# Patient Record
Sex: FEMALE | Race: WHITE | NOT HISPANIC OR LATINO | Employment: OTHER | URBAN - METROPOLITAN AREA
[De-identification: names, ages, dates, MRNs, and addresses within clinical notes are randomized per-mention and may not be internally consistent; named-entity substitution may affect disease eponyms.]

---

## 2017-01-04 ENCOUNTER — ANTICOAGULATION THERAPY VISIT (OUTPATIENT)
Dept: ANTICOAGULATION | Facility: CLINIC | Age: 67
End: 2017-01-04
Payer: COMMERCIAL

## 2017-01-04 DIAGNOSIS — I48.20 CHRONIC ATRIAL FIBRILLATION (H): ICD-10-CM

## 2017-01-04 DIAGNOSIS — Z79.01 LONG-TERM (CURRENT) USE OF ANTICOAGULANTS: Primary | ICD-10-CM

## 2017-01-04 LAB — INR POINT OF CARE: 2.9 (ref 0.86–1.14)

## 2017-01-04 PROCEDURE — 99207 ZZC NO CHARGE NURSE ONLY: CPT

## 2017-01-04 PROCEDURE — 36416 COLLJ CAPILLARY BLOOD SPEC: CPT

## 2017-01-04 PROCEDURE — 85610 PROTHROMBIN TIME: CPT | Mod: QW

## 2017-01-04 NOTE — PROGRESS NOTES
ANTICOAGULATION FOLLOW-UP CLINIC VISIT    Patient Name:  Latanya Padron  Date:  1/4/2017  Contact Type:  Face to Face    SUBJECTIVE:     Patient Findings     Positives No Problem Findings           OBJECTIVE    INR PROTIME   Date Value Ref Range Status   01/04/2017 2.9* 0.86 - 1.14 Final       ASSESSMENT / PLAN  INR assessment THER    Recheck INR In: 6 WEEKS    INR Location Clinic      Anticoagulation Summary as of 1/4/2017     INR goal 2.0-3.0   Selected INR 2.9 (1/4/2017)   Maintenance plan 5 mg (5 mg x 1) on Mon; 2.5 mg (5 mg x 0.5) all other days   Full instructions 5 mg on Mon; 2.5 mg all other days   Weekly total 20 mg   No change documented Francine Bowen RN   Plan last modified Francine Bowen RN (3/4/2016)   Next INR check 2/15/2017   Target end date     Indications   Long-term (current) use of anticoagulants [Z79.01] [Z79.01]  Atrial fibrillation (H) [I48.91]         Anticoagulation Episode Summary     INR check location     Preferred lab     Send INR reminders to [facility] POOL    Comments 5 mg tabs, likes card       Anticoagulation Care Providers     Provider Role Specialty Phone number    Glen Blue MD [facility] [phone]            See the Encounter Report to view Anticoagulation Flowsheet and Dosing Calendar (Go to Encounters tab in chart review, and find the Anticoagulation Therapy Visit)    Delete key to remove if not charging [id number]    Francine Bowen, LOVE

## 2017-02-14 ENCOUNTER — TELEPHONE (OUTPATIENT)
Dept: INTERNAL MEDICINE | Facility: CLINIC | Age: 67
End: 2017-02-14

## 2017-02-17 ENCOUNTER — TELEPHONE (OUTPATIENT)
Dept: INTERNAL MEDICINE | Facility: CLINIC | Age: 67
End: 2017-02-17

## 2017-02-17 DIAGNOSIS — Z53.9 ERRONEOUS ENCOUNTER--DISREGARD: Primary | ICD-10-CM

## 2017-02-17 RX ORDER — DILTIAZEM HYDROCHLORIDE 120 MG/1
CAPSULE, EXTENDED RELEASE ORAL
Qty: 90 CAPSULE | Refills: 3 | OUTPATIENT
Start: 2017-02-17

## 2017-02-17 NOTE — TELEPHONE ENCOUNTER
Pt is wondering if she can discontinue taking Lipitor. Stated her labs were good in Dec. Please call.

## 2017-02-17 NOTE — TELEPHONE ENCOUNTER
Per Dr. Green the good labs mean the medication is working and she should stay on her Lipitor. Patient notified. Caprice MCKEON

## 2017-02-21 ENCOUNTER — TELEPHONE (OUTPATIENT)
Dept: ANTICOAGULATION | Facility: CLINIC | Age: 67
End: 2017-02-21

## 2017-02-21 ENCOUNTER — ANTICOAGULATION THERAPY VISIT (OUTPATIENT)
Dept: ANTICOAGULATION | Facility: CLINIC | Age: 67
End: 2017-02-21
Payer: COMMERCIAL

## 2017-02-21 DIAGNOSIS — I48.20 CHRONIC ATRIAL FIBRILLATION (H): Primary | ICD-10-CM

## 2017-02-21 DIAGNOSIS — I48.20 CHRONIC ATRIAL FIBRILLATION (H): ICD-10-CM

## 2017-02-21 DIAGNOSIS — Z79.01 LONG-TERM (CURRENT) USE OF ANTICOAGULANTS: ICD-10-CM

## 2017-02-21 LAB — INR POINT OF CARE: 1.7 (ref 0.86–1.14)

## 2017-02-21 PROCEDURE — 36416 COLLJ CAPILLARY BLOOD SPEC: CPT

## 2017-02-21 PROCEDURE — 99207 ZZC NO CHARGE NURSE ONLY: CPT

## 2017-02-21 PROCEDURE — 85610 PROTHROMBIN TIME: CPT | Mod: QW

## 2017-02-21 NOTE — PROGRESS NOTES
ANTICOAGULATION FOLLOW-UP CLINIC VISIT    Patient Name:  Latanya Padron  Date:  2/21/2017  Contact Type:  Face to Face    SUBJECTIVE:     Patient Findings     Positives Change in diet/appetite (Increased greens in diet), Bruising (Bruising on bilat knees from fall, healing), No Problem Findings           OBJECTIVE    INR Protime   Date Value Ref Range Status   02/21/2017 1.7 (A) 0.86 - 1.14 Final       ASSESSMENT / PLAN  INR assessment SUB    Recheck INR In: 2 WEEKS    INR Location Clinic      Anticoagulation Summary as of 2/21/2017     INR goal 2.0-3.0   Today's INR 1.7!   Maintenance plan 5 mg (5 mg x 1) on Mon, Thu; 2.5 mg (5 mg x 0.5) all other days   Full instructions 2/21: 5 mg; Otherwise 5 mg on Mon, Thu; 2.5 mg all other days   Weekly total 22.5 mg   Plan last modified Nino Paz RN (2/21/2017)   Next INR check 3/7/2017   Target end date     Indications   Long-term (current) use of anticoagulants [Z79.01] [Z79.01]  Atrial fibrillation (H) [I48.91]         Anticoagulation Episode Summary     INR check location     Preferred lab     Send INR reminders to [facility] POOL    Comments 5 mg tabs, likes card       Anticoagulation Care Providers     Provider Role Specialty Phone number    Glen Blue MD [facility] [phone]            See the Encounter Report to view Anticoagulation Flowsheet and Dosing Calendar (Go to Encounters tab in chart review, and find the Anticoagulation Therapy Visit)    Dosage adjustment made based on physician directed care plan.    Nino Paz, RN

## 2017-02-21 NOTE — MR AVS SNAPSHOT
Latanya Padron   2/21/2017 10:45 AM   Anticoagulation Therapy Visit    Description:  66 year old female   Provider:  [facility] ANTI COAG   Department:  [facility]           INR as of 2/21/2017     Today's INR 1.7!      Anticoagulation Summary as of 2/21/2017     INR goal 2.0-3.0   Today's INR 1.7!   Full instructions 2/21: 5 mg; Otherwise 5 mg on Mon, Thu; 2.5 mg all other days   Next INR check 3/7/2017    Indications   Long-term (current) use of anticoagulants [Z79.01] [Z79.01]  Atrial fibrillation (H) [I48.91]         Your next Anticoagulation Clinic appointment(s)     Mar 07, 2017  2:15 PM CST   Anticoagulation Visit with PH ANTI COAG   [facility]    [address] [place] [address]   [phone]              Contact Numbers     Clinic Number:         February 2017 Details    Sun Mon Tue Wed Thu Fri Sat        1               2               3               4                 5               6               7               8               9               10               11                 12               13               14               15               16               17               18                 19               20               21      5 mg   See details      22      2.5 mg         23      5 mg         24      2.5 mg         25      2.5 mg           26      2.5 mg         27      5 mg         28      2.5 mg              Date Details   02/21 This INR check               How to take your warfarin dose     To take:  2.5 mg Take 0.5 of a 5 mg tablet.    To take:  5 mg Take 1 of the 5 mg tablets.           March 2017 Details    Sun Mon Tue Wed Thu Fri Sat        1      2.5 mg         2      5 mg         3      2.5 mg         4      2.5 mg           5      2.5 mg         6      5 mg         7            8               9               10               11                 12               13               14               15               16                17               18                 19               20               21               22               23               24               25                 26               27               28               29               30               31                 Date Details   No additional details    Date of next INR:  3/7/2017         How to take your warfarin dose     To take:  2.5 mg Take 0.5 of a 5 mg tablet.    To take:  5 mg Take 1 of the 5 mg tablets.

## 2017-03-07 ENCOUNTER — ANTICOAGULATION THERAPY VISIT (OUTPATIENT)
Dept: ANTICOAGULATION | Facility: CLINIC | Age: 67
End: 2017-03-07
Payer: COMMERCIAL

## 2017-03-07 DIAGNOSIS — I10 HYPERTENSION, GOAL BELOW 140/90: ICD-10-CM

## 2017-03-07 DIAGNOSIS — Z79.01 LONG-TERM (CURRENT) USE OF ANTICOAGULANTS: ICD-10-CM

## 2017-03-07 DIAGNOSIS — J43.9 PULMONARY EMPHYSEMA, UNSPECIFIED EMPHYSEMA TYPE (H): ICD-10-CM

## 2017-03-07 DIAGNOSIS — I48.20 CHRONIC ATRIAL FIBRILLATION (H): ICD-10-CM

## 2017-03-07 LAB — INR POINT OF CARE: 2.2 (ref 0.86–1.14)

## 2017-03-07 PROCEDURE — 99207 ZZC NO CHARGE NURSE ONLY: CPT

## 2017-03-07 PROCEDURE — 85610 PROTHROMBIN TIME: CPT | Mod: QW

## 2017-03-07 PROCEDURE — 36416 COLLJ CAPILLARY BLOOD SPEC: CPT

## 2017-03-07 NOTE — TELEPHONE ENCOUNTER
mometasone-formoterol (DULERA) 200-5 MCG/ACT oral inhaler       Last Written Prescription Date: 12/28/15  Last Fill Quantity: 13, # refills: 11    Last [place] [facility] or  Health prescribing provider:  12/19/16   Future Office Visit:       Date of Last Asthma Action Plan Letter:   There are no preventive care reminders to display for this patient.   Asthma Control Test: No flowsheet data found.    Date of Last Spirometry Test:   No results found for this or any previous visit.    lisinopril (PRINIVIL,ZESTRIL) 2.5 MG tablet      Last Written Prescription Date: 11/21/16  Last Fill Quantity: 90, # refills: 0  Last Office Visit [place] [facility] or  Health prescribing provider: 12/19/16       Potassium   Date Value Ref Range Status   12/05/2016 4.3 3.4 - 5.3 mmol/L Final     Creatinine   Date Value Ref Range Status   12/05/2016 0.90 0.52 - 1.04 mg/dL Final     BP Readings from Last 3 Encounters:   12/19/16 108/68   12/05/16 112/80   10/18/16 126/82     hydrochlorothiazide (HYDRODIURIL) 25 MG tablet      Last Written Prescription Date: 11/21/16  Last Fill Quantity: 90, # refills: 0  Last [place] Visit [place] [facility] or  Health prescribing provider: 12/19/16       Potassium   Date Value Ref Range Status   12/05/2016 4.3 3.4 - 5.3 mmol/L Final     Creatinine   Date Value Ref Range Status   12/05/2016 0.90 0.52 - 1.04 mg/dL Final     BP Readings from Last 3 Encounters:   12/19/16 108/68   12/05/16 112/80   10/18/16 126/82

## 2017-03-07 NOTE — PROGRESS NOTES
ANTICOAGULATION FOLLOW-UP CLINIC VISIT    Patient Name:  Latanya Padron  Date:  3/7/2017  Contact Type:  Face to Face    SUBJECTIVE:     Patient Findings     Positives No Problem Findings           OBJECTIVE    INR Protime   Date Value Ref Range Status   03/07/2017 2.2 (A) 0.86 - 1.14 Final       ASSESSMENT / PLAN  INR assessment THER    Recheck INR In: 6 WEEKS    INR Location Clinic      Anticoagulation Summary as of 3/7/2017     INR goal 2.0-3.0   Today's INR 2.2   Maintenance plan 5 mg (5 mg x 1) on Mon, Thu; 2.5 mg (5 mg x 0.5) all other days   Full instructions 5 mg on Mon, Thu; 2.5 mg all other days   Weekly total 22.5 mg   No change documented Francine Bowen RN   Plan last modified Nino Paz RN (2/21/2017)   Next INR check 4/18/2017   Target end date     Indications   Long-term (current) use of anticoagulants [Z79.01] [Z79.01]  Atrial fibrillation (H) [I48.91]         Anticoagulation Episode Summary     INR check location     Preferred lab     Send INR reminders to [facility] POOL    Comments 5 mg tabs, likes card       Anticoagulation Care Providers     Provider Role Specialty Phone number    Glen Blue MD [facility] [phone]            See the Encounter Report to view Anticoagulation Flowsheet and Dosing Calendar (Go to Encounters tab in chart review, and find the Anticoagulation Therapy Visit)    Dosage adjustment made based on physician directed care plan.      Francine Bowen, RN

## 2017-03-07 NOTE — MR AVS SNAPSHOT
Latanya Padron   3/7/2017 2:15 PM   Anticoagulation Therapy Visit    Description:  66 year old female   Provider:  [facility]   Department:  [facility]           INR as of 3/7/2017     Today's INR 2.2      Anticoagulation Summary as of 3/7/2017     INR goal 2.0-3.0   Today's INR 2.2   Full instructions 5 mg on Mon, Thu; 2.5 mg all other days   Next INR check 4/18/2017    Indications   Long-term (current) use of anticoagulants [Z79.01] [Z79.01]  Atrial fibrillation (H) [I48.91]         Your next Anticoagulation Clinic appointment(s)     Apr 18, 2017 11:00 AM CDT   Anticoagulation Visit with DALIA ANTI COAALISTAIR   [facility]    [address] [place] [id number] [phone]              Contact Numbers     Clinic Number:         March 2017 Details    Sun Mon Tue Wed Thu Fri Sat        1               2               3               4                 5               6               7      2.5 mg   See details      8      2.5 mg         9      5 mg         10      2.5 mg         11      2.5 mg           12      2.5 mg         13      5 mg         14      2.5 mg         15      2.5 mg         16      5 mg         17      2.5 mg         18      2.5 mg           19      2.5 mg         20      5 mg         21      2.5 mg         22      2.5 mg         23      5 mg         24      2.5 mg         25      2.5 mg           26      2.5 mg         27      5 mg         28      2.5 mg         29      2.5 mg         30      5 mg         31      2.5 mg           Date Details   03/07 This INR check               How to take your warfarin dose     To take:  2.5 mg Take 0.5 of a 5 mg tablet.    To take:  5 mg Take 1 of the 5 mg tablets.           April 2017 Details    Sun Mon Tue Wed Thu Fri Sat           1      2.5 mg           2      2.5 mg         3      5 mg         4      2.5 mg         5      2.5 mg         6      5 mg         7      2.5 mg         8      2.5 mg            9      2.5 mg         10      5 mg         11      2.5 mg         12      2.5 mg         13      5 mg         14      2.5 mg         15      2.5 mg           16      2.5 mg         17      5 mg         18            19               20               21               22                 23               24               25               26               27               28               29                 30                      Date Details   No additional details    Date of next INR:  4/18/2017         How to take your warfarin dose     To take:  2.5 mg Take 0.5 of a 5 mg tablet.    To take:  5 mg Take 1 of the 5 mg tablets.

## 2017-03-08 RX ORDER — LISINOPRIL 2.5 MG/1
TABLET ORAL
Qty: 90 TABLET | Refills: 2 | Status: SHIPPED | OUTPATIENT
Start: 2017-03-08 | End: 2017-06-21

## 2017-03-08 RX ORDER — HYDROCHLOROTHIAZIDE 25 MG/1
TABLET ORAL
Qty: 90 TABLET | Refills: 2 | Status: SHIPPED | OUTPATIENT
Start: 2017-03-08 | End: 2017-06-21

## 2017-03-08 RX ORDER — MOMETASONE FUROATE AND FORMOTEROL FUMARATE DIHYDRATE 200; 5 UG/1; UG/1
AEROSOL RESPIRATORY (INHALATION)
Qty: 13 G | Refills: 8 | Status: SHIPPED | OUTPATIENT
Start: 2017-03-08 | End: 2017-06-21

## 2017-03-08 NOTE — TELEPHONE ENCOUNTER
Prescription approved per [facility] Refill Protocol.............LOVE Barnhart

## 2017-03-21 DIAGNOSIS — M81.0 OSTEOPOROSIS: ICD-10-CM

## 2017-03-21 NOTE — TELEPHONE ENCOUNTER
Vitamin d      Last Written Prescription Date: 12/5/16  Last Fill Quantity: 100,  # refills: 0   Last Office Visit with [facility] or [facility] prescribing provider: 12/19/16                                             Gail Becerra MA 3/21/2017

## 2017-03-22 RX ORDER — OMEGA-3S/DHA/EPA/FISH OIL/D3 300MG-1000
CAPSULE ORAL
Qty: 100 TABLET | Refills: 10 | Status: SHIPPED | OUTPATIENT
Start: 2017-03-22 | End: 2019-09-11 | Stop reason: DRUGHIGH

## 2017-03-22 NOTE — TELEPHONE ENCOUNTER
Prescription approved per [facility] [place].  Kathy Ramon RN

## 2017-04-05 ENCOUNTER — TELEPHONE (OUTPATIENT)
Dept: FAMILY MEDICINE | Facility: CLINIC | Age: 67
End: 2017-04-05

## 2017-04-05 NOTE — TELEPHONE ENCOUNTER
Please advise   You have Same day and acute and a dr only that afternoon.    Gail Becerra MA 4/5/2017

## 2017-04-05 NOTE — TELEPHONE ENCOUNTER
Patient.'s apt was rescheduled per her request and confirmed with patient VORB Dr. Glen Blue/Brenda Carlin CMA (AAMA)

## 2017-04-17 ENCOUNTER — ANTICOAGULATION THERAPY VISIT (OUTPATIENT)
Dept: ANTICOAGULATION | Facility: CLINIC | Age: 67
End: 2017-04-17
Payer: COMMERCIAL

## 2017-04-17 ENCOUNTER — OFFICE VISIT (OUTPATIENT)
Dept: FAMILY MEDICINE | Facility: CLINIC | Age: 67
End: 2017-04-17
Payer: COMMERCIAL

## 2017-04-17 VITALS
SYSTOLIC BLOOD PRESSURE: 124 MMHG | HEART RATE: 90 BPM | WEIGHT: 209 LBS | TEMPERATURE: 96.8 F | HEIGHT: 62 IN | DIASTOLIC BLOOD PRESSURE: 64 MMHG | OXYGEN SATURATION: 97 % | RESPIRATION RATE: 14 BRPM | BODY MASS INDEX: 38.46 KG/M2

## 2017-04-17 DIAGNOSIS — J43.9 PULMONARY EMPHYSEMA, UNSPECIFIED EMPHYSEMA TYPE (H): ICD-10-CM

## 2017-04-17 DIAGNOSIS — M17.0 PRIMARY OSTEOARTHRITIS OF BOTH KNEES: Primary | ICD-10-CM

## 2017-04-17 DIAGNOSIS — Z79.01 LONG-TERM (CURRENT) USE OF ANTICOAGULANTS: ICD-10-CM

## 2017-04-17 DIAGNOSIS — I48.20 CHRONIC ATRIAL FIBRILLATION (H): ICD-10-CM

## 2017-04-17 DIAGNOSIS — E66.01 MORBID OBESITY, UNSPECIFIED OBESITY TYPE (H): ICD-10-CM

## 2017-04-17 DIAGNOSIS — F32.5 MAJOR DEPRESSION IN COMPLETE REMISSION (H): ICD-10-CM

## 2017-04-17 LAB — INR POINT OF CARE: 2.5 (ref 0.86–1.14)

## 2017-04-17 PROCEDURE — 36416 COLLJ CAPILLARY BLOOD SPEC: CPT

## 2017-04-17 PROCEDURE — 99214 OFFICE O/P EST MOD 30 MIN: CPT | Performed by: FAMILY MEDICINE

## 2017-04-17 PROCEDURE — 85610 PROTHROMBIN TIME: CPT | Mod: QW

## 2017-04-17 PROCEDURE — 99207 ZZC NO CHARGE NURSE ONLY: CPT

## 2017-04-17 ASSESSMENT — PAIN SCALES - GENERAL: PAINLEVEL: NO PAIN (0)

## 2017-04-17 NOTE — NURSING NOTE
"Chief Complaint   Patient presents with     Leg Pain     c/o knee and leg pain     COPD     Recheck       Initial /64 (BP Location: Left arm, Patient Position: Chair, Cuff Size: Adult Regular)  Pulse 90  Temp 96.8  F (36  C) (Temporal)  Resp 14  Ht 5' 1.5\" (1.562 m)  Wt 209 lb (94.8 kg)  SpO2 97%  BMI 38.85 kg/m2 Estimated body mass index is 38.85 kg/(m^2) as calculated from the following:    Height as of this encounter: 5' 1.5\" (1.562 m).    Weight as of this encounter: 209 lb (94.8 kg).  Medication Reconciliation: complete   Gail Becerra MA 4/17/2017        "

## 2017-04-17 NOTE — MR AVS SNAPSHOT
After Visit Summary   4/17/2017    Latanya Padron    MRN: [id number]           Patient Information     Date Of Birth          1950        Visit Information        Provider Department      4/17/2017 2:45 PM Glen Blue MD [facility]        Today's Diagnoses     Long-term (current) use of anticoagulants [Z79.01]    -  1    Chronic atrial fibrillation (H)        Pulmonary emphysema, unspecified emphysema type (H)        Disorder of bone and cartilage          Care Instructions    Knees let me know if you would like to try injections for the knees  All else the same..            Follow-ups after your visit        Additional Services     INR CLINIC REFERRAL       Your provider has referred you to INR Services.    Please be aware that coverage of these services is subject to the terms and limitations of your health insurance plan.  Call member services at your health plan with any benefit or coverage questions.    Indication for Anticoagulation: Atrial Fibrillation  If nonstandard INR is desired, indicate goal range and explanation:   Expected Duration of Therapy: Lifetime                  Your next 10 appointments already scheduled     May 31, 2017  2:00 PM CDT   Anticoagulation Visit with PH ANTI COAG   [facility] ([address] [id number] [phone]            Jun 21, 2017  2:15 PM CDT   SHORT with Glen Blue MD   [facility] ([address] [address] [phone]              Who to contact     If you have questions or need follow up information about today's clinic visit or your schedule please contact [facility] directly at [phone].  Normal or non-critical lab and imaging results will be communicated to you by MyChart, letter or phone within 4 business days after the clinic has received the results. If you  "do not hear from us within 7 days, please contact the clinic through [employer] or phone. If you have a critical or abnormal lab result, we will notify you by phone as soon as possible.  Submit refill requests through [employer] or call your pharmacy and they will forward the refill request to us. Please allow 3 business days for your refill to be completed.          Additional Information About Your Visit        [employer] Information     [employer] gives you secure access to your electronic health record. If you see a primary care provider, you can also send messages to your care team and make appointments. If you have questions, please call your primary care clinic.  If you do not have a primary care provider, please call [phone] and they will assist you.        Care EveryWhere ID     This is your Care EveryWhere ID. This could be used by other organizations to access your [facility] medical records  [id number]        Your Vitals Were     Pulse Temperature Respirations Height Pulse Oximetry BMI (Body Mass Index)    90 96.8  F (36  C) (Temporal) 14 5' 1.5\" (1.562 m) 97% 38.85 kg/m2       Blood Pressure from Last 3 Encounters:   04/17/17 124/64   12/19/16 108/68   12/05/16 112/80    Weight from Last 3 Encounters:   04/17/17 209 lb (94.8 kg)   12/19/16 210 lb (95.3 kg)   12/05/16 210 lb (95.3 kg)              We Performed the Following     COPD ACTION PLAN     INR CLINIC REFERRAL        Primary Care Provider Office Phone # Fax #    Glen Blue [phone]       [facility] [address] [place] [address]        Thank you!     Thank you for choosing [facility]  for your care. Our goal is always to provide you with excellent care. Hearing back from our patients is one way we can continue to improve our services. Please take a few minutes to complete the written survey that you may receive in the mail after your visit with us. Thank you!             Your Updated " Medication List - Protect others around you: Learn how to safely use, store and throw away your medicines at www.disposemymeds.org.          This list is accurate as of: 4/17/17  3:25 PM.  Always use your most recent med list.                   Brand Name Dispense Instructions for use    * albuterol (2.5 MG/3ML) 0.083% neb solution     75 mL    Take  by nebulization. 1 NEB EVERY 4-6 HOURS AS NEEDED       * albuterol 108 (90 BASE) MCG/ACT Inhaler    PROAIR HFA/PROVENTIL HFA/VENTOLIN HFA    2 Inhaler    1-2 puffs by mouth every 2-4 hours as needed       alendronate 70 MG tablet    FOSAMAX    4 tablet    Take 1 tablet (70 mg) by mouth every 7 days Take 60 minutes before am meal with 8 oz. water. Remain upright for 30 minutes.       amoxicillin 250 MG capsule    AMOXIL    21 capsule    Take 1 capsule (250 mg) by mouth 3 times daily       ASPIRIN NOT PRESCRIBED    INTENTIONAL    0 each    1 each continuous prn for other Reported on 4/17/2017       atorvastatin 10 MG tablet    LIPITOR    30 tablet    TAKE ONE TABLET BY MOUTH EVERY DAY       cholecalciferol 400 UNITS tablet    Vitamin D    100 tablet    TAKE ONE TABLET BY MOUTH EVERY DAY       diltiazem 120 MG 24 hr capsule    CARTIA XT    90 capsule    TAKE ONE CAPSULE BY MOUTH EVERY DAY (DUE FOR FOLLOW-UP APPOINTMENT)       DULERA 200-5 MCG/ACT oral inhaler   Generic drug:  mometasone-formoterol     13 g    INHALE 2 PUFFS BY MOUTH TWO TIMES A DAY       fish oil-omega-3 fatty acids 1000 MG capsule     180 capsule    Take  by mouth. Take 1 capsule by mouth daily       hydrochlorothiazide 25 MG tablet    HYDRODIURIL    90 tablet    TAKE ONE TABLET BY MOUTH EVERY DAY       lisinopril 2.5 MG tablet    PRINIVIL/Zestril    90 tablet    TAKE ONE TABLET BY MOUTH EVERY DAY       methylPREDNISolone 4 MG tablet    MEDROL DOSEPAK    21 tablet    Follow package instructions       MULTIPLE vitamin  S/WOMENS Tabs     30 tablet    Daily       order for DME      Equipment being ordered:  Oxygen @ 2 liters with exertion       umeclidinium 62.5 MCG/INH oral inhaler    INCRUSE ELLIPTA    30 each    Inhale 1 capsule (62.5 mcg) into the lungs daily       venlafaxine 37.5 MG 24 hr capsule    EFFEXOR-XR    90 capsule    TAKE ONE CAPSULE BY MOUTH EVERY DAY WITH FOOD       warfarin 5 MG tablet    JANTOVEN    60 tablet    Take 5 mg Mon and 2.5 mg other 6 days or as directed by coumadin clinic       * Notice:  This list has 2 medication(s) that are the same as other medications prescribed for you. Read the directions carefully, and ask your doctor or other care provider to review them with you.

## 2017-04-17 NOTE — LETTER
My COPD Action Plan   Name: Latanya Padron    YOB: 1950   Date: 4/17/2017    My doctor: Glen Blue MD   My clinic: [address] [address] [phone]  My Controller Medicine: { :453174}   Dose: ***     My Rescue Medicine: { :280811}   Dose: ***     My Flare Up Medicine: { :220372}   Dose: ***  My COPD Severity: { :441373}     Use of Oxygen: { :083861}        GREEN ZONE       Doing well today      Usual level of activity and exercise    Usual amount of cough and mucus    No shortness of breath    Usual level of health (thinking clearly, sleeping well, feel like eating) Actions:      Take daily medicines    Use oxygen as prescribed    Follow regular exercise and diet plan    Avoid cigarette smoke and other irritants that harm the lungs           YELLOW ZONE          Having a bad day or flare up      Short of breath more than usual    A lot more sputum (mucus) than usual    Sputum looks yellow, green, tan, brown or bloody    More coughing or wheezing    Fever or chills    Less energy; trouble completing activities    Trouble thinking or focusing    Using quick relief inhaler or nebulizer more often    Poor sleep; symptoms wake me up    Do not feel like eating Actions:      Get plenty of rest    Take daily medicines    Use quick relief inhaler every *** hours    If you use oxygen, call you doctor to see if you should adjust your oxygen    Do breathing exercises or other things to help you relax    Let a loved one, friend or neighbor know you are feeling worse    Call your care team if you have 2 or more symptoms.  Start taking steroids or antibiotics if directed by your care team           RED ZONE       Need medical care now      Severe shortness of breath (feel you can't breathe)    Fever, chills    Not enough breath to do any activity    Trouble coughing up mucus, walking or talking    Blood in mucus    Frequent coughing   Rescue medicines  are not working    Not able to sleep because of breathing    Feel confused or drowsy    Chest pain    Actions:      Call your health care team.  If you cannot reach your care team, call 911 or go to the emergency room.        Electronically signed by: Brenda Carlin, April 17, 2017  Annual Reminders:  Meet with Care Team, Flu Shot every Fall and Pneumonia Shot at least once  Pharmacy:    [id number] - [place], [place] - [address] [facility] [place], [place] - [address] [place] [address]

## 2017-04-17 NOTE — PROGRESS NOTES
ANTICOAGULATION FOLLOW-UP CLINIC VISIT    Patient Name:  Latanya Padron  Date:  4/17/2017  Contact Type:  Face to Face    SUBJECTIVE:     Patient Findings     Positives No Problem Findings           OBJECTIVE    INR Protime   Date Value Ref Range Status   04/17/2017 2.5 (A) 0.86 - 1.14 Final       ASSESSMENT / PLAN  INR assessment THER    Recheck INR In: 6 WEEKS    INR Location Clinic      Anticoagulation Summary as of 4/17/2017     INR goal 2.0-3.0   Today's INR 2.5   Maintenance plan 5 mg (5 mg x 1) on Mon, Thu; 2.5 mg (5 mg x 0.5) all other days   Full instructions 5 mg on Mon, Thu; 2.5 mg all other days   Weekly total 22.5 mg   No change documented Nino Paz RN   Plan last modified Nino Paz RN (2/21/2017)   Next INR check 5/31/2017   Target end date     Indications   Long-term (current) use of anticoagulants [Z79.01] [Z79.01]  Atrial fibrillation (H) [I48.91]         Anticoagulation Episode Summary     INR check location     Preferred lab     Send INR reminders to [facility] POOL    Comments 5 mg tabs, likes card       Anticoagulation Care Providers     Provider Role Specialty Phone number    Glen Blue MD [facility] Practice [phone]            See the Encounter Report to view Anticoagulation Flowsheet and Dosing Calendar (Go to Encounters tab in chart review, and find the Anticoagulation Therapy Visit)    Dosage adjustment made based on physician directed care plan.    Nino Paz, RN

## 2017-04-17 NOTE — PROGRESS NOTES
"  SUBJECTIVE:                                                    Latanya Padron is a 67 year old female who presents to clinic today for the following health issues:      COPD Follow-Up    Symptoms are currently: stable sleeps with oxygen all night    Current fatigue or dyspnea with ambulation: stable     Shortness of breath: depends on situation    Increased or change in Cough/Sputum: No    Fever(s): No    Baseline ambulation without stopping to rest 1 to 1.5 blocks. Able to walk up 1 flights of stairs without stopping to rest.    Any ER/UC or hospital admissions since your last visit? No     History   Smoking Status     Former Smoker     Packs/day: 1.00     Years: 30.00     Quit date: 10/25/2005   Smokeless Tobacco     Never Used     Lab Results   Component Value Date    FEV1 0.50 04/05/2010    AVF7XDS 0.33 04/05/2010        C/o leg and knee pain:    Amount of exercise or physical activity: 2-3 days/week for an average of 30-45 minutes    Problems taking medications regularly: No    Medication side effects: none    Diet: weight watchers          Problem list and histories reviewed & adjusted, as indicated.  Additional history: none    Relieved mother is now in assisted living, less stress in care giving and with siblings who now see how much their mother has changed with dementia present.     Reviewed and updated as needed this visit by clinical staff       Reviewed and updated as needed this visit by Provider         ROS:  Constitutional, HEENT, cardiovascular, pulmonary, gi and gu systems are negative, except as otherwise noted.    OBJECTIVE:                                                    /64 (BP Location: Left arm, Patient Position: Chair, Cuff Size: Adult Regular)  Pulse 90  Temp 96.8  F (36  C) (Temporal)  Resp 14  Ht 5' 1.5\" (1.562 m)  Wt 209 lb (94.8 kg)  SpO2 97%  BMI 38.85 kg/m2  Body mass index is 38.85 kg/(m^2).  GENERAL: healthy, alert and no distress  NECK: no adenopathy, no " "asymmetry, masses, or scars and thyroid normal to palpation  RESP: lungs clear to auscultation - no rales, rhonchi or wheezes  CV: regular rate and rhythm, normal S1 S2, no S3 or S4, no murmur, click or rub, no peripheral edema and peripheral pulses strong  MS: painful knees with walking/steps.  Tender to palpate medial joint areas both knees. No swelling knees. Hips seem ok. Feet ok. No or minimal leg swelling  NEURO: Normal strength and tone and mentation intact    Diagnostic Test Results:  none      ASSESSMENT/PLAN:                                                    COPD: Moderate = FeV1 < 79% -50%, controlled  Associated with the following complications:  Supplemental Oxygen:  At night so sleeps better and able to be more active in daytime    Plan:  No changes in the patient's current treatment plan      COPD education: www.lungmn.org        BMI:   Estimated body mass index is 38.85 kg/(m^2) as calculated from the following:    Height as of this encounter: 5' 1.5\" (1.562 m).    Weight as of this encounter: 209 lb (94.8 kg).   Weight management plan: Discussed healthy diet and exercise guidelines and patient will follow up in 6 months in clinic to re-evaluate.    (M17.0) Primary osteoarthritis of both knees  (primary encounter diagnosis)  Comment: does not want to consider replacement. Will treat symptoms and knows injections may help  Plan: sports med consult for injection if desired. Discussed some of risks and benefits    (Z79.01) Long-term (current) use of anticoagulants [Z79.01]  Comment:   Plan: continue    (I48.2) Chronic atrial fibrillation (H)  Comment: controlled and doing well  Plan: INR CLINIC REFERRAL        As above    (J43.9) Pulmonary emphysema, unspecified emphysema type (H)  Comment: best I have seen her for a long time  Plan: COPD ACTION PLAN        Will continue night time oxygen use    (F32.5) Major depression in complete remission (H)  Comment: helps having mother's situation in control and " no longer work stres  Plan: stay with antidepressant.    (E66.01) Morbid obesity, unspecified obesity type (H)  Comment: knows this affects knees and will continue to try fewer calories and activity as able  Plan: above      MEDICATIONS:  Continue current medications without change  Work on weight loss  Patient Instructions   Knees let me know if you would like to try injections for the knees  All else the same..        Time spent with greater than 50% spent in discussion, making the plan, or filling out paperwork with patient for illness/treatments was 25 minutes or more. First time seeing her for a while and needed to update with records and discussion  Glen Blue MD  [facility]

## 2017-04-17 NOTE — MR AVS SNAPSHOT
Latanya Padron   4/17/2017 2:15 PM   Anticoagulation Therapy Visit    Description:  67 year old female   Provider:  DALIA ANTI COAG   Department:  [facility]           INR as of 4/17/2017     Today's INR 2.5      Anticoagulation Summary as of 4/17/2017     INR goal 2.0-3.0   Today's INR 2.5   Full instructions 5 mg on Mon, Thu; 2.5 mg all other days   Next INR check 5/31/2017    Indications   Long-term (current) use of anticoagulants [Z79.01] [Z79.01]  Atrial fibrillation (H) [I48.91]         Your next Anticoagulation Clinic appointment(s)     May 31, 2017  2:00 PM CDT   Anticoagulation Visit with DALIA ANTI CHERYL   [facility]    [address] [place] [id number] [phone]              Contact Numbers     Clinic Number:         April 2017 Details    Sun Mon Tue Wed Thu Fri Sat           1                 2               3               4               5               6               7               8                 9               10               11               12               13               14               15                 16               17      5 mg   See details      18      2.5 mg         19      2.5 mg         20      5 mg         21      2.5 mg         22      2.5 mg           23      2.5 mg         24      5 mg         25      2.5 mg         26      2.5 mg         27      5 mg         28      2.5 mg         29      2.5 mg           30      2.5 mg                Date Details   04/17 This INR check               How to take your warfarin dose     To take:  2.5 mg Take 0.5 of a 5 mg tablet.    To take:  5 mg Take 1 of the 5 mg tablets.           May 2017 Details    Sun Mon Tue Wed Thu Fri Sat      1      5 mg         2      2.5 mg         3      2.5 mg         4      5 mg         5      2.5 mg         6      2.5 mg           7      2.5 mg         8      5 mg         9      2.5 mg         10      2.5 mg         11      5 mg         12       2.5 mg         13      2.5 mg           14      2.5 mg         15      5 mg         16      2.5 mg         17      2.5 mg         18      5 mg         19      2.5 mg         20      2.5 mg           21      2.5 mg         22      5 mg         23      2.5 mg         24      2.5 mg         25      5 mg         26      2.5 mg         27      2.5 mg           28      2.5 mg         29      5 mg         30      2.5 mg         31                Date Details   No additional details    Date of next INR:  5/31/2017         How to take your warfarin dose     To take:  2.5 mg Take 0.5 of a 5 mg tablet.    To take:  5 mg Take 1 of the 5 mg tablets.

## 2017-04-18 DIAGNOSIS — Z79.01 LONG-TERM (CURRENT) USE OF ANTICOAGULANTS: ICD-10-CM

## 2017-04-18 DIAGNOSIS — I48.20 CHRONIC ATRIAL FIBRILLATION (H): ICD-10-CM

## 2017-04-18 PROBLEM — M17.0 PRIMARY OSTEOARTHRITIS OF BOTH KNEES: Status: ACTIVE | Noted: 2017-04-18

## 2017-04-18 NOTE — TELEPHONE ENCOUNTER
Feliciano     Last Written Prescription Date: 10/28/2016  Last Fill Qty: 60, # refills: 2   (lost her last supply she picked up)  Last Office Visit with [facility], [facility] or [facility] prescribing provider: 04/17/2017  Next 5 appointments (look out 90 days)     Jun 21, 2017  2:15 PM CDT   SHORT with Glen Blue MD   [facility]    [address] [place] [id number] [phone]                   Date and Result of Last PT/INR:   Lab Results   Component Value Date    INR 2.5 04/17/2017    INR 2.2 03/07/2017        Thanks  Delia Garcia [facility]   [phone]

## 2017-04-19 RX ORDER — WARFARIN SODIUM 5 MG/1
TABLET ORAL
Qty: 60 TABLET | Refills: 1 | Status: SHIPPED | OUTPATIENT
Start: 2017-04-19 | End: 2017-06-21

## 2017-05-31 ENCOUNTER — ANTICOAGULATION THERAPY VISIT (OUTPATIENT)
Dept: ANTICOAGULATION | Facility: CLINIC | Age: 67
End: 2017-05-31
Payer: COMMERCIAL

## 2017-05-31 DIAGNOSIS — I48.20 CHRONIC ATRIAL FIBRILLATION (H): ICD-10-CM

## 2017-05-31 DIAGNOSIS — Z79.01 LONG-TERM (CURRENT) USE OF ANTICOAGULANTS: ICD-10-CM

## 2017-05-31 LAB — INR POINT OF CARE: 2.2 (ref 0.86–1.14)

## 2017-05-31 PROCEDURE — 99207 ZZC NO CHARGE NURSE ONLY: CPT

## 2017-05-31 PROCEDURE — 85610 PROTHROMBIN TIME: CPT | Mod: QW

## 2017-05-31 PROCEDURE — 36416 COLLJ CAPILLARY BLOOD SPEC: CPT

## 2017-05-31 NOTE — MR AVS SNAPSHOT
Latanya Padron   5/31/2017 2:00 PM   Anticoagulation Therapy Visit    Description:  67 year old female   Provider:  DALIA ANTI CHERYL   Department:  [facility]           INR as of 5/31/2017     Today's INR 2.2      Anticoagulation Summary as of 5/31/2017     INR goal 2.0-3.0   Today's INR 2.2   Full instructions 5 mg on Mon, Thu; 2.5 mg all other days   Next INR check 7/12/2017    Indications   Long-term (current) use of anticoagulants [Z79.01] [Z79.01]  Atrial fibrillation (H) [I48.91]         Your next Anticoagulation Clinic appointment(s)     Jul 12, 2017  8:30 AM CDT   Anticoagulation Visit with PH ANTI COAG   [facility]    [address] [place] [id number] [phone]              Contact Numbers     Clinic Number:         May 2017 Details    Sun Mon Tue Wed Thu Fri Sat      1               2               3               4               5               6                 7               8               9               10               11               12               13                 14               15               16               17               18               19               20                 21               22               23               24               25               26               27                 28               29               30               31      2.5 mg   See details          Date Details   05/31 This INR check               How to take your warfarin dose     To take:  2.5 mg Take 0.5 of a 5 mg tablet.           June 2017 Details    Sun Mon Tue Wed Thu Fri Sat         1      5 mg         2      2.5 mg         3      2.5 mg           4      2.5 mg         5      5 mg         6      2.5 mg         7      2.5 mg         8      5 mg         9      2.5 mg         10      2.5 mg           11      2.5 mg         12      5 mg         13      2.5 mg         14      2.5 mg         15      5 mg         16      2.5 mg          17      2.5 mg           18      2.5 mg         19      5 mg         20      2.5 mg         21      2.5 mg         22      5 mg         23      2.5 mg         24      2.5 mg           25      2.5 mg         26      5 mg         27      2.5 mg         28      2.5 mg         29      5 mg         30      2.5 mg           Date Details   No additional details            How to take your warfarin dose     To take:  2.5 mg Take 0.5 of a 5 mg tablet.    To take:  5 mg Take 1 of the 5 mg tablets.           July 2017 Details    Sun Mon Tue Wed Thu Fri Sat           1      2.5 mg           2      2.5 mg         3      5 mg         4      2.5 mg         5      2.5 mg         6      5 mg         7      2.5 mg         8      2.5 mg           9      2.5 mg         10      5 mg         11      2.5 mg         12            13               14               15                 16               17               18               19               20               21               22                 23               24               25               26               27               28               29                 30               31                     Date Details   No additional details    Date of next INR:  7/12/2017         How to take your warfarin dose     To take:  2.5 mg Take 0.5 of a 5 mg tablet.    To take:  5 mg Take 1 of the 5 mg tablets.

## 2017-05-31 NOTE — PROGRESS NOTES
ANTICOAGULATION FOLLOW-UP CLINIC VISIT    Patient Name:  Latanya Padron  Date:  5/31/2017  Contact Type:  Face to Face    SUBJECTIVE:     Patient Findings     Positives No Problem Findings           OBJECTIVE    INR Protime   Date Value Ref Range Status   05/31/2017 2.2 (A) 0.86 - 1.14 Final       ASSESSMENT / PLAN  INR assessment THER    Recheck INR In: 6 WEEKS    INR Location Clinic      Anticoagulation Summary as of 5/31/2017     INR goal 2.0-3.0   Today's INR 2.2   Maintenance plan 5 mg (5 mg x 1) on Mon, Thu; 2.5 mg (5 mg x 0.5) all other days   Full instructions 5 mg on Mon, Thu; 2.5 mg all other days   Weekly total 22.5 mg   No change documented Francine Bowen RN   Plan last modified Nino Paz RN (2/21/2017)   Next INR check 7/12/2017   Target end date     Indications   Long-term (current) use of anticoagulants [Z79.01] [Z79.01]  Atrial fibrillation (H) [I48.91]         Anticoagulation Episode Summary     INR check location     Preferred lab     Send INR reminders to [facility] POOL    Comments 5 mg tabs, likes card       Anticoagulation Care Providers     Provider Role Specialty Phone number    Glen Blue MD [facility] [phone]            See the Encounter Report to view Anticoagulation Flowsheet and Dosing Calendar (Go to Encounters tab in chart review, and find the Anticoagulation Therapy Visit)    Dosage adjustment made based on physician directed care plan.      Francine Bowen, RN

## 2017-06-03 DIAGNOSIS — J43.9 PULMONARY EMPHYSEMA, UNSPECIFIED EMPHYSEMA TYPE (H): Primary | ICD-10-CM

## 2017-06-05 DIAGNOSIS — F32.5 MAJOR DEPRESSION IN COMPLETE REMISSION (H): ICD-10-CM

## 2017-06-05 RX ORDER — ALBUTEROL SULFATE 90 UG/1
AEROSOL, METERED RESPIRATORY (INHALATION)
Qty: 2 INHALER | Refills: 11 | Status: SHIPPED | OUTPATIENT
Start: 2017-06-05 | End: 2019-05-13

## 2017-06-06 NOTE — TELEPHONE ENCOUNTER
venlafaxine (EFFEXOR-XR) 37.5 MG 24 hr capsule    Last Written Prescription Date: 11/21/16  Last Fill Quantity: 90, # refills: 1  Last Office Visit with [facility], P or [facility] prescribing provider: 4/17/17   Next 5 appointments (look out 90 days)     Jun 21, 2017  2:15 PM CDT   SHORT with Glen Blue MD   [facility]    [address] [place] [id number] [phone]                   BP Readings from Last 3 Encounters:   04/17/17 124/64   12/19/16 108/68   12/05/16 112/80     Pulse: (for Fetzima)  Creatinine   Date Value Ref Range Status   12/05/2016 0.90 0.52 - 1.04 mg/dL Final   ]    Last PHQ-9 score on record=   PHQ-9 SCORE 12/19/2016   Total Score -   Total Score 0

## 2017-06-07 RX ORDER — VENLAFAXINE HYDROCHLORIDE 37.5 MG/1
CAPSULE, EXTENDED RELEASE ORAL
Qty: 90 CAPSULE | Refills: 0 | Status: SHIPPED | OUTPATIENT
Start: 2017-06-07 | End: 2017-06-21

## 2017-06-07 NOTE — TELEPHONE ENCOUNTER
Prescription approved per [facility] Refill Protocol.  Suze Damian [facility]  [phone]

## 2017-06-21 ENCOUNTER — OFFICE VISIT (OUTPATIENT)
Dept: FAMILY MEDICINE | Facility: CLINIC | Age: 67
End: 2017-06-21
Payer: COMMERCIAL

## 2017-06-21 VITALS
RESPIRATION RATE: 16 BRPM | TEMPERATURE: 97.3 F | HEART RATE: 84 BPM | OXYGEN SATURATION: 100 % | DIASTOLIC BLOOD PRESSURE: 70 MMHG | WEIGHT: 218 LBS | BODY MASS INDEX: 40.52 KG/M2 | SYSTOLIC BLOOD PRESSURE: 132 MMHG

## 2017-06-21 DIAGNOSIS — I10 HYPERTENSION, GOAL BELOW 140/90: ICD-10-CM

## 2017-06-21 DIAGNOSIS — E78.5 HYPERLIPIDEMIA LDL GOAL <130: ICD-10-CM

## 2017-06-21 DIAGNOSIS — J43.9 PULMONARY EMPHYSEMA, UNSPECIFIED EMPHYSEMA TYPE (H): ICD-10-CM

## 2017-06-21 DIAGNOSIS — N18.30 CKD (CHRONIC KIDNEY DISEASE) STAGE 3, GFR 30-59 ML/MIN (H): ICD-10-CM

## 2017-06-21 DIAGNOSIS — I48.20 CHRONIC ATRIAL FIBRILLATION (H): Primary | ICD-10-CM

## 2017-06-21 DIAGNOSIS — Z79.01 LONG-TERM (CURRENT) USE OF ANTICOAGULANTS: ICD-10-CM

## 2017-06-21 DIAGNOSIS — F32.5 MAJOR DEPRESSION IN COMPLETE REMISSION (H): ICD-10-CM

## 2017-06-21 DIAGNOSIS — E66.01 MORBID OBESITY, UNSPECIFIED OBESITY TYPE (H): ICD-10-CM

## 2017-06-21 LAB
ANION GAP SERPL CALCULATED.3IONS-SCNC: 3 MMOL/L (ref 3–14)
BUN SERPL-MCNC: 17 MG/DL (ref 7–30)
CALCIUM SERPL-MCNC: 8.7 MG/DL (ref 8.5–10.1)
CHLORIDE SERPL-SCNC: 105 MMOL/L (ref 94–109)
CO2 SERPL-SCNC: 33 MMOL/L (ref 20–32)
CREAT SERPL-MCNC: 0.95 MG/DL (ref 0.52–1.04)
ERYTHROCYTE [DISTWIDTH] IN BLOOD BY AUTOMATED COUNT: 12.7 % (ref 10–15)
GFR SERPL CREATININE-BSD FRML MDRD: 59 ML/MIN/1.7M2
GLUCOSE SERPL-MCNC: 119 MG/DL (ref 70–99)
HCT VFR BLD AUTO: 41.4 % (ref 35–47)
HGB BLD-MCNC: 13.1 G/DL (ref 11.7–15.7)
MCH RBC QN AUTO: 28.6 PG (ref 26.5–33)
MCHC RBC AUTO-ENTMCNC: 31.6 G/DL (ref 31.5–36.5)
MCV RBC AUTO: 90 FL (ref 78–100)
PLATELET # BLD AUTO: 290 10E9/L (ref 150–450)
POTASSIUM SERPL-SCNC: 4 MMOL/L (ref 3.4–5.3)
RBC # BLD AUTO: 4.58 10E12/L (ref 3.8–5.2)
SODIUM SERPL-SCNC: 141 MMOL/L (ref 133–144)
WBC # BLD AUTO: 9 10E9/L (ref 4–11)

## 2017-06-21 PROCEDURE — 99214 OFFICE O/P EST MOD 30 MIN: CPT | Performed by: FAMILY MEDICINE

## 2017-06-21 PROCEDURE — 85027 COMPLETE CBC AUTOMATED: CPT | Performed by: FAMILY MEDICINE

## 2017-06-21 PROCEDURE — 80048 BASIC METABOLIC PNL TOTAL CA: CPT | Performed by: FAMILY MEDICINE

## 2017-06-21 PROCEDURE — 36415 COLL VENOUS BLD VENIPUNCTURE: CPT | Performed by: FAMILY MEDICINE

## 2017-06-21 RX ORDER — VENLAFAXINE HYDROCHLORIDE 37.5 MG/1
CAPSULE, EXTENDED RELEASE ORAL
Qty: 90 CAPSULE | Refills: 3 | Status: SHIPPED | OUTPATIENT
Start: 2017-06-21 | End: 2018-07-01

## 2017-06-21 RX ORDER — ALBUTEROL SULFATE 0.83 MG/ML
SOLUTION RESPIRATORY (INHALATION)
Qty: 75 ML | Refills: 5 | Status: SHIPPED | OUTPATIENT
Start: 2017-06-21 | End: 2020-02-12

## 2017-06-21 RX ORDER — DILTIAZEM HYDROCHLORIDE 120 MG/1
CAPSULE, COATED, EXTENDED RELEASE ORAL
Qty: 90 CAPSULE | Refills: 3 | Status: SHIPPED | OUTPATIENT
Start: 2017-06-21 | End: 2018-08-12

## 2017-06-21 RX ORDER — LISINOPRIL 2.5 MG/1
2.5 TABLET ORAL DAILY
Qty: 90 TABLET | Refills: 3 | Status: SHIPPED | OUTPATIENT
Start: 2017-06-21 | End: 2018-07-01

## 2017-06-21 RX ORDER — WARFARIN SODIUM 5 MG/1
TABLET ORAL
Qty: 60 TABLET | Refills: 5 | Status: SHIPPED | OUTPATIENT
Start: 2017-06-21 | End: 2017-07-26

## 2017-06-21 RX ORDER — HYDROCHLOROTHIAZIDE 25 MG/1
25 TABLET ORAL DAILY
Qty: 90 TABLET | Refills: 3 | Status: SHIPPED | OUTPATIENT
Start: 2017-06-21 | End: 2018-07-01

## 2017-06-21 RX ORDER — ATORVASTATIN CALCIUM 10 MG/1
10 TABLET, FILM COATED ORAL DAILY
Qty: 90 TABLET | Refills: 3 | Status: SHIPPED | OUTPATIENT
Start: 2017-06-21 | End: 2018-07-01

## 2017-06-21 ASSESSMENT — PAIN SCALES - GENERAL: PAINLEVEL: NO PAIN (0)

## 2017-06-21 NOTE — NURSING NOTE
"Chief Complaint   Patient presents with     Atrial Fib     Recheck     Depression     Recheck     Lipids     Recheck     Hypertension     Recheck     COPD     Recheck       Initial /62 (BP Location: Left arm, Patient Position: Chair, Cuff Size: Adult Large)  Pulse 84  Temp 97.3  F (36.3  C) (Temporal)  Resp 16  Wt 218 lb (98.9 kg)  SpO2 100%  BMI 40.52 kg/m2 Estimated body mass index is 40.52 kg/(m^2) as calculated from the following:    Height as of 4/17/17: 5' 1.5\" (1.562 m).    Weight as of this encounter: 218 lb (98.9 kg).  Medication Reconciliation: complete  "

## 2017-06-21 NOTE — MR AVS SNAPSHOT
After Visit Summary   6/21/2017    Latanya Padron    MRN: [id number]           Patient Information     Date Of Birth          1950        Visit Information        Provider Department      6/21/2017 2:15 PM Glen Blue MD [facility]        Today's Diagnoses     Chronic atrial fibrillation (H)    -  1    Cough        Other emphysema (H)        Hyperlipidemia LDL goal <130        Hypertension, goal below 140/90        Paroxysmal atrial fibrillation (H)        Pulmonary emphysema, unspecified emphysema type (H)        Major depression in complete remission (H)        Long-term (current) use of anticoagulants [Z79.01]          Care Instructions    The current medical regimen is effective;  continue present plan and medications.            Follow-ups after your visit        Additional Services     INR CLINIC REFERRAL       Your provider has referred you to INR Services.    Please be aware that coverage of these services is subject to the terms and limitations of your health insurance plan.  Call member services at your health plan with any benefit or coverage questions.    Indication for Anticoagulation: Atrial Fibrillation  If nonstandard INR is desired, indicate goal range and explanation:   Expected Duration of Therapy: Lifetime                  Your next 10 appointments already scheduled     Jul 12, 2017  8:30 AM CDT   Anticoagulation Visit with [facility]TI COAG   [facility]    [address] [place] [id number] [phone]              Who to contact     If you have questions or need follow up information about today's clinic visit or your schedule please contact [facility] directly at [phone].  Normal or non-critical lab and imaging results will be communicated to you by MyChart, letter or phone within 4 business days after the clinic has received the results. If you do not hear from us within 7  days, please contact the clinic through [employer] or phone. If you have a critical or abnormal lab result, we will notify you by phone as soon as possible.  Submit refill requests through [employer] or call your pharmacy and they will forward the refill request to us. Please allow 3 business days for your refill to be completed.          Additional Information About Your Visit        [employer] Information     [employer] gives you secure access to your electronic health record. If you see a primary care provider, you can also send messages to your care team and make appointments. If you have questions, please call your primary care clinic.  If you do not have a primary care provider, please call [phone] and they will assist you.        Care EveryWhere ID     This is your Care EveryWhere ID. This could be used by other organizations to access your [facility] medical records  [id number]        Your Vitals Were     Pulse Temperature Respirations Pulse Oximetry BMI (Body Mass Index)       84 97.3  F (36.3  C) (Temporal) 16 100% 40.52 kg/m2        Blood Pressure from Last 3 Encounters:   06/21/17 132/70   04/17/17 124/64   12/19/16 108/68    Weight from Last 3 Encounters:   06/21/17 218 lb (98.9 kg)   04/17/17 209 lb (94.8 kg)   12/19/16 210 lb (95.3 kg)              We Performed the Following     Basic metabolic panel     CBC with platelets     INR CLINIC REFERRAL          Today's Medication Changes          These changes are accurate as of: 6/21/17  3:27 PM.  If you have any questions, ask your nurse or doctor.               These medicines have changed or have updated prescriptions.        Dose/Directions    atorvastatin 10 MG tablet   Commonly known as:  LIPITOR   This may have changed:  See the new instructions.   Used for:  Hyperlipidemia LDL goal <130   Changed by:  Glen Blue MD        Dose:  10 mg   Take 1 tablet (10 mg) by mouth daily   Quantity:  90 tablet   Refills:  3       hydrochlorothiazide 25 MG  tablet   Commonly known as:  HYDRODIURIL   This may have changed:  See the new instructions.   Used for:  Hypertension, goal below 140/90   Changed by:  Glen Blue MD        Dose:  25 mg   Take 1 tablet (25 mg) by mouth daily   Quantity:  90 tablet   Refills:  3       lisinopril 2.5 MG tablet   Commonly known as:  PRINIVIL/Zestril   This may have changed:  See the new instructions.   Used for:  Hypertension, goal below 140/90   Changed by:  Glen Blue MD        Dose:  2.5 mg   Take 1 tablet (2.5 mg) by mouth daily   Quantity:  90 tablet   Refills:  3       mometasone-formoterol 200-5 MCG/ACT oral inhaler   Commonly known as:  DULERA   This may have changed:  See the new instructions.   Used for:  Pulmonary emphysema, unspecified emphysema type (H)   Changed by:  Glen Blue MD        INHALE 2 PUFFS BY MOUTH TWO TIMES A DAY   Quantity:  13 g   Refills:  11       umeclidinium 62.5 MCG/INH oral inhaler   Commonly known as:  INCRUSE ELLIPTA   This may have changed:  how much to take   Used for:  Pulmonary emphysema, unspecified emphysema type (H)   Changed by:  Glen Blue MD        Dose:  1 puff   Inhale 1 puff into the lungs daily   Quantity:  1 Inhaler   Refills:  11       venlafaxine 37.5 MG 24 hr capsule   Commonly known as:  EFFEXOR-XR   This may have changed:  See the new instructions.   Used for:  Major depression in complete remission (H)   Changed by:  Glen Blue MD        TAKE ONE CAPSULE BY MOUTH EVERY DAY WITH FOOD   Quantity:  90 capsule   Refills:  3            Where to get your medicines      These medications were sent to [facility] [place] [address] [facility]   [address] [facility] , [facility] [address]     Phone:  [phone]     albuterol (2.5 MG/3ML) 0.083% neb solution    atorvastatin 10 MG tablet    diltiazem 120 MG 24 hr capsule    hydrochlorothiazide 25 MG tablet    lisinopril 2.5 MG tablet    mometasone-formoterol 200-5 MCG/ACT oral  inhaler    umeclidinium 62.5 MCG/INH oral inhaler    venlafaxine 37.5 MG 24 hr capsule    warfarin 5 MG tablet                Primary Care Provider Office Phone # Fax #    Glen Darci Blue [phone]       [facility] [address] [place] [address]        Equal Access to Services     [facility] : Hadii aad ku hadasho Soomaali, waaxda luqadaha, qaybta kaalmada adeegyada, waxay idiin hayaan adeeg kharash la'aan . So [facility] [phone].    ATENCIÓN: Si habla español, tiene a de los santos disposición servicios gratuitos de asistencia lingüística. Llame al [phone].    We comply with applicable federal civil rights laws and [place] laws. We do not discriminate on the basis of race, color, national origin, age, disability sex, sexual orientation or gender identity.            Thank you!     Thank you for choosing [facility]  for your care. Our goal is always to provide you with excellent care. Hearing back from our patients is one way we can continue to improve our services. Please take a few minutes to complete the written survey that you may receive in the mail after your visit with us. Thank you!             Your Updated Medication List - Protect others around you: Learn how to safely use, store and throw away your medicines at www.disposemymeds.org.          This list is accurate as of: 6/21/17  3:27 PM.  Always use your most recent med list.                   Brand Name Dispense Instructions for use Diagnosis    * albuterol 108 (90 BASE) MCG/ACT Inhaler    PROAIR HFA/PROVENTIL HFA/VENTOLIN HFA    2 Inhaler    1-2 puffs by mouth every 2-4 hours as needed    Pulmonary emphysema, unspecified emphysema type (H)       * albuterol (2.5 MG/3ML) 0.083% neb solution     75 mL    Take  by nebulization. 1 NEB EVERY 4-6 HOURS AS NEEDED    Cough, Other emphysema (H)       ASPIRIN NOT PRESCRIBED    INTENTIONAL    0 each    1 each continuous prn for other Reported on  4/17/2017    Atrial fibrillation (H)       atorvastatin 10 MG tablet    LIPITOR    90 tablet    Take 1 tablet (10 mg) by mouth daily    Hyperlipidemia LDL goal <130       cholecalciferol 400 UNITS tablet    Vitamin D    100 tablet    TAKE ONE TABLET BY MOUTH EVERY DAY    Osteoporosis       diltiazem 120 MG 24 hr capsule    CARTIA XT    90 capsule    TAKE ONE CAPSULE BY MOUTH EVERY DAY (DUE FOR FOLLOW-UP APPOINTMENT)    Hypertension, goal below 140/90, Paroxysmal atrial fibrillation (H)       fish oil-omega-3 fatty acids 1000 MG capsule     180 capsule    Take  by mouth. Take 1 capsule by mouth daily        hydrochlorothiazide 25 MG tablet    HYDRODIURIL    90 tablet    Take 1 tablet (25 mg) by mouth daily    Hypertension, goal below 140/90       lisinopril 2.5 MG tablet    PRINIVIL/Zestril    90 tablet    Take 1 tablet (2.5 mg) by mouth daily    Hypertension, goal below 140/90       mometasone-formoterol 200-5 MCG/ACT oral inhaler    DULERA    13 g    INHALE 2 PUFFS BY MOUTH TWO TIMES A DAY    Pulmonary emphysema, unspecified emphysema type (H)       MULTIPLE vitamin  S/WOMENS Tabs     30 tablet    Daily        order for DME      Equipment being ordered: Oxygen @ 2 liters with exertion    COPD (chronic obstructive pulmonary disease) (H)       umeclidinium 62.5 MCG/INH oral inhaler    INCRUSE ELLIPTA    1 Inhaler    Inhale 1 puff into the lungs daily    Pulmonary emphysema, unspecified emphysema type (H)       venlafaxine 37.5 MG 24 hr capsule    EFFEXOR-XR    90 capsule    TAKE ONE CAPSULE BY MOUTH EVERY DAY WITH FOOD    Major depression in complete remission (H)       warfarin 5 MG tablet    JANTOVEN    60 tablet    Take 5 mg Mon, Thursday and 2.5 mg other 5 days or as directed by coumadin clinic    Long-term (current) use of anticoagulants, Chronic atrial fibrillation (H)       * Notice:  This list has 2 medication(s) that are the same as other medications prescribed for you. Read the directions carefully, and ask  your doctor or other care provider to review them with you.

## 2017-06-22 ASSESSMENT — PATIENT HEALTH QUESTIONNAIRE - PHQ9: SUM OF ALL RESPONSES TO PHQ QUESTIONS 1-9: 1

## 2017-07-12 ENCOUNTER — ANTICOAGULATION THERAPY VISIT (OUTPATIENT)
Dept: ANTICOAGULATION | Facility: CLINIC | Age: 67
End: 2017-07-12
Payer: COMMERCIAL

## 2017-07-12 DIAGNOSIS — Z79.01 LONG-TERM (CURRENT) USE OF ANTICOAGULANTS: ICD-10-CM

## 2017-07-12 DIAGNOSIS — I48.20 CHRONIC ATRIAL FIBRILLATION (H): ICD-10-CM

## 2017-07-12 LAB — INR POINT OF CARE: 3.5 (ref 0.86–1.14)

## 2017-07-12 PROCEDURE — 99207 ZZC NO CHARGE NURSE ONLY: CPT

## 2017-07-12 PROCEDURE — 36416 COLLJ CAPILLARY BLOOD SPEC: CPT

## 2017-07-12 PROCEDURE — 85610 PROTHROMBIN TIME: CPT | Mod: QW

## 2017-07-12 NOTE — PROGRESS NOTES
ANTICOAGULATION FOLLOW-UP CLINIC VISIT    Patient Name:  Latanya Padron  Date:  7/12/2017  Contact Type:  Face to Face    SUBJECTIVE:     Patient Findings     Positives Change in medications (will be starting oxybutnin, no affect per Micromedex ), Change in diet/appetite (less Shelley K lately, but will increase these )           OBJECTIVE    INR Protime   Date Value Ref Range Status   07/12/2017 3.5 (A) 0.86 - 1.14 Final       ASSESSMENT / PLAN  INR assessment SUPRA    Recheck INR In: 2 WEEKS    INR Location Clinic      Anticoagulation Summary as of 7/12/2017     INR goal 2.0-3.0   Today's INR 3.5!   Maintenance plan 5 mg (5 mg x 1) on Mon, Thu; 2.5 mg (5 mg x 0.5) all other days   Full instructions 7/13: 2.5 mg; Otherwise 5 mg on Mon, Thu; 2.5 mg all other days   Weekly total 22.5 mg   Plan last modified Nino Paz RN (2/21/2017)   Next INR check 7/26/2017   Target end date     Indications   Long-term (current) use of anticoagulants [Z79.01] [Z79.01]  Atrial fibrillation (H) [I48.91]         Anticoagulation Episode Summary     INR check location     Preferred lab     Send INR reminders to [facility] POOL    Comments 5 mg tabs, likes card       Anticoagulation Care Providers     Provider Role Specialty Phone number    Glen Blue MD [facility] [phone]            See the Encounter Report to view Anticoagulation Flowsheet and Dosing Calendar (Go to Encounters tab in chart review, and find the Anticoagulation Therapy Visit)    Dosage adjustment made based on physician directed care plan.    2.5 mg tomorrow, then resume     Francine Andrew, RN

## 2017-07-12 NOTE — MR AVS SNAPSHOT
Latanya Padron   7/12/2017 8:30 AM   Anticoagulation Therapy Visit    Description:  67 year old female   Provider:  PH ANTI COAG   Department:  [facility]           INR as of 7/12/2017     Today's INR 3.5!      Anticoagulation Summary as of 7/12/2017     INR goal 2.0-3.0   Today's INR 3.5!   Full instructions 7/13: 2.5 mg; Otherwise 5 mg on Mon, Thu; 2.5 mg all other days   Next INR check 7/26/2017    Indications   Long-term (current) use of anticoagulants [Z79.01] [Z79.01]  Atrial fibrillation (H) [I48.91]         Your next Anticoagulation Clinic appointment(s)     Jul 26, 2017  8:15 AM CDT   [facility]agulation [facility] with PH ANTI COAG   [facility]    [address] [place] [address]   [phone]              Contact Numbers     Clinic Number:         July 2017 Details    Sun Mon Tue Wed Thu Fri Sat           1                 2               3               4               5               6               7               8                 9               10               11               12      2.5 mg   See details      13      2.5 mg         14      2.5 mg         15      2.5 mg           16      2.5 mg         17      5 mg         18      2.5 mg         19      2.5 mg         20      5 mg         21      2.5 mg         22      2.5 mg           23      2.5 mg         24      5 mg         25      2.5 mg         26            27               28               29                 30               31                     Date Details   07/12 This INR check       Date of next INR:  7/26/2017         How to take your warfarin dose     To take:  2.5 mg Take 0.5 of a 5 mg tablet.    To take:  5 mg Take 1 of the 5 mg tablets.

## 2017-07-26 ENCOUNTER — ANTICOAGULATION THERAPY VISIT (OUTPATIENT)
Dept: ANTICOAGULATION | Facility: CLINIC | Age: 67
End: 2017-07-26
Payer: COMMERCIAL

## 2017-07-26 DIAGNOSIS — I48.20 CHRONIC ATRIAL FIBRILLATION (H): ICD-10-CM

## 2017-07-26 DIAGNOSIS — Z79.01 LONG-TERM (CURRENT) USE OF ANTICOAGULANTS: ICD-10-CM

## 2017-07-26 LAB — INR POINT OF CARE: 3.4 (ref 0.86–1.14)

## 2017-07-26 PROCEDURE — 99207 ZZC NO CHARGE NURSE ONLY: CPT

## 2017-07-26 PROCEDURE — 36416 COLLJ CAPILLARY BLOOD SPEC: CPT

## 2017-07-26 PROCEDURE — 85610 PROTHROMBIN TIME: CPT | Mod: QW

## 2017-07-26 RX ORDER — WARFARIN SODIUM 5 MG/1
TABLET ORAL
Qty: 60 TABLET | Refills: 5 | COMMUNITY
Start: 2017-07-26 | End: 2018-08-12

## 2017-07-26 NOTE — MR AVS SNAPSHOT
Latanya Padron   7/26/2017 8:15 AM   Anticoagulation Therapy Visit    Description:  67 year old female   Provider:  [facility]   Department:  [facility]           INR as of 7/26/2017     Today's INR 3.4!      Anticoagulation Summary as of 7/26/2017     INR goal 2.0-3.0   Today's INR 3.4!   Full instructions 5 mg on Mon; 2.5 mg all other days   Next INR check 8/16/2017    Indications   Long-term (current) use of anticoagulants [Z79.01] [Z79.01]  Atrial fibrillation (H) [I48.91]         Your next Anticoagulation Clinic appointment(s)     Aug 16, 2017  8:45 AM CDT   Anticoagulation Visit with [facility] ANTI CHERY[facility]   [facility]    [address] [place] [address] [phone]              Contact Numbers     Clinic Number:         July 2017 Details    Sun Mon Tue Wed Thu Fri Sat           1                 2               3               4               5               6               7               8                 9               10               11               12               13               14               15                 16               17               18               19               20               21               22                 23               24               25               26      2.5 mg   See details      27      2.5 mg         28      2.5 mg         29      2.5 mg           30      2.5 mg         31      5 mg               Date Details   07/26 This INR check               How to take your warfarin dose     To take:  2.5 mg Take 0.5 of a 5 mg tablet.    To take:  5 mg Take 1 of the 5 mg tablets.           August 2017 Details    Sun Mon Tue Wed Thu Fri Sat       1      2.5 mg         2      2.5 mg         3      2.5 mg         4      2.5 mg         5      2.5 mg           6      2.5 mg         7      5 mg         8      2.5 mg         9      2.5 mg         10      2.5 mg         11      2.5 mg         12      2.5 mg           13       2.5 mg         14      5 mg         15      2.5 mg         16            17               18               19                 20               21               22               23               24               25               26                 27               28               29               30               31                  Date Details   No additional details    Date of next INR:  8/16/2017         How to take your warfarin dose     To take:  2.5 mg Take 0.5 of a 5 mg tablet.    To take:  5 mg Take 1 of the 5 mg tablets.

## 2017-07-26 NOTE — PROGRESS NOTES
ANTICOAGULATION FOLLOW-UP CLINIC VISIT    Patient Name:  Latanya Padron  Date:  7/26/2017  Contact Type:  Face to Face    SUBJECTIVE:     Patient Findings     Positives Unexplained INR or factor level change           OBJECTIVE    INR Protime   Date Value Ref Range Status   07/26/2017 3.4 (A) 0.86 - 1.14 Final       ASSESSMENT / PLAN  INR assessment THER    Recheck INR In: 3 WEEKS    INR Location Clinic      Anticoagulation Summary as of 7/26/2017     INR goal 2.0-3.0   Today's INR 3.4!   Maintenance plan 5 mg (5 mg x 1) on Mon; 2.5 mg (5 mg x 0.5) all other days   Full instructions 5 mg on Mon; 2.5 mg all other days   Weekly total 20 mg   Plan last modified rFancine Andrew RN (7/26/2017)   Next INR check 8/16/2017   Target end date     Indications   Long-term (current) use of anticoagulants [Z79.01] [Z79.01]  Atrial fibrillation (H) [I48.91]         Anticoagulation Episode Summary     INR check location     Preferred lab     Send INR reminders to [facility] POOL    Comments 5 mg tabs, likes card       Anticoagulation Care Providers     Provider Role Specialty Phone number    Glen Blue MD [facility] [phone]            See the Encounter Report to view Anticoagulation Flowsheet and Dosing Calendar (Go to Encounters tab in chart review, and find the Anticoagulation Therapy Visit)    Dosage adjustment made based on physician directed care plan.      Francine Andrew, RN

## 2017-07-31 ENCOUNTER — OFFICE VISIT (OUTPATIENT)
Dept: FAMILY MEDICINE | Facility: CLINIC | Age: 67
End: 2017-07-31
Payer: COMMERCIAL

## 2017-07-31 ENCOUNTER — TELEPHONE (OUTPATIENT)
Dept: FAMILY MEDICINE | Facility: CLINIC | Age: 67
End: 2017-07-31

## 2017-07-31 VITALS
OXYGEN SATURATION: 100 % | TEMPERATURE: 97.2 F | SYSTOLIC BLOOD PRESSURE: 126 MMHG | WEIGHT: 219 LBS | HEART RATE: 72 BPM | RESPIRATION RATE: 16 BRPM | BODY MASS INDEX: 40.71 KG/M2 | DIASTOLIC BLOOD PRESSURE: 64 MMHG

## 2017-07-31 DIAGNOSIS — I48.20 CHRONIC ATRIAL FIBRILLATION (H): ICD-10-CM

## 2017-07-31 DIAGNOSIS — R30.0 DYSURIA: Primary | ICD-10-CM

## 2017-07-31 LAB
ALBUMIN UR-MCNC: NEGATIVE MG/DL
APPEARANCE UR: CLEAR
BILIRUB UR QL STRIP: NEGATIVE
COLOR UR AUTO: NORMAL
GLUCOSE UR STRIP-MCNC: NEGATIVE MG/DL
HGB UR QL STRIP: NEGATIVE
KETONES UR STRIP-MCNC: NEGATIVE MG/DL
LEUKOCYTE ESTERASE UR QL STRIP: NEGATIVE
NITRATE UR QL: NEGATIVE
PH UR STRIP: 6 PH (ref 5–7)
SP GR UR STRIP: 1 (ref 1–1.03)
URN SPEC COLLECT METH UR: NORMAL
UROBILINOGEN UR STRIP-MCNC: 0 MG/DL (ref 0–2)

## 2017-07-31 PROCEDURE — 81003 URINALYSIS AUTO W/O SCOPE: CPT | Performed by: FAMILY MEDICINE

## 2017-07-31 PROCEDURE — 99213 OFFICE O/P EST LOW 20 MIN: CPT | Performed by: FAMILY MEDICINE

## 2017-07-31 RX ORDER — OXYBUTYNIN CHLORIDE 5 MG/1
5 TABLET ORAL DAILY
Qty: 360 TABLET | Refills: 0 | COMMUNITY
Start: 2017-07-31 | End: 2018-06-20 | Stop reason: DRUGHIGH

## 2017-07-31 ASSESSMENT — PAIN SCALES - GENERAL: PAINLEVEL: NO PAIN (0)

## 2017-07-31 NOTE — MR AVS SNAPSHOT
After Visit Summary   7/31/2017    Latanya Padron    MRN: [id number]           Patient Information     Date Of Birth          1950        Visit Information        Provider Department      7/31/2017 4:15 PM Glen Blue MD [facility]        Today's Diagnoses     Chronic atrial fibrillation (H)    -  1    Dysuria          Care Instructions    Keep working with your urologist  All else is the same.           Follow-ups after your visit        Additional Services     INR CLINIC REFERRAL       Your provider has referred you to INR Services.    Please be aware that coverage of these services is subject to the terms and limitations of your health insurance plan.  Call member services at your health plan with any benefit or coverage questions.    Indication for Anticoagulation: Atrial Fibrillation  If nonstandard INR is desired, indicate goal range and explanation:   Expected Duration of Therapy: Lifetime                  Your next 10 appointments already scheduled     Aug 16, 2017  8:45 AM CDT   Anticoagulation Visit with [facility]   [facility]    [address] [place] [address] [id number] [phone]              Who to contact     If you have questions or need follow up information about today's clinic visit or your schedule please contact [facility] directly at [phone].  Normal or non-critical lab and imaging results will be communicated to you by MyChart, letter or phone within 4 business days after the clinic has received the results. If you do not hear from us within 7 days, please contact the clinic through MyChart or phone. If you have a critical or abnormal lab result, we will notify you by phone as soon as possible.  Submit refill requests through [email or web address] or call your pharmacy and they will forward the refill request to us. Please allow 3 business days for your refill to be completed.           Additional Information About Your Visit        MyChart Information     [facility] gives you secure access to your electronic health record. If you see a primary care provider, you can also send messages to your care team and make appointments. If you have questions, please call your primary care clinic.  If you do not have a primary care provider, please call [phone] and they will assist you.        Care EveryWhere ID     This is your Care EveryWhere ID. This could be used by other organizations to access your Mobile medical records  [id number]        Your Vitals Were     Pulse Temperature Respirations Pulse Oximetry BMI (Body Mass Index)       72 97.2  F (36.2  C) (Temporal) 16 100% 40.71 kg/m2        Blood Pressure from Last 3 Encounters:   07/31/17 126/64   06/21/17 132/70   04/17/17 124/64    Weight from Last 3 Encounters:   07/31/17 219 lb (99.3 kg)   06/21/17 218 lb (98.9 kg)   04/17/17 209 lb (94.8 kg)              We Performed the Following     *UA reflex to Microscopic and Culture ([place]; [facility]; [facility]; [facility]; [facility]; [facility]; [place]; [place])     INR CLINIC REFERRAL        Primary Care Provider Office Phone # Fax #    Glen Darci Blue [phone]       [facility] [address] [place] [address]        Equal Access to Services     YAYO SANFORD AH: Hadii lucia ku hadasho Soomaali, waaxda luqadaha, qaybta kaalmada adeegyada, edin velasquez. So [facility] [phone].    ATENCIÓN: Si habla español, tiene a de los santos disposición servicios gratuitos de asistencia lingüística. Gayatri damico [phone].    We comply with applicable federal civil rights laws and [place] laws. We do not discriminate on the basis of race, color, national origin, age, disability sex, sexual orientation or gender identity.            Thank you!     Thank you for choosing [facility]  for your care. Our goal is always  to provide you with excellent care. Hearing back from our patients is one way we can continue to improve our services. Please take a few minutes to complete the written survey that you may receive in the mail after your visit with us. Thank you!             Your Updated Medication List - Protect others around you: Learn how to safely use, store and throw away your medicines at www.disposemymeds.org.          This list is accurate as of: 7/31/17  4:48 PM.  Always use your most recent med list.                   Brand Name Dispense Instructions for use Diagnosis    * albuterol 108 (90 BASE) MCG/ACT Inhaler    PROAIR HFA/PROVENTIL HFA/VENTOLIN HFA    2 Inhaler    1-2 puffs by mouth every 2-4 hours as needed    Pulmonary emphysema, unspecified emphysema type (H)       * albuterol (2.5 MG/3ML) 0.083% neb solution     75 mL    Take  by nebulization. 1 NEB EVERY 4-6 HOURS AS NEEDED        ASPIRIN NOT PRESCRIBED    INTENTIONAL    0 each    1 each continuous prn for other Reported on 4/17/2017    Atrial fibrillation (H)       atorvastatin 10 MG tablet    LIPITOR    90 tablet    Take 1 tablet (10 mg) by mouth daily    Hyperlipidemia LDL goal <130       cholecalciferol 400 UNITS tablet    Vitamin D    100 tablet    TAKE ONE TABLET BY MOUTH EVERY DAY    Osteoporosis       diltiazem 120 MG 24 hr capsule    CARTIA XT    90 capsule    TAKE ONE CAPSULE BY MOUTH EVERY DAY (DUE FOR FOLLOW-UP APPOINTMENT)    Hypertension, goal below 140/90, Chronic atrial fibrillation (H)       hydrochlorothiazide 25 MG tablet    HYDRODIURIL    90 tablet    Take 1 tablet (25 mg) by mouth daily    Hypertension, goal below 140/90       JANTOVEN 5 MG tablet   Generic drug:  warfarin     60 tablet    Take 5 mg Mon and 2.5 mg all other days of the week, or as directed by coumadin clinic    Long-term (current) use of anticoagulants, Chronic atrial fibrillation (H)       lisinopril 2.5 MG tablet    PRINIVIL/Zestril    90 tablet    Take 1 tablet (2.5 mg) by  mouth daily    Hypertension, goal below 140/90       mometasone-formoterol 200-5 MCG/ACT oral inhaler    DULERA    13 g    INHALE 2 PUFFS BY MOUTH TWO TIMES A DAY    Pulmonary emphysema, unspecified emphysema type (H)       order for DME      Equipment being ordered: Oxygen @ 2 liters with exertion    COPD (chronic obstructive pulmonary disease) (H)       oxybutynin 5 MG tablet    DITROPAN    360 tablet    Take 1 tablet (5 mg) by mouth daily        umeclidinium 62.5 MCG/INH oral inhaler    INCRUSE ELLIPTA    1 Inhaler    Inhale 1 puff into the lungs daily    Pulmonary emphysema, unspecified emphysema type (H)       venlafaxine 37.5 MG 24 hr capsule    EFFEXOR-XR    90 capsule    TAKE ONE CAPSULE BY MOUTH EVERY DAY WITH FOOD    Major depression in complete remission (H)       * Notice:  This list has 2 medication(s) that are the same as other medications prescribed for you. Read the directions carefully, and ask your doctor or other care provider to review them with you.

## 2017-07-31 NOTE — TELEPHONE ENCOUNTER
Reason for Call: Request for an order or referral:    Order or referral being requested: Urine Lab Order    Date needed: as soon as possible    Has the patient been seen by the PCP for this problem? YES    Additional comments: Patient called and is requesting a lab order for a urine test. She states she thinks she might be getting a bladder or yeast infection. She is not really sure but something just doesn't feel right and she would like to get it tested. She is wondering if she would be able to do this today because she going out of town. Please advise.     Phone number Patient can be reached at:  Cell number on file:    Telephone Information:   Mobile [phone]       Best Time:  Any     Can we leave a detailed message on this number?  YES    Call taken on 7/31/2017 at 10:58 AM by Renny Orona

## 2017-07-31 NOTE — NURSING NOTE
"Chief Complaint   Patient presents with     Bladder Problems     c/o problems voiding with growth sensation     Optimization Acute Care       Initial /64 (BP Location: Right arm, Patient Position: Chair, Cuff Size: Adult Large)  Pulse 72  Temp 97.2  F (36.2  C) (Temporal)  Resp 16  Wt 219 lb (99.3 kg)  SpO2 100%  BMI 40.71 kg/m2 Estimated body mass index is 40.71 kg/(m^2) as calculated from the following:    Height as of 4/17/17: 5' 1.5\" (1.562 m).    Weight as of this encounter: 219 lb (99.3 kg).  Medication Reconciliation: complete  "

## 2017-07-31 NOTE — TELEPHONE ENCOUNTER
Patient c/o feeling she needs to void but has scant urination and also feels like may have growth sensation.  Confirmed appointment with patient for this pm and to have urinalysis done before seen VORB Dr. Glen Blue/Brenda Carlin CMA (AAMA)

## 2017-08-01 NOTE — PROGRESS NOTES
Latanya Padron is a 67 year old female who presents to clinic today for Bladder Problems (c/o problems voiding with growth sensation) and Optimization Acute Care   She complains of urgency and frequency    Patient did see one urologist and was not satisfied. She saw another treatment plan has been arranged for her urgency and frequency of urination. She was afraid she may be has a bladder infection. She wanted to be seen today. We have obtained a urine sample. She denies fever or chills. She has been following the plan set forth from current urologist. She is not sure that the medication is helped greatly at this time     She is well known to me.    Past medical, surgical, social histories were reviewed and updated.  Social History   Substance Use Topics     Smoking status: Former Smoker     Packs/day: 1.00     Years: 30.00     Quit date: 10/25/2005     Smokeless tobacco: Never Used     Alcohol use No     Current Outpatient Prescriptions   Medication Sig     oxybutynin (DITROPAN) 5 MG tablet Take 1 tablet (5 mg) by mouth daily     warfarin (JANTOVEN) 5 MG tablet Take 5 mg Mon and 2.5 mg all other days of the week, or as directed by coumadin clinic     atorvastatin (LIPITOR) 10 MG tablet Take 1 tablet (10 mg) by mouth daily     diltiazem (CARTIA XT) 120 MG 24 hr capsule TAKE ONE CAPSULE BY MOUTH EVERY DAY (DUE FOR FOLLOW-UP APPOINTMENT)     mometasone-formoterol (DULERA) 200-5 MCG/ACT oral inhaler INHALE 2 PUFFS BY MOUTH TWO TIMES A DAY     hydrochlorothiazide (HYDRODIURIL) 25 MG tablet Take 1 tablet (25 mg) by mouth daily     lisinopril (PRINIVIL/ZESTRIL) 2.5 MG tablet Take 1 tablet (2.5 mg) by mouth daily     umeclidinium (INCRUSE ELLIPTA) 62.5 MCG/INH oral inhaler Inhale 1 puff into the lungs daily     venlafaxine (EFFEXOR-XR) 37.5 MG 24 hr capsule TAKE ONE CAPSULE BY MOUTH EVERY DAY WITH FOOD     albuterol (PROAIR HFA/PROVENTIL HFA/VENTOLIN HFA) 108 (90 BASE) MCG/ACT Inhaler 1-2 puffs by mouth every 2-4 hours as  needed     cholecalciferol (VITAMIN D) 400 UNITS tablet TAKE ONE TABLET BY MOUTH EVERY DAY     ORDER FOR DME Equipment being ordered: Oxygen @ 2 liters with exertion     predniSONE (DELTASONE) 20 MG tablet TAKE ONE TABLET BY MOUTH TWO TIMES A DAY     albuterol (2.5 MG/3ML) 0.083% neb solution Take  by nebulization. 1 NEB EVERY 4-6 HOURS AS NEEDED     ASPIRIN NOT PRESCRIBED, INTENTIONAL, 1 each continuous prn for other Reported on 4/17/2017     No current facility-administered medications for this visit.        Allergies   Allergen Reactions     Meperidine Hcl Nausea and Vomiting     demerol     Seasonal Allergies      spring       Patient Active Problem List   Diagnosis     Sprain and strain of unspecified site of knee and leg     Disorder of bone and cartilage     Female stress incontinence     Family history of malignant neoplasm of breast     Hirsutism     HYPERLIPIDEMIA LDL GOAL <130     Advanced directives, counseling/discussion     Tennis elbow     Hypertension, goal below 140/90     Atrial fibrillation (H)     Major depression in complete remission (H)     Morbid obesity, unspecified obesity type (H)     Pulmonary emphysema, unspecified emphysema type (H)     Long-term (current) use of anticoagulants [Z79.01]     Primary osteoarthritis of both knees     CKD (chronic kidney disease) stage 3, GFR 30-59 ml/min       REVIEW OF SYSTEMS:  Constitutional, HEENT, cardiovascular, pulmonary, gi and gu systems are negative, except as otherwise noted.      EXAM:/64 (BP Location: Right arm, Patient Position: Chair, Cuff Size: Adult Large)  Pulse 72  Temp 97.2  F (36.2  C) (Temporal)  Resp 16  Wt 219 lb (99.3 kg)  SpO2 100%  BMI 40.71 kg/m2  GENERAL APPEARANCE: healthy, alert and no distress  RESP: lungs clear to auscultation - no rales, rhonchi or wheezes  CV: regular rate and rhythm, normal S1 S2, no S3 or S4 and no murmur, click or rub   ABDOMEN: soft, nontender, no HSM or masses and bowel sounds  normal    ASSESSMENT:    ICD-10-CM    1. Dysuria R30.0 *UA reflex to Microscopic and Culture ([place]; [facility]; [facility]; [facility]; [facility]; [facility]; [place]; [place])     CANCELED: *UA reflex to Microscopic and Culture ([place] and [facility] (except [place] and [place])   2. Chronic atrial fibrillation (H) I48.2 INR CLINIC REFERRAL       PLAN:  After discussion patient is comfortable with current medication and urologic consult. With negative urinalysis, infection is not part of her urgency frequency and dysuria. She will continue as noted. I've told her that urology has certain medications at work better. They need to start with oxybutynin and then maybe get a prior authorization for other medication.  And chronic atrial fibrillation she is set up to see Coumadin clinic      Weight management plan: Discussed healthy diet and exercise guidelines and patient will follow up in 6 months in clinic to re-evaluate.  Dicussed general issues around the treatment, evaluation and prevetion  [facility]  Time spent with greater than 50% spent in discussion, making the plan, or filling out paperwork with patient for illness/treatments was 15 minutes or more.    Reviewed medications and side effects in detail.  Return to clinic 3 months  AVS printed. Electronically signed by Glen Baldwin sign of infection.  Noted at visit with her.

## 2017-08-10 DIAGNOSIS — M77.50 TENDONITIS OF ANKLE: ICD-10-CM

## 2017-08-11 RX ORDER — PREDNISONE 20 MG/1
TABLET ORAL
Qty: 10 TABLET | Refills: 0 | Status: SHIPPED | OUTPATIENT
Start: 2017-08-11 | End: 2017-11-28

## 2017-08-11 NOTE — TELEPHONE ENCOUNTER
prednisone      Last Written Prescription Date:  9/28/16  Last Fill Quantity: 10,   # refills: 0  Last Office Visit with [facility] provider: 7/31/17  Future Office visit:       Routing refill request to provider for review/approval because:  Drug not on the [facility] or [facility] refill protocol or controlled substance  Drug not active on patient's medication list

## 2017-08-14 ENCOUNTER — ANTICOAGULATION THERAPY VISIT (OUTPATIENT)
Dept: ANTICOAGULATION | Facility: CLINIC | Age: 67
End: 2017-08-14
Payer: COMMERCIAL

## 2017-08-14 ENCOUNTER — TELEPHONE (OUTPATIENT)
Dept: FAMILY MEDICINE | Facility: CLINIC | Age: 67
End: 2017-08-14

## 2017-08-14 ENCOUNTER — OFFICE VISIT (OUTPATIENT)
Dept: FAMILY MEDICINE | Facility: CLINIC | Age: 67
End: 2017-08-14
Payer: COMMERCIAL

## 2017-08-14 VITALS
BODY MASS INDEX: 41.45 KG/M2 | RESPIRATION RATE: 16 BRPM | DIASTOLIC BLOOD PRESSURE: 50 MMHG | WEIGHT: 223 LBS | HEART RATE: 72 BPM | TEMPERATURE: 96.1 F | SYSTOLIC BLOOD PRESSURE: 114 MMHG | OXYGEN SATURATION: 96 %

## 2017-08-14 DIAGNOSIS — J43.9 PULMONARY EMPHYSEMA, UNSPECIFIED EMPHYSEMA TYPE (H): ICD-10-CM

## 2017-08-14 DIAGNOSIS — I48.20 CHRONIC ATRIAL FIBRILLATION (H): ICD-10-CM

## 2017-08-14 DIAGNOSIS — Z79.01 LONG-TERM (CURRENT) USE OF ANTICOAGULANTS: ICD-10-CM

## 2017-08-14 DIAGNOSIS — J20.9 ACUTE BRONCHITIS WITH COEXISTING CONDITION REQUIRING PROPHYLACTIC TREATMENT: Primary | ICD-10-CM

## 2017-08-14 LAB — INR POINT OF CARE: 3.1 (ref 0.86–1.14)

## 2017-08-14 PROCEDURE — 36416 COLLJ CAPILLARY BLOOD SPEC: CPT

## 2017-08-14 PROCEDURE — 99213 OFFICE O/P EST LOW 20 MIN: CPT | Performed by: FAMILY MEDICINE

## 2017-08-14 PROCEDURE — 85610 PROTHROMBIN TIME: CPT | Mod: QW

## 2017-08-14 PROCEDURE — 99207 ZZC NO CHARGE NURSE ONLY: CPT

## 2017-08-14 RX ORDER — AZITHROMYCIN 250 MG/1
TABLET, FILM COATED ORAL
Qty: 6 TABLET | Refills: 0 | Status: SHIPPED | OUTPATIENT
Start: 2017-08-14 | End: 2017-12-11

## 2017-08-14 ASSESSMENT — PAIN SCALES - GENERAL: PAINLEVEL: NO PAIN (0)

## 2017-08-14 NOTE — TELEPHONE ENCOUNTER
Confirmed appointment with patient to seen by Dr. Blue today//Brenda Carlin/[facility])

## 2017-08-14 NOTE — PROGRESS NOTES
ANTICOAGULATION FOLLOW-UP CLINIC VISIT    Patient Name:  Latanya Padron  Date:  8/14/2017  Contact Type:  Face to Face    SUBJECTIVE:     Patient Findings     Positives Antibiotic use or infection (Pt has URI, was on prednisone 8/9-8/14 and started z mickie 8/14-8/18), No Problem Findings    Comments Advised pt that we could slightly adjust her dose, or she could just increase greens.  Pt states that she will plan to increase greens.           OBJECTIVE    INR Protime   Date Value Ref Range Status   08/14/2017 3.1 (A) 0.86 - 1.14 Final       ASSESSMENT / PLAN  INR assessment THER    Recheck INR In: 2 WEEKS    INR Location Clinic      Anticoagulation Summary as of 8/14/2017     INR goal 2.0-3.0   Today's INR 3.1!   Maintenance plan 5 mg (5 mg x 1) on Mon; 2.5 mg (5 mg x 0.5) all other days   Full instructions 5 mg on Mon; 2.5 mg all other days   Weekly total 20 mg   No change documented Nino Paz RN   Plan last modified Francine Andrew RN (7/26/2017)   Next INR check 8/30/2017   Target end date     Indications   Long-term (current) use of anticoagulants [Z79.01] [Z79.01]  Atrial fibrillation (H) [I48.91]         Anticoagulation Episode Summary     INR check location     Preferred lab     Send INR reminders to [facility] POOL    Comments 5 mg tabs, likes card       Anticoagulation Care Providers     Provider Role Specialty Phone number    Glen Blue MD [facility] [phone]            See the Encounter Report to view Anticoagulation Flowsheet and Dosing Calendar (Go to Encounters tab in chart review, and find the Anticoagulation Therapy Visit)    Dosage adjustment made based on physician directed care plan.    Nino Paz, RN

## 2017-08-14 NOTE — TELEPHONE ENCOUNTER
Reason for Call:  Medication or medication refill:    Do you use a [facility]?  Name of the pharmacy and phone number for the current request:  [facility]- [phone]    Name of the medication requested: Patient is requesting medication for a chest cold.    Other request:  5 days, patient states she's been taking prednisone for 5 days    Can we leave a detailed message on this number? YES    Phone number patient can be reached at: Home number on file [phone] (home) or Cell number on file:    Telephone Information:   Mobile [phone]       Best Time:     Call taken on 8/14/2017 at 7:28 AM by Hope Herrera

## 2017-08-14 NOTE — MR AVS SNAPSHOT
Latanya Padron   8/14/2017 2:15 PM   Anticoagulation Therapy Visit    Description:  67 year old female   Provider:  [facility]   Department:  [facility]           INR as of 8/14/2017     Today's INR 3.1!      Anticoagulation Summary as of 8/14/2017     INR goal 2.0-3.0   Today's INR 3.1!   Full instructions 5 mg on Mon; 2.5 mg all other days   Next INR check 8/30/2017    Indications   Long-term (current) use of anticoagulants [Z79.01] [Z79.01]  Atrial fibrillation (H) [I48.91]         Your next Anticoagulation Clinic appointment(s)     Aug 30, 2017  9:45 AM CDT   An[facility] with [facility]   [facility]    [address] [place] [address] [id number] [phone]              Contact Numbers     Clinic Number:         August 2017 Details    Sun Mon Tue Wed Thu Fri Sat       1               2               3               4               5                 6               7               8               9               10               11               12                 13               14      5 mg   See details      15      2.5 mg         16      2.5 mg         17      2.5 mg         18      2.5 mg         19      2.5 mg           20      2.5 mg         21      5 mg         22      2.5 mg         23      2.5 mg         24      2.5 mg         25      2.5 mg         26      2.5 mg           27      2.5 mg         28      5 mg         29      2.5 mg         30            31                  Date Details   08/14 This INR check       Date of next INR:  8/30/2017         How to take your warfarin dose     To take:  2.5 mg Take 0.5 of a 5 mg tablet.    To take:  5 mg Take 1 of the 5 mg tablets.

## 2017-08-14 NOTE — PROGRESS NOTES
SUBJECTIVE:  Latanya Padron is a 67 year old female who presents to the clinic today with a persistent, slightly productive, spasmodic cough for 6 days.  Associated symptoms include congestion, facial pain, malaise, myalgias, shortness of breath and wheezing.  The patient's symptoms are not changing over the course of time.  The patient's symptoms are exacerbated by cold air and exercise.  Patient has been using OTC cough suppressants and prednisone trial to improve symptoms.    Past Medical History:   Diagnosis Date     Mixed hyperlipidemia       Social History   Substance Use Topics     Smoking status: Former Smoker     Packs/day: 1.00     Years: 30.00     Quit date: 10/25/2005     Smokeless tobacco: Never Used     Alcohol use No       OBJECTIVE:  Exam:  Blood pressure 114/50, pulse 72, temperature 96.1  F (35.6  C), temperature source Temporal, resp. rate 16, weight 223 lb (101.2 kg), SpO2 96 %, not currently breastfeeding.  General: healthy, alert and no distress, appears hydarated, vital signs stable  and lethargic  Head: NORMAL - atraumatic, nontender.  Ears: normal canals, TMs bilaterally, normal TM mobility  Eyes: NORMAL - no injection no discharge, no periorbital swelling.  Nose: NORMAL - no drainage, turbinates normal in size.  Neck: supple, non-tender, free range of motion, no adenopathy  Throat: NORMAL - no erythema, no adenopathy, no exudates.  Resp:  ABNORMAL - bilateral wheezing and frequent coughing which sounds productive  Cardiac: NORMAL - regular rate and rhythm without murmur.    ASSESSMENT:    Acute Bronchitis  With underlying emphysema/COPD unresponsive to try the prednisone alone plus inhalers    PLAN:    Symptomatic measures encouraged, humidified air, plenty of fluids.  Z mickie  Side effects discussed with the patient.  Reportable signs and symptoms discussed.  Follow-up in 2 weeks if not improving and a chest x-ray will be considered.

## 2017-08-14 NOTE — MR AVS SNAPSHOT
After Visit Summary   8/14/2017    Latanya Padorn    MRN: [id number]           Patient Information     Date Of Birth          1950        Visit Information        Provider Department      8/14/2017 2:45 PM Glen Blue MD [facility]        Today's Diagnoses     Acute bronchitis with coexisting condition requiring prophylactic treatment    -  1      Care Instructions    Take antibiotics as ordered          Follow-ups after your visit        Your next 10 appointments already scheduled     Aug 14, 2017  2:45 PM CDT   Office Visit with Glen Blue MD   [facility] ([address] [id number] [phone]           Bring a current list of meds and any records pertaining to this visit. For Physicals, please bring immunization records and any forms needing to be filled out. Please arrive 10 minutes early to complete paperwork.            Aug 16, 2017  8:45 AM CDT   Anticoagulation Visit with PH ANTI COAG   [facility] ([address] [phone]            Dec 11, 2017  8:00 AM CST   Office Visit with Glen Blue MD   [facility] ([address] [phone]           Bring a current list of meds and any records pertaining to this visit. For Physicals, please bring immunization records and any forms needing to be filled out. Please arrive 10 minutes early to complete paperwork.              Who to contact     If you have questions or need follow up information about today's clinic visit or your schedule please contact [facility] directly at [phone].  Normal or non-critical lab and imaging results will be communicated to you by MyChart, letter or phone within 4 business days after the clinic has received the results. If you do  not hear from us within 7 days, please contact the clinic through [employer] or phone. If you have a critical or abnormal lab result, we will notify you by phone as soon as possible.  Submit refill requests through [employer] or call your pharmacy and they will forward the refill request to us. Please allow 3 business days for your refill to be completed.          Additional Information About Your Visit        [employer] Information     [employer] gives you secure access to your electronic health record. If you see a primary care provider, you can also send messages to your care team and make appointments. If you have questions, please call your primary care clinic.  If you do not have a primary care provider, please call [phone] and they will assist you.        Care EveryWhere ID     This is your Care EveryWhere ID. This could be used by other organizations to access your [place] medical records  [id number]        Your Vitals Were     Pulse Temperature Respirations Pulse Oximetry BMI (Body Mass Index)       72 96.1  F (35.6  C) (Temporal) 16 96% 41.45 kg/m2        Blood Pressure from Last 3 Encounters:   08/14/17 114/50   07/31/17 126/64   06/21/17 132/70    Weight from Last 3 Encounters:   08/14/17 223 lb (101.2 kg)   07/31/17 219 lb (99.3 kg)   06/21/17 218 lb (98.9 kg)              Today, you had the following     No orders found for display         Today's Medication Changes          These changes are accurate as of: 8/14/17  2:10 PM.  If you have any questions, ask your nurse or doctor.               Start taking these medicines.        Dose/Directions    azithromycin 250 MG tablet   Commonly known as:  ZITHROMAX   Used for:  Acute bronchitis with coexisting condition requiring prophylactic treatment   Started by:  Glen Blue MD        Two tablets first day, then one tablet daily for four days.   Quantity:  6 tablet   Refills:  0            Where to get your medicines      These medications were sent to  [facility], [place] - [address]  [address] [facility] [address]     Phone:  [phone]     azithromycin 250 MG tablet                Primary Care Provider Office Phone # Fax #    Glen Blue [phone]       [address] [facility]   [facility] [place] [address]        Equal Access to Services     [facility]: Hadii aad ku hadasho Soomaali, waaxda luqadaha, qaybta kaalmada adeegyada, waxay idiin hayaan adeeg kharash la'aan ah. So [facility] [phone].    ATENCIÓN: Si habla español, tiene a de los santos disposición servicios gratuitos de asistencia lingüística. Gayatri al [phone].    We comply with applicable federal civil rights laws and [place] laws. We do not discriminate on the basis of race, color, national origin, age, disability sex, sexual orientation or gender identity.            Thank you!     Thank you for choosing [facility]  for your care. Our goal is always to provide you with excellent care. Hearing back from our patients is one way we can continue to improve our services. Please take a few minutes to complete the written survey that you may receive in the mail after your visit with us. Thank you!             Your Updated Medication List - Protect others around you: Learn how to safely use, store and throw away your medicines at www.disposemymeds.org.          This list is accurate as of: 8/14/17  2:10 PM.  Always use your most recent med list.                   Brand Name Dispense Instructions for use Diagnosis    * albuterol 108 (90 BASE) MCG/ACT Inhaler    PROAIR HFA/PROVENTIL HFA/VENTOLIN HFA    2 Inhaler    1-2 puffs by mouth every 2-4 hours as needed    Pulmonary emphysema, unspecified emphysema type (H)       * albuterol (2.5 MG/3ML) 0.083% neb solution     75 mL    Take  by nebulization. 1 NEB EVERY 4-6 HOURS AS NEEDED        ASPIRIN NOT PRESCRIBED    INTENTIONAL    0 each    1 each continuous prn for other Reported on 4/17/2017     Atrial fibrillation (H)       atorvastatin 10 MG tablet    LIPITOR    90 tablet    Take 1 tablet (10 mg) by mouth daily    Hyperlipidemia LDL goal <130       azithromycin 250 MG tablet    ZITHROMAX    6 tablet    Two tablets first day, then one tablet daily for four days.    Acute bronchitis with coexisting condition requiring prophylactic treatment       cholecalciferol 400 UNITS tablet    Vitamin D    100 tablet    TAKE ONE TABLET BY MOUTH EVERY DAY    Osteoporosis       diltiazem 120 MG 24 hr capsule    CARTIA XT    90 capsule    TAKE ONE CAPSULE BY MOUTH EVERY DAY (DUE FOR FOLLOW-UP APPOINTMENT)    Hypertension, goal below 140/90, Chronic atrial fibrillation (H)       hydrochlorothiazide 25 MG tablet    HYDRODIURIL    90 tablet    Take 1 tablet (25 mg) by mouth daily    Hypertension, goal below 140/90       JANTOVEN 5 MG tablet   Generic drug:  warfarin     60 tablet    Take 5 mg Mon and 2.5 mg all other days of the week, or as directed by coumadin clinic    Long-term (current) use of anticoagulants, Chronic atrial fibrillation (H)       lisinopril 2.5 MG tablet    PRINIVIL/Zestril    90 tablet    Take 1 tablet (2.5 mg) by mouth daily    Hypertension, goal below 140/90       mometasone-formoterol 200-5 MCG/ACT oral inhaler    DULERA    13 g    INHALE 2 PUFFS BY MOUTH TWO TIMES A DAY    Pulmonary emphysema, unspecified emphysema type (H)       order for DME      Equipment being ordered: Oxygen @ 2 liters with exertion    COPD (chronic obstructive pulmonary disease) (H)       oxybutynin 5 MG tablet    DITROPAN    360 tablet    Take 1 tablet (5 mg) by mouth daily        predniSONE 20 MG tablet    DELTASONE    10 tablet    TAKE ONE TABLET BY MOUTH TWO TIMES A DAY    Tendonitis of ankle       umeclidinium 62.5 MCG/INH oral inhaler    INCRUSE ELLIPTA    1 Inhaler    Inhale 1 puff into the lungs daily    Pulmonary emphysema, unspecified emphysema type (H)       venlafaxine 37.5 MG 24 hr capsule    EFFEXOR-XR    90  capsule    TAKE ONE CAPSULE BY MOUTH EVERY DAY WITH FOOD    Major depression in complete remission (H)       * Notice:  This list has 2 medication(s) that are the same as other medications prescribed for you. Read the directions carefully, and ask your doctor or other care provider to review them with you.

## 2017-08-14 NOTE — NURSING NOTE
"Chief Complaint   Patient presents with     Cough       Initial /50 (BP Location: Left arm, Patient Position: Chair, Cuff Size: Adult Large)  Pulse 72  Temp 96.1  F (35.6  C) (Temporal)  Resp 16  Wt 223 lb (101.2 kg)  SpO2 96%  BMI 41.45 kg/m2 Estimated body mass index is 41.45 kg/(m^2) as calculated from the following:    Height as of 4/17/17: 5' 1.5\" (1.562 m).    Weight as of this encounter: 223 lb (101.2 kg).  Medication Reconciliation: complete  "

## 2017-08-18 ENCOUNTER — TELEPHONE (OUTPATIENT)
Dept: FAMILY MEDICINE | Facility: CLINIC | Age: 67
End: 2017-08-18

## 2017-08-18 DIAGNOSIS — J42 CHRONIC BRONCHITIS, UNSPECIFIED CHRONIC BRONCHITIS TYPE (H): ICD-10-CM

## 2017-08-18 DIAGNOSIS — J20.9 ACUTE BRONCHITIS WITH COEXISTING CONDITION REQUIRING PROPHYLACTIC TREATMENT: Primary | ICD-10-CM

## 2017-08-18 RX ORDER — PREDNISONE 10 MG/1
TABLET ORAL
Qty: 15 TABLET | Refills: 0 | Status: SHIPPED | OUTPATIENT
Start: 2017-08-18 | End: 2017-12-11

## 2017-08-18 RX ORDER — AMOXICILLIN AND CLAVULANATE POTASSIUM 500; 125 MG/1; MG/1
1 TABLET, FILM COATED ORAL 2 TIMES DAILY
Qty: 20 TABLET | Refills: 0 | Status: SHIPPED | OUTPATIENT
Start: 2017-08-18 | End: 2017-12-11

## 2017-08-18 NOTE — TELEPHONE ENCOUNTER
Given persistence with infection, I will switch to a more potent antibiotic which will cover other organisms. Augmentin 875 twice a day will be the choice. I will also order prednisone for a longer period of time. Glen Blue M.D.

## 2017-08-18 NOTE — TELEPHONE ENCOUNTER
Patient informed of plan below and that due to insurance Augmentin 500 mg was ordered instead of 875 mg VORB Dr. Glen Blue/Brenda Carlin CMA (AAMA)

## 2017-08-18 NOTE — TELEPHONE ENCOUNTER
Reason for Call:  Medication or medication refill:    Do you use a [facility]?  Name of the pharmacy and phone number for the current request:  [facility] [phone]    Name of the medication requested:     Other request: patient is still coughing up green stuff and over all not feeling great. States that she does feel a little better but not great. Wondering if Dr. Blue wants to do another zpack? Please advise    Can we leave a detailed message on this number? YES    Phone number patient can be reached at: Cell number on file:    Telephone Information:   Mobile [phone]       Best Time: any    Call taken on 8/18/2017 at 8:51 AM by Lisa Jimenez

## 2017-08-30 ENCOUNTER — ANTICOAGULATION THERAPY VISIT (OUTPATIENT)
Dept: ANTICOAGULATION | Facility: CLINIC | Age: 67
End: 2017-08-30
Payer: COMMERCIAL

## 2017-08-30 DIAGNOSIS — I48.20 CHRONIC ATRIAL FIBRILLATION (H): ICD-10-CM

## 2017-08-30 DIAGNOSIS — Z79.01 LONG-TERM (CURRENT) USE OF ANTICOAGULANTS: ICD-10-CM

## 2017-08-30 LAB — INR POINT OF CARE: 3.6 (ref 0.86–1.14)

## 2017-08-30 PROCEDURE — 36416 COLLJ CAPILLARY BLOOD SPEC: CPT

## 2017-08-30 PROCEDURE — 99207 ZZC NO CHARGE NURSE ONLY: CPT

## 2017-08-30 PROCEDURE — 85610 PROTHROMBIN TIME: CPT | Mod: QW

## 2017-08-30 NOTE — MR AVS SNAPSHOT
Latanya Padron   8/30/2017 9:45 AM   Anticoagulation Therapy Visit    Description:  67 year old female   Provider:  [facility]   Department:  [facility]           INR as of 8/30/2017     Today's INR 3.6!      Anticoagulation Summary as of 8/30/2017     INR goal 2.0-3.0   Today's INR 3.6!   Full instructions 8/30: Hold; 9/4: 2.5 mg; Otherwise 5 mg on Mon; 2.5 mg all other days   Next INR check 9/20/2017    Indications   Long-term (current) use of anticoagulants [Z79.01] [Z79.01]  Atrial fibrillation (H) [I48.91]         Your next Anticoagulation Clinic appointment(s)     Sep 20, 2017  8:30 AM CDT   [facility]    [address] [place] [address]   [phone]              Contact Numbers     Clinic Number:         August 2017 Details    Sun Mon Tue Wed Thu Fri Sat       1               2               3               4               5                 6               7               8               9               10               11               12                 13               14               15               16               17               18               19                 20               21               22               23               24               25               26                 27               28               29               30      Hold   See details      31      2.5 mg            Date Details   08/30 This INR check               How to take your warfarin dose     To take:  2.5 mg Take 0.5 of a 5 mg tablet.    Hold Do not take your warfarin dose. See the Details table to the right for additional instructions.                September 2017 Details    Sun Mon Tue Wed Thu Fri Sat          1      2.5 mg         2      2.5 mg           3      2.5 mg         4      2.5 mg         5      2.5 mg         6      2.5 mg         7      2.5 mg         8      2.5 mg         9      2.5 mg            10      2.5 mg         11      5 mg         12      2.5 mg         13      2.5 mg         14      2.5 mg         15      2.5 mg         16      2.5 mg           17      2.5 mg         18      5 mg         19      2.5 mg         20            21               22               23                 24               25               26               27               28               29               30                Date Details   No additional details    Date of next INR:  9/20/2017         How to take your warfarin dose     To take:  2.5 mg Take 0.5 of a 5 mg tablet.    To take:  5 mg Take 1 of the 5 mg tablets.

## 2017-08-30 NOTE — PROGRESS NOTES
ANTICOAGULATION FOLLOW-UP CLINIC VISIT    Patient Name:  Latanya Padron  Date:  8/30/2017  Contact Type:  Face to Face    SUBJECTIVE:     Patient Findings     Positives Change in medications           OBJECTIVE    INR Protime   Date Value Ref Range Status   08/30/2017 3.6 (A) 0.86 - 1.14 Final       ASSESSMENT / PLAN  INR assessment SUPRA    Recheck INR In: 4 WEEKS    INR Location Clinic      Anticoagulation Summary as of 8/30/2017     INR goal 2.0-3.0   Today's INR 3.6!   Maintenance plan 5 mg (5 mg x 1) on Mon; 2.5 mg (5 mg x 0.5) all other days   Full instructions 8/30: Hold; 9/4: 2.5 mg; Otherwise 5 mg on Mon; 2.5 mg all other days   Weekly total 20 mg   Plan last modified Francine Andrew, RN (7/26/2017)   Next INR check 9/20/2017   Target end date     Indications   Long-term (current) use of anticoagulants [Z79.01] [Z79.01]  Atrial fibrillation (H) [I48.91]         Anticoagulation Episode Summary     INR check location     Preferred lab     Send INR reminders to [facility] POOL    Comments 5 mg tabs, likes card       Anticoagulation Care Providers     Provider Role Specialty Phone number    Glen Blue MD [facility] [phone]            See the Encounter Report to view Anticoagulation Flowsheet and Dosing Calendar (Go to Encounters tab in chart review, and find the Anticoagulation Therapy Visit)    Dosage adjustment made based on physician directed care plan.    Hold 8/30, take 2.5 mg on 9/4, and keep all other dosing the same      Francine Andrew, LOVE

## 2017-09-20 ENCOUNTER — ANTICOAGULATION THERAPY VISIT (OUTPATIENT)
Dept: ANTICOAGULATION | Facility: CLINIC | Age: 67
End: 2017-09-20
Payer: COMMERCIAL

## 2017-09-20 DIAGNOSIS — Z79.01 LONG-TERM (CURRENT) USE OF ANTICOAGULANTS: ICD-10-CM

## 2017-09-20 DIAGNOSIS — I48.20 CHRONIC ATRIAL FIBRILLATION (H): ICD-10-CM

## 2017-09-20 LAB — INR POINT OF CARE: 2.3 (ref 0.86–1.14)

## 2017-09-20 PROCEDURE — 99207 ZZC NO CHARGE NURSE ONLY: CPT

## 2017-09-20 PROCEDURE — 36416 COLLJ CAPILLARY BLOOD SPEC: CPT

## 2017-09-20 PROCEDURE — 85610 PROTHROMBIN TIME: CPT | Mod: QW

## 2017-09-20 NOTE — MR AVS SNAPSHOT
Latanya Padron   9/20/2017 8:30 AM   Anticoagulation Therapy Visit    Description:  67 year old female   Provider:  [facility]   Department:  [facility]           INR as of 9/20/2017     Today's INR 2.3      Anticoagulation Summary as of 9/20/2017     INR goal 2.0-3.0   Today's INR 2.3   Full instructions 5 mg on Mon; 2.5 mg all other days   Next INR check 11/1/2017    Indications   Long-term (current) use of anticoagulants [Z79.01] [Z79.01]  Atrial fibrillation (H) [I48.91]         Your next Anticoagulation Clinic appointment(s)     Nov 01, 2017  8:30 AM CDT   Anticoagulation Visit with [facility]AL[facility] ANTI COAALISTAIR   [facility]    [address] [place] [id number] [phone]              Contact Numbers     Clinic Number:         September 2017 Details    Sun Mon Tue Wed Thu Fri Sat          1               2                 3               4               5               6               7               8               9                 10               11               12               13               14               15               16                 17               18               19               20      2.5 mg   See details      21      2.5 mg         22      2.5 mg         23      2.5 mg           24      2.5 mg         25      5 mg         26      2.5 mg         27      2.5 mg         28      2.5 mg         29      2.5 mg         30      2.5 mg          Date Details   09/20 This INR check               How to take your warfarin dose     To take:  2.5 mg Take 0.5 of a 5 mg tablet.    To take:  5 mg Take 1 of the 5 mg tablets.           October 2017 Details    Sun Mon Tue Wed Thu Fri Sat     1      2.5 mg         2      5 mg         3      2.5 mg         4      2.5 mg         5      2.5 mg         6      2.5 mg         7      2.5 mg           8      2.5 mg         9      5 mg         10      2.5 mg         11      2.5 mg         12      2.5 mg          13      2.5 mg         14      2.5 mg           15      2.5 mg         16      5 mg         17      2.5 mg         18      2.5 mg         19      2.5 mg         20      2.5 mg         21      2.5 mg           22      2.5 mg         23      5 mg         24      2.5 mg         25      2.5 mg         26      2.5 mg         27      2.5 mg         28      2.5 mg           29      2.5 mg         30      5 mg         31      2.5 mg              Date Details   No additional details            How to take your warfarin dose     To take:  2.5 mg Take 0.5 of a 5 mg tablet.    To take:  5 mg Take 1 of the 5 mg tablets.           November 2017 Details    Sun Mon Tue Wed Thu Fri Sat        1            2               3               4                 5               6               7               8               9               10               11                 12               13               14               15               16               17               18                 19               20               21               22               23               24               25                 26               27               28               29               30                  Date Details   No additional details    Date of next INR:  11/1/2017         How to take your warfarin dose     To take:  2.5 mg Take 0.5 of a 5 mg tablet.

## 2017-09-20 NOTE — PROGRESS NOTES
ANTICOAGULATION FOLLOW-UP CLINIC VISIT    Patient Name:  Latanya Padron  Date:  9/20/2017  Contact Type:  Face to Face    SUBJECTIVE:     Patient Findings     Positives No Problem Findings           OBJECTIVE    INR Protime   Date Value Ref Range Status   09/20/2017 2.3 (A) 0.86 - 1.14 Final       ASSESSMENT / PLAN  INR assessment THER    Recheck INR In: 6 WEEKS    INR Location Clinic      Anticoagulation Summary as of 9/20/2017     INR goal 2.0-3.0   Today's INR 2.3   Maintenance plan 5 mg (5 mg x 1) on Mon; 2.5 mg (5 mg x 0.5) all other days   Full instructions 5 mg on Mon; 2.5 mg all other days   Weekly total 20 mg   No change documented Francine Andrew RN   Plan last modified Francine Andrew RN (7/26/2017)   Next INR check 11/1/2017   Target end date     Indications   Long-term (current) use of anticoagulants [Z79.01] [Z79.01]  Atrial fibrillation (H) [I48.91]         Anticoagulation Episode Summary     INR check location     Preferred lab     Send INR reminders to [facility] POOL    Comments 5 mg tabs, likes card       Anticoagulation Care Providers     Provider Role Specialty Phone number    Glen Blue MD [facility] [phone]            See the Encounter Report to view Anticoagulation Flowsheet and Dosing Calendar (Go to Encounters tab in chart review, and find the Anticoagulation Therapy Visit)    Dosage adjustment made based on physician directed care plan.      Francine Andrew RN

## 2017-10-02 NOTE — PROGRESS NOTES
ORTHOPEDIC CONSULT      Chief Complaint: Latanya Padron is a 67 year old female     She is being seen for   Chief Complaints and History of Present Illnesses   Patient presents with     Consult     rt knee burning sensation         History of Present Illness:   Latanya Padron is a 67 year old female who is seen in consultation at the request of Dr. Blue.  History of Present illness:  Latanya presents for evaluation of:  1.) rt knee  Onset: late and August  Symptoms brought on by nothing.   Character:  sharp and burning.    Progression of symptoms:  more frequent and last longer.    Previous similar pain: no .   Pain Level:  3/10.   Previous treatments:  tylenol.  Currently on Blood thinners? Yes - coumadin for afib  Diagnosis of Diabetes? No  Painful with knee straight, better bending and moving        Patient's past medical, surgical, social and family histories reviewed.     Past Medical History:   Diagnosis Date     Mixed hyperlipidemia        Past Surgical History:   Procedure Laterality Date     C APPENDECTOMY,W OTHR PROC       C  DELIVERY ONLY           C LIGATE FALLOPIAN TUBE       C NONSPECIFIC PROCEDURE      Exploratory laparotomy with resection of infarcted segment of omentum and minor lysis of adhesions     C NONSPECIFIC PROCEDURE      Removal of hemorrhagic corpus luteum cyst     C VAGINAL HYSTERECTOMY       COLONOSCOPY  06/21/10      BIOPSY OF BREAST, OPEN INCISIONAL        CORRECT BUNION,METATARSAL OSTEOTOMY        LAPAROSCOPY, SURGICAL; CHOLECYSTECTOMY  08/04/10     HC SLING OPERATION FOR STRESS INCONTINENCE  2007     HERNIORRHAPHY INCISIONAL (LOCATION)  2011    Procedure:HERNIORRHAPHY INCISIONAL (LOCATION); Surgeon:AYALA HOOVER; Location[facility]     LAPAROSCOPIC HERNIORRHAPHY INCISIONAL  2011    Procedure:LAPAROSCOPIC HERNIORRHAPHY INCISIONAL; Laparoscopic mesh repair of incarcerated incisional hernia , extensive lysis of  adhesions, excision of hernia sac and closure of fascia.; Surgeon:AYALA HOOVER; Location[facility]     LAPAROSCOPIC LYSIS ADHESIONS  9/23/2011    Procedure:LAPAROSCOPIC LYSIS ADHESIONS; Surgeon:AYALA HOOVER; Location[facility]       Medications:    Current Outpatient Prescriptions on File Prior to Visit:  amoxicillin-clavulanate (AUGMENTIN) 500-125 MG per tablet Take 1 tablet by mouth 2 times daily   predniSONE (DELTASONE) 10 MG tablet 20 mg daily for 3 days and then 10 mg daily until gone.   azithromycin (ZITHROMAX) 250 MG tablet Two tablets first day, then one tablet daily for four days.   predniSONE (DELTASONE) 20 MG tablet TAKE ONE TABLET BY MOUTH TWO TIMES A DAY   oxybutynin (DITROPAN) 5 MG tablet Take 1 tablet (5 mg) by mouth daily   warfarin (JANTOVEN) 5 MG tablet Take 5 mg Mon and 2.5 mg all other days of the week, or as directed by coumadin clinic   albuterol (2.5 MG/3ML) 0.083% neb solution Take  by nebulization. 1 NEB EVERY 4-6 HOURS AS NEEDED   atorvastatin (LIPITOR) 10 MG tablet Take 1 tablet (10 mg) by mouth daily   diltiazem (CARTIA XT) 120 MG 24 hr capsule TAKE ONE CAPSULE BY MOUTH EVERY DAY (DUE FOR FOLLOW-UP APPOINTMENT)   mometasone-formoterol (DULERA) 200-5 MCG/ACT oral inhaler INHALE 2 PUFFS BY MOUTH TWO TIMES A DAY   hydrochlorothiazide (HYDRODIURIL) 25 MG tablet Take 1 tablet (25 mg) by mouth daily   lisinopril (PRINIVIL/ZESTRIL) 2.5 MG tablet Take 1 tablet (2.5 mg) by mouth daily   umeclidinium (INCRUSE ELLIPTA) 62.5 MCG/INH oral inhaler Inhale 1 puff into the lungs daily   venlafaxine (EFFEXOR-XR) 37.5 MG 24 hr capsule TAKE ONE CAPSULE BY MOUTH EVERY DAY WITH FOOD   albuterol (PROAIR HFA/PROVENTIL HFA/VENTOLIN HFA) 108 (90 BASE) MCG/ACT Inhaler 1-2 puffs by mouth every 2-4 hours as needed   cholecalciferol (VITAMIN D) 400 UNITS tablet TAKE ONE TABLET BY MOUTH EVERY DAY   ASPIRIN NOT PRESCRIBED, INTENTIONAL, 1 each continuous prn for other Reported on 4/17/2017   ORDER FOR DME Equipment  "being ordered: Oxygen @ 2 liters with exertion     No current facility-administered medications on file prior to visit.     Allergies   Allergen Reactions     Meperidine Hcl Nausea and Vomiting     demerol     Seasonal Allergies      spring       Social History     Occupational History     [employer]     Social History Main Topics     Smoking status: Former Smoker     Packs/day: 1.00     Years: 30.00     Quit date: 10/25/2005     Smokeless tobacco: Never Used     Alcohol use No     Drug use: No     Sexual activity: No      Comment: hysterectomy       Family History   Problem Relation Age of Onset     CANCER Mother      breast     Breast Cancer Mother      Obesity Mother      Genitourinary Problems Mother      CANCER Sister      breast     Respiratory Sister      Lipids Sister      Breast Cancer Sister      Arthritis Sister      Obesity Sister      CANCER Maternal Grandfather      CANCER Maternal Uncle      colon     CANCER Paternal Grandfather      lymph nodes     HEART DISEASE Father      by pass (5)     CEREBROVASCULAR DISEASE Father      hemorragic     Lipids Father      Alzheimer Disease Maternal Grandmother      Genitourinary Problems Maternal Grandmother      Lipids Sister      HEART DISEASE Brother      Hx: stent placement     Lipids Brother      X 2     Lipids Mother        REVIEW OF SYSTEMS  10 point review systems performed otherwise negative as noted as per history of present illness.    Physical Exam:  Vitals: Resp 16  Ht 1.562 m (5' 1.5\")  Wt 103.4 kg (228 lb)  BMI 42.38 kg/m2  BMI= Body mass index is 42.38 kg/(m^2).    Constitutional: healthy, alert and no acute distress   Psychiatric: mentation appears normal and affect normal/bright  NEURO: no focal deficits  RESP: Normal with easy respirations and no use of accessory muscles to breathe, no audible wheezing or retractions  CV: regular pulse  SKIN: No erythema, rashes, excoriation, or breakdown. No " evidence of infection.   JOINT/EXTREMITIES:right Knee Exam: Inspection: AP/lateral alignment normal, No effusion, No quad atrophy  Tender: peripatellar region  Active Range of Motion: all normal  Strength: normal  Special tests: normal Valgus stress test, normal Varus, negative Lachman's test      GAIT: antalgic  Lymph: no palpable lymph nodes    Diagnostic Modalities:  bilateral knee X-ray: patellofemoral compartment:  with moderate joint space narrowing  Independent visualization of the images was performed.      Impression: right Knee Patellofemoral osteoarthritis and syndrome      Plan:  All of the above pertinent physical exam and imaging modalities findings was reviewed with Latanya.                                          CONSERVATIVE CARE:  I recommend conservative care for the patient to include Tylenol, focused self directed physical therapy, steroid injections, activity modifications. Today I provided or dispensed info and hep.                                        INJECTION PROCEDURE:  The patient was counseled about an  injection, including discussion of risks (including infection), contents of the injection, rationale for performing the injection, and expected benefits of the injection. The skin was prepped with alcohol and betadine and then utilizing sterile technique an injection of the right knee joint from the superior lateral approach in the supine position was performed. The injection consisted 1ml of Kenalog (40mg per 1ml) with 8ml 1% lidocaine plain. The patient tolerated the injection well, and there were no complications. The injection site was covered with a Band-Aid. The injection was performed by TOMI Mathew                                                FUTURE PLAN:  On their return if they still have symptoms we will consider physical therapy, injection of steroids, injection of visco-supplementation .      Return to clinic 6, week(s), or sooner as needed for changes.  Re-x-ray on  return: Ana Hinson M.D.

## 2017-10-04 ENCOUNTER — OFFICE VISIT (OUTPATIENT)
Dept: ORTHOPEDICS | Facility: CLINIC | Age: 67
End: 2017-10-04
Payer: COMMERCIAL

## 2017-10-04 ENCOUNTER — RADIANT APPOINTMENT (OUTPATIENT)
Dept: GENERAL RADIOLOGY | Facility: CLINIC | Age: 67
End: 2017-10-04
Attending: ORTHOPAEDIC SURGERY
Payer: COMMERCIAL

## 2017-10-04 VITALS — HEIGHT: 62 IN | BODY MASS INDEX: 41.96 KG/M2 | RESPIRATION RATE: 16 BRPM | WEIGHT: 228 LBS

## 2017-10-04 DIAGNOSIS — M22.2X1 PATELLOFEMORAL PAIN SYNDROME OF RIGHT KNEE: Primary | ICD-10-CM

## 2017-10-04 DIAGNOSIS — M17.11 PRIMARY OSTEOARTHRITIS OF RIGHT KNEE: ICD-10-CM

## 2017-10-04 DIAGNOSIS — M25.561 RIGHT KNEE PAIN: ICD-10-CM

## 2017-10-04 PROCEDURE — 20610 DRAIN/INJ JOINT/BURSA W/O US: CPT | Mod: RT | Performed by: ORTHOPAEDIC SURGERY

## 2017-10-04 PROCEDURE — 99203 OFFICE O/P NEW LOW 30 MIN: CPT | Mod: 25 | Performed by: ORTHOPAEDIC SURGERY

## 2017-10-04 PROCEDURE — 73562 X-RAY EXAM OF KNEE 3: CPT | Mod: TC

## 2017-10-04 ASSESSMENT — PAIN SCALES - GENERAL: PAINLEVEL: MILD PAIN (3)

## 2017-10-04 NOTE — NURSING NOTE
"Chief Complaint   Patient presents with     Consult     rt knee burning sensation       Initial Resp 16  Ht 1.562 m (5' 1.5\")  Wt 103.4 kg (228 lb)  BMI 42.38 kg/m2 Estimated body mass index is 42.38 kg/(m^2) as calculated from the following:    Height as of this encounter: 1.562 m (5' 1.5\").    Weight as of this encounter: 103.4 kg (228 lb).  Medication Reconciliation: complete    BP completed using cuff size: NA (Not Taken)    Azeb Gottlieb MA      "

## 2017-10-04 NOTE — LETTER
10/4/2017         RE: Latanya Padron  [id number] [facility] [id number]        Dear Colleague,    Thank you for referring your patient, Latanya Padron, to the [facility]. Please see a copy of my visit note below.    ORTHOPEDIC CONSULT      Chief Complaint: Latanya Padron is a 67 year old female     She is being seen for   Chief Complaints and History of Present Illnesses   Patient presents with     Consult     rt knee burning sensation         History of Present Illness:   Latanya Padron is a 67 year old female who is seen in consultation at the request of Dr. Blue.  History of Present illness:  Latanya presents for evaluation of:  1.) rt knee  Onset: late and August  Symptoms brought on by nothing.   Character:  sharp and burning.    Progression of symptoms:  more frequent and last longer.    Previous similar pain: no .   Pain Level:  310.   Previous treatments:  tylenol.  Currently on Blood thinners? Yes - coumadin for afib  Diagnosis of Diabetes? No  Painful with knee straight, better bending and moving        Patient's past medical, surgical, social and family histories reviewed.     Past Medical History:   Diagnosis Date     Mixed hyperlipidemia        Past Surgical History:   Procedure Laterality Date     C APPENDECTOMY,W OTHR PROC       C  DELIVERY ONLY           C LIGATE FALLOPIAN TUBE  's     C NONSPECIFIC PROCEDURE      Exploratory laparotomy with resection of infarcted segment of omentum and minor lysis of adhesions     C NONSPECIFIC PROCEDURE      Removal of hemorrhagic corpus luteum cyst     C VAGINAL HYSTERECTOMY       COLONOSCOPY  06/21/10      BIOPSY OF BREAST, OPEN INCISIONAL        CORRECT BUNION,METATARSAL OSTEOTOMY        LAPAROSCOPY, SURGICAL; CHOLECYSTECTOMY  08/04/10     HC SLING OPERATION FOR STRESS INCONTINENCE  2007     HERNIORRHAPHY INCISIONAL (LOCATION)  2011    Procedure:HERNIORRHAPHY  INCISIONAL (LOCATION); Surgeon:AYALA HOOVER; Location[facility]     LAPAROSCOPIC HERNIORRHAPHY INCISIONAL  9/23/2011    Procedure:LAPAROSCOPIC HERNIORRHAPHY INCISIONAL; Laparoscopic mesh repair of incarcerated incisional hernia , extensive lysis of adhesions, excision of hernia sac and closure of fascia.; Surgeon:AYALA HOOVER; Location[facility]     LAPAROSCOPIC LYSIS ADHESIONS  9/23/2011    Procedure:LAPAROSCOPIC LYSIS ADHESIONS; Surgeon:AYALA HOOVER; Location[facility]       Medications:    Current Outpatient Prescriptions on File Prior to Visit:  amoxicillin-clavulanate (AUGMENTIN) 500-125 MG per tablet Take 1 tablet by mouth 2 times daily   predniSONE (DELTASONE) 10 MG tablet 20 mg daily for 3 days and then 10 mg daily until gone.   azithromycin (ZITHROMAX) 250 MG tablet Two tablets first day, then one tablet daily for four days.   predniSONE (DELTASONE) 20 MG tablet TAKE ONE TABLET BY MOUTH TWO TIMES A DAY   oxybutynin (DITROPAN) 5 MG tablet Take 1 tablet (5 mg) by mouth daily   warfarin (JANTOVEN) 5 MG tablet Take 5 mg Mon and 2.5 mg all other days of the week, or as directed by coumadin clinic   albuterol (2.5 MG/3ML) 0.083% neb solution Take  by nebulization. 1 NEB EVERY 4-6 HOURS AS NEEDED   atorvastatin (LIPITOR) 10 MG tablet Take 1 tablet (10 mg) by mouth daily   diltiazem (CARTIA XT) 120 MG 24 hr capsule TAKE ONE CAPSULE BY MOUTH EVERY DAY (DUE FOR FOLLOW-UP APPOINTMENT)   mometasone-formoterol (DULERA) 200-5 MCG/ACT oral inhaler INHALE 2 PUFFS BY MOUTH TWO TIMES A DAY   hydrochlorothiazide (HYDRODIURIL) 25 MG tablet Take 1 tablet (25 mg) by mouth daily   lisinopril (PRINIVIL/ZESTRIL) 2.5 MG tablet Take 1 tablet (2.5 mg) by mouth daily   umeclidinium (INCRUSE ELLIPTA) 62.5 MCG/INH oral inhaler Inhale 1 puff into the lungs daily   venlafaxine (EFFEXOR-XR) 37.5 MG 24 hr capsule TAKE ONE CAPSULE BY MOUTH EVERY DAY WITH FOOD   albuterol (PROAIR HFA/PROVENTIL HFA/VENTOLIN HFA) 108 (90 BASE) MCG/ACT  "Inhaler 1-2 puffs by mouth every 2-4 hours as needed   cholecalciferol (VITAMIN D) 400 UNITS tablet TAKE ONE TABLET BY MOUTH EVERY DAY   ASPIRIN NOT PRESCRIBED, INTENTIONAL, 1 each continuous prn for other Reported on 4/17/2017   ORDER FOR DME Equipment being ordered: Oxygen @ 2 liters with exertion     No current facility-administered medications on file prior to visit.     Allergies   Allergen Reactions     Meperidine Hcl Nausea and Vomiting     demerol     Seasonal Allergies      spring       Social History     Occupational History     [employer]     Social History Main Topics     Smoking status: Former Smoker     Packs/day: 1.00     Years: 30.00     Quit date: 10/25/2005     Smokeless tobacco: Never Used     Alcohol use No     Drug use: No     Sexual activity: No      Comment: hysterectomy       Family History   Problem Relation Age of Onset     CANCER Mother      breast     Breast Cancer Mother      Obesity Mother      Genitourinary Problems Mother      CANCER Sister      breast     Respiratory Sister      Lipids Sister      Breast Cancer Sister      Arthritis Sister      Obesity Sister      CANCER Maternal Grandfather      CANCER Maternal Uncle      colon     CANCER Paternal Grandfather      lymph nodes     HEART DISEASE Father      by pass (5)     CEREBROVASCULAR DISEASE Father      hemorragic     Lipids Father      Alzheimer Disease Maternal Grandmother      Genitourinary Problems Maternal Grandmother      Lipids Sister      HEART DISEASE Brother      Hx: stent placement     Lipids Brother      X 2     Lipids Mother        REVIEW OF SYSTEMS  10 point review systems performed otherwise negative as noted as per history of present illness.    Physical Exam:  Vitals: Resp 16  Ht 1.562 m (5' 1.5\")  Wt 103.4 kg (228 lb)  BMI 42.38 kg/m2  BMI= Body mass index is 42.38 kg/(m^2).    Constitutional: healthy, alert and no acute distress   Psychiatric: mentation " appears normal and affect normal/bright  NEURO: no focal deficits  RESP: Normal with easy respirations and no use of accessory muscles to breathe, no audible wheezing or retractions  CV: regular pulse  SKIN: No erythema, rashes, excoriation, or breakdown. No evidence of infection.   JOINT/EXTREMITIES:right Knee Exam: Inspection: AP/lateral alignment normal, No effusion, No quad atrophy  Tender: peripatellar region  Active Range of Motion: all normal  Strength: normal  Special tests: normal Valgus stress test, normal Varus, negative Lachman's test      GAIT: antalgic  Lymph: no palpable lymph nodes    Diagnostic Modalities:  bilateral knee X-ray: patellofemoral compartment:  with moderate joint space narrowing  Independent visualization of the images was performed.      Impression: right Knee Patellofemoral osteoarthritis and syndrome      Plan:  All of the above pertinent physical exam and imaging modalities findings was reviewed with Latanya.                                          CONSERVATIVE CARE:  I recommend conservative care for the patient to include Tylenol, focused self directed physical therapy, steroid injections, activity modifications. Today I provided or dispensed info and hep.                                        INJECTION PROCEDURE:  The patient was counseled about an  injection, including discussion of risks (including infection), contents of the injection, rationale for performing the injection, and expected benefits of the injection. The skin was prepped with alcohol and betadine and then utilizing sterile technique an injection of the right knee joint from the superior lateral approach in the supine position was performed. The injection consisted 1ml of Kenalog (40mg per 1ml) with 8ml 1% lidocaine plain. The patient tolerated the injection well, and there were no complications. The injection site was covered with a Band-Aid. The injection was performed by TOMI Mathew                                                 FUTURE PLAN:  On their return if they still have symptoms we will consider physical therapy, injection of steroids, injection of visco-supplementation .      Return to clinic 6, week(s), or sooner as needed for changes.  Re-x-ray on return: No    Nadja Hinson M.D.    Again, thank you for allowing me to participate in the care of your patient.        Sincerely,        Nadja Hinson MD

## 2017-10-04 NOTE — PATIENT INSTRUCTIONS
Encounter Diagnoses   Name Primary?     Patellofemoral pain syndrome of right knee Yes     Primary osteoarthritis of right knee      Rest, ice and elevate above heart level as needed for pain control  1. This is like the head ache of the knee.  Some arthritis under the knee cap also.  2. We decided on a cortisone injection today.  This will help calm down the inflammation in the knee.  3. We also discussed doing straight leg raising and strengthening your quad to holding your kneecap up higher so it does not get bothered. We have exercises below.  4. We have information below  5. We discussed Mobic however that medication is not good for you since you take Coumadin.  6. You can followup with Nadja Hinson MD in 6 weeks, if you are having pain at that time we can do a cortisone injection.  You can always cancel the appointment if you are doing really well and follow-up as needed.   Cortisone Instructions:     1. You received an injection of cortisone into your right knee today.  2. The joint(s) may be more painful for the first 1-2 days.  3. We ask you to continue to rest the joint(s) for a few more days before resuming regular activities.  4. Pain Medications you can take (as long as your primary care provider allows these meds and you do not have kidney or liver conditions):  Tylenol  Take 1000 mg by mouth every 6 hours as needed; maximum dose 4000 mg a day  Ibuprofen  600 mg every 6 hours as needed; maximum 2400 mg a day  (OK to take tylenol and ibuprofen at the same time)  5. Rest, ice and elevate as needed for pain control  6. Watch for these signs of infection: redness, swelling, drainage, warmth to touch, increased pain, or fever. Call the clinic or make an appointment to be seen if you think you have an infection.  7. If you are diabetic, make sure you keep a close eye on your blood sugars, they can get elevated with cortisone injections.   8. Sometimes it can take 1-2 weeks for it to reach its full  effect.    Cortisone Injections  Cortisone is a type of steroid. It can greatly reduce swelling, redness, and irritation (inflammation) and pain. Being injected with cortisone is simple and doesn t take long. Your doctor may ask you questions about your health. Certain health conditions, such as diabetes, can be affected by cortisone.     Your pain may be relieved by a cortisone injection.   Why have a cortisone injection?  Injecting cortisone can relieve pain for anything from a sports injury to arthritis. Your doctor may suggest an injection if rest, splints, or oral medicine doesn t relieve your pain. Injecting cortisone is simpler than having surgery. And cortisone may provide the lasting pain relief that can help you get out and enjoy life again.  Getting the injection  Your doctor will start by cleaning and occasionally numbing your skin at the injection site. Next you ll be injected with local anesthetics (for short-term pain relief) and cortisone. The injection may last a few moments. A small bandage will be put over the injection site. You ll then be ready to go home.  After your injection  After being injected, make sure you don t injure the treated region. But stay active. Enjoy a walk or some other mild activity. Just be careful not to strain the region that gave you trouble.  The next day  Some people feel more pain after being injected. This is normal, and it will go away soon. Applying ice for 20 minutes at a time to your injury may reduce the increased pain. Rest for the first day or two. You don t need to stay in bed. But avoid tasks that may strain the injured region.  If you have diabetes  Cortisone injections can cause blood sugar to be increased for several days after the injection. If you have diabetes, you should follow your blood  sugar closely during this time. Follow your regular plan for what to do when your blood sugar is elevated.     6769-4689 [employer]. [address] [place] [address]  [place] [address]. All rights reserved. This information is not intended as a substitute for professional medical care. Always follow your healthcare professional's instructions.                         Patellofemoral Pain Syndrome (Runner's Knee)             What is patellofemoral pain syndrome?   Patellofemoral pain syndrome is pain behind the kneecap. It may also be called patellofemoral disorder, patellar malalignment, runner's knee, and chondromalacia.   How does it occur?   Patellofemoral pain syndrome can occur from overuse of the knee in sports and activities such as running, walking, jumping, or bicycling.   The kneecap (patella) is attached to the large group of muscles in the thigh called the quadriceps. It is also attached to the shin bone by the patellar tendon. The kneecap fits into grooves in the end of the thigh bone (femur) called the femoral condyle. With repeated bending and straightening of the knee, you can irritate the inside surface of the kneecap and cause pain.   Patellofemoral pain syndrome also may result from the way your hips, legs, knees, or feet are aligned. For example, if you have wide hips or underdeveloped thigh muscles, or if you are knock-kneed You may also have this problem if your foot flattens too much when you walk or run (a condition called over-pronation).   What are the symptoms?   The main symptom is pain behind the kneecap. You may have pain when you walk, run, or sit for a long time. The pain is usually worse when you walk downhill or down stairs. Your knee may swell at times. You may feel or hear snapping, popping, or grinding in the knee.   How is it diagnosed?   Your healthcare provider will review your symptoms and examine your knee. You will have knee X-rays. You may have an MRI to check for damage to the surface of the patella or femur or another injury.   How is it treated?   Treatment includes the following:   Put an ice pack, gel pack, or package of  frozen vegetables, wrapped in a cloth on the area every 3 to 4 hours, for up to 20 minutes at a time.   Raise the knee on a pillow when you sit or lie down.   Take an anti-inflammatory medicine such as ibuprofen, or other medicine as directed by your provider. Nonsteroidal anti-inflammatory medicines (NSAIDs) may cause stomach bleeding and other problems. These risks increase with age. Read the label and take as directed. Unless recommended by your healthcare provider, do not take for more than 10 days.   Follow your provider's instructions for doing exercises to help you recover. Your healthcare provider will show you exercises to help decrease the pain behind your kneecap.   If you over-pronate, your healthcare provider may recommend shoe inserts, called orthotics. You can buy orthotics at a pharmacy or athletic shoe store or they can be custom-made.   Use an infrapatellar strap, a strap placed below the kneecap over the patellar tendon.   Wear a neoprene knee sleeve, which will give support to your knee and patella.   While you recover from your injury, you will need to change your sport or activity to one that does not make your condition worse. For example, you may need to bicycle or swim instead of run.   In cases of severe patellofemoral pain syndrome, surgery may be recommended.   How long will the effects last?   Patellofemoral pain often lasts a long time and can come back after symptoms were better for a while. Treatment requires proper rehabilitation exercises that are done regularly.   When can I return to my normal activities?   Everyone recovers from an injury at a different rate. Return to your activities depends on how soon your knee recovers, not by how many days or weeks it has been since your injury has occurred. In general, the longer you have symptoms before you start treatment, the longer it will take to get better. The goal is to return you to your normal activities as soon as is safely  possible. If you return too soon you may worsen your injury.   You may safely return to your normal activities when, starting from the top of the list and progressing to the end, each of the following is true:   Your injured knee can be fully straightened and bent without pain.   Your knee and leg have regained normal strength compared to the uninjured knee and leg.   You are able to walk, bend, and squat without pain.   How can I prevent runner's knee?   Runner's knee can best be prevented by strengthening your thigh muscles, particularly the inside part of this muscle group. It is also important to wear shoes that fit well and that have good arch supports.     Published by [employer].  This content is reviewed periodically and is subject to change as new health information becomes available. The information is intended to inform and educate and is not a replacement for medical evaluation, advice, diagnosis or treatment by a healthcare professional.   Written by Handy Fernandes MD, for [employer]   ? 2010 [facility] and/or its affiliates. All Rights Reserved.   Copyright   [employer] 2011  Adult Health Advisor    Patellofemoral Pain Syndrome (Runner's Knee) Rehabilitation Exercises              You can do the hamstring stretch right away. When the pain in your knee has decreased, you can do the quadriceps stretch and start strengthening the thigh muscles using the rest of the exercises.   Standing hamstring stretch: Put the heel of the leg on your injured side on a stool about 15 inches high. Keep your leg straight. Lean forward, bending at the hips, until you feel a mild stretch in the back of your thigh. Make sure you don't roll your shoulders or bend at the waist when doing this or you will stretch your lower back instead of your leg. Hold the stretch for 15 to 30 seconds. Repeat 3 times.   Quadriceps stretch: Stand an arm's length away from the wall with your injured leg farthest from the  wall. Facing straight ahead, brace yourself by keeping one hand against the wall. With your other hand, grasp the ankle of your injured leg and pull your heel toward your buttocks. Don't arch or twist your back. Keep your knees together. Hold this stretch for 15 to 30 seconds.   Side-lying leg lift: Lie on your uninjured side. Tighten the front thigh muscles on your injured leg and lift that leg 8 to 10 inches away from the other leg. Keep the leg straight and lower it slowly. Do 3 sets of 10.   Quad sets: Sit on the floor with your injured leg straight and your other leg bent. Press the back of the knee of your injured leg against the floor by tightening the muscles on the top of your thigh. Hold this position 10 seconds. Relax. Do 3 sets of 10.   Straight leg raise: Lie on your back with your legs straight out in front of you. Bend the knee on your uninjured side and place the foot flat on the floor. Tighten the thigh muscle on your injured side and lift your leg about 8 inches off the floor. Keep your leg straight and your thigh muscle tight. Slowly lower your leg back down to the floor. Do 3 sets of 10.   Step-up: Stand with the foot of your injured leg on a support 3 to 5 inches high (like a small step or block of wood). Keep your other foot flat on the floor. Shift your weight onto the injured leg on the support. Straighten your injured leg as the other leg comes off the floor. Return to the starting position by bending your injured leg and slowly lowering your uninjured leg back to the floor. Do 3 sets of 10.   Wall squat with a ball: Stand with your back, shoulders, and head against a wall. Look straight ahead. Keep your shoulders relaxed and your feet 3 feet from the wall and shoulder's width apart. Place a soccer or basketball-sized ball behind your back. Keeping your back against the wall, slowly squat down to a 45-degree angle. Your thighs will not yet be parallel to the floor. Hold this position for 10  seconds and then slowly slide back up the wall. Repeat 10 times. Build up to 3 sets of 10.   Knee stabilization: Wrap a piece of elastic tubing around the ankle of the uninjured leg. Tie a knot in the other end of the tubing and close it in a door.   0. Stand facing the door on the leg without tubing and bend your knee slightly, keeping your thigh muscles tight. While maintaining this position, move the leg with the tubing straight back behind you. Do 3 sets of 10.   0. Turn 90 degrees so the leg without tubing is closest to the door. Move the leg with tubing away from your body. Do 3 sets of 10.   0. Turn 90 degrees again so your back is to the door. Move the leg with tubing straight out in front of you. Do 3 sets of 10.   0. Turn your body 90 degrees again so the leg with tubing is closest to the door. Move the leg with tubing across your body. Do 3 sets of 10.   Hold onto a chair if you need help balancing. This exercise can be made even more challenging by standing on a pillow while you move the leg with tubing.   Resisted terminal knee extension: Make a loop from a piece of elastic tubing by tying a knot in both ends. Close both knots in a door. Step into the loop so the tubing is around the back of your injured leg. Lift the other foot off the ground. Hold onto a chair for balance, if needed. Bend the knee on the leg with tubing about 45 degrees. Slowly straighten your leg, keeping your thigh muscle tight as you do this. Do this 10 times. Do 3 sets. An easier way to do this is to stand on both legs for better support while you do the exercise.   Standing calf stretch: Stand facing a wall with your hands on the wall at about eye level. Keep your injured leg back with your heel on the floor. Keep the other leg forward with the knee bent. Turn your back foot slightly inward (as if you were pigeon-toed). Slowly lean into the wall until you feel a stretch in the back of your calf. Hold the stretch for 15 to 30  seconds. Return to the starting position. Repeat 3 times. Do this exercise several times each day.   Clam exercise: Lie on your uninjured side with your hips and knees bent and feet together. Slowly raise your top leg toward the ceiling while keeping your heels touching each other. Hold for 2 seconds and lower slowly. Do 3 sets of 10 repetitions.   Iliotibial band stretch: Side-bending: Cross one leg in front of the other leg and lean in the opposite direction from the front leg. Reach the arm on the side of the back leg over your head while you do this. Hold this position for 15 to 30 seconds. Return to the starting position. Repeat 3 times and then switch legs and repeat the exercise.   Published by [employer].  This content is reviewed periodically and is subject to change as new health information becomes available. The information is intended to inform and educate and is not a replacement for medical evaluation, advice, diagnosis or treatment by a healthcare professional.   Written by Amy Garcia, MS, PT, and Torie Atkinson PT, [facility], \[facility]\"", for [facility].   ? 2010 [facility] and/or its affiliates. All Rights Reserved.   Copyright   [employer] Reference Systems 2011  Adult Health Advisor                                    [employer] and [employer] may offer reliable information regarding your diagnosis and treatment plan.    THANK YOU for coming in today. If you receive a survey via [employer] or mail please let us know if there was anything you especially appreciated today or if there is any way we can improve our clinic. We appreciate your input.    GENERAL INFORMATION:  Our hours are:  Monday :     Clinic 7:30 AM-430 PM ([facility])  Tuesday:      Operating Room All Day ([facility])  Wednesday: Clinic 7:30 AM - 11:15 AM ([facility])             Clinic 1:00 PM - 4:00PM ([facility])  Thursday:     Administrative Day  Friday:           Clinic 7:30 AM - 11:15 AM ([facility] [place])            Clinic 1:00 PM - 4:00 PM ([facility])      Bone and Joint Service Line for any issues or concerns: [phone]      We are not in the office Thursdays. Therefore non- urgent calls and medical messages received on Thursday will be addressed when we are back in the office on Wednesday. Urgent matters will be reviewed and addressed by one of our partners in the office as needed.    If lab work was done today as part of your evaluation you will generally be contacted via [employer], mail, or phone with the results within 1-5 days. If there is an alarming result we will contact you by phone. Lab results come back at varying times, I generally wait until all labs are resulted before making comments on results. Please note labs are automatically released to [employer] (if you have signed up for it) once available-at times you may see these prior to my having a chance to review them as well.    If you need refills please contact your pharmacist. They will send a refill request to me to review. Please allow 3 business days for us to process all refill requests. All narcotic refills should be handled in the clinic at the time of your visit.

## 2017-10-04 NOTE — MR AVS SNAPSHOT
After Visit Summary   10/4/2017    Latanya Padron    MRN: [id number]           Patient Information     Date Of Birth          1950        Visit Information        Provider Department      10/4/2017 3:50 PM Nadja Hinson MD [facility]        Today's Diagnoses     Patellofemoral pain syndrome of right knee    -  1    Primary osteoarthritis of right knee          Care Instructions    Encounter Diagnoses   Name Primary?     Patellofemoral pain syndrome of right knee Yes     Primary osteoarthritis of right knee      Rest, ice and elevate above heart level as needed for pain control  1. This is like the head ache of the knee.  Some arthritis under the knee cap also.  2. We decided on a cortisone injection today.  This will help calm down the inflammation in the knee.  3. We also discussed doing straight leg raising and strengthening your quad to holding your kneecap up higher so it does not get bothered. We have exercises below.  4. We have information below  5. We discussed Mobic however that medication is not good for you since you take Coumadin.  6. You can followup with Nadja Hinson MD in 6 weeks, if you are having pain at that time we can do a cortisone injection.  You can always cancel the appointment if you are doing really well and follow-up as needed.   Cortisone Instructions:     1. You received an injection of cortisone into your right knee today.  2. The joint(s) may be more painful for the first 1-2 days.  3. We ask you to continue to rest the joint(s) for a few more days before resuming regular activities.  4. Pain Medications you can take (as long as your primary care provider allows these meds and you do not have kidney or liver conditions):  Tylenol  Take 1000 mg by mouth every 6 hours as needed; maximum dose 4000 mg a day  Ibuprofen  600 mg every 6 hours as needed; maximum 2400 mg a day  (OK to take tylenol and ibuprofen at the same time)  5. Rest, ice  and elevate as needed for pain control  6. Watch for these signs of infection: redness, swelling, drainage, warmth to touch, increased pain, or fever. Call the clinic or make an appointment to be seen if you think you have an infection.  7. If you are diabetic, make sure you keep a close eye on your blood sugars, they can get elevated with cortisone injections.   8. Sometimes it can take 1-2 weeks for it to reach its full effect.    Cortisone Injections  Cortisone is a type of steroid. It can greatly reduce swelling, redness, and irritation (inflammation) and pain. Being injected with cortisone is simple and doesn t take long. Your doctor may ask you questions about your health. Certain health conditions, such as diabetes, can be affected by cortisone.     Your pain may be relieved by a cortisone injection.   Why have a cortisone injection?  Injecting cortisone can relieve pain for anything from a sports injury to arthritis. Your doctor may suggest an injection if rest, splints, or oral medicine doesn t relieve your pain. Injecting cortisone is simpler than having surgery. And cortisone may provide the lasting pain relief that can help you get out and enjoy life again.  Getting the injection  Your doctor will start by cleaning and occasionally numbing your skin at the injection site. Next you ll be injected with local anesthetics (for short-term pain relief) and cortisone. The injection may last a few moments. A small bandage will be put over the injection site. You ll then be ready to go home.  After your injection  After being injected, make sure you don t injure the treated region. But stay active. Enjoy a walk or some other mild activity. Just be careful not to strain the region that gave you trouble.  The next day  Some people feel more pain after being injected. This is normal, and it will go away soon. Applying ice for 20 minutes at a time to your injury may reduce the increased pain. Rest for the first day  or two. You don t need to stay in bed. But avoid tasks that may strain the injured region.  If you have diabetes  Cortisone injections can cause blood sugar to be increased for several days after the injection. If you have diabetes, you should follow your blood  sugar closely during this time. Follow your regular plan for what to do when your blood sugar is elevated.     7162-8125 [employer]. [address] [place] [address]. All rights reserved. This information is not intended as a substitute for professional medical care. Always follow your healthcare professional's instructions.                         Patellofemoral Pain Syndrome (Runner's Knee)             What is patellofemoral pain syndrome?   Patellofemoral pain syndrome is pain behind the kneecap. It may also be called patellofemoral disorder, patellar malalignment, runner's knee, and chondromalacia.   How does it occur?   Patellofemoral pain syndrome can occur from overuse of the knee in sports and activities such as running, walking, jumping, or bicycling.   The kneecap (patella) is attached to the large group of muscles in the thigh called the quadriceps. It is also attached to the shin bone by the patellar tendon. The kneecap fits into grooves in the end of the thigh bone (femur) called the femoral condyle. With repeated bending and straightening of the knee, you can irritate the inside surface of the kneecap and cause pain.   Patellofemoral pain syndrome also may result from the way your hips, legs, knees, or feet are aligned. For example, if you have wide hips or underdeveloped thigh muscles, or if you are knock-kneed You may also have this problem if your foot flattens too much when you walk or run (a condition called over-pronation).   What are the symptoms?   The main symptom is pain behind the kneecap. You may have pain when you walk, run, or sit for a long time. The pain is usually worse when you walk downhill or down  stairs. Your knee may swell at times. You may feel or hear snapping, popping, or grinding in the knee.   How is it diagnosed?   Your healthcare provider will review your symptoms and examine your knee. You will have knee X-rays. You may have an MRI to check for damage to the surface of the patella or femur or another injury.   How is it treated?   Treatment includes the following:   Put an ice pack, gel pack, or package of frozen vegetables, wrapped in a cloth on the area every 3 to 4 hours, for up to 20 minutes at a time.   Raise the knee on a pillow when you sit or lie down.   Take an anti-inflammatory medicine such as ibuprofen, or other medicine as directed by your provider. Nonsteroidal anti-inflammatory medicines (NSAIDs) may cause stomach bleeding and other problems. These risks increase with age. Read the label and take as directed. Unless recommended by your healthcare provider, do not take for more than 10 days.   Follow your provider's instructions for doing exercises to help you recover. Your healthcare provider will show you exercises to help decrease the pain behind your kneecap.   If you over-pronate, your healthcare provider may recommend shoe inserts, called orthotics. You can buy orthotics at a pharmacy or athletic shoe store or they can be custom-made.   Use an infrapatellar strap, a strap placed below the kneecap over the patellar tendon.   Wear a neoprene knee sleeve, which will give support to your knee and patella.   While you recover from your injury, you will need to change your sport or activity to one that does not make your condition worse. For example, you may need to bicycle or swim instead of run.   In cases of severe patellofemoral pain syndrome, surgery may be recommended.   How long will the effects last?   Patellofemoral pain often lasts a long time and can come back after symptoms were better for a while. Treatment requires proper rehabilitation exercises that are done regularly.    When can I return to my normal activities?   Everyone recovers from an injury at a different rate. Return to your activities depends on how soon your knee recovers, not by how many days or weeks it has been since your injury has occurred. In general, the longer you have symptoms before you start treatment, the longer it will take to get better. The goal is to return you to your normal activities as soon as is safely possible. If you return too soon you may worsen your injury.   You may safely return to your normal activities when, starting from the top of the list and progressing to the end, each of the following is true:   Your injured knee can be fully straightened and bent without pain.   Your knee and leg have regained normal strength compared to the uninjured knee and leg.   You are able to walk, bend, and squat without pain.   How can I prevent runner's knee?   Runner's knee can best be prevented by strengthening your thigh muscles, particularly the inside part of this muscle group. It is also important to wear shoes that fit well and that have good arch supports.     Published by [employer].  This content is reviewed periodically and is subject to change as new health information becomes available. The information is intended to inform and educate and is not a replacement for medical evaluation, advice, diagnosis or treatment by a healthcare professional.   Written by Handy Fernandes MD, for [employer]   ? 2010 [employer] and/or its affiliates. All Rights Reserved.   Copyright   Clinical Reference Systems 2011  Adult Health Advisor    Patellofemoral Pain Syndrome (Runner's Knee) Rehabilitation Exercises              You can do the hamstring stretch right away. When the pain in your knee has decreased, you can do the quadriceps stretch and start strengthening the thigh muscles using the rest of the exercises.   Standing hamstring stretch: Put the heel of the leg on your injured side on a stool about 15  inches high. Keep your leg straight. Lean forward, bending at the hips, until you feel a mild stretch in the back of your thigh. Make sure you don't roll your shoulders or bend at the waist when doing this or you will stretch your lower back instead of your leg. Hold the stretch for 15 to 30 seconds. Repeat 3 times.   Quadriceps stretch: Stand an arm's length away from the wall with your injured leg farthest from the wall. Facing straight ahead, brace yourself by keeping one hand against the wall. With your other hand, grasp the ankle of your injured leg and pull your heel toward your buttocks. Don't arch or twist your back. Keep your knees together. Hold this stretch for 15 to 30 seconds.   Side-lying leg lift: Lie on your uninjured side. Tighten the front thigh muscles on your injured leg and lift that leg 8 to 10 inches away from the other leg. Keep the leg straight and lower it slowly. Do 3 sets of 10.   Quad sets: Sit on the floor with your injured leg straight and your other leg bent. Press the back of the knee of your injured leg against the floor by tightening the muscles on the top of your thigh. Hold this position 10 seconds. Relax. Do 3 sets of 10.   Straight leg raise: Lie on your back with your legs straight out in front of you. Bend the knee on your uninjured side and place the foot flat on the floor. Tighten the thigh muscle on your injured side and lift your leg about 8 inches off the floor. Keep your leg straight and your thigh muscle tight. Slowly lower your leg back down to the floor. Do 3 sets of 10.   Step-up: Stand with the foot of your injured leg on a support 3 to 5 inches high (like a small step or block of wood). Keep your other foot flat on the floor. Shift your weight onto the injured leg on the support. Straighten your injured leg as the other leg comes off the floor. Return to the starting position by bending your injured leg and slowly lowering your uninjured leg back to the floor. Do  3 sets of 10.   Wall squat with a ball: Stand with your back, shoulders, and head against a wall. Look straight ahead. Keep your shoulders relaxed and your feet 3 feet from the wall and shoulder's width apart. Place a soccer or basketball-sized ball behind your back. Keeping your back against the wall, slowly squat down to a 45-degree angle. Your thighs will not yet be parallel to the floor. Hold this position for 10 seconds and then slowly slide back up the wall. Repeat 10 times. Build up to 3 sets of 10.   Knee stabilization: Wrap a piece of elastic tubing around the ankle of the uninjured leg. Tie a knot in the other end of the tubing and close it in a door.   0. Stand facing the door on the leg without tubing and bend your knee slightly, keeping your thigh muscles tight. While maintaining this position, move the leg with the tubing straight back behind you. Do 3 sets of 10.   0. Turn 90 degrees so the leg without tubing is closest to the door. Move the leg with tubing away from your body. Do 3 sets of 10.   0. Turn 90 degrees again so your back is to the door. Move the leg with tubing straight out in front of you. Do 3 sets of 10.   0. Turn your body 90 degrees again so the leg with tubing is closest to the door. Move the leg with tubing across your body. Do 3 sets of 10.   Hold onto a chair if you need help balancing. This exercise can be made even more challenging by standing on a pillow while you move the leg with tubing.   Resisted terminal knee extension: Make a loop from a piece of elastic tubing by tying a knot in both ends. Close both knots in a door. Step into the loop so the tubing is around the back of your injured leg. Lift the other foot off the ground. Hold onto a chair for balance, if needed. Bend the knee on the leg with tubing about 45 degrees. Slowly straighten your leg, keeping your thigh muscle tight as you do this. Do this 10 times. Do 3 sets. An easier way to do this is to stand on both  legs for better support while you do the exercise.   Standing calf stretch: Stand facing a wall with your hands on the wall at about eye level. Keep your injured leg back with your heel on the floor. Keep the other leg forward with the knee bent. Turn your back foot slightly inward (as if you were pigeon-toed). Slowly lean into the wall until you feel a stretch in the back of your calf. Hold the stretch for 15 to 30 seconds. Return to the starting position. Repeat 3 times. Do this exercise several times each day.   Clam exercise: Lie on your uninjured side with your hips and knees bent and feet together. Slowly raise your top leg toward the ceiling while keeping your heels touching each other. Hold for 2 seconds and lower slowly. Do 3 sets of 10 repetitions.   Iliotibial band stretch: Side-bending: Cross one leg in front of the other leg and lean in the opposite direction from the front leg. Reach the arm on the side of the back leg over your head while you do this. Hold this position for 15 to 30 seconds. Return to the starting position. Repeat 3 times and then switch legs and repeat the exercise.   Published by [facility].  This content is reviewed periodically and is subject to change as new health information becomes available. The information is intended to inform and educate and is not a replacement for medical evaluation, advice, diagnosis or treatment by a healthcare professional.   Written by Amy Garcai MS, PT, and Torie Atkinson, PT, [facility], [facility], for [facility].   ? 2010 [facility] and/or its affiliates. All Rights Reserved.   Copyright   Clinical Reference Systems 2011  Adult Health Advisor                                    Web[employer] and [employer] may offer reliable information regarding your diagnosis and treatment plan.    THANK YOU for coming in today. If you receive a survey via [employer] or mail please let us know if there was anything you especially appreciated today or if there is any way we can  improve our clinic. We appreciate your input.    GENERAL INFORMATION:  Our hours are:  Monday :     Clinic 7:30 AM-430 PM ([facility])  Tuesday:      Operating Room All Day ([facility])  Wednesday: Clinic 7:30 AM - 11:15 AM ([facility])             Clinic 1:00 PM - 4:00PM ([facility])  Thursday:     Administrative Day  Friday:          Clinic 7:30 AM - 11:15 AM ([facility])            Clinic 1:00 PM - 4:00 PM ([facility])      Bone and Joint Service Line for any issues or concerns: [phone]      We are not in the office Thursdays. Therefore non- urgent calls and medical messages received on Thursday will be addressed when we are back in the office on Wednesday. Urgent matters will be reviewed and addressed by one of our partners in the office as needed.    If lab work was done today as part of your evaluation you will generally be contacted via [employer], mail, or phone with the results within 1-5 days. If there is an alarming result we will contact you by phone. Lab results come back at varying times, I generally wait until all labs are resulted before making comments on results. Please note labs are automatically released to [employer] (if you have signed up for it) once available-at times you may see these prior to my having a chance to review them as well.    If you need refills please contact your pharmacist. They will send a refill request to me to review. Please allow 3 business days for us to process all refill requests. All narcotic refills should be handled in the clinic at the time of your visit.            Follow-ups after your visit        Your next 10 appointments already scheduled     Nov 01, 2017  8:30 AM CDT   [facility] with [facility]   [facility]    [address] [place] [id number] [phone]     "        Dec 11, 2017  8:00 AM CST   Office Visit with Glen Blue MD   [facility] ([facility])    [address] [address] [phone]           Bring a current list of meds and any records pertaining to this visit. For Physicals, please bring immunization records and any forms needing to be filled out. Please arrive 10 minutes early to complete paperwork.              Who to contact     If you have questions or need follow up information about today's clinic visit or your schedule please contact [facility] directly at [phone].  Normal or non-critical lab and imaging results will be communicated to you by DearLocalhart, letter or phone within 4 business days after the clinic has received the results. If you do not hear from us within 7 days, please contact the clinic through [employer] or phone. If you have a critical or abnormal lab result, we will notify you by phone as soon as possible.  Submit refill requests through [employer] or call your pharmacy and they will forward the refill request to us. Please allow 3 business days for your refill to be completed.          Additional Information About Your Visit        MyChart Information     [employer] gives you secure access to your electronic health record. If you see a primary care provider, you can also send messages to your care team and make appointments. If you have questions, please call your primary care clinic.  If you do not have a primary care provider, please call [phone] and they will assist you.        Care EveryWhere ID     This is your Care EveryWhere ID. This could be used by other organizations to access your [place] medical records  [id number]        Your Vitals Were     Respirations Height BMI (Body Mass Index)             16 1.562 m (5' 1.5\") 42.38 kg/m2          Blood Pressure from Last 3 Encounters:   08/14/17 114/50   07/31/17 126/64   06/21/17 132/70    Weight from " Last 3 Encounters:   10/04/17 103.4 kg (228 lb)   08/14/17 101.2 kg (223 lb)   07/31/17 99.3 kg (219 lb)              We Performed the Following     Kenalog 40 MG  []     Large Joint/Bursa injection and/or drainage (Shoulder, Knee)        Primary Care Provider Office Phone # Fax #    Glen Darci Blue [phone] [address] [place] [address]        Equal Access to Services     YAYO SANFORD : Hadii aad ku hadasho Soomaali, waaxda luqadaha, qaybta kaalmada adeegyada, waxay idiin hayaan adeeg kharash jules . So [facility] [phone].    ATENCIÓN: Si habla español, tiene a de los santos disposición servicios gratuitos de asistencia lingüística. [facility] [phone].    We comply with applicable federal civil rights laws and [place] laws. We do not discriminate on the basis of race, color, national origin, age, disability, sex, sexual orientation, or gender identity.            Thank you!     Thank you for choosing [facility]  for your care. Our goal is always to provide you with excellent care. Hearing back from our patients is one way we can continue to improve our services. Please take a few minutes to complete the written survey that you may receive in the mail after your visit with us. Thank you!             Your Updated Medication List - Protect others around you: Learn how to safely use, store and throw away your medicines at www.disposemymeds.org.          This list is accurate as of: 10/4/17  4:42 PM.  Always use your most recent med list.                   Brand Name Dispense Instructions for use Diagnosis    * albuterol 108 (90 BASE) MCG/ACT Inhaler    PROAIR HFA/PROVENTIL HFA/VENTOLIN HFA    2 Inhaler    1-2 puffs by mouth every 2-4 hours as needed    Pulmonary emphysema, unspecified emphysema type (H)       * albuterol (2.5 MG/3ML) 0.083% neb solution     75 mL    Take  by nebulization. 1 NEB EVERY 4-6 HOURS AS NEEDED        amoxicillin-clavulanate 500-125 MG  per tablet    AUGMENTIN    20 tablet    Take 1 tablet by mouth 2 times daily    Acute bronchitis with coexisting condition requiring prophylactic treatment, Chronic bronchitis, unspecified chronic bronchitis type (H)       ASPIRIN NOT PRESCRIBED    INTENTIONAL    0 each    1 each continuous prn for other Reported on 4/17/2017    Atrial fibrillation (H)       atorvastatin 10 MG tablet    LIPITOR    90 tablet    Take 1 tablet (10 mg) by mouth daily    Hyperlipidemia LDL goal <130       azithromycin 250 MG tablet    ZITHROMAX    6 tablet    Two tablets first day, then one tablet daily for four days.    Acute bronchitis with coexisting condition requiring prophylactic treatment       cholecalciferol 400 UNITS tablet    Vitamin D    100 tablet    TAKE ONE TABLET BY MOUTH EVERY DAY    Osteoporosis       diltiazem 120 MG 24 hr capsule    CARTIA XT    90 capsule    TAKE ONE CAPSULE BY MOUTH EVERY DAY (DUE FOR FOLLOW-UP APPOINTMENT)    Hypertension, goal below 140/90, Chronic atrial fibrillation (H)       hydrochlorothiazide 25 MG tablet    HYDRODIURIL    90 tablet    Take 1 tablet (25 mg) by mouth daily    Hypertension, goal below 140/90       JANTOVEN 5 MG tablet   Generic drug:  warfarin     60 tablet    Take 5 mg Mon and 2.5 mg all other days of the week, or as directed by coumadin clinic    Long-term (current) use of anticoagulants, Chronic atrial fibrillation (H)       lisinopril 2.5 MG tablet    PRINIVIL/Zestril    90 tablet    Take 1 tablet (2.5 mg) by mouth daily    Hypertension, goal below 140/90       mometasone-formoterol 200-5 MCG/ACT oral inhaler    DULERA    13 g    INHALE 2 PUFFS BY MOUTH TWO TIMES A DAY    Pulmonary emphysema, unspecified emphysema type (H)       order for DME      Equipment being ordered: Oxygen @ 2 liters with exertion    COPD (chronic obstructive pulmonary disease) (H)       oxybutynin 5 MG tablet    DITROPAN    360 tablet    Take 1 tablet (5 mg) by mouth daily        * predniSONE 20 MG  tablet    DELTASONE    10 tablet    TAKE ONE TABLET BY MOUTH TWO TIMES A DAY    Tendonitis of ankle       * predniSONE 10 MG tablet    DELTASONE    15 tablet    20 mg daily for 3 days and then 10 mg daily until gone.    Acute bronchitis with coexisting condition requiring prophylactic treatment, Chronic bronchitis, unspecified chronic bronchitis type (H)       umeclidinium 62.5 MCG/INH oral inhaler    INCRUSE ELLIPTA    1 Inhaler    Inhale 1 puff into the lungs daily    Pulmonary emphysema, unspecified emphysema type (H)       venlafaxine 37.5 MG 24 hr capsule    EFFEXOR-XR    90 capsule    TAKE ONE CAPSULE BY MOUTH EVERY DAY WITH FOOD    Major depression in complete remission (H)       * Notice:  This list has 4 medication(s) that are the same as other medications prescribed for you. Read the directions carefully, and ask your doctor or other care provider to review them with you.

## 2017-10-06 ENCOUNTER — TELEPHONE (OUTPATIENT)
Dept: FAMILY MEDICINE | Facility: CLINIC | Age: 67
End: 2017-10-06

## 2017-10-06 NOTE — TELEPHONE ENCOUNTER
Reason for Call:  Other call back    Detailed comments: Patient had a steroid injection and is wondering if she needs to change the dosage of her coumadin. Please call and advise.     Phone Number Patient can be reached at: Cell number on file:    Telephone Information:   Mobile [phone]     Best Time: any    Can we leave a detailed message on this number? YES    Call taken on 10/6/2017 at 10:20 AM by Tracy Ac

## 2017-11-01 ENCOUNTER — ANTICOAGULATION THERAPY VISIT (OUTPATIENT)
Dept: ANTICOAGULATION | Facility: CLINIC | Age: 67
End: 2017-11-01
Payer: COMMERCIAL

## 2017-11-01 DIAGNOSIS — Z79.01 LONG-TERM (CURRENT) USE OF ANTICOAGULANTS: ICD-10-CM

## 2017-11-01 DIAGNOSIS — I48.20 CHRONIC ATRIAL FIBRILLATION (H): ICD-10-CM

## 2017-11-01 LAB — INR POINT OF CARE: 1.9 (ref 0.86–1.14)

## 2017-11-01 PROCEDURE — 85610 PROTHROMBIN TIME: CPT | Mod: QW

## 2017-11-01 PROCEDURE — 99207 ZZC NO CHARGE NURSE ONLY: CPT

## 2017-11-01 PROCEDURE — 36416 COLLJ CAPILLARY BLOOD SPEC: CPT

## 2017-11-01 NOTE — MR AVS SNAPSHOT
Latanya Padron   11/1/2017 8:30 AM   Anticoagulation Therapy Visit    Description:  67 year old female   Provider:  [facility]   Department:  [facility]           INR as of 11/1/2017     Today's INR 1.9!      Anticoagulation Summary as of 11/1/2017     INR goal 2.0-3.0   Today's INR 1.9!   Full instructions 11/1: 5 mg; Otherwise 5 mg on Mon; 2.5 mg all other days   Next INR check 12/11/2017    Indications   Long-term (current) use of anticoagulants [Z79.01] [Z79.01]  Atrial fibrillation (H) [I48.91]         Your next Anticoagulation Clinic appointment(s)     Dec 11, 2017  8:45 AM CST   Anticoagulation Visit with [facility]   [facility]    [address] [place] [id number]   [phone]              Contact Numbers     Clinic Number:         November 2017 Details    Sun Mon Tue Wed Thu Fri Sat        1      5 mg   See details      2      2.5 mg         3      2.5 mg         4      2.5 mg           5      2.5 mg         6      5 mg         7      2.5 mg         8      2.5 mg         9      2.5 mg         10      2.5 mg         11      2.5 mg           12      2.5 mg         13      5 mg         14      2.5 mg         15      2.5 mg         16      2.5 mg         17      2.5 mg         18      2.5 mg           19      2.5 mg         20      5 mg         21      2.5 mg         22      2.5 mg         23      2.5 mg         24      2.5 mg         25      2.5 mg           26      2.5 mg         27      5 mg         28      2.5 mg         29      2.5 mg         30      2.5 mg            Date Details   11/01 This INR check               How to take your warfarin dose     To take:  2.5 mg Take 0.5 of a 5 mg tablet.    To take:  5 mg Take 1 of the 5 mg tablets.           December 2017 Details    Sun Mon Tue Wed Thu Fri Sat          1      2.5 mg         2      2.5 mg           3      2.5 mg         4      5 mg         5      2.5 mg         6      2.5 mg          7      2.5 mg         8      2.5 mg         9      2.5 mg           10      2.5 mg         11            12               13               14               15               16                 17               18               19               20               21               22               23                 24               25               26               27               28               29               30                 31                      Date Details   No additional details    Date of next INR:  12/11/2017         How to take your warfarin dose     To take:  2.5 mg Take 0.5 of a 5 mg tablet.    To take:  5 mg Take 1 of the 5 mg tablets.

## 2017-11-01 NOTE — PROGRESS NOTES
ANTICOAGULATION FOLLOW-UP CLINIC VISIT    Patient Name:  Latanya Padron  Date:  11/1/2017  Contact Type:  Face to Face    SUBJECTIVE:     Patient Findings     Positives No Problem Findings           OBJECTIVE    INR Protime   Date Value Ref Range Status   11/01/2017 1.9 (A) 0.86 - 1.14 Final       ASSESSMENT / PLAN  INR assessment THER    Recheck INR In: 6 WEEKS    INR Location Clinic      Anticoagulation Summary as of 11/1/2017     INR goal 2.0-3.0   Today's INR 1.9!   Maintenance plan 5 mg (5 mg x 1) on Mon; 2.5 mg (5 mg x 0.5) all other days   Full instructions 11/1: 5 mg; Otherwise 5 mg on Mon; 2.5 mg all other days   Weekly total 20 mg   Plan last modified Francine Andrew RN (7/26/2017)   Next INR check 12/11/2017   Target end date     Indications   Long-term (current) use of anticoagulants [Z79.01] [Z79.01]  Atrial fibrillation (H) [I48.91]         Anticoagulation Episode Summary     INR check location     Preferred lab     Send INR reminders to [facility] POOL    Comments 5 mg tabs, likes card       Anticoagulation Care Providers     Provider Role Specialty Phone number    Glen Blue MD [facility] [phone]            See the Encounter Report to view Anticoagulation Flowsheet and Dosing Calendar (Go to Encounters tab in chart review, and find the Anticoagulation Therapy Visit)    Dosage adjustment made based on physician directed care plan.      Francine Andrew, RN

## 2017-11-02 ENCOUNTER — ALLIED HEALTH/NURSE VISIT (OUTPATIENT)
Dept: FAMILY MEDICINE | Facility: CLINIC | Age: 67
End: 2017-11-02
Payer: COMMERCIAL

## 2017-11-02 DIAGNOSIS — Z23 NEED FOR PROPHYLACTIC VACCINATION AND INOCULATION AGAINST INFLUENZA: Primary | ICD-10-CM

## 2017-11-02 PROCEDURE — G0008 ADMIN INFLUENZA VIRUS VAC: HCPCS

## 2017-11-02 PROCEDURE — 90662 IIV NO PRSV INCREASED AG IM: CPT

## 2017-11-02 PROCEDURE — 99207 ZZC NO CHARGE NURSE ONLY: CPT

## 2017-11-02 NOTE — PROGRESS NOTES

## 2017-11-02 NOTE — MR AVS SNAPSHOT
After Visit Summary   11/2/2017    Latanya Padron    MRN: [id number]           Patient Information     Date Of Birth          1950        Visit Information        Provider Department      11/2/2017 2:00 PM NL FLOAT TEAM D [facility][note 3]        Today's Diagnoses     Need for prophylactic vaccination and inoculation against influenza    -  1       Follow-ups after your visit        Your next 10 appointments already scheduled     Nov 02, 2017  2:00 PM CDT   Nurse Only with NL KENNY TEAM D [address] [id number] [phone]            Nov 15, 2017  4:00 PM CST   Return Visit with Nadja Hinson MD   [address] [id number] [phone]            Dec 11, 2017  8:00 AM CST   Office Visit with Glen Blue MD   [address] [id number] [phone]           Bring a current list of meds and any records pertaining to this visit. For Physicals, please bring immunization records and any forms needing to be filled out. Please arrive 10 minutes early to complete paperwork.            Dec 11, 2017  8:45 AM CST   Anticoagulation Visit with [facility] ANTI COAG   [address] [id number] [phone]              Who to contact     If you have questions or need follow up information about today's clinic visit or your schedule please contact [facility] directly at [phone].  Normal or non-critical lab and imaging results will be communicated to you by MyChart, letter or phone within 4 business days after the clinic has received the results. If you do not hear from us within 7 days, please contact the clinic through MyChart or phone. If you have  a critical or abnormal lab result, we will notify you by phone as soon as possible.  Submit refill requests through [facility] or call your pharmacy and they will forward the refill request to us. Please allow 3 business days for your refill to be completed.          Additional Information About Your Visit        [facility] Information     [facility] gives you secure access to your electronic health record. If you see a primary care provider, you can also send messages to your care team and make appointments. If you have questions, please call your primary care clinic.  If you do not have a primary care provider, please call [phone] and they will assist you.        Care EveryWhere ID     This is your Care EveryWhere ID. This could be used by other organizations to access your [facility] medical records  [id number]         Blood Pressure from Last 3 Encounters:   08/14/17 114/50   07/31/17 126/64   06/21/17 132/70    Weight from Last 3 Encounters:   10/04/17 228 lb (103.4 kg)   08/14/17 223 lb (101.2 kg)   07/31/17 219 lb (99.3 kg)              We Performed the Following     ADMIN INFLUENZA (For MEDICARE Patients ONLY) []     FLU VACCINE, INCREASED ANTIGEN, PRESV FREE, AGE 65+ [02539]        Primary Care Provider Office Phone # Fax #    Glen Darci Blue [phone] [address] [place] [address]        Equal Access to Services     [facility] : Hadii lucia hou hadasho Sorellali, waaxda luqadaha, qaybta kaalmada jerry, edin vicente . So [facility] [phone].    ATENCIÓN: Si habla español, tiene a de los santos disposición servicios gratuitos de asistencia lingüística. Gayatri al [phone].    We comply with applicable federal civil rights laws and [place] laws. We do not discriminate on the basis of race, color, national origin, age, disability, sex, sexual orientation, or gender identity.            Thank you!     Thank you for choosing [facility]   for your care. Our goal is always to provide you with excellent care. Hearing back from our patients is one way we can continue to improve our services. Please take a few minutes to complete the written survey that you may receive in the mail after your visit with us. Thank you!             Your Updated Medication List - Protect others around you: Learn how to safely use, store and throw away your medicines at www.disposemymeds.org.          This list is accurate as of: 11/2/17  1:57 PM.  Always use your most recent med list.                   Brand Name Dispense Instructions for use Diagnosis    * albuterol 108 (90 BASE) MCG/ACT Inhaler    PROAIR HFA/PROVENTIL HFA/VENTOLIN HFA    2 Inhaler    1-2 puffs by mouth every 2-4 hours as needed    Pulmonary emphysema, unspecified emphysema type (H)       * albuterol (2.5 MG/3ML) 0.083% neb solution     75 mL    Take  by nebulization. 1 NEB EVERY 4-6 HOURS AS NEEDED        amoxicillin-clavulanate 500-125 MG per tablet    AUGMENTIN    20 tablet    Take 1 tablet by mouth 2 times daily    Acute bronchitis with coexisting condition requiring prophylactic treatment, Chronic bronchitis, unspecified chronic bronchitis type (H)       ASPIRIN NOT PRESCRIBED    INTENTIONAL    0 each    1 each continuous prn for other Reported on 4/17/2017    Atrial fibrillation (H)       atorvastatin 10 MG tablet    LIPITOR    90 tablet    Take 1 tablet (10 mg) by mouth daily    Hyperlipidemia LDL goal <130       azithromycin 250 MG tablet    ZITHROMAX    6 tablet    Two tablets first day, then one tablet daily for four days.    Acute bronchitis with coexisting condition requiring prophylactic treatment       cholecalciferol 400 UNITS tablet    Vitamin D    100 tablet    TAKE ONE TABLET BY MOUTH EVERY DAY    Osteoporosis       diltiazem 120 MG 24 hr capsule    CARTIA XT    90 capsule    TAKE ONE CAPSULE BY MOUTH EVERY DAY (DUE FOR FOLLOW-UP APPOINTMENT)    Hypertension, goal below 140/90, Chronic  atrial fibrillation (H)       hydrochlorothiazide 25 MG tablet    HYDRODIURIL    90 tablet    Take 1 tablet (25 mg) by mouth daily    Hypertension, goal below 140/90       JANTOVEN 5 MG tablet   Generic drug:  warfarin     60 tablet    Take 5 mg Mon and 2.5 mg all other days of the week, or as directed by coumadin clinic    Long-term (current) use of anticoagulants, Chronic atrial fibrillation (H)       lisinopril 2.5 MG tablet    PRINIVIL/Zestril    90 tablet    Take 1 tablet (2.5 mg) by mouth daily    Hypertension, goal below 140/90       mometasone-formoterol 200-5 MCG/ACT oral inhaler    DULERA    13 g    INHALE 2 PUFFS BY MOUTH TWO TIMES A DAY    Pulmonary emphysema, unspecified emphysema type (H)       order for DME      Equipment being ordered: Oxygen @ 2 liters with exertion    COPD (chronic obstructive pulmonary disease) (H)       oxybutynin 5 MG tablet    DITROPAN    360 tablet    Take 1 tablet (5 mg) by mouth daily        * predniSONE 20 MG tablet    DELTASONE    10 tablet    TAKE ONE TABLET BY MOUTH TWO TIMES A DAY    Tendonitis of ankle       * predniSONE 10 MG tablet    DELTASONE    15 tablet    20 mg daily for 3 days and then 10 mg daily until gone.    Acute bronchitis with coexisting condition requiring prophylactic treatment, Chronic bronchitis, unspecified chronic bronchitis type (H)       umeclidinium 62.5 MCG/INH oral inhaler    INCRUSE ELLIPTA    1 Inhaler    Inhale 1 puff into the lungs daily    Pulmonary emphysema, unspecified emphysema type (H)       venlafaxine 37.5 MG 24 hr capsule    EFFEXOR-XR    90 capsule    TAKE ONE CAPSULE BY MOUTH EVERY DAY WITH FOOD    Major depression in complete remission (H)       * Notice:  This list has 4 medication(s) that are the same as other medications prescribed for you. Read the directions carefully, and ask your doctor or other care provider to review them with you.

## 2017-11-02 NOTE — NURSING NOTE
Chief Complaint   Patient presents with     Imm/Inj     flu shot     Prior to injection verified patient identity using patient's name and date of birth.  Gail Becerra MA 11/2/2017

## 2017-11-17 ENCOUNTER — TELEPHONE (OUTPATIENT)
Dept: FAMILY MEDICINE | Facility: CLINIC | Age: 67
End: 2017-11-17

## 2017-11-17 ENCOUNTER — ANTICOAGULATION THERAPY VISIT (OUTPATIENT)
Dept: ANTICOAGULATION | Facility: CLINIC | Age: 67
End: 2017-11-17
Payer: COMMERCIAL

## 2017-11-17 DIAGNOSIS — I48.20 CHRONIC ATRIAL FIBRILLATION (H): ICD-10-CM

## 2017-11-17 DIAGNOSIS — Z79.01 LONG-TERM (CURRENT) USE OF ANTICOAGULANTS: ICD-10-CM

## 2017-11-17 LAB — INR POINT OF CARE: 2.2 (ref 0.86–1.14)

## 2017-11-17 PROCEDURE — 85610 PROTHROMBIN TIME: CPT | Mod: QW

## 2017-11-17 PROCEDURE — 99207 ZZC NO CHARGE NURSE ONLY: CPT

## 2017-11-17 PROCEDURE — 36416 COLLJ CAPILLARY BLOOD SPEC: CPT

## 2017-11-17 NOTE — MR AVS SNAPSHOT
Latanya Padron   11/17/2017 4:00 PM   Anticoagulation Therapy Visit    Description:  67 year old female   Provider:  [facility] AN[facility]   Department:  Ph Anticoag           INR as of 11/17/2017     Today's INR 2.2      Anticoagulation Summary as of 11/17/2017     INR goal 2.0-3.0   Today's INR 2.2   Full instructions 5 mg on Mon; 2.5 mg all other days   Next INR check 12/12/2017    Indications   Long-term (current) use of anticoagulants [Z79.01] [Z79.01]  Atrial fibrillation (H) [I48.91]         Your next Anticoagulation Clinic appointment(s)     Nov 17, 2017  4:00 PM CST   [facility]    [address] [place] [id number]   [phone]            Dec 11, 2017  8:45 AM CST   Anticoagulation Visit with PH ANTI COAG   [facility] ([address] [phone]   [phone]              Contact Numbers     Clinic Number:         November 2017 Details    Sun Mon Tue Wed Thu Fri Sat        1               2               3               4                 5               6               7               8               9               10               11                 12               13               14               15               16               17      2.5 mg   See details      18      2.5 mg           19      2.5 mg         20      5 mg         21      2.5 mg         22      2.5 mg         23      2.5 mg         24      2.5 mg         25      2.5 mg           26      2.5 mg         27      5 mg         28      2.5 mg         29      2.5 mg         30      2.5 mg            Date Details   11/17 This INR check               How to take your warfarin dose     To take:  2.5 mg Take 0.5 of a 5 mg tablet.    To take:  5 mg Take 1 of the 5 mg tablets.           December 2017 Details    Sun Mon Tue Wed Thu Fri Sat          1      2.5 mg         2      2.5 mg            3      2.5 mg         4      5 mg         5      2.5 mg         6      2.5 mg         7      2.5 mg         8      2.5 mg         9      2.5 mg           10      2.5 mg         11      5 mg         12            13               14               15               16                 17               18               19               20               21               22               23                 24               25               26               27               28               29               30                 31                      Date Details   No additional details    Date of next INR:  12/12/2017         How to take your warfarin dose     To take:  2.5 mg Take 0.5 of a 5 mg tablet.    To take:  5 mg Take 1 of the 5 mg tablets.

## 2017-11-17 NOTE — PROGRESS NOTES
ANTICOAGULATION FOLLOW-UP CLINIC VISIT    Patient Name:  Latanya Padron  Date:  11/17/2017  Contact Type:  Face to Face    SUBJECTIVE:     Patient Findings     Positives Bruising (medium sized reddish spot on right arm after itching it this AM)           OBJECTIVE    INR Protime   Date Value Ref Range Status   11/17/2017 2.2 (A) 0.86 - 1.14 Final       ASSESSMENT / PLAN  INR assessment THER    Recheck INR In: 3 WEEKS    INR Location Clinic      Anticoagulation Summary as of 11/17/2017     INR goal 2.0-3.0   Today's INR 2.2   Maintenance plan 5 mg (5 mg x 1) on Mon; 2.5 mg (5 mg x 0.5) all other days   Full instructions 5 mg on Mon; 2.5 mg all other days   Weekly total 20 mg   No change documented Francine Andrew RN   Plan last modified Francine Andrew RN (7/26/2017)   Next INR check 12/12/2017   Target end date     Indications   Long-term (current) use of anticoagulants [Z79.01] [Z79.01]  Atrial fibrillation (H) [I48.91]         Anticoagulation Episode Summary     INR check location     Preferred lab     Send INR reminders to [facility] POOL    Comments 5 mg tabs, likes card       Anticoagulation Care Providers     Provider Role Specialty Phone number    Glen Blue MD [facility] [phone]            See the Encounter Report to view Anticoagulation Flowsheet and Dosing Calendar (Go to Encounters tab in chart review, and find the Anticoagulation Therapy Visit)    Dosage adjustment made based on physician directed care plan.      Francine Andrew RN

## 2017-11-28 DIAGNOSIS — M77.50 TENDONITIS OF ANKLE: ICD-10-CM

## 2017-11-29 RX ORDER — PREDNISONE 20 MG/1
TABLET ORAL
Qty: 10 TABLET | Refills: 3 | Status: SHIPPED | OUTPATIENT
Start: 2017-11-29 | End: 2018-11-26

## 2017-11-29 NOTE — TELEPHONE ENCOUNTER
Patient calling, states she needs this Rx asap for her cold. Patient states she thought this Rx was kept on file for her to get anytime. Or as needed

## 2017-12-11 ENCOUNTER — OFFICE VISIT (OUTPATIENT)
Dept: FAMILY MEDICINE | Facility: CLINIC | Age: 67
End: 2017-12-11
Payer: COMMERCIAL

## 2017-12-11 ENCOUNTER — ANTICOAGULATION THERAPY VISIT (OUTPATIENT)
Dept: ANTICOAGULATION | Facility: CLINIC | Age: 67
End: 2017-12-11
Payer: COMMERCIAL

## 2017-12-11 VITALS
DIASTOLIC BLOOD PRESSURE: 72 MMHG | WEIGHT: 230 LBS | TEMPERATURE: 96.8 F | HEART RATE: 80 BPM | SYSTOLIC BLOOD PRESSURE: 138 MMHG | OXYGEN SATURATION: 95 % | BODY MASS INDEX: 42.75 KG/M2 | RESPIRATION RATE: 16 BRPM

## 2017-12-11 DIAGNOSIS — F32.5 MAJOR DEPRESSION IN COMPLETE REMISSION (H): Primary | ICD-10-CM

## 2017-12-11 DIAGNOSIS — I10 HYPERTENSION, GOAL BELOW 140/90: ICD-10-CM

## 2017-12-11 DIAGNOSIS — M70.41 PREPATELLAR BURSITIS OF RIGHT KNEE: ICD-10-CM

## 2017-12-11 DIAGNOSIS — I48.0 PAROXYSMAL ATRIAL FIBRILLATION (H): ICD-10-CM

## 2017-12-11 DIAGNOSIS — J43.9 PULMONARY EMPHYSEMA, UNSPECIFIED EMPHYSEMA TYPE (H): ICD-10-CM

## 2017-12-11 DIAGNOSIS — Z79.01 LONG-TERM (CURRENT) USE OF ANTICOAGULANTS: ICD-10-CM

## 2017-12-11 DIAGNOSIS — N18.30 CKD (CHRONIC KIDNEY DISEASE) STAGE 3, GFR 30-59 ML/MIN (H): ICD-10-CM

## 2017-12-11 LAB — INR POINT OF CARE: 3.1 (ref 0.86–1.14)

## 2017-12-11 PROCEDURE — 99207 ZZC NO CHARGE NURSE ONLY: CPT

## 2017-12-11 PROCEDURE — 85610 PROTHROMBIN TIME: CPT | Mod: QW

## 2017-12-11 PROCEDURE — 99214 OFFICE O/P EST MOD 30 MIN: CPT | Performed by: FAMILY MEDICINE

## 2017-12-11 PROCEDURE — 36416 COLLJ CAPILLARY BLOOD SPEC: CPT

## 2017-12-11 RX ORDER — MULTIVITAMIN WITH IRON
1 TABLET ORAL DAILY
Qty: 30 TABLET | Status: ON HOLD | COMMUNITY
Start: 2017-12-11 | End: 2023-01-01

## 2017-12-11 RX ORDER — OMEGA-3 FATTY ACIDS/FISH OIL 300-1000MG
1 CAPSULE ORAL DAILY
Qty: 90 CAPSULE | COMMUNITY
Start: 2017-12-11 | End: 2019-04-29

## 2017-12-11 ASSESSMENT — PAIN SCALES - GENERAL: PAINLEVEL: NO PAIN (0)

## 2017-12-11 ASSESSMENT — PATIENT HEALTH QUESTIONNAIRE - PHQ9: SUM OF ALL RESPONSES TO PHQ QUESTIONS 1-9: 0

## 2017-12-11 NOTE — PROGRESS NOTES
SUBJECTIVE:   Latanya Padron is a 67 year old female who presents to clinic today for the following health issues:        Hypertension Follow-up      Outpatient blood pressures are not being checked.    Low Salt Diet: no added salt    Depression Followup    Status since last visit: Improved     See PHQ-9 for current symptoms.  Other associated symptoms: None    Complicating factors:   Significant life event:  Yes-  Spouse retiring   Current substance abuse:  None  Anxiety or Panic symptoms:  No    PHQ-9 Score and MyChart F/U Questions 7/13/2016 12/19/2016 6/21/2017   Total Score 0 0 1   Q9: Suicide Ideation Not at all Not at all Not at all       PHQ-9  English  PHQ-9   Any Language  Suicide Assessment Five-step Evaluation and Treatment (SAFE-T)  COPD Follow-Up    Symptoms are currently: improved    Current fatigue or dyspnea with ambulation: stable     Shortness of breath: stable    Increased or change in Cough/Sputum: No    Fever(s): No    Baseline ambulation without stopping to rest:  1 blocks. Able to walk up 2 flights of stairs without stopping to rest.    Any ER/UC or hospital admissions since your last visit? No     History   Smoking Status     Former Smoker     Packs/day: 1.00     Years: 30.00     Quit date: 10/25/2005   Smokeless Tobacco     Never Used     Lab Results   Component Value Date    FEV1 0.50 04/05/2010    CBU3RSL 0.33 04/05/2010     Recheck A fib        Amount of exercise or physical activity: None    Problems taking medications regularly: No    Medication side effects: none    Diet: low salt and low fat/cholesterol        Patient did have one episode of respiratory infection around Thanksgiving.  She picked up her prednisone and she has been doing fine since then.  She has a couple tablets left.  She plans to have prednisone on hand.    Problem list and histories reviewed & adjusted, as indicated.  Additional history: as documented    BP Readings from Last 3 Encounters:   12/11/17 138/72    08/14/17 114/50   07/31/17 126/64    Wt Readings from Last 3 Encounters:   12/11/17 230 lb (104.3 kg)   10/04/17 228 lb (103.4 kg)   08/14/17 223 lb (101.2 kg)                  Labs reviewed in EPIC      Reviewed and updated as needed this visit by clinical staff     Reviewed and updated as needed this visit by Provider         ROS:  Constitutional, HEENT, cardiovascular, pulmonary, gi and gu systems are negative, except as otherwise noted.  All history as above in addition patient did have issues with her right knee and saw orthopedics.  They did a cortisone injection with great relief of symptoms.      OBJECTIVE:   /72 (BP Location: Left arm, Patient Position: Sitting, Cuff Size: Adult Large)  Pulse 80  Temp 96.8  F (36  C) (Temporal)  Resp 16  Wt 230 lb (104.3 kg)  SpO2 95%  BMI 42.75 kg/m2  Body mass index is 42.75 kg/(m^2).  GENERAL: healthy, alert and no distress  EYES: Eyes grossly normal to inspection, PERRL and conjunctivae and sclerae normal  NECK: no adenopathy, no asymmetry, masses, or scars and thyroid normal to palpation  RESP: lungs clear to auscultation - no rales, rhonchi or wheezes  CV: regular rate and rhythm, normal S1 S2, no S3 or S4, no murmur, click or rub, no peripheral edema and peripheral pulses strong  ABDOMEN: soft, nontender, no hepatosplenomegaly, no masses and bowel sounds normal  MS: no gross musculoskeletal defects noted, no edema  SKIN: no suspicious lesions or rashes  PSYCH: mentation appears normal, affect normal/bright    Diagnostic Test Results:  none     ASSESSMENT/PLAN:     Hypertension; controlled   Associated with the following complications:    CKD   Plan:  No changes in the patient's current treatment plan    Depression; recurrent episode-- Full remission   Associated with the following complications:    CKD   Plan:  No changes in the patient's current treatment plan    COPD: Moderate = FeV1 < 79% -50%, controlled  Associated with the following  "complications:  None    Plan:  No changes in the patient's current treatment plan      COPD education: www.lungmn.org              Tobacco Cessation:   reports that she quit smoking about 12 years ago. She has a 30.00 pack-year smoking history. She has never used smokeless tobacco.      BMI:   Estimated body mass index is 42.75 kg/(m^2) as calculated from the following:    Height as of 10/4/17: 5' 1.5\" (1.562 m).    Weight as of this encounter: 230 lb (104.3 kg).   Weight management plan: Discussed healthy diet and exercise guidelines and patient will follow up in 6 months in clinic to re-evaluate.          1. Major depression in complete remission (H)  Depression as noted above.  Because she remains on medication, we are not removing this diagnosis  - DEPRESSION ACTION PLAN (DAP)    2. Hypertension, goal below 140/90  Well-controlled although still not at 120/80    3. Paroxysmal atrial fibrillation (H)  No symptoms and no findings on exam of this as recurrence today    4. CKD (chronic kidney disease) stage 3, GFR 30-59 ml/min  Due for blood work later.  No sign of edema or other problems    5. Pulmonary emphysema, unspecified emphysema type (H)  Well-controlled emphysema.    6. Prepatellar bursitis of right knee  Greatly improved knee with cortisone injection after orthopedics      MEDICATIONS:  Continue current medications without change  Work on weight loss  Regular exercise, patient already is going to the fitness center but she plans on being more diligent and eating better at the new year.  Patient Instructions   Keep doing what you are doing.       Glentona Blue MD  [facility]  "

## 2017-12-11 NOTE — MR AVS SNAPSHOT
Latanya Padron   12/11/2017 8:45 AM   Anticoagulation Therapy Visit    Description:  67 year old female   Provider:  [facility]   Department:  [facility]           INR as of 12/11/2017     Today's INR 3.1!      Anticoagulation Summary as of 12/11/2017     INR goal 2.0-3.0   Today's INR 3.1!   Full instructions 5 mg on Mon; 2.5 mg all other days   Next INR check 1/2/2018    Indications   Long-term (current) use of anticoagulants [Z79.01] [Z79.01]  Atrial fibrillation (H) [I48.91]         Your next Anticoagulation Clinic appointment(s)     Jan 02, 2018  3:15 PM CST   Anticoagulation Visit with [facility]   [facility]    [address] [place] [id number] [phone]              Contact Numbers     Clinic Number:         December 2017 Details    Sun Mon Tue Wed Thu Fri Sat          1               2                 3               4               5               6               7               8               9                 10               11      5 mg   See details      12      2.5 mg         13      2.5 mg         14      2.5 mg         15      2.5 mg         16      2.5 mg           17      2.5 mg         18      5 mg         19      2.5 mg         20      2.5 mg         21      2.5 mg         22      2.5 mg         23      2.5 mg           24      2.5 mg         25      5 mg         26      2.5 mg         27      2.5 mg         28      2.5 mg         29      2.5 mg         30      2.5 mg           31      2.5 mg                Date Details   12/11 This INR check               How to take your warfarin dose     To take:  2.5 mg Take 0.5 of a 5 mg tablet.    To take:  5 mg Take 1 of the 5 mg tablets.           January 2018 Details    Sun Mon Tue Wed Thu Fri Sat      1      5 mg         2            3               4               5               6                 7               8               9               10               11                12               13                 14               15               16               17               18               19               20                 21               22               23               24               25               26               27                 28               29               30               31                   Date Details   No additional details    Date of next INR:  1/2/2018         How to take your warfarin dose     To take:  2.5 mg Take 0.5 of a 5 mg tablet.    To take:  5 mg Take 1 of the 5 mg tablets.

## 2017-12-11 NOTE — NURSING NOTE
"Chief Complaint   Patient presents with     Atrial Fib     Recheck     COPD     Recheck     Hypertension     Recheck     Depression     Recheck       Initial /72 (BP Location: Left arm, Patient Position: Sitting, Cuff Size: Adult Large)  Pulse 80  Temp 96.8  F (36  C) (Temporal)  Resp 16  Wt 230 lb (104.3 kg)  SpO2 95%  BMI 42.75 kg/m2 Estimated body mass index is 42.75 kg/(m^2) as calculated from the following:    Height as of 10/4/17: 5' 1.5\" (1.562 m).    Weight as of this encounter: 230 lb (104.3 kg).  Medication Reconciliation: complete  "

## 2017-12-11 NOTE — LETTER
My Depression Action Plan  Name: Latanya Padron   Date of Birth 1950  Date: 12/11/2017    My doctor: Glen Blue   My clinic: [address] [id number] [phone]          GREEN    ZONE   Good Control    What it looks like:     Things are going generally well. You have normal up s and down s. You may even feel depressed from time to time, but bad moods usually last less than a day.   What you need to do:  1. Continue to care for yourself (see self care plan)  2. Check your depression survival kit and update it as needed  3. Follow your physician s recommendations including any medication.  4. Do not stop taking medication unless you consult with your physician first.           YELLOW         ZONE Getting Worse    What it looks like:     Depression is starting to interfere with your life.     It may be hard to get out of bed; you may be starting to isolate yourself from others.    Symptoms of depression are starting to last most all day and this has happened for several days.     You may have suicidal thoughts but they are not constant.   What you need to do:     1. Call your care team, your response to treatment will improve if you keep your care team informed of your progress. Yellow periods are signs an adjustment may need to be made.     2. Continue your self-care, even if you have to fake it!    3. Talk to someone in your support network    4. Open up your depression survival kit           RED    ZONE Medical Alert - Get Help    What it looks like:     Depression is seriously interfering with your life.     You may experience these or other symptoms: You can t get out of bed most days, can t work or engage in other necessary activities, you have trouble taking care of basic hygiene, or basic responsibilities, thoughts of suicide or death that will not go away, self-injurious behavior.     What you need to do:  1. Call your care team and  request a same-day appointment. If they are not available (weekends or after hours) call your local crisis line, emergency room or 911.      Electronically signed by: Brenda Carlin, December 11, 2017    Depression Self Care Plan / Survival Kit    Self-Care for Depression  Here s the deal. Your body and mind are really not as separate as most people think.  What you do and think affects how you feel and how you feel influences what you do and think. This means if you do things that people who feel good do, it will help you feel better.  Sometimes this is all it takes.  There is also a place for medication and therapy depending on how severe your depression is, so be sure to consult with your medical provider and/ or Behavioral Health Consultant if your symptoms are worsening or not improving.     In order to better manage my stress, I will:    Exercise  Get some form of exercise, every day. This will help reduce pain and release endorphins, the  feel good  chemicals in your brain. This is almost as good as taking antidepressants!  This is not the same as joining a gym and then never going! (they count on that by the way ) It can be as simple as just going for a walk or doing some gardening, anything that will get you moving.      Hygiene   Maintain good hygiene (Get out of bed in the morning, Make your bed, Brush your teeth, Take a shower, and Get dressed like you were going to work, even if you are unemployed).  If your clothes don't fit try to get ones that do.    Diet  I will strive to eat foods that are good for me, drink plenty of water, and avoid excessive sugar, caffeine, alcohol, and other mood-altering substances.  Some foods that are helpful in depression are: complex carbohydrates, B vitamins, flaxseed, fish or fish oil, fresh fruits and vegetables.    Psychotherapy  I agree to participate in Individual Therapy (if recommended).    Medication  If prescribed medications, I agree to take them.  Missing doses  can result in serious side effects.  I understand that drinking alcohol, or other illicit drug use, may cause potential side effects.  I will not stop my medication abruptly without first discussing it with my provider.    Staying Connected With Others  I will stay in touch with my friends, family members, and my primary care provider/team.    Use your imagination  Be creative.  We all have a creative side; it doesn t matter if it s oil painting, sand castles, or mud pies! This will also kick up the endorphins.    Witness Beauty  (AKA stop and smell the roses) Take a look outside, even in mid-winter. Notice colors, textures. Watch the squirrels and birds.     Service to others  Be of service to others.  There is always someone else in need.  By helping others we can  get out of ourselves  and remember the really important things.  This also provides opportunities for practicing all the other parts of the program.    Humor  Laugh and be silly!  Adjust your TV habits for less news and crime-drama and more comedy.    Control your stress  Try breathing deep, massage therapy, biofeedback, and meditation. Find time to relax each day.     My support system    Clinic Contact:  Phone number:    Contact 1:  Phone number:    Contact 2:  Phone number:    Jewish/:  Phone number:    Therapist:  Phone number:    Local crisis center:    Phone number:    Other community support:  Phone number:

## 2017-12-11 NOTE — MR AVS SNAPSHOT
After Visit Summary   12/11/2017    Latanya Padron    MRN: [id number]           Patient Information     Date Of Birth          1950        Visit Information        Provider Department      12/11/2017 8:00 AM Glen Blue MD [facility]        Today's Diagnoses     Major depression in complete remission (H)    -  1    Hypertension, goal below 140/90        Paroxysmal atrial fibrillation (H)        CKD (chronic kidney disease) stage 3, GFR 30-59 ml/min        Pulmonary emphysema, unspecified emphysema type (H)          Care Instructions    Keep doing what you are doing.           Follow-ups after your visit        Follow-up notes from your care team     Return in about 6 months (around 6/11/2018).      Your next 10 appointments already scheduled     Dec 11, 2017  8:45 AM CST   Anticoagulation Visit with [facility]   [facility] ([facility]    [address] [place] [id number] [phone]              Who to contact     If you have questions or need follow up information about today's clinic visit or your schedule please contact [facility] directly at [phone].  Normal or non-critical lab and imaging results will be communicated to you by [employer], letter or phone within 4 business days after the clinic has received the results. If you do not hear from us within 7 days, please contact the clinic through [email or web address] or phone. If you have a critical or abnormal lab result, we will notify you by phone as soon as possible.  Submit refill requests through [employer] or call your pharmacy and they will forward the refill request to us. Please allow 3 business days for your refill to be completed.          Additional Information About Your Visit        [employer] Information     [employer] gives you secure access to your electronic health record. If you see a primary care provider, you can also send messages to your care team and  make appointments. If you have questions, please call your primary care clinic.  If you do not have a primary care provider, please call [phone] and they will assist you.        Care EveryWhere ID     This is your Care EveryWhere ID. This could be used by other organizations to access your [facility] medical records  [id number]        Your Vitals Were     Pulse Temperature Respirations Pulse Oximetry BMI (Body Mass Index)       80 96.8  F (36  C) (Temporal) 16 95% 42.75 kg/m2        Blood Pressure from Last 3 Encounters:   12/11/17 138/72   08/14/17 114/50   07/31/17 126/64    Weight from Last 3 Encounters:   12/11/17 230 lb (104.3 kg)   10/04/17 228 lb (103.4 kg)   08/14/17 223 lb (101.2 kg)              We Performed the Following     DEPRESSION ACTION PLAN (DAP)        Primary Care Provider Office Phone # Fax #    Glen Darci Blue [phone]       [address] [place] [address]        Equal Access to Services     [facility]: Hadii aad ku hadasho Soomaali, waaxda luqadaha, qaybta kaalmada adeegyada, waxay shanthi vicente . So [facility] [phone].    ATENCIÓN: Si habla español, tiene a de los santos disposición servicios gratuitos de asistencia lingüística. Llame al [phone].    We comply with applicable federal civil rights laws and [place] laws. We do not discriminate on the basis of race, color, national origin, age, disability, sex, sexual orientation, or gender identity.            Thank you!     Thank you for choosing [facility]  for your care. Our goal is always to provide you with excellent care. Hearing back from our patients is one way we can continue to improve our services. Please take a few minutes to complete the written survey that you may receive in the mail after your visit with us. Thank you!             Your Updated Medication List - Protect others around you: Learn how to safely use, store and throw away your medicines at  www.disposemymeds.org.          This list is accurate as of: 12/11/17  8:34 AM.  Always use your most recent med list.                   Brand Name Dispense Instructions for use Diagnosis    * albuterol 108 (90 BASE) MCG/ACT Inhaler    PROAIR HFA/PROVENTIL HFA/VENTOLIN HFA    2 Inhaler    1-2 puffs by mouth every 2-4 hours as needed    Pulmonary emphysema, unspecified emphysema type (H)       * albuterol (2.5 MG/3ML) 0.083% neb solution     75 mL    Take  by nebulization. 1 NEB EVERY 4-6 HOURS AS NEEDED        ASPIRIN NOT PRESCRIBED    INTENTIONAL    0 each    1 each continuous prn for other Reported on 4/17/2017    Atrial fibrillation (H)       atorvastatin 10 MG tablet    LIPITOR    90 tablet    Take 1 tablet (10 mg) by mouth daily    Hyperlipidemia LDL goal <130       B-12 1000 MCG Tbcr     100 tablet    Take 1,000 mcg by mouth daily        calcium 500 + D 500-400 MG-UNIT Tabs   Generic drug:  Calcium Carb-Cholecalciferol     180 tablet    Take 1 tablet by mouth 2 times daily        cholecalciferol 400 UNITS tablet    Vitamin D    100 tablet    TAKE ONE TABLET BY MOUTH EVERY DAY    Osteoporosis       diltiazem 120 MG 24 hr capsule    CARTIA XT    90 capsule    TAKE ONE CAPSULE BY MOUTH EVERY DAY (DUE FOR FOLLOW-UP APPOINTMENT)    Hypertension, goal below 140/90, Chronic atrial fibrillation (H)       hydrochlorothiazide 25 MG tablet    HYDRODIURIL    90 tablet    Take 1 tablet (25 mg) by mouth daily    Hypertension, goal below 140/90       JANTOVEN 5 MG tablet   Generic drug:  warfarin     60 tablet    Take 5 mg Mon and 2.5 mg all other days of the week, or as directed by coumadin clinic    Long-term (current) use of anticoagulants, Chronic atrial fibrillation (H)       lisinopril 2.5 MG tablet    PRINIVIL/Zestril    90 tablet    Take 1 tablet (2.5 mg) by mouth daily    Hypertension, goal below 140/90       magnesium 250 MG tablet     30 tablet    Take 1 tablet by mouth daily        mometasone-formoterol 200-5  MCG/ACT oral inhaler    DULERA    13 g    INHALE 2 PUFFS BY MOUTH TWO TIMES A DAY    Pulmonary emphysema, unspecified emphysema type (H)       omega 3 1000 MG Caps     90 capsule    Take 1 g by mouth daily        order for DME      Equipment being ordered: Oxygen @ 2 liters with exertion    COPD (chronic obstructive pulmonary disease) (H)       oxybutynin 5 MG tablet    DITROPAN    360 tablet    Take 1 tablet (5 mg) by mouth daily        predniSONE 20 MG tablet    DELTASONE    10 tablet    TAKE ONE TABLET BY MOUTH TWO TIMES A DAY    Tendonitis of ankle       umeclidinium 62.5 MCG/INH oral inhaler    INCRUSE ELLIPTA    1 Inhaler    Inhale 1 puff into the lungs daily    Pulmonary emphysema, unspecified emphysema type (H)       venlafaxine 37.5 MG 24 hr capsule    EFFEXOR-XR    90 capsule    TAKE ONE CAPSULE BY MOUTH EVERY DAY WITH FOOD    Major depression in complete remission (H)       * Notice:  This list has 2 medication(s) that are the same as other medications prescribed for you. Read the directions carefully, and ask your doctor or other care provider to review them with you.

## 2017-12-11 NOTE — PROGRESS NOTES
ANTICOAGULATION FOLLOW-UP CLINIC VISIT    Patient Name:  Latanya Padron  Date:  12/11/2017  Contact Type:  Face to Face    SUBJECTIVE:     Patient Findings     Positives Change in medications (has been on prednisone about 2 weeks, has 1 day left), Change in diet/appetite (has been eating a salad almost daily)           OBJECTIVE    INR Protime   Date Value Ref Range Status   12/11/2017 3.1 (A) 0.86 - 1.14 Final       ASSESSMENT / PLAN  INR assessment THER    Recheck INR In: 3 WEEKS    INR Location Clinic      Anticoagulation Summary as of 12/11/2017     INR goal 2.0-3.0   Today's INR 3.1!   Maintenance plan 5 mg (5 mg x 1) on Mon; 2.5 mg (5 mg x 0.5) all other days   Full instructions 5 mg on Mon; 2.5 mg all other days   Weekly total 20 mg   No change documented Francine Andrew RN   Plan last modified Francine Andrew RN (7/26/2017)   Next INR check 1/2/2018   Target end date     Indications   Long-term (current) use of anticoagulants [Z79.01] [Z79.01]  Atrial fibrillation (H) [I48.91]         Anticoagulation Episode Summary     INR check location     Preferred lab     Send INR reminders to [facility] POOL    Comments 5 mg tabs, likes card       Anticoagulation Care Providers     Provider Role Specialty Phone number    Glen Blue MD [facility] [phone]            See the Encounter Report to view Anticoagulation Flowsheet and Dosing Calendar (Go to Encounters tab in chart review, and find the Anticoagulation Therapy Visit)    Dosage adjustment made based on physician directed care plan.        Francine Andrew RN

## 2017-12-15 ENCOUNTER — TELEPHONE (OUTPATIENT)
Dept: FAMILY MEDICINE | Facility: CLINIC | Age: 67
End: 2017-12-15

## 2017-12-15 NOTE — TELEPHONE ENCOUNTER
Reason for call:  Patient reporting a symptom    Symptom or request: Patient states she has a severe stiff neck & is freezing, asking if she should be doing something?  Please advise    Duration (how long have symptoms been present): 2 days    Have you been treated for this before? Yes    Additional comments:     Phone Number patient can be reached at:  Cell number on file:    Telephone Information:   Mobile [phone]       Best Time:      Can we leave a detailed message on this number:  YES    Call taken on 12/15/2017 at 9:14 AM by Hope Herrera

## 2017-12-17 ENCOUNTER — NURSE TRIAGE (OUTPATIENT)
Dept: NURSING | Facility: CLINIC | Age: 67
End: 2017-12-17

## 2017-12-17 NOTE — TELEPHONE ENCOUNTER
"  Reason for Disposition    [1] MODERATE neck pain (e.g., interferes with normal activities AND [2] present > 3 days    Additional Information    Negative: Shock suspected (e.g., cold/pale/clammy skin, too weak to stand, low BP, rapid pulse)    Negative: Difficult to awaken or acting confused  (e.g., disoriented, slurred speech)    Negative: [1] Similar pain previously AND [2] it was from \"heart attack\"    Negative: [1] Similar pain previously AND [2] it was from \"angina\" AND [3] not relieved by nitroglycerin    Negative: Sounds like a life-threatening emergency to the triager    Negative: Difficulty breathing or unusual sweating (e.g., sweating without exertion)    Negative: [1] Stiff neck (can't put chin to chest) AND [2] headache    Negative: [1] Stiff neck (can't put chin to chest) AND [2] fever    Negative: Weakness of an arm or hand    Negative: Problems with bowel or bladder control    Negative: Head is twisting to one side (or ask \"is it turning against your will?\")    Negative: Patient sounds very sick or weak to the triager    Negative: [1] SEVERE neck pain (e.g., excruciating, unable to do any normal activities) AND [2] not improved after 2 hours of pain medicine    Negative: [1] Fever > 100.5 F (38.1 C) AND [2] IVDA (intravenous drug abuse)    Negative: [1] Fever > 100.5 F (38.1 C) AND [2] diabetes mellitus or weak immune system (e.g., HIV positive, cancer chemo, splenectomy, organ transplant, chronic steroids)    Negative: Numbness in an arm or hand (i.e., loss of sensation)    Negative: Tenderness or swelling of front of neck over windpipe    Negative: Rash in same area as pain (may be described as \"small blisters\")    Negative: High-risk adult (e.g., history of cancer, HIV, or IV drug abuse)    Protocols used: NECK PAIN OR STIFFNESS-ADULT-AH    "

## 2017-12-18 ENCOUNTER — TELEPHONE (OUTPATIENT)
Dept: FAMILY MEDICINE | Facility: CLINIC | Age: 67
End: 2017-12-18

## 2017-12-18 ENCOUNTER — OFFICE VISIT (OUTPATIENT)
Dept: FAMILY MEDICINE | Facility: OTHER | Age: 67
End: 2017-12-18
Payer: COMMERCIAL

## 2017-12-18 VITALS
HEART RATE: 80 BPM | DIASTOLIC BLOOD PRESSURE: 60 MMHG | WEIGHT: 228.2 LBS | RESPIRATION RATE: 20 BRPM | OXYGEN SATURATION: 94 % | BODY MASS INDEX: 42.42 KG/M2 | SYSTOLIC BLOOD PRESSURE: 128 MMHG | TEMPERATURE: 99.4 F

## 2017-12-18 DIAGNOSIS — Z79.01 LONG-TERM (CURRENT) USE OF ANTICOAGULANTS: ICD-10-CM

## 2017-12-18 DIAGNOSIS — R51.9 NONINTRACTABLE HEADACHE, UNSPECIFIED CHRONICITY PATTERN, UNSPECIFIED HEADACHE TYPE: ICD-10-CM

## 2017-12-18 DIAGNOSIS — M62.838 NECK MUSCLE SPASM: Primary | ICD-10-CM

## 2017-12-18 DIAGNOSIS — I48.0 PAROXYSMAL ATRIAL FIBRILLATION (H): Primary | ICD-10-CM

## 2017-12-18 DIAGNOSIS — J01.90 ACUTE SINUSITIS WITH SYMPTOMS > 10 DAYS: ICD-10-CM

## 2017-12-18 PROCEDURE — 99214 OFFICE O/P EST MOD 30 MIN: CPT | Performed by: PHYSICIAN ASSISTANT

## 2017-12-18 RX ORDER — AMOXICILLIN 500 MG/1
500 CAPSULE ORAL 3 TIMES DAILY
Qty: 30 CAPSULE | Refills: 0 | Status: SHIPPED | OUTPATIENT
Start: 2017-12-18 | End: 2018-03-14

## 2017-12-18 RX ORDER — CYCLOBENZAPRINE HCL 5 MG
2.5-5 TABLET ORAL 2 TIMES DAILY PRN
Qty: 30 TABLET | Refills: 0 | Status: SHIPPED | OUTPATIENT
Start: 2017-12-18 | End: 2018-11-26

## 2017-12-18 ASSESSMENT — PAIN SCALES - GENERAL: PAINLEVEL: MODERATE PAIN (5)

## 2017-12-18 NOTE — TELEPHONE ENCOUNTER
Advised pt to come in later this week to check INR. Scheduled Thurs. For lab. Will call her with result and recommendation.     Zoey Pickard RN- [facility] RN

## 2017-12-18 NOTE — PATIENT INSTRUCTIONS
Neck Spasm     A spasm of the neck muscles can happen after a sudden awkward neck movement. Sleeping with your neck in a crooked position can also cause spasm. Some people respond to emotional stress by tensing the muscles of their neck, shoulders, and upper back. If neck spasm lasts long enough, it can cause headache.  The treatment described below will usually help the pain to go away in 5 to 7 days. Pain that continues may need further evaluation or other types of treatment such as physical therapy.  Home care    Rest and relax the muscles. Use a comfortable pillow that supports the head and keeps the spine in a neutral position. The position of the head should not be tilted forward or backward. A rolled up towel may help for a custom fit.    Some people find relief with heat. Heat can be applied with either a warm shower or bath or a moist towel heated in the microwave and massage. Others prefer cold packs. You can make an ice pack by filling a plastic bag that seals at the top with ice cubes or crushed ice and then wrapping it with a thin towel. Try both and use the method that feels best for 15 to 20 minutes, several times a day.    Whether using ice or heat, be careful that you do not injure your skin. Never put ice directly on the skin. Always wrap the ice in a towel or other type of cloth. This is very important, especially in people with poor skin sensations.    Try to reduce your stress level. Emotional stress can lead to neck muscle tension and get in the way of or delay the healing process.    You may use over-the-counter pain medicine to control pain, unless another medicine was prescribed.If you have chronic liver or kidney disease or ever had a stomach ulcer or GI bleeding, talk with your healthcare provider before using these medicines.  Follow-up care  Follow up with your healthcare provider if your symptoms do not show signs of improvement after one week. Physical therapy or further tests may be  needed.  If X-rays, CT scans, or MRI scans were taken, you will be told of any new findings that may affect your care.  Call 911  Call 911 if you have:    Sudden weakness or numbness in one or both arms    Neck swelling, difficulty or painful swallowing    Difficulty breathing    Chest pain  When to seek medical advice  Call your healthcare provider right away if any of these occur:    Pain becomes worse or spreads into one or both arms    Increasing headache with nausea or vomiting    Fever of 100.4 F (38 C) or above lasting for 24 to 48 hours  Date Last Reviewed: 11/21/2015 2000-2017 [employer]. [address] [place] [id number]. All rights reserved. This information is not intended as a substitute for professional medical care. Always follow your healthcare professional's instructions.

## 2017-12-18 NOTE — MR AVS SNAPSHOT
After Visit Summary   12/18/2017    Latanya Padron    MRN: [id number]           Patient Information     Date Of Birth          1950        Visit Information        Provider Department      12/18/2017 11:20 AM Silvina Galvan PA-C [facility]        Today's Diagnoses     Neck muscle spasm    -  1    Acute sinusitis with symptoms > 10 days        Nonintractable headache, unspecified chronicity pattern, unspecified headache type          Care Instructions      Neck Spasm     A spasm of the neck muscles can happen after a sudden awkward neck movement. Sleeping with your neck in a crooked position can also cause spasm. Some people respond to emotional stress by tensing the muscles of their neck, shoulders, and upper back. If neck spasm lasts long enough, it can cause headache.  The treatment described below will usually help the pain to go away in 5 to 7 days. Pain that continues may need further evaluation or other types of treatment such as physical therapy.  Home care    Rest and relax the muscles. Use a comfortable pillow that supports the head and keeps the spine in a neutral position. The position of the head should not be tilted forward or backward. A rolled up towel may help for a custom fit.    Some people find relief with heat. Heat can be applied with either a warm shower or bath or a moist towel heated in the microwave and massage. Others prefer cold packs. You can make an ice pack by filling a plastic bag that seals at the top with ice cubes or crushed ice and then wrapping it with a thin towel. Try both and use the method that feels best for 15 to 20 minutes, several times a day.    Whether using ice or heat, be careful that you do not injure your skin. Never put ice directly on the skin. Always wrap the ice in a towel or other type of cloth. This is very important, especially in people with poor skin sensations.    Try to reduce your stress level. Emotional stress can  lead to neck muscle tension and get in the way of or delay the healing process.    You may use over-the-counter pain medicine to control pain, unless another medicine was prescribed.If you have chronic liver or kidney disease or ever had a stomach ulcer or GI bleeding, talk with your healthcare provider before using these medicines.  Follow-up care  Follow up with your healthcare provider if your symptoms do not show signs of improvement after one week. Physical therapy or further tests may be needed.  If X-rays, CT scans, or MRI scans were taken, you will be told of any new findings that may affect your care.  Call 911  Call 911 if you have:    Sudden weakness or numbness in one or both arms    Neck swelling, difficulty or painful swallowing    Difficulty breathing    Chest pain  When to seek medical advice  Call your healthcare provider right away if any of these occur:    Pain becomes worse or spreads into one or both arms    Increasing headache with nausea or vomiting    Fever of 100.4 F (38 C) or above lasting for 24 to 48 hours  Date Last Reviewed: 11/21/2015 2000-2017 [employer]. [address] [place] [address]. All rights reserved. This information is not intended as a substitute for professional medical care. Always follow your healthcare professional's instructions.                Follow-ups after your visit        Follow-up notes from your care team     Return if symptoms worsen or fail to improve.      Your next 10 appointments already scheduled     Jan 02, 2018  3:15 PM CST   [facility] with PH ANTI COAG   [facility]    [address] [place] [phone] [phone]              Who to contact     If you have questions or need follow up information about today's clinic visit or your schedule please contact [facility] directly at [phone].  Normal or non-critical lab and imaging results will  be communicated to you by [employer], letter or phone within 4 business days after the clinic has received the results. If you do not hear from us within 7 days, please contact the clinic through [employer] or phone. If you have a critical or abnormal lab result, we will notify you by phone as soon as possible.  Submit refill requests through [employer] or call your pharmacy and they will forward the refill request to us. Please allow 3 business days for your refill to be completed.          Additional Information About Your Visit        [employer] Information     [employer] gives you secure access to your electronic health record. If you see a primary care provider, you can also send messages to your care team and make appointments. If you have questions, please call your primary care clinic.  If you do not have a primary care provider, please call [phone] and they will assist you.        Care EveryWhere ID     This is your Care EveryWhere ID. This could be used by other organizations to access your [place] medical records  [id number]        Your Vitals Were     Pulse Temperature Respirations Pulse Oximetry BMI (Body Mass Index)       80 99.4  F (37.4  C) (Oral) 20 94% 42.42 kg/m2        Blood Pressure from Last 3 Encounters:   12/18/17 128/60   12/11/17 138/72   08/14/17 114/50    Weight from Last 3 Encounters:   12/18/17 228 lb 3.2 oz (103.5 kg)   12/11/17 230 lb (104.3 kg)   10/04/17 228 lb (103.4 kg)              Today, you had the following     No orders found for display         Today's Medication Changes          These changes are accurate as of: 12/18/17 11:42 AM.  If you have any questions, ask your nurse or doctor.               Start taking these medicines.        Dose/Directions    amoxicillin 500 MG capsule   Commonly known as:  AMOXIL   Used for:  Acute sinusitis with symptoms > 10 days   Started by:  Silvina Galvan PA-C        Dose:  500 mg   Take 1 capsule (500 mg) by mouth 3 times daily   Quantity:   30 capsule   Refills:  0       cyclobenzaprine 5 MG tablet   Commonly known as:  FLEXERIL   Used for:  Neck muscle spasm   Started by:  Silvina Galvan PA-C        Dose:  2.5-5 mg   Take 0.5-1 tablets (2.5-5 mg) by mouth 2 times daily as needed for muscle spasms   Quantity:  30 tablet   Refills:  0            Where to get your medicines      These medications were sent to [facility], [place] - [address] [facility]   [address] [place] [address]     Phone:  [phone]     amoxicillin 500 MG capsule    cyclobenzaprine 5 MG tablet                Primary Care Provider Office Phone # Fax #    Glen Darci Blue [phone]       [address] [address]        Equal Access to Services     [facility]: Hadii lucia hou hadasho Sogrecia, waaxda luqadaha, qaybta kaalmada adeegyada, edin vicente . So [facility] [phone].    ATENCIÓN: Si habla español, tiene a de los santos disposición servicios gratuitos de asistencia lingüística. [facility] [phone].    We comply with applicable federal civil rights laws and [place] laws. We do not discriminate on the basis of race, color, national origin, age, disability, sex, sexual orientation, or gender identity.            Thank you!     Thank you for choosing [facility]  for your care. Our goal is always to provide you with excellent care. Hearing back from our patients is one way we can continue to improve our services. Please take a few minutes to complete the written survey that you may receive in the mail after your visit with us. Thank you!             Your Updated Medication List - Protect others around you: Learn how to safely use, store and throw away your medicines at www.disposemymeds.org.          This list is accurate as of: 12/18/17 11:42 AM.  Always use your most recent med list.                   Brand Name Dispense Instructions for use Diagnosis    * albuterol 108 (90 BASE)  MCG/ACT Inhaler    PROAIR HFA/PROVENTIL HFA/VENTOLIN HFA    2 Inhaler    1-2 puffs by mouth every 2-4 hours as needed    Pulmonary emphysema, unspecified emphysema type (H)       * albuterol (2.5 MG/3ML) 0.083% neb solution     75 mL    Take  by nebulization. 1 NEB EVERY 4-6 HOURS AS NEEDED        amoxicillin 500 MG capsule    AMOXIL    30 capsule    Take 1 capsule (500 mg) by mouth 3 times daily    Acute sinusitis with symptoms > 10 days       ASPIRIN NOT PRESCRIBED    INTENTIONAL    0 each    1 each continuous prn for other Reported on 4/17/2017    Atrial fibrillation (H)       atorvastatin 10 MG tablet    LIPITOR    90 tablet    Take 1 tablet (10 mg) by mouth daily    Hyperlipidemia LDL goal <130       B-12 1000 MCG Tbcr     100 tablet    Take 1,000 mcg by mouth daily        calcium 500 + D 500-400 MG-UNIT Tabs   Generic drug:  Calcium Carb-Cholecalciferol     180 tablet    Take 1 tablet by mouth 2 times daily        cholecalciferol 400 UNITS tablet    Vitamin D    100 tablet    TAKE ONE TABLET BY MOUTH EVERY DAY    Osteoporosis       cyclobenzaprine 5 MG tablet    FLEXERIL    30 tablet    Take 0.5-1 tablets (2.5-5 mg) by mouth 2 times daily as needed for muscle spasms    Neck muscle spasm       diltiazem 120 MG 24 hr capsule    CARTIA XT    90 capsule    TAKE ONE CAPSULE BY MOUTH EVERY DAY (DUE FOR FOLLOW-UP APPOINTMENT)    Hypertension, goal below 140/90, Chronic atrial fibrillation (H)       hydrochlorothiazide 25 MG tablet    HYDRODIURIL    90 tablet    Take 1 tablet (25 mg) by mouth daily    Hypertension, goal below 140/90       JANTOVEN 5 MG tablet   Generic drug:  warfarin     60 tablet    Take 5 mg Mon and 2.5 mg all other days of the week, or as directed by coumadin clinic    Long-term (current) use of anticoagulants, Chronic atrial fibrillation (H)       lisinopril 2.5 MG tablet    PRINIVIL/Zestril    90 tablet    Take 1 tablet (2.5 mg) by mouth daily    Hypertension, goal below 140/90       magnesium  250 MG tablet     30 tablet    Take 1 tablet by mouth daily        mometasone-formoterol 200-5 MCG/ACT oral inhaler    DULERA    13 g    INHALE 2 PUFFS BY MOUTH TWO TIMES A DAY    Pulmonary emphysema, unspecified emphysema type (H)       omega 3 1000 MG Caps     90 capsule    Take 1 g by mouth daily        order for DME      Equipment being ordered: Oxygen @ 2 liters with exertion    COPD (chronic obstructive pulmonary disease) (H)       oxybutynin 5 MG tablet    DITROPAN    360 tablet    Take 1 tablet (5 mg) by mouth daily        predniSONE 20 MG tablet    DELTASONE    10 tablet    TAKE ONE TABLET BY MOUTH TWO TIMES A DAY    Tendonitis of ankle       umeclidinium 62.5 MCG/INH oral inhaler    INCRUSE ELLIPTA    1 Inhaler    Inhale 1 puff into the lungs daily    Pulmonary emphysema, unspecified emphysema type (H)       venlafaxine 37.5 MG 24 hr capsule    EFFEXOR-XR    90 capsule    TAKE ONE CAPSULE BY MOUTH EVERY DAY WITH FOOD    Major depression in complete remission (H)       * Notice:  This list has 2 medication(s) that are the same as other medications prescribed for you. Read the directions carefully, and ask your doctor or other care provider to review them with you.

## 2017-12-18 NOTE — TELEPHONE ENCOUNTER
Reason for Call:  Other  Latanya wanted Coumadin Clinic to know that she was put on Amoxicillin today due to sinus infection.    Detailed comments: Will this affect her coumadin    Phone Number Patient can be reached at: Home number on file [phone] (home)    Best Time: any    Can we leave a detailed message on this number? YES    Call taken on 12/18/2017 at 3:32 PM by Francine Robledo

## 2017-12-18 NOTE — PROGRESS NOTES
SUBJECTIVE:   Latanya Padron is a 67 year old female who presents to clinic today for the following health issues:      Acute Illness   Acute illness concerns: nausea, headache and neck stiffness  Onset: 4-5 days    Fever: no    Chills/Sweats: YES- chills    Headache (location?): YES    Sinus Pressure:YES    Conjunctivitis:  no    Ear Pain: YES: right    Rhinorrhea: no    Congestion: no    Sore Throat: no     Cough: YES-non-productive    Wheeze: YES    Decreased Appetite: YES    Nausea: YES    Vomiting: no    Diarrhea:  YES- 1 time episode    Dysuria/Freq.: no    Fatigue/Achiness: no     Sick/Strep Exposure: no     Therapies Tried and outcome: Tylenol, Advil; slight relief    Patient is here with concern about sinus pressure and pain as well as a cough. She has had some congestion for the last couple weeks but cough and other cold symptoms have worsened the last several days. She has also been having headaches in the frontal area.   In addition to sinus symptoms she has had pain and stiffness in the neck, she reports this mostly in the right left of the neck with worsening with turning her head to the right. She has had chills but no fevers.   -------------------------------------    Problem list and histories reviewed & adjusted, as indicated.  Additional history: as documented    BP Readings from Last 3 Encounters:   12/18/17 128/60   12/11/17 138/72   08/14/17 114/50    Wt Readings from Last 3 Encounters:   12/18/17 228 lb 3.2 oz (103.5 kg)   12/11/17 230 lb (104.3 kg)   10/04/17 228 lb (103.4 kg)         Reviewed and updated as needed this visit by clinical staffTobacco  Allergies  Med Hx  Surg Hx  Fam Hx  Soc Hx      Reviewed and updated as needed this visit by Provider       ROS:  Constitutional, HEENT, cardiovascular, pulmonary, gi and gu systems are negative, except as otherwise noted.      OBJECTIVE:   /60 (BP Location: Left arm, Patient Position: Chair, Cuff Size: Adult Large)  Pulse 80   Temp 99.4  F (37.4  C) (Oral)  Resp 20  Wt 228 lb 3.2 oz (103.5 kg)  SpO2 94%  BMI 42.42 kg/m2  Body mass index is 42.42 kg/(m^2).  GENERAL: alert and no distress  EYES: Eyes grossly normal to inspection  HENT: normal cephalic/atraumatic, both ears: clear effusion, rhinorrhea yellow, oropharynx clear, oral mucous membranes moist and sinuses: maxillary tenderness on left  NECK: no adenopathy  RESP: lungs clear to auscultation - no rales, rhonchi or wheezes  CV: regular rates and rhythm  MS: neck muscle tension and spasm on the left, no bony tenderness, increased pain with turning head, no meningeal signs  SKIN: no suspicious lesions or rashes    Diagnostic Test Results:  none     ASSESSMENT/PLAN:       ICD-10-CM    1. Neck muscle spasm M62.838 cyclobenzaprine (FLEXERIL) 5 MG tablet   2. Acute sinusitis with symptoms > 10 days J01.90 amoxicillin (AMOXIL) 500 MG capsule   3. Nonintractable headache, unspecified chronicity pattern, unspecified headache type R51        I will treat for neck muscle spasm and sinus infection and have her follow  Up if worsening or not improving with treatment.     See Patient Instructions    Silvina Galvan PA-C  [facility]

## 2017-12-18 NOTE — NURSING NOTE
"Chief Complaint   Patient presents with     Sick     5 days     Neck Pain     4 days       Initial /60 (BP Location: Left arm, Patient Position: Chair, Cuff Size: Adult Large)  Pulse 80  Temp 99.4  F (37.4  C) (Oral)  Resp 20  Wt 228 lb 3.2 oz (103.5 kg)  SpO2 94%  BMI 42.42 kg/m2 Estimated body mass index is 42.42 kg/(m^2) as calculated from the following:    Height as of 10/4/17: 5' 1.5\" (1.562 m).    Weight as of this encounter: 228 lb 3.2 oz (103.5 kg).  Medication Reconciliation: complete     Betty FONTANEZ LPN      "

## 2017-12-21 ENCOUNTER — TELEPHONE (OUTPATIENT)
Dept: FAMILY MEDICINE | Facility: CLINIC | Age: 67
End: 2017-12-21

## 2017-12-21 ENCOUNTER — ANTICOAGULATION THERAPY VISIT (OUTPATIENT)
Dept: ANTICOAGULATION | Facility: CLINIC | Age: 67
End: 2017-12-21

## 2017-12-21 ENCOUNTER — TELEPHONE (OUTPATIENT)
Dept: ANTICOAGULATION | Facility: OTHER | Age: 67
End: 2017-12-21

## 2017-12-21 DIAGNOSIS — I48.0 PAROXYSMAL ATRIAL FIBRILLATION (H): ICD-10-CM

## 2017-12-21 DIAGNOSIS — Z79.01 LONG-TERM (CURRENT) USE OF ANTICOAGULANTS: ICD-10-CM

## 2017-12-21 DIAGNOSIS — I48.91 ATRIAL FIBRILLATION, UNSPECIFIED TYPE (H): ICD-10-CM

## 2017-12-21 LAB — INR BLD: 3 (ref 0.86–1.14)

## 2017-12-21 PROCEDURE — 36416 COLLJ CAPILLARY BLOOD SPEC: CPT | Performed by: FAMILY MEDICINE

## 2017-12-21 PROCEDURE — 85610 PROTHROMBIN TIME: CPT | Mod: QW | Performed by: FAMILY MEDICINE

## 2017-12-21 PROCEDURE — 99207 ZZC NO CHARGE NURSE ONLY: CPT | Performed by: FAMILY MEDICINE

## 2017-12-21 NOTE — TELEPHONE ENCOUNTER
Reason for Call:  Medication question    Do you use a [facility]?  Name of the pharmacy and phone number for the current request:  [facility] - [phone]    Name of the medication requested: Amoxicillin & Prednisone    Other request: Patient calling and states that she is currently taking Amoxicillin for a sinus infection. Patient is wondering if she should start taking her prednisone as well because of her COPD. Please advise patient on what she should do.     Can we leave a detailed message on this number? YES    Phone number patient can be reached at: Cell number on file:    Telephone Information:   Mobile [phone]       Best Time: any    Call taken on 12/21/2017 at 10:24 AM by Eulalia Oates

## 2017-12-21 NOTE — TELEPHONE ENCOUNTER
I have attempted to contact this patient by phone with the following results: no answer. VM left asking her to call the [facility] back to discuss. Manolo Damian RN, BSN

## 2017-12-21 NOTE — PROGRESS NOTES
ANTICOAGULATION FOLLOW-UP CLINIC VISIT    Patient Name:  Latanya Padron  Date:  12/21/2017  Contact Type:  Telephone    SUBJECTIVE:     Patient Findings     Positives Antibiotic use or infection (Currently on Amoxicillin for a sinus infection)    Comments Pt and I discussed leaving her dose the same but just making sure she adds some additional greens in her diet while on the amoxicillin. She also may be starting Prednisone. If that is the case she will call us back and let us know. Manolo Damian RN, BSN             OBJECTIVE    INR Point of Care   Date Value Ref Range Status   12/21/2017 3.0 (H) 0.86 - 1.14 Final     Comment:     This test is intended for monitoring Coumadin therapy.  Results are not   accurate in patients with prolonged INR due to factor deficiency.         ASSESSMENT / PLAN  INR assessment THER    Recheck INR In: 2 WEEKS    INR Location Outside lab      Anticoagulation Summary as of 12/21/2017     INR goal 2.0-3.0   Today's INR 3.0   Maintenance plan 5 mg (5 mg x 1) on Mon; 2.5 mg (5 mg x 0.5) all other days   Full instructions 5 mg on Mon; 2.5 mg all other days   Weekly total 20 mg   No change documented Manolo Damian RN   Plan last modified Francine Andrew RN (7/26/2017)   Next INR check 1/2/2018   Target end date     Indications   Long-term (current) use of anticoagulants [Z79.01] [Z79.01]  Atrial fibrillation (H) [I48.91]         Anticoagulation Episode Summary     INR check location     Preferred lab     Send INR reminders to [facility] POOL    Comments 5 mg tabs, likes card       Anticoagulation Care Providers     Provider Role Specialty Phone number    Glen Blue MD [facility] [phone]            See the Encounter Report to view Anticoagulation Flowsheet and Dosing Calendar (Go to Encounters tab in chart review, and find the Anticoagulation Therapy Visit)    Dosage adjustment made based on physician directed care plan.    Manolo Damian RN

## 2017-12-21 NOTE — MR AVS SNAPSHOT
Latanya Padron   12/21/2017   Anticoagulation Therapy Visit    Description:  67 year old female   Provider:  Glen Blue MD   Department:  Ph Anticoag           INR as of 12/21/2017     Today's INR 3.0      Anticoagulation Summary as of 12/21/2017     INR goal 2.0-3.0   Today's INR 3.0   Full instructions 5 mg on Mon; 2.5 mg all other days   Next INR check 1/2/2018    Indications   Long-term (current) use of anticoagulants [Z79.01] [Z79.01]  Atrial fibrillation (H) [I48.91]         Your next Anticoagulation Clinic appointment(s)     Jan 02, 2018  3:15 PM CST   Anticoagulation Visit with PH ANTI COAG   [facility]    [address] [place] [id number] [phone]              Contact Numbers     Clinic Number:         December 2017 Details    Sun Mon Tue Wed Thu Fri Sat          1               2                 3               4               5               6               7               8               9                 10               11               12               13               14               15               16                 17               18               19               20               21      2.5 mg   See details      22      2.5 mg         23      2.5 mg           24      2.5 mg         25      5 mg         26      2.5 mg         27      2.5 mg         28      2.5 mg         29      2.5 mg         30      2.5 mg           31      2.5 mg                Date Details   12/21 This INR check               How to take your warfarin dose     To take:  2.5 mg Take 0.5 of a 5 mg tablet.    To take:  5 mg Take 1 of the 5 mg tablets.           January 2018 Details    Sun Mon Tue Wed Thu Fri Sat      1      5 mg         2            3               4               5               6                 7               8               9               10               11               12               13                 14               15                16               17               18               19               20                 21               22               23               24               25               26               27                 28               29               30               31                   Date Details   No additional details    Date of next INR:  1/2/2018         How to take your warfarin dose     To take:  2.5 mg Take 0.5 of a 5 mg tablet.    To take:  5 mg Take 1 of the 5 mg tablets.

## 2018-01-02 ENCOUNTER — ANTICOAGULATION THERAPY VISIT (OUTPATIENT)
Dept: ANTICOAGULATION | Facility: CLINIC | Age: 68
End: 2018-01-02
Payer: COMMERCIAL

## 2018-01-02 DIAGNOSIS — I48.91 ATRIAL FIBRILLATION, UNSPECIFIED TYPE (H): ICD-10-CM

## 2018-01-02 DIAGNOSIS — Z79.01 LONG-TERM (CURRENT) USE OF ANTICOAGULANTS: ICD-10-CM

## 2018-01-02 LAB — INR POINT OF CARE: 3 (ref 0.86–1.14)

## 2018-01-02 PROCEDURE — 85610 PROTHROMBIN TIME: CPT | Mod: QW

## 2018-01-02 PROCEDURE — 36416 COLLJ CAPILLARY BLOOD SPEC: CPT

## 2018-01-02 PROCEDURE — 99207 ZZC NO CHARGE NURSE ONLY: CPT

## 2018-01-02 NOTE — MR AVS SNAPSHOT
Latanya Padron   1/2/2018 3:15 PM   Anticoagulation Therapy Visit    Description:  67 year old female   Provider:  [facility]   Department:  [facility]           INR as of 1/2/2018     Today's INR 3.0      Anticoagulation Summary as of 1/2/2018     INR goal 2.0-3.0   Today's INR 3.0   Full instructions 5 mg on Mon; 2.5 mg all other days   Next INR check 2/13/2018    Indications   Long-term (current) use of anticoagulants [Z79.01] [Z79.01]  Atrial fibrillation (H) [I48.91]         Your next Anticoagulation Clinic appointment(s)     Feb 13, 2018 11:30 AM CST   Anticoagulation Visit with [facility] ANTI COAG   [facility]    [address] [place] [id number] [phone]              Contact Numbers     Clinic Number:         January 2018 Details    Sun Mon Tue Wed Thu Fri Sat      1               2      2.5 mg   See details      3      2.5 mg         4      2.5 mg         5      2.5 mg         6      2.5 mg           7      2.5 mg         8      5 mg         9      2.5 mg         10      2.5 mg         11      2.5 mg         12      2.5 mg         13      2.5 mg           14      2.5 mg         15      5 mg         16      2.5 mg         17      2.5 mg         18      2.5 mg         19      2.5 mg         20      2.5 mg           21      2.5 mg         22      5 mg         23      2.5 mg         24      2.5 mg         25      2.5 mg         26      2.5 mg         27      2.5 mg           28      2.5 mg         29      5 mg         30      2.5 mg         31      2.5 mg             Date Details   01/02 This INR check               How to take your warfarin dose     To take:  2.5 mg Take 0.5 of a 5 mg tablet.    To take:  5 mg Take 1 of the 5 mg tablets.           February 2018 Details    Sun Mon Tue Wed Thu Fri Sat         1      2.5 mg         2      2.5 mg         3      2.5 mg           4      2.5 mg         5      5 mg         6      2.5 mg         7      2.5  mg         8      2.5 mg         9      2.5 mg         10      2.5 mg           11      2.5 mg         12      5 mg         13            14               15               16               17                 18               19               20               21               22               23               24                 25               26               27               28                   Date Details   No additional details    Date of next INR:  2/13/2018         How to take your warfarin dose     To take:  2.5 mg Take 0.5 of a 5 mg tablet.    To take:  5 mg Take 1 of the 5 mg tablets.

## 2018-01-02 NOTE — PROGRESS NOTES
ANTICOAGULATION FOLLOW-UP CLINIC VISIT    Patient Name:  Latanya Padron  Date:  1/2/2018  Contact Type:  Face to Face    SUBJECTIVE:     Patient Findings     Positives No Problem Findings           OBJECTIVE    INR Protime   Date Value Ref Range Status   01/02/2018 3.0 (A) 0.86 - 1.14 Final       ASSESSMENT / PLAN  INR assessment THER    Recheck INR In: 6 WEEKS    INR Location Clinic      Anticoagulation Summary as of 1/2/2018     INR goal 2.0-3.0   Today's INR 3.0   Maintenance plan 5 mg (5 mg x 1) on Mon; 2.5 mg (5 mg x 0.5) all other days   Full instructions 5 mg on Mon; 2.5 mg all other days   Weekly total 20 mg   No change documented Francine Andrew RN   Plan last modified Francine Andrew RN (7/26/2017)   Next INR check 2/13/2018   Target end date     Indications   Long-term (current) use of anticoagulants [Z79.01] [Z79.01]  Atrial fibrillation (H) [I48.91]         Anticoagulation Episode Summary     INR check location     Preferred lab     Send INR reminders to [facility] POOL    Comments 5 mg tabs, likes card       Anticoagulation Care Providers     Provider Role Specialty Phone number    Glen Blue MD [facility] [phone]            See the Encounter Report to view Anticoagulation Flowsheet and Dosing Calendar (Go to Encounters tab in chart review, and find the Anticoagulation Therapy Visit)    Dosage adjustment made based on physician directed care plan.    Francine Andrew RN

## 2018-02-13 ENCOUNTER — ANTICOAGULATION THERAPY VISIT (OUTPATIENT)
Dept: ANTICOAGULATION | Facility: CLINIC | Age: 68
End: 2018-02-13
Payer: COMMERCIAL

## 2018-02-13 DIAGNOSIS — Z79.01 LONG-TERM (CURRENT) USE OF ANTICOAGULANTS: ICD-10-CM

## 2018-02-13 DIAGNOSIS — I48.91 ATRIAL FIBRILLATION, UNSPECIFIED TYPE (H): ICD-10-CM

## 2018-02-13 LAB — INR POINT OF CARE: 2.1 (ref 0.86–1.14)

## 2018-02-13 PROCEDURE — 85610 PROTHROMBIN TIME: CPT | Mod: QW

## 2018-02-13 PROCEDURE — 99207 ZZC NO CHARGE NURSE ONLY: CPT

## 2018-02-13 PROCEDURE — 36416 COLLJ CAPILLARY BLOOD SPEC: CPT

## 2018-02-13 NOTE — PROGRESS NOTES
ANTICOAGULATION FOLLOW-UP CLINIC VISIT    Patient Name:  Latanya Padron  Date:  2/13/2018  Contact Type:  Face to Face    SUBJECTIVE:     Patient Findings     Positives No Problem Findings           OBJECTIVE    INR Protime   Date Value Ref Range Status   02/13/2018 2.1 (A) 0.86 - 1.14 Final       ASSESSMENT / PLAN  INR assessment THER    Recheck INR In: 6 WEEKS    INR Location Clinic      Anticoagulation Summary as of 2/13/2018     INR goal 2.0-3.0   Today's INR 2.1   Maintenance plan 5 mg (5 mg x 1) on Mon; 2.5 mg (5 mg x 0.5) all other days   Full instructions 5 mg on Mon; 2.5 mg all other days   Weekly total 20 mg   No change documented Francine Andrew RN   Plan last modified Francine Andrew RN (7/26/2017)   Next INR check 3/27/2018   Target end date     Indications   Long-term (current) use of anticoagulants [Z79.01] [Z79.01]  Atrial fibrillation (H) [I48.91]         Anticoagulation Episode Summary     INR check location     Preferred lab     Send INR reminders to [facility] POOL    Comments 5 mg tabs, likes card, AM      Anticoagulation Care Providers     Provider Role Specialty Phone number    Glen Blue MD [facility] [phone]            See the Encounter Report to view Anticoagulation Flowsheet and Dosing Calendar (Go to Encounters tab in chart review, and find the Anticoagulation Therapy Visit)    Dosage adjustment made based on physician directed care plan.      Francine Andrew RN

## 2018-02-13 NOTE — MR AVS SNAPSHOT
Latanya Padron   2/13/2018 11:30 AM   Anticoagulation Therapy Visit    Description:  67 year old female   Provider:  DALIA ANTI COAG   Department:  [facility]           INR as of 2/13/2018     Today's INR 2.1      Anticoagulation Summary as of 2/13/2018     INR goal 2.0-3.0   Today's INR 2.1   Full instructions 5 mg on Mon; 2.5 mg all other days   Next INR check 3/27/2018    Indications   Long-term (current) use of anticoagulants [Z79.01] [Z79.01]  Atrial fibrillation (H) [I48.91]         Your next Anticoagulation Clinic appointment(s)     Mar 27, 2018  8:15 AM CDT   Anticoagulation Visit with DALIA ANTI COAALISTAIR   [facility]    [address] [place] [id number] [phone]              Contact Numbers     Clinic Number:         February 2018 Details    Sun Mon Tue Wed Thu Fri Sat         1               2               3                 4               5               6               7               8               9               10                 11               12               13      2.5 mg   See details      14      2.5 mg         15      2.5 mg         16      2.5 mg         17      2.5 mg           18      2.5 mg         19      5 mg         20      2.5 mg         21      2.5 mg         22      2.5 mg         23      2.5 mg         24      2.5 mg           25      2.5 mg         26      5 mg         27      2.5 mg         28      2.5 mg             Date Details   02/13 This INR check               How to take your warfarin dose     To take:  2.5 mg Take 0.5 of a 5 mg tablet.    To take:  5 mg Take 1 of the 5 mg tablets.           March 2018 Details    Sun Mon Tue Wed Thu Fri Sat         1      2.5 mg         2      2.5 mg         3      2.5 mg           4      2.5 mg         5      5 mg         6      2.5 mg         7      2.5 mg         8      2.5 mg         9      2.5 mg         10      2.5 mg           11      2.5 mg         12      5 mg          13      2.5 mg         14      2.5 mg         15      2.5 mg         16      2.5 mg         17      2.5 mg           18      2.5 mg         19      5 mg         20      2.5 mg         21      2.5 mg         22      2.5 mg         23      2.5 mg         24      2.5 mg           25      2.5 mg         26      5 mg         27            28               29               30               31                Date Details   No additional details    Date of next INR:  3/27/2018         How to take your warfarin dose     To take:  2.5 mg Take 0.5 of a 5 mg tablet.    To take:  5 mg Take 1 of the 5 mg tablets.

## 2018-03-14 ENCOUNTER — MYC MEDICAL ADVICE (OUTPATIENT)
Dept: FAMILY MEDICINE | Facility: CLINIC | Age: 68
End: 2018-03-14

## 2018-03-27 ENCOUNTER — ANTICOAGULATION THERAPY VISIT (OUTPATIENT)
Dept: ANTICOAGULATION | Facility: CLINIC | Age: 68
End: 2018-03-27
Payer: COMMERCIAL

## 2018-03-27 DIAGNOSIS — Z79.01 LONG-TERM (CURRENT) USE OF ANTICOAGULANTS: ICD-10-CM

## 2018-03-27 DIAGNOSIS — I48.91 ATRIAL FIBRILLATION, UNSPECIFIED TYPE (H): ICD-10-CM

## 2018-03-27 LAB — INR POINT OF CARE: 2.7 (ref 0.86–1.14)

## 2018-03-27 PROCEDURE — 36416 COLLJ CAPILLARY BLOOD SPEC: CPT

## 2018-03-27 PROCEDURE — 99207 ZZC NO CHARGE NURSE ONLY: CPT

## 2018-03-27 PROCEDURE — 85610 PROTHROMBIN TIME: CPT | Mod: QW

## 2018-03-27 NOTE — PROGRESS NOTES
ANTICOAGULATION FOLLOW-UP CLINIC VISIT    Patient Name:  Latanya Padron  Date:  3/27/2018  Contact Type:  Face to Face    SUBJECTIVE:     Patient Findings     Positives No Problem Findings           OBJECTIVE    INR Protime   Date Value Ref Range Status   03/27/2018 2.7 (A) 0.86 - 1.14 Final       ASSESSMENT / PLAN  INR assessment THER    Recheck INR In: 6 WEEKS    INR Location Clinic      Anticoagulation Summary as of 3/27/2018     INR goal 2.0-3.0   Today's INR 2.7   Maintenance plan 5 mg (5 mg x 1) on Mon; 2.5 mg (5 mg x 0.5) all other days   Full instructions 5 mg on Mon; 2.5 mg all other days   Weekly total 20 mg   No change documented Francine Andrew RN   Plan last modified Francine Andrew RN (7/26/2017)   Next INR check 5/8/2018   Target end date     Indications   Long-term (current) use of anticoagulants [Z79.01] [Z79.01]  Atrial fibrillation (H) [I48.91]         Anticoagulation Episode Summary     INR check location     Preferred lab     Send INR reminders to [facility] POOL    Comments 5 mg tabs, likes card, AM dose      Anticoagulation Care Providers     Provider Role Specialty Phone number    Glen Blue MD [facility] [phone]            See the Encounter Report to view Anticoagulation Flowsheet and Dosing Calendar (Go to Encounters tab in chart review, and find the Anticoagulation Therapy Visit)    Dosage adjustment made based on physician directed care plan.      Francine Andrew RN

## 2018-03-27 NOTE — MR AVS SNAPSHOT
Latanya Padron   3/27/2018 8:15 AM   Anticoagulation Therapy Visit    Description:  68 year old female   Provider:  DALIA ANTI COAG   Department:  [facility]           INR as of 3/27/2018     Today's INR 2.7      Anticoagulation Summary as of 3/27/2018     INR goal 2.0-3.0   Today's INR 2.7   Full instructions 5 mg on Mon; 2.5 mg all other days   Next INR check 5/8/2018    Indications   Long-term (current) use of anticoagulants [Z79.01] [Z79.01]  Atrial fibrillation (H) [I48.91]         Your next Anticoagulation Clinic appointment(s)     May 08, 2018  8:15 AM CDT   Anticoagulation Visit with DALIA ANTI CHERYL   [facility]    [address] [place] [id number] [phone]              Contact Numbers     Clinic Number:         March 2018 Details    Sun Mon Tue Wed Thu Fri Sat         1               2               3                 4               5               6               7               8               9               10                 11               12               13               14               15               16               17                 18               19               20               21               22               23               24                 25               26               27      2.5 mg   See details      28      2.5 mg         29      2.5 mg         30      2.5 mg         31      2.5 mg          Date Details   03/27 This INR check               How to take your warfarin dose     To take:  2.5 mg Take 0.5 of a 5 mg tablet.           April 2018 Details    Sun Mon Tue Wed Thu Fri Sat     1      2.5 mg         2      5 mg         3      2.5 mg         4      2.5 mg         5      2.5 mg         6      2.5 mg         7      2.5 mg           8      2.5 mg         9      5 mg         10      2.5 mg         11      2.5 mg         12      2.5 mg         13      2.5 mg         14      2.5 mg           15      2.5 mg          16      5 mg         17      2.5 mg         18      2.5 mg         19      2.5 mg         20      2.5 mg         21      2.5 mg           22      2.5 mg         23      5 mg         24      2.5 mg         25      2.5 mg         26      2.5 mg         27      2.5 mg         28      2.5 mg           29      2.5 mg         30      5 mg               Date Details   No additional details            How to take your warfarin dose     To take:  2.5 mg Take 0.5 of a 5 mg tablet.    To take:  5 mg Take 1 of the 5 mg tablets.           May 2018 Details    Sun Mon Tue Wed Thu Fri Sat       1      2.5 mg         2      2.5 mg         3      2.5 mg         4      2.5 mg         5      2.5 mg           6      2.5 mg         7      5 mg         8            9               10               11               12                 13               14               15               16               17               18               19                 20               21               22               23               24               25               26                 27               28               29               30               31                  Date Details   No additional details    Date of next INR:  5/8/2018         How to take your warfarin dose     To take:  2.5 mg Take 0.5 of a 5 mg tablet.    To take:  5 mg Take 1 of the 5 mg tablets.

## 2018-04-11 ENCOUNTER — OFFICE VISIT (OUTPATIENT)
Dept: ORTHOPEDICS | Facility: CLINIC | Age: 68
End: 2018-04-11
Payer: COMMERCIAL

## 2018-04-11 VITALS
BODY MASS INDEX: 43.05 KG/M2 | HEIGHT: 61 IN | TEMPERATURE: 98 F | SYSTOLIC BLOOD PRESSURE: 122 MMHG | DIASTOLIC BLOOD PRESSURE: 60 MMHG | WEIGHT: 228 LBS

## 2018-04-11 DIAGNOSIS — M22.2X1 PATELLOFEMORAL PAIN SYNDROME OF RIGHT KNEE: Primary | ICD-10-CM

## 2018-04-11 DIAGNOSIS — M17.11 PRIMARY OSTEOARTHRITIS OF RIGHT KNEE: ICD-10-CM

## 2018-04-11 PROCEDURE — 20610 DRAIN/INJ JOINT/BURSA W/O US: CPT | Mod: RT | Performed by: ORTHOPAEDIC SURGERY

## 2018-04-11 RX ORDER — TRIAMCINOLONE ACETONIDE 40 MG/ML
40 INJECTION, SUSPENSION INTRA-ARTICULAR; INTRAMUSCULAR ONCE
Qty: 1 ML | Refills: 0 | OUTPATIENT
Start: 2018-04-11 | End: 2018-04-11

## 2018-04-11 ASSESSMENT — PAIN SCALES - GENERAL: PAINLEVEL: SEVERE PAIN (7)

## 2018-04-11 NOTE — PROGRESS NOTES
"Office Visit-Follow up      Chief Complaint: Latanya Padron is a 68 year old female who is being seen for   Chief Complaint   Patient presents with     RECHECK     f/u rt knee pain lov 10/4/48 she would like injection       History of Present Illness:   Last injection lasted almost 6 mos  Slipped and fell on the ice a couple of times which may have flared up the knee  Would like another injection today      Past medical history reviewed and there is no significant change    Patient does not use Tobacco products.    REVIEW OF SYSTEMS  General: negative for, night sweats, dizziness, fatigue  Resp: No shortness of breath and no cough  CV: negative for chest pain, syncope or near-syncope  GI: negative for nausea, vomiting and diarrhea  : negative for dysuria and hematuria  Musculoskeletal: as above  Neurologic: negative for syncope   Hematologic: negative for bleeding disorder      Physical Exam:  Vitals: /60 (BP Location: Right arm, Patient Position: Sitting, Cuff Size: Adult Large)  Temp 98  F (36.7  C) (Temporal)  Ht 1.549 m (5' 1\")  Wt 103.4 kg (228 lb)  BMI 43.08 kg/m2  BMI= Body mass index is 43.08 kg/(m^2).  Constitutional: healthy, alert and no acute distress   Psychiatric: mentation appears normal and affect normal/bright  NEURO: no focal deficits  JOINT/EXTREMITIES:right knee - no effusion, peripatellar TTP/pain      Diagnostic Modalities:  None today.      Impression: right Patellofemoral OA    Plan:  All of the above pertinent physical exam and imaging modalities findings was reviewed with Latanya.                                          INJECTION PROCEDURE:  The patient was counseled about an  injection, including discussion of risks (including infection), contents of the injection, rationale for performing the injection, and expected benefits of the injection. The skin was prepped with alcohol and betadine and then utilizing sterile technique an injection of the right knee joint from the " superior lateral approach in the supine position was performed. The injection consisted 1ml of Kenalog (40mg per 1ml) with 8ml 1% lidocaine plain. The patient tolerated the injection well, and there were no complications. The injection site was covered with a Band-Aid. The injection was performed by Nadja Hinson M.D.    BP Readings from Last 1 Encounters:   04/11/18 122/60       BP noted to be well controlled today in office.     Return to clinic 6, week(s), PRN, or sooner as needed for changes.  Re-x-ray on return: No    Nadja Hinson M.D.

## 2018-04-11 NOTE — MR AVS SNAPSHOT
After Visit Summary   4/11/2018    Latanya Padron    MRN: [id number]           Patient Information     Date Of Birth          1950        Visit Information        Provider Department      4/11/2018 2:00 PM Nadja Hinson MD [facility]        Today's Diagnoses     Patellofemoral pain syndrome of right knee    -  1    Primary osteoarthritis of right knee           Follow-ups after your visit        Your next 10 appointments already scheduled     May 08, 2018  8:15 AM CDT   Anticoagulation Visit with PH ANTI COAG   [facility]    [address] [place] [id number] [phone]              Who to contact     If you have questions or need follow up information about today's clinic visit or your schedule please contact [facility] directly at [phone].  Normal or non-critical lab and imaging results will be communicated to you by Bellybaloohart, letter or phone within 4 business days after the clinic has received the results. If you do not hear from us within 7 days, please contact the clinic through Bellybaloohart or phone. If you have a critical or abnormal lab result, we will notify you by phone as soon as possible.  Submit refill requests through Virtualmin or call your pharmacy and they will forward the refill request to us. Please allow 3 business days for your refill to be completed.          Additional Information About Your Visit        MyChart Information     Virtualmin gives you secure access to your electronic health record. If you see a primary care provider, you can also send messages to your care team and make appointments. If you have questions, please call your primary care clinic.  If you do not have a primary care provider, please call [phone] and they will assist you.        Care EveryWhere ID     This is your Care EveryWhere ID. This could be used by other organizations to access your [place]  "medical records  [id number]        Your Vitals Were     Temperature Height BMI (Body Mass Index)             98  F (36.7  C) (Temporal) 1.549 m (5' 1\") 43.08 kg/m2          Blood Pressure from Last 3 Encounters:   04/11/18 122/60   12/18/17 128/60   12/11/17 138/72    Weight from Last 3 Encounters:   04/11/18 103.4 kg (228 lb)   12/18/17 103.5 kg (228 lb 3.2 oz)   12/11/17 104.3 kg (230 lb)              We Performed the Following     Kenalog 40 MG  []     Large Joint/Bursa injection and/or drainage (Shoulder, Knee)          Today's Medication Changes          These changes are accurate as of 4/11/18  2:24 PM.  If you have any questions, ask your nurse or doctor.               Start taking these medicines.        Dose/Directions    triamcinolone acetonide 40 MG/ML injection   Commonly known as:  KENALOG   Used for:  Patellofemoral pain syndrome of right knee, Primary osteoarthritis of right knee   Started by:  Nadja Hinson MD        Dose:  40 mg   1 mL (40 mg) by INTRA-ARTICULAR route once for 1 dose   Quantity:  1 mL   Refills:  0            Where to get your medicines      Some of these will need a paper prescription and others can be bought over the counter.  Ask your nurse if you have questions.     You don't need a prescription for these medications     triamcinolone acetonide 40 MG/ML injection                Primary Care Provider Office Phone # Fax #    Glen Darci Blue [phone]        [facility] [address] [place] [address]        Equal Access to Services     [facility]: Hadii lucia hou hadasho Soomaali, waaxda luqadaha, qaybta kaalmada adeegyahalie, waxay idiin hayaan adeeg kharash la'aan . So [facility] [phone].    ATENCIÓN: Si habla español, tiene a de los santos disposición servicios gratuitos de asistencia lingüística. Llame al [phone].    We comply with applicable federal civil rights laws and [place] laws. We do not discriminate on the basis of race, color, national " origin, age, disability, sex, sexual orientation, or gender identity.            Thank you!     Thank you for choosing [facility]  for your care. Our goal is always to provide you with excellent care. Hearing back from our patients is one way we can continue to improve our services. Please take a few minutes to complete the written survey that you may receive in the mail after your visit with us. Thank you!             Your Updated Medication List - Protect others around you: Learn how to safely use, store and throw away your medicines at www.disposemymeds.org.          This list is accurate as of 4/11/18  2:24 PM.  Always use your most recent med list.                   Brand Name Dispense Instructions for use Diagnosis    * albuterol 108 (90 Base) MCG/ACT Inhaler    PROAIR HFA/PROVENTIL HFA/VENTOLIN HFA    2 Inhaler    1-2 puffs by mouth every 2-4 hours as needed    Pulmonary emphysema, unspecified emphysema type (H)       * albuterol (2.5 MG/3ML) 0.083% neb solution     75 mL    Take  by nebulization. 1 NEB EVERY 4-6 HOURS AS NEEDED        ASPIRIN NOT PRESCRIBED    INTENTIONAL    0 each    1 each continuous prn for other Reported on 4/17/2017    Atrial fibrillation (H)       atorvastatin 10 MG tablet    LIPITOR    90 tablet    Take 1 tablet (10 mg) by mouth daily    Hyperlipidemia LDL goal <130       B-12 1000 MCG Tbcr     100 tablet    Take 1,000 mcg by mouth daily        calcium 500 + D 500-400 MG-UNIT Tabs   Generic drug:  Calcium Carb-Cholecalciferol     180 tablet    Take 1 tablet by mouth 2 times daily        cholecalciferol 400 units tablet    Vitamin D    100 tablet    TAKE ONE TABLET BY MOUTH EVERY DAY    Osteoporosis       cyclobenzaprine 5 MG tablet    FLEXERIL    30 tablet    Take 0.5-1 tablets (2.5-5 mg) by mouth 2 times daily as needed for muscle spasms    Neck muscle spasm       diltiazem 120 MG 24 hr capsule    CARTIA XT    90 capsule    TAKE ONE CAPSULE BY MOUTH EVERY DAY (DUE FOR  FOLLOW-UP APPOINTMENT)    Hypertension, goal below 140/90, Chronic atrial fibrillation (H)       hydrochlorothiazide 25 MG tablet    HYDRODIURIL    90 tablet    Take 1 tablet (25 mg) by mouth daily    Hypertension, goal below 140/90       JANTOVEN 5 MG tablet   Generic drug:  warfarin     60 tablet    Take 5 mg Mon and 2.5 mg all other days of the week, or as directed by coumadin clinic    Long-term (current) use of anticoagulants, Chronic atrial fibrillation (H)       lisinopril 2.5 MG tablet    PRINIVIL/Zestril    90 tablet    Take 1 tablet (2.5 mg) by mouth daily    Hypertension, goal below 140/90       magnesium 250 MG tablet     30 tablet    Take 1 tablet by mouth daily        mometasone-formoterol 200-5 MCG/ACT oral inhaler    DULERA    13 g    INHALE 2 PUFFS BY MOUTH TWO TIMES A DAY    Pulmonary emphysema, unspecified emphysema type (H)       omega 3 1000 MG Caps     90 capsule    Take 1 g by mouth daily        order for DME      Equipment being ordered: Oxygen @ 2 liters with exertion    COPD (chronic obstructive pulmonary disease) (H)       oxybutynin 5 MG tablet    DITROPAN    360 tablet    Take 1 tablet (5 mg) by mouth daily        predniSONE 20 MG tablet    DELTASONE    10 tablet    TAKE ONE TABLET BY MOUTH TWO TIMES A DAY    Tendonitis of ankle       triamcinolone acetonide 40 MG/ML injection    KENALOG    1 mL    1 mL (40 mg) by INTRA-ARTICULAR route once for 1 dose    Patellofemoral pain syndrome of right knee, Primary osteoarthritis of right knee       umeclidinium 62.5 MCG/INH oral inhaler    INCRUSE ELLIPTA    1 Inhaler    Inhale 1 puff into the lungs daily    Pulmonary emphysema, unspecified emphysema type (H)       venlafaxine 37.5 MG 24 hr capsule    EFFEXOR-XR    90 capsule    TAKE ONE CAPSULE BY MOUTH EVERY DAY WITH FOOD    Major depression in complete remission (H)       * Notice:  This list has 2 medication(s) that are the same as other medications prescribed for you. Read the directions  carefully, and ask your doctor or other care provider to review them with you.

## 2018-04-11 NOTE — NURSING NOTE
"Chief Complaint   Patient presents with     RECHECK     f/u rt knee pain lov 10/4/48 she would like injection       Initial /60 (BP Location: Right arm, Patient Position: Sitting, Cuff Size: Adult Large)  Temp 98  F (36.7  C) (Temporal)  Ht 1.549 m (5' 1\")  Wt 103.4 kg (228 lb)  BMI 43.08 kg/m2 Estimated body mass index is 43.08 kg/(m^2) as calculated from the following:    Height as of this encounter: 1.549 m (5' 1\").    Weight as of this encounter: 103.4 kg (228 lb).  Medication Reconciliation: complete    "

## 2018-04-11 NOTE — LETTER
"    4/11/2018         RE: Latanya Padron  [id number] [facility] [id number]        Dear Colleague,    Thank you for referring your patient, Latanya Padron, to the [facility]. Please see a copy of my visit note below.    Office Visit-Follow up      Chief Complaint: Latanya Padron is a 68 year old female who is being seen for   Chief Complaint   Patient presents with     RECHECK     f/u rt knee pain lov 10/4/48 she would like injection       History of Present Illness:   Last injection lasted almost 6 mos  Slipped and fell on the ice a couple of times which may have flared up the knee  Would like another injection today      Past medical history reviewed and there is no significant change    Patient does not use Tobacco products.    REVIEW OF SYSTEMS  General: negative for, night sweats, dizziness, fatigue  Resp: No shortness of breath and no cough  CV: negative for chest pain, syncope or near-syncope  GI: negative for nausea, vomiting and diarrhea  : negative for dysuria and hematuria  Musculoskeletal: as above  Neurologic: negative for syncope   Hematologic: negative for bleeding disorder      Physical Exam:  Vitals: /60 (BP Location: Right arm, Patient Position: Sitting, Cuff Size: Adult Large)  Temp 98  F (36.7  C) (Temporal)  Ht 1.549 m (5' 1\")  Wt 103.4 kg (228 lb)  BMI 43.08 kg/m2  BMI= Body mass index is 43.08 kg/(m^2).  Constitutional: healthy, alert and no acute distress   Psychiatric: mentation appears normal and affect normal/bright  NEURO: no focal deficits  JOINT/EXTREMITIES:right knee - no effusion, peripatellar TTP/pain      Diagnostic Modalities:  None today.      Impression: right Patellofemoral OA    Plan:  All of the above pertinent physical exam and imaging modalities findings was reviewed with Latanya.                                          INJECTION PROCEDURE:  The patient was counseled about an  injection, including discussion of risks (including " infection), contents of the injection, rationale for performing the injection, and expected benefits of the injection. The skin was prepped with alcohol and betadine and then utilizing sterile technique an injection of the right knee joint from the superior lateral approach in the supine position was performed. The injection consisted 1ml of Kenalog (40mg per 1ml) with 8ml 1% lidocaine plain. The patient tolerated the injection well, and there were no complications. The injection site was covered with a Band-Aid. The injection was performed by Nadja Hinson M.D.    BP Readings from Last 1 Encounters:   04/11/18 122/60       BP noted to be well controlled today in office.     Return to clinic 6, week(s), PRN, or sooner as needed for changes.  Re-x-ray on return: No    Nadja Hinson M.D.          Again, thank you for allowing me to participate in the care of your patient.        Sincerely,        Nadja Hinson MD

## 2018-04-23 ENCOUNTER — TELEPHONE (OUTPATIENT)
Dept: FAMILY MEDICINE | Facility: CLINIC | Age: 68
End: 2018-04-23

## 2018-04-23 DIAGNOSIS — J20.9 ACUTE BRONCHITIS WITH COEXISTING CONDITION REQUIRING PROPHYLACTIC TREATMENT: ICD-10-CM

## 2018-04-23 DIAGNOSIS — J43.9 PULMONARY EMPHYSEMA, UNSPECIFIED EMPHYSEMA TYPE (H): Primary | ICD-10-CM

## 2018-04-23 RX ORDER — PREDNISONE 20 MG/1
20 TABLET ORAL DAILY
Qty: 5 TABLET | Refills: 0 | Status: SHIPPED | OUTPATIENT
Start: 2018-04-23 | End: 2018-10-17

## 2018-04-23 RX ORDER — AZITHROMYCIN 250 MG/1
TABLET, FILM COATED ORAL
Qty: 6 TABLET | Refills: 0 | Status: SHIPPED | OUTPATIENT
Start: 2018-04-23 | End: 2018-06-20

## 2018-04-23 NOTE — TELEPHONE ENCOUNTER
Reason for Call:  Medication or medication refill:    Do you use a [facility]?  Name of the pharmacy and phone number for the current request:  [facility] - [phone]    Name of the medication requested: prednisone or another antibiotic     Other request: Patient called and states that she started having a sore throat on Saturday 4/21 and thought it could have just been because of allergies but now her sore throat still hasn't gone away and she is starting to cough up a lot of green and yellow mucus. She is wondering if she should be taking prednisone like she usually does when she gets a cough because of her COPD or if she should be taking an antibiotic. Please advise.     Can we leave a detailed message on this number? YES    Phone number patient can be reached at: Cell number on file:    Telephone Information:   Mobile [phone]       Best Time: any     Call taken on 4/23/2018 at 7:59 AM by Renny Orona

## 2018-04-24 ENCOUNTER — TELEPHONE (OUTPATIENT)
Dept: FAMILY MEDICINE | Facility: CLINIC | Age: 68
End: 2018-04-24

## 2018-04-24 NOTE — TELEPHONE ENCOUNTER
Reason for Call:  Other call back    Detailed comments: pt is put on zpak and prednisone. Wondering if her coumadin should change? Please call her back and advise.     Phone Number Patient can be reached at: Cell number on file:    Telephone Information:   Mobile [phone]       Best Time: anytime    Can we leave a detailed message on this number? YES    Call taken on 4/24/2018 at 8:22 AM by Lynne Peng

## 2018-04-27 ENCOUNTER — ANTICOAGULATION THERAPY VISIT (OUTPATIENT)
Dept: ANTICOAGULATION | Facility: CLINIC | Age: 68
End: 2018-04-27
Payer: COMMERCIAL

## 2018-04-27 DIAGNOSIS — Z79.01 LONG-TERM (CURRENT) USE OF ANTICOAGULANTS: ICD-10-CM

## 2018-04-27 DIAGNOSIS — I48.91 ATRIAL FIBRILLATION, UNSPECIFIED TYPE (H): ICD-10-CM

## 2018-04-27 LAB — INR POINT OF CARE: 2.7 (ref 0.86–1.14)

## 2018-04-27 PROCEDURE — 36416 COLLJ CAPILLARY BLOOD SPEC: CPT

## 2018-04-27 PROCEDURE — 99207 ZZC NO CHARGE NURSE ONLY: CPT

## 2018-04-27 PROCEDURE — 85610 PROTHROMBIN TIME: CPT | Mod: QW

## 2018-04-27 NOTE — PROGRESS NOTES
ANTICOAGULATION FOLLOW-UP CLINIC VISIT    Patient Name:  Latanya Padron  Date:  4/27/2018  Contact Type:  Face to Face    SUBJECTIVE:     Patient Findings     Positives Change in medications (zpack and prednisone 4/23-4/27), Change in diet/appetite (pt states she has been eating more greens ), OTC meds (one Aleve last night- she is aware that Tylenol would be better)           OBJECTIVE    INR Protime   Date Value Ref Range Status   04/27/2018 2.7 (A) 0.86 - 1.14 Final       ASSESSMENT / PLAN  INR assessment THER    Recheck INR In: 10 DAYS    INR Location Clinic      Anticoagulation Summary as of 4/27/2018     INR goal 2.0-3.0   Today's INR 2.7   Maintenance plan 5 mg (5 mg x 1) on Mon; 2.5 mg (5 mg x 0.5) all other days   Full instructions 5 mg on Mon; 2.5 mg all other days   Weekly total 20 mg   No change documented Francine Andrew RN   Plan last modified Francine Andrew RN (7/26/2017)   Next INR check 5/8/2018   Target end date     Indications   Long-term (current) use of anticoagulants [Z79.01] [Z79.01]  Atrial fibrillation (H) [I48.91]         Anticoagulation Episode Summary     INR check location     Preferred lab     Send INR reminders to [facility] POOL    Comments 5 mg tabs, likes card, AM dose      Anticoagulation Care Providers     Provider Role Specialty Phone number    Glen Blue MD [facility] [phone]            See the Encounter Report to view Anticoagulation Flowsheet and Dosing Calendar (Go to Encounters tab in chart review, and find the Anticoagulation Therapy Visit)    Dosage adjustment made based on physician directed care plan.      Francine Andrew, LOVE

## 2018-04-27 NOTE — MR AVS SNAPSHOT
Latanya Padron   4/27/2018 8:30 AM   Anticoagulation Therapy Visit    Description:  68 year old female   Provider:  D[facility] ANTI COAG   Department:  [facility]           INR as of 4/27/2018     Today's INR 2.7      Anticoagulation Summary as of 4/27/2018     INR goal 2.0-3.0   Today's INR 2.7   Full instructions 5 mg on Mon; 2.5 mg all other days   Next INR check 5/8/2018    Indications   Long-term (current) use of anticoagulants [Z79.01] [Z79.01]  Atrial fibrillation (H) [I48.91]         Your next Anticoagulation Clinic appointment(s)     May 08, 2018  8:15 AM CDT   Anticoagulation Visit with [facility] ANTI CHERYL   [facility]    [address] [place] [id number] [phone]              Contact Numbers     Clinic Number:         April 2018 Details    Sun Mon Tue Wed Thu Fri Sat     1               2               3               4               5               6               7                 8               9               10               11               12               13               14                 15               16               17               18               19               20               21                 22               23               24               25               26               27      2.5 mg   See details      28      2.5 mg           29      2.5 mg         30      5 mg               Date Details   04/27 This INR check               How to take your warfarin dose     To take:  2.5 mg Take 0.5 of a 5 mg tablet.    To take:  5 mg Take 1 of the 5 mg tablets.           May 2018 Details    Sun Mon Tue Wed Thu Fri Sat       1      2.5 mg         2      2.5 mg         3      2.5 mg         4      2.5 mg         5      2.5 mg           6      2.5 mg         7      5 mg         8            9               10               11               12                 13               14               15               16               17                18               19                 20               21               22               23               24               25               26                 27               28               29               30               31                  Date Details   No additional details    Date of next INR:  5/8/2018         How to take your warfarin dose     To take:  2.5 mg Take 0.5 of a 5 mg tablet.    To take:  5 mg Take 1 of the 5 mg tablets.

## 2018-05-08 ENCOUNTER — ANTICOAGULATION THERAPY VISIT (OUTPATIENT)
Dept: ANTICOAGULATION | Facility: CLINIC | Age: 68
End: 2018-05-08
Payer: COMMERCIAL

## 2018-05-08 DIAGNOSIS — I48.91 ATRIAL FIBRILLATION, UNSPECIFIED TYPE (H): ICD-10-CM

## 2018-05-08 DIAGNOSIS — Z79.01 LONG-TERM (CURRENT) USE OF ANTICOAGULANTS: ICD-10-CM

## 2018-05-08 LAB — INR POINT OF CARE: 2.2 (ref 0.86–1.14)

## 2018-05-08 PROCEDURE — 36416 COLLJ CAPILLARY BLOOD SPEC: CPT

## 2018-05-08 PROCEDURE — 85610 PROTHROMBIN TIME: CPT | Mod: QW

## 2018-05-08 PROCEDURE — 99207 ZZC NO CHARGE NURSE ONLY: CPT

## 2018-05-08 NOTE — PROGRESS NOTES
ANTICOAGULATION FOLLOW-UP CLINIC VISIT    Patient Name:  Latanya Padron  Date:  5/8/2018  Contact Type:  Face to Face    SUBJECTIVE:     Patient Findings     Positives No Problem Findings           OBJECTIVE    INR Protime   Date Value Ref Range Status   05/08/2018 2.2 (A) 0.86 - 1.14 Final       ASSESSMENT / PLAN  INR assessment THER    Recheck INR In: 6 WEEKS    INR Location Clinic      Anticoagulation Summary as of 5/8/2018     INR goal 2.0-3.0   Today's INR 2.2   Maintenance plan 5 mg (5 mg x 1) on Mon; 2.5 mg (5 mg x 0.5) all other days   Full instructions 5 mg on Mon; 2.5 mg all other days   Weekly total 20 mg   No change documented Francine Andrew RN   Plan last modified Francine Andrew RN (7/26/2017)   Next INR check 6/19/2018   Target end date     Indications   Long-term (current) use of anticoagulants [Z79.01] [Z79.01]  Atrial fibrillation (H) [I48.91]         Anticoagulation Episode Summary     INR check location     Preferred lab     Send INR reminders to [facility] POOL    Comments 5 mg tabs, likes card, AM dose      Anticoagulation Care Providers     Provider Role Specialty Phone number    Glen Blue MD [facility] [phone]            See the Encounter Report to view Anticoagulation Flowsheet and Dosing Calendar (Go to Encounters tab in chart review, and find the Anticoagulation Therapy Visit)    Dosage adjustment made based on physician directed care plan.      Francine Andrew RN

## 2018-05-08 NOTE — MR AVS SNAPSHOT
Latanya Padron   5/8/2018 8:15 AM   Anticoagulation Therapy Visit    Description:  68 year old female   Provider:  [facility]   Department:  [facility]           INR as of 5/8/2018     Today's INR 2.2      Anticoagulation Summary as of 5/8/2018     INR goal 2.0-3.0   Today's INR 2.2   Full instructions 5 mg on Mon; 2.5 mg all other days   Next INR check 6/19/2018    Indications   Long-term (current) use of anticoagulants [Z79.01] [Z79.01]  Atrial fibrillation (H) [I48.91]         Your next Anticoagulation Clinic appointment(s)     Jun 19, 2018  8:15 AM CDT   Anticoagulation Visit with DALIA ANTI COAG   [facility]    [address] [place] [id number] [phone]              Contact Numbers     Clinic Number:         May 2018 Details    Sun Mon Tue Wed Thu Fri Sat       1               2               3               4               5                 6               7               8      2.5 mg   See details      9      2.5 mg         10      2.5 mg         11      2.5 mg         12      2.5 mg           13      2.5 mg         14      5 mg         15      2.5 mg         16      2.5 mg         17      2.5 mg         18      2.5 mg         19      2.5 mg           20      2.5 mg         21      5 mg         22      2.5 mg         23      2.5 mg         24      2.5 mg         25      2.5 mg         26      2.5 mg           27      2.5 mg         28      5 mg         29      2.5 mg         30      2.5 mg         31      2.5 mg            Date Details   05/08 This INR check               How to take your warfarin dose     To take:  2.5 mg Take 0.5 of a 5 mg tablet.    To take:  5 mg Take 1 of the 5 mg tablets.           June 2018 Details    Sun Mon Tue Wed Thu Fri Sat          1      2.5 mg         2      2.5 mg           3      2.5 mg         4      5 mg         5      2.5 mg         6      2.5 mg         7      2.5 mg         8      2.5 mg         9       2.5 mg           10      2.5 mg         11      5 mg         12      2.5 mg         13      2.5 mg         14      2.5 mg         15      2.5 mg         16      2.5 mg           17      2.5 mg         18      5 mg         19            20               21               22               23                 24               25               26               27               28               29               30                Date Details   No additional details    Date of next INR:  6/19/2018         How to take your warfarin dose     To take:  2.5 mg Take 0.5 of a 5 mg tablet.    To take:  5 mg Take 1 of the 5 mg tablets.

## 2018-06-15 ENCOUNTER — TELEPHONE (OUTPATIENT)
Dept: FAMILY MEDICINE | Facility: CLINIC | Age: 68
End: 2018-06-15

## 2018-06-15 NOTE — TELEPHONE ENCOUNTER
Latanya Padron is a 68 year old female who calls with left foot and ankle swelling.  Patient reports swelling over the past about 5 days.  Reports swelling is a little better than yesterday.  Reports pitting edema, she is unable to tell how high up the leg the swelling goes.  She reports some pain to the area, but is still able to ambulate.  She denies any shortness of breath, chest pain, warmth, redness, or open areas.  She has been elevating leg, and this is improving.        NURSING ASSESSMENT:  Description:  Left foot and ankle swelling.   Onset/duration:  Past 5 days.   Associated symptoms:  Pain.   Improves/worsens symptoms:  Elevation is helpful.   Pain scale (0-10)   4/10  Last exam/Treatment:  12/11/2017  Allergies:   Allergies   Allergen Reactions     Meperidine Hcl Nausea and Vomiting     demerol     Seasonal Allergies      spring     NURSING PLAN: Huddle with provider, plan includes monitor and appointment.     RECOMMENDED DISPOSITION:  Appointment made for Wednesday 06/20/2018, patient will use ACE wrap or compression stocking at home, elevate.    Will comply with recommendation: Yes  If further questions/concerns or if symptoms do not improve, worsen or new symptoms develop, call your PCP or [facility] as soon as possible.    Guideline used:  Swelling.   Telephone Triage Protocols for Nurses, Fifth Edition, Dena Garrett RN

## 2018-06-15 NOTE — TELEPHONE ENCOUNTER
Reason for Call:  Same Day Appointment, Requested Provider:  Glen Blue M.D.    PCP: Glen Blue    Reason for visit: Left Leg swollen and achy    Duration of symptoms: 1 week    Have you been treated for this in the past? No    Additional comments: Would like to see Dr Blue on Wed 06/20 - Or Ivette is wondering is she should see Dr Caicedo for this    Can we leave a detailed message on this number? YES    Phone number patient can be reached at: Cell number on file:    Telephone Information:   Mobile [phone]       Best Time: any    Call taken on 6/15/2018 at 8:35 AM by Francine Robledo

## 2018-06-15 NOTE — TELEPHONE ENCOUNTER
Per Dr. Blue, will route to triage first. If it is okay to wait until Wednesday, pt can see either Dr. Blue or Dr. Caicedo. Okay to use Dr. Ryan romo. Cely Lobato CMA ([facility])

## 2018-06-19 ENCOUNTER — ANTICOAGULATION THERAPY VISIT (OUTPATIENT)
Dept: ANTICOAGULATION | Facility: CLINIC | Age: 68
End: 2018-06-19
Payer: COMMERCIAL

## 2018-06-19 DIAGNOSIS — I48.91 ATRIAL FIBRILLATION, UNSPECIFIED TYPE (H): ICD-10-CM

## 2018-06-19 DIAGNOSIS — Z79.01 LONG-TERM (CURRENT) USE OF ANTICOAGULANTS: ICD-10-CM

## 2018-06-19 LAB — INR POINT OF CARE: 2.7 (ref 0.86–1.14)

## 2018-06-19 PROCEDURE — 85610 PROTHROMBIN TIME: CPT | Mod: QW

## 2018-06-19 PROCEDURE — 36416 COLLJ CAPILLARY BLOOD SPEC: CPT

## 2018-06-19 PROCEDURE — 99207 ZZC NO CHARGE NURSE ONLY: CPT

## 2018-06-19 NOTE — MR AVS SNAPSHOT
Latanya Padron   6/19/2018 8:15 AM   Anticoagulation Therapy Visit    Description:  68 year old female   Provider:  [facility] ANTI COAG   Department:  [facility]           INR as of 6/19/2018     Today's INR 2.7      Anticoagulation Summary as of 6/19/2018     INR goal 2.0-3.0   Today's INR 2.7   Full warfarin instructions 5 mg on Mon; 2.5 mg all other days   Next INR check 8/1/2018    Indications   Long-term (current) use of anticoagulants [Z79.01] [Z79.01]  Atrial fibrillation (H) [I48.91]         Your next Anticoagulation Clinic appointment(s)     Aug 01, 2018  8:00 AM CDT   [facility]    [address] [place] [id number] [phone]              Contact Numbers     Clinic Number:         June 2018 Details    Sun Mon Tue Wed Thu Fri Sat          1               2                 3               4               5               6               7               8               9                 10               11               12               13               14               15               16                 17               18               19      2.5 mg   See details      20      2.5 mg         21      2.5 mg         22      2.5 mg         23      2.5 mg           24      2.5 mg         25      5 mg         26      2.5 mg         27      2.5 mg         28      2.5 mg         29      2.5 mg         30      2.5 mg          Date Details   06/19 This INR check               How to take your warfarin dose     To take:  2.5 mg Take 0.5 of a 5 mg tablet.    To take:  5 mg Take 1 of the 5 mg tablets.           July 2018 Details    Sun Mon Tue Wed Thu Fri Sat     1      2.5 mg         2      5 mg         3      2.5 mg         4      2.5 mg         5      2.5 mg         6      2.5 mg         7      2.5 mg           8      2.5 mg         9      5 mg         10      2.5 mg         11      2.5 mg         12      2.5 mg          13      2.5 mg         14      2.5 mg           15      2.5 mg         16      5 mg         17      2.5 mg         18      2.5 mg         19      2.5 mg         20      2.5 mg         21      2.5 mg           22      2.5 mg         23      5 mg         24      2.5 mg         25      2.5 mg         26      2.5 mg         27      2.5 mg         28      2.5 mg           29      2.5 mg         30      5 mg         31      2.5 mg              Date Details   No additional details            How to take your warfarin dose     To take:  2.5 mg Take 0.5 of a 5 mg tablet.    To take:  5 mg Take 1 of the 5 mg tablets.           August 2018 Details    Sun Mon Tue Wed Thu Fri Sat        1            2               3               4                 5               6               7               8               9               10               11                 12               13               14               15               16               17               18                 19               20               21               22               23               24               25                 26               27               28               29               30               31                 Date Details   No additional details    Date of next INR:  8/1/2018         How to take your warfarin dose     To take:  2.5 mg Take 0.5 of a 5 mg tablet.

## 2018-06-19 NOTE — PROGRESS NOTES
ANTICOAGULATION FOLLOW-UP CLINIC VISIT    Patient Name:  Latanya Padron  Date:  6/19/2018  Contact Type:  Face to Face    SUBJECTIVE:     Patient Findings     Positives No Problem Findings           OBJECTIVE    INR Protime   Date Value Ref Range Status   06/19/2018 2.7 (A) 0.86 - 1.14 Final       ASSESSMENT / PLAN  INR assessment THER    Recheck INR In: 6 WEEKS    INR Location Clinic      Anticoagulation Summary as of 6/19/2018     INR goal 2.0-3.0   Today's INR 2.7   Warfarin maintenance plan 5 mg (5 mg x 1) on Mon; 2.5 mg (5 mg x 0.5) all other days   Full warfarin instructions 5 mg on Mon; 2.5 mg all other days   Weekly warfarin total 20 mg   No change documented Francine Andrew RN   Plan last modified Francine Andrew RN (7/26/2017)   Next INR check 8/1/2018   Target end date     Indications   Long-term (current) use of anticoagulants [Z79.01] [Z79.01]  Atrial fibrillation (H) [I48.91]         Anticoagulation Episode Summary     INR check location     Preferred lab     Send INR reminders to [facility] POOL    Comments 5 mg tabs, likes card, AM dose      Anticoagulation Care Providers     Provider Role Specialty Phone number    Glen Blue MD [facility] [phone]            See the Encounter Report to view Anticoagulation Flowsheet and Dosing Calendar (Go to Encounters tab in chart review, and find the Anticoagulation Therapy Visit)    Dosage adjustment made based on physician directed care plan.      Francine Andrew RN

## 2018-06-20 ENCOUNTER — HOSPITAL ENCOUNTER (OUTPATIENT)
Dept: CT IMAGING | Facility: CLINIC | Age: 68
Discharge: HOME | End: 2018-06-20
Attending: FAMILY MEDICINE | Admitting: FAMILY MEDICINE
Payer: MEDICARE

## 2018-06-20 ENCOUNTER — OFFICE VISIT (OUTPATIENT)
Dept: FAMILY MEDICINE | Facility: CLINIC | Age: 68
End: 2018-06-20
Payer: COMMERCIAL

## 2018-06-20 VITALS
TEMPERATURE: 96.9 F | RESPIRATION RATE: 16 BRPM | OXYGEN SATURATION: 97 % | HEART RATE: 94 BPM | WEIGHT: 230.5 LBS | BODY MASS INDEX: 43.55 KG/M2 | SYSTOLIC BLOOD PRESSURE: 118 MMHG | DIASTOLIC BLOOD PRESSURE: 72 MMHG

## 2018-06-20 DIAGNOSIS — M25.551 HIP PAIN, RIGHT: ICD-10-CM

## 2018-06-20 DIAGNOSIS — E78.5 HYPERLIPIDEMIA LDL GOAL <130: ICD-10-CM

## 2018-06-20 DIAGNOSIS — R22.1 NECK MASS: ICD-10-CM

## 2018-06-20 DIAGNOSIS — M25.50 MULTIPLE JOINT PAIN: ICD-10-CM

## 2018-06-20 DIAGNOSIS — N18.30 CKD (CHRONIC KIDNEY DISEASE) STAGE 3, GFR 30-59 ML/MIN (H): ICD-10-CM

## 2018-06-20 DIAGNOSIS — I10 HYPERTENSION, GOAL BELOW 140/90: ICD-10-CM

## 2018-06-20 DIAGNOSIS — I48.91 ATRIAL FIBRILLATION, UNSPECIFIED TYPE (H): ICD-10-CM

## 2018-06-20 DIAGNOSIS — F32.5 MAJOR DEPRESSION IN COMPLETE REMISSION (H): ICD-10-CM

## 2018-06-20 DIAGNOSIS — E66.01 MORBID OBESITY, UNSPECIFIED OBESITY TYPE (H): ICD-10-CM

## 2018-06-20 DIAGNOSIS — Z87.891 PERSONAL HISTORY OF TOBACCO USE: Primary | ICD-10-CM

## 2018-06-20 DIAGNOSIS — J43.9 PULMONARY EMPHYSEMA, UNSPECIFIED EMPHYSEMA TYPE (H): ICD-10-CM

## 2018-06-20 DIAGNOSIS — Z87.891 PERSONAL HISTORY OF TOBACCO USE: ICD-10-CM

## 2018-06-20 LAB
ALBUMIN SERPL-MCNC: 3.7 G/DL (ref 3.4–5)
ALP SERPL-CCNC: 64 U/L (ref 40–150)
ALT SERPL W P-5'-P-CCNC: 23 U/L (ref 0–50)
ANION GAP SERPL CALCULATED.3IONS-SCNC: 7 MMOL/L (ref 3–14)
AST SERPL W P-5'-P-CCNC: 17 U/L (ref 0–45)
BILIRUB SERPL-MCNC: 0.5 MG/DL (ref 0.2–1.3)
BUN SERPL-MCNC: 14 MG/DL (ref 7–30)
CALCIUM SERPL-MCNC: 8.4 MG/DL (ref 8.5–10.1)
CHLORIDE SERPL-SCNC: 104 MMOL/L (ref 94–109)
CHOLEST SERPL-MCNC: 149 MG/DL
CO2 SERPL-SCNC: 30 MMOL/L (ref 20–32)
CREAT SERPL-MCNC: 0.86 MG/DL (ref 0.52–1.04)
CREAT UR-MCNC: 107 MG/DL
ERYTHROCYTE [DISTWIDTH] IN BLOOD BY AUTOMATED COUNT: 12.9 % (ref 10–15)
ERYTHROCYTE [SEDIMENTATION RATE] IN BLOOD BY WESTERGREN METHOD: 14 MM/H (ref 0–30)
GFR SERPL CREATININE-BSD FRML MDRD: 66 ML/MIN/1.7M2
GLUCOSE SERPL-MCNC: 93 MG/DL (ref 70–99)
HCT VFR BLD AUTO: 40.1 % (ref 35–47)
HDLC SERPL-MCNC: 55 MG/DL
HGB BLD-MCNC: 12.8 G/DL (ref 11.7–15.7)
LDLC SERPL CALC-MCNC: 78 MG/DL
MCH RBC QN AUTO: 28.6 PG (ref 26.5–33)
MCHC RBC AUTO-ENTMCNC: 31.9 G/DL (ref 31.5–36.5)
MCV RBC AUTO: 90 FL (ref 78–100)
MICROALBUMIN UR-MCNC: 7 MG/L
MICROALBUMIN/CREAT UR: 6.42 MG/G CR (ref 0–25)
NONHDLC SERPL-MCNC: 94 MG/DL
PLATELET # BLD AUTO: 275 10E9/L (ref 150–450)
POTASSIUM SERPL-SCNC: 4 MMOL/L (ref 3.4–5.3)
PROT SERPL-MCNC: 7.2 G/DL (ref 6.8–8.8)
RBC # BLD AUTO: 4.48 10E12/L (ref 3.8–5.2)
SODIUM SERPL-SCNC: 141 MMOL/L (ref 133–144)
TRIGL SERPL-MCNC: 80 MG/DL
TSH SERPL DL<=0.005 MIU/L-ACNC: 1.9 MU/L (ref 0.4–4)
WBC # BLD AUTO: 6 10E9/L (ref 4–11)

## 2018-06-20 PROCEDURE — 80061 LIPID PANEL: CPT | Performed by: FAMILY MEDICINE

## 2018-06-20 PROCEDURE — 85652 RBC SED RATE AUTOMATED: CPT | Performed by: FAMILY MEDICINE

## 2018-06-20 PROCEDURE — 85027 COMPLETE CBC AUTOMATED: CPT | Performed by: FAMILY MEDICINE

## 2018-06-20 PROCEDURE — 84443 ASSAY THYROID STIM HORMONE: CPT | Performed by: FAMILY MEDICINE

## 2018-06-20 PROCEDURE — 36415 COLL VENOUS BLD VENIPUNCTURE: CPT | Performed by: FAMILY MEDICINE

## 2018-06-20 PROCEDURE — G0296 VISIT TO DETERM LDCT ELIG: HCPCS | Performed by: FAMILY MEDICINE

## 2018-06-20 PROCEDURE — 99214 OFFICE O/P EST MOD 30 MIN: CPT | Performed by: FAMILY MEDICINE

## 2018-06-20 PROCEDURE — 80053 COMPREHEN METABOLIC PANEL: CPT | Performed by: FAMILY MEDICINE

## 2018-06-20 PROCEDURE — G0297 LDCT FOR LUNG CA SCREEN: HCPCS

## 2018-06-20 PROCEDURE — 82043 UR ALBUMIN QUANTITATIVE: CPT | Performed by: FAMILY MEDICINE

## 2018-06-20 RX ORDER — TROSPIUM CHLORIDE 20 MG/1
20 TABLET, FILM COATED ORAL
COMMUNITY
Start: 2018-04-10 | End: 2023-01-01 | Stop reason: ALTCHOICE

## 2018-06-20 ASSESSMENT — ANXIETY QUESTIONNAIRES
3. WORRYING TOO MUCH ABOUT DIFFERENT THINGS: NOT AT ALL
IF YOU CHECKED OFF ANY PROBLEMS ON THIS QUESTIONNAIRE, HOW DIFFICULT HAVE THESE PROBLEMS MADE IT FOR YOU TO DO YOUR WORK, TAKE CARE OF THINGS AT HOME, OR GET ALONG WITH OTHER PEOPLE: NOT DIFFICULT AT ALL
6. BECOMING EASILY ANNOYED OR IRRITABLE: NOT AT ALL
1. FEELING NERVOUS, ANXIOUS, OR ON EDGE: NOT AT ALL
2. NOT BEING ABLE TO STOP OR CONTROL WORRYING: NOT AT ALL
7. FEELING AFRAID AS IF SOMETHING AWFUL MIGHT HAPPEN: NOT AT ALL
GAD7 TOTAL SCORE: 0
5. BEING SO RESTLESS THAT IT IS HARD TO SIT STILL: NOT AT ALL

## 2018-06-20 ASSESSMENT — PATIENT HEALTH QUESTIONNAIRE - PHQ9: 5. POOR APPETITE OR OVEREATING: NOT AT ALL

## 2018-06-20 ASSESSMENT — PAIN SCALES - GENERAL: PAINLEVEL: NO PAIN (0)

## 2018-06-20 NOTE — MR AVS SNAPSHOT
After Visit Summary   6/20/2018    Latanya Padron    MRN: [id number]           Patient Information     Date Of Birth          1950        Visit Information        Provider Department      6/20/2018 10:30 AM Glen Blue MD [facility]        Today's Diagnoses     Personal history of tobacco use    -  1    Morbid obesity, unspecified obesity type (H)        Major depression in complete remission (H)        Atrial fibrillation, unspecified type (H)        Pulmonary emphysema, unspecified emphysema type (H)        Hyperlipidemia LDL goal <130        CKD (chronic kidney disease) stage 3, GFR 30-59 ml/min        Hypertension, goal below 140/90        Neck mass        Multiple joint pain        Hip pain, right          Care Instructions      Lung Cancer Screening   Frequently Asked Questions  If you are at high-risk for lung cancer, getting screened with low-dose computed tomography (LDCT) every year can help save your life. This handout offers answers to some of the most common questions about lung cancer screening. If you have other questions, please call [phone] ([phone]).     What is it?  Lung cancer screening uses special X-ray technology to create an image of your lung tissue. The exam is quick and easy and takes less than 10 seconds. We don t give you any medicine or use any needles. You can eat before and after the exam. You don t need to change your clothes as long as the clothing on your chest doesn t contain metal. But, you do need to be able to hold your breath for at least 6 seconds during the exam.    What is the goal of lung cancer screening?  The goal of lung cancer screening is to save lives. Many times, lung cancer is not found until a person starts having physical symptoms. Lung cancer screening can help detect lung cancer in the earliest stages when it may be easier to treat.    Who should be screened for lung cancer?  We suggest lung cancer  screening for anyone who is at high-risk for lung cancer. You are in the high-risk group if you:      are between the ages of 55 and 79, and    have smoked at least 1 pack of cigarettes a day for 30 or more years, and    still smoke or have quit within the past 15 years.    However, if you have a new cough or shortness of breath, you should talk to your doctor before being screened.    Some national lung health advocacy groups also recommend screening for people ages 50 to 79 who have smoked an average of 1 pack of cigarettes a day for 20 years. They must also have at least 1 other risk factor for lung cancer, not including exposure to secondhand smoke. Other risk factors are having had cancer in the past, emphysema, pulmonary fibrosis, COPD, a family history of lung cancer, or exposure to certain materials such as arsenic, asbestos, beryllium, cadmium, chromium, diesel fumes, nickel, radon or silica. Your care team can help you know if you have one of these risk factors.     Why does it matter if I have symptoms?  Certain symptoms can be a sign that you have a condition in your lungs that should be checked and treated by your doctor. These symptoms include fever, chest pain, a new or changing cough, shortness of breath that you have never felt before, coughing up blood or unexplained weight loss. Having any of these symptoms can greatly affect the results of lung cancer screening.       Should all smokers get an LDCT lung cancer screening exam?  It depends. Lung cancer screening is for a very specific group of men and women who have a history of heavy smoking over a long period of time (see  Who should be screened for lung cancer  above).  I am in the high-risk group, but have been diagnosed with cancer in the past. Is LDCT lung cancer screening right for me?  In some cases, you should not have LDCT lung screening, such as when your doctor is already following your cancer with CT scan studies. Your doctor will help  you decide if LDCT lung screening is right for you.  Do I need to have a screening exam every year?  Yes. If you are in the high-risk group described earlier, you should get an LDCT lung cancer screening exam every year until you are 79, or are no longer willing or able to undergo screening and possible procedures to diagnose and treat lung cancer.  How effective is LDCT at preventing death from lung cancer?  Studies have shown that LDCT lung cancer screening can lower the risk of death from lung cancer by 20 percent in people who are at high-risk.  What are the risks?  There are some risks and limitations of LDCT lung cancer screening. We want to make sure you understand the risks and benefits, so please let us know if you have any questions. Your doctor may want to talk with you more about these risks.    Radiation exposure: As with any exam that uses radiation, there is a very small increased risk of cancer. The amount of radiation in LDCT is small--about the same amount a person would get from a mammogram. Your doctor orders the exam when he or she feels the potential benefits outweigh the risks.    False negatives: No test is perfect, including LDCT. It is possible that you may have a medical condition, including lung cancer, that is not found during your exam. This is called a false negative result.    False positives and more testing: LDCT very often finds something in the lung that could be cancer, but in fact is not. This is called a false positive result. False positive tests often cause anxiety. To make sure these findings are not cancer, you may need to have more tests. These tests will be done only if you give us permission. Sometimes patients need a treatment that can have side effects, such as a biopsy. For more information on false positives, see  What can I expect from the results?     Findings not related to lung cancer: Your LDCT exam also takes pictures of areas of your body next to your lungs.  In a very small number of cases, the CT scan will show an abnormal finding in one of these areas, such as your kidneys, adrenal glands, liver or thyroid. This finding may not be serious, but you may need more tests. Your doctor can help you decide what other tests you may need, if any.  What can I expect from the results?  About 1 out of 4 LDCT exams will find something that may need more tests. Most of the time, these findings are lung nodules. Lung nodules are very small collections of tissue in the lung. These nodules are very common, and the vast majority--more than 97 percent--are not cancer (benign). Most are normal lymph nodes or small areas of scarring from past infections.  But, if a small lung nodule is found to be cancer, the cancer can be cured more than 90 percent of the time. To know if the nodule is cancer, we may need to get more images before your next yearly screening exam. If the nodule has suspicious features (for example, it is large, has an odd shape or grows over time), we will refer you to a specialist for further testing.  Will my doctor also get the results?  Yes. Your doctor will get a copy of your results.  Is it okay to keep smoking now that there s a cancer screening exam?  No. Tobacco is one of the strongest cancer-causing agents. It causes not only lung cancer, but other cancers and cardiovascular (heart) diseases as well. The damage caused by smoking builds over time. This means that the longer you smoke, the higher your risk of disease. While it is never too late to quit, the sooner you quit, the better.  Where can I find help to quit smoking?  The best way to prevent lung cancer is to stop smoking. If you have already quit smoking, congratulations and keep it up! For help on quitting smoking, please call [employer] at 1-643-762-HJTK ([phone]) or the American [employer] Society at 1-223.663.7711 to find local resources near you.  One-on-one health coaching:  If you d prefer to work  individually with a health care provider on tobacco cessation, we offer:      Medication Therapy Management:  Our specially trained pharmacists work closely with you and your doctor to help you quit smoking.  Call [phone] or [phone] (toll free).     Can Do: Health coaching offered by [facility].  [email or web address]    Once I see labs, I will offer a plan  The neck mass I will check with ENT          Follow-ups after your visit        Your next 10 appointments already scheduled     Aug 01, 2018  8:00 AM CDT   Anticoagulation Visit with PH ANTI COAG   [facility]    [address] [id number] [phone]              Future tests that were ordered for you today     Open Future Orders        Priority Expected Expires Ordered    CT Chest Lung Cancer Scrn Low Dose wo Routine  6/20/2019 6/20/2018            Who to contact     If you have questions or need follow up information about today's clinic visit or your schedule please contact [facility] directly at [phone].  Normal or non-critical lab and imaging results will be communicated to you by elenihart, letter or phone within 4 business days after the clinic has received the results. If you do not hear from us within 7 days, please contact the clinic through [employer] or phone. If you have a critical or abnormal lab result, we will notify you by phone as soon as possible.  Submit refill requests through [employer] or call your pharmacy and they will forward the refill request to us. Please allow 3 business days for your refill to be completed.          Additional Information About Your Visit        Piktochart Information     [employer] gives you secure access to your electronic health record. If you see a primary care provider, you can also send messages to your care team and make appointments. If you have questions, please call your primary care clinic.  If you do  not have a primary care provider, please call [phone] and they will assist you.        Care EveryWhere ID     This is your Care EveryWhere ID. This could be used by other organizations to access your [facility] medical records  [id number]        Your Vitals Were     Pulse Temperature Respirations Pulse Oximetry BMI (Body Mass Index)       94 96.9  F (36.1  C) (Temporal) 16 97% 43.55 kg/m2        Blood Pressure from Last 3 Encounters:   06/20/18 118/72   04/11/18 122/60   12/18/17 128/60    Weight from Last 3 Encounters:   06/20/18 230 lb 8 oz (104.6 kg)   04/11/18 228 lb (103.4 kg)   12/18/17 228 lb 3.2 oz (103.5 kg)              We Performed the Following     Albumin Random Urine Quantitative with Creat Ratio     CBC with platelets     Comprehensive metabolic panel     ESR: Erythrocyte sedimentation rate     Lipid panel reflex to direct LDL Fasting     Okay for Smoking Cessation Study (PLUTO) to Contact Patient     Prof fee: Shared Decisionmaking for Lung Cancer Screening     TSH with free T4 reflex          Today's Medication Changes          These changes are accurate as of 6/20/18  3:54 PM.  If you have any questions, ask your nurse or doctor.               Stop taking these medicines if you haven't already. Please contact your care team if you have questions.     oxybutynin 5 MG tablet   Commonly known as:  DITROPAN   Stopped by:  Glen Blue MD                    Primary Care Provider Office Phone # Fax #    Glen Blue [phone]       [address] [place] [address]        Equal Access to Services     [facility] : Hadii lucia hou hadasho Sogrecia, waaxda luqadaha, qaybta kaalmada abhiegtruman, edin idirosanne velasquez. So [facility] [phone].    ATENCIÓN: Si habla español, tiene a de los santos disposición servicios gratuitos de asistencia lingüística. Llame al [phone].    We comply with applicable federal civil rights laws and [place] laws. We do not  discriminate on the basis of race, color, national origin, age, disability, sex, sexual orientation, or gender identity.            Thank you!     Thank you for choosing [facility]  for your care. Our goal is always to provide you with excellent care. Hearing back from our patients is one way we can continue to improve our services. Please take a few minutes to complete the written survey that you may receive in the mail after your visit with us. Thank you!             Your Updated Medication List - Protect others around you: Learn how to safely use, store and throw away your medicines at www.disposemymeds.org.          This list is accurate as of 6/20/18  3:54 PM.  Always use your most recent med list.                   Brand Name Dispense Instructions for use Diagnosis    * albuterol 108 (90 Base) MCG/ACT Inhaler    PROAIR HFA/PROVENTIL HFA/VENTOLIN HFA    2 Inhaler    1-2 puffs by mouth every 2-4 hours as needed    Pulmonary emphysema, unspecified emphysema type (H)       * albuterol (2.5 MG/3ML) 0.083% neb solution     75 mL    Take  by nebulization. 1 NEB EVERY 4-6 HOURS AS NEEDED        ASPIRIN NOT PRESCRIBED    INTENTIONAL    0 each    1 each continuous prn for other Reported on 4/17/2017    Atrial fibrillation (H)       atorvastatin 10 MG tablet    LIPITOR    90 tablet    Take 1 tablet (10 mg) by mouth daily    Hyperlipidemia LDL goal <130       B-12 1000 MCG Tbcr     100 tablet    Take 1,000 mcg by mouth daily        calcium 500 + D 500-400 MG-UNIT Tabs   Generic drug:  Calcium Carb-Cholecalciferol     180 tablet    Take 1 tablet by mouth 2 times daily        cholecalciferol 400 units tablet    Vitamin D    100 tablet    TAKE ONE TABLET BY MOUTH EVERY DAY    Osteoporosis       cyclobenzaprine 5 MG tablet    FLEXERIL    30 tablet    Take 0.5-1 tablets (2.5-5 mg) by mouth 2 times daily as needed for muscle spasms    Neck muscle spasm       diltiazem 120 MG 24 hr capsule    CARTIA XT    90  capsule    TAKE ONE CAPSULE BY MOUTH EVERY DAY (DUE FOR FOLLOW-UP APPOINTMENT)    Hypertension, goal below 140/90, Chronic atrial fibrillation (H)       hydrochlorothiazide 25 MG tablet    HYDRODIURIL    90 tablet    Take 1 tablet (25 mg) by mouth daily    Hypertension, goal below 140/90       JANTOVEN 5 MG tablet   Generic drug:  warfarin     60 tablet    Take 5 mg Mon and 2.5 mg all other days of the week, or as directed by coumadin clinic    Long-term (current) use of anticoagulants, Chronic atrial fibrillation (H)       lisinopril 2.5 MG tablet    PRINIVIL/Zestril    90 tablet    Take 1 tablet (2.5 mg) by mouth daily    Hypertension, goal below 140/90       magnesium 250 MG tablet     30 tablet    Take 1 tablet by mouth daily        mometasone-formoterol 200-5 MCG/ACT oral inhaler    DULERA    13 g    INHALE 2 PUFFS BY MOUTH TWO TIMES A DAY    Pulmonary emphysema, unspecified emphysema type (H)       omega 3 1000 MG Caps     90 capsule    Take 1 g by mouth daily        order for DME      Equipment being ordered: Oxygen @ 2 liters with exertion    COPD (chronic obstructive pulmonary disease) (H)       * predniSONE 20 MG tablet    DELTASONE    10 tablet    TAKE ONE TABLET BY MOUTH TWO TIMES A DAY    Tendonitis of ankle       * predniSONE 20 MG tablet    DELTASONE    5 tablet    Take 1 tablet (20 mg) by mouth daily    Pulmonary emphysema, unspecified emphysema type (H), Acute bronchitis with coexisting condition requiring prophylactic treatment       trospium 20 MG tablet    SANCTURA     Take 20 mg by mouth        umeclidinium 62.5 MCG/INH oral inhaler    INCRUSE ELLIPTA    1 Inhaler    Inhale 1 puff into the lungs daily    Pulmonary emphysema, unspecified emphysema type (H)       venlafaxine 37.5 MG 24 hr capsule    EFFEXOR-XR    90 capsule    TAKE ONE CAPSULE BY MOUTH EVERY DAY WITH FOOD    Major depression in complete remission (H)       * Notice:  This list has 4 medication(s) that are the same as other  medications prescribed for you. Read the directions carefully, and ask your doctor or other care provider to review them with you.

## 2018-06-20 NOTE — PROGRESS NOTES
SUBJECTIVE:   Latanya Padron is a 68 year old female who presents to clinic today for the following health issues:    Joint Pain    Onset: x 1.5 weeks    Description:   Location: right hip  Character: Dull ache    Intensity: moderate, severe    Progression of Symptoms: same    Accompanying Signs & Symptoms:  Other symptoms: none    History:   Previous similar pain: no       Precipitating factors:   Trauma or overuse: no     Alleviating factors:  Improved by: tylenol    Therapies Tried and outcome: tylenol, benadryl    Left leg swelling x 1 week; has subsided since. Has kept legs elevated.        Also some mild leg swelling.  Hip pain does limit activity some.    Problem list and histories reviewed & adjusted, as indicated.  Additional history: as documented    BP Readings from Last 3 Encounters:   06/20/18 118/72   04/11/18 122/60   12/18/17 128/60    Wt Readings from Last 3 Encounters:   06/20/18 230 lb 8 oz (104.6 kg)   04/11/18 228 lb (103.4 kg)   12/18/17 228 lb 3.2 oz (103.5 kg)                  Labs reviewed in EPIC    Reviewed and updated as needed this visit by clinical staff  Tobacco  Allergies  Meds  Med Hx  Surg Hx  Fam Hx  Soc Hx      Reviewed and updated as needed this visit by Provider         ROS:  Constitutional, HEENT, cardiovascular, pulmonary, gi and gu systems are negative, except as otherwise noted.  Patient has noted a swelling at the base of her neck on the right side.  There is no specific hard mass felt there but there is a bit of a lump she thinks.  Seems to be getting larger.  She was a former smoker.    OBJECTIVE:     /72  Pulse 94  Temp 96.9  F (36.1  C) (Temporal)  Resp 16  Wt 230 lb 8 oz (104.6 kg)  SpO2 97%  BMI 43.55 kg/m2  Body mass index is 43.55 kg/(m^2).  GENERAL: healthy, alert and no distress  EYES: Eyes grossly normal to inspection, PERRL and conjunctivae and sclerae normal  HENT: ear canals and TM's normal, nose and mouth without ulcers or lesions  NECK:  no asymmetry, masses, or scars, mass this is a slightly ill-defined but generally discrete mass about 2 cm just to the right in proximal to the sternal notch. It is not firm.  There are no other associated soft tissue findings., thyroid normal to palpation and no carotid bruits  RESP: lungs clear to auscultation - no rales, rhonchi or wheezes  CV: regular rate and rhythm, normal S1 S2, no S3 or S4, no murmur, click or rub, no peripheral edema and peripheral pulses strong  ABDOMEN: soft, nontender, no hepatosplenomegaly, no masses and bowel sounds normal  MSPatient does not have edema but there is the brawny edema look to the legs.  Some varicose veins.  Tender to palpate the anterior shins.  She is tender to palpate a little over the hip surface.  Range of motion of the hip is adequate.  She is a little bit favoring the hip with walking and activity  SKIN: no suspicious lesions or rashes  NEURO: Very grossly negative  PSYCH: mentation appears normal, affect normal/bright  LYMPH: No hepato-splenomegaly  And no other nodes.  The mass in the neck does not seem to be a node and there are otherwise no cervical nodes.  This 1 is more in the supraclavicular area.    Diagnostic Test Results:  Results for orders placed or performed in visit on 06/20/18 (from the past 24 hour(s))   Lipid panel reflex to direct LDL Fasting   Result Value Ref Range    Cholesterol 149 <200 mg/dL    Triglycerides 80 <150 mg/dL    HDL Cholesterol 55 >49 mg/dL    LDL Cholesterol Calculated 78 <100 mg/dL    Non HDL Cholesterol 94 <130 mg/dL   Comprehensive metabolic panel   Result Value Ref Range    Sodium 141 133 - 144 mmol/L    Potassium 4.0 3.4 - 5.3 mmol/L    Chloride 104 94 - 109 mmol/L    Carbon Dioxide 30 20 - 32 mmol/L    Anion Gap 7 3 - 14 mmol/L    Glucose 93 70 - 99 mg/dL    Urea Nitrogen 14 7 - 30 mg/dL    Creatinine 0.86 0.52 - 1.04 mg/dL    GFR Estimate 66 >60 mL/min/1.7m2    GFR Estimate If Black 80 >60 mL/min/1.7m2    Calcium 8.4 (L)  "8.5 - 10.1 mg/dL    Bilirubin Total 0.5 0.2 - 1.3 mg/dL    Albumin 3.7 3.4 - 5.0 g/dL    Protein Total 7.2 6.8 - 8.8 g/dL    Alkaline Phosphatase 64 40 - 150 U/L    ALT 23 0 - 50 U/L    AST 17 0 - 45 U/L   CBC with platelets   Result Value Ref Range    WBC 6.0 4.0 - 11.0 10e9/L    RBC Count 4.48 3.8 - 5.2 10e12/L    Hemoglobin 12.8 11.7 - 15.7 g/dL    Hematocrit 40.1 35.0 - 47.0 %    MCV 90 78 - 100 fl    MCH 28.6 26.5 - 33.0 pg    MCHC 31.9 31.5 - 36.5 g/dL    RDW 12.9 10.0 - 15.0 %    Platelet Count 275 150 - 450 10e9/L   TSH with free T4 reflex   Result Value Ref Range    TSH 1.90 0.40 - 4.00 mU/L   ESR: Erythrocyte sedimentation rate   Result Value Ref Range    Sed Rate 14 0 - 30 mm/h   Albumin Random Urine Quantitative with Creat Ratio   Result Value Ref Range    Creatinine Urine 107 mg/dL    Albumin Urine mg/L 7 mg/L    Albumin Urine mg/g Cr 6.42 0 - 25 mg/g Cr       ASSESSMENT/PLAN:           Tobacco Cessation:   reports that she quit smoking about 12 years ago. She has a 30.00 pack-year smoking history. She has never used smokeless tobacco.      BMI:   Estimated body mass index is 43.55 kg/(m^2) as calculated from the following:    Height as of 4/11/18: 5' 1\" (1.549 m).    Weight as of this encounter: 230 lb 8 oz (104.6 kg).   Weight management plan: Discussed healthy diet and exercise guidelines and patient will follow up in 1 month in clinic to re-evaluate.      1. Morbid obesity, unspecified obesity type (H)  Patient is encouraged to be active.    2. Major depression in complete remission (H)  Patient's depression remains in remission.  No change in treatment plan    3. Atrial fibrillation, unspecified type (H)  Well-controlled atrial fibrillation at this time    4. Pulmonary emphysema, unspecified emphysema type (H)  Very well controlled for her.  She uses oxygen intermittently when things are extremely uncomfortable with breathing.    5. Personal history of tobacco use  With a personal history of " tobacco use and this mass like effect right neck region, it is appropriate that we screen for lung CA.  Blood work is negative  - Prof fee: Shared Decisionmaking for Lung Cancer Screening  - CT Chest Lung Cancer Scrn Low Dose wo; Future  - Okay for Smoking Cessation Study (PLUTO) to Contact Patient    6. Hyperlipidemia LDL goal <130  Blood work is negative so we will continue any current treatment  - Lipid panel reflex to direct LDL Fasting    7. CKD (chronic kidney disease) stage 3, GFR 30-59 ml/min  Remains stable with no change in treatment  - Albumin Random Urine Quantitative with Creat Ratio  - Comprehensive metabolic panel    8. Hypertension, goal below 140/90  Remains well controlled.  - Comprehensive metabolic panel  - CBC with platelets    9. Neck mass  This seems to be very much separate from the thyroid but checking TSH just in case.  TSH is normal.  Lung CT screening as above  - TSH with free T4 reflex    10. Multiple joint pain  Joint pain unlikely to be inflammatory.  - ESR: Erythrocyte sedimentation rate    11. Hip pain, right  At some point orthopedic consult or imaging may be necessary.      MEDICATIONS:  Continue current medications without change  Work on weight loss  Regular exercise  Patient Instructions     Lung Cancer Screening   Frequently Asked Questions  If you are at high-risk for lung cancer, getting screened with low-dose computed tomography (LDCT) every year can help save your life. This handout offers answers to some of the most common questions about lung cancer screening. If you have other questions, please call [phone] ([phone]).     What is it?  Lung cancer screening uses special X-ray technology to create an image of your lung tissue. The exam is quick and easy and takes less than 10 seconds. We don t give you any medicine or use any needles. You can eat before and after the exam. You don t need to change your clothes as long as the clothing on your chest doesn t  contain metal. But, you do need to be able to hold your breath for at least 6 seconds during the exam.    What is the goal of lung cancer screening?  The goal of lung cancer screening is to save lives. Many times, lung cancer is not found until a person starts having physical symptoms. Lung cancer screening can help detect lung cancer in the earliest stages when it may be easier to treat.    Who should be screened for lung cancer?  We suggest lung cancer screening for anyone who is at high-risk for lung cancer. You are in the high-risk group if you:      are between the ages of 55 and 79, and    have smoked at least 1 pack of cigarettes a day for 30 or more years, and    still smoke or have quit within the past 15 years.    However, if you have a new cough or shortness of breath, you should talk to your doctor before being screened.    Some national lung health advocacy groups also recommend screening for people ages 50 to 79 who have smoked an average of 1 pack of cigarettes a day for 20 years. They must also have at least 1 other risk factor for lung cancer, not including exposure to secondhand smoke. Other risk factors are having had cancer in the past, emphysema, pulmonary fibrosis, COPD, a family history of lung cancer, or exposure to certain materials such as arsenic, asbestos, beryllium, cadmium, chromium, diesel fumes, nickel, radon or silica. Your care team can help you know if you have one of these risk factors.     Why does it matter if I have symptoms?  Certain symptoms can be a sign that you have a condition in your lungs that should be checked and treated by your doctor. These symptoms include fever, chest pain, a new or changing cough, shortness of breath that you have never felt before, coughing up blood or unexplained weight loss. Having any of these symptoms can greatly affect the results of lung cancer screening.       Should all smokers get an LDCT lung cancer screening exam?  It depends. Lung  cancer screening is for a very specific group of men and women who have a history of heavy smoking over a long period of time (see  Who should be screened for lung cancer  above).  I am in the high-risk group, but have been diagnosed with cancer in the past. Is LDCT lung cancer screening right for me?  In some cases, you should not have LDCT lung screening, such as when your doctor is already following your cancer with CT scan studies. Your doctor will help you decide if LDCT lung screening is right for you.  Do I need to have a screening exam every year?  Yes. If you are in the high-risk group described earlier, you should get an LDCT lung cancer screening exam every year until you are 79, or are no longer willing or able to undergo screening and possible procedures to diagnose and treat lung cancer.  How effective is LDCT at preventing death from lung cancer?  Studies have shown that LDCT lung cancer screening can lower the risk of death from lung cancer by 20 percent in people who are at high-risk.  What are the risks?  There are some risks and limitations of LDCT lung cancer screening. We want to make sure you understand the risks and benefits, so please let us know if you have any questions. Your doctor may want to talk with you more about these risks.    Radiation exposure: As with any exam that uses radiation, there is a very small increased risk of cancer. The amount of radiation in LDCT is small--about the same amount a person would get from a mammogram. Your doctor orders the exam when he or she feels the potential benefits outweigh the risks.    False negatives: No test is perfect, including LDCT. It is possible that you may have a medical condition, including lung cancer, that is not found during your exam. This is called a false negative result.    False positives and more testing: LDCT very often finds something in the lung that could be cancer, but in fact is not. This is called a false positive  result. False positive tests often cause anxiety. To make sure these findings are not cancer, you may need to have more tests. These tests will be done only if you give us permission. Sometimes patients need a treatment that can have side effects, such as a biopsy. For more information on false positives, see  What can I expect from the results?     Findings not related to lung cancer: Your LDCT exam also takes pictures of areas of your body next to your lungs. In a very small number of cases, the CT scan will show an abnormal finding in one of these areas, such as your kidneys, adrenal glands, liver or thyroid. This finding may not be serious, but you may need more tests. Your doctor can help you decide what other tests you may need, if any.  What can I expect from the results?  About 1 out of 4 LDCT exams will find something that may need more tests. Most of the time, these findings are lung nodules. Lung nodules are very small collections of tissue in the lung. These nodules are very common, and the vast majority--more than 97 percent--are not cancer (benign). Most are normal lymph nodes or small areas of scarring from past infections.  But, if a small lung nodule is found to be cancer, the cancer can be cured more than 90 percent of the time. To know if the nodule is cancer, we may need to get more images before your next yearly screening exam. If the nodule has suspicious features (for example, it is large, has an odd shape or grows over time), we will refer you to a specialist for further testing.  Will my doctor also get the results?  Yes. Your doctor will get a copy of your results.  Is it okay to keep smoking now that there s a cancer screening exam?  No. Tobacco is one of the strongest cancer-causing agents. It causes not only lung cancer, but other cancers and cardiovascular (heart) diseases as well. The damage caused by smoking builds over time. This means that the longer you smoke, the higher your risk  of disease. While it is never too late to quit, the sooner you quit, the better.  Where can I find help to quit smoking?  The best way to prevent lung cancer is to stop smoking. If you have already quit smoking, congratulations and keep it up! For help on quitting smoking, please call [employer] at 1-899-919-JIYR (1810) or the American [employer] Society at 1-442.521.2246 to find local resources near you.  One-on-one health coaching:  If you d prefer to work individually with a health care provider on tobacco cessation, we offer:      Medication Therapy Management:  Our specially trained pharmacists work closely with you and your doctor to help you quit smoking.  Call [phone] or [phone] (toll free).     Can Do: Health coaching offered by [facility].  [email or web address]    Once I see labs, I will offer a plan  The neck mass I will check with ENT      Glen Darci Blue MD  [facility]    Lung Cancer Screening Shared Decision Making Visit     Latanya Padron is eligible for lung cancer screening on the basis of the information provided in my signed lung cancer screening order.     I have discussed with patient the risks and benefits of screening for lung cancer with low-dose CT.     The risks include:   radiation exposure: one low dose chest CT has as much ionizing radiation as  about 15 chest x-rays or 6 months of background radiation living in [place]     false positives: 96% of positive findings/nodules are NOT cancer, but some  might still require additional diagnostic evaluation, including biopsy   over-diagnosis: some slow growing cancers that might never have been clinically  significant will be detected and treated unnecessarily     The benefit of early detection of lung cancer is contingent upon adherence to annual screening or more frequent follow up if indicated.     Furthermore, reaping the benefits of screening requires Latanya Padron to be willing and  physically able to undergo diagnostic procedures, if indicated. Although no specific guide is available for determining severity of comorbidities, it is reasonable to withhold screening in patients who have greater mortality risk from other diseases.     We did discuss that the only way to prevent lung cancer is to not smoke. Smoking cessation assistance was not offered.  Because patient has already quit  I did not offer risk estimation using a calculator such as this one:    ShouldIScreen    I did offer screening because of history and that mass increasing in size in the right neck area.

## 2018-06-20 NOTE — PATIENT INSTRUCTIONS

## 2018-06-21 ASSESSMENT — PATIENT HEALTH QUESTIONNAIRE - PHQ9: SUM OF ALL RESPONSES TO PHQ QUESTIONS 1-9: 1

## 2018-06-21 ASSESSMENT — ANXIETY QUESTIONNAIRES: GAD7 TOTAL SCORE: 0

## 2018-06-25 ENCOUNTER — TELEPHONE (OUTPATIENT)
Dept: FAMILY MEDICINE | Facility: CLINIC | Age: 68
End: 2018-06-25

## 2018-06-25 NOTE — TELEPHONE ENCOUNTER
Follow up is repeat CT in one year but the nodules seen are old and not really of concern on their own.   Regarding the calcified heart arteries, this is known information too and controlling blood pressure and watching for diabetes mellitus, etc.  We have seen aortic calcification since at least 2013 so it too is old. Her Lipitor treats this.  Any major chest pain issue should be brought to our attention.    Therefore, this exam rules out cancer at this time and the other findings are known and treated.  The nodules remain of very minimal risk and the ischemic vascular disease is being managed as best as we can already.  Glen Blue MD

## 2018-06-25 NOTE — TELEPHONE ENCOUNTER
Call out to pt, who reports she received a letter from the imaging Department. Letter in Epic on 6/21/18.     Pt is wondering if she needs to do anything further at this time. Routed to Dr. Blue to review and advise.

## 2018-06-25 NOTE — TELEPHONE ENCOUNTER
Reason for Call:  Request for results:    Name of test or procedure: CT     Date of test of procedure: 6/20    Location of the test or procedure: [facility] to leave the result message on voice mail or with a family member? YES    Phone number Patient can be reached at:  Home number on file [phone] (home)    Additional comments: Pt received a letter to call you to discuss these results.     Call taken on 6/25/2018 at 3:36 PM by Nadja Harrison

## 2018-07-01 DIAGNOSIS — I10 HYPERTENSION, GOAL BELOW 140/90: ICD-10-CM

## 2018-07-01 DIAGNOSIS — F32.5 MAJOR DEPRESSION IN COMPLETE REMISSION (H): ICD-10-CM

## 2018-07-01 DIAGNOSIS — E78.5 HYPERLIPIDEMIA LDL GOAL <130: ICD-10-CM

## 2018-07-02 NOTE — TELEPHONE ENCOUNTER
"Requested Prescriptions   Pending Prescriptions Disp Refills     atorvastatin (LIPITOR) 10 MG tablet [Pharmacy Med Name: ATORVASTATIN CALCIUM 10MG TABS] 90 tablet 3     Sig: TAKE ONE TABLET BY MOUTH EVERY DAY    Statins Protocol Passed    7/1/2018  1:54 PM       Passed - LDL on file in past 12 months    Recent Labs   Lab Test  06/20/18   1121   LDL  78            Passed - No abnormal creatine kinase in past 12 months    No lab results found.            Passed - Recent (12 mo) or future (30 days) visit within the authorizing provider's specialty    Patient had office visit in the last 12 months or has a visit in the next 30 days with authorizing provider or within the authorizing provider's specialty.  See \"Patient Info\" tab in inbasket, or \"Choose Columns\" in Meds & Orders section of the refill encounter.           Passed - Patient is age 18 or older       Passed - No active pregnancy on record       Passed - No positive pregnancy test in past 12 months        hydrochlorothiazide (HYDRODIURIL) 25 MG tablet [Pharmacy Med Name: HYDROCHLOROTHIAZIDE 25MG TABS] 90 tablet 3     Sig: TAKE ONE TABLET BY MOUTH EVERY DAY    Diuretics (Including Combos) Protocol Passed    7/1/2018  1:54 PM       Passed - Blood pressure under 140/90 in past 12 months    BP Readings from Last 3 Encounters:   06/20/18 118/72   04/11/18 122/60   12/18/17 128/60                Passed - Recent (12 mo) or future (30 days) visit within the authorizing provider's specialty    Patient had office visit in the last 12 months or has a visit in the next 30 days with authorizing provider or within the authorizing provider's specialty.  See \"Patient Info\" tab in inbasket, or \"Choose Columns\" in Meds & Orders section of the refill encounter.           Passed - Patient is age 18 or older       Passed - No active pregancy on record       Passed - Normal serum creatinine on file in past 12 months    Recent Labs   Lab Test  06/20/18   1121   CR  0.86             " "Passed - Normal serum potassium on file in past 12 months    Recent Labs   Lab Test  06/20/18   1121   POTASSIUM  4.0                   Passed - Normal serum sodium on file in past 12 months    Recent Labs   Lab Test  06/20/18   1121   NA  141             Passed - No positive pregnancy test in past 12 months        venlafaxine (EFFEXOR-XR) 37.5 MG 24 hr capsule [Pharmacy Med Name: VENLAFAXINE HCL ER 37.5MG CP24] 90 capsule 3     Sig: TAKE ONE CAPSULE BY MOUTH EVERY DAY WITH FOOD    Serotonin-Norepinephrine Reuptake Inhibitors  Passed    7/1/2018  1:54 PM       Passed - Blood pressure under 140/90 in past 12 months    BP Readings from Last 3 Encounters:   06/20/18 118/72   04/11/18 122/60   12/18/17 128/60                Passed - PHQ-9 score of less than 5 in past 6 months    Please review last PHQ-9 score.          Passed - Patient is age 18 or older       Passed - No active pregnancy on record       Passed - Normal serum creatinine on file in past 12 months    Recent Labs   Lab Test  06/20/18   1121   CR  0.86            Passed - No positive pregnancy test in past 12 months       Passed - Recent (6 mo) or future (30 days) visit within the authorizing provider's specialty    Patient had office visit in the last 6 months or has a visit in the next 30 days with authorizing provider or within the authorizing provider's specialty.  See \"Patient Info\" tab in inbasket, or \"Choose Columns\" in Meds & Orders section of the refill encounter.            lisinopril (PRINIVIL/ZESTRIL) 2.5 MG tablet [Pharmacy Med Name: LISINOPRIL 2.5MG TABS] 90 tablet 3     Sig: TAKE ONE TABLET BY MOUTH EVERY DAY    ACE Inhibitors (Including Combos) Protocol Passed    7/1/2018  1:54 PM       Passed - Blood pressure under 140/90 in past 12 months    BP Readings from Last 3 Encounters:   06/20/18 118/72   04/11/18 122/60   12/18/17 128/60                Passed - Recent (12 mo) or future (30 days) visit within the authorizing provider's specialty    " "Patient had office visit in the last 12 months or has a visit in the next 30 days with authorizing provider or within the authorizing provider's specialty.  See \"Patient Info\" tab in inbasket, or \"Choose Columns\" in Meds & Orders section of the refill encounter.           Passed - Patient is age 18 or older       Passed - No active pregnancy on record       Passed - Normal serum creatinine on file in past 12 months    Recent Labs   Lab Test  06/20/18   1121   CR  0.86            Passed - Normal serum potassium on file in past 12 months    Recent Labs   Lab Test  06/20/18   1121   POTASSIUM  4.0            Passed - No positive pregnancy test in past 12 months                  atorvastatin  Last Written Prescription Date:  6/21/17  Last Fill Quantity: 90,  # refills: 3   Last office visit: 6/20/2018 with prescribing provider:     Future Office Visit:          hydrochlorothiazide  Last Written Prescription Date:  6/21/17  Last Fill Quantity: 90,  # refills: 3   Last office visit: 6/20/2018 with prescribing provider:     Future Office Visit:          venlafaxine  Last Written Prescription Date:  6/21/17  Last Fill Quantity: 90,  # refills: 3   Last office visit: 6/20/2018 with prescribing provider:     Future Office Visit:            lisinopril  Last Written Prescription Date:  6/21/17  Last Fill Quantity: 90,  # refills: 3   Last office visit: 6/20/2018 with prescribing provider:     Future Office Visit:      "

## 2018-07-03 RX ORDER — LISINOPRIL 2.5 MG/1
TABLET ORAL
Qty: 90 TABLET | Refills: 3 | Status: SHIPPED | OUTPATIENT
Start: 2018-07-03 | End: 2019-07-23

## 2018-07-03 RX ORDER — HYDROCHLOROTHIAZIDE 25 MG/1
TABLET ORAL
Qty: 90 TABLET | Refills: 3 | Status: SHIPPED | OUTPATIENT
Start: 2018-07-03 | End: 2019-07-23

## 2018-07-03 RX ORDER — ATORVASTATIN CALCIUM 10 MG/1
TABLET, FILM COATED ORAL
Qty: 90 TABLET | Refills: 3 | Status: SHIPPED | OUTPATIENT
Start: 2018-07-03 | End: 2019-06-30

## 2018-07-03 RX ORDER — VENLAFAXINE HYDROCHLORIDE 37.5 MG/1
CAPSULE, EXTENDED RELEASE ORAL
Qty: 90 CAPSULE | Refills: 1 | Status: SHIPPED | OUTPATIENT
Start: 2018-07-03 | End: 2019-01-11

## 2018-07-03 NOTE — TELEPHONE ENCOUNTER
Lipitor, HCTZ, Effexor- SR, and Lisinopril Prescription approved per [facility]..................L[facility]

## 2018-07-11 NOTE — PROGRESS NOTES
"Office Visit-Follow up      Chief Complaint: Latanya Padron is a 68 year old female who is being seen for   Chief Complaint   Patient presents with     Consult     rt hip         History of Present Illness: here for a new problem  Latanya Padron is a 68 year old female who is seen in consultation at the request of .  History of Present illness:  Latanya presents for evaluation of:  1.) rt hip  Onset: 3 weeks  Symptoms brought on by: nothing.   Character:  sharp, dull ache, stabbing, burning and throbbing.    Progression of symptoms:  worse.    Previous similar pain: no .   Pain Level:  6/10.   Previous treatments:  acetaminophen and elevate feet.  Currently on Blood thinners? Yes  Diagnosis of Diabetes? No  Sore, worse when walking and sleeping, lateral hip pain    Past medical history reviewed and there is no significant change    Patient does not use Tobacco products.    REVIEW OF SYSTEMS  General: negative for, night sweats, dizziness, fatigue  Resp: No shortness of breath and no cough  CV: negative for chest pain, syncope or near-syncope  GI: negative for nausea, vomiting and diarrhea  : negative for dysuria and hematuria  Musculoskeletal: as above  Neurologic: negative for syncope   Hematologic: negative for bleeding disorder      Physical Exam:  Vitals: /82  Ht 1.549 m (5' 1\")  Wt 104.3 kg (230 lb)  BMI 43.46 kg/m2  BMI= Body mass index is 43.46 kg/(m^2).  Constitutional: healthy, alert and no acute distress   Psychiatric: mentation appears normal and affect normal/bright  NEURO: no focal deficits  RESP: Normal with easy respirations and no use of accessory muscles to breathe, no audible wheezing or retractions  CV: No peripheral edema  SKIN: No erythema, rashes, excoriation, or breakdown. No evidence of infection.   JOINT/EXTREMITIES:right Hip Exam: Palpation: Tender:   right greater trochanter, and IT band  Range of Motion:  Full ROM, both hips  Strength:  full strength    GAIT: " antalgic      Diagnostic Modalities:  right hip X-ray: No fracture, dislocation and or lesion. Normal alignment.  Joint space maintained no significant arthritis. No appreciable soft tissue abnormality.   Independent visualization of the images was performed.      Impression: right Greater trochanteric bursitis    Plan:  All of the above pertinent physical exam and imaging modalities findings was reviewed with Latanya.                                          CONSERVATIVE CARE:  I recommend conservative care for the patient to include focused self directed physical therapy, steroid injections. Today I provided or dispensed info and hep.                                        INJECTION PROCEDURE:  The patient was counseled about an  injection, including discussion of risks (including infection), contents of the injection, rationale for performing the injection, and expected benefits of the injection. The skin was prepped with alcohol and betadine and then utilizing sterile technique an injection of the right hip trochanteric bursa was performed. The injection consisted 1ml of Kenalog (40mg per 1ml) with 3ml 1% lidocaine plain. The patient tolerated the injection well, and there were no complications. The injection site was covered with a Band-Aid. The injection was performed by Nadja Hinson M.D.                                                FUTURE PLAN:  On their return if they still have symptoms we will consider physical therapy, injection of steroids.    BP Readings from Last 1 Encounters:   07/13/18 126/82       BP noted to be well controlled today in office.     Return to clinic 6, week(s), or sooner as needed for changes.  Re-x-ray on return: No    Nadja Hinson M.D.

## 2018-07-13 ENCOUNTER — OFFICE VISIT (OUTPATIENT)
Dept: ORTHOPEDICS | Facility: OTHER | Age: 68
End: 2018-07-13
Payer: COMMERCIAL

## 2018-07-13 ENCOUNTER — RADIANT APPOINTMENT (OUTPATIENT)
Dept: GENERAL RADIOLOGY | Facility: OTHER | Age: 68
End: 2018-07-13
Attending: ORTHOPAEDIC SURGERY
Payer: COMMERCIAL

## 2018-07-13 VITALS
HEIGHT: 61 IN | WEIGHT: 230 LBS | SYSTOLIC BLOOD PRESSURE: 126 MMHG | DIASTOLIC BLOOD PRESSURE: 82 MMHG | BODY MASS INDEX: 43.43 KG/M2

## 2018-07-13 DIAGNOSIS — M70.61 TROCHANTERIC BURSITIS OF RIGHT HIP: ICD-10-CM

## 2018-07-13 DIAGNOSIS — M25.551 HIP PAIN, RIGHT: ICD-10-CM

## 2018-07-13 DIAGNOSIS — M25.551 HIP PAIN, RIGHT: Primary | ICD-10-CM

## 2018-07-13 PROCEDURE — 73502 X-RAY EXAM HIP UNI 2-3 VIEWS: CPT

## 2018-07-13 PROCEDURE — 99213 OFFICE O/P EST LOW 20 MIN: CPT | Mod: 25 | Performed by: ORTHOPAEDIC SURGERY

## 2018-07-13 PROCEDURE — 20610 DRAIN/INJ JOINT/BURSA W/O US: CPT | Mod: RT | Performed by: ORTHOPAEDIC SURGERY

## 2018-07-13 ASSESSMENT — PAIN SCALES - GENERAL: PAINLEVEL: SEVERE PAIN (6)

## 2018-07-13 NOTE — MR AVS SNAPSHOT
After Visit Summary   7/13/2018    Latanya Padron    MRN: [id number]           Patient Information     Date Of Birth          1950        Visit Information        Provider Department      7/13/2018 4:00 PM Nadja Hinson MD [facility]        Today's Diagnoses     Hip pain, right    -  1      Care Instructions      Understanding Trochanteric Bursitis    A bursa is a thin, slippery, sac-like film. It contains a small amount of fluid. This structure is found between bones and soft tissues in and around joints. A bursa cushions and protects a joint. It keeps parts of a joint from rubbing against each other. If a bursa becomes inflamed and irritated, it is known as bursitis.  The trochanteric bursa is found on the hip joint. It lies on top of the bump at the top of the thighbone called the greater trochanter. Inflammation of this bursa is called trochanteric bursitis.     How to say it  [id number]   Causes of trochanteric bursitis  Causes may include:    Overuse of the hip during running or other sports, dance, or work    Falling on or irritation to the side of the hip  This condition may occur along with other problems, such as osteoarthritis of the hip or knee, or low back problems. In rare cases, it may occur after hip surgery.  Symptoms of trochanteric bursitis    Pain or aching on the side of the hip. The pain may travel down the leg.    Swelling, tenderness, or warmth on the side of the hip at the bony bump at the top of the thigh  Treatment for trochanteric bursitis  These may include:    Resting the hip. This allows the bursa to heal.    Prescription or over-the-counter pain medicines. These help reduce inflammation, swelling, and pain.    Cold packs and heat packs. These help reduce pain and swelling.    Stretching and strengthening exercises. These improve flexibility and strength around the hip.    Physical therapy. This includes exercises or other  treatments.    Injections of medicine into the bursa. This may help reduce inflammation and relieve symptoms.  Possible complications  If you don t give your hip time to heal, the problem may not go away, may return, or may get worse. Rest and treat your hip as directed.     When to call your healthcare provider  Call your healthcare provider right away if you have any of these:    Fever of 100.4 F (38 C) or higher, or as directed    Redness, swelling, or warmth that gets worse    Symptoms that don t get better with prescribed medicines, or get worse    New symptoms   Date Last Reviewed: 3/29/2016    2472-6275 [employer]. [address] [place] [address]. All rights reserved. This information is not intended as a substitute for professional medical care. Always follow your healthcare professional's instructions.        Hip Abduction (Strength)    1. Lie on your right side on the floor with your legs straight.  2. Raise your left leg about 6 to 8 inches. Keep your legs and hips straight. Don t roll back onto your hip. Hold for 5 seconds, then lower your leg.  3. Repeat 10 times, or as instructed.  4. Switch legs and repeat.     Challenge yourself  Put an elastic band or tubing around both ankles. Hold the band to the floor with your bottom ankle. Raise and lower your top leg slowly and steadily.   Date Last Reviewed: 3/10/2016    1853-5866 [employer]. [address] [place] [address]. All rights reserved. This information is not intended as a substitute for professional medical care. Always follow your healthcare professional's instructions.        Hip Adductor Stretch (Flexibility)    1. Sit on the floor. Put the soles of your feet together so your knees are pointed outward.  2. Pull your heels in toward your groin, as close as is comfortable.  3. Put your hands on your knees, and gently push them closer to the floor.  4. Hold for 30 to 60 seconds.  5. Relax and repeat  2 times, or as instructed.  6. Repeat this exercise 3 times a day, or as instructed.  Date Last Reviewed: 3/10/2016    2914-4701 [employer]. [address] [place] [address]. All rights reserved. This information is not intended as a substitute for professional medical care. Always follow your healthcare professional's instructions.        Iliotibial Band Stretch (Flexibility)    1. Stand next to a chair. Hold onto the chair with your right hand for support. Cross your right leg behind your left leg.  2. Lean your right hip toward the right. Feel the stretch at the outside of your hip.  3. Hold for 30 to 60 seconds. Then relax.  4. Repeat 2 times, or as instructed.  5. Switch sides and repeat.  6. Do this 3 times a day, or as instructed.     Tip: Don t bend forward or twist at the waist.   Date Last Reviewed: 3/29/2016    9368-9738 [employer]. [address] [place] [address]. All rights reserved. This information is not intended as a substitute for professional medical care. Always follow your healthcare professional's instructions.        Side Lying Hip Abduction (Strength)    1. Lie down on the floor on your side. Rest your head on your arm. Bend your legs at the knees.  2. Keep your feet together and lift your top leg up so that your knees are . Keep your hips steady.     3. Slowly lower your leg back down.  4. Repeat 10 times, or as instructed.  5. Switch sides if instructed.     Challenge yourself  Put an elastic band or tubing around your thighs. Raise and lower your top leg slowly and steadily.      Date Last Reviewed: 3/29/2016    6779-2699 [employer]. [address] [place] [address]. All rights reserved. This information is not intended as a substitute for professional medical care. Always follow your healthcare professional's instructions.                Follow-ups after your visit        Your next 10 appointments already  "scheduled     Aug 01, 2018  8:00 AM CDT   Anticoagulation Visit with PH ANTI COAG   [facility]    [address] [facility] [address] [phone]              Who to contact     If you have questions or need follow up information about today's clinic visit or your schedule please contact [facility] directly at [phone].  Normal or non-critical lab and imaging results will be communicated to you by [employer], letter or phone within 4 business days after the clinic has received the results. If you do not hear from us within 7 days, please contact the clinic through [employer] or phone. If you have a critical or abnormal lab result, we will notify you by phone as soon as possible.  Submit refill requests through [employer] or call your pharmacy and they will forward the refill request to us. Please allow 3 business days for your refill to be completed.          Additional Information About Your Visit        [employer] Information     [employer] gives you secure access to your electronic health record. If you see a primary care provider, you can also send messages to your care team and make appointments. If you have questions, please call your primary care clinic.  If you do not have a primary care provider, please call [phone] and they will assist you.        Care EveryWhere ID     This is your Care EveryWhere ID. This could be used by other organizations to access your [place] medical records  [id number]        Your Vitals Were     Height BMI (Body Mass Index)                1.549 m (5' 1\") 43.46 kg/m2           Blood Pressure from Last 3 Encounters:   07/13/18 126/82   06/20/18 118/72   04/11/18 122/60    Weight from Last 3 Encounters:   07/13/18 104.3 kg (230 lb)   06/20/18 104.6 kg (230 lb 8 oz)   04/11/18 103.4 kg (228 lb)               Primary Care Provider Office Phone # Fax #    [facility] [phone]       [address] [facility] [address] [place] [address]        Equal Access to Services     YAYO SANFORD : Hadii lucia ku hadramonaalvaro Sorellali, wajakobda luqmarlineha, qaalbertota kaemigdiohalie mcgowansamanthahalie, edin cobosdawitaltagracia velasquez. So [facility] [phone].    ATENCIÓN: Si habla español, tiene a de los santos disposición servicios gratuitos de asistencia lingüística. [facility] [phone].    We comply with applicable federal civil rights laws and [place] laws. We do not discriminate on the basis of race, color, national origin, age, disability, sex, sexual orientation, or gender identity.            Thank you!     Thank you for choosing [facility] [place]  for your care. Our goal is always to provide you with excellent care. Hearing back from our patients is one way we can continue to improve our services. Please take a few minutes to complete the written survey that you may receive in the mail after your visit with us. Thank you!             Your Updated Medication List - Protect others around you: Learn how to safely use, store and throw away your medicines at www.disposemymeds.org.          This list is accurate as of 7/13/18  4:25 PM.  Always use your most recent med list.                   Brand Name Dispense Instructions for use Diagnosis    * albuterol 108 (90 Base) MCG/ACT Inhaler    PROAIR HFA/PROVENTIL HFA/VENTOLIN HFA    2 Inhaler    1-2 puffs by mouth every 2-4 hours as needed    Pulmonary emphysema, unspecified emphysema type (H)       * albuterol (2.5 MG/3ML) 0.083% neb solution     75 mL    Take  by nebulization. 1 NEB EVERY 4-6 HOURS AS NEEDED        ASPIRIN NOT PRESCRIBED    INTENTIONAL    0 each    1 each continuous prn for other Reported on 4/17/2017    Atrial fibrillation (H)       atorvastatin 10 MG tablet    LIPITOR    90 tablet    TAKE ONE TABLET BY MOUTH EVERY DAY    Hyperlipidemia LDL goal <130       B-12 1000 MCG Tbcr     100 tablet    Take 1,000 mcg by mouth daily        calcium 500 + D 500-400 MG-UNIT Tabs   Generic  drug:  Calcium Carb-Cholecalciferol     180 tablet    Take 1 tablet by mouth 2 times daily        cholecalciferol 400 units tablet    Vitamin D    100 tablet    TAKE ONE TABLET BY MOUTH EVERY DAY    Osteoporosis       cyclobenzaprine 5 MG tablet    FLEXERIL    30 tablet    Take 0.5-1 tablets (2.5-5 mg) by mouth 2 times daily as needed for muscle spasms    Neck muscle spasm       diltiazem 120 MG 24 hr capsule    CARTIA XT    90 capsule    TAKE ONE CAPSULE BY MOUTH EVERY DAY (DUE FOR FOLLOW-UP APPOINTMENT)    Hypertension, goal below 140/90, Chronic atrial fibrillation (H)       hydrochlorothiazide 25 MG tablet    HYDRODIURIL    90 tablet    TAKE ONE TABLET BY MOUTH EVERY DAY    Hypertension, goal below 140/90       JANTOVEN 5 MG tablet   Generic drug:  warfarin     60 tablet    Take 5 mg Mon and 2.5 mg all other days of the week, or as directed by coumadin clinic    Long-term (current) use of anticoagulants, Chronic atrial fibrillation (H)       lisinopril 2.5 MG tablet    PRINIVIL/Zestril    90 tablet    TAKE ONE TABLET BY MOUTH EVERY DAY    Hypertension, goal below 140/90       magnesium 250 MG tablet     30 tablet    Take 1 tablet by mouth daily        mometasone-formoterol 200-5 MCG/ACT oral inhaler    DULERA    13 g    INHALE 2 PUFFS BY MOUTH TWO TIMES A DAY    Pulmonary emphysema, unspecified emphysema type (H)       omega 3 1000 MG Caps     90 capsule    Take 1 g by mouth daily        order for DME      Equipment being ordered: Oxygen @ 2 liters with exertion    COPD (chronic obstructive pulmonary disease) (H)       * predniSONE 20 MG tablet    DELTASONE    10 tablet    TAKE ONE TABLET BY MOUTH TWO TIMES A DAY    Tendonitis of ankle       * predniSONE 20 MG tablet    DELTASONE    5 tablet    Take 1 tablet (20 mg) by mouth daily    Pulmonary emphysema, unspecified emphysema type (H), Acute bronchitis with coexisting condition requiring prophylactic treatment       trospium 20 MG tablet    SANCTURA     Take 20  mg by mouth        umeclidinium 62.5 MCG/INH oral inhaler    INCRUSE ELLIPTA    1 Inhaler    Inhale 1 puff into the lungs daily    Pulmonary emphysema, unspecified emphysema type (H)       venlafaxine 37.5 MG 24 hr capsule    EFFEXOR-XR    90 capsule    TAKE ONE CAPSULE BY MOUTH EVERY DAY WITH FOOD    Major depression in complete remission (H)       * Notice:  This list has 4 medication(s) that are the same as other medications prescribed for you. Read the directions carefully, and ask your doctor or other care provider to review them with you.

## 2018-07-13 NOTE — PATIENT INSTRUCTIONS
Understanding Trochanteric Bursitis    A bursa is a thin, slippery, sac-like film. It contains a small amount of fluid. This structure is found between bones and soft tissues in and around joints. A bursa cushions and protects a joint. It keeps parts of a joint from rubbing against each other. If a bursa becomes inflamed and irritated, it is known as bursitis.  The trochanteric bursa is found on the hip joint. It lies on top of the bump at the top of the thighbone called the greater trochanter. Inflammation of this bursa is called trochanteric bursitis.     How to say it  [id number]   Causes of trochanteric bursitis  Causes may include:    Overuse of the hip during running or other sports, dance, or work    Falling on or irritation to the side of the hip  This condition may occur along with other problems, such as osteoarthritis of the hip or knee, or low back problems. In rare cases, it may occur after hip surgery.  Symptoms of trochanteric bursitis    Pain or aching on the side of the hip. The pain may travel down the leg.    Swelling, tenderness, or warmth on the side of the hip at the bony bump at the top of the thigh  Treatment for trochanteric bursitis  These may include:    Resting the hip. This allows the bursa to heal.    Prescription or over-the-counter pain medicines. These help reduce inflammation, swelling, and pain.    Cold packs and heat packs. These help reduce pain and swelling.    Stretching and strengthening exercises. These improve flexibility and strength around the hip.    Physical therapy. This includes exercises or other treatments.    Injections of medicine into the bursa. This may help reduce inflammation and relieve symptoms.  Possible complications  If you don t give your hip time to heal, the problem may not go away, may return, or may get worse. Rest and treat your hip as directed.     When to call your healthcare provider  Call your healthcare provider right away if you have  any of these:    Fever of 100.4 F (38 C) or higher, or as directed    Redness, swelling, or warmth that gets worse    Symptoms that don t get better with prescribed medicines, or get worse    New symptoms   Date Last Reviewed: 3/29/2016    7364-1808 [employer]. [address] [place] [address]. All rights reserved. This information is not intended as a substitute for professional medical care. Always follow your healthcare professional's instructions.        Hip Abduction (Strength)    1. Lie on your right side on the floor with your legs straight.  2. Raise your left leg about 6 to 8 inches. Keep your legs and hips straight. Don t roll back onto your hip. Hold for 5 seconds, then lower your leg.  3. Repeat 10 times, or as instructed.  4. Switch legs and repeat.     Challenge yourself  Put an elastic band or tubing around both ankles. Hold the band to the floor with your bottom ankle. Raise and lower your top leg slowly and steadily.   Date Last Reviewed: 3/10/2016    5609-0015 [employer]. [address] [place] [address]. All rights reserved. This information is not intended as a substitute for professional medical care. Always follow your healthcare professional's instructions.        Hip Adductor Stretch (Flexibility)    1. Sit on the floor. Put the soles of your feet together so your knees are pointed outward.  2. Pull your heels in toward your groin, as close as is comfortable.  3. Put your hands on your knees, and gently push them closer to the floor.  4. Hold for 30 to 60 seconds.  5. Relax and repeat 2 times, or as instructed.  6. Repeat this exercise 3 times a day, or as instructed.  Date Last Reviewed: 3/10/2016    9703-3390 [employer]. [address] [place] [address]. All rights reserved. This information is not intended as a substitute for professional medical care. Always follow your healthcare professional's  instructions.        Iliotibial Band Stretch (Flexibility)    1. Stand next to a chair. Hold onto the chair with your right hand for support. Cross your right leg behind your left leg.  2. Lean your right hip toward the right. Feel the stretch at the outside of your hip.  3. Hold for 30 to 60 seconds. Then relax.  4. Repeat 2 times, or as instructed.  5. Switch sides and repeat.  6. Do this 3 times a day, or as instructed.     Tip: Don t bend forward or twist at the waist.   Date Last Reviewed: 3/29/2016    4201-1959 [employer]. [address] [place] [address]. All rights reserved. This information is not intended as a substitute for professional medical care. Always follow your healthcare professional's instructions.        Side Lying Hip Abduction (Strength)    1. Lie down on the floor on your side. Rest your head on your arm. Bend your legs at the knees.  2. Keep your feet together and lift your top leg up so that your knees are . Keep your hips steady.     3. Slowly lower your leg back down.  4. Repeat 10 times, or as instructed.  5. Switch sides if instructed.     Challenge yourself  Put an elastic band or tubing around your thighs. Raise and lower your top leg slowly and steadily.      Date Last Reviewed: 3/29/2016    6384-5677 [employer]. [address] [place] [address]. All rights reserved. This information is not intended as a substitute for professional medical care. Always follow your healthcare professional's instructions.

## 2018-07-13 NOTE — LETTER
"    7/13/2018         RE: Latanya Padron  [id number] [facility] [id number]        Dear Colleague,    Thank you for referring your patient, Latanya Padron, to the [facility]. Please see a copy of my visit note below.    Office Visit-Follow up      Chief Complaint: Latanya Padron is a 68 year old female who is being seen for   Chief Complaint   Patient presents with     Consult     rt hip         History of Present Illness: here for a new problem  Latanya Padron is a 68 year old female who is seen in consultation at the request of .  History of Present illness:  Latanya presents for evaluation of:  1.) rt hip  Onset: 3 weeks  Symptoms brought on by: nothing.   Character:  sharp, dull ache, stabbing, burning and throbbing.    Progression of symptoms:  worse.    Previous similar pain: no .   Pain Level:  6/10.   Previous treatments:  acetaminophen and elevate feet.  Currently on Blood thinners? Yes  Diagnosis of Diabetes? No  Sore, worse when walking and sleeping, lateral hip pain    Past medical history reviewed and there is no significant change    Patient does not use Tobacco products.    REVIEW OF SYSTEMS  General: negative for, night sweats, dizziness, fatigue  Resp: No shortness of breath and no cough  CV: negative for chest pain, syncope or near-syncope  GI: negative for nausea, vomiting and diarrhea  : negative for dysuria and hematuria  Musculoskeletal: as above  Neurologic: negative for syncope   Hematologic: negative for bleeding disorder      Physical Exam:  Vitals: /82  Ht 1.549 m (5' 1\")  Wt 104.3 kg (230 lb)  BMI 43.46 kg/m2  BMI= Body mass index is 43.46 kg/(m^2).  Constitutional: healthy, alert and no acute distress   Psychiatric: mentation appears normal and affect normal/bright  NEURO: no focal deficits  RESP: Normal with easy respirations and no use of accessory muscles to breathe, no audible wheezing or retractions  CV: No peripheral edema  SKIN: No erythema, " rashes, excoriation, or breakdown. No evidence of infection.   JOINT/EXTREMITIES:right Hip Exam: Palpation: Tender:   right greater trochanter, and IT band  Range of Motion:  Full ROM, both hips  Strength:  full strength    GAIT: antalgic      Diagnostic Modalities:  right hip X-ray: No fracture, dislocation and or lesion. Normal alignment.  Joint space maintained no significant arthritis. No appreciable soft tissue abnormality.   Independent visualization of the images was performed.      Impression: right Greater trochanteric bursitis    Plan:  All of the above pertinent physical exam and imaging modalities findings was reviewed with Latanya.                                          CONSERVATIVE CARE:  I recommend conservative care for the patient to include focused self directed physical therapy, steroid injections. Today I provided or dispensed info and hep.                                        INJECTION PROCEDURE:  The patient was counseled about an  injection, including discussion of risks (including infection), contents of the injection, rationale for performing the injection, and expected benefits of the injection. The skin was prepped with alcohol and betadine and then utilizing sterile technique an injection of the right hip trochanteric bursa was performed. The injection consisted 1ml of Kenalog (40mg per 1ml) with 3ml 1% lidocaine plain. The patient tolerated the injection well, and there were no complications. The injection site was covered with a Band-Aid. The injection was performed by Nadja Hinson M.D.                                                FUTURE PLAN:  On their return if they still have symptoms we will consider physical therapy, injection of steroids.    BP Readings from Last 1 Encounters:   07/13/18 126/82       BP noted to be well controlled today in office.     Return to clinic 6, week(s), or sooner as needed for changes.  Re-x-ray on return: No    Nadja Hinson  M.D.          Again, thank you for allowing me to participate in the care of your patient.        Sincerely,        Nadja Hinson MD

## 2018-08-01 ENCOUNTER — ANTICOAGULATION THERAPY VISIT (OUTPATIENT)
Dept: ANTICOAGULATION | Facility: CLINIC | Age: 68
End: 2018-08-01
Payer: COMMERCIAL

## 2018-08-01 DIAGNOSIS — Z79.01 LONG-TERM (CURRENT) USE OF ANTICOAGULANTS: ICD-10-CM

## 2018-08-01 DIAGNOSIS — I48.91 ATRIAL FIBRILLATION, UNSPECIFIED TYPE (H): ICD-10-CM

## 2018-08-01 LAB — INR POINT OF CARE: 2.7 (ref 0.86–1.14)

## 2018-08-01 PROCEDURE — 85610 PROTHROMBIN TIME: CPT | Mod: QW

## 2018-08-01 PROCEDURE — 99207 ZZC NO CHARGE NURSE ONLY: CPT

## 2018-08-01 PROCEDURE — 36416 COLLJ CAPILLARY BLOOD SPEC: CPT

## 2018-08-01 NOTE — PROGRESS NOTES
ANTICOAGULATION FOLLOW-UP CLINIC VISIT    Patient Name:  Latanya Padron  Date:  8/1/2018  Contact Type:  Face to Face    SUBJECTIVE:     Patient Findings     Positives No Problem Findings           OBJECTIVE    INR Protime   Date Value Ref Range Status   08/01/2018 2.7 (A) 0.86 - 1.14 Final       ASSESSMENT / PLAN  INR assessment THER    Recheck INR In: 6 WEEKS    INR Location Clinic      Anticoagulation Summary as of 8/1/2018     INR goal 2.0-3.0   Today's INR 2.7   Warfarin maintenance plan 5 mg (5 mg x 1) on Mon; 2.5 mg (5 mg x 0.5) all other days   Full warfarin instructions 5 mg on Mon; 2.5 mg all other days   Weekly warfarin total 20 mg   No change documented Manolo Damian RN   Plan last modified Francine Andrew RN (7/26/2017)   Next INR check 9/12/2018   Target end date     Indications   Long-term (current) use of anticoagulants [Z79.01] [Z79.01]  Atrial fibrillation (H) [I48.91]         Anticoagulation Episode Summary     INR check location     Preferred lab     Send INR reminders to [facility] POOL    Comments 5 mg tabs, likes card, AM dose      Anticoagulation Care Providers     Provider Role Specialty Phone number    Glen Blue MD [facility] [phone]            See the Encounter Report to view Anticoagulation Flowsheet and Dosing Calendar (Go to Encounters tab in chart review, and find the Anticoagulation Therapy Visit)    Dosage adjustment made based on physician directed care plan.    Manolo Damian RN

## 2018-08-01 NOTE — MR AVS SNAPSHOT
Latanya Padron   8/1/2018 8:00 AM   Anticoagulation Therapy Visit    Description:  68 year old female   Provider:  [facility]   Department:  [facility]           INR as of 8/1/2018     Today's INR 2.7      Anticoagulation Summary as of 8/1/2018     INR goal 2.0-3.0   Today's INR 2.7   Full warfarin instructions 5 mg on Mon; 2.5 mg all other days   Next INR check 9/12/2018    Indications   Long-term (current) use of anticoagulants [Z79.01] [Z79.01]  Atrial fibrillation (H) [I48.91]         Your next Anticoagulation Clinic appointment(s)     Sep 12, 2018  8:15 AM CDT   Anticoagulation Visit with [facility]TI [facility]AG   [facility]    [address] [place] [id number] [phone]              Contact Numbers     Clinic Number:         August 2018 Details    Sun Mon Tue Wed Thu Fri Sat        1      2.5 mg   See details      2      2.5 mg         3      2.5 mg         4      2.5 mg           5      2.5 mg         6      5 mg         7      2.5 mg         8      2.5 mg         9      2.5 mg         10      2.5 mg         11      2.5 mg           12      2.5 mg         13      5 mg         14      2.5 mg         15      2.5 mg         16      2.5 mg         17      2.5 mg         18      2.5 mg           19      2.5 mg         20      5 mg         21      2.5 mg         22      2.5 mg         23      2.5 mg         24      2.5 mg         25      2.5 mg           26      2.5 mg         27      5 mg         28      2.5 mg         29      2.5 mg         30      2.5 mg         31      2.5 mg           Date Details   08/01 This INR check               How to take your warfarin dose     To take:  2.5 mg Take 0.5 of a 5 mg tablet.    To take:  5 mg Take 1 of the 5 mg tablets.           September 2018 Details    Sun Mon Tue Wed Thu Fri Sat           1      2.5 mg           2      2.5 mg         3      5 mg         4      2.5 mg         5      2.5 mg         6      2.5 mg          7      2.5 mg         8      2.5 mg           9      2.5 mg         10      5 mg         11      2.5 mg         12            13               14               15                 16               17               18               19               20               21               22                 23               24               25               26               27               28               29                 30                      Date Details   No additional details    Date of next INR:  9/12/2018         How to take your warfarin dose     To take:  2.5 mg Take 0.5 of a 5 mg tablet.    To take:  5 mg Take 1 of the 5 mg tablets.

## 2018-08-12 DIAGNOSIS — I10 HYPERTENSION, GOAL BELOW 140/90: ICD-10-CM

## 2018-08-12 DIAGNOSIS — I48.20 CHRONIC ATRIAL FIBRILLATION (H): ICD-10-CM

## 2018-08-12 DIAGNOSIS — Z79.01 LONG-TERM (CURRENT) USE OF ANTICOAGULANTS: ICD-10-CM

## 2018-08-13 NOTE — TELEPHONE ENCOUNTER
"Requested Prescriptions   Pending Prescriptions Disp Refills     warfarin (JANTOVEN) 5 MG tablet 60 tablet 5    Last Written Prescription Date:  7/26/17  Last Fill Quantity: 60,  # refills: 5   Last office visit: 6/20/2018 with prescribing provider:  6/20/18   Future Office Visit:     Sig: Take 5 mg Mon and 2.5 mg all other days of the week, or as directed by coumadin clinic    Vitamin K Antagonists Failed    8/12/2018 11:38 AM       Failed - INR is within goal in the past 6 weeks    Confirm INR is within goal in the past 6 weeks.     Recent Labs   Lab Test 08/01/18   INR  2.7*                      Passed - Recent (12 mo) or future (30 days) visit within the authorizing provider's specialty    Patient had office visit in the last 12 months or has a visit in the next 30 days with authorizing provider or within the authorizing provider's specialty.  See \"Patient Info\" tab in inbasket, or \"Choose Columns\" in Meds & Orders section of the refill encounter.           Passed - Patient is 18 years of age or older       Passed - Patient is not pregnant       Passed - No positive pregnancy on file in past 12 months          "

## 2018-08-13 NOTE — TELEPHONE ENCOUNTER
"Last Written Prescription Date:  06/21/2017  Last Fill Quantity: 90,  # refills: 3   Last office visit: 6/20/2018 with prescribing provider:  Dr. Blue   Future Office Visit:      Requested Prescriptions   Pending Prescriptions Disp Refills     CARTIA  MG 24 hr capsule [Pharmacy Med Name: CARTIA XT (DILTIAZEM) 120MG CP24] 90 capsule 3     Sig: TAKE ONE CAPSULE BY MOUTH EVERY DAY    Calcium Channel Blockers Protocol  Passed    8/12/2018 10:59 AM       Passed - Blood pressure under 140/90 in past 12 months    BP Readings from Last 3 Encounters:   07/13/18 126/82   06/20/18 118/72   04/11/18 122/60                Passed - Normal ALT in past 12 months    Recent Labs   Lab Test  06/20/18   1121   ALT  23            Passed - Recent (12 mo) or future (30 days) visit within the authorizing provider's specialty    Patient had office visit in the last 12 months or has a visit in the next 30 days with authorizing provider or within the authorizing provider's specialty.  See \"Patient Info\" tab in inbasket, or \"Choose Columns\" in Meds & Orders section of the refill encounter.           Passed - Patient is age 18 or older       Passed - No active pregnancy on record       Passed - Normal serum creatinine on file in past 12 months    Recent Labs   Lab Test  06/20/18   1121   CR  0.86            Passed - No positive pregnancy test in past 12 months        Bobbi Serrano MA 8/13/2018  1:07 PM        "

## 2018-08-14 ENCOUNTER — MYC MEDICAL ADVICE (OUTPATIENT)
Dept: FAMILY MEDICINE | Facility: CLINIC | Age: 68
End: 2018-08-14

## 2018-08-14 RX ORDER — DILTIAZEM HYDROCHLORIDE 120 MG/1
CAPSULE, EXTENDED RELEASE ORAL
Qty: 90 CAPSULE | Refills: 3 | Status: SHIPPED | OUTPATIENT
Start: 2018-08-14 | End: 2019-08-06

## 2018-08-14 RX ORDER — WARFARIN SODIUM 5 MG/1
TABLET ORAL
Qty: 60 TABLET | Refills: 3 | Status: SHIPPED | OUTPATIENT
Start: 2018-08-14 | End: 2019-08-06

## 2018-08-22 ENCOUNTER — TELEPHONE (OUTPATIENT)
Dept: FAMILY MEDICINE | Facility: CLINIC | Age: 68
End: 2018-08-22

## 2018-08-22 DIAGNOSIS — Z12.31 VISIT FOR SCREENING MAMMOGRAM: Primary | ICD-10-CM

## 2018-08-22 NOTE — TELEPHONE ENCOUNTER
Panel Management Review      Patient has the following on her problem list:     Hypertension   Last three blood pressure readings:  BP Readings from Last 3 Encounters:   07/13/18 126/82   06/20/18 118/72   04/11/18 122/60     Blood pressure: Passed    HTN Guidelines:  Age 18-59 BP range:  Less than 140/90  Age 60-85 with Diabetes:  Less than 140/90  Age 60-85 without Diabetes:  less than 150/90      Composite cancer screening  Chart review shows that this patient is due/due soon for the following Mammogram  Summary:    Patient is due/failing the following:   MAMMOGRAM    Action needed:   Patient needs to schedule a mammogram    Type of outreach:    Sent [employer] message.    Questions for provider review:    None                                                                                                                                    Cely Lobato CMA (AAMA)       Chart routed to Care Team

## 2018-09-05 ENCOUNTER — TELEPHONE (OUTPATIENT)
Dept: FAMILY MEDICINE | Facility: CLINIC | Age: 68
End: 2018-09-05

## 2018-09-05 NOTE — TELEPHONE ENCOUNTER
9/5/2018    Attempt 2    Contacted patient in regards to scheduling VIP mammogram  Message on voicemail     Patient is also due for -     Comments: Clinic made prior attempt(s)        Outreach   Marisol Piña

## 2018-09-07 ENCOUNTER — TELEPHONE (OUTPATIENT)
Dept: FAMILY MEDICINE | Facility: CLINIC | Age: 68
End: 2018-09-07

## 2018-09-07 NOTE — TELEPHONE ENCOUNTER
Reason for call:  Patient reporting a symptom    Symptom or request: Patient states she noticed a bump under the skin on upper arm, it's a little raised white on the top & red underneath, patient is wondering if she need to be seen, please advise.  Patient was advised no appts today, I offered a message to triage to see if it's urgent to be seen today, patient requested the message be sent to Dr Blue    Duration (how long have symptoms been present): notice this morning    Have you been treated for this before? No    Additional comments:     Phone Number patient can be reached at:  Cell number on file:    Telephone Information:   Mobile [phone]       Best Time:      Can we leave a detailed message on this number:  YES    Call taken on 9/7/2018 at 7:57 AM by Hope Herrera

## 2018-09-07 NOTE — TELEPHONE ENCOUNTER
"I have contacted pt. Per Dr. Blue, she can use a hot pack on it over the weekend and see if it changes. If it gets worse, she should call back next week to see if she can get in. Pt said she was able to get some \"white stuff\" out of it. She said it is smaller than a dime. She will see what happens over the weekend and will call back next week if needed. Cely Lobato CMA ([facility])    "

## 2018-09-12 ENCOUNTER — TELEPHONE (OUTPATIENT)
Dept: ANTICOAGULATION | Facility: CLINIC | Age: 68
End: 2018-09-12

## 2018-09-12 ENCOUNTER — ANTICOAGULATION THERAPY VISIT (OUTPATIENT)
Dept: ANTICOAGULATION | Facility: CLINIC | Age: 68
End: 2018-09-12
Payer: COMMERCIAL

## 2018-09-12 DIAGNOSIS — I48.91 ATRIAL FIBRILLATION, UNSPECIFIED TYPE (H): ICD-10-CM

## 2018-09-12 DIAGNOSIS — Z79.01 LONG-TERM (CURRENT) USE OF ANTICOAGULANTS: Primary | ICD-10-CM

## 2018-09-12 DIAGNOSIS — Z79.01 LONG-TERM (CURRENT) USE OF ANTICOAGULANTS: ICD-10-CM

## 2018-09-12 LAB — INR POINT OF CARE: 3.9 (ref 0.86–1.14)

## 2018-09-12 PROCEDURE — 99207 ZZC NO CHARGE NURSE ONLY: CPT

## 2018-09-12 PROCEDURE — 85610 PROTHROMBIN TIME: CPT | Mod: QW

## 2018-09-12 PROCEDURE — 36416 COLLJ CAPILLARY BLOOD SPEC: CPT

## 2018-09-12 NOTE — MR AVS SNAPSHOT
Latanya Padron   9/12/2018 8:15 AM   Anticoagulation Therapy Visit    Description:  68 year old female   Provider:  [facility]   Department:  [facility]           INR as of 9/12/2018     Today's INR 3.9!      Anticoagulation Summary as of 9/12/2018     INR goal 2.0-3.0   Today's INR 3.9!   Full warfarin instructions 9/12: Hold; Otherwise 5 mg on Mon; 2.5 mg all other days   Next INR check 9/26/2018    Indications   Long-term (current) use of anticoagulants [Z79.01] [Z79.01]  Atrial fibrillation (H) [I48.91]         Your next Anticoagulation Clinic appointment(s)     Sep 26, 2018  8:45 AM CDT   Anticoagulation Visit with PH ANTI COAG   [facility] ([facility])    [address] [place] [id number]   [phone]              Contact Numbers     Clinic Number:         September 2018 Details    Sun Mon Tue Wed Thu Fri Sat           1                 2               3               4               5               6               7               8                 9               10               11               12      Hold   See details      13      2.5 mg         14      2.5 mg         15      2.5 mg           16      2.5 mg         17      5 mg         18      2.5 mg         19      2.5 mg         20      2.5 mg         21      2.5 mg         22      2.5 mg           23      2.5 mg         24      5 mg         25      2.5 mg         26            27               28               29                 30                      Date Details   09/12 This INR check       Date of next INR:  9/26/2018         How to take your warfarin dose     To take:  2.5 mg Take 0.5 of a 5 mg tablet.    To take:  5 mg Take 1 of the 5 mg tablets.    Hold Do not take your warfarin dose. See the Details table to the right for additional instructions.

## 2018-09-12 NOTE — TELEPHONE ENCOUNTER
Please review and renew this patients INR referral, orders pending. Thank you!      Manolo Damian RN, BSN

## 2018-09-12 NOTE — PROGRESS NOTES
ANTICOAGULATION FOLLOW-UP CLINIC VISIT    Patient Name:  Latanya Padron  Date:  9/12/2018  Contact Type:  Face to Face    SUBJECTIVE:     Patient Findings     Positives Change in diet/appetite (Possibly hasn't been eating the same amount of greens as she normally would. She will increase this. ), Unexplained INR or factor level change           OBJECTIVE    INR Protime   Date Value Ref Range Status   09/12/2018 3.9 (A) 0.86 - 1.14 Final       ASSESSMENT / PLAN  INR assessment SUPRA    Recheck INR In: 2 WEEKS    INR Location Clinic      Anticoagulation Summary as of 9/12/2018     INR goal 2.0-3.0   Today's INR 3.9!   Warfarin maintenance plan 5 mg (5 mg x 1) on Mon; 2.5 mg (5 mg x 0.5) all other days   Full warfarin instructions 9/12: Hold; Otherwise 5 mg on Mon; 2.5 mg all other days   Weekly warfarin total 20 mg   Plan last modified Francine Andrew RN (7/26/2017)   Next INR check 9/26/2018   Target end date     Indications   Long-term (current) use of anticoagulants [Z79.01] [Z79.01]  Atrial fibrillation (H) [I48.91]         Anticoagulation Episode Summary     INR check location     Preferred lab     Send INR reminders to [facility] POOL    Comments 5 mg tabs, likes card, PM dose      Anticoagulation Care Providers     Provider Role Specialty Phone number    Glen Blue MD [facility] [phone]            See the Encounter Report to view Anticoagulation Flowsheet and Dosing Calendar (Go to Encounters tab in chart review, and find the Anticoagulation Therapy Visit)    Dosage adjustment made based on physician directed care plan.    Manolo Damian RN

## 2018-09-26 ENCOUNTER — ANTICOAGULATION THERAPY VISIT (OUTPATIENT)
Dept: ANTICOAGULATION | Facility: CLINIC | Age: 68
End: 2018-09-26
Payer: COMMERCIAL

## 2018-09-26 ENCOUNTER — HOSPITAL ENCOUNTER (OUTPATIENT)
Dept: MAMMOGRAPHY | Facility: CLINIC | Age: 68
Discharge: HOME | End: 2018-09-26
Attending: FAMILY MEDICINE | Admitting: FAMILY MEDICINE
Payer: MEDICARE

## 2018-09-26 DIAGNOSIS — Z79.01 LONG-TERM (CURRENT) USE OF ANTICOAGULANTS: ICD-10-CM

## 2018-09-26 DIAGNOSIS — I48.91 ATRIAL FIBRILLATION, UNSPECIFIED TYPE (H): ICD-10-CM

## 2018-09-26 DIAGNOSIS — Z12.31 VISIT FOR SCREENING MAMMOGRAM: ICD-10-CM

## 2018-09-26 LAB — INR POINT OF CARE: 2.8 (ref 0.86–1.14)

## 2018-09-26 PROCEDURE — 77063 BREAST TOMOSYNTHESIS BI: CPT

## 2018-09-26 PROCEDURE — 36416 COLLJ CAPILLARY BLOOD SPEC: CPT

## 2018-09-26 PROCEDURE — 85610 PROTHROMBIN TIME: CPT | Mod: QW

## 2018-09-26 PROCEDURE — 99207 ZZC NO CHARGE NURSE ONLY: CPT

## 2018-09-26 NOTE — PROGRESS NOTES
ANTICOAGULATION FOLLOW-UP CLINIC VISIT    Patient Name:  Latanya Padron  Date:  9/26/2018  Contact Type:  Face to Face    SUBJECTIVE:     Patient Findings     Positives No Problem Findings           OBJECTIVE    INR Protime   Date Value Ref Range Status   09/26/2018 2.8 (A) 0.86 - 1.14 Final       ASSESSMENT / PLAN  INR assessment THER    Recheck INR In: 4 WEEKS    INR Location Clinic      Anticoagulation Summary as of 9/26/2018     INR goal 2.0-3.0   Today's INR 2.8   Warfarin maintenance plan 5 mg (5 mg x 1) on Mon; 2.5 mg (5 mg x 0.5) all other days   Full warfarin instructions 5 mg on Mon; 2.5 mg all other days   Weekly warfarin total 20 mg   No change documented Manolo Damian RN   Plan last modified Francine Andrew RN (7/26/2017)   Next INR check 10/24/2018   Target end date     Indications   Long-term (current) use of anticoagulants [Z79.01] [Z79.01]  Atrial fibrillation (H) [I48.91]         Anticoagulation Episode Summary     INR check location     Preferred lab     Send INR reminders to [facility] POOL    Comments 5 mg tabs, likes card, PM dose      Anticoagulation Care Providers     Provider Role Specialty Phone number    Glen Blue MD [facility] [phone]            See the Encounter Report to view Anticoagulation Flowsheet and Dosing Calendar (Go to Encounters tab in chart review, and find the Anticoagulation Therapy Visit)    Dosage adjustment made based on physician directed care plan.    Manolo Damian RN

## 2018-09-26 NOTE — MR AVS SNAPSHOT
Latanya Padron   9/26/2018 8:45 AM   Anticoagulation Therapy Visit    Description:  68 year old female   Provider:  [facility]   Department:  [facility]           INR as of 9/26/2018     Today's INR 2.8      Anticoagulation Summary as of 9/26/2018     INR goal 2.0-3.0   Today's INR 2.8   Full warfarin instructions 5 mg on Mon; 2.5 mg all other days   Next INR check 10/24/2018    Indications   Long-term (current) use of anticoagulants [Z79.01] [Z79.01]  Atrial fibrillation (H) [I48.91]         Your next Anticoagulation Clinic appointment(s)     Oct 24, 2018  8:15 AM CDT   Anticoagulation Visit with DALIA ANTI CHERYL   [facility]    [address] [place] [id number] [phone]              Contact Numbers     Clinic Number:         September 2018 Details    Sun Mon Tue Wed Thu Fri Sat           1                 2               3               4               5               6               7               8                 9               10               11               12               13               14               15                 16               17               18               19               20               21               22                 23               24               25               26      2.5 mg   See details      27      2.5 mg         28      2.5 mg         29      2.5 mg           30      2.5 mg                Date Details   09/26 This INR check               How to take your warfarin dose     To take:  2.5 mg Take 0.5 of a 5 mg tablet.           October 2018 Details    Sun Mon Tue Wed Thu Fri Sat      1      5 mg         2      2.5 mg         3      2.5 mg         4      2.5 mg         5      2.5 mg         6      2.5 mg           7      2.5 mg         8      5 mg         9      2.5 mg         10      2.5 mg         11      2.5 mg         12      2.5 mg         13      2.5 mg           14      2.5 mg         15      5 mg          16      2.5 mg         17      2.5 mg         18      2.5 mg         19      2.5 mg         20      2.5 mg           21      2.5 mg         22      5 mg         23      2.5 mg         24            25               26               27                 28               29               30               31                   Date Details   No additional details    Date of next INR:  10/24/2018         How to take your warfarin dose     To take:  2.5 mg Take 0.5 of a 5 mg tablet.    To take:  5 mg Take 1 of the 5 mg tablets.

## 2018-10-09 ENCOUNTER — HOSPITAL ENCOUNTER (OUTPATIENT)
Dept: ULTRASOUND IMAGING | Facility: CLINIC | Age: 68
Discharge: HOME | End: 2018-10-09
Attending: FAMILY MEDICINE | Admitting: FAMILY MEDICINE
Payer: MEDICARE

## 2018-10-09 DIAGNOSIS — R92.8 ABNORMAL MAMMOGRAM: ICD-10-CM

## 2018-10-09 PROCEDURE — 76642 ULTRASOUND BREAST LIMITED: CPT | Mod: LT

## 2018-10-17 ENCOUNTER — TELEPHONE (OUTPATIENT)
Dept: FAMILY MEDICINE | Facility: CLINIC | Age: 68
End: 2018-10-17

## 2018-10-17 DIAGNOSIS — J43.9 PULMONARY EMPHYSEMA, UNSPECIFIED EMPHYSEMA TYPE (H): ICD-10-CM

## 2018-10-17 DIAGNOSIS — J20.9 ACUTE BRONCHITIS WITH COEXISTING CONDITION REQUIRING PROPHYLACTIC TREATMENT: ICD-10-CM

## 2018-10-17 DIAGNOSIS — J20.9 ACUTE BRONCHITIS, UNSPECIFIED ORGANISM: Primary | ICD-10-CM

## 2018-10-17 RX ORDER — PREDNISONE 20 MG/1
20 TABLET ORAL DAILY
Qty: 5 TABLET | Refills: 0 | Status: SHIPPED | OUTPATIENT
Start: 2018-10-17 | End: 2019-04-10

## 2018-10-17 NOTE — TELEPHONE ENCOUNTER
I do not want her to wait until Friday.  I have ordered antibiotics and prednisone.  Keep us informed. Have her tell Nam garcia from me.  She has been busy I am sure with his care and followup.  Glen Blue MD

## 2018-10-17 NOTE — TELEPHONE ENCOUNTER
Reason for call:  Patient reporting a symptom    Symptom or request: sinus sx/ cold sx/ cough     Duration (how long have symptoms been present): off & on 3 weeks     Have you been treated for this before? Yes    Additional comments: Pt is wondering if she needs more Prednisone due to her COPD? Cannot come in today, but could on Friday if you want to see her.     Phone Number patient can be reached at:  [phone]    Best Time:  Any     Can we leave a detailed message on this number:  YES    Call taken on 10/17/2018 at 8:06 AM by Nadja Harrison

## 2018-10-18 ENCOUNTER — TELEPHONE (OUTPATIENT)
Dept: FAMILY MEDICINE | Facility: CLINIC | Age: 68
End: 2018-10-18

## 2018-10-18 DIAGNOSIS — J43.9 PULMONARY EMPHYSEMA, UNSPECIFIED EMPHYSEMA TYPE (H): ICD-10-CM

## 2018-10-18 DIAGNOSIS — J20.9 ACUTE BRONCHITIS, UNSPECIFIED ORGANISM: Primary | ICD-10-CM

## 2018-10-18 RX ORDER — CEPHALEXIN 500 MG/1
500 CAPSULE ORAL 2 TIMES DAILY
Qty: 20 CAPSULE | Refills: 0 | Status: SHIPPED | OUTPATIENT
Start: 2018-10-18 | End: 2018-11-26

## 2018-10-18 NOTE — TELEPHONE ENCOUNTER
Reason for Call:  Medication or medication refill:    Do you use a [facility]?  Name of the pharmacy and phone number for the current request:  [facility] - [phone]    Name of the medication requested: Different antibiotic for bronchitis    Other request: Patient calling and states she was up all night throwing up from taking Augmentin that was prescribed. Patient is not going to take it. Please prescribe something else and per patients request note in her chart not to prescribe Augmentin again.     Can we leave a detailed message on this number? YES    Phone number patient can be reached at: Cell number on file:    Telephone Information:   Mobile [phone]       Best Time: any    Call taken on 10/18/2018 at 7:38 AM by Eulalia Oates

## 2018-10-24 ENCOUNTER — ANTICOAGULATION THERAPY VISIT (OUTPATIENT)
Dept: ANTICOAGULATION | Facility: CLINIC | Age: 68
End: 2018-10-24
Payer: COMMERCIAL

## 2018-10-24 DIAGNOSIS — I48.91 ATRIAL FIBRILLATION, UNSPECIFIED TYPE (H): ICD-10-CM

## 2018-10-24 LAB — INR POINT OF CARE: 3 (ref 0.86–1.14)

## 2018-10-24 PROCEDURE — 99207 ZZC NO CHARGE NURSE ONLY: CPT

## 2018-10-24 PROCEDURE — 85610 PROTHROMBIN TIME: CPT | Mod: QW

## 2018-10-24 PROCEDURE — 36416 COLLJ CAPILLARY BLOOD SPEC: CPT

## 2018-10-24 NOTE — PROGRESS NOTES
ANTICOAGULATION FOLLOW-UP CLINIC VISIT    Patient Name:  Latanya Padron  Date:  10/24/2018  Contact Type:  Face to Face    SUBJECTIVE:     Patient Findings     Positives Change in medications (prednisone done today. Keflex done in a week. ), Change in diet/appetite (pt got sick and had a decreased appetite/less greens last week. She will be going back to her normal amount of greens now. )           OBJECTIVE    INR Protime   Date Value Ref Range Status   10/24/2018 3.0 (A) 0.86 - 1.14 Final       ASSESSMENT / PLAN  INR assessment THER    Recheck INR In: 4 WEEKS    INR Location Clinic      Anticoagulation Summary as of 10/24/2018     INR goal 2.0-3.0   Today's INR 3.0   Warfarin maintenance plan 5 mg (5 mg x 1) on Mon; 2.5 mg (5 mg x 0.5) all other days   Full warfarin instructions 10/29: 2.5 mg; Otherwise 5 mg on Mon; 2.5 mg all other days   Weekly warfarin total 20 mg   Plan last modified Francine Andrew RN (7/26/2017)   Next INR check 11/21/2018   Target end date     Indications   Long-term (current) use of anticoagulants [Z79.01] [Z79.01]  Atrial fibrillation (H) [I48.91]         Anticoagulation Episode Summary     INR check location     Preferred lab     Send INR reminders to [facility] POOL    Comments 5 mg tabs, likes card, PM dose      Anticoagulation Care Providers     Provider Role Specialty Phone number    Glen Blue MD [facility] [phone]            See the Encounter Report to view Anticoagulation Flowsheet and Dosing Calendar (Go to Encounters tab in chart review, and find the Anticoagulation Therapy Visit)    Dosage adjustment made based on physician directed care plan.      Francine Andrew, LOVE

## 2018-10-24 NOTE — MR AVS SNAPSHOT
Latanya Padron   10/24/2018 8:15 AM   Anticoagulation Therapy Visit    Description:  68 year old female   Provider:  DALIA ANTI CHERYL   Department:  [facility]           INR as of 10/24/2018     Today's INR 3.0      Anticoagulation Summary as of 10/24/2018     INR goal 2.0-3.0   Today's INR 3.0   Full warfarin instructions 10/29: 2.5 mg; Otherwise 5 mg on Mon; 2.5 mg all other days   Next INR check 11/21/2018    Indications   Long-term (current) use of anticoagulants [Z79.01] [Z79.01]  Atrial fibrillation (H) [I48.91]         Your next Anticoagulation Clinic appointment(s)     Nov 21, 2018  8:15 AM CST   Anticoagulation Visit with PH ANTI COAG   [facility]    [address] [place] [address]   [phone]              Contact Numbers     Clinic Number:         October 2018 Details    Sun Mon Tue Wed Thu Fri Sat      1               2               3               4               5               6                 7               8               9               10               11               12               13                 14               15               16               17               18               19               20                 21               22               23               24      2.5 mg   See details      25      2.5 mg         26      2.5 mg         27      2.5 mg           28      2.5 mg         29      2.5 mg         30      2.5 mg         31      2.5 mg             Date Details   10/24 This INR check               How to take your warfarin dose     To take:  2.5 mg Take 0.5 of a 5 mg tablet.           November 2018 Details    Sun Mon Tue Wed Thu Fri Sat         1      2.5 mg         2      2.5 mg         3      2.5 mg           4      2.5 mg         5      5 mg         6      2.5 mg         7      2.5 mg         8      2.5 mg         9      2.5 mg         10      2.5 mg           11      2.5 mg         12      5 mg         13       2.5 mg         14      2.5 mg         15      2.5 mg         16      2.5 mg         17      2.5 mg           18      2.5 mg         19      5 mg         20      2.5 mg         21            22               23               24                 25               26               27               28               29               30                 Date Details   No additional details    Date of next INR:  11/21/2018         How to take your warfarin dose     To take:  2.5 mg Take 0.5 of a 5 mg tablet.    To take:  5 mg Take 1 of the 5 mg tablets.

## 2018-11-21 ENCOUNTER — ANTICOAGULATION THERAPY VISIT (OUTPATIENT)
Dept: ANTICOAGULATION | Facility: CLINIC | Age: 68
End: 2018-11-21
Payer: COMMERCIAL

## 2018-11-21 DIAGNOSIS — I48.91 ATRIAL FIBRILLATION, UNSPECIFIED TYPE (H): ICD-10-CM

## 2018-11-21 LAB — INR POINT OF CARE: 3 (ref 0.86–1.14)

## 2018-11-21 PROCEDURE — 85610 PROTHROMBIN TIME: CPT | Mod: QW

## 2018-11-21 PROCEDURE — 36416 COLLJ CAPILLARY BLOOD SPEC: CPT

## 2018-11-21 PROCEDURE — 99207 ZZC NO CHARGE NURSE ONLY: CPT

## 2018-11-21 NOTE — MR AVS SNAPSHOT
Latanya Padron   11/21/2018 8:15 AM   Anticoagulation Therapy Visit    Description:  68 year old female   Provider:  DALIA ANTI COAG   Department:  [facility]           INR as of 11/21/2018     Today's INR 3.0      Anticoagulation Summary as of 11/21/2018     INR goal 2.0-3.0   Today's INR 3.0   Full warfarin instructions 5 mg on Mon; 2.5 mg all other days   Next INR check 1/2/2019    Indications   Long-term (current) use of anticoagulants [Z79.01] [Z79.01]  Atrial fibrillation (H) [I48.91]         Your next Anticoagulation Clinic appointment(s)     Jan 02, 2019  8:15 AM CST   Anticoagulation [facility] with DALIA ANTI CHERYL   [facility]    [address] [place] [id number] [phone]              Contact Numbers     Clinic Number:         November 2018 Details    Sun Mon Tue Wed Thu Fri Sat         1               2               3                 4               5               6               7               8               9               10                 11               12               13               14               15               16               17                 18               19               20               21      2.5 mg   See details      22      2.5 mg         23      2.5 mg         24      2.5 mg           25      2.5 mg         26      5 mg         27      2.5 mg         28      2.5 mg         29      2.5 mg         30      2.5 mg           Date Details   11/21 This INR check               How to take your warfarin dose     To take:  2.5 mg Take 0.5 of a 5 mg tablet.    To take:  5 mg Take 1 of the 5 mg tablets.           December 2018 Details    Sun Mon Tue Wed Thu Fri Sat           1      2.5 mg           2      2.5 mg         3      5 mg         4      2.5 mg         5      2.5 mg         6      2.5 mg         7      2.5 mg         8      2.5 mg           9      2.5 mg         10      5 mg         11      2.5 mg         12      2.5  mg         13      2.5 mg         14      2.5 mg         15      2.5 mg           16      2.5 mg         17      5 mg         18      2.5 mg         19      2.5 mg         20      2.5 mg         21      2.5 mg         22      2.5 mg           23      2.5 mg         24      5 mg         25      2.5 mg         26      2.5 mg         27      2.5 mg         28      2.5 mg         29      2.5 mg           30      2.5 mg         31      5 mg               Date Details   No additional details            How to take your warfarin dose     To take:  2.5 mg Take 0.5 of a 5 mg tablet.    To take:  5 mg Take 1 of the 5 mg tablets.           January 2019 Details    Sun Mon Tue Wed Thu Fri Sat       1      2.5 mg         2            3               4               5                 6               7               8               9               10               11               12                 13               14               15               16               17               18               19                 20               21               22               23               24               25               26                 27               28               29               30               31                  Date Details   No additional details    Date of next INR:  1/2/2019         How to take your warfarin dose     To take:  2.5 mg Take 0.5 of a 5 mg tablet.

## 2018-11-26 ENCOUNTER — OFFICE VISIT (OUTPATIENT)
Dept: ORTHOPEDICS | Facility: CLINIC | Age: 68
End: 2018-11-26
Payer: COMMERCIAL

## 2018-11-26 VITALS
SYSTOLIC BLOOD PRESSURE: 112 MMHG | TEMPERATURE: 97.9 F | WEIGHT: 222.5 LBS | BODY MASS INDEX: 42.01 KG/M2 | DIASTOLIC BLOOD PRESSURE: 72 MMHG | HEIGHT: 61 IN

## 2018-11-26 DIAGNOSIS — M70.61 TROCHANTERIC BURSITIS OF RIGHT HIP: Primary | ICD-10-CM

## 2018-11-26 PROCEDURE — 20610 DRAIN/INJ JOINT/BURSA W/O US: CPT | Mod: RT | Performed by: ORTHOPAEDIC SURGERY

## 2018-11-26 PROCEDURE — 99212 OFFICE O/P EST SF 10 MIN: CPT | Mod: 25 | Performed by: ORTHOPAEDIC SURGERY

## 2018-11-26 ASSESSMENT — PAIN SCALES - GENERAL: PAINLEVEL: SEVERE PAIN (7)

## 2018-11-26 NOTE — PROGRESS NOTES
"Office Visit-Follow up      Chief Complaint: Latanya Padron is a 68 year old female who is being seen for   Chief Complaint   Patient presents with     RECHECK     rt hip lov 7/13/18 inj given          History of Present Illness:   Pain 7/10  Right hip injection in July helped a lot, would like another today  Also has right thigh pain, ana when her little dog jumps up on her      Past medical history reviewed and there is no significant change    Patient does not use Tobacco products.    REVIEW OF SYSTEMS  General: negative for, night sweats, dizziness, fatigue  Resp: No shortness of breath and no cough  CV: negative for chest pain, syncope or near-syncope  GI: negative for nausea, vomiting and diarrhea  : negative for dysuria and hematuria  Musculoskeletal: as above  Neurologic: negative for syncope   Hematologic: negative for bleeding disorder      Physical Exam:  Vitals: /72  Temp 97.9  F (36.6  C) (Oral)  Ht 1.549 m (5' 1\")  Wt 100.9 kg (222 lb 8 oz)  BMI 42.04 kg/m2  BMI= Body mass index is 42.04 kg/(m^2).  Constitutional: healthy, alert and no acute distress   Psychiatric: mentation appears normal and affect normal/bright  NEURO: no focal deficits  RESP: Normal with easy respirations and no use of accessory muscles to breathe, no audible wheezing or retractions  CV: No peripheral edema  SKIN: No erythema, rashes, excoriation, or breakdown. No evidence of infection.   JOINT/EXTREMITIES:right Hip Exam: Palpation: Tender:   right greater trochanter  Range of Motion:  Full ROM, both hips  Strength:  full strength  Right thigh - global quadriceps tenderness, no swelling, no knee pain    GAIT: antalgic      Diagnostic Modalities:  None today.        Impression: right Greater trochanteric bursitis  Right quadriceps muscle soreness    Plan:  All of the above pertinent physical exam and imaging modalities findings was reviewed with Latanya.                                          CONSERVATIVE CARE:  I " recommend conservative care for the patient to include NSAIDs, Tylenol, focused self directed physical therapy, steroid injections, activity modifications, chirp. Today I provided or dispensed chiro referral.                                        INJECTION PROCEDURE:  The patient was counseled about an  injection, including discussion of risks (including infection), contents of the injection, rationale for performing the injection, and expected benefits of the injection. The skin was prepped with alcohol and betadine and then utilizing sterile technique an injection of the right hip trochanteric bursa was performed. The injection consisted 1ml of Kenalog (40mg per 1ml) with 3ml 1% lidocaine plain. The patient tolerated the injection well, and there were no complications. The injection site was covered with a Band-Aid. The injection was performed by Nadja Hinson M.D.    BP Readings from Last 1 Encounters:   11/26/18 112/72       BP noted to be well controlled today in office.     Return to clinic PRN, or sooner as needed for changes.  Re-x-ray on return: No    Nadja Hinson M.D.

## 2018-11-26 NOTE — MR AVS SNAPSHOT
After Visit Summary   11/26/2018    Latanya Padron    MRN: [id number]           Patient Information     Date Of Birth          1950        Visit Information        Provider Department      11/26/2018 1:30 PM Nadja Hinson MD [facility]         Follow-ups after your visit        Your next 10 appointments already scheduled     Dec 10, 2018  3:45 PM CST   Office Visit with Glen Blue MD   [facility] ([address] [address] [phone]           Bring a current list of meds and any records pertaining to this visit. For Physicals, please bring immunization records and any forms needing to be filled out. Please arrive 10 minutes early to complete paperwork.            Jan 02, 2019  8:15 AM CST   Anticoagulation Visit with PH ANTI COAG   [facility] ([address] [id number] [phone]              Who to contact     If you have questions or need follow up information about today's clinic visit or your schedule please contact [facility] directly at [phone].  Normal or non-critical lab and imaging results will be communicated to you by MyChart, letter or phone within 4 business days after the clinic has received the results. If you do not hear from us within 7 days, please contact the clinic through [employer] or phone. If you have a critical or abnormal lab result, we will notify you by phone as soon as possible.  Submit refill requests through [employer] or call your pharmacy and they will forward the refill request to us. Please allow 3 business days for your refill to be completed.          Additional Information About Your Visit        MyChart Information     [employer] gives you secure access to your electronic health record. If you see a primary care provider, you can also send messages to your care team and make  "appointments. If you have questions, please call your primary care clinic.  If you do not have a primary care provider, please call [phone] and they will assist you.        Care EveryWhere ID     This is your Care EveryWhere ID. This could be used by other organizations to access your [place] medical records  [id number]        Your Vitals Were     Temperature Height BMI (Body Mass Index)             97.9  F (36.6  C) (Oral) 1.549 m (5' 1\") 42.04 kg/m2          Blood Pressure from Last 3 Encounters:   11/26/18 112/72   07/13/18 126/82   06/20/18 118/72    Weight from Last 3 Encounters:   11/26/18 100.9 kg (222 lb 8 oz)   07/13/18 104.3 kg (230 lb)   06/20/18 104.6 kg (230 lb 8 oz)              Today, you had the following     No orders found for display       Primary Care Provider Office Phone # Fax #    Glen Darci Blue [phone]       [address] [place] [address]        Equal Access to Services     [facility]: Hadii lucia hou hadasho Sogrecia, waaxda luqadaha, qaybta kaalmada jerry, edin velasquez. So [facility] [phone].    ATENCIÓN: Si habla español, tiene a de los santos disposición servicios gratuitos de asistencia lingüística. Gayatri al [phone].    We comply with applicable federal civil rights laws and [place] laws. We do not discriminate on the basis of race, color, national origin, age, disability, sex, sexual orientation, or gender identity.            Thank you!     Thank you for choosing [facility]  for your care. Our goal is always to provide you with excellent care. Hearing back from our patients is one way we can continue to improve our services. Please take a few minutes to complete the written survey that you may receive in the mail after your visit with us. Thank you!             Your Updated Medication List - Protect others around you: Learn how to safely use, store and throw away your medicines at " www.disposemymeds.org.          This list is accurate as of 11/26/18  2:04 PM.  Always use your most recent med list.                   Brand Name Dispense Instructions for use Diagnosis    * albuterol 108 (90 Base) MCG/ACT inhaler    PROAIR HFA/PROVENTIL HFA/VENTOLIN HFA    2 Inhaler    1-2 puffs by mouth every 2-4 hours as needed    Pulmonary emphysema, unspecified emphysema type (H)       * albuterol (2.5 MG/3ML) 0.083% neb solution    PROVENTIL    75 mL    Take  by nebulization. 1 NEB EVERY 4-6 HOURS AS NEEDED        atorvastatin 10 MG tablet    LIPITOR    90 tablet    TAKE ONE TABLET BY MOUTH EVERY DAY    Hyperlipidemia LDL goal <130       B-12 1000 MCG Tbcr     100 tablet    Take 1,000 mcg by mouth daily        calcium 500 + D 500-400 MG-UNIT Tabs   Generic drug:  Calcium Carb-Cholecalciferol     180 tablet    Take 1 tablet by mouth 2 times daily        CARTIA  MG 24 hr capsule   Generic drug:  diltiazem     90 capsule    TAKE ONE CAPSULE BY MOUTH EVERY DAY    Hypertension, goal below 140/90, Chronic atrial fibrillation (H)       cholecalciferol 400 units tablet    Vitamin D    100 tablet    TAKE ONE TABLET BY MOUTH EVERY DAY    Osteoporosis       hydrochlorothiazide 25 MG tablet    HYDRODIURIL    90 tablet    TAKE ONE TABLET BY MOUTH EVERY DAY    Hypertension, goal below 140/90       lisinopril 2.5 MG tablet    PRINIVIL/Zestril    90 tablet    TAKE ONE TABLET BY MOUTH EVERY DAY    Hypertension, goal below 140/90       magnesium 250 MG tablet     30 tablet    Take 1 tablet by mouth daily        mometasone-formoterol 200-5 MCG/ACT inhaler    DULERA    13 g    INHALE 2 PUFFS BY MOUTH TWO TIMES A DAY    Pulmonary emphysema, unspecified emphysema type (H)       omega 3 1000 MG Caps     90 capsule    Take 1 g by mouth daily        order for DME      Equipment being ordered: Oxygen @ 2 liters with exertion    COPD (chronic obstructive pulmonary disease) (H)       predniSONE 20 MG tablet    DELTASONE    5  tablet    Take 1 tablet (20 mg) by mouth daily    Pulmonary emphysema, unspecified emphysema type (H), Acute bronchitis with coexisting condition requiring prophylactic treatment, Acute bronchitis, unspecified organism       trospium 20 MG tablet    SANCTURA     Take 20 mg by mouth        umeclidinium 62.5 MCG/INH inhaler    INCRUSE ELLIPTA    1 Inhaler    Inhale 1 puff into the lungs daily    Pulmonary emphysema, unspecified emphysema type (H)       venlafaxine 37.5 MG 24 hr capsule    EFFEXOR-XR    90 capsule    TAKE ONE CAPSULE BY MOUTH EVERY DAY WITH FOOD    Major depression in complete remission (H)       warfarin 5 MG tablet    JANTOVEN    60 tablet    Take 5 mg Mon and 2.5 mg all other days of the week, or as directed by coumadin clinic    Long-term (current) use of anticoagulants, Chronic atrial fibrillation (H)       * Notice:  This list has 2 medication(s) that are the same as other medications prescribed for you. Read the directions carefully, and ask your doctor or other care provider to review them with you.

## 2018-11-26 NOTE — LETTER
"    11/26/2018         RE: Latanya Padron  [id number] [facility] [address]        Dear Colleague,    Thank you for referring your patient, Latnaya Padron, to the [facility]. Please see a copy of my visit note below.    Office Visit-Follow up      Chief Complaint: Latanya Padron is a 68 year old female who is being seen for   Chief Complaint   Patient presents with     RECHECK     rt hip lov 7/13/18 inj given          History of Present Illness:   Pain 7/10  Right hip injection in July helped a lot, would like another today  Also has right thigh pain, ana when her little dog jumps up on her      Past medical history reviewed and there is no significant change    Patient does not use Tobacco products.    REVIEW OF SYSTEMS  General: negative for, night sweats, dizziness, fatigue  Resp: No shortness of breath and no cough  CV: negative for chest pain, syncope or near-syncope  GI: negative for nausea, vomiting and diarrhea  : negative for dysuria and hematuria  Musculoskeletal: as above  Neurologic: negative for syncope   Hematologic: negative for bleeding disorder      Physical Exam:  Vitals: /72  Temp 97.9  F (36.6  C) (Oral)  Ht 1.549 m (5' 1\")  Wt 100.9 kg (222 lb 8 oz)  BMI 42.04 kg/m2  BMI= Body mass index is 42.04 kg/(m^2).  Constitutional: healthy, alert and no acute distress   Psychiatric: mentation appears normal and affect normal/bright  NEURO: no focal deficits  RESP: Normal with easy respirations and no use of accessory muscles to breathe, no audible wheezing or retractions  CV: No peripheral edema  SKIN: No erythema, rashes, excoriation, or breakdown. No evidence of infection.   JOINT/EXTREMITIES:right Hip Exam: Palpation: Tender:   right greater trochanter  Range of Motion:  Full ROM, both hips  Strength:  full strength  Right thigh - global quadriceps tenderness, no swelling, no knee pain    GAIT: antalgic      Diagnostic Modalities:  None today.        Impression: " right Greater trochanteric bursitis  Right quadriceps muscle soreness    Plan:  All of the above pertinent physical exam and imaging modalities findings was reviewed with Latanya.                                          CONSERVATIVE CARE:  I recommend conservative care for the patient to include NSAIDs, Tylenol, focused self directed physical therapy, steroid injections, activity modifications, chirp. Today I provided or dispensed chiro referral.                                        INJECTION PROCEDURE:  The patient was counseled about an  injection, including discussion of risks (including infection), contents of the injection, rationale for performing the injection, and expected benefits of the injection. The skin was prepped with alcohol and betadine and then utilizing sterile technique an injection of the right hip trochanteric bursa was performed. The injection consisted 1ml of Kenalog (40mg per 1ml) with 3ml 1% lidocaine plain. The patient tolerated the injection well, and there were no complications. The injection site was covered with a Band-Aid. The injection was performed by Nadja Hinson M.D.    BP Readings from Last 1 Encounters:   11/26/18 112/72       BP noted to be well controlled today in office.     Return to clinic PRN, or sooner as needed for changes.  Re-x-ray on return: No    Nadja Hinson M.D.          Again, thank you for allowing me to participate in the care of your patient.        Sincerely,        Nadja Hinson MD

## 2018-12-07 ENCOUNTER — TELEPHONE (OUTPATIENT)
Dept: ORTHOPEDICS | Facility: CLINIC | Age: 68
End: 2018-12-07

## 2018-12-07 DIAGNOSIS — M70.61 TROCHANTERIC BURSITIS OF RIGHT HIP: Primary | ICD-10-CM

## 2018-12-07 DIAGNOSIS — M22.2X1 PATELLOFEMORAL PAIN SYNDROME OF RIGHT KNEE: ICD-10-CM

## 2018-12-07 NOTE — TELEPHONE ENCOUNTER
Reason for Call: Request for an order or referral:    Order or referral being requested: patient is calling because she has had phone calls from the [facility] to schedule but they have it down as the right hip but she says that it's really the muscle that is on the upper leg that she's having trouble with. [facility] needs this changed in the order if this is in fact what they will be treating her for.    Date needed: as soon as possible    Has the patient been seen by the PCP for this problem? YES    Additional comments: none    Phone number Patient can be reached at:  Home number on file (home) [phone]     Best Time:  any    Can we leave a detailed message on this number?  YES    Call taken on 12/7/2018 at 12:29 PM by Suri Douglass

## 2018-12-31 ENCOUNTER — OFFICE VISIT (OUTPATIENT)
Dept: FAMILY MEDICINE | Facility: CLINIC | Age: 68
End: 2018-12-31
Payer: COMMERCIAL

## 2018-12-31 VITALS
TEMPERATURE: 96.6 F | DIASTOLIC BLOOD PRESSURE: 70 MMHG | WEIGHT: 220.6 LBS | SYSTOLIC BLOOD PRESSURE: 124 MMHG | BODY MASS INDEX: 41.68 KG/M2 | OXYGEN SATURATION: 91 % | RESPIRATION RATE: 18 BRPM | HEART RATE: 100 BPM

## 2018-12-31 DIAGNOSIS — I10 HYPERTENSION, GOAL BELOW 140/90: ICD-10-CM

## 2018-12-31 DIAGNOSIS — Z23 NEED FOR PROPHYLACTIC VACCINATION AND INOCULATION AGAINST INFLUENZA: ICD-10-CM

## 2018-12-31 DIAGNOSIS — J43.9 PULMONARY EMPHYSEMA, UNSPECIFIED EMPHYSEMA TYPE (H): Primary | ICD-10-CM

## 2018-12-31 DIAGNOSIS — F32.5 MAJOR DEPRESSION IN COMPLETE REMISSION (H): ICD-10-CM

## 2018-12-31 DIAGNOSIS — Z23 NEED FOR PROPHYLACTIC VACCINATION WITH COMBINED VACCINE: ICD-10-CM

## 2018-12-31 PROCEDURE — 90662 IIV NO PRSV INCREASED AG IM: CPT | Performed by: FAMILY MEDICINE

## 2018-12-31 PROCEDURE — G0008 ADMIN INFLUENZA VIRUS VAC: HCPCS | Performed by: FAMILY MEDICINE

## 2018-12-31 PROCEDURE — G0009 ADMIN PNEUMOCOCCAL VACCINE: HCPCS | Performed by: FAMILY MEDICINE

## 2018-12-31 PROCEDURE — 90732 PPSV23 VACC 2 YRS+ SUBQ/IM: CPT | Performed by: FAMILY MEDICINE

## 2018-12-31 PROCEDURE — 99214 OFFICE O/P EST MOD 30 MIN: CPT | Mod: 25 | Performed by: FAMILY MEDICINE

## 2018-12-31 ASSESSMENT — ANXIETY QUESTIONNAIRES
3. WORRYING TOO MUCH ABOUT DIFFERENT THINGS: NOT AT ALL
6. BECOMING EASILY ANNOYED OR IRRITABLE: NOT AT ALL
2. NOT BEING ABLE TO STOP OR CONTROL WORRYING: NOT AT ALL
GAD7 TOTAL SCORE: 0
IF YOU CHECKED OFF ANY PROBLEMS ON THIS QUESTIONNAIRE, HOW DIFFICULT HAVE THESE PROBLEMS MADE IT FOR YOU TO DO YOUR WORK, TAKE CARE OF THINGS AT HOME, OR GET ALONG WITH OTHER PEOPLE: NOT DIFFICULT AT ALL
1. FEELING NERVOUS, ANXIOUS, OR ON EDGE: NOT AT ALL
7. FEELING AFRAID AS IF SOMETHING AWFUL MIGHT HAPPEN: NOT AT ALL
5. BEING SO RESTLESS THAT IT IS HARD TO SIT STILL: NOT AT ALL

## 2018-12-31 ASSESSMENT — PATIENT HEALTH QUESTIONNAIRE - PHQ9
SUM OF ALL RESPONSES TO PHQ QUESTIONS 1-9: 0
5. POOR APPETITE OR OVEREATING: NOT AT ALL

## 2018-12-31 ASSESSMENT — PAIN SCALES - GENERAL: PAINLEVEL: NO PAIN (0)

## 2018-12-31 NOTE — PATIENT INSTRUCTIONS
Check to make sure Shingrix is covered with new insurance. This is a series of 2 vaccines for shingles.

## 2018-12-31 NOTE — PROGRESS NOTES
Injectable Influenza Immunization Documentation    1.  Is the person to be vaccinated sick today?  Yes    2. Does the person to be vaccinated have an allergy to a component   of the vaccine?   No  Egg Allergy Algorithm Link    3. Has the person to be vaccinated ever had a serious reaction   to influenza vaccine in the past?   No    4. Has the person to be vaccinated ever had Guillain-Barré syndrome?   No    Form completed by Cely Lobato CMA ([facility])

## 2018-12-31 NOTE — NURSING NOTE
Prior to injection, verified patient identity using patient's name and date of birth.  Due to injection administration, patient instructed to remain in clinic for 15 minutes  afterwards, and to report any adverse reaction to me immediately.    Screening Questionnaire for Adult Immunization    Are you sick today?   No   Do you have allergies to medications, food, a vaccine component or latex?   Yes   Have you ever had a serious reaction after receiving a vaccination?   No   Do you have a long-term health problem with heart disease, lung disease, asthma, kidney disease, metabolic disease (e.g. diabetes), anemia, or other blood disorder?   No   Do you have cancer, leukemia, HIV/AIDS, or any other immune system problem?   No   In the past 3 months, have you taken medications that affect  your immune system, such as prednisone, other steroids, or anticancer drugs; drugs for the treatment of rheumatoid arthritis, Crohn s disease, or psoriasis; or have you had radiation treatments?   No   Have you had a seizure, or a brain or other nervous system problem?   No   During the past year, have you received a transfusion of blood or blood     products, or been given immune (gamma) globulin or antiviral drug?   No   For women: Are you pregnant or is there a chance you could become        pregnant during the next month?   No   Have you received any vaccinations in the past 4 weeks?   No     Immunization questionnaire was positive for at least one answer. Pt okay for vaccines per Dr. Blue.        Per orders of Dr. Blue, injection of Pneumovax 23 and Influenza given by Cely Lobato. Patient instructed to remain in clinic for 15 minutes afterwards, and to report any adverse reaction to me immediately.       Screening performed by Cely Lobato on 12/31/2018 at 4:05 PM.

## 2018-12-31 NOTE — PROGRESS NOTES
SUBJECTIVE:   Latanya Padron is a 68 year old female who presents to clinic today for the following health issues:    Depression Followup    Status since last visit: Stable doing okay all things considered.  With her 's recent health crisis this was a challenge.    See PHQ-9 for current symptoms.  Other associated symptoms: None    Complicating factors:   Significant life event:  Yes,  had a stroke   Current substance abuse:  None  Anxiety or Panic symptoms:  Yes-does not limit her.  Also her daughter is moved far away and she will miss having her close    PHQ 6/21/2017 12/11/2017 6/20/2018   PHQ-9 Total Score 1 0 1   Q9: Suicide Ideation Not at all Not at all Not at all     Not at risk for suicide  PHQ-9  English  PHQ-9   Any Language  Suicide Assessment Five-step Evaluation and Treatment (SAFE-T)  COPD Follow-Up    Symptoms are currently: stable    Current fatigue or dyspnea with ambulation: stable     Shortness of breath: stable    Increased or change in Cough/Sputum: No    Fever(s): No    Baseline ambulation without stopping to rest:  4 blocks. Able to walk up 0 flights of stairs without stopping to rest.    Any ER/UC or hospital admissions since your last visit? No     History   Smoking Status     Former Smoker     Packs/day: 1.00     Years: 30.00     Quit date: 10/25/2005   Smokeless Tobacco     Never Used     Lab Results   Component Value Date    FEV1 0.50 04/05/2010    PGA2MMC 0.33 04/05/2010         A-Fib Recheck      Amount of exercise or physical activity: None    Problems taking medications regularly: No    Medication side effects: none    Diet: regular (no restrictions)            Problem list and histories reviewed & adjusted, as indicated.  Additional history: as documented    BP Readings from Last 3 Encounters:   12/31/18 124/70   11/26/18 112/72   07/13/18 126/82    Wt Readings from Last 3 Encounters:   12/31/18 100.1 kg (220 lb 9.6 oz)   11/26/18 100.9 kg (222 lb 8 oz)   07/13/18  "104.3 kg (230 lb)                  Labs reviewed in EPIC    Reviewed and updated as needed this visit by clinical staff  Tobacco  Allergies  Meds  Med Hx  Surg Hx  Fam Hx  Soc Hx      Reviewed and updated as needed this visit by Provider         ROS:  Constitutional, HEENT, cardiovascular, pulmonary, gi and gu systems are negative, except as otherwise noted.  Will be short of breath when she walks upstairs from going down stairs to do laundry.  It is especially prominent if she is carrying laundry.  She and her  are working on getting a washer dryer upstairs to keep the unit downstairs for bigger loads.    OBJECTIVE:     /70   Pulse 100   Temp 96.6  F (35.9  C) (Temporal)   Resp 18   Wt 100.1 kg (220 lb 9.6 oz)   SpO2 91%   BMI 41.68 kg/m    Body mass index is 41.68 kg/m .  GENERAL: healthy, alert and no distress  EYES: Eyes grossly normal to inspection, PERRL and conjunctivae and sclerae normal  NECK: no adenopathy, no asymmetry, masses, or scars and thyroid normal to palpation  RESP: lungs clear to auscultation - no rales, rhonchi or wheezes  CV: regular rate and rhythm, normal S1 S2, no S3 or S4, no murmur, click or rub, no peripheral edema and peripheral pulses strong  ABDOMEN: soft, nontender, no hepatosplenomegaly, no masses and bowel sounds normal  MS: no gross musculoskeletal defects noted, no edema  NEURO: Normal strength and tone, sensory exam grossly normal and mentation intact  PSYCH: mentation appears normal, affect normal/bright    Diagnostic Test Results:  none     ASSESSMENT/PLAN:           Tobacco Cessation:   reports that she quit smoking about 13 years ago. She has a 30.00 pack-year smoking history. she has never used smokeless tobacco.      BMI:   Estimated body mass index is 41.68 kg/m  as calculated from the following:    Height as of 11/26/18: 1.549 m (5' 1\").    Weight as of this encounter: 100.1 kg (220 lb 9.6 oz).   Weight management plan: Discussed healthy diet and " exercise guidelines      1. Pulmonary emphysema, unspecified emphysema type (H)  Currently doing very well and will just watch and observe.  She will keep us informed of any change in breathing.    2. Hypertension, goal below 140/90  Well-controlled    3. 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    4. Need for prophylactic vaccination with combined vaccine  Updated vaccine  - Pneumococcal vaccine 23 valent PPSV23  (Pneumovax) [70638]  - ADMIN PNEUMOVAX VACCINE (For MEDICARE Patients ONLY) []    5. Need for prophylactic vaccination and inoculation against influenza  Updated vaccine  - FLU VACCINE, INCREASED ANTIGEN, PRESV FREE, AGE 65+ [85293]  - ADMIN INFLUENZA (For MEDICARE Patients ONLY) []    MEDICATIONS:  Continue current medications without change  Patient Instructions   Check to make sure Shingrix is covered with new insurance. This is a series of 2 vaccines for shingles.       Time spent with greater than 50% spent in discussion, making the plan, or filling out paperwork with patient for illness/treatments was 25 minutes or more.    Glen Blue MD  [facility]

## 2019-01-01 ASSESSMENT — ANXIETY QUESTIONNAIRES: GAD7 TOTAL SCORE: 0

## 2019-01-02 ENCOUNTER — ANTICOAGULATION THERAPY VISIT (OUTPATIENT)
Dept: ANTICOAGULATION | Facility: CLINIC | Age: 69
End: 2019-01-02
Payer: COMMERCIAL

## 2019-01-02 DIAGNOSIS — I48.91 ATRIAL FIBRILLATION, UNSPECIFIED TYPE (H): ICD-10-CM

## 2019-01-02 LAB — INR POINT OF CARE: 2.3 (ref 0.86–1.14)

## 2019-01-02 PROCEDURE — 36416 COLLJ CAPILLARY BLOOD SPEC: CPT

## 2019-01-02 PROCEDURE — 85610 PROTHROMBIN TIME: CPT | Mod: QW

## 2019-01-02 PROCEDURE — 99207 ZZC NO CHARGE NURSE ONLY: CPT

## 2019-01-02 NOTE — PROGRESS NOTES
ANTICOAGULATION FOLLOW-UP CLINIC VISIT    Patient Name:  Latanya Padron  Date:  2019  Contact Type:  Face to Face    SUBJECTIVE:     Patient Findings     Positives:   Med error (pt has been taking 2.5 mg daily )           OBJECTIVE    INR Protime   Date Value Ref Range Status   2019 2.3 (A) 0.86 - 1.14 Final       ASSESSMENT / PLAN  INR assessment THER    Recheck INR In: 6 WEEKS    INR Location Clinic      Anticoagulation Summary  As of 2019    INR goal:   2.0-3.0   TTR:   77.4 % (2.8 y)   INR used for dosin.3 (2019)   Warfarin maintenance plan:   5 mg (5 mg x 1) every Mon; 2.5 mg (5 mg x 0.5) all other days   Full warfarin instructions:   5 mg every Mon; 2.5 mg all other days   Weekly warfarin total:   20 mg   No change documented:   Francine Andrew RN   Plan last modified:   Francine Andrew RN (2017)   Next INR check:   2019   Target end date:       Indications    Long-term (current) use of anticoagulants [Z79.01] [Z79.01]  Atrial fibrillation (H) [I48.91]             Anticoagulation Episode Summary     INR check location:       Preferred lab:       Send INR reminders to:   BENIGNO ALVAREZ    Comments:   5 mg tabs, likes card, PM dose      Anticoagulation Care Providers     Provider Role Specialty Phone number    Glen Blue MD [facility] [phone]            See the Encounter Report to view Anticoagulation Flowsheet and Dosing Calendar (Go to Encounters tab in chart review, and find the Anticoagulation Therapy Visit)    Dosage adjustment made based on physician directed care plan.      Francine Andrew RN

## 2019-01-11 DIAGNOSIS — F32.5 MAJOR DEPRESSION IN COMPLETE REMISSION (H): ICD-10-CM

## 2019-01-14 RX ORDER — VENLAFAXINE HYDROCHLORIDE 37.5 MG/1
CAPSULE, EXTENDED RELEASE ORAL
Qty: 90 CAPSULE | Refills: 1 | Status: SHIPPED | OUTPATIENT
Start: 2019-01-14 | End: 2019-06-26 | Stop reason: DRUGHIGH

## 2019-01-14 NOTE — TELEPHONE ENCOUNTER
"Requested Prescriptions   Pending Prescriptions Disp Refills     venlafaxine (EFFEXOR-XR) 37.5 MG 24 hr capsule [Pharmacy Med Name: VENLAFAXINE HCL ER 37.5MG CP24] 90 capsule 1    Last Written Prescription Date:  7/3/18  Last Fill Quantity: 90,  # refills: 1   Last office visit: 12/31/2018 with prescribing provider:     Future Office Visit:   Sig: TAKE ONE CAPSULE BY MOUTH EVERY DAY WITH FOOD    Serotonin-Norepinephrine Reuptake Inhibitors  Passed - 1/11/2019 12:50 PM       Passed - Blood pressure under 140/90 in past 12 months    BP Readings from Last 3 Encounters:   12/31/18 124/70   11/26/18 112/72   07/13/18 126/82          Passed - PHQ-9 score of less than 5 in past 6 months    Please review last PHQ-9 score.   PHQ-9 score:    PHQ-9 SCORE 12/31/2018   PHQ-9 Total Score -   PHQ-9 Total Score 0          Passed - Medication is active on med list       Passed - Patient is age 18 or older       Passed - No active pregnancy on record       Passed - Normal serum creatinine on file in past 12 months    Recent Labs   Lab Test 06/20/18  1121   CR 0.86          Passed - No positive pregnancy test in past 12 months       Passed - Recent (6 mo) or future (30 days) visit within the authorizing provider's specialty    Patient had office visit in the last 6 months or has a visit in the next 30 days with authorizing provider or within the authorizing provider's specialty.  See \"Patient Info\" tab in inbasket, or \"Choose Columns\" in Meds & Orders section of the refill encounter.          Prescription approved per [facility] [place] [facility].    Flory Hidalgo RN      "

## 2019-02-01 ENCOUNTER — THERAPY VISIT (OUTPATIENT)
Dept: CHIROPRACTIC MEDICINE | Facility: CLINIC | Age: 69
End: 2019-02-01
Payer: COMMERCIAL

## 2019-02-01 DIAGNOSIS — M99.03 SEGMENTAL DYSFUNCTION OF LUMBAR REGION: ICD-10-CM

## 2019-02-01 DIAGNOSIS — M99.04 SEGMENTAL DYSFUNCTION OF SACRAL REGION: Primary | ICD-10-CM

## 2019-02-01 DIAGNOSIS — M54.41 BILATERAL LOW BACK PAIN WITH RIGHT-SIDED SCIATICA: ICD-10-CM

## 2019-02-01 PROBLEM — M99.02 SEGMENTAL DYSFUNCTION OF THORACIC REGION: Status: ACTIVE | Noted: 2019-02-01

## 2019-02-01 PROCEDURE — 97035 APP MDLTY 1+ULTRASOUND EA 15: CPT | Mod: GA | Performed by: CHIROPRACTOR

## 2019-02-01 PROCEDURE — 99203 OFFICE O/P NEW LOW 30 MIN: CPT | Mod: 25 | Performed by: CHIROPRACTOR

## 2019-02-01 PROCEDURE — 98940 CHIROPRACT MANJ 1-2 REGIONS: CPT | Mod: AT | Performed by: CHIROPRACTOR

## 2019-02-01 NOTE — PROGRESS NOTES
"Initial Chiropractic Clinic Visit    PCP: Glen Blue Nereida is a 68 year old female who is seen  in consultation at the request of Dr. Sravan MD presenting with right hip pain and knee pain \"where she has arthritis.\" When she touches her anterior thigh it is very painful. She is here for \"heat and massage\" today, neither of which I do. She does state that she has had a bit of a lower back ache the last few weeks. She has not noticed antalgic gait, but she states that she has been sitting more because of the extreme cold. She rates her current pain 3/10, and describes it as burning. She is not sleeping well at night, but hasn't for years. She is not using ice or heat, but does use rapid release Tylenol occasionally, which seems to help. She states that her injection helped in November in her hip, but not what she is feeling in her back or leg. Her ADLs are unaffected buy her stamina is not as long with her COPD. She denies numbness or tingling into her right LE. She has seen Dr. Green in the past, but it has been many years.         Injury: None    Location of Pain: right anterior thigh, across lower back a  Duration of Pain: couple weeks   Rating of Pain at worst: 5/10  Rating of Pain Currently: 3/10  Symptoms are better with: Nothing  Symptoms are worse with: standing too long  Additional Features: right anterior thigh pain      Health History  as reported by the patient:    How does the patient rate their own health:   Good    Current or past medical history:   Depression - controlled, High blood pressure - controlled with medication, and Overweight, COPD    Medical allergies:  Augmentin, Demerol    Past Traumas/Surgeries:  Hysterectomy, cyst removed from right breast    Family History:  Cancer, Alzheimer's, blood disorder    Medications:  Anti-depressants, Cardiac, Heparin/Coumadin and High blood pressure    Occupation:  Retired; dental assistant and office work     Primary job tasks: "   General housework    Barriers as home/work:   Limited by COPD        Review of Systems  Musculoskeletal: as above  Remainder of review of systems is negative including constitutional, CV, pulmonary, GI, Skin and Neurologic except as noted in HPI or medical history.    Past Medical History:   Diagnosis Date     Mixed hyperlipidemia      Past Surgical History:   Procedure Laterality Date     C APPENDECTOMY,W OTHR PROC       C  DELIVERY ONLY           C LIGATE FALLOPIAN TUBE       C NONSPECIFIC PROCEDURE      Exploratory laparotomy with resection of infarcted segment of omentum and minor lysis of adhesions     C NONSPECIFIC PROCEDURE      Removal of hemorrhagic corpus luteum cyst     C VAGINAL HYSTERECTOMY       COLONOSCOPY  06/21/10     [facility] BIOPSY OF BREAST, OPEN INCISIONAL        CORRECT BUNION,METATARSAL OSTEOTOMY        LAPAROSCOPY, SURGICAL; CHOLECYSTECTOMY  08/04/10      SLING OPERATION FOR STRESS INCONTINENCE  2007     HERNIORRHAPHY INCISIONAL (LOCATION)  2011    Procedure:HERNIORRHAPHY INCISIONAL (LOCATION); Surgeon:AYALA HOOVER; Location:[facility] OR     LAPAROSCOPIC HERNIORRHAPHY INCISIONAL  2011    Procedure:LAPAROSCOPIC HERNIORRHAPHY INCISIONAL; Laparoscopic mesh repair of incarcerated incisional hernia , extensive lysis of adhesions, excision of hernia sac and closure of fascia.; Surgeon:AYALA HOOVER; Location:[facility] OR     LAPAROSCOPIC LYSIS ADHESIONS  2011    Procedure:LAPAROSCOPIC LYSIS ADHESIONS; Surgeon:AYALA HOOVER; Location:[facility]     Objective  There were no vitals taken for this visit.      GENERAL APPEARANCE: healthy, alert and no distress   GAIT: NORMAL  SKIN: no suspicious lesions or rashes  NEURO: Normal strength and tone, mentation intact and speech normal  PSYCH:  mentation appears normal and affect normal/bright    Low back exam:    Inspection:       no visible deformity in the low back    ROM:        RLF/LLF mildly reduced with pain on right side    Tender:  Right anterior thigh, across lower back in lumbar paraspinals         Strength:       ankle dorsiflexion 5/5 Normal       ankle plantarflexion 5/5 Normal       dorsiflexion of the great toe 5/5 Normal    Reflexes:       patellar (L3, L4) 2 bilaterally    Sensation:  Right anterior thigh hypersensitive    Special tests:  Milgrams - negative, Valsalva - negative, Kemps - Right positive, SLR - Right negative and Left negative, Gaenslen's - Right negative and Left negative, Fabere - Right positive, Yeoman's - Right positive, Naresh - Right negative and Left negative and Ely's - Right negative and Left negative    Segmental spinal dysfunction/restrictions found at:  L5  and PSIS Right       Muscle spasm found in:Gluteal, Lumbar erector spine and Piriformis      Radiology: Pelvic x-rays 7/13/2018  IMPRESSION:   1. No fracture, malalignment, or significant degenerative changes are  identified. If there is significant clinical concern for acute  fracture of the hip, MRI of the hip may be helpful.    Assessment:    No diagnosis found.    RX ordered/plan of care: Right-sided sciatica with associated myospasm and intersegmental dysfunction.   Anticipated outcomes: Patient is expected to get relief with care.   Possible risks and side effects: Minimal soreness expected post-adjustment.     After discussing the risk and benefits of care, patient consented to treatment.    Prognosis: Good      Patient's condition:  Patient had restrictions pre-manipulation and Patient had decreased motion prior to manipulation    Treatment effectiveness:  Post manipulation there is better intersegmental movement and Patient claims to feel looser post manipulation      Plan:    Procedures:  Evaluation and Management:  74556 Moderate level exam 30 min    CMT:  00219 Chiropractic manipulative treatment 1-2 regions performed   Lumbar: Drop Table, L5, Prone  Pelvis: Drop Table, PSIS Right ,  Prone    Modalities:  25288: US:  1.0 Silva/cm squared for 8 minutes at 1.2mhz  Continuous  pulsed, Location: right piriformis    Therapeutic procedures:  Gave patient Ice instructions post adjustment, and instructions for acute care    Response to Treatment:  Patient tolerated treatment well today.       Treatment plan and goals:  Goals:  Decrease pain across lower back.  Decrease symptoms patient is feeling into right anterolateral thigh.     Frequency of care  Duration of care is estimated to be 4 weeks, from the initial treatment.  It is estimated that the patient will need a total of 8 visits to resolve this episode.  For the initial therapeutic trial of care, the frequency is recommended at twice per week.  A reevaluation would be clinically appropriate in 8 visits, to determine progress and further course of care.    In-Office Treatment  Evaluation  Spinal Chiropractic Manipulative Therapy:  Trial of care - re-evaluate after 8 visits.         Recommendations:    Instructions:  ice 20 minutes every other hour as needed    Follow-up:    Return to care next week.       Discussed the assessment with the patient.      Disclaimer: This note consists of symbols derived from keyboarding, dictation and/or voice recognition software. As a result, there may be errors in the script that have gone undetected. Please consider this when interpreting information found in this chart.

## 2019-02-13 ENCOUNTER — THERAPY VISIT (OUTPATIENT)
Dept: CHIROPRACTIC MEDICINE | Facility: CLINIC | Age: 69
End: 2019-02-13
Payer: COMMERCIAL

## 2019-02-13 ENCOUNTER — ANTICOAGULATION THERAPY VISIT (OUTPATIENT)
Dept: ANTICOAGULATION | Facility: CLINIC | Age: 69
End: 2019-02-13
Payer: COMMERCIAL

## 2019-02-13 DIAGNOSIS — I48.91 ATRIAL FIBRILLATION, UNSPECIFIED TYPE (H): ICD-10-CM

## 2019-02-13 DIAGNOSIS — M99.04 SEGMENTAL DYSFUNCTION OF SACRAL REGION: ICD-10-CM

## 2019-02-13 DIAGNOSIS — M54.41 BILATERAL LOW BACK PAIN WITH RIGHT-SIDED SCIATICA: ICD-10-CM

## 2019-02-13 DIAGNOSIS — M99.03 SEGMENTAL DYSFUNCTION OF LUMBAR REGION: ICD-10-CM

## 2019-02-13 LAB — INR POINT OF CARE: 3.4 (ref 0.9–1.1)

## 2019-02-13 PROCEDURE — 85610 PROTHROMBIN TIME: CPT | Mod: QW

## 2019-02-13 PROCEDURE — 98940 CHIROPRACT MANJ 1-2 REGIONS: CPT | Mod: AT | Performed by: CHIROPRACTOR

## 2019-02-13 PROCEDURE — 99207 ZZC NO CHARGE NURSE ONLY: CPT

## 2019-02-13 PROCEDURE — 36416 COLLJ CAPILLARY BLOOD SPEC: CPT

## 2019-02-13 NOTE — PROGRESS NOTES
ANTICOAGULATION FOLLOW-UP CLINIC VISIT    Patient Name:  Latanya Padron  Date:  2/13/2019  Contact Type:  Face to Face    SUBJECTIVE:     Patient Findings     Positives:   Change in diet/appetite (less greens, will increase back to normal ), No Problem Findings           OBJECTIVE    INR Protime   Date Value Ref Range Status   02/13/2019 3.4 (A) 0.9 - 1.1 Final       ASSESSMENT / PLAN  INR assessment THER    Recheck INR In: 6 WEEKS    INR Location Clinic      Anticoagulation Summary  As of 2/13/2019    INR goal:   2.0-3.0   TTR:   76.8 % (2.9 y)   INR used for dosing:   3.4! (2/13/2019)   Warfarin maintenance plan:   5 mg (5 mg x 1) every Mon; 2.5 mg (5 mg x 0.5) all other days   Full warfarin instructions:   5 mg every Mon; 2.5 mg all other days   Weekly warfarin total:   20 mg   No change documented:   Francine Andrew RN   Plan last modified:   Francine Andrew RN (7/26/2017)   Next INR check:   3/27/2019   Target end date:       Indications    Long-term (current) use of anticoagulants [Z79.01] [Z79.01]  Atrial fibrillation (H) [I48.91]             Anticoagulation Episode Summary     INR check location:       Preferred lab:       Send INR reminders to:   BENIGNO ALVAREZ    Comments:   5 mg tabs, likes card, PM dose      Anticoagulation Care Providers     Provider Role Specialty Phone number    Glen Blue MD [facility] [phone]            See the Encounter Report to view Anticoagulation Flowsheet and Dosing Calendar (Go to Encounters tab in chart review, and find the Anticoagulation Therapy Visit)    Dosage adjustment made based on physician directed care plan.      Francine Andrew RN

## 2019-02-13 NOTE — PROGRESS NOTES
Visit #:  2 of 8 based on treatment plan 2/1/2019    Subjective:  Latanya Padron is a 68 year old female who is seen in f/u up for:        Segmental dysfunction of lumbar region  Segmental dysfunction of sacral region  Bilateral low back pain with right-sided sciatica.     Since last visit on 2/1/2019,  Latanya Padron reports the following changes: Patient presents and states that she is okay today. Her lower back was feeling better until a couple days ago. She had to miss her last appt. She felt more improvement that she had experienced in awhile. She states that she hasn't done much yet today and it typically worsens throughout the day. The sensitivity in her right thigh wasn't as sore after her treatment, now the sensitivity has returned. She rates her current pain 5/10.            Objective:  The following was observed:    P: palpatory tenderness    A: static palpation demonstrates intersegmental asymmetry     R: motion palpation notes restricted motion    T: localized muscle spasm at: Gluteal, Lumbar erector spine and Piriformis R>>L      Assessment:    Segmental spinal dysfunction/restrictions found at:  L5  PSIS Left    Diagnoses:      1. Segmental dysfunction of lumbar region    2. Segmental dysfunction of sacral region    3. Bilateral low back pain with right-sided sciatica        Patient's condition:  Patient had restrictions pre-manipulation and Patient had decreased motion prior to manipulation    Treatment effectiveness:  Post manipulation there is better intersegmental movement and Patient claims to feel looser post manipulation      Procedures:  CMT:  42645 Chiropractic manipulative treatment 1-2 regions performed   Lumbar: Drop Table, L5, Prone  Pelvis: Drop Table, PSIS Left , Prone    Modalities:  58137: MSTM:  To Gluteal and Piriformis  for 5 min    Therapeutic procedures:  Gave patient Ice instructions post adjustment, and instructions for acute care      Prognosis: Good    Progress towards  Goals:   Decrease pain across lower back.  Decrease symptoms patient is feeling into right anterolateral thigh.     Response to Treatment:   Reduction of symptoms for several days until pain returned.       Recommendations:    Instructions:ice 20 minutes every other hour as needed and stretch as instructed at visit    Follow-up:  Return to care in 1 week.

## 2019-02-18 ENCOUNTER — THERAPY VISIT (OUTPATIENT)
Dept: CHIROPRACTIC MEDICINE | Facility: CLINIC | Age: 69
End: 2019-02-18
Payer: COMMERCIAL

## 2019-02-18 DIAGNOSIS — M54.41 BILATERAL LOW BACK PAIN WITH RIGHT-SIDED SCIATICA: ICD-10-CM

## 2019-02-18 DIAGNOSIS — M99.04 SEGMENTAL DYSFUNCTION OF SACRAL REGION: ICD-10-CM

## 2019-02-18 DIAGNOSIS — M99.02 SEGMENTAL DYSFUNCTION OF THORACIC REGION: Primary | ICD-10-CM

## 2019-02-18 DIAGNOSIS — M99.03 SEGMENTAL DYSFUNCTION OF LUMBAR REGION: ICD-10-CM

## 2019-02-18 PROCEDURE — 98941 CHIROPRACT MANJ 3-4 REGIONS: CPT | Mod: AT | Performed by: CHIROPRACTOR

## 2019-02-18 NOTE — PROGRESS NOTES
Visit #:  3 of 8 based on treatment plan 2/1/2019    Subjective:  Latanya Padron is a 68 year old female who is seen in f/u up for:        Segmental dysfunction of lumbar region  Segmental dysfunction of sacral region  Bilateral low back pain with right-sided sciatica.     Since last visit on 2/13/2019,  Latanya Padron reports the following changes: Patient presents and states that she is feeling okay, but she has a spot in her left neck that feels like something burning. She has had it the last couple nights at bedtime. She rates her current pain 4/10 in her neck. Her lower back was sore after her last adjustment, but has gotten better overall with treatment. She also rates this pain 4 /10.         Objective:  The following was observed:    P: palpatory tenderness    A: static palpation demonstrates intersegmental asymmetry     R: motion palpation notes restricted motion    T: localized muscle spasm at: Gluteal, Lumbar erector spine and Piriformis R>>L      Assessment:    Segmental spinal dysfunction/restrictions found at:  T1 LR, RRR  T6 mobilization  L5 RR, LRR  Left SI posterior    Diagnoses:      1. Segmental dysfunction of lumbar region    2. Segmental dysfunction of sacral region    3. Bilateral low back pain with right-sided sciatica        Patient's condition:  Patient had restrictions pre-manipulation and Patient had decreased motion prior to manipulation    Treatment effectiveness:  Post manipulation there is better intersegmental movement and Patient claims to feel looser post manipulation      Procedures:  CMT:  47121 Chiropractic manipulative treatment 1-2 regions performed   Thoracic: Mobilization, T1, T6, Mobilization  Lumbar: Drop Table, L5, Prone  Pelvis: Drop Table, PSIS Left , Prone    Modalities:  37847: MSTM:  To Gluteal and Piriformis  for 5 min    Therapeutic procedures:  Gave patient Ice instructions post adjustment, and instructions for acute care      Prognosis: Good    Progress towards  Goals:   Decrease pain across lower back.  Decrease symptoms patient is feeling into right anterolateral thigh.   Pain in upper back at 3rd visit.     Response to Treatment:   Reduction of symptoms overall.       Recommendations:    Instructions:ice 20 minutes every other hour as needed and stretch as instructed at visit    Follow-up:  Return to care in 1 week with newer upper back pain.

## 2019-02-27 ENCOUNTER — THERAPY VISIT (OUTPATIENT)
Dept: CHIROPRACTIC MEDICINE | Facility: CLINIC | Age: 69
End: 2019-02-27
Payer: COMMERCIAL

## 2019-02-27 ENCOUNTER — OFFICE VISIT (OUTPATIENT)
Dept: ORTHOPEDICS | Facility: CLINIC | Age: 69
End: 2019-02-27
Payer: COMMERCIAL

## 2019-02-27 VITALS
WEIGHT: 220 LBS | BODY MASS INDEX: 41.54 KG/M2 | SYSTOLIC BLOOD PRESSURE: 128 MMHG | HEIGHT: 61 IN | DIASTOLIC BLOOD PRESSURE: 83 MMHG

## 2019-02-27 DIAGNOSIS — M54.41 BILATERAL LOW BACK PAIN WITH RIGHT-SIDED SCIATICA: ICD-10-CM

## 2019-02-27 DIAGNOSIS — M17.11 PRIMARY OSTEOARTHRITIS OF RIGHT KNEE: Primary | ICD-10-CM

## 2019-02-27 DIAGNOSIS — M99.03 SEGMENTAL DYSFUNCTION OF LUMBAR REGION: ICD-10-CM

## 2019-02-27 DIAGNOSIS — M99.02 SEGMENTAL DYSFUNCTION OF THORACIC REGION: Primary | ICD-10-CM

## 2019-02-27 DIAGNOSIS — M99.04 SEGMENTAL DYSFUNCTION OF SACRAL REGION: ICD-10-CM

## 2019-02-27 PROCEDURE — 98941 CHIROPRACT MANJ 3-4 REGIONS: CPT | Mod: AT | Performed by: CHIROPRACTOR

## 2019-02-27 PROCEDURE — 20610 DRAIN/INJ JOINT/BURSA W/O US: CPT | Mod: RT | Performed by: PHYSICIAN ASSISTANT

## 2019-02-27 RX ORDER — TRIAMCINOLONE ACETONIDE 40 MG/ML
40 INJECTION, SUSPENSION INTRA-ARTICULAR; INTRAMUSCULAR ONCE
Status: COMPLETED | OUTPATIENT
Start: 2019-02-27 | End: 2019-02-27

## 2019-02-27 RX ADMIN — TRIAMCINOLONE ACETONIDE 40 MG: 40 INJECTION, SUSPENSION INTRA-ARTICULAR; INTRAMUSCULAR at 14:39

## 2019-02-27 ASSESSMENT — PAIN SCALES - GENERAL: PAINLEVEL: EXTREME PAIN (8)

## 2019-02-27 ASSESSMENT — MIFFLIN-ST. JEOR: SCORE: 1470.29

## 2019-02-27 NOTE — PROGRESS NOTES
Visit #:  4 of 8 based on treatment plan 2/1/2019    Subjective:  Latanya Padron is a 68 year old female who is seen in f/u up for:        Segmental dysfunction of lumbar region  Segmental dysfunction of sacral region  Bilateral low back pain with right-sided sciatica.     Since last visit on 2/13/2019,  Latanya Padron reports the following changes: Patient presents and states that she still has pain on the left side of her neck. She points to one spot in her left upper traps. She rates her current pain 6/10, and she states that it is burning. Her lower back is improved.       Objective:  The following was observed:    P: palpatory tenderness    A: static palpation demonstrates intersegmental asymmetry     R: motion palpation notes restricted motion    T: localized muscle spasm at: Left upper traps      Assessment:    Segmental spinal dysfunction/restrictions found at:  T1 LR, RRR  T6 E, FR  L4 LR, RRR  Left SI posterior    Diagnoses:      1. Segmental dysfunction of lumbar region    2. Segmental dysfunction of sacral region    3. Bilateral low back pain with right-sided sciatica        Patient's condition:  Patient had restrictions pre-manipulation and Patient had decreased motion prior to manipulation    Treatment effectiveness:  Post manipulation there is better intersegmental movement and Patient claims to feel looser post manipulation      Procedures:  CMT:  40512 Chiropractic manipulative treatment 1-2 regions performed   Thoracic: Mobilization, T1, T6, Mobilization  Lumbar: Drop Table, L4, Prone  Pelvis: Drop Table, PSIS Left , Prone    Modalities:  06119: MSTM:  To Gluteal and Piriformis  for 5 min    Therapeutic procedures:  Gave patient Ice instructions post adjustment, and instructions for acute care      Prognosis: Good    Progress towards Goals:   Decrease pain across lower back.  Decrease symptoms patient is feeling into right anterolateral thigh.   Pain in upper back at 3rd visit.     Response to  Treatment:   Reduction of symptoms overall. Pain has shifted into her upper back at this point, just minimal soreness in her left lower back.      Recommendations:    Instructions:ice 20 minutes every other hour as needed and stretch as instructed at visit    Follow-up:  Return to care in 1 week with newer upper back pain.

## 2019-02-27 NOTE — PROGRESS NOTES
"Office Visit-Follow up    Chief Complaint: Latanya Padron is a 68 year old female who is being seen for   Chief Complaint   Patient presents with     RECHECK     f/u rt knee pain lov 4/1/18 inj given       History of Present Illness:   Mechanism of Injury: No new injury  Location: Right anterior knee and radiates down the tibia towards the ankle  Duration of Pain: Getting worse over the last 2-3 months  Rating of Pain: Moderate/mild  Pain Quality: Achy  Pain is better with: Rest  Pain is worse with: Activity and stairs  Treatment so far consists of: Cortisone injection done on 10/4/2017 and another one on 4/11/2018.  Patient cannot have NSAIDs because she is on Coumadin..   Associated Features: None.  Patient denies numbness or tingling.  Pain is Limiting: Heavy activity with the right knee  Here to: Get another cortisone injection  Additional History: None    REVIEW OF SYSTEMS  General: negative for, night sweats, dizziness, fatigue  Resp: No shortness of breath and no cough  CV: negative for chest pain, syncope or near-syncope  GI: negative for nausea, vomiting and diarrhea  : negative for dysuria and hematuria  Musculoskeletal: as above  Neurologic: negative for syncope   Hematologic: negative for bleeding disorder    Physical Exam:  Vitals: /83   Ht 1.549 m (5' 1\")   Wt 99.8 kg (220 lb)   BMI 41.57 kg/m    BMI= Body mass index is 41.57 kg/m .  Constitutional: healthy, alert and no acute distress   Psychiatric: mentation appears normal and affect normal/bright  NEURO: no focal deficits, CMS intact right lower extremity  RESP: Normal with easy respirations and no use of accessory muscles to breathe, no audible wheezing or retractions  CV: Calf soft and nontender to palpation, leg warm   SKIN: No erythema, rashes, excoriation, or breakdown. No evidence of infection.   MUSCULOSKELETAL:    INSPECTION of right knee: No gross deformities, erythema, edema, ecchymosis, atrophy or fasciculations.  She does " seem to have some edema in her lower legs though.    PALPATION: No tenderness on palpation of the medial, lateral, anterior and posterior portion of the knee. No specific joint line tenderness. No increased warmth.  No effusion.     ROM: Extension full, flexion to approximately 125 . All range of motion without catching, locking or pain.       STRENGTH: Able to fire quad and hamstring.  Able to get up on the exam table without any problems.    SPECIAL TEST:   None today.  GAIT: non-antalgic  Lymph: no palpable lymph nodes      Diagnostic Modalities:  No new radiographs necessary.    I did review the previous x-rays which was 3 views of the right knee showing no fracture no dislocation no tumor but patellofemoral degenerative joint disease especially on the sunrise view.  No other findings.    Independent visualization of the images was performed.      Impression: 1.  Right knee degenerative joint disease, patellofemoral, moderate    Plan:  All of the above pertinent physical exam and imaging modalities findings was reviewed with Latanya.                                          INJECTION PROCEDURE:  The patient was counseled about an  injection, including discussion of risks (including infection), contents of the injection, rationale for performing the injection, and expected benefits of the injection. The skin was prepped with alcohol and betadine and then utilizing sterile technique an injection of the right knee joint from the anterolateral approach in the seated position was performed. I used tiffany chloride spray prior to doing the injection. The injection consisted 1ml of Kenalog (40mg per 1ml) with 8ml 1% lidocaine plain. The patient tolerated the injection well, and there were no complications. The injection site was covered with a Band-Aid. The injection was performed by Mark Taylor PA-C     Patient Instructions:   1.  Your last cortisone injection worked extremely well.  You had the injection on 4/11/2018 and  that lasted approximately 8 months.  The injection that you had previous to that was on 10/4/2017.  2.  We are totally fine doing another cortisone injection today.  3.  We also talked about alternatives to cortisone injection such as gel injections.  4.  You can also do straight leg raising to help your pain.  5. We can do 4 cortisone injections in a years time but no more than that. We can do the first 3 injections within 6 weeks of each other but the fourth injection we usually wait 2 months. This helps protect the cartilage in your joint.  6. You can followup with Nadja Hinson MD in 6 weeks, if you are having pain at that time we can do a cortisone injection.  You can always cancel the appointment if you are doing really well and follow-up as needed.  Re-x-ray on return: No    BP Readings from Last 1 Encounters:   02/27/19 128/83       BP noted to be well controlled today in office.      Patient does not use Tobacco products.    This note was dictated with [employer].    Esteban Taylor PA-C

## 2019-02-27 NOTE — PATIENT INSTRUCTIONS
Encounter Diagnosis   Name Primary?     Primary osteoarthritis of right knee Yes     Rest, ice and elevate above heart level as needed for pain control  1.  Your last cortisone injection worked extremely well.  You had the injection on 4/11/2018 and that lasted approximately 8 months.  The injection that you had previous to that was on 10/4/2017.  2.  We are totally fine doing another cortisone injection today.  3.  We also talked about alternatives to cortisone injection such as gel injections.  4.  You can also do straight leg raising to help your pain.  5. We can do 4 cortisone injections in a years time but no more than that. We can do the first 3 injections within 6 weeks of each other but the fourth injection we usually wait 2 months. This helps protect the cartilage in your joint.  6. You can followup with Nadja Hinson MD in 6 weeks, if you are having pain at that time we can do a cortisone injection.  You can always cancel the appointment if you are doing really well and follow-up as needed.  Cortisone Instructions:     1. You received an injection of cortisone into your right knee today.  2. The joint(s) may be more painful for the first 1-2 days.  3. We ask you to continue to rest the joint(s) for a few more days before resuming regular activities.  4. Pain Medications you can take (as long as your primary care provider allows these meds and you do not have kidney or liver conditions):  Tylenol  Take 1000 mg by mouth every 6 hours as needed; maximum dose 4000 mg a day  Ibuprofen  600 mg every 6 hours as needed; maximum 2400 mg a day  (OK to take tylenol and ibuprofen at the same time)  5. Rest, ice and elevate as needed for pain control  6. Watch for these signs of infection: redness, swelling, drainage, warmth to touch, increased pain, or fever. Call the clinic or make an appointment to be seen if you think you have an infection.  7. If you are diabetic, make sure you keep a close eye on your blood  sugars, they can get elevated with cortisone injections.   8. Sometimes it can take 1-2 weeks for it to reach its full effect.    Cortisone Injections  Cortisone is a type of steroid. It can greatly reduce swelling, redness, and irritation (inflammation) and pain. Being injected with cortisone is simple and doesn t take long. Your doctor may ask you questions about your health. Certain health conditions, such as diabetes, can be affected by cortisone.     Your pain may be relieved by a cortisone injection.   Why have a cortisone injection?  Injecting cortisone can relieve pain for anything from a sports injury to arthritis. Your doctor may suggest an injection if rest, splints, or oral medicine doesn t relieve your pain. Injecting cortisone is simpler than having surgery. And cortisone may provide the lasting pain relief that can help you get out and enjoy life again.  Getting the injection  Your doctor will start by cleaning and occasionally numbing your skin at the injection site. Next you ll be injected with local anesthetics (for short-term pain relief) and cortisone. The injection may last a few moments. A small bandage will be put over the injection site. You ll then be ready to go home.  After your injection  After being injected, make sure you don t injure the treated region. But stay active. Enjoy a walk or some other mild activity. Just be careful not to strain the region that gave you trouble.  The next day  Some people feel more pain after being injected. This is normal, and it will go away soon. Applying ice for 20 minutes at a time to your injury may reduce the increased pain. Rest for the first day or two. You don t need to stay in bed. But avoid tasks that may strain the injured region.  If you have diabetes  Cortisone injections can cause blood sugar to be increased for several days after the injection. If you have diabetes, you should follow your blood  sugar closely during this time. Follow your  regular plan for what to do when your blood sugar is elevated.     7713-1814 [employer]. [address] [place] [address], [place] [address]. All rights reserved. This information is not intended as a substitute for professional medical care. Always follow your healthcare professional's instructions.     [facility] [employer] may offer reliable information regarding your diagnosis and treatment plan.    THANK YOU for coming in today. If you receive a survey via [employer] or mail please let us know if there was anything you especially appreciated today or if there is any way we can improve our clinic. We appreciate your input.    GENERAL INFORMATION:  Our hours are:  Monday :     Clinic 7:30 AM-430 PM ([facility])  Tuesday:      Operating Room All Day ([facility])  Wednesday: Clinic 7:30 AM - 11:15 AM ([facility])             Clinic 1:00 PM - 4:00PM ([facility])  Thursday:     Administrative Day  Friday:          Clinic 7:30 AM - 11:15 AM ([facility])            Clinic 1:00 PM - 4:00 PM ([facility])      [facility] for any issues or concerns: [phone]      We are not in the office Thursdays. Therefore non- urgent calls and medical messages received on Thursday will be addressed when we are back in the office on Wednesday. Urgent matters will be reviewed and addressed by one of our partners in the office as needed.    If lab work was done today as part of your evaluation you will generally be contacted via [employer], mail, or phone with the results within 1-5 days. If there is an alarming result we will contact you by phone. Lab results come back at varying times, I generally wait until all labs are resulted before making comments on results. Please note labs are automatically released to [employer] (if you have signed up for it) once available-at times you may see these prior to my having a  chance to review them as well.    If you need refills please contact your pharmacist. They will send a refill request to me to review. Please allow 3 business days for us to process all refill requests. All narcotic refills should be handled in the clinic at the time of your visit.

## 2019-02-27 NOTE — PROGRESS NOTES
Prior to injection, verified patient identity using patient's name and date of birth.  Due to injection administration, patient instructed to remain in clinic for 15 minutes  afterwards, and to report any adverse reaction to me immediately.    Joint injection was performed.      Drug Amount Wasted:  None.  Vial/Syringe: Single dose vial  Expiration Date:  9/2020  Rt knee

## 2019-02-27 NOTE — LETTER
"    2/27/2019         RE: Latanya Padron  [address] [place] [facility] [address]        Dear Colleague,    Thank you for referring your patient, Latanya Padron, to the [facility]. Please see a copy of my visit note below.      Prior to injection, verified patient identity using patient's name and date of birth.  Due to injection administration, patient instructed to remain in clinic for 15 minutes  afterwards, and to report any adverse reaction to me immediately.    Joint injection was performed.      Drug Amount Wasted:  None.  Vial/Syringe: Single dose vial  Expiration Date:  9/2020  Rt knee          Office Visit-Follow up    Chief Complaint: Latanya Padron is a 68 year old female who is being seen for   Chief Complaint   Patient presents with     RECHECK     f/u rt knee pain lov 4/1/18 inj given       History of Present Illness:   Mechanism of Injury: No new injury  Location: Right anterior knee and radiates down the tibia towards the ankle  Duration of Pain: Getting worse over the last 2-3 months  Rating of Pain: Moderate/mild  Pain Quality: Achy  Pain is better with: Rest  Pain is worse with: Activity and stairs  Treatment so far consists of: Cortisone injection done on 10/4/2017 and another one on 4/11/2018.  Patient cannot have NSAIDs because she is on Coumadin..   Associated Features: None.  Patient denies numbness or tingling.  Pain is Limiting: Heavy activity with the right knee  Here to: Get another cortisone injection  Additional History: None    REVIEW OF SYSTEMS  General: negative for, night sweats, dizziness, fatigue  Resp: No shortness of breath and no cough  CV: negative for chest pain, syncope or near-syncope  GI: negative for nausea, vomiting and diarrhea  : negative for dysuria and hematuria  Musculoskeletal: as above  Neurologic: negative for syncope   Hematologic: negative for bleeding disorder    Physical Exam:  Vitals: /83   Ht 1.549 m (5' 1\")   Wt 99.8 kg (220 " lb)   BMI 41.57 kg/m     BMI= Body mass index is 41.57 kg/m .  Constitutional: healthy, alert and no acute distress   Psychiatric: mentation appears normal and affect normal/bright  NEURO: no focal deficits, CMS intact right lower extremity  RESP: Normal with easy respirations and no use of accessory muscles to breathe, no audible wheezing or retractions  CV: Calf soft and nontender to palpation, leg warm   SKIN: No erythema, rashes, excoriation, or breakdown. No evidence of infection.   MUSCULOSKELETAL:    INSPECTION of right knee: No gross deformities, erythema, edema, ecchymosis, atrophy or fasciculations.  She does seem to have some edema in her lower legs though.    PALPATION: No tenderness on palpation of the medial, lateral, anterior and posterior portion of the knee. No specific joint line tenderness. No increased warmth.  No effusion.     ROM: Extension full, flexion to approximately 125 . All range of motion without catching, locking or pain.       STRENGTH: Able to fire quad and hamstring.  Able to get up on the exam table without any problems.    SPECIAL TEST:   None today.  GAIT: non-antalgic  Lymph: no palpable lymph nodes      Diagnostic Modalities:  No new radiographs necessary.    I did review the previous x-rays which was 3 views of the right knee showing no fracture no dislocation no tumor but patellofemoral degenerative joint disease especially on the sunrise view.  No other findings.    Independent visualization of the images was performed.      Impression: 1.  Right knee degenerative joint disease, patellofemoral, moderate    Plan:  All of the above pertinent physical exam and imaging modalities findings was reviewed with Latanya.                                          INJECTION PROCEDURE:  The patient was counseled about an  injection, including discussion of risks (including infection), contents of the injection, rationale for performing the injection, and expected benefits of the  injection. The skin was prepped with alcohol and betadine and then utilizing sterile technique an injection of the right knee joint from the anterolateral approach in the seated position was performed. I used tiffany chloride spray prior to doing the injection. The injection consisted 1ml of Kenalog (40mg per 1ml) with 8ml 1% lidocaine plain. The patient tolerated the injection well, and there were no complications. The injection site was covered with a Band-Aid. The injection was performed by Mark Taylor PA-C     Patient Instructions:   1.  Your last cortisone injection worked extremely well.  You had the injection on 4/11/2018 and that lasted approximately 8 months.  The injection that you had previous to that was on 10/4/2017.  2.  We are totally fine doing another cortisone injection today.  3.  We also talked about alternatives to cortisone injection such as gel injections.  4.  You can also do straight leg raising to help your pain.  5. We can do 4 cortisone injections in a years time but no more than that. We can do the first 3 injections within 6 weeks of each other but the fourth injection we usually wait 2 months. This helps protect the cartilage in your joint.  6. You can followup with Nadja Hinson MD in 6 weeks, if you are having pain at that time we can do a cortisone injection.  You can always cancel the appointment if you are doing really well and follow-up as needed.  Re-x-ray on return: No    BP Readings from Last 1 Encounters:   02/27/19 128/83       BP noted to be well controlled today in office.      Patient does not use Tobacco products.    This note was dictated with [employer].    Esteban Taylor PA-C                Again, thank you for allowing me to participate in the care of your patient.        Sincerely,        Esteban Taylor PA-C

## 2019-03-01 ENCOUNTER — TELEPHONE (OUTPATIENT)
Dept: FAMILY MEDICINE | Facility: CLINIC | Age: 69
End: 2019-03-01

## 2019-03-01 NOTE — TELEPHONE ENCOUNTER
Pt will be getting a small tattoo this weekend. I advised her to check with the , but as long as its small, she should not have an issue. She said she is going to hold her Coumadin kishor Andrew RN

## 2019-03-01 NOTE — TELEPHONE ENCOUNTER
Reason for Call:  Other call back    Detailed comments: Patient would like Francine call her back.     Phone Number Patient can be reached at: Cell number on file:    Telephone Information:   Mobile [phone]       Best Time: any    Can we leave a detailed message on this number? YES    Call taken on 3/1/2019 at 9:06 AM by Eulalia Oates

## 2019-03-11 ENCOUNTER — THERAPY VISIT (OUTPATIENT)
Dept: CHIROPRACTIC MEDICINE | Facility: CLINIC | Age: 69
End: 2019-03-11
Payer: COMMERCIAL

## 2019-03-11 DIAGNOSIS — M99.03 SEGMENTAL DYSFUNCTION OF LUMBAR REGION: ICD-10-CM

## 2019-03-11 DIAGNOSIS — M54.41 BILATERAL LOW BACK PAIN WITH RIGHT-SIDED SCIATICA: ICD-10-CM

## 2019-03-11 DIAGNOSIS — M99.02 SEGMENTAL DYSFUNCTION OF THORACIC REGION: Primary | ICD-10-CM

## 2019-03-11 DIAGNOSIS — M99.04 SEGMENTAL DYSFUNCTION OF SACRAL REGION: ICD-10-CM

## 2019-03-11 PROCEDURE — 98941 CHIROPRACT MANJ 3-4 REGIONS: CPT | Mod: AT | Performed by: CHIROPRACTOR

## 2019-03-11 NOTE — PROGRESS NOTES
Visit #:  5 of 8 based on treatment plan 2/1/2019    Subjective:  Latanya Padron is a 68 year old female who is seen in f/u up for:        Segmental dysfunction of lumbar region  Segmental dysfunction of sacral region  Bilateral low back pain with right-sided sciatica.     Since last visit on 2/27/2019,  Latanya Padron reports the following changes: Patient presents and states that she is doing pretty good, she has no pain yet today but it is early! She had some pain around her left side of her lower back over the weekend. Her back is just a bit achy.         Objective:  The following was observed:    P: palpatory tenderness    A: static palpation demonstrates intersegmental asymmetry     R: motion palpation notes restricted motion    T: localized muscle spasm at: Left upper traps      Assessment:    Segmental spinal dysfunction/restrictions found at:  T7 E, FR  T10 E, FR  L4 LR, RRR  Right SI posterior  Left sacrum    Diagnoses:      1. Segmental dysfunction of lumbar region    2. Segmental dysfunction of sacral region    3. Bilateral low back pain with right-sided sciatica        Patient's condition:  Patient had restrictions pre-manipulation and Patient had decreased motion prior to manipulation    Treatment effectiveness:  Post manipulation there is better intersegmental movement and Patient claims to feel looser post manipulation      Procedures:  CMT:  29335 Chiropractic manipulative treatment 1-2 regions performed   Thoracic: Mobilization, T7, T10, Mobilization  Lumbar: Drop Table, L4, Prone  Pelvis: Drop Table, Right SI, left sacrum, Prone    Modalities:  20611: MSTM:  To Gluteal and Piriformis  for 5 min    Therapeutic procedures:  Gave patient Ice instructions post adjustment, and instructions for acute care      Prognosis: Good    Progress towards Goals:   Decrease pain across lower back.  Decrease symptoms patient is feeling into right anterolateral thigh.   Pain in upper back at 3rd visit.      Response to Treatment:   Reduction of symptoms overall. Pain has shifted into her upper back at this point, just minimal soreness in her left lower back.      Recommendations:    Instructions:ice 20 minutes every other hour as needed and stretch as instructed at visit    Follow-up:  Return to care PRN.

## 2019-03-27 ENCOUNTER — TELEPHONE (OUTPATIENT)
Dept: FAMILY MEDICINE | Facility: CLINIC | Age: 69
End: 2019-03-27

## 2019-03-27 ENCOUNTER — ANTICOAGULATION THERAPY VISIT (OUTPATIENT)
Dept: ANTICOAGULATION | Facility: CLINIC | Age: 69
End: 2019-03-27
Payer: COMMERCIAL

## 2019-03-27 DIAGNOSIS — I48.91 ATRIAL FIBRILLATION, UNSPECIFIED TYPE (H): ICD-10-CM

## 2019-03-27 LAB — INR POINT OF CARE: 2.7 (ref 0.9–1.1)

## 2019-03-27 PROCEDURE — 36416 COLLJ CAPILLARY BLOOD SPEC: CPT

## 2019-03-27 PROCEDURE — 99207 ZZC NO CHARGE NURSE ONLY: CPT

## 2019-03-27 PROCEDURE — 85610 PROTHROMBIN TIME: CPT | Mod: QW

## 2019-03-27 NOTE — TELEPHONE ENCOUNTER
Pt stopped by the clinic and needs a letter written stating she has medical needs to see a massage therapist. She states the chiropractor doesn't help but massage has been helping. She said she needs this letter so she doesn't get charged sales tax. She would like the letter mailed to her home. Cely Lobato CMA ([facility])

## 2019-03-27 NOTE — LETTER
[address] [address]  Phone: [phone]  Fax: [phone]    March 27, 2019        Latanya Padron  [address]  [place] [address]          To whom it may concern:    RE: Latanya Padron    This patient has degenerative spine disease and other osteoarthritis with muscle aches.  Physical therapy, chiropractic care, and medications have not managed her pain.  Massage therapy has been very helpful and is my recommendation that she continue with this.    Please contact me for questions or concerns.      Sincerely,        Glentona Blue MD

## 2019-03-27 NOTE — PROGRESS NOTES
ANTICOAGULATION FOLLOW-UP CLINIC VISIT    Patient Name:  Latanya Padron  Date:  3/27/2019  Contact Type:  Face to Face    SUBJECTIVE:     Patient Findings     Comments:   The patient was assessed for diet, medication, and activity level changes, missed or extra doses, bruising or bleeding, with no problem findings.  Francine Andrew RN             OBJECTIVE    INR Protime   Date Value Ref Range Status   2019 2.7 (A) 0.9 - 1.1 Final       ASSESSMENT / PLAN  INR assessment THER    Recheck INR In: 6 WEEKS    INR Location Clinic      Anticoagulation Summary  As of 3/27/2019    INR goal:   2.0-3.0   TTR:   75.6 % (3 y)   INR used for dosin.7 (3/27/2019)   Warfarin maintenance plan:   5 mg (5 mg x 1) every Mon; 2.5 mg (5 mg x 0.5) all other days   Full warfarin instructions:   5 mg every Mon; 2.5 mg all other days   Weekly warfarin total:   20 mg   No change documented:   Francine Andrew RN   Plan last modified:   Francine Andrew RN (2017)   Next INR check:   2019   Target end date:       Indications    Long-term (current) use of anticoagulants [Z79.01] [Z79.01]  Atrial fibrillation (H) [I48.91]             Anticoagulation Episode Summary     INR check location:       Preferred lab:       Send INR reminders to:   BENIGNO ALVAREZ    Comments:   5 mg tabs, likes card, PM dose      Anticoagulation Care Providers     Provider Role Specialty Phone number    Glen Blue MD [facility] [phone]            See the Encounter Report to view Anticoagulation Flowsheet and Dosing Calendar (Go to Encounters tab in chart review, and find the Anticoagulation Therapy Visit)    Dosage adjustment made based on physician directed care plan.      Francine Andrew RN                 
5

## 2019-03-27 NOTE — TELEPHONE ENCOUNTER
Letter completed and mailed to pt. I have attempted to contact her to inform her that this has been done. I left a message for her to return my call. Cely Lobato CMA ([facility])

## 2019-04-10 DIAGNOSIS — J20.9 ACUTE BRONCHITIS WITH COEXISTING CONDITION REQUIRING PROPHYLACTIC TREATMENT: ICD-10-CM

## 2019-04-10 DIAGNOSIS — J43.9 PULMONARY EMPHYSEMA, UNSPECIFIED EMPHYSEMA TYPE (H): ICD-10-CM

## 2019-04-10 DIAGNOSIS — J20.9 ACUTE BRONCHITIS, UNSPECIFIED ORGANISM: ICD-10-CM

## 2019-04-10 RX ORDER — PREDNISONE 20 MG/1
TABLET ORAL
Qty: 5 TABLET | Refills: 0 | Status: SHIPPED | OUTPATIENT
Start: 2019-04-10 | End: 2019-09-11

## 2019-04-10 NOTE — TELEPHONE ENCOUNTER
Prednisone 20 MG       Last Written Prescription Date:  10/17/18  Last Fill Quantity: 5,   # refills: 0  Last Office Visit: 12/31/18  Future Office visit:    Next 5 appointments (look out 90 days)    Apr 29, 2019 10:00 AM CDT  [facility] [address] [place] [id number]  [phone]           Routing refill request to provider for review/approval because:  Drug not on the [facility], [facility] or [facility] refill protocol or controlled substance

## 2019-04-12 ENCOUNTER — HOSPITAL ENCOUNTER (OUTPATIENT)
Dept: ULTRASOUND IMAGING | Facility: CLINIC | Age: 69
End: 2019-04-12
Attending: FAMILY MEDICINE
Payer: COMMERCIAL

## 2019-04-12 ENCOUNTER — HOSPITAL ENCOUNTER (OUTPATIENT)
Dept: MAMMOGRAPHY | Facility: CLINIC | Age: 69
End: 2019-04-12
Attending: FAMILY MEDICINE
Payer: COMMERCIAL

## 2019-04-12 ENCOUNTER — HOSPITAL ENCOUNTER (OUTPATIENT)
Dept: MAMMOGRAPHY | Facility: CLINIC | Age: 69
Discharge: HOME | End: 2019-04-12
Attending: FAMILY MEDICINE | Admitting: FAMILY MEDICINE
Payer: COMMERCIAL

## 2019-04-12 DIAGNOSIS — R92.8 BI-RADS CATEGORY 3 MAMMOGRAM RESULT: ICD-10-CM

## 2019-04-12 DIAGNOSIS — N60.02 BREAST CYST, LEFT: ICD-10-CM

## 2019-04-12 DIAGNOSIS — N63.20 LEFT BREAST MASS: ICD-10-CM

## 2019-04-12 LAB — INR BLD: 2.9 (ref 0.86–1.14)

## 2019-04-12 PROCEDURE — 85610 PROTHROMBIN TIME: CPT | Mod: QW | Performed by: RADIOLOGY

## 2019-04-12 PROCEDURE — 88377 M/PHMTRC ALYS ISHQUANT/SEMIQ: CPT | Performed by: PATHOLOGY

## 2019-04-12 PROCEDURE — 88360 TUMOR IMMUNOHISTOCHEM/MANUAL: CPT | Performed by: FAMILY MEDICINE

## 2019-04-12 PROCEDURE — 76642 ULTRASOUND BREAST LIMITED: CPT | Mod: LT

## 2019-04-12 PROCEDURE — 27211109 US BREAST BIOPSY CORE NEEDLE LEFT

## 2019-04-12 PROCEDURE — 25000125 ZZHC RX 250: Performed by: FAMILY MEDICINE

## 2019-04-12 PROCEDURE — 40000986 MA POST PROCEDURE LEFT

## 2019-04-12 PROCEDURE — 00000158 ZZHCL STATISTIC H-FISH PROCESS B/S: Performed by: FAMILY MEDICINE

## 2019-04-12 PROCEDURE — 00000159 ZZHCL STATISTIC H-SEND OUTS PREP: Performed by: FAMILY MEDICINE

## 2019-04-12 PROCEDURE — G0279 TOMOSYNTHESIS, MAMMO: HCPCS

## 2019-04-12 PROCEDURE — 36416 COLLJ CAPILLARY BLOOD SPEC: CPT | Performed by: RADIOLOGY

## 2019-04-12 PROCEDURE — 88305 TISSUE EXAM BY PATHOLOGIST: CPT | Performed by: FAMILY MEDICINE

## 2019-04-12 PROCEDURE — 88342 IMHCHEM/IMCYTCHM 1ST ANTB: CPT | Performed by: FAMILY MEDICINE

## 2019-04-12 PROCEDURE — 88360 TUMOR IMMUNOHISTOCHEM/MANUAL: CPT | Mod: 26,59 | Performed by: FAMILY MEDICINE

## 2019-04-12 PROCEDURE — 88305 TISSUE EXAM BY PATHOLOGIST: CPT | Mod: 26 | Performed by: FAMILY MEDICINE

## 2019-04-12 RX ORDER — LIDOCAINE HYDROCHLORIDE AND EPINEPHRINE 10; 10 MG/ML; UG/ML
8.5 INJECTION, SOLUTION INFILTRATION; PERINEURAL ONCE
Status: COMPLETED | OUTPATIENT
Start: 2019-04-12 | End: 2019-04-12

## 2019-04-12 RX ADMIN — LIDOCAINE HYDROCHLORIDE AND EPINEPHRINE 8.5 ML: 10; 10 INJECTION, SOLUTION INFILTRATION; PERINEURAL at 10:36

## 2019-04-12 RX ADMIN — LIDOCAINE HYDROCHLORIDE 1 ML: 10 INJECTION, SOLUTION INFILTRATION; PERINEURAL at 10:35

## 2019-04-16 ENCOUNTER — PATIENT OUTREACH (OUTPATIENT)
Dept: ONCOLOGY | Facility: CLINIC | Age: 69
End: 2019-04-16

## 2019-04-16 LAB
COPATH REPORT: NORMAL
COPATH REPORT: NORMAL

## 2019-04-17 ENCOUNTER — TELEPHONE (OUTPATIENT)
Dept: FAMILY MEDICINE | Facility: CLINIC | Age: 69
End: 2019-04-17

## 2019-04-17 DIAGNOSIS — C50.912 MALIGNANT NEOPLASM OF LEFT FEMALE BREAST, UNSPECIFIED ESTROGEN RECEPTOR STATUS, UNSPECIFIED SITE OF BREAST (H): Primary | ICD-10-CM

## 2019-04-17 NOTE — TELEPHONE ENCOUNTER
I called patient and confirmed that she had received the My Chart message about her positive biopsy for breast cancer.  Indeed she had.  She understands we will be making arrangements for her to see surgery and oncology.  Once again discussed that breast cancer is no longer the absolute scary thing it used to be.  Treatments often can be very successful.  Glen Blue MD

## 2019-04-17 NOTE — PROGRESS NOTES
Writing nurse called this patient per protocol with breast biopsy results.  Introduced self and role.  Reviewed pathology with follow up plan.  Reviewed that patient would see surgery first and they would then direct the next steps.  Reviewed all team member involved in her care now and in the future with what to briefly expect.  Scheduled patient with Dr. Aleman on Friday after reviewing surgeon options.  Offered to assist setting up where ever she prefers.  Patient agrees with this Friday.  I let her know that she will also see Oncology but that would be later on and the surgeon would let her know when.  Emotional support provided.  Reassured patient that she is taking the next correct steps and we are here to support her.  Provided my contact information and let her know that I would be available through her interim of care.  Patient's questions and concerns addressed to her satisfaction since she is unfamiliar as to what to ask.  Again encouraged patient to call with questions or concerns and to write them down.  Noted patient seemed to be crying at end of conversation.  Again reassured her that we are here to support her and that she is taking the next correct steps.    Jorge Ocampo RN, BSN, OCN  4/17/2019, 10:23 AM

## 2019-04-19 ENCOUNTER — OFFICE VISIT (OUTPATIENT)
Dept: SURGERY | Facility: CLINIC | Age: 69
End: 2019-04-19
Payer: COMMERCIAL

## 2019-04-19 VITALS
WEIGHT: 222 LBS | SYSTOLIC BLOOD PRESSURE: 128 MMHG | BODY MASS INDEX: 41.91 KG/M2 | DIASTOLIC BLOOD PRESSURE: 82 MMHG | TEMPERATURE: 97.3 F | HEIGHT: 61 IN

## 2019-04-19 DIAGNOSIS — J43.9 PULMONARY EMPHYSEMA, UNSPECIFIED EMPHYSEMA TYPE (H): ICD-10-CM

## 2019-04-19 DIAGNOSIS — I10 HYPERTENSION, GOAL BELOW 140/90: ICD-10-CM

## 2019-04-19 DIAGNOSIS — Z17.0 MALIGNANT NEOPLASM OF OVERLAPPING SITES OF LEFT BREAST IN FEMALE, ESTROGEN RECEPTOR POSITIVE (H): Primary | ICD-10-CM

## 2019-04-19 DIAGNOSIS — I48.91 ATRIAL FIBRILLATION, UNSPECIFIED TYPE (H): ICD-10-CM

## 2019-04-19 DIAGNOSIS — Z79.01 CHRONIC ANTICOAGULATION: ICD-10-CM

## 2019-04-19 DIAGNOSIS — E66.01 MORBID OBESITY, UNSPECIFIED OBESITY TYPE (H): ICD-10-CM

## 2019-04-19 DIAGNOSIS — C50.812 MALIGNANT NEOPLASM OF OVERLAPPING SITES OF LEFT BREAST IN FEMALE, ESTROGEN RECEPTOR POSITIVE (H): Primary | ICD-10-CM

## 2019-04-19 PROCEDURE — 99204 OFFICE O/P NEW MOD 45 MIN: CPT | Performed by: SURGERY

## 2019-04-19 ASSESSMENT — MIFFLIN-ST. JEOR: SCORE: 1474.37

## 2019-04-19 NOTE — LETTER
4/19/2019         RE: Latanya Padron  [address] [facility] [address]        Dear Colleague,    Thank you for referring your patient, Latanya Padron, to the [facility]. Please see a copy of my visit note below.      General Surgery Consultation    Latanya Padron MRN# [id number]   Age: 69 year old YOB: 1950         Assessment and Plan:   I was asked to see Ms. Padron  By Dr. Blue for evaluation of left breast invasive lobular carcinoma.  This is a 69 year old female who has the assessment below.      Assessment:    ICD-10-CM    1. Malignant neoplasm of overlapping sites of left breast in female, estrogen receptor positive (H) C50.812 MR Breast Bilateral w/o & w Contrast    Z17.0    2. Morbid obesity, unspecified obesity type (H) E66.01    3. Pulmonary emphysema, unspecified emphysema type (H) J43.9    4. Atrial fibrillation, unspecified type (H) I48.91    5. Hypertension, goal below 140/90 I10    6. Chronic anticoagulation Z79.01        Plan:  I explained pt's pathology result as below   I explained the surgical options: breast conservative therapy vs simple mastectomy with sentinel lymph node biopsy.     I advised Ms. Padron that lymph node biopsy is recommended whenever we are dealing with invasive breast cancer and described the procedure for sentinel lymph node biopsy.  We talked about the risk for lymphedema (7%) which is small with removal of only a few nodes.    We talked about level 1 and level 2 axillary lymph node dissection; risk for lymphedema is up to 20%.  We talked about the indications for the axillary dissection.  I reviewed the data regarding lumpectomy and radiation vs mastectomy that shows that the local recurrence risk is slightly higher for lumpectomy and radiation vs mastectomy (5-10% vs. 1-2%), but the survival at 20 years is the same.      We explain additional steps as patient plans to undergo BCT : 1 MRI to r/o any other lesions and the  contralateral breast as she does have invasive lobular.       Patient has significant family history of breast cancer, sister at age 47; mother at age 73.  Pt does meet NCCN criteria for genetic testing; pt stated she would want to undergo risk reduction surgeries if she has a genetic gene mutation.     She will think about this.        As of right now, Ms. Padron is leaning more towards wire localized left partial mastectomy with sentinel lymph node biopsy possible left axillary dissection.   I drew out the procedure for above procedure in addition to the possible risks of the procedures - seroma, hematoma, wound infection, and possible second surgery depending on the final result of the pathology report.       I will follow up with her MRI result; if there is no additional lesions or area of concerns we will proceed with the plan as above.  If there are area of concern we may need to come back to the office and discuss possible mastectomy options.  All of her questions were answered to her satisfaction along with her 's questions.    15 mins were spent as noncontact time on the day of the visit reviewing patient's imagings, and looking at pathology results    I thank Dr. Bleu for the opportunity to participate in the patient's care.           Chief Complaint:   Left breast cancer     History is obtained from the patient         History of Present Illness:   This patient is a 69 year old  female with a significant past medical history of atrial fibrillation (Coumadin), chronic obstructive pulmonary disease, hypertension and morbid obesity who presents with biopsy-proven left breast invasive lobular carcinoma.  This was found on a screening mammogram.  Stated she had an abnormal screening mammogram 6 months ago.  It was recommended at that time that she wait 6 months for follow-up.  At the follow-up this time around, it was recommended that she undergo an ultrasound followed by a core biopsy which  yielded the result as left breast ILC, 11:30 oclock; 4cm FNAC US 1.1x0.5x0.5cm ER/WV (+); HER2 (-); neg LCIS, +microcalcifications; +famhx of breast cancer in mother and sister.    Patient denies breast pain prior to the biopsy, changes in her skin, nipple discharge, nipple inversion, palpable mass.   She denies history of MI, no stroke.  Patient has previous excision of benign cysts on the right breast.  Patient does have atrial fibrillation and is currently on Coumadin.  Patient does have dyspnea on exertion.        BREAST-SPECIFIC HISTORY:  Prior breast biopsies: No  Prior breast surgeries: previous excision of benign cyst on left breast  Prior radiation history: no  Hormone replacement therapy: no  Dominant hand: right     GYN HISTORY:  [id number]  Age at 1st pregnancy: 22  Age at menarche: 14  Breastfeeding history: no  Menopausal? post     Reproductive PSH includes: ARISTEO-BSO    Cancer history in self: no      FAMILY HISTORY:  Breast ca: Sister at age 47; mother at age 73  Ovarian ca: ?  Pancreatic ca: no  Gastric ca: no   Melanoma: no  Colon ca: no  Other cancer: no              Past Medical History:    has a past medical history of Mixed hyperlipidemia.          Past Surgical History:     Past Surgical History:   Procedure Laterality Date     C APPENDECTOMY,W OTHR PROC       C  DELIVERY ONLY           C LIGATE FALLOPIAN TUBE  's     C NONSPECIFIC PROCEDURE      Exploratory laparotomy with resection of infarcted segment of omentum and minor lysis of adhesions     C NONSPECIFIC PROCEDURE      Removal of hemorrhagic corpus luteum cyst     C VAGINAL HYSTERECTOMY       COLONOSCOPY  06/21/10      BIOPSY OF BREAST, OPEN INCISIONAL        CORRECT BUNION,METATARSAL OSTEOTOMY        LAPAROSCOPY, SURGICAL; CHOLECYSTECTOMY  08/04/10      SLING OPERATION FOR STRESS INCONTINENCE  2007     HERNIORRHAPHY INCISIONAL (LOCATION)  2011    Procedure:HERNIORRHAPHY  INCISIONAL (LOCATION); Surgeon:AYALA HOOVER; Location:[facility]     LAPAROSCOPIC HERNIORRHAPHY INCISIONAL  9/23/2011    Procedure:LAPAROSCOPIC HERNIORRHAPHY INCISIONAL; Laparoscopic mesh repair of incarcerated incisional hernia , extensive lysis of adhesions, excision of hernia sac and closure of fascia.; Surgeon:AYALA HOOVER; Location:[facility]     LAPAROSCOPIC LYSIS ADHESIONS  9/23/2011    Procedure:LAPAROSCOPIC LYSIS ADHESIONS; Surgeon:AYALA HOOVER; Location:[facility]           Medications:     Current Outpatient Medications on File Prior to Visit:  albuterol (2.5 MG/3ML) 0.083% neb solution Take  by nebulization. 1 NEB EVERY 4-6 HOURS AS NEEDED   albuterol (PROAIR HFA/PROVENTIL HFA/VENTOLIN HFA) 108 (90 BASE) MCG/ACT Inhaler 1-2 puffs by mouth every 2-4 hours as needed   atorvastatin (LIPITOR) 10 MG tablet TAKE ONE TABLET BY MOUTH EVERY DAY   Calcium Carb-Cholecalciferol (CALCIUM 500 + D) 500-400 MG-UNIT TABS Take 1 tablet by mouth 2 times daily   CARTIA  MG 24 hr capsule TAKE ONE CAPSULE BY MOUTH EVERY DAY   cholecalciferol (VITAMIN D) 400 UNITS tablet TAKE ONE TABLET BY MOUTH EVERY DAY   Cyanocobalamin (B-12) 1000 MCG TBCR Take 1,000 mcg by mouth daily   hydrochlorothiazide (HYDRODIURIL) 25 MG tablet TAKE ONE TABLET BY MOUTH EVERY DAY   lisinopril (PRINIVIL/ZESTRIL) 2.5 MG tablet TAKE ONE TABLET BY MOUTH EVERY DAY   magnesium 250 MG tablet Take 1 tablet by mouth daily   mometasone-formoterol (DULERA) 200-5 MCG/ACT oral inhaler INHALE 2 PUFFS BY MOUTH TWO TIMES A DAY   omega 3 1000 MG CAPS Take 1 g by mouth daily   ORDER FOR DME Equipment being ordered: Oxygen @ 2 liters with exertion   predniSONE (DELTASONE) 20 MG tablet TAKE ONE TABLET BY MOUTH EVERY DAY   trospium (SANCTURA) 20 MG tablet Take 20 mg by mouth   umeclidinium (INCRUSE ELLIPTA) 62.5 MCG/INH oral inhaler Inhale 1 puff into the lungs daily   venlafaxine (EFFEXOR-XR) 37.5 MG 24 hr capsule TAKE ONE CAPSULE BY MOUTH EVERY DAY WITH FOOD  "  warfarin (JANTOVEN) 5 MG tablet Take 5 mg Mon and 2.5 mg all other days of the week, or as directed by coumadin clinic     No current facility-administered medications on file prior to visit.       Allergies:      Allergies   Allergen Reactions     Augmentin [Amoxicillin-Pot Clavulanate] Nausea and Vomiting     Meperidine Hcl Nausea and Vomiting     demerol     Seasonal Allergies      spring            Social History:   Latanya Padron  reports that she quit smoking about 13 years ago. She has a 30.00 pack-year smoking history. She has never used smokeless tobacco. She reports that she does not drink alcohol or use drugs.          Family History:   The patient has no family history of any bleeding, clotting or anesthesia problems.          Review of Systems:     Constitutional: Denies fever or chills   Eyes: Denies change in visual acuity; glasses  HENT: Denies nasal congestion or sore throat; nasal congestion  Respiratory: Denies shortness of breath; positive cough  Cardiovascular: Denies chest pain or edema; +dyspnea on exertion  GI: Denies abdominal pain, nausea, vomiting, bloody stools or diarrhea   : Denies dysuria; incontinent  Musculoskeletal: Positive joint pain   Integument: Denies rash; dryness  Neurologic: Denies headache, focal weakness or sensory changes   Endocrine: Denies polyuria or polydipsia   Lymphatic: Denies swollen glands   Psychiatric: Denies anxiety; positive depression         Physical Exam:     VITAL SIGNS:  height is 1.549 m (5' 1\") and weight is 100.7 kg (222 lb). Her temporal temperature is 97.3  F (36.3  C). Her blood pressure is 128/82.   Constitutional: Well developed, Well nourished, No acute distress, Non-toxic appearance.   HENT: Normocephalic, Atraumatic, Bilateral external ears normal, Oropharynx moist, No oral exudates, Nose normal.   Eyes: Conjunctiva normal, No discharge.   Neck: Normal range of motion, No tenderness, Supple, No stridor.   Lymphatic: No lymphadenopathy " noted.   Cardiovascular: Normal heart rate, Normal rhythm, No murmurs, No rubs, No gallops.   Breast Exam:     RIGHT: normal without suspicious masses, skin changes or axillary nodes, symmetric fibrous changes in both upper outer quadrants, self exam is taught and encouraged.    LEFT:normal without suspicious masses, skin changes or axillary nodes, symmetric fibrous changes in both upper outer quadrants, self exam is taught and encouraged, noted hematoma at the area biopsied approximately 1130 o'clock.  Thorax & Lungs: Normal breath sounds, No respiratory distress, No wheezing, No chest tenderness.   Abdomen: Bowel sounds normal, Soft, No masses, No pulsatile masses.  Noted previous cicatrix.  Skin: Warm, Dry, No erythema, No rash.   Back: No tenderness, No CVA tenderness.   Extremities: Intact distal pulses, No edema, No tenderness, No cyanosis, No clubbing.   Musculoskeletal: Good range of motion in all major joints. No tenderness to palpation or major deformities noted.   Neurologic: Alert & oriented x 3, Normal motor function, Normal sensory function, No focal deficits noted.   Psychiatric: Affect normal, Judgment normal, Mood normal.           Data:   WBC -   WBC   Date Value Ref Range Status   06/20/2018 6.0 4.0 - 11.0 10e9/L Final   ], HgB - Hemoglobin   Date Value Ref Range Status   06/20/2018 12.8 11.7 - 15.7 g/dL Final   ]   Liver Function Studies -   Recent Labs   Lab Test 06/20/18  1121   PROTTOTAL 7.2   ALBUMIN 3.7   BILITOTAL 0.5   ALKPHOS 64   AST 17   ALT 23       Imagings:   MA DIAGNOSTIC LEFT W/ ALISHA, US BREAST LEFT LIMITED 1-3 QUADRANTS,  DIGITAL w/CAD;    TARGETED ULTRASOUND, LEFT BREAST - 4/12/2019 9:51 AM     HISTORY: Six-month follow-up for focal asymmetry on mammogram and for  probably complex cyst left breast 12 o'clock position on prior  diagnostic ultrasound. Family history of breast cancer in her mother  at age 70 and in her sister at age 40. Personal history of benign  right breast  biopsy 20+ years ago.      COMPARISON:  Mammograms dated 9/26/2018, 4/26/2016 and 4/4/2015. Left  breast ultrasound dated 10/9/2018.     BREAST DENSITY: Scattered fibroglandular densities.     FINDINGS: Focal area of architectural distortion with some mass effect  in the central aspect of the 11:30 position of the mid anterior left  breast is essentially stable since the prior study dated 9/26/2018. No  other mammographic findings of concern for malignancy.     Targeted ultrasound of the upper left breast again demonstrates what  is likely a small benign complex cystic structure at the 12 o'clock  position 3 cm from nipple measuring 0.3 x 0.3 x 0.2 cm. This is highly  likely benign. Incidental note of a heterogeneously echoic shadowing  mass in the left breast 11:30 position 4 cm from the nipple measuring  1.1 x 0.5 x 0.5 cm. This was not previously identified on ultrasound  and does have some vascularity either within the lesion or medially  adjacent to the lesion. This is an indeterminate and concerning  finding and further evaluation with biopsy is recommended.                                                                      IMPRESSION: BI-RADS CATEGORY: 4 - Suspicious Abnormality-Biopsy Should  Be Considered. Heterogeneously echoic shadowing mass in the 11:30  position of the left breast approximately 4 cm from nipple was not  definitely seen on the prior study and further evaluation with biopsy  is recommended.     RECOMMENDED FOLLOW-UP: Biopsy     I discussed the findings and recommendations with the patient at the  time of the exam.     MD GINA MORAW  Accession # [id number], [id number]     Pathology results are now available for the needle biopsy of the left  breast lesion. These demonstrate invasive lobular carcinoma.     Please see also pathology report for additional information.     These results are concordant with the imaging findings.     RECOMMENDATIONS: Preoperative MRI.  Surgical and oncologic consultation  are also recommended.     Inga Trevino MD  ( Date of Addendum: 4/18/2019 )     INGA TREVINO MD   Addended by Inga Trevino MD on 4/18/2019  6:44 PM      Study Result     ULTRASOUND-GUIDED LEFT BREAST CORE BIOPSY;  CLIP PLACEMENT   POSTBIOPSY UNILATERAL DIGITAL MAMMOGRAM - 4/12/2019 10:45 AM      HISTORY: Left breast mass.     COMPARISON: Ultrasound dated 4/12/2019 and 10/9/2018.     Mammograms dated 4/12/2019, 9/28/2018, 4/26/2016, 4/4/2015 and  3/1/2013.     PROCEDURE: Following discussion of risks and benefits, consent was  obtained from the patient. An appropriate skin puncture site was  selected using sonographic guidance. The skin was then prepped and  draped in usual sterile fashion. 1 mL of 1% lidocaine without  epinephrine was infiltrated for local anesthetic in the skin and 8.5  mL of lidocaine with epinephrine were infiltrated in the deep soft  tissues and around the lesion. A 13-gauge trocar was introduced and  its tip was placed adjacent to the lesion in the upper aspect of the  left breast via a lateral approach. A series of 6 samples were  obtained with a 14-gauge core-cutting needle. A hydrophilic metal  tissue marker was then deployed to inga the lesion. Samples were  placed in formalin and sent to pathology for appropriate evaluation.     The postbiopsy mammogram showed biopsy clip to be in the area of small  mass and architectural distortion in the slightly inner upper left  breast. She tolerated the procedure without difficulty and there was  no immediate complication.      PHYSICIAN:  Dr. Inga Trevino.  ASSISTANTS: Ultrasound technologists.  PROCEDURE: Breast biopsy.  FINDINGS:  Heterogeneously echoic shadowing mass in the upper left  breast 11:30 position is again noted. Biopsy needle is seen in this  region on all biopsy attempts. The biopsy clip is seen in this region  on the ultrasound during placement and on the  postprocedural  mammogram.  ESTIMATED BLOOD LOSS:  Less than 5 ml.  SPECIMENS REMOVED: 5 x 14 gauge core biopsy samples.  POST OP DIAGNOSIS : Successful breast biopsy.                                                                      IMPRESSION:   1. Technically successful left breast biopsy with clip placement and  appropriate clip positioning on postprocedural mammogram. No immediate  complications. Final diagnosis is awaiting pathologic evaluation.  2. Post procedure mammograms for marker placement.     INGA TREVINO MD            Pathology:   SPECIMEN(S):   Left breast 11:30 needle biopsy     FINAL DIAGNOSIS:   Breast, left, 11:30, ultrasound-guided needle core biopsy:   - Invasive lobular carcinoma:        - Rosie grade: II/III ; Houston score: 6/9 (tubules:3;   nuclear:2; mitosis:1)        - Angiolymphatic invasion not identified in the planes examined.        - Lobular carcinoma in-situ: Present        - Microcalcifications: Present; associated with invasive carcinoma   and fibrotic         stroma.        - ER: Positive (strong staining in more than 90% of invasive tumor   nuclei)        - ME: Positive (moderate staining in 50-70% of invasive tumor nuclei)     COMMENT:   HER2 gene amplification will be performed and reported by the [facility]    laboratory.     Electronically signed out by:     Serafin Rodríguez M.D., PhD     TEST(S) REQUESTED:   [facility] HER2 FISH     SPECIMEN DESCRIPTION:   Breast Tissue, Paraffin Embedded     CLINICAL COMMENTS:   [id number]     RESULTS:     Ratio of HER2/STAS-17 signals   Latanya Padron: 1.1 (VIOLA Negative)                                   Avg.   number HER2 signals/nucleus: 1.9                                                                             Avg. number STAS-17 signals/nucleus: 1.7     **Interpretive guidelines per the American Society of Clinical   Oncology/College of American Pathologists   Clinical Practice Guideline Focused Update (Kenneth BRAY et al,  2018, Arch   Pathol Lab Med 142:1364;   doi:10.5858/arpa.7279-5218-JP):     -- Group 1: HER2/STAS-17 ratio 2.0 or more -AND- avg. number HER2   signals/nucleus 4.0 or more (VIOLA Positive)   -- Group 2: HER2/STAS-17 ratio 2.0 or more -AND- avg. number HER2   signals/nucleus <4.0 (Additional work   required)   -- Group 3: HER2/STAS-17 ratio <2.0 -AND- avg. number HER2 signals/nucleus   6.0 or more (Additional work   required)   -- Group 4: HER2/STAS-17 ratio <2.0 -AND- avg. number HER2 signals/nucleus   4.0 or more and <6.0 (Additional   work required)   -- Group 5: HER2/STAS-17 ratio <2.0 -AND- avg. number HER2 signals/nucleus   <4.0 (VIOLA Negative)     INTERPRETATION:  Group 5 (VIOLA Negative)     Per the American Society of Clinical Oncology/College of American   Pathologists Clinical Practice Guideline   Focused Update (Kenneth BRAY et al, 2018, Arch Pathol Lab Med     doi:10.5858/arpa.[email or web address]-XV), the HER2/STAS 17   ratio of 1.1 and average number of HER2 signals/cell of 1.9 places this   specimen in Group 5 (VIOLA Negative).     Specimen:  Case [id number], Block A1   Reported formalin fixation time:  6-72 hours   Number of cells scored: 60   Probe:   [employer] HER2/STAS-17 IQFISH pharmDx Probe Mix to HER2 (17q12) and to   the centromere region of chromosome   17     ADDITIONAL COMMENTS:   The IQFISH pharmDx test has been approved by the FDA for the evaluation of    HER2 (ERBB2) gene amplification   status in formalin-fixed, paraffin-embedded breast cancer tissue specimens    and gastric or gastroesophageal   junction adenocarcinoma.  It is intended for use as an adjunct to other   existing clinicopathologic information   used to evaluate patients with such tumors. This test was developed and   its performance characteristics   determined by the [facility] [place]   [facility].     Electronically Signed Out By:   Chaparrita Noel M.D., UMPhysiciansT Codes:     Yash-Leyda Aleman, DO 4/19/2019             Again, thank you for allowing me to participate in the care of your patient.        Sincerely,        Donald Aleman MD

## 2019-04-19 NOTE — PROGRESS NOTES
General Surgery Consultation    Latanya Padron MRN# [id number]   Age: 69 year old YOB: 1950         Assessment and Plan:   I was asked to see Ms. Padron  By Dr. Blue for evaluation of left breast invasive lobular carcinoma.  This is a 69 year old female who has the assessment below.      Assessment:    ICD-10-CM    1. Malignant neoplasm of overlapping sites of left breast in female, estrogen receptor positive (H) C50.812 MR Breast Bilateral w/o & w Contrast    Z17.0    2. Morbid obesity, unspecified obesity type (H) E66.01    3. Pulmonary emphysema, unspecified emphysema type (H) J43.9    4. Atrial fibrillation, unspecified type (H) I48.91    5. Hypertension, goal below 140/90 I10    6. Chronic anticoagulation Z79.01        Plan:  I explained pt's pathology result as below   I explained the surgical options: breast conservative therapy vs simple mastectomy with sentinel lymph node biopsy.     I advised Ms. Padron that lymph node biopsy is recommended whenever we are dealing with invasive breast cancer and described the procedure for sentinel lymph node biopsy.  We talked about the risk for lymphedema (7%) which is small with removal of only a few nodes.    We talked about level 1 and level 2 axillary lymph node dissection; risk for lymphedema is up to 20%.  We talked about the indications for the axillary dissection.  I reviewed the data regarding lumpectomy and radiation vs mastectomy that shows that the local recurrence risk is slightly higher for lumpectomy and radiation vs mastectomy (5-10% vs. 1-2%), but the survival at 20 years is the same.      We explain additional steps as patient plans to undergo BCT : 1 MRI to r/o any other lesions and the contralateral breast as she does have invasive lobular.       Patient has significant family history of breast cancer, sister at age 47; mother at age 73.  Pt does meet NCCN criteria for genetic testing; pt stated she would want to undergo risk  reduction surgeries if she has a genetic gene mutation.    She will think about this.        As of right now, Ms. Padron is leaning more towards wire localized left partial mastectomy with sentinel lymph node biopsy possible left axillary dissection.   I drew out the procedure for above procedure in addition to the possible risks of the procedures - seroma, hematoma, wound infection, and possible second surgery depending on the final result of the pathology report.       I will follow up with her MRI result; if there is no additional lesions or area of concerns we will proceed with the plan as above.  If there are area of concern we may need to come back to the office and discuss possible mastectomy options.  All of her questions were answered to her satisfaction along with her 's questions.    15 mins were spent as noncontact time on the day of the visit reviewing patient's imagings, and looking at pathology results    I thank Dr. Blue for the opportunity to participate in the patient's care.           Chief Complaint:   Left breast cancer     History is obtained from the patient         History of Present Illness:   This patient is a 69 year old  female with a significant past medical history of atrial fibrillation (Coumadin), chronic obstructive pulmonary disease, hypertension and morbid obesity who presents with biopsy-proven left breast invasive lobular carcinoma.  This was found on a screening mammogram.  Stated she had an abnormal screening mammogram 6 months ago.  It was recommended at that time that she wait 6 months for follow-up.  At the follow-up this time around, it was recommended that she undergo an ultrasound followed by a core biopsy which yielded the result as left breast ILC, 11:30 oclock; 4cm FNAC US 1.1x0.5x0.5cm ER/ID (+); HER2 (-); neg LCIS, +microcalcifications; +famhx of breast cancer in mother and sister.    Patient denies breast pain prior to the biopsy, changes in her  skin, nipple discharge, nipple inversion, palpable mass.   She denies history of MI, no stroke.  Patient has previous excision of benign cysts on the right breast.  Patient does have atrial fibrillation and is currently on Coumadin.  Patient does have dyspnea on exertion.        BREAST-SPECIFIC HISTORY:  Prior breast biopsies: No  Prior breast surgeries: previous excision of benign cyst on left breast  Prior radiation history: no  Hormone replacement therapy: no  Dominant hand: right     GYN HISTORY:  [id number]  Age at 1st pregnancy: 22  Age at menarche: 14  Breastfeeding history: no  Menopausal? post     Reproductive PSH includes: ARISTEO-BSO    Cancer history in self: no      FAMILY HISTORY:  Breast ca: Sister at age 47; mother at age 73  Ovarian ca: ?  Pancreatic ca: no  Gastric ca: no   Melanoma: no  Colon ca: no  Other cancer: no              Past Medical History:    has a past medical history of Mixed hyperlipidemia.          Past Surgical History:     Past Surgical History:   Procedure Laterality Date     C APPENDECTOMY,W OTHR PROC       C  DELIVERY ONLY           C LIGATE FALLOPIAN TUBE       C NONSPECIFIC PROCEDURE      Exploratory laparotomy with resection of infarcted segment of omentum and minor lysis of adhesions     C NONSPECIFIC PROCEDURE      Removal of hemorrhagic corpus luteum cyst     C VAGINAL HYSTERECTOMY       COLONOSCOPY  06/21/10      BIOPSY OF BREAST, OPEN INCISIONAL        CORRECT BUNION,METATARSAL OSTEOTOMY        LAPAROSCOPY, SURGICAL; CHOLECYSTECTOMY  08/04/10      SLING OPERATION FOR STRESS INCONTINENCE  2007     HERNIORRHAPHY INCISIONAL (LOCATION)  2011    Procedure:HERNIORRHAPHY INCISIONAL (LOCATION); Surgeon:AYALA HOOVER; Location:[facility]     LAPAROSCOPIC HERNIORRHAPHY INCISIONAL  2011    Procedure:LAPAROSCOPIC HERNIORRHAPHY INCISIONAL; Laparoscopic mesh repair of incarcerated incisional hernia , extensive  lysis of adhesions, excision of hernia sac and closure of fascia.; Surgeon:AYALA HOOVER; Location:[facility]     LAPAROSCOPIC LYSIS ADHESIONS  9/23/2011    Procedure:LAPAROSCOPIC LYSIS ADHESIONS; Surgeon:AYALA HOOVER; Location:[facility]           Medications:     Current Outpatient Medications on File Prior to Visit:  albuterol (2.5 MG/3ML) 0.083% neb solution Take  by nebulization. 1 NEB EVERY 4-6 HOURS AS NEEDED   albuterol (PROAIR HFA/PROVENTIL HFA/VENTOLIN HFA) 108 (90 BASE) MCG/ACT Inhaler 1-2 puffs by mouth every 2-4 hours as needed   atorvastatin (LIPITOR) 10 MG tablet TAKE ONE TABLET BY MOUTH EVERY DAY   Calcium Carb-Cholecalciferol (CALCIUM 500 + D) 500-400 MG-UNIT TABS Take 1 tablet by mouth 2 times daily   CARTIA  MG 24 hr capsule TAKE ONE CAPSULE BY MOUTH EVERY DAY   cholecalciferol (VITAMIN D) 400 UNITS tablet TAKE ONE TABLET BY MOUTH EVERY DAY   Cyanocobalamin (B-12) 1000 MCG TBCR Take 1,000 mcg by mouth daily   hydrochlorothiazide (HYDRODIURIL) 25 MG tablet TAKE ONE TABLET BY MOUTH EVERY DAY   lisinopril (PRINIVIL/ZESTRIL) 2.5 MG tablet TAKE ONE TABLET BY MOUTH EVERY DAY   magnesium 250 MG tablet Take 1 tablet by mouth daily   mometasone-formoterol (DULERA) 200-5 MCG/ACT oral inhaler INHALE 2 PUFFS BY MOUTH TWO TIMES A DAY   omega 3 1000 MG CAPS Take 1 g by mouth daily   ORDER FOR DME Equipment being ordered: Oxygen @ 2 liters with exertion   predniSONE (DELTASONE) 20 MG tablet TAKE ONE TABLET BY MOUTH EVERY DAY   trospium (SANCTURA) 20 MG tablet Take 20 mg by mouth   umeclidinium (INCRUSE ELLIPTA) 62.5 MCG/INH oral inhaler Inhale 1 puff into the lungs daily   venlafaxine (EFFEXOR-XR) 37.5 MG 24 hr capsule TAKE ONE CAPSULE BY MOUTH EVERY DAY WITH FOOD   warfarin (JANTOVEN) 5 MG tablet Take 5 mg Mon and 2.5 mg all other days of the week, or as directed by coumadin clinic     No current facility-administered medications on file prior to visit.       Allergies:      Allergies   Allergen  "Reactions     Augmentin [Amoxicillin-Pot Clavulanate] Nausea and Vomiting     Meperidine Hcl Nausea and Vomiting     demerol     Seasonal Allergies      spring            Social History:   Latanya Padron  reports that she quit smoking about 13 years ago. She has a 30.00 pack-year smoking history. She has never used smokeless tobacco. She reports that she does not drink alcohol or use drugs.          Family History:   The patient has no family history of any bleeding, clotting or anesthesia problems.          Review of Systems:     Constitutional: Denies fever or chills   Eyes: Denies change in visual acuity; glasses  HENT: Denies nasal congestion or sore throat; nasal congestion  Respiratory: Denies shortness of breath; positive cough  Cardiovascular: Denies chest pain or edema; +dyspnea on exertion  GI: Denies abdominal pain, nausea, vomiting, bloody stools or diarrhea   : Denies dysuria; incontinent  Musculoskeletal: Positive joint pain   Integument: Denies rash; dryness  Neurologic: Denies headache, focal weakness or sensory changes   Endocrine: Denies polyuria or polydipsia   Lymphatic: Denies swollen glands   Psychiatric: Denies anxiety; positive depression         Physical Exam:     VITAL SIGNS:  height is 1.549 m (5' 1\") and weight is 100.7 kg (222 lb). Her temporal temperature is 97.3  F (36.3  C). Her blood pressure is 128/82.   Constitutional: Well developed, Well nourished, No acute distress, Non-toxic appearance.   HENT: Normocephalic, Atraumatic, Bilateral external ears normal, Oropharynx moist, No oral exudates, Nose normal.   Eyes: Conjunctiva normal, No discharge.   Neck: Normal range of motion, No tenderness, Supple, No stridor.   Lymphatic: No lymphadenopathy noted.   Cardiovascular: Normal heart rate, Normal rhythm, No murmurs, No rubs, No gallops.   Breast Exam:     RIGHT: normal without suspicious masses, skin changes or axillary nodes, symmetric fibrous changes in both upper outer quadrants, " self exam is taught and encouraged.    LEFT:normal without suspicious masses, skin changes or axillary nodes, symmetric fibrous changes in both upper outer quadrants, self exam is taught and encouraged, noted hematoma at the area biopsied approximately 1130 o'clock.  Thorax & Lungs: Normal breath sounds, No respiratory distress, No wheezing, No chest tenderness.   Abdomen: Bowel sounds normal, Soft, No masses, No pulsatile masses.  Noted previous cicatrix.  Skin: Warm, Dry, No erythema, No rash.   Back: No tenderness, No CVA tenderness.   Extremities: Intact distal pulses, No edema, No tenderness, No cyanosis, No clubbing.   Musculoskeletal: Good range of motion in all major joints. No tenderness to palpation or major deformities noted.   Neurologic: Alert & oriented x 3, Normal motor function, Normal sensory function, No focal deficits noted.   Psychiatric: Affect normal, Judgment normal, Mood normal.           Data:   WBC -   WBC   Date Value Ref Range Status   06/20/2018 6.0 4.0 - 11.0 10e9/L Final   ], HgB - Hemoglobin   Date Value Ref Range Status   06/20/2018 12.8 11.7 - 15.7 g/dL Final   ]   Liver Function Studies -   Recent Labs   Lab Test 06/20/18  1121   PROTTOTAL 7.2   ALBUMIN 3.7   BILITOTAL 0.5   ALKPHOS 64   AST 17   ALT 23       Imagings:   MA DIAGNOSTIC LEFT W/ ALISHA, US BREAST LEFT LIMITED 1-3 QUADRANTS,  DIGITAL w/CAD;    TARGETED ULTRASOUND, LEFT BREAST - 4/12/2019 9:51 AM     HISTORY: Six-month follow-up for focal asymmetry on mammogram and for  probably complex cyst left breast 12 o'clock position on prior  diagnostic ultrasound. Family history of breast cancer in her mother  at age 70 and in her sister at age 40. Personal history of benign  right breast biopsy 20+ years ago.      COMPARISON:  Mammograms dated 9/26/2018, 4/26/2016 and 4/4/2015. Left  breast ultrasound dated 10/9/2018.     BREAST DENSITY: Scattered fibroglandular densities.     FINDINGS: Focal area of architectural distortion with  some mass effect  in the central aspect of the 11:30 position of the mid anterior left  breast is essentially stable since the prior study dated 9/26/2018. No  other mammographic findings of concern for malignancy.     Targeted ultrasound of the upper left breast again demonstrates what  is likely a small benign complex cystic structure at the 12 o'clock  position 3 cm from nipple measuring 0.3 x 0.3 x 0.2 cm. This is highly  likely benign. Incidental note of a heterogeneously echoic shadowing  mass in the left breast 11:30 position 4 cm from the nipple measuring  1.1 x 0.5 x 0.5 cm. This was not previously identified on ultrasound  and does have some vascularity either within the lesion or medially  adjacent to the lesion. This is an indeterminate and concerning  finding and further evaluation with biopsy is recommended.                                                                      IMPRESSION: BI-RADS CATEGORY: 4 - Suspicious Abnormality-Biopsy Should  Be Considered. Heterogeneously echoic shadowing mass in the 11:30  position of the left breast approximately 4 cm from nipple was not  definitely seen on the prior study and further evaluation with biopsy  is recommended.     RECOMMENDED FOLLOW-UP: Biopsy     I discussed the findings and recommendations with the patient at the  time of the exam.     MD GINA [facility]  Accession # [id number], [id number]     Pathology results are now available for the needle biopsy of the left  breast lesion. These demonstrate invasive lobular carcinoma.     Please see also pathology report for additional information.     These results are concordant with the imaging findings.     RECOMMENDATIONS: Preoperative MRI. Surgical and oncologic consultation  are also recommended.     Inag Trevino MD  ( Date of Addendum: 4/18/2019 )     INGA TREVINO MD   Addended by Inag Trevino MD on 4/18/2019  6:44 PM      Study Result     ULTRASOUND-GUIDED LEFT BREAST  CORE BIOPSY;  CLIP PLACEMENT   POSTBIOPSY UNILATERAL DIGITAL MAMMOGRAM - 4/12/2019 10:45 AM      HISTORY: Left breast mass.     COMPARISON: Ultrasound dated 4/12/2019 and 10/9/2018.     Mammograms dated 4/12/2019, 9/28/2018, 4/26/2016, 4/4/2015 and  3/1/2013.     PROCEDURE: Following discussion of risks and benefits, consent was  obtained from the patient. An appropriate skin puncture site was  selected using sonographic guidance. The skin was then prepped and  draped in usual sterile fashion. 1 mL of 1% lidocaine without  epinephrine was infiltrated for local anesthetic in the skin and 8.5  mL of lidocaine with epinephrine were infiltrated in the deep soft  tissues and around the lesion. A 13-gauge trocar was introduced and  its tip was placed adjacent to the lesion in the upper aspect of the  left breast via a lateral approach. A series of 6 samples were  obtained with a 14-gauge core-cutting needle. A hydrophilic metal  tissue marker was then deployed to kaiser the lesion. Samples were  placed in formalin and sent to pathology for appropriate evaluation.     The postbiopsy mammogram showed biopsy clip to be in the area of small  mass and architectural distortion in the slightly inner upper left  breast. She tolerated the procedure without difficulty and there was  no immediate complication.      PHYSICIAN:  Dr. Kaiser Barrett.  ASSISTANTS: Ultrasound technologists.  PROCEDURE: Breast biopsy.  FINDINGS:  Heterogeneously echoic shadowing mass in the upper left  breast 11:30 position is again noted. Biopsy needle is seen in this  region on all biopsy attempts. The biopsy clip is seen in this region  on the ultrasound during placement and on the postprocedural  mammogram.  ESTIMATED BLOOD LOSS:  Less than 5 ml.  SPECIMENS REMOVED: 5 x 14 gauge core biopsy samples.  POST OP DIAGNOSIS : Successful breast biopsy.                                                                      IMPRESSION:   1. Technically successful  left breast biopsy with clip placement and  appropriate clip positioning on postprocedural mammogram. No immediate  complications. Final diagnosis is awaiting pathologic evaluation.  2. Post procedure mammograms for marker placement.     INGA TREVINO MD            Pathology:   SPECIMEN(S):   Left breast 11:30 needle biopsy     FINAL DIAGNOSIS:   Breast, left, 11:30, ultrasound-guided needle core biopsy:   - Invasive lobular carcinoma:        - Miami grade: II/III ; Miami score: 6/9 (tubules:3;   nuclear:2; mitosis:1)        - Angiolymphatic invasion not identified in the planes examined.        - Lobular carcinoma in-situ: Present        - Microcalcifications: Present; associated with invasive carcinoma   and fibrotic         stroma.        - ER: Positive (strong staining in more than 90% of invasive tumor   nuclei)        - DE: Positive (moderate staining in 50-70% of invasive tumor nuclei)     COMMENT:   HER2 gene amplification will be performed and reported by the [facility]    laboratory.     Electronically signed out by:     Serafin Rodríguez M.D., PhD     TEST(S) REQUESTED:   [employer] HER2 FISH     SPECIMEN DESCRIPTION:   Breast Tissue, Paraffin Embedded     CLINICAL COMMENTS:   [id number]     RESULTS:     Ratio of HER2/STAS-17 signals   Latanya Padron: 1.1 (VIOLA Negative)                                   Avg.   number HER2 signals/nucleus: 1.9                                                                             Avg. number STAS-17 signals/nucleus: 1.7     **Interpretive guidelines per the American Society of Clinical   Oncology/College of American Pathologists   Clinical Practice Guideline Focused Update (Kenneth BRAY et al, 2018, Arch   Pathol Lab Med 142:1364;   doi:10.5858/arpa.2872-9893-UM):     -- Group 1: HER2/STAS-17 ratio 2.0 or more -AND- avg. number HER2   signals/nucleus 4.0 or more (VIOLA Positive)   -- Group 2: HER2/STAS-17 ratio 2.0 or more -AND- avg. number HER2   signals/nucleus <4.0  (Additional work   required)   -- Group 3: HER2/STAS-17 ratio <2.0 -AND- avg. number HER2 signals/nucleus   6.0 or more (Additional work   required)   -- Group 4: HER2/STAS-17 ratio <2.0 -AND- avg. number HER2 signals/nucleus   4.0 or more and <6.0 (Additional   work required)   -- Group 5: HER2/STAS-17 ratio <2.0 -AND- avg. number HER2 signals/nucleus   <4.0 (VIOLA Negative)     INTERPRETATION:  Group 5 (VIOLA Negative)     Per the American Society of Clinical Oncology/College of American   Pathologists Clinical Practice Guideline   Focused Update (Kenneth BRAY et al, 2018, Arch Pathol Lab Med     doi:10.5858/arpa.5982-9376-BN), the HER2/STAS 17   ratio of 1.1 and average number of HER2 signals/cell of 1.9 places this   specimen in Group 5 (VIOLA Negative).     Specimen:  Case [id number], Block A1   Reported formalin fixation time:  6-72 hours   Number of cells scored: 60   Probe:   [employer]ako HER2/STAS-17 IQFISH pharmDx Probe Mix to HER2 (17q12) and to   the centromere region of chromosome   17     ADDITIONAL COMMENTS:   The IQFISH pharmDx test has been approved by the FDA for the evaluation of    HER2 (ERBB2) gene amplification   status in formalin-fixed, paraffin-embedded breast cancer tissue specimens    and gastric or gastroesophageal   junction adenocarcinoma.  It is intended for use as an adjunct to other   existing clinicopathologic information   used to evaluate patients with such tumors. This test was developed and   its performance characteristics   determined by the [facility]   [facility].     Electronically Signed Out By:   Chaparrita Noel M.D., VA Medical CentersiciansT Codes:     Yash-Leyda Aleman, DO 4/19/2019

## 2019-04-22 ENCOUNTER — TELEPHONE (OUTPATIENT)
Dept: FAMILY MEDICINE | Facility: CLINIC | Age: 69
End: 2019-04-22

## 2019-04-22 NOTE — TELEPHONE ENCOUNTER
ONCOLOGY INTAKE: Records Information      APPT INFORMATION: 05/01 w/Dr. Denis  Referring provider:  Glen Blue MD  Referring provider s clinic:  [facility]  Reason for visit/diagnosis:  Malignant neoplasm of left female breast, unspecified estrogen receptor status, unspecified site of breast (H) [C50.912]    RECORDS INFORMATION:  Were the records received with the referral (via [employer])? IN [facility]     Has patient been seen for any external appt for this diagnosis? NO    If yes, where? NA    Has patient had any imaging or procedures outside of [facility] for this condition? NO      If Yes, where? NA  ADDITIONAL INFORMATION:  Malignant neoplasm of left female breast, unspecified estrogen receptor status, unspecified site of breast (H) [C50.912]: caller intake: ref by:Glen Blue MD:-[facility] Practice: Records In [facility]

## 2019-04-25 ENCOUNTER — HOSPITAL ENCOUNTER (OUTPATIENT)
Dept: MRI IMAGING | Facility: CLINIC | Age: 69
Discharge: HOME | End: 2019-04-25
Attending: SURGERY | Admitting: SURGERY
Payer: COMMERCIAL

## 2019-04-25 DIAGNOSIS — C50.812 MALIGNANT NEOPLASM OF OVERLAPPING SITES OF LEFT BREAST IN FEMALE, ESTROGEN RECEPTOR POSITIVE (H): ICD-10-CM

## 2019-04-25 DIAGNOSIS — Z17.0 MALIGNANT NEOPLASM OF OVERLAPPING SITES OF LEFT BREAST IN FEMALE, ESTROGEN RECEPTOR POSITIVE (H): ICD-10-CM

## 2019-04-25 DIAGNOSIS — C50.912 LOBULAR CARCINOMA OF LEFT BREAST (H): Primary | ICD-10-CM

## 2019-04-25 PROCEDURE — 25800025 ZZH RX 258: Performed by: SURGERY

## 2019-04-25 PROCEDURE — 25500064 ZZH RX 255 OP 636: Performed by: SURGERY

## 2019-04-25 PROCEDURE — A9585 GADOBUTROL INJECTION: HCPCS | Performed by: SURGERY

## 2019-04-25 PROCEDURE — 77049 MRI BREAST C-+ W/CAD BI: CPT

## 2019-04-25 RX ORDER — CEFAZOLIN SODIUM 1 G/3ML
1 INJECTION, POWDER, FOR SOLUTION INTRAMUSCULAR; INTRAVENOUS SEE ADMIN INSTRUCTIONS
Status: CANCELLED | OUTPATIENT
Start: 2019-04-25

## 2019-04-25 RX ORDER — GADOBUTROL 604.72 MG/ML
10 INJECTION INTRAVENOUS ONCE
Status: COMPLETED | OUTPATIENT
Start: 2019-04-25 | End: 2019-04-25

## 2019-04-25 RX ORDER — CEFAZOLIN SODIUM 2 G/100ML
2 INJECTION, SOLUTION INTRAVENOUS
Status: CANCELLED | OUTPATIENT
Start: 2019-04-25

## 2019-04-25 RX ORDER — ACYCLOVIR 200 MG/1
60 CAPSULE ORAL ONCE
Status: COMPLETED | OUTPATIENT
Start: 2019-04-25 | End: 2019-04-25

## 2019-04-25 RX ADMIN — SODIUM CHLORIDE 60 ML: 9 INJECTION, SOLUTION INTRAMUSCULAR; INTRAVENOUS; SUBCUTANEOUS at 14:01

## 2019-04-25 RX ADMIN — GADOBUTROL 10 ML: 604.72 INJECTION INTRAVENOUS at 14:01

## 2019-04-26 ENCOUNTER — TELEPHONE (OUTPATIENT)
Dept: SURGERY | Facility: CLINIC | Age: 69
End: 2019-04-26

## 2019-04-26 ENCOUNTER — TELEPHONE (OUTPATIENT)
Dept: ANTICOAGULATION | Facility: CLINIC | Age: 69
End: 2019-04-26

## 2019-04-26 ENCOUNTER — TELEPHONE (OUTPATIENT)
Dept: FAMILY MEDICINE | Facility: CLINIC | Age: 69
End: 2019-04-26

## 2019-04-26 ENCOUNTER — ANTICOAGULATION THERAPY VISIT (OUTPATIENT)
Dept: ANTICOAGULATION | Facility: CLINIC | Age: 69
End: 2019-04-26
Payer: COMMERCIAL

## 2019-04-26 DIAGNOSIS — I48.0 AF (PAROXYSMAL ATRIAL FIBRILLATION) (H): ICD-10-CM

## 2019-04-26 DIAGNOSIS — I48.91 ATRIAL FIBRILLATION, UNSPECIFIED TYPE (H): ICD-10-CM

## 2019-04-26 LAB — INR POINT OF CARE: 2.7 (ref 0.9–1.1)

## 2019-04-26 PROCEDURE — 99207 ZZC NO CHARGE NURSE ONLY: CPT

## 2019-04-26 PROCEDURE — 36416 COLLJ CAPILLARY BLOOD SPEC: CPT

## 2019-04-26 PROCEDURE — 85610 PROTHROMBIN TIME: CPT | Mod: QW

## 2019-04-26 NOTE — PROGRESS NOTES
ANTICOAGULATION FOLLOW-UP CLINIC VISIT    Patient Name:  Latanya Padron  Date:  2019  Contact Type:  Face to Face    SUBJECTIVE:     Patient Findings     Positives:   Bruising (more unexplained brusing on arms)           OBJECTIVE    INR Protime   Date Value Ref Range Status   2019 2.7 (A) 0.9 - 1.1 Final       ASSESSMENT / PLAN  INR assessment THER    Recheck INR In: 2 WEEKS    INR Location Clinic      Anticoagulation Summary  As of 2019    INR goal:   2.0-3.0   TTR:   76.2 % (3.1 y)   INR used for dosin.7 (2019)   Warfarin maintenance plan:   5 mg (5 mg x 1) every Mon; 2.5 mg (5 mg x 0.5) all other days   Full warfarin instructions:   5 mg every Mon; 2.5 mg all other days   Weekly warfarin total:   20 mg   Plan last modified:   Francine Andrew RN (2017)   Next INR check:   2019   Target end date:       Indications    AF (paroxysmal atrial fibrillation) (H) [I48.0]  Atrial fibrillation (H) [I48.91]             Anticoagulation Episode Summary     INR check location:       Preferred lab:       Send INR reminders to:   [facility] KIM    Comments:   5 mg tabs, likes card, PM dose      Anticoagulation Care Providers     Provider Role Specialty Phone number    Glen Blue MD [facility] [phone]            See the Encounter Report to view Anticoagulation Flowsheet and Dosing Calendar (Go to Encounters tab in chart review, and find the Anticoagulation Therapy Visit)    Dosage adjustment made based on physician directed care plan.      Francine Andrew RN

## 2019-04-26 NOTE — TELEPHONE ENCOUNTER
Pt will be having lumpectomy on 5/15. Please review pt's history and advise if he/she will need to be bridged with Lovenox. If so, 1 mg/kg BID or 1.5 mg/kg daily? If not, OK to stop coumadin 5 days prior and resume usual dose 12-24 hours after procedure? Please advise.    Dx: LUCAS Andrew RN

## 2019-04-26 NOTE — TELEPHONE ENCOUNTER
Reason for Call:  Other call back    Detailed comments: Patient states she is bruising more then usual. Should she come in and get it checked?    Phone Number Patient can be reached at: Home number on file [phone] (home)    Best Time: any     Can we leave a detailed message on this number? YES    Call taken on 4/26/2019 at 12:57 PM by Tracy Chiu

## 2019-04-26 NOTE — TELEPHONE ENCOUNTER
Type of surgery: left wire localized partial mastectomy with left sentinel lymph node biopsy, possible left axillary dissection  Location of surgery: [facility]  Date and time of surgery: 5/15  Surgeon: Love  Pre-Op Appt Date: 4/29  Post-Op Appt Date: 5/28   Packet sent out: Yes  Pre-cert/Authorization completed:  Not Applicable  Date: na

## 2019-04-29 ENCOUNTER — OFFICE VISIT (OUTPATIENT)
Dept: FAMILY MEDICINE | Facility: CLINIC | Age: 69
End: 2019-04-29
Payer: COMMERCIAL

## 2019-04-29 VITALS
RESPIRATION RATE: 18 BRPM | DIASTOLIC BLOOD PRESSURE: 72 MMHG | SYSTOLIC BLOOD PRESSURE: 116 MMHG | OXYGEN SATURATION: 96 % | TEMPERATURE: 96.4 F | WEIGHT: 221.4 LBS | HEART RATE: 102 BPM | BODY MASS INDEX: 41.83 KG/M2

## 2019-04-29 DIAGNOSIS — C50.912 MALIGNANT NEOPLASM OF LEFT BREAST IN FEMALE, ESTROGEN RECEPTOR POSITIVE, UNSPECIFIED SITE OF BREAST (H): ICD-10-CM

## 2019-04-29 DIAGNOSIS — I48.91 ATRIAL FIBRILLATION, UNSPECIFIED TYPE (H): ICD-10-CM

## 2019-04-29 DIAGNOSIS — N18.30 CKD (CHRONIC KIDNEY DISEASE) STAGE 3, GFR 30-59 ML/MIN (H): ICD-10-CM

## 2019-04-29 DIAGNOSIS — Z17.0 MALIGNANT NEOPLASM OF LEFT BREAST IN FEMALE, ESTROGEN RECEPTOR POSITIVE, UNSPECIFIED SITE OF BREAST (H): ICD-10-CM

## 2019-04-29 DIAGNOSIS — F32.5 MAJOR DEPRESSION IN COMPLETE REMISSION (H): ICD-10-CM

## 2019-04-29 DIAGNOSIS — E66.01 MORBID OBESITY, UNSPECIFIED OBESITY TYPE (H): ICD-10-CM

## 2019-04-29 DIAGNOSIS — I10 HYPERTENSION, GOAL BELOW 140/90: ICD-10-CM

## 2019-04-29 DIAGNOSIS — Z01.818 PREOP GENERAL PHYSICAL EXAM: Primary | ICD-10-CM

## 2019-04-29 LAB
ANION GAP SERPL CALCULATED.3IONS-SCNC: 4 MMOL/L (ref 3–14)
BUN SERPL-MCNC: 15 MG/DL (ref 7–30)
CALCIUM SERPL-MCNC: 9.1 MG/DL (ref 8.5–10.1)
CHLORIDE SERPL-SCNC: 102 MMOL/L (ref 94–109)
CO2 SERPL-SCNC: 33 MMOL/L (ref 20–32)
CREAT SERPL-MCNC: 0.91 MG/DL (ref 0.52–1.04)
ERYTHROCYTE [DISTWIDTH] IN BLOOD BY AUTOMATED COUNT: 12.5 % (ref 10–15)
GFR SERPL CREATININE-BSD FRML MDRD: 65 ML/MIN/{1.73_M2}
GLUCOSE SERPL-MCNC: 96 MG/DL (ref 70–99)
HCT VFR BLD AUTO: 40 % (ref 35–47)
HGB BLD-MCNC: 12.5 G/DL (ref 11.7–15.7)
MCH RBC QN AUTO: 28.7 PG (ref 26.5–33)
MCHC RBC AUTO-ENTMCNC: 31.3 G/DL (ref 31.5–36.5)
MCV RBC AUTO: 92 FL (ref 78–100)
PLATELET # BLD AUTO: 334 10E9/L (ref 150–450)
POTASSIUM SERPL-SCNC: 3.4 MMOL/L (ref 3.4–5.3)
RBC # BLD AUTO: 4.36 10E12/L (ref 3.8–5.2)
SODIUM SERPL-SCNC: 139 MMOL/L (ref 133–144)
WBC # BLD AUTO: 9.1 10E9/L (ref 4–11)

## 2019-04-29 PROCEDURE — 80048 BASIC METABOLIC PNL TOTAL CA: CPT | Performed by: FAMILY MEDICINE

## 2019-04-29 PROCEDURE — 85027 COMPLETE CBC AUTOMATED: CPT | Performed by: FAMILY MEDICINE

## 2019-04-29 PROCEDURE — 99215 OFFICE O/P EST HI 40 MIN: CPT | Performed by: FAMILY MEDICINE

## 2019-04-29 PROCEDURE — 36415 COLL VENOUS BLD VENIPUNCTURE: CPT | Performed by: FAMILY MEDICINE

## 2019-04-29 PROCEDURE — 93000 ELECTROCARDIOGRAM COMPLETE: CPT | Performed by: FAMILY MEDICINE

## 2019-04-29 ASSESSMENT — PAIN SCALES - GENERAL: PAINLEVEL: NO PAIN (0)

## 2019-04-29 NOTE — NURSING NOTE
Per Dr. Blue, I have performed an EKG on pt. Results given to provider. Pt tolerated well. Cely Lobato CMA ([facility])

## 2019-04-29 NOTE — TELEPHONE ENCOUNTER
With this information we will defer Lovenox injections and just hold Coumadin sufficiently before surgery and resume after surgery.  After my visit with her today, patient will be excited to hear this news.  I will let Coumadin clinic passed this information on to her.  Glen Blue MD

## 2019-04-29 NOTE — TELEPHONE ENCOUNTER
Pt informed to hold Coumadin starting 5/10. She will see me on 5/13 to be sure INR is going in the right direction for surgery on 5/15  Francine Andrew RN

## 2019-04-29 NOTE — PATIENT INSTRUCTIONS
Before Your Surgery      Call your surgeon if there is any change in your health. This includes signs of a cold or flu (such as a sore throat, runny nose, cough, rash or fever).    Do not smoke, drink alcohol or take over the counter medicine (unless your surgeon or primary care doctor tells you to) for the 24 hours before and after surgery.    If you take prescribed drugs: Follow your doctor s orders about which medicines to take and which to stop until after surgery.    Eating and drinking prior to surgery: follow the instructions from your surgeon    Take a shower or bath the night before surgery. Use the soap your surgeon gave you to gently clean your skin. If you do not have soap from your surgeon, use your regular soap. Do not shave or scrub the surgery site.  Wear clean pajamas and have clean sheets on your bed.     Meaningful chance to survive  Is the phrase to use for advanced directive    something to think about feeding tubes, being on respirator/intubate, antibiotics.

## 2019-04-29 NOTE — PROGRESS NOTES
[address] [address]  [phone]  Dept: [phone]    PRE-OP EVALUATION:  Today's date: 2019    Latanya Padron (: 1950) presents for pre-operative evaluation assessment as requested by Dr. Aleman.  She requires evaluation and anesthesia risk assessment prior to undergoing surgery/procedure for treatment of lumpectomy of left breast .    Fax number for surgical facility: [facility]  Primary Physician: Glen Blue  Type of Anesthesia Anticipated: General    Patient has a Health Care Directive or Living Will:  YES     Preop Questions 2019   Who is doing your surgery? DR ALEMAN   What are you having done? breast  cancer surgery   Date of Surgery/Procedure: may 15 2019   Facility or Hospital where procedure/surgery will be performed: [facility]   1.  Do you have a history of Heart attack, stroke, stent, coronary bypass surgery, or other heart surgery? No   2.  Do you ever have any pain or discomfort in your chest? No   3.  Do you have a history of  Heart Failure? No   4.   Are you troubled by shortness of breath when:  walking on a level surface, or up a slight hill, or at night? YES - longstanding COPD, controlled   5.  Do you currently have a cold, bronchitis or other respiratory infection? No   6.  Do you have a cough, shortness of breath, or wheezing? YES - see above   7.  Do you sometimes get pains in the calves of your legs when you walk? No   8. Do you or anyone in your family have previous history of blood clots? UNKNOWN -    9.  Do you or does anyone in your family have a serious bleeding problem such as prolonged bleeding following surgeries or cuts? No   10. Have you ever had problems with anemia or been told to take iron pills? No   11. Have you had any abnormal blood loss such as black, tarry or bloody stools, or abnormal vaginal bleeding? No   12. Have you ever had a blood transfusion? No   13. Have you  or any of your relatives ever had problems with anesthesia? No   14. Do you have sleep apnea, excessive snoring or daytime drowsiness? No   15. Do you have any prosthetic heart valves? No   16. Do you have prosthetic joints? No   17. Is there any chance that you may be pregnant? No         HPI:     HPI related to upcoming procedure: Patient has left-sided breast cancer which is definitive surgery.  Lymph node dissection, cancer area removal but not total mastectomy is planned      See problem list for active medical problems.  Problems all longstanding and stable, except as noted/documented.  See ROS for pertinent symptoms related to these conditions.                                                                                                                                                          .    MEDICAL HISTORY:     Patient Active Problem List    Diagnosis Date Noted     Segmental dysfunction of sacral region 02/01/2019     Priority: Medium     Segmental dysfunction of lumbar region 02/01/2019     Priority: Medium     Segmental dysfunction of thoracic region 02/01/2019     Priority: Medium     Bilateral low back pain with right-sided sciatica 02/01/2019     Priority: Medium     Trochanteric bursitis of right hip 07/13/2018     Priority: Medium     Hip pain, right 06/20/2018     Priority: Medium     Multiple joint pain 06/20/2018     Priority: Medium     Neck mass 06/20/2018     Priority: Medium     CKD (chronic kidney disease) stage 3, GFR 30-59 ml/min (H) 06/21/2017     Priority: Medium     Primary osteoarthritis of both knees 04/18/2017     Priority: Medium     AF (paroxysmal atrial fibrillation) (H) 03/02/2016     Priority: Medium     Major depression in complete remission (H) 12/28/2015     Priority: Medium     Morbid obesity, unspecified obesity type (H) 12/28/2015     Priority: Medium     Pulmonary emphysema, unspecified emphysema type (H) 12/28/2015     Priority: Medium     Atrial fibrillation (H)  2014     Priority: Medium     Hypertension, goal below 140/90 2013     Priority: Medium     Tennis elbow 2013     Priority: Medium     left         Advanced directives, counseling/discussion 2011     Priority: Medium     Advance Directive Problem List Overview:   Name Relationship Phone    Primary Health Care Agent            Alternative Health Care Agent          Discussed advance care planning with patient; information given to patient to review.//Brenda Carlin/BEATRIZ(AAMA)  2011          HYPERLIPIDEMIA LDL GOAL <130 10/31/2010     Priority: Medium     Hirsutism 2007     Priority: Medium     Family history of malignant neoplasm of breast 2006     Priority: Medium     Female stress incontinence 2005     Priority: Medium     Disorder of bone and cartilage 2005     Priority: Medium     Problem list name updated by automated process. Provider to review       Sprain and strain of unspecified site of knee and leg 2004     Priority: Medium      Past Medical History:   Diagnosis Date     Mixed hyperlipidemia      Past Surgical History:   Procedure Laterality Date     C APPENDECTOMY,W OTHR PROC       C  DELIVERY ONLY           C LIGATE FALLOPIAN TUBE       C NONSPECIFIC PROCEDURE      Exploratory laparotomy with resection of infarcted segment of omentum and minor lysis of adhesions     C NONSPECIFIC PROCEDURE      Removal of hemorrhagic corpus luteum cyst     C VAGINAL HYSTERECTOMY       COLONOSCOPY  06/21/10      BIOPSY OF BREAST, OPEN INCISIONAL        CORRECT BUNION,METATARSAL OSTEOTOMY        LAPAROSCOPY, SURGICAL; CHOLECYSTECTOMY  08/04/10      SLING OPERATION FOR STRESS INCONTINENCE  2007     HERNIORRHAPHY INCISIONAL (LOCATION)  2011    Procedure:HERNIORRHAPHY INCISIONAL (LOCATION); Surgeon:AYALA HOOVER; Location:[facility]     LAPAROSCOPIC HERNIORRHAPHY INCISIONAL  2011     Procedure:LAPAROSCOPIC HERNIORRHAPHY INCISIONAL; Laparoscopic mesh repair of incarcerated incisional hernia , extensive lysis of adhesions, excision of hernia sac and closure of fascia.; Surgeon:AYALA HOOVER; Location:[facility]     LAPAROSCOPIC LYSIS ADHESIONS  9/23/2011    Procedure:LAPAROSCOPIC LYSIS ADHESIONS; Surgeon:AYALA HOOVER; Location:[facility]     Current Outpatient Medications   Medication Sig Dispense Refill     albuterol (2.5 MG/3ML) 0.083% neb solution Take  by nebulization. 1 NEB EVERY 4-6 HOURS AS NEEDED 75 mL 5     albuterol (PROAIR HFA/PROVENTIL HFA/VENTOLIN HFA) 108 (90 BASE) MCG/ACT Inhaler 1-2 puffs by mouth every 2-4 hours as needed 2 Inhaler 11     atorvastatin (LIPITOR) 10 MG tablet TAKE ONE TABLET BY MOUTH EVERY DAY 90 tablet 3     Calcium Carb-Cholecalciferol (CALCIUM 500 + D) 500-400 MG-UNIT TABS Take 1 tablet by mouth 2 times daily 180 tablet 3     CARTIA  MG 24 hr capsule TAKE ONE CAPSULE BY MOUTH EVERY DAY 90 capsule 3     cholecalciferol (VITAMIN D) 400 UNITS tablet TAKE ONE TABLET BY MOUTH EVERY  tablet 10     Cyanocobalamin (B-12) 1000 MCG TBCR Take 1,000 mcg by mouth daily 100 tablet 1     hydrochlorothiazide (HYDRODIURIL) 25 MG tablet TAKE ONE TABLET BY MOUTH EVERY DAY 90 tablet 3     lisinopril (PRINIVIL/ZESTRIL) 2.5 MG tablet TAKE ONE TABLET BY MOUTH EVERY DAY 90 tablet 3     magnesium 250 MG tablet Take 1 tablet by mouth daily 30 tablet      mometasone-formoterol (DULERA) 200-5 MCG/ACT oral inhaler INHALE 2 PUFFS BY MOUTH TWO TIMES A DAY 13 g 11     omega 3 1000 MG CAPS Take 1 g by mouth daily 90 capsule      ORDER FOR DME Equipment being ordered: Oxygen @ 2 liters with exertion       predniSONE (DELTASONE) 20 MG tablet TAKE ONE TABLET BY MOUTH EVERY DAY 5 tablet 0     trospium (SANCTURA) 20 MG tablet Take 20 mg by mouth       umeclidinium (INCRUSE ELLIPTA) 62.5 MCG/INH oral inhaler Inhale 1 puff into the lungs daily 1 Inhaler 11     venlafaxine (EFFEXOR-XR)  37.5 MG 24 hr capsule TAKE ONE CAPSULE BY MOUTH EVERY DAY WITH FOOD 90 capsule 1     warfarin (JANTOVEN) 5 MG tablet Take 5 mg Mon and 2.5 mg all other days of the week, or as directed by coumadin clinic 60 tablet 3     OTC products: None, except as noted above    Allergies   Allergen Reactions     Augmentin [Amoxicillin-Pot Clavulanate] Nausea and Vomiting     Meperidine Hcl Nausea and Vomiting     demerol     Seasonal Allergies      spring      Latex Allergy: NO    Social History     Tobacco Use     Smoking status: Former Smoker     Packs/day: 1.00     Years: 30.00     Pack years: 30.00     Last attempt to quit: 10/25/2005     Years since quittin.5     Smokeless tobacco: Never Used   Substance Use Topics     Alcohol use: No     Alcohol/week: 0.0 oz     History   Drug Use No       REVIEW OF SYSTEMS:   CONSTITUTIONAL: NEGATIVE for fever, chills, change in weight  INTEGUMENTARY/SKIN: NEGATIVE for worrisome rashes, moles or lesions  EYES: NEGATIVE for vision changes or irritation  ENT/MOUTH: NEGATIVE for ear, mouth and throat problems  RESP: NEGATIVE for significant cough or SOB  BREAST: NEGATIVE for masses, tenderness or discharge  CV: NEGATIVE for chest pain, palpitations or peripheral edema  GI: NEGATIVE for nausea, abdominal pain, heartburn, or change in bowel habits  : NEGATIVE for frequency, dysuria, or hematuria  MUSCULOSKELETAL: NEGATIVE for significant arthralgias or myalgia  NEURO: NEGATIVE for weakness, dizziness or paresthesias  ENDOCRINE: NEGATIVE for temperature intolerance, skin/hair changes  HEME: NEGATIVE for bleeding problems  PSYCHIATRIC: NEGATIVE for changes in mood or affect    EXAM:   /72   Pulse 102   Temp 96.4  F (35.8  C) (Temporal)   Resp 18   Wt 100.4 kg (221 lb 6.4 oz)   SpO2 96%   BMI 41.83 kg/m      GENERAL APPEARANCE: healthy, alert and no distress     EYES: EOMI, PERRL     HENT: ear canals and TM's normal and nose and mouth without ulcers or lesions     NECK: no  adenopathy, no asymmetry, masses, or scars and thyroid normal to palpation     RESP: lungs clear to auscultation - no rales, rhonchi or wheezes and for her very good breath sounds     CV: regular rates and rhythm, normal S1 S2, no S3 or S4 and no murmur, click or rub     ABDOMEN:  soft, nontender, no HSM or masses and bowel sounds normal     MS: extremities normal- no gross deformities noted, no evidence of inflammation in joints, FROM in all extremities.     SKIN: no suspicious lesions or rashes     NEURO: Normal strength and tone, sensory exam grossly normal, mentation intact and speech normal     PSYCH: mentation appears normal. and affect normal/bright     LYMPHATICS: No cervical adenopathy    DIAGNOSTICS:   EKG: appears normal, NSR, normal axis, normal intervals, no acute ST/T changes c/w ischemia, no LVH by voltage criteria    Recent Labs   Lab Test 04/26/19 04/12/19  1005  06/20/18  1121  06/21/17  1533   HGB  --   --   --  12.8  --  13.1   PLT  --   --   --  275  --  290   INR 2.7* 2.9*   < >  --    < >  --    NA  --   --   --  141  --  141   POTASSIUM  --   --   --  4.0  --  4.0   CR  --   --   --  0.86  --  0.95    < > = values in this interval not displayed.        IMPRESSION:   Reason for surgery/procedure: Breast cancer left-sided discovered during mammography requiring excision of cancer and biopsy of nodes  Diagnosis/reason for consult:     ICD-10-CM    1. Preop general physical exam Z01.818 Basic metabolic panel     CBC with platelets     EKG 12-lead complete w/read - Clinics   2. Malignant neoplasm of left breast in female, estrogen receptor positive, unspecified site of breast (H) C50.912     Z17.0    3. Hypertension, goal below 140/90 I10 Basic metabolic panel     CBC with platelets   4. Morbid obesity, unspecified obesity type (H) E66.01    5. Major depression in complete remission (H) F32.5    6. Atrial fibrillation, unspecified type (H) I48.91    7. CKD (chronic kidney disease) stage 3, GFR  30-59 ml/min (H) N18.3        The proposed surgical procedure is considered INTERMEDIATE risk.    REVISED CARDIAC RISK INDEX  The patient has the following serious cardiovascular risks for perioperative complications such as (MI, PE, VFib and 3  AV Block):  No serious cardiac risks  INTERPRETATION: 0 risks: Class I (very low risk - 0.4% complication rate)    The patient has the following additional risks for perioperative complications:  No identified additional risks      ICD-10-CM    1. Preop general physical exam Z01.818 Basic metabolic panel     CBC with platelets     EKG 12-lead complete w/read - Clinics   2. Malignant neoplasm of left breast in female, estrogen receptor positive, unspecified site of breast (H) C50.912     Z17.0    3. Hypertension, goal below 140/90 I10 Basic metabolic panel     CBC with platelets   4. Morbid obesity, unspecified obesity type (H) E66.01    5. Major depression in complete remission (H) F32.5    6. Atrial fibrillation, unspecified type (H) I48.91    7. CKD (chronic kidney disease) stage 3, GFR 30-59 ml/min (H) N18.3        RECOMMENDATIONS:     --Consult hospital rounder / IM to assist post-op medical management only if needed    --Patient is to take all scheduled medications on the day of surgery EXCEPT for modifications listed below.  I discussed possibility of Lovenox bridging and Coumadin.  After she was here, Coumadin clinic suggest that we just need to hold Coumadin prior to surgery and resume at time of surgery or shortly thereafter.    APPROVAL GIVEN to proceed with proposed procedure, without further diagnostic evaluation       Signed Electronically by: Glen Blue MD    Copy of this evaluation report is provided to requesting physician.    [facility] Preop Guidelines    Revised Cardiac Risk Index

## 2019-04-29 NOTE — H&P (VIEW-ONLY)
[address] [address]  [phone]  Dept: [phone]    PRE-OP EVALUATION:  Today's date: 2019    Latanya Padron (: 1950) presents for pre-operative evaluation assessment as requested by Dr. Aleman.  She requires evaluation and anesthesia risk assessment prior to undergoing surgery/procedure for treatment of lumpectomy of left breast .    Fax number for surgical facility: [facility]  Primary Physician: Glen Blue  Type of Anesthesia Anticipated: General    Patient has a Health Care Directive or Living Will:  YES     Preop Questions 2019   Who is doing your surgery? DR ALEMAN   What are you having done? breast  cancer surgery   Date of Surgery/Procedure: may 15 2019   Facility or Hospital where procedure/surgery will be performed: [facility]   1.  Do you have a history of Heart attack, stroke, stent, coronary bypass surgery, or other heart surgery? No   2.  Do you ever have any pain or discomfort in your chest? No   3.  Do you have a history of  Heart Failure? No   4.   Are you troubled by shortness of breath when:  walking on a level surface, or up a slight hill, or at night? YES - longstanding COPD, controlled   5.  Do you currently have a cold, bronchitis or other respiratory infection? No   6.  Do you have a cough, shortness of breath, or wheezing? YES - see above   7.  Do you sometimes get pains in the calves of your legs when you walk? No   8. Do you or anyone in your family have previous history of blood clots? UNKNOWN -    9.  Do you or does anyone in your family have a serious bleeding problem such as prolonged bleeding following surgeries or cuts? No   10. Have you ever had problems with anemia or been told to take iron pills? No   11. Have you had any abnormal blood loss such as black, tarry or bloody stools, or abnormal vaginal bleeding? No   12. Have you ever had a blood transfusion? No   13. Have you  or any of your relatives ever had problems with anesthesia? No   14. Do you have sleep apnea, excessive snoring or daytime drowsiness? No   15. Do you have any prosthetic heart valves? No   16. Do you have prosthetic joints? No   17. Is there any chance that you may be pregnant? No         HPI:     HPI related to upcoming procedure: Patient has left-sided breast cancer which is definitive surgery.  Lymph node dissection, cancer area removal but not total mastectomy is planned      See problem list for active medical problems.  Problems all longstanding and stable, except as noted/documented.  See ROS for pertinent symptoms related to these conditions.                                                                                                                                                          .    MEDICAL HISTORY:     Patient Active Problem List    Diagnosis Date Noted     Segmental dysfunction of sacral region 02/01/2019     Priority: Medium     Segmental dysfunction of lumbar region 02/01/2019     Priority: Medium     Segmental dysfunction of thoracic region 02/01/2019     Priority: Medium     Bilateral low back pain with right-sided sciatica 02/01/2019     Priority: Medium     Trochanteric bursitis of right hip 07/13/2018     Priority: Medium     Hip pain, right 06/20/2018     Priority: Medium     Multiple joint pain 06/20/2018     Priority: Medium     Neck mass 06/20/2018     Priority: Medium     CKD (chronic kidney disease) stage 3, GFR 30-59 ml/min (H) 06/21/2017     Priority: Medium     Primary osteoarthritis of both knees 04/18/2017     Priority: Medium     AF (paroxysmal atrial fibrillation) (H) 03/02/2016     Priority: Medium     Major depression in complete remission (H) 12/28/2015     Priority: Medium     Morbid obesity, unspecified obesity type (H) 12/28/2015     Priority: Medium     Pulmonary emphysema, unspecified emphysema type (H) 12/28/2015     Priority: Medium     Atrial fibrillation (H)  2014     Priority: Medium     Hypertension, goal below 140/90 2013     Priority: Medium     Tennis elbow 2013     Priority: Medium     left         Advanced directives, counseling/discussion 2011     Priority: Medium     Advance Directive Problem List Overview:   Name Relationship Phone    Primary Health Care Agent            Alternative Health Care Agent          Discussed advance care planning with patient; information given to patient to review.//Brenda Carlin/BEATRIZ(AAMA)  2011          HYPERLIPIDEMIA LDL GOAL <130 10/31/2010     Priority: Medium     Hirsutism 2007     Priority: Medium     Family history of malignant neoplasm of breast 2006     Priority: Medium     Female stress incontinence 2005     Priority: Medium     Disorder of bone and cartilage 2005     Priority: Medium     Problem list name updated by automated process. Provider to review       Sprain and strain of unspecified site of knee and leg 2004     Priority: Medium      Past Medical History:   Diagnosis Date     Mixed hyperlipidemia      Past Surgical History:   Procedure Laterality Date     C APPENDECTOMY,W OTHR PROC       C  DELIVERY ONLY           C LIGATE FALLOPIAN TUBE       C NONSPECIFIC PROCEDURE      Exploratory laparotomy with resection of infarcted segment of omentum and minor lysis of adhesions     C NONSPECIFIC PROCEDURE      Removal of hemorrhagic corpus luteum cyst     C VAGINAL HYSTERECTOMY       COLONOSCOPY  06/21/10      BIOPSY OF BREAST, OPEN INCISIONAL        CORRECT BUNION,METATARSAL OSTEOTOMY        LAPAROSCOPY, SURGICAL; CHOLECYSTECTOMY  08/04/10      SLING OPERATION FOR STRESS INCONTINENCE  2007     HERNIORRHAPHY INCISIONAL (LOCATION)  2011    Procedure:HERNIORRHAPHY INCISIONAL (LOCATION); Surgeon:AYALA HOOVER; Location:[facility] OR     LAPAROSCOPIC HERNIORRHAPHY INCISIONAL  2011     Procedure:LAPAROSCOPIC HERNIORRHAPHY INCISIONAL; Laparoscopic mesh repair of incarcerated incisional hernia , extensive lysis of adhesions, excision of hernia sac and closure of fascia.; Surgeon:AYALA HOOVER; Location:[facility] OR     LAPAROSCOPIC LYSIS ADHESIONS  9/23/2011    Procedure:LAPAROSCOPIC LYSIS ADHESIONS; Surgeon:AYALA HOOVER; Location:[facility] OR     Current Outpatient Medications   Medication Sig Dispense Refill     albuterol (2.5 MG/3ML) 0.083% neb solution Take  by nebulization. 1 NEB EVERY 4-6 HOURS AS NEEDED 75 mL 5     albuterol (PROAIR HFA/PROVENTIL HFA/VENTOLIN HFA) 108 (90 BASE) MCG/ACT Inhaler 1-2 puffs by mouth every 2-4 hours as needed 2 Inhaler 11     atorvastatin (LIPITOR) 10 MG tablet TAKE ONE TABLET BY MOUTH EVERY DAY 90 tablet 3     Calcium Carb-Cholecalciferol (CALCIUM 500 + D) 500-400 MG-UNIT TABS Take 1 tablet by mouth 2 times daily 180 tablet 3     CARTIA  MG 24 hr capsule TAKE ONE CAPSULE BY MOUTH EVERY DAY 90 capsule 3     cholecalciferol (VITAMIN D) 400 UNITS tablet TAKE ONE TABLET BY MOUTH EVERY  tablet 10     Cyanocobalamin (B-12) 1000 MCG TBCR Take 1,000 mcg by mouth daily 100 tablet 1     hydrochlorothiazide (HYDRODIURIL) 25 MG tablet TAKE ONE TABLET BY MOUTH EVERY DAY 90 tablet 3     lisinopril (PRINIVIL/ZESTRIL) 2.5 MG tablet TAKE ONE TABLET BY MOUTH EVERY DAY 90 tablet 3     magnesium 250 MG tablet Take 1 tablet by mouth daily 30 tablet      mometasone-formoterol (DULERA) 200-5 MCG/ACT oral inhaler INHALE 2 PUFFS BY MOUTH TWO TIMES A DAY 13 g 11     omega 3 1000 MG CAPS Take 1 g by mouth daily 90 capsule      ORDER FOR DME Equipment being ordered: Oxygen @ 2 liters with exertion       predniSONE (DELTASONE) 20 MG tablet TAKE ONE TABLET BY MOUTH EVERY DAY 5 tablet 0     trospium (SANCTURA) 20 MG tablet Take 20 mg by mouth       umeclidinium (INCRUSE ELLIPTA) 62.5 MCG/INH oral inhaler Inhale 1 puff into the lungs daily 1 Inhaler 11     venlafaxine (EFFEXOR-XR)  37.5 MG 24 hr capsule TAKE ONE CAPSULE BY MOUTH EVERY DAY WITH FOOD 90 capsule 1     warfarin (JANTOVEN) 5 MG tablet Take 5 mg Mon and 2.5 mg all other days of the week, or as directed by coumadin clinic 60 tablet 3     OTC products: None, except as noted above    Allergies   Allergen Reactions     Augmentin [Amoxicillin-Pot Clavulanate] Nausea and Vomiting     Meperidine Hcl Nausea and Vomiting     demerol     Seasonal Allergies      spring      Latex Allergy: NO    Social History     Tobacco Use     Smoking status: Former Smoker     Packs/day: 1.00     Years: 30.00     Pack years: 30.00     Last attempt to quit: 10/25/2005     Years since quittin.5     Smokeless tobacco: Never Used   Substance Use Topics     Alcohol use: No     Alcohol/week: 0.0 oz     History   Drug Use No       REVIEW OF SYSTEMS:   CONSTITUTIONAL: NEGATIVE for fever, chills, change in weight  INTEGUMENTARY/SKIN: NEGATIVE for worrisome rashes, moles or lesions  EYES: NEGATIVE for vision changes or irritation  ENT/MOUTH: NEGATIVE for ear, mouth and throat problems  RESP: NEGATIVE for significant cough or SOB  BREAST: NEGATIVE for masses, tenderness or discharge  CV: NEGATIVE for chest pain, palpitations or peripheral edema  GI: NEGATIVE for nausea, abdominal pain, heartburn, or change in bowel habits  : NEGATIVE for frequency, dysuria, or hematuria  MUSCULOSKELETAL: NEGATIVE for significant arthralgias or myalgia  NEURO: NEGATIVE for weakness, dizziness or paresthesias  ENDOCRINE: NEGATIVE for temperature intolerance, skin/hair changes  HEME: NEGATIVE for bleeding problems  PSYCHIATRIC: NEGATIVE for changes in mood or affect    EXAM:   /72   Pulse 102   Temp 96.4  F (35.8  C) (Temporal)   Resp 18   Wt 100.4 kg (221 lb 6.4 oz)   SpO2 96%   BMI 41.83 kg/m      GENERAL APPEARANCE: healthy, alert and no distress     EYES: EOMI, PERRL     HENT: ear canals and TM's normal and nose and mouth without ulcers or lesions     NECK: no  adenopathy, no asymmetry, masses, or scars and thyroid normal to palpation     RESP: lungs clear to auscultation - no rales, rhonchi or wheezes and for her very good breath sounds     CV: regular rates and rhythm, normal S1 S2, no S3 or S4 and no murmur, click or rub     ABDOMEN:  soft, nontender, no HSM or masses and bowel sounds normal     MS: extremities normal- no gross deformities noted, no evidence of inflammation in joints, FROM in all extremities.     SKIN: no suspicious lesions or rashes     NEURO: Normal strength and tone, sensory exam grossly normal, mentation intact and speech normal     PSYCH: mentation appears normal. and affect normal/bright     LYMPHATICS: No cervical adenopathy    DIAGNOSTICS:   EKG: appears normal, NSR, normal axis, normal intervals, no acute ST/T changes c/w ischemia, no LVH by voltage criteria    Recent Labs   Lab Test 04/26/19 04/12/19  1005  06/20/18  1121  06/21/17  1533   HGB  --   --   --  12.8  --  13.1   PLT  --   --   --  275  --  290   INR 2.7* 2.9*   < >  --    < >  --    NA  --   --   --  141  --  141   POTASSIUM  --   --   --  4.0  --  4.0   CR  --   --   --  0.86  --  0.95    < > = values in this interval not displayed.        IMPRESSION:   Reason for surgery/procedure: Breast cancer left-sided discovered during mammography requiring excision of cancer and biopsy of nodes  Diagnosis/reason for consult:     ICD-10-CM    1. Preop general physical exam Z01.818 Basic metabolic panel     CBC with platelets     EKG 12-lead complete w/read - Clinics   2. Malignant neoplasm of left breast in female, estrogen receptor positive, unspecified site of breast (H) C50.912     Z17.0    3. Hypertension, goal below 140/90 I10 Basic metabolic panel     CBC with platelets   4. Morbid obesity, unspecified obesity type (H) E66.01    5. Major depression in complete remission (H) F32.5    6. Atrial fibrillation, unspecified type (H) I48.91    7. CKD (chronic kidney disease) stage 3, GFR  30-59 ml/min (H) N18.3        The proposed surgical procedure is considered INTERMEDIATE risk.    REVISED CARDIAC RISK INDEX  The patient has the following serious cardiovascular risks for perioperative complications such as (MI, PE, VFib and 3  AV Block):  No serious cardiac risks  INTERPRETATION: 0 risks: Class I (very low risk - 0.4% complication rate)    The patient has the following additional risks for perioperative complications:  No identified additional risks      ICD-10-CM    1. Preop general physical exam Z01.818 Basic metabolic panel     CBC with platelets     EKG 12-lead complete w/read - Clinics   2. Malignant neoplasm of left breast in female, estrogen receptor positive, unspecified site of breast (H) C50.912     Z17.0    3. Hypertension, goal below 140/90 I10 Basic metabolic panel     CBC with platelets   4. Morbid obesity, unspecified obesity type (H) E66.01    5. Major depression in complete remission (H) F32.5    6. Atrial fibrillation, unspecified type (H) I48.91    7. CKD (chronic kidney disease) stage 3, GFR 30-59 ml/min (H) N18.3        RECOMMENDATIONS:     --Consult hospital rounder / IM to assist post-op medical management only if needed    --Patient is to take all scheduled medications on the day of surgery EXCEPT for modifications listed below.  I discussed possibility of Lovenox bridging and Coumadin.  After she was here, Coumadin clinic suggest that we just need to hold Coumadin prior to surgery and resume at time of surgery or shortly thereafter.    APPROVAL GIVEN to proceed with proposed procedure, without further diagnostic evaluation       Signed Electronically by: Glen Blue MD    Copy of this evaluation report is provided to requesting physician.    [facility] Preop Guidelines    Revised Cardiac Risk Index

## 2019-04-29 NOTE — TELEPHONE ENCOUNTER
Per Chaparrita Ramon, Clinical Pharmacist, pt has a CHADSVASc score of 4, which is considered low risk and Lovenox is not technically necessary. See her note below.   If you still feel like pt should be bridged, please let me know and I will send Lovenox instructions to you to review, otherwise patient will just need to hold Coumadin for 5 days prior, then resume dosing. She is scheduled to see me on 5/13 as of right now.     Francine Andrew RN          [4/29/2019 11:39 AM] Chaparrita Ramon:   With no HX clots or valves, no thrombophilia and no rheumatic heart disease she has no need for bridging.

## 2019-05-01 ENCOUNTER — PRE VISIT (OUTPATIENT)
Dept: ONCOLOGY | Facility: CLINIC | Age: 69
End: 2019-05-01

## 2019-05-01 ENCOUNTER — ONCOLOGY VISIT (OUTPATIENT)
Dept: ONCOLOGY | Facility: CLINIC | Age: 69
End: 2019-05-01
Attending: FAMILY MEDICINE
Payer: COMMERCIAL

## 2019-05-01 VITALS
OXYGEN SATURATION: 92 % | HEIGHT: 61 IN | RESPIRATION RATE: 18 BRPM | WEIGHT: 223.8 LBS | BODY MASS INDEX: 42.25 KG/M2 | DIASTOLIC BLOOD PRESSURE: 68 MMHG | TEMPERATURE: 97.5 F | SYSTOLIC BLOOD PRESSURE: 110 MMHG | HEART RATE: 94 BPM

## 2019-05-01 DIAGNOSIS — C50.912 LOBULAR CARCINOMA OF LEFT BREAST (H): Primary | ICD-10-CM

## 2019-05-01 PROCEDURE — 99205 OFFICE O/P NEW HI 60 MIN: CPT | Performed by: INTERNAL MEDICINE

## 2019-05-01 ASSESSMENT — MIFFLIN-ST. JEOR: SCORE: 1482.53

## 2019-05-01 NOTE — NURSING NOTE
DISCHARGE PLAN:  Next appointments: See patient instruction section  Departure Mode: Ambulatory  Accompanied by:   3 minutes for nursing discharge (face to face time)     Latanya Padron is here today for Consult breast cancer.  Writing nurse seen patient after Medical Oncology appointment to address questions/concerns/coordinate care. Patient will follow up 2 weeks after her breast surgery..  Patient ambulated by nurse to  to schedule follow up and/or lab appointments.  Imaging scheduled if ordered.  See patient instructions and Oncologist's Progress note for further details. Questions and concerns addressed to patient's satisfaction. Patient verbalized and demonstrated understanding of plan.  Contact information provided and patient is encouraged to call with any that arise in the interim of care.    Debbie Hull RN  [facility] Oncology   [facility]  [phone]  5/1/2019, 2:10 PM

## 2019-05-01 NOTE — PATIENT INSTRUCTIONS
Please follow up with Dr. Denis 2 weeks after your surgery.      Follow Up Date/Time:     If you have any questions or concerns please feel free to call.    If you need to reschedule please call:  Clinic or Lab Appointment - [phone]  Infusion - [phone]  Imaging - [phone]    Debbie Hull RN  [facility]   Oncology/Hematology Care Coordinator RN  [facility]  [phone]

## 2019-05-01 NOTE — LETTER
5/1/2019         RE: Latanya Padron  [id number] [facility] [id number]        Dear Colleague,    Thank you for referring your patient, Latanya Padron, to the [facility]. Please see a copy of my visit note below.    DATE OF VISIT: May 1, 2019    REASON FOR REFERRAL:   Invasive lobular carcinoma left breast      HISTORY OF PRESENT ILLNESS:     69-year-old female with past medical history significant for Atrial fibrillation, hypertension    09/2018 Screening mammogram showed an area of architectural distortion the left breast.Ultrasound breast did not show any sonographic abnormality and recommendation was for a 6 month follow-up mammogram    04/2019 Mammogram and ultrasound showed a heterogenous mass in the left breast measuring 1.1 x 0.5 x 0.5 cm. Centimeter biopsy of the mass came back showing invasive lobular carcinoma, Grade 2, ER/CT positive and HER-2/tulio negative by FISH.MRI breast bilateral shoulder 2 cm mass like enhancement in the left breast corresponding to the biopsy-proven carcinoma with no evidence of multifocal disease or axillary lymphadenopathy. She has seen surgical oncology and is scheduled to undergo lumpectomy     Medical oncology clinic today to establish care. She is accompanied by her . No active bleeding currently. Denies breast pain, skin changes, nipple discharge, and fatigue, weight loss, dyspnea, cough, abdominal pain, bone pain, headache, visual symptoms or any other complaints. Good appetite and energy levels    Family History significant for breast cancer in mother and sister.    Patient underwent hysterectomy with bilateral sloping oophorectomy At age 36 for history of abnormal uterine bleeding and ovarian cysts.      REVIEW OF SYSTEMS:      ROS: 14 point ROS neg other than the symptoms noted above in the HPI.    PAST MEDICAL HISTORY:   Past Medical History:   Diagnosis Date     Mixed hyperlipidemia        PAST SURGICAL HISTORY:   Past Surgical  History:   Procedure Laterality Date     C APPENDECTOMY,W OTHR PROC       C  DELIVERY ONLY           C LIGATE FALLOPIAN TUBE  's     C NONSPECIFIC PROCEDURE      Exploratory laparotomy with resection of infarcted segment of omentum and minor lysis of adhesions     C NONSPECIFIC PROCEDURE      Removal of hemorrhagic corpus luteum cyst     C VAGINAL HYSTERECTOMY       COLONOSCOPY  06/21/10     HC BIOPSY OF BREAST, OPEN INCISIONAL        CORRECT BUNION,METATARSAL OSTEOTOMY        LAPAROSCOPY, SURGICAL; CHOLECYSTECTOMY  08/04/10     HC SLING OPERATION FOR STRESS INCONTINENCE  2007     HERNIORRHAPHY INCISIONAL (LOCATION)  2011    Procedure:HERNIORRHAPHY INCISIONAL (LOCATION); Surgeon:AYALA HOOVER; Location:[facility] OR     LAPAROSCOPIC HERNIORRHAPHY INCISIONAL  2011    Procedure:LAPAROSCOPIC HERNIORRHAPHY INCISIONAL; Laparoscopic mesh repair of incarcerated incisional hernia , extensive lysis of adhesions, excision of hernia sac and closure of fascia.; Surgeon:AYALA HOOVER; Location:[facility] OR     LAPAROSCOPIC LYSIS ADHESIONS  2011    Procedure:LAPAROSCOPIC LYSIS ADHESIONS; Surgeon:AYALA HOOVER; Location:[facility] OR       ALLERGIES:   Allergies as of 2019 - Reviewed 2019   Allergen Reaction Noted     Augmentin [amoxicillin-pot clavulanate] Nausea and Vomiting 10/18/2018     Meperidine hcl Nausea and Vomiting 2001     Seasonal allergies  2010       MEDICATIONS:   Current Outpatient Medications   Medication Sig Dispense Refill     atorvastatin (LIPITOR) 10 MG tablet TAKE ONE TABLET BY MOUTH EVERY DAY 90 tablet 3     hydrochlorothiazide (HYDRODIURIL) 25 MG tablet TAKE ONE TABLET BY MOUTH EVERY DAY 90 tablet 3     lisinopril (PRINIVIL/ZESTRIL) 2.5 MG tablet TAKE ONE TABLET BY MOUTH EVERY DAY 90 tablet 3     mometasone-formoterol (DULERA) 200-5 MCG/ACT oral inhaler INHALE 2 PUFFS BY MOUTH TWO TIMES A DAY 13 g 11     ORDER FOR  DME Equipment being ordered: Oxygen @ 2 liters with exertion       trospium (SANCTURA) 20 MG tablet Take 20 mg by mouth       umeclidinium (INCRUSE ELLIPTA) 62.5 MCG/INH oral inhaler Inhale 1 puff into the lungs daily 1 Inhaler 11     venlafaxine (EFFEXOR-XR) 37.5 MG 24 hr capsule TAKE ONE CAPSULE BY MOUTH EVERY DAY WITH FOOD 90 capsule 1     warfarin (JANTOVEN) 5 MG tablet Take 5 mg Mon and 2.5 mg all other days of the week, or as directed by coumadin clinic 60 tablet 3     albuterol (2.5 MG/3ML) 0.083% neb solution Take  by nebulization. 1 NEB EVERY 4-6 HOURS AS NEEDED (Patient not taking: Reported on 5/1/2019) 75 mL 5     albuterol (PROAIR HFA/PROVENTIL HFA/VENTOLIN HFA) 108 (90 BASE) MCG/ACT Inhaler 1-2 puffs by mouth every 2-4 hours as needed (Patient not taking: Reported on 5/1/2019) 2 Inhaler 11     Calcium Carb-Cholecalciferol (CALCIUM 500 + D) 500-400 MG-UNIT TABS Take 1 tablet by mouth 2 times daily 180 tablet 3     CARTIA  MG 24 hr capsule TAKE ONE CAPSULE BY MOUTH EVERY DAY 90 capsule 3     cholecalciferol (VITAMIN D) 400 UNITS tablet TAKE ONE TABLET BY MOUTH EVERY DAY (Patient not taking: Reported on 5/1/2019) 100 tablet 10     Cyanocobalamin (B-12) 1000 MCG TBCR Take 1,000 mcg by mouth daily 100 tablet 1     magnesium 250 MG tablet Take 1 tablet by mouth daily 30 tablet      predniSONE (DELTASONE) 20 MG tablet TAKE ONE TABLET BY MOUTH EVERY DAY (Patient not taking: Reported on 4/29/2019) 5 tablet 0        FAMILY HISTORY:   Family History   Problem Relation Age of Onset     Cancer Mother         breast     Breast Cancer Mother      Obesity Mother      Genitourinary Problems Mother      Lipids Mother      Cancer Sister         breast     Respiratory Sister      Lipids Sister      Breast Cancer Sister      Arthritis Sister      Obesity Sister      Heart Failure Sister      Cancer Maternal Grandfather      Cancer Paternal Grandfather         lymph nodes     Heart Disease Father         by pass (5)      Cerebrovascular Disease Father         hemorragic     Lipids Father      Alzheimer Disease Maternal Grandmother      Genitourinary Problems Maternal Grandmother      Lipids Sister      Cancer Maternal Uncle         colon     Heart Disease Brother         Hx: stent placement     Lipids Brother         X 2       SOCIAL HISTORY:   Social History     Socioeconomic History     Marital status:      Spouse name: Nam     Number of children: 1     Years of education: 14     Highest education level: None   Occupational History     Occupation: Ins Cordinater     Comment:  [employer]     Employer: [employer]   Social Needs     Financial resource strain: None     Food insecurity:     Worry: None     Inability: None     Transportation needs:     Medical: None     Non-medical: None   Tobacco Use     Smoking status: Former Smoker     Packs/day: 1.00     Years: 30.00     Pack years: 30.00     Last attempt to quit: 10/25/2005     Years since quittin.5     Smokeless tobacco: Never Used   Substance and Sexual Activity     Alcohol use: No     Alcohol/week: 0.0 oz     Drug use: No     Sexual activity: Not Currently     Partners: Male     Birth control/protection: Surgical     Comment: hysterectomy   Lifestyle     Physical activity:     Days per week: None     Minutes per session: None     Stress: None   Relationships     Social connections:     Talks on phone: None     Gets together: None     Attends Uatsdin service: None     Active member of club or organization: None     Attends meetings of clubs or organizations: None     Relationship status: None     Intimate partner violence:     Fear of current or ex partner: None     Emotionally abused: None     Physically abused: None     Forced sexual activity: None   Other Topics Concern      Service No     Blood Transfusions No     Caffeine Concern No     Comment: coffee 2c/d pop: 2c/d     Occupational Exposure No     Hobby Hazards No     Sleep Concern No  "    Stress Concern No     Weight Concern Yes     Comment: desire wt loss     Special Diet No     Back Care No     Comment: Hx: seen chiropractor      Exercise No     Bike Helmet No     Comment: n/a     Seat Belt Yes     Self-Exams Yes     Comment: attempts to do SBE monthly. Patient has fibrous breast tissue.     Parent/sibling w/ CABG, MI or angioplasty before 65F 55M? Not Asked   Social History Narrative     None       PHYSICAL EXAMINATION:   /68   Pulse 94   Temp 97.5  F (36.4  C) (Temporal)   Resp 18   Ht 1.549 m (5' 1\")   Wt 101.5 kg (223 lb 12.8 oz)   SpO2 92%   BMI 42.29 kg/m     Wt Readings from Last 10 Encounters:   05/01/19 101.5 kg (223 lb 12.8 oz)   04/29/19 100.4 kg (221 lb 6.4 oz)   04/19/19 100.7 kg (222 lb)   02/27/19 99.8 kg (220 lb)   12/31/18 100.1 kg (220 lb 9.6 oz)   11/26/18 100.9 kg (222 lb 8 oz)   07/13/18 104.3 kg (230 lb)   06/20/18 104.6 kg (230 lb 8 oz)   04/11/18 103.4 kg (228 lb)   12/18/17 103.5 kg (228 lb 3.2 oz)      ECOG performance status: 0  Exam:  Constitutional: healthy, alert and no distress  Head: Normocephalic.   Neck: Neck supple. No adenopathy.  Breast Left breast mass with no skin changes  Cardiovascular: RRR.   Respiratory: Lungs clear  Gastrointestinal: Abdomen soft, non-tender. BS normal. No masses, organomegaly  Musculoskeletal: extremities normal-  Skin: no suspicious lesions or rashes  Neurologic: Gait normal. Sensation grossly WNL.  Psychiatric: mentation appears normal and affect normal/bright  Hematologic/Lymphatic/Immunologic: Normal cervical lymph nodes      LABORATORY RESULTS:    Recent Labs   Lab Test 04/29/19  1054 06/20/18  1121    141   POTASSIUM 3.4 4.0   CHLORIDE 102 104   BUN 15 14   CR 0.91 0.86   GLC 96 93   IVANNA 9.1 8.4*     Recent Labs   Lab Test 04/29/19  1054 06/20/18  1121 06/21/17  1533   WBC 9.1 6.0 9.0   HGB 12.5 12.8 13.1    275 290   MCV 92 90 90     Recent Labs   Lab Test 06/20/18  1121 12/05/16  0739 10/08/14  0849 "   BILITOTAL 0.5 0.6  --    ALKPHOS 64 75  --    ALT 23 22 27   AST 17 16  --    ALBUMIN 3.7 3.8  --      SPECIMEN(S):   Left breast 11:30 needle biopsy     FINAL DIAGNOSIS:   Breast, left, 11:30, ultrasound-guided needle core biopsy:   - Invasive lobular carcinoma:        - Rosie grade: II/III ; Rosie score: 6/9 (tubules:3;   nuclear:2; mitosis:1)        - Angiolymphatic invasion not identified in the planes examined.        - Lobular carcinoma in-situ: Present        - Microcalcifications: Present; associated with invasive carcinoma   and fibrotic         stroma.        - ER: Positive (strong staining in more than 90% of invasive tumor   nuclei)        - W[place]: Positive (moderate staining in 50-70% of invasive tumor nuclei)     COMMENT:   HER2 gene amplification will be performed and reported by the [facility]    laboratory.     IMAGING RESULTS:  Recent Results (from the past 744 hour(s))   US Breast Left    Narrative    MA DIAGNOSTIC LEFT W/ ALISHA, US BREAST LEFT LIMITED 1-3 QUADRANTS,  DIGITAL w/CAD;    TARGETED ULTRASOUND, LEFT BREAST - 4/12/2019 9:51 AM    HISTORY: Six-month follow-up for focal asymmetry on mammogram and for  probably complex cyst left breast 12 o'clock position on prior  diagnostic ultrasound. Family history of breast cancer in her mother  at age 70 and in her sister at age 40. Personal history of benign  right breast biopsy 20+ years ago.     COMPARISON:  Mammograms dated 9/26/2018, 4/26/2016 and 4/4/2015. Left  breast ultrasound dated 10/9/2018.    BREAST DENSITY: Scattered fibroglandular densities.    FINDINGS: Focal area of architectural distortion with some mass effect  in the central aspect of the 11:30 position of the mid anterior left  breast is essentially stable since the prior study dated 9/26/2018. No  other mammographic findings of concern for malignancy.    Targeted ultrasound of the upper left breast again demonstrates what  is likely a small benign complex cystic  structure at the 12 o'clock  position 3 cm from nipple measuring 0.3 x 0.3 x 0.2 cm. This is highly  likely benign. Incidental note of a heterogeneously echoic shadowing  mass in the left breast 11:30 position 4 cm from the nipple measuring  1.1 x 0.5 x 0.5 cm. This was not previously identified on ultrasound  and does have some vascularity either within the lesion or medially  adjacent to the lesion. This is an indeterminate and concerning  finding and further evaluation with biopsy is recommended.      Impression    IMPRESSION: BI-RADS CATEGORY: 4 - Suspicious Abnormality-Biopsy Should  Be Considered. Heterogeneously echoic shadowing mass in the 11:30  position of the left breast approximately 4 cm from nipple was not  definitely seen on the prior study and further evaluation with biopsy  is recommended.    RECOMMENDED FOLLOW-UP: Biopsy    I discussed the findings and recommendations with the patient at the  time of the exam.    INGA TREVINO MD MA Diagnostic Left w/Alisha    Narrative    MA DIAGNOSTIC LEFT W/ ALISHA, US BREAST LEFT LIMITED 1-3 QUADRANTS,  DIGITAL w/CAD;    TARGETED ULTRASOUND, LEFT BREAST - 4/12/2019 9:51 AM    HISTORY: Six-month follow-up for focal asymmetry on mammogram and for  probably complex cyst left breast 12 o'clock position on prior  diagnostic ultrasound. Family history of breast cancer in her mother  at age 70 and in her sister at age 40. Personal history of benign  right breast biopsy 20+ years ago.     COMPARISON:  Mammograms dated 9/26/2018, 4/26/2016 and 4/4/2015. Left  breast ultrasound dated 10/9/2018.    BREAST DENSITY: Scattered fibroglandular densities.    FINDINGS: Focal area of architectural distortion with some mass effect  in the central aspect of the 11:30 position of the mid anterior left  breast is essentially stable since the prior study dated 9/26/2018. No  other mammographic findings of concern for malignancy.    Targeted ultrasound of the upper left breast again  demonstrates what  is likely a small benign complex cystic structure at the 12 o'clock  position 3 cm from nipple measuring 0.3 x 0.3 x 0.2 cm. This is highly  likely benign. Incidental note of a heterogeneously echoic shadowing  mass in the left breast 11:30 position 4 cm from the nipple measuring  1.1 x 0.5 x 0.5 cm. This was not previously identified on ultrasound  and does have some vascularity either within the lesion or medially  adjacent to the lesion. This is an indeterminate and concerning  finding and further evaluation with biopsy is recommended.      Impression    IMPRESSION: BI-RADS CATEGORY: 4 - Suspicious Abnormality-Biopsy Should  Be Considered. Heterogeneously echoic shadowing mass in the 11:30  position of the left breast approximately 4 cm from nipple was not  definitely seen on the prior study and further evaluation with biopsy  is recommended.    RECOMMENDED FOLLOW-UP: Biopsy    I discussed the findings and recommendations with the patient at the  time of the exam.    INGA TREVINO MD   MR Breast Bilateral w/o & w Contrast    Narrative    MRI BREASTS, BILATERAL, ENHANCED - 4/25/2019 2:53 PM    HISTORY: Newly diagnosed left breast cancer. Evaluate for extent of  disease.     COMPARISON: Recent mammograms and ultrasound on April 12, 2018.     TECHNIQUE: Both breasts were simultaneously evaluated using dedicated  breast coil.  Axial STIR, axial T1 before and at two-minute intervals  out to eight minutes following 10 ml Gadavist administration and  sagittal postcontrast images were performed, as well as subtraction,  CAD and angio mapping.    FINDINGS:     Right Breast: There is minimal  background parenchymal enhancement.    There are no areas of suspicious enhancement or lymphadenopathy.    Left Breast: There is minimal  background parenchymal enhancement.    There is faint nonmasslike enhancement spanning approximately 2 cm in  the left breast at 11:30 o'clock 4 cm from the nipple,  corresponding  with the recently biopsy-proven carcinoma and postbiopsy changes.  There is a lobulated 1.3 cm area of T1 hyperintensity in the area  which is not enhancing, likely reflecting a small postbiopsy in the  trauma.    No suspicious lymphadenopathy.       Impression    IMPRESSION:   BI-RADS 6, KNOWN MALIGNANCY. Known biopsy-proven malignancy in the  left breast at 11:30 o'clock. No evidence of multifocal or  multicentric disease.     RECOMMENDED FOLLOW-UP:  Surgical follow-up.     ANTHONY CHEN MD       ASSESSMENT AND PLAN:    69-year-old postmenopausal female with past medical history of atrial fibrillation, hypertension     - Invasive lobular carcinoma  cT1N0  ER/MA positive and HER-2/tulio negative by FISH    Had a detailed discussion with patient today regarding what appears to be an early-stage hormone receptor positive breast carcinoma. Agree with proceeding with upfront surgical resection followed by assessment of surgical pathology and formulating adjuvant treatment recommendations    - Chemotherapy  Pending results of the surgical pathology  -Radiation therapy  She would likely need radiation given plans for lumpectomy  -Hormonal therapy  Definitely a candidate for adjuvant hormonal therapy of at least 5 years given all receptor positive breast cancer. Given postmenopausal, agent of choice would be an aromatase inhibitor    Given history of breast cancer In multiple relatives, Patient meets criteria for genetic testing and would recommend that.    - Atrial fibrillation  On Coumadin for anticoagulation    - Hypertension  lisinopril and hydrochlorothiazide per primary caregiver    - Hyperlipidemia  On statin        RTC After surgery to discuss pathology and formulate final plan of care    The patient is ready to learn, no apparent learning barriers were identified, Diagnosis and treatment plans were explained to the patient. The patient expressed understanding of the content. The patient questions  were answered to her satisfaction.    Chart documentation with Dragon Voice recognition Software. Although reviewed after completion, some words and grammatical errors may remain.    Greg Denis MD  Attending Physician   Hematology/Medical Oncology        Again, thank you for allowing me to participate in the care of your patient.        Sincerely,        Greg Denis MD

## 2019-05-01 NOTE — PROGRESS NOTES
DATE OF VISIT: May 1, 2019    REASON FOR REFERRAL:   Invasive lobular carcinoma left breast      HISTORY OF PRESENT ILLNESS:     69-year-old female with past medical history significant for Atrial fibrillation, hypertension    2018 Screening mammogram showed an area of architectural distortion the left breast.Ultrasound breast did not show any sonographic abnormality and recommendation was for a 6 month follow-up mammogram    2019 Mammogram and ultrasound showed a heterogenous mass in the left breast measuring 1.1 x 0.5 x 0.5 cm. Centimeter biopsy of the mass came back showing invasive lobular carcinoma, Grade 2, ER/AL positive and HER-2/tulio negative by FISH.MRI breast bilateral shoulder 2 cm mass like enhancement in the left breast corresponding to the biopsy-proven carcinoma with no evidence of multifocal disease or axillary lymphadenopathy. She has seen surgical oncology and is scheduled to undergo lumpectomy     Medical oncology clinic today to establish care. She is accompanied by her . No active bleeding currently. Denies breast pain, skin changes, nipple discharge, and fatigue, weight loss, dyspnea, cough, abdominal pain, bone pain, headache, visual symptoms or any other complaints. Good appetite and energy levels    Family History significant for breast cancer in mother and sister.    Patient underwent hysterectomy with bilateral sloping oophorectomy At age 36 for history of abnormal uterine bleeding and ovarian cysts.      REVIEW OF SYSTEMS:      ROS: 14 point ROS neg other than the symptoms noted above in the HPI.    PAST MEDICAL HISTORY:   Past Medical History:   Diagnosis Date     Mixed hyperlipidemia        PAST SURGICAL HISTORY:   Past Surgical History:   Procedure Laterality Date     C APPENDECTOMY,W OTHR PROC       C  DELIVERY ONLY           C LIGATE FALLOPIAN TUBE       C NONSPECIFIC PROCEDURE      Exploratory laparotomy with resection of infarcted  segment of omentum and minor lysis of adhesions     C NONSPECIFIC PROCEDURE  1981    Removal of hemorrhagic corpus luteum cyst     C VAGINAL HYSTERECTOMY  1984     COLONOSCOPY  06/21/10     HC BIOPSY OF BREAST, OPEN INCISIONAL  1988      CORRECT BUNION,METATARSAL OSTEOTOMY  1993      LAPAROSCOPY, SURGICAL; CHOLECYSTECTOMY  08/04/10     HC SLING OPERATION FOR STRESS INCONTINENCE  04/19/2007     HERNIORRHAPHY INCISIONAL (LOCATION)  9/23/2011    Procedure:HERNIORRHAPHY INCISIONAL (LOCATION); Surgeon:AYALA HOOVER; Location:[facility] OR     LAPAROSCOPIC HERNIORRHAPHY INCISIONAL  9/23/2011    Procedure:LAPAROSCOPIC HERNIORRHAPHY INCISIONAL; Laparoscopic mesh repair of incarcerated incisional hernia , extensive lysis of adhesions, excision of hernia sac and closure of fascia.; Surgeon:AYALA HOOVER; Location:[facility]     LAPAROSCOPIC LYSIS ADHESIONS  9/23/2011    Procedure:LAPAROSCOPIC LYSIS ADHESIONS; Surgeon:AYALA HOOVER; Location:[facility]       ALLERGIES:   Allergies as of 05/01/2019 - Reviewed 05/01/2019   Allergen Reaction Noted     Augmentin [amoxicillin-pot clavulanate] Nausea and Vomiting 10/18/2018     Meperidine hcl Nausea and Vomiting 04/17/2001     Seasonal allergies  04/05/2010       MEDICATIONS:   Current Outpatient Medications   Medication Sig Dispense Refill     atorvastatin (LIPITOR) 10 MG tablet TAKE ONE TABLET BY MOUTH EVERY DAY 90 tablet 3     hydrochlorothiazide (HYDRODIURIL) 25 MG tablet TAKE ONE TABLET BY MOUTH EVERY DAY 90 tablet 3     lisinopril (PRINIVIL/ZESTRIL) 2.5 MG tablet TAKE ONE TABLET BY MOUTH EVERY DAY 90 tablet 3     mometasone-formoterol (DULERA) 200-5 MCG/ACT oral inhaler INHALE 2 PUFFS BY MOUTH TWO TIMES A DAY 13 g 11     ORDER FOR DME Equipment being ordered: Oxygen @ 2 liters with exertion       trospium (SANCTURA) 20 MG tablet Take 20 mg by mouth       umeclidinium (INCRUSE ELLIPTA) 62.5 MCG/INH oral inhaler Inhale 1 puff into the lungs daily 1 Inhaler 11     venlafaxine  (EFFEXOR-XR) 37.5 MG 24 hr capsule TAKE ONE CAPSULE BY MOUTH EVERY DAY WITH FOOD 90 capsule 1     warfarin (JANTOVEN) 5 MG tablet Take 5 mg Mon and 2.5 mg all other days of the week, or as directed by coumadin clinic 60 tablet 3     albuterol (2.5 MG/3ML) 0.083% neb solution Take  by nebulization. 1 NEB EVERY 4-6 HOURS AS NEEDED (Patient not taking: Reported on 5/1/2019) 75 mL 5     albuterol (PROAIR HFA/PROVENTIL HFA/VENTOLIN HFA) 108 (90 BASE) MCG/ACT Inhaler 1-2 puffs by mouth every 2-4 hours as needed (Patient not taking: Reported on 5/1/2019) 2 Inhaler 11     Calcium Carb-Cholecalciferol (CALCIUM 500 + D) 500-400 MG-UNIT TABS Take 1 tablet by mouth 2 times daily 180 tablet 3     CARTIA  MG 24 hr capsule TAKE ONE CAPSULE BY MOUTH EVERY DAY 90 capsule 3     cholecalciferol (VITAMIN D) 400 UNITS tablet TAKE ONE TABLET BY MOUTH EVERY DAY (Patient not taking: Reported on 5/1/2019) 100 tablet 10     Cyanocobalamin (B-12) 1000 MCG TBCR Take 1,000 mcg by mouth daily 100 tablet 1     magnesium 250 MG tablet Take 1 tablet by mouth daily 30 tablet      predniSONE (DELTASONE) 20 MG tablet TAKE ONE TABLET BY MOUTH EVERY DAY (Patient not taking: Reported on 4/29/2019) 5 tablet 0        FAMILY HISTORY:   Family History   Problem Relation Age of Onset     Cancer Mother         breast     Breast Cancer Mother      Obesity Mother      Genitourinary Problems Mother      Lipids Mother      Cancer Sister         breast     Respiratory Sister      Lipids Sister      Breast Cancer Sister      Arthritis Sister      Obesity Sister      Heart Failure Sister      Cancer Maternal Grandfather      Cancer Paternal Grandfather         lymph nodes     Heart Disease Father         by pass (5)     Cerebrovascular Disease Father         hemorragic     Lipids Father      Alzheimer Disease Maternal Grandmother      Genitourinary Problems Maternal Grandmother      Lipids Sister      Cancer Maternal Uncle         colon     Heart Disease  Brother         Hx: stent placement     Lipids Brother         X 2       SOCIAL HISTORY:   Social History     Socioeconomic History     Marital status:      Spouse name: Ray     Number of children: 1     Years of education: 14     Highest education level: None   Occupational History     Occupation: Ins Cordinater     Comment:  [employer]     Employer: [employer]   Social Needs     Financial resource strain: None     Food insecurity:     Worry: None     Inability: None     Transportation needs:     Medical: None     Non-medical: None   Tobacco Use     Smoking status: Former Smoker     Packs/day: 1.00     Years: 30.00     Pack years: 30.00     Last attempt to quit: 10/25/2005     Years since quittin.5     Smokeless tobacco: Never Used   Substance and Sexual Activity     Alcohol use: No     Alcohol/week: 0.0 oz     Drug use: No     Sexual activity: Not Currently     Partners: Male     Birth control/protection: Surgical     Comment: hysterectomy   Lifestyle     Physical activity:     Days per week: None     Minutes per session: None     Stress: None   Relationships     Social connections:     Talks on phone: None     Gets together: None     Attends [employer] service: None     Active member of club or organization: None     Attends meetings of clubs or organizations: None     Relationship status: None     Intimate partner violence:     Fear of current or ex partner: None     Emotionally abused: None     Physically abused: None     Forced sexual activity: None   Other Topics Concern      Service No     Blood Transfusions No     Caffeine Concern No     Comment: coffee 2c/d pop: 2c/d     Occupational Exposure No     Hobby Hazards No     Sleep Concern No     Stress Concern No     Weight Concern Yes     Comment: desire wt loss     Special Diet No     Back Care No     Comment: Hx: seen chiropractor      Exercise No     Bike Helmet No     Comment: n/a     Seat Belt Yes     Self-Exams Yes      "Comment: attempts to do SBE monthly. Patient has fibrous breast tissue.     Parent/sibling w/ CABG, MI or angioplasty before 65F 55M? Not Asked   Social History Narrative     None       PHYSICAL EXAMINATION:   /68   Pulse 94   Temp 97.5  F (36.4  C) (Temporal)   Resp 18   Ht 1.549 m (5' 1\")   Wt 101.5 kg (223 lb 12.8 oz)   SpO2 92%   BMI 42.29 kg/m    Wt Readings from Last 10 Encounters:   05/01/19 101.5 kg (223 lb 12.8 oz)   04/29/19 100.4 kg (221 lb 6.4 oz)   04/19/19 100.7 kg (222 lb)   02/27/19 99.8 kg (220 lb)   12/31/18 100.1 kg (220 lb 9.6 oz)   11/26/18 100.9 kg (222 lb 8 oz)   07/13/18 104.3 kg (230 lb)   06/20/18 104.6 kg (230 lb 8 oz)   04/11/18 103.4 kg (228 lb)   12/18/17 103.5 kg (228 lb 3.2 oz)      ECOG performance status: 0  Exam:  Constitutional: healthy, alert and no distress  Head: Normocephalic.   Neck: Neck supple. No adenopathy.  Breast Left breast mass with no skin changes  Cardiovascular: RRR.   Respiratory: Lungs clear  Gastrointestinal: Abdomen soft, non-tender. BS normal. No masses, organomegaly  Musculoskeletal: extremities normal-  Skin: no suspicious lesions or rashes  Neurologic: Gait normal. Sensation grossly WNL.  Psychiatric: mentation appears normal and affect normal/bright  Hematologic/Lymphatic/Immunologic: Normal cervical lymph nodes      LABORATORY RESULTS:    Recent Labs   Lab Test 04/29/19  1054 06/20/18  1121    141   POTASSIUM 3.4 4.0   CHLORIDE 102 104   BUN 15 14   CR 0.91 0.86   GLC 96 93   IVANNA 9.1 8.4*     Recent Labs   Lab Test 04/29/19  1054 06/20/18  1121 06/21/17  1533   WBC 9.1 6.0 9.0   HGB 12.5 12.8 13.1    275 290   MCV 92 90 90     Recent Labs   Lab Test 06/20/18  1121 12/05/16  0739 10/08/14  0849   BILITOTAL 0.5 0.6  --    ALKPHOS 64 75  --    ALT 23 22 27   AST 17 16  --    ALBUMIN 3.7 3.8  --      SPECIMEN(S):   Left breast 11:30 needle biopsy     FINAL DIAGNOSIS:   Breast, left, 11:30, ultrasound-guided needle core biopsy:   - " Invasive lobular carcinoma:        - [place] grade: II/III ; Rosie score: 6/9 (tubules:3;   nuclear:2; mitosis:1)        - Angiolymphatic invasion not identified in the planes examined.        - Lobular carcinoma in-situ: Present        - Microcalcifications: Present; associated with invasive carcinoma   and fibrotic         stroma.        - ER: Positive (strong staining in more than 90% of invasive tumor   nuclei)        - UT: Positive (moderate staining in 50-70% of invasive tumor nuclei)     COMMENT:   HER2 gene amplification will be performed and reported by the [facility]    laboratory.     IMAGING RESULTS:  Recent Results (from the past 744 hour(s))   US Breast Left    Narrative    MA DIAGNOSTIC LEFT W/ ALISHA, US BREAST LEFT LIMITED 1-3 QUADRANTS,  DIGITAL w/CAD;    TARGETED ULTRASOUND, LEFT BREAST - 4/12/2019 9:51 AM    HISTORY: Six-month follow-up for focal asymmetry on mammogram and for  probably complex cyst left breast 12 o'clock position on prior  diagnostic ultrasound. Family history of breast cancer in her mother  at age 70 and in her sister at age 40. Personal history of benign  right breast biopsy 20+ years ago.     COMPARISON:  Mammograms dated 9/26/2018, 4/26/2016 and 4/4/2015. Left  breast ultrasound dated 10/9/2018.    BREAST DENSITY: Scattered fibroglandular densities.    FINDINGS: Focal area of architectural distortion with some mass effect  in the central aspect of the 11:30 position of the mid anterior left  breast is essentially stable since the prior study dated 9/26/2018. No  other mammographic findings of concern for malignancy.    Targeted ultrasound of the upper left breast again demonstrates what  is likely a small benign complex cystic structure at the 12 o'clock  position 3 cm from nipple measuring 0.3 x 0.3 x 0.2 cm. This is highly  likely benign. Incidental note of a heterogeneously echoic shadowing  mass in the left breast 11:30 position 4 cm from the nipple  measuring  1.1 x 0.5 x 0.5 cm. This was not previously identified on ultrasound  and does have some vascularity either within the lesion or medially  adjacent to the lesion. This is an indeterminate and concerning  finding and further evaluation with biopsy is recommended.      Impression    IMPRESSION: BI-RADS CATEGORY: 4 - Suspicious Abnormality-Biopsy Should  Be Considered. Heterogeneously echoic shadowing mass in the 11:30  position of the left breast approximately 4 cm from nipple was not  definitely seen on the prior study and further evaluation with biopsy  is recommended.    RECOMMENDED FOLLOW-UP: Biopsy    I discussed the findings and recommendations with the patient at the  time of the exam.    INGA TREVINO MD MA Diagnostic Left w/Alisha    Narrative    MA DIAGNOSTIC LEFT W/ ALISHA, US BREAST LEFT LIMITED 1-3 QUADRANTS,  DIGITAL w/CAD;    TARGETED ULTRASOUND, LEFT BREAST - 4/12/2019 9:51 AM    HISTORY: Six-month follow-up for focal asymmetry on mammogram and for  probably complex cyst left breast 12 o'clock position on prior  diagnostic ultrasound. Family history of breast cancer in her mother  at age 70 and in her sister at age 40. Personal history of benign  right breast biopsy 20+ years ago.     COMPARISON:  Mammograms dated 9/26/2018, 4/26/2016 and 4/4/2015. Left  breast ultrasound dated 10/9/2018.    BREAST DENSITY: Scattered fibroglandular densities.    FINDINGS: Focal area of architectural distortion with some mass effect  in the central aspect of the 11:30 position of the mid anterior left  breast is essentially stable since the prior study dated 9/26/2018. No  other mammographic findings of concern for malignancy.    Targeted ultrasound of the upper left breast again demonstrates what  is likely a small benign complex cystic structure at the 12 o'clock  position 3 cm from nipple measuring 0.3 x 0.3 x 0.2 cm. This is highly  likely benign. Incidental note of a heterogeneously echoic shadowing  mass  in the left breast 11:30 position 4 cm from the nipple measuring  1.1 x 0.5 x 0.5 cm. This was not previously identified on ultrasound  and does have some vascularity either within the lesion or medially  adjacent to the lesion. This is an indeterminate and concerning  finding and further evaluation with biopsy is recommended.      Impression    IMPRESSION: BI-RADS CATEGORY: 4 - Suspicious Abnormality-Biopsy Should  Be Considered. Heterogeneously echoic shadowing mass in the 11:30  position of the left breast approximately 4 cm from nipple was not  definitely seen on the prior study and further evaluation with biopsy  is recommended.    RECOMMENDED FOLLOW-UP: Biopsy    I discussed the findings and recommendations with the patient at the  time of the exam.    INGA TREVINO MD   MR Breast Bilateral w/o & w Contrast    Narrative    MRI BREASTS, BILATERAL, ENHANCED - 4/25/2019 2:53 PM    HISTORY: Newly diagnosed left breast cancer. Evaluate for extent of  disease.     COMPARISON: Recent mammograms and ultrasound on April 12, 2018.     TECHNIQUE: Both breasts were simultaneously evaluated using dedicated  breast coil.  Axial STIR, axial T1 before and at two-minute intervals  out to eight minutes following 10 ml Gadavist administration and  sagittal postcontrast images were performed, as well as subtraction,  CAD and angio mapping.    FINDINGS:     Right Breast: There is minimal  background parenchymal enhancement.    There are no areas of suspicious enhancement or lymphadenopathy.    Left Breast: There is minimal  background parenchymal enhancement.    There is faint nonmasslike enhancement spanning approximately 2 cm in  the left breast at 11:30 o'clock 4 cm from the nipple, corresponding  with the recently biopsy-proven carcinoma and postbiopsy changes.  There is a lobulated 1.3 cm area of T1 hyperintensity in the area  which is not enhancing, likely reflecting a small postbiopsy in the  trauma.    No suspicious  lymphadenopathy.       Impression    IMPRESSION:   BI-RADS 6, KNOWN MALIGNANCY. Known biopsy-proven malignancy in the  left breast at 11:30 o'clock. No evidence of multifocal or  multicentric disease.     RECOMMENDED FOLLOW-UP:  Surgical follow-up.     ANTHONY CHEN MD       ASSESSMENT AND PLAN:    69-year-old postmenopausal female with past medical history of atrial fibrillation, hypertension     - Invasive lobular carcinoma  cT1N0  ER/RI positive and HER-2/tulio negative by FISH    Had a detailed discussion with patient today regarding what appears to be an early-stage hormone receptor positive breast carcinoma. Agree with proceeding with upfront surgical resection followed by assessment of surgical pathology and formulating adjuvant treatment recommendations    - Chemotherapy  Pending results of the surgical pathology  -Radiation therapy  She would likely need radiation given plans for lumpectomy  -Hormonal therapy  Definitely a candidate for adjuvant hormonal therapy of at least 5 years given all receptor positive breast cancer. Given postmenopausal, agent of choice would be an aromatase inhibitor    Given history of breast cancer In multiple relatives, Patient meets criteria for genetic testing and would recommend that.    - Atrial fibrillation  On Coumadin for anticoagulation    - Hypertension  lisinopril and hydrochlorothiazide per primary caregiver    - Hyperlipidemia  On statin        RTC After surgery to discuss pathology and formulate final plan of care    The patient is ready to learn, no apparent learning barriers were identified, Diagnosis and treatment plans were explained to the patient. The patient expressed understanding of the content. The patient questions were answered to her satisfaction.    Chart documentation with Dragon Voice recognition Software. Although reviewed after completion, some words and grammatical errors may remain.    Greg Denis MD  Attending Physician   Hematology/Medical  Oncology

## 2019-05-10 ENCOUNTER — TELEPHONE (OUTPATIENT)
Dept: FAMILY MEDICINE | Facility: CLINIC | Age: 69
End: 2019-05-10

## 2019-05-10 NOTE — TELEPHONE ENCOUNTER
Reason for Call:  Other question    Detailed comments: patient calling is having beast cancer surgery on 5/15/19, she would like to know if it was ok for her to get a message (states the massage gal used rocks) before her surgery.please call to advise     Phone Number Patient can be reached at: Cell number on file:    Telephone Information:   Mobile [phone]       Best Time: any    Can we leave a detailed message on this number? YES    Call taken on 5/10/2019 at 2:58 PM by Tania Grewal

## 2019-05-10 NOTE — TELEPHONE ENCOUNTER
Per Dr. Blue, pt is okay to have the massage. I have given pt this message. Cely Lobato CMA ([facility])

## 2019-05-13 ENCOUNTER — ANTICOAGULATION THERAPY VISIT (OUTPATIENT)
Dept: ANTICOAGULATION | Facility: CLINIC | Age: 69
End: 2019-05-13
Payer: COMMERCIAL

## 2019-05-13 DIAGNOSIS — I48.0 AF (PAROXYSMAL ATRIAL FIBRILLATION) (H): ICD-10-CM

## 2019-05-13 DIAGNOSIS — J43.9 PULMONARY EMPHYSEMA, UNSPECIFIED EMPHYSEMA TYPE (H): ICD-10-CM

## 2019-05-13 DIAGNOSIS — I48.91 ATRIAL FIBRILLATION, UNSPECIFIED TYPE (H): ICD-10-CM

## 2019-05-13 LAB — INR POINT OF CARE: 1.2 (ref 0.9–1.1)

## 2019-05-13 PROCEDURE — 99207 ZZC NO CHARGE NURSE ONLY: CPT

## 2019-05-13 PROCEDURE — 36416 COLLJ CAPILLARY BLOOD SPEC: CPT

## 2019-05-13 PROCEDURE — 85610 PROTHROMBIN TIME: CPT | Mod: QW

## 2019-05-13 NOTE — TELEPHONE ENCOUNTER
Needs before Wednesday please.   Needs to bring to surgery    Thanks  Delia Garcia [facility]   [phone]

## 2019-05-13 NOTE — PROGRESS NOTES
ANTICOAGULATION FOLLOW-UP CLINIC VISIT    Patient Name:  Latanya Padron  Date:  2019  Contact Type:  Face to Face    SUBJECTIVE:  Patient Findings     Positives:   Missed doses (intentionally holding Coumadin since 5/10 for lumpectomy on 5/15)             OBJECTIVE    INR Protime   Date Value Ref Range Status   2019 1.2 (A) 0.9 - 1.1 Final       ASSESSMENT / PLAN  INR assessment SUB    Recheck INR In: 9 DAYS    INR Location Clinic      Anticoagulation Summary  As of 2019    INR goal:   2.0-3.0   TTR:   75.8 % (3.2 y)   INR used for dosin.2! (2019)   Warfarin maintenance plan:   5 mg (5 mg x 1) every Mon; 2.5 mg (5 mg x 0.5) all other days   Full warfarin instructions:   : Hold; : Hold; 5/15: 5 mg; Otherwise 5 mg every Mon; 2.5 mg all other days   Weekly warfarin total:   20 mg   Plan last modified:   Francine Andrew RN (2017)   Next INR check:   2019   Target end date:       Indications    AF (paroxysmal atrial fibrillation) (H) [I48.0]  Atrial fibrillation (H) [I48.91]             Anticoagulation Episode Summary     INR check location:       Preferred lab:       Send INR reminders to:   BENIGNO ALVAREZ    Comments:   5 mg tabs, likes card, PM dose      Anticoagulation Care Providers     Provider Role Specialty Phone number    Glen Blue MD [facility] [phone]            See the Encounter Report to view Anticoagulation Flowsheet and Dosing Calendar (Go to Encounters tab in chart review, and find the Anticoagulation Therapy Visit)    Dosage adjustment made based on physician directed care plan.      Francine Andrew, LOVE

## 2019-05-14 RX ORDER — ALBUTEROL SULFATE 90 UG/1
AEROSOL, METERED RESPIRATORY (INHALATION)
Qty: 18 G | Refills: 1 | Status: SHIPPED | OUTPATIENT
Start: 2019-05-14 | End: 2019-12-18

## 2019-05-14 NOTE — TELEPHONE ENCOUNTER
"Requested Prescriptions   Pending Prescriptions Disp Refills     albuterol (PROAIR HFA/PROVENTIL HFA/VENTOLIN HFA) 108 (90 Base) MCG/ACT inhaler 18 g 1     Si-2 puffs by mouth every 2-4 hours as needed   Last Written Prescription Date:  2017  Last Fill Quantity: 2 inhaller,  # refills: 11   Last office visit: 2019 with prescribing provider:     Future Office Visit:   Next 5 appointments (look out 90 days)    May 28, 2019  9:00 AM CDT  Return Visit with Donald Aleman MD  [address] [address]  [phone]   May 29, 2019  9:30 AM CDT  Return Visit with Greg Denis MD  [facility] ([address] [address]  [phone]             Asthma Maintenance Inhalers - Anticholinergics Passed - 2019  8:27 AM        Passed - Patient is age 12 years or older        Passed - Recent (12 mo) or future (30 days) visit within the authorizing provider's specialty     Patient had office visit in the last 12 months or has a visit in the next 30 days with authorizing provider or within the authorizing provider's specialty.  See \"Patient Info\" tab in inbasket, or \"Choose Columns\" in Meds & Orders section of the refill encounter.              Passed - Medication is active on med list      Prescription approved per [facility] Refill Protocol.  Flory Hidalgo RN    "

## 2019-05-15 ENCOUNTER — HOSPITAL ENCOUNTER (OUTPATIENT)
Dept: ULTRASOUND IMAGING | Facility: CLINIC | Age: 69
End: 2019-05-15
Attending: SURGERY
Payer: COMMERCIAL

## 2019-05-15 ENCOUNTER — HOSPITAL ENCOUNTER (OUTPATIENT)
Dept: MAMMOGRAPHY | Facility: CLINIC | Age: 69
End: 2019-05-15
Attending: SURGERY
Payer: COMMERCIAL

## 2019-05-15 ENCOUNTER — ANESTHESIA EVENT (OUTPATIENT)
Dept: SURGERY | Facility: CLINIC | Age: 69
End: 2019-05-15
Payer: COMMERCIAL

## 2019-05-15 ENCOUNTER — TRANSFERRED RECORDS (OUTPATIENT)
Dept: HEALTH INFORMATION MANAGEMENT | Facility: CLINIC | Age: 69
End: 2019-05-15

## 2019-05-15 ENCOUNTER — ANESTHESIA (OUTPATIENT)
Dept: SURGERY | Facility: CLINIC | Age: 69
End: 2019-05-15
Payer: COMMERCIAL

## 2019-05-15 ENCOUNTER — HOSPITAL ENCOUNTER (OUTPATIENT)
Dept: NUCLEAR MEDICINE | Facility: CLINIC | Age: 69
Setting detail: NUCLEAR MEDICINE
Discharge: HOME | End: 2019-05-15
Attending: SURGERY | Admitting: SURGERY
Payer: COMMERCIAL

## 2019-05-15 ENCOUNTER — HOSPITAL ENCOUNTER (OUTPATIENT)
Facility: CLINIC | Age: 69
Discharge: HOME | End: 2019-05-15
Attending: SURGERY | Admitting: SURGERY
Payer: COMMERCIAL

## 2019-05-15 VITALS
DIASTOLIC BLOOD PRESSURE: 80 MMHG | HEART RATE: 91 BPM | SYSTOLIC BLOOD PRESSURE: 153 MMHG | RESPIRATION RATE: 24 BRPM | TEMPERATURE: 95.4 F | OXYGEN SATURATION: 97 %

## 2019-05-15 DIAGNOSIS — C50.912 LEFT BREAST CANCER WITH T3 TUMOR, >5 CM IN GREATEST DIMENSION (H): ICD-10-CM

## 2019-05-15 DIAGNOSIS — Z90.12 S/P PARTIAL MASTECTOMY, LEFT: Primary | ICD-10-CM

## 2019-05-15 PROCEDURE — 88341 IMHCHEM/IMCYTCHM EA ADD ANTB: CPT | Performed by: SURGERY

## 2019-05-15 PROCEDURE — 88342 IMHCHEM/IMCYTCHM 1ST ANTB: CPT | Mod: 26 | Performed by: SURGERY

## 2019-05-15 PROCEDURE — 78195 LYMPH SYSTEM IMAGING: CPT | Mod: 26 | Performed by: SURGERY

## 2019-05-15 PROCEDURE — 25000132 ZZH RX MED GY IP 250 OP 250 PS 637: Performed by: SURGERY

## 2019-05-15 PROCEDURE — 25000125 ZZHC RX 250: Performed by: NURSE ANESTHETIST, CERTIFIED REGISTERED

## 2019-05-15 PROCEDURE — 25000125 ZZHC RX 250: Performed by: SURGERY

## 2019-05-15 PROCEDURE — 76098 X-RAY EXAM SURGICAL SPECIMEN: CPT

## 2019-05-15 PROCEDURE — 88305 TISSUE EXAM BY PATHOLOGIST: CPT | Performed by: SURGERY

## 2019-05-15 PROCEDURE — 40000306 ZZH STATISTIC PRE PROC ASSESS II: Performed by: SURGERY

## 2019-05-15 PROCEDURE — 00000159 ZZHCL STATISTIC H-SEND OUTS PREP: Performed by: SURGERY

## 2019-05-15 PROCEDURE — 36000058 ZZH SURGERY LEVEL 3 EA 15 ADDTL MIN: Performed by: SURGERY

## 2019-05-15 PROCEDURE — 71000014 ZZH RECOVERY PHASE 1 LEVEL 2 FIRST HR: Performed by: SURGERY

## 2019-05-15 PROCEDURE — 25800030 ZZH RX IP 258 OP 636: Performed by: NURSE ANESTHETIST, CERTIFIED REGISTERED

## 2019-05-15 PROCEDURE — 37000008 ZZH ANESTHESIA TECHNICAL FEE, 1ST 30 MIN: Performed by: SURGERY

## 2019-05-15 PROCEDURE — 38525 BIOPSY/REMOVAL LYMPH NODES: CPT | Mod: LT | Performed by: SURGERY

## 2019-05-15 PROCEDURE — 40000986 MA POST PROCEDURE LEFT

## 2019-05-15 PROCEDURE — 38900 IO MAP OF SENT LYMPH NODE: CPT | Performed by: SURGERY

## 2019-05-15 PROCEDURE — 88307 TISSUE EXAM BY PATHOLOGIST: CPT | Performed by: SURGERY

## 2019-05-15 PROCEDURE — 88342 IMHCHEM/IMCYTCHM 1ST ANTB: CPT | Performed by: SURGERY

## 2019-05-15 PROCEDURE — 88341 IMHCHEM/IMCYTCHM EA ADD ANTB: CPT | Mod: 26

## 2019-05-15 PROCEDURE — 34300033 ZZH RX 343: Performed by: SURGERY

## 2019-05-15 PROCEDURE — 19285 PERQ DEV BREAST 1ST US IMAG: CPT | Mod: LT

## 2019-05-15 PROCEDURE — 88307 TISSUE EXAM BY PATHOLOGIST: CPT | Mod: 26 | Performed by: SURGERY

## 2019-05-15 PROCEDURE — 25000566 ZZH SEVOFLURANE, EA 15 MIN: Performed by: SURGERY

## 2019-05-15 PROCEDURE — 25000128 H RX IP 250 OP 636: Performed by: SURGERY

## 2019-05-15 PROCEDURE — A9541 TC99M SULFUR COLLOID: HCPCS | Performed by: SURGERY

## 2019-05-15 PROCEDURE — 25000128 H RX IP 250 OP 636: Performed by: NURSE ANESTHETIST, CERTIFIED REGISTERED

## 2019-05-15 PROCEDURE — 71000027 ZZH RECOVERY PHASE 2 EACH 15 MINS: Performed by: SURGERY

## 2019-05-15 PROCEDURE — 36000056 ZZH SURGERY LEVEL 3 1ST 30 MIN: Performed by: SURGERY

## 2019-05-15 PROCEDURE — 37000009 ZZH ANESTHESIA TECHNICAL FEE, EACH ADDTL 15 MIN: Performed by: SURGERY

## 2019-05-15 PROCEDURE — 27210794 ZZH OR GENERAL SUPPLY STERILE: Performed by: SURGERY

## 2019-05-15 PROCEDURE — 38792 RA TRACER ID OF SENTINL NODE: CPT | Mod: TC

## 2019-05-15 PROCEDURE — 88341 IMHCHEM/IMCYTCHM EA ADD ANTB: CPT | Mod: 26 | Performed by: SURGERY

## 2019-05-15 PROCEDURE — 88305 TISSUE EXAM BY PATHOLOGIST: CPT | Mod: 26 | Performed by: SURGERY

## 2019-05-15 PROCEDURE — 19301 PARTIAL MASTECTOMY: CPT | Mod: LT | Performed by: SURGERY

## 2019-05-15 RX ORDER — LIDOCAINE 40 MG/G
CREAM TOPICAL
Status: DISCONTINUED | OUTPATIENT
Start: 2019-05-15 | End: 2019-05-15 | Stop reason: HOSPADM

## 2019-05-15 RX ORDER — HYDROCODONE BITARTRATE AND ACETAMINOPHEN 5; 325 MG/1; MG/1
1 TABLET ORAL EVERY 4 HOURS PRN
Qty: 25 TABLET | Refills: 0 | Status: SHIPPED | OUTPATIENT
Start: 2019-05-15 | End: 2019-09-11

## 2019-05-15 RX ORDER — CEFAZOLIN SODIUM 1 G/3ML
1 INJECTION, POWDER, FOR SOLUTION INTRAMUSCULAR; INTRAVENOUS SEE ADMIN INSTRUCTIONS
Status: DISCONTINUED | OUTPATIENT
Start: 2019-05-15 | End: 2019-05-15 | Stop reason: HOSPADM

## 2019-05-15 RX ORDER — NALOXONE HYDROCHLORIDE 0.4 MG/ML
.1-.4 INJECTION, SOLUTION INTRAMUSCULAR; INTRAVENOUS; SUBCUTANEOUS
Status: DISCONTINUED | OUTPATIENT
Start: 2019-05-15 | End: 2019-05-15 | Stop reason: HOSPADM

## 2019-05-15 RX ORDER — FENTANYL CITRATE 50 UG/ML
INJECTION, SOLUTION INTRAMUSCULAR; INTRAVENOUS PRN
Status: DISCONTINUED | OUTPATIENT
Start: 2019-05-15 | End: 2019-05-15

## 2019-05-15 RX ORDER — LIDOCAINE HYDROCHLORIDE AND EPINEPHRINE 10; 10 MG/ML; UG/ML
INJECTION, SOLUTION INFILTRATION; PERINEURAL PRN
Status: DISCONTINUED | OUTPATIENT
Start: 2019-05-15 | End: 2019-05-15 | Stop reason: HOSPADM

## 2019-05-15 RX ORDER — LIDOCAINE HYDROCHLORIDE 20 MG/ML
INJECTION, SOLUTION INFILTRATION; PERINEURAL PRN
Status: DISCONTINUED | OUTPATIENT
Start: 2019-05-15 | End: 2019-05-15

## 2019-05-15 RX ORDER — SODIUM CHLORIDE, SODIUM LACTATE, POTASSIUM CHLORIDE, CALCIUM CHLORIDE 600; 310; 30; 20 MG/100ML; MG/100ML; MG/100ML; MG/100ML
INJECTION, SOLUTION INTRAVENOUS CONTINUOUS
Status: DISCONTINUED | OUTPATIENT
Start: 2019-05-15 | End: 2019-05-15 | Stop reason: HOSPADM

## 2019-05-15 RX ORDER — DIMENHYDRINATE 50 MG/ML
INJECTION, SOLUTION INTRAMUSCULAR; INTRAVENOUS PRN
Status: DISCONTINUED | OUTPATIENT
Start: 2019-05-15 | End: 2019-05-15

## 2019-05-15 RX ORDER — LABETALOL HYDROCHLORIDE 5 MG/ML
10 INJECTION, SOLUTION INTRAVENOUS
Status: DISCONTINUED | OUTPATIENT
Start: 2019-05-15 | End: 2019-05-15 | Stop reason: HOSPADM

## 2019-05-15 RX ORDER — SCOLOPAMINE TRANSDERMAL SYSTEM 1 MG/1
1 PATCH, EXTENDED RELEASE TRANSDERMAL ONCE
Status: COMPLETED | OUTPATIENT
Start: 2019-05-15 | End: 2019-05-15

## 2019-05-15 RX ORDER — HYDROCODONE BITARTRATE AND ACETAMINOPHEN 5; 325 MG/1; MG/1
2 TABLET ORAL
Status: COMPLETED | OUTPATIENT
Start: 2019-05-15 | End: 2019-05-15

## 2019-05-15 RX ORDER — IPRATROPIUM BROMIDE AND ALBUTEROL SULFATE 2.5; .5 MG/3ML; MG/3ML
3 SOLUTION RESPIRATORY (INHALATION)
Status: DISCONTINUED | OUTPATIENT
Start: 2019-05-15 | End: 2019-05-15 | Stop reason: HOSPADM

## 2019-05-15 RX ORDER — CEFAZOLIN SODIUM 2 G/100ML
2 INJECTION, SOLUTION INTRAVENOUS
Status: COMPLETED | OUTPATIENT
Start: 2019-05-15 | End: 2019-05-15

## 2019-05-15 RX ORDER — BUPIVACAINE HYDROCHLORIDE 2.5 MG/ML
INJECTION, SOLUTION INFILTRATION; PERINEURAL PRN
Status: DISCONTINUED | OUTPATIENT
Start: 2019-05-15 | End: 2019-05-15 | Stop reason: HOSPADM

## 2019-05-15 RX ORDER — FENTANYL CITRATE 50 UG/ML
25-50 INJECTION, SOLUTION INTRAMUSCULAR; INTRAVENOUS
Status: DISCONTINUED | OUTPATIENT
Start: 2019-05-15 | End: 2019-05-15 | Stop reason: HOSPADM

## 2019-05-15 RX ORDER — ONDANSETRON 2 MG/ML
INJECTION INTRAMUSCULAR; INTRAVENOUS PRN
Status: DISCONTINUED | OUTPATIENT
Start: 2019-05-15 | End: 2019-05-15

## 2019-05-15 RX ORDER — PROPOFOL 10 MG/ML
INJECTION, EMULSION INTRAVENOUS PRN
Status: DISCONTINUED | OUTPATIENT
Start: 2019-05-15 | End: 2019-05-15

## 2019-05-15 RX ORDER — ONDANSETRON 4 MG/1
4 TABLET, ORALLY DISINTEGRATING ORAL EVERY 30 MIN PRN
Status: DISCONTINUED | OUTPATIENT
Start: 2019-05-15 | End: 2019-05-15 | Stop reason: HOSPADM

## 2019-05-15 RX ORDER — KETOROLAC TROMETHAMINE 30 MG/ML
15 INJECTION, SOLUTION INTRAMUSCULAR; INTRAVENOUS ONCE
Status: COMPLETED | OUTPATIENT
Start: 2019-05-15 | End: 2019-05-15

## 2019-05-15 RX ORDER — DIMENHYDRINATE 50 MG/ML
25 INJECTION, SOLUTION INTRAMUSCULAR; INTRAVENOUS
Status: DISCONTINUED | OUTPATIENT
Start: 2019-05-15 | End: 2019-05-15 | Stop reason: HOSPADM

## 2019-05-15 RX ORDER — HEPARIN SODIUM 5000 [USP'U]/.5ML
5000 INJECTION, SOLUTION INTRAVENOUS; SUBCUTANEOUS
Status: COMPLETED | OUTPATIENT
Start: 2019-05-15 | End: 2019-05-15

## 2019-05-15 RX ORDER — ONDANSETRON 2 MG/ML
4 INJECTION INTRAMUSCULAR; INTRAVENOUS EVERY 30 MIN PRN
Status: DISCONTINUED | OUTPATIENT
Start: 2019-05-15 | End: 2019-05-15 | Stop reason: HOSPADM

## 2019-05-15 RX ORDER — HYDROMORPHONE HYDROCHLORIDE 1 MG/ML
.3-.5 INJECTION, SOLUTION INTRAMUSCULAR; INTRAVENOUS; SUBCUTANEOUS EVERY 10 MIN PRN
Status: DISCONTINUED | OUTPATIENT
Start: 2019-05-15 | End: 2019-05-15 | Stop reason: HOSPADM

## 2019-05-15 RX ORDER — PROPOFOL 10 MG/ML
INJECTION, EMULSION INTRAVENOUS CONTINUOUS PRN
Status: DISCONTINUED | OUTPATIENT
Start: 2019-05-15 | End: 2019-05-15

## 2019-05-15 RX ORDER — DEXAMETHASONE SODIUM PHOSPHATE 10 MG/ML
INJECTION, SOLUTION INTRAMUSCULAR; INTRAVENOUS PRN
Status: DISCONTINUED | OUTPATIENT
Start: 2019-05-15 | End: 2019-05-15

## 2019-05-15 RX ADMIN — HYDROMORPHONE HYDROCHLORIDE 0.5 MG: 1 INJECTION, SOLUTION INTRAMUSCULAR; INTRAVENOUS; SUBCUTANEOUS at 10:37

## 2019-05-15 RX ADMIN — FENTANYL CITRATE 50 MCG: 50 INJECTION, SOLUTION INTRAMUSCULAR; INTRAVENOUS at 10:01

## 2019-05-15 RX ADMIN — DEXAMETHASONE SODIUM PHOSPHATE 10 MG: 10 INJECTION, SOLUTION INTRAMUSCULAR; INTRAVENOUS at 10:21

## 2019-05-15 RX ADMIN — SODIUM CHLORIDE, POTASSIUM CHLORIDE, SODIUM LACTATE AND CALCIUM CHLORIDE: 600; 310; 30; 20 INJECTION, SOLUTION INTRAVENOUS at 09:37

## 2019-05-15 RX ADMIN — FENTANYL CITRATE 25 MCG: 50 INJECTION INTRAMUSCULAR; INTRAVENOUS at 12:31

## 2019-05-15 RX ADMIN — IPRATROPIUM BROMIDE AND ALBUTEROL SULFATE 3 ML: .5; 3 SOLUTION RESPIRATORY (INHALATION) at 09:38

## 2019-05-15 RX ADMIN — PROPOFOL 200 MCG/KG/MIN: 10 INJECTION, EMULSION INTRAVENOUS at 10:11

## 2019-05-15 RX ADMIN — FENTANYL CITRATE 50 MCG: 50 INJECTION, SOLUTION INTRAMUSCULAR; INTRAVENOUS at 10:06

## 2019-05-15 RX ADMIN — CEFAZOLIN SODIUM 2 G: 2 INJECTION, SOLUTION INTRAVENOUS at 10:02

## 2019-05-15 RX ADMIN — TECHNETIUM TC 99M SULFUR COLLOID 1.1 MILLICURIE: KIT at 08:20

## 2019-05-15 RX ADMIN — FENTANYL CITRATE 50 MCG: 50 INJECTION, SOLUTION INTRAMUSCULAR; INTRAVENOUS at 11:01

## 2019-05-15 RX ADMIN — MIDAZOLAM 1 MG: 1 INJECTION INTRAMUSCULAR; INTRAVENOUS at 10:00

## 2019-05-15 RX ADMIN — ONDANSETRON 4 MG: 2 INJECTION INTRAMUSCULAR; INTRAVENOUS at 10:21

## 2019-05-15 RX ADMIN — CEFAZOLIN 1 G: 1 INJECTION, POWDER, FOR SOLUTION INTRAMUSCULAR; INTRAVENOUS at 12:00

## 2019-05-15 RX ADMIN — MIDAZOLAM 1 MG: 1 INJECTION INTRAMUSCULAR; INTRAVENOUS at 09:57

## 2019-05-15 RX ADMIN — LIDOCAINE HYDROCHLORIDE 0.5 ML: 10 INJECTION, SOLUTION INFILTRATION; PERINEURAL at 09:03

## 2019-05-15 RX ADMIN — SCOPALAMINE 1 PATCH: 1 PATCH, EXTENDED RELEASE TRANSDERMAL at 08:34

## 2019-05-15 RX ADMIN — DIMENHYDRINATE 25 MG: 50 INJECTION, SOLUTION INTRAMUSCULAR; INTRAVENOUS at 10:55

## 2019-05-15 RX ADMIN — SODIUM CHLORIDE, POTASSIUM CHLORIDE, SODIUM LACTATE AND CALCIUM CHLORIDE: 600; 310; 30; 20 INJECTION, SOLUTION INTRAVENOUS at 11:56

## 2019-05-15 RX ADMIN — FENTANYL CITRATE 25 MCG: 50 INJECTION INTRAMUSCULAR; INTRAVENOUS at 12:47

## 2019-05-15 RX ADMIN — HEPARIN SODIUM 5000 UNITS: 5000 INJECTION, SOLUTION INTRAVENOUS; SUBCUTANEOUS at 09:46

## 2019-05-15 RX ADMIN — PROPOFOL 200 MG: 10 INJECTION, EMULSION INTRAVENOUS at 10:06

## 2019-05-15 RX ADMIN — LIDOCAINE HYDROCHLORIDE 100 MG: 20 INJECTION, SOLUTION INFILTRATION; PERINEURAL at 10:06

## 2019-05-15 RX ADMIN — KETOROLAC TROMETHAMINE 15 MG: 30 INJECTION INTRAMUSCULAR; INTRAVENOUS at 09:42

## 2019-05-15 RX ADMIN — HYDROMORPHONE HYDROCHLORIDE 0.5 MG: 1 INJECTION, SOLUTION INTRAMUSCULAR; INTRAVENOUS; SUBCUTANEOUS at 12:32

## 2019-05-15 RX ADMIN — HYDROCODONE BITARTRATE AND ACETAMINOPHEN 1 TABLET: 5; 325 TABLET ORAL at 13:16

## 2019-05-15 RX ADMIN — ROCURONIUM BROMIDE 50 MG: 10 INJECTION INTRAVENOUS at 10:06

## 2019-05-15 ASSESSMENT — LIFESTYLE VARIABLES: TOBACCO_USE: 1

## 2019-05-15 ASSESSMENT — COPD QUESTIONNAIRES
CAT_SEVERITY: MILD
COPD: 1

## 2019-05-15 ASSESSMENT — ENCOUNTER SYMPTOMS: DYSRHYTHMIAS: 1

## 2019-05-15 NOTE — PROGRESS NOTES
S: Pt having difficulty maintaining oxygen sats >90% on RA while asleep. Desats to 84% on RA while asleep. Increases to 95-97% on RA while awake with deep breaths.    B: Pt states she wears 2 liters oxygen at night at home and periodically during the day if needed. Pt states she has an oximeter at home and uses it frequently. Pt has history of emphysema. 93% on RA on admission.  A: Writer advised pt to apply and wear oxygen once she is home this afternoon and through the day and night until tomorrow and then to frequently check O2 sats. Pt and pt's  understand and agree with plan.  R: Pt discharged to home.

## 2019-05-15 NOTE — INTERVAL H&P NOTE
The History and Physical has been reviewed, the patient has been examined and no changes have occurred in the patient's condition since the H & P was completed.       [facility], DO

## 2019-05-15 NOTE — PROGRESS NOTES
SBAR Wire Localization     SITUATION:  Patient to breast imaging center for imaging guided wire localizations before breast lumpectomy or excision biopsy with sentinel node injection.    BACKGROUND:  Breast imaging cancer, breast abnormality  Ordered procedure completed: Yes  Special needs identified: n/a     ASSESSMENT:  SBAR report called to patient care unit because of unexpected event in radiology: No  Allergies and medication list reviewed prior to procedure. Yes  Skin cleansed with ChloraPrep One-Step.  Anesthesia: approximately 0.5 ml of 1% Lidocaine injection subcutaneous before wire insertion administered by the radiologist.   Gauze dressing over insertion site(s).  Post procedure mammogram completed: Yes    Patient tolerance:yes    RECOMMENDATIONS:  Patient transferred to Same Day Surgery in stable condition via wheelchair with Breast Imaging Staff.    Please call [facility] [phone] if there are any questions.

## 2019-05-15 NOTE — ANESTHESIA POSTPROCEDURE EVALUATION
Patient: Latanya Padron    Procedure(s):  left wire localized partial mastectomy  left sentinel node biopsy    Diagnosis:left breast invasive lobular carcinoma  Diagnosis Additional Information: No value filed.    Anesthesia Type:  General, ETT    Note:  Anesthesia Post Evaluation    Patient location during evaluation: Phase 2  Patient participation: Able to fully participate in evaluation  Level of consciousness: awake and alert  Pain management: adequate  Airway patency: patent  Cardiovascular status: acceptable and stable  Respiratory status: acceptable and room air  Hydration status: acceptable  PONV: none     Anesthetic complications: None    Comments:  Patient was happy with the anesthesia care received and no anesthesia related complications were noted.  Pt denied nausea and verbalized pain was under control.  I will follow up with the patient again if it is needed.        Last vitals:  Vitals:    05/15/19 1300 05/15/19 1305 05/15/19 1315   BP: 161/76 155/78 (!) 148/92   Pulse:  85 88   Resp: 24     Temp: 95.4  F (35.2  C)     SpO2: 95% (!) 89% 97%         Electronically Signed By: SOSA Reno CRNA  May 15, 2019  1:47 PM

## 2019-05-15 NOTE — DISCHARGE INSTRUCTIONS
[facility] Discharge Instructions     What are my post-op activity restrictions?  Do not drive or operate power equipment or engage in activities that require coordination or the ability to respond quickly for 24 hours.    Walking: You may walk without help     Lifting: Limit lifting to 10 pounds   Straining: Avoid activities that cause you to strain.    Stairs: You may climb stairs.    Strenuous activities: Strenuous, repetitive activities are to be avoided with the affected arm.     Bathing: You may shower in 24-48 hours.    Weight bearing: Full weight bearing on the affected side (use a gallon of milk as your restriction for the first 2 weeks).    Elevate: Elevate arm on the surgery side when at rest    Driving: If you drove prior to surgery, you may resume driving when you are not taking pain medication and able to move the affected arm freely. Do not drive while taking pain medications.    Special Exercises: Light stretching of the affected arm is fine.  What care does my wound require?  Remove your ace wrap or surgical bra in 24 hours. You may get your incision wet in 48 hours (but no tubs, hot tubs, pools, lakes). You may replace your dressing if needed.  Leave the steri-strips alone, you may take them off after 2 weeks (if applicable). Wear bra at all times,when not in the shower, if you have had a mastectomy.  What can I eat?  You can resume eating your previous diet.  What do I need to know concerning my pain medication?  Avoid alcohol while taking pain medications. Pain medications may cause constipation. You may increase fluids. You may increase fiber intake. You may take an over the counter laxative or stool softener per package instructions if needed.   Use the following medications (in addition to your normal meds) as shown:  a. Percocet or Norco 5 mg 1-2 every 6 hours as needed for pain. This contains 500 mg of Tylenol (acetaminophen) per tablet.  Please do not take more than  4 grams of Tylenol (acetaminophen) per day. For example, you may take 1 Percocet and 1 Tylenol, or 2 Percocet and no Tylenol, or 2 Tylenol and no Percocet every 6 hours.  b. Tylenol (acetaminophen) 500 mg every 6 hours as needed for pain. Do not take more than 1000 mg every 6 hours (see above).  c. Motrin (ibuprofen) 200-800 mg every 6 hours as needed for pain. Take with food.  Special Instructions:   -Empty drains 2-3 times/day and record amount, if applicable.   Support drains at all times.   Remove pain ball when empty, if applicable. 10 deep breaths every hour while awake.  You may experience some of the following which are normal:    It is normal to have pain after surgery. Take medications as ordered to reduce the pain to a tolerable level.    Pain medications may make you sleepy.    Incision- you may expect a minimal amount of blood or drainage.    Bruising at the operative site     If you have had general anesthesia, you may have a sore throat for the first 24 hours due to the airway placed in your windpipe. You may also experience dizziness, drowsiness, or lightheadedness after anesthesia or sedation.     You may feel tired, rest as needed  Report the following signs and symptoms of complications to your surgeon:    Fever above 101 degrees not responding to Tylenol and lasting 4 hours.    Persistent nausea and vomiting.    Excessive pain not controlled with prescribed medication.    Difficulty breathing or unusual shortness of breath.     Unexpected amount of blood/drainage,     New numbness or tingling.    Difficulty urinating.    Call [facility] Aleman, DO or the physician on-call by contacting the office to report concerns or for after hours contact information. If the doctor is not available, please go to your local emergency room.    [facility]   Adult Discharge Orders & Instructions     For 24 hours after surgery    1. Get plenty of rest.  A responsible adult must stay with you for  at least 24 hours after you leave the hospital.   2. Do not drive or use heavy equipment.  If you have weakness or tingling, don't drive or use heavy equipment until this feeling goes away.  3. Do not drink alcohol.  4. Avoid strenuous or risky activities.  Ask for help when climbing stairs.   5. You may feel lightheaded.  If so, sit for a few minutes before standing.  Have someone help you get up.   6. You may have a slight fever. Call the doctor if your fever is over 100 F (37.7 C) (taken under the tongue) or lasts longer than 24 hours.  7. You may have a dry mouth, a sore throat, muscle aches or trouble sleeping.  These should go away after 24 hours.  8. Do not make important or legal decisions.  We don t expect you to have any problems from the surgery or treatment you had today. Just in case, here s what to do if you have pain, upset stomach (nausea), bleeding or infection:  Pain:  Take medicines your doctor has prescribed or over-the-counter medicine they have suggested. Resting and using ice packs can help, too. For surgery on an arm or leg, raise it on a pillow to ease swelling. Call your doctor if these methods don t work.  Copyright [employer], Licensed under [employer]  Upset stomach (nausea):  Take anti-nausea medicine approved by your doctor. Drink clear liquids like apple juice, ginger ale, broth or 7-Up. Be sure to drink enough fluids. Rest can help, too. Move to normal foods when you re ready. Bleeding:  In the first 24 hours, you may see a little blood on your dressing, about the size of a quarter. You don t need to worry about this much blood, but if the blood spot keeps getting bigger:    Put pressure on the wound if you can, AND    Call your doctor.  Copyright [employer], Licensed under [employer]  Fever/Infection: Please call your doctor if you have any of these signs:    Redness    Swelling    Wound feels warm    Pain gets worse    Bad-smelling fluid leaks from  wound    Fever or chills  Call your doctor for any of the followin.  It has been over 8 to 10 hours since surgery and you are still not able to urinate (pass water).    2.  Headache for over 24 hours.    Nurse advice line: [phone]

## 2019-05-15 NOTE — ANESTHESIA PREPROCEDURE EVALUATION
Anesthesia Pre-Procedure Evaluation    Patient: Latanya Padron   MRN: [id number] : 1950          Preoperative Diagnosis: left breast invasive lobular carcinoma    Procedure(s):  left wire localized partial mastectomy  left sentinel node biopsy  possible left axillary dissection    Past Medical History:   Diagnosis Date     Mixed hyperlipidemia      PONV (postoperative nausea and vomiting)      Past Surgical History:   Procedure Laterality Date     C APPENDECTOMY,W OTHR PROC       C  DELIVERY ONLY           C LIGATE FALLOPIAN TUBE       C NONSPECIFIC PROCEDURE      Exploratory laparotomy with resection of infarcted segment of omentum and minor lysis of adhesions     C NONSPECIFIC PROCEDURE      Removal of hemorrhagic corpus luteum cyst     C VAGINAL HYSTERECTOMY       COLONOSCOPY  06/21/10      BIOPSY OF BREAST, OPEN INCISIONAL        CORRECT BUNION,METATARSAL OSTEOTOMY        LAPAROSCOPY, SURGICAL; CHOLECYSTECTOMY  08/04/10      SLING OPERATION FOR STRESS INCONTINENCE  2007     HERNIORRHAPHY INCISIONAL (LOCATION)  2011    Procedure:HERNIORRHAPHY INCISIONAL (LOCATION); Surgeon:AYALA HOOVER; Location:[facility] OR     LAPAROSCOPIC HERNIORRHAPHY INCISIONAL  2011    Procedure:LAPAROSCOPIC HERNIORRHAPHY INCISIONAL; Laparoscopic mesh repair of incarcerated incisional hernia , extensive lysis of adhesions, excision of hernia sac and closure of fascia.; Surgeon:AYALA HOOVER; Location:[facility] OR     LAPAROSCOPIC LYSIS ADHESIONS  2011    Procedure:LAPAROSCOPIC LYSIS ADHESIONS; Surgeon:AYALA HOOVER; Location:[facility] OR       Anesthesia Evaluation     . Pt has had prior anesthetic. Type: General and MAC    History of anesthetic complications   - PONV        ROS/MED HX    ENT/Pulmonary:     (+)KATE risk factors hypertension, obese, tobacco use, Past use 30 pack year history packs/day  mild COPD, , . .    Neurologic:  - neg neurologic ROS      Cardiovascular: Comment: Paroxysmal a fib    (+) hypertension----. : . . . :. dysrhythmias a-fib, . Previous cardiac testing Echodate:6/18/14results:The left ventricle is normal in size. The visual ejection fraction is   estimated at 60-65%. No regional wall motion abnormalities noted. The right   ventricle is borderline dilated. The right ventricular systolic function is normal. Right ventricle systolic pressure estimate normal Likely no significant valvular heart disease.date: results:ECG reviewed date:4/29/19 results:SR, 75bpm date: results:          METS/Exercise Tolerance:  4 - Raking leaves, gardening   Hematologic:  - neg hematologic  ROS       Musculoskeletal:  - neg musculoskeletal ROS (+) arthritis,  -       GI/Hepatic:         Renal/Genitourinary: Comment: CKD in diagnoses on H&P but eGFR 65 on 4/29/19    (+) chronic renal disease, type: CRI, Pt does not require dialysis, Pt has no history of transplant,       Endo:     (+) Obesity, .      Psychiatric:     (+) psychiatric history depression      Infectious Disease:  - neg infectious disease ROS       Malignancy:      - no malignancy   Other:    (+) No chance of pregnancy C-spine cleared: N/A, no H/O Chronic Pain,no other significant disability   - neg other ROS                      Physical Exam  Normal systems: cardiovascular and pulmonary    Airway   Mallampati: III  TM distance: >3 FB  Neck ROM: full    Dental   (+) implants  Comment: On upper teeth    Cardiovascular   Rhythm and rate: regular and normal      Pulmonary    breath sounds clear to auscultation            Lab Results   Component Value Date    WBC 9.1 04/29/2019    HGB 12.5 04/29/2019    HCT 40.0 04/29/2019     04/29/2019    SED 14 06/20/2018     04/29/2019    POTASSIUM 3.4 04/29/2019    CHLORIDE 102 04/29/2019    CO2 33 (H) 04/29/2019    BUN 15 04/29/2019    CR 0.91 04/29/2019    GLC 96 04/29/2019    IVANNA 9.1 04/29/2019    ALBUMIN 3.7 06/20/2018    PROTTOTAL 7.2 06/20/2018     "ALT 23 06/20/2018    AST 17 06/20/2018    ALKPHOS 64 06/20/2018    BILITOTAL 0.5 06/20/2018    LIPASE 170 05/24/2010    AMYLASE 75 05/24/2010    INR 1.2 (A) 05/13/2019    TSH 1.90 06/20/2018       Preop Vitals  BP Readings from Last 3 Encounters:   05/01/19 110/68   04/29/19 116/72   04/19/19 128/82    Pulse Readings from Last 3 Encounters:   05/01/19 94   04/29/19 102   12/31/18 100      Resp Readings from Last 3 Encounters:   05/01/19 18   04/29/19 18   12/31/18 18    SpO2 Readings from Last 3 Encounters:   05/01/19 92%   04/29/19 96%   12/31/18 91%      Temp Readings from Last 1 Encounters:   05/01/19 97.5  F (36.4  C) (Temporal)    Ht Readings from Last 1 Encounters:   05/01/19 1.549 m (5' 1\")      Wt Readings from Last 1 Encounters:   05/01/19 101.5 kg (223 lb 12.8 oz)    Estimated body mass index is 42.29 kg/m  as calculated from the following:    Height as of 5/1/19: 1.549 m (5' 1\").    Weight as of 5/1/19: 101.5 kg (223 lb 12.8 oz).       Anesthesia Plan      History & Physical Review  History and physical reviewed and following examination; no interval change.    ASA Status:  2 .    NPO Status:  > 8 hours    Plan for General and ETT with Propofol and Intravenous induction. Maintenance will be TIVA.    PONV prophylaxis:  Ondansetron (or other 5HT-3), Dexamethasone or Solumedrol and Scopolamine patch  Additional equipment: Videolaryngoscope      Postoperative Care  Postoperative pain management:  IV analgesics.      Consents  Anesthetic plan, risks, benefits and alternatives discussed with:  Patient.  Use of blood products discussed: No .   .                 SOSA Reno CRNA  "

## 2019-05-15 NOTE — ANESTHESIA CARE TRANSFER NOTE
Patient: Latanya Padron    Procedure(s):  left wire localized partial mastectomy  left sentinel node biopsy    Diagnosis: left breast invasive lobular carcinoma  Diagnosis Additional Information: No value filed.    Anesthesia Type:   General, ETT     Note:  Airway :Face Mask  Patient transferred to:PACU  Comments: Amanda 8. Patient denies pain. Handoff Report: Identifed the Patient, Identified the Reponsible Provider, Reviewed the pertinent medical history, Discussed the surgical course, Reviewed Intra-OP anesthesia mangement and issues during anesthesia, Set expectations for post-procedure period and Allowed opportunity for questions and acknowledgement of understanding      Vitals: (Last set prior to Anesthesia Care Transfer)    CRNA VITALS  5/15/2019 1142 - 5/15/2019 1221      5/15/2019             EKG:  NSR                Electronically Signed By: SOSA Reno CRNA  May 15, 2019  12:21 PM

## 2019-05-17 ENCOUNTER — TELEPHONE (OUTPATIENT)
Dept: SURGERY | Facility: CLINIC | Age: 69
End: 2019-05-17

## 2019-05-17 NOTE — OP NOTE
OPERATIVE NOTE  [facility] SURGERY    DATE:  5/17/2019    PREOPERATIVE DIAGNOSES:     1. Left invasive lobular carcinoma  2. Family history of breast cancer  3. Atrial fibrillation (coumadin)  4. Morbid obesity      POSTOPERATIVE DIAGNOSES:     1.  same      PROCEDURE:   1.  Injection of technetium 99.   2.  Injection of methylene blue  3.  Intraoperative lymphoscintigraphy.   4.  Sprakers lymph node biopsy to the left axilla.   5.  left wire localized partial mastectomy.       SURGEON:  Donald Aleman DO       ASSISTANT:  A single scrub tech.       SPECIMENS:   1.  Sprakers lymph node #1, 2, 3  2.  left breast mass labeled with Vector Inking system  3. Additional inferior lateral margin with stitch indication presume area of the residual cancer - labeled with Vector Inking system      ANESTHESIA:  General endotracheal anesthesia.       INDICATIONS:  Latanya Padron is a 69 year old female who presents with biopsy proven left breast ILC, 11:30 oclock; 4cm FNAC US 1.1x0.5x0.5cm ER/ID (+); HER2 (-); neg LCIS, +microcalcifications.  Risks, benefits and alternatives were explained to the patient.  She showed understanding and wished to proceed with the above.         DESCRIPTION OF PROCEDURE:  The patient was properly identified in the preop holding area.  The patient was then taken to the radiology suite.  Interventional radiology utilizing ultrasound guided, a wire was placed to the area of concern on the left breast.  The patient then came back to the preoperative suite where with the help of a nuclear medicine I injected technetium 99 in the preop holding area into the left breast intradermally and this was massaged for several minutes.  Consent was signed and approximately an hour later the patient was then brought back to the operative suite.  The patient was then placed in a supine position.  Anesthesia was induced per anesthesia protocol.  We started our procedure by infiltrating methylene blue into the left  breast intradermally at the nipple areolar complex.  This was massaged for several minutes.  The patient was then prepped and draped in the usual sterile fashion.  A timeout was then done to confirm the correct patient, positioning and procedure.       At this point, we focused onto the left breast, noted the wire.  I reviewed the film prior to starting of the case, utilizing local infiltration we infiltrate the area around the wire.  Utilizing a #15 blade scalpel, a curvilinear linear periareolar incision was then made .  This was deepened down into the breast tissue with the electrocautery Bovie after initial incision utilizing Rashad retractors.  Flaps were then made superiorly and utilizing the wire as a guide, I was able to excise a good size breast tissue circumferentially around the wire to ensure good oncologic margin.  This was done utilizing retractions and also the electrical cautery Bovie.  Once I completely excised this large breast tissue that encompassed the mass and the wire.  In the process of removing the posterior aspect of the mass, the wire got completely pulled out with the anterior retraction.  Thus I removed the wire from the field and excised the breast tissue.  I reoriented and labeled the mass with the Vector Inking system and this was sent for radiology.  Radiology did call back to confirm that the mass does contain the clip; however, he was concern about the inferior lateral margin.  Thus, realizing the electrocautery bur Bovie I excised the inferior lateral margin from the wound bed.  I put in a stitch at the presumed area of the residual cancer.  And I use the vector inking system to kaiser the margins of the specimen.   Both specimen will be sent to pathology for further examination in separate jars of formalin.   We achieve hemostasis utilizing the electrical cautery Bovie.   Please clips to the wound bed one clip at the 12:00, 2 clips at the 3:00, 3 clips at the 6:00, and 4 clips at the  9:00. At this point, we copiously irrigate the area further noted that hemostasis was achieved.    I then closed the deep space with a 2-0 Vicryl in a simple interrupted buried fashion.  This area was then covered with a moist Ray-Caroline.      Utilizing the Neoprobe, we were able to find the area of a hot node in the left axilla. Utilizing local anesthetic this area was infiltrated.  Utilizing a #15 blade scalpel, an incision was then made.  Electrocautery Bovie was then deepened down into the tissue of the axilla.  Utilizing the Neoprobe as a guide, we were able to find the area of the hot node.  The hot node was then grasped with Ary and completely excised utilizing the electrocautery Bovie.  Once excised, it was placed on the Neoprobe and the count was approximately [id number].  This was then sent to pathology in formalin.  Next, utilizing the Neoprobe, we were able to find another area of the palpable node. Therefore, I was able to grasp it with a DeBakey elevated, grasped with a Ary and excised it utilizing the electrocautery Bovie.  This was sent to pathology in formalin labeled as left sentinel lymph node #2.    I was able to find a third node that gave off 247 count on the neoprobe.  This node was excised and also sent to pathology in formalin.  At this point, I was not able to get any additional detection from the Neoprobe.  Therefore, we noted that hemostasis was achieved. The incision was then sewn with a 3-0 Monocryl in a simple interrupted buried fashion and the skin was then run with 4-0 Monocryl in a subcuticular running fashion.  Next, we then focused on the breast incision; noted that hemostasis was achieved.  We then sewed the incision closed with a 3-0 Monocryl in a simple interrupted buried fashion and ran the skin with a 4-0 Monocryl in a running fashion.  The entire breast and axilla was clean and dried and Mastisol were then applied to both incisions.  Fluff dressing was then used on the  breast and a Surgi-Bra was then placed.  The patient tolerated the procedure well.  All counts were correct x2 prior to closing of both incisions.     FINDINGS: 3 sentinel lymph nodes were excised.  The breast mass specimen noted to have clip; wire was removed during the process of excising the specimen.  Fortunately the wire fell out after the localization of this area.  Did take additional inferior lateral margin. We will await final pathology.           [facility], DO

## 2019-05-17 NOTE — OR NURSING
[facility] Same Day Surgery  Discharge Call Back  Latanya Padron  1950  MRN: [id number]  Home: [phone] (home)   PCP: Glen Blue    We are calling to see how you're doing since your surgery/procedure with us?   Comments: good  Clinical Questions  1. Have you had time to look at your discharge instructions? Do you have any questions in regards to the instructions?   Comment: had called in to ask about deodorant on surgical side and question answered  2. Do you feel your pain is being controlled with the regimen the surgeon sent you home on? (ie: prescription medications, over the counter pain medications, ice packs)   Comments: ye  3. Have you noticed any drainage on your dressing? Do you know what to do if you have bleeding as a result of your procedure?   Comments: no/yes  4. Have you had any nausea/vomiting? Do you know how to treat this?   Comment: none/yes  5. Have you had any signs/symptoms of infection? (ie: fever, swelling, heat, drainage or redness) Do you know what to do if you have?   Comment: none/yes  6. Do you have a follow up appointment made with your surgeon? Do you have a number to contact them at if you need it?   Comment: yes        You may be randomly selected to fill out a [facility] survey. We would appreciate you taking the time to fill this out. It is important to us if you would answer all of the questions on the survey.

## 2019-05-17 NOTE — TELEPHONE ENCOUNTER
Per Dr. Aleman, can wear deoderant just keep it away from the incision. Informed patient.     Debbie HERNÁNDEZ RN. . .  5/17/2019, 8:35 AM

## 2019-05-17 NOTE — TELEPHONE ENCOUNTER
Reason for Call:  Other appointment    Detailed comments: Patient calling wondering if she is able to wear deodorant on the side that she had surgery on? Please call her.     Phone Number Patient can be reached at: Home number on file  [phone]    Best Time: any    Can we leave a detailed message on this number? YES    Call taken on 5/17/2019 at 7:55 AM by Suri Douglass

## 2019-05-22 ENCOUNTER — ANTICOAGULATION THERAPY VISIT (OUTPATIENT)
Dept: ANTICOAGULATION | Facility: CLINIC | Age: 69
End: 2019-05-22
Payer: COMMERCIAL

## 2019-05-22 DIAGNOSIS — I48.91 ATRIAL FIBRILLATION, UNSPECIFIED TYPE (H): ICD-10-CM

## 2019-05-22 DIAGNOSIS — I48.0 AF (PAROXYSMAL ATRIAL FIBRILLATION) (H): ICD-10-CM

## 2019-05-22 LAB — INR POINT OF CARE: 2.1 (ref 0.9–1.1)

## 2019-05-22 PROCEDURE — 36416 COLLJ CAPILLARY BLOOD SPEC: CPT

## 2019-05-22 PROCEDURE — 99207 ZZC NO CHARGE NURSE ONLY: CPT

## 2019-05-22 PROCEDURE — 85610 PROTHROMBIN TIME: CPT | Mod: QW

## 2019-05-22 NOTE — PROGRESS NOTES
ANTICOAGULATION FOLLOW-UP CLINIC VISIT    Patient Name:  Latanya Padron  Date:  2019  Contact Type:  Face to Face    SUBJECTIVE:  Patient Findings     Comments:   The patient was assessed for diet, medication, and activity level changes, missed or extra doses, bruising or bleeding, with no problem findings.  Francine Andrew RN          Clinical Outcomes     Negatives:   Major bleeding event, Thromboembolic event, Anticoagulation-related hospital admission, Anticoagulation-related ED visit, Anticoagulation-related fatality    Comments:   The patient was assessed for diet, medication, and activity level changes, missed or extra doses, bruising or bleeding, with no problem findings.  Francine Andrew RN             OBJECTIVE    INR Protime   Date Value Ref Range Status   2019 2.1 (A) 0.9 - 1.1 Final       ASSESSMENT / PLAN  INR assessment THER    Recheck INR In: 4 WEEKS    INR Location Clinic      Anticoagulation Summary  As of 2019    INR goal:   2.0-3.0   TTR:   75.3 % (3.2 y)   INR used for dosin.1 (2019)   Warfarin maintenance plan:   5 mg (5 mg x 1) every Mon; 2.5 mg (5 mg x 0.5) all other days   Full warfarin instructions:   5 mg every Mon; 2.5 mg all other days   Weekly warfarin total:   20 mg   No change documented:   Francine Andrew RN   Plan last modified:   Francine Andrew RN (2017)   Next INR check:   2019   Target end date:       Indications    AF (paroxysmal atrial fibrillation) (H) [I48.0]  Atrial fibrillation (H) [I48.91]             Anticoagulation Episode Summary     INR check location:       Preferred lab:       Send INR reminders to:   BENIGNO ALVAREZ    Comments:   5 mg tabs, likes card, PM dose      Anticoagulation Care Providers     Provider Role Specialty Phone number    Glen Blue MD [facility] [phone]            See the Encounter Report to view Anticoagulation Flowsheet and Dosing Calendar (Go to Encounters tab in chart  review, and find the Anticoagulation Therapy Visit)    Dosage adjustment made based on physician directed care plan.      Francine Andrew RN

## 2019-05-23 ENCOUNTER — TELEPHONE (OUTPATIENT)
Dept: SURGERY | Facility: CLINIC | Age: 69
End: 2019-05-23

## 2019-05-23 LAB — COPATH REPORT: NORMAL

## 2019-05-23 NOTE — TELEPHONE ENCOUNTER
Dr. Aleman called writing nurse.  Results in and she called patient.  Patient will see Dr. Aleman on Tuesday.  She would like an Oncotype ordered.  Will chart when complete.  Will call patient to reschedule Oncology for after Oncotype in done.    Jorge Ocampo RN, BSN, OCN  5/23/2019, 5:06 PM       back pain/injury

## 2019-05-23 NOTE — TELEPHONE ENCOUNTER
Reason for Call:  Request for results:    Name of test or procedure: pathology results     Date of test of procedure: 5/15    Location of the test or procedure: [facility] to leave the result message on voice mail or with a family member? YES    Phone number Patient can be reached at:  Home number on file [phone] (home)    Additional comments: please call with results    Call taken on 5/23/2019 at 12:04 PM by Linh Mcbride

## 2019-05-23 NOTE — TELEPHONE ENCOUNTER
These are still in process, will check again tomorrow.     Debbie HERNÁNDEZ RN. . .  5/23/2019, 3:04 PM

## 2019-05-28 ENCOUNTER — PATIENT OUTREACH (OUTPATIENT)
Dept: ONCOLOGY | Facility: CLINIC | Age: 69
End: 2019-05-28

## 2019-05-28 ENCOUNTER — OFFICE VISIT (OUTPATIENT)
Dept: SURGERY | Facility: CLINIC | Age: 69
End: 2019-05-28
Payer: COMMERCIAL

## 2019-05-28 VITALS
DIASTOLIC BLOOD PRESSURE: 62 MMHG | TEMPERATURE: 97.1 F | HEIGHT: 61 IN | SYSTOLIC BLOOD PRESSURE: 112 MMHG | WEIGHT: 222 LBS | BODY MASS INDEX: 41.91 KG/M2

## 2019-05-28 DIAGNOSIS — N18.30 CKD (CHRONIC KIDNEY DISEASE) STAGE 3, GFR 30-59 ML/MIN (H): ICD-10-CM

## 2019-05-28 DIAGNOSIS — Z80.3 FAMILY HISTORY OF MALIGNANT NEOPLASM OF BREAST: ICD-10-CM

## 2019-05-28 DIAGNOSIS — C50.912 INVASIVE LOBULAR CARCINOMA OF LEFT BREAST, STAGE 2 (H): Primary | ICD-10-CM

## 2019-05-28 DIAGNOSIS — E66.01 MORBID OBESITY, UNSPECIFIED OBESITY TYPE (H): ICD-10-CM

## 2019-05-28 DIAGNOSIS — I48.91 ATRIAL FIBRILLATION, UNSPECIFIED TYPE (H): ICD-10-CM

## 2019-05-28 DIAGNOSIS — I10 HYPERTENSION, GOAL BELOW 140/90: ICD-10-CM

## 2019-05-28 PROCEDURE — 99024 POSTOP FOLLOW-UP VISIT: CPT | Performed by: SURGERY

## 2019-05-28 ASSESSMENT — MIFFLIN-ST. JEOR: SCORE: 1474.37

## 2019-05-28 NOTE — PROGRESS NOTES
Oncotype ordered per Dr. Alemna.  Email confirmation received, 5/28/19 at 4:48.    Jorge Ocampo, RN, BSN, OCN  5/28/2019, 4:50 PM

## 2019-05-28 NOTE — PROGRESS NOTES
[facility] FOLLOW-UP NOTE  GENERAL SURGERY    PCP: Glen Blue         Assessment and Plan:   Assessment:   Latanya Padron is a 69 year old female who presented post operatively from left wire localized partial mastectomy with SLNB 5/15/2019 and is doing very well.       ICD-10-CM    1. Invasive lobular carcinoma of left breast, stage 2 (H) C50.912    2. Morbid obesity, unspecified obesity type (H) E66.01    3. Atrial fibrillation, unspecified type (H) I48.91    4. Hypertension, goal below 140/90 I10    5. CKD (chronic kidney disease) stage 3, GFR 30-59 ml/min (H) N18.3    6. Family history of malignant neoplasm of breast Z80.3        I reviewed the pathology report today with the patient and answered all questions.  SPECIMEN(S):   A: Left breast tissue   B: Left sentinel lymph node #1   C: Left lymph node #2   D: Left lymph node #3   E: Additional left breast tissue     FINAL DIAGNOSIS:   A.  Specimen, Procedure, Side:  Breast, left, lumpectomy with localization    wire and with designated margins.     Tumor Site:  11:30 position, 4 cm from nipple     Specimen Integrity:  Intact     Specimen size:  See gross description     Histologic Type:  Invasive lobular carcinoma     Size of invasive ductal carcinoma:  30 mm in greatest dimension.  See   comment     [place] grade:  2 (out of 3)     Rosie score: 6 (out of 9)  (tubules-3; nuclear pleomorphism-2;   mitoses-1)     Angiolymphatic invasion:  Not identified     Tumor focality:  Unifocal     Extent of tumor: No nipple/areola identified     Associated in situ carcinoma:  No ductal carcinoma in situ identified.     Atypical lobular hyperplasia/lobular   carcinoma in situ present.     Size of atypical lobular hyperplasia/lobular carcinoma in situ:  Present   in 11 of 18 blocks of tissue examined     Margins:   - Invasive lobular carcinoma:  Uninvolved; 2 mm from nearest anterior   margin (slide A12), with evaluation   limited by disruption of the  margin, 5 mm from the nearest posterior   margin (slide A12), 6 mm from the nearest   lateral margin (slide A11), 2 mm from the nearest inferior margin (slide   A7), greater than 10 mm from the   nearest superior margin and greater than 10 mm from the nearest medial   margin.     Ductal carcinoma in situ:  Not identified.  Atypical lobular   hyperplasia/lobular carcinoma in situ 0.1 mm from   nearest margin, the posterior margin.     Lymph node status:  Three benign left axillary sentinel lymph nodes, each   negative for metastatic tumor,   specimens [id number], C and D.     Pathologic staging (pTNM):  pT2 (criterion: tumor >20 mm but d50 mm in   greatest dimension) pNO  pM - not   applicable     Estrogen and progesterone receptors:  Previously reported on left breast   biopsy, [id number] as estrogen receptors   90% of invasive tumor nuclei and progesterone receptors 50-70% of invasive    tumor nuclei.     HER2 FISH:  Previously reported on left breast biopsy, [id number], as Group    5 (VIOLA negative).     Additional findings:   - Evidence of previous biopsy site with metallic coiled biopsy marker.   - Fibrocystic changes.   - Focal columnar cell change.   - Focal moderate to florid usual ductal hyperplasia.   - Medial calcification of parenchymal artery.   - Focal intraductal microcalcifications in benign breast ducts and   atyupical lobular hyperplasia/lobular   carcinoma in situ.     B.  [place] lymph node #1, left axilla, excision:   - Benign lymph node, negative for metastatic tumor.     C.   [place] lymph node #2, left axilla, excision:   - Benign lymph node, negative for metastatic tumor.     D.  [place] lymph node #2, left axilla, excision:   - Benign lymph node, negative for metastatic tumor.     E.  Additional lateral margin, left breast, excision:   - Three adjacent foci of grade 2 invasive lobular carcinoma (slides E23   and E24, at the medial end of the   specimen):      - A 3 mm focus, 0.5 mm from  anterior margin, 1 mm from medial margin      - A 3 mm focus, 0.5 mm from junction of medial and inferior margins      - A 1 mm focus, 1 mm from junction of anterior and superior margin   - Focal columnar cell change and moderate to florid usual ductal   epithelial hyperplasia.   - Occasional focus of atypical lobular hyperplasia/lobular carcinoma in   situ, without extension to margins.   - Fibrocystic changes.   - Focal duct ectasia.   - Focal intraductal microcalcifications.     COMMENT:   The invasive lobular carcinoma is present in seven consecutive slices of   breast tissue, spanning approximately   30 mm in greatest dimension (breast slices 7 through 13, blocks A3, A4,   A6, A8, A9, A10, A11, A12 and A13).     Electronically signed out by:     Jose Eduardo Garcia M.D.     Plan:  Incisions clean dry and intact.  Small seroma noted.  There is blue marking from the methylene blue.  Explained to the patient the final pathology as above.  Her margins are clear.  No additional surgical intervention is needed at this time.  She will be seeing medical oncology next.  I ready notify the medical oncology team.  We will also submit an Oncotype DX for the patient as her lymph nodes are negative for metastatic disease and final pathology is hormonal positive breast cancer.  I explained what Oncotype DX is all about.  At her medical oncology appointment, hopefully the Oncotype will be done and the oncologist can further explain what the next step is.  She will also need radiation oncology for adjuvant radiation.  All of her questions were answered to her satisfaction.  I will see her in 6 months.           Subjective:     Latanya Padron is a 69 year old female who presents post operatively from left wire localized partial mastectomy with SLNB 5/15/2019. Pain has been controled. Currently is not using pain medications. Eating a Regular and having regular bladder/bowel function. Overall doing very well.           Objective:  "        /62   Temp 97.1  F (36.2  C) (Temporal)   Ht 1.549 m (5' 1\")   Wt 100.7 kg (222 lb)   BMI 41.95 kg/m     Constitutional: Awake, alert, in no acute distress.  Respiratory: Non-labored.   Cardiovascular: Regular rate and rhythm.   Left breast: . Incision is Healing well.  Small seroma; noted methylene blue staining on skin - no signs of necrosis.      [facility]              "

## 2019-05-28 NOTE — LETTER
5/28/2019         RE: Latanya Padron  [address]  [place] [address]        Dear Colleague,    Thank you for referring your patient, Latanya Padron, to the [facility]. Please see a copy of my visit note below.    [facility] FOLLOW-UP NOTE  GENERAL SURGERY    PCP: Glen Blue         Assessment and Plan:   Assessment:   Latanya Padron is a 69 year old female who presented post operatively from left wire localized partial mastectomy with SLNB 5/15/2019 and is doing very well.       ICD-10-CM    1. Invasive lobular carcinoma of left breast, stage 2 (H) C50.912    2. Morbid obesity, unspecified obesity type (H) E66.01    3. Atrial fibrillation, unspecified type (H) I48.91    4. Hypertension, goal below 140/90 I10    5. CKD (chronic kidney disease) stage 3, GFR 30-59 ml/min (H) N18.3    6. Family history of malignant neoplasm of breast Z80.3        I reviewed the pathology report today with the patient and answered all questions.  SPECIMEN(S):   A: Left breast tissue   B: Left sentinel lymph node #1   C: Left lymph node #2   D: Left lymph node #3   E: Additional left breast tissue     FINAL DIAGNOSIS:   A.  Specimen, Procedure, Side:  Breast, left, lumpectomy with localization    wire and with designated margins.     Tumor Site:  11:30 position, 4 cm from nipple     Specimen Integrity:  Intact     Specimen size:  See gross description     Histologic Type:  Invasive lobular carcinoma     Size of invasive ductal carcinoma:  30 mm in greatest dimension.  See   comment     Rosie grade:  2 (out of 3)     Rosie score: 6 (out of 9)  (tubules-3; nuclear pleomorphism-2;   mitoses-1)     Angiolymphatic invasion:  Not identified     Tumor focality:  Unifocal     Extent of tumor: No nipple/areola identified     Associated in situ carcinoma:  No ductal carcinoma in situ identified.     Atypical lobular hyperplasia/lobular   carcinoma in situ present.     Size of atypical lobular  hyperplasia/lobular carcinoma in situ:  Present   in 11 of 18 blocks of tissue examined     Margins:   - Invasive lobular carcinoma:  Uninvolved; 2 mm from nearest anterior   margin (slide A12), with evaluation   limited by disruption of the margin, 5 mm from the nearest posterior   margin (slide A12), 6 mm from the nearest   lateral margin (slide A11), 2 mm from the nearest inferior margin (slide   A7), greater than 10 mm from the   nearest superior margin and greater than 10 mm from the nearest medial   margin.     Ductal carcinoma in situ:  Not identified.  Atypical lobular   hyperplasia/lobular carcinoma in situ 0.1 mm from   nearest margin, the posterior margin.     Lymph node status:  Three benign left axillary sentinel lymph nodes, each   negative for metastatic tumor,   specimens [id number], C and D.     Pathologic staging (pTNM):  pT2 (criterion: tumor >20 mm but d50 mm in   greatest dimension) pNO  pM - not   applicable     Estrogen and progesterone receptors:  Previously reported on left breast   biopsy, [id number] as estrogen receptors   90% of invasive tumor nuclei and progesterone receptors 50-70% of invasive    tumor nuclei.     HER2 FISH:  Previously reported on left breast biopsy, [id number], as Group    5 (VIOLA negative).     Additional findings:   - Evidence of previous biopsy site with metallic coiled biopsy marker.   - Fibrocystic changes.   - Focal columnar cell change.   - Focal moderate to florid usual ductal hyperplasia.   - Medial calcification of parenchymal artery.   - Focal intraductal microcalcifications in benign breast ducts and   atyupical lobular hyperplasia/lobular   carcinoma in situ.     B.  [place] lymph node #1, left axilla, excision:   - Benign lymph node, negative for metastatic tumor.     C.   [place] lymph node #2, left axilla, excision:   - Benign lymph node, negative for metastatic tumor.     D.  [place] lymph node #2, left axilla, excision:   - Benign lymph node, negative  for metastatic tumor.     E.  Additional lateral margin, left breast, excision:   - Three adjacent foci of grade 2 invasive lobular carcinoma (slides E23   and E24, at the medial end of the   specimen):      - A 3 mm focus, 0.5 mm from anterior margin, 1 mm from medial margin      - A 3 mm focus, 0.5 mm from junction of medial and inferior margins      - A 1 mm focus, 1 mm from junction of anterior and superior margin   - Focal columnar cell change and moderate to florid usual ductal   epithelial hyperplasia.   - Occasional focus of atypical lobular hyperplasia/lobular carcinoma in   situ, without extension to margins.   - Fibrocystic changes.   - Focal duct ectasia.   - Focal intraductal microcalcifications.     COMMENT:   The invasive lobular carcinoma is present in seven consecutive slices of   breast tissue, spanning approximately   30 mm in greatest dimension (breast slices 7 through 13, blocks A3, A4,   A6, A8, A9, A10, A11, A12 and A13).     Electronically signed out by:     Jose Eduardo Garcia M.D.     Plan:  Incisions clean dry and intact.  Small seroma noted.  There is blue marking from the methylene blue.  Splane to the patient the final pathology as above.  Her margins are clear.  No additional surgical intervention is needed at this time.  She will be seeing medical oncology next.  I ready notify the medical oncology team.  We will also submit an Oncotype DX for the patient.  I explained what Oncotype DX is all about.  At her medical oncology appointment, hopefully the Oncotype will be done and the oncologist can further explain what the next step is.  She will also need radiation oncology for adjuvant radiation.  All of her questions were answered to her satisfaction.  I will see her in 6 months.           Subjective:     Latanya Padron is a 69 year old female who presents post operatively from left wire localized partial mastectomy with SLNB 5/15/2019. Pain has been controled. Currently is not using pain  "medications. Eating a Regular and having regular bladder/bowel function. Overall doing very well.           Objective:         /62   Temp 97.1  F (36.2  C) (Temporal)   Ht 1.549 m (5' 1\")   Wt 100.7 kg (222 lb)   BMI 41.95 kg/m      Constitutional: Awake, alert, in no acute distress.  Respiratory: Non-labored.   Cardiovascular: Regular rate and rhythm.   Left breast: . Incision is Healing well.  Small seroma; noted methylene blue staining on skin - no signs of necrosis.      [facility] DO [facility]                Again, thank you for allowing me to participate in the care of your patient.        Sincerely,        [facility] Hospital[facility]ane[facility] MD [facility]    "

## 2019-06-03 ENCOUNTER — TELEPHONE (OUTPATIENT)
Dept: FAMILY MEDICINE | Facility: CLINIC | Age: 69
End: 2019-06-03

## 2019-06-03 DIAGNOSIS — Z87.891 HISTORY OF TOBACCO ABUSE: Primary | ICD-10-CM

## 2019-06-03 NOTE — TELEPHONE ENCOUNTER
Reason for Call: Request for an order or referral:    Order or referral being requested: Lung Cancer Screening    Date needed: at your convenience    Has the patient been seen by the PCP for this problem?     Additional comments: Ivette received letter in the mail from [facility] stating she is due    Phone number Patient can be reached at:  Cell number on file:    Telephone Information:   Mobile [phone]       Best Time:  any    Can we leave a detailed message on this number?  YES    Call taken on 6/3/2019 at 11:28 AM by Francine Robledo

## 2019-06-10 ENCOUNTER — ANCILLARY PROCEDURE (OUTPATIENT)
Dept: GENERAL RADIOLOGY | Facility: CLINIC | Age: 69
End: 2019-06-10
Attending: PHYSICIAN ASSISTANT
Payer: COMMERCIAL

## 2019-06-10 ENCOUNTER — OFFICE VISIT (OUTPATIENT)
Dept: ORTHOPEDICS | Facility: CLINIC | Age: 69
End: 2019-06-10
Payer: COMMERCIAL

## 2019-06-10 VITALS
HEIGHT: 61 IN | BODY MASS INDEX: 41.91 KG/M2 | WEIGHT: 222 LBS | DIASTOLIC BLOOD PRESSURE: 77 MMHG | SYSTOLIC BLOOD PRESSURE: 137 MMHG

## 2019-06-10 DIAGNOSIS — M17.11 PRIMARY OSTEOARTHRITIS OF RIGHT KNEE: Primary | ICD-10-CM

## 2019-06-10 DIAGNOSIS — M25.561 KNEE PAIN, RIGHT: ICD-10-CM

## 2019-06-10 PROCEDURE — 20610 DRAIN/INJ JOINT/BURSA W/O US: CPT | Mod: RT | Performed by: PHYSICIAN ASSISTANT

## 2019-06-10 PROCEDURE — 73564 X-RAY EXAM KNEE 4 OR MORE: CPT | Mod: TC

## 2019-06-10 PROCEDURE — 99213 OFFICE O/P EST LOW 20 MIN: CPT | Mod: 25 | Performed by: PHYSICIAN ASSISTANT

## 2019-06-10 RX ORDER — TRIAMCINOLONE ACETONIDE 40 MG/ML
40 INJECTION, SUSPENSION INTRA-ARTICULAR; INTRAMUSCULAR ONCE
Status: COMPLETED | OUTPATIENT
Start: 2019-06-10 | End: 2019-06-10

## 2019-06-10 RX ADMIN — TRIAMCINOLONE ACETONIDE 40 MG: 40 INJECTION, SUSPENSION INTRA-ARTICULAR; INTRAMUSCULAR at 08:28

## 2019-06-10 ASSESSMENT — PAIN SCALES - GENERAL: PAINLEVEL: MODERATE PAIN (5)

## 2019-06-10 ASSESSMENT — MIFFLIN-ST. JEOR: SCORE: 1474.37

## 2019-06-10 NOTE — PROGRESS NOTES
Prior to injection, verified patient identity using patient's name and date of birth.  Due to injection administration, patient instructed to remain in clinic for 15 minutes  afterwards, and to report any adverse reaction to me immediately.    Joint injection was performed.      Drug Amount Wasted:  None.  Vial/Syringe: Single dose vial  Expiration Date:  2/2021  Lot [id number]  Rt knee  Esteban Taylor PA-C

## 2019-06-10 NOTE — LETTER
6/10/2019         RE: Latanya Padron  [address] [facility] [address]        Dear Colleague,    Thank you for referring your patient, Latanya Padron, to the [facility]. Please see a copy of my visit note below.      Prior to injection, verified patient identity using patient's name and date of birth.  Due to injection administration, patient instructed to remain in clinic for 15 minutes  afterwards, and to report any adverse reaction to me immediately.    Joint injection was performed.      Drug Amount Wasted:  None.  Vial/Syringe: Single dose vial  Expiration Date:  2/2021  Lot [id number]  Rt knee  Esteban Taylor PA-C             Office Visit-Follow up    Chief Complaint: Latanya Padron is a 69 year old female who is being seen for   Chief Complaint   Patient presents with     RECHECK     f/u rt knee pain lov 2/27/19 inj given       History of Present Illness:   Mechanism of Injury: No new injury.  Location: Right anterior and anterior lateral knee  Duration of Pain: For the last 3 weeks  Rating of Pain: 5 out of 10  Pain Quality: Achy  Pain is better with: A cortisone injection and Tylenol arthritis  Pain is worse with: Activity and ambulation.  Treatment so far consists of: Cortisone injection done in the right knee on 4/11/2018 as well as 2/27/2019.  Patient has not had gel injections.  Patient takes Tylenol arthritis and NSAIDs only every once a while/infrequently.   Associated Features: Right anterior knee pain.  Denies numbness or tingling that is constant.  Pain is Limiting: Activity and ambulation  Here to: Another cortisone injection  Additional History: Patient also dealing with some breast cancer right now.    REVIEW OF SYSTEMS  General: negative for, night sweats, dizziness, fatigue  Resp: No shortness of breath and no cough  CV: negative for chest pain, syncope or near-syncope  GI: negative for nausea, vomiting and diarrhea  : negative for dysuria and  "hematuria  Musculoskeletal: as above  Neurologic: negative for syncope   Hematologic: negative for bleeding disorder    Physical Exam:  Vitals: /77   Ht 1.549 m (5' 1\")   Wt 100.7 kg (222 lb)   BMI 41.95 kg/m     BMI= Body mass index is 41.95 kg/m .  Constitutional: healthy, alert and no acute distress   Psychiatric: mentation appears normal and affect normal/bright  NEURO: no focal deficits, CMS intact right lower extremity  RESP: Normal with easy respirations and no use of accessory muscles to breathe, no audible wheezing or retractions  CV: Calf soft and nontender to palpation, leg warm   SKIN: No erythema, rashes, excoriation, or breakdown. No evidence of infection.   MUSCULOSKELETAL:    INSPECTION of right knee: No gross deformities, erythema, edema, ecchymosis, atrophy or fasciculations.     PALPATION: No tenderness on palpation of the medial, lateral, anterior and posterior portion of the knee. No specific joint line tenderness. No increased warmth.  No effusion.     ROM: Extension full, flexion to approximately 125 . All range of motion without catching, locking or pain.  I did feel some very slight crepitus under the patella with range of motion today.     STRENGTH: 5 out of 5 quad and hamstring strength.     SPECIAL TEST: Patient has a negative Lachman's negative drawer sign. Patient's knee is stable to varus and valgus stress at 30  of flexion. Patient has a negative Silvia's.   GAIT: non-antalgic  Lymph: no palpable lymph nodes      Diagnostic Modalities:  Today we did bilateral AP as well as jumpers view and sunrise view and a lateral of the right knee showing progression from previous x-rays in 2017 under the patellofemoral area on the right knee.  Otherwise the rest of the compartments lateral and medial look well-maintained from previous x-rays.  No fracture no dislocation no tumor.    Independent visualization of the images was performed.      Impression: 1.  Right knee degenerative joint " disease, mild to moderate, patella femoral.    Plan:  All of the above pertinent physical exam and imaging modalities findings was reviewed with Latanya.                                          INJECTION PROCEDURE:  The patient was counseled about an  injection, including discussion of risks (including infection), contents of the injection, rationale for performing the injection, and expected benefits of the injection. The skin was prepped with alcohol and betadine and then utilizing sterile technique an injection of the right knee joint from the anterolateral approach in the seated position was performed. I used tiffany chloride spray prior to doing the injection. The injection consisted 1ml of Kenalog (40mg per 1ml) with 8ml 1% lidocaine plain. The patient tolerated the injection well, and there were no complications. The injection site was covered with a Band-Aid. The injection was performed by Mark Taylor PA-C    Patient Instructions:   1.  Cortisone injection: The last cortisone injection was 2/27/2019 and it worked very well for you.  It had lasted about 3 months, the injection wore off for last 3 weeks.  These cortisone injections worked very well for you.  We are okay doing another one.  This will be your second 1 of 2019.  2. We can do 4 cortisone injections in a years time but no more than that. We can do the first 3 injections within 6 weeks of each other but the fourth injection we usually wait 2-3 months. This helps protect the cartilage in your joint.  3.  Gel injections: We did talk about this today.  If the cortisone injections do not work all that long like approximately 1 to 2 months then we might supplement with gel injections every 6 months.  This is paid for by your insurance.  4.  Therapy: Today we did talk about straight leg raising.  This will help strengthen your quad and put your kneecap away from your thigh bone to cause some relief.  Trying to do this twice once in the morning once at night  when you are laying on your back.  5.  Medication: Continue with Tylenol arthritis as that works well for you.  You take NSAIDs every once while but your kidney function is not the greatest with a GFR of 65.  We would hold off on any Mobic or Voltaren at this time.    6.  X-ray: On these x-rays we see that the kneecap has progressed with arthritis otherwise the rest of your joint spaces look pretty good.  7. You can followup with Nadja Hinson MD and Esteban Taylor PA-C in 6 weeks, if you are having pain at that time we can do a cortisone injection.  You can always cancel the appointment if you are doing really well and follow-up as needed.   Re-x-ray on return: No    BP Readings from Last 1 Encounters:   06/10/19 137/77       BP noted to be well controlled today in office.      Patient does not use Tobacco products.    This note was dictated with [employer].    Esteban Taylor PA-C             Again, thank you for allowing me to participate in the care of your patient.        Sincerely,        Esteban Taylor PA-C

## 2019-06-10 NOTE — PROGRESS NOTES
"Office Visit-Follow up    Chief Complaint: Latanya Padron is a 69 year old female who is being seen for   Chief Complaint   Patient presents with     RECHECK     f/u rt knee pain lov 2/27/19 inj given       History of Present Illness:   Mechanism of Injury: No new injury.  Location: Right anterior and anterior lateral knee  Duration of Pain: For the last 3 weeks  Rating of Pain: 5 out of 10  Pain Quality: Achy  Pain is better with: A cortisone injection and Tylenol arthritis  Pain is worse with: Activity and ambulation.  Treatment so far consists of: Cortisone injection done in the right knee on 4/11/2018 as well as 2/27/2019.  Patient has not had gel injections.  Patient takes Tylenol arthritis and NSAIDs only every once a while/infrequently.   Associated Features: Right anterior knee pain.  Denies numbness or tingling that is constant.  Pain is Limiting: Activity and ambulation  Here to: Another cortisone injection  Additional History: Patient also dealing with some breast cancer right now.    REVIEW OF SYSTEMS  General: negative for, night sweats, dizziness, fatigue  Resp: No shortness of breath and no cough  CV: negative for chest pain, syncope or near-syncope  GI: negative for nausea, vomiting and diarrhea  : negative for dysuria and hematuria  Musculoskeletal: as above  Neurologic: negative for syncope   Hematologic: negative for bleeding disorder    Physical Exam:  Vitals: /77   Ht 1.549 m (5' 1\")   Wt 100.7 kg (222 lb)   BMI 41.95 kg/m    BMI= Body mass index is 41.95 kg/m .  Constitutional: healthy, alert and no acute distress   Psychiatric: mentation appears normal and affect normal/bright  NEURO: no focal deficits, CMS intact right lower extremity  RESP: Normal with easy respirations and no use of accessory muscles to breathe, no audible wheezing or retractions  CV: Calf soft and nontender to palpation, leg warm   SKIN: No erythema, rashes, excoriation, or breakdown. No evidence of infection. "   MUSCULOSKELETAL:    INSPECTION of right knee: No gross deformities, erythema, edema, ecchymosis, atrophy or fasciculations.     PALPATION: No tenderness on palpation of the medial, lateral, anterior and posterior portion of the knee. No specific joint line tenderness. No increased warmth.  No effusion.     ROM: Extension full, flexion to approximately 125 . All range of motion without catching, locking or pain.  I did feel some very slight crepitus under the patella with range of motion today.     STRENGTH: 5 out of 5 quad and hamstring strength.     SPECIAL TEST: Patient has a negative Lachman's negative drawer sign. Patient's knee is stable to varus and valgus stress at 30  of flexion. Patient has a negative Silvia's.   GAIT: non-antalgic  Lymph: no palpable lymph nodes      Diagnostic Modalities:  Today we did bilateral AP as well as jumpers view and sunrise view and a lateral of the right knee showing progression from previous x-rays in 2017 under the patellofemoral area on the right knee.  Otherwise the rest of the compartments lateral and medial look well-maintained from previous x-rays.  No fracture no dislocation no tumor.    Independent visualization of the images was performed.      Impression: 1.  Right knee degenerative joint disease, mild to moderate, patella femoral.    Plan:  All of the above pertinent physical exam and imaging modalities findings was reviewed with Latanya.                                          INJECTION PROCEDURE:  The patient was counseled about an  injection, including discussion of risks (including infection), contents of the injection, rationale for performing the injection, and expected benefits of the injection. The skin was prepped with alcohol and betadine and then utilizing sterile technique an injection of the right knee joint from the anterolateral approach in the seated position was performed. I used tiffany chloride spray prior to doing the injection. The injection  consisted 1ml of Kenalog (40mg per 1ml) with 8ml 1% lidocaine plain. The patient tolerated the injection well, and there were no complications. The injection site was covered with a Band-Aid. The injection was performed by Mark Taylor PA-C    Patient Instructions:   1.  Cortisone injection: The last cortisone injection was 2/27/2019 and it worked very well for you.  It had lasted about 3 months, the injection wore off for last 3 weeks.  These cortisone injections worked very well for you.  We are okay doing another one.  This will be your second 1 of 2019.  2. We can do 4 cortisone injections in a years time but no more than that. We can do the first 3 injections within 6 weeks of each other but the fourth injection we usually wait 2-3 months. This helps protect the cartilage in your joint.  3.  Gel injections: We did talk about this today.  If the cortisone injections do not work all that long like approximately 1 to 2 months then we might supplement with gel injections every 6 months.  This is paid for by your insurance.  4.  Therapy: Today we did talk about straight leg raising.  This will help strengthen your quad and put your kneecap away from your thigh bone to cause some relief.  Trying to do this twice once in the morning once at night when you are laying on your back.  5.  Medication: Continue with Tylenol arthritis as that works well for you.  You take NSAIDs every once while but your kidney function is not the greatest with a GFR of 65.  We would hold off on any Mobic or Voltaren at this time.    6.  X-ray: On these x-rays we see that the kneecap has progressed with arthritis otherwise the rest of your joint spaces look pretty good.  7. You can followup with Nadja Hinson MD and Esteban Taylor PA-C in 6 weeks, if you are having pain at that time we can do a cortisone injection.  You can always cancel the appointment if you are doing really well and follow-up as needed.   Re-x-ray on return: No    BP  Readings from Last 1 Encounters:   06/10/19 137/77       BP noted to be well controlled today in office.      Patient does not use Tobacco products.    This note was dictated with [employer].    Esteban Taylor PA-C

## 2019-06-10 NOTE — PATIENT INSTRUCTIONS
Encounter Diagnosis   Name Primary?     Primary osteoarthritis of right knee Yes     Rest, ice and elevate above heart level as needed for pain control  1.  Cortisone injection: The last cortisone injection was 2/27/2019 and it worked very well for you.  It had lasted about 3 months, the injection wore off for last 3 weeks.  These cortisone injections worked very well for you.  We are okay doing another one.  This will be your second 1 of 2019.  2. We can do 4 cortisone injections in a years time but no more than that. We can do the first 3 injections within 6 weeks of each other but the fourth injection we usually wait 2-3 months. This helps protect the cartilage in your joint.  3.  Gel injections: We did talk about this today.  If the cortisone injections do not work all that long like approximately 1 to 2 months then we might supplement with gel injections every 6 months.  This is paid for by your insurance.  4.  Therapy: Today we did talk about straight leg raising.  This will help strengthen your quad and put your kneecap away from your thigh bone to cause some relief.  Trying to do this twice once in the morning once at night when you are laying on your back.  5.  Medication: Continue with Tylenol arthritis as that works well for you.  You take NSAIDs every once while but your kidney function is not the greatest with a GFR of 65.  We would hold off on any Mobic or Voltaren at this time.    6.  X-ray: On these x-rays we see that the kneecap has progressed with arthritis otherwise the rest of your joint spaces look pretty good.  7. You can followup with Nadja Hinson MD and Esteban Taylor PA-C in 6 weeks, if you are having pain at that time we can do a cortisone injection.  You can always cancel the appointment if you are doing really well and follow-up as needed.     Cortisone Instructions:     1. You received an injection of cortisone into your right knee today.  2. The joint(s) may be more painful for  the first 1-2 days.  3. We ask you to continue to rest the joint(s) for a few more days before resuming regular activities.  4. Pain Medications you can take (as long as your primary care provider allows these meds and you do not have kidney or liver conditions):  Tylenol  Take 1000 mg by mouth every 6 hours as needed; maximum dose 4000 mg a day  Ibuprofen  600 mg every 6 hours as needed; maximum 2400 mg a day  (OK to take tylenol and ibuprofen at the same time)  5. Rest, ice and elevate as needed for pain control  6. Watch for these signs of infection: redness, swelling, drainage, warmth to touch, increased pain, or fever. Call the clinic or make an appointment to be seen if you think you have an infection.  7. If you are diabetic, make sure you keep a close eye on your blood sugars, they can get elevated with cortisone injections.   8. Sometimes it can take 1-2 weeks for it to reach its full effect.    Cortisone Injections  Cortisone is a type of steroid. It can greatly reduce swelling, redness, and irritation (inflammation) and pain. Being injected with cortisone is simple and doesn t take long. Your doctor may ask you questions about your health. Certain health conditions, such as diabetes, can be affected by cortisone.     Your pain may be relieved by a cortisone injection.   Why have a cortisone injection?  Injecting cortisone can relieve pain for anything from a sports injury to arthritis. Your doctor may suggest an injection if rest, splints, or oral medicine doesn t relieve your pain. Injecting cortisone is simpler than having surgery. And cortisone may provide the lasting pain relief that can help you get out and enjoy life again.  Getting the injection  Your doctor will start by cleaning and occasionally numbing your skin at the injection site. Next you ll be injected with local anesthetics (for short-term pain relief) and cortisone. The injection may last a few moments. A small bandage will be put over  the injection site. You ll then be ready to go home.  After your injection  After being injected, make sure you don t injure the treated region. But stay active. Enjoy a walk or some other mild activity. Just be careful not to strain the region that gave you trouble.  The next day  Some people feel more pain after being injected. This is normal, and it will go away soon. Applying ice for 20 minutes at a time to your injury may reduce the increased pain. Rest for the first day or two. You don t need to stay in bed. But avoid tasks that may strain the injured region.  If you have diabetes  Cortisone injections can cause blood sugar to be increased for several days after the injection. If you have diabetes, you should follow your blood  sugar closely during this time. Follow your regular plan for what to do when your blood sugar is elevated.     7641-1994 [employer]. [address] [place] [address]. All rights reserved. This information is not intended as a substitute for professional medical care. Always follow your healthcare professional's instructions.     [employer] and [employer] may offer reliable information regarding your diagnosis and treatment plan.    THANK YOU for coming in today. If you receive a survey via [employer] or mail please let us know if there was anything you especially appreciated today or if there is any way we can improve our clinic. We appreciate your input.    GENERAL INFORMATION:  Our hours are:  Monday :     Clinic 7:30 AM-430 PM ([facility])  Tuesday:      Operating Room All Day ([facility])  Wednesday: Clinic 7:30 AM - 11:15 AM ([facility])             Clinic 1:00 PM - 4:00PM ([facility])  Thursday:     Administrative Day  Friday:          Clinic 7:30 AM - 11:15 AM ([facility])            Clinic 1:00 PM - 4:00 PM ([facility])      [facility]  any issues or concerns: [phone]      We are not in the office Thursdays. Therefore non- urgent calls and medical messages received on Thursday will be addressed when we are back in the office on Wednesday. Urgent matters will be reviewed and addressed by one of our partners in the office as needed.    If lab work was done today as part of your evaluation you will generally be contacted via [employer], mail, or phone with the results within 1-5 days. If there is an alarming result we will contact you by phone. Lab results come back at varying times, I generally wait until all labs are resulted before making comments on results. Please note labs are automatically released to [employer] (if you have signed up for it) once available-at times you may see these prior to my having a chance to review them as well.    If you need refills please contact your pharmacist. They will send a refill request to me to review. Please allow 3 business days for us to process all refill requests. All narcotic refills should be handled in the clinic at the time of your visit.

## 2019-06-12 ENCOUNTER — ONCOLOGY VISIT (OUTPATIENT)
Dept: ONCOLOGY | Facility: CLINIC | Age: 69
End: 2019-06-12
Payer: COMMERCIAL

## 2019-06-12 VITALS
SYSTOLIC BLOOD PRESSURE: 120 MMHG | TEMPERATURE: 97.8 F | WEIGHT: 222.3 LBS | BODY MASS INDEX: 41.97 KG/M2 | HEART RATE: 91 BPM | RESPIRATION RATE: 18 BRPM | DIASTOLIC BLOOD PRESSURE: 76 MMHG | HEIGHT: 61 IN | OXYGEN SATURATION: 92 %

## 2019-06-12 DIAGNOSIS — M85.89 OSTEOPENIA OF MULTIPLE SITES: Primary | ICD-10-CM

## 2019-06-12 DIAGNOSIS — C50.812 MALIGNANT NEOPLASM OF OVERLAPPING SITES OF LEFT BREAST IN FEMALE, ESTROGEN RECEPTOR POSITIVE (H): ICD-10-CM

## 2019-06-12 DIAGNOSIS — Z17.0 MALIGNANT NEOPLASM OF OVERLAPPING SITES OF LEFT BREAST IN FEMALE, ESTROGEN RECEPTOR POSITIVE (H): ICD-10-CM

## 2019-06-12 LAB — LAB SCANNED RESULT: NORMAL

## 2019-06-12 PROCEDURE — 99214 OFFICE O/P EST MOD 30 MIN: CPT | Performed by: INTERNAL MEDICINE

## 2019-06-12 ASSESSMENT — MIFFLIN-ST. JEOR: SCORE: 1475.73

## 2019-06-12 NOTE — PATIENT INSTRUCTIONS
Today:  Review patient information on Arimidex.  Plan will be to start this when you are done with Radiation.  Will discuss with follow up again.  Review patient information on Prolia as well.  Please see your dentist for Dental clearance.  They can fax us a letter to [phone] and then Oncology will get you scheduled to start.    Radiation Oncology Consult Date/Time/Location:  They will call to schedule!  If you don't get a call by middle of next week, please call Oncology.    Infusion for Prolia Date/Time:  On the second floor of the Hospital.    Please follow up with Dr. Denis after Radiation.  DEXA Scan needed as well.    DEXA Scan Date/Time:  6/14/19 at 10:00 - Check in at 9:45  No Calcium supplements, including TUMS for 24 hours prior to scan.    Office visit follow up with Dr. Denis Date/Time:     If you have any questions or concerns please feel free to call.    If you need to reschedule please call:  Clinic or Lab Appointment - [phone]  Infusion - [phone]  Imaging - [phone]    Jorge Ocampo, RN, BSN, OCN  [facility]   Oncology/Hematology Care Coordinator RN  [facility]  [phone]

## 2019-06-12 NOTE — PROGRESS NOTES
"DATE OF VISIT: Jun 12, 2019    REASON FOR REFERRAL:   Invasive lobular carcinoma left breast      HISTORY OF PRESENT ILLNESS:     69-year-old female with past medical history significant for Atrial fibrillation, hypertension    09/2018 Screening mammogram showed an area of architectural distortion the left breast.Ultrasound breast did not show any sonographic abnormality and recommendation was for a 6 month follow-up mammogram    04/2019 Mammogram and ultrasound showed a heterogenous mass in the left breast measuring 1.1 x 0.5 x 0.5 cm. Centimeter biopsy of the mass came back showing invasive lobular carcinoma, Grade 2, ER/LA positive and HER-2/tulio negative by FISH.MRI breast bilateral shoulder 2 cm mass like enhancement in the left breast corresponding to the biopsy-proven carcinoma with no evidence of multifocal disease or axillary lymphadenopathy.     05/15/19 Lumpectomy with SNL dissection    Interval History  In clinic today to discuss surgical pathology and adjuvant treatments.  Recovering well from surgery.  Denies breast pain, skin changes, nipple discharge, and fatigue, weight loss, dyspnea, cough, abdominal pain, bone pain, headache, visual symptoms or any other complaints. Good appetite and energy levels      REVIEW OF SYSTEMS:      ROS: 14 point ROS neg other than the symptoms noted above in the HPI.    PAST MEDICAL HISTORY:   Past Medical History:   Diagnosis Date     Mixed hyperlipidemia      PONV (postoperative nausea and vomiting)      PHYSICAL EXAMINATION:   /76   Pulse 91   Temp 97.8  F (36.6  C) (Temporal)   Resp 18   Ht 1.549 m (5' 1\")   Wt 100.8 kg (222 lb 4.8 oz)   SpO2 92%   BMI 42.00 kg/m    Wt Readings from Last 10 Encounters:   06/12/19 100.8 kg (222 lb 4.8 oz)   06/10/19 100.7 kg (222 lb)   05/28/19 100.7 kg (222 lb)   05/01/19 101.5 kg (223 lb 12.8 oz)   04/29/19 100.4 kg (221 lb 6.4 oz)   04/19/19 100.7 kg (222 lb)   02/27/19 99.8 kg (220 lb)   12/31/18 100.1 kg (220 lb 9.6 " oz)   11/26/18 100.9 kg (222 lb 8 oz)   07/13/18 104.3 kg (230 lb)      ECOG performance status: 0  Exam:  Constitutional: healthy, alert and no distress  Head: Normocephalic.   Neck: Neck supple.   Breast surgical scars healing well  Cardiovascular: RRR.   Respiratory: Lungs clear  Gastrointestinal: Abdomen soft, non-tender.   Musculoskeletal: extremities normal-  Skin: no suspicious lesions or rashes  Neurologic: Gait normal. Sensation grossly WNL.  Psychiatric: mentation appears normal       LABORATORY RESULTS:    FINAL DIAGNOSIS:   A.  Specimen, Procedure, Side:  Breast, left, lumpectomy with localization    wire and with designated margins.     Tumor Site:  11:30 position, 4 cm from nipple     Specimen Integrity:  Intact     Specimen size:  See gross description     Histologic Type:  Invasive lobular carcinoma     Size of invasive ductal carcinoma:  30 mm in greatest dimension.  See   comment     Rosie grade:  2 (out of 3)     Norris score: 6 (out of 9)  (tubules-3; nuclear pleomorphism-2;   mitoses-1)     Angiolymphatic invasion:  Not identified     Tumor focality:  Unifocal     Extent of tumor: No nipple/areola identified     Associated in situ carcinoma:  No ductal carcinoma in situ identified.     Atypical lobular hyperplasia/lobular   carcinoma in situ present.     Size of atypical lobular hyperplasia/lobular carcinoma in situ:  Present   in 11 of 18 blocks of tissue examined     Margins:   - Invasive lobular carcinoma:  Uninvolved; 2 mm from nearest anterior   margin (slide A12), with evaluation   limited by disruption of the margin, 5 mm from the nearest posterior   margin (slide A12), 6 mm from the nearest   lateral margin (slide A11), 2 mm from the nearest inferior margin (slide   A7), greater than 10 mm from the   nearest superior margin and greater than 10 mm from the nearest medial   margin.     Ductal carcinoma in situ:  Not identified.  Atypical lobular   hyperplasia/lobular carcinoma in  situ 0.1 mm from   nearest margin, the posterior margin.     Lymph node status:  Three benign left axillary sentinel lymph nodes, each   negative for metastatic tumor,   specimens [id number], C and D.     Pathologic staging (pTNM):  pT2 (criterion: tumor >20 mm but d50 mm in greatest dimension) pNO  pM - not   applicable     Estrogen and progesterone receptors:  Previously reported on left breast   biopsy, [id number] as estrogen receptors   90% of invasive tumor nuclei and progesterone receptors 50-70% of invasive    tumor nuclei.     HER2 FISH:  Previously reported on left breast biopsy, [id number], as Group    5 (VIOLA negative).     Additional findings:   - Evidence of previous biopsy site with metallic coiled biopsy marker.   - Fibrocystic changes.   - Focal columnar cell change.   - Focal moderate to florid usual ductal hyperplasia.   - Medial calcification of parenchymal artery.   - Focal intraductal microcalcifications in benign breast ducts and   atyupical lobular hyperplasia/lobular   carcinoma in situ.     B.  [place] lymph node #1, left axilla, excision:   - Benign lymph node, negative for metastatic tumor.     C.   [place] lymph node #2, left axilla, excision:   - Benign lymph node, negative for metastatic tumor.     D.  [place] lymph node #2, left axilla, excision:   - Benign lymph node, negative for metastatic tumor.     E.  Additional lateral margin, left breast, excision:   - Three adjacent foci of grade 2 invasive lobular carcinoma (slides E23   and E24, at the medial end of the   specimen):      - A 3 mm focus, 0.5 mm from anterior margin, 1 mm from medial margin      - A 3 mm focus, 0.5 mm from junction of medial and inferior margins      - A 1 mm focus, 1 mm from junction of anterior and superior margin   - Focal columnar cell change and moderate to florid usual ductal   epithelial hyperplasia.   - Occasional focus of atypical lobular hyperplasia/lobular carcinoma in   situ, without extension to  margins.   - Fibrocystic changes.   - Focal duct ectasia.   - Focal intraductal microcalcifications.     COMMENT:   The invasive lobular carcinoma is present in seven consecutive slices of   breast tissue, spanning approximately   30 mm in greatest dimension (breast slices 7 through 13, blocks A3, A4,   A6, A8, A9, A10, A11, A12 and A13).     Electronically signed out by:       ASSESSMENT AND PLAN:    69-year-old postmenopausal female with past medical history of atrial fibrillation, hypertension     - Invasive lobular carcinoma  pT2N0  ER/CA positive and HER-2/tulio negative by FISH      - Chemotherapy  Reviewed with patient results of the surgical pathology as well as of the Oncotype DX testing.Recurrence score came back at 11 putting patient in the low-risk category  And so did not recommend adjuvant systemic chemotherapy based off on no chemotherapy benefit noted in the tailor X trial  -Radiation therapy  Patient referred to radiation oncology for Consideration of adjuvant radiation therapy  -Hormonal therapy  Definitely a candidate for adjuvant hormonal therapy of at least 5 years given Hormone receptor positive breast And its role in decreasing recurrence and improving survival.  Given postmenopausal, agent of choice would be an aromatase inhibitor. Side effect profile of tamoxifen inhibitor therapy explained including but not limited to arthralgias/myalgias, bone loss, cardiovascular risk, fatigue, flashes etc. Pertinent information about the drug was given to the patient as well. We'll plan to start hormonal therapy after completion of radiation therapy. Patient will also need a baseline DEXA scan given prior imaging showing osteopenia    Given history of breast cancer In multiple relatives, Patient meets criteria for genetic testing and would recommend that.    - Atrial fibrillation  On Coumadin for anticoagulation    - Hypertension  lisinopril and hydrochlorothiazide per primary caregiver    -  Hyperlipidemia  On statin      RTC After radiation  to proceed with hormonal therapy    The patient is ready to learn, no apparent learning barriers were identified, Diagnosis and treatment plans were explained to the patient. The patient expressed understanding of the content. The patient questions were answered to her satisfaction.    Chart documentation with Dragon Voice recognition Software. Although reviewed after completion, some words and grammatical errors may remain.    Greg Denis MD  Attending Physician   Hematology/Medical Oncology

## 2019-06-12 NOTE — Clinical Note
"    6/12/2019         RE: Latanya Padron  [address] [facility] [id number]        Dear Colleague,    Thank you for referring your patient, Latanya Padron, to the [facility]. Please see a copy of my visit note below.    DATE OF VISIT: Jun 12, 2019    REASON FOR REFERRAL:   Invasive lobular carcinoma left breast      HISTORY OF PRESENT ILLNESS:     69-year-old female with past medical history significant for Atrial fibrillation, hypertension    09/2018 Screening mammogram showed an area of architectural distortion the left breast.Ultrasound breast did not show any sonographic abnormality and recommendation was for a 6 month follow-up mammogram    04/2019 Mammogram and ultrasound showed a heterogenous mass in the left breast measuring 1.1 x 0.5 x 0.5 cm. Centimeter biopsy of the mass came back showing invasive lobular carcinoma, Grade 2, ER/[place] positive and HER-2/tulio negative by FISH.MRI breast bilateral shoulder 2 cm mass like enhancement in the left breast corresponding to the biopsy-proven carcinoma with no evidence of multifocal disease or axillary lymphadenopathy.     05/15/19 Lumpectomy with SNL dissection    Interval History  In clinic today to discuss surgical pathology and adjuvant treatments.  Recovering well from surgery.  Denies breast pain, skin changes, nipple discharge, and fatigue, weight loss, dyspnea, cough, abdominal pain, bone pain, headache, visual symptoms or any other complaints. Good appetite and energy levels      REVIEW OF SYSTEMS:      ROS: 14 point ROS neg other than the symptoms noted above in the HPI.    PAST MEDICAL HISTORY:   Past Medical History:   Diagnosis Date     Mixed hyperlipidemia      PONV (postoperative nausea and vomiting)      PHYSICAL EXAMINATION:   /76   Pulse 91   Temp 97.8  F (36.6  C) (Temporal)   Resp 18   Ht 1.549 m (5' 1\")   Wt 100.8 kg (222 lb 4.8 oz)   SpO2 92%   BMI 42.00 kg/m     Wt Readings from Last 10 Encounters:   06/12/19 " 100.8 kg (222 lb 4.8 oz)   06/10/19 100.7 kg (222 lb)   05/28/19 100.7 kg (222 lb)   05/01/19 101.5 kg (223 lb 12.8 oz)   04/29/19 100.4 kg (221 lb 6.4 oz)   04/19/19 100.7 kg (222 lb)   02/27/19 99.8 kg (220 lb)   12/31/18 100.1 kg (220 lb 9.6 oz)   11/26/18 100.9 kg (222 lb 8 oz)   07/13/18 104.3 kg (230 lb)      ECOG performance status: 0  Exam:  Constitutional: healthy, alert and no distress  Head: Normocephalic.   Neck: Neck supple.   Breast surgical scars healing well  Cardiovascular: RRR.   Respiratory: Lungs clear  Gastrointestinal: Abdomen soft, non-tender.   Musculoskeletal: extremities normal-  Skin: no suspicious lesions or rashes  Neurologic: Gait normal. Sensation grossly WNL.  Psychiatric: mentation appears normal       LABORATORY RESULTS:    FINAL DIAGNOSIS:   A.  Specimen, Procedure, Side:  Breast, left, lumpectomy with localization    wire and with designated margins.     Tumor Site:  11:30 position, 4 cm from nipple     Specimen Integrity:  Intact     Specimen size:  See gross description     Histologic Type:  Invasive lobular carcinoma     Size of invasive ductal carcinoma:  30 mm in greatest dimension.  See   comment     Andrew grade:  2 (out of 3)     Andrew score: 6 (out of 9)  (tubules-3; nuclear pleomorphism-2;   mitoses-1)     Angiolymphatic invasion:  Not identified     Tumor focality:  Unifocal     Extent of tumor: No nipple/areola identified     Associated in situ carcinoma:  No ductal carcinoma in situ identified.     Atypical lobular hyperplasia/lobular   carcinoma in situ present.     Size of atypical lobular hyperplasia/lobular carcinoma in situ:  Present   in 11 of 18 blocks of tissue examined     Margins:   - Invasive lobular carcinoma:  Uninvolved; 2 mm from nearest anterior   margin (slide A12), with evaluation   limited by disruption of the margin, 5 mm from the nearest posterior   margin (slide A12), 6 mm from the nearest   lateral margin (slide A11), 2 mm from the  nearest inferior margin (slide   A7), greater than 10 mm from the   nearest superior margin and greater than 10 mm from the nearest medial   margin.     Ductal carcinoma in situ:  Not identified.  Atypical lobular   hyperplasia/lobular carcinoma in situ 0.1 mm from   nearest margin, the posterior margin.     Lymph node status:  Three benign left axillary sentinel lymph nodes, each   negative for metastatic tumor,   specimens [id number], C and D.     Pathologic staging (pTNM):  pT2 (criterion: tumor >20 mm but d50 mm in greatest dimension) pNO  pM - not   applicable     Estrogen and progesterone receptors:  Previously reported on left breast   biopsy, [id number] as estrogen receptors   90% of invasive tumor nuclei and progesterone receptors 50-70% of invasive    tumor nuclei.     HER2 FISH:  Previously reported on left breast biopsy, [id number], as Group    5 (VIOLA negative).     Additional findings:   - Evidence of previous biopsy site with metallic coiled biopsy marker.   - Fibrocystic changes.   - Focal columnar cell change.   - Focal moderate to florid usual ductal hyperplasia.   - Medial calcification of parenchymal artery.   - Focal intraductal microcalcifications in benign breast ducts and   atyupical lobular hyperplasia/lobular   carcinoma in situ.     B.  [place] lymph node #1, left axilla, excision:   - Benign lymph node, negative for metastatic tumor.     C.   [place] lymph node #2, left axilla, excision:   - Benign lymph node, negative for metastatic tumor.     D.  [place] lymph node #2, left axilla, excision:   - Benign lymph node, negative for metastatic tumor.     E.  Additional lateral margin, left breast, excision:   - Three adjacent foci of grade 2 invasive lobular carcinoma (slides E23   and E24, at the medial end of the   specimen):      - A 3 mm focus, 0.5 mm from anterior margin, 1 mm from medial margin      - A 3 mm focus, 0.5 mm from junction of medial and inferior margins      - A 1 mm focus, 1  mm from junction of anterior and superior margin   - Focal columnar cell change and moderate to florid usual ductal   epithelial hyperplasia.   - Occasional focus of atypical lobular hyperplasia/lobular carcinoma in   situ, without extension to margins.   - Fibrocystic changes.   - Focal duct ectasia.   - Focal intraductal microcalcifications.     COMMENT:   The invasive lobular carcinoma is present in seven consecutive slices of   breast tissue, spanning approximately   30 mm in greatest dimension (breast slices 7 through 13, blocks A3, A4,   A6, A8, A9, A10, A11, A12 and A13).     Electronically signed out by:       ASSESSMENT AND PLAN:    69-year-old postmenopausal female with past medical history of atrial fibrillation, hypertension     - Invasive lobular carcinoma  pT2N0  ER/MS positive and HER-2/tulio negative by FISH      - Chemotherapy  Reviewed with patient results of the surgical pathology as well as of the Oncotype DX testing.Recurrence score came back at 11 putting patient in the low-risk category  And so did not recommend adjuvant systemic chemotherapy based off on no chemotherapy benefit noted in the tailor X trial  -Radiation therapy  Patient referred to radiation oncology for Consideration of adjuvant radiation therapy  -Hormonal therapy  Definitely a candidate for adjuvant hormonal therapy of at least 5 years given Hormone receptor positive breast And its role in decreasing recurrence and improving survival.  Given postmenopausal, agent of choice would be an aromatase inhibitor. Side effect profile of tamoxifen inhibitor therapy explained including but not limited to arthralgias/myalgias, bone loss, cardiovascular risk, fatigue, flashes etc. Pertinent information about the drug was given to the patient as well. We'll plan to start hormonal therapy after completion of radiation therapy. Patient will also need a baseline DEXA scan given prior imaging showing osteopenia    Given history of breast cancer  In multiple relatives, Patient meets criteria for genetic testing and would recommend that.    - Atrial fibrillation  On Coumadin for anticoagulation    - Hypertension  lisinopril and hydrochlorothiazide per primary caregiver    - Hyperlipidemia  On statin      RTC After radiation  to proceed with hormonal therapy    The patient is ready to learn, no apparent learning barriers were identified, Diagnosis and treatment plans were explained to the patient. The patient expressed understanding of the content. The patient questions were answered to her satisfaction.    Chart documentation with Dragon Voice recognition Software. Although reviewed after completion, some words and grammatical errors may remain.    Greg Denis MD  Attending Physician   Hematology/Medical Oncology        Again, thank you for allowing me to participate in the care of your patient.        Sincerely,        Greg Denis MD

## 2019-06-12 NOTE — NURSING NOTE
DISCHARGE PLAN:  Next appointments: See patient instruction section  Departure Mode: Ambulatory  Accompanied by: self  10 minutes for nursing discharge (face to face time)     Latanya Padron is here today for Oncology consult for new breast cancer diagnosis.  Writing nurse seen patient after Medical Oncology appointment to address questions/concerns/coordinate care. Patient to see Radiation.  Call to [facility] and they will review chart to see if they can get patient start and finished prior to closing for machine replacement.  JERMAIN completed for [facility] Radiation since she would like to go there if [facility] unable to get her in.  patient information on Arimidex provided and reviewed in detail.  Patient will discuss and start after Radiation.  DEXA scan soon.  Patient will need Dental Clearance for possible start of Prolia.  Provided patient information and reviewed as well.  Patient to follow up after Radiation is done.  Appointments scheduled.  See patient instructions and Oncologist's Progress note for further details. Questions and concerns addressed to patient's satisfaction. Patient verbalized and demonstrated understanding of plan.  Contact information provided and patient is encouraged to call with any that arise in the interim of care.    Jorge Ocampo, RN, BSN, OCN   Oncology Care Coordinator RN  [facility]  [phone]  6/12/2019, 10:22 AM

## 2019-06-14 ENCOUNTER — HOSPITAL ENCOUNTER (OUTPATIENT)
Dept: BONE DENSITY | Facility: CLINIC | Age: 69
Discharge: HOME | End: 2019-06-14
Attending: INTERNAL MEDICINE | Admitting: INTERNAL MEDICINE
Payer: COMMERCIAL

## 2019-06-14 ENCOUNTER — TELEPHONE (OUTPATIENT)
Dept: RADIATION ONCOLOGY | Facility: CLINIC | Age: 69
End: 2019-06-14

## 2019-06-14 ENCOUNTER — APPOINTMENT (OUTPATIENT)
Dept: RADIATION ONCOLOGY | Facility: CLINIC | Age: 69
End: 2019-06-14
Payer: COMMERCIAL

## 2019-06-14 DIAGNOSIS — M85.89 OSTEOPENIA OF MULTIPLE SITES: ICD-10-CM

## 2019-06-14 PROCEDURE — 77263 THER RADIOLOGY TX PLNG CPLX: CPT | Performed by: RADIOLOGY

## 2019-06-14 PROCEDURE — 77080 DXA BONE DENSITY AXIAL: CPT

## 2019-06-14 NOTE — PROGRESS NOTES
Dear Colleagues,  Today Latanya Padron was seen in consultation.  IDENTIFICATION: This is a 69 year old woman with left breast G2 ILCA, ER+/OH+/H2N-, status post lumpectomy and SLNBx, revealing [id number] disease referred for adjuvant radiation therapy. She will not be receiving chemotherapy.  HISTORY OF PRESENT ILLNESS: Latanya Padron has a strong family history of breast cancer. She was having a six month follow up for a focal asymmetry and complex cyst seen on prior mammogram 4/12/19.  These areas were stable but within the left UIQ breast an incidental echoic shadowing mass was noted. Same day US guided left breast biopsy revealed G2 ILCA ER+/OH+ with LCIS but no LVSI.  On 4/25/19 bilateral breast MRI showed faint nonmasslike enhancement spanning approximately 2 cm in the left breast at 11:30 o'clock 4 cm from the nipple, corresponding with the recently biopsy-proven carcinoma. The right breast was unremarkable and there was no lymphadenopathy identified.  On 5/15/19 Dr. Aleman took her to the OR for a wire localized lumpectomy.  Pathology revealed 3.0cm ILCA 2mm from anterior margin with eval limited due to disruption of the margin.  There was also ALH/LCIS 0.1mm from posterior margin. 0 out of 3 lymph nodes were involved with malignancy. Postprocedure mmg showed that the mass appeared to contact the inferior edge of the sample on the image. Her postoperative course was uneventful.  She was recently seen by Dr. Denis.  Her Oncotype score returned back as 15, low risk. The benefit of treatment did not outweigh the risks and no systemic chemotherapy is recommended.  She is to receive adjuvant endocrine therapy after XRT.    Since her surgery, she has continued to heal well and denies any significant pain.  She has good ROM.  She denies any other new masses, skin changes, shortness of breath, chest or bony pain, or new neurologic symptoms. She is being seen here today for consideration of postoperative  radiotherapy.  REVIEW OF SYSTEMS: As per HPI, a 14-point review of system is otherwise negative.  PAST RADIATION THERAPY:  Denies.  PAST CTD/PACEMAKER: Denies  BREAST RISK FACTORS:  She started her menses at age 14.  Hysterectomy age 36.  with her first delivery at age 22. She did not breastfeed.  No history of HRT for >10 years. OCP use for 3 years. Family history of breast cancer in mother and sister but no history of ovarian cancer. Prior right breast biopsy ~30 years ago benign per pt.  Past Medical History:   Diagnosis Date     Breast cancer (H) 2019    Left Breast     COPD (chronic obstructive pulmonary disease) (H)      Mixed hyperlipidemia      PONV (postoperative nausea and vomiting)        Past Surgical History:   Procedure Laterality Date     BIOPSY NODE SENTINEL Left 5/15/2019    Procedure: left sentinel node biopsy;  Surgeon: Donald Aleman MD;  Location: [facility] OR     BREAST BIOPSY, CORE RT/LT Left 2019     C APPENDECTOMY,W OTHR PROC       C  DELIVERY ONLY           C LIGATE FALLOPIAN TUBE       C NONSPECIFIC PROCEDURE      Exploratory laparotomy with resection of infarcted segment of omentum and minor lysis of adhesions     C NONSPECIFIC PROCEDURE      Removal of hemorrhagic corpus luteum cyst     C VAGINAL HYSTERECTOMY       COLONOSCOPY  06/21/10     HC BIOPSY OF BREAST, OPEN INCISIONAL Right 1988    Benign per patient     HC CORRECT BUNION,METATARSAL OSTEOTOMY        LAPAROSCOPY, SURGICAL; CHOLECYSTECTOMY  08/04/10      SLING OPERATION FOR STRESS INCONTINENCE  2007     HERNIORRHAPHY INCISIONAL (LOCATION)  2011    Procedure:HERNIORRHAPHY INCISIONAL (LOCATION); Surgeon:AYALA HOOVER; Location:[facility]     LAPAROSCOPIC HERNIORRHAPHY INCISIONAL  2011    Procedure:LAPAROSCOPIC HERNIORRHAPHY INCISIONAL; Laparoscopic mesh repair of incarcerated incisional hernia , extensive lysis of adhesions, excision of hernia sac and  closure of fascia.; Surgeon:AYALA HOOVER; Location:[facility]     LAPAROSCOPIC LYSIS ADHESIONS  2011    Procedure:LAPAROSCOPIC LYSIS ADHESIONS; Surgeon:AYALA HOOVER; Location:[facility]     MASTECTOMY PARTIAL WITH NEEDLE LOCALIZATION Left 5/15/2019    Procedure: left wire localized partial mastectomy;  Surgeon: Donald Aleman MD;  Location: [facility]     TONSILLECTOMY         Family History   Problem Relation Age of Onset     Breast Cancer Mother      Obesity Mother      Genitourinary Problems Mother      Lipids Mother      Respiratory Sister      Lipids Sister      Breast Cancer Sister 40        s/p mastectomy     Arthritis Sister      Obesity Sister      Heart Failure Sister      Cancer Maternal Grandfather      Cancer Paternal Grandfather         lymph nodes     Heart Disease Father         by pass (5)     Cerebrovascular Disease Father         hemorragic     Lipids Father      Alzheimer Disease Maternal Grandmother      Genitourinary Problems Maternal Grandmother      Lipids Sister      Cancer Maternal Uncle         colon     Heart Disease Brother         Hx: stent placement     Lipids Brother         X 2       Social History     Tobacco Use     Smoking status: Former Smoker     Packs/day: 1.00     Years: 30.00     Pack years: 30.00     Last attempt to quit: 10/25/2005     Years since quittin.6     Smokeless tobacco: Never Used   Substance Use Topics     Alcohol use: No     Alcohol/week: 0.0 oz     Current Outpatient Medications   Medication     albuterol (PROAIR HFA/PROVENTIL HFA/VENTOLIN HFA) 108 (90 Base) MCG/ACT inhaler     atorvastatin (LIPITOR) 10 MG tablet     BIOTIN PO     Calcium Carb-Cholecalciferol (CALCIUM 500 + D) 500-400 MG-UNIT TABS     CARTIA  MG 24 hr capsule     cholecalciferol (VITAMIN D) 400 UNITS tablet     Cyanocobalamin (B-12) 1000 MCG TBCR     hydrochlorothiazide (HYDRODIURIL) 25 MG tablet     lisinopril (PRINIVIL/ZESTRIL) 2.5 MG tablet     magnesium 250 MG tablet      mometasone-formoterol (DULERA) 200-5 MCG/ACT oral inhaler     ORDER FOR DME     trospium (SANCTURA) 20 MG tablet     umeclidinium (INCRUSE ELLIPTA) 62.5 MCG/INH oral inhaler     venlafaxine (EFFEXOR-XR) 37.5 MG 24 hr capsule     warfarin (JANTOVEN) 5 MG tablet     albuterol (2.5 MG/3ML) 0.083% neb solution     HYDROcodone-acetaminophen (NORCO) 5-325 MG tablet     predniSONE (DELTASONE) 20 MG tablet     No current facility-administered medications for this visit.         Allergies   Allergen Reactions     Augmentin [Amoxicillin-Pot Clavulanate] Nausea and Vomiting     Meperidine Hcl Nausea and Vomiting     demerol     Seasonal Allergies      spring     PHYSICAL EXAMINATION:  /79 (BP Location: Right arm, Patient Position: Chair, Cuff Size: Adult Large)   Pulse 83   Temp 97.2  F (36.2  C) (Oral)   Resp 18   Wt 100.7 kg (222 lb)   SpO2 94%   Breastfeeding? No   BMI 41.95 kg/m    GENERAL Well-appearing obese woman in no acute distress.  HEENT Normocephalic, atraumatic.  Sclerae anicteric.  CVR  Regular rate and rhythm.  No murmurs, rubs, or gallops.  LUNGS Clear to auscultation bilaterally.  BREASTS Breasts are examined in the supine and upright position.  The breasts are symmetric.  The right breast is unremarkable, as there is no other erythema, ulceration or suspicious nodularity within the right breast.  Within the left periareolar breast and left axilla there are two ~3cm well healed incisions.  Firmness of these incisions is not suspicious.  There is no other erythema, ulceration or suspicious nodularity within the left breast.  There is no erythema, retraction, desquamation or discharge appreciated within the right or left nipple areolar complex.    LYMPH No supraclavicular, infraclavicular, or axillary lymphadenopathy appreciated bilaterally.  ABDOMEN Soft.  Positive bowel sounds.  EXT  No clubbing or cyanosis.   NEURO No focal deficits. Strength is symmetrical in all four extremities. Sensation  intact in all areas tested.  MSK  Good ROM. No spinal tenderness.  SKIN  Warm and well perfused.    PSYCH  Alert and oriented x 3    IMPRESSION/PLAN: Latanya Padron is a 69 year old woman with strong family history of breast cancer and history of COPD recently diagnosed with left breast G2 ILCA, ER+/OR+/H2N-, status post lumpectomy and SLNBx, revealing [id number] disease referred for adjuvant radiation therapy. She will not be receiving chemotherapy.  Oncotype score is 15.  I recommend adjuvant XRT to improve local control and overall survival.  Plan is to treat the affected breast based on multiple randomized prospective data which show decrease risk of local recurrence by ~50% with the addition of XRT to BCS and the EBCTCG metaanalysis published in Lancet 2011 which shows that for every 4 recurrences avoided by year 10 one breast cancer death is avoided by year 15.  Given her margin she will also receive a left breast cavity boost based EORTC and Robb Boost Trials.  The risks, benefits, treatment rationale and regimen of radiation therapy to the left breast were discussed.  Risks include but are not limited to skin erythema, desquamation, hyperpigmentation, breast and lymphedema, fibrosis, telengectasia, pneumonitis, cardiac toxicity, rib fracture and secondary malignancy. The patient consented to therapy and is scheduled for a CT simulation. Treatment will start within 1 week.    Additional problem list to be addressed in the following manner:  1. Systemic Treatment in Postmenopausal Woman: Oncotype score of 15, so no systemic chemo recommended.  Will f/u with Med Onc 1-2 weeks after XRT completed to discuss adjuvant endocrine therapy.   There was ample time for questions and all were answered to the patient's satisfaction. Thank you for allowing me to participate in the care of this pleasant patient. If you have any questions, please do not hesitate to contact my office.    Sincerely,  Nya Gaxiola MD

## 2019-06-14 NOTE — TELEPHONE ENCOUNTER
Patient contacted for reminder call, informed of appointment with Dr. Gaxiola on 06/17/2019 at 1000 at [facility].  Patient confirmed appointment date and time and had no questions at this time.        Veda Kellogg MA

## 2019-06-17 ENCOUNTER — OFFICE VISIT (OUTPATIENT)
Dept: RADIATION ONCOLOGY | Facility: CLINIC | Age: 69
End: 2019-06-17
Attending: INTERNAL MEDICINE
Payer: COMMERCIAL

## 2019-06-17 VITALS
WEIGHT: 222 LBS | OXYGEN SATURATION: 94 % | BODY MASS INDEX: 41.95 KG/M2 | RESPIRATION RATE: 18 BRPM | HEART RATE: 83 BPM | SYSTOLIC BLOOD PRESSURE: 135 MMHG | TEMPERATURE: 97.2 F | DIASTOLIC BLOOD PRESSURE: 79 MMHG

## 2019-06-17 DIAGNOSIS — C50.812 MALIGNANT NEOPLASM OF OVERLAPPING SITES OF LEFT BREAST IN FEMALE, ESTROGEN RECEPTOR POSITIVE (H): Primary | ICD-10-CM

## 2019-06-17 DIAGNOSIS — Z17.0 MALIGNANT NEOPLASM OF OVERLAPPING SITES OF LEFT BREAST IN FEMALE, ESTROGEN RECEPTOR POSITIVE (H): Primary | ICD-10-CM

## 2019-06-17 DIAGNOSIS — L58.9 RADIATION DERMATITIS: ICD-10-CM

## 2019-06-17 PROCEDURE — 77290 THER RAD SIMULAJ FIELD CPLX: CPT | Performed by: RADIOLOGY

## 2019-06-17 PROCEDURE — 99205 OFFICE O/P NEW HI 60 MIN: CPT | Mod: 25 | Performed by: RADIOLOGY

## 2019-06-17 RX ORDER — MOMETASONE FUROATE 1 MG/G
CREAM TOPICAL DAILY
Qty: 50 G | Refills: 1 | Status: SHIPPED | OUTPATIENT
Start: 2019-06-17 | End: 2019-09-11

## 2019-06-17 ASSESSMENT — PAIN SCALES - GENERAL: PAINLEVEL: NO PAIN (0)

## 2019-06-17 NOTE — PATIENT INSTRUCTIONS
What to expect at your Simulation visit:    You will meet with a Radiation Therapist and other team members who will be doing a planning session called a  simulation  with you. This process will determine your daily treatment.    ~ You will lie on a flat table and have a treatment planning CT scan.  It is important during the scan to hold very still and breathe normally.    ~ Your therapist may construct a body mold to help you hold still for your treatments.    ~ If you are having treatment to the head or neck area you will be fitted with a plastic mesh mask that fits very snugly over your face and neck.     ~ Your therapist will be taking some digital photos that will go in your treatment chart.      ~Your therapist will make marks on your skin and take measurements. Your therapist may ask you about making small tattoos (a permanent small dot) over these marks.  These marks are used to position you daily for your radiation therapy treatments. Please do not wash off any marks until all of your radiation therapy treatments are complete unless you are instructed to do so by your therapist.    ~ Once the simulation is completed it can take from 3 to 10 business days before you start radiation therapy treatments.    ~ You may meet with a nurse who will go over management of treatment side effects and self care during your treatments. The nurse will help to plan care with other departments and physicians if needed.      Please contact [facility] RN with questions or concerns following today's appointment: [phone].    Thank you!

## 2019-06-17 NOTE — RESULT ENCOUNTER NOTE
VM left for return call to review results and recommendations.    Jorge Ocampo RN, BSN, OCN  6/17/2019, 11:05 AM

## 2019-06-17 NOTE — NURSING NOTE
INITIAL PATIENT ASSESSMENT      Diagnosis: Breast cancer (left breast)    Prior radiation therapy: None    Prior chemotherapy: None    Prior hormonal therapy: Patient reports history of oral birth control use for approximately three years, reports history of HRT > 10 years.    Pain Eval:  Denies    Psychosocial  Living arrangements: Lives at home in [place] with , Nam.  Patient is a retired dental assistant.  Fall Risk: independent  MIPS Falls Risk Screening Completed: Yes Result: Negative   referral needs: Not needed    Advanced Directive: Yes - Location: on file in [facility] Chart  Implantable Cardiac Device: No  Authorization To Share Protected Health Information: YES - Date: 10/5/2012    Onset of menarche: age 14  Onset of menopause: Hysterectomy at age 36 due to cysts  Abnormal vaginal bleeding/discharge: No  Are you pregnant? No  Reproductive note: Patient reports history of two pregnancies, one live birth at age 22, denies history of breastfeeding.    Review of Systems      Constitutional: Negative.    HENT: Negative.    Eyes: Negative.    Respiratory: Positive for shortness of breath. Negative for cough, hemoptysis, sputum production and wheezing.         Shortness of breath with activity, relief with rest.  Patient has diagnosis of COPD, reports supplemental oxygen use at home as needed.   Cardiovascular: Negative.    Gastrointestinal: Negative.    Genitourinary: Negative.    Musculoskeletal: Negative.    Skin: Negative.    Neurological: Negative.    Endo/Heme/Allergies: Negative.    Psychiatric/Behavioral: Negative.      Nurse face-to-face time: Level 5:  over 15 min face to face time.

## 2019-06-18 ENCOUNTER — TELEPHONE (OUTPATIENT)
Dept: RADIATION ONCOLOGY | Facility: CLINIC | Age: 69
End: 2019-06-18

## 2019-06-18 NOTE — TELEPHONE ENCOUNTER
Contacted patient to notify of scheduled daily radiation treatments.  Informed patient of radiation treatment start date of 06/24/2019 at 0840.   Reviewed with patient first day of treatment will be 20 minutes and all remaining daily radiation treatments with be 10 minutes.  Informed patient that weekly visits with Dr. Gaxiola will be on Wednesdays during the course of radiation treatment.  Patient verbalized understanding of all information, confirmed appointment dates and times and appointment schedule mailed to patient s home.    Veda Kellogg MA

## 2019-06-19 ENCOUNTER — ANTICOAGULATION THERAPY VISIT (OUTPATIENT)
Dept: ANTICOAGULATION | Facility: CLINIC | Age: 69
End: 2019-06-19
Payer: COMMERCIAL

## 2019-06-19 ENCOUNTER — APPOINTMENT (OUTPATIENT)
Dept: RADIATION ONCOLOGY | Facility: CLINIC | Age: 69
End: 2019-06-19
Payer: COMMERCIAL

## 2019-06-19 DIAGNOSIS — I48.0 AF (PAROXYSMAL ATRIAL FIBRILLATION) (H): ICD-10-CM

## 2019-06-19 DIAGNOSIS — I48.91 ATRIAL FIBRILLATION, UNSPECIFIED TYPE (H): ICD-10-CM

## 2019-06-19 LAB — INR POINT OF CARE: 3.1 (ref 0.9–1.1)

## 2019-06-19 PROCEDURE — 77334 RADIATION TREATMENT AID(S): CPT | Performed by: RADIOLOGY

## 2019-06-19 PROCEDURE — 36416 COLLJ CAPILLARY BLOOD SPEC: CPT

## 2019-06-19 PROCEDURE — 85610 PROTHROMBIN TIME: CPT | Mod: QW

## 2019-06-19 PROCEDURE — 99207 ZZC NO CHARGE NURSE ONLY: CPT

## 2019-06-19 PROCEDURE — 77300 RADIATION THERAPY DOSE PLAN: CPT | Performed by: RADIOLOGY

## 2019-06-19 PROCEDURE — 77295 3-D RADIOTHERAPY PLAN: CPT | Performed by: RADIOLOGY

## 2019-06-19 NOTE — RESULT ENCOUNTER NOTE
Called patient with results and recommendations.  Patient reported that she dropped off the request to her dentist today and we should be hearing from them.  Will connect with patient as soon as we get clearance to schedule.    Jorge Ocampo RN, BSN, OCN  6/19/2019, 1:13 PM

## 2019-06-19 NOTE — PROGRESS NOTES
ANTICOAGULATION FOLLOW-UP CLINIC VISIT    Patient Name:  Latanya Padron  Date:  6/19/2019  Contact Type:  Face to Face    SUBJECTIVE:  Patient Findings     Comments:   Pt will be increasing her veggies/judah K    The patient was assessed for diet, medication, and activity level changes, missed or extra doses, bruising or bleeding, with no problem findings.  Francine Andrew RN          Clinical Outcomes     Negatives:   Major bleeding event, Thromboembolic event, Anticoagulation-related hospital admission, Anticoagulation-related ED visit, Anticoagulation-related fatality    Comments:   Pt will be increasing her veggies/judah K    The patient was assessed for diet, medication, and activity level changes, missed or extra doses, bruising or bleeding, with no problem findings.  Francine Andrew RN             OBJECTIVE    INR Protime   Date Value Ref Range Status   06/19/2019 3.1 (A) 0.9 - 1.1 Final       ASSESSMENT / PLAN  INR assessment THER    Recheck INR In: 3 WEEKS    INR Location Clinic      Anticoagulation Summary  As of 6/19/2019    INR goal:   2.0-3.0   TTR:   75.6 % (3.3 y)   INR used for dosing:   3.1! (6/19/2019)   Warfarin maintenance plan:   5 mg (5 mg x 1) every Mon; 2.5 mg (5 mg x 0.5) all other days   Full warfarin instructions:   5 mg every Mon; 2.5 mg all other days   Weekly warfarin total:   20 mg   No change documented:   Francine Andrew RN   Plan last modified:   Francine Andrew RN (7/26/2017)   Next INR check:   7/10/2019   Target end date:       Indications    AF (paroxysmal atrial fibrillation) (H) [I48.0]  Atrial fibrillation (H) [I48.91]             Anticoagulation Episode Summary     INR check location:       Preferred lab:       Send INR reminders to:   BENIGNO ALVAREZ    Comments:   5 mg tabs, likes card, PM dose      Anticoagulation Care Providers     Provider Role Specialty Phone number    Glen Blue MD [facility] [phone]            See the Encounter  Report to view Anticoagulation Flowsheet and Dosing Calendar (Go to Encounters tab in chart review, and find the Anticoagulation Therapy Visit)    Dosage adjustment made based on physician directed care plan.      Francine Andrew RN

## 2019-06-20 ENCOUNTER — OFFICE VISIT (OUTPATIENT)
Dept: RADIATION ONCOLOGY | Facility: CLINIC | Age: 69
End: 2019-06-20
Payer: COMMERCIAL

## 2019-06-20 DIAGNOSIS — Z17.0 MALIGNANT NEOPLASM OF OVERLAPPING SITES OF LEFT BREAST IN FEMALE, ESTROGEN RECEPTOR POSITIVE (H): Primary | ICD-10-CM

## 2019-06-20 DIAGNOSIS — C50.812 MALIGNANT NEOPLASM OF OVERLAPPING SITES OF LEFT BREAST IN FEMALE, ESTROGEN RECEPTOR POSITIVE (H): Primary | ICD-10-CM

## 2019-06-20 PROCEDURE — 77280 THER RAD SIMULAJ FIELD SMPL: CPT | Performed by: RADIOLOGY

## 2019-06-20 PROCEDURE — 77412 RADIATION TX DELIVERY LVL 3: CPT | Performed by: RADIOLOGY

## 2019-06-20 NOTE — PROGRESS NOTES
Treatment Field Set Up Note-Simple Simulation  Date of Service: 6/20/2019      GINA LEDESMA  MRN: [id number]      Diagnosis: C50.812  Malignant neoplasm of overlapping sites of left female breast        The patient s initial set up and simple simulation on the treatment machine was reviewed prior to treatment to verify following parameters:    1. Patient Positioning / Immobilization  2. Location of the body to be treated  3. Energy, Machine, and Dose for prescription  4. Portal verification   5. Portal Beam Arrangement verification with Dosimetry /Physics Staff      All of the above factors were verified before the initial treatment was delivered. There were no complications encountered.      Veronica Trotter MD  Department of Radiation Oncology

## 2019-06-21 ENCOUNTER — APPOINTMENT (OUTPATIENT)
Dept: RADIATION ONCOLOGY | Facility: CLINIC | Age: 69
End: 2019-06-21
Payer: COMMERCIAL

## 2019-06-21 ENCOUNTER — TELEPHONE (OUTPATIENT)
Dept: FAMILY MEDICINE | Facility: CLINIC | Age: 69
End: 2019-06-21

## 2019-06-21 ENCOUNTER — TELEPHONE (OUTPATIENT)
Dept: ONCOLOGY | Facility: CLINIC | Age: 69
End: 2019-06-21

## 2019-06-21 PROCEDURE — 77412 RADIATION TX DELIVERY LVL 3: CPT | Performed by: RADIOLOGY

## 2019-06-21 NOTE — TELEPHONE ENCOUNTER
Latanya called because she had heard that Dr. Denis is leaving.  I let her know that Dr. Camilo leave date is the end of August and that they are recruiting for a new permanent doctor to come up here.  We also have plans for coverage from [facility] and other oncology sites in the [facility].      All of her questions were answered she no longer needs to hear from Vinh BECK RN.  Closing encounter.

## 2019-06-21 NOTE — TELEPHONE ENCOUNTER
Reason for Call:  Other      Detailed comments: Latanya called and states she just cant stop crying. She said she has been on Effexor and wondering if a increase of this medication would help. Latanya has been diagnosed with cancer, her Daughter is having heart issues and her  had a stroke. Feeling very emotional and also states she would never harm herself, she just cant stop crying. Please advise.     Phone Number Patient can be reached at: Cell number on file:    Telephone Information:   Mobile [phone]       Best Time:      Can we leave a detailed message on this number? YES    Call taken on 6/21/2019 at 1:29 PM by Latia Ramon

## 2019-06-21 NOTE — TELEPHONE ENCOUNTER
Patient will try doubling venlafaxine to 75 mg daily for the weekend.  When I talked to her she seems less overwhelmed and had talked to her sister.  Her sister had said it is okay to cry but as I conversed with patient it sounds like she is not just crying but overwhelmed.  She will let me know Monday how the increase went.

## 2019-06-24 ENCOUNTER — TELEPHONE (OUTPATIENT)
Dept: ONCOLOGY | Facility: CLINIC | Age: 69
End: 2019-06-24

## 2019-06-24 ENCOUNTER — APPOINTMENT (OUTPATIENT)
Dept: RADIATION ONCOLOGY | Facility: CLINIC | Age: 69
End: 2019-06-24
Payer: COMMERCIAL

## 2019-06-24 PROCEDURE — 77412 RADIATION TX DELIVERY LVL 3: CPT | Performed by: RADIOLOGY

## 2019-06-24 NOTE — TELEPHONE ENCOUNTER
Letter received from [facility] informing [facility] that patient  Has had recent dental exam and was given medical clearance of stable dental health. Letter scanned into patient's chart.    Dena Dong CMA

## 2019-06-24 NOTE — TELEPHONE ENCOUNTER
Ivette called in this morning to report that doubling the Effexor dose seems to be helping.  She would be ok with continuing with it.  Please call her if you have any questions.

## 2019-06-25 ENCOUNTER — APPOINTMENT (OUTPATIENT)
Dept: RADIATION ONCOLOGY | Facility: CLINIC | Age: 69
End: 2019-06-25
Payer: COMMERCIAL

## 2019-06-25 ENCOUNTER — HOSPITAL ENCOUNTER (OUTPATIENT)
Dept: CT IMAGING | Facility: CLINIC | Age: 69
Discharge: HOME | End: 2019-06-25
Attending: FAMILY MEDICINE | Admitting: FAMILY MEDICINE
Payer: COMMERCIAL

## 2019-06-25 DIAGNOSIS — Z87.891 HISTORY OF TOBACCO ABUSE: ICD-10-CM

## 2019-06-25 PROCEDURE — G0297 LDCT FOR LUNG CA SCREEN: HCPCS

## 2019-06-25 PROCEDURE — 77412 RADIATION TX DELIVERY LVL 3: CPT | Performed by: RADIOLOGY

## 2019-06-26 ENCOUNTER — OFFICE VISIT (OUTPATIENT)
Dept: RADIATION ONCOLOGY | Facility: CLINIC | Age: 69
End: 2019-06-26
Payer: COMMERCIAL

## 2019-06-26 ENCOUNTER — APPOINTMENT (OUTPATIENT)
Dept: RADIATION ONCOLOGY | Facility: CLINIC | Age: 69
End: 2019-06-26
Payer: COMMERCIAL

## 2019-06-26 ENCOUNTER — TELEPHONE (OUTPATIENT)
Dept: ONCOLOGY | Facility: CLINIC | Age: 69
End: 2019-06-26

## 2019-06-26 VITALS
RESPIRATION RATE: 16 BRPM | TEMPERATURE: 97.2 F | BODY MASS INDEX: 40.15 KG/M2 | OXYGEN SATURATION: 96 % | SYSTOLIC BLOOD PRESSURE: 158 MMHG | HEART RATE: 77 BPM | DIASTOLIC BLOOD PRESSURE: 81 MMHG | WEIGHT: 212.5 LBS

## 2019-06-26 DIAGNOSIS — Z17.0 MALIGNANT NEOPLASM OF OVERLAPPING SITES OF LEFT BREAST IN FEMALE, ESTROGEN RECEPTOR POSITIVE (H): Primary | ICD-10-CM

## 2019-06-26 DIAGNOSIS — L58.9 RADIATION DERMATITIS: ICD-10-CM

## 2019-06-26 DIAGNOSIS — C50.812 MALIGNANT NEOPLASM OF OVERLAPPING SITES OF LEFT BREAST IN FEMALE, ESTROGEN RECEPTOR POSITIVE (H): Primary | ICD-10-CM

## 2019-06-26 PROCEDURE — 77336 RADIATION PHYSICS CONSULT: CPT | Performed by: RADIOLOGY

## 2019-06-26 PROCEDURE — 99207 ZZC DROP WITH A PROCEDURE: CPT | Performed by: RADIOLOGY

## 2019-06-26 PROCEDURE — 77412 RADIATION TX DELIVERY LVL 3: CPT | Performed by: RADIOLOGY

## 2019-06-26 PROCEDURE — 77427 RADIATION TX MANAGEMENT X5: CPT | Performed by: RADIOLOGY

## 2019-06-26 ASSESSMENT — PAIN SCALES - GENERAL: PAINLEVEL: NO PAIN (0)

## 2019-06-26 NOTE — TELEPHONE ENCOUNTER
Greg Denis MD  You 7 minutes ago (11:07 AM)      Okay from oncology standpoint.      Writer relayed message to patient who Patient verbalizes understanding and will call back if needed.    Suri Merritt RN on 6/26/2019 at 11:16 AM

## 2019-06-26 NOTE — NURSING NOTE
Falls Risk Screening Questions - Weekly OTV    1. Have you fallen in the past one week?  No.  2. Have you felt unsteady when walking or standing in the past one week?  No.      If patient answers yes to one of the above questions, radiation oncology RN to be notified.    RN Notified No.    Completed By: Osorio ALEMAN

## 2019-06-26 NOTE — PROGRESS NOTES
[facility]  SPECIALIZING IN BREAKTHROUGHS  Radiation Oncology    On Treatment Visit Note      Latanya Padron      Date: 2019   MRN: [id number]   : 1950  Diagnosis: ER positive left breast cancer      Reason for Visit:  On Radiation Treatment Visit     Treatment Summary to Date  Treatment Site: left breast Current Dose: 1330/5256 cGy Fractions:       Chemotherapy  Chemo concurrent with radx?: No    Subjective:     Early in treatment doing well with no complaints related to radiation.  However has been reflecting on multiple life stressors that she has had over the last couple of years as well as her most recent diagnosis and is quite tearful today in clinic.    Nursing ROS:   Nutrition Alteration  Diet Type: Patient's Preference  Skin  Skin Reaction: 1 - Faint erythema or dry desquamation  Skin Intervention: Mometasone in AM, Aquaphor in PM           Gastrointestinal  Nausea: 0 - None  Genitourinary  Urinary Status: 0 - Normal  Psychosocial  Mood - Anxiety: 1 - Mild mood alteration  Pyschosocial Note: Tearful at times, patient reports her PCP increased her Effexor dose  Pain Assessment  0-10 Pain Scale: 0      Objective:   /81 (BP Location: Right arm, Patient Position: Sitting, Cuff Size: Adult Regular)   Pulse 77   Temp 97.2  F (36.2  C) (Oral)   Resp 16   Wt 96.4 kg (212 lb 8 oz)   SpO2 96%   BMI 40.15 kg/m    Gen: Appears well, in no acute distress  Skin: No erythema    Labs:  CBC RESULTS:   Recent Labs   Lab Test 19  1054   WBC 9.1   RBC 4.36   HGB 12.5   HCT 40.0   MCV 92   MCH 28.7   MCHC 31.3*   RDW 12.5        ELECTROLYTES:  Recent Labs   Lab Test 19  1054      POTASSIUM 3.4   CHLORIDE 102   IVANNA 9.1   CO2 33*   BUN 15   CR 0.91   GLC 96       Assessment:    Tolerating radiation therapy well.  All questions and concerns addressed.  Discussed issues surrounding her grief today.  Given all that she's been through I think that she is  going through a normal grieving process.  That being said, she would definitely benefit from establishing care with our psychotherapy services.    Toxicities:  Fatigue: Grade 0: No toxicity  Nausea: Grade 0: No toxicity  Pain: Grade 0: No toxicity  Dermatitis: Grade 0: No toxicity    Plan:   1. Continue current therapy.    2. Skin care per above  3. Referred to [facility]/psychotherapy services      [facility] chart and setup information reviewed  Ports checked    Medication Review  Med list reviewed with patient?: Yes    Educational Topic Discussed  Education Instructions: Skin Changes during Radiation Therapy, Cancer Related Fatigue        Nya Gaxiola MD    Please do not send letter to referring physician.

## 2019-06-26 NOTE — TELEPHONE ENCOUNTER
Reason for Call:  Other call back    Detailed comments: Pt needs her 2nd shingles shot and wants to know iff she can still have that or not.     Phone Number Patient can be reached at: Cell number on file:    Telephone Information:   Mobile [phone]       Best Time: NA    Can we leave a detailed message on this number? YES    Call taken on 6/26/2019 at 10:25 AM by Sherrell Gottlieb

## 2019-06-27 ENCOUNTER — APPOINTMENT (OUTPATIENT)
Dept: RADIATION ONCOLOGY | Facility: CLINIC | Age: 69
End: 2019-06-27
Payer: COMMERCIAL

## 2019-06-27 PROCEDURE — 77417 THER RADIOLOGY PORT IMAGE(S): CPT | Performed by: RADIOLOGY

## 2019-06-27 PROCEDURE — 77412 RADIATION TX DELIVERY LVL 3: CPT | Performed by: RADIOLOGY

## 2019-06-28 ENCOUNTER — APPOINTMENT (OUTPATIENT)
Dept: RADIATION ONCOLOGY | Facility: CLINIC | Age: 69
End: 2019-06-28
Payer: COMMERCIAL

## 2019-06-28 PROCEDURE — 77412 RADIATION TX DELIVERY LVL 3: CPT | Performed by: RADIOLOGY

## 2019-06-30 DIAGNOSIS — E78.5 HYPERLIPIDEMIA LDL GOAL <130: ICD-10-CM

## 2019-07-01 ENCOUNTER — APPOINTMENT (OUTPATIENT)
Dept: RADIATION ONCOLOGY | Facility: CLINIC | Age: 69
End: 2019-07-01
Payer: COMMERCIAL

## 2019-07-01 PROCEDURE — 77334 RADIATION TREATMENT AID(S): CPT | Performed by: RADIOLOGY

## 2019-07-01 PROCEDURE — 77412 RADIATION TX DELIVERY LVL 3: CPT | Performed by: RADIOLOGY

## 2019-07-01 PROCEDURE — 77307 TELETHX ISODOSE PLAN CPLX: CPT | Performed by: RADIOLOGY

## 2019-07-02 ENCOUNTER — APPOINTMENT (OUTPATIENT)
Dept: RADIATION ONCOLOGY | Facility: CLINIC | Age: 69
End: 2019-07-02
Payer: COMMERCIAL

## 2019-07-02 PROCEDURE — 77412 RADIATION TX DELIVERY LVL 3: CPT | Performed by: RADIOLOGY

## 2019-07-02 NOTE — TELEPHONE ENCOUNTER
Jake Patel,try cell# 1st [phone] and then home # [phone] . Pt calling in regards to below message from the dental office. She would like to know if she can start her med now? Please call to advise. Thank You Linh

## 2019-07-02 NOTE — TELEPHONE ENCOUNTER
"Requested Prescriptions   Pending Prescriptions Disp Refills     atorvastatin (LIPITOR) 10 MG tablet [Pharmacy Med Name: ATORVASTATIN CALCIUM 10MG TABS] 90 tablet 3     Sig: TAKE ONE TABLET BY MOUTH EVERY DAY   Last Written Prescription Date:  7/3/2018  Last Fill Quantity: 90,  # refills: 3   Last office visit: 4/29/2019 with prescribing provider:     Future Office Visit:        Statins Protocol Failed - 6/30/2019  1:42 PM        Failed - LDL on file in past 12 months     Recent Labs   Lab Test 06/20/18  1121   LDL 78           Passed - No abnormal creatine kinase in past 12 months     No lab results found.             Passed - Recent (12 mo) or future (30 days) visit within the authorizing provider's specialty     Patient had office visit in the last 12 months or has a visit in the next 30 days with authorizing provider or within the authorizing provider's specialty.  See \"Patient Info\" tab in inbasket, or \"Choose Columns\" in Meds & Orders section of the refill encounter.              Passed - Medication is active on med list        Passed - Patient is age 18 or older        Passed - No active pregnancy on record        Passed - No positive pregnancy test in past 12 months      Routing refill request to provider for review/approval because:  Labs not current:  LDL  T'd up 1 month for provider review.    Flory Hidalgo RN            "

## 2019-07-03 ENCOUNTER — OFFICE VISIT (OUTPATIENT)
Dept: RADIATION ONCOLOGY | Facility: CLINIC | Age: 69
End: 2019-07-03
Payer: COMMERCIAL

## 2019-07-03 ENCOUNTER — APPOINTMENT (OUTPATIENT)
Dept: RADIATION ONCOLOGY | Facility: CLINIC | Age: 69
End: 2019-07-03
Payer: COMMERCIAL

## 2019-07-03 VITALS
BODY MASS INDEX: 41.95 KG/M2 | WEIGHT: 222 LBS | OXYGEN SATURATION: 95 % | SYSTOLIC BLOOD PRESSURE: 127 MMHG | HEART RATE: 93 BPM | TEMPERATURE: 97.3 F | RESPIRATION RATE: 18 BRPM | DIASTOLIC BLOOD PRESSURE: 69 MMHG

## 2019-07-03 DIAGNOSIS — M81.0 AGE-RELATED OSTEOPOROSIS WITHOUT CURRENT PATHOLOGICAL FRACTURE: ICD-10-CM

## 2019-07-03 DIAGNOSIS — C50.812 MALIGNANT NEOPLASM OF OVERLAPPING SITES OF LEFT BREAST IN FEMALE, ESTROGEN RECEPTOR POSITIVE (H): Primary | ICD-10-CM

## 2019-07-03 DIAGNOSIS — C50.812 MALIGNANT NEOPLASM OF OVERLAPPING SITES OF LEFT BREAST IN FEMALE, ESTROGEN RECEPTOR POSITIVE (H): ICD-10-CM

## 2019-07-03 DIAGNOSIS — Z17.0 MALIGNANT NEOPLASM OF OVERLAPPING SITES OF LEFT BREAST IN FEMALE, ESTROGEN RECEPTOR POSITIVE (H): ICD-10-CM

## 2019-07-03 DIAGNOSIS — Z17.0 MALIGNANT NEOPLASM OF OVERLAPPING SITES OF LEFT BREAST IN FEMALE, ESTROGEN RECEPTOR POSITIVE (H): Primary | ICD-10-CM

## 2019-07-03 PROCEDURE — 77336 RADIATION PHYSICS CONSULT: CPT | Performed by: RADIOLOGY

## 2019-07-03 PROCEDURE — 77427 RADIATION TX MANAGEMENT X5: CPT | Performed by: RADIOLOGY

## 2019-07-03 PROCEDURE — 99207 ZZC NO CHARGE LOS: CPT | Performed by: RADIOLOGY

## 2019-07-03 PROCEDURE — 77412 RADIATION TX DELIVERY LVL 3: CPT | Performed by: RADIOLOGY

## 2019-07-03 RX ORDER — DIPHENHYDRAMINE HYDROCHLORIDE 50 MG/ML
50 INJECTION INTRAMUSCULAR; INTRAVENOUS
Status: CANCELLED
Start: 2019-07-03

## 2019-07-03 RX ORDER — ATORVASTATIN CALCIUM 10 MG/1
TABLET, FILM COATED ORAL
Qty: 30 TABLET | Refills: 0 | Status: SHIPPED | OUTPATIENT
Start: 2019-07-03 | End: 2019-07-23

## 2019-07-03 RX ORDER — MEPERIDINE HYDROCHLORIDE 25 MG/ML
25 INJECTION INTRAMUSCULAR; INTRAVENOUS; SUBCUTANEOUS EVERY 30 MIN PRN
Status: CANCELLED | OUTPATIENT
Start: 2019-07-03

## 2019-07-03 RX ORDER — NALOXONE HYDROCHLORIDE 0.4 MG/ML
.1-.4 INJECTION, SOLUTION INTRAMUSCULAR; INTRAVENOUS; SUBCUTANEOUS
Status: CANCELLED | OUTPATIENT
Start: 2019-07-03

## 2019-07-03 RX ORDER — EPINEPHRINE 1 MG/ML
0.3 INJECTION, SOLUTION, CONCENTRATE INTRAVENOUS EVERY 5 MIN PRN
Status: CANCELLED | OUTPATIENT
Start: 2019-07-03

## 2019-07-03 RX ORDER — ALBUTEROL SULFATE 0.83 MG/ML
2.5 SOLUTION RESPIRATORY (INHALATION)
Status: CANCELLED | OUTPATIENT
Start: 2019-07-03

## 2019-07-03 RX ORDER — EPINEPHRINE 0.3 MG/.3ML
0.3 INJECTION SUBCUTANEOUS EVERY 5 MIN PRN
Status: CANCELLED | OUTPATIENT
Start: 2019-07-03

## 2019-07-03 RX ORDER — ALBUTEROL SULFATE 90 UG/1
1-2 AEROSOL, METERED RESPIRATORY (INHALATION)
Status: CANCELLED
Start: 2019-07-03

## 2019-07-03 RX ORDER — SODIUM CHLORIDE 9 MG/ML
1000 INJECTION, SOLUTION INTRAVENOUS CONTINUOUS PRN
Status: CANCELLED
Start: 2019-07-03

## 2019-07-03 RX ORDER — METHYLPREDNISOLONE SODIUM SUCCINATE 125 MG/2ML
125 INJECTION, POWDER, LYOPHILIZED, FOR SOLUTION INTRAMUSCULAR; INTRAVENOUS
Status: CANCELLED
Start: 2019-07-03

## 2019-07-03 ASSESSMENT — PAIN SCALES - GENERAL: PAINLEVEL: NO PAIN (0)

## 2019-07-03 NOTE — PROGRESS NOTES
[facility] PHYSICIANS  SPECIALIZING IN BREAKTHROUGHS  Radiation Oncology    On Treatment Visit Note      Latanya Padron      Date:7/3/19  MRN: [id number]   : 1950  Diagnosis: ER positive left breast cancer      Reason for Visit:  On Radiation Treatment Visit     Treatment Summary to Date  Treatment Site: left breast Current Dose: 2660/5256 cGy Fractions: 10/20      Chemotherapy  Chemo concurrent with radx?: No    Subjective:     Early in treatment doing well with no complaints related to radiation.          Nursing ROS:   Nutrition Alteration  Diet Type: Patient's Preference  Skin  Skin Reaction: 1 - Faint erythema or dry desquamation  Skin Intervention: Mometasone in AM, Aquaphor in PM           Gastrointestinal  Nausea: 0 - None  Genitourinary  Urinary Status: 0 - Normal  Psychosocial  Mood - Anxiety: 1 - Mild mood alteration  Pyschosocial Note: Tearful at times, patient reports her PCP increased her Effexor dose  Pain Assessment  0-10 Pain Scale: 0      Objective:  /69 (BP Location: Left arm, Patient Position: Chair, Cuff Size: Adult Large)   Pulse 93   Temp 97.3  F (36.3  C) (Oral)   Resp 18   Wt 100.7 kg (222 lb)   SpO2 95%   BMI 41.95 kg/m    Gen: Appears well, in no acute distress  Skin: No erythema    Assessment:    Tolerating radiation therapy well.  All questions and concerns addressed.      Discussed issues surrounding her grief today.  Given all that she's been through I think that she is going through a normal grieving process.  That being said, she would definitely benefit from establishing care with our psychotherapy services.    Toxicities:  Fatigue: Grade 0: No toxicity  Nausea: Grade 0: No toxicity  Pain: Grade 0: No toxicity  Dermatitis: Grade 0: No toxicity    Plan:   1. Continue current therapy.    2. Skin care per above  3.       Mosaiq chart and setup information reviewed  Ports checked    Medication Review  Med list reviewed with patient?: Yes    Educational  Topic Discussed  Education Instructions: Skin Changes during Radiation Therapy, Cancer Related Fatigue    HUBER Gaston MD     [phone]

## 2019-07-03 NOTE — PATIENT INSTRUCTIONS
Please contact [facility] RN with questions or concerns following today's appointment: [phone].    Thank you!

## 2019-07-03 NOTE — TELEPHONE ENCOUNTER
Dr. Denis reviewed the form from [facility] and will put treatment plan in for Prolia.     Will route to him to get plan in and then reach out to infusion to contact patient to get appointment set up.    Nella Andrea, CMA

## 2019-07-03 NOTE — NURSING NOTE
Falls Risk Screening Questions - Weekly OTV    1. Have you fallen in the past one week?  No.  2. Have you felt unsteady when walking or standing in the past one week?  No.      If patient answers yes to one of the above questions, radiation oncology RN to be notified.    RN Notified? No.    Completed By: Jazmin Lawson RN BSN OCN

## 2019-07-03 NOTE — TELEPHONE ENCOUNTER
Called infusion and got patient set up for an appointment for the Prolia Inj w/ Infusion on 7/11/19 at 2 pm.    Called and spoke to patient, let her know that Dr. Denis put in the orders/treatment plan for the Prolia and let her know of the date/time. Patient voiced in understanding.     Nella Andrea, [facility]

## 2019-07-05 ENCOUNTER — APPOINTMENT (OUTPATIENT)
Dept: RADIATION ONCOLOGY | Facility: CLINIC | Age: 69
End: 2019-07-05
Payer: COMMERCIAL

## 2019-07-05 PROCEDURE — 77417 THER RADIOLOGY PORT IMAGE(S): CPT | Performed by: RADIOLOGY

## 2019-07-05 PROCEDURE — 77412 RADIATION TX DELIVERY LVL 3: CPT | Performed by: RADIOLOGY

## 2019-07-08 ENCOUNTER — APPOINTMENT (OUTPATIENT)
Dept: RADIATION ONCOLOGY | Facility: CLINIC | Age: 69
End: 2019-07-08
Payer: COMMERCIAL

## 2019-07-08 PROCEDURE — 77412 RADIATION TX DELIVERY LVL 3: CPT | Performed by: RADIOLOGY

## 2019-07-08 PROCEDURE — 77280 THER RAD SIMULAJ FIELD SMPL: CPT | Performed by: RADIOLOGY

## 2019-07-09 ENCOUNTER — APPOINTMENT (OUTPATIENT)
Dept: RADIATION ONCOLOGY | Facility: CLINIC | Age: 69
End: 2019-07-09
Payer: COMMERCIAL

## 2019-07-09 PROCEDURE — 77412 RADIATION TX DELIVERY LVL 3: CPT | Performed by: RADIOLOGY

## 2019-07-10 ENCOUNTER — APPOINTMENT (OUTPATIENT)
Dept: RADIATION ONCOLOGY | Facility: CLINIC | Age: 69
End: 2019-07-10
Payer: COMMERCIAL

## 2019-07-10 ENCOUNTER — OFFICE VISIT (OUTPATIENT)
Dept: RADIATION ONCOLOGY | Facility: CLINIC | Age: 69
End: 2019-07-10
Payer: COMMERCIAL

## 2019-07-10 VITALS
WEIGHT: 221 LBS | SYSTOLIC BLOOD PRESSURE: 124 MMHG | BODY MASS INDEX: 41.76 KG/M2 | OXYGEN SATURATION: 95 % | DIASTOLIC BLOOD PRESSURE: 77 MMHG | TEMPERATURE: 97 F | HEART RATE: 86 BPM | RESPIRATION RATE: 18 BRPM

## 2019-07-10 DIAGNOSIS — L58.9 RADIATION DERMATITIS: ICD-10-CM

## 2019-07-10 DIAGNOSIS — C50.812 MALIGNANT NEOPLASM OF OVERLAPPING SITES OF LEFT BREAST IN FEMALE, ESTROGEN RECEPTOR POSITIVE (H): Primary | ICD-10-CM

## 2019-07-10 DIAGNOSIS — Z17.0 MALIGNANT NEOPLASM OF OVERLAPPING SITES OF LEFT BREAST IN FEMALE, ESTROGEN RECEPTOR POSITIVE (H): Primary | ICD-10-CM

## 2019-07-10 PROCEDURE — 77412 RADIATION TX DELIVERY LVL 3: CPT | Performed by: RADIOLOGY

## 2019-07-10 PROCEDURE — 99207 ZZC DROP WITH A PROCEDURE: CPT | Performed by: RADIOLOGY

## 2019-07-10 ASSESSMENT — PAIN SCALES - GENERAL: PAINLEVEL: NO PAIN (0)

## 2019-07-10 NOTE — PATIENT INSTRUCTIONS
Please contact [facility] RN with questions or concerns following today's appointment: [phone].    Thank you!

## 2019-07-10 NOTE — PROGRESS NOTES
[facility] PHYSICIANS  SPECIALIZING IN BREAKTHROUGHS  Radiation Oncology    On Treatment Visit Note      Latanya Padron      Date: 7/10/2019   MRN: [id number]   : 1950  Diagnosis: breast cancer      Reason for Visit:  On Radiation Treatment Visit     Treatment Summary to Date  Treatment Site: left breast Current Dose: 3724/5256 cGy Fractions:       Chemotherapy  Chemo concurrent with radx?: No(Dr. Denis)    Subjective:     Patient doing well with no complaints.    Nursing ROS:   Nutrition Alteration  Diet Type: Patient's Preference  Skin  Skin Reaction: 1 - Faint erythema or dry desquamation(no desquamation)  Skin Intervention: Mometasone cream every morning and Aquaphor every evening        Cardiovascular  Respiratory effort: 2 - Mild dyspnea on exertion  Cardio/Resp Note: supplemental oxygen via nasal cannula at home as needed     Genitourinary  Urinary Status: 0 - Normal  Psychosocial  Mood - Anxiety: 0 - Normal  Mood - Depression: 1 - Mild mood alteration  Pyschosocial Note: energy level at baseline  Pain Assessment  0-10 Pain Scale: 0      Objective:   /77 (BP Location: Right arm, Patient Position: Chair, Cuff Size: Adult Large)   Pulse 86   Temp 97  F (36.1  C) (Oral)   Resp 18   Wt 100.2 kg (221 lb)   SpO2 95%   BMI 41.76 kg/m    Gen: Appears well, in no acute distress  Skin: Mild diffuse erythema over treatment field    Labs:  CBC RESULTS:   Recent Labs   Lab Test 19  1054   WBC 9.1   RBC 4.36   HGB 12.5   HCT 40.0   MCV 92   MCH 28.7   MCHC 31.3*   RDW 12.5        ELECTROLYTES:  Recent Labs   Lab Test 19  1054      POTASSIUM 3.4   CHLORIDE 102   IVANNA 9.1   CO2 33*   BUN 15   CR 0.91   GLC 96       Assessment:    Tolerating radiation therapy well.  All questions and concerns addressed.    Toxicities:  Fatigue: Grade 0: No toxicity  Pain: Grade 0: No toxicity  Dermatitis: Grade 1: Faint erythema or dry desquamation    Plan:   1. Continue current  therapy.    2. Skin care per above.      Mosaiq chart and setup information reviewed  Ports checked    Medication Review  Med list reviewed with patient?: Yes  Med list printed and given: Offered and declined    Educational Topic Discussed  Education Instructions: reviewed continued skin cares, patient reports feeling emotionally better this week, has been speaking with her  and her PCP increased her Effexor dose, declines need to see Dr. Hanson at this time        Nya Gaxiola MD    Please do not send letter to referring physician.

## 2019-07-11 ENCOUNTER — APPOINTMENT (OUTPATIENT)
Dept: RADIATION ONCOLOGY | Facility: CLINIC | Age: 69
End: 2019-07-11
Payer: COMMERCIAL

## 2019-07-11 ENCOUNTER — INFUSION THERAPY VISIT (OUTPATIENT)
Dept: INFUSION THERAPY | Facility: CLINIC | Age: 69
End: 2019-07-11
Payer: COMMERCIAL

## 2019-07-11 VITALS
OXYGEN SATURATION: 95 % | SYSTOLIC BLOOD PRESSURE: 137 MMHG | BODY MASS INDEX: 42.36 KG/M2 | RESPIRATION RATE: 16 BRPM | TEMPERATURE: 97.4 F | WEIGHT: 224.2 LBS | DIASTOLIC BLOOD PRESSURE: 77 MMHG | HEART RATE: 91 BPM

## 2019-07-11 DIAGNOSIS — C50.812 MALIGNANT NEOPLASM OF OVERLAPPING SITES OF LEFT BREAST IN FEMALE, ESTROGEN RECEPTOR POSITIVE (H): ICD-10-CM

## 2019-07-11 DIAGNOSIS — Z17.0 MALIGNANT NEOPLASM OF OVERLAPPING SITES OF LEFT BREAST IN FEMALE, ESTROGEN RECEPTOR POSITIVE (H): ICD-10-CM

## 2019-07-11 DIAGNOSIS — M81.0 AGE-RELATED OSTEOPOROSIS WITHOUT CURRENT PATHOLOGICAL FRACTURE: Primary | ICD-10-CM

## 2019-07-11 LAB
CALCIUM SERPL-MCNC: 8.9 MG/DL (ref 8.5–10.1)
CREAT SERPL-MCNC: 0.99 MG/DL (ref 0.52–1.04)
GFR SERPL CREATININE-BSD FRML MDRD: 58 ML/MIN/{1.73_M2}

## 2019-07-11 PROCEDURE — 96372 THER/PROPH/DIAG INJ SC/IM: CPT

## 2019-07-11 PROCEDURE — 77427 RADIATION TX MANAGEMENT X5: CPT | Performed by: RADIOLOGY

## 2019-07-11 PROCEDURE — 82310 ASSAY OF CALCIUM: CPT | Performed by: INTERNAL MEDICINE

## 2019-07-11 PROCEDURE — 36415 COLL VENOUS BLD VENIPUNCTURE: CPT | Performed by: INTERNAL MEDICINE

## 2019-07-11 PROCEDURE — 77336 RADIATION PHYSICS CONSULT: CPT | Performed by: RADIOLOGY

## 2019-07-11 PROCEDURE — 25000128 H RX IP 250 OP 636: Performed by: INTERNAL MEDICINE

## 2019-07-11 PROCEDURE — 77412 RADIATION TX DELIVERY LVL 3: CPT | Performed by: RADIOLOGY

## 2019-07-11 PROCEDURE — 82565 ASSAY OF CREATININE: CPT | Performed by: INTERNAL MEDICINE

## 2019-07-11 RX ADMIN — DENOSUMAB 60 MG: 60 INJECTION SUBCUTANEOUS at 14:24

## 2019-07-11 ASSESSMENT — PAIN SCALES - GENERAL: PAINLEVEL: NO PAIN (0)

## 2019-07-11 NOTE — PROGRESS NOTES
Infusion Nursing Note:  Latanya Padron presents today for Prolia.    Patient seen by provider today: No   present during visit today: Not Applicable.    Note: N/A.    Intravenous Access:  No Intravenous access/labs at this visit.    Treatment Conditions:  Results reviewed, labs MET treatment parameters, ok to proceed with treatment.      Post Infusion Assessment:  Patient tolerated injection without incident.       Discharge Plan:   Discharge instructions reviewed with: Patient.  Patient discharged in stable condition accompanied by: daughter.  Departure Mode: Ambulatory.    Rajani Rubin RN

## 2019-07-12 ENCOUNTER — APPOINTMENT (OUTPATIENT)
Dept: RADIATION ONCOLOGY | Facility: CLINIC | Age: 69
End: 2019-07-12
Payer: COMMERCIAL

## 2019-07-12 PROCEDURE — 77412 RADIATION TX DELIVERY LVL 3: CPT | Performed by: RADIOLOGY

## 2019-07-15 ENCOUNTER — APPOINTMENT (OUTPATIENT)
Dept: RADIATION ONCOLOGY | Facility: CLINIC | Age: 69
End: 2019-07-15
Payer: COMMERCIAL

## 2019-07-15 ENCOUNTER — ANTICOAGULATION THERAPY VISIT (OUTPATIENT)
Dept: ANTICOAGULATION | Facility: CLINIC | Age: 69
End: 2019-07-15
Payer: COMMERCIAL

## 2019-07-15 DIAGNOSIS — I48.0 AF (PAROXYSMAL ATRIAL FIBRILLATION) (H): ICD-10-CM

## 2019-07-15 DIAGNOSIS — I48.91 ATRIAL FIBRILLATION, UNSPECIFIED TYPE (H): ICD-10-CM

## 2019-07-15 LAB — INR POINT OF CARE: 2.8 (ref 0.9–1.1)

## 2019-07-15 PROCEDURE — 77417 THER RADIOLOGY PORT IMAGE(S): CPT | Performed by: RADIOLOGY

## 2019-07-15 PROCEDURE — 99207 ZZC NO CHARGE NURSE ONLY: CPT

## 2019-07-15 PROCEDURE — 36416 COLLJ CAPILLARY BLOOD SPEC: CPT

## 2019-07-15 PROCEDURE — 85610 PROTHROMBIN TIME: CPT | Mod: QW

## 2019-07-15 PROCEDURE — 77412 RADIATION TX DELIVERY LVL 3: CPT | Performed by: RADIOLOGY

## 2019-07-15 NOTE — PROGRESS NOTES
ANTICOAGULATION FOLLOW-UP CLINIC VISIT    Patient Name:  Latanya Padron  Date:  7/15/2019  Contact Type:  Face to Face    SUBJECTIVE:  Patient Findings     Comments:   The patient was assessed for diet, medication, and activity level changes, missed or extra doses, bruising or bleeding, with no problem findings.  Francine Andrew RN          Clinical Outcomes     Negatives:   Major bleeding event, Thromboembolic event, Anticoagulation-related hospital admission, Anticoagulation-related ED visit, Anticoagulation-related fatality    Comments:   The patient was assessed for diet, medication, and activity level changes, missed or extra doses, bruising or bleeding, with no problem findings.  Francine Andrew RN             OBJECTIVE    INR Protime   Date Value Ref Range Status   07/15/2019 2.8 (A) 0.9 - 1.1 Final       ASSESSMENT / PLAN  INR assessment THER    Recheck INR In: 6 WEEKS    INR Location Clinic      Anticoagulation Summary  As of 7/15/2019    INR goal:   2.0-3.0   TTR:   75.4 % (3.3 y)   INR used for dosin.8 (7/15/2019)   Warfarin maintenance plan:   5 mg (5 mg x 1) every Mon; 2.5 mg (5 mg x 0.5) all other days   Full warfarin instructions:   5 mg every Mon; 2.5 mg all other days   Weekly warfarin total:   20 mg   No change documented:   Francine Andrew RN   Plan last modified:   Francine Andrew RN (2017)   Next INR check:   2019   Target end date:       Indications    AF (paroxysmal atrial fibrillation) (H) [I48.0]  Atrial fibrillation (H) [I48.91]             Anticoagulation Episode Summary     INR check location:       Preferred lab:       Send INR reminders to:   [facility] [place]    Comments:   5 mg tabs, likes card, PM dose      Anticoagulation Care Providers     Provider Role Specialty Phone number    Glen Blue MD [facility] [phone]            See the Encounter Report to view Anticoagulation Flowsheet and Dosing Calendar (Go to Encounters tab  in chart review, and find the Anticoagulation Therapy Visit)    Dosage adjustment made based on physician directed care plan.      Francine Andrew RN

## 2019-07-16 ENCOUNTER — APPOINTMENT (OUTPATIENT)
Dept: RADIATION ONCOLOGY | Facility: CLINIC | Age: 69
End: 2019-07-16
Payer: COMMERCIAL

## 2019-07-16 PROCEDURE — 77412 RADIATION TX DELIVERY LVL 3: CPT | Performed by: RADIOLOGY

## 2019-07-17 ENCOUNTER — APPOINTMENT (OUTPATIENT)
Dept: RADIATION ONCOLOGY | Facility: CLINIC | Age: 69
End: 2019-07-17
Payer: COMMERCIAL

## 2019-07-17 ENCOUNTER — OFFICE VISIT (OUTPATIENT)
Dept: RADIATION ONCOLOGY | Facility: CLINIC | Age: 69
End: 2019-07-17
Payer: COMMERCIAL

## 2019-07-17 VITALS
WEIGHT: 219 LBS | RESPIRATION RATE: 18 BRPM | TEMPERATURE: 97 F | DIASTOLIC BLOOD PRESSURE: 82 MMHG | HEART RATE: 94 BPM | OXYGEN SATURATION: 94 % | BODY MASS INDEX: 41.38 KG/M2 | SYSTOLIC BLOOD PRESSURE: 133 MMHG

## 2019-07-17 DIAGNOSIS — L58.9 RADIATION DERMATITIS: ICD-10-CM

## 2019-07-17 DIAGNOSIS — C50.812 MALIGNANT NEOPLASM OF OVERLAPPING SITES OF LEFT BREAST IN FEMALE, ESTROGEN RECEPTOR POSITIVE (H): Primary | ICD-10-CM

## 2019-07-17 DIAGNOSIS — Z17.0 MALIGNANT NEOPLASM OF OVERLAPPING SITES OF LEFT BREAST IN FEMALE, ESTROGEN RECEPTOR POSITIVE (H): Primary | ICD-10-CM

## 2019-07-17 PROCEDURE — 99207 ZZC DROP WITH A PROCEDURE: CPT | Performed by: RADIOLOGY

## 2019-07-17 PROCEDURE — 77412 RADIATION TX DELIVERY LVL 3: CPT | Performed by: RADIOLOGY

## 2019-07-17 ASSESSMENT — PAIN SCALES - GENERAL: PAINLEVEL: NO PAIN (0)

## 2019-07-17 NOTE — PROGRESS NOTES
[facility] PHYSICIANS  SPECIALIZING IN BREAKTHROUGHS  Radiation Oncology    On Treatment Visit Note      Latanya Padron      Date: 2019   MRN: [id number]   : 1950  Diagnosis: breast cancer      Reason for Visit:  On Radiation Treatment Visit     Treatment Summary to Date  Treatment Site: left breast Current Dose: 5006/5256 cGy Fractions:       Chemotherapy  Chemo concurrent with radx?: No(Dr. Denis)    Subjective:     Has been using antifungal and inframammary fold with initial improvement.  However yesterday she was perspiring a lot and today she notes more erythema in this area.  Denies any other complaints.    Nursing ROS:   Nutrition Alteration  Diet Type: Patient's Preference  Skin  Skin Reaction: 1 - Faint erythema or dry desquamation(no desquamation)  Skin Intervention: Mometasone cream every morning and Aquaphor every evening  Skin Progress: reviewed application of Aquaphor daily to left axilla, reviewed continued skin cares using Aquaphor for the next one month, Dr. Gaxiola to provide recommendations on when to discontinue use of Mometasone cream  Skin Note: patient using Clotrimazole powder along left inframammary fold for appearance of fungal/heat rash        Cardiovascular  Respiratory effort: 2 - Mild dyspnea on exertion  Cardio/Resp Note: supplemental oxygen via nasal cannula at home as needed     Genitourinary  Urinary Status: 0 - Normal  Psychosocial  Mood - Anxiety: 0 - Normal  Mood - Depression: 0 - Normal  Pyschosocial Note: energy level at baseline  Pain Assessment  0-10 Pain Scale: 0      Objective:   /82 (BP Location: Right arm, Patient Position: Chair, Cuff Size: Adult Regular)   Pulse 94   Temp 97  F (36.1  C) (Oral)   Resp 18   Wt 99.3 kg (219 lb)   SpO2 94%   BMI 41.38 kg/m    Gen: Appears well, in no acute distress  Skin: Mild diffuse erythema over treatment field    Labs:  CBC RESULTS:   Recent Labs   Lab Test 19  1054   WBC 9.1   RBC 4.36    HGB 12.5   HCT 40.0   MCV 92   MCH 28.7   MCHC 31.3*   RDW 12.5        ELECTROLYTES:  Recent Labs   Lab Test 07/11/19  1326 04/29/19  1054   NA  --  139   POTASSIUM  --  3.4   CHLORIDE  --  102   IVANNA 8.9 9.1   CO2  --  33*   BUN  --  15   CR 0.99 0.91   GLC  --  96       Assessment:    Tolerating radiation therapy well.  All questions and concerns addressed.    Toxicities:  Fatigue: Grade 0: No toxicity  Pain: Grade 0: No toxicity  Dermatitis: Grade 1: Faint erythema or dry desquamation    Plan:   1. Continue current therapy.    2. Stop mometasone on last day of treatment. Continue with Aquaphor only.  3. Continue with antifungal powder in inframammary fold for ~2-4 weeks to clear infection.  Recommend follow up with PCP if it doesn't resolve.  4. Pt asked to not use Aquaphor in inframammary fold.      Mosaiq chart and setup information reviewed  Ports checked    Medication Review  Med list reviewed with patient?: Yes  Med list printed and given: Offered and declined    Educational Topic Discussed  Education Instructions: follow-up with Dr. Gaxiola in two months scheduled 9/11/2019, in-basket message sent to Jorge Ocampo RN to assist in scheduling follow-up with Dr. Adeel Gaxiola MD    Please do not send letter to referring physician.

## 2019-07-17 NOTE — PATIENT INSTRUCTIONS
Managing radiation side effects after treatment has ended    The side effects of radiation therapy should gradually decrease in 2 to 3 weeks after you have finished radiation.  Some effects take longer to resolve.    Fatigue caused by radiation therapy will decrease and your energy will improve.    Skin reactions:  Skin changes (such as redness or irritation) will slowly begin to get better. Some people may have a permanent change in skin color.  Their skin may be more pink or  tan  than the untreated skin. The skin may be thinner or more fragile than before treatment.  Continue to use a gentle moisturizing lotion for several months.  You should always protect the skin in the area that was treated by using sunscreen of SPF30 or higher.      Other skin care instructions: continue with daily use of Aquaphor to left breast for the next one month.        For concerns or questions call [facility] [phone]

## 2019-07-18 ENCOUNTER — ONCOLOGY VISIT (OUTPATIENT)
Dept: ONCOLOGY | Facility: CLINIC | Age: 69
End: 2019-07-18
Payer: COMMERCIAL

## 2019-07-18 ENCOUNTER — TELEPHONE (OUTPATIENT)
Dept: ORTHOPEDICS | Facility: OTHER | Age: 69
End: 2019-07-18

## 2019-07-18 ENCOUNTER — APPOINTMENT (OUTPATIENT)
Dept: RADIATION ONCOLOGY | Facility: CLINIC | Age: 69
End: 2019-07-18
Payer: COMMERCIAL

## 2019-07-18 VITALS
WEIGHT: 223 LBS | BODY MASS INDEX: 42.1 KG/M2 | HEART RATE: 102 BPM | RESPIRATION RATE: 18 BRPM | OXYGEN SATURATION: 94 % | HEIGHT: 61 IN | DIASTOLIC BLOOD PRESSURE: 88 MMHG | TEMPERATURE: 97.9 F | SYSTOLIC BLOOD PRESSURE: 128 MMHG

## 2019-07-18 DIAGNOSIS — Z17.0 MALIGNANT NEOPLASM OF OVERLAPPING SITES OF LEFT BREAST IN FEMALE, ESTROGEN RECEPTOR POSITIVE (H): Primary | ICD-10-CM

## 2019-07-18 DIAGNOSIS — C50.812 MALIGNANT NEOPLASM OF OVERLAPPING SITES OF LEFT BREAST IN FEMALE, ESTROGEN RECEPTOR POSITIVE (H): Primary | ICD-10-CM

## 2019-07-18 PROCEDURE — 77427 RADIATION TX MANAGEMENT X5: CPT | Performed by: RADIOLOGY

## 2019-07-18 PROCEDURE — 99214 OFFICE O/P EST MOD 30 MIN: CPT | Performed by: INTERNAL MEDICINE

## 2019-07-18 PROCEDURE — 77336 RADIATION PHYSICS CONSULT: CPT | Performed by: RADIOLOGY

## 2019-07-18 PROCEDURE — 77412 RADIATION TX DELIVERY LVL 3: CPT | Performed by: RADIOLOGY

## 2019-07-18 RX ORDER — ANASTROZOLE 1 MG/1
1 TABLET ORAL DAILY
Qty: 30 TABLET | Refills: 3 | Status: SHIPPED | OUTPATIENT
Start: 2019-07-18 | End: 2019-10-15

## 2019-07-18 ASSESSMENT — PAIN SCALES - GENERAL: PAINLEVEL: NO PAIN (0)

## 2019-07-18 ASSESSMENT — MIFFLIN-ST. JEOR: SCORE: 1478.9

## 2019-07-18 NOTE — PROGRESS NOTES
DATE OF VISIT: Jul 18, 2019    REASON FOR REFERRAL:   Invasive lobular carcinoma left breast    HISTORY OF PRESENT ILLNESS:     69-year-old female with past medical history significant for Atrial fibrillation, hypertension    09/2018 Screening mammogram showed an area of architectural distortion the left breast.Ultrasound breast did not show any sonographic abnormality and recommendation was for a 6 month follow-up mammogram    04/2019 Mammogram and ultrasound showed a heterogenous mass in the left breast measuring 1.1 x 0.5 x 0.5 cm. Centimeter biopsy of the mass came back showing invasive lobular carcinoma, Grade 2, ER/TX positive and HER-2/tulio negative by FISH.MRI breast bilateral shoulder 2 cm mass like enhancement in the left breast corresponding to the biopsy-proven carcinoma with no evidence of multifocal disease or axillary lymphadenopathy.     05/15/19 Lumpectomy with SNL dissection. Oncotype DX testing.Recurrence score came back at 11 putting patient in the low-risk category and so did not recommend adjuvant systemic chemotherapy     06-07/2019 Adjuvant RT    Interval History  In clinic today to proceed with hormonal therapy  Compleetd RT todaya dn tolerated it well except for skin rash.  Denies breast pain, skin changes, nipple discharge, and fatigue, weight loss, dyspnea, cough, abdominal pain, bone pain, headache, visual symptoms or any other complaints. Good appetite and energy levels      REVIEW OF SYSTEMS:      ROS: 14 point ROS neg other than the symptoms noted above in the HPI.    PAST MEDICAL HISTORY:   Past Medical History:   Diagnosis Date     Breast cancer (H) 04/12/2019    Left Breast     COPD (chronic obstructive pulmonary disease) (H)      Mixed hyperlipidemia      PONV (postoperative nausea and vomiting)      S/P radiation therapy     5,256 cGy to left breast completed on 7/18/2019 - [facility]     PHYSICAL EXAMINATION:   /88   Pulse 102   Temp 97.9  F (36.6  C) (Temporal)  "  Resp 18   Ht 1.549 m (5' 1\")   Wt 101.2 kg (223 lb)   SpO2 94%   BMI 42.14 kg/m    Wt Readings from Last 10 Encounters:   07/18/19 101.2 kg (223 lb)   07/17/19 99.3 kg (219 lb)   07/11/19 101.7 kg (224 lb 3.2 oz)   07/10/19 100.2 kg (221 lb)   07/03/19 100.7 kg (222 lb)   06/26/19 96.4 kg (212 lb 8 oz)   06/17/19 100.7 kg (222 lb)   06/12/19 100.8 kg (222 lb 4.8 oz)   06/10/19 100.7 kg (222 lb)   05/28/19 100.7 kg (222 lb)      ECOG performance status: 0  Exam:  Constitutional: healthy, alert and no distress  Head: Normocephalic.   Neck: Neck supple.   Breast Skin rash at RT site  Gastrointestinal: Abdomen soft, non-tender.   Musculoskeletal: extremities normal-  Skin: no suspicious lesions or rashes  Neurologic: Gait normal. Sensation grossly WNL.  Psychiatric: mentation appears normal     ASSESSMENT AND PLAN:    69-year-old postmenopausal female with past medical history of atrial fibrillation, hypertension     - Invasive lobular carcinoma  pT2N0  ER/NJ positive and HER-2/tulio negative by FISH    Pt was informed that she is Definitely a candidate for adjuvant hormonal therapy of at least 5 years given Hormone receptor positive breast And its role in decreasing recurrence and improving survival.  Given postmenopausal, agent of choice would be an aromatase inhibitor. Will start her on Arimidex 1 mg daily for 5 years duration. Side effect profile discussed inclyuding risk of arthralgias/myalgias, bone loss, hot flashes, cardiovascular risk and patient is agreeable.   Annual surveillance mammograms  Given history of breast cancer In multiple relatives, Patient meets criteria for genetic testing and would suggest that.    - Osteoporosis  Prolia q 6 months  Ca/it D supplementation  DEXA q 2 years    - Atrial fibrillation  On Coumadin for anticoagulation    - Hypertension  lisinopril and hydrochlorothiazide per primary caregiver    - Hyperlipidemia  On statin      RTC in 3 months for follow up    The patient is " ready to learn, no apparent learning barriers were identified, Diagnosis and treatment plans were explained to the patient. The patient expressed understanding of the content. The patient questions were answered to her satisfaction.    Chart documentation with Dragon Voice recognition Software. Although reviewed after completion, some words and grammatical errors may remain.    Greg Denis MD  Attending Physician   Hematology/Medical Oncology

## 2019-07-18 NOTE — NURSING NOTE
DISCHARGE PLAN:  Next appointments: See patient instruction section  Departure Mode: Ambulatory  Accompanied by: self  3 minutes for nursing discharge (face to face time)     Latanya K Nereida is here today for Oncology follow up after radiation.  Writing nurse seen patient after Medical Oncology appointment to address questions/concerns/coordinate care. Patient to follow up in 3 months.  Will start Arimidex now.  Patient would like to be seen in [place].  Appointments scheduled.  See patient instructions and Oncologist's Progress note for further details. Questions and concerns addressed to patient's satisfaction. Patient verbalized and demonstrated understanding of plan.  Contact information provided and patient is encouraged to call with any that arise in the interim of care.    Jorge Ocampo, RN, BSN, OCN   Oncology Care Coordinator RN  [facility]  [phone]  7/18/2019, 2:41 PM

## 2019-07-18 NOTE — TELEPHONE ENCOUNTER
Reason for Call:  Other call back    Detailed comments: Pt called asking if her inj would be covered tomorrow. She stated that someone from this office was going to check it out and let her know. Please call her and let her know.     Phone Number Patient can be reached at:[phone]    Best Time: NA     Can we leave a detailed message on this number? YES    Call taken on 7/18/2019 at 8:39 AM by Sherrell Gottlieb

## 2019-07-18 NOTE — PATIENT INSTRUCTIONS
Today:  Ok to start Arimidex.    Please follow up with Oncology in [place] in 3 months.  Office visit only.      Office visit follow up in [place] Date/Time: 10/15/19 at 11:15 with Dr. Poole    If you have any questions or concerns please feel free to call.    If you need to reschedule please call:  Clinic or Lab Appointment - [phone]  Infusion - [phone]  Imaging - [phone]    Jorge Ocampo RN, BSN, OCN  [facility]   Oncology/Hematology Care Coordinator RN  [facility]  [phone]

## 2019-07-18 NOTE — Clinical Note
7/18/2019         RE: Latanya Padron  [address] [facility] [address]        Dear Colleague,    Thank you for referring your patient, Latanya Padron, to the [facility]. Please see a copy of my visit note below.    DATE OF VISIT: Jul 18, 2019    REASON FOR REFERRAL:   Invasive lobular carcinoma left breast    HISTORY OF PRESENT ILLNESS:     69-year-old female with past medical history significant for Atrial fibrillation, hypertension    09/2018 Screening mammogram showed an area of architectural distortion the left breast.Ultrasound breast did not show any sonographic abnormality and recommendation was for a 6 month follow-up mammogram    04/2019 Mammogram and ultrasound showed a heterogenous mass in the left breast measuring 1.1 x 0.5 x 0.5 cm. Centimeter biopsy of the mass came back showing invasive lobular carcinoma, Grade 2, ER/SC positive and HER-2/tulio negative by FISH.MRI breast bilateral shoulder 2 cm mass like enhancement in the left breast corresponding to the biopsy-proven carcinoma with no evidence of multifocal disease or axillary lymphadenopathy.     05/15/19 Lumpectomy with SNL dissection. Oncotype DX testing.Recurrence score came back at 11 putting patient in the low-risk category and so did not recommend adjuvant systemic chemotherapy     06-07/2019 Adjuvant RT    Interval History  In clinic today to proceed with hormonal therapy  Compleetd RT todaya dn tolerated it well except for skin rash.  Denies breast pain, skin changes, nipple discharge, and fatigue, weight loss, dyspnea, cough, abdominal pain, bone pain, headache, visual symptoms or any other complaints. Good appetite and energy levels      REVIEW OF SYSTEMS:      ROS: 14 point ROS neg other than the symptoms noted above in the HPI.    PAST MEDICAL HISTORY:   Past Medical History:   Diagnosis Date     Breast cancer (H) 04/12/2019    Left Breast     COPD (chronic obstructive pulmonary disease) (H)      Mixed  "hyperlipidemia      PONV (postoperative nausea and vomiting)      S/P radiation therapy     5,256 cGy to left breast completed on 7/18/2019 - [facility]     PHYSICAL EXAMINATION:   /88   Pulse 102   Temp 97.9  F (36.6  C) (Temporal)   Resp 18   Ht 1.549 m (5' 1\")   Wt 101.2 kg (223 lb)   SpO2 94%   BMI 42.14 kg/m     Wt Readings from Last 10 Encounters:   07/18/19 101.2 kg (223 lb)   07/17/19 99.3 kg (219 lb)   07/11/19 101.7 kg (224 lb 3.2 oz)   07/10/19 100.2 kg (221 lb)   07/03/19 100.7 kg (222 lb)   06/26/19 96.4 kg (212 lb 8 oz)   06/17/19 100.7 kg (222 lb)   06/12/19 100.8 kg (222 lb 4.8 oz)   06/10/19 100.7 kg (222 lb)   05/28/19 100.7 kg (222 lb)      ECOG performance status: 0  Exam:  Constitutional: healthy, alert and no distress  Head: Normocephalic.   Neck: Neck supple.   Breast Skin rash at RT site  Gastrointestinal: Abdomen soft, non-tender.   Musculoskeletal: extremities normal-  Skin: no suspicious lesions or rashes  Neurologic: Gait normal. Sensation grossly WNL.  Psychiatric: mentation appears normal     ASSESSMENT AND PLAN:    69-year-old postmenopausal female with past medical history of atrial fibrillation, hypertension     - Invasive lobular carcinoma  pT2N0  ER/OK positive and HER-2/tulio negative by FISH    Pt was informed that she is Definitely a candidate for adjuvant hormonal therapy of at least 5 years given Hormone receptor positive breast And its role in decreasing recurrence and improving survival.  Given postmenopausal, agent of choice would be an aromatase inhibitor. Will start her on Arimidex 1 mg daily for 5 years duration. Side effect profile discussed inclyuding risk of arthralgias/myalgias, bone loss, hot flashes, cardiovascular risk and patient is agreeable.   Annual surveillance mammograms  Given history of breast cancer In multiple relatives, Patient meets criteria for genetic testing and would suggest that.    - Osteoporosis  Prolia q 6 months  Ca/it D " supplementation  DEXA q 2 years    - Atrial fibrillation  On Coumadin for anticoagulation    - Hypertension  lisinopril and hydrochlorothiazide per primary caregiver    - Hyperlipidemia  On statin      RTC in 3 months for follow up    The patient is ready to learn, no apparent learning barriers were identified, Diagnosis and treatment plans were explained to the patient. The patient expressed understanding of the content. The patient questions were answered to her satisfaction.    Chart documentation with Dragon Voice recognition Software. Although reviewed after completion, some words and grammatical errors may remain.    Greg Denis MD  Attending Physician   Hematology/Medical Oncology        Again, thank you for allowing me to participate in the care of your patient.        Sincerely,        Greg Denis MD

## 2019-07-19 ENCOUNTER — TELEPHONE (OUTPATIENT)
Dept: ONCOLOGY | Facility: CLINIC | Age: 69
End: 2019-07-19

## 2019-07-19 ENCOUNTER — OFFICE VISIT (OUTPATIENT)
Dept: ORTHOPEDICS | Facility: CLINIC | Age: 69
End: 2019-07-19
Payer: COMMERCIAL

## 2019-07-19 VITALS
WEIGHT: 223 LBS | BODY MASS INDEX: 42.1 KG/M2 | SYSTOLIC BLOOD PRESSURE: 133 MMHG | DIASTOLIC BLOOD PRESSURE: 75 MMHG | HEIGHT: 61 IN

## 2019-07-19 DIAGNOSIS — M17.11 PRIMARY OSTEOARTHRITIS OF RIGHT KNEE: Primary | ICD-10-CM

## 2019-07-19 DIAGNOSIS — M22.2X1 PATELLOFEMORAL PAIN SYNDROME OF RIGHT KNEE: ICD-10-CM

## 2019-07-19 PROCEDURE — 20610 DRAIN/INJ JOINT/BURSA W/O US: CPT | Mod: RT | Performed by: PHYSICIAN ASSISTANT

## 2019-07-19 ASSESSMENT — PAIN SCALES - GENERAL: PAINLEVEL: MILD PAIN (2)

## 2019-07-19 ASSESSMENT — MIFFLIN-ST. JEOR: SCORE: 1478.9

## 2019-07-19 NOTE — PROGRESS NOTES
Prior to injection, verified patient identity using patient's name and date of birth.  Due to injection administration, patient instructed to remain in clinic for 15 minutes  afterwards, and to report any adverse reaction to me immediately.    Joint injection was performed.      Drug Amount Wasted:  None.  Vial/Syringe: Single dose vial  : [email or web address]  EXPIRATION DATE:  8/31/21     Esteban Taylor PA-C

## 2019-07-19 NOTE — TELEPHONE ENCOUNTER
Reason for Call:  Other call back    Detailed comments: How much of there vit D and calcium should elana be taking. I did not see anything in Dr. Camilo notes could you take a look and call pt to advise. Thank You Linh    Phone Number Patient can be reached at: Home number on file [phone] (home) or Cell #  Can we leave a detailed message on this number? YES    Call taken on 7/19/2019 at 10:03 AM by Linh Mcbride

## 2019-07-19 NOTE — LETTER
"    7/19/2019         RE: Latanya Padron  [address] [facility] [address]        Dear Colleague,    Thank you for referring your patient, Latanya Padron, to the [facility]. Please see a copy of my visit note below.      Prior to injection, verified patient identity using patient's name and date of birth.  Due to injection administration, patient instructed to remain in clinic for 15 minutes  afterwards, and to report any adverse reaction to me immediately.    Joint injection was performed.      Drug Amount Wasted:  None.  Vial/Syringe: Single dose vial  : [employer]  EXPIRATION DATE:  8/31/21     Esteban Taylor PA-C           Office Visit-Follow up    Chief Complaint: Latanya Padron is a 69 year old female who is being seen for   Chief Complaint   Patient presents with     RECHECK     f/u rt knee pain lov 6/10/19 inj given       History of Present Illness:   Patient is here in the right knee injection again.  Patient was last injection of cortisone was 6/10/2019 so she is almost 6 weeks out from this.  Her injection prior to this was 2/27/2019.  The last injection worked longer.  Patient feels that she has been much more active recently and whether may have played a factor as to why the injection did not last as long.  On her last visit we did talk about doing a gel injection and she would like to proceed with that instead of the cortisone.  Patient has also tried for treatments Tylenol, Aspercreme, straight leg raising and exercises.    Physical Exam:  Vitals: /75   Ht 1.549 m (5' 1\")   Wt 101.2 kg (223 lb)   BMI 42.14 kg/m     BMI= Body mass index is 42.14 kg/m .  Constitutional: healthy, alert and no acute distress   Psychiatric: mentation appears normal and affect normal/bright  NEURO: no focal deficits, CMS intact right lower extremity   RESP: Normal with easy respirations and no use of accessory muscles to breathe, no audible wheezing or retractions  CV: Calf soft " and nontender to palpation, leg warm   SKIN: No erythema, rashes, excoriation, or breakdown. No evidence of infection.   MUSCULOSKELETAL:    INSPECTION of right  knee: No gross deformities, erythema, edema, ecchymosis, atrophy or fasciculations.     PALPATION: No tenderness on palpation of the medial, lateral, anterior and posterior portion of the knee. No specific joint line tenderness. No increased warmth.  No effusion.     ROM: Able to flex and extend without any catching or locking or pain.    STRENGTH: Able to get on the exam table and off without any problems and able to ambulate without any problems.    SPECIAL TEST: None today.   GAIT: non-antalgic  Lymph: no palpable lymph nodes    Impression: 1.  Right knee degenerative joint disease, patellofemoral.  2.  Right patellofemoral syndrome.    Plan:                                      INJECTION PROCEDURE:  The patient was counseled about an  injection, including discussion of risks (including infection), contents of the injection, rationale for performing the injection, and expected benefits of the injection. The skin was prepped with alcohol and betadine and then utilizing sterile technique an injection of the right knee joint from the anterolateral approach in the seated position was performed. The injection consisted Synvisc-One (6cc). The patient tolerated the injection well, and there were no complications. The injection site was covered with a Band-Aid. The injection was performed by Mark Taylor PA-C      Patient Instructions:   1.   cortisone injection: Your last cortisone injection was on 6/10/2019.  This worked well for you however you have been much more active recently and did not last as long as the last one.  You would like to try gel injection.    2.  Gel injection: We did a Synvisc 1 injection today.  You can do this every 6 months.  The cortisone injections we can do for any years time.    3. Follow up with Esteban Taylor PA-C on an as-needed  basis for in 2 weeks, at that time we'll do a cortisone injection to help the gel injection work better but as we talked about you do not have to do this.  You can save the cortisone injection for another time.    Re-x-ray on return: No    BP Readings from Last 1 Encounters:   07/19/19 133/75       BP noted to be well controlled today in office.      Patient does not use Tobacco products.      This note was dictated with [employer].    Esteban Taylor PA-C     Again, thank you for allowing me to participate in the care of your patient.        Sincerely,        Esteban Taylor PA-C

## 2019-07-19 NOTE — PATIENT INSTRUCTIONS
Encounter Diagnoses   Name Primary?     Primary osteoarthritis of right knee Yes     Patellofemoral pain syndrome of right knee        Rest, ice and elevate above heart level as needed for pain control    1.   cortisone injection: Your last cortisone injection was on 6/10/2019.  This worked well for you however you have been much more active recently and did not last as long as the last one.  You would like to try gel injection.    2.  Gel injection: We did a Synvisc 1 injection today.  You can do this every 6 months.  The cortisone injections we can do for any years time.    3. Follow up with Esteban Taylor PA-C on an as-needed basis for in 2 weeks, at that time we'll do a cortisone injection to help the gel injection work better but as we talked about you do not have to do this.  You can save the cortisone injection for another time.    Gel Injection Instructions:     1. You received your injection(s) of Synvisc 1 into the right knee today.  2. The joint(s) may be more painful for the first 1-2 days.  3. We ask you to continue to rest the joint(s) for a few more days before resuming regular activities.  4.  Pain Medications you can take (as long as your primary care provider allows these meds and you do not have kidney or liver conditions):  Tylenol  Take 1000 mg by mouth every 6 hours as needed; maximum dose 4000 mg a day  Ibuprofen  600 mg every 6 hours as needed; maximum 2400 mg a day  (OK to take tylenol and ibuprofen at the same time)  5. Rest, ice and elevate as needed for pain control  6. Watch for these signs of infection: redness, swelling, drainage, warmth to touch, increased pain, or fever. Call the clinic or make an appointment to be seen if you think you have an infection.  7. Sometimes it can take 6 weeks from the last injection for it to reach its full effect.      Sodium Hyaluronate intra-articular injection  What is this medicine?  SODIUM HYALURONATE (KARRI ariel um yemi al yoor ON ate) is used to treat  pain in the knee due to osteoarthritis.  How should I use this medicine?  This medicine is for injection into the knee joint. It is given by a health care professional in a hospital or clinic setting.  Talk to your pediatrician regarding the use of this medicine in children. Special care may be needed.  What side effects may I notice from receiving this medicine?  Side effects that you should report to your doctor or health care professional as soon as possible:    allergic reactions like skin rash, itching or hives, swelling of the face, lips, or tongue    dizziness    facial flushing    pain, tingling, numbness in the hands or feet    vision changes if received this medicine during eye surgery  Side effects that usually do not require medical attention (Report these to your doctor or health care professional if they continue or are bothersome.):    back pain    bruising at site where injected    chills    diarrhea    fever    headache    joint pain    joint stiffness    joint swelling    muscle cramps    muscle pain    nausea, vomiting    pain, redness, or irritation at site where injected    weak or tired  What may interact with this medicine?  Interactions are not expected.  What if I miss a dose?  This does not apply.  Where should I keep my medicine?  This drug is given in a hospital or clinic and will not be stored at home.  What should I tell my health care provider before I take this medicine?  They need to know if you have any of these conditions:    bleeding disorders    glaucoma    infection in the knee joint    skin conditions or sensitivity    skin infection    an unusual allergic reaction to sodium hyaluronate, other medicines, foods, dyes, or preservatives. Different brands of sodium hyaluronate contain different allergens. Some may contain egg. Talk to your doctor about your allergies to make sure that you get the right product.    pregnant or trying to get pregnant    breast-feeding  What should I  watch for while using this medicine?  Tell your doctor or healthcare professional if your symptoms do not start to get better or if they get worse.  If receiving this medicine for osteoarthritis, limit your activity after you receive your injection. Avoid physical activity for 48 hours following your injection to keep your knee from swelling. Do not stand on your feet for more than 1 hour at a time during the first 48 hours following your injection. Ask your doctor or healthcare professional about when you can begin major physical activity again.  NOTE:This sheet is a summary. It may not cover all possible information. If you have questions about this medicine, talk to your doctor, pharmacist, or health care provider. Copyright  2017 [employer]       [employer] [facility] [employer] may offer reliable information regarding your diagnosis and treatment plan.    THANK YOU for coming in today. If you receive a survey via [employer] or mail please let us know if there was anything you especially appreciated today or if there is any way we can improve our clinic. We appreciate your input.    GENERAL INFORMATION:  Our hours are:  Monday :     Clinic 7:30 AM-430 PM ([facility])  Tuesday:      Operating Room All Day ([facility])  Wednesday: Clinic 7:30 AM - 11:15 AM ([facility])             Clinic 1:00 PM - 4:00PM ([facility])  Thursday:     Administrative Day  Friday:          Clinic 7:30 AM - 11:15 AM ([facility])            Clinic 1:00 PM - 4:00 PM ([facility])      [facility] for any issues or concerns: [phone]      We are not in the office Thursdays. Therefore non- urgent calls and medical messages received on Thursday will be addressed when we are back in the office on Wednesday. Urgent matters will be reviewed and addressed by one of our partners in the office as needed.    If lab work was done today  as part of your evaluation you will generally be contacted via [employer], mail, or phone with the results within 1-5 days. If there is an alarming result we will contact you by phone. Lab results come back at varying times, I generally wait until all labs are resulted before making comments on results. Please note labs are automatically released to [employer] (if you have signed up for it) once available-at times you may see these prior to my having a chance to review them as well.    If you need refills please contact your pharmacist. They will send a refill request to me to review. Please allow 3 business days for us to process all refill requests. All narcotic refills should be handled in the clinic at the time of your visit.

## 2019-07-19 NOTE — PROGRESS NOTES
"Office Visit-Follow up    Chief Complaint: Latanya Padron is a 69 year old female who is being seen for   Chief Complaint   Patient presents with     RECHECK     f/u rt knee pain lov 6/10/19 inj given       History of Present Illness:   Patient is here in the right knee injection again.  Patient was last injection of cortisone was 6/10/2019 so she is almost 6 weeks out from this.  Her injection prior to this was 2/27/2019.  The last injection worked longer.  Patient feels that she has been much more active recently and whether may have played a factor as to why the injection did not last as long.  On her last visit we did talk about doing a gel injection and she would like to proceed with that instead of the cortisone.  Patient has also tried for treatments Tylenol, Aspercreme, straight leg raising and exercises.    Physical Exam:  Vitals: /75   Ht 1.549 m (5' 1\")   Wt 101.2 kg (223 lb)   BMI 42.14 kg/m    BMI= Body mass index is 42.14 kg/m .  Constitutional: healthy, alert and no acute distress   Psychiatric: mentation appears normal and affect normal/bright  NEURO: no focal deficits, CMS intact right lower extremity   RESP: Normal with easy respirations and no use of accessory muscles to breathe, no audible wheezing or retractions  CV: Calf soft and nontender to palpation, leg warm   SKIN: No erythema, rashes, excoriation, or breakdown. No evidence of infection.   MUSCULOSKELETAL:    INSPECTION of right  knee: No gross deformities, erythema, edema, ecchymosis, atrophy or fasciculations.     PALPATION: No tenderness on palpation of the medial, lateral, anterior and posterior portion of the knee. No specific joint line tenderness. No increased warmth.  No effusion.     ROM: Able to flex and extend without any catching or locking or pain.    STRENGTH: Able to get on the exam table and off without any problems and able to ambulate without any problems.    SPECIAL TEST: None today.   GAIT: " non-antalgic  Lymph: no palpable lymph nodes    Impression: 1.  Right knee degenerative joint disease, patellofemoral.  2.  Right patellofemoral syndrome.    Plan:                                      INJECTION PROCEDURE:  The patient was counseled about an  injection, including discussion of risks (including infection), contents of the injection, rationale for performing the injection, and expected benefits of the injection. The skin was prepped with alcohol and betadine and then utilizing sterile technique an injection of the right knee joint from the anterolateral approach in the seated position was performed. The injection consisted Synvisc-One (6cc). The patient tolerated the injection well, and there were no complications. The injection site was covered with a Band-Aid. The injection was performed by Mark Taylor PA-C      Patient Instructions:   1.   cortisone injection: Your last cortisone injection was on 6/10/2019.  This worked well for you however you have been much more active recently and did not last as long as the last one.  You would like to try gel injection.    2.  Gel injection: We did a Synvisc 1 injection today.  You can do this every 6 months.  The cortisone injections we can do for any years time.    3. Follow up with Esteban Taylor PA-C on an as-needed basis for in 2 weeks, at that time we'll do a cortisone injection to help the gel injection work better but as we talked about you do not have to do this.  You can save the cortisone injection for another time.    Re-x-ray on return: No    BP Readings from Last 1 Encounters:   07/19/19 133/75       BP noted to be well controlled today in office.      Patient does not use Tobacco products.      This note was dictated with [employer].    Esteban Taylor PA-C

## 2019-07-22 NOTE — TELEPHONE ENCOUNTER
Patient calling again.  Reviewed up to date and NCCN guidelines to verify and 1200 of Calcium and 800 of Vit D is the recommendations.  Patient reports she thought it was higher.  Reviewed with [facility] and it is [id number] of Vit D.  1000 of Vit D is what patient reports buying right after he recommended it.  Will verify for sure when Dr. Denis returns from [place].    Jorge Ocampo RN, BSN, OCN  7/22/2019, 2:07 PM

## 2019-07-23 DIAGNOSIS — I10 HYPERTENSION, GOAL BELOW 140/90: ICD-10-CM

## 2019-07-23 DIAGNOSIS — E78.5 HYPERLIPIDEMIA LDL GOAL <130: ICD-10-CM

## 2019-07-24 ENCOUNTER — NURSE TRIAGE (OUTPATIENT)
Dept: FAMILY MEDICINE | Facility: CLINIC | Age: 69
End: 2019-07-24

## 2019-07-24 ENCOUNTER — OFFICE VISIT (OUTPATIENT)
Dept: FAMILY MEDICINE | Facility: CLINIC | Age: 69
End: 2019-07-24
Payer: COMMERCIAL

## 2019-07-24 VITALS
SYSTOLIC BLOOD PRESSURE: 132 MMHG | OXYGEN SATURATION: 94 % | RESPIRATION RATE: 16 BRPM | DIASTOLIC BLOOD PRESSURE: 68 MMHG | HEART RATE: 114 BPM | WEIGHT: 219.3 LBS | BODY MASS INDEX: 41.44 KG/M2 | TEMPERATURE: 96.2 F

## 2019-07-24 DIAGNOSIS — L03.113 CELLULITIS OF RIGHT UPPER EXTREMITY: Primary | ICD-10-CM

## 2019-07-24 PROCEDURE — 99213 OFFICE O/P EST LOW 20 MIN: CPT | Performed by: FAMILY MEDICINE

## 2019-07-24 RX ORDER — HYDROCHLOROTHIAZIDE 25 MG/1
TABLET ORAL
Qty: 90 TABLET | Refills: 2 | Status: SHIPPED | OUTPATIENT
Start: 2019-07-24 | End: 2020-04-22

## 2019-07-24 RX ORDER — ATORVASTATIN CALCIUM 10 MG/1
TABLET, FILM COATED ORAL
Qty: 90 TABLET | Refills: 2 | Status: SHIPPED | OUTPATIENT
Start: 2019-07-24 | End: 2020-05-11

## 2019-07-24 RX ORDER — LISINOPRIL 2.5 MG/1
TABLET ORAL
Qty: 90 TABLET | Refills: 2 | Status: SHIPPED | OUTPATIENT
Start: 2019-07-24 | End: 2020-05-11

## 2019-07-24 RX ORDER — MINERAL OIL/HYDROPHIL PETROLAT
OINTMENT (GRAM) TOPICAL PRN
COMMUNITY
End: 2019-09-11

## 2019-07-24 RX ORDER — CEPHALEXIN 500 MG/1
500 CAPSULE ORAL 2 TIMES DAILY
Qty: 14 CAPSULE | Refills: 1 | Status: SHIPPED | OUTPATIENT
Start: 2019-07-24 | End: 2019-09-11

## 2019-07-24 ASSESSMENT — PAIN SCALES - GENERAL: PAINLEVEL: MODERATE PAIN (4)

## 2019-07-24 NOTE — TELEPHONE ENCOUNTER
Per Dr. Blue, pt should be seen. He will see pt when she can get here. I have contacted the pt and put her on the schedule. Cely Lobato CMA ([facility])

## 2019-07-24 NOTE — TELEPHONE ENCOUNTER
".S-(situation) scratch on outside of left wrist Friday night, 7/19/19 , by her dog's toenail.    B-(background): It was storming out and the dog got scared.    A-(assessment): C/O some redness now and greenish looking discharge from the scratch on her wrist.     R-(recommendations):  Should be seen within 24 hours. Will forward to Dr. Blue to see if he wants to see her or treat with RX?  [phone] (home)   Please call and let her know. She uses the [employer]. . DOI: 7/1919.  LOVE Barnhart      Additional Information    Pus or cloudy fluid draining from wound    Wound becomes more painful or swollen, 3 or more days after injury    Answer Assessment - Initial Assessment Questions  1. LOCATION: \"Where is the wound located?\"       Scratch is located on the outside of her left wrist.  2. WOUND APPEARANCE: \"What does the wound look like?\"       It's about an inch by half inch.  Her dog jumped up on me during a storm as she was scared and I got scratched by her fingernail.   3. SIZE: If redness is present, ask: \"What is the size of the red area?\" (Inches, centimeters, or compare to size of a coin)       The whole wrist is kind of red  4. SPREAD: \"What's changed in the last day?\"  \"Do you see any red streaks coming from the wound?\"      NO change in the last 24 hours. I just noticed the gauze pad I took off this AM was looking infected- a greenish discharge.   5. ONSET: \"When did it start to look infected?\"       Yesterday it was looking more red and today the bandage. Gauze was green colored and area around scratch looks more red.   6. MECHANISM: \"How did the wound start, what was the cause?\"       The dog, I did wash it off immediately and then put peroxide on it.   7. PAIN: \"Is there any pain?\" If so, ask: \"How bad is the pain?\"   (Scale 1-10; or mild, moderate, severe)      4/10 at the most.   8. FEVER: \"Do you have a fever?\" If so, ask: \"What is your temperature, how was it measured, and when did it " "start?\"       NO FEVER  9. OTHER SYMPTOMS: \"Do you have any other symptoms?\" (e.g., shaking chills, weakness, rash elsewhere on body)      NO chills, does not feel sick. Wound just looking more sore today.   10. PREGNANCY: \"Is there any chance you are pregnant?\" \"When was your last menstrual period?\"        NA    Protocols used: WOUND INFECTION-A-OH    "

## 2019-07-24 NOTE — PROGRESS NOTES
Subjective     Latanya Padron is a 69 year old female who presents to clinic today for the following health issues:    HPI   Chief Complaint   Patient presents with     Scratch on Arm     scratch on right arm from dog; red, swollen and oozing; happened 5 days ago       Patient recently treated with radiation for her breast cancer.  See phone call for more details of how dog claw scratched right arm.     Patient Active Problem List   Diagnosis     Sprain and strain of unspecified site of knee and leg     Disorder of bone and cartilage     Female stress incontinence     Family history of malignant neoplasm of breast     Hirsutism     HYPERLIPIDEMIA LDL GOAL <130     Advanced directives, counseling/discussion     Tennis elbow     Hypertension, goal below 140/90     Atrial fibrillation (H)     Major depression in complete remission (H)     Morbid obesity, unspecified obesity type (H)     Pulmonary emphysema, unspecified emphysema type (H)     AF (paroxysmal atrial fibrillation) (H)     Primary osteoarthritis of both knees     CKD (chronic kidney disease) stage 3, GFR 30-59 ml/min (H)     Hip pain, right     Multiple joint pain     Neck mass     Trochanteric bursitis of right hip     Segmental dysfunction of sacral region     Segmental dysfunction of lumbar region     Segmental dysfunction of thoracic region     Bilateral low back pain with right-sided sciatica     Malignant neoplasm of overlapping sites of left breast in female, estrogen receptor positive (H)     Age-related osteoporosis without current pathological fracture     Past Surgical History:   Procedure Laterality Date     BIOPSY NODE SENTINEL Left 5/15/2019    Procedure: left sentinel node biopsy;  Surgeon: Donald Aleman MD;  Location: [facility]     BREAST BIOPSY, CORE RT/LT Left 2019     C APPENDECTOMY,W OTHR PROC       C  DELIVERY ONLY           C LIGATE FALLOPIAN TUBE       C NONSPECIFIC PROCEDURE      Exploratory  laparotomy with resection of infarcted segment of omentum and minor lysis of adhesions     C NONSPECIFIC PROCEDURE      Removal of hemorrhagic corpus luteum cyst     C VAGINAL HYSTERECTOMY       COLONOSCOPY  06/21/10     HC BIOPSY OF BREAST, OPEN INCISIONAL Right 1988    Benign per patient     HC CORRECT BUNION,METATARSAL OSTEOTOMY        LAPAROSCOPY, SURGICAL; CHOLECYSTECTOMY  08/04/10     HC SLING OPERATION FOR STRESS INCONTINENCE  2007     HERNIORRHAPHY INCISIONAL (LOCATION)  2011    Procedure:HERNIORRHAPHY INCISIONAL (LOCATION); Surgeon:AYALA HOOVER; Location:[facility]     LAPAROSCOPIC HERNIORRHAPHY INCISIONAL  2011    Procedure:LAPAROSCOPIC HERNIORRHAPHY INCISIONAL; Laparoscopic mesh repair of incarcerated incisional hernia , extensive lysis of adhesions, excision of hernia sac and closure of fascia.; Surgeon:AYALA HOOVER; Location:[facility]     LAPAROSCOPIC LYSIS ADHESIONS  2011    Procedure:LAPAROSCOPIC LYSIS ADHESIONS; Surgeon:AYALA HOOVER; Location:[facility]     MASTECTOMY PARTIAL WITH NEEDLE LOCALIZATION Left 5/15/2019    Procedure: left wire localized partial mastectomy;  Surgeon: Donald Aleman MD;  Location: [facility]     TONSILLECTOMY         Social History     Tobacco Use     Smoking status: Former Smoker     Packs/day: 1.00     Years: 30.00     Pack years: 30.00     Last attempt to quit: 10/25/2005     Years since quittin.7     Smokeless tobacco: Never Used   Substance Use Topics     Alcohol use: No     Alcohol/week: 0.0 oz     Family History   Problem Relation Age of Onset     Breast Cancer Mother      Obesity Mother      Genitourinary Problems Mother      Lipids Mother      Respiratory Sister      Lipids Sister      Breast Cancer Sister 40        s/p mastectomy     Arthritis Sister      Obesity Sister      Heart Failure Sister      Cancer Maternal Grandfather      Cancer Paternal Grandfather         lymph nodes     Heart Disease Father         by pass  "(5)     Cerebrovascular Disease Father         hemorragic     Lipids Father      Alzheimer Disease Maternal Grandmother      Genitourinary Problems Maternal Grandmother      Lipids Sister      Cancer Maternal Uncle         colon     Heart Disease Brother         Hx: stent placement     Lipids Brother         X 2           Currently no fever.  There is some drainage from this wound  Reviewed and updated as needed this visit by Provider         Review of Systems   ROS COMP: Constitutional, HEENT, cardiovascular, pulmonary, gi and gu systems are negative, except as otherwise noted.      Objective    /68   Pulse 114   Temp 96.2  F (35.7  C) (Temporal)   Resp 16   Wt 99.5 kg (219 lb 4.8 oz)   SpO2 94%   BMI 41.44 kg/m    Body mass index is 41.44 kg/m .  Physical Exam   GENERAL: healthy, alert and no distress  SKIN: Skin of the right forearm does have some bruising diffusely.  However the area of concern is distal forearm extensor surface radial aspect she has a skin flap and open area that is about 2 cm x 3 cm.  There is redness around it.  There is some dried crusty drainage with some color.  It is slightly tender to touch.  There is no streaking up the extremity.    Diagnostic Test Results:  Labs reviewed in Epic        Assessment & Plan     1. Cellulitis of right upper extremity  This does have a tough appearance to be consistent with a cellulitis.  Given her other underlying health history at the current time, antibiotics seems most reasonable.  She will otherwise keep the area clean.  She can use antibiotic salve that she has at home if she wants to keep it moist.  She will cover it only if desired.  - cephALEXin (KEFLEX) 500 MG capsule; Take 1 capsule (500 mg) by mouth 2 times daily  Dispense: 14 capsule; Refill: 1     BMI:   Estimated body mass index is 41.44 kg/m  as calculated from the following:    Height as of 7/19/19: 1.549 m (5' 1\").    Weight as of this encounter: 99.5 kg (219 lb 4.8 oz).      "       Patient Instructions   Take antibiotics as ordered.  This should just get bettre  Cover only to keep drainage off other things.       No follow-ups on file.    Glen Darci Blue MD  [facility]

## 2019-07-24 NOTE — TELEPHONE ENCOUNTER
Reason for call:  Patient reporting a symptom    Symptom or request: scratch from dog on wrist     Duration (how long have symptoms been present): happened last Friday     Have you been treated for this before? No    Additional comments: Pt thinks it may be infected. She would like a call with advise.     Phone Number patient can be reached at:  Cell number on file:    Telephone Information:   Mobile [phone]       Best Time:  Any     Can we leave a detailed message on this number:  YES    Call taken on 7/24/2019 at 12:04 PM by Nadja Harrison

## 2019-07-24 NOTE — PATIENT INSTRUCTIONS
Take antibiotics as ordered.  This should just get bettre  Cover only to keep drainage off other things.

## 2019-07-24 NOTE — TELEPHONE ENCOUNTER
"hydrochlorathiazide  Last Written Prescription Date:  7/3/2018  Last Fill Quantity: 90,  # refills: 3   Last office visit: 4/29/2019 with prescribing provider:      Future Office Visit:   Next 5 appointments (look out 90 days)    Sep 11, 2019  9:30 AM CDT  Return Visit with Julita Gaxiola MD  [facility] ([facility]) [id number] [place] [id number]  [phone]   Oct 15, 2019 11:15 AM CDT  Return Visit with Arik Poole MD  [facility] ([facility]) [id number] [place] [phone]           Requested Prescriptions   Pending Prescriptions Disp Refills     hydrochlorothiazide (HYDRODIURIL) 25 MG tablet [Pharmacy Med Name: HYDROCHLOROTHIAZIDE 25MG TABS] 90 tablet 3     Sig: TAKE ONE TABLET BY MOUTH EVERY DAY       Diuretics (Including Combos) Protocol Passed - 7/23/2019 12:01 PM        Passed - Blood pressure under 140/90 in past 12 months     BP Readings from Last 3 Encounters:   07/24/19 132/68   07/19/19 133/75   07/18/19 128/88           Passed - Recent (12 mo) or future (30 days) visit within the authorizing provider's specialty     Patient had office visit in the last 12 months or has a visit in the next 30 days with authorizing provider or within the authorizing provider's specialty.  See \"Patient Info\" tab in inbasket, or \"Choose Columns\" in Meds & Orders section of the refill encounter.            Passed - Medication is active on med list        Passed - Patient is age 18 or older        Passed - No active pregancy on record        Passed - Normal serum creatinine on file in past 12 months     Recent Labs   Lab Test 07/11/19  1326   CR 0.99              Passed - Normal serum potassium on file in past 12 months     Recent Labs   Lab Test 04/29/19  1054   POTASSIUM 3.4            Passed - Normal serum sodium on file in past 12 months     Recent Labs   Lab Test 04/29/19  1054                 " "Passed - No positive pregnancy test in past 12 months   Prescription approved per [facility] Refill Protocol.          Lisinopril  Last Written Prescription Date:  7/3/2018  Last Fill Quantity: 90,  # refills: 3   Last office visit: 4/29/2019 with prescribing provider:      Future Office Visit:   Next 5 appointments (look out 90 days)    Sep 11, 2019  9:30 AM CDT  Return Visit with Julita Gaxiola MD  [facility]) [id number] [place] [phone]  [phone]   Oct 15, 2019 11:15 AM CDT  Return Visit with Arik Poole MD  [facility]) [id number] [place] [phone]  [phone]                lisinopril (PRINIVIL/ZESTRIL) 2.5 MG tablet [Pharmacy Med Name: LISINOPRIL 2.5MG TABS] 90 tablet 3     Sig: TAKE ONE TABLET BY MOUTH EVERY DAY       ACE Inhibitors (Including Combos) Protocol Passed - 7/23/2019 12:01 PM        Passed - Blood pressure under 140/90 in past 12 months     BP Readings from Last 3 Encounters:   07/24/19 132/68   07/19/19 133/75   07/18/19 128/88           Passed - Recent (12 mo) or future (30 days) visit within the authorizing provider's specialty     Patient had office visit in the last 12 months or has a visit in the next 30 days with authorizing provider or within the authorizing provider's specialty.  See \"Patient Info\" tab in inbasket, or \"Choose Columns\" in Meds & Orders section of the refill encounter.              Passed - Medication is active on med list        Passed - Patient is age 18 or older        Passed - No active pregnancy on record        Passed - Normal serum creatinine on file in past 12 months     Recent Labs   Lab Test 07/11/19  1326   CR 0.99             Passed - Normal serum potassium on file in past 12 months     Recent Labs   Lab Test 04/29/19  1054   POTASSIUM 3.4             Passed - No positive pregnancy test within past 12 months     Prescription " "approved per [facility].      atorvastatin  Last Written Prescription Date:  7/3/2019  Last Fill Quantity: 30,  # refills: 0   Last office visit: 4/29/2019 with prescribing provider:      Future Office Visit:   Next 5 appointments (look out 90 days)    Sep 11, 2019  9:30 AM CDT  [facility] Visit with [facility] [id number] [place] [phone]  [phone]   Oct 15, 2019 11:15 AM CDT  [facility] Visit with [facility]k Poole MD  [facility] [id number] [place] [phone]  [phone]              atorvastatin (LIPITOR) 10 MG tablet [Pharmacy Med Name: ATORVASTATIN CALCIUM 10MG TABS] 30 tablet 0     Sig: TAKE ONE TABLET BY MOUTH ONCE DAILY       Statins Protocol Failed - 7/23/2019 12:01 PM        Failed - LDL on file in past 12 months     Recent Labs   Lab Test 06/20/18  1121   LDL 78             Passed - No abnormal creatine kinase in past 12 months     No lab results found.             Passed - Recent (12 mo) or future (30 days) visit within the authorizing provider's specialty     Patient had office visit in the last 12 months or has a visit in the next 30 days with authorizing provider or within the authorizing provider's specialty.  See \"Patient Info\" tab in inbasket, or \"Choose Columns\" in Meds & Orders section of the refill encounter.              Passed - Medication is active on med list        Passed - Patient is age 18 or older        Passed - No active pregnancy on record        Passed - No positive pregnancy test in past 12 months          Prescription approved per [facility] [place].      Kathy Ramon RN on 7/24/2019 at 5:19 PM    "

## 2019-07-29 ENCOUNTER — TELEPHONE (OUTPATIENT)
Dept: FAMILY MEDICINE | Facility: CLINIC | Age: 69
End: 2019-07-29

## 2019-07-29 ENCOUNTER — ANTICOAGULATION THERAPY VISIT (OUTPATIENT)
Dept: ANTICOAGULATION | Facility: CLINIC | Age: 69
End: 2019-07-29
Payer: COMMERCIAL

## 2019-07-29 DIAGNOSIS — I48.0 AF (PAROXYSMAL ATRIAL FIBRILLATION) (H): ICD-10-CM

## 2019-07-29 DIAGNOSIS — I48.91 ATRIAL FIBRILLATION, UNSPECIFIED TYPE (H): ICD-10-CM

## 2019-07-29 LAB — INR POINT OF CARE: 2.7 (ref 0.9–1.1)

## 2019-07-29 PROCEDURE — 99207 ZZC NO CHARGE NURSE ONLY: CPT

## 2019-07-29 PROCEDURE — 36416 COLLJ CAPILLARY BLOOD SPEC: CPT

## 2019-07-29 PROCEDURE — 85610 PROTHROMBIN TIME: CPT | Mod: QW

## 2019-07-29 NOTE — PROGRESS NOTES
ANTICOAGULATION FOLLOW-UP CLINIC VISIT    Patient Name:  Latanya Padron  Date:  2019  Contact Type:  Face to Face    SUBJECTIVE:  Patient Findings     Positives:   Change in medications (keflex - - may do another round. )             OBJECTIVE    INR Protime   Date Value Ref Range Status   2019 2.7 (A) 0.9 - 1.1 Final       ASSESSMENT / PLAN  INR assessment THER    Recheck INR In: 4 WEEKS    INR Location Clinic      Anticoagulation Summary  As of 2019    INR goal:   2.0-3.0   TTR:   75.7 % (3.4 y)   INR used for dosin.7 (2019)   Warfarin maintenance plan:   5 mg (5 mg x 1) every Mon; 2.5 mg (5 mg x 0.5) all other days   Full warfarin instructions:   : 2.5 mg; Otherwise 5 mg every Mon; 2.5 mg all other days   Weekly warfarin total:   20 mg   Plan last modified:   Francine Andrew RN (2017)   Next INR check:   2019   Target end date:       Indications    AF (paroxysmal atrial fibrillation) (H) [I48.0]  Atrial fibrillation (H) [I48.91]             Anticoagulation Episode Summary     INR check location:       Preferred lab:       Send INR reminders to:   [facility]    Comments:   5 mg tabs, likes card, PM dose      Anticoagulation Care Providers     Provider Role Specialty Phone number    Glen Blue MD [facility] [phone]            See the Encounter Report to view Anticoagulation Flowsheet and Dosing Calendar (Go to Encounters tab in chart review, and find the Anticoagulation Therapy Visit)    Dosage adjustment made based on physician directed care plan.      Francine Andrew RN

## 2019-08-05 ENCOUNTER — OFFICE VISIT (OUTPATIENT)
Dept: ORTHOPEDICS | Facility: CLINIC | Age: 69
End: 2019-08-05
Payer: COMMERCIAL

## 2019-08-05 ENCOUNTER — TELEPHONE (OUTPATIENT)
Dept: FAMILY MEDICINE | Facility: CLINIC | Age: 69
End: 2019-08-05

## 2019-08-05 ENCOUNTER — ANTICOAGULATION THERAPY VISIT (OUTPATIENT)
Dept: ANTICOAGULATION | Facility: CLINIC | Age: 69
End: 2019-08-05

## 2019-08-05 VITALS
DIASTOLIC BLOOD PRESSURE: 84 MMHG | HEIGHT: 61 IN | SYSTOLIC BLOOD PRESSURE: 138 MMHG | WEIGHT: 219 LBS | BODY MASS INDEX: 41.35 KG/M2

## 2019-08-05 DIAGNOSIS — Z79.01 LONG TERM CURRENT USE OF ANTICOAGULANTS WITH INR GOAL OF 2.0-3.0: Primary | ICD-10-CM

## 2019-08-05 DIAGNOSIS — Z79.01 LONG TERM CURRENT USE OF ANTICOAGULANTS WITH INR GOAL OF 2.0-3.0: ICD-10-CM

## 2019-08-05 DIAGNOSIS — I48.0 AF (PAROXYSMAL ATRIAL FIBRILLATION) (H): ICD-10-CM

## 2019-08-05 DIAGNOSIS — I48.91 ATRIAL FIBRILLATION, UNSPECIFIED TYPE (H): ICD-10-CM

## 2019-08-05 DIAGNOSIS — M17.11 PRIMARY OSTEOARTHRITIS OF RIGHT KNEE: Primary | ICD-10-CM

## 2019-08-05 LAB — INR BLD: 2.2 (ref 0.86–1.14)

## 2019-08-05 PROCEDURE — 99207 ZZC NO CHARGE NURSE ONLY: CPT | Performed by: FAMILY MEDICINE

## 2019-08-05 PROCEDURE — 36416 COLLJ CAPILLARY BLOOD SPEC: CPT | Performed by: FAMILY MEDICINE

## 2019-08-05 PROCEDURE — 20610 DRAIN/INJ JOINT/BURSA W/O US: CPT | Mod: RT | Performed by: PHYSICIAN ASSISTANT

## 2019-08-05 PROCEDURE — 85610 PROTHROMBIN TIME: CPT | Mod: QW | Performed by: FAMILY MEDICINE

## 2019-08-05 RX ORDER — TRIAMCINOLONE ACETONIDE 40 MG/ML
40 INJECTION, SUSPENSION INTRA-ARTICULAR; INTRAMUSCULAR ONCE
Status: COMPLETED | OUTPATIENT
Start: 2019-08-05 | End: 2019-08-05

## 2019-08-05 RX ADMIN — TRIAMCINOLONE ACETONIDE 40 MG: 40 INJECTION, SUSPENSION INTRA-ARTICULAR; INTRAMUSCULAR at 13:58

## 2019-08-05 ASSESSMENT — MIFFLIN-ST. JEOR: SCORE: 1460.76

## 2019-08-05 NOTE — PROGRESS NOTES
"Office Visit-Follow up    Chief Complaint: Latanya Padron is a 69 year old female who is being seen for   Chief Complaint   Patient presents with     RECHECK     rt knee cortisone inj lov with synvisc inj 7/19/19       History of Present Illness:   Patient is here in follow-up cortisone injection.  This patient had a Synvisc 1 injection on 7/19/2019.  She also had a cortisone injection on 6/10/2019 and another one on 2/27/2019.  She would like to have a cortisone injection after the Synvisc 1 injection because it has not kicked in yet.  She also has been active lately.  She also has been using Aspercreme and Tylenol arthritis at night but has not been helping all that much.  We talked about Voltaren gel today.  She does have not great kidney function.    Physical Exam:  Vitals: /84   Ht 1.549 m (5' 1\")   Wt 99.3 kg (219 lb)   BMI 41.38 kg/m    BMI= Body mass index is 41.38 kg/m .  Constitutional: healthy, alert and no acute distress   Psychiatric: mentation appears normal and affect normal/bright  NEURO: no focal deficits, CMS intact right lower extremity   RESP: Normal with easy respirations and no use of accessory muscles to breathe, no audible wheezing or retractions  CV: Calf soft and nontender to palpation, leg warm   SKIN: No erythema, rashes, excoriation, or breakdown. No evidence of infection.   MUSCULOSKELETAL:    INSPECTION of right knee: No gross deformities, erythema, edema, ecchymosis, atrophy or fasciculations.     PALPATION: No tenderness on palpation of the medial, lateral, anterior and posterior portion of the knee. No specific joint line tenderness. No increased warmth.  No effusion.     ROM: Able to flex and extend without any catching or locking or pain.    STRENGTH: Able to get on the exam table and off without any problems and able to ambulate without any problems.    SPECIAL TEST: None today.   GAIT: non-antalgic  Lymph: no palpable lymph nodes    Impression: 1.  Right knee " degenerative joint disease.    Plan:                                          INJECTION PROCEDURE:  The patient was counseled about an  injection, including discussion of risks (including infection), contents of the injection, rationale for performing the injection, and expected benefits of the injection. The skin was prepped with alcohol and betadine and then utilizing sterile technique an injection of the right knee joint from the anterolateral approach in the seated position was performed. I used tiffany chloride spray prior to doing the injection. The injection consisted 1ml of Kenalog (40mg per 1ml) with 8ml 1% lidocaine plain. The patient tolerated the injection well, and there were no complications. The injection site was covered with a Band-Aid. The injection was performed by Mark Taylor PA-C     Patient Instructions:   1.  Gel injection: The gel injection does not seem to kick in quite yet.  Often times he does not until it has been 6 weeks or so after it.  2.  Cortisone injection: We decided to do a cortisone injection.  This will be your third cortisone injection so you could get another one yet this year.  3.   medication: You are doing Aspercreme and Tylenol arthritis at night.  It does not seem to be helping you all that much.  We can always try Voltaren gel if needed.  You will see how the injection goes and if you need that you will call us.  We cannot do Mobic because of your kidney function.  4. Follow up with Esteban Taylor PA-C on an as needed basis.   Re-x-ray on return: No    BP Readings from Last 1 Encounters:   08/05/19 138/84       BP noted to be well controlled today in office.      Patient does not use Tobacco products.      This note was dictated with [employer].    Esteban Taylor PA-C

## 2019-08-05 NOTE — PROGRESS NOTES
ANTICOAGULATION FOLLOW-UP CLINIC VISIT    Patient Name:  Latanya Padron  Date:  2019  Contact Type:  Telephone    SUBJECTIVE:  Patient Findings         Clinical Outcomes     Negatives:   Major bleeding event, Thromboembolic event, Anticoagulation-related hospital admission, Anticoagulation-related ED visit, Anticoagulation-related fatality           OBJECTIVE    INR Point of Care   Date Value Ref Range Status   2019 2.2 (H) 0.86 - 1.14 Final     Comment:     This test is intended for monitoring Coumadin therapy.  Results are not   accurate in patients with prolonged INR due to factor deficiency.         ASSESSMENT / PLAN  INR assessment THER    Recheck INR In: 3 WEEKS    INR Location Clinic      Anticoagulation Summary  As of 2019    INR goal:   2.0-3.0   TTR:   75.9 % (3.4 y)   INR used for dosin.2 (2019)   Warfarin maintenance plan:   5 mg (5 mg x 1) every Mon; 2.5 mg (5 mg x 0.5) all other days   Full warfarin instructions:   5 mg every Mon; 2.5 mg all other days   Weekly warfarin total:   20 mg   No change documented:   Manolo Damian RN   Plan last modified:   Francine Andrew RN (2017)   Next INR check:   2019   Target end date:       Indications    AF (paroxysmal atrial fibrillation) (H) [I48.0]  Atrial fibrillation (H) [I48.91]             Anticoagulation Episode Summary     INR check location:       Preferred lab:       Send INR reminders to:   [facility]    Comments:   5 mg tabs, likes card, PM dose      Anticoagulation Care Providers     Provider Role Specialty Phone number    Glen Blue MD [facility] [phone]            See the Encounter Report to view Anticoagulation Flowsheet and Dosing Calendar (Go to Encounters tab in chart review, and find the Anticoagulation Therapy Visit)    Dosage adjustment made based on physician directed care plan.    Manolo Damian RN

## 2019-08-05 NOTE — PROGRESS NOTES
Prior to injection, verified patient identity using patient's name and date of birth.  Due to injection administration, patient instructed to remain in clinic for 15 minutes  afterwards, and to report any adverse reaction to me immediately.    Joint injection was performed.      Drug Amount Wasted:  None.  Vial/Syringe: Single dose vial  : [employer]  EXPIRATION DATE:  2/2021  NDC: [address]-4  Lot   [id number]  Esteban Taylor PA-C

## 2019-08-05 NOTE — LETTER
"    8/5/2019         RE: Latanya Padron  [address] [facility] [address]        Dear Colleague,    Thank you for referring your patient, Latanya Padron, to the [facility]. Please see a copy of my visit note below.      Prior to injection, verified patient identity using patient's name and date of birth.  Due to injection administration, patient instructed to remain in clinic for 15 minutes  afterwards, and to report any adverse reaction to me immediately.    Joint injection was performed.      Drug Amount Wasted:  None.  Vial/Syringe: Single dose vial  : [employer]  EXPIRATION DATE:  2/2021  NDC: [address]-0  Lot   [id number]  Esteban Taylor PA-C         Office Visit-Follow up    Chief Complaint: Latanya Padron is a 69 year old female who is being seen for   Chief Complaint   Patient presents with     RECHECK     rt knee cortisone inj lov with synvisc inj 7/19/19       History of Present Illness:   Patient is here in follow-up cortisone injection.  This patient had a Synvisc 1 injection on 7/19/2019.  She also had a cortisone injection on 6/10/2019 and another one on 2/27/2019.  She would like to have a cortisone injection after the Synvisc 1 injection because it has not kicked in yet.  She also has been active lately.  She also has been using Aspercreme and Tylenol arthritis at night but has not been helping all that much.  We talked about Voltaren gel today.  She does have not great kidney function.    Physical Exam:  Vitals: /84   Ht 1.549 m (5' 1\")   Wt 99.3 kg (219 lb)   BMI 41.38 kg/m     BMI= Body mass index is 41.38 kg/m .  Constitutional: healthy, alert and no acute distress   Psychiatric: mentation appears normal and affect normal/bright  NEURO: no focal deficits, CMS intact right lower extremity   RESP: Normal with easy respirations and no use of accessory muscles to breathe, no audible wheezing or retractions  CV: Calf soft and nontender to " palpation, leg warm   SKIN: No erythema, rashes, excoriation, or breakdown. No evidence of infection.   MUSCULOSKELETAL:    INSPECTION of right knee: No gross deformities, erythema, edema, ecchymosis, atrophy or fasciculations.     PALPATION: No tenderness on palpation of the medial, lateral, anterior and posterior portion of the knee. No specific joint line tenderness. No increased warmth.  No effusion.     ROM: Able to flex and extend without any catching or locking or pain.    STRENGTH: Able to get on the exam table and off without any problems and able to ambulate without any problems.    SPECIAL TEST: None today.   GAIT: non-antalgic  Lymph: no palpable lymph nodes    Impression: 1.  Right knee degenerative joint disease.    Plan:                                          INJECTION PROCEDURE:  The patient was counseled about an  injection, including discussion of risks (including infection), contents of the injection, rationale for performing the injection, and expected benefits of the injection. The skin was prepped with alcohol and betadine and then utilizing sterile technique an injection of the right knee joint from the anterolateral approach in the seated position was performed. I used tiffany chloride spray prior to doing the injection. The injection consisted 1ml of Kenalog (40mg per 1ml) with 8ml 1% lidocaine plain. The patient tolerated the injection well, and there were no complications. The injection site was covered with a Band-Aid. The injection was performed by Mark Taylor PA-C     Patient Instructions:   1.  Gel injection: The gel injection does not seem to kick in quite yet.  Often times he does not until it has been 6 weeks or so after it.  2.  Cortisone injection: We decided to do a cortisone injection.  This will be your third cortisone injection so you could get another one yet this year.  3.   medication: You are doing Aspercreme and Tylenol arthritis at night.  It does not seem to be helping  you all that much.  We can always try Voltaren gel if needed.  You will see how the injection goes and if you need that you will call us.  We cannot do Mobic because of your kidney function.  4. Follow up with Esteban Taylor PA-C on an as needed basis.   Re-x-ray on return: No    BP Readings from Last 1 Encounters:   08/05/19 138/84       BP noted to be well controlled today in office.      Patient does not use Tobacco products.      This note was dictated with [employer].    Esteban Taylor PA-C     Again, thank you for allowing me to participate in the care of your patient.        Sincerely,        Esteban Taylor PA-C

## 2019-08-05 NOTE — PATIENT INSTRUCTIONS
Encounter Diagnosis   Name Primary?     Primary osteoarthritis of right knee Yes     Rest, ice and elevate above heart level as needed for pain control  1.  Gel injection: The gel injection does not seem to kick in quite yet.  Often times he does not until it has been 6 weeks or so after it.  2.  Cortisone injection: We decided to do a cortisone injection.  This will be your third cortisone injection so you could get another one yet this year.  3.   medication: You are doing Aspercreme and Tylenol arthritis at night.  It does not seem to be helping you all that much.  We can always try Voltaren gel if needed.  You will see how the injection goes and if you need that you will call us.  We cannot do Mobic because of your kidney function.  4. Follow up with Esteban Taylor PA-C on an as needed basis.   Cortisone Instructions:     1. You received an injection of cortisone into your right knee today.  2. The joint(s) may be more painful for the first 1-2 days.  3. We ask you to continue to rest the joint(s) for a few more days before resuming regular activities.  4. Pain Medications you can take (as long as your primary care provider allows these meds and you do not have kidney or liver conditions):  Tylenol  Take 1000 mg by mouth every 6 hours as needed; maximum dose 4000 mg a day  Ibuprofen  600 mg every 6 hours as needed; maximum 2400 mg a day  (OK to take tylenol and ibuprofen at the same time)  5. Rest, ice and elevate as needed for pain control  6. Watch for these signs of infection: redness, swelling, drainage, warmth to touch, increased pain, or fever. Call the clinic or make an appointment to be seen if you think you have an infection.  7. If you are diabetic, make sure you keep a close eye on your blood sugars, they can get elevated with cortisone injections.   8. Sometimes it can take 1-2 weeks for it to reach its full effect.    Cortisone Injections  Cortisone is a type of steroid. It can greatly reduce  swelling, redness, and irritation (inflammation) and pain. Being injected with cortisone is simple and doesn t take long. Your doctor may ask you questions about your health. Certain health conditions, such as diabetes, can be affected by cortisone.     Your pain may be relieved by a cortisone injection.   Why have a cortisone injection?  Injecting cortisone can relieve pain for anything from a sports injury to arthritis. Your doctor may suggest an injection if rest, splints, or oral medicine doesn t relieve your pain. Injecting cortisone is simpler than having surgery. And cortisone may provide the lasting pain relief that can help you get out and enjoy life again.  Getting the injection  Your doctor will start by cleaning and occasionally numbing your skin at the injection site. Next you ll be injected with local anesthetics (for short-term pain relief) and cortisone. The injection may last a few moments. A small bandage will be put over the injection site. You ll then be ready to go home.  After your injection  After being injected, make sure you don t injure the treated region. But stay active. Enjoy a walk or some other mild activity. Just be careful not to strain the region that gave you trouble.  The next day  Some people feel more pain after being injected. This is normal, and it will go away soon. Applying ice for 20 minutes at a time to your injury may reduce the increased pain. Rest for the first day or two. You don t need to stay in bed. But avoid tasks that may strain the injured region.  If you have diabetes  Cortisone injections can cause blood sugar to be increased for several days after the injection. If you have diabetes, you should follow your blood  sugar closely during this time. Follow your regular plan for what to do when your blood sugar is elevated.     7906-6297 [employer]. [address] [place] [address], [place] [address]. All rights reserved. This information is not intended as a  substitute for professional medical care. Always follow your healthcare professional's instructions.     [employer] and [employer] may offer reliable information regarding your diagnosis and treatment plan.    THANK YOU for coming in today. If you receive a survey via [employer] or mail please let us know if there was anything you especially appreciated today or if there is any way we can improve our clinic. We appreciate your input.    GENERAL INFORMATION:  Our hours are:  Monday :     Clinic 7:30 AM-430 PM ([facility])  Tuesday:      Operating Room All Day ([facility])  Wednesday: Clinic 7:30 AM - 11:15 AM ([facility])             Clinic 1:00 PM - 4:00PM ([facility])  Thursday:     Administrative Day  Friday:          Clinic 7:30 AM - 11:15 AM ([facility])            Clinic 1:00 PM - 4:00 PM ([facility])      [facility] for any issues or concerns: [phone]      We are not in the office Thursdays. Therefore non- urgent calls and medical messages received on Thursday will be addressed when we are back in the office on Wednesday. Urgent matters will be reviewed and addressed by one of our partners in the office as needed.    If lab work was done today as part of your evaluation you will generally be contacted via [employer], mail, or phone with the results within 1-5 days. If there is an alarming result we will contact you by phone. Lab results come back at varying times, I generally wait until all labs are resulted before making comments on results. Please note labs are automatically released to [employer] (if you have signed up for it) once available-at times you may see these prior to my having a chance to review them as well.    If you need refills please contact your pharmacist. They will send a refill request to me to review. Please allow 3 business days for us to process all refill requests. All  narcotic refills should be handled in the clinic at the time of your visit.

## 2019-08-05 NOTE — TELEPHONE ENCOUNTER
Ivette calls asking for Francine in the coumadin clinic to call her.  She has some questions about pain medication and what would be ok to take, as she also is on coumadin.   She asks for a call back by 1:00

## 2019-08-05 NOTE — TELEPHONE ENCOUNTER
Pt is going to be seeing ortho today for knee pain. She is wondering what she can take for pain control. I advised against Ibu- although Tylenol is okay. I informed her that if she is prescribed pain meds at her visit today that sometimes they contain Tylenol so she needs to keep that dose in mind when taking OTC Tylenol. She reported understanding.     She has 3-4 days left for antibiotic and is having some bruising behind her knee. She will go to the lab for INR check this afternoon  Order signed.     Francine Andrew RN

## 2019-08-06 DIAGNOSIS — I10 HYPERTENSION, GOAL BELOW 140/90: ICD-10-CM

## 2019-08-06 DIAGNOSIS — Z79.01 LONG TERM CURRENT USE OF ANTICOAGULANT THERAPY: ICD-10-CM

## 2019-08-06 DIAGNOSIS — I48.20 CHRONIC ATRIAL FIBRILLATION (H): ICD-10-CM

## 2019-08-07 RX ORDER — DILTIAZEM HYDROCHLORIDE 120 MG/1
CAPSULE, EXTENDED RELEASE ORAL
Qty: 30 CAPSULE | Refills: 0 | Status: SHIPPED | OUTPATIENT
Start: 2019-08-07 | End: 2019-09-12

## 2019-08-07 RX ORDER — WARFARIN SODIUM 5 MG/1
TABLET ORAL
Qty: 60 TABLET | Refills: 0 | Status: SHIPPED | OUTPATIENT
Start: 2019-08-07 | End: 2019-11-19

## 2019-08-07 NOTE — TELEPHONE ENCOUNTER
"Requested Prescriptions   Pending Prescriptions Disp Refills     warfarin (JANTOVEN) 5 MG tablet 60 tablet 3     Sig: TAKE ONE TABLET BY MOUTH ON MONDAYS AND TAKE ONE-HALF TABLET ALL OTHER DAYS OF THE WEEK OR AS DIRECTED BY COUMADIN CLINIC   Last Written Prescription Date:  8/14/2018  Last Fill Quantity: 60,  # refills: 3   Last office visit: 7/24/2019 with prescribing provider:     Future Office Visit:   Next 5 appointments (look out 90 days)    Sep 11, 2019  9:30 AM CDT  [facility] [address] [place] [id number]  [phone]   Oct 15, 2019 11:15 AM CDT  Return Visit with Arik Poole MD  [facility] [address] [place] [address]  [phone]             Vitamin K Antagonists Failed - 8/6/2019 11:09 AM        Failed - INR is within goal in the past 6 weeks     Confirm INR is within goal in the past 6 weeks.     Recent Labs   Lab Test 08/05/19  1321   INR 2.2*           Passed - Recent (12 mo) or future (30 days) visit within the authorizing provider's specialty     Patient had office visit in the last 12 months or has a visit in the next 30 days with authorizing provider or within the authorizing provider's specialty.  See \"Patient Info\" tab in inbasket, or \"Choose Columns\" in Meds & Orders section of the refill encounter.            Passed - Medication is active on med list        Passed - Patient is 18 years of age or older        Passed - Patient is not pregnant        Passed - No positive pregnancy on file in past 12 months            CARTIA  MG 24 hr capsule [Pharmacy Med Name: CARTIA XT (DILTIAZEM) 120MG CP24] 90 capsule 3     Sig: TAKE ONE CAPSULE BY MOUTH EVERY DAY   Last Written Prescription Date:  8/14/2018  Last Fill Quantity: 90,  # refills: 3   Last office visit: 7/24/2019 with prescribing provider:     Future Office Visit: " "  Next 5 appointments (look out 90 days)    Sep 11, 2019  9:30 AM CDT  [facility]  [facility] [address] [place] [id number]  [phone]   Oct 15, 2019 11:15 AM CDT  Return Visit with Arik Poole MD  [facility]) [address] [place] [id number] [phone]           Calcium Channel Blockers Protocol  Failed - 8/6/2019 11:09 AM        Failed - Normal ALT in past 12 months     Recent Labs   Lab Test 06/20/18  1121   ALT 23           Passed - Blood pressure under 140/90 in past 12 months     BP Readings from Last 3 Encounters:   08/05/19 138/84   07/24/19 132/68   07/19/19 133/75           Passed - Recent (12 mo) or future (30 days) visit within the authorizing provider's specialty     Patient had office visit in the last 12 months or has a visit in the next 30 days with authorizing provider or within the authorizing provider's specialty.  See \"Patient Info\" tab in inbasket, or \"Choose Columns\" in Meds & Orders section of the refill encounter.            Passed - Medication is active on med list        Passed - Patient is age 18 or older        Passed - No active pregnancy on record        Passed - Normal serum creatinine on file in past 12 months     Recent Labs   Lab Test 07/11/19  1326   CR 0.99           Passed - No positive pregnancy test in past 12 months          "

## 2019-08-07 NOTE — TELEPHONE ENCOUNTER
warfarin (JANTOVEN) 5 MG tablet  Routing refill request to provider for review/approval because:  Labs out of range:  INR  T'd up 1 month for provider review.        CARTIA  MG 24 hr capsule  Routing refill request to provider for review/approval because:  Labs not current:  ALT  T'd up 1 month for provider review.    Flory Hidalgo RN

## 2019-08-15 ENCOUNTER — TELEPHONE (OUTPATIENT)
Dept: ORTHOPEDICS | Facility: CLINIC | Age: 69
End: 2019-08-15

## 2019-08-15 DIAGNOSIS — M17.11 PRIMARY OSTEOARTHRITIS OF RIGHT KNEE: Primary | ICD-10-CM

## 2019-08-15 NOTE — TELEPHONE ENCOUNTER
Per note on 8/5/19 MT mentioned adding voltaren gel, pended RX and routed to MT and covering provider.    Debbie HERNÁNDEZ RN. . .  8/15/2019, 3:40 PM

## 2019-08-15 NOTE — TELEPHONE ENCOUNTER
Filled in Mark Taylor's absence. Did verify with pharmacy no concerns with the gel with coumadin or CKD.     Shane Mayberry APRN, CNP  Orthopedic Surgery

## 2019-08-15 NOTE — TELEPHONE ENCOUNTER
Pt calling and would like to know If Mark could send in the pain cream that they had talked about. Thank You Linh      [facility], [place] - [address] [place] [address]

## 2019-08-26 ENCOUNTER — ANTICOAGULATION THERAPY VISIT (OUTPATIENT)
Dept: ANTICOAGULATION | Facility: CLINIC | Age: 69
End: 2019-08-26
Payer: COMMERCIAL

## 2019-08-26 DIAGNOSIS — I48.0 AF (PAROXYSMAL ATRIAL FIBRILLATION) (H): ICD-10-CM

## 2019-08-26 DIAGNOSIS — I48.91 ATRIAL FIBRILLATION, UNSPECIFIED TYPE (H): ICD-10-CM

## 2019-08-26 LAB — INR POINT OF CARE: 2.8 (ref 0.9–1.1)

## 2019-08-26 PROCEDURE — 99207 ZZC NO CHARGE NURSE ONLY: CPT

## 2019-08-26 PROCEDURE — 85610 PROTHROMBIN TIME: CPT | Mod: QW

## 2019-08-26 PROCEDURE — 36416 COLLJ CAPILLARY BLOOD SPEC: CPT

## 2019-08-26 NOTE — PROGRESS NOTES
ANTICOAGULATION FOLLOW-UP CLINIC VISIT    Patient Name:  Latanya Padron  Date:  2019  Contact Type:  Face to Face    SUBJECTIVE:  Patient Findings     Positives:   Change in medications (started taking meloatonin - can increase INR, per micromedex ), Change in diet/appetite (less veggies lately. )             OBJECTIVE    INR Protime   Date Value Ref Range Status   2019 2.8 (A) 0.9 - 1.1 Final       ASSESSMENT / PLAN  INR assessment THER    Recheck INR In: 3 WEEKS    INR Location Clinic      Anticoagulation Summary  As of 2019    INR goal:   2.0-3.0   TTR:   76.2 % (3.5 y)   INR used for dosin.8 (2019)   Warfarin maintenance plan:   5 mg (5 mg x 1) every Mon; 2.5 mg (5 mg x 0.5) all other days   Full warfarin instructions:   5 mg every Mon; 2.5 mg all other days   Weekly warfarin total:   20 mg   No change documented:   Francine Andrew RN   Plan last modified:   Francine Andrew RN (2017)   Next INR check:   2019   Target end date:       Indications    AF (paroxysmal atrial fibrillation) (H) [I48.0]  Atrial fibrillation (H) [I48.91]             Anticoagulation Episode Summary     INR check location:       Preferred lab:       Send INR reminders to:   [facility]    Comments:   5 mg tabs, likes card, PM dose      Anticoagulation Care Providers     Provider Role Specialty Phone number    Glen Blue MD [facility] [phone]            See the Encounter Report to view Anticoagulation Flowsheet and Dosing Calendar (Go to Encounters tab in chart review, and find the Anticoagulation Therapy Visit)    Dosage adjustment made based on physician directed care plan.      Francine Andrew RN

## 2019-08-28 ENCOUNTER — TELEPHONE (OUTPATIENT)
Dept: FAMILY MEDICINE | Facility: CLINIC | Age: 69
End: 2019-08-28

## 2019-08-28 NOTE — TELEPHONE ENCOUNTER
Reason for Call:  Same Day Appointment, Requested Provider:  Glen Blue MD    PCP: Glen Blue    Reason for visit:  Knee pain- possible arthritis      Duration of symptoms:      Have you been treated for this in the past? Yes    Additional comments: Patient calling looking to be seen for knee pain. States October is too long to wait. Please advise on work in     Can we leave a detailed message on this number? YES    Phone number patient can be reached at: Cell number on file:    Telephone Information:   Mobile [phone]       Best Time:      Call taken on 8/28/2019 at 1:08 PM by Josephine Ramon

## 2019-08-28 NOTE — TELEPHONE ENCOUNTER
Per Dr. Blue, pt should recheck with ortho if this is her right knee as she was seeing ortho for this. I have contacted the pt and it is her right knee. She will call specialty to follow-up. Cely Lobato CMA ([facility])

## 2019-09-04 ENCOUNTER — OFFICE VISIT (OUTPATIENT)
Dept: ORTHOPEDICS | Facility: CLINIC | Age: 69
End: 2019-09-04
Payer: COMMERCIAL

## 2019-09-04 ENCOUNTER — ANCILLARY PROCEDURE (OUTPATIENT)
Dept: GENERAL RADIOLOGY | Facility: CLINIC | Age: 69
End: 2019-09-04
Attending: ORTHOPAEDIC SURGERY
Payer: COMMERCIAL

## 2019-09-04 VITALS
SYSTOLIC BLOOD PRESSURE: 155 MMHG | BODY MASS INDEX: 40.78 KG/M2 | HEIGHT: 61 IN | WEIGHT: 216 LBS | DIASTOLIC BLOOD PRESSURE: 89 MMHG

## 2019-09-04 DIAGNOSIS — R26.9 ABNORMAL GAIT: ICD-10-CM

## 2019-09-04 DIAGNOSIS — M25.551 RIGHT HIP PAIN: ICD-10-CM

## 2019-09-04 DIAGNOSIS — M25.551 RIGHT HIP PAIN: Primary | ICD-10-CM

## 2019-09-04 DIAGNOSIS — M17.11 PRIMARY OSTEOARTHRITIS OF RIGHT KNEE: ICD-10-CM

## 2019-09-04 PROBLEM — M22.2X1 PATELLOFEMORAL PAIN SYNDROME OF RIGHT KNEE: Status: ACTIVE | Noted: 2019-09-04

## 2019-09-04 PROCEDURE — 73502 X-RAY EXAM HIP UNI 2-3 VIEWS: CPT | Mod: TC

## 2019-09-04 PROCEDURE — 99213 OFFICE O/P EST LOW 20 MIN: CPT | Performed by: ORTHOPAEDIC SURGERY

## 2019-09-04 RX ORDER — CYCLOBENZAPRINE HCL 10 MG
10 TABLET ORAL
Qty: 30 TABLET | Refills: 0 | Status: SHIPPED | OUTPATIENT
Start: 2019-09-04 | End: 2019-10-04

## 2019-09-04 ASSESSMENT — MIFFLIN-ST. JEOR: SCORE: 1447.15

## 2019-09-04 NOTE — PROGRESS NOTES
Office Visit-Follow up    Chief Complaint: Latanya Pdaron is a 69 year old female who is being seen for   Chief Complaint   Patient presents with     RECHECK     right knee follow up       History of Present Illness:   Today's visit  Has been seeing Dr. Hinson and Mark Taylor for right knee pain now seeing myself.  Here today for right hip and knee pain. States since starting Arimidex for chemo noticed an increasing knee pain.  A moderate  lateral knee pain.  With the increasing knee pain also noticed a new limp, more recently with the limp started to notice ankle and leg pain and deep groin pain. The groin pain is more symptomatic today and described as a deep hip/groin pain, sharp, severe, and worse with some movements like walking.  She does feel that everything got worse when she started Arimidex. No radiating pain from back to foot. No numbness/tingling.  She also notes has not tried physical therapy before and did not get any relief from the last injections to the knee by Mark Taylor.  Has been trying heat lately and this is helpful. Also notes some anterior thigh/quad type pain for the last few months.  Worsens with limp. No injuries recently. Has not talked to her oncology team about the Arimidex and new pain.     8/5/19 visit with Mark Taylor  Patient is here in follow-up cortisone injection.  This patient had a Synvisc 1 injection on 7/19/2019.  She also had a cortisone injection on 6/10/2019 and another one on 2/27/2019.  She would like to have a cortisone injection after the Synvisc 1 injection because it has not kicked in yet.  She also has been active lately.  She also has been using Aspercreme and Tylenol arthritis at night but has not been helping all that much.  We talked about Voltaren gel today.  She does have not great kidney function.  7/19/19 visit with Mark Taylor PA-C   Patient is here in the right knee injection again.  Patient was last injection of cortisone was 6/10/2019 so she is almost 6  "weeks out from this.  Her injection prior to this was 2/27/2019.  The last injection worked longer.  Patient feels that she has been much more active recently and whether may have played a factor as to why the injection did not last as long.  On her last visit we did talk about doing a gel injection and she would like to proceed with that instead of the cortisone.  Patient has also tried for treatments Tylenol, Aspercreme, straight leg raising and exercises.         REVIEW OF SYSTEMS  General: negative for, night sweats, dizziness, fatigue  Resp: No shortness of breath and no cough  CV: negative for chest pain, syncope or near-syncope  GI: negative for nausea, vomiting and diarrhea  : negative for dysuria and hematuria  Musculoskeletal: as above  Neurologic: negative for syncope   Hematologic: negative for bleeding disorder    Physical Exam:  Vitals: BP (!) 155/89   Ht 1.549 m (5' 1\")   Wt 98 kg (216 lb)   BMI 40.81 kg/m    BMI= Body mass index is 40.81 kg/m .  Constitutional: healthy, alert and no acute distress   Psychiatric: mentation appears normal and affect normal/bright  NEURO: no focal deficits  RESP: Normal with easy respirations and no use of accessory muscles to breathe, no audible wheezing or retractions  CV: RLE: mild nonpitting edema  SKIN: No erythema, rashes, excoriation, or breakdown. No evidence of infection.   JOINT/EXTREMITIES:right lower extremity: no focal tenderness through knee.  Mild effusion. Full motion.  No instability.  Right hip: mildly decreased flexion when compared to left side.  No pain with flexion. No pain with internal/external rotation. No pain with straight leg raise and CHAPO.  Mild tenderness to greater trochanteric bursa but does not reproduce groin pain.  Nontender through anterior thigh. No SI joint tenderness. Slow sit to stand.    GAIT: antalgic            Diagnostic Modalities:  right hip X-ray: No fracture, dislocation and or lesion. Normal alignment.  Joint space " maintained no significant arthritis. No appreciable soft tissue abnormality.   Previous knee xray reviewed: mild narrowing to PF compartment, otherwise no significant medial or lateral joint space loss. No bony or soft tissue abnormality visualized.  Incidentally has chondrocalcinosis on the left side.   Independent visualization of the images was performed.      Impression: right acute groin pain - questionable intraarticular versus muscle  Right knee pain - mild PF OA  Abnormal gait  Recently started new chemo medication    Plan:  All of the above pertinent physical exam and imaging modalities findings was reviewed with Latanya.  The hip is more bothersome. Imaging was negative. Could not reproduce the pain with internal/external rotation but is worse with walking.  She is having increased knee pain and a limp that could certainly be contributing to the hip pain. Also she noticed all this about the time she was starting a new chemo medication.  Exact etiology of the hip pain is unknown but included in the differential would be intra-articular/OA not apparent on xray, muscle pain, pain due to mechanical alterted gait due to the knee pain, or from the chemo medication as a reaction.  Recommended to start with physical therapy for the hip, knee, and work on underlying gait also recommended she speak with her oncology team about the medication and find out if there is any known connection between the two, as well as discussed medication,. The worse pain is at night and having hard time sleeping. She cannot take NSAIDs does take tylenol and using voltaren gel.  Discussed a short course of muscle relaxers at night to help with pain and sleep.  Risks discussed.   Referral for physical therapy provided.  Will have her follow-up in 4-6 weeks. In the future could also consider MRI of the knee, as the osteoarthritic changes are very mild and seem to the pf compartment only. May have further chondromalacia not apparent on MRI  or other causes of the knee pain.      Return to clinic 4-6 weeks, or sooner as needed for changes.  Re-x-ray on return: No    Scribed by:  SOSA Martin, CNP  3:00 PM  9/4/2019    I attest I have seen and evaluated the patient.  I agree with above impression and plan.  Patrice Cavanaugh D.O.

## 2019-09-04 NOTE — LETTER
9/4/2019         RE: Latanya Padron  [id number] [facility] [id number]        Dear Colleague,    Thank you for referring your patient, Latnaya Padron, to the [facility]. Please see a copy of my visit note below.    Office Visit-Follow up    Chief Complaint: Latanya Padron is a 69 year old female who is being seen for   Chief Complaint   Patient presents with     RECHECK     right knee follow up       History of Present Illness:   Today's visit  Has been seeing Dr. Hinson and Mark Taylor for right knee pain now seeing myself.  Here today for right hip and knee pain. States since starting Arimidex for chemo noticed an increasing knee pain.  A moderate  lateral knee pain.  With the increasing knee pain also noticed a new limp, more recently with the limp started to notice ankle and leg pain and deep groin pain. The groin pain is more symptomatic today and described as a deep hip/groin pain, sharp, severe, and worse with some movements like walking.  She does feel that everything got worse when she started Arimidex. No radiating pain from back to foot. No numbness/tingling.  She also notes has not tried physical therapy before and did not get any relief from the last injections to the knee by Mark Taylor.  Has been trying heat lately and this is helpful. Also notes some anterior thigh/quad type pain for the last few months.  Worsens with limp. No injuries recently. Has not talked to her oncology team about the Arimidex and new pain.     8/5/19 visit with Mark Taylor  Patient is here in follow-up cortisone injection.  This patient had a Synvisc 1 injection on 7/19/2019.  She also had a cortisone injection on 6/10/2019 and another one on 2/27/2019.  She would like to have a cortisone injection after the Synvisc 1 injection because it has not kicked in yet.  She also has been active lately.  She also has been using Aspercreme and Tylenol arthritis at night but has not been helping all that much.   "We talked about Voltaren gel today.  She does have not great kidney function.  7/19/19 visit with Mark Taylor PA-C   Patient is here in the right knee injection again.  Patient was last injection of cortisone was 6/10/2019 so she is almost 6 weeks out from this.  Her injection prior to this was 2/27/2019.  The last injection worked longer.  Patient feels that she has been much more active recently and whether may have played a factor as to why the injection did not last as long.  On her last visit we did talk about doing a gel injection and she would like to proceed with that instead of the cortisone.  Patient has also tried for treatments Tylenol, Aspercreme, straight leg raising and exercises.         REVIEW OF SYSTEMS  General: negative for, night sweats, dizziness, fatigue  Resp: No shortness of breath and no cough  CV: negative for chest pain, syncope or near-syncope  GI: negative for nausea, vomiting and diarrhea  : negative for dysuria and hematuria  Musculoskeletal: as above  Neurologic: negative for syncope   Hematologic: negative for bleeding disorder    Physical Exam:  Vitals: BP (!) 155/89   Ht 1.549 m (5' 1\")   Wt 98 kg (216 lb)   BMI 40.81 kg/m     BMI= Body mass index is 40.81 kg/m .  Constitutional: healthy, alert and no acute distress   Psychiatric: mentation appears normal and affect normal/bright  NEURO: no focal deficits  RESP: Normal with easy respirations and no use of accessory muscles to breathe, no audible wheezing or retractions  CV: RLE: mild nonpitting edema  SKIN: No erythema, rashes, excoriation, or breakdown. No evidence of infection.   JOINT/EXTREMITIES:right lower extremity: no focal tenderness through knee.  Mild effusion. Full motion.  No instability.  Right hip: mildly decreased flexion when compared to left side.  No pain with flexion. No pain with internal/external rotation. No pain with straight leg raise and CHAPO.  Mild tenderness to greater trochanteric bursa but does " not reproduce groin pain.  Nontender through anterior thigh. No SI joint tenderness. Slow sit to stand.    GAIT: antalgic            Diagnostic Modalities:  right hip X-ray: No fracture, dislocation and or lesion. Normal alignment.  Joint space maintained no significant arthritis. No appreciable soft tissue abnormality.   Previous knee xray reviewed: mild narrowing to PF compartment, otherwise no significant medial or lateral joint space loss. No bony or soft tissue abnormality visualized.  Incidentally has chondrocalcinosis on the left side.   Independent visualization of the images was performed.      Impression: right acute groin pain - questionable intraarticular versus muscle  Right knee pain - mild PF OA  Abnormal gait  Recently started new chemo medication    Plan:  All of the above pertinent physical exam and imaging modalities findings was reviewed with Latanya.  The hip is more bothersome. Imaging was negative. Could not reproduce the pain with internal/external rotation but is worse with walking.  She is having increased knee pain and a limp that could certainly be contributing to the hip pain. Also she noticed all this about the time she was starting a new chemo medication.  Exact etiology of the hip pain is unknown but included in the differential would be intra-articular/OA not apparent on xray, muscle pain, pain due to mechanical alterted gait due to the knee pain, or from the chemo medication as a reaction.  Recommended to start with physical therapy for the hip, knee, and work on underlying gait also recommended she speak with her oncology team about the medication and find out if there is any known connection between the two, as well as discussed medication,. The worse pain is at night and having hard time sleeping. She cannot take NSAIDs does take tylenol and using voltaren gel.  Discussed a short course of muscle relaxers at night to help with pain and sleep.  Risks discussed.   Referral for  physical therapy provided.  Will have her follow-up in 4-6 weeks. In the future could also consider MRI of the knee, as the osteoarthritic changes are very mild and seem to the pf compartment only. May have further chondromalacia not apparent on MRI or other causes of the knee pain.      Return to clinic 4-6 weeks, or sooner as needed for changes.  Re-x-ray on return: No    Scribed by:  SOSA Martin, CNP  3:00 PM  9/4/2019    I attest I have seen and evaluated the patient.  I agree with above impression and plan.  Patrice Cavanaugh D.O.          Again, thank you for allowing me to participate in the care of your patient.        Sincerely,        Dat Cavanaugh, DO

## 2019-09-04 NOTE — NURSING NOTE
Ivette to follow up with Primary Care provider regarding elevated blood pressure.    Patient states she is having a lot of joint pain...........Tracy Trotter CMA  ([facility])

## 2019-09-05 ENCOUNTER — TELEPHONE (OUTPATIENT)
Dept: ORTHOPEDICS | Facility: OTHER | Age: 69
End: 2019-09-05

## 2019-09-05 ENCOUNTER — MYC MEDICAL ADVICE (OUTPATIENT)
Dept: FAMILY MEDICINE | Facility: CLINIC | Age: 69
End: 2019-09-05

## 2019-09-05 NOTE — TELEPHONE ENCOUNTER
Patient wondering if she can take the flexeril in the morning too?    Debbie HERNÁNDEZ RN. . .  9/5/2019, 1:50 PM

## 2019-09-05 NOTE — TELEPHONE ENCOUNTER
Reason for Call:  Other call back    Detailed comments: Patient asking if she can take the prescription given to her yesterday in the morning as well?  She was directed to just take only at night, but is asking to take twice a day until the pain subsides.  Please advise    Phone Number Patient can be reached at: Cell number on file:    Telephone Information:   Mobile [phone]       Best Time: any    Can we leave a detailed message on this number? YES    Call taken on 9/5/2019 at 12:29 PM by Pina Day

## 2019-09-05 NOTE — TELEPHONE ENCOUNTER
Pt notified via phone.    She can try it in the morning, but it may make her drowsy and she should not drive.       Caridad/LINDA

## 2019-09-11 ENCOUNTER — OFFICE VISIT (OUTPATIENT)
Dept: RADIATION ONCOLOGY | Facility: CLINIC | Age: 69
End: 2019-09-11
Payer: COMMERCIAL

## 2019-09-11 VITALS
WEIGHT: 219 LBS | BODY MASS INDEX: 41.38 KG/M2 | RESPIRATION RATE: 18 BRPM | SYSTOLIC BLOOD PRESSURE: 124 MMHG | HEART RATE: 89 BPM | TEMPERATURE: 97.9 F | DIASTOLIC BLOOD PRESSURE: 67 MMHG | OXYGEN SATURATION: 94 %

## 2019-09-11 DIAGNOSIS — Z17.0 MALIGNANT NEOPLASM OF OVERLAPPING SITES OF LEFT BREAST IN FEMALE, ESTROGEN RECEPTOR POSITIVE (H): Primary | ICD-10-CM

## 2019-09-11 DIAGNOSIS — C50.812 MALIGNANT NEOPLASM OF OVERLAPPING SITES OF LEFT BREAST IN FEMALE, ESTROGEN RECEPTOR POSITIVE (H): Primary | ICD-10-CM

## 2019-09-11 DIAGNOSIS — L58.9 RADIATION DERMATITIS: ICD-10-CM

## 2019-09-11 PROCEDURE — 99024 POSTOP FOLLOW-UP VISIT: CPT | Performed by: RADIOLOGY

## 2019-09-11 ASSESSMENT — PAIN SCALES - GENERAL: PAINLEVEL: MILD PAIN (3)

## 2019-09-11 NOTE — PROGRESS NOTES
Dear Colleagues,  Today Latanya Padron was seen in follow up.      IDENTIFICATION: Latanya Padron is a 69 year old woman with strong family history of breast cancer and history of COPD recently diagnosed with left breast G2 ILCA, ER+/AK+/H2N-, status post lumpectomy and SLNBx, revealing [id number] disease referred for adjuvant radiation therapy. She will not be receiving chemotherapy.  Oncotype score is 15. She completed adjuvant radiation therapy to the left breast on 19.  INTERVAL HISTORY:  Latanya Padron was last seen in our clinic during her last week of treatment. While on treatment she had complaints of dyspnea on exertion which is a chronic condition given h/o COPD/Afib. While on treatment she also developed diffuse skin erythema (grade 1) and used mometasone and Aquaphor while on treatment.  Post treatment she states that all of her radiation induced skin symptoms have resolved. She returns today in follow up and has no complaints. She is followed by Dr. Poole/Med Onc team and is on Arimidex.  She has right knee and leg pain and sees PCP, Ortho and Dr. Poole for it.  Specifically denies n/v/ha/unsual sob/cp.  REVIEW OF SYSTEMS: As per HPI, a 14-point review of systems is otherwise negative.     Past Medical History:   Diagnosis Date     Breast cancer (H) 2019    Left Breast     COPD (chronic obstructive pulmonary disease) (H)      Mixed hyperlipidemia      PONV (postoperative nausea and vomiting)      S/P radiation therapy     5,256 cGy to left breast completed on 2019 - [facility]       Past Surgical History:   Procedure Laterality Date     BIOPSY NODE SENTINEL Left 5/15/2019    Procedure: left sentinel node biopsy;  Surgeon: Donald Aleman MD;  Location: [facility] OR     BREAST BIOPSY, CORE RT/LT Left 2019     C APPENDECTOMY,W OTHR PROC       C  DELIVERY ONLY           C LIGATE FALLOPIAN TUBE       C NONSPECIFIC PROCEDURE      Exploratory laparotomy  with resection of infarcted segment of omentum and minor lysis of adhesions     C NONSPECIFIC PROCEDURE  1981    Removal of hemorrhagic corpus luteum cyst     C VAGINAL HYSTERECTOMY  1984     COLONOSCOPY  06/21/10     HC BIOPSY OF BREAST, OPEN INCISIONAL Right 1988    Benign per patient     HC CORRECT BUNION,METATARSAL OSTEOTOMY  1993      LAPAROSCOPY, SURGICAL; CHOLECYSTECTOMY  08/04/10     HC SLING OPERATION FOR STRESS INCONTINENCE  04/19/2007     HERNIORRHAPHY INCISIONAL (LOCATION)  9/23/2011    Procedure:HERNIORRHAPHY INCISIONAL (LOCATION); Surgeon:AYALA HOOVER; Location:[facility] OR     LAPAROSCOPIC HERNIORRHAPHY INCISIONAL  9/23/2011    Procedure:LAPAROSCOPIC HERNIORRHAPHY INCISIONAL; Laparoscopic mesh repair of incarcerated incisional hernia , extensive lysis of adhesions, excision of hernia sac and closure of fascia.; Surgeon:AYALA HOOVER; Location:[facility]     LAPAROSCOPIC LYSIS ADHESIONS  9/23/2011    Procedure:LAPAROSCOPIC LYSIS ADHESIONS; Surgeon:AYALA HOOVER; Location:[facility]     MASTECTOMY PARTIAL WITH NEEDLE LOCALIZATION Left 5/15/2019    Procedure: left wire localized partial mastectomy;  Surgeon: Donald Aleman MD;  Location: [facility] OR     TONSILLECTOMY         Family History   Problem Relation Age of Onset     Breast Cancer Mother      Obesity Mother      Genitourinary Problems Mother      Lipids Mother      Respiratory Sister      Lipids Sister      Breast Cancer Sister 40        s/p mastectomy     Arthritis Sister      Obesity Sister      Heart Failure Sister      Cancer Maternal Grandfather      Cancer Paternal Grandfather         lymph nodes     Heart Disease Father         by pass (5)     Cerebrovascular Disease Father         hemorragic     Lipids Father      Alzheimer Disease Maternal Grandmother      Genitourinary Problems Maternal Grandmother      Lipids Sister      Cancer Maternal Uncle         colon     Heart Disease Brother         Hx: stent placement     Lipids Brother         X  2       Social History     Tobacco Use     Smoking status: Former Smoker     Packs/day: 1.00     Years: 30.00     Pack years: 30.00     Last attempt to quit: 10/25/2005     Years since quittin.8     Smokeless tobacco: Never Used   Substance Use Topics     Alcohol use: No     Alcohol/week: 0.0 oz       Current Outpatient Medications   Medication     albuterol (2.5 MG/3ML) 0.083% neb solution     albuterol (PROAIR HFA/PROVENTIL HFA/VENTOLIN HFA) 108 (90 Base) MCG/ACT inhaler     anastrozole (ARIMIDEX) 1 MG tablet     atorvastatin (LIPITOR) 10 MG tablet     BIOTIN PO     Calcium Carb-Cholecalciferol (CALCIUM 500 + D) 500-400 MG-UNIT TABS     CARTIA  MG 24 hr capsule     cephALEXin (KEFLEX) 500 MG capsule     cholecalciferol (VITAMIN D) 400 UNITS tablet     Cyanocobalamin (B-12) 1000 MCG TBCR     cyclobenzaprine (FLEXERIL) 10 MG tablet     diclofenac (VOLTAREN) 1 % topical gel     hydrochlorothiazide (HYDRODIURIL) 25 MG tablet     HYDROcodone-acetaminophen (NORCO) 5-325 MG tablet     lisinopril (PRINIVIL/ZESTRIL) 2.5 MG tablet     magnesium 250 MG tablet     mineral oil-hydrophilic petrolatum (AQUAPHOR) external ointment     mometasone (ELOCON) 0.1 % external cream     mometasone-formoterol (DULERA) 200-5 MCG/ACT oral inhaler     ORDER FOR DME     predniSONE (DELTASONE) 20 MG tablet     trospium (SANCTURA) 20 MG tablet     umeclidinium (INCRUSE ELLIPTA) 62.5 MCG/INH oral inhaler     venlafaxine (EFFEXOR-ER) 75 MG 24 hr tablet     warfarin (JANTOVEN) 5 MG tablet     No current facility-administered medications for this visit.           Allergies   Allergen Reactions     Augmentin [Amoxicillin-Pot Clavulanate] Nausea and Vomiting     Meperidine Hcl Nausea and Vomiting     demerol     Seasonal Allergies      spring        PHYSICAL EXAMINATION:  /67 (BP Location: Right arm, Patient Position: Chair, Cuff Size: Adult Large)   Pulse 89   Temp 97.9  F (36.6  C) (Oral)   Resp 18   Wt 99.3 kg (219 lb)   SpO2  94%   BMI 41.38 kg/m    GENERAL Well-appearing woman in no acute distress.  HEENT Normocephalic, atraumatic.  Sclerae anicteric.  CVR  Regular rate and rhythm.  No murmurs, rubs, or gallops.  LUNGS Clear to auscultation bilaterally.  BREASTS Breasts are examined in the supine and upright position.  Hyperpigmentation and dry skin noted in prior treatment field.  No erythema, nodularity or desquamation noted in either breast. Bilateral NAC within normal limits. Scar well healed.  LYMPH No supraclavicular, infraclavicular or axillary LAD appreciated bilaterally.  EXT  No clubbing, cyanosis or edema.    NEURO Gait within normal limits.  SKIN  Warm and well perfused.  See breast exam  PSYCH:         Orientation: Alert, attentive, and oriented to time, place, and person                         Affect: Affect was appropriate to the situation and showed normal range and stability. No anxious mood                         Speech: Speech was clear with normal fluency, rate, tone, and volume.                         Memory: Although not formally assessed, immediate, recent, and remote memory appeared to be intact.                          Insight and Judgement:  demonstrates adequate awareness of the issues discussed.                         Thought: Thought patterns were coherent and logical.      IMAGING: No new imaging.        IMPRESSION/PLAN:  Latanya Padron is a 69 year old woman with strong family history of breast cancer and history of COPD recently diagnosed with left breast G2 ILCA, ER+/ND+/H2N-, status post lumpectomy and SLNBx, revealing [id number] disease referred for adjuvant radiation therapy. She will not be receiving chemotherapy.  Oncotype score is 15. She completed adjuvant radiation therapy to the left breast on 7/18/19. She is being seen here in follow up. She has had resolution of her acute radiation induced side effects and only has some residual skin dryness and hyperpigmentation within the treatment  field.  This is to be expected. She should continue to use sunblock and moisturizer in the previously treated area generously.   Additional problem list to be addressed in the following manner:  1) Hormonal treatment in postmenopausal woman: Follow up with Med Onc for management regarding Arimidex hormonal therapy. Scans per Med Onc.  2) Follow up with Surgery as previously directed.    3) Follow up with Rad Onc in 3 months.  There was ample time for questions and all were answered to the patient's satisfaction. Thank you for allowing me to participate in the care of this pleasant patient. If you have any questions, please do not hesitate to contact my office.      Sincerely,  Nya Gaxiola MD  Adjunct   Dept of Radiation Oncology  [facility]

## 2019-09-11 NOTE — PATIENT INSTRUCTIONS
Please contact [facility] RN with questions or concerns following today's appointment: [phone].    Thank you!

## 2019-09-11 NOTE — NURSING NOTE
FOLLOW-UP VISIT    Patient Name: Latanya Padron      : 1950     Age: 69 year old        ______________________________________________________________________________     Chief Complaint   Patient presents with     Cancer     Radiation oncology return visit with Dr. Gaxiola     /67 (BP Location: Right arm, Patient Position: Chair, Cuff Size: Adult Large)   Pulse 89   Temp 97.9  F (36.6  C) (Oral)   Resp 18   Wt 99.3 kg (219 lb)   SpO2 94%   BMI 41.38 kg/m       Date Radiation Completed: 5,256 cGy completed on 2019 to left breast    Pain  Denies    Labs  Other Labs: No    Imaging  None      Other Appointments:     MD Name: Dr. Poole Appointment Date: 10/15/2019   MD Name: Dr. Gaxiola Appointment Date: 12/10/2019   MD Name: Appointment Date:       Residual Radiation side effect: Patient reports she is feeling well, denies fatigue.  Denies skin concerns in previous radiation treatment field, reports intermittent faint twinges in left breast, denies concerns or need for pain management.  Patient reports chronic right knee pain, worsening of pain with radiation through lower leg since starting Armidex, has been seen by PCP and will further discuss with Dr. Poole during upcoming visit.          Nurse face-to-face time: Level 4:  15 min face to face time

## 2019-09-12 DIAGNOSIS — I48.20 CHRONIC ATRIAL FIBRILLATION (H): ICD-10-CM

## 2019-09-12 DIAGNOSIS — I10 HYPERTENSION, GOAL BELOW 140/90: ICD-10-CM

## 2019-09-13 RX ORDER — DILTIAZEM HYDROCHLORIDE 120 MG/1
CAPSULE, EXTENDED RELEASE ORAL
Qty: 30 CAPSULE | Refills: 0 | Status: SHIPPED | OUTPATIENT
Start: 2019-09-13 | End: 2020-02-19

## 2019-09-13 NOTE — TELEPHONE ENCOUNTER
"Routing refill request to provider for review/approval because:  Labs not current:  ALT  T'd up 1 month for provider review.      Requested Prescriptions   Pending Prescriptions Disp Refills     CARTIA  MG 24 hr capsule [Pharmacy Med Name: CARTIA XT (DILTIAZEM) 120MG CP24] 30 capsule 0     Sig: TAKE ONE CAPSULE BY MOUTH ONCE DAILY   Last Written Prescription Date:  8/7/2019  Last Fill Quantity: 30,  # refills: 0   Last office visit: 7/24/2019 with prescribing provider:     Future Office Visit:   Next 5 appointments (look out 90 days)    Oct 15, 2019 11:15 AM CDT  Return Visit with Arik Poole MD  [facility] ([facility]) [address] [place] [id number]  [phone]   Dec 10, 2019 10:45 AM CST  Return Visit with Julita Gaxiola MD  [facility] ([facility]) [address] [place] [id number] [phone]           Calcium Channel Blockers Protocol  Failed - 9/12/2019 12:27 PM        Failed - Normal ALT in past 12 months     Recent Labs   Lab Test 06/20/18  1121   ALT 23           Passed - Blood pressure under 140/90 in past 12 months     BP Readings from Last 3 Encounters:   09/11/19 124/67   09/04/19 (!) 155/89   08/05/19 138/84           Passed - Recent (12 mo) or future (30 days) visit within the authorizing provider's specialty     Patient had office visit in the last 12 months or has a visit in the next 30 days with authorizing provider or within the authorizing provider's specialty.  See \"Patient Info\" tab in inbasket, or \"Choose Columns\" in Meds & Orders section of the refill encounter.            Passed - Medication is active on med list        Passed - Patient is age 18 or older        Passed - No active pregnancy on record        Passed - Normal serum creatinine on file in past 12 months     Recent Labs   Lab Test 07/11/19  1326   CR 0.99           Passed - No positive pregnancy test in past 12 months "      Flory Hidalgo, RN

## 2019-09-16 ENCOUNTER — TELEPHONE (OUTPATIENT)
Dept: ONCOLOGY | Facility: CLINIC | Age: 69
End: 2019-09-16

## 2019-09-16 ENCOUNTER — ANTICOAGULATION THERAPY VISIT (OUTPATIENT)
Dept: ANTICOAGULATION | Facility: CLINIC | Age: 69
End: 2019-09-16
Payer: COMMERCIAL

## 2019-09-16 DIAGNOSIS — I48.91 ATRIAL FIBRILLATION, UNSPECIFIED TYPE (H): ICD-10-CM

## 2019-09-16 DIAGNOSIS — I48.0 AF (PAROXYSMAL ATRIAL FIBRILLATION) (H): ICD-10-CM

## 2019-09-16 LAB — INR POINT OF CARE: 2.2 (ref 0.9–1.1)

## 2019-09-16 PROCEDURE — 36416 COLLJ CAPILLARY BLOOD SPEC: CPT

## 2019-09-16 PROCEDURE — 85610 PROTHROMBIN TIME: CPT | Mod: QW

## 2019-09-16 PROCEDURE — 99207 ZZC NO CHARGE NURSE ONLY: CPT

## 2019-09-16 NOTE — TELEPHONE ENCOUNTER
Reason for Call:  Other call back    Detailed comments: Patient called stating Dr. Gaxiola had sent a message to Jorge last week and Ivette is following up on this.  Please call her cell    Phone Number Patient can be reached at: Cell number on file:    Telephone Information:   Mobile [phone]       Best Time: any    Can we leave a detailed message on this number? YES    Call taken on 9/16/2019 at 10:42 AM by Pina Day

## 2019-09-16 NOTE — PROGRESS NOTES
ANTICOAGULATION FOLLOW-UP CLINIC VISIT    Patient Name:  Latanya Padron  Date:  2019  Contact Type:  Face to Face    SUBJECTIVE:  Patient Findings     Comments:   The patient was assessed for diet, medication, and activity level changes, missed or extra doses, bruising or bleeding, with no problem findings.  Francine Andrew RN          Clinical Outcomes     Negatives:   Major bleeding event, Thromboembolic event, Anticoagulation-related hospital admission, Anticoagulation-related ED visit, Anticoagulation-related fatality    Comments:   The patient was assessed for diet, medication, and activity level changes, missed or extra doses, bruising or bleeding, with no problem findings.  Francine Andrew RN             OBJECTIVE    INR Protime   Date Value Ref Range Status   2019 2.2 (A) 0.9 - 1.1 Final       ASSESSMENT / PLAN  INR assessment THER    Recheck INR In: 6 WEEKS    INR Location Clinic      Anticoagulation Summary  As of 2019    INR goal:   2.0-3.0   TTR:   76.6 % (3.5 y)   INR used for dosin.2 (2019)   Warfarin maintenance plan:   5 mg (5 mg x 1) every Mon; 2.5 mg (5 mg x 0.5) all other days   Full warfarin instructions:   5 mg every Mon; 2.5 mg all other days   Weekly warfarin total:   20 mg   No change documented:   Francine Andrew RN   Plan last modified:   Francine Andrew RN (2017)   Next INR check:   10/28/2019   Target end date:       Indications    AF (paroxysmal atrial fibrillation) (H) [I48.0]  Atrial fibrillation (H) [I48.91]             Anticoagulation Episode Summary     INR check location:       Preferred lab:       Send INR reminders to:   [facility] [place]    Comments:   5 mg tabs, likes card, PM dose      Anticoagulation Care Providers     Provider Role Specialty Phone number    Glen Blue MD [facility] [phone]            See the Encounter Report to view Anticoagulation Flowsheet and Dosing Calendar (Go to Encounters tab  in chart review, and find the Anticoagulation Therapy Visit)    Dosage adjustment made based on physician directed care plan.      Francine Andrew RN

## 2019-09-17 ENCOUNTER — HOSPITAL ENCOUNTER (OUTPATIENT)
Dept: PHYSICAL THERAPY | Facility: OTHER | Age: 69
Setting detail: THERAPIES SERIES
End: 2019-09-17
Attending: NURSE PRACTITIONER
Payer: COMMERCIAL

## 2019-09-17 DIAGNOSIS — M25.551 RIGHT HIP PAIN: ICD-10-CM

## 2019-09-17 DIAGNOSIS — R26.9 ABNORMAL GAIT: ICD-10-CM

## 2019-09-17 DIAGNOSIS — M17.11 PRIMARY OSTEOARTHRITIS OF RIGHT KNEE: ICD-10-CM

## 2019-09-17 PROCEDURE — 97162 PT EVAL MOD COMPLEX 30 MIN: CPT | Mod: GP | Performed by: PHYSICAL THERAPIST

## 2019-09-17 PROCEDURE — 97112 NEUROMUSCULAR REEDUCATION: CPT | Mod: GP | Performed by: PHYSICAL THERAPIST

## 2019-09-17 PROCEDURE — 97110 THERAPEUTIC EXERCISES: CPT | Mod: GP | Performed by: PHYSICAL THERAPIST

## 2019-09-17 NOTE — PROGRESS NOTES
09/17/19 0800   General Information   Type of Visit Initial OP Ortho PT Evaluation   Start of Care Date 09/17/19   Referring Physician SOSA Burton, CNP   Patient/Family Goals Statement get rid of right hip and knee pain   Orders Evaluate and Treat   Date of Order 09/04/19   Certification Required? No   Medical Diagnosis right hip pain, right knee osteoarthritis, abnormal gait   Surgical/Medical history reviewed Yes   Precautions/Limitations no known precautions/limitations   Body Part(s)   Body Part(s) Lumbar Spine/SI   Presentation and Etiology   Pertinent history of current problem (include personal factors and/or comorbidities that impact the POC) Pt has had R knee pain about 1 year ago, has had 2 injections of cortisone and 1 synvisc without much change and over the last month sx into R groin, thigh and R shin and lower leg to ankle and last 2 weeks R buttock. Pt thinks new sx may be related to new chemo meds, has breast cancer hx and surgery, also has COPD and afib. Takes tylenol for R LE sx.    Impairments A. Pain;B. Decreased WB tolerance;H. Impaired gait;F. Decreased strength and endurance   Functional Limitations perform activities of daily living;perform required work activities;perform desired leisure / sports activities   Symptom Location R buttock, R lateral hip to ant and lat R thigh, R groin, ant R knee, R shin and lat lower leg to ant/lat R ankle   How/Where did it occur From insidious onset   Onset date of current episode/exacerbation 08/17/19   Chronicity New   Pain rating (0-10 point scale) Best (/10);Worst (/10)   Best (/10) average R LE pain 4/10, R buttock 2/10   Worst (/10) R LE 8/10, buttock 6/10   Pain quality A. Sharp;B. Dull;C. Aching   Frequency of pain/symptoms A. Constant   Pain/symptoms are: Other  (constant R thigh, knee, lower leg)   Pain symptoms comment worst at end of day   Pain/symptoms exacerbated by A. Sitting;B. Walking;G. Certain positions;J. ADL;K. Home  "tasks;L. Work tasks;M. Other   Pain exacerbation comment lying on L>R side, see LEFS   Pain/symptoms eased by G. Heat;I. OTC medication(s);K. Other   Pain eased by comment standing   Progression of symptoms since onset: Worsened   Prior Level of Function   Prior Level of Function-Mobility no asst device   Prior Level of Function-ADLs ind   Current Level of Function   Current Community Support Family/friend caregiver   Patient role/employment history F. Retired  (sits 50% of day)   Living environment [facility]   Home/community accessibility no concerns, ramblers style home   Current equipment-Gait/Locomotion None   Fall Risk Screen   Fall screen completed by PT   Have you fallen 2 or more times in the past year? No   Have you fallen and had an injury in the past year? No   Is patient a fall risk? No   Functional Scales   Functional Scales Other   Other Scales  33/80 LEFS, wakes 3 nights per week due to pain   Lumbar Spine/SI Objective Findings   Observation no pain behaviors at rest   Posture fair sitting and standing posture   Gait/Locomotion slight limp, no assistive device   Flexion ROM wnl    Extension ROM wnl   Hip Screen R and L hip flexion ROM wnl without significant increase in groin sx. Knee ROM wnl.    Lumbar/SI Special Tests Comments did mechanical lumbar exam for directional preference using static/dynamic force analysis testing. In standing prior to testing R ant/lat mid thigh sx 3/10 to R ant knee to R ant/lat lower leg 1/3 way down 3/10. Flexion in standing x 1 produced R groin 4/10 during, 2/10 after, worse, no change other R LE sx. Extension x 10 no change. Hooklying R groin 3/10, R ant mid thigh sx 1/10 but not ache, just numb, abolished knee and lower leg sx. Flexion x 1 no change., flexion x 10 increased groin to 4/10 and produced R knee 4/10 \"burning\", worse. Prone lying abolished all sx, prone on elbows still 0/10 sx.    Planned Therapy Interventions   Planned Therapy Interventions " neuromuscular re-education;ROM;strengthening   Planned Therapy Interventions Comment manual therapy if needed   Planned Modality Interventions   Planned Modality Interventions Ultrasound;Electrical stimulation   Planned Modality Interventions Comments if needed   Clinical Impression   Criteria for Skilled Therapeutic Interventions Met yes, treatment indicated   PT Diagnosis right hip and right knee pain due to lumbar spine referral   Influenced by the following impairments pain, ROM, weakness   Functional limitations due to impairments sitting, walking, adl's, see LEFS   Clinical Presentation Evolving/Changing   Clinical Presentation Rationale sx worsening, chronic knee pain, hip pain, R buttock pain, 33/80 LEFS, cancer hx, 3 previous R knee injections without help, poor sitting and walking tolerance and affects adl's   Clinical Decision Making (Complexity) Moderate complexity   Therapy Frequency 1 time/week   Predicted Duration of Therapy Intervention (days/wks) 8 visits over 3 months, decrease as able   Risk & Benefits of therapy have been explained Yes   Patient, Family & other staff in agreement with plan of care Yes   Education Assessment   Preferred Learning Style Listening   Barriers to Learning No barriers   ORTHO GOALS   PT Ortho Eval Goals 1;2   Ortho Goal 1   Goal Identifier pain   Goal Description pt will note a decrease in average R LE pain from a 4/10 to a 2/10 or less so she can have improved walking tolerance around the house and with shopping trips.   Target Date 10/08/19   Ortho Goal 2   Goal Identifier function   Goal Description pt will note a decrease in symptoms and carry over to improved functional level as measured by LEFS score increase from 33/80 to 40/80 or better   Target Date 11/17/19   Total Evaluation Time   PT Merrill, Moderate Complexity Minutes (54031) 35

## 2019-09-18 ENCOUNTER — TELEPHONE (OUTPATIENT)
Dept: FAMILY MEDICINE | Facility: CLINIC | Age: 69
End: 2019-09-18

## 2019-09-18 DIAGNOSIS — M25.551 RIGHT HIP PAIN: ICD-10-CM

## 2019-09-18 RX ORDER — CYCLOBENZAPRINE HCL 10 MG
10 TABLET ORAL
Qty: 30 TABLET | Refills: 0 | Status: CANCELLED | OUTPATIENT
Start: 2019-09-18

## 2019-09-19 NOTE — TELEPHONE ENCOUNTER
Called and discussed with patient.  She reports that she has had unilateral hip pain.  Ortho is setting her up with PT.  Discussed that the pain from the AI will tend to be all over joints or bilateral and not just one joint.  Patient will continue to monitor for changes and will call Oncology if she feels as though it is more all over.  Patient agrees to call if it becomes more concerning after PT or during and then we can get her in sooner.  Patient also reported that she is having trouble sleeping at night.  Patient normally takes her AI at time.  Encouraged her to slowly move it to earlier in the day to see if that helps.  Patient agrees.  Patient will call with no relief or further concerns.  She has our contact information.    Jorge Ocampo RN, BSN, OCN  9/19/2019, 10:54 AM

## 2019-09-20 NOTE — PROGRESS NOTES
[facility]          OUTPATIENT PHYSICAL THERAPY ORTHOPEDIC EVALUATION  PLAN OF TREATMENT FOR OUTPATIENT REHABILITATION  (COMPLETE FOR INITIAL CLAIMS ONLY)  Patient's Last Name, First Name, M.I.  YOB: 1950  Latanya Padron    Provider s Name:  [facility]   Medical Record No.  [id number]   Start of Care Date:  09/17/19   Onset Date:  08/17/19   Type:     _X__PT   ___OT   ___SLP Medical Diagnosis:  (P) right hip pain, right knee osteoarthritis, abnormal gait     PT Diagnosis:  right hip and right knee pain due to lumbar spine referral   Visits from SOC:  1      _________________________________________________________________________________  Plan of Treatment/Functional Goals:  neuromuscular re-education, ROM, strengthening  manual therapy if needed  Ultrasound, Electrical stimulation  if needed  Goals  Goal Identifier: pain  Goal Description: pt will note a decrease in average R LE pain from a 4/10 to a 2/10 or less so she can have improved walking tolerance around the house and with shopping trips.  Target Date: 10/08/19    Goal Identifier: function  Goal Description: pt will note a decrease in symptoms and carry over to improved functional level as measured by LEFS score increase from 33/80 to 40/80 or better  Target Date: 11/17/19       Therapy Frequency:  1 time/week  Predicted Duration of Therapy Intervention:  8 visits over 3 months, decrease as able    JUAN PABLO HAN, PT                 I CERTIFY THE NEED FOR THESE SERVICES FURNISHED UNDER        THIS PLAN OF TREATMENT AND WHILE UNDER MY CARE     (Physician co-signature of this document indicates review and certification of the therapy plan).                       Certification Date From:  (P) 09/17/19   Certification Date To:  (P) 12/16/19    Referring Provider:  SOSA Burton, CASANDRA    Initial Assessment        See Epic Evaluation Start of Care Date: 09/17/19

## 2019-09-25 NOTE — TELEPHONE ENCOUNTER
Will need to follow-up as previously discussed prior to refilling the muscle relaxer.     SOSA Hemphill, CNP  Orthopedic Surgery

## 2019-09-26 NOTE — TELEPHONE ENCOUNTER
Left message for patient informing of Ranulfo's message.   Debbie HERNÁNDEZ RN. . .  9/26/2019, 9:19 AM

## 2019-10-01 ENCOUNTER — TELEPHONE (OUTPATIENT)
Dept: ONCOLOGY | Facility: CLINIC | Age: 69
End: 2019-10-01

## 2019-10-01 NOTE — TELEPHONE ENCOUNTER
Patient is being seen Dr. Poole in [place] on 10/15/19.      Writer does not see any Mammo orders.     Will send to RN Oncology Coordinator.    Nella Andrea, BEATRIZ

## 2019-10-02 ENCOUNTER — HOSPITAL ENCOUNTER (OUTPATIENT)
Dept: PHYSICAL THERAPY | Facility: OTHER | Age: 69
Setting detail: THERAPIES SERIES
End: 2019-10-02
Attending: ORTHOPAEDIC SURGERY
Payer: COMMERCIAL

## 2019-10-02 PROCEDURE — 97110 THERAPEUTIC EXERCISES: CPT | Mod: GP | Performed by: PHYSICAL THERAPIST

## 2019-10-02 PROCEDURE — 97112 NEUROMUSCULAR REEDUCATION: CPT | Mod: GP | Performed by: PHYSICAL THERAPIST

## 2019-10-02 PROCEDURE — 97530 THERAPEUTIC ACTIVITIES: CPT | Mod: GP | Performed by: PHYSICAL THERAPIST

## 2019-10-02 NOTE — TELEPHONE ENCOUNTER
Last mammogram was in May.  Spoke with patient and updated her.  She was aware that she needed it in 6 months but wasn't sure when that was.  Let patient know that Dr. Poole will order that and get her set up.  Encouraged patient to call with questions or concerns.    Jorge Ocampo RN, BSN, OCN  10/2/2019, 9:46 AM

## 2019-10-04 DIAGNOSIS — M25.551 RIGHT HIP PAIN: ICD-10-CM

## 2019-10-04 RX ORDER — CYCLOBENZAPRINE HCL 10 MG
10 TABLET ORAL
Qty: 10 TABLET | Refills: 0 | Status: SHIPPED | OUTPATIENT
Start: 2019-10-04 | End: 2020-02-26

## 2019-10-04 NOTE — TELEPHONE ENCOUNTER
Reason for Call:  Other call back    Detailed comments: cyclobenzaprine (FLEXERIL) 10 MG tablet.  Does she need to see you? She stated she is doing well with the PT and the flexeril. Thank You [facility] [address]       Phone Number Patient can be reached at: ok to LM on cell phone     Best Time: any time today     Can we leave a detailed message on this number? YES    Call taken on 10/4/2019 at 10:05 AM by Linh Mcbride

## 2019-10-06 DIAGNOSIS — J43.9 PULMONARY EMPHYSEMA, UNSPECIFIED EMPHYSEMA TYPE (H): ICD-10-CM

## 2019-10-07 DIAGNOSIS — J43.9 PULMONARY EMPHYSEMA, UNSPECIFIED EMPHYSEMA TYPE (H): ICD-10-CM

## 2019-10-07 DIAGNOSIS — J20.9 ACUTE BRONCHITIS WITH COEXISTING CONDITION REQUIRING PROPHYLACTIC TREATMENT: ICD-10-CM

## 2019-10-07 DIAGNOSIS — J20.9 ACUTE BRONCHITIS, UNSPECIFIED ORGANISM: ICD-10-CM

## 2019-10-07 RX ORDER — PREDNISONE 20 MG/1
TABLET ORAL
Qty: 5 TABLET | Refills: 0 | Status: SHIPPED | OUTPATIENT
Start: 2019-10-07 | End: 2020-03-24

## 2019-10-08 NOTE — TELEPHONE ENCOUNTER
"Routing refill request to provider for review/approval because:  A break in medication    Dulera  Last Written Prescription Date:  6/21/2017  Last Fill Quantity: 13 g,  # refills: 11   Last office visit: 7/24/2019 with prescribing provider:  Mirza   Future Office Visit:   Next 5 appointments (look out 90 days)    Oct 15, 2019 11:15 AM CDT  Return Visit with Arik Poole MD  [facility]) [address] [place] [id number] [phone]   Dec 10, 2019 10:45 AM CST  Return Visit with Julita Gaxiola MD  [facility]) [address] [place] [phone] [phone]           Requested Prescriptions   Pending Prescriptions Disp Refills     mometasone-formoterol (DULERA) 200-5 MCG/ACT inhaler [Pharmacy Med Name: DULERA 200-5MCG/ACT AERO] 13 g 11     Sig: INHALE TWO PUFFS BY MOUTH TWICE A DAY       Inhaled Steroids Protocol Passed - 10/6/2019 12:29 PM        Passed - Patient is age 12 or older        Passed - Recent (12 mo) or future (30 days) visit within the authorizing provider's specialty     Patient has had an office visit with the authorizing provider or a provider within the authorizing providers department within the previous 12 mos or has a future within next 30 days. See \"Patient Info\" tab in inbasket, or \"Choose Columns\" in Meds & Orders section of the refill encounter.              Passed - Medication is active on med list        Anali Florian RN on 10/8/2019 at 11:34 AM    "

## 2019-10-09 DIAGNOSIS — I48.20 CHRONIC ATRIAL FIBRILLATION (H): ICD-10-CM

## 2019-10-09 DIAGNOSIS — I10 HYPERTENSION, GOAL BELOW 140/90: ICD-10-CM

## 2019-10-09 NOTE — TELEPHONE ENCOUNTER
"Routing refill request to provider for review/approval because:  Labs not current:  ALT  T'd up 1 month for provider review.      Requested Prescriptions   Pending Prescriptions Disp Refills     CARTIA  MG 24 hr capsule [Pharmacy Med Name: CARTIA XT (DILTIAZEM) 120MG CP24] 30 capsule 0     Sig: TAKE ONE CAPSULE BY MOUTH ONCE DAILY   Last Written Prescription Date:  9/13/2019  Last Fill Quantity: 30,  # refills: 0   Last office visit: 7/24/2019 with prescribing provider:     Future Office Visit:   Next 5 appointments (look out 90 days)    Oct 15, 2019 11:15 AM CDT  Return Visit with Arik Poole MD  [facility] ([facility]) [address] [place] [phone]  [phone]   Dec 10, 2019 10:45 AM CST  Return Visit with Julita Gaxiola MD  [facility] ([facility]) [address] [place] [id number] [phone]             Calcium Channel Blockers Protocol  Failed - 10/9/2019  5:18 PM        Failed - Normal ALT in past 12 months     Recent Labs   Lab Test 06/20/18  1121   ALT 23           Passed - Blood pressure under 140/90 in past 12 months     BP Readings from Last 3 Encounters:   09/11/19 124/67   09/04/19 (!) 155/89   08/05/19 138/84           Passed - Recent (12 mo) or future (30 days) visit within the authorizing provider's specialty     Patient has had an office visit with the authorizing provider or a provider within the authorizing providers department within the previous 12 mos or has a future within next 30 days. See \"Patient Info\" tab in inbasket, or \"Choose Columns\" in Meds & Orders section of the refill encounter.            Passed - Medication is active on med list        Passed - Patient is age 18 or older        Passed - No active pregnancy on record        Passed - Normal serum creatinine on file in past 12 months     Recent Labs   Lab Test 07/11/19  1326   CR 0.99           Passed - No positive " pregnancy test in past 12 months      Flory Hidalgo RN

## 2019-10-11 RX ORDER — DILTIAZEM HYDROCHLORIDE 120 MG/1
120 CAPSULE, COATED, EXTENDED RELEASE ORAL DAILY
Qty: 30 CAPSULE | Refills: 0 | Status: SHIPPED | OUTPATIENT
Start: 2019-10-11 | End: 2019-11-05

## 2019-10-15 ENCOUNTER — ONCOLOGY VISIT (OUTPATIENT)
Dept: ONCOLOGY | Facility: CLINIC | Age: 69
End: 2019-10-15
Payer: COMMERCIAL

## 2019-10-15 VITALS
BODY MASS INDEX: 42.32 KG/M2 | SYSTOLIC BLOOD PRESSURE: 140 MMHG | OXYGEN SATURATION: 90 % | HEART RATE: 92 BPM | TEMPERATURE: 97.8 F | WEIGHT: 224 LBS | DIASTOLIC BLOOD PRESSURE: 79 MMHG

## 2019-10-15 DIAGNOSIS — C50.812 MALIGNANT NEOPLASM OF OVERLAPPING SITES OF LEFT BREAST IN FEMALE, ESTROGEN RECEPTOR POSITIVE (H): ICD-10-CM

## 2019-10-15 DIAGNOSIS — Z17.0 MALIGNANT NEOPLASM OF OVERLAPPING SITES OF LEFT BREAST IN FEMALE, ESTROGEN RECEPTOR POSITIVE (H): ICD-10-CM

## 2019-10-15 DIAGNOSIS — Z12.31 ENCOUNTER FOR SCREENING MAMMOGRAM FOR BREAST CANCER: Primary | ICD-10-CM

## 2019-10-15 PROCEDURE — 99214 OFFICE O/P EST MOD 30 MIN: CPT | Performed by: INTERNAL MEDICINE

## 2019-10-15 RX ORDER — ANASTROZOLE 1 MG/1
1 TABLET ORAL DAILY
Qty: 30 TABLET | Refills: 3 | Status: SHIPPED | OUTPATIENT
Start: 2019-10-15 | End: 2019-10-15

## 2019-10-15 RX ORDER — ANASTROZOLE 1 MG/1
1 TABLET ORAL DAILY
Qty: 30 TABLET | Refills: 11 | Status: SHIPPED | OUTPATIENT
Start: 2019-10-15 | End: 2020-07-10

## 2019-10-15 ASSESSMENT — PAIN SCALES - GENERAL: PAINLEVEL: MODERATE PAIN (4)

## 2019-10-15 NOTE — PATIENT INSTRUCTIONS
Cont Arimidex, calcium and Vit D    Follow up in Cancer survivorship clinic in 3 months      Schedule mammogram in April 2020 and see me after that

## 2019-10-15 NOTE — NURSING NOTE
"Oncology Rooming Note    October 15, 2019 11:25 AM   Latanya Padron is a 69 year old female who presents for:    Chief Complaint   Patient presents with     Oncology Clinic Visit     MD for Return, f/u transfer of care     Initial Vitals: BP (!) 140/79   Pulse 92   Temp 97.8  F (36.6  C) (Oral)   Wt 101.6 kg (224 lb)   SpO2 90%   BMI 42.32 kg/m   Estimated body mass index is 42.32 kg/m  as calculated from the following:    Height as of 9/4/19: 1.549 m (5' 1\").    Weight as of this encounter: 101.6 kg (224 lb). Body surface area is 2.09 meters squared.  Moderate Pain (4) Comment: Data Unavailable   No LMP recorded. Patient has had a hysterectomy.  Allergies reviewed: Yes  Medications reviewed: Yes    Medications: MEDICATION REFILLS NEEDED TODAY. Provider was notified.  Pharmacy name entered into EPIC:    [id number] [place], [place] - [facility] [place], [place] - [address] [place] [address]     Clinical concerns: not sleeping some nights, what is appropriate dose of melatonin? Dr. Poole was notified.      Gautam Charles RN              "

## 2019-10-15 NOTE — PROGRESS NOTES
DATE OF VISIT: Oct 15, 2019    REASON FOR REFERRAL:   Invasive lobular carcinoma left breast    HISTORY OF PRESENT ILLNESS:     Patient was being followed by Dr. Denis but now she will be transferring her care to me.  I have reviewed her previous records and have copied and updated from prior notes.    69-year-old female with past medical history significant for Atrial fibrillation, hypertension    09/2018 Screening mammogram showed an area of architectural distortion the left breast.Ultrasound breast did not show any sonographic abnormality and recommendation was for a 6 month follow-up mammogram    04/2019 Mammogram and ultrasound showed a heterogenous mass in the left breast measuring 1.1 x 0.5 x 0.5 cm. Centimeter biopsy of the mass came back showing invasive lobular carcinoma, Grade 2, ER/CA positive and HER-2/tulio negative by FISH. MRI breast bilateral showed 2 cm mass like enhancement in the left breast corresponding to the biopsy-proven carcinoma with no evidence of multifocal disease or axillary lymphadenopathy.     05/15/19 Lumpectomy with SNL dissection. Oncotype DX Recurrence score came back at 15 putting patient in the low-risk category and so did not recommend adjuvant systemic chemotherapy     07/18/2019 Completed Adjuvant RT    July 2019- started Arimidex.    She comes in today and tells me that she has been doing well.  She is tolerating Arimidex well without any significant hot flashes, musculoskeletal symptoms or abnormal vaginal dryness.  She denies any new swellings or any infections.  She has baseline shortness of breath from COPD which is unchanged.  Denies neuropathy.  No nausea vomiting diarrhea or constipation.  She is not exercising regularly.  She is taking calcium and vitamin D regularly.    ECOG 1    ROS:  A comprehensive ROS was otherwise neg      PAST MEDICAL HISTORY:   Past Medical History:   Diagnosis Date     Breast cancer (H) 04/12/2019    Left Breast     COPD (chronic obstructive  pulmonary disease) (H)      Mixed hyperlipidemia      PONV (postoperative nausea and vomiting)      S/P radiation therapy     5,256 cGy to left breast completed on 2019 - [facility]   Osteoporosis-     Family History   Problem Relation Age of Onset     Breast Cancer Mother      Obesity Mother      Genitourinary Problems Mother      Lipids Mother      Respiratory Sister      Lipids Sister      Breast Cancer Sister 40        s/p mastectomy     Arthritis Sister      Obesity Sister      Heart Failure Sister      Cancer Maternal Grandfather      Cancer Paternal Grandfather         lymph nodes     Heart Disease Father         by pass (5)     Cerebrovascular Disease Father         hemorragic     Lipids Father      Alzheimer Disease Maternal Grandmother      Genitourinary Problems Maternal Grandmother      Lipids Sister      Cancer Maternal Uncle         colon     Heart Disease Brother         Hx: stent placement     Lipids Brother         X 2   Her mother had breast cancer in her 70s.  Sister had breast cancer at 47.  Maternal cousin had breast cancer at 60.  Maternal uncle had colon cancer at 65.  Another maternal uncle had lymphoma at 65.  One maternal aunt had some sort of a cancer at age 80.  She has 1 daughter who is healthy.  Social History     Socioeconomic History     Marital status:      Spouse name: Nam     Number of children: 1     Years of education: 14     Highest education level: Not on file   Occupational History     Occupation: Ins Cordinater     Comment:  [employer]     Employer: [employer]   Social Needs     Financial resource strain: Not on file     Food insecurity:     Worry: Not on file     Inability: Not on file     Transportation needs:     Medical: Not on file     Non-medical: Not on file   Tobacco Use     Smoking status: Former Smoker     Packs/day: 1.00     Years: 30.00     Pack years: 30.00     Last attempt to quit: 10/25/2005     Years since quittin.9      Smokeless tobacco: Never Used   Substance and Sexual Activity     Alcohol use: No     Alcohol/week: 0.0 standard drinks     Drug use: No     Sexual activity: Not Currently     Partners: Male     Birth control/protection: Female Surgical     Comment: hysterectomy   Lifestyle     Physical activity:     Days per week: Not on file     Minutes per session: Not on file     Stress: Not on file   Relationships     Social connections:     Talks on phone: Not on file     Gets together: Not on file     Attends Orthodoxy service: Not on file     Active member of club or organization: Not on file     Attends meetings of clubs or organizations: Not on file     Relationship status: Not on file     Intimate partner violence:     Fear of current or ex partner: Not on file     Emotionally abused: Not on file     Physically abused: Not on file     Forced sexual activity: Not on file   Other Topics Concern      Service No     Blood Transfusions No     Caffeine Concern No     Comment: coffee 2c/d pop: 2c/d     Occupational Exposure No     Hobby Hazards No     Sleep Concern No     Stress Concern No     Weight Concern Yes     Comment: desire wt loss     Special Diet No     Back Care No     Comment: Hx: seen chiropractor      Exercise No     Bike Helmet No     Comment: n/a     Seat Belt Yes     Self-Exams Yes     Comment: attempts to do SBE monthly. Patient has fibrous breast tissue.     Parent/sibling w/ CABG, MI or angioplasty before 65F 55M? Not Asked   Social History Narrative     Not on file        Allergies   Allergen Reactions     Augmentin [Amoxicillin-Pot Clavulanate] Nausea and Vomiting     Meperidine Hcl Nausea and Vomiting     demerol     Seasonal Allergies      spring       PHYSICAL EXAMINATION:   BP (!) 140/79   Pulse 92   Temp 97.8  F (36.6  C) (Oral)   Wt 101.6 kg (224 lb)   SpO2 90%   BMI 42.32 kg/m    Wt Readings from Last 10 Encounters:   10/15/19 101.6 kg (224 lb)   09/11/19 99.3 kg (219 lb)   09/04/19 98  kg (216 lb)   08/05/19 99.3 kg (219 lb)   07/24/19 99.5 kg (219 lb 4.8 oz)   07/19/19 101.2 kg (223 lb)   07/18/19 101.2 kg (223 lb)   07/17/19 99.3 kg (219 lb)   07/11/19 101.7 kg (224 lb 3.2 oz)   07/10/19 100.2 kg (221 lb)    CONSTITUTIONAL: no acute distress  EYES: PERRLA, no palor or icterus.   ENT/MOUTH: no mouth lesions. Ears normal  CVS: s1s2 no m r g .   RESPIRATORY: clear to auscultation b/l  GI: soft non tender no hepatosplenomegaly  NEURO: AAOX3  Grossly non focal neuro exam  INTEGUMENT: no obvious rashes  LYMPHATIC: no palpable cervical, supraclavicular, axillary or inguinal LAD  MUSCULOSKELETAL: Unremarkable. No bony tenderness.   EXTREMITIES: no edema  PSYCH: Mentation, mood and affect are normal. Decision making capacity is intact  Breast exam was done in presence of female chaperone.  On the left side the surgical scar has healed well.  There is some hyperpigmentation around the surgical scar but there is mild underlying postsurgical changes but no suspicious palpable abnormality.  Right breast exam is unremarkable.    ASSESSMENT AND PLAN:    69-year-old postmenopausal female with past medical history of atrial fibrillation, hypertension     - Invasive lobular carcinoma  pT2N0  ER/FL positive and HER-2/tulio negative by FISH  Oncotype Score 15 with 4% risk of distant recurrence with hormonal therapy alone and <1% benefit from chemotherapy so did not get adjuvant chemo.  Completed adjuvant radiation on 7/18/2019.  She started Arimidex in July 2019.  It is going well and we will continue this.      Discussion regarding surveillance.  She needs annual surveillance mammograms. Next due in April 2020.  Will have her follow up in Cancer survivorship clinic in 3 months.    Discussion regarding exercise.  I recommend regular exercise and I recommend at least 150 minutes of aerobic weightbearing exercise every week.    - Osteoporosis  Prolia q 6 months  Cont Ca and Vit D daily.  Repeat DEXA scan in June 2021.    Recommend weightbearing exercise as mentioned above.    We did not address the following today.  Discussion regarding genetic testing.  Because of her personal and strong family history of cancers, it would be good if she is evaluated by a genetic counselor.  We will address this on a future visit.      - Atrial fibrillation  On Coumadin for anticoagulation    RTC in 3 months in Cancer Survivorship clinic and see me in 6 months with Mammogram prior.    All questions answered. She is agreeable and comfortable with the plan.    Arik Poole MD

## 2019-10-15 NOTE — LETTER
10/15/2019         RE: Lat[place] Padron  [id number] [facility] [facility] [address]        Dear Colleague,    Thank you for referring your patient, Latanya Padron, to the [facility]. Please see a copy of my visit note below.    DATE OF VISIT: Oct 15, 2019    REASON FOR REFERRAL:   Invasive lobular carcinoma left breast    HISTORY OF PRESENT ILLNESS:     Patient was being followed by Dr. Denis but now she will be transferring her care to me.  I have reviewed her previous records and have copied and updated from prior notes.    69-year-old female with past medical history significant for Atrial fibrillation, hypertension    09/2018 Screening mammogram showed an area of architectural distortion the left breast.Ultrasound breast did not show any sonographic abnormality and recommendation was for a 6 month follow-up mammogram    04/2019 Mammogram and ultrasound showed a heterogenous mass in the left breast measuring 1.1 x 0.5 x 0.5 cm. Centimeter biopsy of the mass came back showing invasive lobular carcinoma, Grade 2, ER/SC positive and HER-2/tulio negative by FISH. MRI breast bilateral showed 2 cm mass like enhancement in the left breast corresponding to the biopsy-proven carcinoma with no evidence of multifocal disease or axillary lymphadenopathy.     05/15/19 Lumpectomy with SNL dissection. Oncotype DX Recurrence score came back at 15 putting patient in the low-risk category and so did not recommend adjuvant systemic chemotherapy     07/18/2019 Completed Adjuvant RT    July 2019- started Arimidex.    She comes in today and tells me that she has been doing well.  She is tolerating Arimidex well without any significant hot flashes, musculoskeletal symptoms or abnormal vaginal dryness.  She denies any new swellings or any infections.  She has baseline shortness of breath from COPD which is unchanged.  Denies neuropathy.  No nausea vomiting diarrhea or constipation.  She is not exercising regularly.   She is taking calcium and vitamin D regularly.    ECOG 1    ROS:  A comprehensive ROS was otherwise neg      PAST MEDICAL HISTORY:   Past Medical History:   Diagnosis Date     Breast cancer (H) 04/12/2019    Left Breast     COPD (chronic obstructive pulmonary disease) (H)      Mixed hyperlipidemia      PONV (postoperative nausea and vomiting)      S/P radiation therapy     5,256 cGy to left breast completed on 7/18/2019 -  [facility]   Osteoporosis-     Family History   Problem Relation Age of Onset     Breast Cancer Mother      Obesity Mother      Genitourinary Problems Mother      Lipids Mother      Respiratory Sister      Lipids Sister      Breast Cancer Sister 40        s/p mastectomy     Arthritis Sister      Obesity Sister      Heart Failure Sister      Cancer Maternal Grandfather      Cancer Paternal Grandfather         lymph nodes     Heart Disease Father         by pass (5)     Cerebrovascular Disease Father         hemorragic     Lipids Father      Alzheimer Disease Maternal Grandmother      Genitourinary Problems Maternal Grandmother      Lipids Sister      Cancer Maternal Uncle         colon     Heart Disease Brother         Hx: stent placement     Lipids Brother         X 2   Her mother had breast cancer in her 70s.  Sister had breast cancer at 47.  Maternal cousin had breast cancer at 60.  Maternal uncle had colon cancer at 65.  Another maternal uncle had lymphoma at 65.  One maternal aunt had some sort of a cancer at age 80.  She has 1 daughter who is healthy.  Social History     Socioeconomic History     Marital status:      Spouse name: Nam     Number of children: 1     Years of education: 14     Highest education level: Not on file   Occupational History     Occupation: Ins Cordinater     Comment:  [employer]     Employer: [employer]   Social Needs     Financial resource strain: Not on file     Food insecurity:     Worry: Not on file     Inability: Not on file      Transportation needs:     Medical: Not on file     Non-medical: Not on file   Tobacco Use     Smoking status: Former Smoker     Packs/day: 1.00     Years: 30.00     Pack years: 30.00     Last attempt to quit: 10/25/2005     Years since quittin.9     Smokeless tobacco: Never Used   Substance and Sexual Activity     Alcohol use: No     Alcohol/week: 0.0 standard drinks     Drug use: No     Sexual activity: Not Currently     Partners: Male     Birth control/protection: Female Surgical     Comment: hysterectomy   Lifestyle     Physical activity:     Days per week: Not on file     Minutes per session: Not on file     Stress: Not on file   Relationships     Social connections:     Talks on phone: Not on file     Gets together: Not on file     Attends Hindu service: Not on file     Active member of club or organization: Not on file     Attends meetings of clubs or organizations: Not on file     Relationship status: Not on file     Intimate partner violence:     Fear of current or ex partner: Not on file     Emotionally abused: Not on file     Physically abused: Not on file     Forced sexual activity: Not on file   Other Topics Concern      Service No     Blood Transfusions No     Caffeine Concern No     Comment: coffee 2c/d pop: 2c/d     Occupational Exposure No     Hobby Hazards No     Sleep Concern No     Stress Concern No     Weight Concern Yes     Comment: desire wt loss     Special Diet No     Back Care No     Comment: Hx: seen chiropractor      Exercise No     Bike Helmet No     Comment: n/a     Seat Belt Yes     Self-Exams Yes     Comment: attempts to do SBE monthly. Patient has fibrous breast tissue.     Parent/sibling w/ CABG, MI or angioplasty before 65F 55M? Not Asked   Social History Narrative     Not on file        Allergies   Allergen Reactions     Augmentin [Amoxicillin-Pot Clavulanate] Nausea and Vomiting     Meperidine Hcl Nausea and Vomiting     demerol     Seasonal Allergies      spring        PHYSICAL EXAMINATION:   BP (!) 140/79   Pulse 92   Temp 97.8  F (36.6  C) (Oral)   Wt 101.6 kg (224 lb)   SpO2 90%   BMI 42.32 kg/m     Wt Readings from Last 10 Encounters:   10/15/19 101.6 kg (224 lb)   09/11/19 99.3 kg (219 lb)   09/04/19 98 kg (216 lb)   08/05/19 99.3 kg (219 lb)   07/24/19 99.5 kg (219 lb 4.8 oz)   07/19/19 101.2 kg (223 lb)   07/18/19 101.2 kg (223 lb)   07/17/19 99.3 kg (219 lb)   07/11/19 101.7 kg (224 lb 3.2 oz)   07/10/19 100.2 kg (221 lb)    CONSTITUTIONAL: no acute distress  EYES: PERRLA, no palor or icterus.   ENT/MOUTH: no mouth lesions. Ears normal  CVS: s1s2 no m r g .   RESPIRATORY: clear to auscultation b/l  GI: soft non tender no hepatosplenomegaly  NEURO: AAOX3  Grossly non focal neuro exam  INTEGUMENT: no obvious rashes  LYMPHATIC: no palpable cervical, supraclavicular, axillary or inguinal LAD  MUSCULOSKELETAL: Unremarkable. No bony tenderness.   EXTREMITIES: no edema  PSYCH: Mentation, mood and affect are normal. Decision making capacity is intact  Breast exam was done in presence of female chaperone.  On the left side the surgical scar has healed well.  There is some hyperpigmentation around the surgical scar but there is mild underlying postsurgical changes but no suspicious palpable abnormality.  Right breast exam is unremarkable.    ASSESSMENT AND PLAN:    69-year-old postmenopausal female with past medical history of atrial fibrillation, hypertension     - Invasive lobular carcinoma  pT2N0  ER/IL positive and HER-2/tulio negative by FISH  Oncotype Score 15 with 4% risk of distant recurrence with hormonal therapy alone and <1% benefit from chemotherapy so did not get adjuvant chemo.  Completed adjuvant radiation on 7/18/2019.  She started Arimidex in July 2019.  It is going well and we will continue this.      Discussion regarding surveillance.  She needs annual surveillance mammograms. Next due in April 2020.  Will have her follow up in Cancer survivorship clinic  in 3 months.    Discussion regarding exercise.  I recommend regular exercise and I recommend at least 150 minutes of aerobic weightbearing exercise every week.    - Osteoporosis  Prolia q 6 months  Cont Ca and Vit D daily.  Repeat DEXA scan in June 2021.   Recommend weightbearing exercise as mentioned above.    We did not address the following today.  Discussion regarding genetic testing.  Because of her personal and strong family history of cancers, it would be good if she is evaluated by a genetic counselor.  We will address this on a future visit.      - Atrial fibrillation  On Coumadin for anticoagulation    RTC in 3 months in Cancer Survivorship clinic and see me in 6 months with Mammogram prior.    All questions answered. She is agreeable and comfortable with the plan.    Arik Poole MD              Again, thank you for allowing me to participate in the care of your patient.        Sincerely,        Arik Poole MD

## 2019-10-25 ENCOUNTER — IMMUNIZATION (OUTPATIENT)
Dept: FAMILY MEDICINE | Facility: CLINIC | Age: 69
End: 2019-10-25
Payer: COMMERCIAL

## 2019-10-25 PROCEDURE — G0008 ADMIN INFLUENZA VIRUS VAC: HCPCS

## 2019-10-25 PROCEDURE — 90662 IIV NO PRSV INCREASED AG IM: CPT

## 2019-10-28 ENCOUNTER — ANTICOAGULATION THERAPY VISIT (OUTPATIENT)
Dept: ANTICOAGULATION | Facility: CLINIC | Age: 69
End: 2019-10-28
Payer: COMMERCIAL

## 2019-10-28 ENCOUNTER — TELEPHONE (OUTPATIENT)
Dept: ANTICOAGULATION | Facility: CLINIC | Age: 69
End: 2019-10-28

## 2019-10-28 DIAGNOSIS — I48.20 CHRONIC ATRIAL FIBRILLATION (H): Primary | ICD-10-CM

## 2019-10-28 DIAGNOSIS — I48.0 AF (PAROXYSMAL ATRIAL FIBRILLATION) (H): ICD-10-CM

## 2019-10-28 DIAGNOSIS — I48.91 ATRIAL FIBRILLATION, UNSPECIFIED TYPE (H): ICD-10-CM

## 2019-10-28 LAB — INR POINT OF CARE: 3 (ref 0.9–1.1)

## 2019-10-28 PROCEDURE — 85610 PROTHROMBIN TIME: CPT | Mod: QW

## 2019-10-28 PROCEDURE — 99207 ZZC NO CHARGE NURSE ONLY: CPT

## 2019-10-28 PROCEDURE — 36416 COLLJ CAPILLARY BLOOD SPEC: CPT

## 2019-10-28 NOTE — TELEPHONE ENCOUNTER
Please review and renew this patients INR referral, orders pending. Thank you!      Francine Andrew RN

## 2019-10-28 NOTE — PROGRESS NOTES
ANTICOAGULATION FOLLOW-UP CLINIC VISIT    Patient Name:  Latanya Padron  Date:  10/28/2019  Contact Type:  Face to Face    SUBJECTIVE:  Patient Findings     Comments:   The patient was assessed for diet, medication, and activity level changes, missed or extra doses, bruising or bleeding, with no problem findings.  Francine Andrew RN          Clinical Outcomes     Negatives:   Major bleeding event, Thromboembolic event, Anticoagulation-related hospital admission, Anticoagulation-related ED visit, Anticoagulation-related fatality    Comments:   The patient was assessed for diet, medication, and activity level changes, missed or extra doses, bruising or bleeding, with no problem findings.  Francine Andrew RN             OBJECTIVE    INR Protime   Date Value Ref Range Status   10/28/2019 3.0 (A) 0.9 - 1.1 Final       ASSESSMENT / PLAN  INR assessment THER    Recheck INR In: 6 WEEKS    INR Location Clinic      Anticoagulation Summary  As of 10/28/2019    INR goal:   2.0-3.0   TTR:   77.4 % (3.6 y)   INR used for dosing:   3.0 (10/28/2019)   Warfarin maintenance plan:   5 mg (5 mg x 1) every Mon; 2.5 mg (5 mg x 0.5) all other days   Full warfarin instructions:   5 mg every Mon; 2.5 mg all other days   Weekly warfarin total:   20 mg   No change documented:   Francine Andrew RN   Plan last modified:   Francine Andrew RN (7/26/2017)   Next INR check:   12/9/2019   Target end date:       Indications    AF (paroxysmal atrial fibrillation) (H) [I48.0]  Atrial fibrillation (H) [I48.91]             Anticoagulation Episode Summary     INR check location:       Preferred lab:       Send INR reminders to:   [facility] [place]    Comments:   5 mg tabs, likes card, PM dose      Anticoagulation Care Providers     Provider Role Specialty Phone number    Glen Blue MD [facility] [phone]            See the Encounter Report to view Anticoagulation Flowsheet and Dosing Calendar (Go to Encounters  tab in chart review, and find the Anticoagulation Therapy Visit)    Dosage adjustment made based on physician directed care plan.    Francine Andrew RN

## 2019-11-05 DIAGNOSIS — I10 HYPERTENSION, GOAL BELOW 140/90: ICD-10-CM

## 2019-11-05 DIAGNOSIS — I48.20 CHRONIC ATRIAL FIBRILLATION (H): ICD-10-CM

## 2019-11-06 RX ORDER — DILTIAZEM HYDROCHLORIDE 120 MG/1
CAPSULE, COATED, EXTENDED RELEASE ORAL
Qty: 30 CAPSULE | Refills: 0 | Status: SHIPPED | OUTPATIENT
Start: 2019-11-06 | End: 2019-12-07

## 2019-11-06 NOTE — TELEPHONE ENCOUNTER
"Requested Prescriptions   Pending Prescriptions Disp Refills     diltiazem ER COATED BEADS (CARDIZEM CD/CARTIA XT) 120 MG 24 hr capsule [Pharmacy Med Name: DILTIAZEM HCL ER COATED  CP24] 30 capsule 0     Sig: TAKE ONE CAPSULE BY MOUTH ONCE DAILY (LABS DUE)   Last Written Prescription Date:  11/1/19  Last Fill Quantity: 30,  # refills: 0   Last office visit: 7/24/19 [facility]   Future Office Visit:   Next 5 appointments (look out 90 days)    Dec 10, 2019 10:45 AM CST  Return Visit with Julita Gaxiola MD  [facility] ([facility]) [id number] [place] [id number] [phone]             Calcium Channel Blockers Protocol  Failed - 11/5/2019  6:09 PM        Failed - Blood pressure under 140/90 in past 12 months     BP Readings from Last 3 Encounters:   10/15/19 (!) 140/79   09/11/19 124/67   09/04/19 (!) 155/89           Failed - Normal ALT in past 12 months     Recent Labs   Lab Test 06/20/18  1121   ALT 23           Passed - Recent (12 mo) or future (30 days) visit within the authorizing provider's specialty     Patient has had an office visit with the authorizing provider or a provider within the authorizing providers department within the previous 12 mos or has a future within next 30 days. See \"Patient Info\" tab in inbasket, or \"Choose Columns\" in Meds & Orders section of the refill encounter.              Passed - Medication is active on med list        Passed - Patient is age 18 or older        Passed - No active pregnancy on record        Passed - Normal serum creatinine on file in past 12 months     Recent Labs   Lab Test 07/11/19  1326   CR 0.99           Passed - No positive pregnancy test in past 12 months          "

## 2019-11-06 NOTE — TELEPHONE ENCOUNTER
Routing refill request to provider for review/approval because:  Labs out of range:  BP  Labs not current:  Kang Kellogg RN on 11/6/2019 at 8:59 AM

## 2019-11-19 ENCOUNTER — TELEPHONE (OUTPATIENT)
Dept: ONCOLOGY | Facility: CLINIC | Age: 69
End: 2019-11-19

## 2019-11-19 DIAGNOSIS — Z79.01 LONG TERM CURRENT USE OF ANTICOAGULANT THERAPY: ICD-10-CM

## 2019-11-19 DIAGNOSIS — I48.20 CHRONIC ATRIAL FIBRILLATION (H): ICD-10-CM

## 2019-11-19 RX ORDER — WARFARIN SODIUM 5 MG/1
TABLET ORAL
Qty: 60 TABLET | Refills: 0 | Status: SHIPPED | OUTPATIENT
Start: 2019-11-19 | End: 2020-01-21

## 2019-11-19 NOTE — TELEPHONE ENCOUNTER
Writing Nurse spoke with patient this morning. Patient was asking if she could come in and see Dr. Penaloza as she does not feel comfortable with her new Oncologist and doesn't like the drive to [place]. [place] nurse informed her that Dr. Penaloza was unable to take anymore patients, but we were looking at possibly hiring a new oncologist in the future. Patient was happy to hear that and will call in the future to see if she can see the new provider. Patient was in tears and very emotional, but patient did verbalize understanding.   Lisa Hernandez RN on 11/19/2019 at 11:07 AM

## 2019-12-07 DIAGNOSIS — I48.20 CHRONIC ATRIAL FIBRILLATION (H): ICD-10-CM

## 2019-12-07 DIAGNOSIS — I10 HYPERTENSION, GOAL BELOW 140/90: ICD-10-CM

## 2019-12-09 ENCOUNTER — ANTICOAGULATION THERAPY VISIT (OUTPATIENT)
Dept: ANTICOAGULATION | Facility: CLINIC | Age: 69
End: 2019-12-09
Payer: COMMERCIAL

## 2019-12-09 ENCOUNTER — PRE VISIT (OUTPATIENT)
Dept: RADIATION ONCOLOGY | Facility: CLINIC | Age: 69
End: 2019-12-09

## 2019-12-09 DIAGNOSIS — I48.0 AF (PAROXYSMAL ATRIAL FIBRILLATION) (H): ICD-10-CM

## 2019-12-09 DIAGNOSIS — I48.91 ATRIAL FIBRILLATION, UNSPECIFIED TYPE (H): ICD-10-CM

## 2019-12-09 LAB — INR POINT OF CARE: 2.8 (ref 0.9–1.1)

## 2019-12-09 PROCEDURE — 36416 COLLJ CAPILLARY BLOOD SPEC: CPT

## 2019-12-09 PROCEDURE — 99207 ZZC NO CHARGE NURSE ONLY: CPT

## 2019-12-09 PROCEDURE — 85610 PROTHROMBIN TIME: CPT | Mod: QW

## 2019-12-09 RX ORDER — DILTIAZEM HYDROCHLORIDE 120 MG/1
CAPSULE, EXTENDED RELEASE ORAL
Qty: 30 CAPSULE | Refills: 0 | Status: SHIPPED | OUTPATIENT
Start: 2019-12-09 | End: 2020-01-09

## 2019-12-09 NOTE — TELEPHONE ENCOUNTER
December 9, 2019              Veda Kellogg CMA     Chief Complaint   Patient presents with     Appointment       Pre-Visit Documentation: Latanya Padron is a 69 year old female - Patient stated no imaging or providers seen outside of [facility] since her last appointment.       Current Outpatient Medications   Medication Sig Dispense Refill     albuterol (2.5 MG/3ML) 0.083% neb solution Take  by nebulization. 1 NEB EVERY 4-6 HOURS AS NEEDED 75 mL 5     albuterol (PROAIR HFA/PROVENTIL HFA/VENTOLIN HFA) 108 (90 Base) MCG/ACT inhaler 1-2 puffs by mouth every 2-4 hours as needed 18 g 1     anastrozole (ARIMIDEX) 1 MG tablet Take 1 tablet (1 mg) by mouth daily 30 tablet 11     atorvastatin (LIPITOR) 10 MG tablet TAKE ONE TABLET BY MOUTH ONCE DAILY 90 tablet 2     BIOTIN PO Take by mouth daily       Calcium Carb-Cholecalciferol (CALCIUM 500 + D) 500-400 MG-UNIT TABS Take 1 tablet by mouth 2 times daily 180 tablet 3     CARTIA  MG 24 hr capsule TAKE ONE CAPSULE BY MOUTH ONCE DAILY 30 capsule 0     Cyanocobalamin (B-12) 1000 MCG TBCR Take 1,000 mcg by mouth daily 100 tablet 1     diclofenac (VOLTAREN) 1 % topical gel Apply 4 g topically 2 times daily as needed for moderate pain 100 g 0     diltiazem ER COATED BEADS (CARDIZEM CD/CARTIA XT) 120 MG 24 hr capsule TAKE ONE CAPSULE BY MOUTH ONCE DAILY (LABS DUE) 30 capsule 0     hydrochlorothiazide (HYDRODIURIL) 25 MG tablet TAKE ONE TABLET BY MOUTH EVERY DAY 90 tablet 2     lisinopril (PRINIVIL/ZESTRIL) 2.5 MG tablet TAKE ONE TABLET BY MOUTH EVERY DAY 90 tablet 2     magnesium 250 MG tablet Take 1 tablet by mouth daily 30 tablet      mometasone-formoterol (DULERA) 200-5 MCG/ACT inhaler INHALE TWO PUFFS BY MOUTH TWICE A DAY 13 g 11     ORDER FOR DME Equipment being ordered: Oxygen @ 2 liters with exertion       Probiotic Product (PROBIOTIC PO) Take by mouth daily       trospium (SANCTURA) 20 MG tablet Take 20 mg by mouth       umeclidinium (INCRUSE ELLIPTA)  62.5 MCG/INH oral inhaler Inhale 1 puff into the lungs daily 1 Inhaler 11     venlafaxine (EFFEXOR-ER) 75 MG 24 hr tablet Take 1 tablet (75 mg) by mouth daily 90 tablet 3     warfarin ANTICOAGULANT (JANTOVEN ANTICOAGULANT) 5 MG tablet Take 5 mg on Mon and 2.5 mg all other days, or as directed by the Coumadin Clinic. 60 tablet 0     cyclobenzaprine (FLEXERIL) 10 MG tablet Take 1 tablet (10 mg) by mouth nightly as needed for muscle spasms Can cause sedation. Do not mix with alcohol. (Patient not taking: Reported on 12/9/2019) 10 tablet 0     predniSONE (DELTASONE) 20 MG tablet TAKE ONE TABLET BY MOUTH EVERY DAY (Patient not taking: Reported on 12/9/2019) 5 tablet 0       ASSESSMENT / PLAN:  No diagnosis found.

## 2019-12-09 NOTE — PROGRESS NOTES
ANTICOAGULATION FOLLOW-UP CLINIC VISIT    Patient Name:  Latanya Padron  Date:  2019  Contact Type:  Face to Face    SUBJECTIVE:  Patient Findings     Comments:   The patient was assessed for diet, medication, and activity level changes, missed or extra doses, bruising or bleeding, with no problem findings.  rFancine Andrew RN          Clinical Outcomes     Negatives:   Major bleeding event, Thromboembolic event, Anticoagulation-related hospital admission, Anticoagulation-related ED visit, Anticoagulation-related fatality    Comments:   The patient was assessed for diet, medication, and activity level changes, missed or extra doses, bruising or bleeding, with no problem findings.  Francine Andrew RN             OBJECTIVE    INR Protime   Date Value Ref Range Status   2019 2.8 (A) 0.9 - 1.1 Final       ASSESSMENT / PLAN  INR assessment THER    Recheck INR In: 6 WEEKS    INR Location Clinic      Anticoagulation Summary  As of 2019    INR goal:   2.0-3.0   TTR:   81.4 % (1 y)   INR used for dosin.8 (2019)   Warfarin maintenance plan:   5 mg (5 mg x 1) every Mon; 2.5 mg (5 mg x 0.5) all other days   Full warfarin instructions:   5 mg every Mon; 2.5 mg all other days   Weekly warfarin total:   20 mg   No change documented:   Francine Andrew RN   Plan last modified:   Francine Andrew RN (2017)   Next INR check:   2020   Target end date:       Indications    AF (paroxysmal atrial fibrillation) (H) [I48.0]  Atrial fibrillation (H) [I48.91]             Anticoagulation Episode Summary     INR check location:       Preferred lab:       Send INR reminders to:   [facility] [place]    Comments:   5 mg tabs, likes card, PM dose      Anticoagulation Care Providers     Provider Role Specialty Phone number    Glen Blue MD [facility] [phone]            See the Encounter Report to view Anticoagulation Flowsheet and Dosing Calendar (Go to Encounters tab in  chart review, and find the Anticoagulation Therapy Visit)    Dosage adjustment made based on physician directed care plan.      Francine Andrew RN

## 2019-12-09 NOTE — TELEPHONE ENCOUNTER
"Routing refill request to provider for review/approval because:  Labs not current:  ALT  BP out of range  T'd up 1 month for provider review.      Requested Prescriptions   Pending Prescriptions Disp Refills     CARTIA  MG 24 hr capsule [Pharmacy Med Name: CARTIA XT (DILTIAZEM) 120MG CP24] 30 capsule 0     Sig: TAKE ONE CAPSULE BY MOUTH ONCE DAILY (LABS DUE)   Last Written Prescription Date:  9/13/2019  Last Fill Quantity: 30,  # refills: 0   Last office visit: 7/24/2019 with prescribing provider:     Future Office Visit:   Next 5 appointments (look out 90 days)    Dec 10, 2019 10:45 AM CST  Return Visit with Julita [facility]ola MD  [facility] [address] [place] [id number] [phone]           Calcium Channel Blockers Protocol  Failed - 12/7/2019 11:08 AM        Failed - Blood pressure under 140/90 in past 12 months     BP Readings from Last 3 Encounters:   10/15/19 (!) 140/79   09/11/19 124/67   09/04/19 (!) 155/89           Failed - Normal ALT in past 12 months     Recent Labs   Lab Test 06/20/18  1121   ALT 23           Passed - Recent (12 mo) or future (30 days) visit within the authorizing provider's specialty     Patient has had an office visit with the authorizing provider or a provider within the authorizing providers department within the previous 12 mos or has a future within next 30 days. See \"Patient Info\" tab in inbasket, or \"Choose Columns\" in Meds & Orders section of the refill encounter.            Passed - Medication is active on med list        Passed - Patient is age 18 or older        Passed - No active pregnancy on record        Passed - Normal serum creatinine on file in past 12 months     Recent Labs   Lab Test 07/11/19  1326   CR 0.99           Passed - No positive pregnancy test in past 12 months      Flory Hidalgo RN      "

## 2019-12-10 ENCOUNTER — OFFICE VISIT (OUTPATIENT)
Dept: RADIATION ONCOLOGY | Facility: CLINIC | Age: 69
End: 2019-12-10
Payer: COMMERCIAL

## 2019-12-10 VITALS
BODY MASS INDEX: 41.78 KG/M2 | OXYGEN SATURATION: 96 % | SYSTOLIC BLOOD PRESSURE: 125 MMHG | HEART RATE: 86 BPM | DIASTOLIC BLOOD PRESSURE: 84 MMHG | WEIGHT: 221.1 LBS | TEMPERATURE: 97.7 F

## 2019-12-10 DIAGNOSIS — Z17.0 MALIGNANT NEOPLASM OF OVERLAPPING SITES OF LEFT BREAST IN FEMALE, ESTROGEN RECEPTOR POSITIVE (H): Primary | ICD-10-CM

## 2019-12-10 DIAGNOSIS — C50.812 MALIGNANT NEOPLASM OF OVERLAPPING SITES OF LEFT BREAST IN FEMALE, ESTROGEN RECEPTOR POSITIVE (H): Primary | ICD-10-CM

## 2019-12-10 PROCEDURE — 99214 OFFICE O/P EST MOD 30 MIN: CPT | Performed by: RADIOLOGY

## 2019-12-10 ASSESSMENT — PAIN SCALES - GENERAL: PAINLEVEL: NO PAIN (0)

## 2019-12-10 NOTE — PATIENT INSTRUCTIONS
Please contact [facility] RN with questions or concerns following today's appointment: [phone].    Thank you!

## 2019-12-10 NOTE — NURSING NOTE
FOLLOW-UP VISIT    Patient Name: Latanya Padron      : 1950     Age: 69 year old        ______________________________________________________________________________     Chief Complaint   Patient presents with     Cancer     Radiation Oncology return visit with Dr. Gaxiola     /84 (BP Location: Right arm, Patient Position: Sitting, Cuff Size: Adult Large)   Pulse 86   Temp 97.7  F (36.5  C) (Oral)   Wt 100.3 kg (221 lb 1.6 oz)   SpO2 96%   BMI 41.78 kg/m       Date Radiation Completed: 5,256 cGy to left breast completed on 2019    Pain  Denies    Labs  Other Labs: Yes: 2019    Imaging  None        Other Appointments:     MD Name: Dr. Poole Appointment Date: 2020   MD Name: Dr. Gaxiola Appointment Date: 12/15/2020   MD Name: Appointment Date:   Other Appointment Notes: Mammogram 2020, Angela Germscheid 01/15/2020

## 2019-12-11 NOTE — PROGRESS NOTES
Dear Colleagues,  Today Latanya Padron was seen in follow up.      IDENTIFICATION: Latanya Padron is a 69 year old woman with strong family history of breast cancer and history of COPD most recently diagnosed with left breast G2 ILCA, ER+/AZ+/H2N-, status post lumpectomy and SLNBx, revealing jH9H2Y1 disease. She will not be receiving chemotherapy.  Oncotype score is 15. She completed adjuvant radiation therapy to the left breast on 19 and is here in follow up.  INTERVAL HISTORY:  Latanya Padron was last seen in our clinic 3 months ago. While on treatment she had complaints of dyspnea on exertion which is a chronic condition given h/o COPD/Afib. While on treatment she also developed diffuse skin erythema (grade 1) and used mometasone and Aquaphor while on treatment.  Post treatment her radiation induced skin symptoms resolved. She returns today in follow up and has no complaints. She is followed by Dr. Poole/Med Onc team and is on Arimidex.  She is tolerating it well.  Specifically denies n/v/ha/unsual sob/cp.  REVIEW OF SYSTEMS: As per HPI, a 14-point review of systems is otherwise negative.     Past Medical History:   Diagnosis Date     Breast cancer (H) 2019    Left Breast     COPD (chronic obstructive pulmonary disease) (H)      Mixed hyperlipidemia      PONV (postoperative nausea and vomiting)      S/P radiation therapy     5,256 cGy to left breast completed on 2019 - [facility]       Past Surgical History:   Procedure Laterality Date     BIOPSY NODE SENTINEL Left 5/15/2019    Procedure: left sentinel node biopsy;  Surgeon: Donald Aleman MD;  Location: [facility] OR     BREAST BIOPSY, CORE RT/LT Left 2019     C APPENDECTOMY,W OTHR PROC       C  DELIVERY ONLY           C LIGATE FALLOPIAN TUBE       C NONSPECIFIC PROCEDURE      Exploratory laparotomy with resection of infarcted segment of omentum and minor lysis of adhesions     C NONSPECIFIC PROCEDURE   1981    Removal of hemorrhagic corpus luteum cyst     C VAGINAL HYSTERECTOMY  1984     COLONOSCOPY  06/21/10      BIOPSY OF BREAST, OPEN INCISIONAL Right 1988    Benign per patient     HC CORRECT BUNION,METATARSAL OSTEOTOMY  1993      LAPAROSCOPY, SURGICAL; CHOLECYSTECTOMY  08/04/10     HC SLING OPERATION FOR STRESS INCONTINENCE  04/19/2007     HERNIORRHAPHY INCISIONAL (LOCATION)  9/23/2011    Procedure:HERNIORRHAPHY INCISIONAL (LOCATION); Surgeon:AYALA HOOVER; Location:[facility] OR     LAPAROSCOPIC HERNIORRHAPHY INCISIONAL  9/23/2011    Procedure:LAPAROSCOPIC HERNIORRHAPHY INCISIONAL; Laparoscopic mesh repair of incarcerated incisional hernia , extensive lysis of adhesions, excision of hernia sac and closure of fascia.; Surgeon:AYALA HOOVER; Location:[facility] OR     LAPAROSCOPIC LYSIS ADHESIONS  9/23/2011    Procedure:LAPAROSCOPIC LYSIS ADHESIONS; Surgeon:AYALA HOOVER; Location:[facility] OR     MASTECTOMY PARTIAL WITH NEEDLE LOCALIZATION Left 5/15/2019    Procedure: left wire localized partial mastectomy;  Surgeon: Donald Aleman MD;  Location: [facility] OR     TONSILLECTOMY         Family History   Problem Relation Age of Onset     Breast Cancer Mother      Obesity Mother      Genitourinary Problems Mother      Lipids Mother      Respiratory Sister      Lipids Sister      Breast Cancer Sister 40        s/p mastectomy     Arthritis Sister      Obesity Sister      Heart Failure Sister      Cancer Maternal Grandfather      Cancer Paternal Grandfather         lymph nodes     Heart Disease Father         by pass (5)     Cerebrovascular Disease Father         hemorragic     Lipids Father      Alzheimer Disease Maternal Grandmother      Genitourinary Problems Maternal Grandmother      Lipids Sister      Cancer Maternal Uncle         colon     Heart Disease Brother         Hx: stent placement     Lipids Brother         X 2       Social History     Tobacco Use     Smoking status: Former Smoker     Packs/day: 1.00     Years:  30.00     Pack years: 30.00     Last attempt to quit: 10/25/2005     Years since quittin.1     Smokeless tobacco: Never Used   Substance Use Topics     Alcohol use: No     Alcohol/week: 0.0 standard drinks       Current Outpatient Medications   Medication     albuterol (2.5 MG/3ML) 0.083% neb solution     albuterol (PROAIR HFA/PROVENTIL HFA/VENTOLIN HFA) 108 (90 Base) MCG/ACT inhaler     anastrozole (ARIMIDEX) 1 MG tablet     atorvastatin (LIPITOR) 10 MG tablet     BIOTIN PO     Calcium Carb-Cholecalciferol (CALCIUM 500 + D) 500-400 MG-UNIT TABS     CARTIA  MG 24 hr capsule     CARTIA  MG 24 hr capsule     Cyanocobalamin (B-12) 1000 MCG TBCR     cyclobenzaprine (FLEXERIL) 10 MG tablet     diclofenac (VOLTAREN) 1 % topical gel     hydrochlorothiazide (HYDRODIURIL) 25 MG tablet     lisinopril (PRINIVIL/ZESTRIL) 2.5 MG tablet     magnesium 250 MG tablet     mometasone-formoterol (DULERA) 200-5 MCG/ACT inhaler     ORDER FOR DME     predniSONE (DELTASONE) 20 MG tablet     Probiotic Product (PROBIOTIC PO)     trospium (SANCTURA) 20 MG tablet     umeclidinium (INCRUSE ELLIPTA) 62.5 MCG/INH oral inhaler     venlafaxine (EFFEXOR-ER) 75 MG 24 hr tablet     warfarin ANTICOAGULANT (JANTOVEN ANTICOAGULANT) 5 MG tablet     No current facility-administered medications for this visit.           Allergies   Allergen Reactions     Augmentin [Amoxicillin-Pot Clavulanate] Nausea and Vomiting     Meperidine Hcl Nausea and Vomiting     demerol     Seasonal Allergies      spring        PHYSICAL EXAMINATION:  /84 (BP Location: Right arm, Patient Position: Sitting, Cuff Size: Adult Large)   Pulse 86   Temp 97.7  F (36.5  C) (Oral)   Wt 100.3 kg (221 lb 1.6 oz)   SpO2 96%   BMI 41.78 kg/m    GENERAL Well-appearing woman in no acute distress.  HEENT Normocephalic, atraumatic.  Sclerae anicteric.  CVR  Regular rate and rhythm.  No murmurs, rubs, or gallops.  LUNGS Clear to auscultation bilaterally.  BREASTS Breasts  are examined in the supine and upright position.  Subtle hyperpigmentation and dry skin noted in prior treatment field.  No erythema, nodularity or desquamation noted in either breast. Bilateral NAC within normal limits. Scar well healed.  LYMPH No supraclavicular, infraclavicular or axillary LAD appreciated bilaterally.  EXT  No clubbing, cyanosis or edema.    NEURO Gait within normal limits.  SKIN  Warm and well perfused.  See breast exam  PSYCH:         Orientation: Alert, attentive, and oriented to time, place, and person                         Affect: Affect was appropriate to the situation and showed normal range and stability. No anxious mood                         Speech: Speech was clear with normal fluency, rate, tone, and volume.                         Memory: Although not formally assessed, immediate, recent, and remote memory appeared to be intact.                          Insight and Judgement:  demonstrates adequate awareness of the issues discussed.                         Thought: Thought patterns were coherent and logical.      IMAGING: No new imaging.        IMPRESSION/PLAN:  Latanya Padron is a 69 year old woman with strong family history of breast cancer and history of COPD most recently diagnosed with left breast G2 ILCA, ER+/DC+/H2N-, status post lumpectomy and SLNBx, revealing dG5S5T8 disease. She will not be receiving chemotherapy as Oncotype score is 15. She completed adjuvant radiation therapy to the left breast on 7/18/19. She is being seen here in follow up. She has had resolution of her acute radiation induced side effects and only has some residual skin dryness and subtle hyperpigmentation within the treatment field.  This is to be expected. She should continue to use sunblock and moisturizer in the previously treated area generously.   Additional problem list to be addressed in the following manner:  1) Hormonal treatment in postmenopausal woman: Follow up with Med Onc for  management regarding Arimidex hormonal therapy. She is tolerating it very well. Scans per Med Onc.  Next MMG scheduled for April 2020.  2) Follow up with Surgery as previously directed.    3) Follow up with Rad Onc prn.  There was ample time for questions and all were answered to the patient's satisfaction. Thank you for allowing me to participate in the care of this pleasant patient. If you have any questions, please do not hesitate to contact my office.      Sincerely,  Nya Gaxiola MD  Adjunct   Dept of Radiation Oncology  [facility]

## 2019-12-16 ENCOUNTER — TELEPHONE (OUTPATIENT)
Dept: ONCOLOGY | Facility: CLINIC | Age: 69
End: 2019-12-16

## 2019-12-16 NOTE — TELEPHONE ENCOUNTER
We will have to discuss with Dr. Penaloza on Thursday if he is taking patient for care since she is a former Dr. Denis patient.     Nella Andrea, BEATRIZ

## 2019-12-16 NOTE — TELEPHONE ENCOUNTER
Reason for Call:  Other appointment    Detailed comments: Patient is wondering if she would be able to transfer care to Dr. Penaloza. She went and saw Dr. Poole in [place] and really doesn't want to continue her care with him. Please call her at [phone]     Phone Number Patient can be reached at: Home number on file  [phone]     Best Time: any    Can we leave a detailed message on this number? YES    Call taken on 12/16/2019 at 1:20 PM by Suri Douglass

## 2019-12-16 NOTE — PROGRESS NOTES
Outpatient Physical Therapy Progress Note and Discharge Note     Patient: Latanya Padron  : 1950    Beginning/End Dates of Reporting Period:  19 to 2019 (pt seen for 2 PT visits from  to 10/2/19)    Referring Provider: SOSA Burton CNP    Therapy Diagnosis: right hip and right knee pain due to lumbar spine referral     Client Self Report: Much better when pt reported at last visit on 19. Groin pain gone for the last week. Just a little left in R shin and foot.    Objective Measurements:10/2/19  Objective Measure: average R LE pain (4/10 at eval on 19)  Details: 80% of day 0/10  Objective Measure: frequency of pain (100% constant at eval)  Details: 20% and much smaller areal  Objective Measure: number of pain relieving exercise per day (PREP)  Details: 5  Objective Measure: effect of PREP  Details: abolishes sx  Objective Measure: LEFS (33/80 at eval)   Details: not done today      Goals:  Goal Identifier pain   Goal Description pt will note a decrease in average R LE pain from a 4/10 to a 2/10 or less so she can have improved walking tolerance around the house and with shopping trips.   Target Date 10/08/19   Date Met  10/02/19   Progress:     Goal Identifier function   Goal Description pt will note a decrease in symptoms and carry over to improved functional level as measured by LEFS score increase from 33/80 to 40/80 or better   Target Date 19   Date Met      Progress:       Progress Toward Goals:   Progress this reporting period: pt meeting goal 1 for pain reduction, did not get follow up LEFS. Pt failed to schedule more PT after 10/2/19 visit as she was supposed to schedule 1 month later. PT has not been notified of any problems or questions since last visit.    Plan:  Discharge from therapy.    Discharge:    Reason for Discharge: Patient has failed to schedule further appointments.    Equipment Issued: none    Discharge Plan: Patient to continue home  program.

## 2019-12-17 NOTE — TELEPHONE ENCOUNTER
LVM for patient stating need to review with Dr. Penaloza to see if transfer of care is okay due to high capacity of patients and he is not back into clinic until Thursday. Writer will review with Dr. Penaloza on Thursday and will get back to patient on Thursday on what needs to happen.    Nella Andrea, CMA

## 2019-12-18 DIAGNOSIS — J43.9 PULMONARY EMPHYSEMA, UNSPECIFIED EMPHYSEMA TYPE (H): ICD-10-CM

## 2019-12-18 RX ORDER — ALBUTEROL SULFATE 90 UG/1
AEROSOL, METERED RESPIRATORY (INHALATION)
Qty: 8.5 G | Refills: 1 | Status: SHIPPED | OUTPATIENT
Start: 2019-12-18 | End: 2020-10-16

## 2019-12-18 NOTE — TELEPHONE ENCOUNTER
"proair  Last Written Prescription Date:  05/14/2019  Last Fill Quantity: 18 g,  # refills: 1   Last office visit: 7/24/2019 with prescribing provider:     Future Office Visit:    Requested Prescriptions   Pending Prescriptions Disp Refills     albuterol (PROAIR HFA) 108 (90 Base) MCG/ACT inhaler [Pharmacy Med Name: PROAIR HFA 108MCG/ACT AERS] 8.5 g 1     Sig: INHALE 1-2 PUFFS BY MOUTH EVERY 2-4 HOURS AS NEEDED       Asthma Maintenance Inhalers - Anticholinergics Passed - 12/18/2019 12:04 PM        Passed - Patient is age 12 years or older        Passed - Recent (12 mo) or future (30 days) visit within the authorizing provider's specialty     Patient has had an office visit with the authorizing provider or a provider within the authorizing providers department within the previous 12 mos or has a future within next 30 days. See \"Patient Info\" tab in inbasket, or \"Choose Columns\" in Meds & Orders section of the refill encounter.              Passed - Medication is active on med list      Prescription approved per [facility] Refill Protocol.    Dedra Conroy RN BSN    "

## 2019-12-19 NOTE — TELEPHONE ENCOUNTER
Writing nurse spoke with Patient regarding transfer of care. Writer informed patient that Dr. Penaloza is not taking new patients at this time due to his full schedule. Patient reported she is not due for an oncology appointment until mid April. Patient informed writer that she will call in March to see if new oncologist has started and try to establish care with new oncologist.   Lisa Hernandez RN on 12/19/2019 at 4:58 PM

## 2019-12-27 ENCOUNTER — TELEPHONE (OUTPATIENT)
Dept: FAMILY MEDICINE | Facility: CLINIC | Age: 69
End: 2019-12-27

## 2019-12-27 NOTE — TELEPHONE ENCOUNTER
Patient wanted to speak only to Dr. Blue about another physician. She stated that it was not a physician from this facility.    Tawny Lord CMA

## 2020-01-05 ENCOUNTER — MYC MEDICAL ADVICE (OUTPATIENT)
Dept: FAMILY MEDICINE | Facility: CLINIC | Age: 70
End: 2020-01-05

## 2020-01-06 NOTE — TELEPHONE ENCOUNTER
Pt scheduled for 1/13/2020 at 415 pm. I have notified Latanya of this appt. Cely Lobato CMA ([facility])

## 2020-01-06 NOTE — TELEPHONE ENCOUNTER
You only have the last DrBecca Only at the end of the left.  Is that ok or what would be a good time for you to see her?

## 2020-01-09 DIAGNOSIS — I10 HYPERTENSION, GOAL BELOW 140/90: ICD-10-CM

## 2020-01-09 DIAGNOSIS — I48.20 CHRONIC ATRIAL FIBRILLATION (H): ICD-10-CM

## 2020-01-09 RX ORDER — DILTIAZEM HYDROCHLORIDE 120 MG/1
CAPSULE, EXTENDED RELEASE ORAL
Qty: 90 CAPSULE | Refills: 1 | Status: SHIPPED | OUTPATIENT
Start: 2020-01-09 | End: 2020-07-07

## 2020-01-09 NOTE — TELEPHONE ENCOUNTER
"cartia  Last Written Prescription Date:  12/09/2019  Last Fill Quantity: 30,  # refills: 0   Last office visit: 07/24/2019 with prescribing provider:     Future Office Visit:        Requested Prescriptions   Pending Prescriptions Disp Refills     CARTIA  MG 24 hr capsule [Pharmacy Med Name: CARTIA XT (DILTIAZEM) 120MG CP24] 30 capsule 0     Sig: TAKE ONE CAPSULE BY MOUTH ONCE DAILY (LABS DUE)       Calcium Channel Blockers Protocol  Failed - 1/9/2020 10:40 AM        Failed - Normal ALT in past 12 months     Recent Labs   Lab Test 06/20/18  1121   ALT 23             Passed - Blood pressure under 140/90 in past 12 months     BP Readings from Last 3 Encounters:   12/10/19 125/84   10/15/19 (!) 140/79   09/11/19 124/67                 Passed - Recent (12 mo) or future (30 days) visit within the authorizing provider's specialty     Patient has had an office visit with the authorizing provider or a provider within the authorizing providers department within the previous 12 mos or has a future within next 30 days. See \"Patient Info\" tab in inbasket, or \"Choose Columns\" in Meds & Orders section of the refill encounter.              Passed - Medication is active on med list        Passed - Patient is age 18 or older        Passed - No active pregnancy on record        Passed - Normal serum creatinine on file in past 12 months     Recent Labs   Lab Test 07/11/19  1326   CR 0.99             Passed - No positive pregnancy test in past 12 months        Dedra Conroy RN BSN      "

## 2020-01-10 DIAGNOSIS — C50.812 MALIGNANT NEOPLASM OF OVERLAPPING SITES OF LEFT BREAST IN FEMALE, ESTROGEN RECEPTOR POSITIVE (H): ICD-10-CM

## 2020-01-10 DIAGNOSIS — M81.0 AGE-RELATED OSTEOPOROSIS WITHOUT CURRENT PATHOLOGICAL FRACTURE: ICD-10-CM

## 2020-01-10 DIAGNOSIS — Z17.0 MALIGNANT NEOPLASM OF OVERLAPPING SITES OF LEFT BREAST IN FEMALE, ESTROGEN RECEPTOR POSITIVE (H): ICD-10-CM

## 2020-01-10 RX ORDER — ALBUTEROL SULFATE 0.83 MG/ML
2.5 SOLUTION RESPIRATORY (INHALATION)
Status: CANCELLED | OUTPATIENT
Start: 2020-01-10

## 2020-01-10 RX ORDER — EPINEPHRINE 0.3 MG/.3ML
0.3 INJECTION SUBCUTANEOUS EVERY 5 MIN PRN
Status: CANCELLED | OUTPATIENT
Start: 2020-01-10

## 2020-01-10 RX ORDER — EPINEPHRINE 1 MG/ML
0.3 INJECTION, SOLUTION INTRAMUSCULAR; SUBCUTANEOUS EVERY 5 MIN PRN
Status: CANCELLED | OUTPATIENT
Start: 2020-01-10

## 2020-01-10 RX ORDER — SODIUM CHLORIDE 9 MG/ML
1000 INJECTION, SOLUTION INTRAVENOUS CONTINUOUS PRN
Status: CANCELLED
Start: 2020-01-10

## 2020-01-10 RX ORDER — MEPERIDINE HYDROCHLORIDE 25 MG/ML
25 INJECTION INTRAMUSCULAR; INTRAVENOUS; SUBCUTANEOUS EVERY 30 MIN PRN
Status: CANCELLED | OUTPATIENT
Start: 2020-01-10

## 2020-01-10 RX ORDER — DIPHENHYDRAMINE HYDROCHLORIDE 50 MG/ML
50 INJECTION INTRAMUSCULAR; INTRAVENOUS
Status: CANCELLED
Start: 2020-01-10

## 2020-01-10 RX ORDER — ALBUTEROL SULFATE 90 UG/1
1-2 AEROSOL, METERED RESPIRATORY (INHALATION)
Status: CANCELLED
Start: 2020-01-10

## 2020-01-10 RX ORDER — NALOXONE HYDROCHLORIDE 0.4 MG/ML
.1-.4 INJECTION, SOLUTION INTRAMUSCULAR; INTRAVENOUS; SUBCUTANEOUS
Status: CANCELLED | OUTPATIENT
Start: 2020-01-10

## 2020-01-10 RX ORDER — METHYLPREDNISOLONE SODIUM SUCCINATE 125 MG/2ML
125 INJECTION, POWDER, LYOPHILIZED, FOR SOLUTION INTRAMUSCULAR; INTRAVENOUS
Status: CANCELLED
Start: 2020-01-10

## 2020-01-13 ENCOUNTER — INFUSION THERAPY VISIT (OUTPATIENT)
Dept: INFUSION THERAPY | Facility: CLINIC | Age: 70
End: 2020-01-13
Attending: INTERNAL MEDICINE
Payer: COMMERCIAL

## 2020-01-13 VITALS
OXYGEN SATURATION: 95 % | BODY MASS INDEX: 41.5 KG/M2 | RESPIRATION RATE: 16 BRPM | HEIGHT: 61 IN | TEMPERATURE: 98.2 F | WEIGHT: 219.8 LBS | DIASTOLIC BLOOD PRESSURE: 79 MMHG | HEART RATE: 90 BPM | SYSTOLIC BLOOD PRESSURE: 134 MMHG

## 2020-01-13 DIAGNOSIS — C50.812 MALIGNANT NEOPLASM OF OVERLAPPING SITES OF LEFT BREAST IN FEMALE, ESTROGEN RECEPTOR POSITIVE (H): ICD-10-CM

## 2020-01-13 DIAGNOSIS — Z17.0 MALIGNANT NEOPLASM OF OVERLAPPING SITES OF LEFT BREAST IN FEMALE, ESTROGEN RECEPTOR POSITIVE (H): ICD-10-CM

## 2020-01-13 DIAGNOSIS — M81.0 AGE-RELATED OSTEOPOROSIS WITHOUT CURRENT PATHOLOGICAL FRACTURE: Primary | ICD-10-CM

## 2020-01-13 LAB
CALCIUM SERPL-MCNC: 9.5 MG/DL (ref 8.5–10.1)
CREAT SERPL-MCNC: 1.08 MG/DL (ref 0.52–1.04)
GFR SERPL CREATININE-BSD FRML MDRD: 52 ML/MIN/{1.73_M2}

## 2020-01-13 PROCEDURE — 82310 ASSAY OF CALCIUM: CPT | Performed by: NURSE PRACTITIONER

## 2020-01-13 PROCEDURE — 25000128 H RX IP 250 OP 636: Performed by: NURSE PRACTITIONER

## 2020-01-13 PROCEDURE — 36415 COLL VENOUS BLD VENIPUNCTURE: CPT | Performed by: NURSE PRACTITIONER

## 2020-01-13 PROCEDURE — 96372 THER/PROPH/DIAG INJ SC/IM: CPT

## 2020-01-13 PROCEDURE — 82565 ASSAY OF CREATININE: CPT | Performed by: NURSE PRACTITIONER

## 2020-01-13 RX ADMIN — DENOSUMAB 60 MG: 60 INJECTION SUBCUTANEOUS at 10:14

## 2020-01-13 ASSESSMENT — MIFFLIN-ST. JEOR: SCORE: 1464.39

## 2020-01-13 ASSESSMENT — PAIN SCALES - GENERAL: PAINLEVEL: NO PAIN (0)

## 2020-01-13 NOTE — PATIENT INSTRUCTIONS
Pt to return on 07/13/20 for Prolia/Labs. Copies of medication list and upcoming appointments given prior to discharge.

## 2020-01-13 NOTE — PROGRESS NOTES
Infusion Nursing Note:  Latanya Padron presents today for Prolia.    Patient seen by provider today: No   present during visit today: Not Applicable.    Note: Patient arrived by self, no symptoms today.    Intravenous Access:  No Intravenous access.    Treatment Conditions:  Lab Results   Component Value Date     04/29/2019                   Lab Results   Component Value Date    POTASSIUM 3.4 04/29/2019           No results found for: MAG         Lab Results   Component Value Date    CR 1.08 01/13/2020                   Lab Results   Component Value Date    IVANNA 9.5 01/13/2020                Lab Results   Component Value Date    BILITOTAL 0.5 06/20/2018           Lab Results   Component Value Date    ALBUMIN 3.7 06/20/2018                    Lab Results   Component Value Date    ALT 23 06/20/2018           Lab Results   Component Value Date    AST 17 06/20/2018       Results reviewed, labs MET treatment parameters, ok to proceed with treatment.      Post Infusion Assessment:  Patient tolerated injection without incident.  Patient observed for 15 minutes post injection per protocol.  Site patent and intact, free from redness, edema or discomfort.       Discharge Plan:   Discharge instructions reviewed with: Patient.  Patient and/or family verbalized understanding of discharge instructions and all questions answered.  Patient discharged in stable condition accompanied by: self.  Departure Mode: Ambulatory.    Brenda Bradshaw RN

## 2020-01-21 ENCOUNTER — ANTICOAGULATION THERAPY VISIT (OUTPATIENT)
Dept: ANTICOAGULATION | Facility: CLINIC | Age: 70
End: 2020-01-21
Payer: COMMERCIAL

## 2020-01-21 DIAGNOSIS — I48.0 AF (PAROXYSMAL ATRIAL FIBRILLATION) (H): ICD-10-CM

## 2020-01-21 DIAGNOSIS — Z79.01 LONG TERM CURRENT USE OF ANTICOAGULANT THERAPY: ICD-10-CM

## 2020-01-21 DIAGNOSIS — I48.20 CHRONIC ATRIAL FIBRILLATION (H): ICD-10-CM

## 2020-01-21 DIAGNOSIS — I48.91 ATRIAL FIBRILLATION, UNSPECIFIED TYPE (H): ICD-10-CM

## 2020-01-21 LAB — INR POINT OF CARE: 3.4 (ref 0.9–1.1)

## 2020-01-21 PROCEDURE — 36416 COLLJ CAPILLARY BLOOD SPEC: CPT

## 2020-01-21 PROCEDURE — 85610 PROTHROMBIN TIME: CPT | Mod: QW

## 2020-01-21 PROCEDURE — 99207 ZZC NO CHARGE NURSE ONLY: CPT

## 2020-01-21 RX ORDER — WARFARIN SODIUM 5 MG/1
TABLET ORAL
Qty: 60 TABLET | Refills: 0 | COMMUNITY
Start: 2020-01-21 | End: 2020-02-19

## 2020-01-21 NOTE — PROGRESS NOTES
ANTICOAGULATION FOLLOW-UP CLINIC VISIT    Patient Name:  Latanya Padron  Date:  1/21/2020  Contact Type:  Face to Face    SUBJECTIVE:  Patient Findings     Positives:   Change in diet/appetite (less greens lately due to lettuce recalls. )             OBJECTIVE    INR Protime   Date Value Ref Range Status   01/21/2020 3.4 (A) 0.9 - 1.1 Final       ASSESSMENT / PLAN  INR assessment SUPRA    Recheck INR In: 4 WEEKS    INR Location Clinic      Anticoagulation Summary  As of 1/21/2020    INR goal:   2.0-3.0   TTR:   73.6 % (1 y)   INR used for dosing:   3.4! (1/21/2020)   Warfarin maintenance plan:   2.5 mg (5 mg x 0.5) every day   Full warfarin instructions:   2.5 mg every day   Weekly warfarin total:   17.5 mg   Plan last modified:   Francine Andrew RN (1/21/2020)   Next INR check:   2/18/2020   Target end date:       Indications    AF (paroxysmal atrial fibrillation) (H) [I48.0]  Atrial fibrillation (H) [I48.91]             Anticoagulation Episode Summary     INR check location:       Preferred lab:       Send INR reminders to:   [facility] [facility]    Comments:   5 mg tabs, likes card, PM dose      Anticoagulation Care Providers     Provider Role Specialty Phone number    Glen Blue MD [facility] [phone]            See the Encounter Report to view Anticoagulation Flowsheet and Dosing Calendar (Go to Encounters tab in chart review, and find the Anticoagulation Therapy Visit)    Dosage adjustment made based on physician directed care plan.    Francine Andrew RN

## 2020-02-12 ENCOUNTER — TELEPHONE (OUTPATIENT)
Dept: FAMILY MEDICINE | Facility: CLINIC | Age: 70
End: 2020-02-12

## 2020-02-12 DIAGNOSIS — J43.9 PULMONARY EMPHYSEMA, UNSPECIFIED EMPHYSEMA TYPE (H): Primary | ICD-10-CM

## 2020-02-12 RX ORDER — ALBUTEROL SULFATE 0.83 MG/ML
SOLUTION RESPIRATORY (INHALATION)
Qty: 75 ML | Refills: 0 | Status: SHIPPED | OUTPATIENT
Start: 2020-02-12 | End: 2020-11-04

## 2020-02-12 NOTE — TELEPHONE ENCOUNTER
Medication is being filled for 1 time refill only due to:  Patient needs to be seen because follow up.     Patient has an appointment scheduled.    Caprice Kellogg RN on 2/12/2020 at 3:17 PM

## 2020-02-12 NOTE — TELEPHONE ENCOUNTER
"Requested Prescriptions   Pending Prescriptions Disp Refills     albuterol (PROVENTIL) (2.5 MG/3ML) 0.083% neb solution 75 mL 5     Sig: Take  by nebulization. 1 NEB EVERY 4-6 HOURS AS NEEDED   Last Written Prescription Date:  6/21/17  Last Fill Quantity: 75 ml,  # refills: 5   Last office visit: 7/24/19 Mirza  Future Office Visit:   Next 5 appointments (look out 90 days)    Feb 26, 2020  7:30 AM CST  [facility] Visit with [facility] [address] [place] [id number]  [phone]   Apr 21, 2020 10:30 AM CDT  Return Visit with [facility]k [facility]ole MD  [facility] [address] [place] [id number] [phone]             Asthma Maintenance Inhalers - Anticholinergics Passed - 2/12/2020  1:40 PM        Passed - Patient is age 12 years or older        Passed - Recent (12 mo) or future (30 days) visit within the authorizing provider's specialty     Patient has had an office visit with the authorizing provider or a provider within the authorizing providers department within the previous 12 mos or has a future within next 30 days. See \"Patient Info\" tab in inbasket, or \"Choose Columns\" in Meds & Orders section of the refill encounter.              Passed - Medication is active on med list          "

## 2020-02-12 NOTE — TELEPHONE ENCOUNTER
Patient stated she also needs a new nebulizer machine, can you also send an Rx for that? Thanks!  Lizzeth Ramon, PharmD   [facility] pharmacist on behalf of [facility].

## 2020-02-17 ENCOUNTER — TELEPHONE (OUTPATIENT)
Dept: FAMILY MEDICINE | Facility: CLINIC | Age: 70
End: 2020-02-17

## 2020-02-17 DIAGNOSIS — R07.89 CHEST WALL PAIN: Primary | ICD-10-CM

## 2020-02-17 RX ORDER — GABAPENTIN 300 MG/1
300-600 CAPSULE ORAL AT BEDTIME
Qty: 60 CAPSULE | Refills: 1 | Status: SHIPPED | OUTPATIENT
Start: 2020-02-17 | End: 2020-06-24

## 2020-02-17 NOTE — TELEPHONE ENCOUNTER
By discussion this sounds almost neuropathic in nature.  It might be something else but given its persistence and location, gabapentin 300 mg at bedtime will be started.  She could increase to 600 mg but she will keep us informed.  We may need to do imaging which may include chest x-ray, cervical spine, other.

## 2020-02-17 NOTE — TELEPHONE ENCOUNTER
I have contacted the pt. She states her cough is a dry cough. She said it is more annoying. She said she feels like she has a cold in her shoulder. She said she is really uncomfortable. The cough started a week ago. She says she has a little bit of wheezing but has been using her nebulizer.

## 2020-02-17 NOTE — TELEPHONE ENCOUNTER
Reason for call:  Patient reporting a symptom    Symptom or request: Cough and sharp pain below shoulder blade and it goes down neck and arm.      Duration (how long have symptoms been present): 1 week    Have you been treated for this before? No    Additional comments: Pt calling to see if Dr Blue can prescribe something for the pain behind her shoulder blade, neck and arm. Pt states she has tried tylenol and it doesn't help. Please advise.     Phone Number patient can be reached at:  Cell number on file:    Telephone Information:   Mobile [phone]       Best Time:      Can we leave a detailed message on this number:  YES    Call taken on 2/17/2020 at 3:46 PM by Eulalia Oates

## 2020-02-18 ENCOUNTER — ANTICOAGULATION THERAPY VISIT (OUTPATIENT)
Dept: ANTICOAGULATION | Facility: CLINIC | Age: 70
End: 2020-02-18
Payer: COMMERCIAL

## 2020-02-18 DIAGNOSIS — I48.0 AF (PAROXYSMAL ATRIAL FIBRILLATION) (H): ICD-10-CM

## 2020-02-18 DIAGNOSIS — I48.91 ATRIAL FIBRILLATION, UNSPECIFIED TYPE (H): ICD-10-CM

## 2020-02-18 LAB — INR POINT OF CARE: 3.6 (ref 0.9–1.1)

## 2020-02-18 PROCEDURE — 85610 PROTHROMBIN TIME: CPT | Mod: QW

## 2020-02-18 PROCEDURE — 99207 ZZC NO CHARGE NURSE ONLY: CPT

## 2020-02-18 PROCEDURE — 36416 COLLJ CAPILLARY BLOOD SPEC: CPT

## 2020-02-18 NOTE — PROGRESS NOTES
ANTICOAGULATION FOLLOW-UP CLINIC VISIT    Patient Name:  Latanya Padron  Date:  2/18/2020  Contact Type:  Face to Face    SUBJECTIVE:  Patient Findings     Positives:   Change in medications (finished up prednisone on Wednesday last week), Change in diet/appetite (less greens last week due to not feeling well/cough )             OBJECTIVE    INR Protime   Date Value Ref Range Status   02/18/2020 3.6 (A) 0.9 - 1.1 Final       ASSESSMENT / PLAN  INR assessment SUPRA    Recheck INR In: 8 DAYS    INR Location Clinic      Anticoagulation Summary  As of 2/18/2020    INR goal:   2.0-3.0   TTR:   71.5 % (1 y)   INR used for dosing:   3.6! (2/18/2020)   Warfarin maintenance plan:   2.5 mg (5 mg x 0.5) every day   Full warfarin instructions:   2/18: Hold; Otherwise 2.5 mg every day   Weekly warfarin total:   17.5 mg   Plan last modified:   Francine Andrew RN (1/21/2020)   Next INR check:   2/26/2020   Target end date:       Indications    AF (paroxysmal atrial fibrillation) (H) [I48.0]  Atrial fibrillation (H) [I48.91]             Anticoagulation Episode Summary     INR check location:       Preferred lab:       Send INR reminders to:   [facility] [facility]    Comments:   5 mg tabs, likes card, PM dose      Anticoagulation Care Providers     Provider Role Specialty Phone number    Glen Blue MD [facility] [phone]            See the Encounter Report to view Anticoagulation Flowsheet and Dosing Calendar (Go to Encounters tab in chart review, and find the Anticoagulation Therapy Visit)    Dosage adjustment made based on physician directed care plan.    Francine Andrew RN

## 2020-02-19 DIAGNOSIS — Z79.01 LONG TERM CURRENT USE OF ANTICOAGULANT THERAPY: ICD-10-CM

## 2020-02-19 DIAGNOSIS — I48.20 CHRONIC ATRIAL FIBRILLATION (H): ICD-10-CM

## 2020-02-19 RX ORDER — WARFARIN SODIUM 5 MG/1
TABLET ORAL
Qty: 60 TABLET | Refills: 0 | Status: SHIPPED | OUTPATIENT
Start: 2020-02-19 | End: 2020-05-12 | Stop reason: DRUGHIGH

## 2020-02-19 NOTE — TELEPHONE ENCOUNTER
"Requested Prescriptions   Pending Prescriptions Disp Refills     warfarin ANTICOAGULANT (JANTOVEN ANTICOAGULANT) 5 MG PO tablet [Pharmacy Med Name: JANTOVEN 5MG TABS] 60 tablet 0     Sig: TAKE ONE TABLET (5MG) ON MONDAY AND TAKE 1/2 TABLET (2.5MG) ALL OTHER DAYS OF THE WEEK OR AS DIRECTED BY THE COUMADIN CLINIC   Last Written Prescription Date:  1/21/20  Last Fill Quantity: 60,  # refills: 0   Last office visit: 7/24/19 Mirza  Future Office Visit:   Next 5 appointments (look out 90 days)    Feb 26, 2020  7:30 AM CST  [facility] [address] [facility] [id number] [phone]   Apr 21, 2020 10:30 AM CDT  Return Visit with Arik Poole MD  [facility] ([facility]) [address] [place] [id number] [phone]             Vitamin K Antagonists Failed - 2/19/2020  9:17 AM        Failed - INR is within goal in the past 6 weeks     Confirm INR is within goal in the past 6 weeks.     Recent Labs   Lab Test 02/18/20   INR 3.6*           Passed - Recent (12 mo) or future (30 days) visit within the authorizing provider's specialty     Patient has had an office visit with the authorizing provider or a provider within the authorizing providers department within the previous 12 mos or has a future within next 30 days. See \"Patient Info\" tab in inbasket, or \"Choose Columns\" in Meds & Orders section of the refill encounter.              Passed - Medication is active on med list        Passed - Patient is 18 years of age or older        Passed - Patient is not pregnant        Passed - No positive pregnancy on file in past 12 months          "

## 2020-02-26 ENCOUNTER — ANTICOAGULATION THERAPY VISIT (OUTPATIENT)
Dept: ANTICOAGULATION | Facility: CLINIC | Age: 70
End: 2020-02-26
Payer: COMMERCIAL

## 2020-02-26 ENCOUNTER — OFFICE VISIT (OUTPATIENT)
Dept: FAMILY MEDICINE | Facility: CLINIC | Age: 70
End: 2020-02-26
Payer: COMMERCIAL

## 2020-02-26 VITALS
RESPIRATION RATE: 22 BRPM | DIASTOLIC BLOOD PRESSURE: 56 MMHG | BODY MASS INDEX: 40.42 KG/M2 | HEART RATE: 64 BPM | TEMPERATURE: 96.4 F | WEIGHT: 213.9 LBS | SYSTOLIC BLOOD PRESSURE: 110 MMHG

## 2020-02-26 DIAGNOSIS — Z17.0 MALIGNANT NEOPLASM OF OVERLAPPING SITES OF LEFT BREAST IN FEMALE, ESTROGEN RECEPTOR POSITIVE (H): ICD-10-CM

## 2020-02-26 DIAGNOSIS — R59.1 LYMPHADENOPATHY OF HEAD AND NECK: ICD-10-CM

## 2020-02-26 DIAGNOSIS — C50.812 MALIGNANT NEOPLASM OF OVERLAPPING SITES OF LEFT BREAST IN FEMALE, ESTROGEN RECEPTOR POSITIVE (H): ICD-10-CM

## 2020-02-26 DIAGNOSIS — R10.31 GROIN PAIN, RIGHT: ICD-10-CM

## 2020-02-26 DIAGNOSIS — E78.5 HYPERLIPIDEMIA LDL GOAL <130: ICD-10-CM

## 2020-02-26 DIAGNOSIS — N18.30 CKD (CHRONIC KIDNEY DISEASE) STAGE 3, GFR 30-59 ML/MIN (H): Primary | ICD-10-CM

## 2020-02-26 DIAGNOSIS — E66.01 MORBID OBESITY, UNSPECIFIED OBESITY TYPE (H): ICD-10-CM

## 2020-02-26 DIAGNOSIS — I48.91 ATRIAL FIBRILLATION, UNSPECIFIED TYPE (H): ICD-10-CM

## 2020-02-26 DIAGNOSIS — F32.5 MAJOR DEPRESSION IN COMPLETE REMISSION (H): ICD-10-CM

## 2020-02-26 DIAGNOSIS — I10 HYPERTENSION, GOAL BELOW 140/90: ICD-10-CM

## 2020-02-26 DIAGNOSIS — J43.9 PULMONARY EMPHYSEMA, UNSPECIFIED EMPHYSEMA TYPE (H): ICD-10-CM

## 2020-02-26 DIAGNOSIS — I48.0 AF (PAROXYSMAL ATRIAL FIBRILLATION) (H): ICD-10-CM

## 2020-02-26 LAB
ALBUMIN SERPL-MCNC: 3.5 G/DL (ref 3.4–5)
ALBUMIN UR-MCNC: NEGATIVE MG/DL
ALP SERPL-CCNC: 58 U/L (ref 40–150)
ALT SERPL W P-5'-P-CCNC: 16 U/L (ref 0–50)
ANION GAP SERPL CALCULATED.3IONS-SCNC: 6 MMOL/L (ref 3–14)
APPEARANCE UR: ABNORMAL
AST SERPL W P-5'-P-CCNC: 13 U/L (ref 0–45)
BACTERIA #/AREA URNS HPF: ABNORMAL /HPF
BILIRUB SERPL-MCNC: 0.6 MG/DL (ref 0.2–1.3)
BILIRUB UR QL STRIP: NEGATIVE
BUN SERPL-MCNC: 28 MG/DL (ref 7–30)
CALCIUM SERPL-MCNC: 10.1 MG/DL (ref 8.5–10.1)
CHLORIDE SERPL-SCNC: 102 MMOL/L (ref 94–109)
CHOLEST SERPL-MCNC: 143 MG/DL
CO2 SERPL-SCNC: 31 MMOL/L (ref 20–32)
COLOR UR AUTO: YELLOW
CREAT SERPL-MCNC: 1.32 MG/DL (ref 0.52–1.04)
CREAT UR-MCNC: 236 MG/DL
ERYTHROCYTE [DISTWIDTH] IN BLOOD BY AUTOMATED COUNT: 12.3 % (ref 10–15)
ERYTHROCYTE [SEDIMENTATION RATE] IN BLOOD BY WESTERGREN METHOD: 53 MM/H (ref 0–30)
GFR SERPL CREATININE-BSD FRML MDRD: 41 ML/MIN/{1.73_M2}
GLUCOSE SERPL-MCNC: 99 MG/DL (ref 70–99)
GLUCOSE UR STRIP-MCNC: NEGATIVE MG/DL
HCT VFR BLD AUTO: 38.9 % (ref 35–47)
HDLC SERPL-MCNC: 47 MG/DL
HGB BLD-MCNC: 12.3 G/DL (ref 11.7–15.7)
HGB UR QL STRIP: NEGATIVE
HYALINE CASTS #/AREA URNS LPF: 4 /LPF (ref 0–2)
INR POINT OF CARE: 3.5 (ref 0.9–1.1)
KETONES UR STRIP-MCNC: NEGATIVE MG/DL
LDLC SERPL CALC-MCNC: 65 MG/DL
LEUKOCYTE ESTERASE UR QL STRIP: ABNORMAL
MCH RBC QN AUTO: 27.9 PG (ref 26.5–33)
MCHC RBC AUTO-ENTMCNC: 31.6 G/DL (ref 31.5–36.5)
MCV RBC AUTO: 88 FL (ref 78–100)
MICROALBUMIN UR-MCNC: 22 MG/L
MICROALBUMIN/CREAT UR: 9.11 MG/G CR (ref 0–25)
MUCOUS THREADS #/AREA URNS LPF: PRESENT /LPF
NITRATE UR QL: NEGATIVE
NONHDLC SERPL-MCNC: 96 MG/DL
PH UR STRIP: 5 PH (ref 5–7)
PLATELET # BLD AUTO: 341 10E9/L (ref 150–450)
POTASSIUM SERPL-SCNC: 3.7 MMOL/L (ref 3.4–5.3)
PROT SERPL-MCNC: 7.5 G/DL (ref 6.8–8.8)
RBC # BLD AUTO: 4.41 10E12/L (ref 3.8–5.2)
RBC #/AREA URNS AUTO: 3 /HPF (ref 0–2)
SODIUM SERPL-SCNC: 139 MMOL/L (ref 133–144)
SOURCE: ABNORMAL
SP GR UR STRIP: 1.02 (ref 1–1.03)
SQUAMOUS #/AREA URNS AUTO: 3 /HPF (ref 0–1)
TRIGL SERPL-MCNC: 155 MG/DL
UROBILINOGEN UR STRIP-MCNC: 0 MG/DL (ref 0–2)
WBC # BLD AUTO: 6.9 10E9/L (ref 4–11)
WBC #/AREA URNS AUTO: 13 /HPF (ref 0–5)

## 2020-02-26 PROCEDURE — 99207 ZZC NO CHARGE NURSE ONLY: CPT

## 2020-02-26 PROCEDURE — 81001 URINALYSIS AUTO W/SCOPE: CPT | Performed by: FAMILY MEDICINE

## 2020-02-26 PROCEDURE — 85610 PROTHROMBIN TIME: CPT | Mod: QW

## 2020-02-26 PROCEDURE — 80061 LIPID PANEL: CPT | Performed by: FAMILY MEDICINE

## 2020-02-26 PROCEDURE — 36416 COLLJ CAPILLARY BLOOD SPEC: CPT

## 2020-02-26 PROCEDURE — 99214 OFFICE O/P EST MOD 30 MIN: CPT | Performed by: FAMILY MEDICINE

## 2020-02-26 PROCEDURE — 85027 COMPLETE CBC AUTOMATED: CPT | Performed by: FAMILY MEDICINE

## 2020-02-26 PROCEDURE — 82043 UR ALBUMIN QUANTITATIVE: CPT | Performed by: FAMILY MEDICINE

## 2020-02-26 PROCEDURE — 80053 COMPREHEN METABOLIC PANEL: CPT | Performed by: FAMILY MEDICINE

## 2020-02-26 PROCEDURE — 85652 RBC SED RATE AUTOMATED: CPT | Performed by: FAMILY MEDICINE

## 2020-02-26 ASSESSMENT — PAIN SCALES - GENERAL: PAINLEVEL: NO PAIN (0)

## 2020-02-26 ASSESSMENT — PATIENT HEALTH QUESTIONNAIRE - PHQ9
5. POOR APPETITE OR OVEREATING: SEVERAL DAYS
SUM OF ALL RESPONSES TO PHQ QUESTIONS 1-9: 0

## 2020-02-26 ASSESSMENT — ANXIETY QUESTIONNAIRES
7. FEELING AFRAID AS IF SOMETHING AWFUL MIGHT HAPPEN: NOT AT ALL
2. NOT BEING ABLE TO STOP OR CONTROL WORRYING: NOT AT ALL
6. BECOMING EASILY ANNOYED OR IRRITABLE: NOT AT ALL
3. WORRYING TOO MUCH ABOUT DIFFERENT THINGS: NOT AT ALL
5. BEING SO RESTLESS THAT IT IS HARD TO SIT STILL: NOT AT ALL
GAD7 TOTAL SCORE: 2
IF YOU CHECKED OFF ANY PROBLEMS ON THIS QUESTIONNAIRE, HOW DIFFICULT HAVE THESE PROBLEMS MADE IT FOR YOU TO DO YOUR WORK, TAKE CARE OF THINGS AT HOME, OR GET ALONG WITH OTHER PEOPLE: NOT DIFFICULT AT ALL
1. FEELING NERVOUS, ANXIOUS, OR ON EDGE: SEVERAL DAYS

## 2020-02-26 NOTE — PROGRESS NOTES
ANTICOAGULATION FOLLOW-UP CLINIC VISIT    Patient Name:  Latanya Padron  Date:  2/26/2020  Contact Type:  Face to Face    SUBJECTIVE:  Patient Findings     Positives:   Change in diet/appetite (will increase greens instead of lowering dose )             OBJECTIVE    INR Protime   Date Value Ref Range Status   02/26/2020 3.5 (A) 0.9 - 1.1 Final       ASSESSMENT / PLAN  INR assessment SUPRA    Recheck INR In: 2 WEEKS    INR Location Clinic      Anticoagulation Summary  As of 2/26/2020    INR goal:   2.0-3.0   TTR:   71.5 % (1 y)   INR used for dosing:   3.5! (2/26/2020)   Warfarin maintenance plan:   2.5 mg (5 mg x 0.5) every day   Full warfarin instructions:   2/26: Hold; Otherwise 2.5 mg every day   Weekly warfarin total:   17.5 mg   Plan last modified:   Francine Andrew RN (1/21/2020)   Next INR check:   3/11/2020   Target end date:       Indications    AF (paroxysmal atrial fibrillation) (H) [I48.0]  Atrial fibrillation (H) [I48.91]             Anticoagulation Episode Summary     INR check location:       Preferred lab:       Send INR reminders to:   [facility] [facility]    Comments:   5 mg tabs, likes card, PM dose      Anticoagulation Care Providers     Provider Role Specialty Phone number    Glen Blue MD [facility] [phone]            See the Encounter Report to view Anticoagulation Flowsheet and Dosing Calendar (Go to Encounters tab in chart review, and find the Anticoagulation Therapy Visit)    Dosage adjustment made based on physician directed care plan.      Francine Andrew, RN

## 2020-02-26 NOTE — PATIENT INSTRUCTIONS
We will image the area in some way and I will let you know but if you have not heard by Wednesday next week, remind.

## 2020-02-26 NOTE — PROGRESS NOTES
Subjective     Latanya Padron is a 69 year old female who presents to clinic today for the following health issues:    HPI   Chronic Pain Initial Visit    Where in your body do you have pain? Right groin  When did you first notice your pain? 3-6 months ago  How would you describe your pain? Aching, Throbbing and not there all the time sometimes 3 days in a row   How has your pain affected your ability to work? Pain does not limit ability to work  What makes your pain worse? nothing  Which of these pain treatments have you tried? Other: none  Have you had a significant life event? No  Do you have any symptoms of anxiety, such as panic attacks, periods of constant worry, or not being able to turn off your thoughts? No  How well are you sleeping? Good  Previous medication treatments:  Opiates - none  Antidepressants - venlafaxine (Effexor)    NSAIDs - none  Muscle relaxants - none  Topicals - none  Antiepileptics - gabapentin (Neurontin)    History of chemical dependency:  No  Social History     Tobacco Use     Smoking status: Former Smoker     Packs/day: 1.00     Years: 30.00     Pack years: 30.00     Last attempt to quit: 10/25/2005     Years since quittin.3     Smokeless tobacco: Never Used   Substance Use Topics     Alcohol use: No     Alcohol/week: 0.0 standard drinks     Drug use: No       No flowsheet data found.  PHQ-9 SCORE 2017   PHQ-9 Total Score - - -   PHQ-9 Total Score 0 1 0     SHARLA-7 SCORE 2018   Total Score 0 0     No flowsheet data found.    How many servings of fruits and vegetables do you eat daily?  2-3    On average, how many sweetened beverages do you drink each day (Examples: soda, juice, sweet tea, etc.  Do NOT count diet or artificially sweetened beverages)?   0    How many days per week do you exercise enough to make your heart beat faster? 3 or less    How many minutes a day do you exercise enough to make your heart beat faster? 30 - 60    How  many days per week do you miss taking your medication? 0    Patient is right groin pain has been intermittent now for many months.  It did develop after her original diagnosis of breast cancer.  She did see orthopedics.  Pain will be there for a few days and then be absent.  Often notices it most when she is laying down in bed.  If she abducts her hips pain is somewhat relieved.  Does not affect urination.  Does not affect bowel movements.  Does not make her nauseated.  She notices no lumps or bumps.  She has had hysterectomy, oophorectomy, appendectomy.  Does bother a little more with motion when it is present.  Because of her breast cancer she is concerned.    Breathing is generally okay.  She always needs to be slow with activity to prevent being short of breath.  She has not noticed her irregular heartbeat.  She does have one lump in the right anterior neck she brings to my attention today    BP Readings from Last 3 Encounters:   02/26/20 110/56   01/13/20 134/79   12/10/19 125/84    Wt Readings from Last 3 Encounters:   02/26/20 97 kg (213 lb 14.4 oz)   01/13/20 99.7 kg (219 lb 12.8 oz)   12/10/19 100.3 kg (221 lb 1.6 oz)                      Reviewed and updated as needed this visit by Provider         Review of Systems   ROS COMP: Constitutional, HEENT, cardiovascular, pulmonary, gi and gu systems are negative, except as otherwise noted.      Objective    /56   Pulse 64   Temp 96.4  F (35.8  C) (Temporal)   Resp 22   Wt 97 kg (213 lb 14.4 oz)   BMI 40.42 kg/m    Body mass index is 40.42 kg/m .  Physical Exam   GENERAL: healthy, alert and no distress  EYES: Eyes grossly normal to inspection, PERRL and conjunctivae and sclerae normal  NECK: no asymmetry, masses, or scars, thyroid normal to palpation and one smooth relatively soft feeling node right anterior neck about a centimeter centimeter and a half.  It is mobile.  Remainder the neck is unremarkable  RESP: lungs clear to auscultation - no rales,  rhonchi or wheezes  CV: Seems regular today  ABDOMEN: Normal bowel sounds and no organomegaly.  No masses.  Palpating the right groin I do not feel any mass.  I palpated also when standing and there may be a slight bulge when she coughs on the right but actually very similar feeling on the left.  MS: Hips seem unremarkable today and low back is unremarkable.  She was slightly tender over the pubic bone.  PSYCH: mentation appears normal, affect normal/bright    Diagnostic Test Results:  Labs reviewed in Epic  No results found for this or any previous visit (from the past 24 hour(s)).        Assessment & Plan     1. Groin pain, right  Right groin pain which seems to be musculoskeletal in nature.  There may be a hernia that he is periodically symptomatic.  Perhaps consider ultrasound of this area.  I do not think is related to breast cancer.  Given the previous evaluation of hip and knee I do not think it is related to those joints.  It may be lumbar but the way she describes at this would be unusual.  - UA reflex to Microscopic (Inga Oh; [facility])    2. CKD (chronic kidney disease) stage 3, GFR 30-59 ml/min (H)  Obtaining blood work today  - Albumin Random Urine Quantitative with Creat Ratio  - Comprehensive metabolic panel  - CBC with platelets    3. Hypertension, goal below 140/90  Blood pressure is good and we will continue current medications.    4. Malignant neoplasm of overlapping sites of left breast in female, estrogen receptor positive (H)  I do not think this area of the groin and tenderness of the pubic bone is malignant metastatic cancer but will check with sed rate  - Erythrocyte sedimentation rate auto    5. Hyperlipidemia LDL goal <130  Pain and cholesterol numbers today  - Lipid panel reflex to direct LDL Fasting    6. Major depression in complete remission (H)  Seems to be doing very well with depression but would continue any current medication    7. Pulmonary emphysema, unspecified  "emphysema type (H)  Actually relatively stable and minimally symptomatic for her.  No change in meds    8. AF (paroxysmal atrial fibrillation) (H)  Currently not in A. fib and doing well    9. Morbid obesity, unspecified obesity type (H)  Patient has lost weight by modifying diet especially.  She will continue on the same    10. Lymphadenopathy of head and neck  This feels benign is a lymph node but all of her physicians should be made aware of it especially breast cancer surgeon, oncology, me at each visit or if it changes.       BMI:   Estimated body mass index is 40.42 kg/m  as calculated from the following:    Height as of 1/13/20: 1.549 m (5' 1\").    Weight as of this encounter: 97 kg (213 lb 14.4 oz).   Weight management plan: Discussed healthy diet and exercise guidelines        Patient Instructions   We will image the area in some way and I will let you know but if you have not heard by Wednesday next week, remind.       No follow-ups on file.    Glen Blue MD  [facility]        "

## 2020-02-27 ASSESSMENT — ANXIETY QUESTIONNAIRES: GAD7 TOTAL SCORE: 2

## 2020-02-27 NOTE — RESULT ENCOUNTER NOTE
Notfied via MyChart labs show possible mild decrease in GFR, possible UTI culture pending, mild ESR increase. Suggest one month follow up.

## 2020-03-05 ENCOUNTER — TELEPHONE (OUTPATIENT)
Dept: ONCOLOGY | Facility: CLINIC | Age: 70
End: 2020-03-05

## 2020-03-05 NOTE — TELEPHONE ENCOUNTER
Reason for Call:  Other call back    Detailed comments: Please call pt as she wants to talk with Vinh about Oncology    Phone Number Patient can be reached at: [phone]    Best Time: NA    Can we leave a detailed message on this number? Not Applicable    Call taken on 3/5/2020 at 10:07 AM by Sherrell Gottlieb

## 2020-03-05 NOTE — TELEPHONE ENCOUNTER
Patient reached out again at the end of the day.  She was hoping to switch care back to [place], but understands that we are to capacity at this time and is willing to see another provider in .  Patient is ok with writing nurse working with  to get her in with another provider.  Called patient with appointment details.  Patient very appreciative.  No questions or concerns at this time.    Jorge Ocampo RN, BSN, OCN  3/6/2020, 1:54 PM

## 2020-03-10 ENCOUNTER — TELEPHONE (OUTPATIENT)
Dept: FAMILY MEDICINE | Facility: CLINIC | Age: 70
End: 2020-03-10

## 2020-03-10 DIAGNOSIS — R10.31 RIGHT GROIN PAIN: Primary | ICD-10-CM

## 2020-03-10 NOTE — TELEPHONE ENCOUNTER
Pt called stating she was told by Dr. Blue to make an appointment to see him again by the end of March. She could not remember what it was for. 2/26 clinic note stated several reasons for being seen and had mention to imaging for pt's groin and she would like to follow through with this. Please place appropriate order and inform pt to schedule. Pt is also scheduled for a 1 month follow up for 4/1, please advise if she needs to be seen sooner and try for possible work in.

## 2020-03-10 NOTE — TELEPHONE ENCOUNTER
Yes please arrange a right groin area ultrasound; again unable to determine current Epic . It will not be signed until tomorrow.  Glen Blue MD

## 2020-03-11 ENCOUNTER — ANTICOAGULATION THERAPY VISIT (OUTPATIENT)
Dept: ANTICOAGULATION | Facility: CLINIC | Age: 70
End: 2020-03-11
Payer: COMMERCIAL

## 2020-03-11 DIAGNOSIS — I48.91 ATRIAL FIBRILLATION, UNSPECIFIED TYPE (H): ICD-10-CM

## 2020-03-11 DIAGNOSIS — I48.0 AF (PAROXYSMAL ATRIAL FIBRILLATION) (H): ICD-10-CM

## 2020-03-11 LAB — INR POINT OF CARE: 2.2 (ref 0.9–1.1)

## 2020-03-11 PROCEDURE — 36416 COLLJ CAPILLARY BLOOD SPEC: CPT

## 2020-03-11 PROCEDURE — 99207 ZZC NO CHARGE NURSE ONLY: CPT

## 2020-03-11 PROCEDURE — 85610 PROTHROMBIN TIME: CPT | Mod: QW

## 2020-03-11 NOTE — ADDENDUM NOTE
Addended by: JENNIFER COOK on: 3/11/2020 01:55 PM     Modules accepted: Level of Service, SmartSet

## 2020-03-11 NOTE — PROGRESS NOTES
ANTICOAGULATION FOLLOW-UP CLINIC VISIT    Patient Name:  Latanya Padron  Date:  3/11/2020  Contact Type:  Face to Face    SUBJECTIVE:  Patient Findings     Comments:   The patient was assessed for diet, medication, and activity level changes, missed or extra doses, bruising or bleeding, with no problem findings.  Francine Andrew RN          Clinical Outcomes     Negatives:   Major bleeding event, Thromboembolic event, Anticoagulation-related hospital admission, Anticoagulation-related ED visit, Anticoagulation-related fatality    Comments:   The patient was assessed for diet, medication, and activity level changes, missed or extra doses, bruising or bleeding, with no problem findings.  Francine Andrew RN             OBJECTIVE    INR Protime   Date Value Ref Range Status   2020 2.2 (A) 0.9 - 1.1 Final       ASSESSMENT / PLAN  INR assessment THER    Recheck INR In: 3 WEEKS    INR Location Clinic      Anticoagulation Summary  As of 3/11/2020    INR goal:   2.0-3.0   TTR:   73.0 % (1 y)   INR used for dosin.2 (3/11/2020)   Warfarin maintenance plan:   2.5 mg (5 mg x 0.5) every day   Full warfarin instructions:   2.5 mg every day   Weekly warfarin total:   17.5 mg   No change documented:   Francine Andrew RN   Plan last modified:   Francine Andrew RN (2020)   Next INR check:   2020   Target end date:       Indications    AF (paroxysmal atrial fibrillation) (H) [I48.0]  Atrial fibrillation (H) [I48.91]             Anticoagulation Episode Summary     INR check location:       Preferred lab:       Send INR reminders to:   [facility] [place]    Comments:   5 mg tabs, likes card, PM dose      Anticoagulation Care Providers     Provider Role Specialty Phone number    Glen Blue MD [facility] [phone]            See the Encounter Report to view Anticoagulation Flowsheet and Dosing Calendar (Go to Encounters tab in chart review, and find the Anticoagulation Therapy  Visit)    Dosage adjustment made based on physician directed care plan.    Francine Andrew RN

## 2020-03-11 NOTE — TELEPHONE ENCOUNTER
Patient is scheduled for Wednesday March 25th @ 9:15am for her Ultrasound.   Pina SNYDER       Patient informed of date and time of appointment   Pina SNYDER

## 2020-03-19 ENCOUNTER — TELEPHONE (OUTPATIENT)
Dept: ONCOLOGY | Facility: CLINIC | Age: 70
End: 2020-03-19

## 2020-03-19 NOTE — TELEPHONE ENCOUNTER
Reason for Call:  Other call back    Detailed comments: Pt called stating that Radiology called wondering if it was ok to move her mammogram out to the first part of May and they wanted her to check with us to see.  She is currently scheduled 4/14 and then scheduled to see the Oncologist in [place] on 4/21.  Pt wanted to speak to Jorge if possible since she had scheduled the appointments for her.  Pt stated that Radiology needed to be contacted to let know if it was ok or not.      Phone Number Patient can be reached at: Home number on file [phone] (home)    Best Time: any    Can we leave a detailed message on this number? YES    Call taken on 3/19/2020 at 12:40 PM by Lurdes Stephenson

## 2020-03-19 NOTE — TELEPHONE ENCOUNTER
Radiology did reach out and are aware that this Mammogram is prior to Oncology follow up and they will keep appointment and make note on chart.  Called and updated patient.      Jorge Ocampo RN, BSN, OCN  3/19/2020, 2:39 PM

## 2020-03-24 DIAGNOSIS — J20.9 ACUTE BRONCHITIS WITH COEXISTING CONDITION REQUIRING PROPHYLACTIC TREATMENT: ICD-10-CM

## 2020-03-24 DIAGNOSIS — J43.9 PULMONARY EMPHYSEMA, UNSPECIFIED EMPHYSEMA TYPE (H): ICD-10-CM

## 2020-03-24 DIAGNOSIS — J20.9 ACUTE BRONCHITIS, UNSPECIFIED ORGANISM: ICD-10-CM

## 2020-03-24 RX ORDER — PREDNISONE 20 MG/1
TABLET ORAL
Qty: 5 TABLET | Refills: 0 | Status: SHIPPED | OUTPATIENT
Start: 2020-03-24 | End: 2020-06-24

## 2020-03-24 NOTE — TELEPHONE ENCOUNTER
predniSONE       Last Written Prescription Date:  10/7/19  Last Fill Quantity: 5,   # refills: 0  Last Office Visit: 2/26/20  Future Office visit:    Next 5 appointments (look out 90 days)    Apr 01, 2020  2:30 PM CDT  [facility] with Glen Blue MD  [facility] [address] [facility] [id number]  [phone]   Apr 21, 2020  2:30 PM CDT  Return Visit with Prisca Cortez [facility]  [facility] [address] [place] [id number] [phone]           Routing refill request to provider for review/approval because:  Drug not on the [facility], [facility] or [facility] refill protocol or controlled substance

## 2020-03-27 ENCOUNTER — TELEPHONE (OUTPATIENT)
Dept: FAMILY MEDICINE | Facility: CLINIC | Age: 70
End: 2020-03-27

## 2020-03-27 NOTE — TELEPHONE ENCOUNTER
No it is not essential to have  ultrasound done on Wednesday if patient agrees.  I am quite certain this is not cancer and feel comfortable waiting. To me she is at more risk entering the building than from  anything related to the mass. Could revisit this plan in a couple weeks.  Have her remind us if she chooses to delay, and my suggestion is to delay.  Glen Blue MD

## 2020-03-27 NOTE — TELEPHONE ENCOUNTER
Radiology called and is wondering if it is essensital to have her ultras sound done on Wednesday.  Please route back to me so I can let radiology know.

## 2020-04-01 ENCOUNTER — ANTICOAGULATION THERAPY VISIT (OUTPATIENT)
Dept: ANTICOAGULATION | Facility: CLINIC | Age: 70
End: 2020-04-01

## 2020-04-01 DIAGNOSIS — I48.0 AF (PAROXYSMAL ATRIAL FIBRILLATION) (H): ICD-10-CM

## 2020-04-01 DIAGNOSIS — Z79.01 LONG TERM CURRENT USE OF ANTICOAGULANT THERAPY: ICD-10-CM

## 2020-04-01 DIAGNOSIS — I48.0 AF (PAROXYSMAL ATRIAL FIBRILLATION) (H): Primary | ICD-10-CM

## 2020-04-01 DIAGNOSIS — I48.91 ATRIAL FIBRILLATION, UNSPECIFIED TYPE (H): ICD-10-CM

## 2020-04-01 LAB
CAPILLARY BLOOD COLLECTION: NORMAL
INR BLD: 3.3 (ref 0.86–1.14)

## 2020-04-01 PROCEDURE — 85610 PROTHROMBIN TIME: CPT | Mod: QW | Performed by: FAMILY MEDICINE

## 2020-04-01 PROCEDURE — 99207 ZZC NO CHARGE NURSE ONLY: CPT | Performed by: FAMILY MEDICINE

## 2020-04-01 PROCEDURE — 36416 COLLJ CAPILLARY BLOOD SPEC: CPT | Performed by: FAMILY MEDICINE

## 2020-04-01 NOTE — PROGRESS NOTES
ANTICOAGULATION FOLLOW-UP CLINIC VISIT    Patient Name:  Latanya Padron  Date:  4/1/2020  Contact Type:  Telephone    SUBJECTIVE:  Patient Findings     Positives:   Change in medications (prednisone 8/24-8/28 likely why INR is up)    Comments:   Pt will  Increase lauryn Andrew RN          Clinical Outcomes     Comments:   Pt will  Increase lauryn Andrew RN             OBJECTIVE    INR Point of Care   Date Value Ref Range Status   04/01/2020 3.3 (H) 0.86 - 1.14 Final     Comment:     This test is intended for monitoring Coumadin therapy.  Results are not   accurate in patients with prolonged INR due to factor deficiency.         ASSESSMENT / PLAN  INR assessment SUPRA    Recheck INR In: 6 WEEKS    INR Location Outside lab      Anticoagulation Summary  As of 4/1/2020    INR goal:   2.0-3.0   TTR:   71.4 % (1 y)   INR used for dosing:   3.3! (4/1/2020)   Warfarin maintenance plan:   2.5 mg (5 mg x 0.5) every day   Full warfarin instructions:   2.5 mg every day   Weekly warfarin total:   17.5 mg   No change documented:   Francine Andrew RN   Plan last modified:   Francine Andrew RN (1/21/2020)   Next INR check:   5/13/2020   Target end date:       Indications    AF (paroxysmal atrial fibrillation) (H) [I48.0]  Atrial fibrillation (H) [I48.91]             Anticoagulation Episode Summary     INR check location:       Preferred lab:       Send INR reminders to:   [facility]    Comments:   5 mg tabs, likes card, PM dose      Anticoagulation Care Providers     Provider Role Specialty Phone number    Glen Blue MD [facility] [phone]            See the Encounter Report to view Anticoagulation Flowsheet and Dosing Calendar (Go to Encounters tab in chart review, and find the Anticoagulation Therapy Visit)    Dosage adjustment made based on physician directed care plan.      Francine Andrew RN

## 2020-04-28 ENCOUNTER — MYC MEDICAL ADVICE (OUTPATIENT)
Dept: FAMILY MEDICINE | Facility: CLINIC | Age: 70
End: 2020-04-28

## 2020-04-28 DIAGNOSIS — N64.4 BREAST TENDERNESS: Primary | ICD-10-CM

## 2020-04-28 DIAGNOSIS — Z85.3 PERSONAL HISTORY OF MALIGNANT NEOPLASM OF BREAST: ICD-10-CM

## 2020-04-28 NOTE — TELEPHONE ENCOUNTER
Diagnostic mammogram Order placed and pended. Please sign order if appropriate and route back to [facility]..

## 2020-05-07 DIAGNOSIS — I10 HYPERTENSION, GOAL BELOW 140/90: ICD-10-CM

## 2020-05-07 DIAGNOSIS — E78.5 HYPERLIPIDEMIA LDL GOAL <130: ICD-10-CM

## 2020-05-11 RX ORDER — ATORVASTATIN CALCIUM 10 MG/1
TABLET, FILM COATED ORAL
Qty: 90 TABLET | Refills: 2 | Status: SHIPPED | OUTPATIENT
Start: 2020-05-11 | End: 2021-02-03

## 2020-05-11 RX ORDER — LISINOPRIL 2.5 MG/1
TABLET ORAL
Qty: 90 TABLET | Refills: 2 | Status: SHIPPED | OUTPATIENT
Start: 2020-05-11 | End: 2021-02-02

## 2020-05-11 NOTE — TELEPHONE ENCOUNTER
Lisinopril:  Routing refill request to provider for review/approval because:  Labs out of range:  Cr  Last Written Prescription Date:  7/24/19  Last Fill Quantity: 90,  # refills: 2   Last office visit: 2/26/2020 with prescribing provider:     Future Office Visit:      MARCELO NolascoN, RN

## 2020-05-12 ENCOUNTER — HOSPITAL ENCOUNTER (OUTPATIENT)
Dept: ULTRASOUND IMAGING | Facility: CLINIC | Age: 70
End: 2020-05-12
Attending: FAMILY MEDICINE
Payer: COMMERCIAL

## 2020-05-12 ENCOUNTER — HOSPITAL ENCOUNTER (OUTPATIENT)
Dept: MAMMOGRAPHY | Facility: CLINIC | Age: 70
End: 2020-05-12
Attending: FAMILY MEDICINE
Payer: COMMERCIAL

## 2020-05-12 ENCOUNTER — ANTICOAGULATION THERAPY VISIT (OUTPATIENT)
Dept: ANTICOAGULATION | Facility: CLINIC | Age: 70
End: 2020-05-12

## 2020-05-12 DIAGNOSIS — Z85.3 PERSONAL HISTORY OF MALIGNANT NEOPLASM OF BREAST: ICD-10-CM

## 2020-05-12 DIAGNOSIS — I48.0 AF (PAROXYSMAL ATRIAL FIBRILLATION) (H): ICD-10-CM

## 2020-05-12 DIAGNOSIS — I48.91 ATRIAL FIBRILLATION, UNSPECIFIED TYPE (H): ICD-10-CM

## 2020-05-12 DIAGNOSIS — Z79.01 LONG TERM CURRENT USE OF ANTICOAGULANT THERAPY: ICD-10-CM

## 2020-05-12 DIAGNOSIS — N64.4 BREAST TENDERNESS: ICD-10-CM

## 2020-05-12 LAB
CAPILLARY BLOOD COLLECTION: NORMAL
INR BLD: 2.7 (ref 0.86–1.14)

## 2020-05-12 PROCEDURE — 85610 PROTHROMBIN TIME: CPT | Mod: QW | Performed by: FAMILY MEDICINE

## 2020-05-12 PROCEDURE — 76642 ULTRASOUND BREAST LIMITED: CPT | Mod: LT

## 2020-05-12 PROCEDURE — 99207 ZZC NO CHARGE NURSE ONLY: CPT | Performed by: FAMILY MEDICINE

## 2020-05-12 PROCEDURE — 36416 COLLJ CAPILLARY BLOOD SPEC: CPT | Performed by: FAMILY MEDICINE

## 2020-05-12 PROCEDURE — 77066 DX MAMMO INCL CAD BI: CPT

## 2020-05-12 RX ORDER — WARFARIN SODIUM 2.5 MG/1
TABLET ORAL
Qty: 90 TABLET | Refills: 0 | Status: SHIPPED | OUTPATIENT
Start: 2020-05-12 | End: 2020-08-04

## 2020-05-12 NOTE — PROGRESS NOTES
ANTICOAGULATION FOLLOW-UP CLINIC VISIT    Patient Name:  Latanya Padron  Date:  2020  Contact Type:  Telephone    SUBJECTIVE:  Patient Findings     Comments:   The patient was assessed for diet, medication, and activity level changes, missed or extra doses, bruising or bleeding, with no problem findings.  Francine Andrew RN          Clinical Outcomes     Negatives:   Major bleeding event, Thromboembolic event, Anticoagulation-related hospital admission, Anticoagulation-related ED visit, Anticoagulation-related fatality    Comments:   The patient was assessed for diet, medication, and activity level changes, missed or extra doses, bruising or bleeding, with no problem findings.  Francine Andrew RN             OBJECTIVE    Recent labs: (last 7 days)     20  1021   INR 2.7*       ASSESSMENT / PLAN  INR assessment THER    Recheck INR In: 6 WEEKS    INR Location Outside lab      Anticoagulation Summary  As of 2020    INR goal:   2.0-3.0   TTR:   68.3 % (1 y)   INR used for dosin.7 (2020)   Warfarin maintenance plan:   2.5 mg (5 mg x 0.5) every day   Full warfarin instructions:   2.5 mg every day   Weekly warfarin total:   17.5 mg   No change documented:   Francine Andrew RN   Plan last modified:   Francine Andrew RN (2020)   Next INR check:   2020   Target end date:       Indications    AF (paroxysmal atrial fibrillation) (H) [I48.0]  Atrial fibrillation (H) [I48.91]             Anticoagulation Episode Summary     INR check location:       Preferred lab:       Send INR reminders to:   [facility]    Comments:   5 mg tabs, likes card, PM dose      Anticoagulation Care Providers     Provider Role Specialty Phone number    Glen Blue MD [facility] [phone]            See the Encounter Report to view Anticoagulation Flowsheet and Dosing Calendar (Go to Encounters tab in chart review, and find the Anticoagulation Therapy Visit)    Dosage  adjustment made based on physician directed care plan.    Francine Andrew RN

## 2020-05-13 ENCOUNTER — TELEPHONE (OUTPATIENT)
Dept: FAMILY MEDICINE | Facility: CLINIC | Age: 70
End: 2020-05-13

## 2020-05-13 DIAGNOSIS — N64.4 BREAST TENDERNESS: Primary | ICD-10-CM

## 2020-05-13 DIAGNOSIS — Z85.3 PERSONAL HISTORY OF MALIGNANT NEOPLASM OF BREAST: ICD-10-CM

## 2020-05-13 NOTE — TELEPHONE ENCOUNTER
Notes recorded by Marley Barnes on 5/13/2020 at 10:47 AM CDT   Ivette states she has already discussed the mammo results with the Radiologist and would just like to schedule a 6 month mammogram. She would like early morning.    MA Diagnostic Bilateral w/Florian   Order: [id number]   Status:  Final result   Visible to patient:  Yes (Jaredt) Dx:  Personal history of malignant neoplas...   Linked study orders:  US Breast Left (Order: [id number])   Assessment     Overall     3 - Probably Benign     Study Mammography Recommendations      Side  Status  Due    Clinical Follow-up  Bilateral  Resolved  5/12/2021    Diagnostic Mammogram and Ultrasound  Left  Needs Follow-up  11/13/2020    Breast Density      Overall    Breast Composition  b - Scattered fibroglandular density    Details     Reading Physician  Reading Date  Result Priority    Axel Barrett MD  [phone]  5/12/2020        Physician Responsible for MQSA Outcome  Reason     Axel Barrett MD  Signed        Narrative & Impression      MA DIAGNOSTIC BILATERAL W/ FLORIAN, US BREAST LEFT LIMITED 1-3 QUADRANTS,  DIGITAL w/CAD;    TARGETED ULTRASOUND, LEFT BREAST - 5/12/2020 10:57 AM     HISTORY: New inner, lower, left breast pain. History of left breast  cancer status post lumpectomy and radiation treatment in 2019. Family  history of breast cancer in her sister at age 40 and in her mother at  age 73.     COMPARISON: Mammograms dated 5/15/2019, 4/12/2019, 9/26/2018,  4/26/2016 and 4/14/2015.     BREAST DENSITY: Scattered fibroglandular densities.     FINDINGS: There is overall slightly increased density of the left  breast. Mild architectural distortion, surgical clips and skin  thickening of the left breast corresponds with post conservation  therapy changes. No other new mammographic findings of concern for  malignancy.     Targeted ultrasound of the inner lower left breast at the site of  symptoms demonstrates no sonographic abnormality to  suggest  malignancy. Thickening of the skin in this location is likely due to  patient's radiation therapy.                                                                      IMPRESSION: BI-RADS CATEGORY: 3 - Probably Benign Finding-Short  Interval Follow-Up Suggested.   Post conservation therapy changes of the left breast. No obvious  malignancy is identified.     RECOMMENDED FOLLOW-UP: Clinical follow-up for the clinical symptoms.  Short Interval Follow-up 6 Months, diagnostic left breast mammogram.     I discussed the findings and recommendations with the patient at the  time of the exam.     INGA TREVINO MD             Last Resulted: 05/12/20  5:12 PM         Order Details       View Encounter       Lab and Collection Details

## 2020-06-01 ENCOUNTER — TELEPHONE (OUTPATIENT)
Dept: ONCOLOGY | Facility: CLINIC | Age: 70
End: 2020-06-01

## 2020-06-24 ENCOUNTER — VIRTUAL VISIT (OUTPATIENT)
Dept: FAMILY MEDICINE | Facility: CLINIC | Age: 70
End: 2020-06-24
Payer: COMMERCIAL

## 2020-06-24 DIAGNOSIS — I48.91 ATRIAL FIBRILLATION, UNSPECIFIED TYPE (H): ICD-10-CM

## 2020-06-24 DIAGNOSIS — I10 HYPERTENSION, GOAL BELOW 140/90: ICD-10-CM

## 2020-06-24 DIAGNOSIS — G25.81 RESTLESS LEG SYNDROME: Primary | ICD-10-CM

## 2020-06-24 DIAGNOSIS — N18.30 CKD (CHRONIC KIDNEY DISEASE) STAGE 3, GFR 30-59 ML/MIN (H): ICD-10-CM

## 2020-06-24 DIAGNOSIS — R07.89 CHEST WALL PAIN: ICD-10-CM

## 2020-06-24 PROCEDURE — 99214 OFFICE O/P EST MOD 30 MIN: CPT | Mod: 95 | Performed by: FAMILY MEDICINE

## 2020-06-24 RX ORDER — GABAPENTIN 300 MG/1
300 CAPSULE ORAL AT BEDTIME
Qty: 60 CAPSULE | Refills: 1 | Status: SHIPPED | OUTPATIENT
Start: 2020-06-24 | End: 2020-11-30

## 2020-06-24 RX ORDER — GABAPENTIN 300 MG/1
300 CAPSULE ORAL AT BEDTIME
Qty: 60 CAPSULE | Refills: 1 | COMMUNITY
Start: 2020-06-24 | End: 2020-06-24

## 2020-06-24 NOTE — PROGRESS NOTES
"Latanya Padron is a 70 year old female who is being evaluated via a billable telephone visit.      The patient has been notified of following:     \"This telephone visit will be conducted via a call between you and your physician/provider. We have found that certain health care needs can be provided without the need for a physical exam.  This service lets us provide the care you need with a short phone conversation.  If a prescription is necessary we can send it directly to your pharmacy.  If lab work is needed we can place an order for that and you can then stop by our lab to have the test done at a later time.    Telephone visits are billed at different rates depending on your insurance coverage. During this emergency period, for some insurers they may be billed the same as an in-person visit.  Please reach out to your insurance provider with any questions.    If during the course of the call the physician/provider feels a telephone visit is not appropriate, you will not be charged for this service.\"    Patient has given verbal consent for Telephone visit?  Yes    What phone number would you like to be contacted at? [phone]    How would you like to obtain your AVS? Lopez Stevenson     Latanya Padron is a 70 year old female who presents via phone visit today for the following health issues:    HPI  Chief Complaint   Patient presents with     Musculoskeletal Problem     legs won't settle down at night       Patient over the last month or more has been having restless legs where she feels like she needs to constantly move her legs.  It is not just from pain of her known arthritis of knees and hips.  This very much disrupt sleep.  She has tried a number of non-medical treatments with out success.  This included weighted blankets or perhaps technically supplements are medication.  None of this has helped.  Her treatments for breast cancer, atrial fibrillation, hypertension have all remained the same.  She " stopped gabapentin because her neuropathy had improved.  She seems to believe she noticed the symptoms more after discontinuing gabapentin.  Reviewing laboratory indicates earlier this year values were relatively unremarkable.  She did have slightly low GFR and slightly elevated creatinine and BUN.    Patient Active Problem List   Diagnosis     Sprain and strain of unspecified site of knee and leg     Disorder of bone and cartilage     Female stress incontinence     Family history of malignant neoplasm of breast     Hirsutism     HYPERLIPIDEMIA LDL GOAL <130     Advanced directives, counseling/discussion     Tennis elbow     Hypertension, goal below 140/90     Atrial fibrillation (H)     Major depression in complete remission (H)     Morbid obesity, unspecified obesity type (H)     Pulmonary emphysema, unspecified emphysema type (H)     AF (paroxysmal atrial fibrillation) (H)     Primary osteoarthritis of both knees     CKD (chronic kidney disease) stage 3, GFR 30-59 ml/min (H)     Hip pain, right     Multiple joint pain     Neck mass     Trochanteric bursitis of right hip     Segmental dysfunction of sacral region     Segmental dysfunction of lumbar region     Segmental dysfunction of thoracic region     Bilateral low back pain with right-sided sciatica     Malignant neoplasm of overlapping sites of left breast in female, estrogen receptor positive (H)     Age-related osteoporosis without current pathological fracture     Right hip pain     Patellofemoral pain syndrome of right knee     Abnormal gait     Primary osteoarthritis of right knee     Lymphadenopathy of head and neck     Groin pain, right     Restless leg syndrome     Past Surgical History:   Procedure Laterality Date     BIOPSY NODE SENTINEL Left 5/15/2019    Procedure: left sentinel node biopsy;  Surgeon: Donald Aleman MD;  Location: [facility] OR     BREAST BIOPSY, CORE RT/LT Left 2019     C APPENDECTOMY,W OTHR PROC       C  DELIVERY ONLY            C LIGATE FALLOPIAN TUBE       C NONSPECIFIC PROCEDURE      Exploratory laparotomy with resection of infarcted segment of omentum and minor lysis of adhesions     C NONSPECIFIC PROCEDURE      Removal of hemorrhagic corpus luteum cyst     C VAGINAL HYSTERECTOMY       COLONOSCOPY  06/21/10     HC BIOPSY OF BREAST, OPEN INCISIONAL Right 1988    Benign per patient     HC CORRECT BUNION,METATARSAL OSTEOTOMY        LAPAROSCOPY, SURGICAL; CHOLECYSTECTOMY  08/04/10     HC SLING OPERATION FOR STRESS INCONTINENCE  2007     HERNIORRHAPHY INCISIONAL (LOCATION)  2011    Procedure:HERNIORRHAPHY INCISIONAL (LOCATION); Surgeon:AYALA HOOVER; Location:[facility] OR     LAPAROSCOPIC HERNIORRHAPHY INCISIONAL  2011    Procedure:LAPAROSCOPIC HERNIORRHAPHY INCISIONAL; Laparoscopic mesh repair of incarcerated incisional hernia , extensive lysis of adhesions, excision of hernia sac and closure of fascia.; Surgeon:AYALA HOOVER; Location:[facility]     LAPAROSCOPIC LYSIS ADHESIONS  2011    Procedure:LAPAROSCOPIC LYSIS ADHESIONS; Surgeon:AYALA HOOVER; Location:[facility]     MASTECTOMY PARTIAL WITH NEEDLE LOCALIZATION Left 5/15/2019    Procedure: left wire localized partial mastectomy;  Surgeon: Donald Aleman MD;  Location: [facility]     TONSILLECTOMY         Social History     Tobacco Use     Smoking status: Former Smoker     Packs/day: 1.00     Years: 30.00     Pack years: 30.00     Last attempt to quit: 10/25/2005     Years since quittin.6     Smokeless tobacco: Never Used   Substance Use Topics     Alcohol use: No     Alcohol/week: 0.0 standard drinks     Family History   Problem Relation Age of Onset     Breast Cancer Mother      Obesity Mother      Genitourinary Problems Mother      Lipids Mother      Respiratory Sister      Lipids Sister      Breast Cancer Sister 40        s/p mastectomy     Arthritis Sister      Obesity Sister      Heart Failure Sister      Cancer  Sister         stomach, colon, pancreatic     Cancer Maternal Grandfather      Cancer Paternal Grandfather         lymph nodes     Heart Disease Father         by pass (5)     Cerebrovascular Disease Father         hemorragic     Lipids Father      Alzheimer Disease Maternal Grandmother      Genitourinary Problems Maternal Grandmother      Cancer Brother         prostate     Lipids Sister      Cancer Maternal Uncle         colon     Heart Disease Brother         Hx: stent placement     Lipids Brother         X 2         Current Outpatient Medications   Medication Sig Dispense Refill     albuterol (PROAIR HFA) 108 (90 Base) MCG/ACT inhaler INHALE 1-2 PUFFS BY MOUTH EVERY 2-4 HOURS AS NEEDED 8.5 g 1     albuterol (PROVENTIL) (2.5 MG/3ML) 0.083% neb solution Take  by nebulization. 1 NEB EVERY 4-6 HOURS AS NEEDED 75 mL 0     anastrozole (ARIMIDEX) 1 MG tablet Take 1 tablet (1 mg) by mouth daily 30 tablet 11     atorvastatin (LIPITOR) 10 MG tablet TAKE ONE TABLET BY MOUTH ONCE DAILY 90 tablet 2     BIOTIN PO Take by mouth daily       Calcium Carb-Cholecalciferol (CALCIUM 500 + D) 500-400 MG-UNIT TABS Take 1 tablet by mouth 2 times daily 180 tablet 3     CARTIA  MG 24 hr capsule TAKE ONE CAPSULE BY MOUTH ONCE DAILY (LABS DUE) 90 capsule 1     Cyanocobalamin (B-12) 1000 MCG TBCR Take 1,000 mcg by mouth daily 100 tablet 1     hydrochlorothiazide (HYDRODIURIL) 25 MG tablet TAKE ONE TABLET BY MOUTH EVERY DAY 90 tablet 2     lisinopril (ZESTRIL) 2.5 MG tablet TAKE ONE TABLET BY MOUTH ONCE DAILY 90 tablet 2     magnesium 250 MG tablet Take 1 tablet by mouth daily 30 tablet      mometasone-formoterol (DULERA) 200-5 MCG/ACT inhaler INHALE TWO PUFFS BY MOUTH TWICE A DAY 13 g 11     Probiotic Product (PROBIOTIC PO) Take by mouth daily       trospium (SANCTURA) 20 MG tablet Take 20 mg by mouth       venlafaxine (EFFEXOR-ER) 75 MG 24 hr tablet Take 1 tablet (75 mg) by mouth daily 90 tablet 3     warfarin ANTICOAGULANT  (COUMADIN) 2.5 MG tablet Take 2.5 mg (1 tablet) daily, or as directed by the coumadin clinic. 90 tablet 0     gabapentin (NEURONTIN) 300 MG capsule Take 1-2 capsules (300-600 mg) by mouth At Bedtime (Patient not taking: Reported on 6/24/2020) 60 capsule 1     order for DME Equipment being ordered: Nebulizer 1 Device 0     ORDER FOR DME Equipment being ordered: Oxygen @ 2 liters with exertion       predniSONE (DELTASONE) 20 MG tablet TAKE ONE TABLET BY MOUTH EVERY DAY (Patient not taking: Reported on 6/24/2020) 5 tablet 0     umeclidinium (INCRUSE ELLIPTA) 62.5 MCG/INH oral inhaler Inhale 1 puff into the lungs daily (Patient not taking: Reported on 6/24/2020) 1 Inhaler 11     Allergies   Allergen Reactions     Augmentin [Amoxicillin-Pot Clavulanate] Nausea and Vomiting     Meperidine Hcl Nausea and Vomiting     demerol     Seasonal Allergies      spring     BP Readings from Last 3 Encounters:   02/26/20 110/56   01/13/20 134/79   12/10/19 125/84    Wt Readings from Last 3 Encounters:   02/26/20 97 kg (213 lb 14.4 oz)   01/13/20 99.7 kg (219 lb 12.8 oz)   12/10/19 100.3 kg (221 lb 1.6 oz)                    Reviewed and updated as needed this visit by Provider         Review of Systems   Constitutional, HEENT, cardiovascular, pulmonary, gi and gu systems are negative, except as otherwise noted.  Usual discomfort or breathing symptoms but all of them are well controlled for her at the current time.       Objective   Reported vitals:  There were no vitals taken for this visit.   healthy, alert and no distress  PSYCH: Alert and oriented times 3; coherent speech, normal   rate and volume, able to articulate logical thoughts, able   to abstract reason, no tangential thoughts, no hallucinations   or delusions  Her affect is normal  RESP: No cough, no audible wheezing, able to talk in full sentences  Remainder of exam unable to be completed due to telephone visits    Diagnostic Test Results:  Labs reviewed in Epic         Assessment/Plan:  1. Restless leg syndrome  This most probably is truly restless leg syndrome although cannot rule out medication effect or electrolyte imbalance.  Resuming gabapentin 300 mg at bedtime since she tolerated this medication before.  If after 2 to 4 weeks consideration for increasing dosing or laboratory testing.  Therefore laboratory tests are generated as future orders.  - **CBC with platelets FUTURE anytime; Future  - **Comprehensive metabolic panel FUTURE anytime; Future  - **TSH with free T4 reflex FUTURE anytime; Future    2. Hypertension, goal below 140/90  Anticipate blood pressure will remain good as it has been.  Laboratory testing if needed  - **CBC with platelets FUTURE anytime; Future  - **Comprehensive metabolic panel FUTURE anytime; Future    3. CKD (chronic kidney disease) stage 3, GFR 30-59 ml/min (H)  As above  - **CBC with platelets FUTURE anytime; Future  - **Comprehensive metabolic panel FUTURE anytime; Future    4. Atrial fibrillation, unspecified type (H)  I believe her atrial fibrillation remains relatively in control i.e. mostly sinus rhythm.  However if she is having restless legs thyroid may be out of balance.  We will consider lab if not better in 2 to 4 weeks  - **TSH with free T4 reflex FUTURE anytime; Future    Return in about 3 months (around 9/24/2020) for BP Recheck, MSK problem, mental health, COPD.      Phone call duration:  15 minutes    Glen Blue MD

## 2020-06-24 NOTE — PATIENT INSTRUCTIONS
Take gabapentin 300 mg at bedtime and see if the restless legs does not improve.  You might try 2 of these if one is insufficient.  However at any time in the next 2 to 4 weeks if you have insufficient relief contact me for adjustment in treatment.  I did order future blood work so if not better this would be a plan.  You could call and schedule for this anytime in 2 to 4 weeks as well.  Otherwise anticipating seeing you in the fall or perhaps having another phone conversation visit if needed before he can actually come to the clinic.

## 2020-06-29 ENCOUNTER — ANTICOAGULATION THERAPY VISIT (OUTPATIENT)
Dept: ANTICOAGULATION | Facility: CLINIC | Age: 70
End: 2020-06-29

## 2020-06-29 DIAGNOSIS — I48.0 AF (PAROXYSMAL ATRIAL FIBRILLATION) (H): ICD-10-CM

## 2020-06-29 DIAGNOSIS — I48.91 ATRIAL FIBRILLATION, UNSPECIFIED TYPE (H): ICD-10-CM

## 2020-06-29 DIAGNOSIS — Z79.01 LONG TERM CURRENT USE OF ANTICOAGULANT THERAPY: ICD-10-CM

## 2020-06-29 LAB
CAPILLARY BLOOD COLLECTION: NORMAL
INR BLD: 2.4 (ref 0.86–1.14)

## 2020-06-29 PROCEDURE — 36416 COLLJ CAPILLARY BLOOD SPEC: CPT | Performed by: FAMILY MEDICINE

## 2020-06-29 PROCEDURE — 99207 ZZC NO CHARGE NURSE ONLY: CPT | Performed by: FAMILY MEDICINE

## 2020-06-29 PROCEDURE — 85610 PROTHROMBIN TIME: CPT | Mod: QW | Performed by: FAMILY MEDICINE

## 2020-06-29 NOTE — PROGRESS NOTES
ANTICOAGULATION FOLLOW-UP CLINIC VISIT    Patient Name:  Latanya Padron  Date:  2020  Contact Type:  Telephone    SUBJECTIVE:  Patient Findings     Comments:   The patient was assessed for diet, medication, and activity level changes, missed or extra doses, bruising or bleeding, with no problem findings.          Clinical Outcomes     Comments:   The patient was assessed for diet, medication, and activity level changes, missed or extra doses, bruising or bleeding, with no problem findings.             OBJECTIVE    Recent labs: (last 7 days)     20  0809   INR 2.4*       ASSESSMENT / PLAN  INR assessment THER    Recheck INR In: 6 WEEKS    INR Location Outside lab      Anticoagulation Summary  As of 2020    INR goal:   2.0-3.0   TTR:   73.6 % (1 y)   INR used for dosin.4 (2020)   Warfarin maintenance plan:   2.5 mg (5 mg x 0.5) every day   Full warfarin instructions:   2.5 mg every day   Weekly warfarin total:   17.5 mg   No change documented:   Zoey Pickard RN   Plan last modified:   Francine Andrew RN (2020)   Next INR check:   8/10/2020   Target end date:       Indications    AF (paroxysmal atrial fibrillation) (H) [I48.0]  Atrial fibrillation (H) [I48.91]             Anticoagulation Episode Summary     INR check location:       Preferred lab:       Send INR reminders to:   [facility] [place]    Comments:   5 mg tabs, likes card, PM dose      Anticoagulation Care Providers     Provider Role Specialty Phone number    Glen Blue MD [facility] [phone]            See the Encounter Report to view Anticoagulation Flowsheet and Dosing Calendar (Go to Encounters tab in chart review, and find the Anticoagulation Therapy Visit)    Dosage adjustment made based on physician directed care plan.    Zoey Pickard, LOVE

## 2020-07-05 DIAGNOSIS — F32.5 MAJOR DEPRESSION IN COMPLETE REMISSION (H): ICD-10-CM

## 2020-07-06 DIAGNOSIS — I10 HYPERTENSION, GOAL BELOW 140/90: ICD-10-CM

## 2020-07-06 DIAGNOSIS — I48.20 CHRONIC ATRIAL FIBRILLATION (H): ICD-10-CM

## 2020-07-06 RX ORDER — VENLAFAXINE HYDROCHLORIDE 75 MG/1
CAPSULE, EXTENDED RELEASE ORAL
Qty: 30 CAPSULE | Refills: 0 | Status: SHIPPED | OUTPATIENT
Start: 2020-07-06 | End: 2020-08-04

## 2020-07-06 NOTE — TELEPHONE ENCOUNTER
"Routing refill request to provider for review/approval because:  Labs out of range:  CR  T'd up 1 month for provider review.    Requested Prescriptions   Pending Prescriptions Disp Refills     venlafaxine (EFFEXOR-XR) 75 MG 24 hr capsule [Pharmacy Med Name: VENLAFAXINE HCL ER 75MG CP24] 90 capsule 3     Sig: TAKE ONE CAPSULE BY MOUTH ONCE DAILY   Last Written Prescription Date:  6/26/2019  Last Fill Quantity: 90,  # refills: 3   Last office visit: 6/24/2020 with prescribing provider:     Future Office Visit:        Serotonin-Norepinephrine Reuptake Inhibitors  Failed - 7/5/2020  9:38 AM        Failed - Normal serum creatinine on file in past 12 months     Recent Labs   Lab Test 02/26/20  0812   CR 1.32*     Ok to refill medication if creatinine is low          Passed - Blood pressure under 140/90 in past 12 months     BP Readings from Last 3 Encounters:   02/26/20 110/56   01/13/20 134/79   12/10/19 125/84           Passed - PHQ-9 score of less than 5 in past 6 months     Please review last PHQ-9 score.           Passed - Medication is active on med list        Passed - Patient is age 18 or older        Passed - No active pregnancy on record        Passed - No positive pregnancy test in past 12 months        Passed - Recent (6 mo) or future (30 days) visit within the authorizing provider's specialty     Patient had office visit in the last 6 months or has a visit in the next 30 days with authorizing provider or within the authorizing provider's specialty.  See \"Patient Info\" tab in inbasket, or \"Choose Columns\" in Meds & Orders section of the refill encounter.             Flory Hidalgo RN      "

## 2020-07-07 RX ORDER — DILTIAZEM HYDROCHLORIDE 120 MG/1
CAPSULE, COATED, EXTENDED RELEASE ORAL
Qty: 90 CAPSULE | Refills: 1 | Status: SHIPPED | OUTPATIENT
Start: 2020-07-07 | End: 2020-10-07

## 2020-07-07 NOTE — TELEPHONE ENCOUNTER
Routing refill request to provider for review/approval because:  Labs out of range:  Cr    Patient has been seen in the past 30 days.  Routing to PCP to advise.    Last Written Prescription Date:  1/9/2020  Last Fill Quantity: 90,  # refills: 1   Last office visit: 2/26/2020 with prescribing provider:   6/24/2020  Future Office Visit:      MARCELO NolascoN, RN

## 2020-07-10 ENCOUNTER — VIRTUAL VISIT (OUTPATIENT)
Dept: ONCOLOGY | Facility: CLINIC | Age: 70
End: 2020-07-10
Payer: COMMERCIAL

## 2020-07-10 ENCOUNTER — VIRTUAL VISIT (OUTPATIENT)
Dept: FAMILY MEDICINE | Facility: OTHER | Age: 70
End: 2020-07-10
Payer: COMMERCIAL

## 2020-07-10 VITALS — BODY MASS INDEX: 37.05 KG/M2 | WEIGHT: 217 LBS | HEIGHT: 64 IN

## 2020-07-10 DIAGNOSIS — Z80.3 FHX: BREAST CANCER: ICD-10-CM

## 2020-07-10 DIAGNOSIS — Z20.822 SUSPECTED 2019 NOVEL CORONAVIRUS INFECTION: ICD-10-CM

## 2020-07-10 DIAGNOSIS — M81.0 AGE-RELATED OSTEOPOROSIS WITHOUT CURRENT PATHOLOGICAL FRACTURE: Primary | ICD-10-CM

## 2020-07-10 DIAGNOSIS — Z20.822 SUSPECTED 2019 NOVEL CORONAVIRUS INFECTION: Primary | ICD-10-CM

## 2020-07-10 DIAGNOSIS — Z17.0 MALIGNANT NEOPLASM OF OVERLAPPING SITES OF LEFT BREAST IN FEMALE, ESTROGEN RECEPTOR POSITIVE (H): ICD-10-CM

## 2020-07-10 DIAGNOSIS — I25.10 CORONARY ARTERY CALCIFICATION: ICD-10-CM

## 2020-07-10 DIAGNOSIS — C50.812 MALIGNANT NEOPLASM OF OVERLAPPING SITES OF LEFT BREAST IN FEMALE, ESTROGEN RECEPTOR POSITIVE (H): ICD-10-CM

## 2020-07-10 PROCEDURE — U0003 INFECTIOUS AGENT DETECTION BY NUCLEIC ACID (DNA OR RNA); SEVERE ACUTE RESPIRATORY SYNDROME CORONAVIRUS 2 (SARS-COV-2) (CORONAVIRUS DISEASE [COVID-19]), AMPLIFIED PROBE TECHNIQUE, MAKING USE OF HIGH THROUGHPUT TECHNOLOGIES AS DESCRIBED BY CMS-2020-01-R: HCPCS | Performed by: FAMILY MEDICINE

## 2020-07-10 PROCEDURE — 99214 OFFICE O/P EST MOD 30 MIN: CPT | Mod: 95 | Performed by: INTERNAL MEDICINE

## 2020-07-10 PROCEDURE — 99421 OL DIG E/M SVC 5-10 MIN: CPT | Performed by: FAMILY MEDICINE

## 2020-07-10 RX ORDER — SODIUM CHLORIDE 9 MG/ML
1000 INJECTION, SOLUTION INTRAVENOUS CONTINUOUS PRN
Status: CANCELLED
Start: 2020-07-10

## 2020-07-10 RX ORDER — METHYLPREDNISOLONE SODIUM SUCCINATE 125 MG/2ML
125 INJECTION, POWDER, LYOPHILIZED, FOR SOLUTION INTRAMUSCULAR; INTRAVENOUS
Status: CANCELLED
Start: 2020-07-10

## 2020-07-10 RX ORDER — EPINEPHRINE 1 MG/ML
0.3 INJECTION, SOLUTION INTRAMUSCULAR; SUBCUTANEOUS EVERY 5 MIN PRN
Status: CANCELLED | OUTPATIENT
Start: 2020-07-10

## 2020-07-10 RX ORDER — ANASTROZOLE 1 MG/1
1 TABLET ORAL DAILY
Qty: 90 TABLET | Refills: 3 | Status: SHIPPED | OUTPATIENT
Start: 2020-07-10 | End: 2021-01-22

## 2020-07-10 RX ORDER — ALBUTEROL SULFATE 0.83 MG/ML
2.5 SOLUTION RESPIRATORY (INHALATION)
Status: CANCELLED | OUTPATIENT
Start: 2020-07-10

## 2020-07-10 RX ORDER — NALOXONE HYDROCHLORIDE 0.4 MG/ML
.1-.4 INJECTION, SOLUTION INTRAMUSCULAR; INTRAVENOUS; SUBCUTANEOUS
Status: CANCELLED | OUTPATIENT
Start: 2020-07-10

## 2020-07-10 RX ORDER — EPINEPHRINE 0.3 MG/.3ML
0.3 INJECTION SUBCUTANEOUS EVERY 5 MIN PRN
Status: CANCELLED | OUTPATIENT
Start: 2020-07-10

## 2020-07-10 RX ORDER — ALBUTEROL SULFATE 90 UG/1
1-2 AEROSOL, METERED RESPIRATORY (INHALATION)
Status: CANCELLED
Start: 2020-07-10

## 2020-07-10 RX ORDER — MEPERIDINE HYDROCHLORIDE 25 MG/ML
25 INJECTION INTRAMUSCULAR; INTRAVENOUS; SUBCUTANEOUS EVERY 30 MIN PRN
Status: CANCELLED | OUTPATIENT
Start: 2020-07-10

## 2020-07-10 RX ORDER — DIPHENHYDRAMINE HYDROCHLORIDE 50 MG/ML
50 INJECTION INTRAMUSCULAR; INTRAVENOUS
Status: CANCELLED
Start: 2020-07-10

## 2020-07-10 ASSESSMENT — PAIN SCALES - GENERAL: PAINLEVEL: MODERATE PAIN (4)

## 2020-07-10 ASSESSMENT — MIFFLIN-ST. JEOR: SCORE: 1494.31

## 2020-07-10 NOTE — PROGRESS NOTES
"Date: 07/10/2020 10:25:53  Clinician: Camille Melara  Clinician NPI: [id number]  Patient: Latanya Padron  Patient : 1950  Patient Address: [address] [place] [id number]  Patient Phone: [phone]  Visit Protocol: URI  Patient Summary:  Latanya is a 70 year old ( : 1950 ) female who initiated a Visit for cold, sinus infection, or influenza. When asked the question \"Please sign me up to receive news, health information and promotions from [employer].\", Latanya responded \"Yes\".    Latanya states her symptoms started gradually 3-4 days ago.   Her symptoms consist of wheezing, nausea, tooth pain, rhinitis, malaise, a headache, chills, myalgia, and a cough. She is experiencing mild difficulty breathing with activities but can speak normally in full sentences.   Symptom details     Nasal secretions: The color of her mucus is clear.    Cough: Latanya coughs a few times an hour and her cough is not more bothersome at night. Phlegm does not come into her throat when she coughs. She does not believe her cough is caused by post-nasal drip.     Wheezing: Latanya has been diagnosed with asthma. Additional wheezing details as reported by the patient (free text): I think it's from my COPD and the hot weather.       Headache: She states the headache is mild (1-3 on a 10 point pain scale).     Tooth pain: The tooth pain is not caused by a cavity, recent dental work, or other mouth problems.      Latanya denies having ageusia, diarrhea, vomiting, ear pain, sore throat, enlarged lymph nodes, anosmia, facial pain or pressure, fever, and nasal congestion. She also denies having recent facial or sinus surgery in the past 60 days, taking antibiotic medication in the past month, and double sickening (worsening symptoms after initial improvement).   Precipitating events  She has not recently been exposed to someone with influenza. Latanya has been in close contact with the following high risk individuals: people with " asthma, heart disease or diabetes and adults 65 or older.   Pertinent COVID-19 (Coronavirus) information  In the past 14 days, Latanya has not worked in a congregate living setting.   She does not work or volunteer as healthcare worker or a  and does not work or volunteer in a healthcare facility.   Latanya also has not lived in a congregate living setting in the past 14 days. She does not live with a healthcare worker.   Latanya has not had a close contact with a laboratory-confirmed COVID-19 patient within 14 days of symptom onset.   Pertinent medical history  Latanya does not get yeast infections when she takes antibiotics.   Latanya does not need a return to work/school note.   Weight: 217 lbs   Latanya does not smoke or use smokeless tobacco.   Weight: 217 lbs    MEDICATIONS: trospium oral, hydrochlorothiazide oral, venlafaxine oral, Jantoven oral, atorvastatin oral, diltiazem oral, gabapentin oral, lisinopril oral, anastrozole oral, ALLERGIES: amoxicillin, Demerol  Clinician Response:  Dear Latanya,   Your symptoms show that you may have coronavirus (COVID-19). This illness can cause fever, cough and trouble breathing. Many people get a mild case and get better on their own. Some people can get very sick.  What should I do?  We would like to test you for this virus.   1. Please call [phone] to schedule your visit. Explain that you were referred by [facility] to have a COVID-19 test. Be ready to share your [facility] visit ID number.  The following will serve as your written order for this COVID Test, ordered by me, for the indication of suspected COVID [Z20.828]: The test will be ordered in [employer], our electronic health record, after you are scheduled. It will show as ordered and authorized by Binh Sanchez MD.  Order: COVID-19 (Coronavirus) PCR for SYMPTOMATIC testing from [facility].      2. When it's time for your COVID test:  Stay at least 6 feet away from others. (If someone will drive you to your test, stay  "in the backseat, as far away from the  as you can.)   Cover your mouth and nose with a mask, tissue or washcloth.  Go straight to the testing site. Don't make any stops on the way there or back.      3.Starting now: Stay home and away from others (self-isolate) until:   You've had no fever---and no medicine that reduces fever---for 3 full days (72 hours). And...   Your other symptoms have gotten better. For example, your cough or breathing has improved. And...   At least 10 days have passed since your symptoms started.       During this time, don't leave the house except for testing or medical care.   Stay in your own room, even for meals. Use your own bathroom if you can.   Stay away from others in your home. No hugging, kissing or shaking hands. No visitors.  Don't go to work, school or anywhere else.    Clean \"high touch\" surfaces often (doorknobs, counters, handles, etc.). Use a household cleaning spray or wipes. You'll find a full list of  on the EPA website: www.epa.gov/pesticide-registration/list-n-disinfectants-use-against-sars-cov-2.   Cover your mouth and nose with a mask, tissue or washcloth to avoid spreading germs.  Wash your hands and face often. Use soap and water.  Caregivers in these groups are at risk for severe illness due to COVID-19:  o People 65 years and older  o People who live in a nursing home or long-term care facility  o People with chronic disease (lung, heart, cancer, diabetes, kidney, liver, immunologic)  o People who have a weakened immune system, including those who:   Are in cancer treatment  Take medicine that weakens the immune system, such as corticosteroids  Had a bone marrow or organ transplant  Have an immune deficiency  Have poorly controlled HIV or AIDS  Are obese (body mass index of 40 or higher)  Smoke regularly   o Caregivers should wear gloves while washing dishes, handling laundry and cleaning bedrooms and bathrooms.  o Use caution when washing and drying " laundry: Don't shake dirty laundry, and use the warmest water setting that you can.  o For more tips, go to www.cdc.gov/coronavirus/2019-ncov/downloads/10Things.pdf.    4.Sign up for [employer]. We know it's scary to hear that you might have COVID-19. We want to track your symptoms to make sure you're okay over the next 2 weeks. Please look for an email from [employer]---this is a free, online program that we'll use to keep in touch. To sign up, follow the link in the email. Learn more at [email or web address]  How can I take care of myself?   Get lots of rest. Drink extra fluids (unless a doctor has told you not to).   Take Tylenol (acetaminophen) for fever or pain. If you have liver or kidney problems, ask your family doctor if it's okay to take Tylenol.   Adults can take either:    650 mg (two 325 mg pills) every 4 to 6 hours, or...   1,000 mg (two 500 mg pills) every 8 hours as needed.    Note: Don't take more than 3,000 mg in one day. Acetaminophen is found in many medicines (both prescribed and over-the-counter medicines). Read all labels to be sure you don't take too much.   For children, check the Tylenol bottle for the right dose. The dose is based on the child's age or weight.    If you have other health problems (like cancer, heart failure, an organ transplant or severe kidney disease): Call your specialty clinic if you don't feel better in the next 2 days.       Know when to call 911. Emergency warning signs include:    Trouble breathing or shortness of breath Pain or pressure in the chest that doesn't go away Feeling confused like you haven't felt before, or not being able to wake up Bluish-colored lips or face.  Where can I get more information?    [employer] [facility] -- About COVID-19: [email or web address]   CDC -- What to Do If You're Sick: www.cdc.gov/coronavirus/2019-ncov/about/steps-when-sick.html   CDC -- Ending Home Isolation:  www.cdc.gov/coronavirus/2019-ncov/hcp/disposition-in-home-patients.html   [facility] -- Caring for Someone: www.cdc.gov/coronavirus/2019-ncov/if-you-are-sick/care-for-someone.html   [facility] -- Interim Guidance for Hospital Discharge to Home: www.[facility].[place]/diseases/coronavirus/hcp/hospdischarge.pdf   [facility] clinical trials (COVID-19 research studies): clinicalaffairs.[facility]-clinical-trials    Below are the COVID-19 hotlines at the [place] Department of Health ([facility]). Interpreters are available.    For health questions: Call [phone] or 1-379.266.9068 (7 a.m. to 7 p.m.) For questions about schools and childcare: Call 776-167-9470 or 1-132.192.3436 (7 a.m. to 7 p.m.)    Diagnosis: Cough  Diagnosis ICD: R05

## 2020-07-10 NOTE — NURSING NOTE
"SYMPTOM QUESTIONNAIRE    Pain: 4/10- right knee-- arthritis    Nausea/Vomiting: nausea- no Rx    Mouth Sores: no    Shortness of Breath: yes    Smoking: no    Fever or Chills: suspects \"low grade fever\" has not taken temperature    Hard Stools: no    Soft Stools: no    Weight Loss: no    Weakness: no    Burning, numbness or tingling in hands or feet: no    Problems with skin or swelling: swelling in ankles especially with heat- resolves with extremity elevation    Memory Loss: no    Anxiety or Depression: yes- on Rx-- stable    Trouble Sleeping: RLS on gabapentin    Of note: patient is scheduled for curbside COVID testing this afternoon. She was scheduled by [facility].     Tia Boo LPN on 7/10/2020 at 12:46 PM        "

## 2020-07-10 NOTE — PROGRESS NOTES
"Latanya Padron is a 70 year old female who is being evaluated via a billable telephone visit.      The patient has been notified of following:     \"This telephone visit will be conducted via a call between you and your physician/provider. We have found that certain health care needs can be provided without the need for a physical exam.  This service lets us provide the care you need with a short phone conversation.  If a prescription is necessary we can send it directly to your pharmacy.  If lab work is needed we can place an order for that and you can then stop by our lab to have the test done at a later time.    Telephone visits are billed at different rates depending on your insurance coverage. During this emergency period, for some insurers they may be billed the same as an in-person visit.  Please reach out to your insurance provider with any questions.    If during the course of the call the physician/provider feels a telephone visit is not appropriate, you will not be charged for this service.\"    Patient has given verbal consent for Telephone visit? yes    Phone call duration: 13 minutes    Prisca Cortez MD, MD        Oncology Consultation:  Date on this visit: 7/10/2020    Latanya Padron  is referred by Dr.Aazim HUBER Poole for an oncology consultation. She requires evaluation and transfer of care for Invasive lobular carcinoma left breast.    PCP: Glen Blue     Diagnosis: Invasive lobular carcinoma left breast    Oncologic History:  1. Invasive lobular carcinoma left breast:  09/2018 Screening mammogram showed an area of architectural distortion the left breast.Ultrasound breast did not show any sonographic abnormality and recommendation was for a 6 month follow-up mammogram    04/2019 Mammogram and ultrasound showed a heterogenous mass in the left breast measuring 1.1 x 0.5 x 0.5 cm. Centimeter biopsy of the mass came back showing invasive lobular carcinoma, Grade 2, ER/AK positive and HER-2/tulio " negative by FISH. MRI breast bilateral showed 2 cm mass like enhancement in the left breast corresponding to the biopsy-proven carcinoma with no evidence of multifocal disease or axillary lymphadenopathy.     05/15/19 Lumpectomy with SNL dissection. Oncotype DX Recurrence score came back at 15 putting patient in the low-risk category and so did not recommend adjuvant systemic chemotherapy     07/18/2019 Completed Adjuvant RT  DEXA scan in June 2019 showed findings c/w osteoporosis as below:  The right hip T-score is -2.7. The left hip T-score is -2.4.    She has started on Prolia q 6 months.  July 2019- started Arimidex.    History Of Present Illness:  69-year-old female with past medical history significant for Atrial fibrillation, hypertension, who is here for transfer of care for invasive lobular breast cancer, from Dr. Poole, originally a patient of Dr. Denis. The tumor was pT2N0, ER/WA positive and HER-2/tulio negative by FISH  Oncotype Score 15 with 4% risk of distant recurrence with hormonal therapy alone and <1% benefit from chemotherapy. She is s/p R lumpectomy and axillary SLN dissection in 05/2019.  She has completed adjuvant radiation on 7/18/2019 and she also started Arimidex in July 2019.    She is taking calcium and vitamin D regularly.  She was seen by Dr. Blue in May 2020 with c/o breast pain. B/l diagnostic 3D mammogram was obtained along with targeted L breast US and showed: overall slightly increased density of the left  breast. Mild architectural distortion, surgical clips and skin  thickening of the left breast corresponds with post conservation  therapy changes. No other new mammographic findings of concern for  malignancy.   Targeted ultrasound of the inner lower left breast at the site of  symptoms demonstrates no sonographic abnormality to suggest  malignancy. Thickening of the skin in this location is likely due to  patient's radiation therapy. 6 months short interval f/up with L diagnostic  mammogram was recommended.   She has been undergoing annual low dose CT chest with Dr. Blue- reshma in 2019, which showed small b/l pulmonary nodules, 6 mm or less.  There were moderate to severe CACs.  She has been tolerating anastrozole well.  She has been taking that regularly.  Currently, she has some flulike symptoms with nasal congestion and low-grade fevers.  She is going to be tested for COVID-19 today.  She has minimal swelling in her feet bilaterally.  She attributes it to heat.  She is on gabapentin for restless leg syndrome.  She is on venlafaxine which controls her symptoms of depression.  She also has arthritis of right knee and rates her pain in her right knee at 4 out of 10.  In addition, a complete 12 point  review of systems is negative.    Past Medical/Surgical History:  Past Medical History:   Diagnosis Date     Breast cancer (H) 2019    Left Breast     COPD (chronic obstructive pulmonary disease) (H)      Mixed hyperlipidemia      PONV (postoperative nausea and vomiting)      S/P radiation therapy     5,256 cGy to left breast completed on 2019 - [facility]     Past Surgical History:   Procedure Laterality Date     BIOPSY NODE SENTINEL Left 5/15/2019    Procedure: left sentinel node biopsy;  Surgeon: Donald Aleman MD;  Location: [facility] OR     BREAST BIOPSY, CORE RT/LT Left 2019     C APPENDECTOMY,W OTHR PROC       C  DELIVERY ONLY           C LIGATE FALLOPIAN TUBE  's     C NONSPECIFIC PROCEDURE      Exploratory laparotomy with resection of infarcted segment of omentum and minor lysis of adhesions     C NONSPECIFIC PROCEDURE      Removal of hemorrhagic corpus luteum cyst     C VAGINAL HYSTERECTOMY       COLONOSCOPY  06/21/10     HC BIOPSY OF BREAST, OPEN INCISIONAL Right 1988    Benign per patient     HC CORRECT BUNION,METATARSAL OSTEOTOMY       HC LAPAROSCOPY, SURGICAL; CHOLECYSTECTOMY  08/04/10     HC SLING OPERATION FOR  STRESS INCONTINENCE  04/19/2007     HERNIORRHAPHY INCISIONAL (LOCATION)  9/23/2011    Procedure:HERNIORRHAPHY INCISIONAL (LOCATION); Surgeon:AYALA HOOVER; Location:[facility]     LAPAROSCOPIC HERNIORRHAPHY INCISIONAL  9/23/2011    Procedure:LAPAROSCOPIC HERNIORRHAPHY INCISIONAL; Laparoscopic mesh repair of incarcerated incisional hernia , extensive lysis of adhesions, excision of hernia sac and closure of fascia.; Surgeon:AYALA HOOVER; Location:[facility]     LAPAROSCOPIC LYSIS ADHESIONS  9/23/2011    Procedure:LAPAROSCOPIC LYSIS ADHESIONS; Surgeon:AYALA HOOVER; Location:[facility]     MASTECTOMY PARTIAL WITH NEEDLE LOCALIZATION Left 5/15/2019    Procedure: left wire localized partial mastectomy;  Surgeon: Donald Aleman MD;  Location: [facility] OR     TONSILLECTOMY         Allergies:  Allergies as of 07/10/2020 - Reviewed 06/24/2020   Allergen Reaction Noted     Augmentin [amoxicillin-pot clavulanate] Nausea and Vomiting 10/18/2018     Meperidine hcl Nausea and Vomiting 04/17/2001     Seasonal allergies  04/05/2010     Current Medications:  Current Outpatient Medications   Medication Sig Dispense Refill     albuterol (PROAIR HFA) 108 (90 Base) MCG/ACT inhaler INHALE 1-2 PUFFS BY MOUTH EVERY 2-4 HOURS AS NEEDED 8.5 g 1     albuterol (PROVENTIL) (2.5 MG/3ML) 0.083% neb solution Take  by nebulization. 1 NEB EVERY 4-6 HOURS AS NEEDED 75 mL 0     anastrozole (ARIMIDEX) 1 MG tablet Take 1 tablet (1 mg) by mouth daily 30 tablet 11     atorvastatin (LIPITOR) 10 MG tablet TAKE ONE TABLET BY MOUTH ONCE DAILY 90 tablet 2     BIOTIN PO Take by mouth daily       Calcium Carb-Cholecalciferol (CALCIUM 500 + D) 500-400 MG-UNIT TABS Take 1 tablet by mouth 2 times daily 180 tablet 3     Cyanocobalamin (B-12) 1000 MCG TBCR Take 1,000 mcg by mouth daily 100 tablet 1     diltiazem ER COATED BEADS (CARDIZEM CD/CARTIA XT) 120 MG 24 hr capsule TAKE ONE CAPSULE BY MOUTH ONCE DAILY ( LABS DUE ) 90 capsule 1     gabapentin (NEURONTIN) 300 MG  capsule Take 1 capsule (300 mg) by mouth At Bedtime 60 capsule 1     hydrochlorothiazide (HYDRODIURIL) 25 MG tablet TAKE ONE TABLET BY MOUTH EVERY DAY 90 tablet 2     lisinopril (ZESTRIL) 2.5 MG tablet TAKE ONE TABLET BY MOUTH ONCE DAILY 90 tablet 2     magnesium 250 MG tablet Take 1 tablet by mouth daily 30 tablet      mometasone-formoterol (DULERA) 200-5 MCG/ACT inhaler INHALE TWO PUFFS BY MOUTH TWICE A DAY 13 g 11     order for DME Equipment being ordered: Nebulizer 1 Device 0     ORDER FOR DME Equipment being ordered: Oxygen @ 2 liters with exertion       Probiotic Product (PROBIOTIC PO) Take by mouth daily       trospium (SANCTURA) 20 MG tablet Take 20 mg by mouth       venlafaxine (EFFEXOR-XR) 75 MG 24 hr capsule TAKE ONE CAPSULE BY MOUTH ONCE DAILY 30 capsule 0     warfarin ANTICOAGULANT (COUMADIN) 2.5 MG tablet Take 2.5 mg (1 tablet) daily, or as directed by the coumadin clinic. 90 tablet 0      Family History:  Family History   Problem Relation Age of Onset     Breast Cancer Mother      Obesity Mother      Genitourinary Problems Mother      Lipids Mother      Respiratory Sister      Lipids Sister      Breast Cancer Sister 40        s/p mastectomy     Social History:  Social History     Socioeconomic History     Marital status:      Spouse name: Nam     Number of children: 1     Years of education: 14     Highest education level: Not on file   Occupational History     Occupation: Ins Cordinater     Comment:  [employer]     Employer: [employer]   Tobacco Use     Smoking status: Former Smoker     Packs/day: 1.00     Years: 30.00     Pack years: 30.00     Last attempt to quit: 10/25/2005     Years since quittin.7     Smokeless tobacco: Never Used   Substance and Sexual Activity     Alcohol use: No     Alcohol/week: 0.0 standard drinks     Drug use: No     Sexual activity: Not Currently     Partners: Male     Birth control/protection: Female Surgical     Comment: hysterectomy      Physical Exam:  Constitutional: alert and in no apparent distress  PSYCH: Alert and oriented times 3; coherent speech, normal   rate and volume, able to articulate logical thoughts, able   to abstract reason, no tangential thoughts, no hallucinations   or delusions  His affect is normal  RESP: No cough, no audible wheezing, able to talk in full sentences  Remainder of exam unable to be completed due to telephone visits  The rest the comprehensive physical examination is deferred due to public health emergency     Laboratory/Imaging Studies     Component      Latest Ref Rng & Units 2/26/2020   Sodium      133 - 144 mmol/L 139   Potassium      3.4 - 5.3 mmol/L 3.7   Chloride      94 - 109 mmol/L 102   Carbon Dioxide      20 - 32 mmol/L 31   Anion Gap      3 - 14 mmol/L 6   Glucose      70 - 99 mg/dL 99   Urea Nitrogen      7 - 30 mg/dL 28   Creatinine      0.52 - 1.04 mg/dL 1.32 (H)   GFR Estimate      >60 mL/min/1.73:m2 41 (L)   GFR Estimate If Black      >60 mL/min/1.73:m2 48 (L)   Calcium      8.5 - 10.1 mg/dL 10.1   Bilirubin Total      0.2 - 1.3 mg/dL 0.6   Albumin      3.4 - 5.0 g/dL 3.5   Protein Total      6.8 - 8.8 g/dL 7.5   Alkaline Phosphatase      40 - 150 U/L 58   ALT      0 - 50 U/L 16   AST      0 - 45 U/L 13   WBC      4.0 - 11.0 10e9/L 6.9   RBC Count      3.8 - 5.2 10e12/L 4.41   Hemoglobin      11.7 - 15.7 g/dL 12.3   Hematocrit      35.0 - 47.0 % 38.9   MCV      78 - 100 fl 88   MCH      26.5 - 33.0 pg 27.9   MCHC      31.5 - 36.5 g/dL 31.6   RDW      10.0 - 15.0 % 12.3   Platelet Count      150 - 450 10e9/L 341   Results for IMELDA LEDESMA HUBER (MRN [id number]) as of 7/10/2020 02:19   Ref. Range 6/21/2017 15:33 6/20/2018 11:21 4/29/2019 10:54 7/11/2019 13:26 1/13/2020 09:36 2/26/2020 08:12   Creatinine Latest Ref Range: 0.52 - 1.04 mg/dL 0.95 0.86 0.91 0.99 1.08 (H) 1.32 (H)     ASSESSMENT/PLAN:    69-year-old postmenopausal female with past medical history of atrial fibrillation, hypertension,  with pT2N0, ER/NE positive and HER-2/tulio negative by FISH  Oncotype Score 15 with 4% risk of distant recurrence with hormonal therapy alone and <1% benefit from chemotherapy so did not get adjuvant chemo.   She is s/p R lumpectomy and axillary SLN dissection in 05/2019. Completed adjuvant radiation on 7/18/2019.  She started Arimidex in July 2019.      1.  Invasive lobular carcinoma-continue anastrozole, tolerating well.  Follow-up in 6 months, with labs.    2. Breast Cancer Screening -  B/l diagnostic 3D mammogram was obtained along with targeted L breast US in 05/2020 showed post-treatment and benign changes. Short interval f/up L diagnostic mammogram was recommended in 6 months.  This was ordered and she will proceed in November 2020 at [place].    3. Bone Health -  Osteoporosis  Prolia q 6 months- received two doses so far, in July 2019 and on 12/30/2019.  Cont Ca and Vit D daily. She is scheduled for her next dose on July 13th, and we will continue every 6 months.  CMP planned for July 13.  Plan repeat DEXA scan in June 2021.   Recommend weightbearing exercise as mentioned above.    4. FHx of breast cancer- mother had breast cancer at 70+ years old. Sister had breast breast cancer when she was in her 40s. She had colon, stomach, pancreatic cancer as well.  Maternal cousin also has lobular breast cancer at 60. Maternal uncle also had colon cancer metastatic to the lungs. Brother had prostate cancer. She has one daughter and reports daughter has undergone genetic testing but she is not sure of the details.   We'll refer to genetic counseling.    5. Lung cancer screening- she has been undergoing annual low dose CT chest with Dr. Blue- reshma in 06/2019.  She is aware she is overdue for an annual chest CT and she will be contacting Dr. Blue office to schedule    6.  Cardiac--moderate to severe CACs on chest CT; refer to cardiology for further evaluation.   Atrial fibrillation -On Coumadin for  anticoagulation      At the end of our visit patient verbalized understanding and concurred with the plan.      Addendum: Patient met with genetic counselor on July 22 and 40 gene comprehensive cancer panel was drawn.

## 2020-07-10 NOTE — LETTER
"    7/10/2020         RE: Latanya Padron  [address] [facility] [address]        Dear Colleague,    Thank you for referring your patient, Latanya Padron, to the [facility]. Please see a copy of my visit note below.    Latanya Padron is a 70 year old female who is being evaluated via a billable telephone visit.      The patient has been notified of following:     \"This telephone visit will be conducted via a call between you and your physician/provider. We have found that certain health care needs can be provided without the need for a physical exam.  This service lets us provide the care you need with a short phone conversation.  If a prescription is necessary we can send it directly to your pharmacy.  If lab work is needed we can place an order for that and you can then stop by our lab to have the test done at a later time.    Telephone visits are billed at different rates depending on your insurance coverage. During this emergency period, for some insurers they may be billed the same as an in-person visit.  Please reach out to your insurance provider with any questions.    If during the course of the call the physician/provider feels a telephone visit is not appropriate, you will not be charged for this service.\"    Patient has given verbal consent for Telephone visit? yes    Phone call duration: 13 minutes    Prisca Cortez MD, MD        Oncology Consultation:  Date on this visit: 7/10/2020    Latanya Padron  is referred by Dr.Aazim HUBER Poole for an oncology consultation. She requires evaluation and transfer of care for Invasive lobular carcinoma left breast.    PCP: Glen Blue     Diagnosis: Invasive lobular carcinoma left breast    Oncologic History:  1. Invasive lobular carcinoma left breast:  09/2018 Screening mammogram showed an area of architectural distortion the left breast.Ultrasound breast did not show any sonographic abnormality and recommendation was for a 6 " month follow-up mammogram    04/2019 Mammogram and ultrasound showed a heterogenous mass in the left breast measuring 1.1 x 0.5 x 0.5 cm. Centimeter biopsy of the mass came back showing invasive lobular carcinoma, Grade 2, ER/WI positive and HER-2/tulio negative by FISH. MRI breast bilateral showed 2 cm mass like enhancement in the left breast corresponding to the biopsy-proven carcinoma with no evidence of multifocal disease or axillary lymphadenopathy.     05/15/19 Lumpectomy with SNL dissection. Oncotype DX Recurrence score came back at 15 putting patient in the low-risk category and so did not recommend adjuvant systemic chemotherapy     07/18/2019 Completed Adjuvant RT  DEXA scan in June 2019 showed findings c/w osteoporosis as below:  The right hip T-score is -2.7. The left hip T-score is -2.4.    She has started on Prolia q 6 months.  July 2019- started Arimidex.    History Of Present Illness:  69-year-old female with past medical history significant for Atrial fibrillation, hypertension, who is here for transfer of care for invasive lobular breast cancer, from Dr. Poole, originally a patient of Dr. Denis. The tumor was pT2N0, ER/WI positive and HER-2/tulio negative by FISH  Oncotype Score 15 with 4% risk of distant recurrence with hormonal therapy alone and <1% benefit from chemotherapy. She is s/p R lumpectomy and axillary SLN dissection in 05/2019.  She has completed adjuvant radiation on 7/18/2019 and she also started Arimidex in July 2019.    She is taking calcium and vitamin D regularly.  She was seen by Dr. Blue in May 2020 with c/o breast pain. B/l diagnostic 3D mammogram was obtained along with targeted L breast US and showed: overall slightly increased density of the left  breast. Mild architectural distortion, surgical clips and skin  thickening of the left breast corresponds with post conservation  therapy changes. No other new mammographic findings of concern for  malignancy.   Targeted ultrasound of  the inner lower left breast at the site of  symptoms demonstrates no sonographic abnormality to suggest  malignancy. Thickening of the skin in this location is likely due to  patient's radiation therapy. 6 months short interval f/up with L diagnostic mammogram was recommended.   She has been undergoing annual low dose CT chest with Dr. Leonidas justice in 2019, which showed small b/l pulmonary nodules, 6 mm or less.  There were moderate to severe CACs.  She has been tolerating anastrozole well.  She has been taking that regularly.  Currently, she has some flulike symptoms with nasal congestion and low-grade fevers.  She is going to be tested for COVID-19 today.  She has minimal swelling in her feet bilaterally.  She attributes it to heat.  She is on gabapentin for restless leg syndrome.  She is on venlafaxine which controls her symptoms of depression.  She also has arthritis of right knee and rates her pain in her right knee at 4 out of 10.  In addition, a complete 12 point  review of systems is negative.    Past Medical/Surgical History:  Past Medical History:   Diagnosis Date     Breast cancer (H) 2019    Left Breast     COPD (chronic obstructive pulmonary disease) (H)      Mixed hyperlipidemia      PONV (postoperative nausea and vomiting)      S/P radiation therapy     5,256 cGy to left breast completed on 2019 - [facility]     Past Surgical History:   Procedure Laterality Date     BIOPSY NODE SENTINEL Left 5/15/2019    Procedure: left sentinel node biopsy;  Surgeon: Donald Aleman MD;  Location: [facility] OR     BREAST BIOPSY, CORE RT/LT Left 2019     C APPENDECTOMY,W OTHR PROC       C  DELIVERY ONLY           C LIGATE FALLOPIAN TUBE       C NONSPECIFIC PROCEDURE      Exploratory laparotomy with resection of infarcted segment of omentum and minor lysis of adhesions     C NONSPECIFIC PROCEDURE      Removal of hemorrhagic corpus luteum cyst     C VAGINAL  HYSTERECTOMY  1984     COLONOSCOPY  06/21/10      BIOPSY OF BREAST, OPEN INCISIONAL Right 1988    Benign per patient     HC CORRECT BUNION,METATARSAL OSTEOTOMY  1993      LAPAROSCOPY, SURGICAL; CHOLECYSTECTOMY  08/04/10     HC SLING OPERATION FOR STRESS INCONTINENCE  04/19/2007     HERNIORRHAPHY INCISIONAL (LOCATION)  9/23/2011    Procedure:HERNIORRHAPHY INCISIONAL (LOCATION); Surgeon:AYALA HOOVER; Location:[facility]     LAPAROSCOPIC HERNIORRHAPHY INCISIONAL  9/23/2011    Procedure:LAPAROSCOPIC HERNIORRHAPHY INCISIONAL; Laparoscopic mesh repair of incarcerated incisional hernia , extensive lysis of adhesions, excision of hernia sac and closure of fascia.; Surgeon:AYALA HOOVER; Location:[facility]     LAPAROSCOPIC LYSIS ADHESIONS  9/23/2011    Procedure:LAPAROSCOPIC LYSIS ADHESIONS; Surgeon:AYALA HOOVER; Location:[facility]     MASTECTOMY PARTIAL WITH NEEDLE LOCALIZATION Left 5/15/2019    Procedure: left wire localized partial mastectomy;  Surgeon: Donald Aleman MD;  Location: [facility] OR     TONSILLECTOMY         Allergies:  Allergies as of 07/10/2020 - Reviewed 06/24/2020   Allergen Reaction Noted     Augmentin [amoxicillin-pot clavulanate] Nausea and Vomiting 10/18/2018     Meperidine hcl Nausea and Vomiting 04/17/2001     Seasonal allergies  04/05/2010     Current Medications:  Current Outpatient Medications   Medication Sig Dispense Refill     albuterol (PROAIR HFA) 108 (90 Base) MCG/ACT inhaler INHALE 1-2 PUFFS BY MOUTH EVERY 2-4 HOURS AS NEEDED 8.5 g 1     albuterol (PROVENTIL) (2.5 MG/3ML) 0.083% neb solution Take  by nebulization. 1 NEB EVERY 4-6 HOURS AS NEEDED 75 mL 0     anastrozole (ARIMIDEX) 1 MG tablet Take 1 tablet (1 mg) by mouth daily 30 tablet 11     atorvastatin (LIPITOR) 10 MG tablet TAKE ONE TABLET BY MOUTH ONCE DAILY 90 tablet 2     BIOTIN PO Take by mouth daily       Calcium Carb-Cholecalciferol (CALCIUM 500 + D) 500-400 MG-UNIT TABS Take 1 tablet by mouth 2 times daily 180 tablet 3      Cyanocobalamin (B-12) 1000 MCG TBCR Take 1,000 mcg by mouth daily 100 tablet 1     diltiazem ER COATED BEADS (CARDIZEM CD/CARTIA XT) 120 MG 24 hr capsule TAKE ONE CAPSULE BY MOUTH ONCE DAILY ( LABS DUE ) 90 capsule 1     gabapentin (NEURONTIN) 300 MG capsule Take 1 capsule (300 mg) by mouth At Bedtime 60 capsule 1     hydrochlorothiazide (HYDRODIURIL) 25 MG tablet TAKE ONE TABLET BY MOUTH EVERY DAY 90 tablet 2     lisinopril (ZESTRIL) 2.5 MG tablet TAKE ONE TABLET BY MOUTH ONCE DAILY 90 tablet 2     magnesium 250 MG tablet Take 1 tablet by mouth daily 30 tablet      mometasone-formoterol (DULERA) 200-5 MCG/ACT inhaler INHALE TWO PUFFS BY MOUTH TWICE A DAY 13 g 11     order for DME Equipment being ordered: Nebulizer 1 Device 0     ORDER FOR DME Equipment being ordered: Oxygen @ 2 liters with exertion       Probiotic Product (PROBIOTIC PO) Take by mouth daily       trospium (SANCTURA) 20 MG tablet Take 20 mg by mouth       venlafaxine (EFFEXOR-XR) 75 MG 24 hr capsule TAKE ONE CAPSULE BY MOUTH ONCE DAILY 30 capsule 0     warfarin ANTICOAGULANT (COUMADIN) 2.5 MG tablet Take 2.5 mg (1 tablet) daily, or as directed by the coumadin clinic. 90 tablet 0      Family History:  Family History   Problem Relation Age of Onset     Breast Cancer Mother      Obesity Mother      Genitourinary Problems Mother      Lipids Mother      Respiratory Sister      Lipids Sister      Breast Cancer Sister 40        s/p mastectomy     Social History:  Social History     Socioeconomic History     Marital status:      Spouse name: Nam     Number of children: 1     Years of education: 14     Highest education level: Not on file   Occupational History     Occupation: Ins Cordinater     Comment:  [employer]     Employer: [employer]   Tobacco Use     Smoking status: Former Smoker     Packs/day: 1.00     Years: 30.00     Pack years: 30.00     Last attempt to quit: 10/25/2005     Years since quittin.7     Smokeless tobacco:  Never Used   Substance and Sexual Activity     Alcohol use: No     Alcohol/week: 0.0 standard drinks     Drug use: No     Sexual activity: Not Currently     Partners: Male     Birth control/protection: Female Surgical     Comment: hysterectomy     Physical Exam:  Constitutional: alert and in no apparent distress  PSYCH: Alert and oriented times 3; coherent speech, normal   rate and volume, able to articulate logical thoughts, able   to abstract reason, no tangential thoughts, no hallucinations   or delusions  His affect is normal  RESP: No cough, no audible wheezing, able to talk in full sentences  Remainder of exam unable to be completed due to telephone visits  The rest the comprehensive physical examination is deferred due to public health emergency     Laboratory/Imaging Studies     Component      Latest Ref Rng & Units 2/26/2020   Sodium      133 - 144 mmol/L 139   Potassium      3.4 - 5.3 mmol/L 3.7   Chloride      94 - 109 mmol/L 102   Carbon Dioxide      20 - 32 mmol/L 31   Anion Gap      3 - 14 mmol/L 6   Glucose      70 - 99 mg/dL 99   Urea Nitrogen      7 - 30 mg/dL 28   Creatinine      0.52 - 1.04 mg/dL 1.32 (H)   GFR Estimate      >60 mL/min/1.73:m2 41 (L)   GFR Estimate If Black      >60 mL/min/1.73:m2 48 (L)   Calcium      8.5 - 10.1 mg/dL 10.1   Bilirubin Total      0.2 - 1.3 mg/dL 0.6   Albumin      3.4 - 5.0 g/dL 3.5   Protein Total      6.8 - 8.8 g/dL 7.5   Alkaline Phosphatase      40 - 150 U/L 58   ALT      0 - 50 U/L 16   AST      0 - 45 U/L 13   WBC      4.0 - 11.0 10e9/L 6.9   RBC Count      3.8 - 5.2 10e12/L 4.41   Hemoglobin      11.7 - 15.7 g/dL 12.3   Hematocrit      35.0 - 47.0 % 38.9   MCV      78 - 100 fl 88   MCH      26.5 - 33.0 pg 27.9   MCHC      31.5 - 36.5 g/dL 31.6   RDW      10.0 - 15.0 % 12.3   Platelet Count      150 - 450 10e9/L 341   Results for CALLIE IMELDA K (MRN [id number]) as of 7/10/2020 02:19   Ref. Range 6/21/2017 15:33 6/20/2018 11:21 4/29/2019 10:54 7/11/2019  13:26 1/13/2020 09:36 2/26/2020 08:12   Creatinine Latest Ref Range: 0.52 - 1.04 mg/dL 0.95 0.86 0.91 0.99 1.08 (H) 1.32 (H)     ASSESSMENT/PLAN:    69-year-old postmenopausal female with past medical history of atrial fibrillation, hypertension, with pT2N0, ER/NC positive and HER-2/tulio negative by FISH  Oncotype Score 15 with 4% risk of distant recurrence with hormonal therapy alone and <1% benefit from chemotherapy so did not get adjuvant chemo.   She is s/p R lumpectomy and axillary SLN dissection in 05/2019. Completed adjuvant radiation on 7/18/2019.  She started Arimidex in July 2019.      1.  Invasive lobular carcinoma-continue anastrozole, tolerating well.  Follow-up in 6 months, with labs.    2. Breast Cancer Screening -  B/l diagnostic 3D mammogram was obtained along with targeted L breast US in 05/2020 showed post-treatment and benign changes. Short interval f/up L diagnostic mammogram was recommended in 6 months.  This was ordered and she will proceed in November 2020 at [facility].    3. Bone Health -  Osteoporosis  Prolia q 6 months- received two doses so far, in July 2019 and on 12/30/2019.  Cont Ca and Vit D daily. She is scheduled for her next dose on July 13th, and we will continue every 6 months.  CMP planned for July 13.  Plan repeat DEXA scan in June 2021.   Recommend weightbearing exercise as mentioned above.    4. FHx of breast cancer- mother had breast cancer at 70+ years old. Sister had breast breast cancer when she was in her 40s. She had colon, stomach, pancreatic cancer as well.  Maternal cousin also has lobular breast cancer at 60. Maternal uncle also had colon cancer metastatic to the lungs. Brother had prostate cancer. She has one daughter and reports daughter has undergone genetic testing but she is not sure of the details.   We'll refer to genetic counseling.    5. Lung cancer screening- she has been undergoing annual low dose CT chest with Dr. Blue- reshma in 06/2019.  She is aware she  is overdue for an annual chest CT and she will be contacting Dr. Blue office to schedule    6.  Cardiac--moderate to severe CACs on chest CT; refer to cardiology for further evaluation.   Atrial fibrillation -On Coumadin for anticoagulation      At the end of our visit patient verbalized understanding and concurred with the plan.                        Again, thank you for allowing me to participate in the care of your patient.        Sincerely,        Prisca Cortez MD, MD

## 2020-07-10 NOTE — LETTER
July 17, 2020        Latanya Padron  [address] [address] [place] [id number]    This letter provides a written record that you were tested for COVID-19 on 7/10/20.       Your result was negative. This means that we didn t find the virus that causes COVID-19 in your sample. A test may show negative when you do actually have the virus. This can happen when the virus is in the early stages of infection, before you feel illness symptoms.    If you have symptoms   Stay home and away from others (self-isolate) until you meet ALL of the guidelines below:    You ve had no fever--and no medicine that reduces fever--for 3 full days (72 hours). And      Your other symptoms have gotten better. For example, your cough or breathing has improved. And     At least 10 days have passed since your symptoms started.    During this time:    Stay home. Don t go to work, school or anywhere else.     Stay in your own room, including for meals. Use your own bathroom if you can.    Stay away from others in your home. No hugging, kissing or shaking hands. No visitors.    Clean  high touch  surfaces often (doorknobs, counters, handles, etc.). Use a household cleaning spray or wipes. You can find a full list on the EPA website at www.epa.gov/pesticide-registration/list-n-disinfectants-use-against-sars-cov-2.    Cover your mouth and nose with a mask, tissue or washcloth to avoid spreading germs.    Wash your hands and face often with soap and water.    Going back to work  Check with your employer for any guidelines to follow for going back to work.    Employers: This document serves as formal notice that your employee tested negative for COVID-19, as of the testing date shown above.

## 2020-07-11 LAB
SARS-COV-2 RNA SPEC QL NAA+PROBE: NOT DETECTED
SPECIMEN SOURCE: NORMAL

## 2020-07-13 ENCOUNTER — ANTICOAGULATION THERAPY VISIT (OUTPATIENT)
Dept: ANTICOAGULATION | Facility: CLINIC | Age: 70
End: 2020-07-13

## 2020-07-13 ENCOUNTER — INFUSION THERAPY VISIT (OUTPATIENT)
Dept: INFUSION THERAPY | Facility: CLINIC | Age: 70
End: 2020-07-13
Attending: INTERNAL MEDICINE
Payer: COMMERCIAL

## 2020-07-13 VITALS
WEIGHT: 212.9 LBS | HEART RATE: 83 BPM | SYSTOLIC BLOOD PRESSURE: 144 MMHG | OXYGEN SATURATION: 94 % | TEMPERATURE: 97.7 F | DIASTOLIC BLOOD PRESSURE: 64 MMHG | RESPIRATION RATE: 16 BRPM | BODY MASS INDEX: 36.54 KG/M2

## 2020-07-13 DIAGNOSIS — I48.91 ATRIAL FIBRILLATION, UNSPECIFIED TYPE (H): ICD-10-CM

## 2020-07-13 DIAGNOSIS — I48.0 AF (PAROXYSMAL ATRIAL FIBRILLATION) (H): ICD-10-CM

## 2020-07-13 DIAGNOSIS — C50.812 MALIGNANT NEOPLASM OF OVERLAPPING SITES OF LEFT BREAST IN FEMALE, ESTROGEN RECEPTOR POSITIVE (H): ICD-10-CM

## 2020-07-13 DIAGNOSIS — Z79.01 LONG TERM CURRENT USE OF ANTICOAGULANT THERAPY: ICD-10-CM

## 2020-07-13 DIAGNOSIS — G25.81 RESTLESS LEG SYNDROME: ICD-10-CM

## 2020-07-13 DIAGNOSIS — Z17.0 MALIGNANT NEOPLASM OF OVERLAPPING SITES OF LEFT BREAST IN FEMALE, ESTROGEN RECEPTOR POSITIVE (H): ICD-10-CM

## 2020-07-13 DIAGNOSIS — N18.30 CKD (CHRONIC KIDNEY DISEASE) STAGE 3, GFR 30-59 ML/MIN (H): ICD-10-CM

## 2020-07-13 DIAGNOSIS — M81.0 AGE-RELATED OSTEOPOROSIS WITHOUT CURRENT PATHOLOGICAL FRACTURE: Primary | ICD-10-CM

## 2020-07-13 DIAGNOSIS — I10 HYPERTENSION, GOAL BELOW 140/90: ICD-10-CM

## 2020-07-13 LAB
ALBUMIN SERPL-MCNC: 3.8 G/DL (ref 3.4–5)
ALP SERPL-CCNC: 68 U/L (ref 40–150)
ALT SERPL W P-5'-P-CCNC: 21 U/L (ref 0–50)
ANION GAP SERPL CALCULATED.3IONS-SCNC: 4 MMOL/L (ref 3–14)
AST SERPL W P-5'-P-CCNC: 15 U/L (ref 0–45)
BILIRUB SERPL-MCNC: 0.6 MG/DL (ref 0.2–1.3)
BUN SERPL-MCNC: 20 MG/DL (ref 7–30)
CALCIUM SERPL-MCNC: 9.5 MG/DL (ref 8.5–10.1)
CHLORIDE SERPL-SCNC: 102 MMOL/L (ref 94–109)
CO2 SERPL-SCNC: 31 MMOL/L (ref 20–32)
CREAT SERPL-MCNC: 1.14 MG/DL (ref 0.52–1.04)
ERYTHROCYTE [DISTWIDTH] IN BLOOD BY AUTOMATED COUNT: 12.2 % (ref 10–15)
GFR SERPL CREATININE-BSD FRML MDRD: 49 ML/MIN/{1.73_M2}
GLUCOSE SERPL-MCNC: 101 MG/DL (ref 70–99)
HCT VFR BLD AUTO: 40.7 % (ref 35–47)
HGB BLD-MCNC: 13.1 G/DL (ref 11.7–15.7)
INR PPP: 2.29 (ref 0.86–1.14)
MCH RBC QN AUTO: 28.7 PG (ref 26.5–33)
MCHC RBC AUTO-ENTMCNC: 32.2 G/DL (ref 31.5–36.5)
MCV RBC AUTO: 89 FL (ref 78–100)
PLATELET # BLD AUTO: 291 10E9/L (ref 150–450)
POTASSIUM SERPL-SCNC: 4.2 MMOL/L (ref 3.4–5.3)
PROT SERPL-MCNC: 7.7 G/DL (ref 6.8–8.8)
RBC # BLD AUTO: 4.57 10E12/L (ref 3.8–5.2)
SODIUM SERPL-SCNC: 137 MMOL/L (ref 133–144)
TSH SERPL DL<=0.005 MIU/L-ACNC: 2.8 MU/L (ref 0.4–4)
WBC # BLD AUTO: 6.5 10E9/L (ref 4–11)

## 2020-07-13 PROCEDURE — 84443 ASSAY THYROID STIM HORMONE: CPT | Performed by: FAMILY MEDICINE

## 2020-07-13 PROCEDURE — 80053 COMPREHEN METABOLIC PANEL: CPT | Performed by: FAMILY MEDICINE

## 2020-07-13 PROCEDURE — 85027 COMPLETE CBC AUTOMATED: CPT | Performed by: FAMILY MEDICINE

## 2020-07-13 PROCEDURE — 85610 PROTHROMBIN TIME: CPT | Performed by: FAMILY MEDICINE

## 2020-07-13 PROCEDURE — 36415 COLL VENOUS BLD VENIPUNCTURE: CPT | Performed by: FAMILY MEDICINE

## 2020-07-13 PROCEDURE — 25000128 H RX IP 250 OP 636: Performed by: INTERNAL MEDICINE

## 2020-07-13 PROCEDURE — 96372 THER/PROPH/DIAG INJ SC/IM: CPT

## 2020-07-13 PROCEDURE — 99207 ZZC NO CHARGE NURSE ONLY: CPT | Performed by: FAMILY MEDICINE

## 2020-07-13 RX ADMIN — DENOSUMAB 60 MG: 60 INJECTION SUBCUTANEOUS at 10:13

## 2020-07-13 ASSESSMENT — PAIN SCALES - GENERAL: PAINLEVEL: NO PAIN (0)

## 2020-07-13 NOTE — TELEPHONE ENCOUNTER
ONCOLOGY INTAKE: Records Information      APPT INFORMATION:  Referring provider:  Dr. Prisca Cortez MD  Referring provider s clinic:  [facility]   Reason for visit/diagnosis:  Malignant neoplasm of overlapping sites of left breast in female, estrogen recep...  FHx: breast cancer   Has patient been notified of appointment date and time?: Yes    RECORDS INFORMATION:  Were the records received with the referral (via [employer])? No    Has patient been seen for any external appt for this diagnosis? No    If yes, where? NA      ADDITIONAL INFORMATION:  Patient can't do a video visit so she requested a telephone visit.

## 2020-07-13 NOTE — PROGRESS NOTES
Infusion Nursing Note:  Latanya Padron presents today for Prolia injection.    Patient seen by provider today: No   present during visit today: Not Applicable.    Note: Pt taking Calcium with Vit D, per pt    Intravenous Access:  No Intravenous access/labs at this visit.    Treatment Conditions:  Lab Results   Component Value Date    HGB 13.1 07/13/2020     Lab Results   Component Value Date    WBC 6.5 07/13/2020      Lab Results   Component Value Date    ANEU 3.0 05/24/2010     Lab Results   Component Value Date     07/13/2020      Lab Results   Component Value Date     07/13/2020                   Lab Results   Component Value Date    POTASSIUM 4.2 07/13/2020           No results found for: MAG         Lab Results   Component Value Date    CR 1.14 07/13/2020                   Lab Results   Component Value Date    IVANNA 9.5 07/13/2020                Lab Results   Component Value Date    BILITOTAL 0.6 07/13/2020           Lab Results   Component Value Date    ALBUMIN 3.8 07/13/2020                    Lab Results   Component Value Date    ALT 21 07/13/2020           Lab Results   Component Value Date    AST 15 07/13/2020       Not Applicable.      Post Infusion Assessment:  Patient tolerated injection without incident.  Patient observed for 10 minutes post injections per protocol.       Discharge Plan:   Discharge instructions reviewed with: Patient.  Patient discharged in stable condition accompanied by: self.  Departure Mode: Ambulatory.    Rajani Rubin RN

## 2020-07-13 NOTE — PROGRESS NOTES
ANTICOAGULATION FOLLOW-UP CLINIC VISIT    Patient Name:  Latanya Padron  Date:  2020  Contact Type:  Telephone    SUBJECTIVE:  Patient Findings     Comments:   The patient was assessed for diet, medication, and activity level changes, missed or extra doses, bruising or bleeding, with no problem findings.          Clinical Outcomes     Comments:   The patient was assessed for diet, medication, and activity level changes, missed or extra doses, bruising or bleeding, with no problem findings.             OBJECTIVE    Recent labs: (last 7 days)     20  0909   INR 2.29*       ASSESSMENT / PLAN  INR assessment THER    Recheck INR In: 6 WEEKS    INR Location Outside lab      Anticoagulation Summary  As of 2020    INR goal:   2.0-3.0   TTR:   73.6 % (1 y)   INR used for dosin.29 (2020)   Warfarin maintenance plan:   2.5 mg (5 mg x 0.5) every day   Full warfarin instructions:   2.5 mg every day   Weekly warfarin total:   17.5 mg   No change documented:   Zoey Pickard RN   Plan last modified:   Francine Andrew RN (2020)   Next INR check:   2020   Target end date:       Indications    AF (paroxysmal atrial fibrillation) (H) [I48.0]  Atrial fibrillation (H) [I48.91]             Anticoagulation Episode Summary     INR check location:       Preferred lab:       Send INR reminders to:   [facility] [place]    Comments:   5 mg tabs, likes card, PM dose      Anticoagulation Care Providers     Provider Role Specialty Phone number    Glen Blue MD [facility] [phone]            See the Encounter Report to view Anticoagulation Flowsheet and Dosing Calendar (Go to Encounters tab in chart review, and find the Anticoagulation Therapy Visit)    Dosage adjustment made based on physician directed care plan.    Zoye Pickard, LOVE

## 2020-07-17 ENCOUNTER — TELEPHONE (OUTPATIENT)
Dept: FAMILY MEDICINE | Facility: CLINIC | Age: 70
End: 2020-07-17

## 2020-07-17 DIAGNOSIS — Z12.2 SCREENING FOR MALIGNANT NEOPLASM OF RESPIRATORY ORGAN: ICD-10-CM

## 2020-07-17 DIAGNOSIS — Z87.891 HISTORY OF TOBACCO USE: ICD-10-CM

## 2020-07-17 DIAGNOSIS — Z12.2 ENCOUNTER FOR SCREENING FOR LUNG CANCER: Primary | ICD-10-CM

## 2020-07-17 NOTE — TELEPHONE ENCOUNTER
Reason for Call: Request for an order or referral:    Order or referral being requested: Order for X-ray    Date needed: as soon as possible    Has the patient been seen by the PCP for this problem? YES    Additional comments: Pt calling and states she received a letter from Dr Bleu that she need an x-ray for annual lung cancer check. Please put in orders.     Phone number Patient can be reached at:  Cell number on file:    Telephone Information:   Mobile [phone]       Best Time:      Can we leave a detailed message on this number?  YES    Call taken on 7/17/2020 at 2:55 PM by Eulalia Oates

## 2020-07-17 NOTE — TELEPHONE ENCOUNTER
There is smoking history which needs to be filled in and I know will make her eligible but someone else should take the time to obtain it.  A TC could help if needed.  Glen Blue MD

## 2020-07-20 NOTE — TELEPHONE ENCOUNTER
History updated. Please sign order and route back to team. Cely Lobato CMA ([facility])

## 2020-07-22 ENCOUNTER — PRE VISIT (OUTPATIENT)
Dept: ONCOLOGY | Facility: CLINIC | Age: 70
End: 2020-07-22

## 2020-07-22 ENCOUNTER — VIRTUAL VISIT (OUTPATIENT)
Dept: ONCOLOGY | Facility: CLINIC | Age: 70
End: 2020-07-22
Attending: INTERNAL MEDICINE
Payer: COMMERCIAL

## 2020-07-22 DIAGNOSIS — Z80.1 FAMILY HISTORY OF LUNG CANCER: ICD-10-CM

## 2020-07-22 DIAGNOSIS — Z17.0 MALIGNANT NEOPLASM OF OVERLAPPING SITES OF LEFT BREAST IN FEMALE, ESTROGEN RECEPTOR POSITIVE (H): Primary | ICD-10-CM

## 2020-07-22 DIAGNOSIS — Z80.0 FAMILY HISTORY OF MALIGNANT NEOPLASM OF GASTROINTESTINAL TRACT: ICD-10-CM

## 2020-07-22 DIAGNOSIS — Z80.49 FAMILY HISTORY OF UTERINE CANCER: ICD-10-CM

## 2020-07-22 DIAGNOSIS — Z80.7 FAMILY HISTORY OF LYMPHOMA: ICD-10-CM

## 2020-07-22 DIAGNOSIS — C50.812 MALIGNANT NEOPLASM OF OVERLAPPING SITES OF LEFT BREAST IN FEMALE, ESTROGEN RECEPTOR POSITIVE (H): Primary | ICD-10-CM

## 2020-07-22 DIAGNOSIS — Z80.3 FHX: BREAST CANCER: ICD-10-CM

## 2020-07-22 PROCEDURE — 96040 ZZH GENETIC COUNSELING, EACH 30 MINUTES: CPT | Mod: TEL,ZF | Performed by: GENETIC COUNSELOR, MS

## 2020-07-22 NOTE — LETTER
7/22/2020         RE: Latanya Padron  [address] [facility] [address]        Dear Colleague,    Thank you for referring your patient, Latanya Padron, to the [facility]. Please see a copy of my visit note below.    7/22/2020    Latanya Padron is a 70 year old female who is being evaluated via a billable telephone visit.        Phone call duration: 45 minutes    Referring Provider: Prisca Cortez MD    Presenting Information:   I met with Latanya Padron today for genetic counseling at the Cancer Risk Management Program (visit completed over phone due to COVID-19 pandemic) to discuss her personal and family history of cancer.  She is here today to review this history, cancer screening recommendations, and available genetic testing options.    Personal History:  Latanya is a 70 year old female. 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    She reports a hysterectomy in her 30's (including her ovaries and uterus) due to bleeding/cysts.  Her most recent colonoscopy from 2010 was negative, and she reports no history of colon polyps.      Family History: (Please see scanned pedigree for detailed family history information)    Ivette's daughter, Chelo, is 48.  She has a history of benign breast tumor removed in her 30's.  She has completed genetic testing (report not available for review at this time)    One sister was diagnosed with breast cancer in her mid 40's, and pancreatic/colon/stomach cancer (unknown primary) at age 68    One brother was diagnosed with prostate cancer at age 65    Her mother was diagnosed with breast cancer in her 70's and passed away at 88    One maternal uncle had a history of B-cell lymphoma.  His daughter was diagnosed with breast cancer at age 60    One maternal uncle was diagnosed with metastatic colon cancer at age 65    One materna aunt had a history of lymphoma.  Her  son was diagnosed with kidney cancer in his late 50's    One maternal uncle has a history of skin and throat cancer    Her maternal grandfather had a history of cancer (possibly brain cancer)    One paternal aunt had a history of post-menopausal breast cancer    Two paternal first cousins were diagnosed with breast cancer; one diagnosed in her late 40's, and one diagnosed in her 50's    One paternal first cousin was diagnosed with uterine cancer in her 40's-50's    She reports a family history of lung cancer, including two paternal relatives    Her paternal uncle had a history of mouth cancer and tobacco use    Her maternal ethnicity is [place]/. Her paternal ethnicity is [place].  There is no known Ashkenazi Zoroastrianism ancestry on either side of her family.     Discussion:    Latanya's personal history of breast cancer and family history of multiple cancers (including breast, gastrointestinal, and gynecologic) is suggestive of a possible hereditary cancer syndrome.    We reviewed the features of sporadic, familial, and hereditary cancers. Based on her personal and family history, Latanya meets current National Comprehensive Cancer Network (NCCN) criteria for genetic testing of high penetrance breast and/or ovarian cancer susceptibility genes (including BRCA1/2).      We discussed the natural history and genetics of Hereditary Breast and Ovarian Cancer syndrome caused by mutations in the BRCA1/2 genes. A detailed handout regarding hereditary breast cancer and the information we discussed can be found in the after visit summary. Topics included: inheritance pattern, cancer risks, cancer screening recommendations, and also risks, benefits and limitations of testing.    We discussed that there are additional genes that could cause increased risk for breast cancer. As many of these genes present with overlapping features in a family and accurate cancer risk cannot always be established based upon the  pedigree analysis alone, it would be reasonable for Latanya to consider panel genetic testing to analyze multiple genes at once.    We discussed available testing, including guidelines-based and expanded panel options.  Ivette expressed interest in learning as much information as possible from testing, as it pertains to her history of breast cancer and family history of multiple cancers.  Therefore, we discussed the 40-gene comprehensive hereditary cancer panel: APC, JEREMY, AXIN2, BAP1, BARD1, BMPR1A, BRCA1, BRCA2, BRIP1, CDH1, CDK4, CDKN2A, CHEK2, DICER1, EPCAM, GREM1, HOXB13, MITF, MLH1, MRE11A, MSH2, MSH3, MSH6, MUTYH, NBN, NF1, NTHL1, PALB2, PMS2, POLD1, POLE, POT1, PTEN, RAD50, RAD51C, RAD51D, SMAD4, SMARCA4, STK11, TP53.     Consent was obtained over phone (due to COVID-19) and genetic testing for the 40 gene Comprehensive Cancer Panel has been ordered through the [facility] Diagnostics Laboratory at [facility].  Ivette stated that she would prefer to schedule a lab appointment at [place] or [place] to have her blood drawn for this test.  This appointment will be coordinated through the [facility]    Medical Management: Many of the genes included in this test have published management guidelines.  Therefore, we reviewed that the information from genetic testing may determine:    additional cancer screening for which Latanya may qualify (i.e. mammogram and breast MRI, more frequent colonoscopies, more frequent dermatologic exams, etc.),    options for risk reducing surgeries Latanya could consider (i.e. bilateral mastectomy),      and targeted chemotherapies for certain cancers should they be indicated in the future (i.e. immunotherapy for individuals with Gabriel syndrome, PARP inhibitors, etc.).    Results from genetic testing may also guide testing recommendations for close relatives     These recommendations will be discussed in detail once genetic testing is completed.     Plan:  1) Today  Latanya elected to proceed with the 40 gene comprehensive cancer panel through the [facility] Diagnostics Laboratory.  2) This information should be available in 4-6 weeks, once insurance authorization is obtained.  3) Latanya will be contacted to schedule a return visit to discuss the results.    Christina Mcclelland MS, [facility]  Licensed, Certified Genetic Counselor  [facility]  Phone: [phone]

## 2020-07-22 NOTE — TELEPHONE ENCOUNTER
You can't use the dx that was picked.  I put in the correct dx.  Please sign and route back to me.

## 2020-07-22 NOTE — PROGRESS NOTES
"7/22/2020    Latanya Padron is a 70 year old female who is being evaluated via a billable telephone visit.      The patient has been notified of following:     \"This telephone visit will be conducted via a call between you and your physician/provider. We have found that certain health care needs can be provided without the need for a physical exam.  This service lets us provide the care you need with a short phone conversation.  If a prescription is necessary we can send it directly to your pharmacy.  If lab work is needed we can place an order for that and you can then stop by our lab to have the test done at a later time.    Telephone visits are billed at different rates depending on your insurance coverage. During this emergency period, for some insurers they may be billed the same as an in-person visit.  Please reach out to your insurance provider with any questions.    If during the course of the call the physician/provider feels a telephone visit is not appropriate, you will not be charged for this service.\"    Patient has given verbal consent for Telephone visit?  Yes    Phone call duration: 45 minutes    Referring Provider: Prisca Cortez MD    Presenting Information:   I met with Latanya Padron today for genetic counseling at the Cancer Risk Management Program (visit completed over phone due to COVID-19 pandemic) to discuss her personal and family history of cancer.  She is here today to review this history, cancer screening recommendations, and available genetic testing options.    Personal History:  Latanya is a 70 year old female. She was diagnosed with ER/CT positive HER2 negative invasive lobular breast cancer at age 69 treated with lumpectomy and radiation.    She reports a hysterectomy in her 30's (including her ovaries and uterus) due to bleeding/cysts.  Her most recent colonoscopy from 2010 was negative, and she reports no history of colon polyps.      Family History: (Please see scanned pedigree " for detailed family history information)    Ivette's daughter, Chelo, is 48.  She has a history of benign breast tumor removed in her 30's.  She has completed genetic testing (report not available for review at this time)    One sister was diagnosed with breast cancer in her mid 40's, and pancreatic/colon/stomach cancer (unknown primary) at age 68    One brother was diagnosed with prostate cancer at age 65    Her mother was diagnosed with breast cancer in her 70's and passed away at 88    One maternal uncle had a history of B-cell lymphoma.  His daughter was diagnosed with breast cancer at age 60    One maternal uncle was diagnosed with metastatic colon cancer at age 65    One materna aunt had a history of lymphoma.  Her son was diagnosed with kidney cancer in his late 50's    One maternal uncle has a history of skin and throat cancer    Her maternal grandfather had a history of cancer (possibly brain cancer)    One paternal aunt had a history of post-menopausal breast cancer    Two paternal first cousins were diagnosed with breast cancer; one diagnosed in her late 40's, and one diagnosed in her 50's    One paternal first cousin was diagnosed with uterine cancer in her 40's-50's    She reports a family history of lung cancer, including two paternal relatives    Her paternal uncle had a history of mouth cancer and tobacco use    Her maternal ethnicity is Citizen of [place]/. Her paternal ethnicity is [place].  There is no known Ashkenazi Yarsani ancestry on either side of her family.     Discussion:    Latanya's personal history of breast cancer and family history of multiple cancers (including breast, gastrointestinal, and gynecologic) is suggestive of a possible hereditary cancer syndrome.    We reviewed the features of sporadic, familial, and hereditary cancers. Based on her personal and family history, Latanya meets current National Comprehensive Cancer Network (NCCN) criteria for genetic testing of high  penetrance breast and/or ovarian cancer susceptibility genes (including BRCA1/2).      We discussed the natural history and genetics of Hereditary Breast and Ovarian Cancer syndrome caused by mutations in the BRCA1/2 genes. A detailed handout regarding hereditary breast cancer and the information we discussed can be found in the after visit summary. Topics included: inheritance pattern, cancer risks, cancer screening recommendations, and also risks, benefits and limitations of testing.    We discussed that there are additional genes that could cause increased risk for breast cancer. As many of these genes present with overlapping features in a family and accurate cancer risk cannot always be established based upon the pedigree analysis alone, it would be reasonable for Latanya to consider panel genetic testing to analyze multiple genes at once.    We discussed available testing, including guidelines-based and expanded panel options.  Ivette expressed interest in learning as much information as possible from testing, as it pertains to her history of breast cancer and family history of multiple cancers.  Therefore, we discussed the 40-gene comprehensive hereditary cancer panel: APC, JEREMY, AXIN2, BAP1, BARD1, BMPR1A, BRCA1, BRCA2, BRIP1, CDH1, CDK4, CDKN2A, CHEK2, DICER1, EPCAM, GREM1, HOXB13, MITF, MLH1, MRE11A, MSH2, MSH3, MSH6, MUTYH, NBN, NF1, NTHL1, PALB2, PMS2, POLD1, POLE, POT1, PTEN, RAD50, RAD51C, RAD51D, SMAD4, SMARCA4, STK11, TP53.     Consent was obtained over phone (due to COVID-19) and genetic testing for the 40 gene Comprehensive Cancer Panel has been ordered through the Molecular Diagnostics Laboratory at [facility].  Ivette stated that she would prefer to schedule a lab appointment at [place] or [place] to have her blood drawn for this test.  This appointment will be coordinated through the [facility]    Medical Management: Many of the genes included in this test have published  management guidelines.  Therefore, we reviewed that the information from genetic testing may determine:    additional cancer screening for which Latanya may qualify (i.e. mammogram and breast MRI, more frequent colonoscopies, more frequent dermatologic exams, etc.),    options for risk reducing surgeries Latanya could consider (i.e. bilateral mastectomy),      and targeted chemotherapies for certain cancers should they be indicated in the future (i.e. immunotherapy for individuals with Gabriel syndrome, PARP inhibitors, etc.).    Results from genetic testing may also guide testing recommendations for close relatives     These recommendations will be discussed in detail once genetic testing is completed.     Plan:  1) Today Latanya elected to proceed with the 40 gene comprehensive cancer panel through the Molecular Diagnostics Laboratory.  2) This information should be available in 4-6 weeks, once insurance authorization is obtained.  3) Latanya will be contacted to schedule a return visit to discuss the results.    Christina Mcclelland MS, [facility]  Licensed, Certified Genetic Counselor  [facility]  Phone: [phone]

## 2020-07-22 NOTE — LETTER
Cancer Risk Management  Program Locations    [facility]  [facility]  [facility]  [facility] [place] [facility]  Mailing Address  Cancer Risk Management Program  [facility]  [address]  [place] [address]    New patient appointments  [phone]  July 24, 2020    Latanya Krishnamurthyraw  [id number]  [facility] [address]      Dear Latanya,    It was a pleasure meeting with you  on 7/22/2020. Here is a copy of the progress note from your recent genetic counseling visit to the Cancer Risk Management Program. If you have any additional questions, please feel free to call.    Referring Provider: Prisca Cortez MD    Presenting Information:   I met with Latanya Nereida today for genetic counseling at the Cancer Risk Management Program (visit completed over phone due to COVID-19 pandemic) to discuss her personal and family history of cancer.  She is here today to review this history, cancer screening recommendations, and available genetic testing options.    Personal History:  Latanya is a 70 year old female. She was diagnosed with ER/IL positive HER2 negative invasive lobular breast cancer at age 69 treated with lumpectomy and radiation.    She reports a hysterectomy in her 30's (including her ovaries and uterus) due to bleeding/cysts.  Her most recent colonoscopy from 2010 was negative, and she reports no history of colon polyps.      Family History: (Please see scanned pedigree for detailed family history information)    Ivette's daughter, Chelo, is 48.  She has a history of benign breast tumor removed in her 30's.  She has completed genetic testing (report not available for review at this time)    One sister was diagnosed with breast cancer in her mid 40's, and pancreatic/colon/stomach cancer (unknown primary) at age 68    One brother was diagnosed with prostate  cancer at age 65    Her mother was diagnosed with breast cancer in her 70's and passed away at 88    One maternal uncle had a history of B-cell lymphoma.  His daughter was diagnosed with breast cancer at age 60    One maternal uncle was diagnosed with metastatic colon cancer at age 65    One materna aunt had a history of lymphoma.  Her son was diagnosed with kidney cancer in his late 50's    One maternal uncle has a history of skin and throat cancer    Her maternal grandfather had a history of cancer (possibly brain cancer)    One paternal aunt had a history of post-menopausal breast cancer    Two paternal first cousins were diagnosed with breast cancer; one diagnosed in her late 40's, and one diagnosed in her 50's    One paternal first cousin was diagnosed with uterine cancer in her 40's-50's    She reports a family history of lung cancer, including two paternal relatives    Her paternal uncle had a history of mouth cancer and tobacco use    Her maternal ethnicity is [place]/. Her paternal ethnicity is [place].  There is no known Ashkenazi Restorationism ancestry on either side of her family.     Discussion:    Latanya's personal history of breast cancer and family history of multiple cancers (including breast, gastrointestinal, and gynecologic) is suggestive of a possible hereditary cancer syndrome.    We reviewed the features of sporadic, familial, and hereditary cancers. Based on her personal and family history, Latanya meets current National Comprehensive Cancer Network (NCCN) criteria for genetic testing of high penetrance breast and/or ovarian cancer susceptibility genes (including BRCA1/2).      We discussed the natural history and genetics of Hereditary Breast and Ovarian Cancer syndrome caused by mutations in the BRCA1/2 genes. A detailed handout regarding hereditary breast cancer and the information we discussed can be found in the after visit summary. Topics included: inheritance pattern, cancer  risks, cancer screening recommendations, and also risks, benefits and limitations of testing.    We discussed that there are additional genes that could cause increased risk for breast cancer. As many of these genes present with overlapping features in a family and accurate cancer risk cannot always be established based upon the pedigree analysis alone, it would be reasonable for Latanya to consider panel genetic testing to analyze multiple genes at once.    We discussed available testing, including guidelines-based and expanded panel options.  Ivette expressed interest in learning as much information as possible from testing, as it pertains to her history of breast cancer and family history of multiple cancers.  Therefore, we discussed the 40-gene comprehensive hereditary cancer panel: APC, JEREMY, AXIN2, BAP1, BARD1, BMPR1A, BRCA1, BRCA2, BRIP1, CDH1, CDK4, CDKN2A, CHEK2, DICER1, EPCAM, GREM1, HOXB13, MITF, MLH1, MRE11A, MSH2, MSH3, MSH6, MUTYH, NBN, NF1, NTHL1, PALB2, PMS2, POLD1, POLE, POT1, PTEN, RAD50, RAD51C, RAD51D, SMAD4, SMARCA4, STK11, TP53.     Consent was obtained over phone (due to COVID-19) and genetic testing for the 40 gene Comprehensive Cancer Panel has been ordered through the Molecular Diagnostics Laboratory at [facility].  Ivette stated that she would prefer to schedule a lab appointment at [place] or [place] to have her blood drawn for this test.  This appointment will be coordinated through the [facility]    Medical Management: Many of the genes included in this test have published management guidelines.  Therefore, we reviewed that the information from genetic testing may determine:    additional cancer screening for which Latanya may qualify (i.e. mammogram and breast MRI, more frequent colonoscopies, more frequent dermatologic exams, etc.),    options for risk reducing surgeries Latanya could consider (i.e. bilateral mastectomy),      and targeted chemotherapies for  certain cancers should they be indicated in the future (i.e. immunotherapy for individuals with Gabriel syndrome, PARP inhibitors, etc.).    Results from genetic testing may also guide testing recommendations for close relatives     These recommendations will be discussed in detail once genetic testing is completed.     Plan:  1) Today Latanya elected to proceed with the 40 gene comprehensive cancer panel through the Molecular Diagnostics Laboratory.  2) This information should be available in 4-6 weeks, once insurance authorization is obtained.  3) Latanya will be contacted to schedule a return visit to discuss the results.    Christina Mcclelland MS, [facility]  Licensed, Certified Genetic Counselor  [facility]  Phone: [phone]

## 2020-07-23 ENCOUNTER — HOSPITAL ENCOUNTER (OUTPATIENT)
Dept: CT IMAGING | Facility: CLINIC | Age: 70
Discharge: HOME | End: 2020-07-23
Attending: FAMILY MEDICINE | Admitting: FAMILY MEDICINE
Payer: COMMERCIAL

## 2020-07-23 DIAGNOSIS — Z87.891 HISTORY OF TOBACCO USE: ICD-10-CM

## 2020-07-23 PROCEDURE — G0297 LDCT FOR LUNG CA SCREEN: HCPCS

## 2020-07-23 NOTE — PATIENT INSTRUCTIONS
Assessing Cancer Risk  Only about 5-10% of cancers are thought to be due to an inherited cancer susceptibility gene.    These families often have:    Several people with the same or related types of cancer    Cancers diagnosed at a young age (before age 50)    Individuals with more than one primary cancer    Multiple generations of the family affected with cancer    Some people may be candidates for genetic testing of more than one gene.  For these families, genetic testing using a cancer panel may be offered.  These panels will test different genes known to increase the risk for breast, ovarian, uterine, and/or other cancers. All of the genes discussed below have published clinical management guidelines for individuals who are found to carry a mutation. The purpose of this handout is to serve as a brief summary of the genes analyzed by the panels used to inquire about hereditary breast and gynecologic cancer:  JEREMY, BRCA1, BRCA2, BRIP1, CDH1, CHEK2, MLH1, MSH2, MSH6, PMS2, EPCAM, PTEN, PALB2, RAD51C, RAD51D, and TP53.  ______________________________________________________________________________  Hereditary Breast and Ovarian Cancer Syndrome   (BRCA1 and BRCA2)  A single mutation in one of the copies of BRCA1 or BRCA2 increases the risk for breast and ovarian cancer, among others.  The risk for pancreatic cancer and melanoma may also be slightly increased in some families.  The chart below shows the chance that someone with a BRCA mutation would develop cancer in his or her lifetime1,2,3,4.        A person s ethnic background is also important to consider, as individuals of Ashkenazi Gnosticist ancestry have a higher chance of having a BRCA gene mutation.  There are three BRCA mutations that occur more frequently in this population.    Gabriel Syndrome   (MLH1, MSH2, MSH6, PMS2, and EPCAM)  Currently five genes are known to cause Gabriel Syndrome: MLH1, MSH2, MSH6, PMS2, and EPCAM.  A single mutation in one of the  Gabriel Syndrome genes increases the risk for colon, endometrial, ovarian, and stomach cancers.  Other cancers that occur less commonly in Gabriel Syndrome include urinary tract, skin, and brain cancers.  The chart below shows the chance that a person with Gabriel syndrome would develop cancer in his or her lifetime5.      *Cancer risk varies depending on Gabriel syndrome gene found    Cowden Syndrome   (PTEN)  Cowden syndrome is a hereditary condition that increases the risk for breast, thyroid, endometrial, colon, and kidney cancer.  Cowden syndrome is caused by a mutation in the PTEN gene.  A single mutation in one of the copies of PTEN causes Cowden syndrome and increases cancer risk.  The chart below shows the chance that someone with a PTEN mutation would develop cancer in their lifetime6,7.  Other benign features seen in some individuals with Cowden syndrome include benign skin lesions (facial papules, keratoses, lipomas), learning disability, autism, thyroid nodules, colon polyps, and larger head size.      *One recent study found breast cancer risk to be increased to 85%    Li-Fraumeni Syndrome   (TP53)  Li-Fraumeni Syndrome (LFS) is a cancer predisposition syndrome caused by a mutation in the TP53 gene. A single mutation in one of the copies of TP53 increases the risk for multiple cancers. Individuals with LFS are at an increased risk for developing cancer at a young age. The lifetime risk for development of a LFS-associated cancer is 50% by age 30 and 90% by age 60.   Core Cancers: Sarcomas, Breast, Brain, Lung, Leukemias/Lymphomas, Adrenocortical carcinomas  Other Cancers: Gastrointestinal, Thyroid, Skin, Genitourinary    Hereditary Diffuse Gastric Cancer   (CDH1)  Currently, one gene is known to cause hereditary diffuse gastric cancer (HDGC): CDH1.  Individuals with HDGC are at increased risk for diffuse gastric cancer and lobular breast cancer. Of people diagnosed with HDGC, 30-50% have a mutation in the CDH1  gene.  This suggests there are likely other genes that may cause HDGC that have not been identified yet.      Lifetime Cancer Risks    General Population HDGC    Diffuse Gastric  <1% ~80%   Breast 12% 39-52%         Additional Genes  JEREMY  JEREMY is a moderate-risk breast cancer gene. Women who have a mutation in JEREMY can have between a 2-4 fold increased risk for breast cancer compared to the general population8. JEREMY mutations have also been associated with increased risk for pancreatic cancer, however an estimate of this cancer risk is not well understood9. Individuals who inherit two JEREMY mutations have a condition called ataxia-telangiectasia (AT).  This rare autosomal recessive condition affects the nervous system and immune system, and is associated with progressive cerebellar ataxia beginning in childhood.  Individuals with ataxia-telangiectasia often have a weakened immune system and have an increased risk for childhood cancers.    PALB2  Mutations in PALB2 have been shown to increase the risk of breast cancer up to 33-58% in some families; where individuals fall within this risk range is dependent upon family [id number]. PALB2 mutations have also been associated with increased risk for pancreatic cancer, although this risk has not been quantified yet.  Individuals who inherit two PALB2 mutations--one from their mother and one from their father--have a condition called Fanconi Anemia.  This rare autosomal recessive condition is associated with short stature, developmental delay, bone marrow failure, and increased risk for childhood cancers.    CHEK2   CHEK2 is a moderate-risk breast cancer gene.  Women who have a mutation in CHEK2 have around a 2-fold increased risk for breast cancer compared to the general population, and this risk may be higher depending upon family history.11,12,13 Mutations in CHEK2 have also been shown to increase the risk of a number of other cancers, including colon and prostate, however  these cancer risks are currently not well understood.    BRIP1, RAD51C and RAD51D  Mutations in BRIP1, RAD51C, and RAD51D have been shown to increase the risk of ovarian cancer and possibly female breast cancer as well14,15 .       Lifetime Cancer Risk    General Population BRIP1 RAD51C RAD51D   Ovarian 1-2% ~5-8% ~5-9% ~7-15%           Inheritance  All of the cancer syndromes reviewed above are inherited in an autosomal dominant pattern.  This means that if a parent has a mutation, each of his or her children will have a 50% chance of inheriting that same mutation.  Therefore, each child--male or female--would have a 50% chance of being at increased risk for developing cancer.      Image obtained from Genetics Home Reference, 2013     Mutations in some genes can occur de daniela, which means that a person s mutation occurred for the first time in them and was not inherited from a parent.  Now that they have the mutation, however, it can be passed on to future generations.    Genetic Testing  Genetic testing involves a blood test and will look at the genetic information in the JEREMY, BRCA1, BRCA2, BRIP1, CDH1, CHEK2, MLH1, MSH2, MSH6, PMS2, EPCAM, PTEN, PALB2, RAD51C, RAD51D, and TP53 genes for any harmful mutations that are associated with increased cancer risk.  If possible, it is recommended that the person(s) who has had cancer be tested before other family members.  That person will give us the most useful information about whether or not a specific gene is associated with the cancer in the family.    Results  There are three possible results of genetic testing:    Positive--a harmful mutation was identified in one or more of the genes    Negative--no mutation was identified in any of the genes on this panel    Variant of unknown significance--a variation in one of the genes was identified, but it is unclear how this impacts cancer risk in the family    Advantages and Disadvantages   There are advantages and  disadvantages to genetic testing.    Advantages    May clarify your cancer risk    Can help you make medical decisions    May explain the cancers in your family    May give useful information to your family members (if you share your results)    Disadvantages    Possible negative emotional impact of learning about inherited cancer risk    Uncertainty in interpreting a negative test result in some situations    Possible genetic discrimination concerns (see below)    Genetic Information Nondiscrimination Act (JAZMINE)  JAZMINE is a federal law that protects individuals from health insurance or employment discrimination based on a genetic test result alone.  Although rare, there are currently no legal discrimination protections in terms of life insurance, long term care, or disability insurances.  Visit the [employer] website to learn more.    Reducing Cancer Risk  All of the genes described above have nationally recognized cancer screening guidelines that would be recommended for individuals who test positive.  In addition to increased cancer screening, surgeries may be offered or recommended to reduce cancer risk.  Recommendations are based upon an individual s genetic test result as well as their personal and family history of cancer.    Questions to Think About Regarding Genetic Testing:    What effect will the test result have on me and my relationship with my family members if I have an inherited gene mutation?  If I don t have a gene mutation?    Should I share my test results, and how will my family react to this news, which may also affect them?    Are my children ready to learn new information that may one day affect their own health?    Hereditary Cancer Resources    FORCE: Facing Our Risk of Cancer Empowered facingourrisk.org   Bright Pink bebrightpink.org   Li-Fraumeni Syndrome Association lfsassociation.org   PTEN World PTENworld.com   No stomach for cancer, Inc.  nostomachforcancer.org   Stomach cancer relief network Scrnet.org   Collaborative Group of the Americas on Inherited Colorectal Cancer (CGA) cgaicc.com    Cancer Care cancercare.org   American Cancer Society (ACS) cancer.org   National Cancer Pixley (NCI) cancer.gov     Please call us if you have any questions or concerns.   Cancer Risk Management Program [phone]-CANCER ([phone])  ? Emigdio Bee, MS, [facility] [phone]  ? Leona Prince, MS, [facility]  [phone]  ? Christina Mcclelland, MS, [facility]  [phone]  ? Gloria Jefferson, MS, [facility] [phone]  ? Comfort Yancy, MS, [facility] [phone]  ? Sybil Sanchez, MS, [facility]  [phone]  ? Aleida Serrano, MS, [facility]  [phone]    References  1. Mandie ZAVALA, Denise PDP, Daniel S, Madhu EDWARDS, Jessica JE, Judith JL, Remberto N, George H, Keegan O, Colette A, Trayini B, Radiblessnig P, Mandelmikicarlos S, Jeremiah DM, Solis N, Yaron E, Josesito H, Gilbert E, Sima J, Gronaniyah J, Rachel B, Jevonus H, Thorlacius S, Eerola H, Nevcarloslinna H, Han K, Claudia OP. Average risks of breast and ovarian cancer associated with BRCA1 or BRCA2 mutations detected in case series unselected for family history: a combined analysis of 222 studies. Am J Hum Sarita. 2003;72:1117-30.  2. Homer N, Astrid M, Frazier G.  BRCA1 and BRCA2 Hereditary Breast and Ovarian Cancer. Gene Reviews online. 2013.  3. Clarke YC, Gautam S, Harriet G, Herrera S. Breast cancer risk among male BRCA1 and BRCA2 mutation carriers. J Natl Cancer Inst. 2007;99:1811-4.  4. Ezio SELBY, Jody I, Salinas J, Juan E, Shannan ER, Teresita F. Risk of breast cancer in male BRCA2 carriers. J Med Sarita. 2010;47:710-1.  5. National Comprehensive Cancer Network. Clinical practice guidelines in oncology, colorectal cancer screening. Available online (registration required). 2015.  6. Jeff KATE, Westley J, Jonathan J, Tere LA, Sandro KING, Marilee C. Lifetime cancer risks in individuals with germline PTEN mutations. Clin Cancer Res. 2012;18:400-7.  7. Pilarski R. Cowden  Syndrome: A Critical Review of the Clinical Literature. J Sarita . 2009:18:13-27.  8. Kaya A, Gera D, Janak S, Zamzam P, Silvia T, Juan M, Mendoza B, Josh H, Jj R, Anhtony K, Luke L, Ezio DG, Jeremiah D, Viet DF, Leesa MR, The Breast Cancer Susceptibility Collaboration (UK) & Clarita LEA. JEREMY mutations that cause ataxia-telangiectasia are breast cancer susceptibility alleles. Nature Genetics. 2006;38:873-875  9. Lacho N , Ede Y, Unique J, Padma L, Kizzy GM , Sona ML, Gallinger S, Barboza AG, Syngal S, Heike ML, Iris J , Janiya R, Emily SZ, Shruthi JR, Bandar VE, Elena M, Vohannah B, Fernando N, Ayleen RH, Brian KW, and Alie AP. JEREMY mutations in patients with hereditary pancreatic cancer. Cancer Discover. 2012;2:41-46  10. Mandie MORAN, et al. Breast-Cancer Risk in Families with Mutations in PALB2. NEJM. 2014; 371(6):497-506.  11. CHEK2 Breast Cancer Case-Control Consortium. CHEK2*1100delC and susceptibility to breast cancer: A collaborative analysis involving 10,860 breast cancer cases and 9,065 controls from 10 studies. Am J Hum Sarita, 74 (2004), pp. 7840-7502  12. Eleanor T, Glo S, Sandra K, et al. Spectrum of Mutations in BRCA1, BRCA2, CHEK2, and TP53 in Families at High Risk of Breast Cancer. JACQUI. 2006;295(12):6528-1791.   13. Rolly C, Caleb D, Leighton A, et al. Risk of breast cancer in women with a CHEK2 mutation with and without a family history of breast cancer. J Clin Oncol. 2011;29:0332-7939.  14. Idris H, Matt E, Therese SJ, et al. Contribution of germline mutations in the RAD51B, RAD51C, and RAD51D genes to ovarian cancer in the population. J Clin Oncol. 2015;33(26):5542-7160. Doi:10.1200/JCO.2015.61.2408.  15. Franny T, Brunilda ARREDONDO, Alla P, et al. Mutations in BRIP1 confer high risk of ovarian cancer. Pari Sarita. 2011;43(11):2082-4539. doi:10.1038/ng.955.

## 2020-07-24 DIAGNOSIS — Z80.3 FHX: BREAST CANCER: ICD-10-CM

## 2020-07-24 DIAGNOSIS — Z80.1 FAMILY HISTORY OF LUNG CANCER: ICD-10-CM

## 2020-07-24 DIAGNOSIS — C50.812 MALIGNANT NEOPLASM OF OVERLAPPING SITES OF LEFT BREAST IN FEMALE, ESTROGEN RECEPTOR POSITIVE (H): ICD-10-CM

## 2020-07-24 DIAGNOSIS — Z80.0 FAMILY HISTORY OF MALIGNANT NEOPLASM OF GASTROINTESTINAL TRACT: ICD-10-CM

## 2020-07-24 DIAGNOSIS — Z80.49 FAMILY HISTORY OF UTERINE CANCER: ICD-10-CM

## 2020-07-24 DIAGNOSIS — Z80.7 FAMILY HISTORY OF LYMPHOMA: ICD-10-CM

## 2020-07-24 DIAGNOSIS — Z17.0 MALIGNANT NEOPLASM OF OVERLAPPING SITES OF LEFT BREAST IN FEMALE, ESTROGEN RECEPTOR POSITIVE (H): ICD-10-CM

## 2020-07-24 PROCEDURE — 40000803 ZZHCL STATISTIC DNA ISOL HIGH PURITY: Performed by: COLON & RECTAL SURGERY

## 2020-07-27 ENCOUNTER — TELEPHONE (OUTPATIENT)
Dept: FAMILY MEDICINE | Facility: CLINIC | Age: 70
End: 2020-07-27

## 2020-07-27 NOTE — TELEPHONE ENCOUNTER
"[facility] calling to give RN the results of a  significant Imagining  incidental finding on one of Dr. Aragon's patients.   \" Her chest / lung CT picked up  Moderate to Severe calcium in her coronary artery. You need to report this finding to her doctor. \"     2. Significant Incidental Finding(s):  Category S: Yes.  a.  coronary artery calcium moderate or severe     RN will forward that with a high priority to Dr. Blue  Who is dong virtual phone visits today...............LOVE Barnhart         "

## 2020-07-27 NOTE — TELEPHONE ENCOUNTER
Sadly due to the way the report is laid out, a lot of info is compressed poorly into a small area, my opinion.  However thanks to check and balances it was noted. No change in treatment is needed. Patient should be notified we see calcium in her heart arteries, not unexpected with her history, Anychest pain needs evaluation with urgency depending on intensity, may need to go to ED.  She is on blood pressure meds and cholesterol meds and not diabetic. If has chest pain  symptoms of any type, phone or other visit should be arranged.  Glen Blue MD

## 2020-07-28 ENCOUNTER — TELEPHONE (OUTPATIENT)
Dept: FAMILY MEDICINE | Facility: CLINIC | Age: 70
End: 2020-07-28

## 2020-07-28 LAB — COPATH REPORT: NORMAL

## 2020-07-28 NOTE — TELEPHONE ENCOUNTER
This call was an erroneous encounter.  A  left a message for me to call Ivette Padron and Ivette said she did NOT call for me to call her back. ..............LOVE Barnhart

## 2020-07-28 NOTE — TELEPHONE ENCOUNTER
Pt has been  Notified of her recent finding of calcium build up found in her coronary artery- Moderate to severe. This is like Plaque build up. The plaque will narrow the arteries, which, if becomes completely blocked , leads to a heart attack.  Dr. Blue feels this is good to know for you so you can be aware that if you have any SX:   Seek emergency care Immediately If Any of the Following Occur:  * continuous chest pain accompanied with chest tightness, pressure, or  if it is accompanied by:    * Shortness of breath    * dizziness    * Cool, moist skin    * Nausea or vomiting    * Pain in the neck, shoulders, jaw, teeth, back , or arms.     * Blue of gray face, lips, earlobes, or fingernails,    * heart palpiataions.   * NO relief from repeated Nitroglycerin every 5 minutes for three doses.    The good news is that you are already on Cholesterol and blood pressure medication,  and you are NOT a diabetic, which would put you at a higher risk for cardiovascular events.     Keep your jose with the  [facility] cardiologist on 7/30/20 Your  Phone jose with Cain Bernardo is scheduled for 2:30 PM/  He will explain this further to you, and make recommendations.    Because the patient would like to discuss the consult with this provider, she has scheduled a phone visit with Dr. Blue for Monday, 8/10/20 at 10 AM.  LOVE Barnhart

## 2020-07-30 ENCOUNTER — VIRTUAL VISIT (OUTPATIENT)
Dept: CARDIOLOGY | Facility: CLINIC | Age: 70
End: 2020-07-30
Attending: INTERNAL MEDICINE
Payer: COMMERCIAL

## 2020-07-30 DIAGNOSIS — E78.5 HYPERLIPIDEMIA LDL GOAL <130: ICD-10-CM

## 2020-07-30 DIAGNOSIS — I25.10 CORONARY ARTERY CALCIFICATION: Primary | ICD-10-CM

## 2020-07-30 DIAGNOSIS — I48.0 AF (PAROXYSMAL ATRIAL FIBRILLATION) (H): ICD-10-CM

## 2020-07-30 PROCEDURE — 99202 OFFICE O/P NEW SF 15 MIN: CPT | Mod: 95 | Performed by: INTERNAL MEDICINE

## 2020-07-30 NOTE — PROGRESS NOTES
"Latanya Padron is a 70 year old female who is being evaluated via a billable video visit.      The patient has been notified of following:   \"This video visit will be conducted via a call between you and your physician/provider. We have found that certain health care needs can be provided without the need for an in-person physical exam.  This service lets us provide the care you need with a video conversation.  If a prescription is necessary we can send it directly to your pharmacy.  If lab work is needed we can place an order for that and you can then stop by our lab to have the test done at a later time. Video visits are billed at different rates depending on your insurance coverage.  Please reach out to your insurance provider with any questions. If during the course of the call the physician/provider feels a video visit is not appropriate, you will not be charged for this service.\"    Patient has given verbal consent for Video visit? Yes    Video-Visit Details  Type of service:  Video Visit  Video start time: 2:50 PM  Video end time: 3:13 PM  Total video time: 23 minutes  Originating Location (pt. Location): Home  Distant Location (provider location):  Provider's home  Mode of Communication: Video Conference via Doximity    Chief complaint: coronary artery calcifications on CT    HPI:   Latanya Padron is a 70 year old female with history of breast cancer and paroxysmal A.fib on warfarin referred for evaluation of coronary artery calcifications on CT.      Her cardiac history is notable for paroxysmal AF managed with warfarin and diltiazem.  She underwent surveillance CT 7/23/20 for lung cancer screening and was incidentally noted to have significant coronary artery calcifications, prompting referral.    Her CAD risk factor profile is as follows: +HTN +HL +former tobacco (quit 20y ago) -FHx premature CAD -DM.    She had breast cancer 1 year ago, treated with lumpectomy/XRT and is currently on anastrozole. She " did not require any chemotherapy.     She denies chest pain. She has baseline dyspnea related to her longstanding COPD, which has been stable. Her exertional threshold is walking up 1 flight of stairs and has been stable for at least the past 2 year.     She denies any chest pain, dyspnea at rest or with exertion, PND, orthopnea, peripheral edema, palpitations, lightheadedness or syncope.       PAST MEDICAL HISTORY:  Past Medical History:   Diagnosis Date     Breast cancer (H) 04/12/2019    Left Breast     COPD (chronic obstructive pulmonary disease) (H)      Mixed hyperlipidemia      PONV (postoperative nausea and vomiting)      S/P radiation therapy     5,256 cGy to left breast completed on 7/18/2019 - [facility]       CURRENT MEDICATIONS:  Current Outpatient Medications   Medication Sig Dispense Refill     albuterol (PROAIR HFA) 108 (90 Base) MCG/ACT inhaler INHALE 1-2 PUFFS BY MOUTH EVERY 2-4 HOURS AS NEEDED 8.5 g 1     albuterol (PROVENTIL) (2.5 MG/3ML) 0.083% neb solution Take  by nebulization. 1 NEB EVERY 4-6 HOURS AS NEEDED 75 mL 0     anastrozole (ARIMIDEX) 1 MG tablet Take 1 tablet (1 mg) by mouth daily 90 tablet 3     atorvastatin (LIPITOR) 10 MG tablet TAKE ONE TABLET BY MOUTH ONCE DAILY 90 tablet 2     BIOTIN PO Take by mouth daily       Calcium Carb-Cholecalciferol (CALCIUM 500 + D) 500-400 MG-UNIT TABS Take 1 tablet by mouth 2 times daily 180 tablet 3     Cyanocobalamin (B-12) 1000 MCG TBCR Take 1,000 mcg by mouth daily 100 tablet 1     diltiazem ER COATED BEADS (CARDIZEM CD/CARTIA XT) 120 MG 24 hr capsule TAKE ONE CAPSULE BY MOUTH ONCE DAILY ( LABS DUE ) 90 capsule 1     gabapentin (NEURONTIN) 300 MG capsule Take 1 capsule (300 mg) by mouth At Bedtime 60 capsule 1     hydrochlorothiazide (HYDRODIURIL) 25 MG tablet TAKE ONE TABLET BY MOUTH EVERY DAY 90 tablet 2     lisinopril (ZESTRIL) 2.5 MG tablet TAKE ONE TABLET BY MOUTH ONCE DAILY 90 tablet 2     magnesium 250 MG tablet Take 1 tablet by  mouth daily 30 tablet      mometasone-formoterol (DULERA) 200-5 MCG/ACT inhaler INHALE TWO PUFFS BY MOUTH TWICE A DAY 13 g 11     Probiotic Product (PROBIOTIC PO) Take by mouth daily       trospium (SANCTURA) 20 MG tablet Take 20 mg by mouth       venlafaxine (EFFEXOR-XR) 75 MG 24 hr capsule TAKE ONE CAPSULE BY MOUTH ONCE DAILY 30 capsule 0     warfarin ANTICOAGULANT (COUMADIN) 2.5 MG tablet Take 2.5 mg (1 tablet) daily, or as directed by the coumadin clinic. 90 tablet 0     order for DME Equipment being ordered: Nebulizer 1 Device 0     ORDER FOR DME Equipment being ordered: Oxygen @ 2 liters with exertion         PAST SURGICAL HISTORY:  Past Surgical History:   Procedure Laterality Date     BIOPSY NODE SENTINEL Left 5/15/2019    Procedure: left sentinel node biopsy;  Surgeon: Donald Aleman MD;  Location: [facility] OR     BREAST BIOPSY, CORE RT/LT Left 2019     C APPENDECTOMY,W OTHR PROC       C  DELIVERY ONLY           C LIGATE FALLOPIAN TUBE       C NONSPECIFIC PROCEDURE      Exploratory laparotomy with resection of infarcted segment of omentum and minor lysis of adhesions     C NONSPECIFIC PROCEDURE      Removal of hemorrhagic corpus luteum cyst     C VAGINAL HYSTERECTOMY       COLONOSCOPY  06/21/10     HC BIOPSY OF BREAST, OPEN INCISIONAL Right     Benign per patient      CORRECT BUNION,METATARSAL OSTEOTOMY        LAPAROSCOPY, SURGICAL; CHOLECYSTECTOMY  08/04/10      SLING OPERATION FOR STRESS INCONTINENCE  2007     HERNIORRHAPHY INCISIONAL (LOCATION)  2011    Procedure:HERNIORRHAPHY INCISIONAL (LOCATION); Surgeon:AYALA HOOVER; Location:[facility]     LAPAROSCOPIC HERNIORRHAPHY INCISIONAL  2011    Procedure:LAPAROSCOPIC HERNIORRHAPHY INCISIONAL; Laparoscopic mesh repair of incarcerated incisional hernia , extensive lysis of adhesions, excision of hernia sac and closure of fascia.; Surgeon:AYALA HOOVER; Location:[facility]      LAPAROSCOPIC LYSIS ADHESIONS  2011    Procedure:LAPAROSCOPIC LYSIS ADHESIONS; Surgeon:AYALA HOOVER; Location:[facility]     MASTECTOMY PARTIAL WITH NEEDLE LOCALIZATION Left 5/15/2019    Procedure: left wire localized partial mastectomy;  Surgeon: Donald Aleman MD;  Location: [facility]     TONSILLECTOMY         ALLERGIES:     Allergies   Allergen Reactions     Augmentin [Amoxicillin-Pot Clavulanate] Nausea and Vomiting     Meperidine Hcl Nausea and Vomiting     demerol     Seasonal Allergies      spring       FAMILY HISTORY:  Family History   Problem Relation Age of Onset     Breast Cancer Mother      Obesity Mother      Genitourinary Problems Mother      Lipids Mother      Respiratory Sister      Lipids Sister      Breast Cancer Sister 40        s/p mastectomy     Arthritis Sister      Obesity Sister      Heart Failure Sister      Cancer Sister         stomach, colon, pancreatic     Cancer Maternal Grandfather      Cancer Paternal Grandfather         lymph nodes     Heart Disease Father         by pass (5)     Cerebrovascular Disease Father         hemorragic     Lipids Father      Alzheimer Disease Maternal Grandmother      Genitourinary Problems Maternal Grandmother      Cancer Brother         prostate     Lipids Sister      Cancer Maternal Uncle         colon     Heart Disease Brother         Hx: stent placement     Lipids Brother         X 2   No family history of premature CAD or sudden death.    SOCIAL HISTORY:  Social History     Tobacco Use     Smoking status: Former Smoker     Packs/day: 1.00     Years: 30.00     Pack years: 30.00     Last attempt to quit: 10/25/2005     Years since quittin.7     Smokeless tobacco: Never Used   Substance Use Topics     Alcohol use: No     Alcohol/week: 0.0 standard drinks     Drug use: No       ROS:   A comprehensive 14 point review of systems is negative other than as mentioned in HPI.    Exam:  CONSITUTIONAL: no acute distress  HEENT: no icterus, sclerae  white  CV: no visible edema of visualized upper extremities, no gross JVD  CHEST: respirations nonlabored, no audible wheezing  NEURO: AA&Ox3, speech fluent/appropriate, motor grossly nonfocal  PSYCH: cooperative, affect appropriate  DERM: no rashes on visualized face/neck/upper extremities    The rest of a comprehensive physical examination is deferred due to PHE (public health emergency) video visit restrictions.      Labs:  Reviewed.   Lipids 2/26/20: Chol 143 HDL 47 LDL 65 .       Testing/Procedures:    I personally visualized and interpreted:  CT chest (non-gated): moderate coronary artery calcifications      Assessment and Plan:   1. Coronary artery calcification  I discussed with Ivette that coronary calcifications on CT are indicative of the presence of coronary artery disease and allow for identification of patients who management aggressive preventative therapy.  In her case however, all of the appropriate interventions have already been undertaken.  She is on statin therapy with LDL<70 on her lipids from 2/26/20, quit smoking 20 years ago, and has excellent control of her blood pressure.  I did  her regarding the symptoms and signs of angina, including chest pain chest pressure exertional dyspnea, and upper abdominal discomfort.  We did discuss that symptoms may present atypically without classic chest pain and that this is most common in female patients. Ivette has no symptoms to suggest active ischemia.  No changes in management or further work-up are indicated at this time.    2. Paroxysmal atrial fibrillation  Continue rate control with diltiazem and thromboembolic prophylaxis with warfarin, INR=2-3    Future cardiology follow-up can be on an as-needed basis.    The patient states understanding and is agreeable with plan.     Cain Bernardo MD  Cardiology    [facility]  [facility]

## 2020-07-30 NOTE — PATIENT INSTRUCTIONS
You were seen at the [facility] today.  You saw Dr. Cain Bernardo  Here are your Instructions:    1. Follow up with Dr. Bernardo as needed.

## 2020-07-31 ENCOUNTER — OFFICE VISIT (OUTPATIENT)
Dept: ORTHOPEDICS | Facility: CLINIC | Age: 70
End: 2020-07-31
Payer: COMMERCIAL

## 2020-07-31 VITALS
BODY MASS INDEX: 40.22 KG/M2 | HEIGHT: 61 IN | SYSTOLIC BLOOD PRESSURE: 138 MMHG | DIASTOLIC BLOOD PRESSURE: 83 MMHG | WEIGHT: 213 LBS

## 2020-07-31 DIAGNOSIS — M17.11 PRIMARY OSTEOARTHRITIS OF RIGHT KNEE: Primary | ICD-10-CM

## 2020-07-31 DIAGNOSIS — M22.2X1 PATELLOFEMORAL PAIN SYNDROME OF RIGHT KNEE: ICD-10-CM

## 2020-07-31 PROCEDURE — 20610 DRAIN/INJ JOINT/BURSA W/O US: CPT | Mod: RT | Performed by: PHYSICIAN ASSISTANT

## 2020-07-31 RX ORDER — TRIAMCINOLONE ACETONIDE 40 MG/ML
40 INJECTION, SUSPENSION INTRA-ARTICULAR; INTRAMUSCULAR ONCE
Status: COMPLETED | OUTPATIENT
Start: 2020-07-31 | End: 2020-07-31

## 2020-07-31 RX ADMIN — TRIAMCINOLONE ACETONIDE 40 MG: 40 INJECTION, SUSPENSION INTRA-ARTICULAR; INTRAMUSCULAR at 11:03

## 2020-07-31 ASSESSMENT — PAIN SCALES - GENERAL: PAINLEVEL: SEVERE PAIN (7)

## 2020-07-31 ASSESSMENT — MIFFLIN-ST. JEOR: SCORE: 1428.54

## 2020-07-31 NOTE — LETTER
7/31/2020         RE: Latanya Pardon  [address] [facility] [id number]        Dear Colleague,    Thank you for referring your patient, Latanya Padron, to the [facility]. Please see a copy of my visit note below.      Prior to injection, verified patient identity using patient's name and date of birth.  Due to injection administration, patient instructed to remain in clinic for 15 minutes  afterwards, and to report any adverse reaction to me immediately.    Joint injection was performed.      Drug Amount Wasted:  None.  Vial/Syringe: Single dose vial  : [employer]  EXPIRATION DATE:  3/2022  NDC: [address]-7    Lot   [id number]  Rt knee   Esteban Taylor PA-C       Office Visit-Follow up    Chief Complaint: Latanya Padron is a 70 year old female who is being seen for   Chief Complaint   Patient presents with     RECHECK     rt knee pt wants inj       History of Present Illness:   Mechanism of Injury: No new injury fall or trauma  Location: Right knee  Duration of Pain: Few weeks the pain is gotten worse  Rating of Pain: Mild to moderate  Pain Quality: Achy  Pain is better with: Cortisone injection into the right knee.  Voltaren gel helps slightly  Pain is worse with: Activity such as steps and ambulation  Treatment so far consists of: Patient received a cortisone injection on 2/27/2019 as well as 6/10/2019 and 8/5/2019.  Patient did receive a Synvisc 1 injection on 7/19/2019.  Patient feels the injections do benefit her.  She did do some physical therapy for about a month but states she said it did not help all that much just some.  Associated Features: Patient denies numbness or tingling.  Pain is Limiting: Activity and ambulation  Here to: Orthopedic consultation  Additional History: Patient also on a medication for cancer that she was supposed to talk to her oncologist if this could affect and cause some pain in the joints.  She did not asked that question formally  "but she does feel that the medication causes her some pain.    REVIEW OF SYSTEMS  General: negative for, night sweats, dizziness, fatigue  Resp: No shortness of breath and no cough  CV: negative for chest pain, syncope or near-syncope  GI: negative for nausea, vomiting and diarrhea  : negative for dysuria and hematuria  Musculoskeletal: as above  Neurologic: negative for syncope   Hematologic: negative for bleeding disorder    Physical Exam:  Vitals: /83   Ht 1.549 m (5' 1\")   Wt 96.6 kg (213 lb)   BMI 40.25 kg/m    BMI= Body mass index is 40.25 kg/m .  Constitutional: healthy, alert and no acute distress   Psychiatric: mentation appears normal and affect normal/bright  NEURO: no focal deficits, CMS intact right lower extremity   RESP: Normal with easy respirations and no use of accessory muscles to breathe, no audible wheezing or retractions  CV: Calf soft and nontender to palpation, leg warm   SKIN: No erythema, rashes, excoriation, or breakdown. No evidence of infection.   MUSCULOSKELETAL:    INSPECTION of right knee: No gross deformities, erythema, edema, ecchymosis, atrophy or fasciculations.     PALPATION: No tenderness medial, lateral, anterior and posterior portion of the knee. No specific joint line tenderness. No increased warmth.  No effusion.  No tenderness over the pes bursa either.    ROM: Extension full, flexion to 125 . All range of motion without catching, locking or pain.       STRENGTH: 5 out of 5 quad and hamstring.     SPECIAL TEST: Patient has a negative Lachman's negative drawer sign. Patient's knee is stable to varus and valgus stress at 30  of flexion. Patient has a negative Silvia's.   GAIT: non-antalgic  Lymph: no palpable lymph nodes      Diagnostic Modalities:  No radiographs necessary today.    I did review the x-rays that were done on 6/10/2019 which showed patellofemoral degenerative joint disease right and left side and mild to moderate.  No fracture no dislocation no " tumor.  These were 4 views of the bilateral knees.    Independent visualization of the images was performed.      Impression: 1.  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  2 patellofemoral syndrome.    Plan:  All of the above pertinent physical exam and imaging modalities findings was reviewed with Latanya.                                          INJECTION PROCEDURE:  The patient was counseled about an  injection, including discussion of risks (including infection), contents of the injection, rationale for performing the injection, and expected benefits of the injection. The skin was prepped with alcohol and betadine and then utilizing sterile technique an injection of the right knee joint from the anterolateral approach in the seated position was performed. I used tiffany chloride spray prior to doing the injection. The injection consisted 1ml of Kenalog (40mg per 1ml) with 8ml 1% lidocaine plain. The patient tolerated the injection well, and there were no complications. The injection site was covered with a Band-Aid. The injection was performed by Mark Taylor PA-C    FOCUSED PLAN:   Her last cortisone injection was 1 year ago on 8/5/2019.  She states she got approximately 10 months of relief.  Because of her kidney function she cannot do NSAIDs.  We will try a knee brace with hinges today.  I am okay doing another cortisone injection.  She could have another gel injection if this does not work well for her.  She does believe her cancer medication does cause some pain but she did not talk to her oncologist about this.  She is to continue focusing on straight leg raising.  Follow-up on an as-needed basis.    Re-x-ray on return: No    BP Readings from Last 1 Encounters:   07/31/20 138/83       BP noted to be well controlled today in office.      Patient does not use Tobacco products.    This note was dictated with [employer].    Esteban Taylor PA-C             Again, thank you for  allowing me to participate in the care of your patient.        Sincerely,        Esteban Taylor PA-C

## 2020-07-31 NOTE — PROGRESS NOTES
"Office Visit-Follow up    Chief Complaint: Latanya Padron is a 70 year old female who is being seen for   Chief Complaint   Patient presents with     RECHECK     rt knee pt wants inj       History of Present Illness:   Mechanism of Injury: No new injury fall or trauma  Location: Right knee  Duration of Pain: Few weeks the pain is gotten worse  Rating of Pain: Mild to moderate  Pain Quality: Achy  Pain is better with: Cortisone injection into the right knee.  Voltaren gel helps slightly  Pain is worse with: Activity such as steps and ambulation  Treatment so far consists of: Patient received a cortisone injection on 2/27/2019 as well as 6/10/2019 and 8/5/2019.  Patient did receive a Synvisc 1 injection on 7/19/2019.  Patient feels the injections do benefit her.  She did do some physical therapy for about a month but states she said it did not help all that much just some.  Associated Features: Patient denies numbness or tingling.  Pain is Limiting: Activity and ambulation  Here to: Orthopedic consultation  Additional History: Patient also on a medication for cancer that she was supposed to talk to her oncologist if this could affect and cause some pain in the joints.  She did not asked that question formally but she does feel that the medication causes her some pain.    REVIEW OF SYSTEMS  General: negative for, night sweats, dizziness, fatigue  Resp: No shortness of breath and no cough  CV: negative for chest pain, syncope or near-syncope  GI: negative for nausea, vomiting and diarrhea  : negative for dysuria and hematuria  Musculoskeletal: as above  Neurologic: negative for syncope   Hematologic: negative for bleeding disorder    Physical Exam:  Vitals: /83   Ht 1.549 m (5' 1\")   Wt 96.6 kg (213 lb)   BMI 40.25 kg/m    BMI= Body mass index is 40.25 kg/m .  Constitutional: healthy, alert and no acute distress   Psychiatric: mentation appears normal and affect normal/bright  NEURO: no focal deficits, CMS " intact right lower extremity   RESP: Normal with easy respirations and no use of accessory muscles to breathe, no audible wheezing or retractions  CV: Calf soft and nontender to palpation, leg warm   SKIN: No erythema, rashes, excoriation, or breakdown. No evidence of infection.   MUSCULOSKELETAL:    INSPECTION of right knee: No gross deformities, erythema, edema, ecchymosis, atrophy or fasciculations.     PALPATION: No tenderness medial, lateral, anterior and posterior portion of the knee. No specific joint line tenderness. No increased warmth.  No effusion.  No tenderness over the pes bursa either.    ROM: Extension full, flexion to 125 . All range of motion without catching, locking or pain.       STRENGTH: 5 out of 5 quad and hamstring.     SPECIAL TEST: Patient has a negative Lachman's negative drawer sign. Patient's knee is stable to varus and valgus stress at 30  of flexion. Patient has a negative Silvia's.   GAIT: non-antalgic  Lymph: no palpable lymph nodes      Diagnostic Modalities:  No radiographs necessary today.    I did review the x-rays that were done on 6/10/2019 which showed patellofemoral degenerative joint disease right and left side and mild to moderate.  No fracture no dislocation no tumor.  These were 4 views of the bilateral knees.    Independent visualization of the images was performed.      Impression: 1.  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  2 patellofemoral syndrome.    Plan:  All of the above pertinent physical exam and imaging modalities findings was reviewed with Latanya.                                          INJECTION PROCEDURE:  The patient was counseled about an  injection, including discussion of risks (including infection), contents of the injection, rationale for performing the injection, and expected benefits of the injection. The skin was prepped with alcohol and betadine and then utilizing sterile technique an injection of the right knee  joint from the anterolateral approach in the seated position was performed. I used tiffany chloride spray prior to doing the injection. The injection consisted 1ml of Kenalog (40mg per 1ml) with 8ml 1% lidocaine plain. The patient tolerated the injection well, and there were no complications. The injection site was covered with a Band-Aid. The injection was performed by Mark Taylor PA-C    FOCUSED PLAN:   Her last cortisone injection was 1 year ago on 8/5/2019.  She states she got approximately 10 months of relief.  Because of her kidney function she cannot do NSAIDs.  We will try a knee brace with hinges today.  I am okay doing another cortisone injection.  She could have another gel injection if this does not work well for her.  She does believe her cancer medication does cause some pain but she did not talk to her oncologist about this.  She is to continue focusing on straight leg raising.  Follow-up on an as-needed basis.    Re-x-ray on return: No    BP Readings from Last 1 Encounters:   07/31/20 138/83       BP noted to be well controlled today in office.      Patient does not use Tobacco products.    This note was dictated with [employer].    Esteban Taylor PA-C

## 2020-07-31 NOTE — PROGRESS NOTES
Prior to injection, verified patient identity using patient's name and date of birth.  Due to injection administration, patient instructed to remain in clinic for 15 minutes  afterwards, and to report any adverse reaction to me immediately.    Joint injection was performed.      Drug Amount Wasted:  None.  Vial/Syringe: Single dose vial  : [employer]  EXPIRATION DATE:  3/2022  NDC: [address]-0    Lot   [id number]  Rt knee   Esteban Taylor PA-C

## 2020-08-03 ENCOUNTER — TELEPHONE (OUTPATIENT)
Dept: CONSULT | Facility: CLINIC | Age: 70
End: 2020-08-03

## 2020-08-03 DIAGNOSIS — I48.91 ATRIAL FIBRILLATION, UNSPECIFIED TYPE (H): ICD-10-CM

## 2020-08-03 DIAGNOSIS — I48.0 AF (PAROXYSMAL ATRIAL FIBRILLATION) (H): ICD-10-CM

## 2020-08-03 DIAGNOSIS — F32.5 MAJOR DEPRESSION IN COMPLETE REMISSION (H): ICD-10-CM

## 2020-08-03 NOTE — TELEPHONE ENCOUNTER
Ivette called stating she'd like to cancel testing due to cost. We'll cancel testing for now. Ivette was encouraged to call back if anything changes.       MARCELO Schumacher   Genomics Billing   [facility]  [facility]  [address] [place] [id number]  [email or web address]   Office: [phone]  Fax: [phone]

## 2020-08-03 NOTE — TELEPHONE ENCOUNTER
Notified Ivette that we received partial prior authorization approval for her genetic testing. Valid from 7/24/2020 to 12/31/2020. Authorization number is [id number].     Explained that insurance benefits may still apply, therefore, there could be an out of pocket cost. Provided Ivette with estimated out of pocket cost for testing.    Ivette expressed understanding and stated that she will talk with her  and will call back with a decision. Ivette had no further questions.    Cortney Estrada, MARCELO  Genomics Billing    [facility]   [facility]  [address] [place] [id number]  [phone]

## 2020-08-04 ENCOUNTER — TELEPHONE (OUTPATIENT)
Dept: ONCOLOGY | Facility: CLINIC | Age: 70
End: 2020-08-04

## 2020-08-04 RX ORDER — WARFARIN SODIUM 2.5 MG/1
TABLET ORAL
Qty: 90 TABLET | Refills: 0 | Status: SHIPPED | OUTPATIENT
Start: 2020-08-04 | End: 2020-11-11

## 2020-08-04 RX ORDER — VENLAFAXINE HYDROCHLORIDE 75 MG/1
CAPSULE, EXTENDED RELEASE ORAL
Qty: 30 CAPSULE | Refills: 0 | Status: SHIPPED | OUTPATIENT
Start: 2020-08-04 | End: 2020-09-09

## 2020-08-04 NOTE — TELEPHONE ENCOUNTER
I followed up with Ivette today regarding the genetic test sent to the [facility] ([facility] Hold For Preauthorization).  She was seen as part of the Cancer Risk Management program 7/22/2020, in which Latanya elected to proceed with the 40 gene comprehensive cancer panel through the [facility] (testing placed on hold to determine insurance coverage/cost).  Due to the high expected out of pocket cost, Ivette was not comfortable proceeding with the genetic test.  We discussed possible reduced pricing options available through commercial labs.  Ivette plans to discuss this with her , and will follow up with me regarding her decision.  My contact information was provided.

## 2020-08-04 NOTE — TELEPHONE ENCOUNTER
Routing refill request to provider for review/approval because:  Labs out of range:  Creatinine        Basilia Barraza RN  [facility]

## 2020-08-04 NOTE — TELEPHONE ENCOUNTER
"Requested Prescriptions   Pending Prescriptions Disp Refills     venlafaxine (EFFEXOR-XR) 75 MG 24 hr capsule [Pharmacy Med Name: VENLAFAXINE HCL ER 75MG CP24] 30 capsule 0     Sig: TAKE ONE CAPSULE BY MOUTH ONCE DAILY       Serotonin-Norepinephrine Reuptake Inhibitors  Failed - 8/3/2020  9:40 AM        Failed - Normal serum creatinine on file in past 12 months     Recent Labs   Lab Test 07/13/20  0909   CR 1.14*       Ok to refill medication if creatinine is low          Passed - Blood pressure under 140/90 in past 12 months     BP Readings from Last 3 Encounters:   07/31/20 138/83   07/13/20 (!) 144/64   02/26/20 110/56                 Passed - PHQ-9 score of less than 5 in past 6 months     Please review last PHQ-9 score.           Passed - Medication is active on med list        Passed - Patient is age 18 or older        Passed - No active pregnancy on record        Passed - No positive pregnancy test in past 12 months        Passed - Recent (6 mo) or future (30 days) visit within the authorizing provider's specialty     Patient had office visit in the last 6 months or has a visit in the next 30 days with authorizing provider or within the authorizing provider's specialty.  See \"Patient Info\" tab in inbasket, or \"Choose Columns\" in Meds & Orders section of the refill encounter.               warfarin ANTICOAGULANT (JANTOVEN ANTICOAGULANT) 2.5 MG tablet [Pharmacy Med Name: JANTOVEN 2.5MG TABS] 90 tablet 0     Sig: TAKE ONE TABLET BY MOUTH ONCE DAILY OR AS DIRECTED BY THE COUMADIN CLINIC       Vitamin K Antagonists Failed - 8/3/2020  9:40 AM        Failed - INR is within goal in the past 6 weeks     Confirm INR is within goal in the past 6 weeks.     Recent Labs   Lab Test 07/13/20  0909   INR 2.29*                       Passed - Recent (12 mo) or future (30 days) visit within the authorizing provider's specialty     Patient has had an office visit with the authorizing provider or a provider within the authorizing " "providers department within the previous 12 mos or has a future within next 30 days. See \"Patient Info\" tab in inbasket, or \"Choose Columns\" in Meds & Orders section of the refill encounter.              Passed - Medication is active on med list        Passed - Patient is 18 years of age or older        Passed - Patient is not pregnant        Passed - No positive pregnancy on file in past 12 months             "

## 2020-08-10 ENCOUNTER — VIRTUAL VISIT (OUTPATIENT)
Dept: FAMILY MEDICINE | Facility: CLINIC | Age: 70
End: 2020-08-10
Payer: COMMERCIAL

## 2020-08-10 DIAGNOSIS — C50.812 MALIGNANT NEOPLASM OF OVERLAPPING SITES OF LEFT BREAST IN FEMALE, ESTROGEN RECEPTOR POSITIVE (H): ICD-10-CM

## 2020-08-10 DIAGNOSIS — I10 HYPERTENSION, GOAL BELOW 140/90: ICD-10-CM

## 2020-08-10 DIAGNOSIS — Z17.0 MALIGNANT NEOPLASM OF OVERLAPPING SITES OF LEFT BREAST IN FEMALE, ESTROGEN RECEPTOR POSITIVE (H): ICD-10-CM

## 2020-08-10 DIAGNOSIS — I48.0 AF (PAROXYSMAL ATRIAL FIBRILLATION) (H): ICD-10-CM

## 2020-08-10 DIAGNOSIS — J43.9 PULMONARY EMPHYSEMA, UNSPECIFIED EMPHYSEMA TYPE (H): ICD-10-CM

## 2020-08-10 DIAGNOSIS — N18.30 CKD (CHRONIC KIDNEY DISEASE) STAGE 3, GFR 30-59 ML/MIN (H): ICD-10-CM

## 2020-08-10 DIAGNOSIS — I25.10 CORONARY ARTERY DISEASE INVOLVING NATIVE CORONARY ARTERY OF NATIVE HEART WITHOUT ANGINA PECTORIS: Primary | ICD-10-CM

## 2020-08-10 PROCEDURE — 99214 OFFICE O/P EST MOD 30 MIN: CPT | Mod: 95 | Performed by: FAMILY MEDICINE

## 2020-08-10 NOTE — PATIENT INSTRUCTIONS
Continue with all current medications and if there is any concern about vague ill-defined chest pain or very specific chest pain with activity, go to the emergency room  Check periodically to see if they are scheduling in clinic appointments with me however we should have a phone conversation in October

## 2020-08-10 NOTE — PROGRESS NOTES
"Latanya Padron is a 70 year old female who is being evaluated via a billable telephone visit.      The patient has been notified of following:     \"This telephone visit will be conducted via a call between you and your physician/provider. We have found that certain health care needs can be provided without the need for a physical exam.  This service lets us provide the care you need with a short phone conversation.  If a prescription is necessary we can send it directly to your pharmacy.  If lab work is needed we can place an order for that and you can then stop by our lab to have the test done at a later time.    Telephone visits are billed at different rates depending on your insurance coverage. During this emergency period, for some insurers they may be billed the same as an in-person visit.  Please reach out to your insurance provider with any questions.    If during the course of the call the physician/provider feels a telephone visit is not appropriate, you will not be charged for this service.\"    Patient has given verbal consent for Telephone visit?  Yes    What phone number would you like to be contacted at? [phone]    How would you like to obtain your AVS? Lopez Stevenson     Latanya Padron is a 70 year old female who presents via phone visit today for the following health issues:    HPI    Chief Complaint   Patient presents with     RECHECK     discuss recent visit with oncologist and cardiologist       Patient on a chest CT screening for lung cancer, it was determined that she had coronary artery calcification.  She was asked to see cardiology because of this.  She does not have obvious chest pain with any activity.  She has been managed with her COPD emphysema to tolerate this hot humid weather of the summer.  She does have supplemental oxygen for COPD issues.  However hers is a heavy tank and she plans to ask oxygen provider if she can get something more efficient and lighter.  When she " did see cardiology they believe she is on all appropriate medications to control anything that would be necessary to prevent ongoing or developing problems with heart.  She understands if she has chest pain even if it is ill-defined, emergency room visit would be appropriate.    Patient also was asked by oncology to do genetic testing because of her strong family history.  At minimum it would cost patient $4000 and she is choosing not to do this.  She wants to let me know and asks if I have an opinion.  It is mainly for her benefit and not for any family member.  Patient is aware she has significant issues with heart and lungs.  Breast cancer is treated and doing well..    Patient Active Problem List   Diagnosis     Sprain and strain of unspecified site of knee and leg     Disorder of bone and cartilage     Female stress incontinence     Family history of malignant neoplasm of breast     Hirsutism     HYPERLIPIDEMIA LDL GOAL <130     Advanced directives, counseling/discussion     Tennis elbow     Hypertension, goal below 140/90     Atrial fibrillation (H)     Major depression in complete remission (H)     Morbid obesity, unspecified obesity type (H)     Pulmonary emphysema, unspecified emphysema type (H)     AF (paroxysmal atrial fibrillation) (H)     Primary osteoarthritis of both knees     CKD (chronic kidney disease) stage 3, GFR 30-59 ml/min (H)     Hip pain, right     Multiple joint pain     Neck mass     Trochanteric bursitis of right hip     Segmental dysfunction of sacral region     Segmental dysfunction of lumbar region     Segmental dysfunction of thoracic region     Bilateral low back pain with right-sided sciatica     Malignant neoplasm of overlapping sites of left breast in female, estrogen receptor positive (H)     Age-related osteoporosis without current pathological fracture     Right hip pain     Patellofemoral pain syndrome of right knee     Abnormal gait     Primary osteoarthritis of right knee      Lymphadenopathy of head and neck     Groin pain, right     Restless leg syndrome     Coronary artery disease involving native coronary artery of native heart without angina pectoris     Past Surgical History:   Procedure Laterality Date     BIOPSY NODE SENTINEL Left 5/15/2019    Procedure: left sentinel node biopsy;  Surgeon: Donald Aleman MD;  Location: [facility]     BREAST BIOPSY, CORE RT/LT Left 2019     C APPENDECTOMY,W OTHR PROC       C  DELIVERY ONLY           C LIGATE FALLOPIAN TUBE       C NONSPECIFIC PROCEDURE      Exploratory laparotomy with resection of infarcted segment of omentum and minor lysis of adhesions     C NONSPECIFIC PROCEDURE      Removal of hemorrhagic corpus luteum cyst     C VAGINAL HYSTERECTOMY       COLONOSCOPY  06/21/10     HC BIOPSY OF BREAST, OPEN INCISIONAL Right     Benign per patient     HC CORRECT BUNION,METATARSAL OSTEOTOMY        LAPAROSCOPY, SURGICAL; CHOLECYSTECTOMY  08/04/10      SLING OPERATION FOR STRESS INCONTINENCE  2007     HERNIORRHAPHY INCISIONAL (LOCATION)  2011    Procedure:HERNIORRHAPHY INCISIONAL (LOCATION); Surgeon:AYALA HOOVER; Location:[facility]     LAPAROSCOPIC HERNIORRHAPHY INCISIONAL  2011    Procedure:LAPAROSCOPIC HERNIORRHAPHY INCISIONAL; Laparoscopic mesh repair of incarcerated incisional hernia , extensive lysis of adhesions, excision of hernia sac and closure of fascia.; Surgeon:AYALA HOOVER; Location:[facility]     LAPAROSCOPIC LYSIS ADHESIONS  2011    Procedure:LAPAROSCOPIC LYSIS ADHESIONS; Surgeon:AYALA HOOVER; Location:[facility]     MASTECTOMY PARTIAL WITH NEEDLE LOCALIZATION Left 5/15/2019    Procedure: left wire localized partial mastectomy;  Surgeon: Donald Aleman MD;  Location: [facility]     TONSILLECTOMY         Social History     Tobacco Use     Smoking status: Former Smoker     Packs/day: 1.00     Years: 30.00     Pack years: 30.00     Last attempt to quit:  10/25/2005     Years since quittin.8     Smokeless tobacco: Never Used   Substance Use Topics     Alcohol use: No     Alcohol/week: 0.0 standard drinks     Family History   Problem Relation Age of Onset     Breast Cancer Mother      Obesity Mother      Genitourinary Problems Mother      Lipids Mother      Respiratory Sister      Lipids Sister      Breast Cancer Sister 40        s/p mastectomy     Arthritis Sister      Obesity Sister      Heart Failure Sister      Cancer Sister         stomach, colon, pancreatic     Cancer Maternal Grandfather      Cancer Paternal Grandfather         lymph nodes     Heart Disease Father         by pass (5)     Cerebrovascular Disease Father         hemorragic     Lipids Father      Alzheimer Disease Maternal Grandmother      Genitourinary Problems Maternal Grandmother      Cancer Brother         prostate     Lipids Sister      Cancer Maternal Uncle         colon     Heart Disease Brother         Hx: stent placement     Lipids Brother         X 2         Current Outpatient Medications   Medication Sig Dispense Refill     albuterol (PROAIR HFA) 108 (90 Base) MCG/ACT inhaler INHALE 1-2 PUFFS BY MOUTH EVERY 2-4 HOURS AS NEEDED 8.5 g 1     albuterol (PROVENTIL) (2.5 MG/3ML) 0.083% neb solution Take  by nebulization. 1 NEB EVERY 4-6 HOURS AS NEEDED 75 mL 0     anastrozole (ARIMIDEX) 1 MG tablet Take 1 tablet (1 mg) by mouth daily 90 tablet 3     atorvastatin (LIPITOR) 10 MG tablet TAKE ONE TABLET BY MOUTH ONCE DAILY 90 tablet 2     BIOTIN PO Take by mouth daily       Calcium Carb-Cholecalciferol (CALCIUM 500 + D) 500-400 MG-UNIT TABS Take 1 tablet by mouth 2 times daily 180 tablet 3     Cyanocobalamin (B-12) 1000 MCG TBCR Take 1,000 mcg by mouth daily 100 tablet 1     diltiazem ER COATED BEADS (CARDIZEM CD/CARTIA XT) 120 MG 24 hr capsule TAKE ONE CAPSULE BY MOUTH ONCE DAILY ( LABS DUE ) 90 capsule 1     gabapentin (NEURONTIN) 300 MG capsule Take 1 capsule (300 mg) by mouth At Bedtime  60 capsule 1     hydrochlorothiazide (HYDRODIURIL) 25 MG tablet TAKE ONE TABLET BY MOUTH EVERY DAY 90 tablet 2     lisinopril (ZESTRIL) 2.5 MG tablet TAKE ONE TABLET BY MOUTH ONCE DAILY 90 tablet 2     magnesium 250 MG tablet Take 1 tablet by mouth daily 30 tablet      mometasone-formoterol (DULERA) 200-5 MCG/ACT inhaler INHALE TWO PUFFS BY MOUTH TWICE A DAY 13 g 11     Probiotic Product (PROBIOTIC PO) Take by mouth daily       trospium (SANCTURA) 20 MG tablet Take 20 mg by mouth       venlafaxine (EFFEXOR-XR) 75 MG 24 hr capsule TAKE ONE CAPSULE BY MOUTH ONCE DAILY 30 capsule 0     warfarin ANTICOAGULANT (JANTOVEN ANTICOAGULANT) 2.5 MG tablet TAKE ONE TABLET BY MOUTH ONCE DAILY OR AS DIRECTED BY THE COUMADIN CLINIC 90 tablet 0     order for DME Equipment being ordered: Nebulizer 1 Device 0     ORDER FOR DME Equipment being ordered: Oxygen @ 2 liters with exertion       Allergies   Allergen Reactions     Augmentin [Amoxicillin-Pot Clavulanate] Nausea and Vomiting     Meperidine Hcl Nausea and Vomiting     demerol     Seasonal Allergies      spring     BP Readings from Last 3 Encounters:   07/31/20 138/83   07/13/20 (!) 144/64   02/26/20 110/56    Wt Readings from Last 3 Encounters:   07/31/20 96.6 kg (213 lb)   07/13/20 96.6 kg (212 lb 14.4 oz)   07/10/20 98.4 kg (217 lb)                    Reviewed and updated as needed this visit by Provider         Review of Systems   Constitutional, HEENT, cardiovascular, pulmonary, gi and gu systems are negative, except as otherwise noted.       Objective   Reported vitals:  There were no vitals taken for this visit.   healthy, alert and no distress  PSYCH: Alert and oriented times 3; coherent speech, normal   rate and volume, able to articulate logical thoughts, able   to abstract reason, no tangential thoughts, no hallucinations   or delusions  Her affect is normal  RESP: No cough, no audible wheezing, able to talk in full sentences  Remainder of exam unable to be completed  due to telephone visits    Diagnostic Test Results:  Labs reviewed in Epic        Assessment/Plan:    1. Coronary artery disease involving native coronary artery of native heart without angina pectoris  I agree with the plan to treat hypertension, atrial fibrillation, use statin etc. and come to the emergency room if there is any unusual chest pain.  Patient understands this instruction very well.    2. AF (paroxysmal atrial fibrillation) (H)  Well controlled at current time.  She is on warfarin which also helps prevent clotting within heart arteries.    3. CKD (chronic kidney disease) stage 3, GFR 30-59 ml/min (H)  Currently well controlled with medication.    4. Hypertension, goal below 140/90  Currently well controlled with medication and therefore reduces risk of #1 above    5. Pulmonary emphysema, unspecified emphysema type (H)  Very stable emphysema although severe.  Continue with O2.    6. Malignant neoplasm of overlapping sites of left breast in female, estrogen receptor positive (H)  After discussion, patient gets all screening tests appropriate for cancer and $4000 would be a financial burden for her.  I believe it is appropriate for her to forego this testing at this time.      Return in about 3 months (around 11/10/2020) for BP Recheck, Diabetes check, Cardiac disease, COPD.      Phone call duration: 15 minutes from 10:03 AM to 10:18 AM    Glen Blue MD

## 2020-08-14 ENCOUNTER — TELEPHONE (OUTPATIENT)
Dept: FAMILY MEDICINE | Facility: CLINIC | Age: 70
End: 2020-08-14

## 2020-08-14 DIAGNOSIS — J43.9 PULMONARY EMPHYSEMA, UNSPECIFIED EMPHYSEMA TYPE (H): Primary | ICD-10-CM

## 2020-08-14 NOTE — TELEPHONE ENCOUNTER
Reason for Call: Request for an order or referral:    Order or referral being requested: oxygen order    Date needed: as soon as possible    Has the patient been seen by the PCP for this problem? YES    Additional comments: patient calling is switching oxygen companies and needs a new order sent to [facility], Fax #[phone]    Phone number Patient can be reached at:  Home number on file [phone] (home)    Best Time:  any    Can we leave a detailed message on this number?  YES    Call taken on 8/14/2020 at 11:37 AM by Tania Grewal

## 2020-08-17 NOTE — TELEPHONE ENCOUNTER
With difficulty ordering oxygen for Medicare patients, I could not find the correct entry before I lost my internet service for almost 72 hours  She wants to switch companies AND does VERY MUCH need oxygen.  More information will be needed to order it as she was using it.  Glen Blue MD

## 2020-08-18 NOTE — TELEPHONE ENCOUNTER
I called [facility] and they are not accepting oxygen supply transfers at this time unless the patients insurance has changed and they no longer have coverage with their current supplier.I called Ivette and she will call [facility] and discuss insurance with them and I gave her our fax number to give to [facility] if the need anything from us.

## 2020-08-19 ENCOUNTER — ANTICOAGULATION THERAPY VISIT (OUTPATIENT)
Dept: ANTICOAGULATION | Facility: CLINIC | Age: 70
End: 2020-08-19

## 2020-08-19 DIAGNOSIS — I48.0 AF (PAROXYSMAL ATRIAL FIBRILLATION) (H): ICD-10-CM

## 2020-08-19 DIAGNOSIS — Z79.01 LONG TERM CURRENT USE OF ANTICOAGULANT THERAPY: ICD-10-CM

## 2020-08-19 DIAGNOSIS — I48.91 ATRIAL FIBRILLATION, UNSPECIFIED TYPE (H): ICD-10-CM

## 2020-08-19 LAB
CAPILLARY BLOOD COLLECTION: NORMAL
INR BLD: 2 (ref 0.86–1.14)

## 2020-08-19 PROCEDURE — 99207 ZZC NO CHARGE NURSE ONLY: CPT | Performed by: FAMILY MEDICINE

## 2020-08-19 PROCEDURE — 36416 COLLJ CAPILLARY BLOOD SPEC: CPT | Performed by: FAMILY MEDICINE

## 2020-08-19 PROCEDURE — 85610 PROTHROMBIN TIME: CPT | Mod: QW | Performed by: FAMILY MEDICINE

## 2020-08-19 NOTE — PROGRESS NOTES
ANTICOAGULATION FOLLOW-UP CLINIC VISIT    Patient Name:  Latanya Padron  Date:  2020  Contact Type:  Telephone    SUBJECTIVE:  Patient Findings     Comments:   The patient was assessed for diet, medication, and activity level changes, missed or extra doses, bruising or bleeding, with no problem findings.  Francine Andrew RN          Clinical Outcomes     Negatives:   Major bleeding event, Thromboembolic event, Anticoagulation-related hospital admission, Anticoagulation-related ED visit, Anticoagulation-related fatality    Comments:   The patient was assessed for diet, medication, and activity level changes, missed or extra doses, bruising or bleeding, with no problem findings.  Francine Andrew RN             OBJECTIVE    Recent labs: (last 7 days)     20  1230   INR 2.0*       ASSESSMENT / PLAN  INR assessment THER    Recheck INR In: 6 WEEKS    INR Location Outside lab      Anticoagulation Summary  As of 2020    INR goal:   2.0-3.0   TTR:   73.6 % (1 y)   INR used for dosin.0 (2020)   Warfarin maintenance plan:   2.5 mg (5 mg x 0.5) every day   Full warfarin instructions:   2.5 mg every day   Weekly warfarin total:   17.5 mg   No change documented:   Francine Andrew RN   Plan last modified:   Francine Andrew RN (2020)   Next INR check:   2020   Target end date:       Indications    AF (paroxysmal atrial fibrillation) (H) [I48.0]  Atrial fibrillation (H) [I48.91]             Anticoagulation Episode Summary     INR check location:       Preferred lab:       Send INR reminders to:   [facility] [place]    Comments:   5 mg tabs, likes card, PM dose      Anticoagulation Care Providers     Provider Role Specialty Phone number    Glen Blue MD [facility] [phone]            See the Encounter Report to view Anticoagulation Flowsheet and Dosing Calendar (Go to Encounters tab in chart review, and find the Anticoagulation Therapy Visit)    Dosage  adjustment made based on physician directed care plan.      Francine Andrew RN

## 2020-08-24 ENCOUNTER — TELEPHONE (OUTPATIENT)
Dept: ONCOLOGY | Facility: CLINIC | Age: 70
End: 2020-08-24

## 2020-08-24 NOTE — TELEPHONE ENCOUNTER
Reason for Call:  Other call back and prescription    Detailed comments: Please call, Ivette needs to the dosage of Calcium and Vitamin D she should be taking?      Phone Number Patient can be reached at: Cell number on file:    Telephone Information:   Mobile [phone]       Best Time: any    Can we leave a detailed message on this number? YES    Call taken on 8/24/2020 at 10:40 AM by Lurdes Stephenson

## 2020-08-24 NOTE — TELEPHONE ENCOUNTER
Reviewed recommendations of Calcium 1200 mg and Vit D of [id number] international unit(s) daily.    Jorge Ocampo RN, BSN, OCN  8/24/2020, 4:31 PM

## 2020-08-31 ENCOUNTER — VIRTUAL VISIT (OUTPATIENT)
Dept: FAMILY MEDICINE | Facility: CLINIC | Age: 70
End: 2020-08-31
Payer: COMMERCIAL

## 2020-08-31 DIAGNOSIS — J44.1 COPD EXACERBATION (H): Primary | ICD-10-CM

## 2020-08-31 PROCEDURE — 99213 OFFICE O/P EST LOW 20 MIN: CPT | Mod: 95 | Performed by: FAMILY MEDICINE

## 2020-08-31 RX ORDER — PREDNISONE 10 MG/1
20 TABLET ORAL DAILY
Qty: 10 TABLET | Refills: 0 | Status: SHIPPED | OUTPATIENT
Start: 2020-08-31 | End: 2020-11-04

## 2020-08-31 RX ORDER — AZITHROMYCIN 250 MG/1
TABLET, FILM COATED ORAL
Qty: 6 TABLET | Refills: 0 | Status: SHIPPED | OUTPATIENT
Start: 2020-08-31 | End: 2020-09-05

## 2020-08-31 NOTE — PROGRESS NOTES
"Latanya Padron is a 70 year old female who is being evaluated via a billable telephone visit.      The patient has been notified of following:     \"This telephone visit will be conducted via a call between you and your physician/provider. We have found that certain health care needs can be provided without the need for a physical exam.  This service lets us provide the care you need with a short phone conversation.  If a prescription is necessary we can send it directly to your pharmacy.  If lab work is needed we can place an order for that and you can then stop by our lab to have the test done at a later time.    Telephone visits are billed at different rates depending on your insurance coverage. During this emergency period, for some insurers they may be billed the same as an in-person visit.  Please reach out to your insurance provider with any questions.    If during the course of the call the physician/provider feels a telephone visit is not appropriate, you will not be charged for this service.\"    Patient has given verbal consent for Telephone visit?  Yes    What phone number would you like to be contacted at? [phone]    How would you like to obtain your AVS? MyChart    Subjective     Latanya Padron is a 70 year old female who presents via phone visit today for the following health issues:      Patient states that she gets bronchitis a lot during this time.      due: PHQ9 patient wanted to complete on mychart.     HPI    Acute Illness  Acute illness concerns: Dry Cough  Onset/Duration: 2 days   Symptoms:  Fever: no  Chills/Sweats: YES, when wearing a mask   Headache (location?): no  Sinus Pressure: YES  Conjunctivitis:  no  Ear Pain: no  Rhinorrhea: YES  Congestion: YES, she said she sounds wheezy   Sore Throat: no  Cough: YES-productive of green sputum, also dry cough   Wheeze: YES  Decreased Appetite: no  Nausea: YES  Vomiting: no  Diarrhea: YES, 2 days  Dysuria/Freq.: no  Dysuria or Hematuria: " no  Fatigue/Achiness: no  Sick/Strep Exposure: no  Therapies tried and outcome: Drinking more fluids, imodium, cough drops.       Patient has symptoms as above.  Often with her COPD if this is allowed to progress, she gets too much more seriously ill and require perhaps even hospitalization.  A short course of prednisone and antibiotic usually help this is why she called.  The diarrhea was short-lived and developed after a meal out.  This was a few days ago and things are already improving on that.  She is mostly socially isolated does not have fever and chills, does not have serious headache, and in general few symptoms of COVID.    Review of Systems   Constitutional, HEENT, cardiovascular, pulmonary, gi and gu systems are negative, except as otherwise noted.       Objective          Vitals:  No vitals were obtained today due to virtual visit.    healthy, alert and no distress  PSYCH: Alert and oriented times 3; coherent speech, normal   rate and volume, able to articulate logical thoughts, able   to abstract reason, no tangential thoughts, no hallucinations   or delusions  Her affect is normal  RESP: No cough, no audible wheezing, able to talk in full sentences  Remainder of exam unable to be completed due to telephone visits    No lab x-ray reviewed        Assessment/Plan:    Assessment & Plan     Latanya was seen today for cough.    Diagnoses and all orders for this visit:    COPD exacerbation (H)  -     predniSONE (DELTASONE) 10 MG tablet; Take 2 tablets (20 mg) by mouth daily  -     azithromycin (ZITHROMAX) 250 MG tablet; Take 2 tablets (500 mg) by mouth daily for 1 day, THEN 1 tablet (250 mg) daily for 4 days.      With COPD exacerbation, antibiotic and prednisone ordered above usually help.  She understands if she starts having more serious problems or is not getting better that she should contact us again.    BMI:   Estimated body mass index is 40.25 kg/m  as calculated from the following:    Height as of  "7/31/20: 1.549 m (5' 1\").    Weight as of 7/31/20: 96.6 kg (213 lb).   Weight management plan: Not discussed        MEDICATIONS:  Continue current medications without change  With addition of above medications ordered today.    Return if symptoms worsen or fail to improve, for COPD.    Glentona Blue MD  [facility]    Phone call duration: 11 minutes, 6 PM to 12:17 PM                "

## 2020-09-08 ENCOUNTER — ANTICOAGULATION THERAPY VISIT (OUTPATIENT)
Dept: ANTICOAGULATION | Facility: CLINIC | Age: 70
End: 2020-09-08

## 2020-09-08 DIAGNOSIS — I48.0 AF (PAROXYSMAL ATRIAL FIBRILLATION) (H): ICD-10-CM

## 2020-09-08 DIAGNOSIS — F32.5 MAJOR DEPRESSION IN COMPLETE REMISSION (H): ICD-10-CM

## 2020-09-08 DIAGNOSIS — I48.91 ATRIAL FIBRILLATION, UNSPECIFIED TYPE (H): ICD-10-CM

## 2020-09-08 DIAGNOSIS — Z79.01 LONG TERM CURRENT USE OF ANTICOAGULANT THERAPY: ICD-10-CM

## 2020-09-08 LAB
CAPILLARY BLOOD COLLECTION: NORMAL
INR BLD: 3 (ref 0.86–1.14)

## 2020-09-08 PROCEDURE — 99207 ZZC NO CHARGE NURSE ONLY: CPT | Performed by: FAMILY MEDICINE

## 2020-09-08 PROCEDURE — 85610 PROTHROMBIN TIME: CPT | Mod: QW | Performed by: FAMILY MEDICINE

## 2020-09-08 PROCEDURE — 36416 COLLJ CAPILLARY BLOOD SPEC: CPT | Performed by: FAMILY MEDICINE

## 2020-09-08 NOTE — PROGRESS NOTES
ANTICOAGULATION FOLLOW-UP CLINIC VISIT    Patient Name:  Latanya Padron  Date:  9/8/2020  Contact Type:  Telephone    SUBJECTIVE:  Patient Findings     Comments:   The patient was assessed for diet, medication, and activity level changes, missed or extra doses, bruising or bleeding, with no problem findings.  Francine Andrew RN          Clinical Outcomes     Comments:   The patient was assessed for diet, medication, and activity level changes, missed or extra doses, bruising or bleeding, with no problem findings.  Francine Andrew RN             OBJECTIVE    Recent labs: (last 7 days)     09/08/20  1053   INR 3.0*       ASSESSMENT / PLAN  INR assessment THER    Recheck INR In: 6 WEEKS    INR Location Outside lab      Anticoagulation Summary  As of 9/8/2020    INR goal:   2.0-3.0   TTR:   73.6 % (1 y)   INR used for dosing:   3.0 (9/8/2020)   Warfarin maintenance plan:   2.5 mg (5 mg x 0.5) every day   Full warfarin instructions:   2.5 mg every day   Weekly warfarin total:   17.5 mg   No change documented:   Francine Andrew RN   Plan last modified:   Francine Andrew RN (1/21/2020)   Next INR check:   10/20/2020   Target end date:       Indications    AF (paroxysmal atrial fibrillation) (H) [I48.0]  Atrial fibrillation (H) [I48.91]             Anticoagulation Episode Summary     INR check location:       Preferred lab:       Send INR reminders to:   [facility]    Comments:   5 mg tabs, likes card, PM dose      Anticoagulation Care Providers     Provider Role Specialty Phone number    Glen Blue MD [facility] [phone]            See the Encounter Report to view Anticoagulation Flowsheet and Dosing Calendar (Go to Encounters tab in chart review, and find the Anticoagulation Therapy Visit)    Dosage adjustment made based on physician directed care plan.      Francine Andrew RN

## 2020-09-09 NOTE — TELEPHONE ENCOUNTER
"Routing refill request to provider for review/approval because:  Labs out of range:  CR  T'd up 3 month for provider review.    Requested Prescriptions   Pending Prescriptions Disp Refills     venlafaxine (EFFEXOR-XR) 75 MG 24 hr capsule [Pharmacy Med Name: VENLAFAXINE HCL ER 75MG CP24] 30 capsule 0     Sig: TAKE ONE CAPSULE BY MOUTH ONCE DAILY   Last Written Prescription Date:  8/4/2020  Last Fill Quantity: 30,  # refills: 0   Last office visit: 8/31/2020 with prescribing provider:     Future Office Visit:        Serotonin-Norepinephrine Reuptake Inhibitors  Failed - 9/8/2020  9:41 AM        Failed - Normal serum creatinine on file in past 12 months     Recent Labs   Lab Test 07/13/20  0909   CR 1.14*     Ok to refill medication if creatinine is low          Passed - Blood pressure under 140/90 in past 12 months     BP Readings from Last 3 Encounters:   07/31/20 138/83   07/13/20 (!) 144/64   02/26/20 110/56           Passed - PHQ-9 score of less than 5 in past 6 months     Please review last PHQ-9 score.   PHQ-9 score:    PHQ 9/1/2020   PHQ-9 Total Score 2   Q9: Thoughts of better off dead/self-harm past 2 weeks Not at all             Passed - Medication is active on med list        Passed - Patient is age 18 or older        Passed - No active pregnancy on record        Passed - No positive pregnancy test in past 12 months        Passed - Recent (6 mo) or future (30 days) visit within the authorizing provider's specialty     Patient had office visit in the last 6 months or has a visit in the next 30 days with authorizing provider or within the authorizing provider's specialty.  See \"Patient Info\" tab in inbasket, or \"Choose Columns\" in Meds & Orders section of the refill encounter.             Flory Hidalgo RN    "

## 2020-09-10 RX ORDER — VENLAFAXINE HYDROCHLORIDE 75 MG/1
CAPSULE, EXTENDED RELEASE ORAL
Qty: 30 CAPSULE | Refills: 2 | Status: SHIPPED | OUTPATIENT
Start: 2020-09-10 | End: 2020-12-08

## 2020-09-23 ENCOUNTER — ALLIED HEALTH/NURSE VISIT (OUTPATIENT)
Dept: FAMILY MEDICINE | Facility: CLINIC | Age: 70
End: 2020-09-23
Payer: COMMERCIAL

## 2020-09-23 DIAGNOSIS — Z23 NEED FOR PROPHYLACTIC VACCINATION AND INOCULATION AGAINST INFLUENZA: Primary | ICD-10-CM

## 2020-09-23 PROCEDURE — G0008 ADMIN INFLUENZA VIRUS VAC: HCPCS

## 2020-09-23 PROCEDURE — 99207 ZZC NO CHARGE NURSE ONLY: CPT

## 2020-09-23 PROCEDURE — 90662 IIV NO PRSV INCREASED AG IM: CPT

## 2020-10-05 DIAGNOSIS — I10 HYPERTENSION, GOAL BELOW 140/90: ICD-10-CM

## 2020-10-05 DIAGNOSIS — I48.20 CHRONIC ATRIAL FIBRILLATION (H): ICD-10-CM

## 2020-10-07 RX ORDER — DILTIAZEM HYDROCHLORIDE 120 MG/1
CAPSULE, COATED, EXTENDED RELEASE ORAL
Qty: 90 CAPSULE | Refills: 0 | Status: SHIPPED | OUTPATIENT
Start: 2020-10-07 | End: 2021-02-10

## 2020-10-07 NOTE — TELEPHONE ENCOUNTER
"Routing refill request to provider for review/approval because:  Labs out of range:  CR  T'd up 3 month for provider review.    Requested Prescriptions   Pending Prescriptions Disp Refills     diltiazem ER COATED BEADS (CARDIZEM CD/CARTIA XT) 120 MG 24 hr capsule [Pharmacy Med Name: DILTIAZEM HCL ER COATED  CP24] 90 capsule 1     Sig: TAKE ONE CAPSULE BY MOUTH ONCE DAILY ( LABS DUE )   Last Written Prescription Date:  7/7/2020  Last Fill Quantity: 90,  # refills: 1   Last office visit: 8/31/2020 with prescribing provider:     Future Office Visit:   Next 5 appointments (look out 90 days)    Dec 15, 2020 10:45 AM  Return Visit with Julita Gaxiola MD  [facility] [place] ([facility]) [address] [place] [id number] [phone]           Calcium Channel Blockers Protocol  Failed - 10/5/2020  9:30 AM        Failed - Normal serum creatinine on file in past 12 months     Recent Labs   Lab Test 07/13/20  0909   CR 1.14*     Ok to refill medication if creatinine is low          Passed - Blood pressure under 140/90 in past 12 months     BP Readings from Last 3 Encounters:   07/31/20 138/83   07/13/20 (!) 144/64   02/26/20 110/56           Passed - Normal ALT in past 12 months     Recent Labs   Lab Test 07/13/20  0909   ALT 21           Passed - Recent (12 mo) or future (30 days) visit within the authorizing provider's specialty     Patient has had an office visit with the authorizing provider or a provider within the authorizing providers department within the previous 12 mos or has a future within next 30 days. See \"Patient Info\" tab in inbasket, or \"Choose Columns\" in Meds & Orders section of the refill encounter.            Passed - Medication is active on med list        Passed - Patient is age 18 or older        Passed - No active pregnancy on record        Passed - No positive pregnancy test in past 12 months         Flory Hidalgo RN      "

## 2020-10-16 DIAGNOSIS — J43.9 PULMONARY EMPHYSEMA, UNSPECIFIED EMPHYSEMA TYPE (H): ICD-10-CM

## 2020-10-16 RX ORDER — ALBUTEROL SULFATE 90 UG/1
AEROSOL, METERED RESPIRATORY (INHALATION)
Qty: 18 G | Refills: 1 | Status: SHIPPED | OUTPATIENT
Start: 2020-10-16 | End: 2021-04-14

## 2020-10-16 NOTE — TELEPHONE ENCOUNTER
"Last office visit 8/13/2020    Requested Prescriptions   Pending Prescriptions Disp Refills     albuterol (PROAIR HFA/PROVENTIL HFA/VENTOLIN HFA) 108 (90 Base) MCG/ACT inhaler [Pharmacy Med Name: ALBUTEROL SULFATE  AERS]  1     Sig: INHALE ONE TO TWO PUFFS BY MOUTH EVERY 2-4 HOURS AS NEEDED       Asthma Maintenance Inhalers - Anticholinergics Passed - 10/16/2020  2:50 PM        Passed - Patient is age 12 years or older        Passed - Recent (12 mo) or future (30 days) visit within the authorizing provider's specialty     Patient has had an office visit with the authorizing provider or a provider within the authorizing providers department within the previous 12 mos or has a future within next 30 days. See \"Patient Info\" tab in inbasket, or \"Choose Columns\" in Meds & Orders section of the refill encounter.              Passed - Medication is active on med list       Short-Acting Beta Agonist Inhalers Protocol  Passed - 10/16/2020  2:50 PM        Passed - Patient is age 12 or older        Passed - Recent (12 mo) or future (30 days) visit within the authorizing provider's specialty     Patient has had an office visit with the authorizing provider or a provider within the authorizing providers department within the previous 12 mos or has a future within next 30 days. See \"Patient Info\" tab in inbasket, or \"Choose Columns\" in Meds & Orders section of the refill encounter.              Passed - Medication is active on med list         Prescription approved per [facility] Refill Protocol.  Flory Hidalgo RN      "

## 2020-10-20 ENCOUNTER — TELEPHONE (OUTPATIENT)
Dept: ANTICOAGULATION | Facility: CLINIC | Age: 70
End: 2020-10-20

## 2020-10-20 ENCOUNTER — ANTICOAGULATION THERAPY VISIT (OUTPATIENT)
Dept: ANTICOAGULATION | Facility: CLINIC | Age: 70
End: 2020-10-20

## 2020-10-20 DIAGNOSIS — Z79.01 LONG TERM CURRENT USE OF ANTICOAGULANT THERAPY: ICD-10-CM

## 2020-10-20 DIAGNOSIS — I48.91 ATRIAL FIBRILLATION, UNSPECIFIED TYPE (H): ICD-10-CM

## 2020-10-20 DIAGNOSIS — I48.0 AF (PAROXYSMAL ATRIAL FIBRILLATION) (H): ICD-10-CM

## 2020-10-20 DIAGNOSIS — I48.0 AF (PAROXYSMAL ATRIAL FIBRILLATION) (H): Primary | ICD-10-CM

## 2020-10-20 LAB
CAPILLARY BLOOD COLLECTION: NORMAL
INR BLD: 2.9 (ref 0.86–1.14)

## 2020-10-20 PROCEDURE — 99207 PR NO CHARGE NURSE ONLY: CPT | Performed by: FAMILY MEDICINE

## 2020-10-20 PROCEDURE — 85610 PROTHROMBIN TIME: CPT | Performed by: FAMILY MEDICINE

## 2020-10-20 PROCEDURE — 36416 COLLJ CAPILLARY BLOOD SPEC: CPT | Performed by: FAMILY MEDICINE

## 2020-10-20 NOTE — PROGRESS NOTES
ANTICOAGULATION FOLLOW-UP CLINIC VISIT    Patient Name:  Latanya Padron  Date:  10/20/2020  Contact Type:  Telephone    SUBJECTIVE:  Patient Findings     Comments:  The patient was assessed for diet, medication, and activity level changes, missed or extra doses, bruising or bleeding, with no problem findings.  Francine Andrew RN          Clinical Outcomes     Negatives:  Major bleeding event, Thromboembolic event, Anticoagulation-related hospital admission, Anticoagulation-related ED visit, Anticoagulation-related fatality    Comments:  The patient was assessed for diet, medication, and activity level changes, missed or extra doses, bruising or bleeding, with no problem findings.  Francine Andrew RN             OBJECTIVE    Recent labs: (last 7 days)     10/20/20  0859   INR 2.9*       ASSESSMENT / PLAN  INR assessment THER    Recheck INR In: 6 WEEKS    INR Location Outside lab      Anticoagulation Summary  As of 10/20/2020    INR goal:  2.0-3.0   TTR:  73.6 % (1 y)   INR used for dosin.9 (10/20/2020)   Warfarin maintenance plan:  2.5 mg (5 mg x 0.5) every day   Full warfarin instructions:  2.5 mg every day   Weekly warfarin total:  17.5 mg   No change documented:  Francine Andrew RN   Plan last modified:  Francine Andrew RN (2020)   Next INR check:  2020   Priority:  Maintenance   Target end date:      Indications    AF (paroxysmal atrial fibrillation) (H) [I48.0]  Atrial fibrillation (H) [I48.91]             Anticoagulation Episode Summary     INR check location:      Preferred lab:      Send INR reminders to:  [facility]    Comments:  5 mg tabs, likes card, PM dose      Anticoagulation Care Providers     Provider Role Specialty Phone number    Glen Blue MD [facility] [phone]            See the Encounter Report to view Anticoagulation Flowsheet and Dosing Calendar (Go to Encounters tab in chart review, and find the Anticoagulation Therapy  Visit)    Dosage adjustment made based on physician directed care plan.    Francine Andrew RN

## 2020-11-04 ENCOUNTER — VIRTUAL VISIT (OUTPATIENT)
Dept: FAMILY MEDICINE | Facility: CLINIC | Age: 70
End: 2020-11-04
Payer: COMMERCIAL

## 2020-11-04 DIAGNOSIS — N90.7 EPIDERMOID CYST OF LABIA MAJORA: Primary | ICD-10-CM

## 2020-11-04 DIAGNOSIS — J44.1 COPD EXACERBATION (H): ICD-10-CM

## 2020-11-04 DIAGNOSIS — J43.9 PULMONARY EMPHYSEMA, UNSPECIFIED EMPHYSEMA TYPE (H): ICD-10-CM

## 2020-11-04 PROCEDURE — 99442 PR PHYSICIAN TELEPHONE EVALUATION 11-20 MIN: CPT | Mod: 95 | Performed by: FAMILY MEDICINE

## 2020-11-04 RX ORDER — PREDNISONE 10 MG/1
20 TABLET ORAL DAILY
Qty: 10 TABLET | Refills: 4 | Status: SHIPPED | OUTPATIENT
Start: 2020-11-04 | End: 2021-07-22

## 2020-11-04 RX ORDER — ALBUTEROL SULFATE 0.83 MG/ML
SOLUTION RESPIRATORY (INHALATION)
Qty: 75 ML | Refills: 0 | Status: SHIPPED | OUTPATIENT
Start: 2020-11-04 | End: 2022-06-20

## 2020-11-04 NOTE — PROGRESS NOTES
"Latanya Padron is a 70 year old female who is being evaluated via a billable telephone visit.      The patient has been notified of following:     \"This telephone visit will be conducted via a call between you and your physician/provider. We have found that certain health care needs can be provided without the need for a physical exam.  This service lets us provide the care you need with a short phone conversation.  If a prescription is necessary we can send it directly to your pharmacy.  If lab work is needed we can place an order for that and you can then stop by our lab to have the test done at a later time.    Telephone visits are billed at different rates depending on your insurance coverage. During this emergency period, for some insurers they may be billed the same as an in-person visit.  Please reach out to your insurance provider with any questions.    If during the course of the call the physician/provider feels a telephone visit is not appropriate, you will not be charged for this service.\"    Patient has given verbal consent for Telephone visit?  Yes    What phone number would you like to be contacted at? [phone]    How would you like to obtain your AVS? Lopez Stevenson     Latanya Padron is a 70 year old female who presents via phone visit today for the following health issues:    HPI     Vaginal Symptoms  Onset/Duration: 2 weeks  Description:           LUMP in Pubic area  Vaginal Discharge: none   Itching (Pruritis): no  Burning sensation:  no  Odor: no  Accompanying Signs & Symptoms:  Urinary symptoms: no  Abdominal pain: no  Fever: no  History:   Sexually active: no  New Partner: not applicable  Recent antibiotic use: no  Previous lump issues: no  Precipitating or alleviating factors: tried hot bath  Therapies tried and outcome: hot bath, hot washcloth.      For the last couple weeks patient has noted a swelling on her labial area at the moment right or left side uncertain.  She " originally tried to squeeze something from it and could not do that but there is a little bit of bloody drainage now when she squeezes it.  It is slowly getting smaller.  She has an appointment to see gynecology later this month.  She wanted to make sure that nothing needed to be done in the meantime.  She otherwise has no symptoms vaginally or abdominally.    Patient is having a little bit of tightness in her chest and sensation of a cold.  In the past the prednisone burst will usually help.  She requests this today.    Review of Systems   Constitutional, HEENT, cardiovascular, pulmonary, gi and gu systems are negative, except as otherwise noted.       Objective   Vitals - Patient Reported  Pain Score: No Pain (0)        healthy, alert and no distress  PSYCH: Alert and oriented times 3; coherent speech, normal   rate and volume, able to articulate logical thoughts, able   to abstract reason, no tangential thoughts, no hallucinations   or delusions  Her affect is normal  RESP: No cough, no audible wheezing, able to talk in full sentences  Remainder of exam unable to be completed due to telephone visits    Reviewed labs and she is not due for any at this time.        Assessment/Plan:    Assessment & Plan     Epidermoid cyst of labia majora  This seems to be a benign cyst of the labia.  Since it is smaller and not uncomfortable, no other treatment other than hot packs and time and gynecology exam is anticipated later this month.    Pulmonary emphysema, unspecified emphysema type (H)  Patient does have history of this and she has an exacerbation.  She will be on prednisone for a short time to see if we can reverse this.  She is aware of COVID.  She does limit exposures.  She will certainly let us know if things are not improving.  - albuterol (PROVENTIL) (2.5 MG/3ML) 0.083% neb solution; Take  by nebulization. 1 NEB EVERY 4-6 HOURS AS NEEDED    COPD exacerbation (H)  See above.  - predniSONE (DELTASONE) 10 MG tablet;  Take 2 tablets (20 mg) by mouth daily        MEDICATIONS:        - Continue other medications without change       -With current prednisone dose  See Patient Instructions    No follow-ups on file.    Glen Blue MD  [facility]    Phone call duration: 11 minutes 8:39 AM to 8:50 AM

## 2020-11-04 NOTE — PATIENT INSTRUCTIONS
1.  It seems to be a benign type cyst in the labial area.  Hot packs once daily for 15 minutes and minimal manipulation of it are most appropriate until you see gynecology.  2.  Use prednisone to moderate current developing symptoms.  Fever and chills are significant.  Headache would be significant in change of taste or smell.  Let us know if any of these happen  3.  Anyone of my remaining partners, would be appropriate for your care going forward.  Thank you for letting me be part of your health and wellbeing all these years.

## 2020-11-11 DIAGNOSIS — I48.91 ATRIAL FIBRILLATION, UNSPECIFIED TYPE (H): ICD-10-CM

## 2020-11-11 DIAGNOSIS — I48.0 AF (PAROXYSMAL ATRIAL FIBRILLATION) (H): ICD-10-CM

## 2020-11-11 RX ORDER — WARFARIN SODIUM 2.5 MG/1
TABLET ORAL
Qty: 90 TABLET | Refills: 0 | Status: SHIPPED | OUTPATIENT
Start: 2020-11-11 | End: 2021-02-03

## 2020-11-16 ENCOUNTER — HOSPITAL ENCOUNTER (OUTPATIENT)
Dept: MAMMOGRAPHY | Facility: CLINIC | Age: 70
Discharge: HOME | End: 2020-11-16
Attending: FAMILY MEDICINE | Admitting: FAMILY MEDICINE
Payer: COMMERCIAL

## 2020-11-16 DIAGNOSIS — N64.4 BREAST TENDERNESS: ICD-10-CM

## 2020-11-16 DIAGNOSIS — Z85.3 PERSONAL HISTORY OF MALIGNANT NEOPLASM OF BREAST: ICD-10-CM

## 2020-11-16 PROCEDURE — 77065 DX MAMMO INCL CAD UNI: CPT

## 2020-11-25 ENCOUNTER — TELEPHONE (OUTPATIENT)
Dept: FAMILY MEDICINE | Facility: CLINIC | Age: 70
End: 2020-11-25

## 2020-11-25 DIAGNOSIS — G25.81 RESTLESS LEG SYNDROME: ICD-10-CM

## 2020-11-25 NOTE — TELEPHONE ENCOUNTER
Reason for Call:  Other prescription    Detailed comments: she is reporting that her legs ache all the time now that it is winter.  She states she has arthritis in her knees.  She is wondering if she should be taking more than one Gabapentin per night because that doesn't seem to be helping.  She is aware Dr. Blue is not in until Monday and is ok to wait until then.    Phone Number Patient can be reached at: Home number on file [phone]    Best Time: any    Can we leave a detailed message on this number? YES    Call taken on 11/25/2020 at 10:00 AM by Gautam Sosa

## 2020-11-27 ENCOUNTER — NURSE TRIAGE (OUTPATIENT)
Dept: FAMILY MEDICINE | Facility: CLINIC | Age: 70
End: 2020-11-27

## 2020-11-27 NOTE — TELEPHONE ENCOUNTER
"Patient states when she cleaned her ear with a Q-tip last Wednesday, she noticed some blood on the tip, and then when entering the ear with another Q-tip, she noticed more red blood on the Q-tip/ small blood clot.  Patient states no pain, no discharge, no more blood, just intermittent \"itching\".  Advised patient home care per protocol.  Advised patient to call back with any further questions or concerns.  Advised patient of symptoms to seek emergency care for.  Patient stated understanding.    Additional Information    Clear drainage (not from a head injury) and persists > 24 hours    Answer Assessment - Initial Assessment Questions  1. LOCATION: \"Which ear is involved?\"       Right ear    2. COLOR: \"What is the color of the discharge?\"       Red    3. CONSISTENCY: \"How runny is the discharge? Could it be water?\"       Bloody, red    4. ONSET: \"When did you first notice the discharge?\"      Wednesday evening 11.25.2020    5. PAIN: \"Is there any earache?\" \"How bad is it?\"  (Scale 1-10; or mild, moderate, severe)      Thanksgiving day, it \"aches\" some, today no pain, just itching    6. OBJECTS: \"Any use of q-tips or have you inserted anything else in your ear?\"      No    7. OTHER SYMPTOMS: \"Do you have any other symptoms?\" (e.g., headache, fever, dizziness, vomiting, runny nose)      No    8. PREGNANCY: \"Is there any chance you are pregnant?\" \"When was your last menstrual period?\"      NA    Protocols used: EAR - [id number]  Flory Hidalgo RN    "

## 2020-11-27 NOTE — TELEPHONE ENCOUNTER
Reason for call:  Patient reporting a symptom    Symptom or request: pt stated she was cleaning her ear with a q-tip and noticed blood in her ear.    Duration (how long have symptoms been present): 2 days    Have you been treated for this before? No    Additional comments:     Phone Number patient can be reached at:  Cell number on file:    Telephone Information:   Mobile [phone]       Best Time:      Can we leave a detailed message on this number:  YES    Call taken on 11/27/2020 at 8:50 AM by Ivon Nichols

## 2020-11-27 NOTE — TELEPHONE ENCOUNTER
RN TRIAGE CALL:    Patient Contact    Attempt # 1    Was call answered?  No.  Left message on voicemail with information to call Clinic RN back.    Flory Hidalgo RN

## 2020-11-29 NOTE — TELEPHONE ENCOUNTER
She may increase her dose up to 3 daily with at least 4-5 days between dose increases. Gabapentin 300 mg. I suggest 2 at bedtime and 1 in AM if she increases to 3 tabs.  Glen Blue MD

## 2020-11-30 RX ORDER — GABAPENTIN 300 MG/1
900 CAPSULE ORAL DAILY
Qty: 90 CAPSULE | Refills: 1 | Status: SHIPPED | OUTPATIENT
Start: 2020-11-30 | End: 2021-02-12

## 2020-11-30 NOTE — TELEPHONE ENCOUNTER
Informed pt of increase of gabapentin.  She is asking for refill as she will need more due to increase.  ................Diego Don LPN,   November 30, 2020,      8:52 AM,   [facility]

## 2020-12-01 ENCOUNTER — TELEPHONE (OUTPATIENT)
Dept: OTOLARYNGOLOGY | Facility: CLINIC | Age: 70
End: 2020-12-01

## 2020-12-01 ENCOUNTER — ANTICOAGULATION THERAPY VISIT (OUTPATIENT)
Dept: ANTICOAGULATION | Facility: CLINIC | Age: 70
End: 2020-12-01

## 2020-12-01 DIAGNOSIS — I48.0 AF (PAROXYSMAL ATRIAL FIBRILLATION) (H): ICD-10-CM

## 2020-12-01 DIAGNOSIS — Z79.01 LONG TERM CURRENT USE OF ANTICOAGULANT THERAPY: ICD-10-CM

## 2020-12-01 DIAGNOSIS — I48.91 ATRIAL FIBRILLATION, UNSPECIFIED TYPE (H): ICD-10-CM

## 2020-12-01 LAB
CAPILLARY BLOOD COLLECTION: NORMAL
INR BLD: 2.7 (ref 0.86–1.14)

## 2020-12-01 PROCEDURE — 36416 COLLJ CAPILLARY BLOOD SPEC: CPT | Performed by: FAMILY MEDICINE

## 2020-12-01 PROCEDURE — 85610 PROTHROMBIN TIME: CPT | Performed by: FAMILY MEDICINE

## 2020-12-01 PROCEDURE — 99207 PR NO CHARGE NURSE ONLY: CPT | Performed by: FAMILY MEDICINE

## 2020-12-01 NOTE — PROGRESS NOTES
ANTICOAGULATION FOLLOW-UP CLINIC VISIT    Patient Name:  Latanya Padron  Date:  2020  Contact Type:  Telephone    SUBJECTIVE:  Patient Findings     Comments:  The patient was assessed for diet, medication, and activity level changes, missed or extra doses, bruising or bleeding, with no problem findings.  Francine Andrew RN          Clinical Outcomes     Negatives:  Major bleeding event, Thromboembolic event, Anticoagulation-related hospital admission, Anticoagulation-related ED visit, Anticoagulation-related fatality    Comments:  The patient was assessed for diet, medication, and activity level changes, missed or extra doses, bruising or bleeding, with no problem findings.  Francine Andrew RN             OBJECTIVE    Recent labs: (last 7 days)     20  0916   INR 2.7*       ASSESSMENT / PLAN  INR assessment THER    Recheck INR In: 6 WEEKS    INR Location Outside lab      Anticoagulation Summary  As of 2020    INR goal:  2.0-3.0   TTR:  73.6 % (1 y)   Prior goal:  2.0-3.0   INR used for dosin.7 (2020)   Warfarin maintenance plan:  2.5 mg (5 mg x 0.5) every day   Full warfarin instructions:  2.5 mg every day   Weekly warfarin total:  17.5 mg   No change documented:  Francine Andrew, RN   Plan last modified:  Francine Andrew RN (2020)   Next INR check:  2021   Priority:  Maintenance   Target end date:  Indefinite    Indications    AF (paroxysmal atrial fibrillation) (H) [I48.0]  Atrial fibrillation (H) [I48.91]  Long term current use of anticoagulant therapy [Z79.01]  Atrial fibrillation  unspecified type (H) [I48.91]             Anticoagulation Episode Summary     INR check location:      Preferred lab:      Send INR reminders to:  [facility]    Comments:  5 mg tabs, likes card, PM dose      Anticoagulation Care Providers     Provider Role Specialty Phone number    Glen Blue MD [facility] [phone]            See the Encounter Report  to view Anticoagulation Flowsheet and Dosing Calendar (Go to Encounters tab in chart review, and find the Anticoagulation Therapy Visit)    Dosage adjustment made based on physician directed care plan.      Francine Andrew RN

## 2020-12-01 NOTE — TELEPHONE ENCOUNTER
Dr. Mclaughlin, are you okay with working this patient in??     Debbie HERNÁNDEZ, Lead RN, BSN. . .  12/1/2020, 3:02 PM

## 2020-12-01 NOTE — TELEPHONE ENCOUNTER
Work in with Dr Mclaughlin for tomorrow or Thursday ok to LM her ear pain is so bad and she just does not feel like she can wait until next wk. Please call pt to discuss. Thank You Linh

## 2020-12-02 NOTE — TELEPHONE ENCOUNTER
Called and left patient a VM informing her that we could work her in Thursday the 3rd at 9:15 am at the [facility].

## 2020-12-03 ENCOUNTER — OFFICE VISIT (OUTPATIENT)
Dept: OTOLARYNGOLOGY | Facility: CLINIC | Age: 70
End: 2020-12-03
Payer: COMMERCIAL

## 2020-12-03 ENCOUNTER — MYC MEDICAL ADVICE (OUTPATIENT)
Dept: FAMILY MEDICINE | Facility: CLINIC | Age: 70
End: 2020-12-03

## 2020-12-03 VITALS
WEIGHT: 214 LBS | HEIGHT: 61 IN | BODY MASS INDEX: 40.4 KG/M2 | DIASTOLIC BLOOD PRESSURE: 60 MMHG | SYSTOLIC BLOOD PRESSURE: 130 MMHG

## 2020-12-03 DIAGNOSIS — H92.01 OTALGIA, RIGHT: Primary | ICD-10-CM

## 2020-12-03 DIAGNOSIS — H61.21 IMPACTED CERUMEN OF RIGHT EAR: ICD-10-CM

## 2020-12-03 PROCEDURE — 69200 CLEAR OUTER EAR CANAL: CPT | Mod: RT | Performed by: OTOLARYNGOLOGY

## 2020-12-03 PROCEDURE — 99203 OFFICE O/P NEW LOW 30 MIN: CPT | Mod: 25 | Performed by: OTOLARYNGOLOGY

## 2020-12-03 ASSESSMENT — MIFFLIN-ST. JEOR: SCORE: 1433.08

## 2020-12-03 NOTE — LETTER
[address] [id number] [phone]        December 3, 2020    Latanya HUBER Nereida  [id number] [address] [address]          March 27, 2018        Latanya NGO Nereida  [address] [address] [place] [id number]          To whom it may concern:    RE: Latanya Padron    This patient has degenerative spine disease and other osteoarthritis with muscle aches.  Physical therapy, chiropractic care, and medications have not managed her pain.  Massage therapy has been very helpful and is my recommendation that she continue with this.    Please contact me for questions or concerns.      Sincerely,        Glentona Blue MD  Electronically signed, authenticity to be confirmed by contacting above numbers

## 2020-12-03 NOTE — TELEPHONE ENCOUNTER
Click in letter pending the print, [facility] as sent and accept button and it will go in her Mychart.

## 2020-12-03 NOTE — PROGRESS NOTES
ENT Consultation    Latanya Padron who is a 70 year old female seen in consultation at the request of self.      History of Present Illness - Latanya Padron is a 70 year old female presents for evaluation of discomfort in the right ear.  She has some pressure in the ear.  She had a bit of pressure in the last couple weeks in the was using a Q-tip may have used with little deeper and started having some blood on the Q-tip.  Then later on had a lot of blood clot coming out.  Denies any new hearing changes in the ear.  No tinnitus no dizziness no vertigo.  Left ear has not been bothering her at all.      Past Medical History -   Past Medical History:   Diagnosis Date     Breast cancer (H) 04/12/2019    Left Breast     COPD (chronic obstructive pulmonary disease) (H)      Mixed hyperlipidemia      PONV (postoperative nausea and vomiting)      S/P radiation therapy     5,256 cGy to left breast completed on 7/18/2019 - [facility]       Current Medications -   Current Outpatient Medications:      albuterol (PROAIR HFA/PROVENTIL HFA/VENTOLIN HFA) 108 (90 Base) MCG/ACT inhaler, INHALE ONE TO TWO PUFFS BY MOUTH EVERY 2-4 HOURS AS NEEDED, Disp: 18 g, Rfl: 1     albuterol (PROVENTIL) (2.5 MG/3ML) 0.083% neb solution, Take  by nebulization. 1 NEB EVERY 4-6 HOURS AS NEEDED, Disp: 75 mL, Rfl: 0     anastrozole (ARIMIDEX) 1 MG tablet, Take 1 tablet (1 mg) by mouth daily, Disp: 90 tablet, Rfl: 3     atorvastatin (LIPITOR) 10 MG tablet, TAKE ONE TABLET BY MOUTH ONCE DAILY, Disp: 90 tablet, Rfl: 2     BIOTIN PO, Take by mouth daily, Disp: , Rfl:      Calcium Carb-Cholecalciferol (CALCIUM 500 + D) 500-400 MG-UNIT TABS, Take 1 tablet by mouth 2 times daily, Disp: 180 tablet, Rfl: 3     Cyanocobalamin (B-12) 1000 MCG TBCR, Take 1,000 mcg by mouth daily, Disp: 100 tablet, Rfl: 1     diltiazem ER COATED BEADS (CARDIZEM CD/CARTIA XT) 120 MG 24 hr capsule, TAKE ONE CAPSULE BY MOUTH ONCE DAILY ( LABS DUE ), Disp: 90 capsule, Rfl:  0     gabapentin (NEURONTIN) 300 MG capsule, Take 3 capsules (900 mg) by mouth daily Take 2 capsules (600mg) at bedtime and 1 capsule (300mg) in morning., Disp: 90 capsule, Rfl: 1     hydrochlorothiazide (HYDRODIURIL) 25 MG tablet, TAKE ONE TABLET BY MOUTH EVERY DAY, Disp: 90 tablet, Rfl: 2     lisinopril (ZESTRIL) 2.5 MG tablet, TAKE ONE TABLET BY MOUTH ONCE DAILY, Disp: 90 tablet, Rfl: 2     magnesium 250 MG tablet, Take 1 tablet by mouth daily, Disp: 30 tablet, Rfl:      mometasone-formoterol (DULERA) 200-5 MCG/ACT inhaler, INHALE TWO PUFFS BY MOUTH TWICE A DAY, Disp: 13 g, Rfl: 11     order for DME, Equipment being ordered: Oxygen, Disp: 1 each, Rfl: 0     order for DME, Equipment being ordered: Nebulizer, Disp: 1 Device, Rfl: 0     ORDER FOR DME, Equipment being ordered: Oxygen @ 2 liters with exertion, Disp: , Rfl:      predniSONE (DELTASONE) 10 MG tablet, Take 2 tablets (20 mg) by mouth daily, Disp: 10 tablet, Rfl: 4     Probiotic Product (PROBIOTIC PO), Take by mouth daily, Disp: , Rfl:      trospium (SANCTURA) 20 MG tablet, Take 20 mg by mouth, Disp: , Rfl:      venlafaxine (EFFEXOR-XR) 75 MG 24 hr capsule, TAKE ONE CAPSULE BY MOUTH ONCE DAILY, Disp: 30 capsule, Rfl: 2     warfarin ANTICOAGULANT (JANTOVEN ANTICOAGULANT) 2.5 MG tablet, TAKE ONE TABLET BY MOUTH ONCE DAILY OR AS DIRECTED BY THE COUMADIN CLINIC, Disp: 90 tablet, Rfl: 0    Allergies -   Allergies   Allergen Reactions     Augmentin [Amoxicillin-Pot Clavulanate] Nausea and Vomiting     Meperidine Hcl Nausea and Vomiting     demerol     Seasonal Allergies      spring       Social History -   Social History     Socioeconomic History     Marital status:      Spouse name: Nam     Number of children: 1     Years of education: 14     Highest education level: Not on file   Occupational History     Occupation: Ins Cordinater     Comment:  [employer]     Employer: [employer]   Social Needs     Financial resource strain: Not on file      Food insecurity     Worry: Not on file     Inability: Not on file     Transportation needs     Medical: Not on file     Non-medical: Not on file   Tobacco Use     Smoking status: Former Smoker     Packs/day: 1.00     Years: 30.00     Pack years: 30.00     Quit date: 10/25/2005     Years since quitting: 15.1     Smokeless tobacco: Never Used   Substance and Sexual Activity     Alcohol use: No     Alcohol/week: 0.0 standard drinks     Drug use: No     Sexual activity: Not Currently     Partners: Male     Birth control/protection: Female Surgical     Comment: hysterectomy   Lifestyle     Physical activity     Days per week: Not on file     Minutes per session: Not on file     Stress: Not on file   Relationships     Social connections     Talks on phone: Not on file     Gets together: Not on file     Attends Anabaptism service: Not on file     Active member of club or organization: Not on file     Attends meetings of clubs or organizations: Not on file     Relationship status: Not on file     Intimate partner violence     Fear of current or ex partner: Not on file     Emotionally abused: Not on file     Physically abused: Not on file     Forced sexual activity: Not on file   Other Topics Concern      Service No     Blood Transfusions No     Caffeine Concern No     Comment: coffee 2c/d pop: 2c/d     Occupational Exposure No     Hobby Hazards No     Sleep Concern No     Stress Concern No     Weight Concern Yes     Comment: desire wt loss     Special Diet No     Back Care No     Comment: Hx: seen chiropractor      Exercise No     Bike Helmet No     Comment: n/a     Seat Belt Yes     Self-Exams Yes     Comment: attempts to do SBE monthly. Patient has fibrous breast tissue.     Parent/sibling w/ CABG, MI or angioplasty before 65F 55M? Not Asked   Social History Narrative     Not on file       Family History -   Family History   Problem Relation Age of Onset     Breast Cancer Mother      Obesity Mother       Genitourinary Problems Mother      Lipids Mother      Respiratory Sister      Lipids Sister      Breast Cancer Sister 40        s/p mastectomy     Arthritis Sister      Obesity Sister      Heart Failure Sister      Cancer Sister         stomach, colon, pancreatic     Cancer Maternal Grandfather      Cancer Paternal Grandfather         lymph nodes     Heart Disease Father         by pass (5)     Cerebrovascular Disease Father         hemorragic     Lipids Father      Alzheimer Disease Maternal Grandmother      Genitourinary Problems Maternal Grandmother      Cancer Brother         prostate     Lipids Sister      Cancer Maternal Uncle         colon     Heart Disease Brother         Hx: stent placement     Lipids Brother         X 2       Review of Systems - As per HPI and PMHx, otherwise review of system review of the head and neck negative. Otherwise 10+ review of system is negative    Physical Exam  There were no vitals taken for this visit.  BMI: There is no height or weight on file to calculate BMI.    General - The patient is well nourished and well developed, and appears to have good nutritional status.  Alert and oriented to person and place, answers questions and cooperates with examination appropriately.    SKIN - No suspicious lesions or rashes.  Respiration - No respiratory distress.  Head and Face - Normocephalic and atraumatic, with no gross asymmetry noted of the contour of the facial features.  The facial nerve is intact, with strong symmetric movements.    Voice and Breathing - The patient was breathing comfortably without the use of accessory muscles. The patients voice was clear and strong, and had appropriate pitch and quality.    Ears - Bilateral pinna and EACs with normal appearing overlying skin.  Left tympanic membrane intact with good mobility on pneumatic otoscopy. Bony landmarks of the ossicular chain are normal. The tympanic membranes are normal in appearance. No retraction, perforation, or  masses.  No fluid or purulence was seen in the external canal or the middle ear.  Once the clot and the debris has been removed from the right ear canal tympanic membrane appears to be clear and also mobile and the rest of the canal mucosa appeared to be clear except for small area where it appears that there may been a tiny laceration and where the clot has formed.    Eyes - Extraocular movements intact.  Sclera were not icteric or injected, conjunctiva were pink and moist.    Neck - Normal midline excursion of the laryngotracheal complex during swallowing.  Full range of motion on passive movement.  Palpation of the occipital, submental, submandibular, internal jugular chain, and supraclavicular nodes did not demonstrate any abnormal lymph nodes or masses.  The carotid pulse was palpable bilaterally.  Palpation of the thyroid was soft and smooth, with no nodules or goiter appreciated.  The trachea was mobile and midline.        Neuro - Nonfocal neuro exam is normal, CN 2 through 12 intact, normal gait and muscle tone.      Performed in clinic todaSignificant dry clot and debris was noted in the canal partially obstructing it.  Therefore under the guidance of magnified speculum using lavage first and then using straight pick and alligator forceps I was able to dislodge and remove large clot and debris blocking the canal.  Underneath I saw a tiny laceration but no other skin changes.  Patient tolerated procedure well.      A/P - Latanya Padron is a 70 year old female with a otalgia due to blood clot and debris partially obstructing the canal as result of trauma.  Patient felt immediately better once this was removed.  She is instructed to avoid using Q-tips.  Patient will see me back as needed.      Trevor Mclaughlin MD

## 2020-12-03 NOTE — LETTER
12/3/2020         RE: Latanya Padron  [id number] [facility] [id number]        Dear Colleague,    Thank you for referring your patient, Latanya Padron, to the [facility]. Please see a copy of my visit note below.    ENT Consultation    Latanya Padron who is a 70 year old female seen in consultation at the request of self.      History of Present Illness - Latanya Padron is a 70 year old female presents for evaluation of discomfort in the right ear.  She has some pressure in the ear.  She had a bit of pressure in the last couple weeks in the was using a Q-tip may have used with little deeper and started having some blood on the Q-tip.  Then later on had a lot of blood clot coming out.  Denies any new hearing changes in the ear.  No tinnitus no dizziness no vertigo.  Left ear has not been bothering her at all.      Past Medical History -   Past Medical History:   Diagnosis Date     Breast cancer (H) 04/12/2019    Left Breast     COPD (chronic obstructive pulmonary disease) (H)      Mixed hyperlipidemia      PONV (postoperative nausea and vomiting)      S/P radiation therapy     5,256 cGy to left breast completed on 7/18/2019 - [facility]       Current Medications -   Current Outpatient Medications:      albuterol (PROAIR HFA/PROVENTIL HFA/VENTOLIN HFA) 108 (90 Base) MCG/ACT inhaler, INHALE ONE TO TWO PUFFS BY MOUTH EVERY 2-4 HOURS AS NEEDED, Disp: 18 g, Rfl: 1     albuterol (PROVENTIL) (2.5 MG/3ML) 0.083% neb solution, Take  by nebulization. 1 NEB EVERY 4-6 HOURS AS NEEDED, Disp: 75 mL, Rfl: 0     anastrozole (ARIMIDEX) 1 MG tablet, Take 1 tablet (1 mg) by mouth daily, Disp: 90 tablet, Rfl: 3     atorvastatin (LIPITOR) 10 MG tablet, TAKE ONE TABLET BY MOUTH ONCE DAILY, Disp: 90 tablet, Rfl: 2     BIOTIN PO, Take by mouth daily, Disp: , Rfl:      Calcium Carb-Cholecalciferol (CALCIUM 500 + D) 500-400 MG-UNIT TABS, Take 1 tablet by mouth 2 times daily, Disp: 180 tablet,  Rfl: 3     Cyanocobalamin (B-12) 1000 MCG TBCR, Take 1,000 mcg by mouth daily, Disp: 100 tablet, Rfl: 1     diltiazem ER COATED BEADS (CARDIZEM CD/CARTIA XT) 120 MG 24 hr capsule, TAKE ONE CAPSULE BY MOUTH ONCE DAILY ( LABS DUE ), Disp: 90 capsule, Rfl: 0     gabapentin (NEURONTIN) 300 MG capsule, Take 3 capsules (900 mg) by mouth daily Take 2 capsules (600mg) at bedtime and 1 capsule (300mg) in morning., Disp: 90 capsule, Rfl: 1     hydrochlorothiazide (HYDRODIURIL) 25 MG tablet, TAKE ONE TABLET BY MOUTH EVERY DAY, Disp: 90 tablet, Rfl: 2     lisinopril (ZESTRIL) 2.5 MG tablet, TAKE ONE TABLET BY MOUTH ONCE DAILY, Disp: 90 tablet, Rfl: 2     magnesium 250 MG tablet, Take 1 tablet by mouth daily, Disp: 30 tablet, Rfl:      mometasone-formoterol (DULERA) 200-5 MCG/ACT inhaler, INHALE TWO PUFFS BY MOUTH TWICE A DAY, Disp: 13 g, Rfl: 11     order for DME, Equipment being ordered: Oxygen, Disp: 1 each, Rfl: 0     order for DME, Equipment being ordered: Nebulizer, Disp: 1 Device, Rfl: 0     ORDER FOR DME, Equipment being ordered: Oxygen @ 2 liters with exertion, Disp: , Rfl:      predniSONE (DELTASONE) 10 MG tablet, Take 2 tablets (20 mg) by mouth daily, Disp: 10 tablet, Rfl: 4     Probiotic Product (PROBIOTIC PO), Take by mouth daily, Disp: , Rfl:      trospium (SANCTURA) 20 MG tablet, Take 20 mg by mouth, Disp: , Rfl:      venlafaxine (EFFEXOR-XR) 75 MG 24 hr capsule, TAKE ONE CAPSULE BY MOUTH ONCE DAILY, Disp: 30 capsule, Rfl: 2     warfarin ANTICOAGULANT (JANTOVEN ANTICOAGULANT) 2.5 MG tablet, TAKE ONE TABLET BY MOUTH ONCE DAILY OR AS DIRECTED BY THE COUMADIN CLINIC, Disp: 90 tablet, Rfl: 0    Allergies -   Allergies   Allergen Reactions     Augmentin [Amoxicillin-Pot Clavulanate] Nausea and Vomiting     Meperidine Hcl Nausea and Vomiting     demerol     Seasonal Allergies      spring       Social History -   Social History     Socioeconomic History     Marital status:      Spouse name: Ray     Number of  children: 1     Years of education: 14     Highest education level: Not on file   Occupational History     Occupation: Ins Cordinater     Comment:  [employer]     Employer: [employer]   Social Needs     Financial resource strain: Not on file     Food insecurity     Worry: Not on file     Inability: Not on file     Transportation needs     Medical: Not on file     Non-medical: Not on file   Tobacco Use     Smoking status: Former Smoker     Packs/day: 1.00     Years: 30.00     Pack years: 30.00     Quit date: 10/25/2005     Years since quitting: 15.1     Smokeless tobacco: Never Used   Substance and Sexual Activity     Alcohol use: No     Alcohol/week: 0.0 standard drinks     Drug use: No     Sexual activity: Not Currently     Partners: Male     Birth control/protection: Female Surgical     Comment: hysterectomy   Lifestyle     Physical activity     Days per week: Not on file     Minutes per session: Not on file     Stress: Not on file   Relationships     Social connections     Talks on phone: Not on file     Gets together: Not on file     Attends Mu-ism service: Not on file     Active member of club or organization: Not on file     Attends meetings of clubs or organizations: Not on file     Relationship status: Not on file     Intimate partner violence     Fear of current or ex partner: Not on file     Emotionally abused: Not on file     Physically abused: Not on file     Forced sexual activity: Not on file   Other Topics Concern      Service No     Blood Transfusions No     Caffeine Concern No     Comment: coffee 2c/d pop: 2c/d     Occupational Exposure No     Hobby Hazards No     Sleep Concern No     Stress Concern No     Weight Concern Yes     Comment: desire wt loss     Special Diet No     Back Care No     Comment: Hx: seen chiropractor      Exercise No     Bike Helmet No     Comment: n/a     Seat Belt Yes     Self-Exams Yes     Comment: attempts to do SBE monthly. Patient has fibrous  breast tissue.     Parent/sibling w/ CABG, MI or angioplasty before 65F 55M? Not Asked   Social History Narrative     Not on file       Family History -   Family History   Problem Relation Age of Onset     Breast Cancer Mother      Obesity Mother      Genitourinary Problems Mother      Lipids Mother      Respiratory Sister      Lipids Sister      Breast Cancer Sister 40        s/p mastectomy     Arthritis Sister      Obesity Sister      Heart Failure Sister      Cancer Sister         stomach, colon, pancreatic     Cancer Maternal Grandfather      Cancer Paternal Grandfather         lymph nodes     Heart Disease Father         by pass (5)     Cerebrovascular Disease Father         hemorragic     Lipids Father      Alzheimer Disease Maternal Grandmother      Genitourinary Problems Maternal Grandmother      Cancer Brother         prostate     Lipids Sister      Cancer Maternal Uncle         colon     Heart Disease Brother         Hx: stent placement     Lipids Brother         X 2       Review of Systems - As per HPI and PMHx, otherwise review of system review of the head and neck negative. Otherwise 10+ review of system is negative    Physical Exam  There were no vitals taken for this visit.  BMI: There is no height or weight on file to calculate BMI.    General - The patient is well nourished and well developed, and appears to have good nutritional status.  Alert and oriented to person and place, answers questions and cooperates with examination appropriately.    SKIN - No suspicious lesions or rashes.  Respiration - No respiratory distress.  Head and Face - Normocephalic and atraumatic, with no gross asymmetry noted of the contour of the facial features.  The facial nerve is intact, with strong symmetric movements.    Voice and Breathing - The patient was breathing comfortably without the use of accessory muscles. The patients voice was clear and strong, and had appropriate pitch and quality.    Ears - Bilateral  pinna and EACs with normal appearing overlying skin.  Left tympanic membrane intact with good mobility on pneumatic otoscopy. Bony landmarks of the ossicular chain are normal. The tympanic membranes are normal in appearance. No retraction, perforation, or masses.  No fluid or purulence was seen in the external canal or the middle ear.  Once the clot and the debris has been removed from the right ear canal tympanic membrane appears to be clear and also mobile and the rest of the canal mucosa appeared to be clear except for small area where it appears that there may been a tiny laceration and where the clot has formed.    Eyes - Extraocular movements intact.  Sclera were not icteric or injected, conjunctiva were pink and moist.    Neck - Normal midline excursion of the laryngotracheal complex during swallowing.  Full range of motion on passive movement.  Palpation of the occipital, submental, submandibular, internal jugular chain, and supraclavicular nodes did not demonstrate any abnormal lymph nodes or masses.  The carotid pulse was palpable bilaterally.  Palpation of the thyroid was soft and smooth, with no nodules or goiter appreciated.  The trachea was mobile and midline.        Neuro - Nonfocal neuro exam is normal, CN 2 through 12 intact, normal gait and muscle tone.      Performed in clinic todaSignificant dry clot and debris was noted in the canal partially obstructing it.  Therefore under the guidance of magnified speculum using lavage first and then using straight pick and alligator forceps I was able to dislodge and remove large clot and debris blocking the canal.  Underneath I saw a tiny laceration but no other skin changes.  Patient tolerated procedure well.      A/P - Latanya Padron is a 70 year old female with a otalgia due to blood clot and debris partially obstructing the canal as result of trauma.  Patient felt immediately better once this was removed.  She is instructed to avoid using Q-tips.   Patient will see me back as needed.      Trevor Mclaughlin MD        Again, thank you for allowing me to participate in the care of your patient.        Sincerely,        Trevor Mclaughlin MD, MD

## 2020-12-07 ENCOUNTER — MYC MEDICAL ADVICE (OUTPATIENT)
Dept: FAMILY MEDICINE | Facility: CLINIC | Age: 70
End: 2020-12-07

## 2020-12-07 DIAGNOSIS — F32.5 MAJOR DEPRESSION IN COMPLETE REMISSION (H): Primary | ICD-10-CM

## 2020-12-08 DIAGNOSIS — F32.5 MAJOR DEPRESSION IN COMPLETE REMISSION (H): ICD-10-CM

## 2020-12-08 RX ORDER — VENLAFAXINE HYDROCHLORIDE 75 MG/1
CAPSULE, EXTENDED RELEASE ORAL
Qty: 30 CAPSULE | Refills: 2 | Status: SHIPPED | OUTPATIENT
Start: 2020-12-08 | End: 2020-12-11

## 2020-12-08 NOTE — TELEPHONE ENCOUNTER
"Routing refill request to provider for review/approval because:  Labs out of range:  CR  T'd up 1 month and 2 refills for provider review.      Requested Prescriptions   Pending Prescriptions Disp Refills     venlafaxine (EFFEXOR-XR) 75 MG 24 hr capsule [Pharmacy Med Name: VENLAFAXINE HCL ER 75MG CP24] 30 capsule 2     Sig: TAKE ONE CAPSULE BY MOUTH ONCE DAILY   Last Written Prescription Date:  9/10/2020  Last Fill Quantity: 30,  # refills: 2   Last office visit: 2/26/2020 with prescribing provider:     Future Office Visit:   Next 5 appointments (look out 90 days)    Dec 15, 2020  2:00 PM  Return Visit with Julita Gaxiola MD  [facility] ([facility]) [address] [place] [id number] [phone]           Serotonin-Norepinephrine Reuptake Inhibitors  Failed - 12/8/2020  8:27 AM        Failed - Normal serum creatinine on file in past 12 months     Recent Labs   Lab Test 07/13/20  0909   CR 1.14*     Ok to refill medication if creatinine is low        Passed - Blood pressure under 140/90 in past 12 months     BP Readings from Last 3 Encounters:   12/03/20 130/60   07/31/20 138/83   07/13/20 (!) 144/64           Passed - PHQ-9 score of less than 5 in past 6 months     Please review last PHQ-9 score.   PHQ-9 score:    PHQ 9/1/2020   PHQ-9 Total Score 2   Q9: Thoughts of better off dead/self-harm past 2 weeks Not at all           Passed - Medication is active on med list        Passed - Patient is age 18 or older        Passed - No active pregnancy on record        Passed - No positive pregnancy test in past 12 months        Passed - Recent (6 mo) or future (30 days) visit within the authorizing provider's specialty     Patient had office visit in the last 6 months or has a visit in the next 30 days with authorizing provider or within the authorizing provider's specialty.  See \"Patient Info\" tab in inbasket, or \"Choose Columns\" in Meds & Orders " section of the refill encounter.             Flory Hidalgo RN

## 2020-12-10 ENCOUNTER — MYC MEDICAL ADVICE (OUTPATIENT)
Dept: FAMILY MEDICINE | Facility: CLINIC | Age: 70
End: 2020-12-10

## 2020-12-10 RX ORDER — VENLAFAXINE HYDROCHLORIDE 75 MG/1
75 CAPSULE, EXTENDED RELEASE ORAL DAILY
Qty: 90 CAPSULE | Refills: 1 | Status: SHIPPED | OUTPATIENT
Start: 2020-12-10 | End: 2021-08-06

## 2021-01-09 ENCOUNTER — HEALTH MAINTENANCE LETTER (OUTPATIENT)
Age: 71
End: 2021-01-09

## 2021-01-12 ENCOUNTER — ANTICOAGULATION THERAPY VISIT (OUTPATIENT)
Dept: ANTICOAGULATION | Facility: CLINIC | Age: 71
End: 2021-01-12

## 2021-01-12 ENCOUNTER — INFUSION THERAPY VISIT (OUTPATIENT)
Dept: INFUSION THERAPY | Facility: CLINIC | Age: 71
End: 2021-01-12
Attending: INTERNAL MEDICINE
Payer: COMMERCIAL

## 2021-01-12 VITALS
BODY MASS INDEX: 40.04 KG/M2 | DIASTOLIC BLOOD PRESSURE: 81 MMHG | WEIGHT: 211.9 LBS | SYSTOLIC BLOOD PRESSURE: 133 MMHG | RESPIRATION RATE: 20 BRPM | TEMPERATURE: 98.4 F | OXYGEN SATURATION: 93 % | HEART RATE: 101 BPM

## 2021-01-12 DIAGNOSIS — Z17.0 MALIGNANT NEOPLASM OF OVERLAPPING SITES OF LEFT BREAST IN FEMALE, ESTROGEN RECEPTOR POSITIVE (H): ICD-10-CM

## 2021-01-12 DIAGNOSIS — Z79.01 LONG TERM CURRENT USE OF ANTICOAGULANT THERAPY: ICD-10-CM

## 2021-01-12 DIAGNOSIS — M81.0 AGE-RELATED OSTEOPOROSIS WITHOUT CURRENT PATHOLOGICAL FRACTURE: Primary | ICD-10-CM

## 2021-01-12 DIAGNOSIS — I48.91 ATRIAL FIBRILLATION, UNSPECIFIED TYPE (H): ICD-10-CM

## 2021-01-12 DIAGNOSIS — C50.812 MALIGNANT NEOPLASM OF OVERLAPPING SITES OF LEFT BREAST IN FEMALE, ESTROGEN RECEPTOR POSITIVE (H): ICD-10-CM

## 2021-01-12 DIAGNOSIS — I48.0 AF (PAROXYSMAL ATRIAL FIBRILLATION) (H): ICD-10-CM

## 2021-01-12 LAB
ALBUMIN SERPL-MCNC: 3.9 G/DL (ref 3.4–5)
ALP SERPL-CCNC: 62 U/L (ref 40–150)
ALT SERPL W P-5'-P-CCNC: 21 U/L (ref 0–50)
ANION GAP SERPL CALCULATED.3IONS-SCNC: 4 MMOL/L (ref 3–14)
AST SERPL W P-5'-P-CCNC: 18 U/L (ref 0–45)
BILIRUB SERPL-MCNC: 0.7 MG/DL (ref 0.2–1.3)
BUN SERPL-MCNC: 20 MG/DL (ref 7–30)
CALCIUM SERPL-MCNC: 10.2 MG/DL (ref 8.5–10.1)
CAPILLARY BLOOD COLLECTION: NORMAL
CHLORIDE SERPL-SCNC: 104 MMOL/L (ref 94–109)
CO2 SERPL-SCNC: 30 MMOL/L (ref 20–32)
CREAT SERPL-MCNC: 1.18 MG/DL (ref 0.52–1.04)
GFR SERPL CREATININE-BSD FRML MDRD: 47 ML/MIN/{1.73_M2}
GLUCOSE SERPL-MCNC: 110 MG/DL (ref 70–99)
INR BLD: 2.1 (ref 0.86–1.14)
POTASSIUM SERPL-SCNC: 4.1 MMOL/L (ref 3.4–5.3)
PROT SERPL-MCNC: 7.9 G/DL (ref 6.8–8.8)
SODIUM SERPL-SCNC: 138 MMOL/L (ref 133–144)

## 2021-01-12 PROCEDURE — 80053 COMPREHEN METABOLIC PANEL: CPT | Performed by: INTERNAL MEDICINE

## 2021-01-12 PROCEDURE — 250N000011 HC RX IP 250 OP 636: Performed by: INTERNAL MEDICINE

## 2021-01-12 PROCEDURE — 36415 COLL VENOUS BLD VENIPUNCTURE: CPT | Performed by: INTERNAL MEDICINE

## 2021-01-12 PROCEDURE — 96372 THER/PROPH/DIAG INJ SC/IM: CPT | Performed by: INTERNAL MEDICINE

## 2021-01-12 PROCEDURE — 85610 PROTHROMBIN TIME: CPT | Performed by: FAMILY MEDICINE

## 2021-01-12 RX ORDER — EPINEPHRINE 1 MG/ML
0.3 INJECTION, SOLUTION INTRAMUSCULAR; SUBCUTANEOUS EVERY 5 MIN PRN
Status: CANCELLED | OUTPATIENT
Start: 2021-01-12

## 2021-01-12 RX ORDER — DIPHENHYDRAMINE HYDROCHLORIDE 50 MG/ML
50 INJECTION INTRAMUSCULAR; INTRAVENOUS
Status: CANCELLED
Start: 2021-01-12

## 2021-01-12 RX ORDER — ALBUTEROL SULFATE 0.83 MG/ML
2.5 SOLUTION RESPIRATORY (INHALATION)
Status: CANCELLED | OUTPATIENT
Start: 2021-01-12

## 2021-01-12 RX ORDER — MEPERIDINE HYDROCHLORIDE 25 MG/ML
25 INJECTION INTRAMUSCULAR; INTRAVENOUS; SUBCUTANEOUS EVERY 30 MIN PRN
Status: CANCELLED | OUTPATIENT
Start: 2021-07-05

## 2021-01-12 RX ORDER — NALOXONE HYDROCHLORIDE 0.4 MG/ML
.1-.4 INJECTION, SOLUTION INTRAMUSCULAR; INTRAVENOUS; SUBCUTANEOUS
Status: CANCELLED | OUTPATIENT
Start: 2021-01-12

## 2021-01-12 RX ORDER — ALBUTEROL SULFATE 90 UG/1
1-2 AEROSOL, METERED RESPIRATORY (INHALATION)
Status: CANCELLED
Start: 2021-07-05

## 2021-01-12 RX ORDER — DIPHENHYDRAMINE HYDROCHLORIDE 50 MG/ML
50 INJECTION INTRAMUSCULAR; INTRAVENOUS
Status: CANCELLED
Start: 2021-07-05

## 2021-01-12 RX ORDER — METHYLPREDNISOLONE SODIUM SUCCINATE 125 MG/2ML
125 INJECTION, POWDER, LYOPHILIZED, FOR SOLUTION INTRAMUSCULAR; INTRAVENOUS
Status: CANCELLED
Start: 2021-07-05

## 2021-01-12 RX ORDER — EPINEPHRINE 0.3 MG/.3ML
0.3 INJECTION SUBCUTANEOUS EVERY 5 MIN PRN
Status: CANCELLED | OUTPATIENT
Start: 2021-07-05

## 2021-01-12 RX ORDER — SODIUM CHLORIDE 9 MG/ML
1000 INJECTION, SOLUTION INTRAVENOUS CONTINUOUS PRN
Status: CANCELLED
Start: 2021-01-12

## 2021-01-12 RX ORDER — SODIUM CHLORIDE 9 MG/ML
1000 INJECTION, SOLUTION INTRAVENOUS CONTINUOUS PRN
Status: CANCELLED
Start: 2021-07-05

## 2021-01-12 RX ORDER — ALBUTEROL SULFATE 0.83 MG/ML
2.5 SOLUTION RESPIRATORY (INHALATION)
Status: CANCELLED | OUTPATIENT
Start: 2021-07-05

## 2021-01-12 RX ORDER — EPINEPHRINE 1 MG/ML
0.3 INJECTION, SOLUTION INTRAMUSCULAR; SUBCUTANEOUS EVERY 5 MIN PRN
Status: CANCELLED | OUTPATIENT
Start: 2021-07-05

## 2021-01-12 RX ORDER — EPINEPHRINE 0.3 MG/.3ML
0.3 INJECTION SUBCUTANEOUS EVERY 5 MIN PRN
Status: CANCELLED | OUTPATIENT
Start: 2021-01-12

## 2021-01-12 RX ORDER — ALBUTEROL SULFATE 90 UG/1
1-2 AEROSOL, METERED RESPIRATORY (INHALATION)
Status: CANCELLED
Start: 2021-01-12

## 2021-01-12 RX ORDER — NALOXONE HYDROCHLORIDE 0.4 MG/ML
.1-.4 INJECTION, SOLUTION INTRAMUSCULAR; INTRAVENOUS; SUBCUTANEOUS
Status: CANCELLED | OUTPATIENT
Start: 2021-07-05

## 2021-01-12 RX ORDER — METHYLPREDNISOLONE SODIUM SUCCINATE 125 MG/2ML
125 INJECTION, POWDER, LYOPHILIZED, FOR SOLUTION INTRAMUSCULAR; INTRAVENOUS
Status: CANCELLED
Start: 2021-01-12

## 2021-01-12 RX ORDER — MEPERIDINE HYDROCHLORIDE 25 MG/ML
25 INJECTION INTRAMUSCULAR; INTRAVENOUS; SUBCUTANEOUS EVERY 30 MIN PRN
Status: CANCELLED | OUTPATIENT
Start: 2021-01-12

## 2021-01-12 RX ADMIN — DENOSUMAB 60 MG: 60 INJECTION SUBCUTANEOUS at 12:22

## 2021-01-12 ASSESSMENT — PAIN SCALES - GENERAL: PAINLEVEL: MODERATE PAIN (4)

## 2021-01-12 NOTE — PROGRESS NOTES
Oncology Follow-up visit:  Date on this visit: Jan 22, 2021    PCP: Glen Blue     Diagnosis: Invasive lobular carcinoma left breast    Oncologic History:  1. Invasive lobular carcinoma left breast:  09/2018 Screening mammogram showed an area of architectural distortion the left breast.Ultrasound breast did not show any sonographic abnormality and recommendation was for a 6 month follow-up mammogram    04/2019 Mammogram and ultrasound showed a heterogenous mass in the left breast measuring 1.1 x 0.5 x 0.5 cm.  Biopsy of the mass came back showing invasive lobular carcinoma, Grade 2, ER/IL positive and HER-2/tulio negative by FISH. MRI breast bilateral showed 2 cm mass like enhancement in the left breast corresponding to the biopsy-proven carcinoma with no evidence of multifocal disease or axillary lymphadenopathy.     05/15/19 Lumpectomy with SNL dissection. Oncotype DX Recurrence score came back at 15 putting patient in the low-risk category and so did not recommend adjuvant systemic chemotherapy   Oncotype Score 15 with 4% risk of distant recurrence with hormonal therapy alone and <1% benefit from chemotherapy.     07/18/2019 Completed Adjuvant RT  July 2019- started Arimidex.  She transferred her care to me in July 2020, and was previously a patient of Dr. Hawkins and Dr. Denis.  2.  Bone health-DEXA scan in June 2019 showed findings c/w osteoporosis as below:  The right hip T-score is -2.7. The left hip T-score is -2.4.    She has started on Prolia q 6 months.    3. FHx of breast cancer- mother had breast cancer at 70+ years old. Sister had breast breast cancer when she was in her 40s. She had colon, stomach, pancreatic cancer as well.  Maternal cousin also has lobular breast cancer at 60. Maternal uncle also had colon cancer metastatic to the lungs. Brother had prostate cancer. She has one daughter and reports daughter has undergone genetic testing but she is not sure of the details.   Patient met with  genetic counselor on July 22, 2020 and 40 gene comprehensive cancer panel was drawn, but was not covered by her insurance and patient decided not to proceed.    History Of Present Illness:  70-year-old female with past medical history significant for Atrial fibrillation, hypertension, who presents for follow-up of pT2N0, ER/TN positive and HER-2/tulio negative by FISH invasive lobular carcinoma of left breast, s/p L lumpectomy and axillary SLN dissection in 05/2019.  She has completed adjuvant radiation on 7/18/2019 and she also started Arimidex in July 2019. .   She is taking calcium and vitamin D regularly.  She was seen by Dr. Blue in May 2020 with c/o breast pain. B/l diagnostic 3D mammogram was obtained along with targeted L breast US and showed: overall slightly increased density of the left  breast. Mild architectural distortion, surgical clips and skin  thickening of the left breast corresponds with post conservation  therapy changes. No other new mammographic findings of concern for  malignancy.   Targeted ultrasound of the inner lower left breast at the site of  symptoms demonstrates no sonographic abnormality to suggest  malignancy. Thickening of the skin in this location is likely due to  patient's radiation therapy. 6 months short interval f/up with L diagnostic mammogram was recommended.   Post conservation therapy changes of the left breast are  again noted. No new mammographic findings of concern for malignancy.  There are arterial calcifications  She has been undergoing annual low dose CT chest with Dr. Blue. CT chest in July 2020 demonstrated stable noncalcified pulmonary nodules one in the right upper lobe and 2 in the left lower lobe.   There were moderate to severe CACs.  She was seen by Dr. Bernardo from cardiology in July 2020.  She was recommended to continue on statin therapy and continue her antihypertensive regimen.  She continues on warfarin and diltiazem for atrial fibrillation.  She  has been tolerating anastrozole well.    She is on gabapentin for restless leg syndrome.  She is on venlafaxine which controls her symptoms of depression.  She is pain-free today.  She has COPD and has dyspnea on exertion, stable.  She uses  supplemental oxygen.    In addition, a complete 12 point  review of systems is negative.    Past Medical/Surgical History:  Past Medical History:   Diagnosis Date     Breast cancer (H) 2019    Left Breast     COPD (chronic obstructive pulmonary disease) (H)      Mixed hyperlipidemia      PONV (postoperative nausea and vomiting)      S/P radiation therapy     5,256 cGy to left breast completed on 2019 - [facility]     Past Surgical History:   Procedure Laterality Date     BIOPSY NODE SENTINEL Left 5/15/2019    Procedure: left sentinel node biopsy;  Surgeon: Donald Aleman MD;  Location: [facility] OR     BREAST BIOPSY, CORE RT/LT Left 2019     C APPENDECTOMY,W OTHR PROC       C  DELIVERY ONLY           C LIGATE FALLOPIAN TUBE       C VAGINAL HYSTERECTOMY       COLONOSCOPY  06/21/10     HC BIOPSY OF BREAST, OPEN INCISIONAL Right     Benign per patient     HC CORRECT BUNION,METATARSAL OSTEOTOMY        LAPAROSCOPY, SURGICAL; CHOLECYSTECTOMY  08/04/10     HC SLING OPERATION FOR STRESS INCONTINENCE  2007     HERNIORRHAPHY INCISIONAL (LOCATION)  2011    Procedure:HERNIORRHAPHY INCISIONAL (LOCATION); Surgeon:AYALA HOOVER; Location:[facility] OR     LAPAROSCOPIC HERNIORRHAPHY INCISIONAL  2011    Procedure:LAPAROSCOPIC HERNIORRHAPHY INCISIONAL; Laparoscopic mesh repair of incarcerated incisional hernia , extensive lysis of adhesions, excision of hernia sac and closure of fascia.; Surgeon:AYALA HOOVER; Location:[facility]     LAPAROSCOPIC LYSIS ADHESIONS  2011    Procedure:LAPAROSCOPIC LYSIS ADHESIONS; Surgeon:AYALA HOOVER; Location:[facility] OR     MASTECTOMY PARTIAL WITH NEEDLE LOCALIZATION Left  5/15/2019    Procedure: left wire localized partial mastectomy;  Surgeon: Donald Aleman MD;  Location: [facility]     TONSILLECTOMY       Z NONSPECIFIC PROCEDURE  1990    Exploratory laparotomy with resection of infarcted segment of omentum and minor lysis of adhesions     Z NONSPECIFIC PROCEDURE  1981    Removal of hemorrhagic corpus luteum cyst       Allergies:  Allergies as of 01/22/2021 - Reviewed 12/03/2020   Allergen Reaction Noted     Augmentin [amoxicillin-pot clavulanate] Nausea and Vomiting 10/18/2018     Meperidine hcl Nausea and Vomiting 04/17/2001     Seasonal allergies  04/05/2010     Current Medications:  Current Outpatient Medications   Medication Sig Dispense Refill     albuterol (PROAIR HFA/PROVENTIL HFA/VENTOLIN HFA) 108 (90 Base) MCG/ACT inhaler INHALE ONE TO TWO PUFFS BY MOUTH EVERY 2-4 HOURS AS NEEDED 18 g 1     albuterol (PROVENTIL) (2.5 MG/3ML) 0.083% neb solution Take  by nebulization. 1 NEB EVERY 4-6 HOURS AS NEEDED 75 mL 0     anastrozole (ARIMIDEX) 1 MG tablet Take 1 tablet (1 mg) by mouth daily 90 tablet 3     atorvastatin (LIPITOR) 10 MG tablet TAKE ONE TABLET BY MOUTH ONCE DAILY 90 tablet 2     BIOTIN PO Take by mouth daily       Calcium Carb-Cholecalciferol (CALCIUM 500 + D) 500-400 MG-UNIT TABS Take 1 tablet by mouth 2 times daily 180 tablet 3     Cyanocobalamin (B-12) 1000 MCG TBCR Take 1,000 mcg by mouth daily 100 tablet 1     diltiazem ER COATED BEADS (CARDIZEM CD/CARTIA XT) 120 MG 24 hr capsule TAKE ONE CAPSULE BY MOUTH ONCE DAILY ( LABS DUE ) 90 capsule 0     gabapentin (NEURONTIN) 300 MG capsule Take 3 capsules (900 mg) by mouth daily Take 2 capsules (600mg) at bedtime and 1 capsule (300mg) in morning. 90 capsule 1     hydrochlorothiazide (HYDRODIURIL) 25 MG tablet TAKE ONE TABLET BY MOUTH EVERY DAY 90 tablet 2     lisinopril (ZESTRIL) 2.5 MG tablet TAKE ONE TABLET BY MOUTH ONCE DAILY 90 tablet 2     magnesium 250 MG tablet Take 1 tablet by mouth daily 30 tablet       "mometasone-formoterol (DULERA) 200-5 MCG/ACT inhaler INHALE TWO PUFFS BY MOUTH TWICE A DAY 13 g 11     order for DME Equipment being ordered: Oxygen 1 each 0     order for DME Equipment being ordered: Nebulizer 1 Device 0     ORDER FOR DME Equipment being ordered: Oxygen @ 2 liters with exertion       predniSONE (DELTASONE) 10 MG tablet Take 2 tablets (20 mg) by mouth daily 10 tablet 4     Probiotic Product (PROBIOTIC PO) Take by mouth daily       trospium (SANCTURA) 20 MG tablet Take 20 mg by mouth       venlafaxine (EFFEXOR-XR) 75 MG 24 hr capsule Take 1 capsule (75 mg) by mouth daily 90 capsule 1     warfarin ANTICOAGULANT (JANTOVEN ANTICOAGULANT) 2.5 MG tablet TAKE ONE TABLET BY MOUTH ONCE DAILY OR AS DIRECTED BY THE COUMADIN CLINIC 90 tablet 0      Family History:  Family History   Problem Relation Age of Onset     Breast Cancer Mother      Obesity Mother      Genitourinary Problems Mother      Lipids Mother      Respiratory Sister      Lipids Sister      Breast Cancer Sister 40        s/p mastectomy     Social History:  Social History     Socioeconomic History     Marital status:      Spouse name: Nam     Number of children: 1     Years of education: 14     Highest education level: Not on file   Occupational History     Occupation: [employer]     Comment:  [employer]     Employer: [employer]   Tobacco Use     Smoking status: Former Smoker     Packs/day: 1.00     Years: 30.00     Pack years: 30.00     Last attempt to quit: 10/25/2005     Years since quittin.7     Smokeless tobacco: Never Used   Substance and Sexual Activity     Alcohol use: No     Alcohol/week: 0.0 standard drinks     Drug use: No     Sexual activity: Not Currently     Partners: Male     Birth control/protection: Female Surgical     Comment: hysterectomy     Physical Exam:  BP (!) 141/80 (BP Location: Right arm)   Pulse 85   Temp 97.1  F (36.2  C) (Oral)   Resp 20   Ht 1.549 m (5' 1\")   Wt 97.7 kg (215 lb 4.8 " oz)   SpO2 96%   BMI 40.68 kg/m        GENERAL APPEARANCE: healthy, alert and no distress  HEENT: PEERLA, no neck asymmetry or masses  Lymphatics: No cervical, supraclavicular, axillary lymphadenopathy bilaterally    CV: S1S2 reg no murmur  Respiratory: CTA b/l  GI: soft,  BS+, NT, ND  Extremities: no edema.    SKIN: no suspicious lesions or rashes    PSYCHIATRIC: mentation appears normal and affect normal  Neuro: gait intact. axox3  Breast exam: Status post left lumpectomy incisions well-healed.  No breast masses bilaterally.  No axillary adenopathy bilaterally.    Laboratory/Imaging Studies     Component      Latest Ref Rng & Units 1/12/2021   Sodium      133 - 144 mmol/L 138   Potassium      3.4 - 5.3 mmol/L 4.1   Chloride      94 - 109 mmol/L 104   Carbon Dioxide      20 - 32 mmol/L 30   Anion Gap      3 - 14 mmol/L 4   Glucose      70 - 99 mg/dL 110 (H)   Urea Nitrogen      7 - 30 mg/dL 20   Creatinine      0.52 - 1.04 mg/dL 1.18 (H)   GFR Estimate      >60 mL/min/1.73:m2 47 (L)   GFR Estimate If Black      >60 mL/min/1.73:m2 54 (L)   Calcium      8.5 - 10.1 mg/dL 10.2 (H)   Bilirubin Total      0.2 - 1.3 mg/dL 0.7   Albumin      3.4 - 5.0 g/dL 3.9   Protein Total      6.8 - 8.8 g/dL 7.9   Alkaline Phosphatase      40 - 150 U/L 62   ALT      0 - 50 U/L 21   AST      0 - 45 U/L 18         ASSESSMENT/PLAN:    70-year-old postmenopausal female with past medical history of atrial fibrillation, hypertension, with pT2N0, ER/[place] positive and HER-2/tulio negative by FISH  Oncotype Score 15 with 4% risk of distant recurrence with hormonal therapy alone and <1% benefit from chemotherapy so did not get adjuvant chemo.   She is s/p left lumpectomy and axillary SLN dissection in 05/2019. Completed adjuvant radiation on 7/18/2019.  She started Arimidex in July 2019.      1.  Invasive lobular carcinoma-continue anastrozole, tolerating well.  Follow-up in 6 months, with labs.    2. Breast Cancer Screening -she needs left  diagnostic mammogram follow-up in 6 months recommendation of breast radiologist as above.  She will be due for right-sided screening mammogram at that time as well, and will plan to proceed with bilateral diagnostic mammogram at that time.    3. Bone Health -  Osteoporosis.  Continue Prolia every 6 months including a dose today.  Plan repeat DEXA scan int he next year.     4.  Lung cancer screening- she has been undergoing annual low dose CT chest with Dr. Blue- reshma in July 2020.  She is aware she is overdue for an annual chest CT and she will be contacting Dr. Blue office to schedule    5.  Cardiac- CAD/A. Fib.-Continue on diltiazem and warfarin.  Continue current antihypertensive regimen and atorvastatin.  LDL and total cholesterol within normal limits in February 2020.    At the end of our visit patient verbalized understanding and concurred with the plan.    Addendum: b/l diagnostic mammogram and L breast US performed for evaluation of left upper inner L breast pain showed:  No suspicious lesions on mammogram b/l.  Post conservation therapy changes of the left breast are  essentially stable. No new mammographic findings of concern for  malignancy.      Targeted ultrasound of the upper inner left breast at site of pain  demonstrates a fluid collection with some solid rounded tissue. This  likely represents normal adipose tissues within a seroma in the left  breast. This corresponds with the site of pain. 3 month f/up US recommended.  She will be seeing our NP Angela Oliveira, in July and I placed an US order - expected in August.

## 2021-01-12 NOTE — PROGRESS NOTES
Infusion Nursing Note:  Latanya Padron presents today for Prolia.    Patient seen by provider today: No   present during visit today: Not Applicable.    Note: Patient denies new symptoms or concerns, VSS. Afebrile.  Patient did not meet criteria for an asymptomatic covid-19 PCR test in infusion today. Patient declined the covid-19 test.  Taking Calcium and Vit D supplement.    Intravenous Access:  No Intravenous access/labs at this visit.    Treatment Conditions:  Lab Results   Component Value Date     01/12/2021                   Lab Results   Component Value Date    POTASSIUM 4.1 01/12/2021           No results found for: MAG         Lab Results   Component Value Date    CR 1.18 01/12/2021                   Lab Results   Component Value Date    IVANNA 10.2 01/12/2021                Lab Results   Component Value Date    BILITOTAL 0.7 01/12/2021           Lab Results   Component Value Date    ALBUMIN 3.9 01/12/2021                    Lab Results   Component Value Date    ALT 21 01/12/2021           Lab Results   Component Value Date    AST 18 01/12/2021       Results reviewed, labs MET treatment parameters, ok to proceed with treatment.      Post Infusion Assessment:  Patient tolerated injection without incident.  Site asymptomatic.       Discharge Plan:   Copy of AVS reviewed with patient and/or family.  Patient will return in 6 mo. for next appointment.  Patient discharged in stable condition accompanied by: self.  Departure Mode: Ambulatory.    Marlin Alcala RN

## 2021-01-12 NOTE — PROGRESS NOTES
ANTICOAGULATION MANAGEMENT     Patient Name:  Latanya Padron  Date:  2021    ASSESSMENT /SUBJECTIVE:    Today's INR result of 2.1 is therapeutic. Goal INR of 2.0-3.0      Warfarin dose taken: Warfarin taken as instructed    Diet: No new diet changes affecting INR    Medication changes/ interactions: No new medications/supplements affecting INR    Previous INR: Therapeutic     S/S of bleeding or thromboembolism: No    New injury or illness: No    Upcoming surgery, procedure or cardioversion: No    Additional findings: None      PLAN:    Telephone call with Latanya regarding INR result and instructed:     Warfarin Dosing Instructions: Continue your current warfarin dose 2.5 mg daily    Instructed patient to follow up no later than: 6 weeks  Lab visit scheduled    Education provided: None required      Pt verbalizes understanding and agrees to warfarin dosing plan.    Instructed to call the Anticoagulation Clinic for any changes, questions or concerns. (#[phone])        Francine Andrew RN      OBJECTIVE:  Recent labs: (last 7 days)     21  1242   INR 2.1*         INR assessment THER    Recheck INR In: 6 WEEKS    INR Location Outside lab      Anticoagulation Summary  As of 2021    INR goal:  2.0-3.0   TTR:  79.3 % (1 y)   INR used for dosin.1 (2021)   Warfarin maintenance plan:  2.5 mg (5 mg x 0.5) every day   Full warfarin instructions:  2.5 mg every day   Weekly warfarin total:  17.5 mg   No change documented:  Francine Andrew RN   Plan last modified:  Francine Andrew RN (2020)   Next INR check:  2021   Priority:  Maintenance   Target end date:  Indefinite    Indications    AF (paroxysmal atrial fibrillation) (H) [I48.0]  Atrial fibrillation (H) [I48.91]  Long term current use of anticoagulant therapy [Z79.01]  Atrial fibrillation  unspecified type (H) [I48.91]             Anticoagulation Episode Summary     INR check location:      Preferred lab:      Send INR  reminders to:  [facility]    Comments:  5 mg tabs, likes card, PM dose      Anticoagulation Care Providers     Provider Role Specialty Phone number    Glen Blue MD Referring Family Medicine [phone]

## 2021-01-22 ENCOUNTER — ONCOLOGY VISIT (OUTPATIENT)
Dept: ONCOLOGY | Facility: CLINIC | Age: 71
End: 2021-01-22
Attending: INTERNAL MEDICINE
Payer: COMMERCIAL

## 2021-01-22 VITALS
BODY MASS INDEX: 40.65 KG/M2 | SYSTOLIC BLOOD PRESSURE: 141 MMHG | HEART RATE: 85 BPM | DIASTOLIC BLOOD PRESSURE: 80 MMHG | TEMPERATURE: 97.1 F | WEIGHT: 215.3 LBS | RESPIRATION RATE: 20 BRPM | OXYGEN SATURATION: 96 % | HEIGHT: 61 IN

## 2021-01-22 DIAGNOSIS — R93.89 IMAGING ABNORMALITY: ICD-10-CM

## 2021-01-22 DIAGNOSIS — C50.812 MALIGNANT NEOPLASM OF OVERLAPPING SITES OF LEFT BREAST IN FEMALE, ESTROGEN RECEPTOR POSITIVE (H): ICD-10-CM

## 2021-01-22 DIAGNOSIS — N64.4 BREAST PAIN, LEFT: ICD-10-CM

## 2021-01-22 DIAGNOSIS — Z12.31 VISIT FOR SCREENING MAMMOGRAM: Primary | ICD-10-CM

## 2021-01-22 DIAGNOSIS — Z17.0 MALIGNANT NEOPLASM OF OVERLAPPING SITES OF LEFT BREAST IN FEMALE, ESTROGEN RECEPTOR POSITIVE (H): ICD-10-CM

## 2021-01-22 PROCEDURE — 99214 OFFICE O/P EST MOD 30 MIN: CPT | Performed by: INTERNAL MEDICINE

## 2021-01-22 RX ORDER — ANASTROZOLE 1 MG/1
1 TABLET ORAL DAILY
Qty: 90 TABLET | Refills: 3 | Status: SHIPPED | OUTPATIENT
Start: 2021-01-22 | End: 2022-01-07

## 2021-01-22 ASSESSMENT — PAIN SCALES - GENERAL: PAINLEVEL: NO PAIN (0)

## 2021-01-22 ASSESSMENT — MIFFLIN-ST. JEOR: SCORE: 1438.97

## 2021-01-22 NOTE — NURSING NOTE
"Oncology Rooming Note    January 22, 2021 12:19 PM   Latanya Padron is a 70 year old female who presents for:    Chief Complaint   Patient presents with     Oncology Clinic Visit     Follow up     Initial Vitals: BP (!) 141/80 (BP Location: Right arm)   Pulse 85   Temp 97.1  F (36.2  C) (Oral)   Resp 20   Ht 1.549 m (5' 1\")   Wt 97.7 kg (215 lb 4.8 oz)   SpO2 96%   BMI 40.68 kg/m   Estimated body mass index is 40.68 kg/m  as calculated from the following:    Height as of this encounter: 1.549 m (5' 1\").    Weight as of this encounter: 97.7 kg (215 lb 4.8 oz). Body surface area is 2.05 meters squared.  No Pain (0) Comment: Data Unavailable   No LMP recorded. Patient has had a hysterectomy.  Allergies reviewed: Yes  Medications reviewed: Yes    Medications: Medication refills not needed today.  Pharmacy name entered into EPIC:    [id number] [place], [place] - [address] [facility] [place], [place] - [address] [facility] [address]     Clinical concerns: None       Love Garibay CMA            "

## 2021-01-22 NOTE — LETTER
1/22/2021         RE: Latanya Padron  [address]  [facility] [address]        Dear Colleague,    Thank you for referring your patient, Latanya Padron, to the [facility]. Please see a copy of my visit note below.    Oncology Follow-up visit:  Date on this visit: Jan 22, 2021    PCP: Glen Blue     Diagnosis: Invasive lobular carcinoma left breast    Oncologic History:  1. Invasive lobular carcinoma left breast:  09/2018 Screening mammogram showed an area of architectural distortion the left breast.Ultrasound breast did not show any sonographic abnormality and recommendation was for a 6 month follow-up mammogram    04/2019 Mammogram and ultrasound showed a heterogenous mass in the left breast measuring 1.1 x 0.5 x 0.5 cm.  Biopsy of the mass came back showing invasive lobular carcinoma, Grade 2, ER/KS positive and HER-2/tulio negative by FISH. MRI breast bilateral showed 2 cm mass like enhancement in the left breast corresponding to the biopsy-proven carcinoma with no evidence of multifocal disease or axillary lymphadenopathy.     05/15/19 Lumpectomy with SNL dissection. Oncotype DX Recurrence score came back at 15 putting patient in the low-risk category and so did not recommend adjuvant systemic chemotherapy   Oncotype Score 15 with 4% risk of distant recurrence with hormonal therapy alone and <1% benefit from chemotherapy.     07/18/2019 Completed Adjuvant RT  July 2019- started Arimidex.  She transferred her care to me in July 2020, and was previously a patient of Dr. Hawkins and Dr. Denis.  2.  Bone health-DEXA scan in June 2019 showed findings c/w osteoporosis as below:  The right hip T-score is -2.7. The left hip T-score is -2.4.    She has started on Prolia q 6 months.    3. FHx of breast cancer- mother had breast cancer at 70+ years old. Sister had breast breast cancer when she was in her 40s. She had colon, stomach, pancreatic cancer as well.  Maternal cousin  also has lobular breast cancer at 60. Maternal uncle also had colon cancer metastatic to the lungs. Brother had prostate cancer. She has one daughter and reports daughter has undergone genetic testing but she is not sure of the details.   Patient met with genetic counselor on July 22, 2020 and 40 gene comprehensive cancer panel was drawn, but was not covered by her insurance and patient decided not to proceed.    History Of Present Illness:  70-year-old female with past medical history significant for Atrial fibrillation, hypertension, who presents for follow-up of pT2N0, ER/SD positive and HER-2/tulio negative by FISH invasive lobular carcinoma of left breast, s/p L lumpectomy and axillary SLN dissection in 05/2019.  She has completed adjuvant radiation on 7/18/2019 and she also started Arimidex in July 2019. .   She is taking calcium and vitamin D regularly.  She was seen by Dr. Blue in May 2020 with c/o breast pain. B/l diagnostic 3D mammogram was obtained along with targeted L breast US and showed: overall slightly increased density of the left  breast. Mild architectural distortion, surgical clips and skin  thickening of the left breast corresponds with post conservation  therapy changes. No other new mammographic findings of concern for  malignancy.   Targeted ultrasound of the inner lower left breast at the site of  symptoms demonstrates no sonographic abnormality to suggest  malignancy. Thickening of the skin in this location is likely due to  patient's radiation therapy. 6 months short interval f/up with L diagnostic mammogram was recommended.   Post conservation therapy changes of the left breast are  again noted. No new mammographic findings of concern for malignancy.  There are arterial calcifications  She has been undergoing annual low dose CT chest with Dr. Blue. CT chest in July 2020 demonstrated stable noncalcified pulmonary nodules one in the right upper lobe and 2 in the left lower lobe.   There  were moderate to severe CACs.  She was seen by Dr. Bernardo from cardiology in 2020.  She was recommended to continue on statin therapy and continue her antihypertensive regimen.  She continues on warfarin and diltiazem for atrial fibrillation.  She has been tolerating anastrozole well.    She is on gabapentin for restless leg syndrome.  She is on venlafaxine which controls her symptoms of depression.  She is pain-free today.  She has COPD and has dyspnea on exertion, stable.  She uses  supplemental oxygen.    In addition, a complete 12 point  review of systems is negative.    Past Medical/Surgical History:  Past Medical History:   Diagnosis Date     Breast cancer (H) 2019    Left Breast     COPD (chronic obstructive pulmonary disease) (H)      Mixed hyperlipidemia      PONV (postoperative nausea and vomiting)      S/P radiation therapy     5,256 cGy to left breast completed on 2019 - [facility]     Past Surgical History:   Procedure Laterality Date     BIOPSY NODE SENTINEL Left 5/15/2019    Procedure: left sentinel node biopsy;  Surgeon: Donald Aleman MD;  Location: [facility] OR     BREAST BIOPSY, CORE RT/LT Left 2019     C APPENDECTOMY,W OTHR PROC       C  DELIVERY ONLY           C LIGATE FALLOPIAN TUBE  's     C VAGINAL HYSTERECTOMY       COLONOSCOPY  06/21/10     HC BIOPSY OF BREAST, OPEN INCISIONAL Right     Benign per patient     HC CORRECT BUNION,METATARSAL OSTEOTOMY        LAPAROSCOPY, SURGICAL; CHOLECYSTECTOMY  08/04/10      SLING OPERATION FOR STRESS INCONTINENCE  2007     HERNIORRHAPHY INCISIONAL (LOCATION)  2011    Procedure:HERNIORRHAPHY INCISIONAL (LOCATION); Surgeon:AYALA HOOVER; Location:[facility] OR     LAPAROSCOPIC HERNIORRHAPHY INCISIONAL  2011    Procedure:LAPAROSCOPIC HERNIORRHAPHY INCISIONAL; Laparoscopic mesh repair of incarcerated incisional hernia , extensive lysis of adhesions, excision of hernia sac and  closure of fascia.; Surgeon:AYALA HOOVER; Location:[facility] OR     LAPAROSCOPIC LYSIS ADHESIONS  9/23/2011    Procedure:LAPAROSCOPIC LYSIS ADHESIONS; Surgeon:AYALA HOOVER; Location:[facility] OR     MASTECTOMY PARTIAL WITH NEEDLE LOCALIZATION Left 5/15/2019    Procedure: left wire localized partial mastectomy;  Surgeon: Donald Aleman MD;  Location: [facility] OR     TONSILLECTOMY       [facility] NONSPECIFIC PROCEDURE  1990    Exploratory laparotomy with resection of infarcted segment of omentum and minor lysis of adhesions     [facility] NONSPECIFIC PROCEDURE  1981    Removal of hemorrhagic corpus luteum cyst       Allergies:  Allergies as of 01/22/2021 - Reviewed 12/03/2020   Allergen Reaction Noted     Augmentin [amoxicillin-pot clavulanate] Nausea and Vomiting 10/18/2018     Meperidine hcl Nausea and Vomiting 04/17/2001     Seasonal allergies  04/05/2010     Current Medications:  Current Outpatient Medications   Medication Sig Dispense Refill     albuterol (PROAIR HFA/PROVENTIL HFA/VENTOLIN HFA) 108 (90 Base) MCG/ACT inhaler INHALE ONE TO TWO PUFFS BY MOUTH EVERY 2-4 HOURS AS NEEDED 18 g 1     albuterol (PROVENTIL) (2.5 MG/3ML) 0.083% neb solution Take  by nebulization. 1 NEB EVERY 4-6 HOURS AS NEEDED 75 mL 0     anastrozole (ARIMIDEX) 1 MG tablet Take 1 tablet (1 mg) by mouth daily 90 tablet 3     atorvastatin (LIPITOR) 10 MG tablet TAKE ONE TABLET BY MOUTH ONCE DAILY 90 tablet 2     BIOTIN PO Take by mouth daily       Calcium Carb-Cholecalciferol (CALCIUM 500 + D) 500-400 MG-UNIT TABS Take 1 tablet by mouth 2 times daily 180 tablet 3     Cyanocobalamin (B-12) 1000 MCG TBCR Take 1,000 mcg by mouth daily 100 tablet 1     diltiazem ER COATED BEADS (CARDIZEM CD/CARTIA XT) 120 MG 24 hr capsule TAKE ONE CAPSULE BY MOUTH ONCE DAILY ( LABS DUE ) 90 capsule 0     gabapentin (NEURONTIN) 300 MG capsule Take 3 capsules (900 mg) by mouth daily Take 2 capsules (600mg) at bedtime and 1 capsule (300mg) in morning. 90 capsule 1      hydrochlorothiazide (HYDRODIURIL) 25 MG tablet TAKE ONE TABLET BY MOUTH EVERY DAY 90 tablet 2     lisinopril (ZESTRIL) 2.5 MG tablet TAKE ONE TABLET BY MOUTH ONCE DAILY 90 tablet 2     magnesium 250 MG tablet Take 1 tablet by mouth daily 30 tablet      mometasone-formoterol (DULERA) 200-5 MCG/ACT inhaler INHALE TWO PUFFS BY MOUTH TWICE A DAY 13 g 11     order for DME Equipment being ordered: Oxygen 1 each 0     order for DME Equipment being ordered: Nebulizer 1 Device 0     ORDER FOR DME Equipment being ordered: Oxygen @ 2 liters with exertion       predniSONE (DELTASONE) 10 MG tablet Take 2 tablets (20 mg) by mouth daily 10 tablet 4     Probiotic Product (PROBIOTIC PO) Take by mouth daily       trospium (SANCTURA) 20 MG tablet Take 20 mg by mouth       venlafaxine (EFFEXOR-XR) 75 MG 24 hr capsule Take 1 capsule (75 mg) by mouth daily 90 capsule 1     warfarin ANTICOAGULANT (JANTOVEN ANTICOAGULANT) 2.5 MG tablet TAKE ONE TABLET BY MOUTH ONCE DAILY OR AS DIRECTED BY THE COUMADIN CLINIC 90 tablet 0      Family History:  Family History   Problem Relation Age of Onset     Breast Cancer Mother      Obesity Mother      Genitourinary Problems Mother      Lipids Mother      Respiratory Sister      Lipids Sister      Breast Cancer Sister 40        s/p mastectomy     Social History:  Social History     Socioeconomic History     Marital status:      Spouse name: Nam     Number of children: 1     Years of education: 14     Highest education level: Not on file   Occupational History     Occupation: Ins Cordinater     Comment:  [employer]     Employer: [employer]   Tobacco Use     Smoking status: Former Smoker     Packs/day: 1.00     Years: 30.00     Pack years: 30.00     Last attempt to quit: 10/25/2005     Years since quittin.7     Smokeless tobacco: Never Used   Substance and Sexual Activity     Alcohol use: No     Alcohol/week: 0.0 standard drinks     Drug use: No     Sexual activity: Not Currently     " Partners: Male     Birth control/protection: Female Surgical     Comment: hysterectomy     Physical Exam:  BP (!) 141/80 (BP Location: Right arm)   Pulse 85   Temp 97.1  F (36.2  C) (Oral)   Resp 20   Ht 1.549 m (5' 1\")   Wt 97.7 kg (215 lb 4.8 oz)   SpO2 96%   BMI 40.68 kg/m        GENERAL APPEARANCE: healthy, alert and no distress  HEENT: PEERLA, no neck asymmetry or masses  Lymphatics: No cervical, supraclavicular, axillary lymphadenopathy bilaterally    CV: S1S2 reg no murmur  Respiratory: CTA b/l  GI: soft,  BS+, NT, ND  Extremities: no edema.    SKIN: no suspicious lesions or rashes    PSYCHIATRIC: mentation appears normal and affect normal  Neuro: gait intact. axox3  Breast exam: Status post left lumpectomy incisions well-healed.  No breast masses bilaterally.  No axillary adenopathy bilaterally.    Laboratory/Imaging Studies     Component      Latest Ref Rng & Units 1/12/2021   Sodium      133 - 144 mmol/L 138   Potassium      3.4 - 5.3 mmol/L 4.1   Chloride      94 - 109 mmol/L 104   Carbon Dioxide      20 - 32 mmol/L 30   Anion Gap      3 - 14 mmol/L 4   Glucose      70 - 99 mg/dL 110 (H)   Urea Nitrogen      7 - 30 mg/dL 20   Creatinine      0.52 - 1.04 mg/dL 1.18 (H)   GFR Estimate      >60 mL/min/1.73:m2 47 (L)   GFR Estimate If Black      >60 mL/min/1.73:m2 54 (L)   Calcium      8.5 - 10.1 mg/dL 10.2 (H)   Bilirubin Total      0.2 - 1.3 mg/dL 0.7   Albumin      3.4 - 5.0 g/dL 3.9   Protein Total      6.8 - 8.8 g/dL 7.9   Alkaline Phosphatase      40 - 150 U/L 62   ALT      0 - 50 U/L 21   AST      0 - 45 U/L 18         ASSESSMENT/PLAN:    70-year-old postmenopausal female with past medical history of atrial fibrillation, hypertension, with pT2N0, ER/GA positive and HER-2/tulio negative by FISH  Oncotype Score 15 with 4% risk of distant recurrence with hormonal therapy alone and <1% benefit from chemotherapy so did not get adjuvant chemo.   She is s/p left lumpectomy and axillary SLN dissection in " 05/2019. Completed adjuvant radiation on 7/18/2019.  She started Arimidex in July 2019.      1.  Invasive lobular carcinoma-continue anastrozole, tolerating well.  Follow-up in 6 months, with labs.    2. Breast Cancer Screening -she needs left diagnostic mammogram follow-up in 6 months recommendation of breast radiologist as above.  She will be due for right-sided screening mammogram at that time as well, and will plan to proceed with bilateral diagnostic mammogram at that time.    3. Bone Health -  Osteoporosis.  Continue Prolia every 6 months including a dose today.  Plan repeat DEXA scan int he next year.     4.  Lung cancer screening- she has been undergoing annual low dose CT chest with Dr. Blue- reshma in July 2020.  She is aware she is overdue for an annual chest CT and she will be contacting Dr. Blue office to schedule    5.  Cardiac- CAD/A. Fib.-Continue on diltiazem and warfarin.  Continue current antihypertensive regimen and atorvastatin.  LDL and total cholesterol within normal limits in February 2020.    At the end of our visit patient verbalized understanding and concurred with the plan.                    Again, thank you for allowing me to participate in the care of your patient.        Sincerely,        Prisca Cortez MD, MD

## 2021-01-23 DIAGNOSIS — I10 HYPERTENSION, GOAL BELOW 140/90: ICD-10-CM

## 2021-01-25 NOTE — TELEPHONE ENCOUNTER
Patient has upcoming appointment with Dr. Jones    Routing refill request to provider for review/approval because:  Labs out of range:  Creatinine    Iliana Garrett RN

## 2021-01-26 RX ORDER — HYDROCHLOROTHIAZIDE 25 MG/1
TABLET ORAL
Qty: 90 TABLET | Refills: 2 | Status: SHIPPED | OUTPATIENT
Start: 2021-01-26 | End: 2021-10-21

## 2021-01-31 DIAGNOSIS — I10 HYPERTENSION, GOAL BELOW 140/90: ICD-10-CM

## 2021-02-01 DIAGNOSIS — E78.5 HYPERLIPIDEMIA LDL GOAL <130: ICD-10-CM

## 2021-02-01 DIAGNOSIS — I48.0 AF (PAROXYSMAL ATRIAL FIBRILLATION) (H): ICD-10-CM

## 2021-02-01 DIAGNOSIS — I48.91 ATRIAL FIBRILLATION, UNSPECIFIED TYPE (H): ICD-10-CM

## 2021-02-02 RX ORDER — LISINOPRIL 2.5 MG/1
TABLET ORAL
Qty: 30 TABLET | Refills: 0 | Status: SHIPPED | OUTPATIENT
Start: 2021-02-02 | End: 2021-02-25

## 2021-02-02 NOTE — TELEPHONE ENCOUNTER
Patient has an appointment next week with Dr. Jones for physical.     Medication is being filled for 1 time refill only due to:  Patient needs to be seen because due for appt.     MARCELO LeavittN, RN  [facility]

## 2021-02-03 RX ORDER — ATORVASTATIN CALCIUM 10 MG/1
TABLET, FILM COATED ORAL
Qty: 90 TABLET | Refills: 0 | Status: SHIPPED | OUTPATIENT
Start: 2021-02-03 | End: 2021-05-11

## 2021-02-03 RX ORDER — WARFARIN SODIUM 2.5 MG/1
TABLET ORAL
Qty: 90 TABLET | Refills: 0 | Status: SHIPPED | OUTPATIENT
Start: 2021-02-03 | End: 2021-05-04

## 2021-02-03 NOTE — TELEPHONE ENCOUNTER
Lipitor Prescription approved per [facility] Refill Protocol.  Forwarding Warfarin to Coumadin Care Providers for review as they have been monitoring this pt. .....LOVE Barnhart

## 2021-02-03 NOTE — TELEPHONE ENCOUNTER
Warfarin  Last Written Prescription Date:  11/11/20  Last Fill Quantity: 90,  # refills: 0   Last office visit: 2/26/2020 with prescribing provider:   Dr. Blue   Future Office Visit:   Next 5 appointments (look out 90 days)    Feb 10, 2021  7:00 AM  PHYSICAL with Nazario Jones MD  [facility] [address] [place] [id number] [phone]         Forwarding Warfarin RX to Primary Coumadin  Care Providers as they have been monitoring the pt.........................LOVE Barnhart

## 2021-02-08 ASSESSMENT — ENCOUNTER SYMPTOMS
NERVOUS/ANXIOUS: 0
DIZZINESS: 0
HEMATOCHEZIA: 0
FEVER: 0
HEMATURIA: 0
NAUSEA: 0
DYSURIA: 0
HEADACHES: 0
PARESTHESIAS: 0
SHORTNESS OF BREATH: 1
WEAKNESS: 0
CHILLS: 0
HEARTBURN: 0
MYALGIAS: 0
SORE THROAT: 0
CONSTIPATION: 0
PALPITATIONS: 0
JOINT SWELLING: 0
EYE PAIN: 0
ARTHRALGIAS: 1
COUGH: 0
FREQUENCY: 1
ABDOMINAL PAIN: 0
DIARRHEA: 0
BREAST MASS: 0

## 2021-02-08 ASSESSMENT — ACTIVITIES OF DAILY LIVING (ADL): CURRENT_FUNCTION: NO ASSISTANCE NEEDED

## 2021-02-10 ENCOUNTER — OFFICE VISIT (OUTPATIENT)
Dept: FAMILY MEDICINE | Facility: CLINIC | Age: 71
End: 2021-02-10
Payer: COMMERCIAL

## 2021-02-10 ENCOUNTER — TELEPHONE (OUTPATIENT)
Dept: FAMILY MEDICINE | Facility: CLINIC | Age: 71
End: 2021-02-10

## 2021-02-10 VITALS
TEMPERATURE: 97.5 F | OXYGEN SATURATION: 94 % | HEIGHT: 62 IN | HEART RATE: 107 BPM | DIASTOLIC BLOOD PRESSURE: 74 MMHG | SYSTOLIC BLOOD PRESSURE: 144 MMHG | WEIGHT: 216 LBS | RESPIRATION RATE: 20 BRPM | BODY MASS INDEX: 39.75 KG/M2

## 2021-02-10 DIAGNOSIS — E66.01 MORBID OBESITY, UNSPECIFIED OBESITY TYPE (H): ICD-10-CM

## 2021-02-10 DIAGNOSIS — I48.20 CHRONIC ATRIAL FIBRILLATION (H): ICD-10-CM

## 2021-02-10 DIAGNOSIS — Z12.11 COLON CANCER SCREENING: ICD-10-CM

## 2021-02-10 DIAGNOSIS — N18.30 STAGE 3 CHRONIC KIDNEY DISEASE, UNSPECIFIED WHETHER STAGE 3A OR 3B CKD (H): ICD-10-CM

## 2021-02-10 DIAGNOSIS — I10 ESSENTIAL HYPERTENSION, BENIGN: ICD-10-CM

## 2021-02-10 DIAGNOSIS — F32.5 MAJOR DEPRESSION IN COMPLETE REMISSION (H): ICD-10-CM

## 2021-02-10 DIAGNOSIS — J43.9 PULMONARY EMPHYSEMA, UNSPECIFIED EMPHYSEMA TYPE (H): ICD-10-CM

## 2021-02-10 DIAGNOSIS — Z00.00 ENCOUNTER FOR MEDICARE ANNUAL WELLNESS EXAM: Primary | ICD-10-CM

## 2021-02-10 DIAGNOSIS — Z12.11 COLON CANCER SCREENING: Primary | ICD-10-CM

## 2021-02-10 DIAGNOSIS — E78.2 MIXED HYPERLIPIDEMIA: ICD-10-CM

## 2021-02-10 PROBLEM — R22.1 NECK MASS: Status: RESOLVED | Noted: 2018-06-20 | Resolved: 2021-02-10

## 2021-02-10 PROBLEM — I48.91 ATRIAL FIBRILLATION, UNSPECIFIED TYPE (H): Status: RESOLVED | Noted: 2020-10-20 | Resolved: 2021-02-10

## 2021-02-10 PROCEDURE — 99397 PER PM REEVAL EST PAT 65+ YR: CPT | Performed by: FAMILY MEDICINE

## 2021-02-10 RX ORDER — BISACODYL 5 MG
TABLET, DELAYED RELEASE (ENTERIC COATED) ORAL
Qty: 4 TABLET | Refills: 0 | Status: SHIPPED | OUTPATIENT
Start: 2021-02-10 | End: 2022-01-07

## 2021-02-10 RX ORDER — DILTIAZEM HYDROCHLORIDE 120 MG/1
CAPSULE, COATED, EXTENDED RELEASE ORAL
Qty: 90 CAPSULE | Refills: 3 | Status: SHIPPED | OUTPATIENT
Start: 2021-02-10 | End: 2022-03-30

## 2021-02-10 ASSESSMENT — ENCOUNTER SYMPTOMS
DIARRHEA: 0
WEAKNESS: 0
SHORTNESS OF BREATH: 1
MYALGIAS: 0
COUGH: 0
CONSTIPATION: 0
DYSURIA: 0
BREAST MASS: 0
FREQUENCY: 1
ARTHRALGIAS: 1
DIZZINESS: 0
HEADACHES: 0
CHILLS: 0
JOINT SWELLING: 0
NAUSEA: 0
HEARTBURN: 0
SORE THROAT: 0
EYE PAIN: 0
HEMATURIA: 0
FEVER: 0
HEMATOCHEZIA: 0
NERVOUS/ANXIOUS: 0
ABDOMINAL PAIN: 0
PALPITATIONS: 0
PARESTHESIAS: 0

## 2021-02-10 ASSESSMENT — MIFFLIN-ST. JEOR: SCORE: 1450.08

## 2021-02-10 ASSESSMENT — PATIENT HEALTH QUESTIONNAIRE - PHQ9: SUM OF ALL RESPONSES TO PHQ QUESTIONS 1-9: 1

## 2021-02-10 ASSESSMENT — ACTIVITIES OF DAILY LIVING (ADL): CURRENT_FUNCTION: NO ASSISTANCE NEEDED

## 2021-02-10 ASSESSMENT — PAIN SCALES - GENERAL: PAINLEVEL: NO PAIN (0)

## 2021-02-10 NOTE — PROGRESS NOTES
"SUBJECTIVE:   Latanya Padron is a 70 year old female who presents for Preventive Visit.    Patient has been advised of split billing requirements and indicates understanding: Yes   Are you in the first 12 months of your Medicare coverage?  No    Healthy Habits:     In general, how would you rate your overall health?  Good    Frequency of exercise:  None    Do you usually eat at least 4 servings of fruit and vegetables a day, include whole grains    & fiber and avoid regularly eating high fat or \"junk\" foods?  Yes    Taking medications regularly:  Yes    Medication side effects:  None    Ability to successfully perform activities of daily living:  No assistance needed    Home Safety:  No safety concerns identified    Hearing Impairment:  No hearing concerns    In the past 6 months, have you been bothered by leaking of urine?  No    In general, how would you rate your overall mental or emotional health?  Good      PHQ-2 Total Score: 0    Additional concerns today:  No    Do you feel safe in your environment? Yes    Have you ever done Advance Care Planning? (For example, a Health Directive, POLST, or a discussion with a medical provider or your loved ones about your wishes): Yes, advance care planning is on file.    Fall risk  Fallen 2 or more times in the past year?: No  Any fall with injury in the past year?: No  click delete button to remove this line now  Cognitive Screening   1) Repeat 3 items (Leader, Season, Table)    2) Clock draw: NORMAL  3) 3 item recall: Recalls 1 object   Results: NORMAL clock, 1-2 items recalled: COGNITIVE IMPAIRMENT LESS LIKELY    Mini-CogTM Copyright RASHAD Mcdonnell. Licensed by the author for use in [facility]; reprinted with permission (jhony@.[email or web address]). All rights reserved.      Do you have sleep apnea, excessive snoring or daytime drowsiness?: no    Reviewed and updated as needed this visit by clinical staff  Tobacco  Allergies  Meds              Reviewed and updated as needed " this visit by Provider                Social History     Tobacco Use     Smoking status: Former Smoker     Packs/day: 1.00     Years: 30.00     Pack years: 30.00     Quit date: 10/25/2005     Years since quitting: 15.3     Smokeless tobacco: Never Used   Substance Use Topics     Alcohol use: No     Alcohol/week: 0.0 standard drinks     If you drink alcohol do you typically have >3 drinks per day or >7 drinks per week? No    Alcohol Use 2/8/2021   Prescreen: >3 drinks/day or >7 drinks/week? No   Prescreen: >3 drinks/day or >7 drinks/week? -   No flowsheet data found.    PROBLEMS TO ADD ON...  Her COPD is doing well on her current medications.  She is really not having any particular issues.  She is due for a colonoscopy.  She doesn't need to have any refills currently.      Current providers sharing in care for this patient include:   Patient Care Team:  Glen Blue MD as PCP - General (Family Practice)  Glen Blue MD as Assigned PCP  Jorge Ocampo RN as Specialty Care Coordinator (Oncology)  Greg Denis MD as MD (Hematology & Oncology)  Donald Aleman MD as MD (Surgery)  Jazmin Desir, RN as Specialty Care Coordinator (Radiation Oncology)  Julita Gaxiola MD as MD (Radiation Oncology)  Dat Cavanaugh DO as Assigned Musculoskeletal Provider  Cain Bernardo MD as Assigned Heart and Vascular Provider  Trevor Mclaughlin MD as Assigned Surgical Provider  Prisca Cortez MD as Assigned Cancer Care Provider    The following health maintenance items are reviewed in Epic and correct as of today:  Health Maintenance   Topic Date Due     MEDICARE ANNUAL WELLNESS VISIT  12/05/2017     FALL RISK ASSESSMENT  06/17/2020     COLORECTAL CANCER SCREENING  06/21/2020     LIPID  02/26/2021     MICROALBUMIN  02/26/2021     PHQ-9  02/28/2021     ADVANCE CARE PLANNING  12/05/2021     BMP  01/12/2022     DTAP/TDAP/TD IMMUNIZATION (4 - Td) 09/20/2022     MAMMO SCREENING  11/16/2022      DEXA  06/14/2034     SPIROMETRY  Completed     HEPATITIS C SCREENING  Completed     COPD ACTION PLAN  Completed     DEPRESSION ACTION PLAN  Completed     INFLUENZA VACCINE  Completed     Pneumococcal Vaccine: 65+ Years  Completed     ZOSTER IMMUNIZATION  Completed     Pneumococcal Vaccine: Pediatrics (0 to 5 Years) and At-Risk Patients (6 to 64 Years)  Aged Out     IPV IMMUNIZATION  Aged Out     MENINGITIS IMMUNIZATION  Aged Out     HEPATITIS B IMMUNIZATION  Aged Out     Lab work is in process  Labs reviewed in EPIC  BP Readings from Last 3 Encounters:   02/10/21 (!) 144/74   01/22/21 (!) 141/80   01/12/21 133/81    Wt Readings from Last 3 Encounters:   02/10/21 98 kg (216 lb)   01/22/21 97.7 kg (215 lb 4.8 oz)   01/12/21 96.1 kg (211 lb 14.4 oz)                  Patient Active Problem List   Diagnosis     Sprain and strain of unspecified site of knee and leg     Disorder of bone and cartilage     Female stress incontinence     Family history of malignant neoplasm of breast     Hirsutism     HYPERLIPIDEMIA LDL GOAL <130     Advanced directives, counseling/discussion     Tennis elbow     Hypertension, goal below 140/90     Atrial fibrillation (H)     Major depression in complete remission (H)     Morbid obesity, unspecified obesity type (H)     Pulmonary emphysema, unspecified emphysema type (H)     AF (paroxysmal atrial fibrillation) (H)     Primary osteoarthritis of both knees     CKD (chronic kidney disease) stage 3, GFR 30-59 ml/min     Hip pain, right     Multiple joint pain     Trochanteric bursitis of right hip     Segmental dysfunction of sacral region     Segmental dysfunction of lumbar region     Segmental dysfunction of thoracic region     Bilateral low back pain with right-sided sciatica     Malignant neoplasm of overlapping sites of left breast in female, estrogen receptor positive (H)     Age-related osteoporosis without current pathological fracture     Right hip pain     Patellofemoral pain  syndrome of right knee     Abnormal gait     Primary osteoarthritis of right knee     Lymphadenopathy of head and neck     Groin pain, right     Restless leg syndrome     Coronary artery disease involving native coronary artery of native heart without angina pectoris     Long term current use of anticoagulant therapy     Epidermoid cyst of labia majora     Past Surgical History:   Procedure Laterality Date     BIOPSY NODE SENTINEL Left 5/15/2019    Procedure: left sentinel node biopsy;  Surgeon: Donald Aleman MD;  Location: [facility]     BREAST BIOPSY, CORE RT/LT Left 2019     C APPENDECTOMY,W OTHR PROC       C  DELIVERY ONLY           C LIGATE FALLOPIAN TUBE       C VAGINAL HYSTERECTOMY       COLONOSCOPY  06/21/10     HC BIOPSY OF BREAST, OPEN INCISIONAL Right     Benign per patient     HC CORRECT BUNION,METATARSAL OSTEOTOMY       HC LAPAROSCOPY, SURGICAL; CHOLECYSTECTOMY  08/04/10     HC SLING OPERATION FOR STRESS INCONTINENCE  2007     HERNIORRHAPHY INCISIONAL (LOCATION)  2011    Procedure:HERNIORRHAPHY INCISIONAL (LOCATION); Surgeon:AYALA HOOVER; Location:[facility] OR     LAPAROSCOPIC HERNIORRHAPHY INCISIONAL  2011    Procedure:LAPAROSCOPIC HERNIORRHAPHY INCISIONAL; Laparoscopic mesh repair of incarcerated incisional hernia , extensive lysis of adhesions, excision of hernia sac and closure of fascia.; Surgeon:AYALA HOOVER; Location:[facility] OR     LAPAROSCOPIC LYSIS ADHESIONS  2011    Procedure:LAPAROSCOPIC LYSIS ADHESIONS; Surgeon:AYALA HOOVER; Location:[facility] OR     MASTECTOMY PARTIAL WITH NEEDLE LOCALIZATION Left 5/15/2019    Procedure: left wire localized partial mastectomy;  Surgeon: Donald Aleman MD;  Location: [facility]     TONSILLECTOMY       [facility] NONSPECIFIC PROCEDURE      Exploratory laparotomy with resection of infarcted segment of omentum and minor lysis of adhesions     [facility] NONSPECIFIC PROCEDURE      Removal of hemorrhagic  corpus luteum cyst       Social History     Tobacco Use     Smoking status: Former Smoker     Packs/day: 1.00     Years: 30.00     Pack years: 30.00     Quit date: 10/25/2005     Years since quitting: 15.3     Smokeless tobacco: Never Used   Substance Use Topics     Alcohol use: No     Alcohol/week: 0.0 standard drinks     Family History   Problem Relation Age of Onset     Breast Cancer Mother          at 88     Obesity Mother      Genitourinary Problems Mother      Lipids Mother      Respiratory Sister      Lipids Sister      Breast Cancer Sister 40        s/p mastectomy     Arthritis Sister      Obesity Sister      Heart Failure Sister      Cancer Sister         stomach, colon, pancreatic     Cancer Maternal Grandfather      Cancer Paternal Grandfather         lymph nodes     Heart Disease Father         by pass (5)     Cerebrovascular Disease Father         hemorragic     Lipids Father      Alzheimer Disease Father 92     Alzheimer Disease Maternal Grandmother      Genitourinary Problems Maternal Grandmother      Cancer Brother         prostate     Melanoma Brother      Heart Disease Brother          (aaron with a partner)     Hyperlipidemia Brother      Other - See Comments Daughter         fibromyalgia ()     Heart Disease Brother         stent placement     Hyperlipidemia Brother      Lipids Sister      Cancer Maternal Uncle         colon     Dementia Sister         1/2 sister on dad's side ()     Other - See Comments Sister         8     Hemophilia Grandchild          Current Outpatient Medications   Medication Sig Dispense Refill     albuterol (PROAIR HFA/PROVENTIL HFA/VENTOLIN HFA) 108 (90 Base) MCG/ACT inhaler INHALE ONE TO TWO PUFFS BY MOUTH EVERY 2-4 HOURS AS NEEDED 18 g 1     albuterol (PROVENTIL) (2.5 MG/3ML) 0.083% neb solution Take  by nebulization. 1 NEB EVERY 4-6 HOURS AS NEEDED 75 mL 0     anastrozole (ARIMIDEX) 1 MG tablet Take 1 tablet (1 mg) by mouth daily 90 tablet 3      ASPIRIN NOT PRESCRIBED (INTENTIONAL) Please choose reason not prescribed from choices below.       atorvastatin (LIPITOR) 10 MG tablet TAKE ONE TABLET BY MOUTH ONCE DAILY 90 tablet 0     BIOTIN PO Take by mouth daily       Calcium Carb-Cholecalciferol (CALCIUM 500 + D) 500-400 MG-UNIT TABS Take 1 tablet by mouth 2 times daily 180 tablet 3     Cyanocobalamin (B-12) 1000 MCG TBCR Take 1,000 mcg by mouth daily 100 tablet 1     diltiazem ER COATED BEADS (CARDIZEM CD/CARTIA XT) 120 MG 24 hr capsule TAKE ONE CAPSULE BY MOUTH ONCE DAILY ( LABS DUE ) 90 capsule 3     gabapentin (NEURONTIN) 300 MG capsule Take 3 capsules (900 mg) by mouth daily Take 2 capsules (600mg) at bedtime and 1 capsule (300mg) in morning. 90 capsule 1     hydrochlorothiazide (HYDRODIURIL) 25 MG tablet TAKE ONE TABLET BY MOUTH ONCE DAILY 90 tablet 2     lisinopril (ZESTRIL) 2.5 MG tablet TAKE ONE TABLET BY MOUTH ONCE DAILY 30 tablet 0     magnesium 250 MG tablet Take 1 tablet by mouth daily 30 tablet      order for DME Equipment being ordered: Oxygen 1 each 0     order for DME Equipment being ordered: Nebulizer 1 Device 0     Probiotic Product (PROBIOTIC PO) Take by mouth daily       trospium (SANCTURA) 20 MG tablet Take 20 mg by mouth       venlafaxine (EFFEXOR-XR) 75 MG 24 hr capsule Take 1 capsule (75 mg) by mouth daily 90 capsule 1     warfarin ANTICOAGULANT (JANTOVEN ANTICOAGULANT) 2.5 MG tablet TAKE ONE TABLET BY MOUTH ONCE DAILY OR AS DIRECTED BY THE COUMADIN CLINIC 90 tablet 0     mometasone-formoterol (DULERA) 200-5 MCG/ACT inhaler INHALE TWO PUFFS BY MOUTH TWICE A DAY 13 g 11     ORDER FOR DME Equipment being ordered: Oxygen @ 2 liters with exertion       predniSONE (DELTASONE) 10 MG tablet Take 2 tablets (20 mg) by mouth daily 10 tablet 4     Allergies   Allergen Reactions     Augmentin [Amoxicillin-Pot Clavulanate] Nausea and Vomiting     Meperidine Hcl Nausea and Vomiting     demerol     Seasonal Allergies      spring     Recent Labs   Lab  "Test 01/12/21  1133 07/13/20  0909 02/26/20  0812 06/20/18  1121 06/20/18  1121 12/05/16  0739 12/05/16  0739   LDL  --   --  65  --  78  --  72   HDL  --   --  47*  --  55  --  44*   TRIG  --   --  155*  --  80  --  137   ALT 21 21 16  --  23  --  22   CR 1.18* 1.14* 1.32*   < > 0.86   < > 0.90   GFRESTIMATED 47* 49* 41*   < > 66   < > 63   GFRESTBLACK 54* 57* 48*   < > 80   < > 76   POTASSIUM 4.1 4.2 3.7   < > 4.0   < > 4.3   TSH  --  2.80  --   --  1.90  --  2.85    < > = values in this interval not displayed.      Mammogram Screening: Recommended mammography according to her oncologist    Review of Systems   Constitutional: Negative for chills and fever.   HENT: Positive for ear pain. Negative for congestion, hearing loss and sore throat.    Eyes: Negative for pain and visual disturbance.   Respiratory: Positive for shortness of breath. Negative for cough.    Cardiovascular: Positive for peripheral edema. Negative for chest pain and palpitations.   Gastrointestinal: Negative for abdominal pain, constipation, diarrhea, heartburn, hematochezia and nausea.   Breasts:  Negative for tenderness, breast mass and discharge.   Genitourinary: Positive for frequency. Negative for dysuria, genital sores, hematuria, pelvic pain, urgency, vaginal bleeding and vaginal discharge.   Musculoskeletal: Positive for arthralgias. Negative for joint swelling and myalgias.   Skin: Negative for rash.   Neurological: Negative for dizziness, weakness, headaches and paresthesias.   Psychiatric/Behavioral: Negative for mood changes. The patient is not nervous/anxious.      OBJECTIVE:   Pulse 107   Temp 97.5  F (36.4  C) (Temporal)   Resp 20   Ht 1.562 m (5' 1.5\")   Wt 98 kg (216 lb)   SpO2 94%   BMI 40.15 kg/m   Estimated body mass index is 40.15 kg/m  as calculated from the following:    Height as of this encounter: 1.562 m (5' 1.5\").    Weight as of this encounter: 98 kg (216 lb).  Physical Exam  GENERAL APPEARANCE: healthy, alert " and no distress  EYES: Eyes grossly normal to inspection, PERRL and conjunctivae and sclerae normal  HENT: ear canals and TM's normal, nose and mouth without ulcers or lesions, oropharynx clear and oral mucous membranes moist  NECK: no adenopathy, no asymmetry, masses, or scars and thyroid normal to palpation  RESP: lungs clear to auscultation - no rales, rhonchi or wheezes  BREAST: patient sees her oncologist and surgeon regularly for follow up for her breast cancer history.  She does regular breast exams and is not in need of one today.   CV: regular rate and rhythm, normal S1 S2, no S3 or S4, no murmur, click or rub, no peripheral edema and peripheral pulses strong  ABDOMEN: obese, soft, nontender, no hepatosplenomegaly, no masses and bowel sounds normal  MS: no musculoskeletal defects are noted and gait is age appropriate without ataxia  SKIN: no suspicious lesions or rashes  NEURO: Normal strength and tone, sensory exam grossly normal, mentation intact and speech normal  PSYCH: mentation appears normal and affect normal/bright    Diagnostic Test Results:  Labs reviewed in Epic    ASSESSMENT / PLAN:   (Z00.00) Encounter for Medicare annual wellness exam  (primary encounter diagnosis)  Comment: Overall doing well  Plan: ASPIRIN NOT PRESCRIBED (INTENTIONAL)        No new concerns today.    (Z12.11) Colon cancer screening  Comment: Due for colon cancer screening  Plan: GASTROENTEROLOGY ADULT REF PROCEDURE ONLY        Referral placed.  The patient would prefer to have Dr. Aleman from general surgery do this for her.    (F32.5) Major depression in complete remission (H)  Comment: PHQ 9 today was very good essentially her depression is in remission.  Plan: She wants to stay on her venlafaxine 75 mg.    (E66.01) Morbid obesity, unspecified obesity type (H)  Comment: She had lost a little bit of weight but has not been really doing much activity due to Covid in the winter months.  Plan: She will try her best to watch her  "intake of foods and try to do as much activity as possible during these cold months.    (N18.30) Stage 3 chronic kidney disease, unspecified whether stage 3a or 3b CKD  Comment: Chronic kidney disease is stable.  It really has not changed in the last year.  She has had 2 GFR is within the last 6 months which are stable.  Plan: Labs are up-to-date.  She is not currently on an ACE or an ARB to help maintain or protect the kidneys.  I will discuss this at her next visit.    (J43.9) Pulmonary emphysema, unspecified emphysema type (H)  Comment: Her COPD has been stable.  Plan: No change in medications.    (I10) Essential Hypertension, benign  Comment: Blood pressure is stable as is her heart rate.  Plan: diltiazem ER COATED BEADS (CARDIZEM CD/CARTIA         XT) 120 MG 24 hr capsule        Continue current dose of diltiazem.    (I48.20) Chronic atrial fibrillation (H)  Comment: A. fib is stable with rate well controlled.  Plan: diltiazem ER COATED BEADS (CARDIZEM CD/CARTIA         XT) 120 MG 24 hr capsule        Continue at current dose.    (E78.2) Mixed hyperlipidemia  Comment: She is on atorvastatin and her last lipid was stable.  Plan: Lipid panel reflex to direct LDL Fasting        I forgot to have her stop in for lab work today.  She will come back for a repeat lipid sometime in the next 2 months.      Patient has been advised of split billing requirements and indicates understanding: Yes  COUNSELING:  Reviewed preventive health counseling, as reflected in patient instructions       Regular exercise       Healthy diet/nutrition       Vision screening       Dental care       Fall risk prevention       Osteoporosis prevention/bone health    Estimated body mass index is 40.15 kg/m  as calculated from the following:    Height as of this encounter: 1.562 m (5' 1.5\").    Weight as of this encounter: 98 kg (216 lb).    Weight management plan: Patient is trying to watch her intake.  She has not gained and has lost just a " little bit of weight but will try a little bit harder.    She reports that she quit smoking about 15 years ago. She has a 30.00 pack-year smoking history. She has never used smokeless tobacco.      Appropriate preventive services were discussed with this patient, including applicable screening as appropriate for cardiovascular disease, diabetes, osteopenia/osteoporosis, and glaucoma.  As appropriate for age/gender, discussed screening for colorectal cancer, prostate cancer, breast cancer, and cervical cancer. Checklist reviewing preventive services available has been given to the patient.    Reviewed patients plan of care and provided an AVS. The Basic Care Plan (routine screening as documented in Health Maintenance) for Latanya meets the Care Plan requirement. This Care Plan has been established and reviewed with the Patient.    Counseling Resources:  ATP IV Guidelines  Pooled Cohorts Equation Calculator  Breast Cancer Risk Calculator  Breast Cancer: Medication to Reduce Risk  FRAX Risk Assessment  ICSI Preventive Guidelines  Dietary Guidelines for Americans, 2010  USDA's MyPlate  ASA Prophylaxis  Lung CA Screening    Electronically signed by:  Nazario Jones M.D.  2/10/2021      Identified Health Risks:

## 2021-02-10 NOTE — TELEPHONE ENCOUNTER
Nurse entered requested alternative and routed to provider to enter directions and approve.                  Venecia Ortiz RN  Triage Nurse  [facility], 24 hour nurse line, available by calling clinic at [phone] and following prompts.

## 2021-02-10 NOTE — TELEPHONE ENCOUNTER
Received a script for Golytely for patient, not available, please send new script over for Nulytely to [facility].  Thank you    Bree Encinas, Pharmacy Technician  [facility]  [phone]

## 2021-02-10 NOTE — PATIENT INSTRUCTIONS
Patient Education   Personalized Prevention Plan  You are due for the preventive services outlined below.  Your care team is available to assist you in scheduling these services.  If you have already completed any of these items, please share that information with your care team to update in your medical record.  Health Maintenance Due   Topic Date Due     FALL RISK ASSESSMENT  06/17/2020     Colorectal Cancer Screening  06/21/2020     Cholesterol Lab  02/26/2021     Kidney Microalbumin Urine Test  02/26/2021     Depression Assessment  02/28/2021       Exercise for a Healthier Heart     Exercise with a friend. When activity is fun, you're more likely to stick with it.   You may wonder how you can improve the health of your heart. If you re thinking about exercise, you re on the right track. You don t need to become an athlete. But you do need a certain amount of brisk exercise to help strengthen your heart. If you have been diagnosed with a heart condition, your healthcare provider may advise exercise to help stabilize your condition. To help make exercise a habit, choose safe, fun activities.   Before you start  Check with your healthcare provider before starting an exercise program. This is especially important if you have not been active for a while. It's also important if you have a long-term (chronic) health problem such as heart disease, diabetes, or obesity. Or if you are at high risk for having these problems.   Why exercise?  Exercising regularly offers many healthy rewards. It can help you do all of the following:    Improve your blood cholesterol level to help prevent further heart trouble    Lower your blood pressure to help prevent a stroke or heart attack    Control diabetes, or reduce your risk of getting this disease    Improve your heart and lung function    Reach and stay at a healthy weight    Make your muscles stronger so you can stay active    Prevent falls and fractures by slowing the loss of  bone mass (osteoporosis)    Manage stress better    Reduce your blood pressure    Improve your sense of self and your body image  Exercise tips      Ease into your routine. Set small goals. Then build on them. If you are not sure what your activity level should be, talk with your healthcare provider first before starting an exercise routine.    Exercise on most days. Aim for a total of 150 minutes (2 hours and 30 minutes) or more of moderate-intensity aerobic activity each week. Or 75 minutes (1 hour and 15 minutes) or more of vigorous-intensity aerobic activity each week. Or try for a combination of both. Moderate activity means that you breathe heavier and your heart rate increases but you can still talk. Think about doing 40 minutes of moderate exercise, 3 to 4 times a week. For best results, activity should last for about 40 minutes to lower blood pressure and cholesterol. It's OK to work up to the 40-minute period over time. Examples of moderate-intensity activity are walking 1 mile in 15 minutes. Or doing 30 to 45 minutes of yard work.    Step up your daily activity level.  Along with your exercise program, try being more active the whole day. Walk instead of drive. Or park further away so that you take more steps each day. Do more household tasks or yard work. You may not be able to meet the advised mount of physical activity. But doing some moderate- or vigorous-intensity aerobic activity can help reduce your risk for heart disease. Your healthcare provider can help you figure out what is best for you.    Choose 1 or more activities you enjoy.  Walking is one of the easiest things you can do. You can also try swimming, riding a bike, dancing, or taking an exercise class.    When to call your healthcare provider  Call your healthcare provider if you have any of these:     Chest pain or feel dizzy or lightheaded    Burning, tightness, pressure, or heaviness in your chest, neck, shoulders, back, or  arms    Abnormal shortness of breath    More joint or muscle pain    A very fast or irregular heartbeat (palpitations)  Donald last reviewed this educational content on 7/1/2019 2000-2020 [employer] [address] [place] [address], [place] [address]. All rights reserved. This information is not intended as a substitute for professional medical care. Always follow your healthcare professional's instructions.

## 2021-02-10 NOTE — LETTER
COLONOSCOPY INSTRUCTIONS   For patients with CHF, cardiomyopathy,   kidney and/or renal disease or over the age of 75    (with GoLytely/NuLytely/CoLyte)    Patient Name   Ivette Padron   Procedure Date   3/4/21 Arrival Time   6:30am Procedure Time   7:30am   Physician   Dr. Love Horton     [place]    Other Instructions  Occasionally procedure times need to be changed.   In the event your procedure time needs to be changed, you will receive a telephone call from a nurse informing you of the change as well as any other instructions.     Review the preparation schedule below for the five days preceding your procedure.     If you have any questions, please call [phone].  YOU WILL NEED TO PURCHASE   - Bisacodyl/Dulcolax tablets  5 mg (4 ORAL tablets) (No prescription needed)  - Fill your prescription for GoLytely/NuLytely/Colyte  - A packet of non-red or non-purple Crystal Light (Optional--to improve taste of prep solution)   BEFORE THE PROCEDURE   - You will receive a phone call 1 to 2 days prior to your procedure. Information will be collected to pre-admit you, which will assist us in giving you the best care possible or you can call [phone].  - If you are diabetic, consult your physician for additional instruction.  - Check with your insurance carrier about coverage (phone number on the back of your insurance card).  - You will need to make arrangements to have someone drive you home. You will not be able to drive the day of your examination. You can expect to be here approximately 2 hours; your  needs to be at [facility] 2 hours after your arrival time.  - You will not be able to return to work the day of your procedure.  - If using iron supplements, stop taking those five days before your procedure.  - Three days before your procedure, begin a low-fiber diet. Avoid raw fruits, vegetables, whole wheat, nuts, popcorn, Metamucil, Fibercon, bran or bulking agents.  Meats and cooked vegetables are fine.  - Two days before your procedure, increase your water intake (8 glasses of water is recommended).   DAY BEFORE PROCEDURE   - IF YOU ARE SCHEDULED TO ARRIVE AT 11 AM OR BEFORE FOLLOW THE INSTRUCTIONS BELOW  - You will need to be on a clear liquid diet the entire day. No solid foods.  - In the morning, mix the GoLytely/NuLytely/Colyte powder with water until completely dissolved and then refrigerate.  - At 9 am take four tablets of Bisacodyl.  -  At 3 pm start drinking the prep solution. You will need to have access to the bathroom once you start drinking the prep solution. Some people prefer to drink the solution at room temperature. You may take it out of the refrigerator 1-2 hours prior to drinking it. You may also add a packet of non-red or non-purple Crystal Light to improve the taste. It is best to drink an 8 oz glassful every 15 minutes.    - It will take about 4-6 hours to drink the solution.  Continue drinking clear liquids after you have finished the prep solution.   - If you experience bloating, cramping, nausea, or vomiting, take a 15-30 minute break and then start drinking prep solution again.     IF YOU ARE SCHEDULED TO ARRIVE 11:00 AM OR LATER:  DAY BEFORE PROCEDURE   - You will need to be on a clear liquid diet the entire day (SEE BELOW). No solid foods.  - In the morning, mix the GoLytely/NuLytely/Colyte powder with water until completely dissolved and then refrigerate.  - At 9 am take four tablets of Bisacodyl.  - At 3 pm you will start drinking your prep solution. You will only be drinking half of the solution today, it should take you about 2 - 3 hours to drink half. Some people prefer to drink the solution at room temperature. You may take it out of the refrigerator 1-2 hours prior to drinking it. You may also add a packet of non-red or non-purple Crystal Light to improve the taste. It is best to drink an 8 oz glassful every 15 minutes.    -  Put the  remainder of the prep solution back in the refrigerator for procedure morning.   - If you experience bloating, cramping, nausea, or vomiting, take a 15-30 minute break and then start drinking the prep solution again.   PROCEDURE DAY    - THIS STEP ONLY FOR THOSE WHO DRANK HALF OF PREP DAY BEFORE  - Start drinking your last half of prep solution at 6 am.  Drink an 8 oz glass every 15 minutes until prep solution is gone.  It will take about 2 to 3 hours to finish solution.   - No eating or drinking 3 hours prior to your procedure.  - If you experience bloating, cramping, nausea, or vomiting take a 15 to 30 minute break and then start drinking prep solution again.   Dress comfortably. You may have clear liquids until three hours before your procedure. Please do not wear any perfume or cologne.    Please check with your provider regarding holding any medications. Please bring a list of your current medications with you. If you have any questions regarding these instructions, please call us at [phone].        CLEAR LIQUID DIET  The clear liquid diet are foods that are free of fat and fiber. They will liquefy to clear liquid at room temperature. No red or purple colored juices or Jell-O s, dairy products, or alcoholic beverages.  TYPE OF FOOD USE ONLY THESE FOODS   Beverages Coffee      Tea      Decaffeinated coffee  Carbonated beverages (pop)  Water  Sport drinks (except red or purple)   Desserts Jell-O (except red or purple)  Fruit ice  Popsicle   Fruit and Fruit Juices Citrus juices, strained  Fruit nectars, strained  Apple juice  Fruit drinks (except red or purple)   Soups Broth  Bouillon  Consommé  Meat stock, strained  Vegetable broth, strained   Sweets Hard candy, non-red or non-purple  Sugar   Miscellaneous Flavorings  Salt           Directions to [facility]      From the North:   Take the [address]nd [facility] [address] bess off of [address] (on the south side of [place]).  Turn left on  [address]  Turn left at the first stoplight (at [facility]).  The hospital and clinic is the third building on the left.     From the South:  Follow [address] north to the first [facility] [place] exit.  Exit on [address] following it to the right.  Turn left at the first stoplight.  The hospital and clinic is the third building on the left.    From the East:     Following [address] west, turn left at the stoplight onto [address] Follow [address] through [facility] [place].  Take a right at the light on [address] (at [facility]).  The hospital and clinic is the third building on the left.    From the West:    Following [address] going east, turn south on [address].  Take the [address] exit off of [address] (on the south side of [place]).  Follow [address] through [place] to the stoplight on [address] (at [facility]). Take a left.  The hospital and clinic is the third building on the left.    Colonoscopy  What you should know    What is a colonoscopy?  A colonoscopy lets the doctor look inside your large intestine (colon). The doctor guides a long, flexible, lighted tube through the entire colon. The exam is used to look for early signs of cancer. It is also used to find the cause of a change in bowel habits. The doctor is able to see any inflamed tissue, polyps, ulcers, bleeding or muscle spasms.    How do I prepare for the exam?  See the guidelines for cleaning out your colon. The steps to prepare your colon begin four days before the exam.    Please arrive with someone who can drive you home after the exam. The medicines used in the exam will make you sleepy.  You will not be able to drive. You cannot take a bus or taxi by yourself.  You cannot walk home.    How do I prepare the day of the exam?    *Dress in comfortable, loose clothes.  *Leave your purse, billfold, credit cards, etc. at home.  *Bring your insurance card.  *Bring a list of your medications.  *Plan to arrive on time.  *We do our best  to stay on time, but there may be a delay. Please bring something to pass the time, such as a newspaper, book or magazine.    What happens when I arrive?    *You will do some paper work.  *We will take you to the admission area. Your family may stay with you during this time.  *A nurse will check your records and ask you questions.  *You will change into a hospital gown without underwear.   *You will sign a consent form.  *We will start an IV (intravenous). We will use it to give you medicines during the exam.    What happens during the exam?    *We will take you on a cart to the exam room.   *You can ask questions of the doctor who is doing your exam.  *You will lie on your left side on a table.    *You will receive medicines through your IV to relax you and for pain.    *The doctor will insert a thin, lighted tube (scope) into your rectum. Then the doctor will slowly guide it into your colon. The scope bends, so he or she can move it around the curves of your colon.    *The scope sends an image of the inside of the colon. The image allows the doctor to examine the lining of the colon.    *We may ask you to change positions to help the doctor move the scope.    *The scope also blows air into your colon. This enlarges the colon and helps the doctor see the lining.  *You should feel little pain during the exam.   *You may feel full and have cramps. Breathe slowly and evenly to help your body relax. Tell your doctor or nurse if you have any pain.    If we see a polyp, we will remove a piece of it (polypectomy). That tissue (biopsy) will be tested in the lab. Most polyps are benign (not cancer). We remove most polyps because they can cause bleeding or turn into cancer.     Risks of the exam are bleeding and piercing or tearing the colon. But this is rare.    What happens after the exam?    *We will return you to your room. We will watch you for one hour or until most of the pain medicine has worn off.  *You may feel  bloated or have cramping for a short time because of the air injected during the exam. You will pass the rest of the air from your colon in the next couple hours.  *We will remove the IV.   *Your first meal should be light. Slowly return to normal meals, unless your doctor gives you other orders.

## 2021-02-10 NOTE — TELEPHONE ENCOUNTER
Date of procedure: 3/4/21  Colonoscopy  Surgeon: Dr. Aleman  Prep:Miralax  Packet:Colonoscopy/EGD instructions mailed to patient's home address.   Date: 2/10/2021      Surgery Scheduler

## 2021-02-10 NOTE — TELEPHONE ENCOUNTER
Nulytely will be substituted. I spoke with the pharmacist.    Electronically signed by:  Nazario Jones M.D.  2/10/2021

## 2021-02-10 NOTE — LETTER
[address] [id number] [phone]        February 10, 2021    Latanya Padron  [address] [address] [place] [id number]

## 2021-02-11 DIAGNOSIS — E78.2 MIXED HYPERLIPIDEMIA: ICD-10-CM

## 2021-02-11 LAB
CHOLEST SERPL-MCNC: 172 MG/DL
HDLC SERPL-MCNC: 61 MG/DL
LDLC SERPL CALC-MCNC: 86 MG/DL
NONHDLC SERPL-MCNC: 111 MG/DL
TRIGL SERPL-MCNC: 124 MG/DL

## 2021-02-11 PROCEDURE — 36415 COLL VENOUS BLD VENIPUNCTURE: CPT | Performed by: FAMILY MEDICINE

## 2021-02-11 PROCEDURE — 80061 LIPID PANEL: CPT | Performed by: FAMILY MEDICINE

## 2021-02-12 ENCOUNTER — ANCILLARY PROCEDURE (OUTPATIENT)
Dept: GENERAL RADIOLOGY | Facility: CLINIC | Age: 71
End: 2021-02-12
Attending: PHYSICIAN ASSISTANT
Payer: COMMERCIAL

## 2021-02-12 ENCOUNTER — OFFICE VISIT (OUTPATIENT)
Dept: ORTHOPEDICS | Facility: CLINIC | Age: 71
End: 2021-02-12
Payer: COMMERCIAL

## 2021-02-12 VITALS
DIASTOLIC BLOOD PRESSURE: 76 MMHG | BODY MASS INDEX: 40.78 KG/M2 | SYSTOLIC BLOOD PRESSURE: 128 MMHG | WEIGHT: 216 LBS | TEMPERATURE: 97.8 F | HEIGHT: 61 IN

## 2021-02-12 DIAGNOSIS — M17.11 PRIMARY OSTEOARTHRITIS OF RIGHT KNEE: Primary | ICD-10-CM

## 2021-02-12 DIAGNOSIS — M17.11 PRIMARY OSTEOARTHRITIS OF RIGHT KNEE: ICD-10-CM

## 2021-02-12 PROCEDURE — 73564 X-RAY EXAM KNEE 4 OR MORE: CPT | Mod: TC | Performed by: RADIOLOGY

## 2021-02-12 PROCEDURE — 99213 OFFICE O/P EST LOW 20 MIN: CPT | Mod: 25 | Performed by: PHYSICIAN ASSISTANT

## 2021-02-12 PROCEDURE — 20610 DRAIN/INJ JOINT/BURSA W/O US: CPT | Mod: RT | Performed by: PHYSICIAN ASSISTANT

## 2021-02-12 RX ORDER — TRIAMCINOLONE ACETONIDE 40 MG/ML
40 INJECTION, SUSPENSION INTRA-ARTICULAR; INTRAMUSCULAR ONCE
Status: COMPLETED | OUTPATIENT
Start: 2021-02-12 | End: 2021-02-12

## 2021-02-12 RX ADMIN — TRIAMCINOLONE ACETONIDE 40 MG: 40 INJECTION, SUSPENSION INTRA-ARTICULAR; INTRAMUSCULAR at 12:46

## 2021-02-12 ASSESSMENT — MIFFLIN-ST. JEOR: SCORE: 1442.15

## 2021-02-12 NOTE — PROGRESS NOTES
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  2 patellofemoral syndrome       History of Present Illness:   Patient is here in follow-up steroid injection.  Her last steroid injection was on 7/31/2020 and this provided her with almost 6 months of relief.  Prior to that was 8/5/2019.  Patient did get his hinged knee brace at last visit but has not been really using it much.  I do not believe it has helped her.  She would like another steroid injection and because of her kidney function we can give her NSAIDs I am okay with steroid injection.    Physical Exam:  Vitals: /76   Temp 97.8  F (36.6  C) (Temporal)   Ht 1.549 m (5' 1\")   Wt 98 kg (216 lb)   BMI 40.81 kg/m    BMI= Body mass index is 40.81 kg/m .  Constitutional: healthy, alert and no acute distress   Psychiatric: mentation appears normal and affect normal/bright  NEURO: no focal deficits, CMS intact right lower extremity  RESP: Normal with easy respirations and no use of accessory muscles to breathe, no audible wheezing or retractions  CV: Calf soft and nontender to palpation, leg warm   SKIN: No erythema, rashes, excoriation, or breakdown. No evidence of infection.   MUSCULOSKELETAL:    INSPECTION of right knee: No gross deformities, erythema, edema, ecchymosis, atrophy or fasciculations.     PALPATION: No tenderness on palpation of the medial, lateral, anterior and posterior portion of the knee. No specific joint line tenderness. No increased warmth.  No effusion.     ROM: Able to flex and extend without any catching or locking or pain.    STRENGTH: Able to get on the exam table and off without any problems and able to ambulate without any problems.    SPECIAL TEST: None today.   GAIT: non-antalgic  Lymph: no palpable lymph nodes    Impression: 1.  Right knee " degenerative joint disease patellofemoral mild to moderate.  2 patellofemoral syndrome.    Plan:                                          INJECTION PROCEDURE:  The patient was counseled about an  injection, including discussion of risks (including infection), contents of the injection, rationale for performing the injection, and expected benefits of the injection. The skin was prepped with alcohol and betadine and then utilizing sterile technique an injection of the right knee joint from the anterolateral approach in the seated position was performed. I used tiffany chloride spray prior to doing the injection. The injection consisted 1ml of Kenalog (40mg per 1ml) with 8ml 1% lidocaine plain. The patient tolerated the injection well, and there were no complications. The injection site was covered with a Band-Aid. The injection was performed by Mark Taylor PA-C     FOCUSED PLAN:    Patient doing well but having right knee pain again.  Her last steroid injection was 7/31/2020 so about 6 months ago.  We cannot do NSAIDs secondary to poor kidney function.  I did give her hinged knee brace last time but I do not believe this helped her.  We discussed formal therapy and she wanted to hold off on that for now.  She is doing straight leg raising.  We did another steroid injection today. Follow up with Esteban Taylor PA-C on an as needed basis.     Re-x-ray on return: No    BP Readings from Last 1 Encounters:   02/12/21 128/76       Ivette to follow up with Primary Care provider regarding elevated blood pressure.      Patient does not use Tobacco products.      This note was dictated with [employer].    Esteban Taylor PA-C

## 2021-02-12 NOTE — PROGRESS NOTES
Clinic Administered Medication Documentation      Injection Documentation     Injection was administered by provider (please see MAR for given by information). Please see MAR and medication order for additional information.     Site: Joint injection   Medication Used: Kenalog 40 mg     Expiration Date:  09/2022        The following medication was given by Esteban Taylor PA-C:     MEDICATION: Kenalog 40mg/1ml  ROUTE: Joint Injection  SITE: Right Knee  DOSE: 1 mL  LOT #: [id number]  : [employer]  EXPIRATION DATE:  09/2022  NDC: [address]-5

## 2021-02-12 NOTE — LETTER
"    2/12/2021         RE: Latanya Padron  [address] [address] [facility] [id number]        Dear Colleague,    Thank you for referring your patient, Latanya Padron, to the [facility]. Please see a copy of my visit note below.    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  2 patellofemoral syndrome       History of Present Illness:   Patient is here in follow-up steroid injection.  Her last steroid injection was on 7/31/2020 and this provided her with almost 6 months of relief.  Prior to that was 8/5/2019.  Patient did get his hinged knee brace at last visit but has not been really using it much.  I do not believe it has helped her.  She would like another steroid injection and because of her kidney function we can give her NSAIDs I am okay with steroid injection.    Physical Exam:  Vitals: /76   Temp 97.8  F (36.6  C) (Temporal)   Ht 1.549 m (5' 1\")   Wt 98 kg (216 lb)   BMI 40.81 kg/m    BMI= Body mass index is 40.81 kg/m .  Constitutional: healthy, alert and no acute distress   Psychiatric: mentation appears normal and affect normal/bright  NEURO: no focal deficits, CMS intact right lower extremity  RESP: Normal with easy respirations and no use of accessory muscles to breathe, no audible wheezing or retractions  CV: Calf soft and nontender to palpation, leg warm   SKIN: No erythema, rashes, excoriation, or breakdown. No evidence of infection.   MUSCULOSKELETAL:    INSPECTION of right knee: No gross deformities, erythema, edema, ecchymosis, atrophy or fasciculations.     PALPATION: No tenderness on palpation of the medial, lateral, anterior and posterior portion of the knee. No specific joint line tenderness. No increased warmth.  No effusion.     ROM: Able to flex and extend without any " catching or locking or pain.    STRENGTH: Able to get on the exam table and off without any problems and able to ambulate without any problems.    SPECIAL TEST: None today.   GAIT: non-antalgic  Lymph: no palpable lymph nodes    Impression: 1.  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  2 patellofemoral syndrome.    Plan:                                          INJECTION PROCEDURE:  The patient was counseled about an  injection, including discussion of risks (including infection), contents of the injection, rationale for performing the injection, and expected benefits of the injection. The skin was prepped with alcohol and betadine and then utilizing sterile technique an injection of the right knee joint from the anterolateral approach in the seated position was performed. I used tiffany chloride spray prior to doing the injection. The injection consisted 1ml of Kenalog (40mg per 1ml) with 8ml 1% lidocaine plain. The patient tolerated the injection well, and there were no complications. The injection site was covered with a Band-Aid. The injection was performed by Mark Taylor PA-C     FOCUSED PLAN:    Patient doing well but having right knee pain again.  Her last steroid injection was 7/31/2020 so about 6 months ago.  We cannot do NSAIDs secondary to poor kidney function.  I did give her hinged knee brace last time but I do not believe this helped her.  We discussed formal therapy and she wanted to hold off on that for now.  She is doing straight leg raising.  We did another steroid injection today. Follow up with Esteban Taylor PA-C on an as needed basis.     Re-x-ray on return: No    BP Readings from Last 1 Encounters:   02/12/21 128/76       Ivette to follow up with Primary Care provider regarding elevated blood pressure.      Patient does not use Tobacco products.      This note was dictated with [employer].    Esteban Taylor PA-C     Clinic Administered Medication  Documentation      Injection Documentation     Injection was administered by provider (please see MAR for given by information). Please see MAR and medication order for additional information.     Site: Joint injection   Medication Used: Kenalog 40 mg     Expiration Date:  09/2022        The following medication was given by Esteban Taylor PA-C:     MEDICATION: Kenalog 40mg/1ml  ROUTE: Joint Injection  SITE: Right Knee  DOSE: 1 mL  LOT #: [id number]  : [employer]  EXPIRATION DATE:  09/2022  NDC: [address]-1                        Again, thank you for allowing me to participate in the care of your patient.        Sincerely,        Esteban Taylor PA-C

## 2021-02-18 DIAGNOSIS — Z11.59 ENCOUNTER FOR SCREENING FOR OTHER VIRAL DISEASES: ICD-10-CM

## 2021-02-23 ENCOUNTER — ANTICOAGULATION THERAPY VISIT (OUTPATIENT)
Dept: ANTICOAGULATION | Facility: CLINIC | Age: 71
End: 2021-02-23

## 2021-02-23 ENCOUNTER — TELEPHONE (OUTPATIENT)
Dept: ANTICOAGULATION | Facility: CLINIC | Age: 71
End: 2021-02-23

## 2021-02-23 DIAGNOSIS — Z79.01 LONG TERM CURRENT USE OF ANTICOAGULANT THERAPY: ICD-10-CM

## 2021-02-23 DIAGNOSIS — I48.91 ATRIAL FIBRILLATION, UNSPECIFIED TYPE (H): ICD-10-CM

## 2021-02-23 DIAGNOSIS — I10 HYPERTENSION, GOAL BELOW 140/90: ICD-10-CM

## 2021-02-23 DIAGNOSIS — I48.0 AF (PAROXYSMAL ATRIAL FIBRILLATION) (H): ICD-10-CM

## 2021-02-23 LAB
CAPILLARY BLOOD COLLECTION: NORMAL
INR BLD: 2.1 (ref 0.86–1.14)

## 2021-02-23 PROCEDURE — 36416 COLLJ CAPILLARY BLOOD SPEC: CPT | Performed by: FAMILY MEDICINE

## 2021-02-23 PROCEDURE — 85610 PROTHROMBIN TIME: CPT | Performed by: FAMILY MEDICINE

## 2021-02-23 NOTE — TELEPHONE ENCOUNTER
Pt is scheduled for a colonoscopy on 3/4, she will need to be off her warfarin for 5 days prior (starting Sat 2/27), she has not bridged with lovenox in the past, can you please review and let us know if holding for five days without bridiging is ok again. Thanks!     Zoey Pickard, MARCELON, RN- Coumadin Clinic RN

## 2021-02-23 NOTE — TELEPHONE ENCOUNTER
Per Dr Gardiner    She can hold the Coumadin for 5 days and then continue it right after her procedure

## 2021-02-23 NOTE — PROGRESS NOTES
ANTICOAGULATION MANAGEMENT     Patient Name:  Latanya Padron  Date:  2021    ASSESSMENT /SUBJECTIVE:    Today's INR result of 2.1 is therapeutic. Goal INR of 2.0-3.0      Warfarin dose taken: Warfarin taken as instructed    Diet: No new diet changes affecting INR    Medication changes/ interactions: No new medications/supplements affecting INR    Previous INR: Therapeutic     S/S of bleeding or thromboembolism: No    New injury or illness: No    Upcoming surgery, procedure or cardioversion: Yes, pt having a colonoscopy on 3/4- advised to hold 5 days prior, no bridging.     Additional findings: None      PLAN:    Telephone call with Latanya regarding INR result and instructed:     Warfarin Dosing Instructions: Continue your current warfarin dose 2.5 mg daily (hold -3/3)    Instructed patient to follow up no later than: 3 weeks  Lab visit scheduled    Education provided: None required      Pt verbalizes understanding and agrees to warfarin dosing plan.    Instructed to call the Anticoagulation Clinic for any changes, questions or concerns. (#[phone])        Zoey Pickard RN      OBJECTIVE:  Recent labs: (last 7 days)     21  0755   INR 2.1*         INR assessment THER    Recheck INR In: 3 WEEKS    INR Location Outside lab      Anticoagulation Summary  As of 2021    INR goal:  2.0-3.0   TTR:  90.8 % (1 y)   INR used for dosin.1 (2021)   Warfarin maintenance plan:  2.5 mg (5 mg x 0.5) every day   Full warfarin instructions:  : Hold; : Hold; 3/1: Hold; 3/2: Hold; 3/3: Hold; Otherwise 2.5 mg every day   Weekly warfarin total:  17.5 mg   Plan last modified:  Francine Andrew, RN (2020)   Next INR check:  3/18/2021   Priority:  Maintenance   Target end date:  Indefinite    Indications    AF (paroxysmal atrial fibrillation) (H) [I48.0]  Atrial fibrillation (H) [I48.91]  Long term current use of anticoagulant therapy [Z79.01]  Atrial fibrillation  unspecified type (H)  (Resolved) [I48.91]             Anticoagulation Episode Summary     INR check location:      Preferred lab:      Send INR reminders to:  [facility]    Comments:  5 mg tabs, likes card, PM dose      Anticoagulation Care Providers     Provider Role Specialty Phone number    Glen Blue MD Referring Family Medicine [phone]

## 2021-02-25 RX ORDER — LISINOPRIL 2.5 MG/1
TABLET ORAL
Qty: 90 TABLET | Refills: 3 | Status: SHIPPED | OUTPATIENT
Start: 2021-02-25 | End: 2022-03-10

## 2021-02-25 NOTE — TELEPHONE ENCOUNTER
Routing refill request to provider for review/approval because:  Labs out of range:    CR 1.18*   Kathy Ramon RN

## 2021-02-26 ENCOUNTER — OFFICE VISIT (OUTPATIENT)
Dept: FAMILY MEDICINE | Facility: CLINIC | Age: 71
End: 2021-02-26
Payer: COMMERCIAL

## 2021-02-26 VITALS
HEART RATE: 97 BPM | BODY MASS INDEX: 40.74 KG/M2 | WEIGHT: 215.6 LBS | OXYGEN SATURATION: 95 % | SYSTOLIC BLOOD PRESSURE: 126 MMHG | DIASTOLIC BLOOD PRESSURE: 82 MMHG | TEMPERATURE: 97.5 F

## 2021-02-26 DIAGNOSIS — R35.0 URINARY FREQUENCY: Primary | ICD-10-CM

## 2021-02-26 DIAGNOSIS — R30.0 DYSURIA: ICD-10-CM

## 2021-02-26 LAB
ALBUMIN UR-MCNC: NEGATIVE MG/DL
APPEARANCE UR: ABNORMAL
BACTERIA #/AREA URNS HPF: ABNORMAL /HPF
BILIRUB UR QL STRIP: NEGATIVE
COLOR UR AUTO: YELLOW
GLUCOSE UR STRIP-MCNC: NEGATIVE MG/DL
HGB UR QL STRIP: NEGATIVE
KETONES UR STRIP-MCNC: NEGATIVE MG/DL
LEUKOCYTE ESTERASE UR QL STRIP: NEGATIVE
MUCOUS THREADS #/AREA URNS LPF: PRESENT /LPF
NITRATE UR QL: NEGATIVE
PH UR STRIP: 6 PH (ref 5–7)
RBC #/AREA URNS AUTO: 1 /HPF (ref 0–2)
SOURCE: ABNORMAL
SP GR UR STRIP: 1.02 (ref 1–1.03)
SQUAMOUS #/AREA URNS AUTO: <1 /HPF (ref 0–1)
UROBILINOGEN UR STRIP-MCNC: 0 MG/DL (ref 0–2)
WBC #/AREA URNS AUTO: 2 /HPF (ref 0–5)

## 2021-02-26 PROCEDURE — 81001 URINALYSIS AUTO W/SCOPE: CPT | Performed by: PHYSICIAN ASSISTANT

## 2021-02-26 PROCEDURE — 87086 URINE CULTURE/COLONY COUNT: CPT | Performed by: PHYSICIAN ASSISTANT

## 2021-02-26 PROCEDURE — 99213 OFFICE O/P EST LOW 20 MIN: CPT | Performed by: PHYSICIAN ASSISTANT

## 2021-02-26 NOTE — PROGRESS NOTES
Assessment & Plan     Urinary frequency  Adjust back to 20mg twice daily of trospium    Dysuria  This is mild  - UA reflex to Microscopic ([facility]); Future  - Urine Culture Aerobic Bacterial; Future  - Urine Culture Aerobic Bacterial  - UA reflex to Microscopic ([facility])    No sign of UTI on UA today.  Awaiting urine culture result.  Will call if planned needs to be changed.  Drink lots of fluids.  Increase trospium to twice daily again.    20 minutes spent on the date of the encounter doing chart review, review of test results, patient visit and documentation     Return in about 3 days (around 3/1/2021) for Recheck with primary care provider if not improved.    ISA[facility]    Elva Steele is a 70 year old who presents for the following health issues     HPI       Genitourinary - Female  Onset/Duration: over 1 week  Description:   Painful urination (Dysuria): yes- a little burn           Frequency: yes  Blood in urine (Hematuria): no  Delay in urine (Hesitency): no  Strong odor  Intensity: mild  Progression of Symptoms:  same  Accompanying Signs & Symptoms:  Fever/chills: chills off and on  Flank pain: YES, lower right side of back  Nausea and vomiting: nausea  Vaginal symptoms: none  Abdominal/Pelvic Pain: no  History:   History of frequent UTI s: YES  History of kidney stones: no  Sexually Active: no  Precipitating or alleviating factors: None  Therapies tried and outcome: Increase fluid intake    Ivette presents to the clinic today for urinary frequency.  She notes she has very minimal burning with urination but really no other symptoms.  She has had some right sided lower back pain but no flank pain.  Her last UTI was exactly 1 year ago today.     Review of Systems   ROS negative except as stated above.        Objective    /82 (BP Location: Right arm)   Pulse 97   Temp 97.5  F (36.4  C) (Temporal)   Wt  97.8 kg (215 lb 9.6 oz)   SpO2 95%   BMI 40.74 kg/m    Body mass index is 40.74 kg/m .  Physical Exam   GENERAL: healthy, alert and no distress  RESP: lungs clear to auscultation - no rales, rhonchi or wheezes  CV: regular rate and rhythm, normal S1 S2  ABDOMEN: soft, nontender, no hepatosplenomegaly, no masses and bowel sounds normal  BACK: no CVA tenderness, no paralumbar tenderness    Results for orders placed or performed in visit on 02/26/21   UA reflex to Microscopic ([place]; [facility])     Status: Abnormal   Result Value Ref Range    Color Urine Yellow     Appearance Urine Slightly Cloudy     Glucose Urine Negative NEG^Negative mg/dL    Bilirubin Urine Negative NEG^Negative    Ketones Urine Negative NEG^Negative mg/dL    Specific Gravity Urine 1.023 1.003 - 1.035    Blood Urine Negative NEG^Negative    pH Urine 6.0 5.0 - 7.0 pH    Protein Albumin Urine Negative NEG^Negative mg/dL    Urobilinogen mg/dL 0.0 0.0 - 2.0 mg/dL    Nitrite Urine Negative NEG^Negative    Leukocyte Esterase Urine Negative NEG^Negative    Source Unspecified Urine     RBC Urine 1 0 - 2 /HPF    WBC Urine 2 0 - 5 /HPF    Bacteria Urine Few (A) NEG^Negative /HPF    Squamous Epithelial /HPF Urine <1 0 - 1 /HPF    Mucous Urine Present (A) NEG^Negative /LPF       I spent a total of 10 minutes face-to-face with Latanya Padron during today's office visit.  Over 50% of this time was spent counseling the patient and/or coordinating care regarding dysuria, urinary frequency.  See note for details.

## 2021-02-26 NOTE — PATIENT INSTRUCTIONS
No sign of infection today, antibiotic is not needed.  Urine culture pending, we will call you only if culture shows bacterial growth and an antibiotic is needed.  Drink plenty of fluids. Limit caffeine and alcohol as these are bladder irritants.  May take Tylenol or ibuprofen as needed for discomfort.   If you develop any vomiting, high fevers or lower back pain, these can be signs of a kidney infection and you should be seen in urgent care or in the ER.  Please follow up with primary care provider if you're not improving, symptoms are worsening or new symptoms develop.

## 2021-02-27 LAB
BACTERIA SPEC CULT: NORMAL
SPECIMEN SOURCE: NORMAL

## 2021-03-01 ENCOUNTER — OFFICE VISIT (OUTPATIENT)
Dept: FAMILY MEDICINE | Facility: CLINIC | Age: 71
End: 2021-03-01
Payer: COMMERCIAL

## 2021-03-01 VITALS
HEIGHT: 62 IN | HEART RATE: 108 BPM | SYSTOLIC BLOOD PRESSURE: 130 MMHG | BODY MASS INDEX: 39.38 KG/M2 | OXYGEN SATURATION: 90 % | DIASTOLIC BLOOD PRESSURE: 74 MMHG | TEMPERATURE: 97.5 F | RESPIRATION RATE: 18 BRPM | WEIGHT: 214 LBS

## 2021-03-01 DIAGNOSIS — Z11.59 ENCOUNTER FOR SCREENING FOR OTHER VIRAL DISEASES: ICD-10-CM

## 2021-03-01 DIAGNOSIS — Z01.818 PREOP GENERAL PHYSICAL EXAM: Primary | ICD-10-CM

## 2021-03-01 DIAGNOSIS — Z12.11 COLON CANCER SCREENING: ICD-10-CM

## 2021-03-01 LAB
LABORATORY COMMENT REPORT: NORMAL
SARS-COV-2 RNA RESP QL NAA+PROBE: NEGATIVE
SARS-COV-2 RNA RESP QL NAA+PROBE: NORMAL
SPECIMEN SOURCE: NORMAL
SPECIMEN SOURCE: NORMAL

## 2021-03-01 PROCEDURE — U0003 INFECTIOUS AGENT DETECTION BY NUCLEIC ACID (DNA OR RNA); SEVERE ACUTE RESPIRATORY SYNDROME CORONAVIRUS 2 (SARS-COV-2) (CORONAVIRUS DISEASE [COVID-19]), AMPLIFIED PROBE TECHNIQUE, MAKING USE OF HIGH THROUGHPUT TECHNOLOGIES AS DESCRIBED BY CMS-2020-01-R: HCPCS | Performed by: SURGERY

## 2021-03-01 PROCEDURE — 99213 OFFICE O/P EST LOW 20 MIN: CPT | Performed by: FAMILY MEDICINE

## 2021-03-01 PROCEDURE — U0005 INFEC AGEN DETEC AMPLI PROBE: HCPCS | Performed by: SURGERY

## 2021-03-01 PROCEDURE — 93000 ELECTROCARDIOGRAM COMPLETE: CPT | Performed by: FAMILY MEDICINE

## 2021-03-01 ASSESSMENT — PAIN SCALES - GENERAL: PAINLEVEL: NO PAIN (0)

## 2021-03-01 ASSESSMENT — MIFFLIN-ST. JEOR: SCORE: 1441.01

## 2021-03-01 NOTE — PROGRESS NOTES
[address] [address]  Phone: [phone]  Fax: [phone]  Primary Provider: Madeline Cheema  Pre-op Performing Provider: MADELINE CHEEMA      PREOPERATIVE EVALUATION:  Today's date: 3/1/2021    Latanya Padron is a 70 year old female who presents for a preoperative evaluation.    Surgical Information:  Surgery/Procedure: COLONOSCOPY  Surgery Location: [place]  Surgeon:   Surgery Date: 3/4/2021  Time of Surgery:   Where patient plans to recover: At home with family  Fax number for surgical facility: Note does not need to be faxed, will be available electronically in Epic.    Type of Anesthesia Anticipated: General    Assessment & Plan     The proposed surgical procedure is considered LOW risk.    (Z01.818) Preop general physical exam  (primary encounter diagnosis)  Comment: history of paroxysmal atrial fibrillation but rate controlled and in NSR now.  Her coumadin was stopped for a total of five days prior to the procedure.  She has COPD stable on her meds.  Plan: EKG 12-lead complete w/read - Clinics        EKG was unchanged from previous without any acute ST or T wave changes noted today.      (Z12.11) Colon cancer screening  Comment: due for her 10 yr  Follow up colonoscopy  Plan: coumadin held for 5 days, will hold all other meds the morning of her procedure except her pulmonary inhalers for her COPD.           Risks and Recommendations:  The patient has the following additional risks and recommendations for perioperative complications:   - Morbid obesity (BMI >40)  Pulmonary:    - Incentive spirometry post-op   - patient should use her inhalers prior to her procedure.      Medication Instructions:  she is going to hold all of her meds the morning of her colonoscopy.   - warfarin: Thromboembolic risk is low (<4%/year). HOLD warfarin for 5 days without bridging before procedure.    - LABA, inhaled corticosteroid, long-acting  anticholinergics: Continue without modification.    RECOMMENDATION:  APPROVAL GIVEN to proceed with proposed procedure, without further diagnostic evaluation.    25 minutes spent on the date of the encounter doing chart review, history and exam, documentation and further activities as noted above        Subjective     HPI related to upcoming procedure: patient is scheduled for a colonoscopy and will have moderate sedation.     Preop Questions 2/28/2021   1. Have you ever had a heart attack or stroke? No   2. Have you ever had surgery on your heart or blood vessels, such as a stent placement, a coronary artery bypass, or surgery on an artery in your head, neck, heart, or legs? No   3. Do you have chest pain with activity? No   4. Do you have a history of  heart failure? No   5. Do you currently have a cold, bronchitis or symptoms of other infection? No   6. Do you have a cough, shortness of breath, or wheezing? No   7. Do you or anyone in your family have previous history of blood clots? No   8. Do you or does anyone in your family have a serious bleeding problem such as prolonged bleeding following surgeries or cuts? No   9. Have you ever had problems with anemia or been told to take iron pills? No   10. Have you had any abnormal blood loss such as black, tarry or bloody stools, or abnormal vaginal bleeding? No   11. Have you ever had a blood transfusion? No   12. Are you willing to have a blood transfusion if it is medically needed before, during, or after your surgery? Yes   13. Have you or any of your relatives ever had problems with anesthesia? YES - slow to come out of it.    14. Do you have sleep apnea, excessive snoring or daytime drowsiness? No   15. Do you have any artifical heart valves or other implanted medical devices like a pacemaker, defibrillator, or continuous glucose monitor? No   16. Do you have artificial joints? No   17. Are you allergic to latex? No   18. Is there any chance that you may be  pregnant? -       Health Care Directive:  Patient has a Health Care Directive on file      Preoperative Review of :   reviewed - no record of controlled substances prescribed.      Status of Chronic Conditions:  See problem list for active medical problems.  Problems all longstanding and stable, except as noted/documented.  See ROS for pertinent symptoms related to these conditions.      Review of Systems  Constitutional, neuro, ENT, endocrine, pulmonary, cardiac, gastrointestinal, genitourinary, musculoskeletal, integument and psychiatric systems are negative, except as otherwise noted.    Patient Active Problem List    Diagnosis Date Noted     Epidermoid cyst of labia majora 11/04/2020     Priority: Medium     Long term current use of anticoagulant therapy 10/20/2020     Priority: Medium     Coronary artery disease involving native coronary artery of native heart without angina pectoris 08/10/2020     Priority: Medium     Restless leg syndrome 06/24/2020     Priority: Medium     Lymphadenopathy of head and neck 02/26/2020     Priority: Medium     Groin pain, right 02/26/2020     Priority: Medium     Right hip pain 09/04/2019     Priority: Medium     Patellofemoral pain syndrome of right knee 09/04/2019     Priority: Medium     Abnormal gait 09/04/2019     Priority: Medium     Primary osteoarthritis of right knee 09/04/2019     Priority: Medium     Age-related osteoporosis without current pathological fracture 07/03/2019     Priority: Medium     Malignant neoplasm of overlapping sites of left breast in female, estrogen receptor positive (H) 06/12/2019     Priority: Medium     Segmental dysfunction of sacral region 02/01/2019     Priority: Medium     Segmental dysfunction of lumbar region 02/01/2019     Priority: Medium     Segmental dysfunction of thoracic region 02/01/2019     Priority: Medium     Bilateral low back pain with right-sided sciatica 02/01/2019     Priority: Medium     Trochanteric bursitis of  right hip 07/13/2018     Priority: Medium     Hip pain, right 06/20/2018     Priority: Medium     Multiple joint pain 06/20/2018     Priority: Medium     CKD (chronic kidney disease) stage 3, GFR 30-59 ml/min 06/21/2017     Priority: Medium     Primary osteoarthritis of both knees 04/18/2017     Priority: Medium     AF (paroxysmal atrial fibrillation) (H) 03/02/2016     Priority: Medium     Major depression in complete remission (H) 12/28/2015     Priority: Medium     Morbid obesity, unspecified obesity type (H) 12/28/2015     Priority: Medium     Pulmonary emphysema, unspecified emphysema type (H) 12/28/2015     Priority: Medium     Atrial fibrillation (H) 06/20/2014     Priority: Medium     Essential hypertension, benign 09/30/2013     Priority: Medium     Tennis elbow 04/12/2013     Priority: Medium     left         Advanced directives, counseling/discussion 09/09/2011     Priority: Medium     Advance Directive Problem List Overview:   Name Relationship Phone    Primary Health Care Agent            Alternative Health Care Agent          Discussed advance care planning with patient; information given to patient to review.//Brenda Carlin/BEATRIZ(AAMA)  9/9/2011          HYPERLIPIDEMIA LDL GOAL <130 10/31/2010     Priority: Medium     Hirsutism 04/13/2007     Priority: Medium     Family history of malignant neoplasm of breast 12/27/2006     Priority: Medium     Female stress incontinence 11/26/2005     Priority: Medium     Disorder of bone and cartilage 07/13/2005     Priority: Medium     Problem list name updated by automated process. Provider to review       Sprain and strain of unspecified site of knee and leg 04/09/2004     Priority: Medium      Past Medical History:   Diagnosis Date     Breast cancer (H) 04/12/2019    Left Breast     COPD (chronic obstructive pulmonary disease) (H)      Mixed hyperlipidemia      Neck mass 6/20/2018     PONV (postoperative nausea and vomiting)      S/P radiation therapy     5,256 cGy  to left breast completed on 2019 - [facility]     Past Surgical History:   Procedure Laterality Date     BIOPSY NODE SENTINEL Left 5/15/2019    Procedure: left sentinel node biopsy;  Surgeon: Donald Aleman MD;  Location: [facility] OR     BREAST BIOPSY, CORE RT/LT Left 2019     C APPENDECTOMY,W OTHR PROC       C  DELIVERY ONLY           C LIGATE FALLOPIAN TUBE       C VAGINAL HYSTERECTOMY       COLONOSCOPY  06/21/10     HC BIOPSY OF BREAST, OPEN INCISIONAL Right     Benign per patient     HC CORRECT BUNION,METATARSAL OSTEOTOMY       HC LAPAROSCOPY, SURGICAL; CHOLECYSTECTOMY  08/04/10     HC SLING OPERATION FOR STRESS INCONTINENCE  2007     HERNIORRHAPHY INCISIONAL (LOCATION)  2011    Procedure:HERNIORRHAPHY INCISIONAL (LOCATION); Surgeon:AYALA HOOVER; Location:[facility] OR     LAPAROSCOPIC HERNIORRHAPHY INCISIONAL  2011    Procedure:LAPAROSCOPIC HERNIORRHAPHY INCISIONAL; Laparoscopic mesh repair of incarcerated incisional hernia , extensive lysis of adhesions, excision of hernia sac and closure of fascia.; Surgeon:AYALA HOOVER; Location:[facility] OR     LAPAROSCOPIC LYSIS ADHESIONS  2011    Procedure:LAPAROSCOPIC LYSIS ADHESIONS; Surgeon:AYALA HOOVER; Location:[facility] OR     MASTECTOMY PARTIAL WITH NEEDLE LOCALIZATION Left 5/15/2019    Procedure: left wire localized partial mastectomy;  Surgeon: Donald Aleman MD;  Location: [facility]     TONSILLECTOMY       [facility] NONSPECIFIC PROCEDURE      Exploratory laparotomy with resection of infarcted segment of omentum and minor lysis of adhesions     [facility] NONSPECIFIC PROCEDURE      Removal of hemorrhagic corpus luteum cyst     Current Outpatient Medications   Medication Sig Dispense Refill     albuterol (PROAIR HFA/PROVENTIL HFA/VENTOLIN HFA) 108 (90 Base) MCG/ACT inhaler INHALE ONE TO TWO PUFFS BY MOUTH EVERY 2-4 HOURS AS NEEDED 18 g 1     albuterol (PROVENTIL) (2.5 MG/3ML) 0.083% neb solution  "Take  by nebulization. 1 NEB EVERY 4-6 HOURS AS NEEDED 75 mL 0     anastrozole (ARIMIDEX) 1 MG tablet Take 1 tablet (1 mg) by mouth daily 90 tablet 3     ASPIRIN NOT PRESCRIBED (INTENTIONAL) Please choose reason not prescribed from choices below.       atorvastatin (LIPITOR) 10 MG tablet TAKE ONE TABLET BY MOUTH ONCE DAILY 90 tablet 0     BIOTIN PO Take by mouth daily       bisacodyl (DULCOLAX) 5 MG EC tablet Refer to \"Getting Ready for a Colonoscopy\" instruction handout 4 tablet 0     Calcium Carb-Cholecalciferol (CALCIUM 500 + D) 500-400 MG-UNIT TABS Take 1 tablet by mouth 2 times daily 180 tablet 3     Cyanocobalamin (B-12) 1000 MCG TBCR Take 1,000 mcg by mouth daily 100 tablet 1     diltiazem ER COATED BEADS (CARDIZEM CD/CARTIA XT) 120 MG 24 hr capsule TAKE ONE CAPSULE BY MOUTH ONCE DAILY ( LABS DUE ) 90 capsule 3     hydrochlorothiazide (HYDRODIURIL) 25 MG tablet TAKE ONE TABLET BY MOUTH ONCE DAILY 90 tablet 2     lisinopril (ZESTRIL) 2.5 MG tablet TAKE ONE TABLET BY MOUTH ONCE DAILY 90 tablet 3     magnesium 250 MG tablet Take 1 tablet by mouth daily 30 tablet      mometasone-formoterol (DULERA) 200-5 MCG/ACT inhaler INHALE TWO PUFFS BY MOUTH TWICE A DAY 13 g 11     order for DME Equipment being ordered: Oxygen 1 each 0     order for DME Equipment being ordered: Nebulizer 1 Device 0     ORDER FOR DME Equipment being ordered: Oxygen @ 2 liters with exertion       polyethylene glycol (GOLYTELY) 236 g suspension Refer to \"Getting Ready for a Colonoscopy\" instruction handout 4000 mL 0     Probiotic Product (PROBIOTIC PO) Take by mouth daily       trospium (SANCTURA) 20 MG tablet Take 20 mg by mouth       venlafaxine (EFFEXOR-XR) 75 MG 24 hr capsule Take 1 capsule (75 mg) by mouth daily 90 capsule 1     VITAMIN D PO        warfarin ANTICOAGULANT (JANTOVEN ANTICOAGULANT) 2.5 MG tablet TAKE ONE TABLET BY MOUTH ONCE DAILY OR AS DIRECTED BY THE COUMADIN CLINIC 90 tablet 0     predniSONE (DELTASONE) 10 MG tablet Take 2 " "tablets (20 mg) by mouth daily 10 tablet 4       Allergies   Allergen Reactions     Augmentin [Amoxicillin-Pot Clavulanate] Nausea and Vomiting     Meperidine Hcl Nausea and Vomiting     demerol     Seasonal Allergies      spring        Social History     Tobacco Use     Smoking status: Former Smoker     Packs/day: 1.00     Years: 30.00     Pack years: 30.00     Quit date: 10/25/2005     Years since quitting: 15.3     Smokeless tobacco: Never Used   Substance Use Topics     Alcohol use: No     Alcohol/week: 0.0 standard drinks     Family History   Problem Relation Age of Onset     Breast Cancer Mother          at 88     Obesity Mother      Genitourinary Problems Mother      Lipids Mother      Respiratory Sister      Lipids Sister      Breast Cancer Sister 40        s/p mastectomy     Arthritis Sister      Obesity Sister      Heart Failure Sister      Cancer Sister         stomach, colon, pancreatic     Cancer Maternal Grandfather      Cancer Paternal Grandfather         lymph nodes     Heart Disease Father         by pass (5)     Cerebrovascular Disease Father         hemorragic     Lipids Father      Alzheimer Disease Father 92     Alzheimer Disease Maternal Grandmother      Genitourinary Problems Maternal Grandmother      Cancer Brother         prostate     Melanoma Brother      Heart Disease Brother          (aaron with a partner)     Hyperlipidemia Brother      Other - See Comments Daughter         fibromyalgia ()     Heart Disease Brother         stent placement     Hyperlipidemia Brother      Lipids Sister      Cancer Maternal Uncle         colon     Dementia Sister         1/2 sister on dad's side ()     Other - See Comments Sister         8     Hemophilia Grandchild      Lymphoma Maternal Aunt      Heart Failure Maternal Aunt 81     History   Drug Use No         Objective     /74   Pulse 108   Temp 97.5  F (36.4  C) (Temporal)   Resp 18   Ht 1.562 m (5' 1.5\")   Wt 97.1 kg (214 lb) "   SpO2 90%   BMI 39.78 kg/m      Physical Exam    GENERAL APPEARANCE: healthy, alert and no distress     EYES: EOMI, PERRL     HENT: ear canals and TM's normal and nose and mouth without ulcers or lesions     NECK: no adenopathy, no asymmetry, masses, or scars and thyroid normal to palpation     RESP: lungs clear to auscultation - no rales, rhonchi or wheezes     CV: regular rates and rhythm, normal S1 S2, no S3 or S4 and no murmur, click or rub     MS: extremities normal- no gross deformities noted, no evidence of inflammation in joints, FROM in all extremities.     SKIN: no suspicious lesions or rashes     NEURO: Normal strength and tone, sensory exam grossly normal, mentation intact and speech normal     PSYCH: mentation appears normal. and affect normal/bright     LYMPHATICS: No cervical adenopathy    Recent Labs   Lab Test 02/23/21  0755 01/12/21  1242 01/12/21  1133 07/13/20  0909 07/13/20  0909 02/26/20  0812 02/26/20  0812   HGB  --   --   --   --  13.1  --  12.3   PLT  --   --   --   --  291  --  341   INR 2.1* 2.1*  --    < > 2.29*   < >  --    NA  --   --  138  --  137  --  139   POTASSIUM  --   --  4.1  --  4.2  --  3.7   CR  --   --  1.18*  --  1.14*  --  1.32*    < > = values in this interval not displayed.        Diagnostics:  Recent Results (from the past 24 hour(s))   Asymptomatic COVID-19 Virus (Coronavirus) by PCR    Collection Time: 03/01/21 10:48 AM    Specimen: Nasopharyngeal   Result Value Ref Range    COVID-19 Virus PCR to [facility] - Source Nasopharyngeal     COVID-19 Virus PCR to [facility] - Result       Test received-See reflex to IDDL test SARS CoV2 (COVID-19) Virus RT-PCR   SARS-CoV-2 COVID-19 Virus (Coronavirus) by PCR    Collection Time: 03/01/21 10:48 AM    Specimen: Nasopharyngeal   Result Value Ref Range    SARS-CoV-2 Virus Specimen Source Nasopharyngeal     SARS-CoV-2 PCR Result NEGATIVE     SARS-CoV-2 PCR Comment       Testing was performed using the Aptima SARS-CoV-2 Assay on the  [employer] Instrument System.   Additional information about this Emergency Use Authorization (EUA) assay can be found via   the Lab Guide.        EKG: appears normal, NSR, normal axis, normal intervals, no acute ST/T changes c/w ischemia, no LVH by voltage criteria, unchanged from previous tracings    Revised Cardiac Risk Index (RCRI):  The patient has the following serious cardiovascular risks for perioperative complications:   - No serious cardiac risks = 0 points     RCRI Interpretation: 1 point: Class II (low risk - 0.9% complication rate)    Electronically signed by:  Nazario Jones M.D.  3/1/2021     Copy of this evaluation report is provided to requesting physician.    [facility] Preop Guidelines    Revised Cardiac Risk Index

## 2021-03-01 NOTE — H&P (VIEW-ONLY)
[address] [address]  Phone: [phone]  Fax: [phone]  Primary Provider: Madeline Cheema  Pre-op Performing Provider: MADELINE CHEEMA      PREOPERATIVE EVALUATION:  Today's date: 3/1/2021    Latanya Padron is a 70 year old female who presents for a preoperative evaluation.    Surgical Information:  Surgery/Procedure: COLONOSCOPY  Surgery Location: [facility]  Surgeon:   Surgery Date: 3/4/2021  Time of Surgery:   Where patient plans to recover: At home with family  Fax number for surgical facility: Note does not need to be faxed, will be available electronically in Epic.    Type of Anesthesia Anticipated: General    Assessment & Plan     The proposed surgical procedure is considered LOW risk.    (Z01.818) Preop general physical exam  (primary encounter diagnosis)  Comment: history of paroxysmal atrial fibrillation but rate controlled and in NSR now.  Her coumadin was stopped for a total of five days prior to the procedure.  She has COPD stable on her meds.  Plan: EKG 12-lead complete w/read - Clinics        EKG was unchanged from previous without any acute ST or T wave changes noted today.      (Z12.11) Colon cancer screening  Comment: due for her 10 yr  Follow up colonoscopy  Plan: coumadin held for 5 days, will hold all other meds the morning of her procedure except her pulmonary inhalers for her COPD.           Risks and Recommendations:  The patient has the following additional risks and recommendations for perioperative complications:   - Morbid obesity (BMI >40)  Pulmonary:    - Incentive spirometry post-op   - patient should use her inhalers prior to her procedure.      Medication Instructions:  she is going to hold all of her meds the morning of her colonoscopy.   - warfarin: Thromboembolic risk is low (<4%/year). HOLD warfarin for 5 days without bridging before procedure.    - LABA, inhaled corticosteroid, long-acting  anticholinergics: Continue without modification.    RECOMMENDATION:  APPROVAL GIVEN to proceed with proposed procedure, without further diagnostic evaluation.    25 minutes spent on the date of the encounter doing chart review, history and exam, documentation and further activities as noted above        Subjective     HPI related to upcoming procedure: patient is scheduled for a colonoscopy and will have moderate sedation.     Preop Questions 2/28/2021   1. Have you ever had a heart attack or stroke? No   2. Have you ever had surgery on your heart or blood vessels, such as a stent placement, a coronary artery bypass, or surgery on an artery in your head, neck, heart, or legs? No   3. Do you have chest pain with activity? No   4. Do you have a history of  heart failure? No   5. Do you currently have a cold, bronchitis or symptoms of other infection? No   6. Do you have a cough, shortness of breath, or wheezing? No   7. Do you or anyone in your family have previous history of blood clots? No   8. Do you or does anyone in your family have a serious bleeding problem such as prolonged bleeding following surgeries or cuts? No   9. Have you ever had problems with anemia or been told to take iron pills? No   10. Have you had any abnormal blood loss such as black, tarry or bloody stools, or abnormal vaginal bleeding? No   11. Have you ever had a blood transfusion? No   12. Are you willing to have a blood transfusion if it is medically needed before, during, or after your surgery? Yes   13. Have you or any of your relatives ever had problems with anesthesia? YES - slow to come out of it.    14. Do you have sleep apnea, excessive snoring or daytime drowsiness? No   15. Do you have any artifical heart valves or other implanted medical devices like a pacemaker, defibrillator, or continuous glucose monitor? No   16. Do you have artificial joints? No   17. Are you allergic to latex? No   18. Is there any chance that you may be  pregnant? -       Health Care Directive:  Patient has a Health Care Directive on file      Preoperative Review of :   reviewed - no record of controlled substances prescribed.      Status of Chronic Conditions:  See problem list for active medical problems.  Problems all longstanding and stable, except as noted/documented.  See ROS for pertinent symptoms related to these conditions.      Review of Systems  Constitutional, neuro, ENT, endocrine, pulmonary, cardiac, gastrointestinal, genitourinary, musculoskeletal, integument and psychiatric systems are negative, except as otherwise noted.    Patient Active Problem List    Diagnosis Date Noted     Epidermoid cyst of labia majora 11/04/2020     Priority: Medium     Long term current use of anticoagulant therapy 10/20/2020     Priority: Medium     Coronary artery disease involving native coronary artery of native heart without angina pectoris 08/10/2020     Priority: Medium     Restless leg syndrome 06/24/2020     Priority: Medium     Lymphadenopathy of head and neck 02/26/2020     Priority: Medium     Groin pain, right 02/26/2020     Priority: Medium     Right hip pain 09/04/2019     Priority: Medium     Patellofemoral pain syndrome of right knee 09/04/2019     Priority: Medium     Abnormal gait 09/04/2019     Priority: Medium     Primary osteoarthritis of right knee 09/04/2019     Priority: Medium     Age-related osteoporosis without current pathological fracture 07/03/2019     Priority: Medium     Malignant neoplasm of overlapping sites of left breast in female, estrogen receptor positive (H) 06/12/2019     Priority: Medium     Segmental dysfunction of sacral region 02/01/2019     Priority: Medium     Segmental dysfunction of lumbar region 02/01/2019     Priority: Medium     Segmental dysfunction of thoracic region 02/01/2019     Priority: Medium     Bilateral low back pain with right-sided sciatica 02/01/2019     Priority: Medium     Trochanteric bursitis of  right hip 07/13/2018     Priority: Medium     Hip pain, right 06/20/2018     Priority: Medium     Multiple joint pain 06/20/2018     Priority: Medium     CKD (chronic kidney disease) stage 3, GFR 30-59 ml/min 06/21/2017     Priority: Medium     Primary osteoarthritis of both knees 04/18/2017     Priority: Medium     AF (paroxysmal atrial fibrillation) (H) 03/02/2016     Priority: Medium     Major depression in complete remission (H) 12/28/2015     Priority: Medium     Morbid obesity, unspecified obesity type (H) 12/28/2015     Priority: Medium     Pulmonary emphysema, unspecified emphysema type (H) 12/28/2015     Priority: Medium     Atrial fibrillation (H) 06/20/2014     Priority: Medium     Essential hypertension, benign 09/30/2013     Priority: Medium     Tennis elbow 04/12/2013     Priority: Medium     left         Advanced directives, counseling/discussion 09/09/2011     Priority: Medium     Advance Directive Problem List Overview:   Name Relationship Phone    Primary Health Care Agent            Alternative Health Care Agent          Discussed advance care planning with patient; information given to patient to review.//Brenda Carlin/BEATRIZ(AAMA)  9/9/2011          HYPERLIPIDEMIA LDL GOAL <130 10/31/2010     Priority: Medium     Hirsutism 04/13/2007     Priority: Medium     Family history of malignant neoplasm of breast 12/27/2006     Priority: Medium     Female stress incontinence 11/26/2005     Priority: Medium     Disorder of bone and cartilage 07/13/2005     Priority: Medium     Problem list name updated by automated process. Provider to review       Sprain and strain of unspecified site of knee and leg 04/09/2004     Priority: Medium      Past Medical History:   Diagnosis Date     Breast cancer (H) 04/12/2019    Left Breast     COPD (chronic obstructive pulmonary disease) (H)      Mixed hyperlipidemia      Neck mass 6/20/2018     PONV (postoperative nausea and vomiting)      S/P radiation therapy     5,256 cGy  to left breast completed on 2019 - [facility]     Past Surgical History:   Procedure Laterality Date     BIOPSY NODE SENTINEL Left 5/15/2019    Procedure: left sentinel node biopsy;  Surgeon: Donald Aleman MD;  Location: [facility] OR     BREAST BIOPSY, CORE RT/LT Left 2019     C APPENDECTOMY,W OTHR PROC       C  DELIVERY ONLY           C LIGATE FALLOPIAN TUBE       C VAGINAL HYSTERECTOMY       COLONOSCOPY  06/21/10     HC BIOPSY OF BREAST, OPEN INCISIONAL Right     Benign per patient     HC CORRECT BUNION,METATARSAL OSTEOTOMY       HC LAPAROSCOPY, SURGICAL; CHOLECYSTECTOMY  08/04/10     HC SLING OPERATION FOR STRESS INCONTINENCE  2007     HERNIORRHAPHY INCISIONAL (LOCATION)  2011    Procedure:HERNIORRHAPHY INCISIONAL (LOCATION); Surgeon:AYALA HOOVER; Location:[facility] OR     LAPAROSCOPIC HERNIORRHAPHY INCISIONAL  2011    Procedure:LAPAROSCOPIC HERNIORRHAPHY INCISIONAL; Laparoscopic mesh repair of incarcerated incisional hernia , extensive lysis of adhesions, excision of hernia sac and closure of fascia.; Surgeon:AYALA HOOVER; Location:[facility]     LAPAROSCOPIC LYSIS ADHESIONS  2011    Procedure:LAPAROSCOPIC LYSIS ADHESIONS; Surgeon:AYALA HOOVER; Location:[facility]     MASTECTOMY PARTIAL WITH NEEDLE LOCALIZATION Left 5/15/2019    Procedure: left wire localized partial mastectomy;  Surgeon: Donald Aleman MD;  Location: [facility]     TONSILLECTOMY       [facility] NONSPECIFIC PROCEDURE      Exploratory laparotomy with resection of infarcted segment of omentum and minor lysis of adhesions     [facility] NONSPECIFIC PROCEDURE      Removal of hemorrhagic corpus luteum cyst     Current Outpatient Medications   Medication Sig Dispense Refill     albuterol (PROAIR HFA/PROVENTIL HFA/VENTOLIN HFA) 108 (90 Base) MCG/ACT inhaler INHALE ONE TO TWO PUFFS BY MOUTH EVERY 2-4 HOURS AS NEEDED 18 g 1     albuterol (PROVENTIL) (2.5 MG/3ML) 0.083% neb solution  "Take  by nebulization. 1 NEB EVERY 4-6 HOURS AS NEEDED 75 mL 0     anastrozole (ARIMIDEX) 1 MG tablet Take 1 tablet (1 mg) by mouth daily 90 tablet 3     ASPIRIN NOT PRESCRIBED (INTENTIONAL) Please choose reason not prescribed from choices below.       atorvastatin (LIPITOR) 10 MG tablet TAKE ONE TABLET BY MOUTH ONCE DAILY 90 tablet 0     BIOTIN PO Take by mouth daily       bisacodyl (DULCOLAX) 5 MG EC tablet Refer to \"Getting Ready for a Colonoscopy\" instruction handout 4 tablet 0     Calcium Carb-Cholecalciferol (CALCIUM 500 + D) 500-400 MG-UNIT TABS Take 1 tablet by mouth 2 times daily 180 tablet 3     Cyanocobalamin (B-12) 1000 MCG TBCR Take 1,000 mcg by mouth daily 100 tablet 1     diltiazem ER COATED BEADS (CARDIZEM CD/CARTIA XT) 120 MG 24 hr capsule TAKE ONE CAPSULE BY MOUTH ONCE DAILY ( LABS DUE ) 90 capsule 3     hydrochlorothiazide (HYDRODIURIL) 25 MG tablet TAKE ONE TABLET BY MOUTH ONCE DAILY 90 tablet 2     lisinopril (ZESTRIL) 2.5 MG tablet TAKE ONE TABLET BY MOUTH ONCE DAILY 90 tablet 3     magnesium 250 MG tablet Take 1 tablet by mouth daily 30 tablet      mometasone-formoterol (DULERA) 200-5 MCG/ACT inhaler INHALE TWO PUFFS BY MOUTH TWICE A DAY 13 g 11     order for DME Equipment being ordered: Oxygen 1 each 0     order for DME Equipment being ordered: Nebulizer 1 Device 0     ORDER FOR DME Equipment being ordered: Oxygen @ 2 liters with exertion       polyethylene glycol (GOLYTELY) 236 g suspension Refer to \"Getting Ready for a Colonoscopy\" instruction handout 4000 mL 0     Probiotic Product (PROBIOTIC PO) Take by mouth daily       trospium (SANCTURA) 20 MG tablet Take 20 mg by mouth       venlafaxine (EFFEXOR-XR) 75 MG 24 hr capsule Take 1 capsule (75 mg) by mouth daily 90 capsule 1     VITAMIN D PO        warfarin ANTICOAGULANT (JANTOVEN ANTICOAGULANT) 2.5 MG tablet TAKE ONE TABLET BY MOUTH ONCE DAILY OR AS DIRECTED BY THE COUMADIN CLINIC 90 tablet 0     predniSONE (DELTASONE) 10 MG tablet Take 2 " "tablets (20 mg) by mouth daily 10 tablet 4       Allergies   Allergen Reactions     Augmentin [Amoxicillin-Pot Clavulanate] Nausea and Vomiting     Meperidine Hcl Nausea and Vomiting     demerol     Seasonal Allergies      spring        Social History     Tobacco Use     Smoking status: Former Smoker     Packs/day: 1.00     Years: 30.00     Pack years: 30.00     Quit date: 10/25/2005     Years since quitting: 15.3     Smokeless tobacco: Never Used   Substance Use Topics     Alcohol use: No     Alcohol/week: 0.0 standard drinks     Family History   Problem Relation Age of Onset     Breast Cancer Mother          at 88     Obesity Mother      Genitourinary Problems Mother      Lipids Mother      Respiratory Sister      Lipids Sister      Breast Cancer Sister 40        s/p mastectomy     Arthritis Sister      Obesity Sister      Heart Failure Sister      Cancer Sister         stomach, colon, pancreatic     Cancer Maternal Grandfather      Cancer Paternal Grandfather         lymph nodes     Heart Disease Father         by pass (5)     Cerebrovascular Disease Father         hemorragic     Lipids Father      Alzheimer Disease Father 92     Alzheimer Disease Maternal Grandmother      Genitourinary Problems Maternal Grandmother      Cancer Brother         prostate     Melanoma Brother      Heart Disease Brother          (aaron with a partner)     Hyperlipidemia Brother      Other - See Comments Daughter         fibromyalgia ()     Heart Disease Brother         stent placement     Hyperlipidemia Brother      Lipids Sister      Cancer Maternal Uncle         colon     Dementia Sister         1/2 sister on dad's side ()     Other - See Comments Sister         8     Hemophilia Grandchild      Lymphoma Maternal Aunt      Heart Failure Maternal Aunt 81     History   Drug Use No         Objective     /74   Pulse 108   Temp 97.5  F (36.4  C) (Temporal)   Resp 18   Ht 1.562 m (5' 1.5\")   Wt 97.1 kg (214 lb) "   SpO2 90%   BMI 39.78 kg/m      Physical Exam    GENERAL APPEARANCE: healthy, alert and no distress     EYES: EOMI, PERRL     HENT: ear canals and TM's normal and nose and mouth without ulcers or lesions     NECK: no adenopathy, no asymmetry, masses, or scars and thyroid normal to palpation     RESP: lungs clear to auscultation - no rales, rhonchi or wheezes     CV: regular rates and rhythm, normal S1 S2, no S3 or S4 and no murmur, click or rub     MS: extremities normal- no gross deformities noted, no evidence of inflammation in joints, FROM in all extremities.     SKIN: no suspicious lesions or rashes     NEURO: Normal strength and tone, sensory exam grossly normal, mentation intact and speech normal     PSYCH: mentation appears normal. and affect normal/bright     LYMPHATICS: No cervical adenopathy    Recent Labs   Lab Test 02/23/21  0755 01/12/21  1242 01/12/21  1133 07/13/20  0909 07/13/20  0909 02/26/20  0812 02/26/20  0812   HGB  --   --   --   --  13.1  --  12.3   PLT  --   --   --   --  291  --  341   INR 2.1* 2.1*  --    < > 2.29*   < >  --    NA  --   --  138  --  137  --  139   POTASSIUM  --   --  4.1  --  4.2  --  3.7   CR  --   --  1.18*  --  1.14*  --  1.32*    < > = values in this interval not displayed.        Diagnostics:  Recent Results (from the past 24 hour(s))   Asymptomatic COVID-19 Virus (Coronavirus) by PCR    Collection Time: 03/01/21 10:48 AM    Specimen: Nasopharyngeal   Result Value Ref Range    COVID-19 Virus PCR to [facility] - Source Nasopharyngeal     COVID-19 Virus PCR to [facility] - Result       Test received-See reflex to IDDL test SARS CoV2 (COVID-19) Virus RT-PCR   SARS-CoV-2 COVID-19 Virus (Coronavirus) by PCR    Collection Time: 03/01/21 10:48 AM    Specimen: Nasopharyngeal   Result Value Ref Range    SARS-CoV-2 Virus Specimen Source Nasopharyngeal     SARS-CoV-2 PCR Result NEGATIVE     SARS-CoV-2 PCR Comment       Testing was performed using the Aptima SARS-CoV-2 Assay on the  LMN-1 Instrument System.   Additional information about this Emergency Use Authorization (EUA) assay can be found via   the Lab Guide.        EKG: appears normal, NSR, normal axis, normal intervals, no acute ST/T changes c/w ischemia, no LVH by voltage criteria, unchanged from previous tracings    Revised Cardiac Risk Index (RCRI):  The patient has the following serious cardiovascular risks for perioperative complications:   - No serious cardiac risks = 0 points     RCRI Interpretation: 1 point: Class II (low risk - 0.9% complication rate)    Electronically signed by:  Nazario Jones M.D.  3/1/2021     Copy of this evaluation report is provided to requesting physician.    [facility] Preop Guidelines    Revised Cardiac Risk Index

## 2021-03-01 NOTE — PATIENT INSTRUCTIONS

## 2021-03-03 ENCOUNTER — ANESTHESIA EVENT (OUTPATIENT)
Dept: GASTROENTEROLOGY | Facility: CLINIC | Age: 71
End: 2021-03-03
Payer: COMMERCIAL

## 2021-03-03 ASSESSMENT — COPD QUESTIONNAIRES
COPD: 1
CAT_SEVERITY: MILD

## 2021-03-03 ASSESSMENT — ENCOUNTER SYMPTOMS: DYSRHYTHMIAS: 1

## 2021-03-03 ASSESSMENT — LIFESTYLE VARIABLES: TOBACCO_USE: 1

## 2021-03-04 ENCOUNTER — ANESTHESIA (OUTPATIENT)
Dept: GASTROENTEROLOGY | Facility: CLINIC | Age: 71
End: 2021-03-04
Payer: COMMERCIAL

## 2021-03-04 ENCOUNTER — HOSPITAL ENCOUNTER (OUTPATIENT)
Facility: CLINIC | Age: 71
Discharge: HOME | End: 2021-03-04
Attending: SURGERY | Admitting: SURGERY
Payer: COMMERCIAL

## 2021-03-04 VITALS
DIASTOLIC BLOOD PRESSURE: 68 MMHG | WEIGHT: 214 LBS | RESPIRATION RATE: 20 BRPM | OXYGEN SATURATION: 97 % | BODY MASS INDEX: 39.78 KG/M2 | TEMPERATURE: 97.8 F | SYSTOLIC BLOOD PRESSURE: 129 MMHG

## 2021-03-04 LAB
COLONOSCOPY: NORMAL
INR PPP: 1.1 (ref 0.86–1.14)

## 2021-03-04 PROCEDURE — 85610 PROTHROMBIN TIME: CPT | Performed by: NURSE ANESTHETIST, CERTIFIED REGISTERED

## 2021-03-04 PROCEDURE — 88305 TISSUE EXAM BY PATHOLOGIST: CPT | Mod: 26 | Performed by: PATHOLOGY

## 2021-03-04 PROCEDURE — 45378 DIAGNOSTIC COLONOSCOPY: CPT | Performed by: SURGERY

## 2021-03-04 PROCEDURE — 250N000009 HC RX 250: Performed by: NURSE ANESTHETIST, CERTIFIED REGISTERED

## 2021-03-04 PROCEDURE — 370N000017 HC ANESTHESIA TECHNICAL FEE, PER MIN: Performed by: SURGERY

## 2021-03-04 PROCEDURE — 250N000011 HC RX IP 250 OP 636: Performed by: NURSE ANESTHETIST, CERTIFIED REGISTERED

## 2021-03-04 PROCEDURE — 99153 MOD SED SAME PHYS/QHP EA: CPT

## 2021-03-04 PROCEDURE — 258N000003 HC RX IP 258 OP 636: Performed by: SURGERY

## 2021-03-04 PROCEDURE — 45385 COLONOSCOPY W/LESION REMOVAL: CPT | Mod: PT | Performed by: SURGERY

## 2021-03-04 PROCEDURE — 45385 COLONOSCOPY W/LESION REMOVAL: CPT | Mod: PT

## 2021-03-04 PROCEDURE — 45380 COLONOSCOPY AND BIOPSY: CPT | Mod: PT,XU | Performed by: SURGERY

## 2021-03-04 PROCEDURE — 36415 COLL VENOUS BLD VENIPUNCTURE: CPT | Performed by: NURSE ANESTHETIST, CERTIFIED REGISTERED

## 2021-03-04 PROCEDURE — 88305 TISSUE EXAM BY PATHOLOGIST: CPT | Mod: TC | Performed by: SURGERY

## 2021-03-04 PROCEDURE — 45380 COLONOSCOPY AND BIOPSY: CPT | Mod: 59 | Performed by: SURGERY

## 2021-03-04 RX ORDER — LIDOCAINE HYDROCHLORIDE 20 MG/ML
INJECTION, SOLUTION INFILTRATION; PERINEURAL PRN
Status: DISCONTINUED | OUTPATIENT
Start: 2021-03-04 | End: 2021-03-04

## 2021-03-04 RX ORDER — LIDOCAINE 40 MG/G
CREAM TOPICAL
Status: DISCONTINUED | OUTPATIENT
Start: 2021-03-04 | End: 2021-03-04 | Stop reason: HOSPADM

## 2021-03-04 RX ORDER — SODIUM CHLORIDE, SODIUM LACTATE, POTASSIUM CHLORIDE, CALCIUM CHLORIDE 600; 310; 30; 20 MG/100ML; MG/100ML; MG/100ML; MG/100ML
INJECTION, SOLUTION INTRAVENOUS CONTINUOUS
Status: DISCONTINUED | OUTPATIENT
Start: 2021-03-04 | End: 2021-03-04 | Stop reason: HOSPADM

## 2021-03-04 RX ORDER — PROPOFOL 10 MG/ML
INJECTION, EMULSION INTRAVENOUS PRN
Status: DISCONTINUED | OUTPATIENT
Start: 2021-03-04 | End: 2021-03-04

## 2021-03-04 RX ORDER — PROPOFOL 10 MG/ML
INJECTION, EMULSION INTRAVENOUS CONTINUOUS PRN
Status: DISCONTINUED | OUTPATIENT
Start: 2021-03-04 | End: 2021-03-04

## 2021-03-04 RX ADMIN — PROPOFOL 150 MCG/KG/MIN: 10 INJECTION, EMULSION INTRAVENOUS at 07:31

## 2021-03-04 RX ADMIN — LIDOCAINE HYDROCHLORIDE 40 MG: 20 INJECTION, SOLUTION INFILTRATION; PERINEURAL at 07:31

## 2021-03-04 RX ADMIN — PROPOFOL 50 MG: 10 INJECTION, EMULSION INTRAVENOUS at 07:31

## 2021-03-04 RX ADMIN — PROPOFOL 20 MG: 10 INJECTION, EMULSION INTRAVENOUS at 07:41

## 2021-03-04 RX ADMIN — SODIUM CHLORIDE, POTASSIUM CHLORIDE, SODIUM LACTATE AND CALCIUM CHLORIDE: 600; 310; 30; 20 INJECTION, SOLUTION INTRAVENOUS at 07:16

## 2021-03-04 NOTE — INTERVAL H&P NOTE
The History and Physical has been reviewed, the patient has been examined and no changes have occurred in the patient's condition since the H & P was completed.     due for her 10 yr  Follow up colonoscopy    [facility] DO Love

## 2021-03-04 NOTE — ANESTHESIA POSTPROCEDURE EVALUATION
Patient: Latanya Padron    Procedure(s):  Colonoscopy with  polypectomy    Diagnosis:Special screening for malignant neoplasms, colon [Z12.11]  Diagnosis Additional Information: No value filed.    Anesthesia Type:  MAC    Note:  Disposition: Outpatient   Postop Pain Control: Uneventful            Sign Out: Well controlled pain   PONV: No   Neuro/Psych: Uneventful            Sign Out: Acceptable/Baseline neuro status   Airway/Respiratory: Uneventful            Sign Out: Acceptable/Baseline resp. status   CV/Hemodynamics: Uneventful            Sign Out: Acceptable CV status   Other NRE: NONE   DID A NON-ROUTINE EVENT OCCUR? No    Event details/Postop Comments:  Patient was very pleased with her anesthesia today. No concerns.          Last vitals:  Vitals:    03/04/21 0652 03/04/21 0808   BP: (!) 142/75 116/67   Resp: 18 16   Temp: 97.8  F (36.6  C)    SpO2: 96% 97%       Last vitals prior to Anesthesia Care Transfer:  CRNA VITALS  3/4/2021 0733 - 3/4/2021 0822      3/4/2021             Temp:  98.4  F (36.9  C)    EKG:  Sinus rhythm          Electronically Signed By: SOSA Adan CRNA  March 4, 2021  8:22 AM

## 2021-03-04 NOTE — LETTER
Latanya Padron  [id number] [address] [place] [id number]      March 6, 2021    Dear Latanya,  This letter is written to inform you of the results of your recent colonoscopy.  Your examination showed polyp(s) in your ascending colon, transverse colon, sigmoid colon and rectum. All polyps were removed in their entirety and sent for review by a pathologist. As you will see on the pathology report below, the tissue(s) were tubular adenomatous polyps and hyperplastic polyps. Your examination was otherwise without abnormality.    SPECIMEN(S):   A: Rectal polyps   B: Ascending colon polyps   C: Transverse colon polyp   D: Sigmoid colon polyp     FINAL DIAGNOSIS:   A: Large intestine, rectum, polyps, biopsy/polypectomy:   - Tubulovillous adenomas negative for high-grade dysplasia.     B: Large intestine, ascending, polyps, biopsy/polypectomy:   - Tubular adenomas negative for high-grade dysplasia.     C: Large intestine, transverse, polyp, biopsy/polypectomy:   - Tubular adenoma negative for high-grade dysplasia.     D: Large intestine, sigmoid, polyp, biopsy/polypectomy:   - Hyperplastic polyp.       Adenomatous polyps are entirely benign (non-cancerous); however, patients who have developed these polyps are at an increased risk for developing additional polyps in the future. If these are not eventually removed, there is a risk of developing colon cancer. We will advise more frequent examinations with you because of the risk associated with this type of polyp.    Given these findings,  I recommend that you undergo a repeat colonoscopy in 3 year(s) for surveillance. We will enter you into a recall system so you receive a reminder closer to the time that you are due for repeat examination.     Please remember that this recommendation is made with the understanding that you are not experiencing persistent changes in bowel function, bleeding per rectum, and/or significant abdominal pain. If you experience these symptoms,  please contact your primary care provider for a further evaluation.     If you have any questions or concerns about the results of your colonoscopy or the appropriate follow-up, please contact my assistant at [phone]    Sincerely,        [facility]  [facility]  ___

## 2021-03-04 NOTE — DISCHARGE INSTRUCTIONS
[facility]    Home Care Following Endoscopy     Dr. Aleman      Activity:    You have just undergone an endoscopic procedure  performed with MAC sedation.  Do not work or operate machinery (including a car) or drink alcohol (including beer) for at least 12 hours.      I encourage you to walk and attempt to pass this air as soon as possible.    Diet:    Return to the diet you were on before your procedure but eat lightly for the first 12-24 hours.    Drink plenty of water.    Resume any regular medications unless otherwise advised by your physician.  Please begin any new medication prescribed as a result of your procedure as directed by your physician.     You had several polyps removed so please refrain from aspirin or aspirin products for 2 days.     Pain:    You may take Tylenol as needed for pain.  Expected Recovery:  You can expect some mild abdominal fullness and/or discomfort due to the air used to inflate your intestinal tract.     Call Your Physician if You Have:      After Colonoscopy:  o Worsening persisting abdominal pain which is worse with activity.  o Fevers (>101 degrees F), chills or shakes.  o Passage of continued blood with bowel movements.   Any questions or concerns about your recovery, please call [phone] or after hours [phone] [facility] Nurse Advice Line.    Follow-up Care:    You should receive a call or letter with your results within 1 week. Please call if you have not received a notification of your results.

## 2021-03-04 NOTE — ANESTHESIA CARE TRANSFER NOTE
Patient: Latanya Padron    Procedure(s):  Colonoscopy with  polypectomy    Diagnosis: Special screening for malignant neoplasms, colon [Z12.11]  Diagnosis Additional Information: No value filed.    Anesthesia Type:   MAC     Note:    Oropharynx: oropharynx clear of all foreign objects and spontaneously breathing  Level of Consciousness: awake      Independent Airway: airway patency satisfactory and stable  Dentition: dentition unchanged  Vital Signs Stable: post-procedure vital signs reviewed and stable  Report to RN Given: handoff report given  Patient transferred to: Phase II    Handoff Report: Identifed the Patient, Identified the Reponsible Provider, Reviewed the pertinent medical history, Discussed the surgical course, Reviewed Intra-OP anesthesia mangement and issues during anesthesia, Set expectations for post-procedure period and Allowed opportunity for questions and acknowledgement of understanding      Vitals: (Last set prior to Anesthesia Care Transfer)  CRNA VITALS  3/4/2021 0733 - 3/4/2021 0822      3/4/2021             Temp:  98.4  F (36.9  C)    EKG:  Sinus rhythm        Electronically Signed By: SOSA Adan CRNA  March 4, 2021  8:22 AM

## 2021-03-04 NOTE — ANESTHESIA PREPROCEDURE EVALUATION
Anesthesia Pre-Procedure Evaluation    Patient: Latanya Padron   MRN: [id number] : 1950        Preoperative Diagnosis: Special screening for malignant neoplasms, colon [Z12.11]   Procedure : Procedure(s):  Colonoscopy with possible biopsy and/or polypectomy     Past Medical History:   Diagnosis Date     Breast cancer (H) 2019    Left Breast     COPD (chronic obstructive pulmonary disease) (H)      Mixed hyperlipidemia      Neck mass 2018     PONV (postoperative nausea and vomiting)      S/P radiation therapy     5,256 cGy to left breast completed on 2019 - [facility]      Past Surgical History:   Procedure Laterality Date     BIOPSY NODE SENTINEL Left 5/15/2019    Procedure: left sentinel node biopsy;  Surgeon: Donald Aleman MD;  Location: [facility] OR     BREAST BIOPSY, CORE RT/LT Left 2019     C APPENDECTOMY,W OTHR PROC       C  DELIVERY ONLY           C LIGATE FALLOPIAN TUBE       C VAGINAL HYSTERECTOMY       COLONOSCOPY  06/21/10     HC BIOPSY OF BREAST, OPEN INCISIONAL Right     Benign per patient     HC CORRECT BUNION,METATARSAL OSTEOTOMY        LAPAROSCOPY, SURGICAL; CHOLECYSTECTOMY  08/04/10      SLING OPERATION FOR STRESS INCONTINENCE  2007     HERNIORRHAPHY INCISIONAL (LOCATION)  2011    Procedure:HERNIORRHAPHY INCISIONAL (LOCATION); Surgeon:AYALA HOOVER; Location:[facility]     LAPAROSCOPIC HERNIORRHAPHY INCISIONAL  2011    Procedure:LAPAROSCOPIC HERNIORRHAPHY INCISIONAL; Laparoscopic mesh repair of incarcerated incisional hernia , extensive lysis of adhesions, excision of hernia sac and closure of fascia.; Surgeon:AYALA HOOVER; Location:[facility]     LAPAROSCOPIC LYSIS ADHESIONS  2011    Procedure:LAPAROSCOPIC LYSIS ADHESIONS; Surgeon:AYALA HOOVER; Location:[facility]     MASTECTOMY PARTIAL WITH NEEDLE LOCALIZATION Left 5/15/2019    Procedure: left wire localized partial mastectomy;  Surgeon:  Donald Aleman MD;  Location: [facility]     TONSILLECTOMY       [facility] NONSPECIFIC PROCEDURE  1990    Exploratory laparotomy with resection of infarcted segment of omentum and minor lysis of adhesions     [facility] NONSPECIFIC PROCEDURE  1981    Removal of hemorrhagic corpus luteum cyst      Allergies   Allergen Reactions     Augmentin [Amoxicillin-Pot Clavulanate] Nausea and Vomiting     Meperidine Hcl Nausea and Vomiting     demerol     Seasonal Allergies      spring      Social History     Tobacco Use     Smoking status: Former Smoker     Packs/day: 1.00     Years: 30.00     Pack years: 30.00     Quit date: 10/25/2005     Years since quitting: 15.3     Smokeless tobacco: Never Used   Substance Use Topics     Alcohol use: No     Alcohol/week: 0.0 standard drinks      Wt Readings from Last 1 Encounters:   03/01/21 97.1 kg (214 lb)        Anesthesia Evaluation   Pt has had prior anesthetic. Type: General and MAC.    History of anesthetic complications  - PONV.      ROS/MED HX  ENT/Pulmonary: Comment: Patient occasionally uses oxygen at night. 2L NC    (+) tobacco use, Past use, mild,  COPD,     Neurologic:  - neg neurologic ROS     Cardiovascular:     (+) Dyslipidemia hypertension--CAD angina-with excertion. --Taking blood thinners dysrhythmias, a-fib, Previous cardiac testing   Echo: Date: 2014 Results:  Interpretation Summary  The left ventricle is normal in size. The visual ejection fraction is   estimated at 60-65%. No regional wall motion abnormalities noted. The right   ventricle is borderline dilated. The right ventricular systolic function is   normal. Right ventricle systolic pressure estimate normal Likely no   significant valvular heart disease.  PatientHeight: 61 in  PatientWeight: 230 lbs  SystolicPressure: 130 mmHg  DiastolicPressure: 80 mmHg  HeartRate: 70 bpm  BSA 2.0 m^2     Stress Test: Date: Results:    ECG Reviewed: Date: 3/1/2021 Results:  SR  Cath: Date: Results:      METS/Exercise Tolerance:      Hematologic:  - neg hematologic  ROS     Musculoskeletal: Comment: Chronic low back pain  Knee pain  Abnormal gait  Restless leg syndrome  Bursitis elbow  (+) arthritis,     GI/Hepatic:  - neg GI/hepatic ROS     Renal/Genitourinary: Comment: Female stress incontinence     (+) renal disease, type: CRI,     Endo:     (+) Obesity,     Psychiatric/Substance Use:     (+) psychiatric history depression     Infectious Disease:       Malignancy:   (+) Malignancy, History of Breast.Breast CA Remission status post Surgery.        Other:            Physical Exam    Airway        Mallampati: II   TM distance: > 3 FB   Neck ROM: full   Mouth opening: > 3 cm    Respiratory Devices and Support         Dental  no notable dental history         Cardiovascular   cardiovascular exam normal       Rhythm and rate: regular and normal     Pulmonary   pulmonary exam normal        breath sounds clear to auscultation           OUTSIDE LABS:  CBC:   Lab Results   Component Value Date    WBC 6.5 07/13/2020    WBC 6.9 02/26/2020    HGB 13.1 07/13/2020    HGB 12.3 02/26/2020    HCT 40.7 07/13/2020    HCT 38.9 02/26/2020     07/13/2020     02/26/2020     BMP:   Lab Results   Component Value Date     01/12/2021     07/13/2020    POTASSIUM 4.1 01/12/2021    POTASSIUM 4.2 07/13/2020    CHLORIDE 104 01/12/2021    CHLORIDE 102 07/13/2020    CO2 30 01/12/2021    CO2 31 07/13/2020    BUN 20 01/12/2021    BUN 20 07/13/2020    CR 1.18 (H) 01/12/2021    CR 1.14 (H) 07/13/2020     (H) 01/12/2021     (H) 07/13/2020     COAGS:   Lab Results   Component Value Date    INR 2.1 (H) 02/23/2021     POC: No results found for: BGM, HCG, HCGS  HEPATIC:   Lab Results   Component Value Date    ALBUMIN 3.9 01/12/2021    PROTTOTAL 7.9 01/12/2021    ALT 21 01/12/2021    AST 18 01/12/2021    ALKPHOS 62 01/12/2021    BILITOTAL 0.7 01/12/2021     OTHER:   Lab Results   Component Value Date    IVANNA 10.2 (H) 01/12/2021    LIPASE 170  05/24/2010    AMYLASE 75 05/24/2010    TSH 2.80 07/13/2020    SED 53 (H) 02/26/2020       Anesthesia Plan    ASA Status:  3   NPO Status:  NPO Appropriate    Anesthesia Type: MAC.     - Reason for MAC: immobility needed   Induction: Intravenous, Propofol.   Maintenance: TIVA.        Consents    Anesthesia Plan(s) and associated risks, benefits, and realistic alternatives discussed. Questions answered and patient/representative(s) expressed understanding.     - Discussed with:  Patient         Postoperative Care    Pain management: Oral pain medications.        Comments:                SOSA Adan CRNA

## 2021-03-05 LAB — COPATH REPORT: NORMAL

## 2021-03-09 ENCOUNTER — TELEPHONE (OUTPATIENT)
Dept: SURGERY | Facility: CLINIC | Age: 71
End: 2021-03-09

## 2021-03-09 NOTE — TELEPHONE ENCOUNTER
Please call pt with colonoscopy results, she just has a few questions about the result letter she received. Thank You Linh

## 2021-03-09 NOTE — TELEPHONE ENCOUNTER
Dr resendez spoke to patient, all questions were addressed and patient agreed understanding.      Arpita Bee on 3/9/2021 at 1:17 PM

## 2021-03-19 ENCOUNTER — ANTICOAGULATION THERAPY VISIT (OUTPATIENT)
Dept: ANTICOAGULATION | Facility: CLINIC | Age: 71
End: 2021-03-19

## 2021-03-19 DIAGNOSIS — Z79.01 LONG TERM CURRENT USE OF ANTICOAGULANT THERAPY: ICD-10-CM

## 2021-03-19 DIAGNOSIS — I48.0 AF (PAROXYSMAL ATRIAL FIBRILLATION) (H): ICD-10-CM

## 2021-03-19 DIAGNOSIS — I48.91 ATRIAL FIBRILLATION, UNSPECIFIED TYPE (H): ICD-10-CM

## 2021-03-19 LAB
CAPILLARY BLOOD COLLECTION: NORMAL
INR BLD: 1.8 (ref 0.86–1.14)

## 2021-03-19 PROCEDURE — 85610 PROTHROMBIN TIME: CPT | Performed by: FAMILY MEDICINE

## 2021-03-19 PROCEDURE — 36416 COLLJ CAPILLARY BLOOD SPEC: CPT | Performed by: FAMILY MEDICINE

## 2021-03-19 NOTE — PROGRESS NOTES
ANTICOAGULATION MANAGEMENT     Patient Name:  Latanya Padron  Date:  3/19/2021    ASSESSMENT /SUBJECTIVE:    Today's INR result of 1.8 is subtherapeutic. Goal INR of 2.0-3.0      Warfarin dose taken: Warfarin taken as instructed    Diet: No new diet changes affecting INR    Medication changes/ interactions: No new medications/supplements affecting INR    Previous INR: Therapeutic     S/S of bleeding or thromboembolism: No    New injury or illness: No    Upcoming surgery, procedure or cardioversion: No    Additional findings: None      PLAN:    Telephone call with Latanya regarding INR result and instructed:     Warfarin Dosing Instructions: 5 mg tonight then continue your current warfarin dose of 2.5 mg daily    Instructed patient to follow up no later than: 4 weeks  Lab visit scheduled    Education provided: None required      Pt verbalizes understanding and agrees to warfarin dosing plan.    Instructed to call the Anticoagulation Clinic for any changes, questions or concerns. (#[phone])        Francine Andrew RN      OBJECTIVE:  Recent labs: (last 7 days)     21  1012   INR 1.8*         INR assessment SUB    Recheck INR In: 4 WEEKS    INR Location Outside lab      Anticoagulation Summary  As of 3/19/2021    INR goal:  2.0-3.0   TTR:  86.4 % (1 y)   INR used for dosin.8 (3/19/2021)   Warfarin maintenance plan:  2.5 mg (2.5 mg x 1) every day   Full warfarin instructions:  3/19: 5 mg; Otherwise 2.5 mg every day   Weekly warfarin total:  17.5 mg   Plan last modified:  Francine Andrew RN (3/19/2021)   Next INR check:  2021   Priority:  Maintenance   Target end date:  Indefinite    Indications    AF (paroxysmal atrial fibrillation) (H) [I48.0]  Atrial fibrillation (H) [I48.91]  Long term current use of anticoagulant therapy [Z79.01]  Atrial fibrillation  unspecified type (H) (Resolved) [I48.91]             Anticoagulation Episode Summary     INR check location:      Preferred lab:      Send  INR reminders to:  [facility]    Comments:  5 mg tabs, likes card, PM dose      Anticoagulation Care Providers     Provider Role Specialty Phone number    Mirza Glen MD Darci Referring Family Medicine [phone]

## 2021-03-22 ENCOUNTER — TELEPHONE (OUTPATIENT)
Dept: FAMILY MEDICINE | Facility: CLINIC | Age: 71
End: 2021-03-22

## 2021-03-22 NOTE — TELEPHONE ENCOUNTER
I have attempted to contact this patient by phone, left message to return call to the coumadin clinic.  Will try again later.    MARCELO CeronN, RN- Coumadin Clinic RN

## 2021-03-22 NOTE — TELEPHONE ENCOUNTER
Pt is wondering if she should take more warfarin until her next check as she was low two weeks after being off for her colonoscopy. I did change her next check to Mon 3/29 and advised taking 3.75 mg on Fri, 2.5 mg all other days. Changed in last anticoag encounter.     Zoey Pickard, BSN, RN- Coumadin Clinic RN

## 2021-03-22 NOTE — PROGRESS NOTES
Pt called regarding dosing. Changed dosing to 3.75 mg Fri and 2.5 mg all other days, recheck next week.     MARCELO CeronN, RN- Coumadin Clinic RN

## 2021-03-29 ENCOUNTER — ANTICOAGULATION THERAPY VISIT (OUTPATIENT)
Dept: ANTICOAGULATION | Facility: CLINIC | Age: 71
End: 2021-03-29

## 2021-03-29 DIAGNOSIS — Z79.01 LONG TERM CURRENT USE OF ANTICOAGULANT THERAPY: ICD-10-CM

## 2021-03-29 DIAGNOSIS — I48.0 AF (PAROXYSMAL ATRIAL FIBRILLATION) (H): ICD-10-CM

## 2021-03-29 DIAGNOSIS — I48.91 ATRIAL FIBRILLATION, UNSPECIFIED TYPE (H): ICD-10-CM

## 2021-03-29 DIAGNOSIS — Z79.01 LONG TERM CURRENT USE OF ANTICOAGULANT THERAPY: Primary | ICD-10-CM

## 2021-03-29 LAB
CAPILLARY BLOOD COLLECTION: NORMAL
INR BLD: 2.1 (ref 0.86–1.14)

## 2021-03-29 PROCEDURE — 36416 COLLJ CAPILLARY BLOOD SPEC: CPT | Performed by: FAMILY MEDICINE

## 2021-03-29 PROCEDURE — 85610 PROTHROMBIN TIME: CPT | Performed by: FAMILY MEDICINE

## 2021-03-29 NOTE — PROGRESS NOTES
ANTICOAGULATION MANAGEMENT     Patient Name:  Latanya Padron  Date:  3/29/2021    ASSESSMENT /SUBJECTIVE:    Today's INR result of 2.1 is therapeutic. Goal INR of 2.0-3.0      Warfarin dose taken: Warfarin taken as instructed    Diet: No new diet changes affecting INR    Medication changes/ interactions: No new medications/supplements affecting INR    Previous INR: Therapeutic     S/S of bleeding or thromboembolism: No    New injury or illness: No    Upcoming surgery, procedure or cardioversion: No    Additional findings: None      PLAN:    Telephone call with Latanya regarding INR result and instructed:     Warfarin Dosing Instructions: Change your warfarin dose to 5 mg Mon and 2.5 mg ROW  . (6.7 % change) - pt would like to increase dose slightly as she would like to increase Shelley K in her diet       Instructed patient to follow up no later than: 2 weeks  Lab visit scheduled    Education provided: None required      pt verbalizes understanding and agrees to warfarin dosing plan.    Instructed to call the Anticoagulation Clinic for any changes, questions or concerns. (#[phone])        Francine Andrew RN      OBJECTIVE:  Recent labs: (last 7 days)     21  0758   INR 2.1*         INR assessment THER    Recheck INR In: 2 WEEKS    INR Location Outside lab      Anticoagulation Summary  As of 3/29/2021    INR goal:  2.0-3.0   TTR:  85.3 % (1 y)   INR used for dosin.1 (3/29/2021)   Warfarin maintenance plan:  5 mg (2.5 mg x 2) every Mon; 2.5 mg (2.5 mg x 1) all other days   Full warfarin instructions:  5 mg every Mon; 2.5 mg all other days   Weekly warfarin total:  20 mg   Plan last modified:  Francine Andrew RN (3/29/2021)   Next INR check:  2021   Priority:  Maintenance   Target end date:  Indefinite    Indications    AF (paroxysmal atrial fibrillation) (H) [I48.0]  Atrial fibrillation (H) [I48.91]  Long term current use of anticoagulant therapy [Z79.01]  Atrial fibrillation  unspecified type  (H) (Resolved) [I48.91]             Anticoagulation Episode Summary     INR check location:      Preferred lab:      Send INR reminders to:  [facility]    Comments:  5 mg tabs, likes card, PM dose      Anticoagulation Care Providers     Provider Role Specialty Phone number    Glen Blue MD Referring Family Medicine [phone]

## 2021-04-12 DIAGNOSIS — J43.9 PULMONARY EMPHYSEMA, UNSPECIFIED EMPHYSEMA TYPE (H): Primary | ICD-10-CM

## 2021-04-14 RX ORDER — MOMETASONE FUROATE AND FORMOTEROL FUMARATE DIHYDRATE 200; 5 UG/1; UG/1
AEROSOL RESPIRATORY (INHALATION)
Qty: 13 G | Refills: 11 | Status: SHIPPED | OUTPATIENT
Start: 2021-04-14 | End: 2022-01-01

## 2021-04-14 RX ORDER — ALBUTEROL SULFATE 90 UG/1
AEROSOL, METERED RESPIRATORY (INHALATION)
Qty: 18 G | Refills: 11 | Status: SHIPPED | OUTPATIENT
Start: 2021-04-14 | End: 2022-01-01

## 2021-04-16 ENCOUNTER — ANTICOAGULATION THERAPY VISIT (OUTPATIENT)
Dept: ANTICOAGULATION | Facility: CLINIC | Age: 71
End: 2021-04-16

## 2021-04-16 DIAGNOSIS — I48.0 AF (PAROXYSMAL ATRIAL FIBRILLATION) (H): ICD-10-CM

## 2021-04-16 DIAGNOSIS — I48.91 ATRIAL FIBRILLATION, UNSPECIFIED TYPE (H): ICD-10-CM

## 2021-04-16 DIAGNOSIS — Z79.01 LONG TERM CURRENT USE OF ANTICOAGULANT THERAPY: ICD-10-CM

## 2021-04-16 LAB
CAPILLARY BLOOD COLLECTION: NORMAL
INR BLD: 2.3 (ref 0.86–1.14)

## 2021-04-16 PROCEDURE — 36416 COLLJ CAPILLARY BLOOD SPEC: CPT | Performed by: FAMILY MEDICINE

## 2021-04-16 PROCEDURE — 85610 PROTHROMBIN TIME: CPT | Performed by: FAMILY MEDICINE

## 2021-05-04 ENCOUNTER — ANTICOAGULATION THERAPY VISIT (OUTPATIENT)
Dept: ANTICOAGULATION | Facility: CLINIC | Age: 71
End: 2021-05-04

## 2021-05-04 DIAGNOSIS — I48.91 ATRIAL FIBRILLATION, UNSPECIFIED TYPE (H): ICD-10-CM

## 2021-05-04 DIAGNOSIS — Z79.01 LONG TERM CURRENT USE OF ANTICOAGULANT THERAPY: ICD-10-CM

## 2021-05-04 DIAGNOSIS — I48.0 AF (PAROXYSMAL ATRIAL FIBRILLATION) (H): ICD-10-CM

## 2021-05-04 LAB
CAPILLARY BLOOD COLLECTION: NORMAL
INR BLD: 2.5 (ref 0.86–1.14)

## 2021-05-04 PROCEDURE — 85610 PROTHROMBIN TIME: CPT | Performed by: FAMILY MEDICINE

## 2021-05-04 PROCEDURE — 36416 COLLJ CAPILLARY BLOOD SPEC: CPT | Performed by: FAMILY MEDICINE

## 2021-05-04 RX ORDER — WARFARIN SODIUM 2.5 MG/1
TABLET ORAL
Qty: 100 TABLET | Refills: 0 | Status: SHIPPED | OUTPATIENT
Start: 2021-05-04 | End: 2021-08-05

## 2021-05-04 NOTE — PROGRESS NOTES
ANTICOAGULATION MANAGEMENT     Patient Name:  Latanya Padron  Date:  2021    ASSESSMENT /SUBJECTIVE:    Today's INR result of 2.5 is therapeutic. Goal INR of 2.0-3.0      Warfarin dose taken: Warfarin taken as instructed    Diet: No new diet changes affecting INR    Medication changes/ interactions: No new medications/supplements affecting INR    Previous INR: Therapeutic     S/S of bleeding or thromboembolism: No    New injury or illness: No    Upcoming surgery, procedure or cardioversion: No    Additional findings: None      PLAN:    Telephone call with Latanya regarding INR result and instructed:     Warfarin Dosing Instructions: Continue your current warfarin dose 5 mg Mon and 2.5 mg ROW    Instructed patient to follow up no later than: 5 weeks  Lab visit scheduled    Education provided: None required      Pt verbalizes understanding and agrees to warfarin dosing plan.    Instructed to call the Anticoagulation Clinic for any changes, questions or concerns. (#[phone])        Francine Andrew RN      OBJECTIVE:  Recent labs: (last 7 days)     21  0810   INR 2.5*         INR assessment THER    Recheck INR In: 5 WEEKS    INR Location Outside lab      Anticoagulation Summary  As of 2021    INR goal:  2.0-3.0   TTR:  91.8 % (1 y)   INR used for dosin.5 (2021)   Warfarin maintenance plan:  5 mg (2.5 mg x 2) every Mon; 2.5 mg (2.5 mg x 1) all other days   Full warfarin instructions:  5 mg every Mon; 2.5 mg all other days   Weekly warfarin total:  20 mg   No change documented:  Francine Andrew RN   Plan last modified:  Francine Andrew RN (3/29/2021)   Next INR check:  2021   Priority:  Maintenance   Target end date:  Indefinite    Indications    AF (paroxysmal atrial fibrillation) (H) [I48.0]  Atrial fibrillation (H) [I48.91]  Long term current use of anticoagulant therapy [Z79.01]  Atrial fibrillation  unspecified type (H) (Resolved) [I48.91]             Anticoagulation Episode  Summary     INR check location:      Preferred lab:      Send INR reminders to:  [facility]    Comments:  5 mg tabs, likes card, PM dose      Anticoagulation Care Providers     Provider Role Specialty Phone number    Glen Blue MD Referring Family Medicine [phone]

## 2021-05-09 DIAGNOSIS — E78.5 HYPERLIPIDEMIA LDL GOAL <130: ICD-10-CM

## 2021-05-11 RX ORDER — ATORVASTATIN CALCIUM 10 MG/1
TABLET, FILM COATED ORAL
Qty: 90 TABLET | Refills: 1 | Status: SHIPPED | OUTPATIENT
Start: 2021-05-11 | End: 2021-10-29

## 2021-05-14 ENCOUNTER — TELEPHONE (OUTPATIENT)
Dept: FAMILY MEDICINE | Facility: CLINIC | Age: 71
End: 2021-05-14

## 2021-05-14 NOTE — TELEPHONE ENCOUNTER
Reason for Call:  Form, our goal is to have forms completed with 72 hours, however, some forms may require a visit or additional information.    Type of letter, form or note:  medical    Who is the form from?: Patient    Where did the form come from: Patient or family brought in       What clinic location was the form placed at?: Gave one to dr. Aleman in speciality and one to Dr. Jones in Clinic    Where the form was placed: Dr. Aleman 1st floor and 1 to family practice Box/Folder    What number is listed as a contact on the form?: call patient with any questions       Additional comments: Please complete form and sign patient will  when completed    Call taken on 5/14/2021 at 10:02 AM by Merary Goldstein

## 2021-05-14 NOTE — TELEPHONE ENCOUNTER
Colon cancer screening card signed and left at front specialty desk.  Patient called and informed.  Renny GIRALDO CMA

## 2021-05-20 ENCOUNTER — HOSPITAL ENCOUNTER (OUTPATIENT)
Dept: ULTRASOUND IMAGING | Facility: CLINIC | Age: 71
End: 2021-05-20
Attending: INTERNAL MEDICINE
Payer: COMMERCIAL

## 2021-05-20 ENCOUNTER — HOSPITAL ENCOUNTER (OUTPATIENT)
Dept: MAMMOGRAPHY | Facility: CLINIC | Age: 71
End: 2021-05-20
Attending: INTERNAL MEDICINE
Payer: COMMERCIAL

## 2021-05-20 DIAGNOSIS — Z17.0 MALIGNANT NEOPLASM OF OVERLAPPING SITES OF LEFT BREAST IN FEMALE, ESTROGEN RECEPTOR POSITIVE (H): ICD-10-CM

## 2021-05-20 DIAGNOSIS — R93.89 IMAGING ABNORMALITY: ICD-10-CM

## 2021-05-20 DIAGNOSIS — C50.812 MALIGNANT NEOPLASM OF OVERLAPPING SITES OF LEFT BREAST IN FEMALE, ESTROGEN RECEPTOR POSITIVE (H): ICD-10-CM

## 2021-05-20 DIAGNOSIS — Z12.31 VISIT FOR SCREENING MAMMOGRAM: ICD-10-CM

## 2021-05-20 PROCEDURE — G0279 TOMOSYNTHESIS, MAMMO: HCPCS

## 2021-05-20 PROCEDURE — 76642 ULTRASOUND BREAST LIMITED: CPT | Mod: LT

## 2021-05-27 ENCOUNTER — TELEPHONE (OUTPATIENT)
Dept: ONCOLOGY | Facility: CLINIC | Age: 71
End: 2021-05-27

## 2021-05-27 NOTE — TELEPHONE ENCOUNTER
Call placed to patient. Per Dr Cortez, she would like patient to follow up with Dr Aleman regarding seroma in her breast. Communicated to patient that Dr Aleman said if she can feel the seroma and felt it needed to be drained, she could do it in the clinic.  Patient agreed and will call Dr Aleman's office to schedule  Dena Whipple  RN, BSN, OCN

## 2021-06-01 ENCOUNTER — OFFICE VISIT (OUTPATIENT)
Dept: SURGERY | Facility: CLINIC | Age: 71
End: 2021-06-01
Payer: COMMERCIAL

## 2021-06-01 VITALS
BODY MASS INDEX: 39.78 KG/M2 | HEART RATE: 90 BPM | DIASTOLIC BLOOD PRESSURE: 70 MMHG | SYSTOLIC BLOOD PRESSURE: 128 MMHG | WEIGHT: 214 LBS | TEMPERATURE: 97.6 F

## 2021-06-01 DIAGNOSIS — E66.01 MORBID OBESITY, UNSPECIFIED OBESITY TYPE (H): ICD-10-CM

## 2021-06-01 DIAGNOSIS — Z79.01 LONG TERM CURRENT USE OF ANTICOAGULANT THERAPY: ICD-10-CM

## 2021-06-01 DIAGNOSIS — Z17.0 MALIGNANT NEOPLASM OF OVERLAPPING SITES OF LEFT BREAST IN FEMALE, ESTROGEN RECEPTOR POSITIVE (H): Primary | ICD-10-CM

## 2021-06-01 DIAGNOSIS — I48.0 AF (PAROXYSMAL ATRIAL FIBRILLATION) (H): ICD-10-CM

## 2021-06-01 DIAGNOSIS — C50.812 MALIGNANT NEOPLASM OF OVERLAPPING SITES OF LEFT BREAST IN FEMALE, ESTROGEN RECEPTOR POSITIVE (H): Primary | ICD-10-CM

## 2021-06-01 DIAGNOSIS — N63.20 LEFT BREAST MASS: ICD-10-CM

## 2021-06-01 PROCEDURE — 99214 OFFICE O/P EST MOD 30 MIN: CPT | Performed by: SURGERY

## 2021-06-01 NOTE — PROGRESS NOTES
Assessment & Plan   Problem List Items Addressed This Visit        Digestive    Morbid obesity, unspecified obesity type (H)       Circulatory    AF (paroxysmal atrial fibrillation) (H)       Other    Malignant neoplasm of overlapping sites of left breast in female, estrogen receptor positive (H) - Primary    Long term current use of anticoagulant therapy      Other Visit Diagnoses     Left breast mass             Was noted to have a soft palpable mass on the left breast in the mid upper quadrant away from her previous lumpectomy site.  Ultrasound showed potential seroma.  It was recommended that patient undergo another ultrasound in 3 months for follow-up.  I do not recommend aspiration at this time as I feel that it is deeper than at just the skin level.  Ultrasound is already in place for 3 months from now.  She is to call me after the ultrasound is finished.  Always aspirate this if it has not decreased in size on her next ultrasound.  All other questions were answered to her satisfaction.    25 mins visit, more than 50% of face to face time was spent in counseling and coordinating care as discussed above.        No follow-ups on file.    YashKeyur Aleman MD  [facility] 71 [facility] who presents for the following health issues:    Hx of left breast mastectomy with FINAL PATH 5/15/2019: ILC, 3cm, 0/3 positive LNs; uninvolved margins.  S/p adjuvant radiation.  Tolerating arimedix    Left breast 3-4 weeks ago - swelling and palpable mass.  Has not increased in size.  No trauma to the left breast - no recent fall, or pressed against her.  Only tender on deep palpation.  No skin changes.  No nipple discharge.  No fevers; no chills.  Mammogram noted ?seroma of the left breast.           Review of Systems   Constitutional, HEENT, cardiovascular, pulmonary, gi and gu systems are negative, except as otherwise noted.      Objective    /70 (BP Location: Right arm,  Patient Position: Chair, Cuff Size: Adult Regular)   Pulse 90   Temp 97.6  F (36.4  C) (Temporal)   Wt 97.1 kg (214 lb)   BMI 39.78 kg/m    Body mass index is 39.78 kg/m .  Physical Exam  Vitals signs reviewed. Exam conducted with a chaperone present.   Eyes:      Conjunctiva/sclera: Conjunctivae normal.   Neck:      Musculoskeletal: Normal range of motion.   Cardiovascular:      Pulses: Normal pulses.   Pulmonary:      Breath sounds: Normal breath sounds.   Chest:      Breasts:         Right: Normal.         Left: Swelling, mass and tenderness present. No bleeding, inverted nipple, nipple discharge or skin change.       Abdominal:      Palpations: Abdomen is soft.   Musculoskeletal: Normal range of motion.   Skin:     General: Skin is warm.      Capillary Refill: Capillary refill takes less than 2 seconds.   Neurological:      General: No focal deficit present.      Mental Status: She is alert.   Psychiatric:         Mood and Affect: Mood normal.              MAMMOGRAPHY DIAGNOSTIC BILATERAL W/ ALISHA, DIGITAL;      TARGETED ULTRASOUND, LEFT BREAST - 5/20/2021 8:17 AM     HISTORY: Six-month follow-up. Patient has history of left breast  lumpectomy, radiation treatment and chemotherapy for left breast  cancer found in 2019. Patient complains of new pain in the upper inner  left breast.     COMPARISON:  Mammograms dated 11/16/2020, 5/12/2020 5/15/2019,  4/12/2019, 9/26/2018, 4/26/2016. Ultrasound dated 4/12/2019.     BREAST DENSITY: Scattered fibroglandular densities.     FINDINGS: Post conservation therapy changes of the left breast are  essentially stable. No new mammographic findings of concern for  malignancy.      Targeted ultrasound of the upper inner left breast at site of pain  demonstrates a fluid collection with some solid rounded tissue. This  likely represents normal adipose tissues within a seroma in the left  breast. This corresponds with the site of pain.                                                                       IMPRESSION: BI-RADS CATEGORY: 3 - Probably Benign Finding-Short  Interval Follow-Up Suggested. Probable seroma with indwelling adipose  tissue in the left upper breast corresponding with patient's palpable  area of tenderness. This is not well-seen on the mammogram. Otherwise  essentially stable postop findings in the left breast.     RECOMMENDED FOLLOW-UP: Clinical Follow-up and surgical consultation  for the patient's left upper inner breast pain and underlying probable  seroma. Three-month follow-up ultrasound is also recommended if no  intervention is performed for the probable seroma. Six-month follow-up  left breast diagnostic mammogram is also recommended.     I discussed the findings and recommendations with the patient at the  time of the exam.     INGA TREVINO MD

## 2021-06-01 NOTE — LETTER
6/1/2021         RE: Latanya Padron  [address] [facility] [address]        Dear Colleague,    Thank you for referring your patient, Latanya Padron, to the [facility]. Please see a copy of my visit note below.        Assessment & Plan   Problem List Items Addressed This Visit        Digestive    Morbid obesity, unspecified obesity type (H)       Circulatory    AF (paroxysmal atrial fibrillation) (H)       Other    Malignant neoplasm of overlapping sites of left breast in female, estrogen receptor positive (H) - Primary    Long term current use of anticoagulant therapy      Other Visit Diagnoses     Left breast mass             Was noted to have a soft palpable mass on the left breast in the mid upper quadrant away from her previous lumpectomy site.  Ultrasound showed potential seroma.  It was recommended that patient undergo another ultrasound in 3 months for follow-up.  I do not recommend aspiration at this time as I feel that it is deeper than at just the skin level.  Ultrasound is already in place for 3 months from now.  She is to call me after the ultrasound is finished.  Always aspirate this if it has not decreased in size on her next ultrasound.  All other questions were answered to her satisfaction.    25 mins visit, more than 50% of face to face time was spent in counseling and coordinating care as discussed above.        No follow-ups on file.    Donald Aleman MD  [facility]    Elva Steele is a 71 year old who presents for the following health issues:    Hx of left breast mastectomy with FINAL PATH 5/15/2019: ILC, 3cm, 0/3 positive LNs; uninvolved margins.  S/p adjuvant radiation.  Tolerating arimedix    Left breast 3-4 weeks ago - swelling and palpable mass.  Has not increased in size.  No trauma to the left breast - no recent fall, or pressed against her.  Only tender on deep palpation.  No skin changes.  No nipple discharge.  No  fevers; no chills.  Mammogram noted ?seroma of the left breast.           Review of Systems   Constitutional, HEENT, cardiovascular, pulmonary, gi and gu systems are negative, except as otherwise noted.      Objective    /70 (BP Location: Right arm, Patient Position: Chair, Cuff Size: Adult Regular)   Pulse 90   Temp 97.6  F (36.4  C) (Temporal)   Wt 97.1 kg (214 lb)   BMI 39.78 kg/m    Body mass index is 39.78 kg/m .  Physical Exam  Vitals signs reviewed. Exam conducted with a chaperone present.   Eyes:      Conjunctiva/sclera: Conjunctivae normal.   Neck:      Musculoskeletal: Normal range of motion.   Cardiovascular:      Pulses: Normal pulses.   Pulmonary:      Breath sounds: Normal breath sounds.   Chest:      Breasts:         Right: Normal.         Left: Swelling, mass and tenderness present. No bleeding, inverted nipple, nipple discharge or skin change.       Abdominal:      Palpations: Abdomen is soft.   Musculoskeletal: Normal range of motion.   Skin:     General: Skin is warm.      Capillary Refill: Capillary refill takes less than 2 seconds.   Neurological:      General: No focal deficit present.      Mental Status: She is alert.   Psychiatric:         Mood and Affect: Mood normal.              MAMMOGRAPHY DIAGNOSTIC BILATERAL W/ ALISHA, DIGITAL;      TARGETED ULTRASOUND, LEFT BREAST - 5/20/2021 8:17 AM     HISTORY: Six-month follow-up. Patient has history of left breast  lumpectomy, radiation treatment and chemotherapy for left breast  cancer found in 2019. Patient complains of new pain in the upper inner  left breast.     COMPARISON:  Mammograms dated 11/16/2020, 5/12/2020 5/15/2019,  4/12/2019, 9/26/2018, 4/26/2016. Ultrasound dated 4/12/2019.     BREAST DENSITY: Scattered fibroglandular densities.     FINDINGS: Post conservation therapy changes of the left breast are  essentially stable. No new mammographic findings of concern for  malignancy.      Targeted ultrasound of the upper inner left  breast at site of pain  demonstrates a fluid collection with some solid rounded tissue. This  likely represents normal adipose tissues within a seroma in the left  breast. This corresponds with the site of pain.                                                                      IMPRESSION: BI-RADS CATEGORY: 3 - Probably Benign Finding-Short  Interval Follow-Up Suggested. Probable seroma with indwelling adipose  tissue in the left upper breast corresponding with patient's palpable  area of tenderness. This is not well-seen on the mammogram. Otherwise  essentially stable postop findings in the left breast.     RECOMMENDED FOLLOW-UP: Clinical Follow-up and surgical consultation  for the patient's left upper inner breast pain and underlying probable  seroma. Three-month follow-up ultrasound is also recommended if no  intervention is performed for the probable seroma. Six-month follow-up  left breast diagnostic mammogram is also recommended.     I discussed the findings and recommendations with the patient at the  time of the exam.     INGA TREVINO MD      Again, thank you for allowing me to participate in the care of your patient.        Sincerely,        Donald Aleman MD

## 2021-06-08 ENCOUNTER — ANTICOAGULATION THERAPY VISIT (OUTPATIENT)
Dept: ANTICOAGULATION | Facility: CLINIC | Age: 71
End: 2021-06-08

## 2021-06-08 DIAGNOSIS — I48.0 AF (PAROXYSMAL ATRIAL FIBRILLATION) (H): ICD-10-CM

## 2021-06-08 DIAGNOSIS — I48.91 ATRIAL FIBRILLATION, UNSPECIFIED TYPE (H): ICD-10-CM

## 2021-06-08 DIAGNOSIS — Z79.01 LONG TERM CURRENT USE OF ANTICOAGULANT THERAPY: ICD-10-CM

## 2021-06-08 LAB
CAPILLARY BLOOD COLLECTION: NORMAL
INR BLD: 2.7 (ref 0.86–1.14)

## 2021-06-08 PROCEDURE — 85610 PROTHROMBIN TIME: CPT | Performed by: FAMILY MEDICINE

## 2021-06-08 PROCEDURE — 36416 COLLJ CAPILLARY BLOOD SPEC: CPT | Performed by: FAMILY MEDICINE

## 2021-06-08 NOTE — PROGRESS NOTES
ANTICOAGULATION MANAGEMENT     Patient Name:  Latanya Padron  Date:  2021    ASSESSMENT /SUBJECTIVE:    Today's INR result of 2.7 is therapeutic. Goal INR of 2.0-3.0      Warfarin dose taken: LM    Diet: LM    Medication changes/ interactions: LM    Previous INR: Therapeutic     S/S of bleeding or thromboembolism: No    New injury or illness: No    Upcoming surgery, procedure or cardioversion: No    Additional findings: None      PLAN:    Warfarin Dosing Instructions: Continue your current warfarin dose 5 mg Mon and 2.5 mg all other days    Instructed patient to follow up no later than: 6 weeks  Left detailed message with recommended recheck date    Education provided: Please call back if any changes to your diet, medications or how you've been taking warfarin    Detailed voice message left for Latanya with dosing instructions and follow up date.     Instructed to call the Anticoagulation Clinic for any changes, questions or concerns. (#[phone])        Zoey Pickard RN      OBJECTIVE:  Recent labs: (last 7 days)     21  1056   INR 2.7*         INR assessment THER    Recheck INR In: 6 WEEKS    INR Location Outside lab      Anticoagulation Summary  As of 2021    INR goal:  2.0-3.0   TTR:  91.8 % (1 y)   INR used for dosin.7 (2021)   Warfarin maintenance plan:  5 mg (2.5 mg x 2) every Mon; 2.5 mg (2.5 mg x 1) all other days   Full warfarin instructions:  5 mg every Mon; 2.5 mg all other days   Weekly warfarin total:  20 mg   No change documented:  Zoey Pickard RN   Plan last modified:  Francine Andrew RN (3/29/2021)   Next INR check:  2021   Priority:  Maintenance   Target end date:  Indefinite    Indications    AF (paroxysmal atrial fibrillation) (H) [I48.0]  Atrial fibrillation (H) [I48.91]  Long term current use of anticoagulant therapy [Z79.01]  Atrial fibrillation  unspecified type (H) (Resolved) [I48.91]             Anticoagulation Episode Summary     INR check location:       Preferred lab:      Send INR reminders to:  [facility]    Comments:  5 mg tabs, likes card, PM dose      Anticoagulation Care Providers     Provider Role Specialty Phone number    Glen Blue MD Referring Family Medicine [phone]

## 2021-06-09 ENCOUNTER — TELEPHONE (OUTPATIENT)
Dept: FAMILY MEDICINE | Facility: CLINIC | Age: 71
End: 2021-06-09

## 2021-06-09 NOTE — TELEPHONE ENCOUNTER
Patient calling wanting to talk with an INR nurse. She said she prefers to speak to Francine.    Will route Francine a message for call back.     MARCELO LeavittN, RN  [facility]

## 2021-06-09 NOTE — TELEPHONE ENCOUNTER
Pt will be starting prednisone 6/9 BID x5 days. She is aware this can increase INR. Will have INR checked before going into the weekend  Francine Andrew RN

## 2021-06-11 ENCOUNTER — ANTICOAGULATION THERAPY VISIT (OUTPATIENT)
Dept: ANTICOAGULATION | Facility: CLINIC | Age: 71
End: 2021-06-11

## 2021-06-11 DIAGNOSIS — Z79.01 LONG TERM CURRENT USE OF ANTICOAGULANT THERAPY: ICD-10-CM

## 2021-06-11 DIAGNOSIS — I48.91 ATRIAL FIBRILLATION, UNSPECIFIED TYPE (H): ICD-10-CM

## 2021-06-11 DIAGNOSIS — I48.0 AF (PAROXYSMAL ATRIAL FIBRILLATION) (H): ICD-10-CM

## 2021-06-11 LAB
CAPILLARY BLOOD COLLECTION: NORMAL
INR BLD: 3.4 (ref 0.86–1.14)

## 2021-06-11 PROCEDURE — 85610 PROTHROMBIN TIME: CPT | Performed by: FAMILY MEDICINE

## 2021-06-11 PROCEDURE — 36416 COLLJ CAPILLARY BLOOD SPEC: CPT | Performed by: FAMILY MEDICINE

## 2021-06-11 NOTE — PROGRESS NOTES
ANTICOAGULATION MANAGEMENT     Patient Name:  Latanya Padron  Date:  6/11/2021    ASSESSMENT /SUBJECTIVE:    Today's INR result of 3.4 is supratherapeutic. Goal INR of 2.0-3.0      Warfarin dose taken: Warfarin taken as instructed    Diet: No new diet changes affecting INR    Medication changes/ interactions: Pt was put on a 5 day course of prednisone a couple days ago which is why we checked her INR again and why she may be up a little    Previous INR: Therapeutic     S/S of bleeding or thromboembolism: No    New injury or illness: Yes: Allergies, resp    Upcoming surgery, procedure or cardioversion: No    Additional findings: None      PLAN:    Warfarin Dosing Instructions: hold today then continue your current warfarin dose of 5 mg Mon and 2.5 mg all other days    Instructed patient to follow up no later than: 5 weeks  Lab visit scheduled    Education provided: None required    Telephone call with Latanya whom verbalizes understanding and agrees to plan    Instructed to call the Anticoagulation Clinic for any changes, questions or concerns. (#[phone])        Zoey Pickard RN      OBJECTIVE:  Recent labs: (last 7 days)     06/08/21  1056 06/11/21  1255   INR 2.7* 3.4*         INR assessment SUPRA    Recheck INR In: 5 WEEKS    INR Location Outside lab      Anticoagulation Summary  As of 6/11/2021    INR goal:  2.0-3.0   TTR:  91.4 % (1 y)   INR used for dosing:  3.4 (6/11/2021)   Warfarin maintenance plan:  5 mg (2.5 mg x 2) every Mon; 2.5 mg (2.5 mg x 1) all other days   Full warfarin instructions:  6/11: Hold; Otherwise 5 mg every Mon; 2.5 mg all other days   Weekly warfarin total:  20 mg   Plan last modified:  Francine Andrew RN (3/29/2021)   Next INR check:  7/13/2021   Priority:  Maintenance   Target end date:  Indefinite    Indications    AF (paroxysmal atrial fibrillation) (H) [I48.0]  Atrial fibrillation (H) [I48.91]  Long term current use of anticoagulant therapy [Z79.01]  Atrial  fibrillation  unspecified type (H) (Resolved) [I48.91]             Anticoagulation Episode Summary     INR check location:      Preferred lab:      Send INR reminders to:  [facility]    Comments:  5 mg tabs, likes card, PM dose      Anticoagulation Care Providers     Provider Role Specialty Phone number    Glen Blue MD Referring Family Medicine [phone]

## 2021-07-06 ENCOUNTER — MYC MEDICAL ADVICE (OUTPATIENT)
Dept: FAMILY MEDICINE | Facility: CLINIC | Age: 71
End: 2021-07-06

## 2021-07-06 DIAGNOSIS — Z87.891 HISTORY OF TOBACCO USE: Primary | ICD-10-CM

## 2021-07-06 NOTE — TELEPHONE ENCOUNTER
Thanks for the order, but there is a long list of questions about smoking history and when she quit etc.  If you are able to find that information and fill it out that would be great, otherwise they will have to wait for primary

## 2021-07-12 ENCOUNTER — APPOINTMENT (OUTPATIENT)
Dept: LAB | Facility: CLINIC | Age: 71
End: 2021-07-12
Payer: COMMERCIAL

## 2021-07-12 ENCOUNTER — INFUSION THERAPY VISIT (OUTPATIENT)
Dept: INFUSION THERAPY | Facility: CLINIC | Age: 71
End: 2021-07-12
Attending: INTERNAL MEDICINE
Payer: COMMERCIAL

## 2021-07-12 VITALS
WEIGHT: 213.1 LBS | OXYGEN SATURATION: 93 % | DIASTOLIC BLOOD PRESSURE: 74 MMHG | TEMPERATURE: 97.6 F | SYSTOLIC BLOOD PRESSURE: 133 MMHG | BODY MASS INDEX: 39.61 KG/M2 | HEART RATE: 87 BPM | RESPIRATION RATE: 18 BRPM

## 2021-07-12 DIAGNOSIS — C50.812 MALIGNANT NEOPLASM OF OVERLAPPING SITES OF LEFT BREAST IN FEMALE, ESTROGEN RECEPTOR POSITIVE (H): ICD-10-CM

## 2021-07-12 DIAGNOSIS — Z17.0 MALIGNANT NEOPLASM OF OVERLAPPING SITES OF LEFT BREAST IN FEMALE, ESTROGEN RECEPTOR POSITIVE (H): ICD-10-CM

## 2021-07-12 DIAGNOSIS — M81.0 AGE-RELATED OSTEOPOROSIS WITHOUT CURRENT PATHOLOGICAL FRACTURE: Primary | ICD-10-CM

## 2021-07-12 LAB
ALBUMIN SERPL-MCNC: 3.6 G/DL (ref 3.4–5)
ALP SERPL-CCNC: 56 U/L (ref 40–150)
ALT SERPL W P-5'-P-CCNC: 18 U/L (ref 0–50)
ANION GAP SERPL CALCULATED.3IONS-SCNC: 4 MMOL/L (ref 3–14)
AST SERPL W P-5'-P-CCNC: 14 U/L (ref 0–45)
BASOPHILS # BLD AUTO: 0.1 10E3/UL (ref 0–0.2)
BASOPHILS NFR BLD AUTO: 1 %
BILIRUB SERPL-MCNC: 0.5 MG/DL (ref 0.2–1.3)
BUN SERPL-MCNC: 20 MG/DL (ref 7–30)
CALCIUM SERPL-MCNC: 8.9 MG/DL (ref 8.5–10.1)
CHLORIDE BLD-SCNC: 105 MMOL/L (ref 94–109)
CO2 SERPL-SCNC: 29 MMOL/L (ref 20–32)
CREAT SERPL-MCNC: 1.16 MG/DL (ref 0.52–1.04)
EOSINOPHIL # BLD AUTO: 0.4 10E3/UL (ref 0–0.7)
EOSINOPHIL NFR BLD AUTO: 7 %
ERYTHROCYTE [DISTWIDTH] IN BLOOD BY AUTOMATED COUNT: 12.1 % (ref 10–15)
GFR SERPL CREATININE-BSD FRML MDRD: 48 ML/MIN/1.73M2
GLUCOSE BLD-MCNC: 114 MG/DL (ref 70–99)
HCT VFR BLD AUTO: 38.9 % (ref 35–47)
HGB BLD-MCNC: 12.3 G/DL (ref 11.7–15.7)
IMM GRANULOCYTES # BLD: 0 10E3/UL
IMM GRANULOCYTES NFR BLD: 0 %
LYMPHOCYTES # BLD AUTO: 1.3 10E3/UL (ref 0.8–5.3)
LYMPHOCYTES NFR BLD AUTO: 20 %
MCH RBC QN AUTO: 28.3 PG (ref 26.5–33)
MCHC RBC AUTO-ENTMCNC: 31.6 G/DL (ref 31.5–36.5)
MCV RBC AUTO: 90 FL (ref 78–100)
MONOCYTES # BLD AUTO: 0.7 10E3/UL (ref 0–1.3)
MONOCYTES NFR BLD AUTO: 11 %
NEUTROPHILS # BLD AUTO: 4 10E3/UL (ref 1.6–8.3)
NEUTROPHILS NFR BLD AUTO: 61 %
NRBC # BLD AUTO: 0 10E3/UL
NRBC BLD AUTO-RTO: 0 /100
PLATELET # BLD AUTO: 323 10E3/UL (ref 150–450)
POTASSIUM BLD-SCNC: 3.9 MMOL/L (ref 3.4–5.3)
PROT SERPL-MCNC: 7.1 G/DL (ref 6.8–8.8)
RBC # BLD AUTO: 4.34 10E6/UL (ref 3.8–5.2)
SODIUM SERPL-SCNC: 138 MMOL/L (ref 133–144)
WBC # BLD AUTO: 6.6 10E3/UL (ref 4–11)

## 2021-07-12 PROCEDURE — 85004 AUTOMATED DIFF WBC COUNT: CPT | Performed by: INTERNAL MEDICINE

## 2021-07-12 PROCEDURE — 36415 COLL VENOUS BLD VENIPUNCTURE: CPT | Performed by: INTERNAL MEDICINE

## 2021-07-12 PROCEDURE — 96372 THER/PROPH/DIAG INJ SC/IM: CPT | Performed by: INTERNAL MEDICINE

## 2021-07-12 PROCEDURE — 80053 COMPREHEN METABOLIC PANEL: CPT | Performed by: INTERNAL MEDICINE

## 2021-07-12 PROCEDURE — 250N000011 HC RX IP 250 OP 636: Performed by: INTERNAL MEDICINE

## 2021-07-12 RX ADMIN — DENOSUMAB 60 MG: 60 INJECTION SUBCUTANEOUS at 09:52

## 2021-07-12 ASSESSMENT — PAIN SCALES - GENERAL: PAINLEVEL: NO PAIN (0)

## 2021-07-12 NOTE — PROGRESS NOTES
Infusion Nursing Note:  Latanya Padron presents today for Prolia/labs .    Patient seen by provider today: No   present during visit today: Not Applicable.    Note: N/A.  Patient  did meet criteria for an asymptomatic covid-19 PCR test in infusion today. Patient  declined the covid-19 test.    Intravenous Access:  No Intravenous access at this visit.    Treatment Conditions:  Lab Results   Component Value Date    HGB 12.3 07/12/2021    HGB 13.1 07/13/2020     Lab Results   Component Value Date    WBC 6.6 07/12/2021    WBC 6.5 07/13/2020      Lab Results   Component Value Date    ANEU 3.0 05/24/2010     Lab Results   Component Value Date     07/12/2021     07/13/2020      Lab Results   Component Value Date     07/12/2021     01/12/2021                   Lab Results   Component Value Date    POTASSIUM 3.9 07/12/2021    POTASSIUM 4.1 01/12/2021           No results found for: MAG         Lab Results   Component Value Date    CR 1.16 07/12/2021    CR 1.18 01/12/2021                   Lab Results   Component Value Date    IVANNA 8.9 07/12/2021    IVANNA 10.2 01/12/2021                Lab Results   Component Value Date    BILITOTAL 0.5 07/12/2021    BILITOTAL 0.7 01/12/2021           Lab Results   Component Value Date    ALBUMIN 3.6 07/12/2021    ALBUMIN 3.9 01/12/2021                    Lab Results   Component Value Date    ALT 18 07/12/2021    ALT 21 01/12/2021           Lab Results   Component Value Date    AST 14 07/12/2021    AST 18 01/12/2021       Results reviewed, labs MET treatment parameters, ok to proceed with treatment.      Post Infusion Assessment:  Patient tolerated injection without incident.       Discharge Plan:   Discharge instructions reviewed with: Patient.  Patient discharged in stable condition accompanied by: self.  Departure Mode: Ambulatory.      Chaparrita Ramon RN

## 2021-07-20 ENCOUNTER — TELEPHONE (OUTPATIENT)
Dept: FAMILY MEDICINE | Facility: CLINIC | Age: 71
End: 2021-07-20

## 2021-07-20 NOTE — TELEPHONE ENCOUNTER
ANTICOAGULATION     Latanya Padron is overdue for INR check.      Spoke with Ivette and scheduled lab appointment on 7/26/21    Nadja Ashley RN

## 2021-07-22 ENCOUNTER — VIRTUAL VISIT (OUTPATIENT)
Dept: ONCOLOGY | Facility: CLINIC | Age: 71
End: 2021-07-22
Attending: INTERNAL MEDICINE
Payer: COMMERCIAL

## 2021-07-22 VITALS — WEIGHT: 213 LBS | BODY MASS INDEX: 37.74 KG/M2 | HEIGHT: 63 IN

## 2021-07-22 DIAGNOSIS — Z80.3 FHX: BREAST CANCER: ICD-10-CM

## 2021-07-22 DIAGNOSIS — I10 BENIGN ESSENTIAL HYPERTENSION: ICD-10-CM

## 2021-07-22 DIAGNOSIS — I48.20 CHRONIC ATRIAL FIBRILLATION (H): ICD-10-CM

## 2021-07-22 DIAGNOSIS — I48.0 AF (PAROXYSMAL ATRIAL FIBRILLATION) (H): ICD-10-CM

## 2021-07-22 DIAGNOSIS — N18.31 STAGE 3A CHRONIC KIDNEY DISEASE (H): ICD-10-CM

## 2021-07-22 DIAGNOSIS — Z17.0 MALIGNANT NEOPLASM OF OVERLAPPING SITES OF LEFT BREAST IN FEMALE, ESTROGEN RECEPTOR POSITIVE (H): Primary | ICD-10-CM

## 2021-07-22 DIAGNOSIS — M81.0 AGE-RELATED OSTEOPOROSIS WITHOUT CURRENT PATHOLOGICAL FRACTURE: ICD-10-CM

## 2021-07-22 DIAGNOSIS — R93.89 IMAGING ABNORMALITY: ICD-10-CM

## 2021-07-22 DIAGNOSIS — C50.812 MALIGNANT NEOPLASM OF OVERLAPPING SITES OF LEFT BREAST IN FEMALE, ESTROGEN RECEPTOR POSITIVE (H): Primary | ICD-10-CM

## 2021-07-22 PROCEDURE — 99214 OFFICE O/P EST MOD 30 MIN: CPT | Mod: 95 | Performed by: NURSE PRACTITIONER

## 2021-07-22 ASSESSMENT — PAIN SCALES - GENERAL: PAINLEVEL: NO PAIN (0)

## 2021-07-22 ASSESSMENT — MIFFLIN-ST. JEOR: SCORE: 1447.35

## 2021-07-22 NOTE — LETTER
7/22/2021         RE: Latanya Padron  [address] [facility] [address]        Dear Colleague,    Thank you for referring your patient, Latanya Padron, to the [facility]. Please see a copy of my visit note below.    Oncology Follow Up Visit: July 22, 2021    Oncologist: Dr Prisca Cortez  PCP: Naazrio Jones    Diagnosis: Invasive lobular carcinoma left breast  Latanya Padron is a 70 yo female who had a finding of an area of architectural distortion the left breast in 9/2018.US was negative. 04/2019 Mammogram and ultrasound showed a heterogenous mass in the left breast measuring 1.1 x 0.5 x 0.5 cm.  Biopsy of the mass came back showing invasive lobular carcinoma, Grade 2, ER/AR positive and HER-2/tulio negative by FISH. MRI breast bilateral showed 2 cm mass like enhancement in the left breast corresponding to the biopsy-proven carcinoma with no evidence of multifocal disease or axillary lymphadenopathy.   Oncotype DX Recurrence = 15 or4% risk of distant recurrence with hormonal therapy alone = low-risk category and so did not recommend adjuvant systemic chemotherapy   Treatment:   05/15/19 Lumpectomy with SNL dissection.    07/18/2019 Completed Adjuvant RT  July 2019- started Arimidex.    Interval History: Ms. Duffy is seen today by video for review of use of Arimidex and continuation of therapy for treatment of her breast cancer.  Patient reports she is taking her medication on a regular basis and not seeing any side effects related to the Arimidex including no hot flashes vaginal dryness but then mention she does see some thinning of her hair that may be related to the Arimidex.  She is not having any new breast issues but is having follow-up to the abnormality found in May-seroma of the left breast-this is scheduled for August 24, 2021.  She has COPD and it is limiting her activity right now with the heavy weather and and the smoky air quality.  Rest of  comprehensive and complete ROS is reviewed and is negative.   Past Medical History:   Diagnosis Date     Breast cancer (H) 04/12/2019    Left Breast     COPD (chronic obstructive pulmonary disease) (H)      Mixed hyperlipidemia      Neck mass 6/20/2018     PONV (postoperative nausea and vomiting)      S/P radiation therapy     5,256 cGy to left breast completed on 7/18/2019 - [facility]     Current Outpatient Medications   Medication     albuterol (PROAIR HFA/PROVENTIL HFA/VENTOLIN HFA) 108 (90 Base) MCG/ACT inhaler     albuterol (PROVENTIL) (2.5 MG/3ML) 0.083% neb solution     anastrozole (ARIMIDEX) 1 MG tablet     atorvastatin (LIPITOR) 10 MG tablet     BIOTIN PO     bisacodyl (DULCOLAX) 5 MG EC tablet     Calcium Carb-Cholecalciferol (CALCIUM 500 + D) 500-400 MG-UNIT TABS     Cyanocobalamin (B-12) 1000 MCG TBCR     diltiazem ER COATED BEADS (CARDIZEM CD/CARTIA XT) 120 MG 24 hr capsule     hydrochlorothiazide (HYDRODIURIL) 25 MG tablet     lisinopril (ZESTRIL) 2.5 MG tablet     magnesium 250 MG tablet     mometasone-formoterol (DULERA) 200-5 MCG/ACT inhaler     order for DME     order for DME     ORDER FOR DME     predniSONE (DELTASONE) 10 MG tablet     Probiotic Product (PROBIOTIC PO)     trospium (SANCTURA) 20 MG tablet     venlafaxine (EFFEXOR-XR) 75 MG 24 hr capsule     VITAMIN D PO     warfarin ANTICOAGULANT (JANTOVEN ANTICOAGULANT) 2.5 MG tablet     No current facility-administered medications for this visit.     Allergies   Allergen Reactions     Augmentin [Amoxicillin-Pot Clavulanate] Nausea and Vomiting     Meperidine Hcl Nausea and Vomiting     demerol     Seasonal Allergies      spring   FHx of breast cancer- mother had breast cancer at 70+ years old. Sister had breast breast cancer when she was in her 40s. She had colon, stomach, pancreatic cancer as well.  Maternal cousin also has lobular breast cancer at 60. Maternal uncle also had colon cancer metastatic to the lungs. Brother had prostate  "cancer. She has one daughter and reports daughter has undergone genetic testing but she is not sure of the details.   Patient met with genetic counselor on July 22, 2020 and 40 gene comprehensive cancer panel was drawn, but was not covered by her insurance and patient decided not to proceed.    Physical Exam:Ht 1.588 m (5' 2.5\")   Wt 96.6 kg (213 lb)   BMI 38.34 kg/m     GENERAL: obese, alert and no distress  EYES: Eyes grossly normal to inspection.  No discharge or erythema, or obvious scleral/conjunctival abnormalities.  RESP: No audible wheeze, cough, or visible cyanosis.  No visible retractions or increased work of breathing.  Known COPD-not on oxygen  SKIN: Visible skin clear. No significant rash, abnormal pigmentation or lesions.  NEURO: Cranial nerves grossly intact.  Mentation and speech appropriate for age.  PSYCH: Mentation appears normal, affect normal/bright, judgement and insight intact, normal speech and appearance well-groomed.  The rest of a comprehensive physical examination is deferred due to PHE (public health emergency) video visit restrictions     Laboratory Results:    Ref. Range 7/12/2021 09:05   Sodium Latest Ref Range: 133 - 144 mmol/L 138   Potassium Latest Ref Range: 3.4 - 5.3 mmol/L 3.9   Chloride Latest Ref Range: 94 - 109 mmol/L 105   Carbon Dioxide Latest Ref Range: 20 - 32 mmol/L 29   Urea Nitrogen Latest Ref Range: 7 - 30 mg/dL 20   Creatinine Latest Ref Range: 0.52 - 1.04 mg/dL 1.16 (H)   GFR Estimate Latest Ref Range: >60 mL/min/1.73m2 48 (L)   Calcium Latest Ref Range: 8.5 - 10.1 mg/dL 8.9   Anion Gap Latest Ref Range: 3 - 14 mmol/L 4   Albumin Latest Ref Range: 3.4 - 5.0 g/dL 3.6   Protein Total Latest Ref Range: 6.8 - 8.8 g/dL 7.1   Bilirubin Total Latest Ref Range: 0.2 - 1.3 mg/dL 0.5   Alkaline Phosphatase Latest Ref Range: 40 - 150 U/L 56   ALT Latest Ref Range: 0 - 50 U/L 18   AST Latest Ref Range: 0 - 45 U/L 14   Glucose Latest Ref Range: 70 - 99 mg/dL 114 (H)   WBC " Latest Ref Range: 4.0 - 11.0 10e3/uL 6.6   Hemoglobin Latest Ref Range: 11.7 - 15.7 g/dL 12.3   Hematocrit Latest Ref Range: 35.0 - 47.0 % 38.9   Platelet Count Latest Ref Range: 150 - 450 10e3/uL 323   RBC Count Latest Ref Range: 3.80 - 5.20 10e6/uL 4.34   MCV Latest Ref Range: 78 - 100 fL 90   MCH Latest Ref Range: 26.5 - 33.0 pg 28.3   MCHC Latest Ref Range: 31.5 - 36.5 g/dL 31.6   RDW Latest Ref Range: 10.0 - 15.0 % 12.1   % Neutrophils Latest Units: % 61   % Lymphocytes Latest Units: % 20   % Monocytes Latest Units: % 11   % Eosinophils Latest Units: % 7   Absolute Basophils Latest Ref Range: 0.0 - 0.2 10e3/uL 0.1   % Basophils Latest Units: % 1   Absolute Eosinophils Latest Ref Range: 0.0 - 0.7 10e3/uL 0.4   Absolute Immature Granulocytes Latest Ref Range: <=0.0 10e3/uL 0.0   Absolute Lymphocytes Latest Ref Range: 0.8 - 5.3 10e3/uL 1.3   Absolute Monocytes Latest Ref Range: 0.0 - 1.3 10e3/uL 0.7   % Immature Granulocytes Latest Units: % 0   Absolute Neutrophils Latest Ref Range: 1.6 - 8.3 10e3/uL 4.0   Absolute NRBCs Latest Units: 10e3/uL 0.0   NRBCs per 100 WBC Latest Ref Range: <1 /100 0       Assessment and Plan:   Invasive lobular carcinoma left breast-patient completed left lumpectomy with sentinel lymph node dissection in 5/2021 followed by adjuvant radiation therapy.  She began on aromatase inhibitor with Arimidex 1 mg daily in 5/2019.  She is tolerating Arimidex well and will continue on with plan for 5 years of use.  Labs appear acceptable today.  She did have abnormality found of possible seroma in the left breast which will be followed up on with an ultrasound on 8/24/2021.  We will follow up on results.    We will set her up to see Dr. Cortez in 6 months-no labs necessary.  Bone health-DEXA scan in June 2019 showed findings c/w osteoporosis as below:  The right hip T-score is -2.7. The left hip T-score is -2.4.    She started on Prolia q 6 months- last given 7/12/2021.Will set up DEXA scan  repeat with her US in August.   COPD-patient admits to difficulty with breathing with heavy hot air of summer and the smoky air quality.  She continues on albuterol nebulizer and inhaler as well as Dulera  CKD stage III-creatinine is consistently 1.16 today with GFR of 48.  Stable  Atrial fibrillation with long-term use of warfarin-reports bruising that is recurrent but no other concerns and INR is followed by primary care provider.  Hypertension-currently on Zestril 2.5 mg daily along with hydrochlorothiazide 25 mg daily and diltiazem 120 mg every 24 hours.  The total time of this encounter amounted to 30 minutes. This time included video time spent with the patient, prep work, ordering tests, and performing post visit documentation.  Angela Oliveira Cnp        Again, thank you for allowing me to participate in the care of your patient.        Sincerely,        Angela Oliveira NP, APRN CNP

## 2021-07-22 NOTE — NURSING NOTE
Ivette is a 71 year old who is being evaluated via a billable video visit.      How would you like to obtain your AVS? MyChart  If the video visit is dropped, the invitation should be resent by: Text to cell phone: [phone]  Will anyone else be joining your video visit? No      Video-Visit Details    Type of service:  Video Visit    Originating Location (pt. Location): Home in [place]    Distant Location (provider location):  [facility]     Platform used for Video Visit: [employer]oint     Per patient no concerns    Love Garibay CMA

## 2021-07-22 NOTE — PROGRESS NOTES
Oncology Follow Up Visit: July 22, 2021    Oncologist: Dr Prisca Cortez  PCP: Nazario Jones    Diagnosis: Invasive lobular carcinoma left breast  Latanya Padron is a 70 yo female who had a finding of an area of architectural distortion the left breast in 9/2018.US was negative. 04/2019 Mammogram and ultrasound showed a heterogenous mass in the left breast measuring 1.1 x 0.5 x 0.5 cm.  Biopsy of the mass came back showing invasive lobular carcinoma, Grade 2, ER/TX positive and HER-2/tulio negative by FISH. MRI breast bilateral showed 2 cm mass like enhancement in the left breast corresponding to the biopsy-proven carcinoma with no evidence of multifocal disease or axillary lymphadenopathy.   Oncotype DX Recurrence = 15 or4% risk of distant recurrence with hormonal therapy alone = low-risk category and so did not recommend adjuvant systemic chemotherapy   Treatment:   05/15/19 Lumpectomy with SNL dissection.    07/18/2019 Completed Adjuvant RT  July 2019- started Arimidex.    Interval History: Ms. Duffy is seen today by video for review of use of Arimidex and continuation of therapy for treatment of her breast cancer.  Patient reports she is taking her medication on a regular basis and not seeing any side effects related to the Arimidex including no hot flashes vaginal dryness but then mention she does see some thinning of her hair that may be related to the Arimidex.  She is not having any new breast issues but is having follow-up to the abnormality found in May-seroma of the left breast-this is scheduled for August 24, 2021.  She has COPD and it is limiting her activity right now with the heavy weather and and the smoky air quality.  Rest of comprehensive and complete ROS is reviewed and is negative.   Past Medical History:   Diagnosis Date     Breast cancer (H) 04/12/2019    Left Breast     COPD (chronic obstructive pulmonary disease) (H)      Mixed hyperlipidemia      Neck mass 6/20/2018     PONV  (postoperative nausea and vomiting)      S/P radiation therapy     5,256 cGy to left breast completed on 7/18/2019 - [facility]     Current Outpatient Medications   Medication     albuterol (PROAIR HFA/PROVENTIL HFA/VENTOLIN HFA) 108 (90 Base) MCG/ACT inhaler     albuterol (PROVENTIL) (2.5 MG/3ML) 0.083% neb solution     anastrozole (ARIMIDEX) 1 MG tablet     atorvastatin (LIPITOR) 10 MG tablet     BIOTIN PO     bisacodyl (DULCOLAX) 5 MG EC tablet     Calcium Carb-Cholecalciferol (CALCIUM 500 + D) 500-400 MG-UNIT TABS     Cyanocobalamin (B-12) 1000 MCG TBCR     diltiazem ER COATED BEADS (CARDIZEM CD/CARTIA XT) 120 MG 24 hr capsule     hydrochlorothiazide (HYDRODIURIL) 25 MG tablet     lisinopril (ZESTRIL) 2.5 MG tablet     magnesium 250 MG tablet     mometasone-formoterol (DULERA) 200-5 MCG/ACT inhaler     order for DME     order for DME     ORDER FOR DME     predniSONE (DELTASONE) 10 MG tablet     Probiotic Product (PROBIOTIC PO)     trospium (SANCTURA) 20 MG tablet     venlafaxine (EFFEXOR-XR) 75 MG 24 hr capsule     VITAMIN D PO     warfarin ANTICOAGULANT (JANTOVEN ANTICOAGULANT) 2.5 MG tablet     No current facility-administered medications for this visit.     Allergies   Allergen Reactions     Augmentin [Amoxicillin-Pot Clavulanate] Nausea and Vomiting     Meperidine Hcl Nausea and Vomiting     demerol     Seasonal Allergies      spring   FHx of breast cancer- mother had breast cancer at 70+ years old. Sister had breast breast cancer when she was in her 40s. She had colon, stomach, pancreatic cancer as well.  Maternal cousin also has lobular breast cancer at 60. Maternal uncle also had colon cancer metastatic to the lungs. Brother had prostate cancer. She has one daughter and reports daughter has undergone genetic testing but she is not sure of the details.   Patient met with genetic counselor on July 22, 2020 and 40 gene comprehensive cancer panel was drawn, but was not covered by her insurance and  "patient decided not to proceed.    Physical Exam:Ht 1.588 m (5' 2.5\")   Wt 96.6 kg (213 lb)   BMI 38.34 kg/m     GENERAL: obese, alert and no distress  EYES: Eyes grossly normal to inspection.  No discharge or erythema, or obvious scleral/conjunctival abnormalities.  RESP: No audible wheeze, cough, or visible cyanosis.  No visible retractions or increased work of breathing.  Known COPD-not on oxygen  SKIN: Visible skin clear. No significant rash, abnormal pigmentation or lesions.  NEURO: Cranial nerves grossly intact.  Mentation and speech appropriate for age.  PSYCH: Mentation appears normal, affect normal/bright, judgement and insight intact, normal speech and appearance well-groomed.  The rest of a comprehensive physical examination is deferred due to PHE (public health emergency) video visit restrictions     Laboratory Results:    Ref. Range 7/12/2021 09:05   Sodium Latest Ref Range: 133 - 144 mmol/L 138   Potassium Latest Ref Range: 3.4 - 5.3 mmol/L 3.9   Chloride Latest Ref Range: 94 - 109 mmol/L 105   Carbon Dioxide Latest Ref Range: 20 - 32 mmol/L 29   Urea Nitrogen Latest Ref Range: 7 - 30 mg/dL 20   Creatinine Latest Ref Range: 0.52 - 1.04 mg/dL 1.16 (H)   GFR Estimate Latest Ref Range: >60 mL/min/1.73m2 48 (L)   Calcium Latest Ref Range: 8.5 - 10.1 mg/dL 8.9   Anion Gap Latest Ref Range: 3 - 14 mmol/L 4   Albumin Latest Ref Range: 3.4 - 5.0 g/dL 3.6   Protein Total Latest Ref Range: 6.8 - 8.8 g/dL 7.1   Bilirubin Total Latest Ref Range: 0.2 - 1.3 mg/dL 0.5   Alkaline Phosphatase Latest Ref Range: 40 - 150 U/L 56   ALT Latest Ref Range: 0 - 50 U/L 18   AST Latest Ref Range: 0 - 45 U/L 14   Glucose Latest Ref Range: 70 - 99 mg/dL 114 (H)   WBC Latest Ref Range: 4.0 - 11.0 10e3/uL 6.6   Hemoglobin Latest Ref Range: 11.7 - 15.7 g/dL 12.3   Hematocrit Latest Ref Range: 35.0 - 47.0 % 38.9   Platelet Count Latest Ref Range: 150 - 450 10e3/uL 323   RBC Count Latest Ref Range: 3.80 - 5.20 10e6/uL 4.34   MCV " Latest Ref Range: 78 - 100 fL 90   MCH Latest Ref Range: 26.5 - 33.0 pg 28.3   MCHC Latest Ref Range: 31.5 - 36.5 g/dL 31.6   RDW Latest Ref Range: 10.0 - 15.0 % 12.1   % Neutrophils Latest Units: % 61   % Lymphocytes Latest Units: % 20   % Monocytes Latest Units: % 11   % Eosinophils Latest Units: % 7   Absolute Basophils Latest Ref Range: 0.0 - 0.2 10e3/uL 0.1   % Basophils Latest Units: % 1   Absolute Eosinophils Latest Ref Range: 0.0 - 0.7 10e3/uL 0.4   Absolute Immature Granulocytes Latest Ref Range: <=0.0 10e3/uL 0.0   Absolute Lymphocytes Latest Ref Range: 0.8 - 5.3 10e3/uL 1.3   Absolute Monocytes Latest Ref Range: 0.0 - 1.3 10e3/uL 0.7   % Immature Granulocytes Latest Units: % 0   Absolute Neutrophils Latest Ref Range: 1.6 - 8.3 10e3/uL 4.0   Absolute NRBCs Latest Units: 10e3/uL 0.0   NRBCs per 100 WBC Latest Ref Range: <1 /100 0       Assessment and Plan:   Invasive lobular carcinoma left breast-patient completed left lumpectomy with sentinel lymph node dissection in 5/2021 followed by adjuvant radiation therapy.  She began on aromatase inhibitor with Arimidex 1 mg daily in 5/2019.  She is tolerating Arimidex well and will continue on with plan for 5 years of use.  Labs appear acceptable today.  She did have abnormality found of possible seroma in the left breast which will be followed up on with an ultrasound on 8/24/2021.  We will follow up on results.    We will set her up to see Dr. Cortez in 6 months-no labs necessary.  Bone health-DEXA scan in June 2019 showed findings c/w osteoporosis as below:  The right hip T-score is -2.7. The left hip T-score is -2.4.    She started on Prolia q 6 months- last given 7/12/2021.Will set up DEXA scan repeat with her US in August.   COPD-patient admits to difficulty with breathing with heavy hot air of summer and the smoky air quality.  She continues on albuterol nebulizer and inhaler as well as Dulera  CKD stage III-creatinine is consistently 1.16 today with GFR  of 48.  Stable  Atrial fibrillation with long-term use of warfarin-reports bruising that is recurrent but no other concerns and INR is followed by primary care provider.  Hypertension-currently on Zestril 2.5 mg daily along with hydrochlorothiazide 25 mg daily and diltiazem 120 mg every 24 hours.  The total time of this encounter amounted to 30 minutes. This time included video time spent with the patient, prep work, ordering tests, and performing post visit documentation.  Angela Oliveira,Cnp    Addendum:  S/p L Us for 3 months f/up  evaluation of a previous tender nodule - for which a  complex cystic structure was seen likely representing postoperative  seroma and/or fat necrosis with adjacent fat lobules within the fluid  Collection.  L breast US 9/1/2021:  5/20/2021 and likely represents focal area of fat necrosis  with fat lobules in an area of fluid. This could also represent seroma  with internal fat lobules. Other etiologies for the nodular densities,  such as recurrent malignancy, are considered much less likely. No  definite vascularity is seen in the soft tissue density areas of the  finding. This is likely benign and an additional follow-up ultrasound  in three months at the time of the routine annual mammographic  screening is recommended.                                                                    IMPRESSION: BI-RADS CATEGORY: 3 - Probably Benign Finding-Short  Interval Follow-Up Suggested. Short Interval Follow-up 3 Months ultrasound of  the left breast, at the time of the previously requested diagnostic  left breast mammogram, is recommended.    In May 2020 she had L breast US and b/l diagnostic mammogram which showed benign findings and 3 months f/up US and 6 months f/up L diagnostic mammogram was recommended.   DEXA scan 9/1/2021:  1. Prominent osteopenia in the left femoral neck, slightly improved.  2. Mild osteoporosis in the right femoral neck, minimally improved.  3. Mild osteopenia  within L2, slightly improved.

## 2021-07-26 ENCOUNTER — ANTICOAGULATION THERAPY VISIT (OUTPATIENT)
Dept: ANTICOAGULATION | Facility: CLINIC | Age: 71
End: 2021-07-26

## 2021-07-26 ENCOUNTER — LAB (OUTPATIENT)
Dept: LAB | Facility: CLINIC | Age: 71
End: 2021-07-26
Payer: COMMERCIAL

## 2021-07-26 DIAGNOSIS — I48.0 AF (PAROXYSMAL ATRIAL FIBRILLATION) (H): Primary | ICD-10-CM

## 2021-07-26 DIAGNOSIS — Z79.01 LONG TERM CURRENT USE OF ANTICOAGULANT THERAPY: ICD-10-CM

## 2021-07-26 DIAGNOSIS — I48.91 ATRIAL FIBRILLATION (H): ICD-10-CM

## 2021-07-26 DIAGNOSIS — I48.91 ATRIAL FIBRILLATION, UNSPECIFIED TYPE (H): ICD-10-CM

## 2021-07-26 LAB — INR BLD: 2.5 (ref 0.9–1.1)

## 2021-07-26 PROCEDURE — 36416 COLLJ CAPILLARY BLOOD SPEC: CPT

## 2021-07-26 PROCEDURE — 85610 PROTHROMBIN TIME: CPT

## 2021-07-26 NOTE — PROGRESS NOTES
ANTICOAGULATION MANAGEMENT     Latanya Padron 71 year old female is on warfarin with therapeutic INR result. (Goal INR 2.0-3.0)    Recent labs: (last 7 days)     07/26/21  1427   INR 2.5*       ASSESSMENT     Source(s): Patient/Caregiver Call     Warfarin doses taken: Warfarin taken as instructed  Diet: No new diet changes identified  New illness, injury, or hospitalization: No  Medication/supplement changes: None noted  Signs or symptoms of bleeding or clotting: No  Previous INR: Supratherapeutic  Additional findings: None     PLAN     Recommended plan for no diet, medication or health factor changes affecting INR     Dosing Instructions: Continue your current warfarin dose with next INR in 6 weeks       Summary  As of 7/26/2021    Full warfarin instructions:  5 mg every Mon; 2.5 mg all other days   Next INR check:  9/7/2021             Telephone call with Latanya who verbalizes understanding and agrees to plan    Lab visit scheduled    Education provided: None required    Plan made per [facility] anticoagulation protocol    Zoey Pickard RN  Anticoagulation Clinic  7/26/2021    _______________________________________________________________________     Anticoagulation Episode Summary     Current INR goal:  2.0-3.0   TTR:  85.9 % (1 y)   Target end date:  Indefinite   Send INR reminders to:  [facility]    Indications    AF (paroxysmal atrial fibrillation) (H) [I48.0]  Atrial fibrillation (H) [I48.91]  Long term current use of anticoagulant therapy [Z79.01]  Atrial fibrillation  unspecified type (H) (Resolved) [I48.91]           Comments:  5 mg tabs, likes card, PM dose         Anticoagulation Care Providers     Provider Role Specialty Phone number    Glen Blue MD Referring Family Medicine [phone]

## 2021-07-29 ENCOUNTER — HOSPITAL ENCOUNTER (OUTPATIENT)
Dept: CT IMAGING | Facility: CLINIC | Age: 71
Discharge: HOME | End: 2021-07-29
Attending: FAMILY MEDICINE | Admitting: FAMILY MEDICINE
Payer: COMMERCIAL

## 2021-07-29 DIAGNOSIS — Z87.891 HISTORY OF TOBACCO USE: ICD-10-CM

## 2021-07-29 PROCEDURE — 71271 CT THORAX LUNG CANCER SCR C-: CPT

## 2021-08-03 DIAGNOSIS — F32.5 MAJOR DEPRESSION IN COMPLETE REMISSION (H): ICD-10-CM

## 2021-08-03 DIAGNOSIS — I48.0 AF (PAROXYSMAL ATRIAL FIBRILLATION) (H): ICD-10-CM

## 2021-08-03 DIAGNOSIS — I48.91 ATRIAL FIBRILLATION, UNSPECIFIED TYPE (H): ICD-10-CM

## 2021-08-05 RX ORDER — WARFARIN SODIUM 2.5 MG/1
TABLET ORAL
Qty: 100 TABLET | Refills: 0 | Status: SHIPPED | OUTPATIENT
Start: 2021-08-05 | End: 2021-11-12

## 2021-08-05 NOTE — TELEPHONE ENCOUNTER
"Requested Prescriptions   Pending Prescriptions Disp Refills    JANTOVEN ANTICOAGULANT 2.5 MG tablet [Pharmacy Med Name: JANTOVEN 2.5MG TABS] 100 tablet 0     Sig: TAKE 2 TABLETS (5 MG) BY MOUTH MONDAY AND TAKE 1 TABLET (2.5 MG) ALL OTHER DAYS, OR AS DIRECTED BY THE COUMADIN CLINIC.        Vitamin K Antagonists Failed - 8/3/2021 12:17 PM        Failed - INR is within goal in the past 6 weeks     Confirm INR is within goal in the past 6 weeks.     Recent Labs   Lab Test 07/26/21  1427   INR 2.5*                       Passed - Recent (12 mo) or future (30 days) visit within the authorizing provider's specialty     Patient has had an office visit with the authorizing provider or a provider within the authorizing providers department within the previous 12 mos or has a future within next 30 days. See \"Patient Info\" tab in inbasket, or \"Choose Columns\" in Meds & Orders section of the refill encounter.              Passed - Medication is active on med list        Passed - Patient is 18 years of age or older        Passed - Patient is not pregnant        Passed - No positive pregnancy on file in past 12 months           venlafaxine (EFFEXOR-XR) 75 MG 24 hr capsule [Pharmacy Med Name: VENLAFAXINE HCL ER 75MG CP24] 90 capsule 1     Sig: TAKE ONE CAPSULE BY MOUTH ONCE DAILY        Serotonin-Norepinephrine Reuptake Inhibitors  Failed - 8/3/2021 12:17 PM        Failed - Normal serum creatinine on file in past 12 months     Recent Labs   Lab Test 07/12/21  0905   CR 1.16*       Ok to refill medication if creatinine is low          Passed - Blood pressure under 140/90 in past 12 months       BP Readings from Last 3 Encounters:   07/12/21 133/74   06/01/21 128/70   03/04/21 129/68                 Passed - PHQ-9 score of less than 5 in past 6 months     Please review last PHQ-9 score.           Passed - Medication is active on med list        Passed - Patient is age 18 or older        Passed - No active pregnancy on record        " "Passed - No positive pregnancy test in past 12 months        Passed - Recent (6 mo) or future (30 days) visit within the authorizing provider's specialty     Patient had office visit in the last 6 months or has a visit in the next 30 days with authorizing provider or within the authorizing provider's specialty.  See \"Patient Info\" tab in inbasket, or \"Choose Columns\" in Meds & Orders section of the refill encounter.                Feliciano sent to [facility]     Last Written Prescription Date:  12/10/2020  Last Fill Quantity: 90,  # refills: 1   Last office visit: 3/1/2021 with prescribing provider:  Robert   Future Office Visit:          Routing refill request to provider for review/approval because:  Labs out of range:  Stevan Ortiz RN  [facility]                 "

## 2021-08-06 RX ORDER — VENLAFAXINE HYDROCHLORIDE 75 MG/1
CAPSULE, EXTENDED RELEASE ORAL
Qty: 90 CAPSULE | Refills: 1 | Status: SHIPPED | OUTPATIENT
Start: 2021-08-06 | End: 2022-02-17

## 2021-08-23 ENCOUNTER — MYC MEDICAL ADVICE (OUTPATIENT)
Dept: FAMILY MEDICINE | Facility: CLINIC | Age: 71
End: 2021-08-23

## 2021-08-23 DIAGNOSIS — M54.2 NECK PAIN: Primary | ICD-10-CM

## 2021-08-23 NOTE — TELEPHONE ENCOUNTER
PT was ordered and will send a muscle relaxer to the [facility] pharmacy.    Per       Pt was notified.  MP/MA

## 2021-08-24 RX ORDER — CYCLOBENZAPRINE HCL 10 MG
10 TABLET ORAL 3 TIMES DAILY PRN
Qty: 60 TABLET | Refills: 1 | Status: SHIPPED | OUTPATIENT
Start: 2021-08-24 | End: 2022-01-07

## 2021-08-25 ENCOUNTER — TELEPHONE (OUTPATIENT)
Dept: FAMILY MEDICINE | Facility: CLINIC | Age: 71
End: 2021-08-25

## 2021-08-25 DIAGNOSIS — M54.2 NECK PAIN: Primary | ICD-10-CM

## 2021-08-25 RX ORDER — TRAMADOL HYDROCHLORIDE 50 MG/1
50 TABLET ORAL EVERY 6 HOURS PRN
Qty: 15 TABLET | Refills: 0 | Status: SHIPPED | OUTPATIENT
Start: 2021-08-25 | End: 2022-01-07

## 2021-08-25 NOTE — TELEPHONE ENCOUNTER
Patient notified.    Please put in xray order so patient can schedule tomorrow when she comes for PT.    Caprice Kellogg RN on 8/25/2021 at 4:39 PM

## 2021-08-25 NOTE — TELEPHONE ENCOUNTER
Patient needs a cervical xray to assess anatomy and we can try tramadol for pain until we do this assessment.  If there is significant osteoarthritis, she may need a CT or MRI.    Electronically signed by:  Nazario Jones M.D.  8/25/2021

## 2021-08-25 NOTE — TELEPHONE ENCOUNTER
Reason for Call:  Other prescription    Detailed comments: Ivette does not feel that the muscle relaxer is helping with her neck pain at all. She is requesting something for the pain. [facility] pharmacy. She has a appointment with physical therapy tomorrow.    Phone Number Patient can be reached at: Cell number on file:    Telephone Information:   Mobile [phone]       Best Time:     Can we leave a detailed message on this number? YES    Call taken on 8/25/2021 at 8:04 AM by Marley Barnes

## 2021-08-26 ENCOUNTER — HOSPITAL ENCOUNTER (OUTPATIENT)
Dept: PHYSICAL THERAPY | Facility: CLINIC | Age: 71
Setting detail: THERAPIES SERIES
End: 2021-08-26
Attending: FAMILY MEDICINE
Payer: COMMERCIAL

## 2021-08-26 DIAGNOSIS — M54.2 NECK PAIN: ICD-10-CM

## 2021-08-26 PROCEDURE — 97140 MANUAL THERAPY 1/> REGIONS: CPT | Mod: GP | Performed by: PHYSICAL THERAPIST

## 2021-08-26 PROCEDURE — 97161 PT EVAL LOW COMPLEX 20 MIN: CPT | Mod: GP | Performed by: PHYSICAL THERAPIST

## 2021-08-26 PROCEDURE — 97110 THERAPEUTIC EXERCISES: CPT | Mod: GP | Performed by: PHYSICAL THERAPIST

## 2021-08-26 NOTE — PROGRESS NOTES
21 0900   General Information   Type of Visit Initial OP Ortho PT Evaluation   Start of Care Date 21   Referring Physician Nazario Jones MD    Orders Evaluate and Treat   Date of Order 21   Certification Required? Yes   Medical Diagnosis Neck pain   Surgical/Medical history reviewed Yes   Precautions/Limitations no known precautions/limitations   Weight-Bearing Status - LUE full weight-bearing   Weight-Bearing Status - RUE full weight-bearing   Weight-Bearing Status - LLE full weight-bearing   Weight-Bearing Status - RLE full weight-bearing   Body Part(s)   Body Part(s) Cervical Spine   Presentation and Etiology   Pertinent history of current problem (include personal factors and/or comorbidities that impact the POC) Pt reports that she went on a long card ride to [place] and helped her  move a headboard.  This occurred about 1-2 weeks ago.  Pt reports significant stiffness and reluctant to move her neck.  PMH: Breast cancer 2019 radiation therapy and mastectomy, Neck mass 2018, COPD, hyperlipidemia, appendectomy, , bunion, htn, Afib, CAD, Morbid obesity, hirsutism, OA of B knees, osteoporosis, depression.     Impairments A. Pain;D. Decreased ROM;E. Decreased flexibility;F. Decreased strength and endurance   Functional Limitations perform activities of daily living;perform desired leisure / sports activities   Symptom Location L side of neck.  L supraclavicular area and superior shoulder   How/Where did it occur From insidious onset   Onset date of current episode/exacerbation 21   Chronicity New   Pain rating (0-10 point scale) Best (/10);Worst (/10)   Best (/10) 5/10   Worst (/10) 9/10   Pain quality A. Sharp;B. Dull;C. Aching;D. Burning   Frequency of pain/symptoms C. With activity   Pain/symptoms are: Worse in the morning   Pain/symptoms exacerbated by C. Lifting;D. Carrying;G. Certain positions   Pain/symptoms eased by A. Sitting;C. Rest;E. Changing  positions;F. Certain positions;I. OTC medication(s)   Progression of symptoms since onset: Unchanged   Current Level of Function   Current Community Support Family/friend caregiver   Patient role/employment history F. Retired   Living environment House/townhome   Home/community accessibility [place]   Current equipment-Gait/Locomotion None   Current equipment-ADL None   Fall Risk Screen   Fall screen completed by PT   Have you fallen 2 or more times in the past year? No   Have you fallen and had an injury in the past year? No   Abuse Screen (yes response referral indicated)   Feels Unsafe at Home or Work/School no   Feels Threatened by Someone no   Does Anyone Try to Keep You From Having Contact with Others or Doing Things Outside Your Home? no   Physical Signs of Abuse Present no   Cervical Spine   Cervical Flexion ROM 15   Cervical Extension ROM 18   Cervical Right Side Bending ROM 16   Cervical Left Side Bending ROM 12   Cervical Right Rotation ROM 12   Cervical Left Rotation ROM 33   Shoulder Shrug (C2-C4) Strength 5   Shoulder Abd (C5) Strength 5   Elbow Flexion (C5, C6) Strength 5   Elbow Extension (C7) Strength 5   Thumb Abd (C8) Strength 5   5th Finger Add (T1) Strength 5   Vertebral Artery Test Negative   Spurling Test Painful   Cervical Distraction Test Positive for relief.      Palpation Tender to palpation at B cervical paraspinals, upper trap, levator scapulae, and suboccipitals.     Planned Therapy Interventions   Planned Therapy Interventions gait training;joint mobilization;manual therapy;neuromuscular re-education;ROM;strengthening;stretching   Planned Modality Interventions   Planned Modality Interventions Cryotherapy;Electrical stimulation;Hot packs;TENS;Ultrasound   Clinical Impression   Criteria for Skilled Therapeutic Interventions Met yes, treatment indicated   PT Diagnosis Neck pain   Influenced by the following impairments Pain, decreased ROM.   Functional limitations due to impairments  Turning head.   Clinical Presentation Stable/Uncomplicated   Clinical Presentation Rationale Clinical judgement   Clinical Decision Making (Complexity) Low complexity   Therapy Frequency 2 times/Week   Predicted Duration of Therapy Intervention (days/wks) 8 weeks   Risk & Benefits of therapy have been explained Yes   Patient, Family & other staff in agreement with plan of care Yes   Clinical Impression Comments Pt is a 71 y.o. female who presented to PT with symptoms of neck pain.  Pt will benefit from skilled PT to improve neck ROM, postural stability.    Education Assessment   Preferred Learning Style Listening;Demonstration;Pictures/video   Barriers to Learning No barriers   ORTHO GOALS   PT Ortho Eval Goals 1;2   Ortho Goal 1   Goal Identifier NDI   Goal Description Pt will demonstrate 20% improvement per NDI in order to demonstrate functional improvement of neck   Target Date 10/21/21   Ortho Goal 2   Goal Identifier HEP   Goal Description Pt will be independent with HEP in order to improve neck ROM and postural stabilizatino.   Target Date 10/21/21   Total Evaluation Time   PT Merrill Low Complexity Minutes ([id number]) 15

## 2021-08-26 NOTE — PROGRESS NOTES
[facility]          OUTPATIENT PHYSICAL THERAPY ORTHOPEDIC EVALUATION  PLAN OF TREATMENT FOR OUTPATIENT REHABILITATION  (COMPLETE FOR INITIAL CLAIMS ONLY)  Patient's Last Name, First Name, M.I.  YOB: 1950  Latanya Padron    Provider s Name:  [facility]   Medical Record No.  [id number]   Start of Care Date:  08/26/21   Onset Date:  08/12/21   Type:     _X__PT   ___OT   ___SLP Medical Diagnosis:   Neck pain     PT Diagnosis:  Neck pain   Visits from SOC:  1      _________________________________________________________________________________  Plan of Treatment/Functional Goals:  gait training, joint mobilization, manual therapy, neuromuscular re-education, ROM, strengthening, stretching     Cryotherapy, Electrical stimulation, Hot packs, TENS, Ultrasound     Goals  Goal Identifier: NDI  Goal Description: Pt will demonstrate 20% improvement per NDI in order to demonstrate functional improvement of neck  Target Date: 10/21/21    Goal Identifier: HEP  Goal Description: Pt will be independent with HEP in order to improve neck ROM and postural stabilizatino.  Target Date: 10/21/21                                                                      Therapy Frequency:  2 times/Week  Predicted Duration of Therapy Intervention:  8 weeks    Norberto Ramon, PT                 I CERTIFY THE NEED FOR THESE SERVICES FURNISHED UNDER        THIS PLAN OF TREATMENT AND WHILE UNDER MY CARE     (Physician co-signature of this document indicates review and certification of the therapy plan).                       Certification Date From:    8/16/21  Certification Date To:  10/21/21    Referring Provider:  Nazario Jones MD     Initial Assessment        See Epic Evaluation Start of Care Date: 08/26/21

## 2021-08-27 ENCOUNTER — HOSPITAL ENCOUNTER (OUTPATIENT)
Dept: GENERAL RADIOLOGY | Facility: CLINIC | Age: 71
Discharge: HOME | End: 2021-08-27
Attending: FAMILY MEDICINE | Admitting: FAMILY MEDICINE
Payer: COMMERCIAL

## 2021-08-27 DIAGNOSIS — M54.2 NECK PAIN: ICD-10-CM

## 2021-08-27 PROCEDURE — 72040 X-RAY EXAM NECK SPINE 2-3 VW: CPT

## 2021-08-31 ENCOUNTER — ANTICOAGULATION THERAPY VISIT (OUTPATIENT)
Dept: ANTICOAGULATION | Facility: CLINIC | Age: 71
End: 2021-08-31

## 2021-08-31 ENCOUNTER — LAB (OUTPATIENT)
Dept: LAB | Facility: CLINIC | Age: 71
End: 2021-08-31
Payer: COMMERCIAL

## 2021-08-31 DIAGNOSIS — Z79.01 LONG TERM CURRENT USE OF ANTICOAGULANT THERAPY: ICD-10-CM

## 2021-08-31 DIAGNOSIS — I48.0 AF (PAROXYSMAL ATRIAL FIBRILLATION) (H): Primary | ICD-10-CM

## 2021-08-31 DIAGNOSIS — I48.91 ATRIAL FIBRILLATION, UNSPECIFIED TYPE (H): ICD-10-CM

## 2021-08-31 DIAGNOSIS — I48.91 ATRIAL FIBRILLATION (H): ICD-10-CM

## 2021-08-31 LAB — INR BLD: 3.2 (ref 0.9–1.1)

## 2021-08-31 PROCEDURE — 36416 COLLJ CAPILLARY BLOOD SPEC: CPT

## 2021-08-31 PROCEDURE — 85610 PROTHROMBIN TIME: CPT

## 2021-09-01 ENCOUNTER — HOSPITAL ENCOUNTER (OUTPATIENT)
Dept: BONE DENSITY | Facility: CLINIC | Age: 71
End: 2021-09-01
Attending: NURSE PRACTITIONER
Payer: COMMERCIAL

## 2021-09-01 ENCOUNTER — HOSPITAL ENCOUNTER (OUTPATIENT)
Dept: ULTRASOUND IMAGING | Facility: CLINIC | Age: 71
End: 2021-09-01
Attending: INTERNAL MEDICINE
Payer: COMMERCIAL

## 2021-09-01 ENCOUNTER — HOSPITAL ENCOUNTER (OUTPATIENT)
Dept: PHYSICAL THERAPY | Facility: CLINIC | Age: 71
Setting detail: THERAPIES SERIES
End: 2021-09-01
Attending: FAMILY MEDICINE
Payer: COMMERCIAL

## 2021-09-01 DIAGNOSIS — M81.0 AGE-RELATED OSTEOPOROSIS WITHOUT CURRENT PATHOLOGICAL FRACTURE: ICD-10-CM

## 2021-09-01 DIAGNOSIS — N64.4 BREAST PAIN, LEFT: ICD-10-CM

## 2021-09-01 DIAGNOSIS — R93.89 IMAGING ABNORMALITY: ICD-10-CM

## 2021-09-01 PROCEDURE — 76642 ULTRASOUND BREAST LIMITED: CPT | Mod: LT

## 2021-09-01 PROCEDURE — 97140 MANUAL THERAPY 1/> REGIONS: CPT | Mod: GP | Performed by: PHYSICAL THERAPIST

## 2021-09-01 PROCEDURE — 77080 DXA BONE DENSITY AXIAL: CPT

## 2021-09-01 PROCEDURE — 97110 THERAPEUTIC EXERCISES: CPT | Mod: GP | Performed by: PHYSICAL THERAPIST

## 2021-09-08 ENCOUNTER — HOSPITAL ENCOUNTER (OUTPATIENT)
Dept: PHYSICAL THERAPY | Facility: CLINIC | Age: 71
Setting detail: THERAPIES SERIES
End: 2021-09-08
Attending: FAMILY MEDICINE
Payer: COMMERCIAL

## 2021-09-08 PROCEDURE — 97140 MANUAL THERAPY 1/> REGIONS: CPT | Mod: GP

## 2021-09-08 PROCEDURE — 97110 THERAPEUTIC EXERCISES: CPT | Mod: GP

## 2021-09-10 ENCOUNTER — HOSPITAL ENCOUNTER (OUTPATIENT)
Dept: PHYSICAL THERAPY | Facility: CLINIC | Age: 71
Setting detail: THERAPIES SERIES
End: 2021-09-10
Attending: FAMILY MEDICINE
Payer: COMMERCIAL

## 2021-09-10 PROCEDURE — 97110 THERAPEUTIC EXERCISES: CPT | Mod: GP | Performed by: PHYSICAL THERAPIST

## 2021-09-16 ENCOUNTER — TELEPHONE (OUTPATIENT)
Dept: FAMILY MEDICINE | Facility: CLINIC | Age: 71
End: 2021-09-16

## 2021-09-16 DIAGNOSIS — M54.42 ACUTE BILATERAL LOW BACK PAIN WITH LEFT-SIDED SCIATICA: Primary | ICD-10-CM

## 2021-09-16 NOTE — TELEPHONE ENCOUNTER
"Reason for Call:  Other call back    Detailed comments: Patient called stating she her sciatic nerve is \"out of whack\", she went to a chiropractor yesterday and they discussed it and he recommended she do physical therapy. She is having pain in her lower waist, buttocks, goes down the leg occasionally.     Phone Number Patient can be reached at: Cell number on file:    Telephone Information:   Mobile [phone]       Best Time: Any    Can we leave a detailed message on this number? YES    Call taken on 9/16/2021 at 11:02 AM by Comfort Morales      "

## 2021-09-28 ENCOUNTER — DOCUMENTATION ONLY (OUTPATIENT)
Dept: LAB | Facility: CLINIC | Age: 71
End: 2021-09-28

## 2021-09-28 ENCOUNTER — HOSPITAL ENCOUNTER (OUTPATIENT)
Dept: PHYSICAL THERAPY | Facility: CLINIC | Age: 71
Setting detail: THERAPIES SERIES
End: 2021-09-28
Attending: FAMILY MEDICINE
Payer: COMMERCIAL

## 2021-09-28 PROCEDURE — 97161 PT EVAL LOW COMPLEX 20 MIN: CPT | Mod: GP | Performed by: PHYSICAL THERAPIST

## 2021-09-28 PROCEDURE — 97110 THERAPEUTIC EXERCISES: CPT | Mod: GP | Performed by: PHYSICAL THERAPIST

## 2021-09-28 NOTE — PROGRESS NOTES
"   09/28/21 1100   General Information   Type of Visit Initial OP Ortho PT Evaluation   Start of Care Date 09/28/21   Referring Physician Nazario Jones MD   Patient/Family Goals Statement reduce pain   Orders Evaluate and Treat   Orders Comment Acute bilateral low back pain with left-sided sciatica   Date of Order 09/16/21   Certification Required? Yes   Medical Diagnosis Acute bilateral low back pain with radiating pain into the left anterior thigh   Surgical/Medical history reviewed Yes   Precautions/Limitations no known precautions/limitations   Weight-Bearing Status - LUE full weight-bearing   Weight-Bearing Status - RUE full weight-bearing   Weight-Bearing Status - LLE full weight-bearing   Weight-Bearing Status - RLE full weight-bearing   Special Instructions Has Osteoporosis   Body Part(s)   Body Part(s) Lumbar Spine/SI   Presentation and Etiology   Pertinent history of current problem (include personal factors and/or comorbidities that impact the POC) Pt says her LBP started a Couple weeks ago with onset of colder weather. The pain is in her low back and radiates about 4\" laterally in each direction about where her belt would sit. She has had \"sciatic\" pain in her left anterior thigh to her left knee however that has since resolved and does not seem related to her current LBP. She had a fall about 4 years ago from standing up too fast, getting lightheaded and losing her balance. Her Dr. said she may have broken her tailbone but she does not have any imaging recently for this. She saw PT for neck pain in august 2021 which has since resolved and her neck is feeling better now.  PMH is significant for Osteoporosis, breast cancer left 2019, COPD, mixed hyperlipidemia, S/P radiation therapy 2019 to left breast.   Functional Limitations perform activities of daily living   Symptom Location above tailbone    How/Where did it occur From insidious onset   Onset date of current episode/exacerbation 09/07/21 "   Chronicity Chronic   Pain rating (0-10 point scale) Best (/10);Worst (/10)   Best (/10) 3/10   Worst (/10) 8/10   Pain quality A. Sharp;B. Dull;C. Aching;D. Burning;E. Shooting   Frequency of pain/symptoms B. Intermittent   Pain/symptoms are: Worse during the day   Pain/symptoms exacerbated by A. Sitting;C. Lifting;D. Carrying;G. Certain positions   Pain/symptoms eased by C. Rest;I. OTC medication(s)   Progression of symptoms since onset: Unchanged   Prior Level of Function   Prior Level of Function-Mobility no restrictions    Functional Level Prior Comment she tries to not lift anything that is overly heavy    Current Level of Function   Current Community Support Family/friend caregiver   Patient role/employment history F. Retired   [facility]   Home/community accessibility 1 step to enter   Current equipment-Gait/Locomotion None   Current equipment-ADL None   Fall Risk Screen   Fall screen completed by PT   Have you fallen 2 or more times in the past year? No   Have you fallen and had an injury in the past year? No   Is patient a fall risk? No   Abuse Screen (yes response referral indicated)   Feels Unsafe at Home or Work/School no   Feels Threatened by Someone no   Does Anyone Try to Keep You From Having Contact with Others or Doing Things Outside Your Home? no   Physical Signs of Abuse Present no   System Outcome Measures   Outcome Measures Low Back Pain (see Oswestry and Gaurav)   Lumbar Spine/SI Objective Findings   Integumentary Normal over lumbar spine   Posture Slightly forward head, rounded shoulders    Gait/Locomotion Normal    Flexion ROM WNL   Extension ROM WNL   Right Side Bending ROM slightly less than left   Left Side Bending ROM WNL   Repeated Extension-Standing ROM No change   Repeated Flexion-Standing ROM worse pain 4/10   Hip Flexion (L2) Strength 5/5B   Hip Abduction Strength 5/5B   Hip Adduction Strength 5/5B   Knee Flexion Strength 4/5B   Knee Extension (L3) Strength  5/5B   Ankle Dorsiflexion (L4) Strength 5/5B   Great Toe Extension (L5) Strength 5/5B   Ankle Plantar Flexion (S1) Strength 5/5B   Lumbar/Hip/Knee/Foot Strength Comments generally weak in glute max   Hamstring Flexibility WNL   Quadricep Flexibility WNL   SLR Negative   Crossover SLR Negative   Slump Test Negative B   Spring Test No pain, noted hypomobility of Lumbar segments   Lumbar/SI Special Tests Comments while slumped more pain in Left sidebending, Lasslets cluster thight thrust postive B, Compression: negative, Distraction: Negative, Sacral Thrust: Postive    Segmental Mobility hypomobility of lumbar vertebrae   Planned Therapy Interventions   Planned Therapy Interventions ADL retraining;balance training;bed mobility training;joint mobilization;manual therapy;neuromuscular re-education;strengthening;stretching   Planned Modality Interventions   Planned Modality Interventions Cryotherapy   Clinical Impression   Criteria for Skilled Therapeutic Interventions Met yes, treatment indicated   PT Diagnosis Mechanical Low back pain    Influenced by the following impairments ROM limitiations, weakness, pain, decreased endurance   Functional limitations due to impairments sitting, lifting, rising from chair,    Clinical Presentation Stable/Uncomplicated   Clinical Presentation Rationale History, evaluation and clinical experience   Clinical Decision Making (Complexity) Low complexity   Therapy Frequency 2 times/Week   Predicted Duration of Therapy Intervention (days/wks) 90 days   Risk & Benefits of therapy have been explained Yes   Patient, Family & other staff in agreement with plan of care Yes   Clinical Impression Comments 70 y/o female pt. presents with low back pain. Impairments include decreased ROM, weakness, pain and decreased balance. These prevent the patient from completing activities such as prolonged sitting, standing from sitting and lifting/carrying heavy objects. This hinders the patient's ability to  participate in activities of daily living and leisure activities like prolonged sitting. Pt. will benefit from skilled therapy to decrease pain, increase ROM and strength, regain functional activities like lifting/carrying and sit to stand transfers.   Education Assessment   Preferred Learning Style Listening;Reading;Demonstration   Barriers to Learning No barriers   ORTHO GOALS   PT Ortho Eval Goals 1;2;3   Ortho Goal 1   Goal Identifier SILVINA   Goal Description The patient will score 10% or less on SILVINA to show a 20% decrease in disability from low back pain   Target Date 12/27/21   Ortho Goal 2   Goal Identifier Sitting and sit to stands   Goal Description The patient will be able sit in her couch for 1 hour with no increase in pain and be able to stand up from her couch without an increase in pain to be able enjoy leisure activites in her home.    Target Date 12/27/21   Ortho Goal 3   Goal Identifier HEP   Goal Description Pt will be independent with HEP in order to improve ROM, and strength and decrease pain.    Target Date 12/27/21   Total Evaluation Time   PT Eval, Low Complexity Minutes (38797) 40   Therapy Certification   Certification date from 09/28/21   Certification date to 12/27/21   Medical Diagnosis LBP with radiating pain into left anterior thigh.

## 2021-09-28 NOTE — PROGRESS NOTES
[facility]          OUTPATIENT PHYSICAL THERAPY ORTHOPEDIC EVALUATION  PLAN OF TREATMENT FOR OUTPATIENT REHABILITATION  (COMPLETE FOR INITIAL CLAIMS ONLY)  Patient's Last Name, First Name, M.I.  YOB: 1950  Latanya Padron    Provider s Name:  [facility]   Medical Record No.  [id number]   Start of Care Date:  09/28/21   Onset Date:  09/07/21   Type:     _X__PT   ___OT   ___SLP Medical Diagnosis:  LBP with radiating pain into left anterior thigh.      PT Diagnosis:  Mechanical Low back pain    Visits from SOC:  1      _________________________________________________________________________________  Plan of Treatment/Functional Goals:  ADL retraining, balance training, bed mobility training, joint mobilization, manual therapy, neuromuscular re-education, strengthening, stretching     Cryotherapy     Goals  Goal Identifier: SILVINA  Goal Description: The patient will score 10% or less on SILVINA to show a 20% decrease in disability from low back pain  Target Date: 12/27/21    Goal Identifier: Sitting and sit to stands  Goal Description: The patient will be able sit in her couch for 1 hour with no increase in pain and be able to stand up from her couch without an increase in pain to be able enjoy leisure activites in her home.   Target Date: 12/27/21    Goal Identifier: HEP  Goal Description: Pt will be independent with HEP in order to improve ROM, and strength and decrease pain.   Target Date: 12/27/21                                                           Therapy Frequency:  2 times/Week  Predicted Duration of Therapy Intervention:  90 days    Norberto Ramon PT                 I CERTIFY THE NEED FOR THESE SERVICES FURNISHED UNDER        THIS PLAN OF TREATMENT AND WHILE UNDER MY CARE     (Physician co-signature of this document indicates review and certification of the therapy plan).                       Certification Date From:  09/28/21    Certification Date To:  12/27/21    Referring Provider:  Nazario Jones MD    Initial Assessment        See Epic Evaluation Start of Care Date: 09/28/21

## 2021-09-29 ENCOUNTER — LAB (OUTPATIENT)
Dept: LAB | Facility: CLINIC | Age: 71
End: 2021-09-29
Payer: COMMERCIAL

## 2021-09-29 ENCOUNTER — ANTICOAGULATION THERAPY VISIT (OUTPATIENT)
Dept: ANTICOAGULATION | Facility: CLINIC | Age: 71
End: 2021-09-29

## 2021-09-29 DIAGNOSIS — I48.91 ATRIAL FIBRILLATION, UNSPECIFIED TYPE (H): ICD-10-CM

## 2021-09-29 DIAGNOSIS — I48.0 AF (PAROXYSMAL ATRIAL FIBRILLATION) (H): Primary | ICD-10-CM

## 2021-09-29 DIAGNOSIS — Z79.01 LONG TERM CURRENT USE OF ANTICOAGULANT THERAPY: ICD-10-CM

## 2021-09-29 DIAGNOSIS — I48.91 ATRIAL FIBRILLATION (H): ICD-10-CM

## 2021-09-29 LAB — INR BLD: 4.8 (ref 0.9–1.1)

## 2021-09-29 PROCEDURE — 85610 PROTHROMBIN TIME: CPT

## 2021-09-29 PROCEDURE — 36416 COLLJ CAPILLARY BLOOD SPEC: CPT

## 2021-09-29 NOTE — PROGRESS NOTES
ANTICOAGULATION MANAGEMENT     Latanya Padron 71 year old female is on warfarin with supratherapeutic INR result. (Goal INR 2.0-3.0)    Recent labs: (last 7 days)     09/29/21  0816   INR 4.8*       ASSESSMENT     Source(s): Chart Review and Patient/Caregiver Call     Warfarin doses taken: Warfarin taken as instructed  Diet: Decreased greens/vitamin K in diet; plans to resume previous intake  New illness, injury, or hospitalization: No  Medication/supplement changes: None noted  Signs or symptoms of bleeding or clotting: No  Previous INR: Supratherapeutic  Additional findings: None     PLAN     Recommended plan for temporary change(s) affecting INR     Dosing Instructions: Hold 2 doses then continue your current warfarin dose with next INR in 10 days       Summary  As of 9/29/2021    Full warfarin instructions:  9/29: Hold; 9/30: Hold; Otherwise 5 mg every Mon; 2.5 mg all other days   Next INR check:  10/4/2021             Telephone call with Latanya who verbalizes understanding and agrees to plan and who agrees to plan and repeated back plan correctly    Lab visit scheduled    Education provided: Please call back if any changes to your diet, medications or how you've been taking warfarin, Importance of consistent vitamin K intake, Goal range and significance of current result and Importance of therapeutic range    Plan made per [facility] anticoagulation protocol    Manolo Damian RN  Anticoagulation Clinic  9/29/2021    _______________________________________________________________________     Anticoagulation Episode Summary     Current INR goal:  2.0-3.0   TTR:  75.2 % (1 y)   Target end date:  Indefinite   Send INR reminders to:  [facility]    Indications    AF (paroxysmal atrial fibrillation) (H) [I48.0]  Atrial fibrillation (H) [I48.91]  Long term current use of anticoagulant therapy [Z79.01]  Atrial fibrillation  unspecified type (H) (Resolved) [I48.91]           Comments:  5 mg tabs, PM dose          Anticoagulation Care Providers     Provider Role Specialty Phone number    Mirza Glentona Bejarano MD Referring Family Medicine [phone]

## 2021-09-29 NOTE — PROGRESS NOTES
Latanya Padron has an upcoming lab appointment:    Future Appointments   Date Time Provider Department Center   9/29/2021  8:20 AM PH INR LAB [facility]   10/1/2021 11:15 AM Norberto Ramon, PT PHPT [facility]   10/4/2021  1:15 PM Nilam Jonas, PT PHPT [facility]   10/6/2021 10:15 AM SouNilam rubio, PT PHPT [facility]   10/11/2021 10:30 AM Souramiro, Nilam Allen, PT PHPT [facility]   10/13/2021 10:30 AM SouNilam rubio, PT PHPT [facility]   10/18/2021  1:15 PM Nilam Jonas, PT PHPT [facility]   10/20/2021 10:00 AM Norberto Ramon, PT PHPT [facility]   10/25/2021 10:30 AM Norberto Ramon, PT PHPT [facility]   10/27/2021 10:15 AM Nilam Jonas, PT PHPT [facility]   1/7/2022 11:00 AM Prisca Cortez MD [facility]   1/12/2022  8:30 AM PH LAB [facility]   1/12/2022  9:00 AM PH INFUSION NURSE BETITO FREITAS     Patient is scheduled for the following lab(s):     Patient either has no future order, or has Health Maintenance labs due. Please review and place either future orders or HMPO (Review of Health Maintenance Protocol Orders), as appropriate.    Health Maintenance Due   Topic     ANNUAL REVIEW OF HM ORDERS      MICROALBUMIN      Carito Koo

## 2021-10-03 RX ORDER — METHYLPREDNISOLONE SODIUM SUCCINATE 125 MG/2ML
125 INJECTION, POWDER, LYOPHILIZED, FOR SOLUTION INTRAMUSCULAR; INTRAVENOUS
Status: CANCELLED
Start: 2022-01-01

## 2021-10-03 RX ORDER — SODIUM CHLORIDE 9 MG/ML
1000 INJECTION, SOLUTION INTRAVENOUS CONTINUOUS PRN
Status: CANCELLED
Start: 2022-01-01

## 2021-10-03 RX ORDER — DIPHENHYDRAMINE HYDROCHLORIDE 50 MG/ML
50 INJECTION INTRAMUSCULAR; INTRAVENOUS
Status: CANCELLED
Start: 2022-01-01

## 2021-10-03 RX ORDER — ALBUTEROL SULFATE 0.83 MG/ML
2.5 SOLUTION RESPIRATORY (INHALATION)
Status: CANCELLED | OUTPATIENT
Start: 2022-01-01

## 2021-10-03 RX ORDER — NALOXONE HYDROCHLORIDE 0.4 MG/ML
.1-.4 INJECTION, SOLUTION INTRAMUSCULAR; INTRAVENOUS; SUBCUTANEOUS
Status: CANCELLED | OUTPATIENT
Start: 2022-01-01

## 2021-10-03 RX ORDER — MEPERIDINE HYDROCHLORIDE 25 MG/ML
25 INJECTION INTRAMUSCULAR; INTRAVENOUS; SUBCUTANEOUS EVERY 30 MIN PRN
Status: CANCELLED | OUTPATIENT
Start: 2022-01-01

## 2021-10-03 RX ORDER — EPINEPHRINE 1 MG/ML
0.3 INJECTION, SOLUTION INTRAMUSCULAR; SUBCUTANEOUS EVERY 5 MIN PRN
Status: CANCELLED | OUTPATIENT
Start: 2022-01-01

## 2021-10-03 RX ORDER — EPINEPHRINE 0.3 MG/.3ML
0.3 INJECTION SUBCUTANEOUS EVERY 5 MIN PRN
Status: CANCELLED | OUTPATIENT
Start: 2022-01-01

## 2021-10-03 RX ORDER — ALBUTEROL SULFATE 90 UG/1
1-2 AEROSOL, METERED RESPIRATORY (INHALATION)
Status: CANCELLED
Start: 2022-01-01

## 2021-10-04 ENCOUNTER — LAB (OUTPATIENT)
Dept: LAB | Facility: CLINIC | Age: 71
End: 2021-10-04
Payer: COMMERCIAL

## 2021-10-04 ENCOUNTER — ANTICOAGULATION THERAPY VISIT (OUTPATIENT)
Dept: ANTICOAGULATION | Facility: CLINIC | Age: 71
End: 2021-10-04

## 2021-10-04 ENCOUNTER — HOSPITAL ENCOUNTER (OUTPATIENT)
Dept: PHYSICAL THERAPY | Facility: CLINIC | Age: 71
Setting detail: THERAPIES SERIES
End: 2021-10-04
Attending: PHYSICIAN ASSISTANT
Payer: COMMERCIAL

## 2021-10-04 DIAGNOSIS — I48.91 ATRIAL FIBRILLATION, UNSPECIFIED TYPE (H): ICD-10-CM

## 2021-10-04 DIAGNOSIS — Z79.01 LONG TERM CURRENT USE OF ANTICOAGULANT THERAPY: ICD-10-CM

## 2021-10-04 DIAGNOSIS — I48.91 ATRIAL FIBRILLATION (H): ICD-10-CM

## 2021-10-04 DIAGNOSIS — I48.0 AF (PAROXYSMAL ATRIAL FIBRILLATION) (H): Primary | ICD-10-CM

## 2021-10-04 LAB — INR BLD: 2.3 (ref 0.9–1.1)

## 2021-10-04 PROCEDURE — 36416 COLLJ CAPILLARY BLOOD SPEC: CPT

## 2021-10-04 PROCEDURE — 97110 THERAPEUTIC EXERCISES: CPT | Mod: GP | Performed by: PHYSICAL THERAPIST

## 2021-10-04 PROCEDURE — 97140 MANUAL THERAPY 1/> REGIONS: CPT | Mod: GP | Performed by: PHYSICAL THERAPIST

## 2021-10-04 PROCEDURE — 85610 PROTHROMBIN TIME: CPT

## 2021-10-04 NOTE — PROGRESS NOTES
ANTICOAGULATION MANAGEMENT     Latanya Padron 71 year old female is on warfarin with therapeutic INR result. (Goal INR 2.0-3.0)    Recent labs: (last 7 days)     10/04/21  1415   INR 2.3*       ASSESSMENT     Source(s): Chart Review and Patient/Caregiver Call     Warfarin doses taken: Warfarin taken as instructed  Diet: No new diet changes identified  New illness, injury, or hospitalization: No  Medication/supplement changes: None noted  Signs or symptoms of bleeding or clotting: No  Previous INR: Supratherapeutic  Additional findings: None     PLAN     Recommended plan for no diet, medication or health factor changes affecting INR     Dosing Instructions: Continue your current warfarin dose with next INR in 2 weeks       Summary  As of 10/4/2021    Full warfarin instructions:  5 mg every Mon; 2.5 mg all other days   Next INR check:  10/18/2021             Telephone call with Latanya who verbalizes understanding and agrees to plan and who agrees to plan and repeated back plan correctly    Lab visit scheduled    Education provided: Please call back if any changes to your diet, medications or how you've been taking warfarin    Plan made per [facility] anticoagulation protocol    Manolo Damian, RN  Anticoagulation Clinic  10/4/2021    _______________________________________________________________________     Anticoagulation Episode Summary     Current INR goal:  2.0-3.0   TTR:  74.1 % (1 y)   Target end date:  Indefinite   Send INR reminders to:  [facility]    Indications    AF (paroxysmal atrial fibrillation) (H) [I48.0]  Atrial fibrillation (H) [I48.91]  Long term current use of anticoagulant therapy [Z79.01]  Atrial fibrillation  unspecified type (H) (Resolved) [I48.91]           Comments:  5 mg tabs, PM dose         Anticoagulation Care Providers     Provider Role Specialty Phone number    Glen Blue MD Referring Family Medicine [phone]

## 2021-10-15 DIAGNOSIS — I10 HYPERTENSION, GOAL BELOW 140/90: ICD-10-CM

## 2021-10-18 ENCOUNTER — LAB (OUTPATIENT)
Dept: LAB | Facility: CLINIC | Age: 71
End: 2021-10-18
Payer: COMMERCIAL

## 2021-10-18 ENCOUNTER — ANTICOAGULATION THERAPY VISIT (OUTPATIENT)
Dept: ANTICOAGULATION | Facility: CLINIC | Age: 71
End: 2021-10-18

## 2021-10-18 DIAGNOSIS — I48.91 ATRIAL FIBRILLATION, UNSPECIFIED TYPE (H): ICD-10-CM

## 2021-10-18 DIAGNOSIS — I48.91 ATRIAL FIBRILLATION (H): ICD-10-CM

## 2021-10-18 DIAGNOSIS — Z79.01 LONG TERM CURRENT USE OF ANTICOAGULANT THERAPY: ICD-10-CM

## 2021-10-18 DIAGNOSIS — I48.0 AF (PAROXYSMAL ATRIAL FIBRILLATION) (H): Primary | ICD-10-CM

## 2021-10-18 LAB — INR BLD: 3.4 (ref 0.9–1.1)

## 2021-10-18 PROCEDURE — 85610 PROTHROMBIN TIME: CPT

## 2021-10-18 PROCEDURE — 36416 COLLJ CAPILLARY BLOOD SPEC: CPT

## 2021-10-18 NOTE — PROGRESS NOTES
ANTICOAGULATION MANAGEMENT     Latanya Padron 71 year old female is on warfarin with supratherapeutic INR result. (Goal INR 2.0-3.0)    Recent labs: (last 7 days)     10/18/21  1405   INR 3.4*       ASSESSMENT     Source(s): Chart Review     Warfarin doses taken: Reviewed in chart  Diet: LM  New illness, injury, or hospitalization: Yes: Pt has been in for back issues lately  Medication/supplement changes: None noted  Signs or symptoms of bleeding or clotting: No  Previous INR: Therapeutic last visit; previously outside of goal range  Additional findings: None     PLAN     Recommended plan for temporary change(s) affecting INR?     Dosing Instructions:  Decrease your warfarin dose (6.2% change) with next INR in 2 weeks       Summary  As of 10/18/2021    Full warfarin instructions:  3.75 mg every Mon; 2.5 mg all other days   Next INR check:  11/1/2021             Detailed voice message left for Latanya with dosing instructions and follow up date.     Contact [phone]  to schedule and with any changes, questions or concerns.     Education provided: Please call back if any changes to your diet, medications or how you've been taking warfarin    Plan made per [facility] anticoagulation protocol    Zoey Pickard RN  Anticoagulation Clinic  10/18/2021    _______________________________________________________________________     Anticoagulation Episode Summary     Current INR goal:  2.0-3.0   TTR:  72.7 % (1 y)   Target end date:  Indefinite   Send INR reminders to:  [facility]    Indications    AF (paroxysmal atrial fibrillation) (H) [I48.0]  Atrial fibrillation (H) [I48.91]  Long term current use of anticoagulant therapy [Z79.01]  Atrial fibrillation  unspecified type (H) (Resolved) [I48.91]           Comments:  5 mg tabs, PM dose         Anticoagulation Care Providers     Provider Role Specialty Phone number    Glen Blue MD Referring Family Medicine [phone]

## 2021-10-19 NOTE — TELEPHONE ENCOUNTER
Routing refill request to provider for review/approval because:  Labs out of range:  Creatinine    Iliana Garrett RN

## 2021-10-21 RX ORDER — HYDROCHLOROTHIAZIDE 25 MG/1
TABLET ORAL
Qty: 90 TABLET | Refills: 3 | Status: SHIPPED | OUTPATIENT
Start: 2021-10-21 | End: 2022-03-30

## 2021-10-22 ENCOUNTER — TELEPHONE (OUTPATIENT)
Dept: ANTICOAGULATION | Facility: CLINIC | Age: 71
End: 2021-10-22

## 2021-10-22 DIAGNOSIS — Z79.01 LONG TERM CURRENT USE OF ANTICOAGULANT THERAPY: ICD-10-CM

## 2021-10-22 DIAGNOSIS — I48.0 AF (PAROXYSMAL ATRIAL FIBRILLATION) (H): Primary | ICD-10-CM

## 2021-10-22 NOTE — TELEPHONE ENCOUNTER
ANTICOAGULATION MANAGEMENT      Latanya Padron due for annual renewal of referral to anticoagulation monitoring. Order pended for your review and signature.      ANTICOAGULATION SUMMARY      Warfarin indication(s)     Atrial fibrillation    Heart valve present?  NO       Current goal range   INR: 2.0-3.0     Goal appropriate for indication? Yes, INR 2-3 appropriate for hx of DVT, PE, hypercoagulable state, Afib, LVAD, or bileaflet AVR without risk factors     Current duration of therapy Indefinite/long term therapy   Time in Therapeutic Range (TTR)  (Goal > 60%) 72.7%       Office visit with referring provider's group within last year yes on 3/1/21       Francine Andrew RN

## 2021-10-23 ENCOUNTER — HEALTH MAINTENANCE LETTER (OUTPATIENT)
Age: 71
End: 2021-10-23

## 2021-10-29 DIAGNOSIS — E78.5 HYPERLIPIDEMIA LDL GOAL <130: ICD-10-CM

## 2021-10-29 RX ORDER — ATORVASTATIN CALCIUM 10 MG/1
TABLET, FILM COATED ORAL
Qty: 90 TABLET | Refills: 1 | Status: SHIPPED | OUTPATIENT
Start: 2021-10-29 | End: 2022-03-30

## 2021-11-02 ENCOUNTER — ANTICOAGULATION THERAPY VISIT (OUTPATIENT)
Dept: ANTICOAGULATION | Facility: CLINIC | Age: 71
End: 2021-11-02

## 2021-11-02 ENCOUNTER — LAB (OUTPATIENT)
Dept: LAB | Facility: CLINIC | Age: 71
End: 2021-11-02
Payer: COMMERCIAL

## 2021-11-02 DIAGNOSIS — I48.91 ATRIAL FIBRILLATION (H): ICD-10-CM

## 2021-11-02 DIAGNOSIS — I48.0 AF (PAROXYSMAL ATRIAL FIBRILLATION) (H): Primary | ICD-10-CM

## 2021-11-02 DIAGNOSIS — Z79.01 LONG TERM CURRENT USE OF ANTICOAGULANT THERAPY: ICD-10-CM

## 2021-11-02 DIAGNOSIS — I48.91 ATRIAL FIBRILLATION, UNSPECIFIED TYPE (H): ICD-10-CM

## 2021-11-02 LAB — INR BLD: 3.1 (ref 0.9–1.1)

## 2021-11-02 PROCEDURE — 36416 COLLJ CAPILLARY BLOOD SPEC: CPT

## 2021-11-02 PROCEDURE — 85610 PROTHROMBIN TIME: CPT

## 2021-11-02 NOTE — PROGRESS NOTES
ANTICOAGULATION MANAGEMENT     Latanya Padron 71 year old female is on warfarin with supratherapeutic INR result. (Goal INR 2.0-3.0)    Recent labs: (last 7 days)     11/02/21  1129   INR 3.1*       ASSESSMENT     Source(s): Chart Review and Patient/Caregiver Call     Warfarin doses taken: Warfarin taken as instructed  Diet: No new diet changes identified  New illness, injury, or hospitalization: No  Medication/supplement changes: None noted  Signs or symptoms of bleeding or clotting: No  Previous INR: Supratherapeutic  Additional findings: None     PLAN     Recommended plan for no diet, medication or health factor changes affecting INR     Dosing Instructions:  Decrease your warfarin dose (6.7% change) with next INR in 2 weeks       Summary  As of 11/2/2021    Full warfarin instructions:  2.5 mg every day   Next INR check:  11/16/2021             Telephone call with Latanya who verbalizes understanding and agrees to plan    Lab visit scheduled    Education provided: None required    Plan made per [facility] anticoagulation protocol    Zoey Pickard RN  Anticoagulation Clinic  11/2/2021    _______________________________________________________________________     Anticoagulation Episode Summary     Current INR goal:  2.0-3.0   TTR:  68.7 % (1 y)   Target end date:  Indefinite   Send INR reminders to:  [facility]    Indications    AF (paroxysmal atrial fibrillation) (H) [I48.0]  Atrial fibrillation (H) [I48.91]  Long term current use of anticoagulant therapy [Z79.01]  Atrial fibrillation  unspecified type (H) (Resolved) [I48.91]           Comments:  5 mg tabs, PM dose         Anticoagulation Care Providers     Provider Role Specialty Phone number    Glen Blue MD Referring Family Medicine [phone]    Nazario Jones MD Referring Family Medicine [phone]

## 2021-11-11 DIAGNOSIS — I48.0 AF (PAROXYSMAL ATRIAL FIBRILLATION) (H): ICD-10-CM

## 2021-11-11 DIAGNOSIS — I48.91 ATRIAL FIBRILLATION, UNSPECIFIED TYPE (H): ICD-10-CM

## 2021-11-12 RX ORDER — WARFARIN SODIUM 2.5 MG/1
TABLET ORAL
Qty: 100 TABLET | Refills: 0 | Status: SHIPPED | OUTPATIENT
Start: 2021-11-12 | End: 2022-02-02

## 2021-11-12 NOTE — TELEPHONE ENCOUNTER
Rx approved per [facility] protocol.    Domi Encinas RN    [facility]ticoagulation [facility]

## 2021-11-16 ENCOUNTER — ANTICOAGULATION THERAPY VISIT (OUTPATIENT)
Dept: ANTICOAGULATION | Facility: CLINIC | Age: 71
End: 2021-11-16

## 2021-11-16 ENCOUNTER — LAB (OUTPATIENT)
Dept: LAB | Facility: CLINIC | Age: 71
End: 2021-11-16
Payer: COMMERCIAL

## 2021-11-16 DIAGNOSIS — Z79.01 LONG TERM CURRENT USE OF ANTICOAGULANT THERAPY: ICD-10-CM

## 2021-11-16 DIAGNOSIS — I48.0 AF (PAROXYSMAL ATRIAL FIBRILLATION) (H): Primary | ICD-10-CM

## 2021-11-16 DIAGNOSIS — I48.91 ATRIAL FIBRILLATION (H): ICD-10-CM

## 2021-11-16 DIAGNOSIS — I48.0 AF (PAROXYSMAL ATRIAL FIBRILLATION) (H): ICD-10-CM

## 2021-11-16 LAB — INR BLD: 2.4 (ref 0.9–1.1)

## 2021-11-16 PROCEDURE — 36416 COLLJ CAPILLARY BLOOD SPEC: CPT

## 2021-11-16 PROCEDURE — 85610 PROTHROMBIN TIME: CPT

## 2021-11-16 NOTE — PROGRESS NOTES
ANTICOAGULATION MANAGEMENT     Latanya Padron 71 year old female is on warfarin with therapeutic INR result. (Goal INR 2.0-3.0)    Recent labs: (last 7 days)     11/16/21  0829   INR 2.4*       ASSESSMENT     Source(s): Chart Review and Patient/Caregiver Call     Warfarin doses taken: Warfarin taken as instructed  Diet: No new diet changes identified  New illness, injury, or hospitalization: No  Medication/supplement changes: None noted  Signs or symptoms of bleeding or clotting: No  Previous INR: Supratherapeutic  Additional findings: None     PLAN     Recommended plan for no diet, medication or health factor changes affecting INR     Dosing Instructions: Continue your current warfarin dose with next INR in 3 weeks       Summary  As of 11/16/2021    Full warfarin instructions:  2.5 mg every day   Next INR check:  12/7/2021             Telephone call with Latanya who verbalizes understanding and agrees to plan    Lab visit scheduled    Education provided: None required    Plan made per [facility] anticoagulation protocol    Zoey Pickard RN  Anticoagulation Clinic  11/16/2021    _______________________________________________________________________     Anticoagulation Episode Summary     Current INR goal:  2.0-3.0   TTR:  68.1 % (1 y)   Target end date:  Indefinite   [facility]    Indications    AF (paroxysmal atrial fibrillation) (H) [I48.0]  Atrial fibrillation (H) [I48.91]  Long term current use of anticoagulant therapy [Z79.01]  Atrial fibrillation  unspecified type (H) (Resolved) [I48.91]           Comments:           Anticoagulation Care Providers     Provider Role Specialty Phone number    Nazario Jones MD Referring Family Medicine [phone]

## 2021-12-07 ENCOUNTER — LAB (OUTPATIENT)
Dept: LAB | Facility: CLINIC | Age: 71
End: 2021-12-07
Payer: COMMERCIAL

## 2021-12-07 ENCOUNTER — ANTICOAGULATION THERAPY VISIT (OUTPATIENT)
Dept: ANTICOAGULATION | Facility: CLINIC | Age: 71
End: 2021-12-07

## 2021-12-07 DIAGNOSIS — I48.91 ATRIAL FIBRILLATION (H): ICD-10-CM

## 2021-12-07 DIAGNOSIS — Z79.01 LONG TERM CURRENT USE OF ANTICOAGULANT THERAPY: ICD-10-CM

## 2021-12-07 DIAGNOSIS — I48.0 AF (PAROXYSMAL ATRIAL FIBRILLATION) (H): ICD-10-CM

## 2021-12-07 DIAGNOSIS — I48.0 AF (PAROXYSMAL ATRIAL FIBRILLATION) (H): Primary | ICD-10-CM

## 2021-12-07 LAB — INR BLD: 2.5 (ref 0.9–1.1)

## 2021-12-07 PROCEDURE — 36416 COLLJ CAPILLARY BLOOD SPEC: CPT

## 2021-12-07 PROCEDURE — 85610 PROTHROMBIN TIME: CPT

## 2021-12-07 NOTE — PROGRESS NOTES
ANTICOAGULATION MANAGEMENT     Latanya Padron 71 year old female is on warfarin with therapeutic INR result. (Goal INR 2.0-3.0)    Recent labs: (last 7 days)     12/07/21  1115   INR 2.5*       ASSESSMENT     Source(s): Chart Review     Warfarin doses taken: Reviewed in chart  Diet: LM  New illness, injury, or hospitalization: No  Medication/supplement changes: None noted  Signs or symptoms of bleeding or clotting: No  Previous INR: Therapeutic last visit; previously outside of goal range  Additional findings: None     PLAN     Recommended plan for no diet, medication or health factor changes affecting INR     Dosing Instructions: Continue your current warfarin dose with next INR in 4 weeks       Summary  As of 12/7/2021    Full warfarin instructions:  2.5 mg every day   Next INR check:  1/4/2022             Detailed voice message left for Latanya with dosing instructions and follow up date.     Contact [phone]  to schedule and with any changes, questions or concerns.     Education provided: Please call back if any changes to your diet, medications or how you've been taking warfarin    Plan made per [facility] anticoagulation protocol    Zoey Pickard RN  Anticoagulation Clinic  12/7/2021    _______________________________________________________________________     Anticoagulation Episode Summary     Current INR goal:  2.0-3.0   TTR:  68.1 % (1 y)   Target end date:  Indefinite   [facility]    Indications    AF (paroxysmal atrial fibrillation) (H) [I48.0]  Atrial fibrillation (H) [I48.91]  Long term current use of anticoagulant therapy [Z79.01]  Atrial fibrillation  unspecified type (H) (Resolved) [I48.91]           Comments:           Anticoagulation Care Providers     Provider Role Specialty Phone number    Nazario Jones MD Referring Family Medicine [phone]

## 2021-12-14 NOTE — PROGRESS NOTES
[facility]    Outpatient Physical Therapy Discharge Note  Patient: Latanya Padron  : 1950    Beginning/End Dates of Reporting Period:  21 to 10/4/21    Referring Provider: Nazario Jones MD    Therapy Diagnosis: Mechanical Low back pain      Client Self Report: Pt. reports she missed her last session due to her cousin passing away. Left side is about 5/10 pain currently. No pain on the right currently. HEP has been going well.          Goals:  Goal Identifier SILVINA   Goal Description The patient will score 10% or less on SILVINA to show a 20% decrease in disability from low back pain   Target Date 21   Date Met      Progress (detail required for progress note):       Goal Identifier Sitting and sit to stands   Goal Description The patient will be able sit in her couch for 1 hour with no increase in pain and be able to stand up from her couch without an increase in pain to be able enjoy leisure activites in her home.    Target Date 21   Date Met      Progress (detail required for progress note):       Goal Identifier HEP   Goal Description Pt will be independent with HEP in order to improve ROM, and strength and decrease pain.    Target Date 21   Date Met      Progress (detail required for progress note):                 Plan:  Discharge from therapy.    Discharge:    Reason for Discharge: Patient chooses to discontinue therapy.    Equipment Issued: none    Discharge Plan: Patient to continue home program.

## 2021-12-21 ENCOUNTER — IMMUNIZATION (OUTPATIENT)
Dept: FAMILY MEDICINE | Facility: CLINIC | Age: 71
End: 2021-12-21
Payer: COMMERCIAL

## 2021-12-21 PROCEDURE — G0008 ADMIN INFLUENZA VIRUS VAC: HCPCS

## 2021-12-21 PROCEDURE — 90662 IIV NO PRSV INCREASED AG IM: CPT

## 2022-01-01 ENCOUNTER — LAB (OUTPATIENT)
Dept: LAB | Facility: CLINIC | Age: 72
End: 2022-01-01
Payer: COMMERCIAL

## 2022-01-01 ENCOUNTER — ANTICOAGULATION THERAPY VISIT (OUTPATIENT)
Dept: ANTICOAGULATION | Facility: CLINIC | Age: 72
End: 2022-01-01

## 2022-01-01 ENCOUNTER — MYC MEDICAL ADVICE (OUTPATIENT)
Dept: FAMILY MEDICINE | Facility: CLINIC | Age: 72
End: 2022-01-01

## 2022-01-01 ENCOUNTER — TELEPHONE (OUTPATIENT)
Dept: ANTICOAGULATION | Facility: CLINIC | Age: 72
End: 2022-01-01

## 2022-01-01 ENCOUNTER — HEALTH MAINTENANCE LETTER (OUTPATIENT)
Age: 72
End: 2022-01-01

## 2022-01-01 DIAGNOSIS — I48.0 AF (PAROXYSMAL ATRIAL FIBRILLATION) (H): Primary | ICD-10-CM

## 2022-01-01 DIAGNOSIS — Z79.01 LONG TERM CURRENT USE OF ANTICOAGULANT THERAPY: ICD-10-CM

## 2022-01-01 DIAGNOSIS — J43.9 PULMONARY EMPHYSEMA, UNSPECIFIED EMPHYSEMA TYPE (H): ICD-10-CM

## 2022-01-01 DIAGNOSIS — I48.0 AF (PAROXYSMAL ATRIAL FIBRILLATION) (H): ICD-10-CM

## 2022-01-01 DIAGNOSIS — I48.91 ATRIAL FIBRILLATION, UNSPECIFIED TYPE (H): ICD-10-CM

## 2022-01-01 DIAGNOSIS — I48.91 ATRIAL FIBRILLATION (H): ICD-10-CM

## 2022-01-01 LAB
INR BLD: 1.8 (ref 0.9–1.1)
INR BLD: 2.3 (ref 0.9–1.1)
INR BLD: 2.5 (ref 0.9–1.1)
INR BLD: 2.5 (ref 0.9–1.1)
INR BLD: 2.7 (ref 0.9–1.1)
INR BLD: 2.7 (ref 0.9–1.1)
INR BLD: 3.3 (ref 0.9–1.1)

## 2022-01-01 PROCEDURE — 36415 COLL VENOUS BLD VENIPUNCTURE: CPT

## 2022-01-01 PROCEDURE — 36416 COLLJ CAPILLARY BLOOD SPEC: CPT

## 2022-01-01 PROCEDURE — 85610 PROTHROMBIN TIME: CPT

## 2022-01-01 RX ORDER — ALBUTEROL SULFATE 90 UG/1
AEROSOL, METERED RESPIRATORY (INHALATION)
Qty: 18 G | Refills: 11 | Status: ON HOLD | OUTPATIENT
Start: 2022-01-01 | End: 2023-01-01

## 2022-01-01 RX ORDER — MOMETASONE FUROATE AND FORMOTEROL FUMARATE DIHYDRATE 200; 5 UG/1; UG/1
AEROSOL RESPIRATORY (INHALATION)
Qty: 13 G | Refills: 11 | Status: ON HOLD | OUTPATIENT
Start: 2022-01-01 | End: 2023-01-01

## 2022-01-01 RX ORDER — WARFARIN SODIUM 2.5 MG/1
TABLET ORAL
Qty: 100 TABLET | Refills: 3 | Status: ON HOLD | OUTPATIENT
Start: 2022-01-01 | End: 2023-01-01

## 2022-01-04 ENCOUNTER — ANTICOAGULATION THERAPY VISIT (OUTPATIENT)
Dept: ANTICOAGULATION | Facility: CLINIC | Age: 72
End: 2022-01-04

## 2022-01-04 ENCOUNTER — LAB (OUTPATIENT)
Dept: LAB | Facility: CLINIC | Age: 72
End: 2022-01-04
Payer: COMMERCIAL

## 2022-01-04 DIAGNOSIS — I48.0 AF (PAROXYSMAL ATRIAL FIBRILLATION) (H): ICD-10-CM

## 2022-01-04 DIAGNOSIS — I48.91 ATRIAL FIBRILLATION (H): ICD-10-CM

## 2022-01-04 DIAGNOSIS — I48.0 AF (PAROXYSMAL ATRIAL FIBRILLATION) (H): Primary | ICD-10-CM

## 2022-01-04 DIAGNOSIS — Z79.01 LONG TERM CURRENT USE OF ANTICOAGULANT THERAPY: ICD-10-CM

## 2022-01-04 LAB — INR BLD: 2.9 (ref 0.9–1.1)

## 2022-01-04 PROCEDURE — 36416 COLLJ CAPILLARY BLOOD SPEC: CPT

## 2022-01-04 PROCEDURE — 85610 PROTHROMBIN TIME: CPT

## 2022-01-04 NOTE — PROGRESS NOTES
ANTICOAGULATION MANAGEMENT     Latanya Padron 71 year old female is on warfarin with therapeutic INR result. (Goal INR 2.0-3.0)    Recent labs: (last 7 days)     01/04/22  0952   INR 2.9*       ASSESSMENT     Source(s): Chart Review and Patient/Caregiver Call     Warfarin doses taken: Warfarin taken as instructed  Diet: No new diet changes identified  New illness, injury, or hospitalization: No  Medication/supplement changes: None noted  Signs or symptoms of bleeding or clotting: No  Previous INR: Therapeutic last 2(+) visits  Additional findings: None     PLAN     Recommended plan for no diet, medication or health factor changes affecting INR     Dosing Instructions: Continue your current warfarin dose with next INR in 5 weeks       Summary  As of 1/4/2022    Full warfarin instructions:  2.5 mg every day   Next INR check:  2/8/2022             Telephone call with Latanya who verbalizes understanding and agrees to plan    Lab visit scheduled    Education provided: None required    Plan made per [facility] anticoagulation protocol    Zoey Pickard RN  Anticoagulation Clinic  1/4/2022    _______________________________________________________________________     Anticoagulation Episode Summary     Current INR goal:  2.0-3.0   TTR:  68.1 % (1 y)   Target end date:  Indefinite   [facility]    Indications    AF (paroxysmal atrial fibrillation) (H) [I48.0]  Atrial fibrillation (H) [I48.91]  Long term current use of anticoagulant therapy [Z79.01]  Atrial fibrillation  unspecified type (H) (Resolved) [I48.91]           Comments:           Anticoagulation Care Providers     Provider Role Specialty Phone number    Nazario Jones MD Referring Family Medicine [phone]

## 2022-01-07 ENCOUNTER — ONCOLOGY VISIT (OUTPATIENT)
Dept: ONCOLOGY | Facility: CLINIC | Age: 72
End: 2022-01-07
Payer: COMMERCIAL

## 2022-01-07 VITALS
BODY MASS INDEX: 36.5 KG/M2 | DIASTOLIC BLOOD PRESSURE: 74 MMHG | SYSTOLIC BLOOD PRESSURE: 139 MMHG | WEIGHT: 206 LBS | OXYGEN SATURATION: 94 % | HEIGHT: 63 IN | HEART RATE: 95 BPM

## 2022-01-07 DIAGNOSIS — Z12.31 VISIT FOR SCREENING MAMMOGRAM: ICD-10-CM

## 2022-01-07 DIAGNOSIS — M81.0 AGE-RELATED OSTEOPOROSIS WITHOUT CURRENT PATHOLOGICAL FRACTURE: ICD-10-CM

## 2022-01-07 DIAGNOSIS — I10 BENIGN ESSENTIAL HYPERTENSION: ICD-10-CM

## 2022-01-07 DIAGNOSIS — I48.0 AF (PAROXYSMAL ATRIAL FIBRILLATION) (H): ICD-10-CM

## 2022-01-07 DIAGNOSIS — C50.812 MALIGNANT NEOPLASM OF OVERLAPPING SITES OF LEFT BREAST IN FEMALE, ESTROGEN RECEPTOR POSITIVE (H): Primary | ICD-10-CM

## 2022-01-07 DIAGNOSIS — R92.8 ABNORMAL MAMMOGRAM: ICD-10-CM

## 2022-01-07 DIAGNOSIS — R93.89 IMAGING ABNORMALITY: ICD-10-CM

## 2022-01-07 DIAGNOSIS — Z17.0 MALIGNANT NEOPLASM OF OVERLAPPING SITES OF LEFT BREAST IN FEMALE, ESTROGEN RECEPTOR POSITIVE (H): Primary | ICD-10-CM

## 2022-01-07 DIAGNOSIS — I48.20 CHRONIC ATRIAL FIBRILLATION (H): ICD-10-CM

## 2022-01-07 DIAGNOSIS — N18.31 STAGE 3A CHRONIC KIDNEY DISEASE (H): ICD-10-CM

## 2022-01-07 DIAGNOSIS — Z80.3 FHX: BREAST CANCER: ICD-10-CM

## 2022-01-07 LAB
ALBUMIN SERPL-MCNC: 3.7 G/DL (ref 3.4–5)
ALP SERPL-CCNC: 56 U/L (ref 40–150)
ALT SERPL W P-5'-P-CCNC: 19 U/L (ref 0–50)
ANION GAP SERPL CALCULATED.3IONS-SCNC: 4 MMOL/L (ref 3–14)
AST SERPL W P-5'-P-CCNC: 13 U/L (ref 0–45)
BASOPHILS # BLD AUTO: 0.1 10E3/UL (ref 0–0.2)
BASOPHILS NFR BLD AUTO: 1 %
BILIRUB SERPL-MCNC: 0.4 MG/DL (ref 0.2–1.3)
BUN SERPL-MCNC: 23 MG/DL (ref 7–30)
CALCIUM SERPL-MCNC: 9.9 MG/DL (ref 8.5–10.1)
CHLORIDE BLD-SCNC: 102 MMOL/L (ref 94–109)
CO2 SERPL-SCNC: 33 MMOL/L (ref 20–32)
CREAT SERPL-MCNC: 1.07 MG/DL (ref 0.52–1.04)
EOSINOPHIL # BLD AUTO: 0.3 10E3/UL (ref 0–0.7)
EOSINOPHIL NFR BLD AUTO: 4 %
ERYTHROCYTE [DISTWIDTH] IN BLOOD BY AUTOMATED COUNT: 12.6 % (ref 10–15)
GFR SERPL CREATININE-BSD FRML MDRD: 56 ML/MIN/1.73M2
GLUCOSE BLD-MCNC: 110 MG/DL (ref 70–99)
HCT VFR BLD AUTO: 40.3 % (ref 35–47)
HGB BLD-MCNC: 12.8 G/DL (ref 11.7–15.7)
IMM GRANULOCYTES # BLD: 0 10E3/UL
IMM GRANULOCYTES NFR BLD: 0 %
LYMPHOCYTES # BLD AUTO: 1.2 10E3/UL (ref 0.8–5.3)
LYMPHOCYTES NFR BLD AUTO: 15 %
MCH RBC QN AUTO: 28.1 PG (ref 26.5–33)
MCHC RBC AUTO-ENTMCNC: 31.8 G/DL (ref 31.5–36.5)
MCV RBC AUTO: 89 FL (ref 78–100)
MONOCYTES # BLD AUTO: 0.6 10E3/UL (ref 0–1.3)
MONOCYTES NFR BLD AUTO: 8 %
NEUTROPHILS # BLD AUTO: 5.7 10E3/UL (ref 1.6–8.3)
NEUTROPHILS NFR BLD AUTO: 72 %
NRBC # BLD AUTO: 0 10E3/UL
NRBC BLD AUTO-RTO: 0 /100
PLATELET # BLD AUTO: 336 10E3/UL (ref 150–450)
POTASSIUM BLD-SCNC: 4 MMOL/L (ref 3.4–5.3)
PROT SERPL-MCNC: 7.5 G/DL (ref 6.8–8.8)
RBC # BLD AUTO: 4.55 10E6/UL (ref 3.8–5.2)
SODIUM SERPL-SCNC: 139 MMOL/L (ref 133–144)
WBC # BLD AUTO: 7.9 10E3/UL (ref 4–11)

## 2022-01-07 PROCEDURE — 96372 THER/PROPH/DIAG INJ SC/IM: CPT

## 2022-01-07 PROCEDURE — 36415 COLL VENOUS BLD VENIPUNCTURE: CPT | Performed by: INTERNAL MEDICINE

## 2022-01-07 PROCEDURE — 99214 OFFICE O/P EST MOD 30 MIN: CPT | Mod: 25 | Performed by: INTERNAL MEDICINE

## 2022-01-07 PROCEDURE — 80053 COMPREHEN METABOLIC PANEL: CPT | Performed by: INTERNAL MEDICINE

## 2022-01-07 PROCEDURE — 85025 COMPLETE CBC W/AUTO DIFF WBC: CPT | Performed by: INTERNAL MEDICINE

## 2022-01-07 RX ORDER — DIPHENHYDRAMINE HYDROCHLORIDE 50 MG/ML
50 INJECTION INTRAMUSCULAR; INTRAVENOUS
Status: CANCELLED
Start: 2022-06-30

## 2022-01-07 RX ORDER — ALBUTEROL SULFATE 0.83 MG/ML
2.5 SOLUTION RESPIRATORY (INHALATION)
Status: CANCELLED | OUTPATIENT
Start: 2022-06-30

## 2022-01-07 RX ORDER — SODIUM CHLORIDE 9 MG/ML
1000 INJECTION, SOLUTION INTRAVENOUS CONTINUOUS PRN
Status: CANCELLED
Start: 2022-06-30

## 2022-01-07 RX ORDER — MEPERIDINE HYDROCHLORIDE 25 MG/ML
25 INJECTION INTRAMUSCULAR; INTRAVENOUS; SUBCUTANEOUS EVERY 30 MIN PRN
Status: CANCELLED | OUTPATIENT
Start: 2022-06-30

## 2022-01-07 RX ORDER — ANASTROZOLE 1 MG/1
1 TABLET ORAL DAILY
Qty: 90 TABLET | Refills: 3 | Status: SHIPPED | OUTPATIENT
Start: 2022-01-07 | End: 2023-01-01

## 2022-01-07 RX ORDER — ALBUTEROL SULFATE 90 UG/1
1-2 AEROSOL, METERED RESPIRATORY (INHALATION)
Status: CANCELLED
Start: 2022-06-30

## 2022-01-07 RX ORDER — EPINEPHRINE 1 MG/ML
0.3 INJECTION, SOLUTION INTRAMUSCULAR; SUBCUTANEOUS EVERY 5 MIN PRN
Status: CANCELLED | OUTPATIENT
Start: 2022-06-30

## 2022-01-07 RX ORDER — NALOXONE HYDROCHLORIDE 0.4 MG/ML
.1-.4 INJECTION, SOLUTION INTRAMUSCULAR; INTRAVENOUS; SUBCUTANEOUS
Status: CANCELLED | OUTPATIENT
Start: 2022-06-30

## 2022-01-07 RX ORDER — METHYLPREDNISOLONE SODIUM SUCCINATE 125 MG/2ML
125 INJECTION, POWDER, LYOPHILIZED, FOR SOLUTION INTRAMUSCULAR; INTRAVENOUS
Status: CANCELLED
Start: 2022-06-30

## 2022-01-07 RX ORDER — EPINEPHRINE 0.3 MG/.3ML
0.3 INJECTION SUBCUTANEOUS EVERY 5 MIN PRN
Status: CANCELLED | OUTPATIENT
Start: 2022-06-30

## 2022-01-07 ASSESSMENT — MIFFLIN-ST. JEOR: SCORE: 1415.6

## 2022-01-07 ASSESSMENT — PAIN SCALES - GENERAL: PAINLEVEL: NO PAIN (0)

## 2022-01-07 NOTE — PROGRESS NOTES
Oncology Follow-up visit:  Date on this visit: Jan 7, 2022    PCP: Glen Blue     Diagnosis: Invasive lobular carcinoma left breast    Oncologic History:  1. Invasive lobular carcinoma left breast:  09/2018 Screening mammogram showed an area of architectural distortion the left breast.Ultrasound breast did not show any sonographic abnormality and recommendation was for a 6 month follow-up mammogram    04/2019 Mammogram and ultrasound showed a heterogenous mass in the left breast measuring 1.1 x 0.5 x 0.5 cm.  Biopsy of the mass came back showing invasive lobular carcinoma, Grade 2, ER/MS positive and HER-2/tulio negative by FISH. MRI breast bilateral showed 2 cm mass like enhancement in the left breast corresponding to the biopsy-proven carcinoma with no evidence of multifocal disease or axillary lymphadenopathy.     05/15/19 Lumpectomy with SNL dissection. Oncotype DX Recurrence score came back at 15 putting patient in the low-risk category and so did not recommend adjuvant systemic chemotherapy   Oncotype Score 15 with 4% risk of distant recurrence with hormonal therapy alone and <1% benefit from chemotherapy.     07/18/2019 Completed Adjuvant RT  July 2019- started Arimidex.  She transferred her care to me in July 2020, and was previously a patient of Dr. Hawkins and Dr. Denis.  2.  Bone health-DEXA scan in June 2019 showed findings c/w osteoporosis as below:  The right hip T-score is -2.7. The left hip T-score is -2.4.    She has started on Prolia q 6 months.    3. FHx of breast cancer- mother had breast cancer at 70+ years old. Sister had breast breast cancer when she was in her 40s. She had colon, stomach, pancreatic cancer as well.  Maternal cousin also has lobular breast cancer at 60. Maternal uncle also had colon cancer metastatic to the lungs. Brother had prostate cancer. She has one daughter and reports daughter has undergone genetic testing but she is not sure of the details.   Patient met with  genetic counselor on July 22, 2020 and 40 gene comprehensive cancer panel was drawn, but was not covered by her insurance and patient decided not to proceed.    History Of Present Illness:  71-year-old female with past medical history significant for Atrial fibrillation, hypertension, who presents for follow-up of pT2N0, ER/TN positive and HER-2/tulio negative by FISH invasive lobular carcinoma of left breast, s/p L lumpectomy and axillary SLN dissection in 05/2019.  She has completed adjuvant radiation on 7/18/2019 and she also started Arimidex in July 2019. . She is taking calcium and vitamin D regularly.   b/l diagnostic mammogram and L breast US performed on 5/20/2021 for evaluation of left upper inner L breast pain showed:  No suspicious lesions on mammogram b/l.   Targeted ultrasound of the upper inner left breast at site of pain  demonstrates a fluid collection with some solid rounded tissue. This  likely represents normal adipose tissues within a seroma in the left  breast. This corresponded with the site of patient's pain.  She had a follow-up left breast ultrasound on September 1, 2021 which showed findings consistent with a focal area of fat necrosis with fat lobules in an area of fluid. This could also represent seroma  with internal fat lobules.  3 months follow-up left breast ultrasound and diagnostic mammogram recommended.  Patient is scheduled for January 12.  She also underwent a DEXA scan on September 1, 2021 which showed findings consistent with osteopenia left femoral neck, slightly improved and mild osteoporosis in the right femoral neck, minimally improved.  There was mild osteopenia within L2, slightly improved.  She has been undergoing annual low dose CT chest with Dr. Jones. CT chest in July 2021 demonstrated Micronodular infiltrative  process in the posterior aspect of the right lower lung lobe appears  slightly more prominent than on the 7/23/2020 and likely represents  inflammatory or  infectious infiltrate. Neoplastic nodularity is not  considered likely. No other significant nodules.    There were moderate to severe CACs on CT chest.  She was seen by Dr. Bernardo from cardiology in July 2020.  She was recommended to continue on statin therapy and continue her antihypertensive regimen.  She continues on warfarin and diltiazem for atrial fibrillation.  She has been tolerating anastrozole well.    She is on gabapentin for restless leg syndrome.  She is on venlafaxine which controls her symptoms of depression.  She is pain-free today.  She has COPD and has dyspnea on exertion, stable.      Past Medical/Surgical History:  Past Medical History:   Diagnosis Date     Breast cancer (H) 04/12/2019    Left Breast     COPD (chronic obstructive pulmonary disease) (H)      Mixed hyperlipidemia      Neck mass 6/20/2018     PONV (postoperative nausea and vomiting)      S/P radiation therapy     5,256 cGy to left breast completed on 7/18/2019 - [facility]     Past Surgical History:   Procedure Laterality Date     BIOPSY NODE SENTINEL Left 5/15/2019    Procedure: left sentinel node biopsy;  Surgeon: Donald Aleman MD;  Location: [facility] OR     BREAST BIOPSY, CORE RT/LT Left 04/12/2019     COLONOSCOPY  06/21/10     COLONOSCOPY N/A 3/4/2021    Procedure: Colonoscopy with  polypectomy;  Surgeon: Donald Aleman MD;  Location: [facility] GI     HC BIOPSY OF BREAST, OPEN INCISIONAL Right 1988    Benign per patient      CORRECT BUNION,METATARSAL OSTEOTOMY  1993      LAPAROSCOPY, SURGICAL; CHOLECYSTECTOMY  08/04/10      SLING OPERATION FOR STRESS INCONTINENCE  04/19/2007     HERNIORRHAPHY INCISIONAL (LOCATION)  9/23/2011    Procedure:HERNIORRHAPHY INCISIONAL (LOCATION); Surgeon:AYALA HOOVER; Location:[facility] OR     LAPAROSCOPIC HERNIORRHAPHY INCISIONAL  9/23/2011    Procedure:LAPAROSCOPIC HERNIORRHAPHY INCISIONAL; Laparoscopic mesh repair of incarcerated incisional hernia , extensive lysis of adhesions, excision of  hernia sac and closure of fascia.; Surgeon:AYALA HOOVER; Location:[facility]     LAPAROSCOPIC LYSIS ADHESIONS  2011    Procedure:LAPAROSCOPIC LYSIS ADHESIONS; Surgeon:AYLAA HOOVER; Location:[facility]     MASTECTOMY PARTIAL WITH NEEDLE LOCALIZATION Left 5/15/2019    Procedure: left wire localized partial mastectomy;  Surgeon: Donald Aleman MD;  Location: [facility] OR     TONSILLECTOMY       ZZC APPENDECTOMY,W OTHR PROC       ZC  DELIVERY ONLY           ZC LIGATE FALLOPIAN TUBE       ZZC NONSPECIFIC PROCEDURE      Exploratory laparotomy with resection of infarcted segment of omentum and minor lysis of adhesions     Z NONSPECIFIC PROCEDURE      Removal of hemorrhagic corpus luteum cyst     Z VAGINAL HYSTERECTOMY         Allergies:  Allergies as of 2022 - Reviewed 2022   Allergen Reaction Noted     Augmentin [amoxicillin-pot clavulanate] Nausea and Vomiting 10/18/2018     Meperidine hcl Nausea and Vomiting 2001     Seasonal allergies  2010     Current Medications:  Current Outpatient Medications   Medication Sig Dispense Refill     atorvastatin (LIPITOR) 10 MG tablet TAKE ONE TABLET BY MOUTH ONCE DAILY 90 tablet 1     albuterol (PROAIR HFA/PROVENTIL HFA/VENTOLIN HFA) 108 (90 Base) MCG/ACT inhaler INHALE ONE TO TWO PUFFS BY MOUTH EVERY 2-4 HOURS AS NEEDED 18 g 11     albuterol (PROVENTIL) (2.5 MG/3ML) 0.083% neb solution Take  by nebulization. 1 NEB EVERY 4-6 HOURS AS NEEDED 75 mL 0     anastrozole (ARIMIDEX) 1 MG tablet Take 1 tablet (1 mg) by mouth daily 90 tablet 3     BIOTIN PO Take by mouth daily       Calcium Carb-Cholecalciferol (CALCIUM 500 + D) 500-400 MG-UNIT TABS Take 1 tablet by mouth 2 times daily 180 tablet 3     Cyanocobalamin (B-12) 1000 MCG TBCR Take 1,000 mcg by mouth daily 100 tablet 1     diltiazem ER COATED BEADS (CARDIZEM CD/CARTIA XT) 120 MG 24 hr capsule TAKE ONE CAPSULE BY MOUTH ONCE DAILY ( LABS DUE ) 90 capsule 3      hydrochlorothiazide (HYDRODIURIL) 25 MG tablet TAKE ONE TABLET BY MOUTH ONCE DAILY 90 tablet 3     lisinopril (ZESTRIL) 2.5 MG tablet TAKE ONE TABLET BY MOUTH ONCE DAILY 90 tablet 3     magnesium 250 MG tablet Take 1 tablet by mouth daily 30 tablet      mometasone-formoterol (DULERA) 200-5 MCG/ACT inhaler INHALE 2 PUFFS BY MOUTH TWO TIMES A DAY 13 g 11     order for DME Equipment being ordered: Oxygen 1 each 0     order for DME Equipment being ordered: Nebulizer 1 Device 0     ORDER FOR DME Equipment being ordered: Oxygen @ 2 liters with exertion       Probiotic Product (PROBIOTIC PO) Take by mouth daily       trospium (SANCTURA) 20 MG tablet Take 20 mg by mouth       venlafaxine (EFFEXOR-XR) 75 MG 24 hr capsule TAKE ONE CAPSULE BY MOUTH ONCE DAILY 90 capsule 1     VITAMIN D PO        warfarin ANTICOAGULANT (JANTOVEN ANTICOAGULANT) 2.5 MG tablet 2.5 mg (2.5 mg x 1) every day OR AS DIRECTED BY THE COUMADIN CLINIC. 100 tablet 0      Family History:  Family History   Problem Relation Age of Onset     Breast Cancer Mother      Obesity Mother      Genitourinary Problems Mother      Lipids Mother      Respiratory Sister      Lipids Sister      Breast Cancer Sister 40        s/p mastectomy     Social History:  Social History     Socioeconomic History     Marital status:      Spouse name: Nam     Number of children: 1     Years of education: 14     Highest education level: Not on file   Occupational History     Occupation: Ins Cordinater     Comment:  [employer]     Employer: [employer]   Tobacco Use     Smoking status: Former Smoker     Packs/day: 1.00     Years: 30.00     Pack years: 30.00     Last attempt to quit: 10/25/2005     Years since quittin.7     Smokeless tobacco: Never Used   Substance and Sexual Activity     Alcohol use: No     Alcohol/week: 0.0 standard drinks     Drug use: No     Sexual activity: Not Currently     Partners: Male     Birth control/protection: Female Surgical      "Comment: hysterectomy     Physical Exam:  /74 (BP Location: Left arm, Patient Position: Chair, Cuff Size: Adult Large)   Pulse 95   Ht 1.588 m (5' 2.5\")   Wt 93.4 kg (206 lb)   SpO2 94%   BMI 37.08 kg/m        GENERAL APPEARANCE:  alert and no distress  HEENT: PEERLA, no neck asymmetry or masses  Lymphatics: No cervical, supraclavicular, axillary lymphadenopathy bilaterally    CV: S1S2 reg no murmur  Respiratory: CTA b/l  GI: soft,  BS+, NT, ND  Extremities: no edema.    SKIN: no suspicious lesions or rashes    PSYCHIATRIC: mentation appears normal and affect normal  Neuro: gait intact. axox3  Breast exam: Status post left lumpectomy incisions well-healed.  No breast masses bilaterally.  No axillary adenopathy bilaterally.    Laboratory/Imaging Studies    CBCd today - within normal limits  WBC 7.9  Hb 12.8g/dl  Plt count [id number].  CMP- creat 1.07. LFTs within normal limits    ASSESSMENT/PLAN:    71-year-old postmenopausal female with past medical history of atrial fibrillation, hypertension, with pT2N0, ER/ME positive and HER-2/tulio negative by FISH  Oncotype Score 15 with 4% risk of distant recurrence with hormonal therapy alone and <1% benefit from chemotherapy so did not get adjuvant chemo.   She is s/p left lumpectomy and axillary SLN dissection in 05/2019. Completed adjuvant radiation on 7/18/2019.  She started Arimidex in July 2019.      1.  Invasive lobular carcinoma-continue anastrozole, tolerating well.  Follow-up with Reshma -our NP-  in 6 months, with labs.Transfer care to Dr. Dhillon in one year.    2. Breast Cancer Screening -she will be due for annual bilateral screening mammogram in May 2022.  She is scheduled for left diagnostic mammogram and ultrasound, as above, to follow-up on breast imaging from September 2021, January 12, 2022. We'll inform the patient of the results and recommendations and  f/up plan based on the results.       3. Bone Health -  Osteoporosis.  DEXA scan in " September 2021 showed stable to improved findings as above.  Continue Prolia every 6 months including a dose today.  Plan repeat DEXA scan in 2 years from previous-due in September 2023.    4.  Lung cancer screening- she has been undergoing annual low dose CT chest with Dr. Jones- last in July 2021. Next due in 07/2022.    5.  Cardiac- CAD/A. Fib.-Continue on diltiazem and warfarin.  Continue current antihypertensive regimen and atorvastatin.  LDL and total cholesterol within normal limits in February 2021. F/up with Dr. Jones    At the end of our visit patient verbalized understanding and concurred with the plan.

## 2022-01-07 NOTE — LETTER
1/7/2022         RE: Latanya Padron  [address] [facility] [address]        Dear Colleague,    Thank you for referring your patient, Latanya Padron, to the [facility]. Please see a copy of my visit note below.    Oncology Follow-up visit:  Date on this visit: Jan 7, 2022    PCP: Glen Blue     Diagnosis: Invasive lobular carcinoma left breast    Oncologic History:  1. Invasive lobular carcinoma left breast:  09/2018 Screening mammogram showed an area of architectural distortion the left breast.Ultrasound breast did not show any sonographic abnormality and recommendation was for a 6 month follow-up mammogram    04/2019 Mammogram and ultrasound showed a heterogenous mass in the left breast measuring 1.1 x 0.5 x 0.5 cm.  Biopsy of the mass came back showing invasive lobular carcinoma, Grade 2, ER/PA positive and HER-2/tulio negative by FISH. MRI breast bilateral showed 2 cm mass like enhancement in the left breast corresponding to the biopsy-proven carcinoma with no evidence of multifocal disease or axillary lymphadenopathy.     05/15/19 Lumpectomy with SNL dissection. Oncotype DX Recurrence score came back at 15 putting patient in the low-risk category and so did not recommend adjuvant systemic chemotherapy   Oncotype Score 15 with 4% risk of distant recurrence with hormonal therapy alone and <1% benefit from chemotherapy.     07/18/2019 Completed Adjuvant RT  July 2019- started Arimidex.  She transferred her care to me in July 2020, and was previously a patient of Dr. Hawkins and Dr. Denis.  2.  Bone health-DEXA scan in June 2019 showed findings c/w osteoporosis as below:  The right hip T-score is -2.7. The left hip T-score is -2.4.    She has started on Prolia q 6 months.    3. FHx of breast cancer- mother had breast cancer at 70+ years old. Sister had breast breast cancer when she was in her 40s. She had colon, stomach, pancreatic cancer as well.  Maternal  cousin also has lobular breast cancer at 60. Maternal uncle also had colon cancer metastatic to the lungs. Brother had prostate cancer. She has one daughter and reports daughter has undergone genetic testing but she is not sure of the details.   Patient met with genetic counselor on July 22, 2020 and 40 gene comprehensive cancer panel was drawn, but was not covered by her insurance and patient decided not to proceed.    History Of Present Illness:  71-year-old female with past medical history significant for Atrial fibrillation, hypertension, who presents for follow-up of pT2N0, ER/WI positive and HER-2/tulio negative by FISH invasive lobular carcinoma of left breast, s/p L lumpectomy and axillary SLN dissection in 05/2019.  She has completed adjuvant radiation on 7/18/2019 and she also started Arimidex in July 2019. . She is taking calcium and vitamin D regularly.   b/l diagnostic mammogram and L breast US performed on 5/20/2021 for evaluation of left upper inner L breast pain showed:  No suspicious lesions on mammogram b/l.   Targeted ultrasound of the upper inner left breast at site of pain  demonstrates a fluid collection with some solid rounded tissue. This  likely represents normal adipose tissues within a seroma in the left  breast. This corresponded with the site of patient's pain.  She had a follow-up left breast ultrasound on September 1, 2021 which showed findings consistent with a focal area of fat necrosis with fat lobules in an area of fluid. This could also represent seroma  with internal fat lobules.  3 months follow-up left breast ultrasound and diagnostic mammogram recommended.  Patient is scheduled for January 12.  She also underwent a DEXA scan on September 1, 2021 which showed findings consistent with osteopenia left femoral neck, slightly improved and mild osteoporosis in the right femoral neck, minimally improved.  There was mild osteopenia within L2, slightly improved.  She has been undergoing  annual low dose CT chest with Dr. Jones. CT chest in July 2021 demonstrated Micronodular infiltrative  process in the posterior aspect of the right lower lung lobe appears  slightly more prominent than on the 7/23/2020 and likely represents  inflammatory or infectious infiltrate. Neoplastic nodularity is not  considered likely. No other significant nodules.    There were moderate to severe CACs on CT chest.  She was seen by Dr. Bernardo from cardiology in July 2020.  She was recommended to continue on statin therapy and continue her antihypertensive regimen.  She continues on warfarin and diltiazem for atrial fibrillation.  She has been tolerating anastrozole well.    She is on gabapentin for restless leg syndrome.  She is on venlafaxine which controls her symptoms of depression.  She is pain-free today.  She has COPD and has dyspnea on exertion, stable.      Past Medical/Surgical History:  Past Medical History:   Diagnosis Date     Breast cancer (H) 04/12/2019    Left Breast     COPD (chronic obstructive pulmonary disease) (H)      Mixed hyperlipidemia      Neck mass 6/20/2018     PONV (postoperative nausea and vomiting)      S/P radiation therapy     5,256 cGy to left breast completed on 7/18/2019 - [facility]     Past Surgical History:   Procedure Laterality Date     BIOPSY NODE SENTINEL Left 5/15/2019    Procedure: left sentinel node biopsy;  Surgeon: Donald Aleman MD;  Location: [facility] OR     BREAST BIOPSY, CORE RT/LT Left 04/12/2019     COLONOSCOPY  06/21/10     COLONOSCOPY N/A 3/4/2021    Procedure: Colonoscopy with  polypectomy;  Surgeon: Donald Aleman MD;  Location: [facility] GI     HC BIOPSY OF BREAST, OPEN INCISIONAL Right 1988    Benign per patient      CORRECT BUNION,METATARSAL OSTEOTOMY  1993      LAPAROSCOPY, SURGICAL; CHOLECYSTECTOMY  08/04/10     HC SLING OPERATION FOR STRESS INCONTINENCE  04/19/2007     HERNIORRHAPHY INCISIONAL (LOCATION)  9/23/2011    Procedure:HERNIORRHAPHY INCISIONAL  (LOCATION); Surgeon:AYALA HOOVER; Location:[facility] OR     LAPAROSCOPIC HERNIORRHAPHY INCISIONAL  2011    Procedure:LAPAROSCOPIC HERNIORRHAPHY INCISIONAL; Laparoscopic mesh repair of incarcerated incisional hernia , extensive lysis of adhesions, excision of hernia sac and closure of fascia.; Surgeon:AYALA HOOVER; Location:[facility] OR     LAPAROSCOPIC LYSIS ADHESIONS  2011    Procedure:LAPAROSCOPIC LYSIS ADHESIONS; Surgeon:AYALA HOOVER; Location:[facility] OR     MASTECTOMY PARTIAL WITH NEEDLE LOCALIZATION Left 5/15/2019    Procedure: left wire localized partial mastectomy;  Surgeon: Donald Aleman MD;  Location: [facility] OR     TONSILLECTOMY       ZZC APPENDECTOMY,W OTHR PROC       [facility]  DELIVERY ONLY           [facility] LIGATE FALLOPIAN TUBE       [facility] NONSPECIFIC PROCEDURE      Exploratory laparotomy with resection of infarcted segment of omentum and minor lysis of adhesions     [facility] NONSPECIFIC PROCEDURE      Removal of hemorrhagic corpus luteum cyst     [facility] VAGINAL HYSTERECTOMY         Allergies:  Allergies as of 2022 - Reviewed 2022   Allergen Reaction Noted     Augmentin [amoxicillin-pot clavulanate] Nausea and Vomiting 10/18/2018     Meperidine hcl Nausea and Vomiting 2001     Seasonal allergies  2010     Current Medications:  Current Outpatient Medications   Medication Sig Dispense Refill     atorvastatin (LIPITOR) 10 MG tablet TAKE ONE TABLET BY MOUTH ONCE DAILY 90 tablet 1     albuterol (PROAIR HFA/PROVENTIL HFA/VENTOLIN HFA) 108 (90 Base) MCG/ACT inhaler INHALE ONE TO TWO PUFFS BY MOUTH EVERY 2-4 HOURS AS NEEDED 18 g 11     albuterol (PROVENTIL) (2.5 MG/3ML) 0.083% neb solution Take  by nebulization. 1 NEB EVERY 4-6 HOURS AS NEEDED 75 mL 0     anastrozole (ARIMIDEX) 1 MG tablet Take 1 tablet (1 mg) by mouth daily 90 tablet 3     BIOTIN PO Take by mouth daily       Calcium Carb-Cholecalciferol (CALCIUM 500 + D) 500-400 MG-UNIT TABS Take 1  tablet by mouth 2 times daily 180 tablet 3     Cyanocobalamin (B-12) 1000 MCG TBCR Take 1,000 mcg by mouth daily 100 tablet 1     diltiazem ER COATED BEADS (CARDIZEM CD/CARTIA XT) 120 MG 24 hr capsule TAKE ONE CAPSULE BY MOUTH ONCE DAILY ( LABS DUE ) 90 capsule 3     hydrochlorothiazide (HYDRODIURIL) 25 MG tablet TAKE ONE TABLET BY MOUTH ONCE DAILY 90 tablet 3     lisinopril (ZESTRIL) 2.5 MG tablet TAKE ONE TABLET BY MOUTH ONCE DAILY 90 tablet 3     magnesium 250 MG tablet Take 1 tablet by mouth daily 30 tablet      mometasone-formoterol (DULERA) 200-5 MCG/ACT inhaler INHALE 2 PUFFS BY MOUTH TWO TIMES A DAY 13 g 11     order for DME Equipment being ordered: Oxygen 1 each 0     order for DME Equipment being ordered: Nebulizer 1 Device 0     ORDER FOR DME Equipment being ordered: Oxygen @ 2 liters with exertion       Probiotic Product (PROBIOTIC PO) Take by mouth daily       trospium (SANCTURA) 20 MG tablet Take 20 mg by mouth       venlafaxine (EFFEXOR-XR) 75 MG 24 hr capsule TAKE ONE CAPSULE BY MOUTH ONCE DAILY 90 capsule 1     VITAMIN D PO        warfarin ANTICOAGULANT (JANTOVEN ANTICOAGULANT) 2.5 MG tablet 2.5 mg (2.5 mg x 1) every day OR AS DIRECTED BY THE COUMADIN CLINIC. 100 tablet 0      Family History:  Family History   Problem Relation Age of Onset     Breast Cancer Mother      Obesity Mother      Genitourinary Problems Mother      Lipids Mother      Respiratory Sister      Lipids Sister      Breast Cancer Sister 40        s/p mastectomy     Social History:  Social History     Socioeconomic History     Marital status:      Spouse name: Nam     Number of children: 1     Years of education: 14     Highest education level: Not on file   Occupational History     Occupation: Ins Cordinater     Comment:  [employer]     Employer: [employer]   Tobacco Use     Smoking status: Former Smoker     Packs/day: 1.00     Years: 30.00     Pack years: 30.00     Last attempt to quit: 10/25/2005     Years  "since quittin.7     Smokeless tobacco: Never Used   Substance and Sexual Activity     Alcohol use: No     Alcohol/week: 0.0 standard drinks     Drug use: No     Sexual activity: Not Currently     Partners: Male     Birth control/protection: Female Surgical     Comment: hysterectomy     Physical Exam:  /74 (BP Location: Left arm, Patient Position: Chair, Cuff Size: Adult Large)   Pulse 95   Ht 1.588 m (5' 2.5\")   Wt 93.4 kg (206 lb)   SpO2 94%   BMI 37.08 kg/m        GENERAL APPEARANCE:  alert and no distress  HEENT: PEERLA, no neck asymmetry or masses  Lymphatics: No cervical, supraclavicular, axillary lymphadenopathy bilaterally    CV: S1S2 reg no murmur  Respiratory: CTA b/l  GI: soft,  BS+, NT, ND  Extremities: no edema.    SKIN: no suspicious lesions or rashes    PSYCHIATRIC: mentation appears normal and affect normal  Neuro: gait intact. axox3  Breast exam: Status post left lumpectomy incisions well-healed.  No breast masses bilaterally.  No axillary adenopathy bilaterally.    Laboratory/Imaging Studies    CBCd today - within normal limits  WBC 7.9  Hb 12.8g/dl  Plt count [id number]/ul.  CMP- creat 1.07. LFTs within normal limits    ASSESSMENT/PLAN:    71-year-old postmenopausal female with past medical history of atrial fibrillation, hypertension, with pT2N0, ER/TN positive and HER-2/tulio negative by FISH  Oncotype Score 15 with 4% risk of distant recurrence with hormonal therapy alone and <1% benefit from chemotherapy so did not get adjuvant chemo.   She is s/p left lumpectomy and axillary SLN dissection in 2019. Completed adjuvant radiation on 2019.  She started Arimidex in 2019.      1.  Invasive lobular carcinoma-continue anastrozole, tolerating well.  Follow-up with Reshma -our NP-  in 6 months, with labs.Transfer care to Dr. Dhillon in one year.    2. Breast Cancer Screening -she will be due for annual bilateral screening mammogram in May 2022.  She is scheduled for left " diagnostic mammogram and ultrasound, as above, to follow-up on breast imaging from September 2021, January 12, 2022. We'll inform the patient of the results and recommendations and  f/up plan based on the results.       3. Bone Health -  Osteoporosis.  DEXA scan in September 2021 showed stable to improved findings as above.  Continue Prolia every 6 months including a dose today.  Plan repeat DEXA scan in 2 years from previous-due in September 2023.    4.  Lung cancer screening- she has been undergoing annual low dose CT chest with Dr. Jones- last in July 2021. Next due in 07/2022.    5.  Cardiac- CAD/A. Fib.-Continue on diltiazem and warfarin.  Continue current antihypertensive regimen and atorvastatin.  LDL and total cholesterol within normal limits in February 2021. F/up with Dr. Jones    At the end of our visit patient verbalized understanding and concurred with the plan.      Prolia given after pt saw Dr. Cortez.  Pt tolerated procedure.      Yani Rae, RN-BSN, PHN, OCN  [facility]        Again, thank you for allowing me to participate in the care of your patient.        Sincerely,        Prisca Cortez MD, MD

## 2022-01-07 NOTE — NURSING NOTE
"Oncology Rooming Note    January 7, 2022 11:03 AM   Latanya Padron is a 71 year old female who presents for:    Chief Complaint   Patient presents with     Oncology Clinic Visit     follow up     Initial Vitals: /74 (BP Location: Left arm, Patient Position: Chair, Cuff Size: Adult Large)   Pulse 95   Ht 1.588 m (5' 2.5\")   Wt 93.4 kg (206 lb)   SpO2 94%   BMI 37.08 kg/m   Estimated body mass index is 37.08 kg/m  as calculated from the following:    Height as of this encounter: 1.588 m (5' 2.5\").    Weight as of this encounter: 93.4 kg (206 lb). Body surface area is 2.03 meters squared.  No Pain (0) Comment: Data Unavailable   No LMP recorded. Patient has had a hysterectomy.  Allergies reviewed: Yes  Medications reviewed: Yes    Medications: Medication refills not needed today.  Pharmacy name entered into apstrata:      [facility] [address]     Clinical concerns: Possible fluid in left upper chest; Mammogram and US scheduled for Wednesday     Dr. Cortez was notified.      Estrella Musa CMA              "

## 2022-01-07 NOTE — PROGRESS NOTES
Prolia given after pt saw Dr. Cortez.  Pt tolerated procedure.      Yani Rae, RN-BSN, PHN, OCN  [facility]

## 2022-01-12 ENCOUNTER — HOSPITAL ENCOUNTER (OUTPATIENT)
Dept: MAMMOGRAPHY | Facility: CLINIC | Age: 72
End: 2022-01-12
Attending: INTERNAL MEDICINE
Payer: COMMERCIAL

## 2022-01-12 ENCOUNTER — HOSPITAL ENCOUNTER (OUTPATIENT)
Dept: ULTRASOUND IMAGING | Facility: CLINIC | Age: 72
End: 2022-01-12
Attending: INTERNAL MEDICINE
Payer: COMMERCIAL

## 2022-01-12 DIAGNOSIS — C50.812 MALIGNANT NEOPLASM OF OVERLAPPING SITES OF LEFT BREAST IN FEMALE, ESTROGEN RECEPTOR POSITIVE (H): ICD-10-CM

## 2022-01-12 DIAGNOSIS — R93.89 IMAGING ABNORMALITY: ICD-10-CM

## 2022-01-12 DIAGNOSIS — Z17.0 MALIGNANT NEOPLASM OF OVERLAPPING SITES OF LEFT BREAST IN FEMALE, ESTROGEN RECEPTOR POSITIVE (H): ICD-10-CM

## 2022-01-12 PROCEDURE — 77061 BREAST TOMOSYNTHESIS UNI: CPT | Mod: LT

## 2022-01-12 PROCEDURE — 76642 ULTRASOUND BREAST LIMITED: CPT | Mod: LT

## 2022-02-02 DIAGNOSIS — I48.91 ATRIAL FIBRILLATION, UNSPECIFIED TYPE (H): ICD-10-CM

## 2022-02-02 DIAGNOSIS — I48.0 AF (PAROXYSMAL ATRIAL FIBRILLATION) (H): ICD-10-CM

## 2022-02-02 RX ORDER — WARFARIN SODIUM 2.5 MG/1
TABLET ORAL
Qty: 100 TABLET | Refills: 0 | Status: SHIPPED | OUTPATIENT
Start: 2022-02-02 | End: 2022-04-22

## 2022-02-08 ENCOUNTER — ANTICOAGULATION THERAPY VISIT (OUTPATIENT)
Dept: ANTICOAGULATION | Facility: CLINIC | Age: 72
End: 2022-02-08

## 2022-02-08 ENCOUNTER — LAB (OUTPATIENT)
Dept: LAB | Facility: CLINIC | Age: 72
End: 2022-02-08
Payer: COMMERCIAL

## 2022-02-08 DIAGNOSIS — Z79.01 LONG TERM CURRENT USE OF ANTICOAGULANT THERAPY: ICD-10-CM

## 2022-02-08 DIAGNOSIS — I48.91 ATRIAL FIBRILLATION (H): ICD-10-CM

## 2022-02-08 DIAGNOSIS — I48.0 AF (PAROXYSMAL ATRIAL FIBRILLATION) (H): Primary | ICD-10-CM

## 2022-02-08 DIAGNOSIS — I48.0 AF (PAROXYSMAL ATRIAL FIBRILLATION) (H): ICD-10-CM

## 2022-02-08 LAB — INR BLD: 1.8 (ref 0.9–1.1)

## 2022-02-08 PROCEDURE — 36416 COLLJ CAPILLARY BLOOD SPEC: CPT

## 2022-02-08 PROCEDURE — 85610 PROTHROMBIN TIME: CPT

## 2022-02-08 NOTE — PROGRESS NOTES
ANTICOAGULATION MANAGEMENT     Latanya Padron 71 year old female is on warfarin with subtherapeutic INR result. (Goal INR 2.0-3.0)    Recent labs: (last 7 days)     02/08/22  0952   INR 1.8*       ASSESSMENT     Source(s): Chart Review and Patient/Caregiver Call     Warfarin doses taken: Warfarin taken as instructed  Diet: Increased greens/vitamin K in diet; ongoing change but will cut back slightly   New illness, injury, or hospitalization: No  Medication/supplement changes: None noted  Signs or symptoms of bleeding or clotting: No  Previous INR: Therapeutic last 2(+) visits  Additional findings: None     PLAN     Recommended plan for temporary change(s) affecting INR     Dosing Instructions: Booster dose then continue your current warfarin dose with next INR in 2 weeks       Summary  As of 2/8/2022    Full warfarin instructions:  2/8: 3.75 mg; Otherwise 2.5 mg every day   Next INR check:  2/22/2022             Telephone call with Latanya who verbalizes understanding and agrees to plan    Lab visit scheduled    Education provided: Impact of vitamin K foods on INR and Monitoring for clotting signs and symptoms    Plan made per [facility] anticoagulation protocol    Zoey Pickard RN  Anticoagulation Clinic  2/8/2022    _______________________________________________________________________     Anticoagulation Episode Summary     Current INR goal:  2.0-3.0   TTR:  66.4 % (1 y)   Target end date:  Indefinite   Send INR reminders to:  [facility]    Indications    AF (paroxysmal atrial fibrillation) (H) [I48.0]  Atrial fibrillation (H) [I48.91]  Long term current use of anticoagulant therapy [Z79.01]  Atrial fibrillation  unspecified type (H) (Resolved) [I48.91]           Comments:           Anticoagulation Care Providers     Provider Role Specialty Phone number    Nazario Jones MD Referring Family Medicine [phone]

## 2022-02-16 DIAGNOSIS — F32.5 MAJOR DEPRESSION IN COMPLETE REMISSION (H): ICD-10-CM

## 2022-02-17 RX ORDER — VENLAFAXINE HYDROCHLORIDE 75 MG/1
CAPSULE, EXTENDED RELEASE ORAL
Qty: 90 CAPSULE | Refills: 1 | Status: SHIPPED | OUTPATIENT
Start: 2022-02-17 | End: 2022-05-19

## 2022-02-17 NOTE — TELEPHONE ENCOUNTER
Routing refill request to provider for review/approval because:  Labs out of range:  Creatinine.     Iliana Garrett RN

## 2022-02-21 ENCOUNTER — MYC MEDICAL ADVICE (OUTPATIENT)
Dept: FAMILY MEDICINE | Facility: CLINIC | Age: 72
End: 2022-02-21
Payer: COMMERCIAL

## 2022-02-23 ENCOUNTER — LAB (OUTPATIENT)
Dept: LAB | Facility: CLINIC | Age: 72
End: 2022-02-23
Payer: COMMERCIAL

## 2022-02-23 ENCOUNTER — ANTICOAGULATION THERAPY VISIT (OUTPATIENT)
Dept: ANTICOAGULATION | Facility: CLINIC | Age: 72
End: 2022-02-23

## 2022-02-23 DIAGNOSIS — Z79.01 LONG TERM CURRENT USE OF ANTICOAGULANT THERAPY: ICD-10-CM

## 2022-02-23 DIAGNOSIS — I48.0 AF (PAROXYSMAL ATRIAL FIBRILLATION) (H): ICD-10-CM

## 2022-02-23 DIAGNOSIS — I48.0 AF (PAROXYSMAL ATRIAL FIBRILLATION) (H): Primary | ICD-10-CM

## 2022-02-23 DIAGNOSIS — I48.91 ATRIAL FIBRILLATION (H): ICD-10-CM

## 2022-02-23 LAB — INR BLD: 1.9 (ref 0.9–1.1)

## 2022-02-23 PROCEDURE — 85610 PROTHROMBIN TIME: CPT

## 2022-02-23 PROCEDURE — 36416 COLLJ CAPILLARY BLOOD SPEC: CPT

## 2022-02-23 NOTE — PROGRESS NOTES
ANTICOAGULATION MANAGEMENT     Latanya Padron 71 year old female is on warfarin with subtherapeutic INR result. (Goal INR 2.0-3.0)    Recent labs: (last 7 days)     02/23/22  0958   INR 1.9*       ASSESSMENT     Source(s): Chart Review and Patient/Caregiver Call     Warfarin doses taken: Warfarin taken as instructed  Diet: Increased greens/vitamin K in diet; ongoing change- started a couple weeks ago, but back a little bit from 2 weeks ago, would like to stay about where she's at now.  New illness, injury, or hospitalization: No  Medication/supplement changes: None noted  Signs or symptoms of bleeding or clotting: No  Previous INR: Subtherapeutic  Additional findings: None     PLAN     Recommended plan for ongoing change(s) affecting INR     Dosing Instructions:  Increase your warfarin dose (7.1% change) with next INR in 8 days       Summary  As of 2/23/2022    Full warfarin instructions:  3.75 mg every Wed; 2.5 mg all other days   Next INR check:  3/3/2022             Telephone call with Latanya who verbalizes understanding and agrees to plan    Lab visit scheduled    Education provided: Importance of consistent vitamin K intake and Impact of vitamin K foods on INR    Plan made per [facility] anticoagulation protocol    Zoey Pickard, RN  Anticoagulation Clinic  2/23/2022    _______________________________________________________________________     Anticoagulation Episode Summary     Current INR goal:  2.0-3.0   TTR:  62.2 % (1 y)   Target end date:  Indefinite   Send INR reminders to:  [facility]    Indications    AF (paroxysmal atrial fibrillation) (H) [I48.0]  Atrial fibrillation (H) [I48.91]  Long term current use of anticoagulant therapy [Z79.01]  Atrial fibrillation  unspecified type (H) (Resolved) [I48.91]           Comments:           Anticoagulation Care Providers     Provider Role Specialty Phone number    Nazario Jones MD Referring Family Medicine [phone]

## 2022-03-03 ENCOUNTER — ANTICOAGULATION THERAPY VISIT (OUTPATIENT)
Dept: ANTICOAGULATION | Facility: CLINIC | Age: 72
End: 2022-03-03

## 2022-03-03 ENCOUNTER — LAB (OUTPATIENT)
Dept: LAB | Facility: CLINIC | Age: 72
End: 2022-03-03
Payer: COMMERCIAL

## 2022-03-03 DIAGNOSIS — I48.0 AF (PAROXYSMAL ATRIAL FIBRILLATION) (H): ICD-10-CM

## 2022-03-03 DIAGNOSIS — I48.91 ATRIAL FIBRILLATION (H): ICD-10-CM

## 2022-03-03 DIAGNOSIS — Z79.01 LONG TERM CURRENT USE OF ANTICOAGULANT THERAPY: ICD-10-CM

## 2022-03-03 DIAGNOSIS — I48.0 AF (PAROXYSMAL ATRIAL FIBRILLATION) (H): Primary | ICD-10-CM

## 2022-03-03 LAB — INR BLD: 1.8 (ref 0.9–1.1)

## 2022-03-03 PROCEDURE — 85610 PROTHROMBIN TIME: CPT

## 2022-03-03 PROCEDURE — 36416 COLLJ CAPILLARY BLOOD SPEC: CPT

## 2022-03-03 NOTE — PROGRESS NOTES
ANTICOAGULATION MANAGEMENT     Latanya Padron 71 year old female is on warfarin with subtherapeutic INR result. (Goal INR 2.0-3.0)    Recent labs: (last 7 days)     03/03/22  0950   INR 1.8*       ASSESSMENT     Source(s): Chart Review and Patient/Caregiver Call     Warfarin doses taken: Warfarin taken as instructed  Diet: No new diet changes identified  New illness, injury, or hospitalization: No  Medication/supplement changes: None noted  Signs or symptoms of bleeding or clotting: No  Previous INR: Subtherapeutic  Additional findings: None       PLAN     Recommended plan for no diet, medication or health factor changes affecting INR     Dosing Instructions: Booster dose then Increase your warfarin dose (6.7% change) with next INR in 2 weeks       Summary  As of 3/3/2022    Full warfarin instructions:  3/3: 3.75 mg; Otherwise 3.75 mg every Tue, Sat; 2.5 mg all other days   Next INR check:  3/15/2022             Telephone call with Latanya who verbalizes understanding and agrees to plan and who agrees to plan and repeated back plan correctly    Lab visit scheduled    Education provided: Travel related clotting risk and prevention    Plan made per [facility] anticoagulation protocol    Zoey Pickard, RN  Anticoagulation Clinic  3/3/2022    _______________________________________________________________________     Anticoagulation Episode Summary     Current INR goal:  2.0-3.0   TTR:  61.9 % (1 y)   Target end date:  Indefinite   Send INR reminders to:  [facility]    Indications    AF (paroxysmal atrial fibrillation) (H) [I48.0]  Atrial fibrillation (H) [I48.91]  Long term current use of anticoagulant therapy [Z79.01]  Atrial fibrillation  unspecified type (H) (Resolved) [I48.91]           Comments:           Anticoagulation Care Providers     Provider Role Specialty Phone number    Nazario Jones MD Referring Family Medicine [phone]

## 2022-03-04 NOTE — PROGRESS NOTES
Pt called back. She reports that she hasnt really had much greens the past week. She is going to plan on 3 servings (salads mainly) per week and keep track. Orquidea adjusted her dose slightly.

## 2022-03-08 DIAGNOSIS — I10 HYPERTENSION, GOAL BELOW 140/90: ICD-10-CM

## 2022-03-10 RX ORDER — LISINOPRIL 2.5 MG/1
TABLET ORAL
Qty: 90 TABLET | Refills: 3 | Status: SHIPPED | OUTPATIENT
Start: 2022-03-10 | End: 2022-03-30

## 2022-03-15 ENCOUNTER — LAB (OUTPATIENT)
Dept: LAB | Facility: CLINIC | Age: 72
End: 2022-03-15
Payer: COMMERCIAL

## 2022-03-15 ENCOUNTER — ANTICOAGULATION THERAPY VISIT (OUTPATIENT)
Dept: ANTICOAGULATION | Facility: CLINIC | Age: 72
End: 2022-03-15

## 2022-03-15 DIAGNOSIS — I48.0 AF (PAROXYSMAL ATRIAL FIBRILLATION) (H): Primary | ICD-10-CM

## 2022-03-15 DIAGNOSIS — I48.0 AF (PAROXYSMAL ATRIAL FIBRILLATION) (H): ICD-10-CM

## 2022-03-15 DIAGNOSIS — Z79.01 LONG TERM CURRENT USE OF ANTICOAGULANT THERAPY: ICD-10-CM

## 2022-03-15 DIAGNOSIS — I48.91 ATRIAL FIBRILLATION (H): ICD-10-CM

## 2022-03-15 LAB — INR BLD: 2.4 (ref 0.9–1.1)

## 2022-03-15 PROCEDURE — 36416 COLLJ CAPILLARY BLOOD SPEC: CPT

## 2022-03-15 PROCEDURE — 85610 PROTHROMBIN TIME: CPT

## 2022-03-15 NOTE — PROGRESS NOTES
ANTICOAGULATION MANAGEMENT     Latanya Padron 71 year old female is on warfarin with therapeutic INR result. (Goal INR 2.0-3.0)    Recent labs: (last 7 days)     03/15/22  0949   INR 2.4*       ASSESSMENT     Source(s): Chart Review and Patient/Caregiver Call     Warfarin doses taken: Warfarin taken as instructed  Diet: Pt continuing with 3 servings of greens/wk  New illness, injury, or hospitalization: No  Medication/supplement changes: None noted  Signs or symptoms of bleeding or clotting: No  Previous INR: Therapeutic last 2(+) visits  Additional findings: None       PLAN     Recommended plan for ongoing change(s) affecting INR     Dosing Instructions: Continue your current warfarin dose with next INR in 2 weeks       Summary  As of 3/15/2022    Full warfarin instructions:  3.75 mg every Tue, Thu, Sat; 2.5 mg all other days   Next INR check:  4/5/2022             Telephone call with Latanya who verbalizes understanding and agrees to plan    Lab visit scheduled    Education provided: Importance of consistent vitamin K intake    Plan made per [facility] anticoagulation protocol    Zoey Pickard RN  Anticoagulation Clinic  3/15/2022    _______________________________________________________________________     Anticoagulation Episode Summary     Current INR goal:  2.0-3.0   TTR:  64.1 % (1 y)   Target end date:  Indefinite   [facility]    Indications    AF (paroxysmal atrial fibrillation) (H) [I48.0]  Atrial fibrillation (H) [I48.91]  Long term current use of anticoagulant therapy [Z79.01]  Atrial fibrillation  unspecified type (H) (Resolved) [I48.91]           Comments:           Anticoagulation Care Providers     Provider Role Specialty Phone number    Nazario Jones MD Referring Family Medicine [phone]

## 2022-03-20 ASSESSMENT — KOOS JR
STRAIGHTENING KNEE FULLY: MODERATE
KOOS JR SCORING: 50.01
BENDING TO THE FLOOR TO PICK UP OBJECT: MODERATE
HOW SEVERE IS YOUR KNEE STIFFNESS AFTER FIRST WAKING IN MORNING: MODERATE
STANDING UPRIGHT: MODERATE
TWISING OR PIVOTING ON KNEE: SEVERE
GOING UP OR DOWN STAIRS: MODERATE
RISING FROM SITTING: MODERATE

## 2022-03-22 NOTE — PROGRESS NOTES
[facility]    Outpatient Physical Therapy Discharge Note  Patient: Latanya Padron  : 1950    Beginning/End Dates of Reporting Period:  21 to 10/4/21    Referring Provider: Dr. Jones.    Therapy Diagnosis: neck pain, low back pain.     Client Self Report: Pt. reports she missed her last session due to her cousin passing away. Left side is about 5/10 pain currently. No pain on the right currently. HEP has been going well.       Goals:  Goal Identifier SILVINA   Goal Description The patient will score 10% or less on SILVINA to show a 20% decrease in disability from low back pain   Target Date 21   Date Met      Progress (detail required for progress note):  Not reassessed, did not return to clinic.     Goal Identifier Sitting and sit to stands   Goal Description The patient will be able sit in her couch for 1 hour with no increase in pain and be able to stand up from her couch without an increase in pain to be able enjoy leisure activites in her home.    Target Date 21   Date Met      Progress (detail required for progress note):  Not reassessed, did not return to clinic.     Goal Identifier HEP   Goal Description Pt will be independent with HEP in order to improve ROM, and strength and decrease pain.    Target Date 21   Date Met      Progress (detail required for progress note):  Not reassessed, did not return to clinic.           Plan:  Discharge from therapy.    Discharge:    Reason for Discharge: Patient has failed to schedule further appointments.    Equipment Issued: none.    Discharge Plan: Patient to continue home program.

## 2022-03-23 ENCOUNTER — OFFICE VISIT (OUTPATIENT)
Dept: ORTHOPEDICS | Facility: CLINIC | Age: 72
End: 2022-03-23
Payer: COMMERCIAL

## 2022-03-23 ENCOUNTER — ANCILLARY PROCEDURE (OUTPATIENT)
Dept: GENERAL RADIOLOGY | Facility: CLINIC | Age: 72
End: 2022-03-23
Attending: PHYSICIAN ASSISTANT
Payer: COMMERCIAL

## 2022-03-23 VITALS
BODY MASS INDEX: 36.23 KG/M2 | WEIGHT: 204.5 LBS | DIASTOLIC BLOOD PRESSURE: 66 MMHG | HEIGHT: 63 IN | SYSTOLIC BLOOD PRESSURE: 110 MMHG

## 2022-03-23 DIAGNOSIS — M17.11 PRIMARY OSTEOARTHRITIS OF RIGHT KNEE: ICD-10-CM

## 2022-03-23 DIAGNOSIS — M17.11 PRIMARY OSTEOARTHRITIS OF RIGHT KNEE: Primary | ICD-10-CM

## 2022-03-23 PROCEDURE — 73564 X-RAY EXAM KNEE 4 OR MORE: CPT | Mod: TC | Performed by: RADIOLOGY

## 2022-03-23 PROCEDURE — 20610 DRAIN/INJ JOINT/BURSA W/O US: CPT | Mod: RT | Performed by: PHYSICIAN ASSISTANT

## 2022-03-23 RX ORDER — TRIAMCINOLONE ACETONIDE 40 MG/ML
80 INJECTION, SUSPENSION INTRA-ARTICULAR; INTRAMUSCULAR
Status: DISCONTINUED | OUTPATIENT
Start: 2022-03-23 | End: 2023-01-01

## 2022-03-23 RX ORDER — BUPIVACAINE HYDROCHLORIDE 5 MG/ML
3 INJECTION, SOLUTION PERINEURAL
Status: DISCONTINUED | OUTPATIENT
Start: 2022-03-23 | End: 2023-01-01

## 2022-03-23 RX ADMIN — TRIAMCINOLONE ACETONIDE 80 MG: 40 INJECTION, SUSPENSION INTRA-ARTICULAR; INTRAMUSCULAR at 09:32

## 2022-03-23 RX ADMIN — BUPIVACAINE HYDROCHLORIDE 3 ML: 5 INJECTION, SOLUTION PERINEURAL at 09:32

## 2022-03-23 ASSESSMENT — PAIN SCALES - GENERAL: PAINLEVEL: MILD PAIN (3)

## 2022-03-23 NOTE — LETTER
"    3/23/2022         RE: Latanya Padron  [address] [facility] [address]        Dear Colleague,    Thank you for referring your patient, Latanya Padron, to the [facility]. Please see a copy of my visit note below.    Office Visit-Follow up    Chief Complaint: Latanya Padron is a 71 year old female who is being seen for   Chief Complaint   Patient presents with     RECHECK     Right knee degenerative joint disease patellofemoral mild to moderate.  2 patellofemoral syndrome       History of Present Illness:   Patient is here in follow-up steroid injection.  Her last steroid injection was 2/12/2021 by me.  Previous to that she had one on 7/31/2020.  Patient states that this last injection lasted at least 8 months if not longer.  She has noticed that the pain has been getting worse in the right anterior knee as well as now she is feeling it in the left anterior knee.  Patient can feel the pain more with weather changes.    Physical Exam:  Vitals: /66   Ht 1.588 m (5' 2.5\")   Wt 92.8 kg (204 lb 8 oz)   BMI 36.81 kg/m    BMI= Body mass index is 36.81 kg/m .  Constitutional: healthy, alert and no acute distress   Psychiatric: mentation appears normal and affect normal/bright  NEURO: no focal deficits, CMS intact right lower extremity  RESP: Normal with easy respirations and no use of accessory muscles to breathe, no audible wheezing or retractions  CV: Calf soft and nontender to palpation, leg warm   SKIN: No erythema, rashes, excoriation, or breakdown. No evidence of infection.   MUSCULOSKELETAL:    INSPECTION of right knee: No gross deformities, erythema, edema, ecchymosis, atrophy or fasciculations.     PALPATION: No tenderness on palpation of the medial, lateral, anterior and posterior portion of the knee. No specific joint line tenderness. No increased warmth.  No effusion.     ROM: Able to flex and extend without any catching or locking or pain.    STRENGTH: Able to " get on the exam table and off without any problems and able to ambulate without any problems.    SPECIAL TEST: None today.   GAIT: non-antalgic  Lymph: no palpable lymph nodes    Diagnostic Modalities:  X-rays done today AP lateral and Mcclure and sunrise view all bilateral.  These x-rays show space well preserved the medial and lateral compartment but in the patellofemoral from the osteoarthritis is moderate.  The left is greater than the right.  These x-rays are compared to the x-rays that were done on 2/12/2021 and there is progression of the patellofemoral degenerative joint disease.  No other fracture dislocation or tumor.    Independent visualization of the images was performed.     Impression: 1.  Right knee degenerative joint disease, patellofemoral, mild to moderate.  2.  Right knee patellofemoral syndrome    Plan:    Large Joint Injection/Arthocentesis: R knee joint    Date/Time: 3/23/2022 9:32 AM  Performed by: Esteban Taylor PA-C  Authorized by: Esteban Taylor PA-C     Indications:  Pain  Needle Size:  22 G  Guidance: landmark guided    Approach:  Anterolateral  Location:  Knee      Medications:  80 mg triamcinolone 40 MG/ML; 3 mL bupivacaine 0.5 %  Aspirate amount (mL):  0  Procedure discussed: discussed risks, benefits, and alternatives    Consent Given by:  Patient  Timeout: timeout called immediately prior to procedure    Prep: patient was prepped and draped in usual sterile fashion     The skin was prepped with betadine. The patient was in a seated position. I used tiffany chloride spray prior to doing the injection.  The patient tolerated the injection well, and there were no complications. The injection site was covered with a Band-Aid.           FOCUSED PLAN:    This patient is not able to take NSAIDs for 2 reasons 1 her kidney function is not good with a GFR of 56 which has been lower in the past and secondarily she is now on Coumadin for atrial fibrillation.  She has tried hinged brace in the  past without benefit.  In the past we have suggested formal physical therapy and she wanted to hold off on that.  She is very happy with the steroid injections.  We did talk about gel injections but we know she is doing so well with the steroid injections we might will continue with them.  We discussed straight leg raising again and she will continue to work on that.  Follow-up on an as-needed basis    Re-x-ray on return: No      This note was dictated with Zlio.    Esteban Taylor PA-C        Again, thank you for allowing me to participate in the care of your patient.        Sincerely,        Esteban Taylor PA-C

## 2022-03-29 ASSESSMENT — PATIENT HEALTH QUESTIONNAIRE - PHQ9
10. IF YOU CHECKED OFF ANY PROBLEMS, HOW DIFFICULT HAVE THESE PROBLEMS MADE IT FOR YOU TO DO YOUR WORK, TAKE CARE OF THINGS AT HOME, OR GET ALONG WITH OTHER PEOPLE: NOT DIFFICULT AT ALL
SUM OF ALL RESPONSES TO PHQ QUESTIONS 1-9: 1
SUM OF ALL RESPONSES TO PHQ QUESTIONS 1-9: 1

## 2022-03-30 ENCOUNTER — OFFICE VISIT (OUTPATIENT)
Dept: FAMILY MEDICINE | Facility: CLINIC | Age: 72
End: 2022-03-30
Payer: COMMERCIAL

## 2022-03-30 ENCOUNTER — LAB (OUTPATIENT)
Dept: LAB | Facility: CLINIC | Age: 72
End: 2022-03-30
Payer: COMMERCIAL

## 2022-03-30 ENCOUNTER — ANTICOAGULATION THERAPY VISIT (OUTPATIENT)
Dept: ANTICOAGULATION | Facility: CLINIC | Age: 72
End: 2022-03-30

## 2022-03-30 VITALS
HEART RATE: 110 BPM | RESPIRATION RATE: 14 BRPM | BODY MASS INDEX: 36 KG/M2 | OXYGEN SATURATION: 93 % | SYSTOLIC BLOOD PRESSURE: 118 MMHG | WEIGHT: 200 LBS | DIASTOLIC BLOOD PRESSURE: 80 MMHG | TEMPERATURE: 98.8 F

## 2022-03-30 DIAGNOSIS — I48.20 CHRONIC ATRIAL FIBRILLATION (H): ICD-10-CM

## 2022-03-30 DIAGNOSIS — I10 ESSENTIAL HYPERTENSION, BENIGN: ICD-10-CM

## 2022-03-30 DIAGNOSIS — I48.0 AF (PAROXYSMAL ATRIAL FIBRILLATION) (H): Primary | ICD-10-CM

## 2022-03-30 DIAGNOSIS — Z79.01 LONG TERM CURRENT USE OF ANTICOAGULANT THERAPY: ICD-10-CM

## 2022-03-30 DIAGNOSIS — I48.0 AF (PAROXYSMAL ATRIAL FIBRILLATION) (H): ICD-10-CM

## 2022-03-30 DIAGNOSIS — I48.91 ATRIAL FIBRILLATION (H): ICD-10-CM

## 2022-03-30 DIAGNOSIS — E78.2 MIXED HYPERLIPIDEMIA: ICD-10-CM

## 2022-03-30 DIAGNOSIS — N18.31 STAGE 3A CHRONIC KIDNEY DISEASE (H): ICD-10-CM

## 2022-03-30 LAB — INR BLD: 2.4 (ref 0.9–1.1)

## 2022-03-30 PROCEDURE — 99214 OFFICE O/P EST MOD 30 MIN: CPT | Performed by: FAMILY MEDICINE

## 2022-03-30 PROCEDURE — 36416 COLLJ CAPILLARY BLOOD SPEC: CPT

## 2022-03-30 PROCEDURE — 85610 PROTHROMBIN TIME: CPT

## 2022-03-30 RX ORDER — ATORVASTATIN CALCIUM 10 MG/1
10 TABLET, FILM COATED ORAL DAILY
Qty: 90 TABLET | Refills: 3 | Status: SHIPPED | OUTPATIENT
Start: 2022-03-30 | End: 2023-01-01

## 2022-03-30 RX ORDER — HYDROCHLOROTHIAZIDE 25 MG/1
25 TABLET ORAL DAILY
Qty: 90 TABLET | Refills: 3 | Status: SHIPPED | OUTPATIENT
Start: 2022-03-30 | End: 2023-01-01

## 2022-03-30 RX ORDER — DILTIAZEM HYDROCHLORIDE 120 MG/1
CAPSULE, COATED, EXTENDED RELEASE ORAL
Qty: 90 CAPSULE | Refills: 3 | Status: SHIPPED | OUTPATIENT
Start: 2022-03-30 | End: 2023-01-01

## 2022-03-30 RX ORDER — LISINOPRIL 2.5 MG/1
2.5 TABLET ORAL DAILY
Qty: 90 TABLET | Refills: 3 | Status: SHIPPED | OUTPATIENT
Start: 2022-03-30 | End: 2023-01-01

## 2022-03-30 ASSESSMENT — PATIENT HEALTH QUESTIONNAIRE - PHQ9
SUM OF ALL RESPONSES TO PHQ QUESTIONS 1-9: 1
10. IF YOU CHECKED OFF ANY PROBLEMS, HOW DIFFICULT HAVE THESE PROBLEMS MADE IT FOR YOU TO DO YOUR WORK, TAKE CARE OF THINGS AT HOME, OR GET ALONG WITH OTHER PEOPLE: NOT DIFFICULT AT ALL

## 2022-03-30 ASSESSMENT — PAIN SCALES - GENERAL: PAINLEVEL: NO PAIN (0)

## 2022-03-30 NOTE — PROGRESS NOTES
ANTICOAGULATION MANAGEMENT     Latanya Padron 72 year old female is on warfarin with therapeutic INR result. (Goal INR 2.0-3.0)    Recent labs: (last 7 days)     03/30/22  1035   INR 2.4*       ASSESSMENT     Source(s): Chart Review and Patient/Caregiver Call     Warfarin doses taken: Warfarin taken as instructed  Diet: No new diet changes identified  New illness, injury, or hospitalization: No  Medication/supplement changes: None noted  Signs or symptoms of bleeding or clotting: No  Previous INR: Therapeutic last visit; previously outside of goal range  Additional findings: None       PLAN     Recommended plan for no diet, medication or health factor changes affecting INR     Dosing Instructions: Continue your current warfarin dose with next INR in 3 weeks       Summary  As of 3/30/2022    Full warfarin instructions:  3.75 mg every Tue, Thu, Sat; 2.5 mg all other days   Next INR check:  4/20/2022             Telephone call with Ivette who verbalizes understanding and agrees to plan and who agrees to plan and repeated back plan correctly    Lab visit scheduled    Education provided: Please call back if any changes to your diet, medications or how you've been taking warfarin    Plan made per [facility] anticoagulation protocol    Manolo Damian RN  Anticoagulation Clinic  3/30/2022    _______________________________________________________________________     Anticoagulation Episode Summary     Current INR goal:  2.0-3.0   TTR:  67.1 % (1 y)   Target end date:  Indefinite   Send INR reminders to:  WAT[facility] NASCIMENTO    Indications    AF (paroxysmal atrial fibrillation) (H) [I48.0]  Atrial fibrillation (H) [I48.91]  Long term current use of anticoagulant therapy [Z79.01]  Atrial fibrillation  unspecified type (H) (Resolved) [I48.91]           Comments:           Anticoagulation Care Providers     Provider Role Specialty Phone number    Nazario Jones MD Referring Family Medicine [phone]

## 2022-03-30 NOTE — PROGRESS NOTES
"  Assessment & Plan     (I10) Essential hypertension, benign  Comment: Patient's blood pressure stable. Patient has been taking Lisinopril, Diltiazem, and hydrochlorothiazide and denies side effects from the medications.   Plan: Albumin Random Urine Quantitative with Creat         Ratio, Basic metabolic panel  (Ca, Cl, CO2,         Creat, Gluc, K, Na, BUN). Patient not fasting today. She will schedule with labs when to come back for fasting blood draw. Refills sent to the pharmacy for her    (E78.2) Mixed hyperlipidemia  Comment: Comment: Patient has been taking Lipitor for hyperlipidemia. Patient agreeable to lipid panel  Plan: Lipid panel reflex to direct LDL Fasting,         Comprehensive metabolic panel (BMP + Alb, Alk         Phos, ALT, AST, Total. Bili, TP)        Return for fasting labs, refill sent    (N18.31) Stage 3a chronic kidney disease (H)  Comment: on Lisinopril  Plan: continue current dose and recheck labs when she returns for fasting lab work    (I48.20) Chronic atrial fibrillation (H)  Comment: stable on her meds and coumadin  Plan: diltiazem ER COATED BEADS (CARDIZEM CD/CARTIA         XT) 120 MG 24 hr capsule        Continue current regimen and follow up with coumadin clinic.             25 minutes spent on the date of the encounter doing chart review, history and exam, documentation and further activities per the note       BMI:   Estimated body mass index is 36 kg/m  as calculated from the following:    Height as of 3/23/22: 1.588 m (5' 2.5\").    Weight as of this encounter: 90.7 kg (200 lb).   Weight management plan: Discussed healthy diet and exercise guidelines Patient has been losing weight progressively. Encouraged her to continue working on managing her weight.     FUTURE LABS:       - Scheduled a fasting blood draw: Patient will schedule blood draw with labs before leaving today.     No follow-ups on file.    Electronically signed by:  Nazario Jones M.D.  3/30/2022      Subjective "   Ivette is a 72 year old with a history of atrial fibrillation, hypertension, COPD, and obesity who presents for medications refills.    History of Present Illness       Mental Health Follow-up:                    Today's PHQ-9         PHQ-9 Total Score: 1  PHQ-9 Q9 Thoughts of better off dead/self-harm past 2 weeks :   (P) Not at all    How difficult have these problems made it for you to do your work, take care of things at home, or get along with other people: Not difficult at all        Reason for visit:  Mecication  Symptoms include:  None  Had these symptoms before:  No    She eats 2-3 servings of fruits and vegetables daily.She consumes 0 sweetened beverage(s) daily.She exercises with enough effort to increase her heart rate 9 or less minutes per day.  She exercises with enough effort to increase her heart rate 3 or less days per week.   She is taking medications regularly.       Depression Followup    How are you doing with your depression since your last visit? Improved     Are you having other symptoms that might be associated with depression? No    Have you had a significant life event?  No     Are you feeling anxious or having panic attacks?   No    Do you have any concerns with your use of alcohol or other drugs? No    Social History     Tobacco Use     Smoking status: Former Smoker     Packs/day: 1.00     Years: 30.00     Pack years: 30.00     Quit date: 10/25/2005     Years since quittin.4     Smokeless tobacco: Never Used   Vaping Use     Vaping Use: Never used   Substance Use Topics     Alcohol use: No     Alcohol/week: 0.0 standard drinks     Drug use: No     PHQ 2020 2/10/2021 3/29/2022   PHQ-9 Total Score 2 1 1   Q9: Thoughts of better off dead/self-harm past 2 weeks Not at all Not at all Not at all     SHARLA-7 SCORE 2018   Total Score 0 0 2     Last PHQ-9 3/29/2022   1.  Little interest or pleasure in doing things 0   2.  Feeling down, depressed, or hopeless 0    3.  Trouble falling or staying asleep, or sleeping too much 1   4.  Feeling tired or having little energy 0   5.  Poor appetite or overeating 0   6.  Feeling bad about yourself 0   7.  Trouble concentrating 0   8.  Moving slowly or restless 0   Q9: Thoughts of better off dead/self-harm past 2 weeks 0   PHQ-9 Total Score 1   Difficulty at work, home, or with people -       Suicide Assessment Five-step Evaluation and Treatment (SAFE-T)    Review of Systems   Constitutional, HEENT, cardiovascular, pulmonary, gi and gu systems are negative, except as otherwise noted.      Objective    /80   Pulse 110   Temp 98.8  F (37.1  C) (Temporal)   Resp 14   Wt 90.7 kg (200 lb)   SpO2 93%   BMI 36.00 kg/m    Body mass index is 36 kg/m .     Physical Exam   GENERAL: healthy, alert and no distress  EYES: Eyes grossly normal to inspection, PERRL and conjunctivae and sclerae normal  HENT: ear canals and TM's normal, nose and mouth without ulcers or lesions  NECK: no adenopathy, no asymmetry, masses, or scars and thyroid normal to palpation  RESP: lungs clear to auscultation - no rales, rhonchi or wheezes  BREAST: No breast exam done. we discussed self breast awareness and what to be concerned abot, i.e; retraction of the nipple, dimpling of the skin or any nipple discharge or aerolar rash that does not clear. Patient has a history of breast cancer and following with oncology.   CV: regular rate and rhythm, normal S1 S2, no S3 or S4, no murmur, click or rub, no peripheral edema and peripheral pulses strong  ABDOMEN: soft, nontender, no hepatosplenomegaly, no masses and bowel sounds normal  MS: no gross musculoskeletal defects noted, no edema  GYN: Exam deferred, patient not due for a Pap smear and she is without complaints or concerns today.  SKIN: no suspicious lesions or rashes  NEURO: Normal strength and tone, mentation intact and speech normal  PSYCH: mentation appears normal, affect normal/bright    Labs ordered for  future. Patient not fasting today. She will schedule with labs when to come back in the next few days to have labs drawn.

## 2022-03-31 ENCOUNTER — MYC MEDICAL ADVICE (OUTPATIENT)
Dept: FAMILY MEDICINE | Facility: CLINIC | Age: 72
End: 2022-03-31

## 2022-03-31 ENCOUNTER — LAB (OUTPATIENT)
Dept: LAB | Facility: CLINIC | Age: 72
End: 2022-03-31
Payer: COMMERCIAL

## 2022-03-31 DIAGNOSIS — I10 ESSENTIAL HYPERTENSION, BENIGN: ICD-10-CM

## 2022-03-31 DIAGNOSIS — E78.2 MIXED HYPERLIPIDEMIA: ICD-10-CM

## 2022-03-31 LAB
ALBUMIN SERPL-MCNC: 3.7 G/DL (ref 3.4–5)
ALP SERPL-CCNC: 48 U/L (ref 40–150)
ALT SERPL W P-5'-P-CCNC: 31 U/L (ref 0–50)
ANION GAP SERPL CALCULATED.3IONS-SCNC: 3 MMOL/L (ref 3–14)
AST SERPL W P-5'-P-CCNC: 18 U/L (ref 0–45)
BILIRUB SERPL-MCNC: 0.6 MG/DL (ref 0.2–1.3)
BUN SERPL-MCNC: 27 MG/DL (ref 7–30)
CALCIUM SERPL-MCNC: 8.9 MG/DL (ref 8.5–10.1)
CHLORIDE BLD-SCNC: 109 MMOL/L (ref 94–109)
CHOLEST SERPL-MCNC: 129 MG/DL
CO2 SERPL-SCNC: 27 MMOL/L (ref 20–32)
CREAT SERPL-MCNC: 1.12 MG/DL (ref 0.52–1.04)
CREAT UR-MCNC: 248 MG/DL
FASTING STATUS PATIENT QL REPORTED: YES
GFR SERPL CREATININE-BSD FRML MDRD: 52 ML/MIN/1.73M2
GLUCOSE BLD-MCNC: 111 MG/DL (ref 70–99)
HDLC SERPL-MCNC: 67 MG/DL
LDLC SERPL CALC-MCNC: 50 MG/DL
MICROALBUMIN UR-MCNC: 82 MG/L
MICROALBUMIN/CREAT UR: 33.06 MG/G CR (ref 0–25)
NONHDLC SERPL-MCNC: 62 MG/DL
POTASSIUM BLD-SCNC: 4.2 MMOL/L (ref 3.4–5.3)
PROT SERPL-MCNC: 7.1 G/DL (ref 6.8–8.8)
SODIUM SERPL-SCNC: 139 MMOL/L (ref 133–144)
TRIGL SERPL-MCNC: 62 MG/DL

## 2022-03-31 PROCEDURE — 82043 UR ALBUMIN QUANTITATIVE: CPT

## 2022-03-31 PROCEDURE — 80053 COMPREHEN METABOLIC PANEL: CPT

## 2022-03-31 PROCEDURE — 36415 COLL VENOUS BLD VENIPUNCTURE: CPT

## 2022-03-31 PROCEDURE — 80061 LIPID PANEL: CPT

## 2022-04-09 ENCOUNTER — HEALTH MAINTENANCE LETTER (OUTPATIENT)
Age: 72
End: 2022-04-09

## 2022-04-14 ENCOUNTER — MYC MEDICAL ADVICE (OUTPATIENT)
Dept: FAMILY MEDICINE | Facility: CLINIC | Age: 72
End: 2022-04-14
Payer: COMMERCIAL

## 2022-04-14 ENCOUNTER — VIRTUAL VISIT (OUTPATIENT)
Dept: FAMILY MEDICINE | Facility: OTHER | Age: 72
End: 2022-04-14
Payer: COMMERCIAL

## 2022-04-14 ENCOUNTER — TELEPHONE (OUTPATIENT)
Dept: FAMILY MEDICINE | Facility: CLINIC | Age: 72
End: 2022-04-14

## 2022-04-14 DIAGNOSIS — Z87.891 HISTORY OF TOBACCO USE: ICD-10-CM

## 2022-04-14 DIAGNOSIS — J43.9 PULMONARY EMPHYSEMA, UNSPECIFIED EMPHYSEMA TYPE (H): ICD-10-CM

## 2022-04-14 DIAGNOSIS — R05.9 COUGH: ICD-10-CM

## 2022-04-14 DIAGNOSIS — I48.20 CHRONIC ATRIAL FIBRILLATION (H): Primary | ICD-10-CM

## 2022-04-14 PROCEDURE — 99441 PR PHYSICIAN TELEPHONE EVALUATION 5-10 MIN: CPT | Mod: 95 | Performed by: PHYSICIAN ASSISTANT

## 2022-04-14 RX ORDER — PREDNISONE 10 MG/1
10 TABLET ORAL 2 TIMES DAILY
Qty: 10 TABLET | Refills: 0 | Status: SHIPPED | OUTPATIENT
Start: 2022-04-14 | End: 2022-04-19

## 2022-04-14 RX ORDER — DOXYCYCLINE 100 MG/1
100 CAPSULE ORAL 2 TIMES DAILY
Qty: 10 CAPSULE | Refills: 0 | Status: SHIPPED | OUTPATIENT
Start: 2022-04-14 | End: 2022-04-19

## 2022-04-14 RX ORDER — PREDNISONE 10 MG/1
TABLET ORAL
Qty: 10 TABLET | Refills: 4 | OUTPATIENT
Start: 2022-04-14

## 2022-04-14 ASSESSMENT — PAIN SCALES - GENERAL: PAINLEVEL: NO PAIN (0)

## 2022-04-14 NOTE — PROGRESS NOTES
Ivette is a 72 year old who is being evaluated via a billable telephone visit.      What phone number would you like to be contacted at? [phone]  How would you like to obtain your AVS? MyChart    Assessment & Plan     Chronic atrial fibrillation (H)  Patient is on warfarin and will reach out to the coumadin clinic to inform them that she will be starting an antibiotic as well as oral steroid for the next week.     History of tobacco use  Pulmonary emphysema, unspecified emphysema type (H)  Cough  Patient has history of tobacco use and emphysema. She has been struggling with cough and URI symptoms over this week. She did take a home covid test which returned negative. She states that Dr. Blue would treat her with oral steroids which were helpful. I will repeat a low dose prednisone burst as well as have her take short course of doxycycline to cover for infection. She was educated on the possible adverse effects of the medications and will call if not improving as expected.   - doxycycline hyclate (VIBRAMYCIN) 100 MG capsule; Take 1 capsule (100 mg) by mouth 2 times daily for 5 days  - predniSONE (DELTASONE) 10 MG tablet; Take 1 tablet (10 mg) by mouth 2 times daily for 5 days (take with food)      [facility]    Elva Steele is a 72 year old who presents for the following health issues     HPI     Concern: Wants prednisone refilled. She states only using it when she has a chest cold since she prone to bronchitis/pneumonia. Coughing up green mucous. She states she been through this before and knows the routine. She stated she knows she has to contact her coumadin nurse to get her level checked.   Negative for Covid today.       Review of Systems   Constitutional, HEENT, cardiovascular, pulmonary, gi and gu systems are negative, except as otherwise noted.      Objective           Vitals:  No vitals were obtained today due to virtual visit.    Physical Exam      Remainder of exam unable to be completed due to telephone visits                Phone call duration: 7 minutes

## 2022-04-14 NOTE — TELEPHONE ENCOUNTER
Lab appt schedule for Monday 4/18/22 at 1215. Patient verbalized understanding and agrees with plan of care. Pt had no further questions or concerns at this time.     Manolo Damian, RN, BSN  [facility] Anticoagulation Team

## 2022-04-14 NOTE — TELEPHONE ENCOUNTER
Patient has questions regarding dosing instructions, she found out today they she is going to start taking Doxycycline and Prednsione medications and needs to know if she should be taking Warfarin differently? Please call her back at [phone].  Dena Joshi   [facility]  Central Scheduler

## 2022-04-18 ENCOUNTER — LAB (OUTPATIENT)
Dept: LAB | Facility: CLINIC | Age: 72
End: 2022-04-18
Payer: COMMERCIAL

## 2022-04-18 ENCOUNTER — ANTICOAGULATION THERAPY VISIT (OUTPATIENT)
Dept: ANTICOAGULATION | Facility: CLINIC | Age: 72
End: 2022-04-18

## 2022-04-18 DIAGNOSIS — Z20.822 SUSPECTED COVID-19 VIRUS INFECTION: ICD-10-CM

## 2022-04-18 DIAGNOSIS — Z79.01 LONG TERM CURRENT USE OF ANTICOAGULANT THERAPY: ICD-10-CM

## 2022-04-18 DIAGNOSIS — I48.0 AF (PAROXYSMAL ATRIAL FIBRILLATION) (H): Primary | ICD-10-CM

## 2022-04-18 DIAGNOSIS — I48.91 ATRIAL FIBRILLATION (H): ICD-10-CM

## 2022-04-18 DIAGNOSIS — I48.0 AF (PAROXYSMAL ATRIAL FIBRILLATION) (H): ICD-10-CM

## 2022-04-18 LAB — INR BLD: 3.3 (ref 0.9–1.1)

## 2022-04-18 PROCEDURE — U0003 INFECTIOUS AGENT DETECTION BY NUCLEIC ACID (DNA OR RNA); SEVERE ACUTE RESPIRATORY SYNDROME CORONAVIRUS 2 (SARS-COV-2) (CORONAVIRUS DISEASE [COVID-19]), AMPLIFIED PROBE TECHNIQUE, MAKING USE OF HIGH THROUGHPUT TECHNOLOGIES AS DESCRIBED BY CMS-2020-01-R: HCPCS

## 2022-04-18 PROCEDURE — 36416 COLLJ CAPILLARY BLOOD SPEC: CPT

## 2022-04-18 PROCEDURE — U0005 INFEC AGEN DETEC AMPLI PROBE: HCPCS

## 2022-04-18 PROCEDURE — 85610 PROTHROMBIN TIME: CPT

## 2022-04-18 NOTE — PROGRESS NOTES
ANTICOAGULATION MANAGEMENT     Latanya Padron 72 year old female is on warfarin with supratherapeutic INR result. (Goal INR 2.0-3.0)    Recent labs: (last 7 days)     04/18/22  1620   INR 3.3*       ASSESSMENT     Source(s): Chart Review and Patient/Caregiver Call     Warfarin doses taken: Warfarin taken as instructed  Diet: No new diet changes identified  New illness, injury, or hospitalization: Yes: 4/14 - treated for bad cough  Medication/supplement changes: Doxyclycline 5 day course (dates: 4/14-4/19) which has potential for interaction; increasing INR  Prednisone 5 day course (dates: 4/14-4/19) which has potential for interaction; increasing INR  Signs or symptoms of bleeding or clotting: No  Previous INR: Therapeutic last 2(+) visits  Additional findings: None       PLAN     Recommended plan for temporary change(s) affecting INR     Dosing Instructions: hold two partial doses doses then continue your current warfarin dose with next INR in 2 weeks       Summary  As of 4/18/2022    Full warfarin instructions:  4/18: 1.25 mg; 4/19: 2.5 mg; Otherwise 3.75 mg every Tue, Thu, Sat; 2.5 mg all other days   Next INR check:  5/2/2022             Telephone call with Ivette who verbalizes understanding and agrees to plan and who agrees to plan and repeated back plan correctly    Lab visit scheduled    Education provided: Please call back if any changes to your diet, medications or how you've been taking warfarin, Goal range and significance of current result, Importance of therapeutic range and Potential interaction between warfarin and Prednisone and Doxy    Plan made per [facility] anticoagulation protocol    Manolo Damian, RN  Anticoagulation Clinic  4/18/2022    _______________________________________________________________________     Anticoagulation Episode Summary     Current INR goal:  2.0-3.0   TTR:  65.4 % (1 y)   Target end date:  Indefinite   Send INR reminders to:  WAT[facility] NASCIMENTO    Indications    AF  (paroxysmal atrial fibrillation) (H) [I48.0]  Atrial fibrillation (H) [I48.91]  Long term current use of anticoagulant therapy [Z79.01]  Atrial fibrillation  unspecified type (H) (Resolved) [I48.91]           Comments:           Anticoagulation Care Providers     Provider Role Specialty Phone number    Nazario Jones MD Referring Family Medicine [phone]

## 2022-04-19 LAB — SARS-COV-2 RNA RESP QL NAA+PROBE: NEGATIVE

## 2022-04-21 DIAGNOSIS — I48.0 AF (PAROXYSMAL ATRIAL FIBRILLATION) (H): ICD-10-CM

## 2022-04-21 DIAGNOSIS — I48.91 ATRIAL FIBRILLATION, UNSPECIFIED TYPE (H): ICD-10-CM

## 2022-04-22 RX ORDER — WARFARIN SODIUM 2.5 MG/1
TABLET ORAL
Qty: 100 TABLET | Refills: 1 | Status: SHIPPED | OUTPATIENT
Start: 2022-04-22 | End: 2022-04-22

## 2022-04-22 RX ORDER — WARFARIN SODIUM 2.5 MG/1
TABLET ORAL
Qty: 100 TABLET | Refills: 1 | Status: SHIPPED | OUTPATIENT
Start: 2022-04-22 | End: 2022-08-08

## 2022-04-22 NOTE — TELEPHONE ENCOUNTER
ANTICOAGULATION MANAGEMENT:  Medication Refill    Anticoagulation Summary  As of 4/18/2022    Warfarin maintenance plan:  3.75 mg (2.5 mg x 1.5) every Tue, Thu, Sat; 2.5 mg (2.5 mg x 1) all other days   Next INR check:  5/2/2022   Target end date:  Indefinite    Indications    AF (paroxysmal atrial fibrillation) (H) [I48.0]  Atrial fibrillation (H) [I48.91]  Long term current use of anticoagulant therapy [Z79.01]  Atrial fibrillation  unspecified type (H) (Resolved) [I48.91]             Anticoagulation Care Providers     Provider Role Specialty Phone number    Nazario Jones MD Referring Family Medicine [phone]          Visit with referring provider/group within last year: Yes    [facility] referral signed within last year: Yes    Latanya meets all criteria for refill (current [facility] referral, office visit with referring provider/group in last year, lab monitoring up to date or not exceeding 2 weeks overdue). Rx instructions and quantity supplied updated to match patient's current dosing plan. Warfarin 90 day supply with 1 refill granted per [facility] protocol     Manolo Damian RN  Anticoagulation Clinic

## 2022-04-29 ENCOUNTER — NURSE TRIAGE (OUTPATIENT)
Dept: FAMILY MEDICINE | Facility: CLINIC | Age: 72
End: 2022-04-29
Payer: COMMERCIAL

## 2022-04-29 NOTE — TELEPHONE ENCOUNTER
Patient called reporting she had an episode of her A-fib acting up while getting her nails done and she passed out for a few seconds. She states about 20-30 minutes later she vomited and had diarrhea and is not having GI upset. She has been taking her medications as prescribed and has a history of A-fib and reports she has episodes at times of it acting up. Patient in agreement to go to ED if she starts feeling worse. She declined to go to urgent care at this time or be worked in today.    José Severino RN    Reason for Disposition    History of heart disease (i.e., heart attack, bypass surgery, angina, angioplasty)    Additional Information    Negative: Passed out (i.e., fainted, collapsed and was not responding)    Negative: Shock suspected (e.g., cold/pale/clammy skin, too weak to stand, low BP, rapid pulse)    Negative: Difficult to awaken or acting confused (e.g., disoriented, slurred speech)    Negative: Visible sweat on face or sweat dripping down face    Negative: Unable to walk, or can only walk with assistance (e.g., requires support)    Negative: Received SHOCK from implantable cardiac defibrillator and has persisting symptoms (i.e., palpitations, lightheadedness)    Negative: Dizziness, lightheadedness, or weakness and heart beating very rapidly (e.g., > 140 / minute)    Negative: Dizziness, lightheadedness, or weakness and heart beating very slowly (e.g., < 50 / minute)    Negative: Sounds like a life-threatening emergency to the triager    Negative: Chest pain    Negative: Difficulty breathing    Negative: Dizziness, lightheadedness, or weakness    Negative: Heart beating very rapidly (e.g., > 140 / minute) and present now (Exception: during exercise)    Negative: Heart beating very slowly (e.g., < 50 / minute) (Exception: athlete)    Negative: New or worsened shortness of breath with activity (dyspnea on exertion)    Negative: Patient sounds very sick or weak to the triager    Negative: Wearing a  "'Holter monitor' or 'cardiac event monitor'    Negative: Received SHOCK from implantable cardiac defibrillator (and now feels well)    Answer Assessment - Initial Assessment Questions  1. DESCRIPTION: \"Please describe your heart rate or heart beat that you are having\" (e.g., fast/slow, regular/irregular, skipped or extra beats, \"palpitations\")      Heart beats very fast, but hsa since stopped  2. ONSET: \"When did it start?\" (Minutes, hours or days)       Started in the morning but has subsided  3. DURATION: \"How long does it last\" (e.g., seconds, minutes, hours)      seconds  4. PATTERN \"Does it come and go, or has it been constant since it started?\"  \"Does it get worse with exertion?\"   \"Are you feeling it now?\"      Has stopped   5. TAP: \"Using your hand, can you tap out what you are feeling on a chair or table in front of you, so that I can hear?\" (Note: not all patients can do this)        Very fast  6. HEART RATE: \"Can you tell me your heart rate?\" \"How many beats in 15 seconds?\"  (Note: not all patients can do this)        No  7. RECURRENT SYMPTOM: \"Have you ever had this before?\" If so, ask: \"When was the last time?\" and \"What happened that time?\"       Yes, is diagnosed with A-fib  8. CAUSE: \"What do you think is causing the palpitations?\"      A-fib    9. CARDIAC HISTORY: \"Do you have any history of heart disease?\" (e.g., heart attack, angina, bypass surgery, angioplasty, arrhythmia)       Yes, A-fib  10. OTHER SYMPTOMS: \"Do you have any other symptoms?\" (e.g., dizziness, chest pain, sweating, difficulty breathing)        No  11. PREGNANCY: \"Is there any chance you are pregnant?\" \"When was your last menstrual period?\"        N/A    Protocols used: HEART RATE AND HEARTBEAT [id number]      "

## 2022-05-02 ENCOUNTER — LAB (OUTPATIENT)
Dept: LAB | Facility: CLINIC | Age: 72
End: 2022-05-02
Payer: COMMERCIAL

## 2022-05-02 ENCOUNTER — MYC MEDICAL ADVICE (OUTPATIENT)
Dept: FAMILY MEDICINE | Facility: CLINIC | Age: 72
End: 2022-05-02

## 2022-05-02 ENCOUNTER — ANTICOAGULATION THERAPY VISIT (OUTPATIENT)
Dept: ANTICOAGULATION | Facility: CLINIC | Age: 72
End: 2022-05-02

## 2022-05-02 DIAGNOSIS — I48.91 ATRIAL FIBRILLATION (H): ICD-10-CM

## 2022-05-02 DIAGNOSIS — I48.0 AF (PAROXYSMAL ATRIAL FIBRILLATION) (H): Primary | ICD-10-CM

## 2022-05-02 DIAGNOSIS — Z79.01 LONG TERM CURRENT USE OF ANTICOAGULANT THERAPY: ICD-10-CM

## 2022-05-02 DIAGNOSIS — I48.0 AF (PAROXYSMAL ATRIAL FIBRILLATION) (H): ICD-10-CM

## 2022-05-02 LAB — INR BLD: 3.7 (ref 0.9–1.1)

## 2022-05-02 PROCEDURE — 85610 PROTHROMBIN TIME: CPT

## 2022-05-02 PROCEDURE — 36416 COLLJ CAPILLARY BLOOD SPEC: CPT

## 2022-05-02 NOTE — PROGRESS NOTES
ANTICOAGULATION MANAGEMENT     Latanya Padron 72 year old female is on warfarin with supratherapeutic INR result. (Goal INR 2.0-3.0)    Recent labs: (last 7 days)     05/02/22  0917   INR 3.7*       ASSESSMENT     Source(s): Chart Review and Patient/Caregiver Call     Warfarin doses taken: Warfarin taken as instructed  Diet: Decreased greens/vitamin K in diet; plans to resume previous intake  New illness, injury, or hospitalization: Yes: Pt did pass out with an episode of vomiting/diarrhea this week - see triage note  Medication/supplement changes: None noted  Signs or symptoms of bleeding or clotting: No  Previous INR: Supratherapeutic  Additional findings: None       PLAN     Recommended plan for temporary change(s) affecting INR     Dosing Instructions: hold dose then decrease your warfarin dose (5.9% change) with next INR in 2 weeks       Summary  As of 5/2/2022    Full warfarin instructions:  5/2: Hold; Otherwise 3.75 mg every Tue, Sat; 2.5 mg all other days   Next INR check:  5/16/2022             Telephone call with Ivette who verbalizes understanding and agrees to plan and who agrees to plan and repeated back plan correctly    Lab visit scheduled    Education provided: Please call back if any changes to your diet, medications or how you've been taking warfarin, Importance of consistent vitamin K intake, Goal range and significance of current result, Importance of therapeutic range, Importance of following up at instructed interval, Monitoring for bleeding signs and symptoms and When to seek medical attention/emergency care    Plan made per [facility] anticoagulation protocol    Manolo Damian, RN  Anticoagulation Clinic  5/2/2022    _______________________________________________________________________     Anticoagulation Episode Summary     Current INR goal:  2.0-3.0   TTR:  61.6 % (1 y)   Target end date:  Indefinite   Send INR reminders to:  [facility]    Indications    AF (paroxysmal atrial  fibrillation) (H) [I48.0]  Atrial fibrillation (H) [I48.91]  Long term current use of anticoagulant therapy [Z79.01]  Atrial fibrillation  unspecified type (H) (Resolved) [I48.91]           Comments:           Anticoagulation Care Providers     Provider Role Specialty Phone number    Nazario Jones MD Referring Family Medicine [phone]

## 2022-05-03 ENCOUNTER — MYC MEDICAL ADVICE (OUTPATIENT)
Dept: FAMILY MEDICINE | Facility: CLINIC | Age: 72
End: 2022-05-03
Payer: COMMERCIAL

## 2022-05-16 ENCOUNTER — LAB (OUTPATIENT)
Dept: LAB | Facility: CLINIC | Age: 72
End: 2022-05-16
Payer: COMMERCIAL

## 2022-05-16 ENCOUNTER — ANTICOAGULATION THERAPY VISIT (OUTPATIENT)
Dept: ANTICOAGULATION | Facility: CLINIC | Age: 72
End: 2022-05-16

## 2022-05-16 DIAGNOSIS — Z79.01 LONG TERM CURRENT USE OF ANTICOAGULANT THERAPY: ICD-10-CM

## 2022-05-16 DIAGNOSIS — I48.91 ATRIAL FIBRILLATION (H): ICD-10-CM

## 2022-05-16 DIAGNOSIS — I48.0 AF (PAROXYSMAL ATRIAL FIBRILLATION) (H): ICD-10-CM

## 2022-05-16 DIAGNOSIS — I48.0 AF (PAROXYSMAL ATRIAL FIBRILLATION) (H): Primary | ICD-10-CM

## 2022-05-16 LAB — INR BLD: 2.5 (ref 0.9–1.1)

## 2022-05-16 PROCEDURE — 85610 PROTHROMBIN TIME: CPT

## 2022-05-16 PROCEDURE — 36416 COLLJ CAPILLARY BLOOD SPEC: CPT

## 2022-05-16 NOTE — PROGRESS NOTES
ANTICOAGULATION MANAGEMENT     Latanya Padron 72 year old female is on warfarin with therapeutic INR result. (Goal INR 2.0-3.0)    Recent labs: (last 7 days)     05/16/22  0907   INR 2.5*       ASSESSMENT     Source(s): Chart Review and Patient/Caregiver Call     Warfarin doses taken: Warfarin taken as instructed  Diet: No new diet changes identified  New illness, injury, or hospitalization: No  Medication/supplement changes: None noted  Signs or symptoms of bleeding or clotting: No  Previous INR: Supratherapeutic  Additional findings: None       PLAN     Recommended plan for no diet, medication or health factor changes affecting INR     Dosing Instructions: continue your current warfarin dose with next INR in 2 weeks       Summary  As of 5/16/2022    Full warfarin instructions:  3.75 mg every Tue, Sat; 2.5 mg all other days   Next INR check:  5/31/2022             Telephone call with Ivette who verbalizes understanding and agrees to plan and who agrees to plan and repeated back plan correctly    Lab visit scheduled    Education provided: Please call back if any changes to your diet, medications or how you've been taking warfarin    Plan made per [facility] anticoagulation protocol    Manolo Damian, RN  Anticoagulation Clinic  5/16/2022    _______________________________________________________________________     Anticoagulation Episode Summary     Current INR goal:  2.0-3.0   TTR:  59.4 % (1 y)   Target end date:  Indefinite   Send INR reminders to:  [facility]    Indications    AF (paroxysmal atrial fibrillation) (H) [I48.0]  Atrial fibrillation (H) [I48.91]  Long term current use of anticoagulant therapy [Z79.01]  Atrial fibrillation  unspecified type (H) (Resolved) [I48.91]           Comments:           Anticoagulation Care Providers     Provider Role Specialty Phone number    Nazario Jones MD Referring Family Medicine [phone]

## 2022-05-18 ASSESSMENT — ENCOUNTER SYMPTOMS
SHORTNESS OF BREATH: 1
BREAST MASS: 1
NAUSEA: 1

## 2022-05-18 ASSESSMENT — ACTIVITIES OF DAILY LIVING (ADL): CURRENT_FUNCTION: NO ASSISTANCE NEEDED

## 2022-05-19 ENCOUNTER — OFFICE VISIT (OUTPATIENT)
Dept: FAMILY MEDICINE | Facility: CLINIC | Age: 72
End: 2022-05-19
Payer: COMMERCIAL

## 2022-05-19 VITALS
OXYGEN SATURATION: 92 % | WEIGHT: 193 LBS | RESPIRATION RATE: 18 BRPM | DIASTOLIC BLOOD PRESSURE: 66 MMHG | TEMPERATURE: 98.4 F | HEART RATE: 112 BPM | SYSTOLIC BLOOD PRESSURE: 118 MMHG | BODY MASS INDEX: 34.74 KG/M2

## 2022-05-19 DIAGNOSIS — C50.812 MALIGNANT NEOPLASM OF OVERLAPPING SITES OF LEFT BREAST IN FEMALE, ESTROGEN RECEPTOR POSITIVE (H): ICD-10-CM

## 2022-05-19 DIAGNOSIS — E66.811 CLASS 1 OBESITY DUE TO EXCESS CALORIES WITHOUT SERIOUS COMORBIDITY WITH BODY MASS INDEX (BMI) OF 34.0 TO 34.9 IN ADULT: ICD-10-CM

## 2022-05-19 DIAGNOSIS — Z00.00 ENCOUNTER FOR MEDICARE ANNUAL WELLNESS EXAM: Primary | ICD-10-CM

## 2022-05-19 DIAGNOSIS — Z17.0 MALIGNANT NEOPLASM OF OVERLAPPING SITES OF LEFT BREAST IN FEMALE, ESTROGEN RECEPTOR POSITIVE (H): ICD-10-CM

## 2022-05-19 DIAGNOSIS — E66.09 CLASS 1 OBESITY DUE TO EXCESS CALORIES WITHOUT SERIOUS COMORBIDITY WITH BODY MASS INDEX (BMI) OF 34.0 TO 34.9 IN ADULT: ICD-10-CM

## 2022-05-19 DIAGNOSIS — F32.5 MAJOR DEPRESSION IN COMPLETE REMISSION (H): ICD-10-CM

## 2022-05-19 DIAGNOSIS — I48.0 AF (PAROXYSMAL ATRIAL FIBRILLATION) (H): ICD-10-CM

## 2022-05-19 PROCEDURE — 90714 TD VACC NO PRESV 7 YRS+ IM: CPT | Performed by: FAMILY MEDICINE

## 2022-05-19 PROCEDURE — 90471 IMMUNIZATION ADMIN: CPT | Performed by: FAMILY MEDICINE

## 2022-05-19 PROCEDURE — 99397 PER PM REEVAL EST PAT 65+ YR: CPT | Mod: 25 | Performed by: FAMILY MEDICINE

## 2022-05-19 RX ORDER — VENLAFAXINE HYDROCHLORIDE 75 MG/1
CAPSULE, EXTENDED RELEASE ORAL
Qty: 90 CAPSULE | Refills: 3 | Status: SHIPPED | OUTPATIENT
Start: 2022-05-19 | End: 2023-01-01

## 2022-05-19 ASSESSMENT — PAIN SCALES - GENERAL: PAINLEVEL: NO PAIN (0)

## 2022-05-19 ASSESSMENT — ENCOUNTER SYMPTOMS
NAUSEA: 1
SHORTNESS OF BREATH: 1
BREAST MASS: 1

## 2022-05-19 ASSESSMENT — ACTIVITIES OF DAILY LIVING (ADL): CURRENT_FUNCTION: NO ASSISTANCE NEEDED

## 2022-05-19 NOTE — PROGRESS NOTES
"SUBJECTIVE:   Latanya Padron is a 72 year old female who presents for Preventive Visit.    Patient has been advised of split billing requirements and indicates understanding: Yes  Are you in the first 12 months of your Medicare coverage?  No    Healthy Habits:     In general, how would you rate your overall health?  Good    Frequency of exercise:  None    Do you usually eat at least 4 servings of fruit and vegetables a day, include whole grains    & fiber and avoid regularly eating high fat or \"junk\" foods?  Yes    Taking medications regularly:  Yes    Medication side effects:  Other    Ability to successfully perform activities of daily living:  No assistance needed    Home Safety:  No safety concerns identified    Hearing Impairment:  No hearing concerns    In the past 6 months, have you been bothered by leaking of urine?  No    In general, how would you rate your overall mental or emotional health?  Good      PHQ-2 Total Score: 0    Additional concerns today:  Yes    Do you feel safe in your environment? Yes    Have you ever done Advance Care Planning? (For example, a Health Directive, POLST, or a discussion with a medical provider or your loved ones about your wishes): Yes, advance care planning is on file.     Fall risk  Fallen 2 or more times in the past year?: No  Any fall with injury in the past year?: No    Cognitive Screening   1) Repeat 3 items (Leader, Season, Table)    2) Clock draw: NORMAL  3) 3 item recall: Recalls 1 object   Results: NORMAL clock, 1-2 items recalled: COGNITIVE IMPAIRMENT LESS LIKELY    Mini-CogTM Copyright RASHAD Mcdonnell. Licensed by the author for use in [facility]; reprinted with permission ([email or web address]). All rights reserved.      Do you have sleep apnea, excessive snoring or daytime drowsiness?: no    Reviewed and updated as needed this visit by clinical staff                    Reviewed and updated as needed this visit by Provider                   Social History "     Tobacco Use     Smoking status: Former Smoker     Packs/day: 1.00     Years: 30.00     Pack years: 30.00     Quit date: 10/25/2005     Years since quittin.5     Smokeless tobacco: Never Used   Substance Use Topics     Alcohol use: No     Alcohol/week: 0.0 standard drinks     If you drink alcohol do you typically have >3 drinks per day or >7 drinks per week? No    Alcohol Use 2022   Prescreen: >3 drinks/day or >7 drinks/week? Not Applicable   Prescreen: >3 drinks/day or >7 drinks/week? -   No flowsheet data found.        PROBLEMS TO ADD ON...  Had an episode of a fib on 22    Current providers sharing in care for this patient include:   Patient Care Team:  Nazario Jones MD as PCP - General (Family Medicine)  Greg Denis MD as MD (Hematology & Oncology)  Donald Aleman MD as MD (Surgery)  Jazmin Desir, RN as Specialty Care Coordinator (Radiation Oncology)  Julita Gaxiola MD as MD (Radiation Oncology)  Prisca Cortez MD as Assigned Cancer Care Provider  Nazario Jones MD as Assigned PCP  Esteban Taylor PA-C as Assigned Musculoskeletal Provider  Donald Aleman MD as Assigned Surgical Provider  Jordyn Myers, RN as Specialty Care Coordinator (Hematology & Oncology)    The following health maintenance items are reviewed in Epic and correct as of today:  Health Maintenance Due   Topic Date Due     COVID-19 Vaccine (4 - Booster for Moderna series) 2022     MEDICARE ANNUAL WELLNESS VISIT  02/10/2022     Lab work is in process  Labs reviewed in EPIC  BP Readings from Last 3 Encounters:   22 118/66   22 118/80   22 110/66    Wt Readings from Last 3 Encounters:   22 87.5 kg (193 lb)   22 90.7 kg (200 lb)   22 92.8 kg (204 lb 8 oz)                  Patient Active Problem List   Diagnosis     Sprain and strain of unspecified site of knee and leg     Disorder of bone and cartilage     Female stress incontinence     Family history of  malignant neoplasm of breast     Hirsutism     HYPERLIPIDEMIA LDL GOAL <130     Advanced directives, counseling/discussion     Tennis elbow     Essential hypertension, benign     Atrial fibrillation (H)     Major depression in complete remission (H)     Morbid obesity, unspecified obesity type (H)     Pulmonary emphysema, unspecified emphysema type (H)     AF (paroxysmal atrial fibrillation) (H)     Primary osteoarthritis of both knees     CKD (chronic kidney disease) stage 3, GFR 30-59 ml/min (H)     Hip pain, right     Multiple joint pain     Trochanteric bursitis of right hip     Segmental dysfunction of sacral region     Segmental dysfunction of lumbar region     Segmental dysfunction of thoracic region     Bilateral low back pain with right-sided sciatica     Malignant neoplasm of overlapping sites of left breast in female, estrogen receptor positive (H)     Age-related osteoporosis without current pathological fracture     Right hip pain     Patellofemoral pain syndrome of right knee     Abnormal gait     Primary osteoarthritis of right knee     Lymphadenopathy of head and neck     Groin pain, right     Restless leg syndrome     Coronary artery disease involving native coronary artery of native heart without angina pectoris     Long term current use of anticoagulant therapy     Epidermoid cyst of labia majora     Past Surgical History:   Procedure Laterality Date     BIOPSY NODE SENTINEL Left 5/15/2019    Procedure: left sentinel node biopsy;  Surgeon: Donald Aleman MD;  Location: [facility]     BREAST BIOPSY, CORE RT/LT Left 04/12/2019     COLONOSCOPY  06/21/10     COLONOSCOPY N/A 3/4/2021    Procedure: Colonoscopy with  polypectomy;  Surgeon: Donald Aleman MD;  Location: [facility] GI     HC BIOPSY OF BREAST, OPEN INCISIONAL Right 1988    Benign per patient      CORRECT BUNION,METATARSAL OSTEOTOMY  1993      LAPAROSCOPY, SURGICAL; CHOLECYSTECTOMY  08/04/10      SLING OPERATION FOR STRESS INCONTINENCE  04/19/2007      HERNIORRHAPHY INCISIONAL (LOCATION)  2011    Procedure:HERNIORRHAPHY INCISIONAL (LOCATION); Surgeon:AYALA HOOVER; Location:[facility] OR     LAPAROSCOPIC HERNIORRHAPHY INCISIONAL  2011    Procedure:LAPAROSCOPIC HERNIORRHAPHY INCISIONAL; Laparoscopic mesh repair of incarcerated incisional hernia , extensive lysis of adhesions, excision of hernia sac and closure of fascia.; Surgeon:AYALA HOOVER; Location:[facility]     LAPAROSCOPIC LYSIS ADHESIONS  2011    Procedure:LAPAROSCOPIC LYSIS ADHESIONS; Surgeon:AYALA HOOVER; Location:[facility]     MASTECTOMY PARTIAL WITH NEEDLE LOCALIZATION Left 5/15/2019    Procedure: left wire localized partial mastectomy;  Surgeon: Donald Aleman MD;  Location: [facility]     TONSILLECTOMY       ZZC APPENDECTOMY,W OTHR PROC       [facility]  DELIVERY ONLY           Z LIGATE FALLOPIAN TUBE  's     Z NONSPECIFIC PROCEDURE      Exploratory laparotomy with resection of infarcted segment of omentum and minor lysis of adhesions     Z NONSPECIFIC PROCEDURE      Removal of hemorrhagic corpus luteum cyst     Z VAGINAL HYSTERECTOMY         Social History     Tobacco Use     Smoking status: Former Smoker     Packs/day: 1.00     Years: 30.00     Pack years: 30.00     Quit date: 10/25/2005     Years since quittin.5     Smokeless tobacco: Never Used   Substance Use Topics     Alcohol use: No     Alcohol/week: 0.0 standard drinks     Family History   Problem Relation Age of Onset     Breast Cancer Mother          at 88     Obesity Mother      Genitourinary Problems Mother      Lipids Mother      Heart Disease Father         by pass (5)     Cerebrovascular Disease Father         hemorragic     Lipids Father      Alzheimer Disease Father 92     Respiratory Sister      Lipids Sister      Breast Cancer Sister 40        s/p mastectomy     Arthritis Sister      Obesity Sister      Heart Failure Sister      Cancer Sister         stomach,  colon, pancreatic     Lipids Sister      Dementia Sister         1/2 sister on dad's side (1947)     Other - See Comments Sister         1948     Cancer Brother         prostate     Melanoma Brother      Heart Disease Brother         1955 (aaron with a partner)     Hyperlipidemia Brother      Heart Disease Brother         stent placement     Hyperlipidemia Brother      Alzheimer Disease Maternal Grandmother      Genitourinary Problems Maternal Grandmother      Cancer Maternal Grandfather      Cancer Paternal Grandfather         lymph nodes     Other - See Comments Daughter         fibromyalgia (1972)     Lymphoma Maternal Aunt      Heart Failure Maternal Aunt 81     Cancer Maternal Uncle         colon     Hemophilia Grandchild      Other - See Comments Grandchild         Transgender         Current Outpatient Medications   Medication Sig Dispense Refill     albuterol (PROAIR HFA/PROVENTIL HFA/VENTOLIN HFA) 108 (90 Base) MCG/ACT inhaler INHALE ONE TO TWO PUFFS BY MOUTH EVERY 2-4 HOURS AS NEEDED 18 g 11     albuterol (PROVENTIL) (2.5 MG/3ML) 0.083% neb solution Take  by nebulization. 1 NEB EVERY 4-6 HOURS AS NEEDED 75 mL 0     anastrozole (ARIMIDEX) 1 MG tablet Take 1 tablet (1 mg) by mouth daily 90 tablet 3     ASPIRIN NOT PRESCRIBED (INTENTIONAL) Please choose reason not prescribed from choices below.       atorvastatin (LIPITOR) 10 MG tablet Take 1 tablet (10 mg) by mouth daily 90 tablet 3     BIOTIN PO Take by mouth daily       Calcium Carb-Cholecalciferol 500-400 MG-UNIT TABS Take 1 tablet by mouth 2 times daily 180 tablet 3     Cyanocobalamin (B-12) 1000 MCG TBCR Take 1,000 mcg by mouth daily 100 tablet 1     diltiazem ER COATED BEADS (CARDIZEM CD/CARTIA XT) 120 MG 24 hr capsule TAKE ONE CAPSULE BY MOUTH ONCE DAILY ( LABS DUE ) 90 capsule 3     hydrochlorothiazide (HYDRODIURIL) 25 MG tablet Take 1 tablet (25 mg) by mouth daily 90 tablet 3     lisinopril (ZESTRIL) 2.5 MG tablet Take 1 tablet (2.5 mg) by mouth daily  90 tablet 3     magnesium 250 MG tablet Take 1 tablet by mouth daily 30 tablet      mometasone-formoterol (DULERA) 200-5 MCG/ACT inhaler INHALE 2 PUFFS BY MOUTH TWO TIMES A DAY 13 g 11     order for DME Equipment being ordered: Oxygen 1 each 0     order for DME Equipment being ordered: Nebulizer 1 Device 0     ORDER FOR DME Equipment being ordered: Oxygen @ 2 liters with exertion       Probiotic Product (PROBIOTIC PO) Take by mouth daily       trospium (SANCTURA) 20 MG tablet Take 20 mg by mouth       venlafaxine (EFFEXOR-XR) 75 MG 24 hr capsule TAKE ONE CAPSULE BY MOUTH ONCE DAILY 90 capsule 1     vitamin B-Complex        VITAMIN D PO        warfarin ANTICOAGULANT (JANTOVEN ANTICOAGULANT) 2.5 MG tablet Take 3.75 mg Tues, Thurs, Sat and 2.5 mg all other days or as directed by the coumadin clinic. 100 tablet 1     Allergies   Allergen Reactions     Augmentin [Amoxicillin-Pot Clavulanate] Nausea and Vomiting     Meperidine Hcl Nausea and Vomiting     demerol     Seasonal Allergies      spring     Recent Labs   Lab Test 03/31/22  0802 01/07/22  1146 07/12/21  0905 07/12/21  0905 02/11/21  0915 01/12/21  1133 07/13/20  0909 02/26/20  0812 04/29/19  1054 06/20/18  1121   LDL 50  --   --   --  86  --   --  65  --  78   HDL 67  --   --   --  61  --   --  47*  --  55   TRIG 62  --   --   --  124  --   --  155*  --  80   ALT 31 19  --  18  --  21 21 16  --  23   CR 1.12* 1.07*   < > 1.16*  --  1.18* 1.14* 1.32*   < > 0.86   GFRESTIMATED 52* 56*   < > 48*  --  47* 49* 41*   < > 66   GFRESTBLACK  --   --   --   --   --  54* 57* 48*   < > 80   POTASSIUM 4.2 4.0   < > 3.9  --  4.1 4.2 3.7   < > 4.0   TSH  --   --   --   --   --   --  2.80  --   --  1.90    < > = values in this interval not displayed.      Mammogram Screening: Mammogram Screening: Recommended mammography per oncology since she has a recent diagnosis of breast cancer, 2 yrs ago.     Review of Systems   Respiratory: Positive for shortness of breath.   "  Gastrointestinal: Positive for nausea.   Breasts:  Positive for tenderness and breast mass. Negative for discharge.   Genitourinary: Negative for pelvic pain, vaginal bleeding and vaginal discharge.     Her breast pain is just where her lumpectomy was done.  She has some scar tissue and some fluid that has collected there.  This is being addressed with the surgeon next month.    She has felt more short of breath when she does increase her activity levels but this is most likely due to deconditioned state and nothing else.  She is down almost 40 pounds since 2018 and feels very good about that.  She is just not eating as larger portions and is decreased her snacking and eating significantly.    She is had some nausea that has been kind of random as far as when it happens.  Oftentimes when she has not had anything to eat at all and she will get the dry heaves.  She does not complain of a lot of of reflux or heartburn symptoms.  We talked about her trying to take Pepto-Bismol or Tums or Rolaids at the first onset of any symptoms to see if she can avert it.  It may be that she is just get increased acid in the stomach when she is got an empty stomach and needs to buffer the acid either with food or an antacid.  She will keep me posted.    OBJECTIVE:   There were no vitals taken for this visit. Estimated body mass index is 36 kg/m  as calculated from the following:    Height as of 3/23/22: 1.588 m (5' 2.5\").    Weight as of 3/30/22: 90.7 kg (200 lb).  Physical Exam  GENERAL: healthy, alert and no distress  EYES: Eyes grossly normal to inspection, PERRL and conjunctivae and sclerae normal  HENT: ear canals and TM's normal, nose and mouth without ulcers or lesions  NECK: no adenopathy, no asymmetry, masses, or scars and thyroid normal to palpation  RESP: lungs clear to auscultation - no rales, rhonchi or wheezes  BREAST: Not examined today.  Patient is seeing her oncologist and surgeon for follow-up.  CV: regular rate " and rhythm, normal S1 S2, no S3 or S4, no murmur, click or rub, no peripheral edema and peripheral pulses strong  ABDOMEN: soft, nontender, no hepatosplenomegaly, no masses and bowel sounds normal   (female): not indicated   MS: no gross musculoskeletal defects noted, no edema  SKIN: no suspicious lesions or rashes  NEURO: Normal strength and tone, mentation intact and speech normal  PSYCH: mentation appears normal, affect normal/bright    Diagnostic Test Results:  Labs reviewed in Epic  No results found for any visits on 05/19/22.    ASSESSMENT / PLAN:   (Z00.00) Encounter for Medicare annual wellness exam  (primary encounter diagnosis)  Comment: Overall patient is doing well.  Plan: Please see her issues noted below.    (I48.0) AF (paroxysmal atrial fibrillation) (H)  Comment: She had a history of paroxysmal atrial fibrillation and is on diltiazem to help control rate and warfarin for anticoagulation.  Plan: ASPIRIN NOT PRESCRIBED (INTENTIONAL)        She had an episode in April where she had rapid heart rate that lasted for about 15 to 20 minutes.  She also had associated nausea and vomiting and diarrhea with it.  She has had nothing since.  If this should recur or she starts getting more frequent episodes of this, we will need to do a Zio patch to get a better idea of what she is doing with her rhythm throughout her day or her week.  She will keep me posted.    (C50.812,  Z17.0) Malignant neoplasm of overlapping sites of left breast in female, estrogen receptor positive (H)  Comment: She had breast cancer diagnosed 2 years ago and is status post a lumpectomy.  Plan: She is still having some pain over the lumpectomy site where she is got some fluid collection and is seeing her specialist for follow-up for that.    (F32.5) Major depression in complete remission (H)  Comment: Depression has been stable on her venlafaxine.  Plan: venlafaxine (EFFEXOR XR) 75 MG 24 hr capsule        Symptoms are minimal.  Continue  "medication with dose unchanged.    Regarding her nausea and dry heaving, she is going to try to increase use of OTC antacids or Pepto-Bismol when she has onset of symptoms to see if this corrects that.  She will also keep track of what she has or has not eaten when the symptoms arise.  If it worsens she will let me know and we will set her up for an upper endoscopy.      Patient has been advised of split billing requirements and indicates understanding: Yes    COUNSELING:  Reviewed preventive health counseling, as reflected in patient instructions       Regular exercise       Healthy diet/nutrition       Vision screening       Dental care       Fall risk prevention       Immunizations    Vaccinated for: I offered her her second COVID booster but she wants to wait until the fall.        Estimated body mass index is 36 kg/m  as calculated from the following:    Height as of 3/23/22: 1.588 m (5' 2.5\").    Weight as of 3/30/22: 90.7 kg (200 lb).    Weight management plan: She is down almost 40 pounds over the past 3-1/2 years.  She will continue to do as much activity as she can and continue managing her portions.    She reports that she quit smoking about 16 years ago. She has a 30.00 pack-year smoking history. She has never used smokeless tobacco.      Appropriate preventive services were discussed with this patient, including applicable screening as appropriate for cardiovascular disease, diabetes, osteopenia/osteoporosis, and glaucoma.  As appropriate for age/gender, discussed screening for colorectal cancer, prostate cancer, breast cancer, and cervical cancer. Checklist reviewing preventive services available has been given to the patient.    Reviewed patients plan of care and provided an AVS. The Basic Care Plan (routine screening as documented in Health Maintenance) for Latanya meets the Care Plan requirement. This Care Plan has been established and reviewed with the Patient.    Counseling Resources:  ATP IV " Guidelines  Pooled Cohorts Equation Calculator  Breast Cancer Risk Calculator  Breast Cancer: Medication to Reduce Risk  FRAX Risk Assessment  ICSI Preventive Guidelines  Dietary Guidelines for Americans, 2010  USDA's MyPlate  ASA Prophylaxis  Lung CA Screening    Electronically signed by:  Nazario Jones M.D.  5/19/2022      Identified Health Risks:    She is at risk for lack of exercise and has been provided with information to increase physical activity for the benefit of her well-being.

## 2022-05-19 NOTE — PATIENT INSTRUCTIONS
Patient Education   Personalized Prevention Plan  You are due for the preventive services outlined below.  Your care team is available to assist you in scheduling these services.  If you have already completed any of these items, please share that information with your care team to update in your medical record.  Health Maintenance Due   Topic Date Due     COVID-19 Vaccine (4 - Booster for Moderna series) 02/05/2022       Exercise for a Healthier Heart  You may wonder how you can improve the health of your heart. If you re thinking about exercise, you re on the right track. You don t need to become an athlete. But you do need a certain amount of brisk exercise to help strengthen your heart. If you have been diagnosed with a heart condition, your healthcare provider may advise exercise to help stabilize your condition. To help make exercise a habit, choose safe, fun activities.      Exercise with a friend. When activity is fun, you're more likely to stick with it.   Before you start  Check with your healthcare provider before starting an exercise program. This is especially important if you have not been active for a while. It's also important if you have a long-term (chronic) health problem such as heart disease, diabetes, or obesity. Or if you are at high risk for having these problems.   Why exercise?  Exercising regularly offers many healthy rewards. It can help you do all of the following:     Improve your blood cholesterol level to help prevent further heart trouble    Lower your blood pressure to help prevent a stroke or heart attack    Control diabetes, or reduce your risk of getting this disease    Improve your heart and lung function    Reach and stay at a healthy weight    Make your muscles stronger so you can stay active    Prevent falls and fractures by slowing the loss of bone mass (osteoporosis)    Manage stress better    Reduce your blood pressure    Improve your sense of self and your body  image  Exercise tips      Ease into your routine. Set small goals. Then build on them. If you are not sure what your activity level should be, talk with your healthcare provider first before starting an exercise routine.    Exercise on most days. Aim for a total of 150 minutes (2 hours and 30 minutes) or more of moderate-intensity aerobic activity each week. Or 75 minutes (1 hour and 15 minutes) or more of vigorous-intensity aerobic activity each week. Or try for a combination of both. Moderate activity means that you breathe heavier and your heart rate increases but you can still talk. Think about doing 40 minutes of moderate exercise, 3 to 4 times a week. For best results, activity should last for about 40 minutes to lower blood pressure and cholesterol. It's OK to work up to the 40-minute period over time. Examples of moderate-intensity activity are walking 1 mile in 15 minutes. Or doing 30 to 45 minutes of yard work.    Step up your daily activity level.  Along with your exercise program, try being more active the whole day. Walk instead of drive. Or park further away so that you take more steps each day. Do more household tasks or yard work. You may not be able to meet the advised mount of physical activity. But doing some moderate- or vigorous-intensity aerobic activity can help reduce your risk for heart disease. Your healthcare provider can help you figure out what is best for you.    Choose 1 or more activities you enjoy.  Walking is one of the easiest things you can do. You can also try swimming, riding a bike, dancing, or taking an exercise class.    When to call your healthcare provider  Call your healthcare provider if you have any of these:     Chest pain or feel dizzy or lightheaded    Burning, tightness, pressure, or heaviness in your chest, neck, shoulders, back, or arms    Abnormal shortness of breath    More joint or muscle pain    A very fast or irregular heartbeat (palpitations)  [employer] last  reviewed this educational content on 7/1/2019 2000-2021 [employer]. All rights reserved. This information is not intended as a substitute for professional medical care. Always follow your healthcare professional's instructions.

## 2022-05-25 ENCOUNTER — MYC MEDICAL ADVICE (OUTPATIENT)
Dept: FAMILY MEDICINE | Facility: CLINIC | Age: 72
End: 2022-05-25
Payer: COMMERCIAL

## 2022-05-25 DIAGNOSIS — I48.0 PAROXYSMAL ATRIAL FIBRILLATION (H): Primary | ICD-10-CM

## 2022-05-25 NOTE — TELEPHONE ENCOUNTER
Patient had another episode of atrial fibrillation with eye doctor.  She has had episodes where she is flipped in and out of that.  When I saw her in the clinic she was in a sinus rhythm.  She will need a Zio patch to see how frequently she is going in and out of atrial fibrillation and then follow-up with cardiology.  I placed the order for the Zio patch.  Can somebody make sure that she gets this scheduled to be applied?    Electronically signed by:  Nazario Jones M.D.  5/25/2022

## 2022-05-27 ENCOUNTER — HOSPITAL ENCOUNTER (OUTPATIENT)
Dept: CARDIOLOGY | Facility: CLINIC | Age: 72
Discharge: HOME | End: 2022-05-27
Attending: FAMILY MEDICINE | Admitting: FAMILY MEDICINE
Payer: COMMERCIAL

## 2022-05-27 DIAGNOSIS — I48.0 PAROXYSMAL ATRIAL FIBRILLATION (H): ICD-10-CM

## 2022-05-27 PROCEDURE — 93248 EXT ECG>7D<15D REV&INTERPJ: CPT | Performed by: INTERNAL MEDICINE

## 2022-05-27 PROCEDURE — 93246 EXT ECG>7D<15D RECORDING: CPT

## 2022-05-31 ENCOUNTER — LAB (OUTPATIENT)
Dept: LAB | Facility: CLINIC | Age: 72
End: 2022-05-31
Payer: COMMERCIAL

## 2022-05-31 ENCOUNTER — ANTICOAGULATION THERAPY VISIT (OUTPATIENT)
Dept: ANTICOAGULATION | Facility: CLINIC | Age: 72
End: 2022-05-31

## 2022-05-31 DIAGNOSIS — I48.91 ATRIAL FIBRILLATION (H): ICD-10-CM

## 2022-05-31 DIAGNOSIS — I48.0 AF (PAROXYSMAL ATRIAL FIBRILLATION) (H): ICD-10-CM

## 2022-05-31 DIAGNOSIS — Z79.01 LONG TERM CURRENT USE OF ANTICOAGULANT THERAPY: ICD-10-CM

## 2022-05-31 DIAGNOSIS — I48.0 AF (PAROXYSMAL ATRIAL FIBRILLATION) (H): Primary | ICD-10-CM

## 2022-05-31 LAB — INR BLD: 2.6 (ref 0.9–1.1)

## 2022-05-31 PROCEDURE — 85610 PROTHROMBIN TIME: CPT

## 2022-05-31 PROCEDURE — 36416 COLLJ CAPILLARY BLOOD SPEC: CPT

## 2022-05-31 NOTE — PROGRESS NOTES
ANTICOAGULATION MANAGEMENT     Latanya Padron 72 year old female is on warfarin with therapeutic INR result. (Goal INR 2.0-3.0)    Recent labs: (last 7 days)     05/31/22  0912   INR 2.6*       ASSESSMENT     Source(s): Chart Review and Patient/Caregiver Call     Warfarin doses taken: Warfarin taken as instructed  Diet: No new diet changes identified  New illness, injury, or hospitalization: No  Medication/supplement changes: None noted  Signs or symptoms of bleeding or clotting: No  Previous INR: Therapeutic last 2(+) visits  Additional findings: None       PLAN     Recommended plan for no diet, medication or health factor changes affecting INR     Dosing Instructions: continue your current warfarin dose with next INR in 3 weeks       Summary  As of 5/31/2022    Full warfarin instructions:  3.75 mg every Tue, Sat; 2.5 mg all other days   Next INR check:  6/21/2022             Telephone call with Ivette who verbalizes understanding and agrees to plan    Lab visit scheduled    Education provided: None required    Plan made per [facility] anticoagulation protocol    Zoey Pickard RN  Anticoagulation Clinic  5/31/2022    _______________________________________________________________________     Anticoagulation Episode Summary     Current INR goal:  2.0-3.0   TTR:  59.4 % (1 y)   Target end date:  Indefinite   Send INR reminders to:  [facility]    Indications    AF (paroxysmal atrial fibrillation) (H) [I48.0]  Atrial fibrillation (H) [I48.91]  Long term current use of anticoagulant therapy [Z79.01]  Atrial fibrillation  unspecified type (H) (Resolved) [I48.91]           Comments:           Anticoagulation Care Providers     Provider Role Specialty Phone number    Nazario Jones MD Referring Family Medicine [phone]

## 2022-06-16 ENCOUNTER — MYC MEDICAL ADVICE (OUTPATIENT)
Dept: FAMILY MEDICINE | Facility: CLINIC | Age: 72
End: 2022-06-16

## 2022-06-16 ENCOUNTER — E-VISIT (OUTPATIENT)
Dept: FAMILY MEDICINE | Facility: OTHER | Age: 72
End: 2022-06-16
Payer: COMMERCIAL

## 2022-06-16 DIAGNOSIS — J40 BRONCHITIS: Primary | ICD-10-CM

## 2022-06-16 DIAGNOSIS — R09.81 NASAL CONGESTION: ICD-10-CM

## 2022-06-16 PROCEDURE — 99421 OL DIG E/M SVC 5-10 MIN: CPT | Performed by: PHYSICIAN ASSISTANT

## 2022-06-16 RX ORDER — BENZONATATE 100 MG/1
100 CAPSULE ORAL 3 TIMES DAILY PRN
Qty: 30 CAPSULE | Refills: 0 | Status: SHIPPED | OUTPATIENT
Start: 2022-06-16 | End: 2022-08-03

## 2022-06-16 RX ORDER — FLUTICASONE PROPIONATE 50 MCG
1 SPRAY, SUSPENSION (ML) NASAL DAILY
Qty: 15.8 ML | Refills: 0 | Status: ON HOLD | OUTPATIENT
Start: 2022-06-16 | End: 2023-01-01

## 2022-06-16 RX ORDER — PREDNISONE 20 MG/1
20 TABLET ORAL DAILY
Qty: 5 TABLET | Refills: 0 | Status: SHIPPED | OUTPATIENT
Start: 2022-06-16 | End: 2022-06-20

## 2022-06-16 NOTE — PATIENT INSTRUCTIONS
"  Dear Latanya Padron    After reviewing your responses, I've been able to diagnose you with \"Bronchitis\" which is a common infection of your lungs that is nearly always caused by a virus. The virus causes swelling and irritation of the air passages of your lungs which leads to cough. The illness spreads from your nose and throat to your windpipe and airways. It is often called a \"chest cold\" and can last up to 2 weeks, but is not a serious illness. Exposure to cigarette smoke usually makes this significantly worse.     To treat bronchitis, the main thing to do is drink lots of fluids and rest. Cough medications over-the-counter such as mucinex, robitussin or \"cold and sinus\" medications can be helpful. Tylenol can also help with aching feelings that you often have with this kind of illness. Do not take ibuprofen if you have kidney disease, stomach ulcers or allergy to aspirin.  I have sent out some additional therapies to help with your symptoms.     Bronchitis is most often highly contagious as viruses are spread through the air or touch. Avoid contact with others who may become infected, particularly children, the elderly and those whose immune systems might be weak.    If your symptoms worsen, you develop chest pain or shortness of breath, fevers over 101, or are not improving in 5 days, please contact your primary care provider for an appointment or visit any of our convenient Walk-in Care or Urgent Care Centers to be seen which can be found on our website here.    Thanks again for choosing us as your health care partner,    Tom Monte PA-C  "

## 2022-06-17 ENCOUNTER — TELEPHONE (OUTPATIENT)
Dept: FAMILY MEDICINE | Facility: CLINIC | Age: 72
End: 2022-06-17
Payer: COMMERCIAL

## 2022-06-17 NOTE — TELEPHONE ENCOUNTER
Spoke with Ivette and she started some prednisone yesterday due to bronchitis. She is already scheduled for an INR on Tuesday 6/21 so she will keep this appt. She will add an extra helping or two of Vit K over the weekend and discuss more after her INR on Tuesday. Patient verbalized understanding and agrees with plan of care. Pt had no further questions or concerns at this time.     Manolo Damian RN, BSN  [facility] Anticoagulation Team

## 2022-06-20 ENCOUNTER — VIRTUAL VISIT (OUTPATIENT)
Dept: FAMILY MEDICINE | Facility: CLINIC | Age: 72
End: 2022-06-20
Payer: COMMERCIAL

## 2022-06-20 DIAGNOSIS — J40 BRONCHITIS: ICD-10-CM

## 2022-06-20 DIAGNOSIS — J44.1 COPD EXACERBATION (H): Primary | ICD-10-CM

## 2022-06-20 DIAGNOSIS — J43.9 PULMONARY EMPHYSEMA, UNSPECIFIED EMPHYSEMA TYPE (H): ICD-10-CM

## 2022-06-20 PROCEDURE — 99214 OFFICE O/P EST MOD 30 MIN: CPT | Mod: 95 | Performed by: FAMILY MEDICINE

## 2022-06-20 RX ORDER — ALBUTEROL SULFATE 0.83 MG/ML
SOLUTION RESPIRATORY (INHALATION)
Qty: 75 ML | Refills: 0 | Status: SHIPPED | OUTPATIENT
Start: 2022-06-20 | End: 2023-01-01

## 2022-06-20 RX ORDER — ALBUTEROL SULFATE 0.83 MG/ML
SOLUTION RESPIRATORY (INHALATION)
Qty: 75 ML | Refills: 0 | Status: CANCELLED | OUTPATIENT
Start: 2022-06-20

## 2022-06-20 RX ORDER — DOXYCYCLINE 100 MG/1
100 CAPSULE ORAL 2 TIMES DAILY
Qty: 20 CAPSULE | Refills: 0 | Status: SHIPPED | OUTPATIENT
Start: 2022-06-20 | End: 2022-06-30

## 2022-06-20 RX ORDER — PREDNISONE 20 MG/1
20 TABLET ORAL DAILY
Qty: 5 TABLET | Refills: 0 | Status: SHIPPED | OUTPATIENT
Start: 2022-06-20 | End: 2022-06-30

## 2022-06-20 NOTE — PROGRESS NOTES
Ivette is a 72 year old who is being evaluated via a billable telephone visit.      What phone number would you like to be contacted at? [phone] HOME phone.  How would you like to obtain your AVS? MyChart    Assessment & Plan     Bronchitis  Acute on chronic bronchitis. Initial treatment with oral steroids which help some but she coughs foul sputum and low grade temp. Will continue oral steroids and add doxycycline for 10 days. Follow up in clinic if not improving in 7 days. Encourage staying inside during current Heat Advisory.  - predniSONE (DELTASONE) 20 MG tablet; Take 1 tablet (20 mg) by mouth daily for 10 days    COPD exacerbation (H)  Acute on chronic bronchitis. Initial treatment with oral steroids which help some but she coughs foul sputum and low grade temp. Will continue oral steroids and add doxycycline for 10 days. Follow up in clinic if not improving in 7 days. Encourage staying inside during current Heat Advisory.  - doxycycline hyclate (VIBRAMYCIN) 100 MG capsule; Take 1 capsule (100 mg) by mouth 2 times daily for 10 days    Pulmonary emphysema, unspecified emphysema type (H)  Acute on chronic bronchitis. Initial treatment with oral steroids which help some but she coughs foul sputum and low grade temp. Will continue oral steroids and add doxycycline for 10 days. Follow up in clinic if not improving in 7 days. Encourage staying inside during current Heat Advisory.  - albuterol (PROVENTIL) (2.5 MG/3ML) 0.083% neb solution; Take  by nebulization. 1 NEB EVERY 4-6 HOURS AS NEEDED    Prescription drug management         See Patient Instructions    Return in about 1 week (around 6/27/2022) for Follow up, in person with PCP , If symptoms persist or do not improve.    Stefan Ac MD  [facility]   Ivette is a 72 year old, presenting for the following health issues:  Cough      HPI     Acute Illness  Acute illness concerns: cough with thick brown mucus. Worried about  bronchitis.   Onset/Duration:   Symptoms:  Fever: YES  Chills/Sweats: YES  Headache (location?): YES- once and a while  Sinus Pressure: no  Conjunctivitis:  no  Ear Pain: YES:   Rhinorrhea: YES  Congestion: YES  Sore Throat: YES  Cough: YES-productive of green/brown sputum  Wheeze: patient is on oxygen   Decreased Appetite: YES  Nausea: YES  Vomiting: no  Diarrhea: no  Dysuria/Freq.: no  Dysuria or Hematuria: no  Fatigue/Achiness: no  Sick/Strep Exposure: no  Therapies tried and outcome: Prednisone some help        Patient Active Problem List   Diagnosis     Sprain and strain of unspecified site of knee and leg     Disorder of bone and cartilage     Female stress incontinence     Family history of malignant neoplasm of breast     Hirsutism     HYPERLIPIDEMIA LDL GOAL <130     Advanced directives, counseling/discussion     Tennis elbow     Essential hypertension, benign     Atrial fibrillation (H)     Major depression in complete remission (H)     Class 1 obesity due to excess calories without serious comorbidity with body mass index (BMI) of 34.0 to 34.9 in adult     Pulmonary emphysema, unspecified emphysema type (H)     AF (paroxysmal atrial fibrillation) (H)     Primary osteoarthritis of both knees     CKD (chronic kidney disease) stage 3, GFR 30-59 ml/min (H)     Hip pain, right     Multiple joint pain     Trochanteric bursitis of right hip     Segmental dysfunction of sacral region     Segmental dysfunction of lumbar region     Segmental dysfunction of thoracic region     Bilateral low back pain with right-sided sciatica     Malignant neoplasm of overlapping sites of left breast in female, estrogen receptor positive (H)     Age-related osteoporosis without current pathological fracture     Right hip pain     Patellofemoral pain syndrome of right knee     Abnormal gait     Primary osteoarthritis of right knee     Lymphadenopathy of head and neck     Groin pain, right     Restless leg syndrome     Coronary artery  disease involving native coronary artery of native heart without angina pectoris     Long term current use of anticoagulant therapy     Epidermoid cyst of labia majora     Major depression in complete remission (H)       Current Outpatient Medications   Medication Sig Dispense Refill     albuterol (PROAIR HFA/PROVENTIL HFA/VENTOLIN HFA) 108 (90 Base) MCG/ACT inhaler INHALE ONE TO TWO PUFFS BY MOUTH EVERY 2-4 HOURS AS NEEDED 18 g 11     albuterol (PROVENTIL) (2.5 MG/3ML) 0.083% neb solution Take  by nebulization. 1 NEB EVERY 4-6 HOURS AS NEEDED 75 mL 0     anastrozole (ARIMIDEX) 1 MG tablet Take 1 tablet (1 mg) by mouth daily 90 tablet 3     ASPIRIN NOT PRESCRIBED (INTENTIONAL) Please choose reason not prescribed from choices below.       atorvastatin (LIPITOR) 10 MG tablet Take 1 tablet (10 mg) by mouth daily 90 tablet 3     benzonatate (TESSALON) 100 MG capsule Take 1 capsule (100 mg) by mouth 3 times daily as needed for cough 30 capsule 0     BIOTIN PO Take by mouth daily       Calcium Carb-Cholecalciferol 500-400 MG-UNIT TABS Take 1 tablet by mouth 2 times daily 180 tablet 3     Cyanocobalamin (B-12) 1000 MCG TBCR Take 1,000 mcg by mouth daily 100 tablet 1     diltiazem ER COATED BEADS (CARDIZEM CD/CARTIA XT) 120 MG 24 hr capsule TAKE ONE CAPSULE BY MOUTH ONCE DAILY ( LABS DUE ) 90 capsule 3     doxycycline hyclate (VIBRAMYCIN) 100 MG capsule Take 1 capsule (100 mg) by mouth 2 times daily for 10 days 20 capsule 0     fluticasone (FLONASE) 50 MCG/ACT nasal spray Spray 1 spray into both nostrils daily For stuffy/runny nose 15.8 mL 0     hydrochlorothiazide (HYDRODIURIL) 25 MG tablet Take 1 tablet (25 mg) by mouth daily 90 tablet 3     lisinopril (ZESTRIL) 2.5 MG tablet Take 1 tablet (2.5 mg) by mouth daily 90 tablet 3     magnesium 250 MG tablet Take 1 tablet by mouth daily 30 tablet      mometasone-formoterol (DULERA) 200-5 MCG/ACT inhaler INHALE 2 PUFFS BY MOUTH TWO TIMES A DAY 13 g 11     order for DME Equipment  being ordered: Oxygen 1 each 0     order for DME Equipment being ordered: Nebulizer 1 Device 0     ORDER FOR DME Equipment being ordered: Oxygen @ 2 liters with exertion       predniSONE (DELTASONE) 20 MG tablet Take 1 tablet (20 mg) by mouth daily for 10 days 5 tablet 0     Probiotic Product (PROBIOTIC PO) Take by mouth daily       trospium (SANCTURA) 20 MG tablet Take 20 mg by mouth       venlafaxine (EFFEXOR XR) 75 MG 24 hr capsule TAKE ONE CAPSULE BY MOUTH ONCE DAILY 90 capsule 3     vitamin B-Complex        warfarin ANTICOAGULANT (JANTOVEN ANTICOAGULANT) 2.5 MG tablet Take 3.75 mg Tues, Thurs, Sat and 2.5 mg all other days or as directed by the coumadin clinic. 100 tablet 1     VITAMIN D PO  (Patient not taking: Reported on 6/20/2022)         Immunization History   Administered Date(s) Administered     COVID-19,PF,Moderna 03/08/2021, 04/05/2021, 11/05/2021     Flu, Unspecified 10/25/1996, 10/23/1997, 09/20/2013     HepB 12/09/2005, 01/13/2006, 06/15/2006     Influenza (High Dose) 3 valent vaccine 09/28/2016, 11/02/2017, 12/31/2018, 10/25/2019, 12/21/2021     Influenza (IIV3) PF 10/25/1996, 10/23/1997, 01/10/2013, 09/20/2013     Influenza Vaccine IM > 6 months Valent IIV4 (Alfuria,Fluzone) 09/20/2013, 12/28/2015     Influenza Vaccine, 6+MO IM (QUADRIVALENT W/PRESERVATIVES) 11/12/2014     Influenza, Quad, High Dose, Pf, 65yr+ (Fluzone HD) 09/23/2020, 12/21/2021     Pneumo Conj 13-V (2010&after) 09/28/2016     Pneumococcal 23 valent 07/26/2013, 12/31/2018     TD (ADULT, 7+) 11/20/1990, 05/19/2022     TDAP Vaccine (Adacel) 01/30/2009, 09/20/2012     Varicella 01/01/1955     Varicella Pt Report Hx of Varicella/Chicken Pox 01[birth year]01/1956     Zoster vaccine recombinant adjuvanted (SHINGRIX) 04/08/2019, 07/03/2019     Zoster vaccine, live 04/25/2014       Review of Systems   CONSTITUTIONAL:POSITIVE  for fever low grade and NEGATIVE  for chills and sweats  ENT/MOUTH: NEGATIVE for ear, mouth and throat  problems  RESP:POSITIVE for cough-productive, dyspnea on exertion, Hx COPD, SOB/dyspnea, sputum foul and wheezing and NEGATIVE for hemoptysis and pleurisy  CV: NEGATIVE for chest pain, palpitations or peripheral edema  ROS otherwise negative      Objective           Vitals:  No vitals were obtained today due to virtual visit.    Physical Exam   healthy, alert and no distress  PSYCH: Alert and oriented times 3; coherent speech, normal   rate and volume, able to articulate logical thoughts, able   to abstract reason, no tangential thoughts, no hallucinations   or delusions  Her affect is normal  RESP: No cough, no audible wheezing, able to talk in full sentences  Remainder of exam unable to be completed due to telephone visits    Lab on 05/31/2022   Component Date Value Ref Range Status     INR 05/31/2022 2.6 (A) 0.9 - 1.1 Final               Phone call duration: 6 minutes    .  ..

## 2022-06-21 ENCOUNTER — TELEPHONE (OUTPATIENT)
Dept: FAMILY MEDICINE | Facility: CLINIC | Age: 72
End: 2022-06-21

## 2022-06-21 ENCOUNTER — ANTICOAGULATION THERAPY VISIT (OUTPATIENT)
Dept: ANTICOAGULATION | Facility: CLINIC | Age: 72
End: 2022-06-21

## 2022-06-21 ENCOUNTER — LAB (OUTPATIENT)
Dept: LAB | Facility: CLINIC | Age: 72
End: 2022-06-21
Payer: COMMERCIAL

## 2022-06-21 DIAGNOSIS — I48.0 AF (PAROXYSMAL ATRIAL FIBRILLATION) (H): ICD-10-CM

## 2022-06-21 DIAGNOSIS — I48.0 AF (PAROXYSMAL ATRIAL FIBRILLATION) (H): Primary | ICD-10-CM

## 2022-06-21 DIAGNOSIS — I48.91 ATRIAL FIBRILLATION (H): ICD-10-CM

## 2022-06-21 DIAGNOSIS — Z79.01 LONG TERM CURRENT USE OF ANTICOAGULANT THERAPY: ICD-10-CM

## 2022-06-21 LAB
INR BLD: 6.9 (ref 0.9–1.1)
INR PPP: 4.54 (ref 0.85–1.15)

## 2022-06-21 PROCEDURE — 36415 COLL VENOUS BLD VENIPUNCTURE: CPT

## 2022-06-21 PROCEDURE — 36416 COLLJ CAPILLARY BLOOD SPEC: CPT

## 2022-06-21 PROCEDURE — 85610 PROTHROMBIN TIME: CPT

## 2022-06-21 NOTE — TELEPHONE ENCOUNTER
Reason for Call:  Other call back    Detailed comments: Patient is concerned because she hasn't gotten a call back on her INR yet.     Phone Number Patient can be reached at: Home number on file [phone] (home)    Best Time: any    Can we leave a detailed message on this number? YES    Call taken on 6/21/2022 at 10:48 AM by Asuncion Villafuerte

## 2022-06-21 NOTE — PROGRESS NOTES
ANTICOAGULATION MANAGEMENT     Latanya Padron 72 year old female is on warfarin with supratherapeutic INR result. (Goal INR 2.0-3.0)    Recent labs: (last 7 days)     06/21/22  0931   INR 4.54*       ASSESSMENT     Source(s): Chart Review and Patient/Caregiver Call     Warfarin doses taken: Warfarin taken as instructed  Diet: No new diet changes identified  New illness, injury, or hospitalization: Yes: bronchitis  Medication/supplement changes: Another round of prednisone and started doxy today for 10 days  Signs or symptoms of bleeding or clotting: No  Previous INR: Therapeutic last 2(+) visits  Additional findings: None       PLAN     Recommended plan for temporary change(s) affecting INR     Dosing Instructions: Hold today, partial hold Wed then continue your current warfarin dose with next INR in 3 days       Summary  As of 6/21/2022    Full warfarin instructions:  6/21: Hold; 6/22: 1.25 mg; Otherwise 3.75 mg every Tue, Sat; 2.5 mg all other days   Next INR check:  6/24/2022             Telephone call with Ivette who verbalizes understanding and agrees to plan    Lab visit scheduled    Education provided: Potential interaction between warfarin and abx, prednisone and Monitoring for bleeding signs and symptoms    Plan made per [facility] anticoagulation protocol    Zoey Pickard RN  Anticoagulation Clinic  6/21/2022    _______________________________________________________________________     Anticoagulation Episode Summary     Current INR goal:  2.0-3.0   TTR:  57.3 % (1 y)   Target end date:  Indefinite   Send INR reminders to:  [facility]    Indications    AF (paroxysmal atrial fibrillation) (H) [I48.0]  Atrial fibrillation (H) [I48.91]  Long term current use of anticoagulant therapy [Z79.01]  Atrial fibrillation  unspecified type (H) (Resolved) [I48.91]           Comments:           Anticoagulation Care Providers     Provider Role Specialty Phone number    Nazario Jones MD Referring Family Medicine  [phone]

## 2022-06-23 ENCOUNTER — NURSE TRIAGE (OUTPATIENT)
Dept: FAMILY MEDICINE | Facility: CLINIC | Age: 72
End: 2022-06-23

## 2022-06-23 NOTE — TELEPHONE ENCOUNTER
Patient was wondering what she should be doing for diarrhea. Patient stated she has had the diarrhea for a few days now, but states it is getting better. Patient is still drinking fluids and urinating. RN advised patient that the diarrhea could be from her antibiotic. Patient stated she is taking a daily probiotic. RN advised patient to keep up on fluids and can add in Pedialyte to help combat fluid loss.     Patient stated she is taking milk of magnesium because she feels constipated. RN advised patient that this can cause stools to be looser as well. Patient understood and stated she will try to not use the Milk of Mag unless she absolutely needs.     RN advised patient to follow up with clinic if diarrhea or other symptoms are not improving. Patient agreed.     J LUIS Denton, RN     Reason for Disposition    MILD diarrhea and taking antibiotics    Additional Information    Negative: Shock suspected (e.g., cold/pale/clammy skin, too weak to stand, low BP, rapid pulse)    Negative: Difficult to awaken or acting confused (e.g., disoriented, slurred speech)    Negative: Sounds like a life-threatening emergency to the triager    Negative: Vomiting also present and worse than the diarrhea    Negative: Blood in stool and without diarrhea    Negative: SEVERE diarrhea (e.g., 7 or more times / day more than normal) and age > 60 years    Negative: Constant abdominal pain lasting > 2 hours    Negative: Drinking very little and has signs of dehydration (e.g., no urine > 12 hours, very dry mouth, very lightheaded)    Negative: Patient sounds very sick or weak to the triager    Negative: SEVERE diarrhea (e.g., 7 or more times / day more than normal) and present > 24 hours (1 day)    Negative: MODERATE diarrhea (e.g., 4-6 times / day more than normal) and present > 48 hours (2 days)    Negative: MODERATE diarrhea (e.g., 4-6 times / day more than normal) and age > 70 years    Negative: Abdominal pain  (Exception: pain  clears completely with each passage of diarrhea stool)    Negative: Fever > 101 F (38.3 C)    Negative: Blood in the stool    Negative: Mucus or pus in stool has been present > 2 days and diarrhea is more than mild    Negative: Weak immune system (e.g., HIV positive, cancer chemo, splenectomy, organ transplant, chronic steroids)    Negative: Travel to a foreign country in past month    Negative: Recent antibiotic therapy (i.e., within last 2 months) and diarrhea present > 3 days since antibiotic was stopped    Negative: Recent hospitalization and diarrhea present > 3 days    Negative: Tube feedings (e.g., nasogastric, g-tube, j-tube)    Negative: MILD diarrhea (e.g., 1-3 or more stools than normal in past 24 hours) diarrhea without known cause and present > 7 days    Negative: Patient wants to be seen    Negative: Diarrhea is a chronic symptom (recurrent or ongoing AND lasting > 4 weeks)    Negative: SEVERE diarrhea (e.g., 7 or more times / day more than normal)    Negative: MILD-MODERATE diarrhea (e.g., 1-6 times / day more than normal)    Protocols used: DIARRHEA-A-OH

## 2022-06-24 ENCOUNTER — ANTICOAGULATION THERAPY VISIT (OUTPATIENT)
Dept: ANTICOAGULATION | Facility: CLINIC | Age: 72
End: 2022-06-24

## 2022-06-24 ENCOUNTER — LAB (OUTPATIENT)
Dept: LAB | Facility: CLINIC | Age: 72
End: 2022-06-24
Payer: COMMERCIAL

## 2022-06-24 DIAGNOSIS — I48.0 AF (PAROXYSMAL ATRIAL FIBRILLATION) (H): Primary | ICD-10-CM

## 2022-06-24 DIAGNOSIS — I48.91 ATRIAL FIBRILLATION (H): ICD-10-CM

## 2022-06-24 DIAGNOSIS — I48.0 AF (PAROXYSMAL ATRIAL FIBRILLATION) (H): ICD-10-CM

## 2022-06-24 DIAGNOSIS — Z79.01 LONG TERM CURRENT USE OF ANTICOAGULANT THERAPY: ICD-10-CM

## 2022-06-24 LAB — INR BLD: 2.8 (ref 0.9–1.1)

## 2022-06-24 PROCEDURE — 85610 PROTHROMBIN TIME: CPT

## 2022-06-24 PROCEDURE — 36416 COLLJ CAPILLARY BLOOD SPEC: CPT

## 2022-06-24 NOTE — PROGRESS NOTES
ANTICOAGULATION MANAGEMENT     Latanya Padron 72 year old female is on warfarin with therapeutic INR result. (Goal INR 2.0-3.0)    Recent labs: (last 7 days)     06/24/22  1030   INR 2.8*       ASSESSMENT     Source(s): Chart Review and Patient/Caregiver Call     Warfarin doses taken: Warfarin taken as instructed  Diet: No new diet changes identified  New illness, injury, or hospitalization: Yes: bronchitis   Medication/supplement changes: prednisone and doxycycline x10 days (6/21-6/30)  Signs or symptoms of bleeding or clotting: No  Previous INR: Supratherapeutic  Additional findings: None       PLAN     Recommended plan for temporary change(s) affecting INR     Dosing Instructions: take 2.5 mg Fri-Mon with next INR in 4 days       Summary  As of 6/24/2022    Full warfarin instructions:  6/25: 2.5 mg; Otherwise 3.75 mg every Tue, Sat; 2.5 mg all other days   Next INR check:  6/28/2022             Telephone call with Ivette who verbalizes understanding and agrees to plan    Lab visit scheduled    Education provided: Please call back if any changes to your diet, medications or how you've been taking warfarin, Goal range and significance of current result and Potential interaction between warfarin and doxy and prednisone    Plan made per [facility] anticoagulation protocol    Francine Andrew, RN  Anticoagulation Clinic  6/24/2022    _______________________________________________________________________     Anticoagulation Episode Summary     Current INR goal:  2.0-3.0   TTR:  57.3 % (1 y)   Target end date:  Indefinite   Send INR reminders to:  [facility]    Indications    AF (paroxysmal atrial fibrillation) (H) [I48.0]  Atrial fibrillation (H) [I48.91]  Long term current use of anticoagulant therapy [Z79.01]  Atrial fibrillation  unspecified type (H) (Resolved) [I48.91]           Comments:           Anticoagulation Care Providers     Provider Role Specialty Phone number    Nazario Jones MD Referring Family  Medicine [phone]

## 2022-06-27 NOTE — TELEPHONE ENCOUNTER
Please let her know that I put her on for a phone visit with me at 11 AM tomorrow morning.  I will plan to call her sometime tomorrow morning.  Thank you.UCHE Abebe MD     Pt notified of appt. Cely Lobato, [facility]

## 2022-06-27 NOTE — TELEPHONE ENCOUNTER
Patient called reporting she is now having some constipation but is overall feeling better. She is still passing flatus, no increased abdominal distension. She has a hemorrhoid that she thinks is making it harder for her to pass a normal BM. She has been trying Preparation H OTC but does not feel it is working and would like to try something stronger. She is going to try Miralax for the constipation and increase her water intake. Please review.    José Severino RN

## 2022-06-28 ENCOUNTER — LAB (OUTPATIENT)
Dept: LAB | Facility: CLINIC | Age: 72
End: 2022-06-28
Payer: COMMERCIAL

## 2022-06-28 ENCOUNTER — ANTICOAGULATION THERAPY VISIT (OUTPATIENT)
Dept: ANTICOAGULATION | Facility: CLINIC | Age: 72
End: 2022-06-28

## 2022-06-28 ENCOUNTER — VIRTUAL VISIT (OUTPATIENT)
Dept: FAMILY MEDICINE | Facility: CLINIC | Age: 72
End: 2022-06-28
Payer: COMMERCIAL

## 2022-06-28 DIAGNOSIS — I48.91 ATRIAL FIBRILLATION (H): ICD-10-CM

## 2022-06-28 DIAGNOSIS — Z79.01 LONG TERM CURRENT USE OF ANTICOAGULANT THERAPY: ICD-10-CM

## 2022-06-28 DIAGNOSIS — I48.0 AF (PAROXYSMAL ATRIAL FIBRILLATION) (H): Primary | ICD-10-CM

## 2022-06-28 DIAGNOSIS — K64.4 EXTERNAL HEMORRHOIDS: Primary | ICD-10-CM

## 2022-06-28 DIAGNOSIS — I48.0 AF (PAROXYSMAL ATRIAL FIBRILLATION) (H): ICD-10-CM

## 2022-06-28 LAB — INR BLD: 1.3 (ref 0.9–1.1)

## 2022-06-28 PROCEDURE — 36416 COLLJ CAPILLARY BLOOD SPEC: CPT

## 2022-06-28 PROCEDURE — 99441 PR PHYSICIAN TELEPHONE EVALUATION 5-10 MIN: CPT | Mod: 95 | Performed by: OBSTETRICS & GYNECOLOGY

## 2022-06-28 PROCEDURE — 85610 PROTHROMBIN TIME: CPT

## 2022-06-28 RX ORDER — CLOBETASOL PROPIONATE 0.5 MG/G
CREAM TOPICAL 2 TIMES DAILY
Qty: 60 G | Refills: 1 | Status: SHIPPED | OUTPATIENT
Start: 2022-06-28 | End: 2022-01-01

## 2022-06-28 NOTE — PROGRESS NOTES
Subjective: Latanya requested a phone consultation today because of concerns regarding hemorrhoids.     Had bronchitis- coughing a lot recently- that aggravated them- the bronchitis resolved but the hemorrhoids are now inflamed.  She has been somewhat constipated but is using Dulcolax and that seems to be helping.    The past medical history and medications and allergies have been reviewed today by me.  .  Past Medical History:   Diagnosis Date     Breast cancer (H) 04/12/2019    Left Breast     COPD (chronic obstructive pulmonary disease) (H)      Mixed hyperlipidemia      Neck mass 06/20/2018     Paroxysmal atrial fibrillation (H)      PONV (postoperative nausea and vomiting)      S/P radiation therapy     5,256 cGy to left breast completed on 7/18/2019 - [facility]     Allergies   Allergen Reactions     Augmentin [Amoxicillin-Pot Clavulanate] Nausea and Vomiting     Meperidine Hcl Nausea and Vomiting     demerol     Seasonal Allergies      spring     Current Outpatient Medications   Medication Sig Dispense Refill     albuterol (PROAIR HFA/PROVENTIL HFA/VENTOLIN HFA) 108 (90 Base) MCG/ACT inhaler INHALE ONE TO TWO PUFFS BY MOUTH EVERY 2-4 HOURS AS NEEDED 18 g 11     albuterol (PROVENTIL) (2.5 MG/3ML) 0.083% neb solution Take  by nebulization. 1 NEB EVERY 4-6 HOURS AS NEEDED 75 mL 0     anastrozole (ARIMIDEX) 1 MG tablet Take 1 tablet (1 mg) by mouth daily 90 tablet 3     ASPIRIN NOT PRESCRIBED (INTENTIONAL) Please choose reason not prescribed from choices below.       atorvastatin (LIPITOR) 10 MG tablet Take 1 tablet (10 mg) by mouth daily 90 tablet 3     benzonatate (TESSALON) 100 MG capsule Take 1 capsule (100 mg) by mouth 3 times daily as needed for cough 30 capsule 0     BIOTIN PO Take by mouth daily       Calcium Carb-Cholecalciferol 500-400 MG-UNIT TABS Take 1 tablet by mouth 2 times daily 180 tablet 3     Cyanocobalamin (B-12) 1000 MCG TBCR Take 1,000 mcg by mouth daily 100 tablet 1     diltiazem  ER COATED BEADS (CARDIZEM CD/CARTIA XT) 120 MG 24 hr capsule TAKE ONE CAPSULE BY MOUTH ONCE DAILY ( LABS DUE ) 90 capsule 3     doxycycline hyclate (VIBRAMYCIN) 100 MG capsule Take 1 capsule (100 mg) by mouth 2 times daily for 10 days 20 capsule 0     fluticasone (FLONASE) 50 MCG/ACT nasal spray Spray 1 spray into both nostrils daily For stuffy/runny nose 15.8 mL 0     hydrochlorothiazide (HYDRODIURIL) 25 MG tablet Take 1 tablet (25 mg) by mouth daily 90 tablet 3     lisinopril (ZESTRIL) 2.5 MG tablet Take 1 tablet (2.5 mg) by mouth daily 90 tablet 3     magnesium 250 MG tablet Take 1 tablet by mouth daily 30 tablet      mometasone-formoterol (DULERA) 200-5 MCG/ACT inhaler INHALE 2 PUFFS BY MOUTH TWO TIMES A DAY 13 g 11     order for DME Equipment being ordered: Oxygen 1 each 0     order for DME Equipment being ordered: Nebulizer 1 Device 0     ORDER FOR DME Equipment being ordered: Oxygen @ 2 liters with exertion       predniSONE (DELTASONE) 20 MG tablet Take 1 tablet (20 mg) by mouth daily for 10 days 5 tablet 0     Probiotic Product (PROBIOTIC PO) Take by mouth daily       trospium (SANCTURA) 20 MG tablet Take 20 mg by mouth       venlafaxine (EFFEXOR XR) 75 MG 24 hr capsule TAKE ONE CAPSULE BY MOUTH ONCE DAILY 90 capsule 3     vitamin B-Complex        VITAMIN D PO  (Patient not taking: Reported on 6/20/2022)       warfarin ANTICOAGULANT (JANTOVEN ANTICOAGULANT) 2.5 MG tablet Take 3.75 mg Tues, Thurs, Sat and 2.5 mg all other days or as directed by the coumadin clinic. 100 tablet 1       Assessment/Plan:   hemorrhoids- inflamed now.   See Rx for clobetasol.  Use once daily.  I advised her to wipe her bowel movements with baby lotion on the toilet paper and then apply the clobetasol on a washcloth once each morning.  It may sting.  I am going to refer her to general surgery to consider surgical treatment of the hemorrhoids.  I advised her to continue using the Dulcolax to keep her bowel movements loose.    Phone  call duration was   7  minutes.  Additional 8minutes spent in chart review today as well as charting.  This was all done today.    UCHE Abebe MD

## 2022-06-28 NOTE — PROGRESS NOTES
ANTICOAGULATION MANAGEMENT     Latanya Padron 72 year old female is on warfarin with subtherapeutic INR result. (Goal INR 2.0-3.0)    Recent labs: (last 7 days)     06/28/22  1031   INR 1.3*       ASSESSMENT     Source(s): Chart Review and Patient/Caregiver Call     Warfarin doses taken: Less warfarin taken than planned which may be affecting INR - patient misunderstood dosing and only took half of a tablet daily since last INR.   Diet: No new diet changes identified  New illness, injury, or hospitalization: Yes: bronchitis - resolving. Hemorrhoids - saw PCP today, referral to general surgery.   Medication/supplement changes: Prednisone and Doxycycline, has 2 days left of both.   Signs or symptoms of bleeding or clotting: Yes: hemorrhoids  Previous INR: Therapeutic last visit; previously outside of goal range  Additional findings: D/t hemorrhoids bleeding and couple days left of prednisone and antibiotics, elected not to boost today. Pt will recieve her larger dose of Warfarin today.        PLAN     Recommended plan for temporary change(s) affecting INR     Dosing Instructions: continue your current warfarin dose with next INR in 1 week       Summary  As of 6/28/2022    Full warfarin instructions:  3.75 mg every Tue, Sat; 2.5 mg all other days   Next INR check:  7/5/2022             Telephone call with Ivette who agrees to plan and repeated back plan correctly    Lab visit scheduled    Education provided: Goal range and significance of current result, Importance of following up at instructed interval, Importance of taking warfarin as instructed and Potential interaction between warfarin and prednisone and doxycycline    Plan made per [facility] anticoagulation protocol    Flory Byrd RN  Anticoagulation Clinic  6/28/2022    _______________________________________________________________________     Anticoagulation Episode Summary     Current INR goal:  2.0-3.0   TTR:  57.9 % (1 y)   Target end date:  Indefinite   [facility]    Indications    AF (paroxysmal atrial fibrillation) (H) [I48.0]  Atrial fibrillation (H) [I48.91]  Long term current use of anticoagulant therapy [Z79.01]  Atrial fibrillation  unspecified type (H) (Resolved) [I48.91]           Comments:           Anticoagulation Care Providers     Provider Role Specialty Phone number    Nazario Jones MD Referring Family Medicine [phone]

## 2022-06-29 ENCOUNTER — MYC MEDICAL ADVICE (OUTPATIENT)
Dept: FAMILY MEDICINE | Facility: CLINIC | Age: 72
End: 2022-06-29

## 2022-06-29 NOTE — TELEPHONE ENCOUNTER
Patient inquiring about another round of Doxycycline and staying on Prednisone due to continued sore throat.    José Severino RN

## 2022-07-01 NOTE — TELEPHONE ENCOUNTER
Patient is calling to check on this. Patient states she is feeling better and thinks the doxycycline worked. She is wondering if she still needs to continue the prednisone.     Patient also stated she has 10 pills of prednisone left, but has taken some so she is confused how she still has 10 left? RN asked patient if they could be from fill in April, but patient states she had none from then. Patient states they are all identical pills.     MARCELO DentonN, RN

## 2022-07-05 ENCOUNTER — LAB (OUTPATIENT)
Dept: LAB | Facility: CLINIC | Age: 72
End: 2022-07-05
Payer: COMMERCIAL

## 2022-07-05 ENCOUNTER — ANTICOAGULATION THERAPY VISIT (OUTPATIENT)
Dept: ANTICOAGULATION | Facility: CLINIC | Age: 72
End: 2022-07-05

## 2022-07-05 DIAGNOSIS — I48.0 AF (PAROXYSMAL ATRIAL FIBRILLATION) (H): ICD-10-CM

## 2022-07-05 DIAGNOSIS — I48.91 ATRIAL FIBRILLATION (H): ICD-10-CM

## 2022-07-05 DIAGNOSIS — Z79.01 LONG TERM CURRENT USE OF ANTICOAGULANT THERAPY: ICD-10-CM

## 2022-07-05 DIAGNOSIS — I48.0 AF (PAROXYSMAL ATRIAL FIBRILLATION) (H): Primary | ICD-10-CM

## 2022-07-05 LAB — INR BLD: 3.7 (ref 0.9–1.1)

## 2022-07-05 PROCEDURE — 85610 PROTHROMBIN TIME: CPT

## 2022-07-05 PROCEDURE — 36416 COLLJ CAPILLARY BLOOD SPEC: CPT

## 2022-07-05 NOTE — TELEPHONE ENCOUNTER
She should have completed her 10 days of prednisone and then stopped it.  She shouldn't need any further treatment.     Electronically signed by:  Nazario Jones M.D.  7/5/2022

## 2022-07-05 NOTE — PROGRESS NOTES
ANTICOAGULATION MANAGEMENT     Latanya Padron 72 year old female is on warfarin with supratherapeutic INR result. (Goal INR 2.0-3.0)    Recent labs: (last 7 days)     07/05/22  1446   INR 3.7*       ASSESSMENT     Source(s): Chart Review and Patient/Caregiver Call     Warfarin doses taken: Warfarin taken as instructed  Diet: Decrease in appetite. She is going to start to eat greens beans a couple days this week .   New illness, injury, or hospitalization: Yes: still isnt feeling back to normal from having pneumonia   Medication/supplement changes: finishined up prednisone and antibotics on Wed 6/29  Signs or symptoms of bleeding or clotting: No  Previous INR: Subtherapeutic  Additional findings: None       PLAN     Recommended plan for temporary change(s) affecting INR     Dosing Instructions: decrease your warfarin dose (6.2% change) with next INR in 1 week       Summary  As of 7/5/2022    Full warfarin instructions:  3.75 mg every Sat; 2.5 mg all other days   Next INR check:  7/12/2022             Telephone call with Ivette who verbalizes understanding and agrees to plan    Lab visit scheduled    Education provided: Please call back if any changes to your diet, medications or how you've been taking warfarin, Importance of consistent vitamin K intake, Goal range and significance of current result, Potential interaction between warfarin and pneumonia  and Monitoring for bleeding signs and symptoms    Plan made per [facility] anticoagulation protocol    Francine Andrew, RN  Anticoagulation Clinic  7/5/2022    _______________________________________________________________________     Anticoagulation Episode Summary     Current INR goal:  2.0-3.0   TTR:  57.7 % (1 y)   Target end date:  Indefinite   Send INR reminders to:  [facility]    Indications    AF (paroxysmal atrial fibrillation) (H) [I48.0]  Atrial fibrillation (H) [I48.91]  Long term current use of anticoagulant therapy [Z79.01]  Atrial  fibrillation  unspecified type (H) (Resolved) [I48.91]           Comments:           Anticoagulation Care Providers     Provider Role Specialty Phone number    Nazario Jones MD Referring Family Medicine [phone]

## 2022-07-06 ENCOUNTER — NURSE TRIAGE (OUTPATIENT)
Dept: FAMILY MEDICINE | Facility: CLINIC | Age: 72
End: 2022-07-06

## 2022-07-06 DIAGNOSIS — Z87.891 HISTORY OF TOBACCO USE: Primary | ICD-10-CM

## 2022-07-06 DIAGNOSIS — R53.83 OTHER FATIGUE: ICD-10-CM

## 2022-07-06 DIAGNOSIS — R05.8 PRODUCTIVE COUGH: ICD-10-CM

## 2022-07-06 DIAGNOSIS — J44.1 OBSTRUCTIVE CHRONIC BRONCHITIS WITH EXACERBATION (H): ICD-10-CM

## 2022-07-06 NOTE — TELEPHONE ENCOUNTER
Nurse Triage SBAR    Is this a 2nd Level Triage? YES, LICENSED PRACTITIONER REVIEW IS REQUIRED    Situation: fatigue    Background: Patient has been fatigued for the past 2 weeks since being diagnosed with bronchitis.  She is informed to take a COVID test as she is coughing a lot during this conversation.  She is also looking for lung cancer screening order to be placed.    Assessment: Patient states she has been very fatigued.  It was not improved at all with the doxy or steroids.    Protocol Recommended Disposition:   See Today In Office    Recommendation: Do you want to see patient in JULIUS REQ spot?     Routed to provider    Does the patient meet one of the following criteria for ADS visit consideration? 16+ years old, with an [facility] PCP     TIP  Providers, please consider if this condition is appropriate for management at one of our Acute and Diagnostic Services sites.     If patient is a good candidate, please use dotphrase <dot>triageresponse and select Refer to ADS to document.    Reason for Disposition    MODERATE weakness (i.e., interferes with work, school, normal activities) and persists > 3 days    Taking a medicine that could cause weakness (e.g., blood pressure medications, diuretics)    Patient wants to be seen    Additional Information    Negative: Severe difficulty breathing (e.g., struggling for each breath, speaks in single words)    Negative: Shock suspected (e.g., cold/pale/clammy skin, too weak to stand, low BP, rapid pulse)    Negative: Difficult to awaken or acting confused (e.g., disoriented, slurred speech)    Negative: Fainted > 15 minutes ago and still feels too weak or dizzy to stand    Negative: SEVERE weakness (i.e., unable to walk or barely able to walk, requires support) and new onset or worsening    Negative: Sounds like a life-threatening emergency to the triager    Negative: Weakness of the face, arm or leg on one side of the body    Negative: Has diabetes and weakness from low  blood sugar (i.e., < 60 mg/dL or 3.5 mmol/L)    Negative: Recent heat exposure, suspected cause of weakness    Negative: Vomiting is the main symptom    Negative: Diarrhea is the main symptom    Negative: Difficulty breathing    Negative: Heart beating < 50 beats per minute OR > 140 beats per minute    Negative: Extra heartbeats OR irregular heart beating (i.e., 'palpitations')    Negative: Follows bleeding (e.g., from vomiting, rectum, vagina) (Exception: small transient weakness from sight of a small amount blood)    Negative: Bloody, black, or tarry bowel movements (Exception: chronic-unchanged  black-grey bowel movements and is taking iron pills or Pepto-bismol)    Negative: MODERATE weakness (i.e., interferes with work, school, normal activities) and cause unknown (Exceptions: weakness with acute minor illness, or weakness from poor fluid intake)    Negative: Fever > 103 F (39.4 C) and not able to get the Fever down using CARE ADVICE    Negative: Fever > 100.0 F (37.8 C) and bedridden (e.g., nursing home patient, stroke, chronic illness, recovering from surgery)    Negative: Fever > 101 F (38.3 C) and over 60 years of age    Negative: Fever > 100.0 F (37.8 C) and diabetes mellitus or weak immune system (e.g., HIV positive, cancer chemo, splenectomy, organ transplant, chronic steroids)    Negative: Pale skin (pallor)    Negative: MODERATE weakness from poor fluid intake with no improvement after 2 hours of rest and fluids    Negative: Drinking very little and dehydration suspected (e.g., no urine > 12 hours, very dry mouth, very lightheaded)    Negative: Patient sounds very sick or weak to the triager    Protocols used: WEAKNESS (GENERALIZED) AND FATIGUE-A-OH

## 2022-07-07 NOTE — TELEPHONE ENCOUNTER
The patient needs to be re-evaluated.  I am not available today or tomorrow to see her.  Does anyone else in the clinic have any availability to see her?    I did place an order for her lung CT scan for her lung cancer annual screening.  She has to wait until after 7/29/22 to have it done since that is when she had her last one.        I did also order labs on her to make sure those are looking normal.  Confirm if she did or did not do a home COVID screen.    Electronically signed by:  Nazario Jones M.D.  7/7/2022

## 2022-07-08 ENCOUNTER — LAB (OUTPATIENT)
Dept: LAB | Facility: CLINIC | Age: 72
End: 2022-07-08
Payer: COMMERCIAL

## 2022-07-08 DIAGNOSIS — R53.83 OTHER FATIGUE: ICD-10-CM

## 2022-07-08 DIAGNOSIS — J44.1 OBSTRUCTIVE CHRONIC BRONCHITIS WITH EXACERBATION (H): ICD-10-CM

## 2022-07-08 LAB
ALBUMIN SERPL-MCNC: 3 G/DL (ref 3.4–5)
ALP SERPL-CCNC: 52 U/L (ref 40–150)
ALT SERPL W P-5'-P-CCNC: 21 U/L (ref 0–50)
ANION GAP SERPL CALCULATED.3IONS-SCNC: 6 MMOL/L (ref 3–14)
AST SERPL W P-5'-P-CCNC: 11 U/L (ref 0–45)
BASOPHILS # BLD AUTO: 0.1 10E3/UL (ref 0–0.2)
BASOPHILS NFR BLD AUTO: 0 %
BILIRUB SERPL-MCNC: 0.4 MG/DL (ref 0.2–1.3)
BUN SERPL-MCNC: 16 MG/DL (ref 7–30)
CALCIUM SERPL-MCNC: 11.2 MG/DL (ref 8.5–10.1)
CHLORIDE BLD-SCNC: 98 MMOL/L (ref 94–109)
CO2 SERPL-SCNC: 33 MMOL/L (ref 20–32)
CREAT SERPL-MCNC: 1.09 MG/DL (ref 0.52–1.04)
EOSINOPHIL # BLD AUTO: 0.2 10E3/UL (ref 0–0.7)
EOSINOPHIL NFR BLD AUTO: 2 %
ERYTHROCYTE [DISTWIDTH] IN BLOOD BY AUTOMATED COUNT: 12.9 % (ref 10–15)
GFR SERPL CREATININE-BSD FRML MDRD: 54 ML/MIN/1.73M2
GLUCOSE BLD-MCNC: 126 MG/DL (ref 70–99)
HCT VFR BLD AUTO: 37.1 % (ref 35–47)
HGB BLD-MCNC: 11.9 G/DL (ref 11.7–15.7)
IMM GRANULOCYTES # BLD: 0 10E3/UL
IMM GRANULOCYTES NFR BLD: 0 %
LYMPHOCYTES # BLD AUTO: 1.2 10E3/UL (ref 0.8–5.3)
LYMPHOCYTES NFR BLD AUTO: 9 %
MCH RBC QN AUTO: 29.3 PG (ref 26.5–33)
MCHC RBC AUTO-ENTMCNC: 32.1 G/DL (ref 31.5–36.5)
MCV RBC AUTO: 91 FL (ref 78–100)
MONOCYTES # BLD AUTO: 0.8 10E3/UL (ref 0–1.3)
MONOCYTES NFR BLD AUTO: 6 %
NEUTROPHILS # BLD AUTO: 11.5 10E3/UL (ref 1.6–8.3)
NEUTROPHILS NFR BLD AUTO: 83 %
NRBC # BLD AUTO: 0 10E3/UL
NRBC BLD AUTO-RTO: 0 /100
PLATELET # BLD AUTO: 341 10E3/UL (ref 150–450)
POTASSIUM BLD-SCNC: 3.7 MMOL/L (ref 3.4–5.3)
PROT SERPL-MCNC: 7.1 G/DL (ref 6.8–8.8)
RBC # BLD AUTO: 4.06 10E6/UL (ref 3.8–5.2)
SODIUM SERPL-SCNC: 137 MMOL/L (ref 133–144)
TSH SERPL DL<=0.005 MIU/L-ACNC: 1.2 MU/L (ref 0.4–4)
WBC # BLD AUTO: 13.8 10E3/UL (ref 4–11)

## 2022-07-08 PROCEDURE — 82043 UR ALBUMIN QUANTITATIVE: CPT

## 2022-07-08 PROCEDURE — 85025 COMPLETE CBC W/AUTO DIFF WBC: CPT

## 2022-07-08 PROCEDURE — 80053 COMPREHEN METABOLIC PANEL: CPT

## 2022-07-08 PROCEDURE — 36415 COLL VENOUS BLD VENIPUNCTURE: CPT

## 2022-07-08 PROCEDURE — 84443 ASSAY THYROID STIM HORMONE: CPT

## 2022-07-08 NOTE — TELEPHONE ENCOUNTER
Patient is called and informed of this message.  Patient understands and agrees to this plan.      Scheduled for lab appointment today.  COVID test was negative.      Routing to PCP to check labs on Monday and see if she will need to be seen.  No one was able to see patient today, 7/8/22.     Suri Arteaga RN

## 2022-07-09 LAB
CREAT UR-MCNC: 117 MG/DL
MICROALBUMIN UR-MCNC: 24 MG/L
MICROALBUMIN/CREAT UR: 20.51 MG/G CR (ref 0–25)

## 2022-07-11 NOTE — TELEPHONE ENCOUNTER
Patient calling to inquirer if the provider is going to address her message? Informed he does not start clinic till 1:00 on Mondays and has been busy seeing patients. Informed that her message has been routed to him to address and did inform it maybe after he is done seeing patients in clinic before he is able to look at and address his messages.  Patient voiced understanding and then did also ask if he is in clinic tomorrow and was informed that he is. Basilia Rene LPN

## 2022-07-11 NOTE — TELEPHONE ENCOUNTER
Patient calling in regards to lab work.  She still has cough, sometimes has green mucous.    What are next steps?  Please call [phone]      Whit Nowak XRO/

## 2022-07-12 ENCOUNTER — LAB (OUTPATIENT)
Dept: LAB | Facility: CLINIC | Age: 72
End: 2022-07-12

## 2022-07-12 ENCOUNTER — HOSPITAL ENCOUNTER (OUTPATIENT)
Dept: ULTRASOUND IMAGING | Facility: CLINIC | Age: 72
Discharge: HOME | End: 2022-07-12
Attending: INTERNAL MEDICINE
Payer: COMMERCIAL

## 2022-07-12 ENCOUNTER — HOSPITAL ENCOUNTER (OUTPATIENT)
Dept: GENERAL RADIOLOGY | Facility: CLINIC | Age: 72
Discharge: HOME | End: 2022-07-12
Attending: FAMILY MEDICINE
Payer: COMMERCIAL

## 2022-07-12 ENCOUNTER — ANTICOAGULATION THERAPY VISIT (OUTPATIENT)
Dept: ANTICOAGULATION | Facility: CLINIC | Age: 72
End: 2022-07-12

## 2022-07-12 DIAGNOSIS — R93.89 IMAGING ABNORMALITY: ICD-10-CM

## 2022-07-12 DIAGNOSIS — J18.9 PNEUMONIA OF LEFT LOWER LOBE DUE TO INFECTIOUS ORGANISM: Primary | ICD-10-CM

## 2022-07-12 DIAGNOSIS — R92.8 ABNORMAL MAMMOGRAM: ICD-10-CM

## 2022-07-12 DIAGNOSIS — I48.91 ATRIAL FIBRILLATION (H): ICD-10-CM

## 2022-07-12 DIAGNOSIS — Z79.01 LONG TERM CURRENT USE OF ANTICOAGULANT THERAPY: ICD-10-CM

## 2022-07-12 DIAGNOSIS — I48.0 AF (PAROXYSMAL ATRIAL FIBRILLATION) (H): ICD-10-CM

## 2022-07-12 DIAGNOSIS — I48.0 AF (PAROXYSMAL ATRIAL FIBRILLATION) (H): Primary | ICD-10-CM

## 2022-07-12 DIAGNOSIS — R05.8 PRODUCTIVE COUGH: ICD-10-CM

## 2022-07-12 LAB
ALBUMIN SERPL-MCNC: 3.1 G/DL (ref 3.4–5)
ALP SERPL-CCNC: 50 U/L (ref 40–150)
ALT SERPL W P-5'-P-CCNC: 20 U/L (ref 0–50)
ANION GAP SERPL CALCULATED.3IONS-SCNC: 4 MMOL/L (ref 3–14)
AST SERPL W P-5'-P-CCNC: 13 U/L (ref 0–45)
BILIRUB SERPL-MCNC: 0.5 MG/DL (ref 0.2–1.3)
BUN SERPL-MCNC: 20 MG/DL (ref 7–30)
CALCIUM SERPL-MCNC: 9.3 MG/DL (ref 8.5–10.1)
CHLORIDE BLD-SCNC: 102 MMOL/L (ref 94–109)
CO2 SERPL-SCNC: 31 MMOL/L (ref 20–32)
CREAT SERPL-MCNC: 1.25 MG/DL (ref 0.52–1.04)
GFR SERPL CREATININE-BSD FRML MDRD: 46 ML/MIN/1.73M2
GLUCOSE BLD-MCNC: 107 MG/DL (ref 70–99)
INR BLD: 2.9 (ref 0.9–1.1)
POTASSIUM BLD-SCNC: 3.7 MMOL/L (ref 3.4–5.3)
PROT SERPL-MCNC: 7.3 G/DL (ref 6.8–8.8)
SODIUM SERPL-SCNC: 137 MMOL/L (ref 133–144)

## 2022-07-12 PROCEDURE — 36415 COLL VENOUS BLD VENIPUNCTURE: CPT

## 2022-07-12 PROCEDURE — 80053 COMPREHEN METABOLIC PANEL: CPT | Performed by: INTERNAL MEDICINE

## 2022-07-12 PROCEDURE — 71046 X-RAY EXAM CHEST 2 VIEWS: CPT

## 2022-07-12 PROCEDURE — 76642 ULTRASOUND BREAST LIMITED: CPT | Mod: LT

## 2022-07-12 PROCEDURE — 85610 PROTHROMBIN TIME: CPT | Performed by: FAMILY MEDICINE

## 2022-07-12 RX ORDER — AZITHROMYCIN 250 MG/1
TABLET, FILM COATED ORAL
Qty: 6 TABLET | Refills: 0 | Status: SHIPPED | OUTPATIENT
Start: 2022-07-12 | End: 2022-07-17

## 2022-07-12 NOTE — TELEPHONE ENCOUNTER
Patient had CXR this morning, will wait for results and call from you.    Patient wanted you to know that she has some prednisone left from previous Rx.    Whit Nowak XRO/

## 2022-07-12 NOTE — TELEPHONE ENCOUNTER
I ordered a CXR for her to have done to see if she has a pneumonia.  Her WBC was slightly elevated but the rest of her labs were normal.  If there is evidence of any infection then she will need to go back on antibiotics.      Electronically signed by:  Nazario Jones M.D.  7/12/2022

## 2022-07-12 NOTE — PROGRESS NOTES
ANTICOAGULATION  MANAGEMENT     Interacting Medication Review    Interacting medication(s): Azithromycin (Zithromax, Z-mickie) with warfarin.    Duration: 5 days (7/12 to 7/16)    Indication: Pneumonia    New medication?: Yes, interaction may increase INR and risk of bleeding. Closer INR monitoring recommended.        PLAN     Continue your current warfarin dose with next INR in 3 days        Summary  As of 7/12/2022    Full warfarin instructions:  3.75 mg every Sat; 2.5 mg all other days   Next INR check:  7/15/2022             Telephone call with Ivette who verbalizes understanding and agrees to plan    New recheck date updated on anticoagulation calendar     Plan made per [facility] anticoagulation protocol    Zoey Pickard RN  Anticoagulation Clinic

## 2022-07-12 NOTE — TELEPHONE ENCOUNTER
Her CXR showed a LLL infiltrate.  Will treat with a z-mickie and repeat CXR in 2 wks. Her zio patch did show two episodes of VT 8 beats but no symptoms.  Will continue to watch.      Electronically signed by:  Nazario Jones M.D.  7/12/2022

## 2022-07-14 ENCOUNTER — INFUSION THERAPY VISIT (OUTPATIENT)
Dept: INFUSION THERAPY | Facility: CLINIC | Age: 72
End: 2022-07-14
Attending: INTERNAL MEDICINE
Payer: COMMERCIAL

## 2022-07-14 VITALS
OXYGEN SATURATION: 92 % | HEART RATE: 108 BPM | BODY MASS INDEX: 33.73 KG/M2 | TEMPERATURE: 98.1 F | SYSTOLIC BLOOD PRESSURE: 133 MMHG | DIASTOLIC BLOOD PRESSURE: 62 MMHG | WEIGHT: 187.4 LBS | RESPIRATION RATE: 20 BRPM

## 2022-07-14 DIAGNOSIS — M81.0 AGE-RELATED OSTEOPOROSIS WITHOUT CURRENT PATHOLOGICAL FRACTURE: Primary | ICD-10-CM

## 2022-07-14 DIAGNOSIS — I48.91 ATRIAL FIBRILLATION (H): ICD-10-CM

## 2022-07-14 DIAGNOSIS — Z17.0 MALIGNANT NEOPLASM OF OVERLAPPING SITES OF LEFT BREAST IN FEMALE, ESTROGEN RECEPTOR POSITIVE (H): ICD-10-CM

## 2022-07-14 DIAGNOSIS — I48.0 AF (PAROXYSMAL ATRIAL FIBRILLATION) (H): ICD-10-CM

## 2022-07-14 DIAGNOSIS — C50.812 MALIGNANT NEOPLASM OF OVERLAPPING SITES OF LEFT BREAST IN FEMALE, ESTROGEN RECEPTOR POSITIVE (H): ICD-10-CM

## 2022-07-14 DIAGNOSIS — Z79.01 LONG TERM CURRENT USE OF ANTICOAGULANT THERAPY: ICD-10-CM

## 2022-07-14 PROCEDURE — 250N000011 HC RX IP 250 OP 636: Performed by: INTERNAL MEDICINE

## 2022-07-14 PROCEDURE — 96372 THER/PROPH/DIAG INJ SC/IM: CPT | Performed by: INTERNAL MEDICINE

## 2022-07-14 RX ADMIN — DENOSUMAB 60 MG: 60 INJECTION SUBCUTANEOUS at 14:48

## 2022-07-14 ASSESSMENT — PAIN SCALES - GENERAL: PAINLEVEL: NO PAIN (0)

## 2022-07-15 DIAGNOSIS — M81.0 AGE-RELATED OSTEOPOROSIS WITHOUT CURRENT PATHOLOGICAL FRACTURE: Primary | ICD-10-CM

## 2022-07-15 DIAGNOSIS — Z17.0 MALIGNANT NEOPLASM OF OVERLAPPING SITES OF LEFT BREAST IN FEMALE, ESTROGEN RECEPTOR POSITIVE (H): ICD-10-CM

## 2022-07-15 DIAGNOSIS — C50.812 MALIGNANT NEOPLASM OF OVERLAPPING SITES OF LEFT BREAST IN FEMALE, ESTROGEN RECEPTOR POSITIVE (H): ICD-10-CM

## 2022-07-15 RX ORDER — SODIUM CHLORIDE 9 MG/ML
1000 INJECTION, SOLUTION INTRAVENOUS CONTINUOUS PRN
Status: CANCELLED
Start: 2022-07-18

## 2022-07-15 RX ORDER — NALOXONE HYDROCHLORIDE 0.4 MG/ML
.1-.4 INJECTION, SOLUTION INTRAMUSCULAR; INTRAVENOUS; SUBCUTANEOUS
Status: CANCELLED | OUTPATIENT
Start: 2022-07-18

## 2022-07-15 RX ORDER — MEPERIDINE HYDROCHLORIDE 25 MG/ML
25 INJECTION INTRAMUSCULAR; INTRAVENOUS; SUBCUTANEOUS EVERY 30 MIN PRN
Status: CANCELLED | OUTPATIENT
Start: 2022-07-18

## 2022-07-15 RX ORDER — ALBUTEROL SULFATE 0.83 MG/ML
2.5 SOLUTION RESPIRATORY (INHALATION)
Status: CANCELLED | OUTPATIENT
Start: 2022-07-18

## 2022-07-15 RX ORDER — EPINEPHRINE 0.3 MG/.3ML
0.3 INJECTION SUBCUTANEOUS EVERY 5 MIN PRN
Status: CANCELLED | OUTPATIENT
Start: 2022-07-18

## 2022-07-15 RX ORDER — DIPHENHYDRAMINE HYDROCHLORIDE 50 MG/ML
50 INJECTION INTRAMUSCULAR; INTRAVENOUS
Status: CANCELLED
Start: 2022-07-18

## 2022-07-15 RX ORDER — METHYLPREDNISOLONE SODIUM SUCCINATE 125 MG/2ML
125 INJECTION, POWDER, LYOPHILIZED, FOR SOLUTION INTRAMUSCULAR; INTRAVENOUS
Status: CANCELLED
Start: 2022-07-18

## 2022-07-15 RX ORDER — ALBUTEROL SULFATE 90 UG/1
1-2 AEROSOL, METERED RESPIRATORY (INHALATION)
Status: CANCELLED
Start: 2022-07-18

## 2022-07-15 RX ORDER — EPINEPHRINE 1 MG/ML
0.3 INJECTION, SOLUTION INTRAMUSCULAR; SUBCUTANEOUS EVERY 5 MIN PRN
Status: CANCELLED | OUTPATIENT
Start: 2022-07-18

## 2022-07-18 ENCOUNTER — TELEPHONE (OUTPATIENT)
Dept: ANTICOAGULATION | Facility: CLINIC | Age: 72
End: 2022-07-18

## 2022-07-18 NOTE — TELEPHONE ENCOUNTER
ANTICOAGULATION     Latanya Padron is overdue for INR check.      Left message for patient to call and schedule lab appointment as soon as possible. If returning call, please schedule.     Darling Cruz RN on 7/18/2022 at 2:31 PM

## 2022-07-22 ENCOUNTER — ANTICOAGULATION THERAPY VISIT (OUTPATIENT)
Dept: ANTICOAGULATION | Facility: CLINIC | Age: 72
End: 2022-07-22

## 2022-07-22 ENCOUNTER — APPOINTMENT (OUTPATIENT)
Dept: LAB | Facility: CLINIC | Age: 72
End: 2022-07-22
Payer: COMMERCIAL

## 2022-07-22 DIAGNOSIS — Z79.01 LONG TERM CURRENT USE OF ANTICOAGULANT THERAPY: ICD-10-CM

## 2022-07-22 DIAGNOSIS — I48.91 ATRIAL FIBRILLATION (H): ICD-10-CM

## 2022-07-22 DIAGNOSIS — I48.0 AF (PAROXYSMAL ATRIAL FIBRILLATION) (H): Primary | ICD-10-CM

## 2022-07-22 LAB — INR BLD: 2.2 (ref 0.9–1.1)

## 2022-07-22 PROCEDURE — 36416 COLLJ CAPILLARY BLOOD SPEC: CPT

## 2022-07-22 PROCEDURE — 85610 PROTHROMBIN TIME: CPT

## 2022-07-22 NOTE — PROGRESS NOTES
ANTICOAGULATION MANAGEMENT     Latanya Padron 72 year old female is on warfarin with therapeutic INR result. (Goal INR 2.0-3.0)    Recent labs: (last 7 days)     07/22/22  1437   INR 2.2*       ASSESSMENT     Source(s): Chart Review and Patient/Caregiver Call     Warfarin doses taken: More warfarin taken than planned which may be affecting INR  Diet: No new diet changes identified  New illness, injury, or hospitalization: No  Medication/supplement changes: None noted  Signs or symptoms of bleeding or clotting: No  Previous INR: Therapeutic last visit; previously outside of goal range  Additional findings: None       PLAN     Recommended plan for no diet, medication or health factor changes affecting INR     Dosing Instructions: continue your current warfarin dose with next INR in 10 days       Summary  As of 7/22/2022    Full warfarin instructions:  3.75 mg every Tue, Sat; 2.5 mg all other days   Next INR check:  8/1/2022             Telephone call with Ivette who verbalizes understanding and agrees to plan    Lab visit scheduled    Education provided: Monitoring for bleeding signs and symptoms    Plan made per [facility] anticoagulation protocol    Zoey Pickard RN  Anticoagulation Clinic  7/22/2022    _______________________________________________________________________     Anticoagulation Episode Summary     Current INR goal:  2.0-3.0   TTR:  56.1 % (1 y)   Target end date:  Indefinite   [facility]    Indications    AF (paroxysmal atrial fibrillation) (H) [I48.0]  Atrial fibrillation (H) [I48.91]  Long term current use of anticoagulant therapy [Z79.01]  Atrial fibrillation  unspecified type (H) (Resolved) [I48.91]           Comments:           Anticoagulation Care Providers     Provider Role Specialty Phone number    Nazario Jones MD Referring Family Medicine [phone]

## 2022-08-01 ENCOUNTER — HOSPITAL ENCOUNTER (OUTPATIENT)
Dept: GENERAL RADIOLOGY | Facility: CLINIC | Age: 72
Discharge: HOME | End: 2022-08-01
Attending: FAMILY MEDICINE
Payer: COMMERCIAL

## 2022-08-01 ENCOUNTER — HOSPITAL ENCOUNTER (OUTPATIENT)
Dept: CT IMAGING | Facility: CLINIC | Age: 72
Discharge: HOME | End: 2022-08-01
Attending: FAMILY MEDICINE
Payer: COMMERCIAL

## 2022-08-01 ENCOUNTER — ANTICOAGULATION THERAPY VISIT (OUTPATIENT)
Dept: ANTICOAGULATION | Facility: CLINIC | Age: 72
End: 2022-08-01

## 2022-08-01 ENCOUNTER — LAB (OUTPATIENT)
Dept: LAB | Facility: CLINIC | Age: 72
End: 2022-08-01
Payer: COMMERCIAL

## 2022-08-01 DIAGNOSIS — Z87.891 HISTORY OF TOBACCO USE: ICD-10-CM

## 2022-08-01 DIAGNOSIS — I48.0 AF (PAROXYSMAL ATRIAL FIBRILLATION) (H): ICD-10-CM

## 2022-08-01 DIAGNOSIS — I48.91 ATRIAL FIBRILLATION (H): ICD-10-CM

## 2022-08-01 DIAGNOSIS — J18.9 PNEUMONIA OF LEFT LOWER LOBE DUE TO INFECTIOUS ORGANISM: ICD-10-CM

## 2022-08-01 DIAGNOSIS — Z79.01 LONG TERM CURRENT USE OF ANTICOAGULANT THERAPY: ICD-10-CM

## 2022-08-01 DIAGNOSIS — I48.0 AF (PAROXYSMAL ATRIAL FIBRILLATION) (H): Primary | ICD-10-CM

## 2022-08-01 LAB — INR BLD: 2.7 (ref 0.9–1.1)

## 2022-08-01 PROCEDURE — 250N000011 HC RX IP 250 OP 636: Performed by: FAMILY MEDICINE

## 2022-08-01 PROCEDURE — 71046 X-RAY EXAM CHEST 2 VIEWS: CPT

## 2022-08-01 PROCEDURE — 71260 CT THORAX DX C+: CPT

## 2022-08-01 PROCEDURE — 36416 COLLJ CAPILLARY BLOOD SPEC: CPT

## 2022-08-01 PROCEDURE — 85610 PROTHROMBIN TIME: CPT

## 2022-08-01 PROCEDURE — 250N000009 HC RX 250: Performed by: FAMILY MEDICINE

## 2022-08-01 RX ORDER — IOPAMIDOL 755 MG/ML
500 INJECTION, SOLUTION INTRAVASCULAR ONCE
Status: COMPLETED | OUTPATIENT
Start: 2022-08-01 | End: 2022-08-01

## 2022-08-01 RX ADMIN — IOPAMIDOL 75 ML: 755 INJECTION, SOLUTION INTRAVENOUS at 08:34

## 2022-08-01 RX ADMIN — SODIUM CHLORIDE 75 ML: 9 INJECTION, SOLUTION INTRAVENOUS at 08:33

## 2022-08-01 NOTE — PROGRESS NOTES
ANTICOAGULATION MANAGEMENT     Latanya Padron 72 year old female is on warfarin with therapeutic INR result. (Goal INR 2.0-3.0)    Recent labs: (last 7 days)     08/01/22  0846   INR 2.7*       ASSESSMENT     Source(s): Chart Review and Patient/Caregiver Call     Warfarin doses taken: Warfarin taken as instructed  Diet: No new diet changes identified  New illness, injury, or hospitalization: No  Medication/supplement changes: None noted  Signs or symptoms of bleeding or clotting: No  Previous INR: Therapeutic last 2(+) visits  Additional findings: None       PLAN     Recommended plan for no diet, medication or health factor changes affecting INR     Dosing Instructions: Continue your current warfarin dose with next INR in 2 weeks       Summary  As of 8/1/2022    Full warfarin instructions:  3.75 mg every Tue, Sat; 2.5 mg all other days   Next INR check:  8/15/2022             Telephone call with Ivette who verbalizes understanding and agrees to plan and who agrees to plan and repeated back plan correctly    Lab visit scheduled    Education provided: Please call back if any changes to your diet, medications or how you've been taking warfarin    Plan made per [facility] anticoagulation protocol    Manolo Damian, RN  Anticoagulation Clinic  8/1/2022    _______________________________________________________________________     Anticoagulation Episode Summary     Current INR goal:  2.0-3.0   TTR:  56.1 % (1 y)   Target end date:  Indefinite   Send INR reminders to:  [facility]    Indications    AF (paroxysmal atrial fibrillation) (H) [I48.0]  Atrial fibrillation (H) [I48.91]  Long term current use of anticoagulant therapy [Z79.01]  Atrial fibrillation  unspecified type (H) (Resolved) [I48.91]           Comments:           Anticoagulation Care Providers     Provider Role Specialty Phone number    Nazario Jones MD Referring Family Medicine [phone]

## 2022-08-02 ENCOUNTER — MYC MEDICAL ADVICE (OUTPATIENT)
Dept: FAMILY MEDICINE | Facility: CLINIC | Age: 72
End: 2022-08-02

## 2022-08-03 NOTE — TELEPHONE ENCOUNTER
Patient calling back, wanting to know if she needs to be on more medication, after reviewing the CT and Chest x-ray. Please call to advise

## 2022-08-03 NOTE — TELEPHONE ENCOUNTER
I spoke with the patient and explained that the findings on her CT scan are most likely atelectasis or old scarring from previous infections and injury to her lungs.  There is no evidence of pneumonia or a lung cancer.  We will repeat her CT scan in 1 year.    Electronically signed by:  Nazario Jones M.D.  8/3/2022

## 2022-08-08 DIAGNOSIS — I48.0 AF (PAROXYSMAL ATRIAL FIBRILLATION) (H): ICD-10-CM

## 2022-08-08 DIAGNOSIS — I48.91 ATRIAL FIBRILLATION, UNSPECIFIED TYPE (H): ICD-10-CM

## 2022-08-08 RX ORDER — WARFARIN SODIUM 2.5 MG/1
TABLET ORAL
Qty: 100 TABLET | Refills: 1 | Status: SHIPPED | OUTPATIENT
Start: 2022-08-08 | End: 2022-01-01

## 2022-08-08 NOTE — TELEPHONE ENCOUNTER
Routing refill request to provider for review/approval because:  Drug not on the [facility] refill protocol     Madhav Min RN

## 2022-08-08 NOTE — TELEPHONE ENCOUNTER
ANTICOAGULATION MANAGEMENT:  Medication Refill    Anticoagulation Summary  As of 8/1/2022    Warfarin maintenance plan:  3.75 mg (2.5 mg x 1.5) every Tue, Sat; 2.5 mg (2.5 mg x 1) all other days   Next INR check:  8/15/2022   Target end date:  Indefinite    Indications    AF (paroxysmal atrial fibrillation) (H) [I48.0]  Atrial fibrillation (H) [I48.91]  Long term current use of anticoagulant therapy [Z79.01]  Atrial fibrillation  unspecified type (H) (Resolved) [I48.91]             Anticoagulation Care Providers     Provider Role Specialty Phone number    Nazario Jones MD Referring Family Medicine [phone]          Visit with referring provider/group within last year: Yes    [facility] referral signed within last year: Yes    Latanya meets all criteria for refill (current [facility] referral, office visit with referring provider/group in last year, lab monitoring up to date or not exceeding 2 weeks overdue). Rx instructions and quantity supplied updated to match patient's current dosing plan. Warfarin 90 day supply with 1 refill granted per [facility] protocol     Manolo Damian RN  Anticoagulation Clinic

## 2022-08-15 ENCOUNTER — ANTICOAGULATION THERAPY VISIT (OUTPATIENT)
Dept: ANTICOAGULATION | Facility: CLINIC | Age: 72
End: 2022-08-15

## 2022-08-15 ENCOUNTER — LAB (OUTPATIENT)
Dept: LAB | Facility: CLINIC | Age: 72
End: 2022-08-15
Payer: COMMERCIAL

## 2022-08-15 DIAGNOSIS — I48.0 AF (PAROXYSMAL ATRIAL FIBRILLATION) (H): Primary | ICD-10-CM

## 2022-08-15 DIAGNOSIS — I48.91 ATRIAL FIBRILLATION (H): ICD-10-CM

## 2022-08-15 DIAGNOSIS — Z79.01 LONG TERM CURRENT USE OF ANTICOAGULANT THERAPY: ICD-10-CM

## 2022-08-15 DIAGNOSIS — I48.0 AF (PAROXYSMAL ATRIAL FIBRILLATION) (H): ICD-10-CM

## 2022-08-15 LAB — INR BLD: 1.9 (ref 0.9–1.1)

## 2022-08-15 PROCEDURE — 85610 PROTHROMBIN TIME: CPT

## 2022-08-15 PROCEDURE — 36416 COLLJ CAPILLARY BLOOD SPEC: CPT

## 2022-08-15 NOTE — PROGRESS NOTES
ANTICOAGULATION MANAGEMENT     Latanya Padron 72 year old female is on warfarin with therapeutic INR result. (Goal INR 2.0-3.0)    Recent labs: (last 7 days)     08/15/22  1042   INR 1.9*       ASSESSMENT     Source(s): Chart Review and Patient/Caregiver Call     Warfarin doses taken: Missed dose(s) may be affecting INR  Diet: No new diet changes identified  New illness, injury, or hospitalization: No  Medication/supplement changes: None noted  Signs or symptoms of bleeding or clotting: No  Previous INR: Therapeutic last 2(+) visits  Additional findings: None       PLAN     Recommended plan for temporary change(s) affecting INR     Dosing Instructions: booster dose then continue your current warfarin dose with next INR in 2 weeks       Summary  As of 8/15/2022    Full warfarin instructions:  8/15: 3.75 mg; Otherwise 3.75 mg every Tue, Sat; 2.5 mg all other days   Next INR check:  8/29/2022             Telephone call with Ivette who verbalizes understanding and agrees to plan and who agrees to plan and repeated back plan correctly    Lab visit scheduled    Education provided: Please call back if any changes to your diet, medications or how you've been taking warfarin    Plan made per [facility] anticoagulation protocol    Manolo Damian, RN  Anticoagulation Clinic  8/15/2022    _______________________________________________________________________     Anticoagulation Episode Summary     Current INR goal:  2.0-3.0   TTR:  55.6 % (1 y)   Target end date:  Indefinite   Send INR reminders to:  [facility]    Indications    AF (paroxysmal atrial fibrillation) (H) [I48.0]  Atrial fibrillation (H) [I48.91]  Long term current use of anticoagulant therapy [Z79.01]  Atrial fibrillation  unspecified type (H) (Resolved) [I48.91]           Comments:           Anticoagulation Care Providers     Provider Role Specialty Phone number    Nazario Jones MD Referring Family Medicine [phone]

## 2022-08-18 ENCOUNTER — ONCOLOGY VISIT (OUTPATIENT)
Dept: ONCOLOGY | Facility: CLINIC | Age: 72
End: 2022-08-18
Payer: COMMERCIAL

## 2022-08-18 VITALS
HEART RATE: 110 BPM | TEMPERATURE: 96.7 F | BODY MASS INDEX: 33.59 KG/M2 | OXYGEN SATURATION: 94 % | SYSTOLIC BLOOD PRESSURE: 118 MMHG | WEIGHT: 189.6 LBS | HEIGHT: 63 IN | DIASTOLIC BLOOD PRESSURE: 70 MMHG

## 2022-08-18 DIAGNOSIS — C50.812 MALIGNANT NEOPLASM OF OVERLAPPING SITES OF LEFT BREAST IN FEMALE, ESTROGEN RECEPTOR POSITIVE (H): Primary | ICD-10-CM

## 2022-08-18 DIAGNOSIS — Z17.0 MALIGNANT NEOPLASM OF OVERLAPPING SITES OF LEFT BREAST IN FEMALE, ESTROGEN RECEPTOR POSITIVE (H): Primary | ICD-10-CM

## 2022-08-18 DIAGNOSIS — E78.5 DYSLIPIDEMIA: ICD-10-CM

## 2022-08-18 DIAGNOSIS — M81.0 AGE-RELATED OSTEOPOROSIS WITHOUT CURRENT PATHOLOGICAL FRACTURE: ICD-10-CM

## 2022-08-18 PROCEDURE — 99214 OFFICE O/P EST MOD 30 MIN: CPT | Performed by: INTERNAL MEDICINE

## 2022-08-18 ASSESSMENT — PAIN SCALES - GENERAL: PAINLEVEL: NO PAIN (0)

## 2022-08-18 NOTE — Clinical Note
8/18/2022         RE: Latanya Padron  [address] [facility] [address]        Dear Colleague,    Thank you for referring your patient, Latanya Padron, to the [facility]. Please see a copy of my visit note below.    No notes on file    Again, thank you for allowing me to participate in the care of your patient.        Sincerely,        Vin Dhillon MD

## 2022-08-29 NOTE — PROGRESS NOTES
ANTICOAGULATION MANAGEMENT     Latanya Padron 72 year old female is on warfarin with therapeutic INR result. (Goal INR 2.0-3.0)    Recent labs: (last 7 days)     08/29/22  0902   INR 2.5*       ASSESSMENT     Source(s): Chart Review and Patient/Caregiver Call     Warfarin doses taken: Warfarin taken as instructed  Diet: No new diet changes identified  New illness, injury, or hospitalization: No  Medication/supplement changes: None noted  Signs or symptoms of bleeding or clotting: No  Previous INR: Subtherapeutic  Additional findings: None       PLAN     Recommended plan for no diet, medication or health factor changes affecting INR     Dosing Instructions: Continue your current warfarin dose with next INR in 3 weeks       Summary  As of 8/29/2022    Full warfarin instructions:  3.75 mg every Tue, Sat; 2.5 mg all other days   Next INR check:  9/19/2022             Telephone call with Ivette who verbalizes understanding and agrees to plan, who agrees to plan and repeated back plan correctly and ; made aware of patient survey and encouraged to participate.    Lab visit scheduled    Education provided: Please call back if any changes to your diet, medications or how you've been taking warfarin    Plan made per [facility] anticoagulation protocol    Manolo Damian, RN  Anticoagulation Clinic  8/29/2022    _______________________________________________________________________     Anticoagulation Episode Summary     Current INR goal:  2.0-3.0   TTR:  57.1 % (1 y)   Target end date:  Indefinite   Send INR reminders to:  [facility]    Indications    AF (paroxysmal atrial fibrillation) (H) [I48.0]  Atrial fibrillation (H) [I48.91]  Long term current use of anticoagulant therapy [Z79.01]  Atrial fibrillation  unspecified type (H) (Resolved) [I48.91]           Comments:           Anticoagulation Care Providers     Provider Role Specialty Phone number    Nazario Jones MD Referring Family Medicine [phone]

## 2022-09-19 NOTE — PROGRESS NOTES
ANTICOAGULATION MANAGEMENT     Latanya Padron 72 year old female is on warfarin with therapeutic INR result. (Goal INR 2.0-3.0)    Recent labs: (last 7 days)     09/19/22  0940   INR 2.3*       ASSESSMENT     Source(s): Chart Review and Patient/Caregiver Call     Warfarin doses taken: Warfarin taken as instructed  Diet: No new diet changes identified  New illness, injury, or hospitalization: No  Medication/supplement changes: None noted  Signs or symptoms of bleeding or clotting: No  Previous INR: Therapeutic last 2(+) visits  Additional findings: None       PLAN     Recommended plan for no diet, medication or health factor changes affecting INR     Dosing Instructions: Continue your current warfarin dose with next INR in 4 weeks       Summary  As of 9/19/2022    Full warfarin instructions:  3.75 mg every Tue, Sat; 2.5 mg all other days   Next INR check:  10/17/2022             Telephone call with Ivette who verbalizes understanding and agrees to plan and who agrees to plan and repeated back plan correctly    Lab visit scheduled    Education provided: None required    Plan made per [facility] anticoagulation protocol    Zoey Pickard RN  Anticoagulation Clinic  9/19/2022    _______________________________________________________________________     Anticoagulation Episode Summary     Current INR goal:  2.0-3.0   TTR:  62.8 % (1 y)   Target end date:  Indefinite   Send INR reminders to:  [facility]    Indications    AF (paroxysmal atrial fibrillation) (H) [I48.0]  Atrial fibrillation (H) [I48.91]  Long term current use of anticoagulant therapy [Z79.01]  Atrial fibrillation  unspecified type (H) (Resolved) [I48.91]           Comments:           Anticoagulation Care Providers     Provider Role Specialty Phone number    Nazario Jones MD Referring Family Medicine [phone]

## 2022-10-04 NOTE — TELEPHONE ENCOUNTER
ANTICOAGULATION CLINIC REFERRAL RENEWAL REQUEST       An annual renewal order is required for all patients referred to [facility].?  Please review and sign the pended referral order for Latanya Padron.       ANTICOAGULATION SUMMARY      Warfarin indication(s)   Atrial Fibrillation    Mechanical heart valve present?  NO       Current goal range   INR: 2.0-3.0     Goal appropriate for indication? Goal INR 2-3, standard for indication(s) above     Time in Therapeutic Range (TTR)  (Goal > 60%) 62.8%       Office visit with referring provider's group within last year yes on 6/28/22       Francine Andrew RN  [facility]

## 2022-10-06 PROBLEM — I48.91 ATRIAL FIBRILLATION, UNSPECIFIED TYPE (H): Status: ACTIVE | Noted: 2022-01-01

## 2022-10-06 NOTE — TELEPHONE ENCOUNTER
Patient requested a refill of a dulera inhaler. We do not have an active script on file.    Thank you

## 2022-10-09 NOTE — TELEPHONE ENCOUNTER
Routing refill request to provider for review/approval because:  Order for Serevent, Striverdi, or Foradil and pt has steroid inhaler     Madhav Min RN

## 2022-10-17 NOTE — PROGRESS NOTES
ANTICOAGULATION MANAGEMENT     Latanya Padron 72 year old female is on warfarin with supratherapeutic INR result. (Goal INR 2.0-3.0)    Recent labs: (last 7 days)     10/17/22  0944   INR 3.3*       ASSESSMENT     Source(s): Chart Review and Patient/Caregiver Call     Warfarin doses taken: Warfarin taken as instructed  Diet: Decreased greens/vitamin K in diet; plans to resume previous intake  New illness, injury, or hospitalization: No  Medication/supplement changes: None noted  Signs or symptoms of bleeding or clotting: No  Previous INR: Therapeutic last 2(+) visits  Additional findings: None       PLAN     Recommended plan for temporary change(s) affecting INR     Dosing Instructions: hold partial dose tomorrow doses then continue your current warfarin dose with next INR in 2 weeks       Summary  As of 10/17/2022    Full warfarin instructions:  10/18: 2.5 mg; Otherwise 3.75 mg every Tue, Sat; 2.5 mg all other days   Next INR check:  10/31/2022             Telephone call with Ivette who verbalizes understanding and agrees to plan and who agrees to plan and repeated back plan correctly    Lab visit scheduled    Education provided: Please call back if any changes to your diet, medications or how you've been taking warfarin, Importance of consistent vitamin K intake, Impact of vitamin K foods on INR, Vitamin K content of foods, Goal range and significance of current result, Importance of therapeutic range and Importance of following up at instructed interval    Plan made per [facility] anticoagulation protocol    Manolo Damian RN  Anticoagulation Clinic  10/17/2022    _______________________________________________________________________     Anticoagulation Episode Summary     Current INR goal:  2.0-3.0   TTR:  65.4 % (1 y)   Target end date:  Indefinite   Send INR reminders to:  [facility]    Indications    AF (paroxysmal atrial fibrillation) (H) [I48.0]  Atrial fibrillation (H) [I48.91]  Long term current use  of anticoagulant therapy [Z79.01]  Atrial fibrillation  unspecified type (H) (Resolved) [I48.91]  Atrial fibrillation  unspecified type (H) [I48.91]           Comments:           Anticoagulation Care Providers     Provider Role Specialty Phone number    Nazario Jones MD Referring Family Medicine [phone]

## 2022-10-24 NOTE — TELEPHONE ENCOUNTER
Patient calling about a refill for rescue inhaler. Her script is  now. Patient still has an inhaler to use in the meantime until refills are sent.     Patient also wondering about a salivary gland stimulant. She stated her dental hygenist suggested it might be good for patient with the medications she is on and her having a dry mouth a lot of the time. Would PCP advise patient make an appointment to discuss this?    MARCELO DentonN, RN

## 2022-10-24 NOTE — TELEPHONE ENCOUNTER
Prescription approved per [facility] Refill Protocol.    See note below about other medication request.     MARCELO DentonN, RN

## 2022-10-27 NOTE — PROGRESS NOTES
ANTICOAGULATION MANAGEMENT     Latanya Padron 71 year old female is on warfarin with supratherapeutic INR result. (Goal INR 2.0-3.0)    Recent labs: (last 7 days)     08/31/21  0859   INR 3.2*       ASSESSMENT     Source(s): Chart Review and Patient/Caregiver Call     Warfarin doses taken: Warfarin taken as instructed  Diet: Decreased greens/vitamin K in diet; plans to resume previous intake  New illness, injury, or hospitalization: No  Medication/supplement changes: Was taking ultram which can raise INR, finished a couple days ago  Signs or symptoms of bleeding or clotting: No  Previous INR: Therapeutic last 2(+) visits  Additional findings: None     PLAN     Recommended plan for temporary change(s) affecting INR     Dosing Instructions: Continue your current warfarin dose with next INR in 4 weeks       Summary  As of 8/31/2021    Full warfarin instructions:  5 mg every Mon; 2.5 mg all other days   Next INR check:  9/28/2021             Telephone call with Latanya who verbalizes understanding and agrees to plan    Lab visit scheduled    Education provided: Importance of consistent vitamin K intake    Plan made per [facility] anticoagulation protocol    Zoey Pickard, RN  Anticoagulation Clinic  8/31/2021    _______________________________________________________________________     Anticoagulation Episode Summary     Current INR goal:  2.0-3.0   TTR:  83.1 % (1 y)   Target end date:  Indefinite   [facility]    Indications    AF (paroxysmal atrial fibrillation) (H) [I48.0]  Atrial fibrillation (H) [I48.91]  Long term current use of anticoagulant therapy [Z79.01]  Atrial fibrillation  unspecified type (H) (Resolved) [I48.91]           Comments:  5 mg tabs, PM dose         Anticoagulation Care Providers     Provider Role Specialty Phone number    Glen Blue MD Referring Family Medicine [phone]             Low Dose Naltrexone Pregnancy And Lactation Text: Naltrexone is pregnancy category C.  There have been no adequate and well-controlled studies in pregnant women.  It should be used in pregnancy only if the potential benefit justifies the potential risk to the fetus.   Limited data indicates that naltrexone is minimally excreted into breastmilk.

## 2022-10-31 NOTE — PROGRESS NOTES
ANTICOAGULATION MANAGEMENT     Latanya Padron 72 year old female is on warfarin with therapeutic INR result. (Goal INR 2.0-3.0)    Recent labs: (last 7 days)     10/31/22  0801   INR 2.7*       ASSESSMENT     Source(s): Chart Review     Warfarin doses taken: Reviewed in chart  Diet: LM  New illness, injury, or hospitalization: No  Medication/supplement changes: None noted  Signs or symptoms of bleeding or clotting: No  Previous INR: Supratherapeutic  Additional findings: None       PLAN     Recommended plan for no diet, medication or health factor changes affecting INR     Dosing Instructions: Continue your current warfarin dose with next INR in 2 weeks       Summary  As of 10/31/2022    Full warfarin instructions:  3.75 mg every Tue, Sat; 2.5 mg all other days; Starting 10/31/2022   Next INR check:  11/14/2022             Detailed voice message left for Ivette with dosing instructions and follow up date.     Contact [phone]  to schedule and with any changes, questions or concerns.     Education provided:   Please call back if any changes to your diet, medications or how you've been taking warfarin    Plan made per [facility] anticoagulation protocol    Zoey Pickard RN  Anticoagulation Clinic  10/31/2022    _______________________________________________________________________     Anticoagulation Episode Summary     Current INR goal:  2.0-3.0   TTR:  67.3 % (1 y)   Target end date:  Indefinite   Send INR reminders to:  [facility]    Indications    AF (paroxysmal atrial fibrillation) (H) [I48.0]  Atrial fibrillation (H) [I48.91]  Long term current use of anticoagulant therapy [Z79.01]  Atrial fibrillation  unspecified type (H) (Resolved) [I48.91]  Atrial fibrillation  unspecified type (H) [I48.91]           Comments:           Anticoagulation Care Providers     Provider Role Specialty Phone number    Nazario Jones MD Referring Family Medicine [phone]

## 2022-11-11 NOTE — TELEPHONE ENCOUNTER
"Requested Prescriptions   Pending Prescriptions Disp Refills    JANTOVEN ANTICOAGULANT 2.5 MG tablet [Pharmacy Med Name: JANTOVEN 2.5MG TABS] 100 tablet 1     Sig: TAKE 1 1/2 TABLETS BY MOUTH ON TUES, THURS, SATURDAY AND 1 TABLET ALL OTHER DAYS OR AS DIRECTED BY THE COUMADIN CLINIC.       Vitamin K Antagonists Failed - 11/9/2022  7:25 PM        Failed - INR is within goal in the past 6 weeks     Confirm INR is within goal in the past 6 weeks.     Recent Labs   Lab Test 10/31/22  0801   INR 2.7*                       Passed - Recent (12 mo) or future (30 days) visit within the authorizing provider's specialty     Patient has had an office visit with the authorizing provider or a provider within the authorizing providers department within the previous 12 mos or has a future within next 30 days. See \"Patient Info\" tab in inbasket, or \"Choose Columns\" in Meds & Orders section of the refill encounter.              Passed - Medication is active on med list        Passed - Patient is 18 years of age or older        Passed - Patient is not pregnant        Passed - No positive pregnancy on file in past 12 months              Radha Peña RN  "

## 2022-11-11 NOTE — TELEPHONE ENCOUNTER
Patient requesting a safe supplement suggestion to take for hair loss.     José Severino,BSN, RN

## 2022-11-15 NOTE — PROGRESS NOTES
ANTICOAGULATION MANAGEMENT     Latanya Padron 72 year old female is on warfarin with subtherapeutic INR result. (Goal INR 2.0-3.0)    Recent labs: (last 7 days)     11/15/22  0817   INR 1.8*       ASSESSMENT     Source(s): Chart Review and Patient/Caregiver Call     Warfarin doses taken: Warfarin taken as instructed  Diet: Increased greens/vitamin K in diet; ongoing change  New illness, injury, or hospitalization: No  Medication/supplement changes: None noted  Signs or symptoms of bleeding or clotting: No  Previous INR: Therapeutic last visit; previously outside of goal range  Additional findings: None       PLAN     Recommended plan for no diet, medication or health factor changes affecting INR     Dosing Instructions: Increase your warfarin dose (6.2% change) with next INR in 2 weeks       Summary  As of 11/15/2022    Full warfarin instructions:  3.75 mg every Tue, Thu, Sat; 2.5 mg all other days; Starting 11/15/2022   Next INR check:  11/29/2022             Telephone call with Ivette who verbalizes understanding and agrees to plan and who agrees to plan and repeated back plan correctly    Lab visit scheduled    Education provided:   Dietary considerations: impact of vitamin K foods on INR  Symptom monitoring: monitoring for clotting signs and symptoms and monitoring for stroke signs and symptoms    Plan made per [facility] anticoagulation protocol    Zoey Pickard RN  Anticoagulation Clinic  11/15/2022    _______________________________________________________________________     Anticoagulation Episode Summary     Current INR goal:  2.0-3.0   TTR:  67.6 % (1 y)   Target end date:  Indefinite   Send INR reminders to:  [facility]    Indications    AF (paroxysmal atrial fibrillation) (H) [I48.0]  Atrial fibrillation (H) [I48.91]  Long term current use of anticoagulant therapy [Z79.01]  Atrial fibrillation  unspecified type (H) (Resolved) [I48.91]  Atrial fibrillation  unspecified type (H) [I48.91]            Comments:           Anticoagulation Care Providers     Provider Role Specialty Phone number    Nazario Jones MD Referring Family Medicine [phone]

## 2022-11-21 NOTE — PROGRESS NOTES
[facility] Hematology / Oncology  Progress Note  Name: Latanya Padron  :  1950    MRN:  [id number]    --------------------    Assessment / Plan:  Stage 1A (pT2 pN0 cMX), hormone-positive, HER2-negative, invasive lobular carcinoma left breast  # Sep 2018 Presented w/ abnormal screening mammogram; ultrasound w/ no abnormalities; 6-month follow-up recommended.  # 2019 Repeat mammogram and ultrasound w/ heterogeneous mass left breast measuring 11 x 5 x 5 mm; biopsy w/ grade 2 invasive lobular carcinoma, ER/OR positive/HER2 negative; breast MRI w/ 2 cm focus of masslike enhancement without other signs of disease in either breast or adenopathy.  # May 2019 Left breast lumpectomy with sentinel lymph node; pathology w/ (30 mm, unifocial, no ALI, negative margins, 3/3 nodes negative)  # May 2019 Onco score 15; adjuvant chemotherapy not indicatedtype Dx.  # 2019 Completed adjuvant breast radiation.  # 2019 Started adjuvant anastrozole  Osteoporosis:  # 2019 DEXA scan w/ T score -2.7.  # Prolia q6 months ongoing.  Family history of cancer:  # Mother w/ breast cancer in 70s.  # Sister w/ breast cancer in her 40s.  # Maternal cousin w/ breast cancer in her 60s.  # Maternal uncle with stage IV colon cancer.  # Brother w/ prostate cancer.  # Genetic testing not covered by insurance.    Latanya is doing well and shows no signs of clinical recurrence.  She is tolerating adjuvant Arimidex well and without major toxicities.  She will remain on it for a minimum of 5, potentially 10 years.  She will continue with Prolia every 6 months for prevention of bone thinning associated Arimidex as well as known osteoporosis.  She will continue with annual mammogram.  She will continue with age-appropriate cancer surveillance through her primary care provider.      Vin Dhillon MD    --------------------    Interval History:  Latanya returns for follow up of invasive lobular breast cancer.  All in all, she is  "doing quite well.  No major health concerns or complaints today.  Tolerating adjuvant antiestrogen therapy well and without toxicity.  Some minor complaints including some trace hot flashes and achy joints.  Tolerating Prolia well and without major symptoms as well.    --------------------    Physical Exam:  VS: /70   Pulse 110   Temp (!) 96.7  F (35.9  C) (Temporal)   Ht 1.588 m (5' 2.5\")   Wt 86 kg (189 lb 9.6 oz)   SpO2 94%   BMI 34.13 kg/m    GEN: Well appearing.  BREAST: No palpable breast masses or axillary adenopathy.    Labs / Imaging:  We reviewed breast mammogram, breast ultrasound, breast biopsy pathology, surgical pathology, DEXA scan.  "

## 2022-11-21 NOTE — PATIENT INSTRUCTIONS
1) Mammogram as planned.  2) BJT GA 6 months (Feb 2023) and labs (lipid panel, ALT).    Vin Dhillon MD.

## 2022-11-29 NOTE — PROGRESS NOTES
ANTICOAGULATION MANAGEMENT     Latanya Padron 72 year old female is on warfarin with therapeutic INR result. (Goal INR 2.0-3.0)    Recent labs: (last 7 days)     11/29/22  1039   INR 2.5*       ASSESSMENT     Source(s): Chart Review and Patient/Caregiver Call     Warfarin doses taken: Warfarin taken as instructed  Diet: No new diet changes identified  New illness, injury, or hospitalization: No  Medication/supplement changes: None noted  Signs or symptoms of bleeding or clotting: No  Previous INR: Subtherapeutic  Additional findings: None       PLAN     Recommended plan for no diet, medication or health factor changes affecting INR     Dosing Instructions: Continue your current warfarin dose with next INR in 2 weeks       Summary  As of 11/29/2022    Full warfarin instructions:  3.75 mg every Tue, Thu, Sat; 2.5 mg all other days; Starting 11/29/2022   Next INR check:  12/13/2022             Telephone call with Ivette who verbalizes understanding and agrees to plan and who agrees to plan and repeated back plan correctly    Lab visit scheduled    Education provided:   None required    Plan made per [facility] anticoagulation protocol    Zoey Pickard RN  Anticoagulation Clinic  11/29/2022    _______________________________________________________________________     Anticoagulation Episode Summary     Current INR goal:  2.0-3.0   TTR:  66.5 % (1 y)   Target end date:  Indefinite   [facility]    Indications    AF (paroxysmal atrial fibrillation) (H) [I48.0]  Atrial fibrillation (H) [I48.91]  Long term current use of anticoagulant therapy [Z79.01]  Atrial fibrillation  unspecified type (H) (Resolved) [I48.91]  Atrial fibrillation  unspecified type (H) [I48.91]           Comments:           Anticoagulation Care Providers     Provider Role Specialty Phone number    Nazario Jones MD Referring Family Medicine [phone]

## 2022-12-13 NOTE — PROGRESS NOTES
ANTICOAGULATION MANAGEMENT     Latanya Padron 72 year old female is on warfarin with therapeutic INR result. (Goal INR 2.0-3.0)    Recent labs: (last 7 days)     12/13/22  0904   INR 2.7*       ASSESSMENT     Source(s): Chart Review and Patient/Caregiver Call     Warfarin doses taken: Warfarin taken as instructed  Diet: No new diet changes identified  New illness, injury, or hospitalization: No  Medication/supplement changes: None noted  Signs or symptoms of bleeding or clotting: No  Previous INR: Therapeutic last visit; previously outside of goal range  Additional findings: None       PLAN     Recommended plan for no diet, medication or health factor changes affecting INR     Dosing Instructions: Continue your current warfarin dose with next INR in 3 weeks       Summary  As of 12/13/2022    Full warfarin instructions:  3.75 mg every Tue, Thu, Sat; 2.5 mg all other days; Starting 12/13/2022   Next INR check:  1/3/2023             Telephone call with Ivette who verbalizes understanding and agrees to plan    Lab visit scheduled    Education provided:   Goal range and lab monitoring: goal range and significance of current result    Plan made per [facility] anticoagulation protocol    Laverne Parker RN  Anticoagulation Clinic  12/13/2022    _______________________________________________________________________     Anticoagulation Episode Summary     Current INR goal:  2.0-3.0   TTR:  66.5 % (1 y)   Target end date:  Indefinite   Send INR reminders to:  [facility]    Indications    AF (paroxysmal atrial fibrillation) (H) [I48.0]  Atrial fibrillation (H) [I48.91]  Long term current use of anticoagulant therapy [Z79.01]  Atrial fibrillation  unspecified type (H) (Resolved) [I48.91]  Atrial fibrillation  unspecified type (H) [I48.91]           Comments:           Anticoagulation Care Providers     Provider Role Specialty Phone number    Nazario Jones MD Referring Family Medicine [phone]

## 2023-01-01 ENCOUNTER — APPOINTMENT (OUTPATIENT)
Dept: GENERAL RADIOLOGY | Facility: CLINIC | Age: 73
DRG: 207 | End: 2023-01-01
Attending: PEDIATRICS
Payer: COMMERCIAL

## 2023-01-01 ENCOUNTER — APPOINTMENT (OUTPATIENT)
Dept: GENERAL RADIOLOGY | Facility: CLINIC | Age: 73
DRG: 207 | End: 2023-01-01
Attending: INTERNAL MEDICINE
Payer: COMMERCIAL

## 2023-01-01 ENCOUNTER — ANTICOAGULATION THERAPY VISIT (OUTPATIENT)
Dept: ANTICOAGULATION | Facility: CLINIC | Age: 73
End: 2023-01-01

## 2023-01-01 ENCOUNTER — APPOINTMENT (OUTPATIENT)
Dept: GENERAL RADIOLOGY | Facility: CLINIC | Age: 73
DRG: 207 | End: 2023-01-01
Attending: FAMILY MEDICINE
Payer: COMMERCIAL

## 2023-01-01 ENCOUNTER — TELEPHONE (OUTPATIENT)
Dept: PULMONOLOGY | Facility: CLINIC | Age: 73
End: 2023-01-01

## 2023-01-01 ENCOUNTER — ONCOLOGY VISIT (OUTPATIENT)
Dept: ONCOLOGY | Facility: CLINIC | Age: 73
End: 2023-01-01
Payer: COMMERCIAL

## 2023-01-01 ENCOUNTER — PRE VISIT (OUTPATIENT)
Dept: RADIATION ONCOLOGY | Facility: CLINIC | Age: 73
End: 2023-01-01

## 2023-01-01 ENCOUNTER — DOCUMENTATION ONLY (OUTPATIENT)
Dept: ANTICOAGULATION | Facility: CLINIC | Age: 73
End: 2023-01-01
Payer: COMMERCIAL

## 2023-01-01 ENCOUNTER — HOSPITAL ENCOUNTER (INPATIENT)
Facility: CLINIC | Age: 73
LOS: 10 days | Discharge: HOME HEALTH | DRG: 175 | End: 2023-07-19
Attending: EMERGENCY MEDICINE | Admitting: INTERNAL MEDICINE
Payer: COMMERCIAL

## 2023-01-01 ENCOUNTER — HOSPITAL ENCOUNTER (OUTPATIENT)
Dept: ULTRASOUND IMAGING | Facility: CLINIC | Age: 73
Discharge: HOME | End: 2023-07-07
Payer: COMMERCIAL

## 2023-01-01 ENCOUNTER — TELEPHONE (OUTPATIENT)
Dept: ONCOLOGY | Facility: CLINIC | Age: 73
End: 2023-01-01

## 2023-01-01 ENCOUNTER — HOSPITAL ENCOUNTER (EMERGENCY)
Facility: CLINIC | Age: 73
Discharge: HOME | End: 2023-04-13
Attending: EMERGENCY MEDICINE | Admitting: EMERGENCY MEDICINE
Payer: COMMERCIAL

## 2023-01-01 ENCOUNTER — LAB (OUTPATIENT)
Dept: LAB | Facility: CLINIC | Age: 73
End: 2023-01-01
Payer: COMMERCIAL

## 2023-01-01 ENCOUNTER — TELEPHONE (OUTPATIENT)
Dept: FAMILY MEDICINE | Facility: CLINIC | Age: 73
End: 2023-01-01

## 2023-01-01 ENCOUNTER — TELEPHONE (OUTPATIENT)
Dept: FAMILY MEDICINE | Facility: CLINIC | Age: 73
End: 2023-01-01
Payer: COMMERCIAL

## 2023-01-01 ENCOUNTER — HOSPITAL ENCOUNTER (OUTPATIENT)
Dept: CT IMAGING | Facility: CLINIC | Age: 73
Discharge: HOME | End: 2023-06-29
Payer: COMMERCIAL

## 2023-01-01 ENCOUNTER — APPOINTMENT (OUTPATIENT)
Dept: CT IMAGING | Facility: CLINIC | Age: 73
DRG: 175 | End: 2023-01-01
Attending: EMERGENCY MEDICINE
Payer: COMMERCIAL

## 2023-01-01 ENCOUNTER — TELEPHONE (OUTPATIENT)
Dept: PULMONOLOGY | Facility: CLINIC | Age: 73
End: 2023-01-01
Payer: COMMERCIAL

## 2023-01-01 ENCOUNTER — HOSPITAL ENCOUNTER (EMERGENCY)
Facility: CLINIC | Age: 73
Discharge: HOME | DRG: 207 | End: 2023-08-11
Attending: FAMILY MEDICINE | Admitting: FAMILY MEDICINE
Payer: COMMERCIAL

## 2023-01-01 ENCOUNTER — NURSE TRIAGE (OUTPATIENT)
Dept: FAMILY MEDICINE | Facility: CLINIC | Age: 73
End: 2023-01-01
Payer: COMMERCIAL

## 2023-01-01 ENCOUNTER — APPOINTMENT (OUTPATIENT)
Dept: LAB | Facility: CLINIC | Age: 73
End: 2023-01-01
Payer: COMMERCIAL

## 2023-01-01 ENCOUNTER — OFFICE VISIT (OUTPATIENT)
Dept: PULMONOLOGY | Facility: CLINIC | Age: 73
End: 2023-01-01
Payer: COMMERCIAL

## 2023-01-01 ENCOUNTER — TELEPHONE (OUTPATIENT)
Dept: RADIATION ONCOLOGY | Facility: CLINIC | Age: 73
End: 2023-01-01
Payer: COMMERCIAL

## 2023-01-01 ENCOUNTER — APPOINTMENT (OUTPATIENT)
Dept: GENERAL RADIOLOGY | Facility: CLINIC | Age: 73
DRG: 207 | End: 2023-01-01
Attending: RADIOLOGY
Payer: COMMERCIAL

## 2023-01-01 ENCOUNTER — HOSPITAL ENCOUNTER (OUTPATIENT)
Dept: MAMMOGRAPHY | Facility: CLINIC | Age: 73
Discharge: HOME | End: 2023-01-16
Attending: INTERNAL MEDICINE | Admitting: INTERNAL MEDICINE
Payer: COMMERCIAL

## 2023-01-01 ENCOUNTER — MYC MEDICAL ADVICE (OUTPATIENT)
Dept: FAMILY MEDICINE | Facility: CLINIC | Age: 73
End: 2023-01-01

## 2023-01-01 ENCOUNTER — DOCUMENTATION ONLY (OUTPATIENT)
Dept: ONCOLOGY | Facility: CLINIC | Age: 73
End: 2023-01-01
Payer: COMMERCIAL

## 2023-01-01 ENCOUNTER — TELEPHONE (OUTPATIENT)
Facility: CLINIC | Age: 73
End: 2023-01-01

## 2023-01-01 ENCOUNTER — APPOINTMENT (OUTPATIENT)
Dept: PHYSICAL THERAPY | Facility: CLINIC | Age: 73
DRG: 175 | End: 2023-01-01
Payer: COMMERCIAL

## 2023-01-01 ENCOUNTER — APPOINTMENT (OUTPATIENT)
Dept: MRI IMAGING | Facility: CLINIC | Age: 73
DRG: 175 | End: 2023-01-01
Attending: FAMILY MEDICINE
Payer: COMMERCIAL

## 2023-01-01 ENCOUNTER — PATIENT OUTREACH (OUTPATIENT)
Dept: ONCOLOGY | Facility: CLINIC | Age: 73
End: 2023-01-01
Payer: COMMERCIAL

## 2023-01-01 ENCOUNTER — TELEPHONE (OUTPATIENT)
Dept: ANTICOAGULATION | Facility: CLINIC | Age: 73
End: 2023-01-01

## 2023-01-01 ENCOUNTER — HOSPITAL ENCOUNTER (OUTPATIENT)
Dept: PET IMAGING | Facility: CLINIC | Age: 73
Discharge: HOME | End: 2023-08-01
Attending: INTERNAL MEDICINE | Admitting: INTERNAL MEDICINE
Payer: COMMERCIAL

## 2023-01-01 ENCOUNTER — OFFICE VISIT (OUTPATIENT)
Dept: FAMILY MEDICINE | Facility: CLINIC | Age: 73
End: 2023-01-01
Payer: COMMERCIAL

## 2023-01-01 ENCOUNTER — APPOINTMENT (OUTPATIENT)
Dept: CARDIOLOGY | Facility: CLINIC | Age: 73
DRG: 175 | End: 2023-01-01
Attending: INTERNAL MEDICINE
Payer: COMMERCIAL

## 2023-01-01 ENCOUNTER — DOCUMENTATION ONLY (OUTPATIENT)
Dept: ANTICOAGULATION | Facility: CLINIC | Age: 73
End: 2023-01-01

## 2023-01-01 ENCOUNTER — OFFICE VISIT (OUTPATIENT)
Dept: CARDIOLOGY | Facility: CLINIC | Age: 73
End: 2023-01-01
Payer: COMMERCIAL

## 2023-01-01 ENCOUNTER — HOSPITAL ENCOUNTER (OUTPATIENT)
Dept: CARDIOLOGY | Facility: CLINIC | Age: 73
Discharge: HOME | End: 2023-05-22
Attending: INTERNAL MEDICINE | Admitting: INTERNAL MEDICINE
Payer: COMMERCIAL

## 2023-01-01 ENCOUNTER — HOSPITAL ENCOUNTER (OUTPATIENT)
Dept: NUCLEAR MEDICINE | Facility: CLINIC | Age: 73
Setting detail: NUCLEAR MEDICINE
Discharge: HOME | End: 2023-05-02
Attending: INTERNAL MEDICINE
Payer: COMMERCIAL

## 2023-01-01 ENCOUNTER — HOSPITAL ENCOUNTER (OUTPATIENT)
Facility: CLINIC | Age: 73
End: 2023-01-01
Attending: INTERNAL MEDICINE | Admitting: INTERNAL MEDICINE
Payer: COMMERCIAL

## 2023-01-01 ENCOUNTER — HOSPITAL ENCOUNTER (OUTPATIENT)
Dept: CARDIOLOGY | Facility: CLINIC | Age: 73
Discharge: HOME | End: 2023-04-25
Attending: INTERNAL MEDICINE | Admitting: INTERNAL MEDICINE
Payer: COMMERCIAL

## 2023-01-01 ENCOUNTER — APPOINTMENT (OUTPATIENT)
Dept: GENERAL RADIOLOGY | Facility: CLINIC | Age: 73
DRG: 175 | End: 2023-01-01
Attending: EMERGENCY MEDICINE
Payer: COMMERCIAL

## 2023-01-01 ENCOUNTER — ANESTHESIA EVENT (OUTPATIENT)
Dept: INTENSIVE CARE | Facility: CLINIC | Age: 73
DRG: 207 | End: 2023-01-01
Payer: COMMERCIAL

## 2023-01-01 ENCOUNTER — MYC MEDICAL ADVICE (OUTPATIENT)
Dept: FAMILY MEDICINE | Facility: CLINIC | Age: 73
End: 2023-01-01
Payer: COMMERCIAL

## 2023-01-01 ENCOUNTER — HOSPITAL ENCOUNTER (OUTPATIENT)
Dept: CARDIOLOGY | Facility: CLINIC | Age: 73
Setting detail: NUCLEAR MEDICINE
Discharge: HOME | End: 2023-05-02
Attending: INTERNAL MEDICINE
Payer: COMMERCIAL

## 2023-01-01 ENCOUNTER — APPOINTMENT (OUTPATIENT)
Dept: CT IMAGING | Facility: CLINIC | Age: 73
DRG: 207 | End: 2023-01-01
Attending: FAMILY MEDICINE
Payer: COMMERCIAL

## 2023-01-01 ENCOUNTER — MYC MEDICAL ADVICE (OUTPATIENT)
Dept: CARDIOLOGY | Facility: CLINIC | Age: 73
End: 2023-01-01

## 2023-01-01 ENCOUNTER — INFUSION THERAPY VISIT (OUTPATIENT)
Dept: INFUSION THERAPY | Facility: CLINIC | Age: 73
End: 2023-01-01
Attending: INTERNAL MEDICINE
Payer: COMMERCIAL

## 2023-01-01 ENCOUNTER — PATIENT OUTREACH (OUTPATIENT)
Dept: ONCOLOGY | Facility: CLINIC | Age: 73
End: 2023-01-01

## 2023-01-01 ENCOUNTER — ANESTHESIA (OUTPATIENT)
Dept: INTENSIVE CARE | Facility: CLINIC | Age: 73
DRG: 207 | End: 2023-01-01
Payer: COMMERCIAL

## 2023-01-01 ENCOUNTER — ONCOLOGY VISIT (OUTPATIENT)
Dept: ONCOLOGY | Facility: CLINIC | Age: 73
End: 2023-01-01
Attending: INTERNAL MEDICINE
Payer: COMMERCIAL

## 2023-01-01 ENCOUNTER — VIRTUAL VISIT (OUTPATIENT)
Dept: NEUROSURGERY | Facility: CLINIC | Age: 73
End: 2023-01-01
Payer: COMMERCIAL

## 2023-01-01 ENCOUNTER — NURSE TRIAGE (OUTPATIENT)
Dept: FAMILY MEDICINE | Facility: CLINIC | Age: 73
End: 2023-01-01

## 2023-01-01 ENCOUNTER — APPOINTMENT (OUTPATIENT)
Dept: ULTRASOUND IMAGING | Facility: CLINIC | Age: 73
DRG: 207 | End: 2023-01-01
Attending: FAMILY MEDICINE
Payer: COMMERCIAL

## 2023-01-01 ENCOUNTER — MYC MEDICAL ADVICE (OUTPATIENT)
Dept: ONCOLOGY | Facility: CLINIC | Age: 73
End: 2023-01-01
Payer: COMMERCIAL

## 2023-01-01 ENCOUNTER — OFFICE VISIT (OUTPATIENT)
Dept: RADIATION ONCOLOGY | Facility: CLINIC | Age: 73
End: 2023-01-01
Attending: INTERNAL MEDICINE
Payer: COMMERCIAL

## 2023-01-01 ENCOUNTER — TELEPHONE (OUTPATIENT)
Facility: CLINIC | Age: 73
End: 2023-01-01
Payer: COMMERCIAL

## 2023-01-01 ENCOUNTER — APPOINTMENT (OUTPATIENT)
Dept: GENERAL RADIOLOGY | Facility: CLINIC | Age: 73
End: 2023-01-01
Attending: EMERGENCY MEDICINE
Payer: COMMERCIAL

## 2023-01-01 ENCOUNTER — DOCUMENTATION ONLY (OUTPATIENT)
Dept: MULTI SPECIALTY CLINIC | Facility: CLINIC | Age: 73
End: 2023-01-01

## 2023-01-01 ENCOUNTER — NURSE TRIAGE (OUTPATIENT)
Dept: NURSING | Facility: CLINIC | Age: 73
End: 2023-01-01
Payer: COMMERCIAL

## 2023-01-01 ENCOUNTER — PATIENT OUTREACH (OUTPATIENT)
Dept: CARE COORDINATION | Facility: CLINIC | Age: 73
End: 2023-01-01

## 2023-01-01 ENCOUNTER — VIRTUAL VISIT (OUTPATIENT)
Dept: FAMILY MEDICINE | Facility: CLINIC | Age: 73
End: 2023-01-01
Payer: COMMERCIAL

## 2023-01-01 ENCOUNTER — ANCILLARY PROCEDURE (OUTPATIENT)
Dept: GENERAL RADIOLOGY | Facility: CLINIC | Age: 73
End: 2023-01-01
Payer: COMMERCIAL

## 2023-01-01 ENCOUNTER — OFFICE VISIT (OUTPATIENT)
Dept: CARDIOLOGY | Facility: CLINIC | Age: 73
End: 2023-01-01
Attending: FAMILY MEDICINE
Payer: COMMERCIAL

## 2023-01-01 ENCOUNTER — HOSPITAL ENCOUNTER (EMERGENCY)
Facility: CLINIC | Age: 73
Discharge: HOME | End: 2023-05-08
Attending: EMERGENCY MEDICINE | Admitting: EMERGENCY MEDICINE
Payer: COMMERCIAL

## 2023-01-01 ENCOUNTER — APPOINTMENT (OUTPATIENT)
Dept: CARDIOLOGY | Facility: CLINIC | Age: 73
DRG: 207 | End: 2023-01-01
Attending: FAMILY MEDICINE
Payer: COMMERCIAL

## 2023-01-01 ENCOUNTER — HOSPITAL ENCOUNTER (OUTPATIENT)
Dept: ULTRASOUND IMAGING | Facility: CLINIC | Age: 73
Discharge: HOME | End: 2023-05-04
Attending: FAMILY MEDICINE | Admitting: FAMILY MEDICINE
Payer: COMMERCIAL

## 2023-01-01 ENCOUNTER — TELEPHONE (OUTPATIENT)
Dept: ANTICOAGULATION | Facility: CLINIC | Age: 73
End: 2023-01-01
Payer: COMMERCIAL

## 2023-01-01 ENCOUNTER — HOSPITAL ENCOUNTER (INPATIENT)
Facility: CLINIC | Age: 73
LOS: 11 days | DRG: 207 | End: 2023-08-23
Attending: FAMILY MEDICINE | Admitting: STUDENT IN AN ORGANIZED HEALTH CARE EDUCATION/TRAINING PROGRAM
Payer: COMMERCIAL

## 2023-01-01 ENCOUNTER — CARE COORDINATION (OUTPATIENT)
Dept: CARDIOLOGY | Facility: CLINIC | Age: 73
End: 2023-01-01
Payer: COMMERCIAL

## 2023-01-01 VITALS
OXYGEN SATURATION: 91 % | BODY MASS INDEX: 31.41 KG/M2 | HEIGHT: 60 IN | WEIGHT: 160 LBS | HEART RATE: 72 BPM | DIASTOLIC BLOOD PRESSURE: 70 MMHG | SYSTOLIC BLOOD PRESSURE: 122 MMHG

## 2023-01-01 VITALS
TEMPERATURE: 97.4 F | HEART RATE: 84 BPM | SYSTOLIC BLOOD PRESSURE: 123 MMHG | DIASTOLIC BLOOD PRESSURE: 68 MMHG | OXYGEN SATURATION: 95 % | RESPIRATION RATE: 16 BRPM | WEIGHT: 161.6 LBS | BODY MASS INDEX: 31.35 KG/M2

## 2023-01-01 VITALS
SYSTOLIC BLOOD PRESSURE: 128 MMHG | WEIGHT: 185 LBS | OXYGEN SATURATION: 92 % | HEIGHT: 63 IN | BODY MASS INDEX: 32.78 KG/M2 | DIASTOLIC BLOOD PRESSURE: 80 MMHG

## 2023-01-01 VITALS
DIASTOLIC BLOOD PRESSURE: 50 MMHG | HEART RATE: 96 BPM | WEIGHT: 167.5 LBS | SYSTOLIC BLOOD PRESSURE: 110 MMHG | OXYGEN SATURATION: 91 % | BODY MASS INDEX: 32.5 KG/M2 | TEMPERATURE: 98 F

## 2023-01-01 VITALS
TEMPERATURE: 97.5 F | BODY MASS INDEX: 28.92 KG/M2 | DIASTOLIC BLOOD PRESSURE: 81 MMHG | SYSTOLIC BLOOD PRESSURE: 161 MMHG | HEIGHT: 63 IN | RESPIRATION RATE: 18 BRPM | WEIGHT: 163.2 LBS | HEART RATE: 81 BPM | OXYGEN SATURATION: 87 %

## 2023-01-01 VITALS
SYSTOLIC BLOOD PRESSURE: 132 MMHG | HEIGHT: 63 IN | WEIGHT: 180.1 LBS | BODY MASS INDEX: 31.91 KG/M2 | DIASTOLIC BLOOD PRESSURE: 70 MMHG | HEART RATE: 114 BPM | OXYGEN SATURATION: 90 %

## 2023-01-01 VITALS
RESPIRATION RATE: 20 BRPM | SYSTOLIC BLOOD PRESSURE: 147 MMHG | OXYGEN SATURATION: 98 % | DIASTOLIC BLOOD PRESSURE: 92 MMHG | HEIGHT: 63 IN | BODY MASS INDEX: 34.55 KG/M2 | HEART RATE: 124 BPM | WEIGHT: 195 LBS | TEMPERATURE: 102 F

## 2023-01-01 VITALS
TEMPERATURE: 97.6 F | HEART RATE: 84 BPM | SYSTOLIC BLOOD PRESSURE: 145 MMHG | DIASTOLIC BLOOD PRESSURE: 77 MMHG | RESPIRATION RATE: 18 BRPM | OXYGEN SATURATION: 96 %

## 2023-01-01 VITALS
HEART RATE: 91 BPM | OXYGEN SATURATION: 95 % | RESPIRATION RATE: 22 BRPM | BODY MASS INDEX: 28.35 KG/M2 | TEMPERATURE: 97.9 F | WEIGHT: 160 LBS | SYSTOLIC BLOOD PRESSURE: 122 MMHG | HEIGHT: 63 IN | DIASTOLIC BLOOD PRESSURE: 70 MMHG

## 2023-01-01 VITALS
DIASTOLIC BLOOD PRESSURE: 68 MMHG | TEMPERATURE: 97.7 F | SYSTOLIC BLOOD PRESSURE: 135 MMHG | HEART RATE: 85 BPM | OXYGEN SATURATION: 92 % | RESPIRATION RATE: 27 BRPM

## 2023-01-01 VITALS
RESPIRATION RATE: 20 BRPM | SYSTOLIC BLOOD PRESSURE: 122 MMHG | WEIGHT: 172.4 LBS | HEIGHT: 60 IN | BODY MASS INDEX: 33.85 KG/M2 | HEART RATE: 88 BPM | OXYGEN SATURATION: 90 % | TEMPERATURE: 98 F | DIASTOLIC BLOOD PRESSURE: 70 MMHG

## 2023-01-01 VITALS
HEART RATE: 99 BPM | TEMPERATURE: 96.5 F | SYSTOLIC BLOOD PRESSURE: 112 MMHG | WEIGHT: 160 LBS | OXYGEN SATURATION: 69 % | DIASTOLIC BLOOD PRESSURE: 68 MMHG | RESPIRATION RATE: 20 BRPM | BODY MASS INDEX: 31.04 KG/M2

## 2023-01-01 VITALS
BODY MASS INDEX: 32.82 KG/M2 | SYSTOLIC BLOOD PRESSURE: 136 MMHG | HEART RATE: 114 BPM | HEIGHT: 63 IN | WEIGHT: 185.2 LBS | OXYGEN SATURATION: 90 % | DIASTOLIC BLOOD PRESSURE: 62 MMHG | TEMPERATURE: 98.7 F

## 2023-01-01 VITALS
OXYGEN SATURATION: 91 % | TEMPERATURE: 98.6 F | SYSTOLIC BLOOD PRESSURE: 133 MMHG | DIASTOLIC BLOOD PRESSURE: 68 MMHG | HEART RATE: 103 BPM | RESPIRATION RATE: 26 BRPM

## 2023-01-01 VITALS — TEMPERATURE: 112 F | OXYGEN SATURATION: 64 %

## 2023-01-01 VITALS
HEIGHT: 60 IN | TEMPERATURE: 97 F | DIASTOLIC BLOOD PRESSURE: 70 MMHG | BODY MASS INDEX: 30.98 KG/M2 | OXYGEN SATURATION: 97 % | SYSTOLIC BLOOD PRESSURE: 124 MMHG | WEIGHT: 157.8 LBS | RESPIRATION RATE: 20 BRPM | HEART RATE: 88 BPM

## 2023-01-01 DIAGNOSIS — I26.09 ACUTE PULMONARY EMBOLISM WITH ACUTE COR PULMONALE, UNSPECIFIED PULMONARY EMBOLISM TYPE (H): Primary | ICD-10-CM

## 2023-01-01 DIAGNOSIS — Z79.01 LONG TERM CURRENT USE OF ANTICOAGULANT THERAPY: ICD-10-CM

## 2023-01-01 DIAGNOSIS — I48.0 AF (PAROXYSMAL ATRIAL FIBRILLATION) (H): Primary | ICD-10-CM

## 2023-01-01 DIAGNOSIS — I48.91 ATRIAL FIBRILLATION, UNSPECIFIED TYPE (H): ICD-10-CM

## 2023-01-01 DIAGNOSIS — Z79.01 CHRONIC ANTICOAGULATION: ICD-10-CM

## 2023-01-01 DIAGNOSIS — I48.0 AF (PAROXYSMAL ATRIAL FIBRILLATION) (H): ICD-10-CM

## 2023-01-01 DIAGNOSIS — C78.00 CARCINOMA OF BREAST METASTATIC TO LUNG, UNSPECIFIED LATERALITY (H): ICD-10-CM

## 2023-01-01 DIAGNOSIS — J43.9 PULMONARY EMPHYSEMA, UNSPECIFIED EMPHYSEMA TYPE (H): ICD-10-CM

## 2023-01-01 DIAGNOSIS — Z00.00 ENCOUNTER FOR MEDICARE ANNUAL WELLNESS EXAM: Primary | ICD-10-CM

## 2023-01-01 DIAGNOSIS — C79.31 MALIGNANT NEOPLASM METASTATIC TO BRAIN (H): ICD-10-CM

## 2023-01-01 DIAGNOSIS — C50.812 MALIGNANT NEOPLASM OF OVERLAPPING SITES OF LEFT BREAST IN FEMALE, ESTROGEN RECEPTOR POSITIVE (H): ICD-10-CM

## 2023-01-01 DIAGNOSIS — M81.0 AGE-RELATED OSTEOPOROSIS WITHOUT CURRENT PATHOLOGICAL FRACTURE: ICD-10-CM

## 2023-01-01 DIAGNOSIS — I10 ESSENTIAL HYPERTENSION, BENIGN: ICD-10-CM

## 2023-01-01 DIAGNOSIS — J96.21 ACUTE ON CHRONIC RESPIRATORY FAILURE WITH HYPOXIA AND HYPERCAPNIA (H): ICD-10-CM

## 2023-01-01 DIAGNOSIS — Z17.0 MALIGNANT NEOPLASM OF OVERLAPPING SITES OF LEFT BREAST IN FEMALE, ESTROGEN RECEPTOR POSITIVE (H): ICD-10-CM

## 2023-01-01 DIAGNOSIS — I48.91 ATRIAL FIBRILLATION (H): ICD-10-CM

## 2023-01-01 DIAGNOSIS — F32.5 MAJOR DEPRESSIVE DISORDER IN FULL REMISSION, UNSPECIFIED WHETHER RECURRENT (H): ICD-10-CM

## 2023-01-01 DIAGNOSIS — R40.0 SOMNOLENCE: ICD-10-CM

## 2023-01-01 DIAGNOSIS — C50.919 CARCINOMA OF BREAST METASTATIC TO LUNG, UNSPECIFIED LATERALITY (H): ICD-10-CM

## 2023-01-01 DIAGNOSIS — Z17.0 MALIGNANT NEOPLASM OF OVERLAPPING SITES OF LEFT BREAST IN FEMALE, ESTROGEN RECEPTOR POSITIVE (H): Primary | ICD-10-CM

## 2023-01-01 DIAGNOSIS — J96.01 ACUTE RESPIRATORY FAILURE WITH HYPOXIA AND HYPERCAPNIA (H): ICD-10-CM

## 2023-01-01 DIAGNOSIS — J18.9 PNEUMONIA OF BOTH LUNGS DUE TO INFECTIOUS ORGANISM, UNSPECIFIED PART OF LUNG: ICD-10-CM

## 2023-01-01 DIAGNOSIS — R73.03 PREDIABETES: Primary | ICD-10-CM

## 2023-01-01 DIAGNOSIS — I25.10 CORONARY ARTERY DISEASE INVOLVING NATIVE CORONARY ARTERY OF NATIVE HEART WITHOUT ANGINA PECTORIS: ICD-10-CM

## 2023-01-01 DIAGNOSIS — R59.1 LYMPHADENOPATHY OF HEAD AND NECK: Primary | ICD-10-CM

## 2023-01-01 DIAGNOSIS — C79.31 METASTASIS TO BRAIN (H): Primary | ICD-10-CM

## 2023-01-01 DIAGNOSIS — R59.0 SUPRACLAVICULAR LYMPHADENOPATHY: Primary | ICD-10-CM

## 2023-01-01 DIAGNOSIS — R11.0 NAUSEA: Primary | ICD-10-CM

## 2023-01-01 DIAGNOSIS — F41.8 SITUATIONAL ANXIETY: ICD-10-CM

## 2023-01-01 DIAGNOSIS — F32.5 MAJOR DEPRESSION IN COMPLETE REMISSION (H): ICD-10-CM

## 2023-01-01 DIAGNOSIS — J44.9 STAGE 4 VERY SEVERE COPD BY GOLD CLASSIFICATION (H): ICD-10-CM

## 2023-01-01 DIAGNOSIS — J44.1 COPD EXACERBATION (H): Primary | ICD-10-CM

## 2023-01-01 DIAGNOSIS — J96.02 ACUTE RESPIRATORY FAILURE WITH HYPOXIA AND HYPERCAPNIA (H): ICD-10-CM

## 2023-01-01 DIAGNOSIS — I48.91 ATRIAL FIBRILLATION WITH RVR (H): ICD-10-CM

## 2023-01-01 DIAGNOSIS — J44.1 CHRONIC OBSTRUCTIVE PULMONARY DISEASE WITH ACUTE EXACERBATION (H): ICD-10-CM

## 2023-01-01 DIAGNOSIS — C79.31 MALIGNANT NEOPLASM METASTATIC TO BRAIN (H): Primary | ICD-10-CM

## 2023-01-01 DIAGNOSIS — J96.01 ACUTE HYPOXEMIC RESPIRATORY FAILURE (H): ICD-10-CM

## 2023-01-01 DIAGNOSIS — E78.2 MIXED HYPERLIPIDEMIA: ICD-10-CM

## 2023-01-01 DIAGNOSIS — R79.89 TROPONIN LEVEL ELEVATED: ICD-10-CM

## 2023-01-01 DIAGNOSIS — J96.22 ACUTE ON CHRONIC RESPIRATORY FAILURE WITH HYPOXIA AND HYPERCAPNIA (H): ICD-10-CM

## 2023-01-01 DIAGNOSIS — Z51.81 ENCOUNTER FOR MONITORING DENOSUMAB THERAPY: ICD-10-CM

## 2023-01-01 DIAGNOSIS — J96.01 ACUTE HYPOXEMIC RESPIRATORY FAILURE (H): Primary | ICD-10-CM

## 2023-01-01 DIAGNOSIS — N18.31 STAGE 3A CHRONIC KIDNEY DISEASE (H): Primary | ICD-10-CM

## 2023-01-01 DIAGNOSIS — G47.09 OTHER INSOMNIA: Primary | ICD-10-CM

## 2023-01-01 DIAGNOSIS — R63.4 WEIGHT LOSS: ICD-10-CM

## 2023-01-01 DIAGNOSIS — C50.919 MALIGNANT NEOPLASM OF BREAST METASTATIC TO BRAIN, UNSPECIFIED LATERALITY (H): ICD-10-CM

## 2023-01-01 DIAGNOSIS — C50.812 MALIGNANT NEOPLASM OF OVERLAPPING SITES OF LEFT BREAST IN FEMALE, ESTROGEN RECEPTOR POSITIVE (H): Primary | ICD-10-CM

## 2023-01-01 DIAGNOSIS — I27.20 PULMONARY HYPERTENSION (H): ICD-10-CM

## 2023-01-01 DIAGNOSIS — R22.2 SUPRACLAVICULAR MASS: ICD-10-CM

## 2023-01-01 DIAGNOSIS — R07.9 CHEST PAIN, UNSPECIFIED TYPE: ICD-10-CM

## 2023-01-01 DIAGNOSIS — I26.09 ACUTE PULMONARY EMBOLISM WITH ACUTE COR PULMONALE, UNSPECIFIED PULMONARY EMBOLISM TYPE (H): ICD-10-CM

## 2023-01-01 DIAGNOSIS — I26.09 OTHER ACUTE PULMONARY EMBOLISM WITH ACUTE COR PULMONALE (H): ICD-10-CM

## 2023-01-01 DIAGNOSIS — M81.0 AGE-RELATED OSTEOPOROSIS WITHOUT CURRENT PATHOLOGICAL FRACTURE: Primary | ICD-10-CM

## 2023-01-01 DIAGNOSIS — N18.31 STAGE 3A CHRONIC KIDNEY DISEASE (H): ICD-10-CM

## 2023-01-01 DIAGNOSIS — R10.9 RIGHT FLANK PAIN: ICD-10-CM

## 2023-01-01 DIAGNOSIS — R59.0 MEDIASTINAL ADENOPATHY: Primary | ICD-10-CM

## 2023-01-01 DIAGNOSIS — R00.0 TACHYCARDIA: ICD-10-CM

## 2023-01-01 DIAGNOSIS — J44.1 COPD EXACERBATION (H): ICD-10-CM

## 2023-01-01 DIAGNOSIS — E78.5 DYSLIPIDEMIA: ICD-10-CM

## 2023-01-01 DIAGNOSIS — Z79.899 ENCOUNTER FOR MONITORING DENOSUMAB THERAPY: ICD-10-CM

## 2023-01-01 DIAGNOSIS — R06.02 SOB (SHORTNESS OF BREATH): Primary | ICD-10-CM

## 2023-01-01 DIAGNOSIS — I48.20 CHRONIC ATRIAL FIBRILLATION (H): ICD-10-CM

## 2023-01-01 DIAGNOSIS — E83.42 HYPOMAGNESEMIA: ICD-10-CM

## 2023-01-01 DIAGNOSIS — E78.5 HYPERLIPIDEMIA LDL GOAL <100: ICD-10-CM

## 2023-01-01 DIAGNOSIS — R73.09 ELEVATED GLUCOSE: ICD-10-CM

## 2023-01-01 DIAGNOSIS — R00.9 HEART RATE PROBLEM: Primary | ICD-10-CM

## 2023-01-01 DIAGNOSIS — Z87.891 HISTORY OF TOBACCO USE: ICD-10-CM

## 2023-01-01 DIAGNOSIS — R59.1 LYMPHADENOPATHY: Primary | ICD-10-CM

## 2023-01-01 DIAGNOSIS — Z12.31 VISIT FOR SCREENING MAMMOGRAM: ICD-10-CM

## 2023-01-01 DIAGNOSIS — R11.0 NAUSEA: ICD-10-CM

## 2023-01-01 DIAGNOSIS — R59.0 SUPRACLAVICULAR LYMPHADENOPATHY: ICD-10-CM

## 2023-01-01 DIAGNOSIS — J18.9 PNEUMONIA OF RIGHT UPPER LOBE DUE TO INFECTIOUS ORGANISM: ICD-10-CM

## 2023-01-01 DIAGNOSIS — I50.811 ACUTE RIGHT-SIDED HEART FAILURE (H): ICD-10-CM

## 2023-01-01 DIAGNOSIS — R00.0 WIDE-COMPLEX TACHYCARDIA: ICD-10-CM

## 2023-01-01 DIAGNOSIS — D72.829 LEUKOCYTOSIS, UNSPECIFIED TYPE: ICD-10-CM

## 2023-01-01 DIAGNOSIS — R26.9 ABNORMAL GAIT: ICD-10-CM

## 2023-01-01 DIAGNOSIS — Z01.818 PREOP GENERAL PHYSICAL EXAM: Primary | ICD-10-CM

## 2023-01-01 DIAGNOSIS — Z53.9 DIAGNOSIS NOT YET DEFINED: Primary | ICD-10-CM

## 2023-01-01 DIAGNOSIS — T45.515A WARFARIN-INDUCED COAGULOPATHY (H): ICD-10-CM

## 2023-01-01 DIAGNOSIS — E86.0 DEHYDRATION: ICD-10-CM

## 2023-01-01 DIAGNOSIS — I26.99 PULMONARY EMBOLISM (H): Primary | ICD-10-CM

## 2023-01-01 DIAGNOSIS — D68.32 WARFARIN-INDUCED COAGULOPATHY (H): ICD-10-CM

## 2023-01-01 DIAGNOSIS — C79.31 MALIGNANT NEOPLASM OF BREAST METASTATIC TO BRAIN, UNSPECIFIED LATERALITY (H): ICD-10-CM

## 2023-01-01 LAB
ABO/RH(D): NORMAL
ALBUMIN SERPL BCG-MCNC: 2.5 G/DL (ref 3.5–5.2)
ALBUMIN SERPL BCG-MCNC: 2.6 G/DL (ref 3.5–5.2)
ALBUMIN SERPL BCG-MCNC: 2.7 G/DL (ref 3.5–5.2)
ALBUMIN SERPL BCG-MCNC: 2.7 G/DL (ref 3.5–5.2)
ALBUMIN SERPL BCG-MCNC: 2.8 G/DL (ref 3.5–5.2)
ALBUMIN SERPL BCG-MCNC: 2.8 G/DL (ref 3.5–5.2)
ALBUMIN SERPL BCG-MCNC: 2.9 G/DL (ref 3.5–5.2)
ALBUMIN SERPL BCG-MCNC: 2.9 G/DL (ref 3.5–5.2)
ALBUMIN SERPL BCG-MCNC: 3 G/DL (ref 3.5–5.2)
ALBUMIN SERPL BCG-MCNC: 3.2 G/DL (ref 3.5–5.2)
ALBUMIN SERPL BCG-MCNC: 3.4 G/DL (ref 3.5–5.2)
ALBUMIN SERPL BCG-MCNC: 3.4 G/DL (ref 3.5–5.2)
ALBUMIN SERPL BCG-MCNC: 3.7 G/DL (ref 3.5–5.2)
ALBUMIN SERPL BCG-MCNC: 3.8 G/DL (ref 3.5–5.2)
ALBUMIN SERPL BCG-MCNC: 3.8 G/DL (ref 3.5–5.2)
ALBUMIN SERPL BCG-MCNC: 3.9 G/DL (ref 3.5–5.2)
ALBUMIN UR-MCNC: 100 MG/DL
ALBUMIN UR-MCNC: NEGATIVE MG/DL
ALLEN'S TEST: ABNORMAL
ALLEN'S TEST: NO
ALLEN'S TEST: YES
ALLEN'S TEST: YES
ALP SERPL-CCNC: 36 U/L (ref 35–104)
ALP SERPL-CCNC: 36 U/L (ref 35–104)
ALP SERPL-CCNC: 40 U/L (ref 35–104)
ALP SERPL-CCNC: 48 U/L (ref 35–104)
ALP SERPL-CCNC: 49 U/L (ref 35–104)
ALP SERPL-CCNC: 53 U/L (ref 35–104)
ALP SERPL-CCNC: 54 U/L (ref 35–104)
ALP SERPL-CCNC: 59 U/L (ref 35–104)
ALP SERPL-CCNC: 60 U/L (ref 35–104)
ALP SERPL-CCNC: 60 U/L (ref 35–104)
ALP SERPL-CCNC: 61 U/L (ref 35–104)
ALP SERPL-CCNC: 61 U/L (ref 35–104)
ALP SERPL-CCNC: 63 U/L (ref 35–104)
ALP SERPL-CCNC: 64 U/L (ref 35–104)
ALP SERPL-CCNC: 65 U/L (ref 35–104)
ALT SERPL W P-5'-P-CCNC: 16 U/L (ref 10–35)
ALT SERPL W P-5'-P-CCNC: 16 U/L (ref 10–35)
ALT SERPL W P-5'-P-CCNC: 17 U/L (ref 10–35)
ALT SERPL W P-5'-P-CCNC: 20 U/L (ref 0–50)
ALT SERPL W P-5'-P-CCNC: 21 U/L (ref 10–35)
ALT SERPL W P-5'-P-CCNC: 22 U/L (ref 0–50)
ALT SERPL W P-5'-P-CCNC: 23 U/L (ref 0–50)
ALT SERPL W P-5'-P-CCNC: 23 U/L (ref 0–50)
ALT SERPL W P-5'-P-CCNC: 26 U/L (ref 0–50)
ALT SERPL W P-5'-P-CCNC: 27 U/L (ref 0–50)
ALT SERPL W P-5'-P-CCNC: 28 U/L (ref 0–50)
ALT SERPL W P-5'-P-CCNC: 3008 U/L (ref 0–50)
ALT SERPL W P-5'-P-CCNC: 36 U/L (ref 0–50)
ALT SERPL W P-5'-P-CCNC: 3684 U/L (ref 0–50)
ALT SERPL W P-5'-P-CCNC: 4867 U/L (ref 0–50)
ALT SERPL W P-5'-P-CCNC: 6573 U/L (ref 0–50)
AMMONIA PLAS-SCNC: 105 UMOL/L (ref 11–51)
AMMONIA PLAS-SCNC: 78 UMOL/L (ref 11–51)
AMMONIA PLAS-SCNC: 85 UMOL/L (ref 11–51)
AMMONIA PLAS-SCNC: 97 UMOL/L (ref 11–51)
AMORPH CRY #/AREA URNS HPF: ABNORMAL /HPF
AMORPH CRY #/AREA URNS HPF: ABNORMAL /HPF
ANION GAP SERPL CALCULATED.3IONS-SCNC: 10 MMOL/L (ref 7–15)
ANION GAP SERPL CALCULATED.3IONS-SCNC: 11 MMOL/L (ref 7–15)
ANION GAP SERPL CALCULATED.3IONS-SCNC: 12 MMOL/L (ref 7–15)
ANION GAP SERPL CALCULATED.3IONS-SCNC: 13 MMOL/L (ref 7–15)
ANION GAP SERPL CALCULATED.3IONS-SCNC: 14 MMOL/L (ref 7–15)
ANION GAP SERPL CALCULATED.3IONS-SCNC: 14 MMOL/L (ref 7–15)
ANION GAP SERPL CALCULATED.3IONS-SCNC: 15 MMOL/L (ref 7–15)
ANION GAP SERPL CALCULATED.3IONS-SCNC: 18 MMOL/L (ref 7–15)
ANION GAP SERPL CALCULATED.3IONS-SCNC: 19 MMOL/L (ref 7–15)
ANION GAP SERPL CALCULATED.3IONS-SCNC: 19 MMOL/L (ref 7–15)
ANION GAP SERPL CALCULATED.3IONS-SCNC: 6 MMOL/L (ref 7–15)
ANION GAP SERPL CALCULATED.3IONS-SCNC: 7 MMOL/L (ref 7–15)
ANION GAP SERPL CALCULATED.3IONS-SCNC: 8 MMOL/L (ref 7–15)
ANION GAP SERPL CALCULATED.3IONS-SCNC: 9 MMOL/L (ref 7–15)
ANTIBODY SCREEN: NEGATIVE
APPEARANCE FLD: ABNORMAL
APPEARANCE UR: ABNORMAL
AST SERPL W P-5'-P-CCNC: 14 U/L (ref 0–45)
AST SERPL W P-5'-P-CCNC: 14 U/L (ref 0–45)
AST SERPL W P-5'-P-CCNC: 16 U/L (ref 0–45)
AST SERPL W P-5'-P-CCNC: 20 U/L (ref 0–45)
AST SERPL W P-5'-P-CCNC: 20 U/L (ref 0–45)
AST SERPL W P-5'-P-CCNC: 21 U/L (ref 0–45)
AST SERPL W P-5'-P-CCNC: 21 U/L (ref 10–35)
AST SERPL W P-5'-P-CCNC: 21 U/L (ref 10–35)
AST SERPL W P-5'-P-CCNC: 24 U/L (ref 0–45)
AST SERPL W P-5'-P-CCNC: 24 U/L (ref 10–35)
AST SERPL W P-5'-P-CCNC: 25 U/L (ref 0–45)
AST SERPL W P-5'-P-CCNC: 260 U/L (ref 0–45)
AST SERPL W P-5'-P-CCNC: 360 U/L (ref 0–45)
AST SERPL W P-5'-P-CCNC: 5117 U/L (ref 0–45)
AST SERPL W P-5'-P-CCNC: 910 U/L (ref 0–45)
BACTERIA #/AREA URNS HPF: ABNORMAL /HPF
BACTERIA #/AREA URNS HPF: ABNORMAL /HPF
BACTERIA ASPIRATE CULT: ABNORMAL
BACTERIA BLD CULT: NO GROWTH
BACTERIA PLR CULT: NO GROWTH
BACTERIA PLR CULT: NORMAL
BACTERIA UR CULT: NO GROWTH
BACTERIA UR CULT: NO GROWTH
BASE EXCESS BLDA CALC-SCNC: -0.2 MMOL/L (ref -9–1.8)
BASE EXCESS BLDA CALC-SCNC: -0.9 MMOL/L (ref -9–1.8)
BASE EXCESS BLDA CALC-SCNC: -1 MMOL/L (ref -9–1.8)
BASE EXCESS BLDA CALC-SCNC: -1.2 MMOL/L (ref -9–1.8)
BASE EXCESS BLDA CALC-SCNC: -1.3 MMOL/L (ref -9–1.8)
BASE EXCESS BLDA CALC-SCNC: -1.5 MMOL/L (ref -9–1.8)
BASE EXCESS BLDA CALC-SCNC: -1.8 MMOL/L (ref -9–1.8)
BASE EXCESS BLDA CALC-SCNC: -1.9 MMOL/L (ref -9–1.8)
BASE EXCESS BLDA CALC-SCNC: -1.9 MMOL/L (ref -9–1.8)
BASE EXCESS BLDA CALC-SCNC: -2 MMOL/L (ref -9–1.8)
BASE EXCESS BLDA CALC-SCNC: -2.3 MMOL/L (ref -9–1.8)
BASE EXCESS BLDA CALC-SCNC: -2.5 MMOL/L (ref -9–1.8)
BASE EXCESS BLDA CALC-SCNC: -2.6 MMOL/L (ref -9–1.8)
BASE EXCESS BLDA CALC-SCNC: -2.7 MMOL/L (ref -9–1.8)
BASE EXCESS BLDA CALC-SCNC: -2.7 MMOL/L (ref -9–1.8)
BASE EXCESS BLDA CALC-SCNC: -3.5 MMOL/L (ref -9–1.8)
BASE EXCESS BLDA CALC-SCNC: -3.6 MMOL/L (ref -9–1.8)
BASE EXCESS BLDA CALC-SCNC: -3.7 MMOL/L (ref -9–1.8)
BASE EXCESS BLDA CALC-SCNC: -4.3 MMOL/L (ref -9–1.8)
BASE EXCESS BLDA CALC-SCNC: -4.6 MMOL/L (ref -9–1.8)
BASE EXCESS BLDA CALC-SCNC: -5.1 MMOL/L (ref -9–1.8)
BASE EXCESS BLDA CALC-SCNC: -5.2 MMOL/L (ref -9–1.8)
BASE EXCESS BLDA CALC-SCNC: -5.2 MMOL/L (ref -9–1.8)
BASE EXCESS BLDA CALC-SCNC: -5.5 MMOL/L (ref -9–1.8)
BASE EXCESS BLDA CALC-SCNC: -6.1 MMOL/L (ref -9–1.8)
BASE EXCESS BLDA CALC-SCNC: -6.5 MMOL/L (ref -9–1.8)
BASE EXCESS BLDA CALC-SCNC: -7 MMOL/L (ref -9–1.8)
BASE EXCESS BLDA CALC-SCNC: -7.3 MMOL/L (ref -9–1.8)
BASE EXCESS BLDA CALC-SCNC: -7.6 MMOL/L (ref -9–1.8)
BASE EXCESS BLDA CALC-SCNC: -7.7 MMOL/L (ref -9–1.8)
BASE EXCESS BLDA CALC-SCNC: -7.8 MMOL/L (ref -9–1.8)
BASE EXCESS BLDA CALC-SCNC: -7.8 MMOL/L (ref -9–1.8)
BASE EXCESS BLDA CALC-SCNC: -8.4 MMOL/L (ref -9–1.8)
BASE EXCESS BLDA CALC-SCNC: 0.4 MMOL/L (ref -9–1.8)
BASE EXCESS BLDA CALC-SCNC: 0.7 MMOL/L (ref -9–1.8)
BASE EXCESS BLDA CALC-SCNC: 2 MMOL/L (ref -9–1.8)
BASE EXCESS BLDA CALC-SCNC: 4.5 MMOL/L (ref -9–1.8)
BASE EXCESS BLDV CALC-SCNC: -8.3 MMOL/L (ref -7.7–1.9)
BASE EXCESS BLDV CALC-SCNC: 10.5 MMOL/L (ref -7.7–1.9)
BASE EXCESS BLDV CALC-SCNC: 10.9 MMOL/L (ref -7.7–1.9)
BASE EXCESS BLDV CALC-SCNC: 10.9 MMOL/L (ref -7.7–1.9)
BASE EXCESS BLDV CALC-SCNC: 12.1 MMOL/L (ref -7.7–1.9)
BASE EXCESS BLDV CALC-SCNC: 12.6 MMOL/L (ref -7.7–1.9)
BASE EXCESS BLDV CALC-SCNC: 12.8 MMOL/L (ref -7.7–1.9)
BASE EXCESS BLDV CALC-SCNC: 13.5 MMOL/L (ref -7.7–1.9)
BASE EXCESS BLDV CALC-SCNC: 13.8 MMOL/L (ref -7.7–1.9)
BASE EXCESS BLDV CALC-SCNC: 2.9 MMOL/L (ref -7.7–1.9)
BASE EXCESS BLDV CALC-SCNC: 3.4 MMOL/L (ref -7.7–1.9)
BASE EXCESS BLDV CALC-SCNC: 5.2 MMOL/L (ref -7.7–1.9)
BASE EXCESS BLDV CALC-SCNC: 5.7 MMOL/L (ref -7.7–1.9)
BASE EXCESS BLDV CALC-SCNC: 7.3 MMOL/L (ref -7.7–1.9)
BASE EXCESS BLDV CALC-SCNC: 7.5 MMOL/L (ref -7.7–1.9)
BASE EXCESS BLDV CALC-SCNC: 8.1 MMOL/L (ref -7.7–1.9)
BASE EXCESS BLDV CALC-SCNC: 8.5 MMOL/L (ref -7.7–1.9)
BASE EXCESS BLDV CALC-SCNC: 8.6 MMOL/L (ref -7.7–1.9)
BASE EXCESS BLDV CALC-SCNC: 9 MMOL/L (ref -7.7–1.9)
BASE EXCESS BLDV CALC-SCNC: 9.1 MMOL/L (ref -7.7–1.9)
BASOPHILS # BLD AUTO: 0 10E3/UL (ref 0–0.2)
BASOPHILS # BLD AUTO: 0.1 10E3/UL (ref 0–0.2)
BASOPHILS NFR BLD AUTO: 0 %
BASOPHILS NFR BLD AUTO: 1 %
BASOPHILS NFR BLD AUTO: 1 %
BILIRUB SERPL-MCNC: 0.2 MG/DL
BILIRUB SERPL-MCNC: 0.3 MG/DL
BILIRUB SERPL-MCNC: 0.4 MG/DL
BILIRUB SERPL-MCNC: 0.4 MG/DL
BILIRUB SERPL-MCNC: 0.5 MG/DL
BILIRUB SERPL-MCNC: 0.6 MG/DL
BILIRUB UR QL STRIP: NEGATIVE
BUN SERPL-MCNC: 120.7 MG/DL (ref 8–23)
BUN SERPL-MCNC: 134.8 MG/DL (ref 8–23)
BUN SERPL-MCNC: 145.5 MG/DL (ref 8–23)
BUN SERPL-MCNC: 17.4 MG/DL (ref 8–23)
BUN SERPL-MCNC: 19.5 MG/DL (ref 8–23)
BUN SERPL-MCNC: 19.7 MG/DL (ref 8–23)
BUN SERPL-MCNC: 20.4 MG/DL (ref 8–23)
BUN SERPL-MCNC: 21 MG/DL (ref 8–23)
BUN SERPL-MCNC: 25 MG/DL (ref 8–23)
BUN SERPL-MCNC: 25.7 MG/DL (ref 8–23)
BUN SERPL-MCNC: 25.9 MG/DL (ref 8–23)
BUN SERPL-MCNC: 26.1 MG/DL (ref 8–23)
BUN SERPL-MCNC: 26.5 MG/DL (ref 8–23)
BUN SERPL-MCNC: 26.6 MG/DL (ref 8–23)
BUN SERPL-MCNC: 27.9 MG/DL (ref 8–23)
BUN SERPL-MCNC: 28.7 MG/DL (ref 8–23)
BUN SERPL-MCNC: 28.7 MG/DL (ref 8–23)
BUN SERPL-MCNC: 30.3 MG/DL (ref 8–23)
BUN SERPL-MCNC: 31.2 MG/DL (ref 8–23)
BUN SERPL-MCNC: 37.7 MG/DL (ref 8–23)
BUN SERPL-MCNC: 43.3 MG/DL (ref 8–23)
BUN SERPL-MCNC: 44.1 MG/DL (ref 8–23)
BUN SERPL-MCNC: 45.8 MG/DL (ref 8–23)
BUN SERPL-MCNC: 49.2 MG/DL (ref 8–23)
BUN SERPL-MCNC: 50.5 MG/DL (ref 8–23)
BUN SERPL-MCNC: 51.2 MG/DL (ref 8–23)
BUN SERPL-MCNC: 53.9 MG/DL (ref 8–23)
BUN SERPL-MCNC: 54.1 MG/DL (ref 8–23)
BUN SERPL-MCNC: 54.1 MG/DL (ref 8–23)
BUN SERPL-MCNC: 56 MG/DL (ref 8–23)
BUN SERPL-MCNC: 56.1 MG/DL (ref 8–23)
BUN SERPL-MCNC: 57.8 MG/DL (ref 8–23)
BUN SERPL-MCNC: 87.3 MG/DL (ref 8–23)
CALCIUM SERPL-MCNC: 10 MG/DL (ref 8.8–10.2)
CALCIUM SERPL-MCNC: 10.1 MG/DL (ref 8.8–10.2)
CALCIUM SERPL-MCNC: 10.1 MG/DL (ref 8.8–10.2)
CALCIUM SERPL-MCNC: 10.2 MG/DL (ref 8.8–10.2)
CALCIUM SERPL-MCNC: 10.2 MG/DL (ref 8.8–10.2)
CALCIUM SERPL-MCNC: 11.1 MG/DL (ref 8.8–10.2)
CALCIUM SERPL-MCNC: 11.8 MG/DL (ref 8.8–10.2)
CALCIUM SERPL-MCNC: 12 MG/DL (ref 8.8–10.2)
CALCIUM SERPL-MCNC: 8 MG/DL (ref 8.8–10.2)
CALCIUM SERPL-MCNC: 8.2 MG/DL (ref 8.8–10.2)
CALCIUM SERPL-MCNC: 8.5 MG/DL (ref 8.8–10.2)
CALCIUM SERPL-MCNC: 8.5 MG/DL (ref 8.8–10.2)
CALCIUM SERPL-MCNC: 8.6 MG/DL (ref 8.8–10.2)
CALCIUM SERPL-MCNC: 8.6 MG/DL (ref 8.8–10.2)
CALCIUM SERPL-MCNC: 8.7 MG/DL (ref 8.8–10.2)
CALCIUM SERPL-MCNC: 8.8 MG/DL (ref 8.8–10.2)
CALCIUM SERPL-MCNC: 8.8 MG/DL (ref 8.8–10.2)
CALCIUM SERPL-MCNC: 8.9 MG/DL (ref 8.8–10.2)
CALCIUM SERPL-MCNC: 8.9 MG/DL (ref 8.8–10.2)
CALCIUM SERPL-MCNC: 9.2 MG/DL (ref 8.8–10.2)
CALCIUM SERPL-MCNC: 9.3 MG/DL (ref 8.8–10.2)
CALCIUM SERPL-MCNC: 9.3 MG/DL (ref 8.8–10.2)
CALCIUM SERPL-MCNC: 9.4 MG/DL (ref 8.8–10.2)
CALCIUM SERPL-MCNC: 9.5 MG/DL (ref 8.8–10.2)
CALCIUM SERPL-MCNC: 9.6 MG/DL (ref 8.8–10.2)
CALCIUM SERPL-MCNC: 9.6 MG/DL (ref 8.8–10.2)
CALCIUM SERPL-MCNC: 9.7 MG/DL (ref 8.8–10.2)
CALCIUM SERPL-MCNC: 9.8 MG/DL (ref 8.8–10.2)
CALCIUM SERPL-MCNC: 9.8 MG/DL (ref 8.8–10.2)
CALCIUM SERPL-MCNC: 9.9 MG/DL (ref 8.8–10.2)
CALCIUM SERPL-MCNC: 9.9 MG/DL (ref 8.8–10.2)
CELL COUNT BODY FLUID SOURCE: ABNORMAL
CHLORIDE SERPL-SCNC: 100 MMOL/L (ref 98–107)
CHLORIDE SERPL-SCNC: 101 MMOL/L (ref 98–107)
CHLORIDE SERPL-SCNC: 101 MMOL/L (ref 98–107)
CHLORIDE SERPL-SCNC: 102 MMOL/L (ref 98–107)
CHLORIDE SERPL-SCNC: 103 MMOL/L (ref 98–107)
CHLORIDE SERPL-SCNC: 104 MMOL/L (ref 98–107)
CHLORIDE SERPL-SCNC: 105 MMOL/L (ref 98–107)
CHLORIDE SERPL-SCNC: 105 MMOL/L (ref 98–107)
CHLORIDE SERPL-SCNC: 106 MMOL/L (ref 98–107)
CHLORIDE SERPL-SCNC: 107 MMOL/L (ref 98–107)
CHLORIDE SERPL-SCNC: 107 MMOL/L (ref 98–107)
CHLORIDE SERPL-SCNC: 108 MMOL/L (ref 98–107)
CHLORIDE SERPL-SCNC: 109 MMOL/L (ref 98–107)
CHLORIDE SERPL-SCNC: 111 MMOL/L (ref 98–107)
CHLORIDE SERPL-SCNC: 111 MMOL/L (ref 98–107)
CHLORIDE SERPL-SCNC: 112 MMOL/L (ref 98–107)
CHLORIDE SERPL-SCNC: 114 MMOL/L (ref 98–107)
CHLORIDE SERPL-SCNC: 117 MMOL/L (ref 98–107)
CHLORIDE SERPL-SCNC: 86 MMOL/L (ref 98–107)
CHLORIDE SERPL-SCNC: 87 MMOL/L (ref 98–107)
CHLORIDE SERPL-SCNC: 89 MMOL/L (ref 98–107)
CHLORIDE SERPL-SCNC: 91 MMOL/L (ref 98–107)
CHLORIDE SERPL-SCNC: 93 MMOL/L (ref 98–107)
CHLORIDE SERPL-SCNC: 94 MMOL/L (ref 98–107)
CHLORIDE SERPL-SCNC: 94 MMOL/L (ref 98–107)
CHLORIDE SERPL-SCNC: 95 MMOL/L (ref 98–107)
CHLORIDE SERPL-SCNC: 96 MMOL/L (ref 98–107)
CHLORIDE SERPL-SCNC: 98 MMOL/L (ref 98–107)
CHLORIDE SERPL-SCNC: 98 MMOL/L (ref 98–107)
CHOLEST FLD-MCNC: 76 MG/DL
CHOLEST SERPL-MCNC: 142 MG/DL
CHOLESTEROL BODY FLUID SOURCE: NORMAL
COLOR FLD: ABNORMAL
COLOR UR AUTO: ABNORMAL
COLOR UR AUTO: YELLOW
CREAT SERPL-MCNC: 0.95 MG/DL (ref 0.51–0.95)
CREAT SERPL-MCNC: 1.04 MG/DL (ref 0.51–0.95)
CREAT SERPL-MCNC: 1.08 MG/DL (ref 0.51–0.95)
CREAT SERPL-MCNC: 1.08 MG/DL (ref 0.51–0.95)
CREAT SERPL-MCNC: 1.11 MG/DL (ref 0.51–0.95)
CREAT SERPL-MCNC: 1.14 MG/DL (ref 0.51–0.95)
CREAT SERPL-MCNC: 1.15 MG/DL (ref 0.51–0.95)
CREAT SERPL-MCNC: 1.16 MG/DL (ref 0.51–0.95)
CREAT SERPL-MCNC: 1.17 MG/DL (ref 0.51–0.95)
CREAT SERPL-MCNC: 1.19 MG/DL (ref 0.51–0.95)
CREAT SERPL-MCNC: 1.19 MG/DL (ref 0.51–0.95)
CREAT SERPL-MCNC: 1.2 MG/DL (ref 0.51–0.95)
CREAT SERPL-MCNC: 1.22 MG/DL (ref 0.51–0.95)
CREAT SERPL-MCNC: 1.28 MG/DL (ref 0.51–0.95)
CREAT SERPL-MCNC: 1.29 MG/DL (ref 0.51–0.95)
CREAT SERPL-MCNC: 1.31 MG/DL (ref 0.51–0.95)
CREAT SERPL-MCNC: 1.34 MG/DL (ref 0.51–0.95)
CREAT SERPL-MCNC: 1.34 MG/DL (ref 0.51–0.95)
CREAT SERPL-MCNC: 1.39 MG/DL (ref 0.51–0.95)
CREAT SERPL-MCNC: 1.4 MG/DL (ref 0.51–0.95)
CREAT SERPL-MCNC: 1.41 MG/DL (ref 0.51–0.95)
CREAT SERPL-MCNC: 1.43 MG/DL (ref 0.51–0.95)
CREAT SERPL-MCNC: 1.44 MG/DL (ref 0.51–0.95)
CREAT SERPL-MCNC: 1.44 MG/DL (ref 0.51–0.95)
CREAT SERPL-MCNC: 1.46 MG/DL (ref 0.51–0.95)
CREAT SERPL-MCNC: 1.76 MG/DL (ref 0.51–0.95)
CREAT SERPL-MCNC: 2.17 MG/DL (ref 0.51–0.95)
CREAT SERPL-MCNC: 2.8 MG/DL (ref 0.51–0.95)
CREAT SERPL-MCNC: 2.9 MG/DL (ref 0.51–0.95)
CREAT SERPL-MCNC: 3.09 MG/DL (ref 0.51–0.95)
CREAT UR-MCNC: 271.9 MG/DL
CRP SERPL-MCNC: 104.81 MG/L
CRP SERPL-MCNC: 11.04 MG/L
CRP SERPL-MCNC: 19.59 MG/L
CRP SERPL-MCNC: 27.1 MG/L
CRP SERPL-MCNC: 40.98 MG/L
CRP SERPL-MCNC: 76.61 MG/L
CV STRESS MAX HR HE: 108
DEPRECATED HCO3 PLAS-SCNC: 16 MMOL/L (ref 22–29)
DEPRECATED HCO3 PLAS-SCNC: 16 MMOL/L (ref 22–29)
DEPRECATED HCO3 PLAS-SCNC: 17 MMOL/L (ref 22–29)
DEPRECATED HCO3 PLAS-SCNC: 18 MMOL/L (ref 22–29)
DEPRECATED HCO3 PLAS-SCNC: 18 MMOL/L (ref 22–29)
DEPRECATED HCO3 PLAS-SCNC: 19 MMOL/L (ref 22–29)
DEPRECATED HCO3 PLAS-SCNC: 20 MMOL/L (ref 22–29)
DEPRECATED HCO3 PLAS-SCNC: 20 MMOL/L (ref 22–29)
DEPRECATED HCO3 PLAS-SCNC: 21 MMOL/L (ref 22–29)
DEPRECATED HCO3 PLAS-SCNC: 21 MMOL/L (ref 22–29)
DEPRECATED HCO3 PLAS-SCNC: 22 MMOL/L (ref 22–29)
DEPRECATED HCO3 PLAS-SCNC: 23 MMOL/L (ref 22–29)
DEPRECATED HCO3 PLAS-SCNC: 24 MMOL/L (ref 22–29)
DEPRECATED HCO3 PLAS-SCNC: 25 MMOL/L (ref 22–29)
DEPRECATED HCO3 PLAS-SCNC: 25 MMOL/L (ref 22–29)
DEPRECATED HCO3 PLAS-SCNC: 26 MMOL/L (ref 22–29)
DEPRECATED HCO3 PLAS-SCNC: 29 MMOL/L (ref 22–29)
DEPRECATED HCO3 PLAS-SCNC: 30 MMOL/L (ref 22–29)
DEPRECATED HCO3 PLAS-SCNC: 30 MMOL/L (ref 22–29)
DEPRECATED HCO3 PLAS-SCNC: 31 MMOL/L (ref 22–29)
DEPRECATED HCO3 PLAS-SCNC: 32 MMOL/L (ref 22–29)
DEPRECATED HCO3 PLAS-SCNC: 32 MMOL/L (ref 22–29)
DEPRECATED HCO3 PLAS-SCNC: 33 MMOL/L (ref 22–29)
DEPRECATED HCO3 PLAS-SCNC: 34 MMOL/L (ref 22–29)
DEPRECATED HCO3 PLAS-SCNC: 35 MMOL/L (ref 22–29)
DEPRECATED HCO3 PLAS-SCNC: 38 MMOL/L (ref 22–29)
DIGOXIN SERPL-MCNC: 0.4 NG/ML (ref 0.6–2)
DIGOXIN SERPL-MCNC: 0.9 NG/ML (ref 0.6–2)
DIGOXIN SERPL-MCNC: 1 NG/ML (ref 0.6–2)
EOSINOPHIL # BLD AUTO: 0 10E3/UL (ref 0–0.7)
EOSINOPHIL # BLD AUTO: 0 10E3/UL (ref 0–0.7)
EOSINOPHIL # BLD AUTO: 0.2 10E3/UL (ref 0–0.7)
EOSINOPHIL # BLD AUTO: 0.3 10E3/UL (ref 0–0.7)
EOSINOPHIL # BLD AUTO: 0.3 10E3/UL (ref 0–0.7)
EOSINOPHIL # BLD AUTO: 0.4 10E3/UL (ref 0–0.7)
EOSINOPHIL NFR BLD AUTO: 0 %
EOSINOPHIL NFR BLD AUTO: 0 %
EOSINOPHIL NFR BLD AUTO: 1 %
EOSINOPHIL NFR BLD AUTO: 2 %
EOSINOPHIL NFR BLD AUTO: 3 %
EOSINOPHIL NFR BLD AUTO: 5 %
ERYTHROCYTE [DISTWIDTH] IN BLOOD BY AUTOMATED COUNT: 13 % (ref 10–15)
ERYTHROCYTE [DISTWIDTH] IN BLOOD BY AUTOMATED COUNT: 13.1 % (ref 10–15)
ERYTHROCYTE [DISTWIDTH] IN BLOOD BY AUTOMATED COUNT: 13.2 % (ref 10–15)
ERYTHROCYTE [DISTWIDTH] IN BLOOD BY AUTOMATED COUNT: 13.3 % (ref 10–15)
ERYTHROCYTE [DISTWIDTH] IN BLOOD BY AUTOMATED COUNT: 13.9 % (ref 10–15)
ERYTHROCYTE [DISTWIDTH] IN BLOOD BY AUTOMATED COUNT: 14.2 % (ref 10–15)
ERYTHROCYTE [DISTWIDTH] IN BLOOD BY AUTOMATED COUNT: 14.2 % (ref 10–15)
ERYTHROCYTE [DISTWIDTH] IN BLOOD BY AUTOMATED COUNT: 14.3 % (ref 10–15)
ERYTHROCYTE [DISTWIDTH] IN BLOOD BY AUTOMATED COUNT: 14.7 % (ref 10–15)
ERYTHROCYTE [DISTWIDTH] IN BLOOD BY AUTOMATED COUNT: 14.7 % (ref 10–15)
ERYTHROCYTE [DISTWIDTH] IN BLOOD BY AUTOMATED COUNT: 15 % (ref 10–15)
ERYTHROCYTE [DISTWIDTH] IN BLOOD BY AUTOMATED COUNT: 15.2 % (ref 10–15)
ERYTHROCYTE [DISTWIDTH] IN BLOOD BY AUTOMATED COUNT: 15.6 % (ref 10–15)
ERYTHROCYTE [DISTWIDTH] IN BLOOD BY AUTOMATED COUNT: 15.9 % (ref 10–15)
ERYTHROCYTE [DISTWIDTH] IN BLOOD BY AUTOMATED COUNT: 15.9 % (ref 10–15)
FEF 25/75: NORMAL
FEV-1: NORMAL
FEV1/FVC: NORMAL
FLUAV RNA SPEC QL NAA+PROBE: NEGATIVE
FLUAV RNA SPEC QL NAA+PROBE: NEGATIVE
FLUBV RNA RESP QL NAA+PROBE: NEGATIVE
FLUBV RNA RESP QL NAA+PROBE: NEGATIVE
FVC: NORMAL
GASTROCULT GAST QL: POSITIVE
GFR SERPL CREATININE-BSD FRML MDRD: 15 ML/MIN/1.73M2
GFR SERPL CREATININE-BSD FRML MDRD: 17 ML/MIN/1.73M2
GFR SERPL CREATININE-BSD FRML MDRD: 17 ML/MIN/1.73M2
GFR SERPL CREATININE-BSD FRML MDRD: 24 ML/MIN/1.73M2
GFR SERPL CREATININE-BSD FRML MDRD: 30 ML/MIN/1.73M2
GFR SERPL CREATININE-BSD FRML MDRD: 38 ML/MIN/1.73M2
GFR SERPL CREATININE-BSD FRML MDRD: 39 ML/MIN/1.73M2
GFR SERPL CREATININE-BSD FRML MDRD: 39 ML/MIN/1.73M2
GFR SERPL CREATININE-BSD FRML MDRD: 40 ML/MIN/1.73M2
GFR SERPL CREATININE-BSD FRML MDRD: 42 ML/MIN/1.73M2
GFR SERPL CREATININE-BSD FRML MDRD: 42 ML/MIN/1.73M2
GFR SERPL CREATININE-BSD FRML MDRD: 43 ML/MIN/1.73M2
GFR SERPL CREATININE-BSD FRML MDRD: 44 ML/MIN/1.73M2
GFR SERPL CREATININE-BSD FRML MDRD: 44 ML/MIN/1.73M2
GFR SERPL CREATININE-BSD FRML MDRD: 47 ML/MIN/1.73M2
GFR SERPL CREATININE-BSD FRML MDRD: 48 ML/MIN/1.73M2
GFR SERPL CREATININE-BSD FRML MDRD: 49 ML/MIN/1.73M2
GFR SERPL CREATININE-BSD FRML MDRD: 50 ML/MIN/1.73M2
GFR SERPL CREATININE-BSD FRML MDRD: 51 ML/MIN/1.73M2
GFR SERPL CREATININE-BSD FRML MDRD: 53 ML/MIN/1.73M2
GFR SERPL CREATININE-BSD FRML MDRD: 54 ML/MIN/1.73M2
GFR SERPL CREATININE-BSD FRML MDRD: 54 ML/MIN/1.73M2
GFR SERPL CREATININE-BSD FRML MDRD: 57 ML/MIN/1.73M2
GFR SERPL CREATININE-BSD FRML MDRD: 63 ML/MIN/1.73M2
GLUCOSE BLDC GLUCOMTR-MCNC: 101 MG/DL (ref 70–99)
GLUCOSE BLDC GLUCOMTR-MCNC: 102 MG/DL (ref 70–99)
GLUCOSE BLDC GLUCOMTR-MCNC: 104 MG/DL (ref 70–99)
GLUCOSE BLDC GLUCOMTR-MCNC: 105 MG/DL (ref 70–99)
GLUCOSE BLDC GLUCOMTR-MCNC: 108 MG/DL (ref 70–99)
GLUCOSE BLDC GLUCOMTR-MCNC: 108 MG/DL (ref 70–99)
GLUCOSE BLDC GLUCOMTR-MCNC: 109 MG/DL (ref 70–99)
GLUCOSE BLDC GLUCOMTR-MCNC: 114 MG/DL (ref 70–99)
GLUCOSE BLDC GLUCOMTR-MCNC: 114 MG/DL (ref 70–99)
GLUCOSE BLDC GLUCOMTR-MCNC: 116 MG/DL (ref 70–99)
GLUCOSE BLDC GLUCOMTR-MCNC: 118 MG/DL (ref 70–99)
GLUCOSE BLDC GLUCOMTR-MCNC: 119 MG/DL (ref 70–99)
GLUCOSE BLDC GLUCOMTR-MCNC: 119 MG/DL (ref 70–99)
GLUCOSE BLDC GLUCOMTR-MCNC: 120 MG/DL (ref 70–99)
GLUCOSE BLDC GLUCOMTR-MCNC: 120 MG/DL (ref 70–99)
GLUCOSE BLDC GLUCOMTR-MCNC: 121 MG/DL (ref 70–99)
GLUCOSE BLDC GLUCOMTR-MCNC: 122 MG/DL (ref 70–99)
GLUCOSE BLDC GLUCOMTR-MCNC: 125 MG/DL (ref 70–99)
GLUCOSE BLDC GLUCOMTR-MCNC: 128 MG/DL (ref 70–99)
GLUCOSE BLDC GLUCOMTR-MCNC: 128 MG/DL (ref 70–99)
GLUCOSE BLDC GLUCOMTR-MCNC: 129 MG/DL (ref 70–99)
GLUCOSE BLDC GLUCOMTR-MCNC: 129 MG/DL (ref 70–99)
GLUCOSE BLDC GLUCOMTR-MCNC: 130 MG/DL (ref 70–99)
GLUCOSE BLDC GLUCOMTR-MCNC: 131 MG/DL (ref 70–99)
GLUCOSE BLDC GLUCOMTR-MCNC: 132 MG/DL (ref 70–99)
GLUCOSE BLDC GLUCOMTR-MCNC: 133 MG/DL (ref 70–99)
GLUCOSE BLDC GLUCOMTR-MCNC: 135 MG/DL (ref 70–99)
GLUCOSE BLDC GLUCOMTR-MCNC: 138 MG/DL (ref 70–99)
GLUCOSE BLDC GLUCOMTR-MCNC: 139 MG/DL (ref 70–99)
GLUCOSE BLDC GLUCOMTR-MCNC: 140 MG/DL (ref 70–99)
GLUCOSE BLDC GLUCOMTR-MCNC: 141 MG/DL (ref 70–99)
GLUCOSE BLDC GLUCOMTR-MCNC: 141 MG/DL (ref 70–99)
GLUCOSE BLDC GLUCOMTR-MCNC: 142 MG/DL (ref 70–99)
GLUCOSE BLDC GLUCOMTR-MCNC: 143 MG/DL (ref 70–99)
GLUCOSE BLDC GLUCOMTR-MCNC: 144 MG/DL (ref 70–99)
GLUCOSE BLDC GLUCOMTR-MCNC: 145 MG/DL (ref 70–99)
GLUCOSE BLDC GLUCOMTR-MCNC: 146 MG/DL (ref 70–99)
GLUCOSE BLDC GLUCOMTR-MCNC: 147 MG/DL (ref 70–99)
GLUCOSE BLDC GLUCOMTR-MCNC: 147 MG/DL (ref 70–99)
GLUCOSE BLDC GLUCOMTR-MCNC: 148 MG/DL (ref 70–99)
GLUCOSE BLDC GLUCOMTR-MCNC: 149 MG/DL (ref 70–99)
GLUCOSE BLDC GLUCOMTR-MCNC: 149 MG/DL (ref 70–99)
GLUCOSE BLDC GLUCOMTR-MCNC: 150 MG/DL (ref 70–99)
GLUCOSE BLDC GLUCOMTR-MCNC: 151 MG/DL (ref 70–99)
GLUCOSE BLDC GLUCOMTR-MCNC: 151 MG/DL (ref 70–99)
GLUCOSE BLDC GLUCOMTR-MCNC: 152 MG/DL (ref 70–99)
GLUCOSE BLDC GLUCOMTR-MCNC: 153 MG/DL (ref 70–99)
GLUCOSE BLDC GLUCOMTR-MCNC: 153 MG/DL (ref 70–99)
GLUCOSE BLDC GLUCOMTR-MCNC: 154 MG/DL (ref 70–99)
GLUCOSE BLDC GLUCOMTR-MCNC: 154 MG/DL (ref 70–99)
GLUCOSE BLDC GLUCOMTR-MCNC: 155 MG/DL (ref 70–99)
GLUCOSE BLDC GLUCOMTR-MCNC: 155 MG/DL (ref 70–99)
GLUCOSE BLDC GLUCOMTR-MCNC: 156 MG/DL (ref 70–99)
GLUCOSE BLDC GLUCOMTR-MCNC: 157 MG/DL (ref 70–99)
GLUCOSE BLDC GLUCOMTR-MCNC: 158 MG/DL (ref 70–99)
GLUCOSE BLDC GLUCOMTR-MCNC: 159 MG/DL (ref 70–99)
GLUCOSE BLDC GLUCOMTR-MCNC: 162 MG/DL (ref 70–99)
GLUCOSE BLDC GLUCOMTR-MCNC: 162 MG/DL (ref 70–99)
GLUCOSE BLDC GLUCOMTR-MCNC: 163 MG/DL (ref 70–99)
GLUCOSE BLDC GLUCOMTR-MCNC: 165 MG/DL (ref 70–99)
GLUCOSE BLDC GLUCOMTR-MCNC: 167 MG/DL (ref 70–99)
GLUCOSE BLDC GLUCOMTR-MCNC: 167 MG/DL (ref 70–99)
GLUCOSE BLDC GLUCOMTR-MCNC: 168 MG/DL (ref 70–99)
GLUCOSE BLDC GLUCOMTR-MCNC: 168 MG/DL (ref 70–99)
GLUCOSE BLDC GLUCOMTR-MCNC: 171 MG/DL (ref 70–99)
GLUCOSE BLDC GLUCOMTR-MCNC: 173 MG/DL (ref 70–99)
GLUCOSE BLDC GLUCOMTR-MCNC: 174 MG/DL (ref 70–99)
GLUCOSE BLDC GLUCOMTR-MCNC: 174 MG/DL (ref 70–99)
GLUCOSE BLDC GLUCOMTR-MCNC: 176 MG/DL (ref 70–99)
GLUCOSE BLDC GLUCOMTR-MCNC: 177 MG/DL (ref 70–99)
GLUCOSE BLDC GLUCOMTR-MCNC: 178 MG/DL (ref 70–99)
GLUCOSE BLDC GLUCOMTR-MCNC: 178 MG/DL (ref 70–99)
GLUCOSE BLDC GLUCOMTR-MCNC: 181 MG/DL (ref 70–99)
GLUCOSE BLDC GLUCOMTR-MCNC: 181 MG/DL (ref 70–99)
GLUCOSE BLDC GLUCOMTR-MCNC: 182 MG/DL (ref 70–99)
GLUCOSE BLDC GLUCOMTR-MCNC: 184 MG/DL (ref 70–99)
GLUCOSE BLDC GLUCOMTR-MCNC: 184 MG/DL (ref 70–99)
GLUCOSE BLDC GLUCOMTR-MCNC: 185 MG/DL (ref 70–99)
GLUCOSE BLDC GLUCOMTR-MCNC: 187 MG/DL (ref 70–99)
GLUCOSE BLDC GLUCOMTR-MCNC: 187 MG/DL (ref 70–99)
GLUCOSE BLDC GLUCOMTR-MCNC: 189 MG/DL (ref 70–99)
GLUCOSE BLDC GLUCOMTR-MCNC: 193 MG/DL (ref 70–99)
GLUCOSE BLDC GLUCOMTR-MCNC: 194 MG/DL (ref 70–99)
GLUCOSE BLDC GLUCOMTR-MCNC: 194 MG/DL (ref 70–99)
GLUCOSE BLDC GLUCOMTR-MCNC: 196 MG/DL (ref 70–99)
GLUCOSE BLDC GLUCOMTR-MCNC: 199 MG/DL (ref 70–99)
GLUCOSE BLDC GLUCOMTR-MCNC: 200 MG/DL (ref 70–99)
GLUCOSE BLDC GLUCOMTR-MCNC: 200 MG/DL (ref 70–99)
GLUCOSE BLDC GLUCOMTR-MCNC: 210 MG/DL (ref 70–99)
GLUCOSE BLDC GLUCOMTR-MCNC: 223 MG/DL (ref 70–99)
GLUCOSE BLDC GLUCOMTR-MCNC: 231 MG/DL (ref 70–99)
GLUCOSE BLDC GLUCOMTR-MCNC: 234 MG/DL (ref 70–99)
GLUCOSE BLDC GLUCOMTR-MCNC: 61 MG/DL (ref 70–99)
GLUCOSE BLDC GLUCOMTR-MCNC: 73 MG/DL (ref 70–99)
GLUCOSE BLDC GLUCOMTR-MCNC: 82 MG/DL (ref 70–99)
GLUCOSE BLDC GLUCOMTR-MCNC: 85 MG/DL (ref 70–99)
GLUCOSE BLDC GLUCOMTR-MCNC: 87 MG/DL (ref 70–99)
GLUCOSE BLDC GLUCOMTR-MCNC: 90 MG/DL (ref 70–99)
GLUCOSE BLDC GLUCOMTR-MCNC: 91 MG/DL (ref 70–99)
GLUCOSE BLDC GLUCOMTR-MCNC: 91 MG/DL (ref 70–99)
GLUCOSE BLDC GLUCOMTR-MCNC: 94 MG/DL (ref 70–99)
GLUCOSE BLDC GLUCOMTR-MCNC: 95 MG/DL (ref 70–99)
GLUCOSE BLDC GLUCOMTR-MCNC: 96 MG/DL (ref 70–99)
GLUCOSE BODY FLUID SOURCE: NORMAL
GLUCOSE FLD-MCNC: 156 MG/DL
GLUCOSE SERPL-MCNC: 103 MG/DL (ref 70–99)
GLUCOSE SERPL-MCNC: 105 MG/DL (ref 70–99)
GLUCOSE SERPL-MCNC: 109 MG/DL (ref 70–99)
GLUCOSE SERPL-MCNC: 116 MG/DL (ref 70–99)
GLUCOSE SERPL-MCNC: 119 MG/DL (ref 70–99)
GLUCOSE SERPL-MCNC: 121 MG/DL (ref 70–99)
GLUCOSE SERPL-MCNC: 122 MG/DL (ref 70–99)
GLUCOSE SERPL-MCNC: 123 MG/DL (ref 70–99)
GLUCOSE SERPL-MCNC: 129 MG/DL (ref 70–99)
GLUCOSE SERPL-MCNC: 137 MG/DL (ref 70–99)
GLUCOSE SERPL-MCNC: 141 MG/DL (ref 70–99)
GLUCOSE SERPL-MCNC: 147 MG/DL (ref 70–99)
GLUCOSE SERPL-MCNC: 149 MG/DL (ref 70–99)
GLUCOSE SERPL-MCNC: 150 MG/DL (ref 70–99)
GLUCOSE SERPL-MCNC: 151 MG/DL (ref 70–99)
GLUCOSE SERPL-MCNC: 156 MG/DL (ref 70–99)
GLUCOSE SERPL-MCNC: 160 MG/DL (ref 70–99)
GLUCOSE SERPL-MCNC: 163 MG/DL (ref 70–99)
GLUCOSE SERPL-MCNC: 163 MG/DL (ref 70–99)
GLUCOSE SERPL-MCNC: 168 MG/DL (ref 70–99)
GLUCOSE SERPL-MCNC: 168 MG/DL (ref 70–99)
GLUCOSE SERPL-MCNC: 184 MG/DL (ref 70–99)
GLUCOSE SERPL-MCNC: 190 MG/DL (ref 70–99)
GLUCOSE SERPL-MCNC: 191 MG/DL (ref 70–99)
GLUCOSE SERPL-MCNC: 205 MG/DL (ref 70–99)
GLUCOSE SERPL-MCNC: 216 MG/DL (ref 70–99)
GLUCOSE SERPL-MCNC: 222 MG/DL (ref 70–99)
GLUCOSE SERPL-MCNC: 86 MG/DL (ref 70–99)
GLUCOSE SERPL-MCNC: 87 MG/DL (ref 70–99)
GLUCOSE SERPL-MCNC: 89 MG/DL (ref 70–99)
GLUCOSE SERPL-MCNC: 93 MG/DL (ref 70–99)
GLUCOSE SERPL-MCNC: 93 MG/DL (ref 70–99)
GLUCOSE SERPL-MCNC: 96 MG/DL (ref 70–99)
GLUCOSE UR STRIP-MCNC: 50 MG/DL
GLUCOSE UR STRIP-MCNC: 50 MG/DL
GLUCOSE UR STRIP-MCNC: NEGATIVE MG/DL
GLUCOSE UR STRIP-MCNC: NEGATIVE MG/DL
GRAM STAIN RESULT: ABNORMAL
GRAM STAIN RESULT: ABNORMAL
GRAM STAIN RESULT: NORMAL
GRAM STAIN RESULT: NORMAL
HBA1C MFR BLD: 5.8 %
HCO3 BLD-SCNC: 18 MMOL/L (ref 21–28)
HCO3 BLD-SCNC: 19 MMOL/L (ref 21–28)
HCO3 BLD-SCNC: 20 MMOL/L (ref 21–28)
HCO3 BLD-SCNC: 20 MMOL/L (ref 21–28)
HCO3 BLD-SCNC: 21 MMOL/L (ref 21–28)
HCO3 BLD-SCNC: 22 MMOL/L (ref 21–28)
HCO3 BLD-SCNC: 22 MMOL/L (ref 21–28)
HCO3 BLD-SCNC: 23 MMOL/L (ref 21–28)
HCO3 BLD-SCNC: 24 MMOL/L (ref 21–28)
HCO3 BLD-SCNC: 25 MMOL/L (ref 21–28)
HCO3 BLD-SCNC: 26 MMOL/L (ref 21–28)
HCO3 BLD-SCNC: 27 MMOL/L (ref 21–28)
HCO3 BLD-SCNC: 28 MMOL/L (ref 21–28)
HCO3 BLD-SCNC: 28 MMOL/L (ref 21–28)
HCO3 BLD-SCNC: 29 MMOL/L (ref 21–28)
HCO3 BLDV-SCNC: 20 MMOL/L (ref 21–28)
HCO3 BLDV-SCNC: 33 MMOL/L (ref 21–28)
HCO3 BLDV-SCNC: 33 MMOL/L (ref 21–28)
HCO3 BLDV-SCNC: 34 MMOL/L (ref 21–28)
HCO3 BLDV-SCNC: 35 MMOL/L (ref 21–28)
HCO3 BLDV-SCNC: 36 MMOL/L (ref 21–28)
HCO3 BLDV-SCNC: 37 MMOL/L (ref 21–28)
HCO3 BLDV-SCNC: 37 MMOL/L (ref 21–28)
HCO3 BLDV-SCNC: 38 MMOL/L (ref 21–28)
HCO3 BLDV-SCNC: 38 MMOL/L (ref 21–28)
HCO3 BLDV-SCNC: 40 MMOL/L (ref 21–28)
HCO3 BLDV-SCNC: 42 MMOL/L (ref 21–28)
HCO3 BLDV-SCNC: 44 MMOL/L (ref 21–28)
HCT VFR BLD AUTO: 23.9 % (ref 35–47)
HCT VFR BLD AUTO: 24.6 % (ref 35–47)
HCT VFR BLD AUTO: 25.6 % (ref 35–47)
HCT VFR BLD AUTO: 27.6 % (ref 35–47)
HCT VFR BLD AUTO: 27.8 % (ref 35–47)
HCT VFR BLD AUTO: 29.4 % (ref 35–47)
HCT VFR BLD AUTO: 30.4 % (ref 35–47)
HCT VFR BLD AUTO: 30.8 % (ref 35–47)
HCT VFR BLD AUTO: 31.2 % (ref 35–47)
HCT VFR BLD AUTO: 31.7 % (ref 35–47)
HCT VFR BLD AUTO: 33.5 % (ref 35–47)
HCT VFR BLD AUTO: 33.9 % (ref 35–47)
HCT VFR BLD AUTO: 34.1 % (ref 35–47)
HCT VFR BLD AUTO: 34.2 % (ref 35–47)
HCT VFR BLD AUTO: 34.3 % (ref 35–47)
HCT VFR BLD AUTO: 35 % (ref 35–47)
HCT VFR BLD AUTO: 35.4 % (ref 35–47)
HCT VFR BLD AUTO: 36.8 % (ref 35–47)
HCT VFR BLD AUTO: 37.1 % (ref 35–47)
HCT VFR BLD AUTO: 38.7 % (ref 35–47)
HDLC SERPL-MCNC: 61 MG/DL
HGB BLD-MCNC: 10.4 G/DL (ref 11.7–15.7)
HGB BLD-MCNC: 10.6 G/DL (ref 11.7–15.7)
HGB BLD-MCNC: 10.6 G/DL (ref 11.7–15.7)
HGB BLD-MCNC: 10.8 G/DL (ref 11.7–15.7)
HGB BLD-MCNC: 11.1 G/DL (ref 11.7–15.7)
HGB BLD-MCNC: 11.6 G/DL (ref 11.7–15.7)
HGB BLD-MCNC: 11.8 G/DL (ref 11.7–15.7)
HGB BLD-MCNC: 12.5 G/DL (ref 11.7–15.7)
HGB BLD-MCNC: 7.5 G/DL (ref 11.7–15.7)
HGB BLD-MCNC: 7.7 G/DL (ref 11.7–15.7)
HGB BLD-MCNC: 7.7 G/DL (ref 11.7–15.7)
HGB BLD-MCNC: 7.8 G/DL (ref 11.7–15.7)
HGB BLD-MCNC: 7.9 G/DL (ref 11.7–15.7)
HGB BLD-MCNC: 8 G/DL (ref 11.7–15.7)
HGB BLD-MCNC: 8.1 G/DL (ref 11.7–15.7)
HGB BLD-MCNC: 8.1 G/DL (ref 11.7–15.7)
HGB BLD-MCNC: 8.6 G/DL (ref 11.7–15.7)
HGB BLD-MCNC: 8.7 G/DL (ref 11.7–15.7)
HGB BLD-MCNC: 8.7 G/DL (ref 11.7–15.7)
HGB BLD-MCNC: 8.8 G/DL (ref 11.7–15.7)
HGB BLD-MCNC: 8.9 G/DL (ref 11.7–15.7)
HGB BLD-MCNC: 9 G/DL (ref 11.7–15.7)
HGB BLD-MCNC: 9.2 G/DL (ref 11.7–15.7)
HGB BLD-MCNC: 9.2 G/DL (ref 11.7–15.7)
HGB BLD-MCNC: 9.4 G/DL (ref 11.7–15.7)
HGB BLD-MCNC: 9.8 G/DL (ref 11.7–15.7)
HGB BLD-MCNC: 9.8 G/DL (ref 11.7–15.7)
HGB UR QL STRIP: ABNORMAL
HGB UR QL STRIP: NEGATIVE
HOLD SPECIMEN: NORMAL
HYALINE CASTS: 1 /LPF
HYALINE CASTS: 5 /LPF
IMM GRANULOCYTES # BLD: 0.1 10E3/UL
IMM GRANULOCYTES # BLD: 0.3 10E3/UL
IMM GRANULOCYTES # BLD: 0.3 10E3/UL
IMM GRANULOCYTES # BLD: 0.4 10E3/UL
IMM GRANULOCYTES NFR BLD: 1 %
IMM GRANULOCYTES NFR BLD: 2 %
INR BLD: 1 (ref 0.9–1.1)
INR BLD: 1.9 (ref 0.9–1.1)
INR BLD: 2.5 (ref 0.9–1.1)
INR BLD: 2.6 (ref 0.9–1.1)
INR BLD: 2.6 (ref 0.9–1.1)
INR BLD: 2.7 (ref 0.9–1.1)
INR BLD: 2.9 (ref 0.9–1.1)
INR BLD: 2.9 (ref 0.9–1.1)
INR BLD: 3 (ref 0.9–1.1)
INR BLD: 3 (ref 0.9–1.1)
INR BLD: 3.1 (ref 0.9–1.1)
INR BLD: 3.2 (ref 0.9–1.1)
INR BLD: 3.3 (ref 0.9–1.1)
INR BLD: 3.4 (ref 0.9–1.1)
INR BLD: 3.4 (ref 0.9–1.1)
INR BLD: 3.5 (ref 0.9–1.1)
INR BLD: 3.9 (ref 0.9–1.1)
INR BLD: 4 (ref 0.9–1.1)
INR BLD: 4.1 (ref 0.9–1.1)
INR BLD: 4.7 (ref 0.9–1.1)
INR BLD: 7.9 (ref 0.9–1.1)
INR PPP: 1.12 (ref 0.85–1.15)
INR PPP: 1.31 (ref 0.85–1.15)
INR PPP: 1.38 (ref 0.85–1.15)
INR PPP: 1.38 (ref 0.85–1.15)
INR PPP: 1.4 (ref 0.85–1.15)
INR PPP: 1.6 (ref 0.85–1.15)
INR PPP: 1.78 (ref 0.85–1.15)
INR PPP: 2.95 (ref 0.85–1.15)
INR PPP: 4.93 (ref 0.85–1.15)
KETONES UR STRIP-MCNC: NEGATIVE MG/DL
LACTATE SERPL-SCNC: 0.8 MMOL/L (ref 0.7–2)
LACTATE SERPL-SCNC: 0.9 MMOL/L (ref 0.7–2)
LACTATE SERPL-SCNC: 1.1 MMOL/L (ref 0.7–2)
LACTATE SERPL-SCNC: 1.2 MMOL/L (ref 0.7–2)
LD BODY BODY FLUID SOURCE: NORMAL
LDH FLD L TO P-CCNC: 2355 U/L
LDH SERPL L TO P-CCNC: 290 U/L (ref 0–250)
LDLC SERPL CALC-MCNC: 65 MG/DL
LEUKOCYTE ESTERASE UR QL STRIP: ABNORMAL
LEUKOCYTE ESTERASE UR QL STRIP: NEGATIVE
LVEF ECHO: NORMAL
LYMPHOCYTES # BLD AUTO: 0.2 10E3/UL (ref 0.8–5.3)
LYMPHOCYTES # BLD AUTO: 0.5 10E3/UL (ref 0.8–5.3)
LYMPHOCYTES # BLD AUTO: 0.5 10E3/UL (ref 0.8–5.3)
LYMPHOCYTES # BLD AUTO: 0.6 10E3/UL (ref 0.8–5.3)
LYMPHOCYTES # BLD AUTO: 0.7 10E3/UL (ref 0.8–5.3)
LYMPHOCYTES # BLD AUTO: 1.1 10E3/UL (ref 0.8–5.3)
LYMPHOCYTES NFR BLD AUTO: 1 %
LYMPHOCYTES NFR BLD AUTO: 3 %
LYMPHOCYTES NFR BLD AUTO: 3 %
LYMPHOCYTES NFR BLD AUTO: 5 %
LYMPHOCYTES NFR BLD AUTO: 6 %
LYMPHOCYTES NFR BLD AUTO: 8 %
LYMPHOCYTES NFR FLD MANUAL: 9 %
MAGNESIUM SERPL-MCNC: 1.9 MG/DL (ref 1.7–2.3)
MAGNESIUM SERPL-MCNC: 2 MG/DL (ref 1.7–2.3)
MAGNESIUM SERPL-MCNC: 2.1 MG/DL (ref 1.7–2.3)
MAGNESIUM SERPL-MCNC: 2.2 MG/DL (ref 1.7–2.3)
MAGNESIUM SERPL-MCNC: 2.3 MG/DL (ref 1.7–2.3)
MAGNESIUM SERPL-MCNC: 2.3 MG/DL (ref 1.7–2.3)
MAGNESIUM SERPL-MCNC: 2.4 MG/DL (ref 1.7–2.3)
MAGNESIUM SERPL-MCNC: 2.6 MG/DL (ref 1.7–2.3)
MAGNESIUM SERPL-MCNC: 2.7 MG/DL (ref 1.7–2.3)
MAGNESIUM SERPL-MCNC: 2.8 MG/DL (ref 1.7–2.3)
MCH RBC QN AUTO: 27.3 PG (ref 26.5–33)
MCH RBC QN AUTO: 27.4 PG (ref 26.5–33)
MCH RBC QN AUTO: 27.5 PG (ref 26.5–33)
MCH RBC QN AUTO: 27.8 PG (ref 26.5–33)
MCH RBC QN AUTO: 28 PG (ref 26.5–33)
MCH RBC QN AUTO: 28.1 PG (ref 26.5–33)
MCH RBC QN AUTO: 28.3 PG (ref 26.5–33)
MCH RBC QN AUTO: 28.4 PG (ref 26.5–33)
MCH RBC QN AUTO: 28.5 PG (ref 26.5–33)
MCH RBC QN AUTO: 28.6 PG (ref 26.5–33)
MCH RBC QN AUTO: 28.7 PG (ref 26.5–33)
MCH RBC QN AUTO: 28.7 PG (ref 26.5–33)
MCH RBC QN AUTO: 28.8 PG (ref 26.5–33)
MCH RBC QN AUTO: 28.9 PG (ref 26.5–33)
MCH RBC QN AUTO: 29.1 PG (ref 26.5–33)
MCH RBC QN AUTO: 29.2 PG (ref 26.5–33)
MCHC RBC AUTO-ENTMCNC: 30.1 G/DL (ref 31.5–36.5)
MCHC RBC AUTO-ENTMCNC: 30.3 G/DL (ref 31.5–36.5)
MCHC RBC AUTO-ENTMCNC: 30.4 G/DL (ref 31.5–36.5)
MCHC RBC AUTO-ENTMCNC: 30.5 G/DL (ref 31.5–36.5)
MCHC RBC AUTO-ENTMCNC: 30.5 G/DL (ref 31.5–36.5)
MCHC RBC AUTO-ENTMCNC: 30.7 G/DL (ref 31.5–36.5)
MCHC RBC AUTO-ENTMCNC: 30.9 G/DL (ref 31.5–36.5)
MCHC RBC AUTO-ENTMCNC: 31.2 G/DL (ref 31.5–36.5)
MCHC RBC AUTO-ENTMCNC: 31.3 G/DL (ref 31.5–36.5)
MCHC RBC AUTO-ENTMCNC: 31.4 G/DL (ref 31.5–36.5)
MCHC RBC AUTO-ENTMCNC: 31.4 G/DL (ref 31.5–36.5)
MCHC RBC AUTO-ENTMCNC: 31.6 G/DL (ref 31.5–36.5)
MCHC RBC AUTO-ENTMCNC: 31.7 G/DL (ref 31.5–36.5)
MCHC RBC AUTO-ENTMCNC: 32.1 G/DL (ref 31.5–36.5)
MCHC RBC AUTO-ENTMCNC: 32.3 G/DL (ref 31.5–36.5)
MCV RBC AUTO: 88 FL (ref 78–100)
MCV RBC AUTO: 89 FL (ref 78–100)
MCV RBC AUTO: 90 FL (ref 78–100)
MCV RBC AUTO: 91 FL (ref 78–100)
MCV RBC AUTO: 92 FL (ref 78–100)
MCV RBC AUTO: 93 FL (ref 78–100)
MCV RBC AUTO: 94 FL (ref 78–100)
MICROALBUMIN UR-MCNC: 218.2 MG/L
MICROALBUMIN/CREAT UR: 80.25 MG/G CR (ref 0–25)
MONOCYTES # BLD AUTO: 0.6 10E3/UL (ref 0–1.3)
MONOCYTES # BLD AUTO: 0.9 10E3/UL (ref 0–1.3)
MONOCYTES # BLD AUTO: 1.1 10E3/UL (ref 0–1.3)
MONOCYTES # BLD AUTO: 1.1 10E3/UL (ref 0–1.3)
MONOCYTES # BLD AUTO: 1.3 10E3/UL (ref 0–1.3)
MONOCYTES # BLD AUTO: 2.2 10E3/UL (ref 0–1.3)
MONOCYTES NFR BLD AUTO: 13 %
MONOCYTES NFR BLD AUTO: 3 %
MONOCYTES NFR BLD AUTO: 5 %
MONOCYTES NFR BLD AUTO: 8 %
MONOCYTES NFR BLD AUTO: 9 %
MONOCYTES NFR BLD AUTO: 9 %
MONOS+MACROS NFR FLD MANUAL: 58 %
MRSA DNA SPEC QL NAA+PROBE: NEGATIVE
MUCOUS THREADS #/AREA URNS LPF: PRESENT /LPF
NEUTROPHILS # BLD AUTO: 11 10E3/UL (ref 1.6–8.3)
NEUTROPHILS # BLD AUTO: 17.8 10E3/UL (ref 1.6–8.3)
NEUTROPHILS # BLD AUTO: 18.4 10E3/UL (ref 1.6–8.3)
NEUTROPHILS # BLD AUTO: 21 10E3/UL (ref 1.6–8.3)
NEUTROPHILS # BLD AUTO: 6.2 10E3/UL (ref 1.6–8.3)
NEUTROPHILS # BLD AUTO: 9.2 10E3/UL (ref 1.6–8.3)
NEUTROPHILS NFR BLD AUTO: 74 %
NEUTROPHILS NFR BLD AUTO: 80 %
NEUTROPHILS NFR BLD AUTO: 82 %
NEUTROPHILS NFR BLD AUTO: 86 %
NEUTROPHILS NFR BLD AUTO: 91 %
NEUTROPHILS NFR BLD AUTO: 94 %
NEUTS BAND NFR FLD MANUAL: 33 %
NITRATE UR QL: NEGATIVE
NONHDLC SERPL-MCNC: 81 MG/DL
NRBC # BLD AUTO: 0 10E3/UL
NRBC BLD AUTO-RTO: 0 /100
NT-PROBNP SERPL-MCNC: 1435 PG/ML (ref 0–900)
NT-PROBNP SERPL-MCNC: 231 PG/ML (ref 0–900)
NT-PROBNP SERPL-MCNC: 483 PG/ML (ref 0–900)
NT-PROBNP SERPL-MCNC: 9886 PG/ML (ref 0–900)
NT-PROBNP SERPL-MCNC: ABNORMAL PG/ML (ref 0–900)
O2/TOTAL GAS SETTING VFR VENT: 100 %
O2/TOTAL GAS SETTING VFR VENT: 21 %
O2/TOTAL GAS SETTING VFR VENT: 23 %
O2/TOTAL GAS SETTING VFR VENT: 24 %
O2/TOTAL GAS SETTING VFR VENT: 24 %
O2/TOTAL GAS SETTING VFR VENT: 30 %
O2/TOTAL GAS SETTING VFR VENT: 32 %
O2/TOTAL GAS SETTING VFR VENT: 33 %
O2/TOTAL GAS SETTING VFR VENT: 35 %
O2/TOTAL GAS SETTING VFR VENT: 36 %
O2/TOTAL GAS SETTING VFR VENT: 36 %
O2/TOTAL GAS SETTING VFR VENT: 40 %
O2/TOTAL GAS SETTING VFR VENT: 50 %
O2/TOTAL GAS SETTING VFR VENT: 60 %
O2/TOTAL GAS SETTING VFR VENT: 60 %
OSMOLALITY SERPL: 309 MMOL/KG (ref 280–301)
OSMOLALITY SERPL: 316 MMOL/KG (ref 280–301)
OSMOLALITY SERPL: 323 MMOL/KG (ref 280–301)
OSMOLALITY SERPL: 333 MMOL/KG (ref 280–301)
OSMOLALITY SERPL: 338 MMOL/KG (ref 280–301)
OSMOLALITY SERPL: 357 MMOL/KG (ref 280–301)
PATH REPORT.COMMENTS IMP SPEC: NORMAL
PATH REPORT.FINAL DX SPEC: NORMAL
PATH REPORT.GROSS SPEC: NORMAL
PATH REPORT.MICROSCOPIC SPEC OTHER STN: NORMAL
PATH REPORT.RELEVANT HX SPEC: NORMAL
PCO2 BLD: 38 MM HG (ref 35–45)
PCO2 BLD: 39 MM HG (ref 35–45)
PCO2 BLD: 40 MM HG (ref 35–45)
PCO2 BLD: 41 MM HG (ref 35–45)
PCO2 BLD: 41 MM HG (ref 35–45)
PCO2 BLD: 42 MM HG (ref 35–45)
PCO2 BLD: 43 MM HG (ref 35–45)
PCO2 BLD: 44 MM HG (ref 35–45)
PCO2 BLD: 46 MM HG (ref 35–45)
PCO2 BLD: 47 MM HG (ref 35–45)
PCO2 BLD: 48 MM HG (ref 35–45)
PCO2 BLD: 49 MM HG (ref 35–45)
PCO2 BLD: 49 MM HG (ref 35–45)
PCO2 BLD: 50 MM HG (ref 35–45)
PCO2 BLD: 51 MM HG (ref 35–45)
PCO2 BLD: 52 MM HG (ref 35–45)
PCO2 BLD: 54 MM HG (ref 35–45)
PCO2 BLD: 55 MM HG (ref 35–45)
PCO2 BLD: 56 MM HG (ref 35–45)
PCO2 BLD: 58 MM HG (ref 35–45)
PCO2 BLD: 59 MM HG (ref 35–45)
PCO2 BLD: 60 MM HG (ref 35–45)
PCO2 BLD: 60 MM HG (ref 35–45)
PCO2 BLDV: 53 MM HG (ref 40–50)
PCO2 BLDV: 56 MM HG (ref 40–50)
PCO2 BLDV: 60 MM HG (ref 40–50)
PCO2 BLDV: 61 MM HG (ref 40–50)
PCO2 BLDV: 63 MM HG (ref 40–50)
PCO2 BLDV: 64 MM HG (ref 40–50)
PCO2 BLDV: 65 MM HG (ref 40–50)
PCO2 BLDV: 65 MM HG (ref 40–50)
PCO2 BLDV: 66 MM HG (ref 40–50)
PCO2 BLDV: 67 MM HG (ref 40–50)
PCO2 BLDV: 74 MM HG (ref 40–50)
PCO2 BLDV: 75 MM HG (ref 40–50)
PCO2 BLDV: 76 MM HG (ref 40–50)
PCO2 BLDV: 78 MM HG (ref 40–50)
PCO2 BLDV: 81 MM HG (ref 40–50)
PCO2 BLDV: 81 MM HG (ref 40–50)
PCO2 BLDV: 82 MM HG (ref 40–50)
PCO2 BLDV: 83 MM HG (ref 40–50)
PH BLD: 7.22 [PH] (ref 7.35–7.45)
PH BLD: 7.23 [PH] (ref 7.35–7.45)
PH BLD: 7.23 [PH] (ref 7.35–7.45)
PH BLD: 7.24 [PH] (ref 7.35–7.45)
PH BLD: 7.25 [PH] (ref 7.35–7.45)
PH BLD: 7.26 [PH] (ref 7.35–7.45)
PH BLD: 7.26 [PH] (ref 7.35–7.45)
PH BLD: 7.27 [PH] (ref 7.35–7.45)
PH BLD: 7.28 [PH] (ref 7.35–7.45)
PH BLD: 7.29 [PH] (ref 7.35–7.45)
PH BLD: 7.3 [PH] (ref 7.35–7.45)
PH BLD: 7.3 [PH] (ref 7.35–7.45)
PH BLD: 7.31 [PH] (ref 7.35–7.45)
PH BLD: 7.32 [PH] (ref 7.35–7.45)
PH BLD: 7.33 [PH] (ref 7.35–7.45)
PH BLD: 7.33 [PH] (ref 7.35–7.45)
PH BLD: 7.34 [PH] (ref 7.35–7.45)
PH BLD: 7.34 [PH] (ref 7.35–7.45)
PH BLD: 7.35 [PH] (ref 7.35–7.45)
PH BLD: 7.37 [PH] (ref 7.35–7.45)
PH BLD: 7.39 [PH] (ref 7.35–7.45)
PH BLD: 7.41 [PH] (ref 7.35–7.45)
PH BLD: 7.47 [PH] (ref 7.35–7.45)
PH BLDV: 7.16 [PH] (ref 7.32–7.43)
PH BLDV: 7.2 [PH] (ref 7.32–7.43)
PH BLDV: 7.24 [PH] (ref 7.32–7.43)
PH BLDV: 7.24 [PH] (ref 7.32–7.43)
PH BLDV: 7.25 [PH] (ref 7.32–7.43)
PH BLDV: 7.28 [PH] (ref 7.32–7.43)
PH BLDV: 7.29 [PH] (ref 7.32–7.43)
PH BLDV: 7.33 [PH] (ref 7.32–7.43)
PH BLDV: 7.34 [PH] (ref 7.32–7.43)
PH BLDV: 7.36 [PH] (ref 7.32–7.43)
PH BLDV: 7.37 [PH] (ref 7.32–7.43)
PH BLDV: 7.37 [PH] (ref 7.32–7.43)
PH BLDV: 7.38 [PH] (ref 7.32–7.43)
PH BLDV: 7.38 [PH] (ref 7.32–7.43)
PH BLDV: 7.39 [PH] (ref 7.32–7.43)
PH BLDV: 7.4 [PH] (ref 7.32–7.43)
PH BLDV: 7.42 [PH] (ref 7.32–7.43)
PH BLDV: 7.44 [PH] (ref 7.32–7.43)
PH BODY FLUID SOURCE: NORMAL
PH FLD: 8 PH
PH GAST: ABNORMAL [PH]
PH UR STRIP: 5 [PH] (ref 5–7)
PHOSPHATE SERPL-MCNC: 2.6 MG/DL (ref 2.5–4.5)
PHOSPHATE SERPL-MCNC: 2.8 MG/DL (ref 2.5–4.5)
PHOSPHATE SERPL-MCNC: 2.8 MG/DL (ref 2.5–4.5)
PHOSPHATE SERPL-MCNC: 3 MG/DL (ref 2.5–4.5)
PHOSPHATE SERPL-MCNC: 3 MG/DL (ref 2.5–4.5)
PHOSPHATE SERPL-MCNC: 3.2 MG/DL (ref 2.5–4.5)
PHOSPHATE SERPL-MCNC: 3.3 MG/DL (ref 2.5–4.5)
PHOSPHATE SERPL-MCNC: 3.3 MG/DL (ref 2.5–4.5)
PHOSPHATE SERPL-MCNC: 3.4 MG/DL (ref 2.5–4.5)
PHOSPHATE SERPL-MCNC: 3.4 MG/DL (ref 2.5–4.5)
PHOSPHATE SERPL-MCNC: 3.8 MG/DL (ref 2.5–4.5)
PHOSPHATE SERPL-MCNC: 3.8 MG/DL (ref 2.5–4.5)
PHOSPHATE SERPL-MCNC: 4.1 MG/DL (ref 2.5–4.5)
PHOSPHATE SERPL-MCNC: 4.4 MG/DL (ref 2.5–4.5)
PHOSPHATE SERPL-MCNC: 4.6 MG/DL (ref 2.5–4.5)
PHOSPHATE SERPL-MCNC: 4.8 MG/DL (ref 2.5–4.5)
PHOSPHATE SERPL-MCNC: 6.7 MG/DL (ref 2.5–4.5)
PHOSPHATE SERPL-MCNC: 6.8 MG/DL (ref 2.5–4.5)
PHOSPHATE SERPL-MCNC: 7 MG/DL (ref 2.5–4.5)
PHOTO IMAGE: NORMAL
PLATELET # BLD AUTO: 121 10E3/UL (ref 150–450)
PLATELET # BLD AUTO: 129 10E3/UL (ref 150–450)
PLATELET # BLD AUTO: 134 10E3/UL (ref 150–450)
PLATELET # BLD AUTO: 135 10E3/UL (ref 150–450)
PLATELET # BLD AUTO: 145 10E3/UL (ref 150–450)
PLATELET # BLD AUTO: 157 10E3/UL (ref 150–450)
PLATELET # BLD AUTO: 164 10E3/UL (ref 150–450)
PLATELET # BLD AUTO: 168 10E3/UL (ref 150–450)
PLATELET # BLD AUTO: 173 10E3/UL (ref 150–450)
PLATELET # BLD AUTO: 176 10E3/UL (ref 150–450)
PLATELET # BLD AUTO: 176 10E3/UL (ref 150–450)
PLATELET # BLD AUTO: 183 10E3/UL (ref 150–450)
PLATELET # BLD AUTO: 186 10E3/UL (ref 150–450)
PLATELET # BLD AUTO: 187 10E3/UL (ref 150–450)
PLATELET # BLD AUTO: 204 10E3/UL (ref 150–450)
PLATELET # BLD AUTO: 222 10E3/UL (ref 150–450)
PLATELET # BLD AUTO: 226 10E3/UL (ref 150–450)
PLATELET # BLD AUTO: 237 10E3/UL (ref 150–450)
PLATELET # BLD AUTO: 241 10E3/UL (ref 150–450)
PLATELET # BLD AUTO: 387 10E3/UL (ref 150–450)
PLATELET # BLD AUTO: 425 10E3/UL (ref 150–450)
PLATELET # BLD AUTO: 85 10E3/UL (ref 150–450)
PLATELET # BLD AUTO: 95 10E3/UL (ref 150–450)
PO2 BLD: 100 MM HG (ref 80–105)
PO2 BLD: 100 MM HG (ref 80–105)
PO2 BLD: 101 MM HG (ref 80–105)
PO2 BLD: 103 MM HG (ref 80–105)
PO2 BLD: 104 MM HG (ref 80–105)
PO2 BLD: 104 MM HG (ref 80–105)
PO2 BLD: 105 MM HG (ref 80–105)
PO2 BLD: 105 MM HG (ref 80–105)
PO2 BLD: 107 MM HG (ref 80–105)
PO2 BLD: 109 MM HG (ref 80–105)
PO2 BLD: 112 MM HG (ref 80–105)
PO2 BLD: 68 MM HG (ref 80–105)
PO2 BLD: 69 MM HG (ref 80–105)
PO2 BLD: 69 MM HG (ref 80–105)
PO2 BLD: 70 MM HG (ref 80–105)
PO2 BLD: 74 MM HG (ref 80–105)
PO2 BLD: 74 MM HG (ref 80–105)
PO2 BLD: 76 MM HG (ref 80–105)
PO2 BLD: 78 MM HG (ref 80–105)
PO2 BLD: 78 MM HG (ref 80–105)
PO2 BLD: 79 MM HG (ref 80–105)
PO2 BLD: 80 MM HG (ref 80–105)
PO2 BLD: 80 MM HG (ref 80–105)
PO2 BLD: 82 MM HG (ref 80–105)
PO2 BLD: 85 MM HG (ref 80–105)
PO2 BLD: 87 MM HG (ref 80–105)
PO2 BLD: 88 MM HG (ref 80–105)
PO2 BLD: 89 MM HG (ref 80–105)
PO2 BLD: 89 MM HG (ref 80–105)
PO2 BLD: 91 MM HG (ref 80–105)
PO2 BLD: 94 MM HG (ref 80–105)
PO2 BLD: 96 MM HG (ref 80–105)
PO2 BLD: 96 MM HG (ref 80–105)
PO2 BLD: 97 MM HG (ref 80–105)
PO2 BLD: 98 MM HG (ref 80–105)
PO2 BLD: 99 MM HG (ref 80–105)
PO2 BLD: 99 MM HG (ref 80–105)
PO2 BLDV: 22 MM HG (ref 25–47)
PO2 BLDV: 28 MM HG (ref 25–47)
PO2 BLDV: 30 MM HG (ref 25–47)
PO2 BLDV: 32 MM HG (ref 25–47)
PO2 BLDV: 33 MM HG (ref 25–47)
PO2 BLDV: 34 MM HG (ref 25–47)
PO2 BLDV: 36 MM HG (ref 25–47)
PO2 BLDV: 42 MM HG (ref 25–47)
PO2 BLDV: 44 MM HG (ref 25–47)
PO2 BLDV: 46 MM HG (ref 25–47)
PO2 BLDV: 47 MM HG (ref 25–47)
PO2 BLDV: 48 MM HG (ref 25–47)
PO2 BLDV: 51 MM HG (ref 25–47)
PO2 BLDV: 51 MM HG (ref 25–47)
PO2 BLDV: 55 MM HG (ref 25–47)
PO2 BLDV: 56 MM HG (ref 25–47)
PO2 BLDV: 57 MM HG (ref 25–47)
PO2 BLDV: 69 MM HG (ref 25–47)
PO2 BLDV: 72 MM HG (ref 25–47)
PO2 BLDV: 74 MM HG (ref 25–47)
POTASSIUM SERPL-SCNC: 3.2 MMOL/L (ref 3.4–5.3)
POTASSIUM SERPL-SCNC: 3.4 MMOL/L (ref 3.4–5.3)
POTASSIUM SERPL-SCNC: 3.4 MMOL/L (ref 3.4–5.3)
POTASSIUM SERPL-SCNC: 3.5 MMOL/L (ref 3.4–5.3)
POTASSIUM SERPL-SCNC: 3.5 MMOL/L (ref 3.4–5.3)
POTASSIUM SERPL-SCNC: 3.6 MMOL/L (ref 3.4–5.3)
POTASSIUM SERPL-SCNC: 3.7 MMOL/L (ref 3.4–5.3)
POTASSIUM SERPL-SCNC: 3.9 MMOL/L (ref 3.4–5.3)
POTASSIUM SERPL-SCNC: 4 MMOL/L (ref 3.4–5.3)
POTASSIUM SERPL-SCNC: 4.1 MMOL/L (ref 3.4–5.3)
POTASSIUM SERPL-SCNC: 4.1 MMOL/L (ref 3.4–5.3)
POTASSIUM SERPL-SCNC: 4.2 MMOL/L (ref 3.4–5.3)
POTASSIUM SERPL-SCNC: 4.3 MMOL/L (ref 3.4–5.3)
POTASSIUM SERPL-SCNC: 4.4 MMOL/L (ref 3.4–5.3)
POTASSIUM SERPL-SCNC: 4.5 MMOL/L (ref 3.4–5.3)
POTASSIUM SERPL-SCNC: 4.6 MMOL/L (ref 3.4–5.3)
POTASSIUM SERPL-SCNC: 4.7 MMOL/L (ref 3.4–5.3)
POTASSIUM SERPL-SCNC: 4.8 MMOL/L (ref 3.4–5.3)
POTASSIUM SERPL-SCNC: 4.9 MMOL/L (ref 3.4–5.3)
POTASSIUM SERPL-SCNC: 5 MMOL/L (ref 3.4–5.3)
PROCALCITONIN SERPL IA-MCNC: 0.13 NG/ML
PROCALCITONIN SERPL IA-MCNC: 0.15 NG/ML
PROCALCITONIN SERPL IA-MCNC: 0.16 NG/ML
PROCALCITONIN SERPL IA-MCNC: 1.32 NG/ML
PROCALCITONIN SERPL IA-MCNC: 1.36 NG/ML
PROT FLD-MCNC: 3 G/DL
PROT SERPL-MCNC: 4.5 G/DL (ref 6.4–8.3)
PROT SERPL-MCNC: 4.7 G/DL (ref 6.4–8.3)
PROT SERPL-MCNC: 4.7 G/DL (ref 6.4–8.3)
PROT SERPL-MCNC: 4.8 G/DL (ref 6.4–8.3)
PROT SERPL-MCNC: 4.9 G/DL (ref 6.4–8.3)
PROT SERPL-MCNC: 5.2 G/DL (ref 6.4–8.3)
PROT SERPL-MCNC: 5.3 G/DL (ref 6.4–8.3)
PROT SERPL-MCNC: 5.9 G/DL (ref 6.4–8.3)
PROT SERPL-MCNC: 6.2 G/DL (ref 6.4–8.3)
PROT SERPL-MCNC: 6.4 G/DL (ref 6.4–8.3)
PROT SERPL-MCNC: 6.5 G/DL (ref 6.4–8.3)
PROT SERPL-MCNC: 6.7 G/DL (ref 6.4–8.3)
PROT SERPL-MCNC: 6.8 G/DL (ref 6.4–8.3)
PROT SERPL-MCNC: 7.1 G/DL (ref 6.4–8.3)
PROTEIN BODY FLUID SOURCE: NORMAL
RADIOLOGIST FLAGS: ABNORMAL
RATE PRESSURE PRODUCT: NORMAL
RBC # BLD AUTO: 2.57 10E6/UL (ref 3.8–5.2)
RBC # BLD AUTO: 2.7 10E6/UL (ref 3.8–5.2)
RBC # BLD AUTO: 2.75 10E6/UL (ref 3.8–5.2)
RBC # BLD AUTO: 2.98 10E6/UL (ref 3.8–5.2)
RBC # BLD AUTO: 3.07 10E6/UL (ref 3.8–5.2)
RBC # BLD AUTO: 3.27 10E6/UL (ref 3.8–5.2)
RBC # BLD AUTO: 3.29 10E6/UL (ref 3.8–5.2)
RBC # BLD AUTO: 3.31 10E6/UL (ref 3.8–5.2)
RBC # BLD AUTO: 3.36 10E6/UL (ref 3.8–5.2)
RBC # BLD AUTO: 3.45 10E6/UL (ref 3.8–5.2)
RBC # BLD AUTO: 3.68 10E6/UL (ref 3.8–5.2)
RBC # BLD AUTO: 3.71 10E6/UL (ref 3.8–5.2)
RBC # BLD AUTO: 3.78 10E6/UL (ref 3.8–5.2)
RBC # BLD AUTO: 3.8 10E6/UL (ref 3.8–5.2)
RBC # BLD AUTO: 3.84 10E6/UL (ref 3.8–5.2)
RBC # BLD AUTO: 3.88 10E6/UL (ref 3.8–5.2)
RBC # BLD AUTO: 3.92 10E6/UL (ref 3.8–5.2)
RBC # BLD AUTO: 4.05 10E6/UL (ref 3.8–5.2)
RBC # BLD AUTO: 4.08 10E6/UL (ref 3.8–5.2)
RBC # BLD AUTO: 4.33 10E6/UL (ref 3.8–5.2)
RBC # FLD: ABNORMAL /UL
RBC URINE: 0 /HPF
RBC URINE: 7 /HPF
RBC URINE: >182 /HPF
RBC URINE: >182 /HPF
RSV RNA SPEC NAA+PROBE: NEGATIVE
RSV RNA SPEC NAA+PROBE: NEGATIVE
S PNEUM AG SPEC QL: NEGATIVE
SA TARGET DNA: NEGATIVE
SARS-COV-2 RNA RESP QL NAA+PROBE: NEGATIVE
SODIUM SERPL-SCNC: 132 MMOL/L (ref 136–145)
SODIUM SERPL-SCNC: 133 MMOL/L (ref 136–145)
SODIUM SERPL-SCNC: 135 MMOL/L (ref 136–145)
SODIUM SERPL-SCNC: 136 MMOL/L (ref 136–145)
SODIUM SERPL-SCNC: 137 MMOL/L (ref 136–145)
SODIUM SERPL-SCNC: 138 MMOL/L (ref 136–145)
SODIUM SERPL-SCNC: 139 MMOL/L (ref 136–145)
SODIUM SERPL-SCNC: 140 MMOL/L (ref 136–145)
SODIUM SERPL-SCNC: 141 MMOL/L (ref 136–145)
SODIUM SERPL-SCNC: 142 MMOL/L (ref 136–145)
SODIUM SERPL-SCNC: 142 MMOL/L (ref 136–145)
SODIUM SERPL-SCNC: 143 MMOL/L (ref 136–145)
SODIUM SERPL-SCNC: 143 MMOL/L (ref 136–145)
SODIUM SERPL-SCNC: 144 MMOL/L (ref 136–145)
SODIUM SERPL-SCNC: 144 MMOL/L (ref 136–145)
SODIUM SERPL-SCNC: 145 MMOL/L (ref 136–145)
SODIUM SERPL-SCNC: 146 MMOL/L (ref 136–145)
SODIUM SERPL-SCNC: 146 MMOL/L (ref 136–145)
SODIUM SERPL-SCNC: 148 MMOL/L (ref 136–145)
SP GR UR STRIP: 1.01 (ref 1–1.03)
SP GR UR STRIP: 1.02 (ref 1–1.03)
SPECIMEN EXPIRATION DATE: NORMAL
SQUAMOUS EPITHELIAL: 1 /HPF
SQUAMOUS EPITHELIAL: 2 /HPF
SQUAMOUS EPITHELIAL: 3 /HPF
STRESS ECHO BASELINE DIASTOLIC HE: 70
STRESS ECHO BASELINE HR: 89 BPM
STRESS ECHO BASELINE SYSTOLIC BP: 152
STRESS ECHO CALCULATED PERCENT HR: 73 %
STRESS ECHO LAST STRESS DIASTOLIC BP: 70
STRESS ECHO LAST STRESS SYSTOLIC BP: 153
STRESS ECHO TARGET HR: 148
STRESS/REST PERFUSION RATIO: 1.1
T3FREE SERPL-MCNC: 2.3 PG/ML (ref 2–4.4)
TRIGL SERPL-MCNC: 82 MG/DL
TROPONIN T SERPL HS-MCNC: 194 NG/L
TROPONIN T SERPL HS-MCNC: 20 NG/L
TROPONIN T SERPL HS-MCNC: 216 NG/L
TROPONIN T SERPL HS-MCNC: 26 NG/L
TROPONIN T SERPL HS-MCNC: 271 NG/L
TROPONIN T SERPL HS-MCNC: 276 NG/L
TROPONIN T SERPL HS-MCNC: 39 NG/L
TROPONIN T SERPL HS-MCNC: 41 NG/L
TROPONIN T SERPL HS-MCNC: 42 NG/L
TROPONIN T SERPL HS-MCNC: 45 NG/L
TROPONIN T SERPL HS-MCNC: 47 NG/L
TROPONIN T SERPL HS-MCNC: 47 NG/L
TROPONIN T SERPL HS-MCNC: 48 NG/L
TROPONIN T SERPL HS-MCNC: 51 NG/L
TROPONIN T SERPL HS-MCNC: 55 NG/L
TROPONIN T SERPL HS-MCNC: 59 NG/L
TROPONIN T SERPL HS-MCNC: 62 NG/L
TROPONIN T SERPL HS-MCNC: 63 NG/L
TROPONIN T SERPL HS-MCNC: 68 NG/L
TROPONIN T SERPL HS-MCNC: 72 NG/L
TROPONIN T SERPL HS-MCNC: 75 NG/L
TSH SERPL DL<=0.005 MIU/L-ACNC: 1.6 UIU/ML (ref 0.3–4.2)
UFH PPP CHRO-ACNC: 0.33 IU/ML
UFH PPP CHRO-ACNC: 0.4 IU/ML
UFH PPP CHRO-ACNC: 0.4 IU/ML
UFH PPP CHRO-ACNC: 0.44 IU/ML
UFH PPP CHRO-ACNC: 0.47 IU/ML
UFH PPP CHRO-ACNC: 0.5 IU/ML
UFH PPP CHRO-ACNC: 0.5 IU/ML
UFH PPP CHRO-ACNC: 0.57 IU/ML
UFH PPP CHRO-ACNC: 0.6 IU/ML
UFH PPP CHRO-ACNC: 0.61 IU/ML
UFH PPP CHRO-ACNC: 0.67 IU/ML
UFH PPP CHRO-ACNC: 0.73 IU/ML
UFH PPP CHRO-ACNC: 0.85 IU/ML
UFH PPP CHRO-ACNC: 0.85 IU/ML
UFH PPP CHRO-ACNC: 0.87 IU/ML
UFH PPP CHRO-ACNC: >1.1 IU/ML
UROBILINOGEN UR STRIP-MCNC: 2 MG/DL
UROBILINOGEN UR STRIP-MCNC: NORMAL MG/DL
WBC # BLD AUTO: 11.1 10E3/UL (ref 4–11)
WBC # BLD AUTO: 12.6 10E3/UL (ref 4–11)
WBC # BLD AUTO: 13.8 10E3/UL (ref 4–11)
WBC # BLD AUTO: 14.7 10E3/UL (ref 4–11)
WBC # BLD AUTO: 15.2 10E3/UL (ref 4–11)
WBC # BLD AUTO: 15.5 10E3/UL (ref 4–11)
WBC # BLD AUTO: 16.5 10E3/UL (ref 4–11)
WBC # BLD AUTO: 16.7 10E3/UL (ref 4–11)
WBC # BLD AUTO: 16.9 10E3/UL (ref 4–11)
WBC # BLD AUTO: 18.9 10E3/UL (ref 4–11)
WBC # BLD AUTO: 20.1 10E3/UL (ref 4–11)
WBC # BLD AUTO: 20.4 10E3/UL (ref 4–11)
WBC # BLD AUTO: 20.4 10E3/UL (ref 4–11)
WBC # BLD AUTO: 20.9 10E3/UL (ref 4–11)
WBC # BLD AUTO: 21 10E3/UL (ref 4–11)
WBC # BLD AUTO: 21.3 10E3/UL (ref 4–11)
WBC # BLD AUTO: 21.6 10E3/UL (ref 4–11)
WBC # BLD AUTO: 21.9 10E3/UL (ref 4–11)
WBC # BLD AUTO: 23.4 10E3/UL (ref 4–11)
WBC # BLD AUTO: 24.3 10E3/UL (ref 4–11)
WBC # BLD AUTO: 24.5 10E3/UL (ref 4–11)
WBC # BLD AUTO: 25.4 10E3/UL (ref 4–11)
WBC # BLD AUTO: 32.5 10E3/UL (ref 4–11)
WBC # BLD AUTO: 32.5 10E3/UL (ref 4–11)
WBC # BLD AUTO: 8.4 10E3/UL (ref 4–11)
WBC # FLD AUTO: 495 /UL
WBC CLUMPS #/AREA URNS HPF: PRESENT /HPF
WBC CLUMPS #/AREA URNS HPF: PRESENT /HPF
WBC URINE: 0 /HPF
WBC URINE: 1 /HPF
WBC URINE: 48 /HPF
WBC URINE: 57 /HPF

## 2023-01-01 PROCEDURE — 85048 AUTOMATED LEUKOCYTE COUNT: CPT | Performed by: PEDIATRICS

## 2023-01-01 PROCEDURE — 250N000011 HC RX IP 250 OP 636: Performed by: FAMILY MEDICINE

## 2023-01-01 PROCEDURE — 370N000003 HC ANESTHESIA WARD SERVICE: Performed by: NURSE ANESTHETIST, CERTIFIED REGISTERED

## 2023-01-01 PROCEDURE — 84100 ASSAY OF PHOSPHORUS: CPT | Performed by: FAMILY MEDICINE

## 2023-01-01 PROCEDURE — 88341 IMHCHEM/IMCYTCHM EA ADD ANTB: CPT | Mod: 26

## 2023-01-01 PROCEDURE — 94003 VENT MGMT INPAT SUBQ DAY: CPT

## 2023-01-01 PROCEDURE — 83735 ASSAY OF MAGNESIUM: CPT | Performed by: FAMILY MEDICINE

## 2023-01-01 PROCEDURE — 258N000003 HC RX IP 258 OP 636: Performed by: INTERNAL MEDICINE

## 2023-01-01 PROCEDURE — 87205 SMEAR GRAM STAIN: CPT | Performed by: FAMILY MEDICINE

## 2023-01-01 PROCEDURE — 94660 CPAP INITIATION&MGMT: CPT

## 2023-01-01 PROCEDURE — 85610 PROTHROMBIN TIME: CPT | Performed by: PEDIATRICS

## 2023-01-01 PROCEDURE — 250N000012 HC RX MED GY IP 250 OP 636 PS 637: Performed by: INTERNAL MEDICINE

## 2023-01-01 PROCEDURE — 85049 AUTOMATED PLATELET COUNT: CPT | Performed by: PEDIATRICS

## 2023-01-01 PROCEDURE — 85610 PROTHROMBIN TIME: CPT | Performed by: EMERGENCY MEDICINE

## 2023-01-01 PROCEDURE — 250N000009 HC RX 250: Performed by: FAMILY MEDICINE

## 2023-01-01 PROCEDURE — 71045 X-RAY EXAM CHEST 1 VIEW: CPT

## 2023-01-01 PROCEDURE — 258N000003 HC RX IP 258 OP 636: Performed by: NURSE PRACTITIONER

## 2023-01-01 PROCEDURE — G0180 MD CERTIFICATION HHA PATIENT: HCPCS | Performed by: FAMILY MEDICINE

## 2023-01-01 PROCEDURE — 99285 EMERGENCY DEPT VISIT HI MDM: CPT | Mod: 25,CS | Performed by: EMERGENCY MEDICINE

## 2023-01-01 PROCEDURE — 87449 NOS EACH ORGANISM AG IA: CPT | Performed by: PEDIATRICS

## 2023-01-01 PROCEDURE — 80061 LIPID PANEL: CPT | Performed by: INTERNAL MEDICINE

## 2023-01-01 PROCEDURE — 99223 1ST HOSP IP/OBS HIGH 75: CPT | Mod: FS | Performed by: NURSE PRACTITIONER

## 2023-01-01 PROCEDURE — 250N000009 HC RX 250

## 2023-01-01 PROCEDURE — 120N000001 HC R&B MED SURG/OB

## 2023-01-01 PROCEDURE — 250N000009 HC RX 250: Performed by: NURSE PRACTITIONER

## 2023-01-01 PROCEDURE — 250N000011 HC RX IP 250 OP 636: Mod: JZ | Performed by: INTERNAL MEDICINE

## 2023-01-01 PROCEDURE — 84132 ASSAY OF SERUM POTASSIUM: CPT | Performed by: FAMILY MEDICINE

## 2023-01-01 PROCEDURE — C9113 INJ PANTOPRAZOLE SODIUM, VIA: HCPCS | Mod: JZ | Performed by: FAMILY MEDICINE

## 2023-01-01 PROCEDURE — 250N000011 HC RX IP 250 OP 636: Performed by: PEDIATRICS

## 2023-01-01 PROCEDURE — 85018 HEMOGLOBIN: CPT | Performed by: PEDIATRICS

## 2023-01-01 PROCEDURE — 258N000003 HC RX IP 258 OP 636: Performed by: FAMILY MEDICINE

## 2023-01-01 PROCEDURE — 85610 PROTHROMBIN TIME: CPT | Performed by: FAMILY MEDICINE

## 2023-01-01 PROCEDURE — 250N000013 HC RX MED GY IP 250 OP 250 PS 637: Performed by: FAMILY MEDICINE

## 2023-01-01 PROCEDURE — 80053 COMPREHEN METABOLIC PANEL: CPT | Performed by: FAMILY MEDICINE

## 2023-01-01 PROCEDURE — 99285 EMERGENCY DEPT VISIT HI MDM: CPT | Mod: 25 | Performed by: FAMILY MEDICINE

## 2023-01-01 PROCEDURE — 84100 ASSAY OF PHOSPHORUS: CPT | Performed by: PEDIATRICS

## 2023-01-01 PROCEDURE — 84484 ASSAY OF TROPONIN QUANT: CPT | Performed by: EMERGENCY MEDICINE

## 2023-01-01 PROCEDURE — 36415 COLL VENOUS BLD VENIPUNCTURE: CPT | Performed by: STUDENT IN AN ORGANIZED HEALTH CARE EDUCATION/TRAINING PROGRAM

## 2023-01-01 PROCEDURE — 250N000011 HC RX IP 250 OP 636: Mod: JZ | Performed by: STUDENT IN AN ORGANIZED HEALTH CARE EDUCATION/TRAINING PROGRAM

## 2023-01-01 PROCEDURE — 272N000054 HC CANNULA HIGH FLOW, ADULT

## 2023-01-01 PROCEDURE — 85520 HEPARIN ASSAY: CPT | Performed by: EMERGENCY MEDICINE

## 2023-01-01 PROCEDURE — 82803 BLOOD GASES ANY COMBINATION: CPT | Performed by: FAMILY MEDICINE

## 2023-01-01 PROCEDURE — 99291 CRITICAL CARE FIRST HOUR: CPT | Mod: 25 | Performed by: EMERGENCY MEDICINE

## 2023-01-01 PROCEDURE — 84484 ASSAY OF TROPONIN QUANT: CPT | Performed by: FAMILY MEDICINE

## 2023-01-01 PROCEDURE — 85520 HEPARIN ASSAY: CPT | Performed by: PEDIATRICS

## 2023-01-01 PROCEDURE — 99232 SBSQ HOSP IP/OBS MODERATE 35: CPT | Performed by: FAMILY MEDICINE

## 2023-01-01 PROCEDURE — 200N000001 HC R&B ICU

## 2023-01-01 PROCEDURE — 250N000009 HC RX 250: Performed by: INTERNAL MEDICINE

## 2023-01-01 PROCEDURE — 94640 AIRWAY INHALATION TREATMENT: CPT

## 2023-01-01 PROCEDURE — 250N000011 HC RX IP 250 OP 636: Mod: JZ | Performed by: PEDIATRICS

## 2023-01-01 PROCEDURE — 85610 PROTHROMBIN TIME: CPT

## 2023-01-01 PROCEDURE — 70450 CT HEAD/BRAIN W/O DYE: CPT

## 2023-01-01 PROCEDURE — 82945 GLUCOSE OTHER FLUID: CPT | Performed by: FAMILY MEDICINE

## 2023-01-01 PROCEDURE — 250N000013 HC RX MED GY IP 250 OP 250 PS 637: Performed by: PEDIATRICS

## 2023-01-01 PROCEDURE — 84484 ASSAY OF TROPONIN QUANT: CPT | Performed by: PEDIATRICS

## 2023-01-01 PROCEDURE — 36416 COLLJ CAPILLARY BLOOD SPEC: CPT

## 2023-01-01 PROCEDURE — 250N000011 HC RX IP 250 OP 636: Mod: JZ | Performed by: FAMILY MEDICINE

## 2023-01-01 PROCEDURE — 96372 THER/PROPH/DIAG INJ SC/IM: CPT | Performed by: FAMILY MEDICINE

## 2023-01-01 PROCEDURE — 81001 URINALYSIS AUTO W/SCOPE: CPT | Performed by: FAMILY MEDICINE

## 2023-01-01 PROCEDURE — 99233 SBSQ HOSP IP/OBS HIGH 50: CPT | Performed by: FAMILY MEDICINE

## 2023-01-01 PROCEDURE — 94002 VENT MGMT INPAT INIT DAY: CPT

## 2023-01-01 PROCEDURE — 82803 BLOOD GASES ANY COMBINATION: CPT | Performed by: EMERGENCY MEDICINE

## 2023-01-01 PROCEDURE — 86140 C-REACTIVE PROTEIN: CPT | Performed by: FAMILY MEDICINE

## 2023-01-01 PROCEDURE — 999N000065 XR ABDOMEN PORT 1 VIEW

## 2023-01-01 PROCEDURE — 272N000221 US BIOPSY FINE NEEDLE ASPIRATION LYMPH NODE

## 2023-01-01 PROCEDURE — 85027 COMPLETE CBC AUTOMATED: CPT | Performed by: PEDIATRICS

## 2023-01-01 PROCEDURE — 250N000011 HC RX IP 250 OP 636: Performed by: INTERNAL MEDICINE

## 2023-01-01 PROCEDURE — 258N000003 HC RX IP 258 OP 636: Performed by: HOSPITALIST

## 2023-01-01 PROCEDURE — 36415 COLL VENOUS BLD VENIPUNCTURE: CPT | Performed by: FAMILY MEDICINE

## 2023-01-01 PROCEDURE — 99223 1ST HOSP IP/OBS HIGH 75: CPT | Mod: AI | Performed by: INTERNAL MEDICINE

## 2023-01-01 PROCEDURE — 93017 CV STRESS TEST TRACING ONLY: CPT

## 2023-01-01 PROCEDURE — 93227 XTRNL ECG REC<48 HR R&I: CPT | Performed by: INTERNAL MEDICINE

## 2023-01-01 PROCEDURE — 93018 CV STRESS TEST I&R ONLY: CPT | Performed by: INTERNAL MEDICINE

## 2023-01-01 PROCEDURE — 82565 ASSAY OF CREATININE: CPT | Performed by: STUDENT IN AN ORGANIZED HEALTH CARE EDUCATION/TRAINING PROGRAM

## 2023-01-01 PROCEDURE — 83880 ASSAY OF NATRIURETIC PEPTIDE: CPT | Performed by: FAMILY MEDICINE

## 2023-01-01 PROCEDURE — 93010 ELECTROCARDIOGRAM REPORT: CPT | Performed by: EMERGENCY MEDICINE

## 2023-01-01 PROCEDURE — 83880 ASSAY OF NATRIURETIC PEPTIDE: CPT | Performed by: PEDIATRICS

## 2023-01-01 PROCEDURE — 93325 DOPPLER ECHO COLOR FLOW MAPG: CPT | Mod: 26 | Performed by: INTERNAL MEDICINE

## 2023-01-01 PROCEDURE — 80053 COMPREHEN METABOLIC PANEL: CPT | Performed by: INTERNAL MEDICINE

## 2023-01-01 PROCEDURE — 97110 THERAPEUTIC EXERCISES: CPT | Mod: GP | Performed by: PHYSICAL THERAPIST

## 2023-01-01 PROCEDURE — 250N000012 HC RX MED GY IP 250 OP 636 PS 637: Performed by: PEDIATRICS

## 2023-01-01 PROCEDURE — 80069 RENAL FUNCTION PANEL: CPT | Performed by: PEDIATRICS

## 2023-01-01 PROCEDURE — 99285 EMERGENCY DEPT VISIT HI MDM: CPT | Mod: 25 | Performed by: EMERGENCY MEDICINE

## 2023-01-01 PROCEDURE — 99284 EMERGENCY DEPT VISIT MOD MDM: CPT | Mod: CS | Performed by: EMERGENCY MEDICINE

## 2023-01-01 PROCEDURE — 80048 BASIC METABOLIC PNL TOTAL CA: CPT | Performed by: FAMILY MEDICINE

## 2023-01-01 PROCEDURE — 36415 COLL VENOUS BLD VENIPUNCTURE: CPT | Performed by: NURSE PRACTITIONER

## 2023-01-01 PROCEDURE — 85027 COMPLETE CBC AUTOMATED: CPT | Performed by: FAMILY MEDICINE

## 2023-01-01 PROCEDURE — 80051 ELECTROLYTE PANEL: CPT | Performed by: PEDIATRICS

## 2023-01-01 PROCEDURE — 83605 ASSAY OF LACTIC ACID: CPT | Performed by: FAMILY MEDICINE

## 2023-01-01 PROCEDURE — 85025 COMPLETE CBC W/AUTO DIFF WBC: CPT | Performed by: FAMILY MEDICINE

## 2023-01-01 PROCEDURE — 85520 HEPARIN ASSAY: CPT | Performed by: FAMILY MEDICINE

## 2023-01-01 PROCEDURE — 88305 TISSUE EXAM BY PATHOLOGIST: CPT | Mod: TC | Performed by: FAMILY MEDICINE

## 2023-01-01 PROCEDURE — 89050 BODY FLUID CELL COUNT: CPT | Performed by: FAMILY MEDICINE

## 2023-01-01 PROCEDURE — 250N000009 HC RX 250: Performed by: STUDENT IN AN ORGANIZED HEALTH CARE EDUCATION/TRAINING PROGRAM

## 2023-01-01 PROCEDURE — 82803 BLOOD GASES ANY COMBINATION: CPT | Performed by: PEDIATRICS

## 2023-01-01 PROCEDURE — 80053 COMPREHEN METABOLIC PANEL: CPT | Performed by: STUDENT IN AN ORGANIZED HEALTH CARE EDUCATION/TRAINING PROGRAM

## 2023-01-01 PROCEDURE — C9113 INJ PANTOPRAZOLE SODIUM, VIA: HCPCS | Mod: JZ | Performed by: INTERNAL MEDICINE

## 2023-01-01 PROCEDURE — 82140 ASSAY OF AMMONIA: CPT | Performed by: PEDIATRICS

## 2023-01-01 PROCEDURE — 87040 BLOOD CULTURE FOR BACTERIA: CPT | Performed by: PEDIATRICS

## 2023-01-01 PROCEDURE — 36600 WITHDRAWAL OF ARTERIAL BLOOD: CPT

## 2023-01-01 PROCEDURE — 84145 PROCALCITONIN (PCT): CPT | Performed by: PEDIATRICS

## 2023-01-01 PROCEDURE — 83986 ASSAY PH BODY FLUID NOS: CPT | Performed by: FAMILY MEDICINE

## 2023-01-01 PROCEDURE — 99292 CRITICAL CARE ADDL 30 MIN: CPT | Performed by: PEDIATRICS

## 2023-01-01 PROCEDURE — 250N000012 HC RX MED GY IP 250 OP 636 PS 637: Performed by: FAMILY MEDICINE

## 2023-01-01 PROCEDURE — 999N000156 HC STATISTIC RCP CONSULT EA 30 MIN

## 2023-01-01 PROCEDURE — G0378 HOSPITAL OBSERVATION PER HR: HCPCS

## 2023-01-01 PROCEDURE — 93005 ELECTROCARDIOGRAM TRACING: CPT | Performed by: EMERGENCY MEDICINE

## 2023-01-01 PROCEDURE — 99291 CRITICAL CARE FIRST HOUR: CPT | Performed by: PEDIATRICS

## 2023-01-01 PROCEDURE — 85027 COMPLETE CBC AUTOMATED: CPT | Performed by: INTERNAL MEDICINE

## 2023-01-01 PROCEDURE — 94640 AIRWAY INHALATION TREATMENT: CPT | Mod: 76

## 2023-01-01 PROCEDURE — 999N000157 HC STATISTIC RCP TIME EA 10 MIN

## 2023-01-01 PROCEDURE — 80053 COMPREHEN METABOLIC PANEL: CPT | Performed by: EMERGENCY MEDICINE

## 2023-01-01 PROCEDURE — 36416 COLLJ CAPILLARY BLOOD SPEC: CPT | Performed by: FAMILY MEDICINE

## 2023-01-01 PROCEDURE — 250N000012 HC RX MED GY IP 250 OP 636 PS 637: Performed by: NURSE PRACTITIONER

## 2023-01-01 PROCEDURE — 83615 LACTATE (LD) (LDH) ENZYME: CPT | Performed by: FAMILY MEDICINE

## 2023-01-01 PROCEDURE — 85018 HEMOGLOBIN: CPT | Performed by: FAMILY MEDICINE

## 2023-01-01 PROCEDURE — 99291 CRITICAL CARE FIRST HOUR: CPT | Performed by: FAMILY MEDICINE

## 2023-01-01 PROCEDURE — 343N000001 HC RX 343: Performed by: INTERNAL MEDICINE

## 2023-01-01 PROCEDURE — 83735 ASSAY OF MAGNESIUM: CPT | Performed by: PEDIATRICS

## 2023-01-01 PROCEDURE — 99214 OFFICE O/P EST MOD 30 MIN: CPT | Performed by: FAMILY MEDICINE

## 2023-01-01 PROCEDURE — 32555 ASPIRATE PLEURA W/ IMAGING: CPT

## 2023-01-01 PROCEDURE — 78452 HT MUSCLE IMAGE SPECT MULT: CPT

## 2023-01-01 PROCEDURE — 999N000065 XR CHEST PORT 1 VIEW

## 2023-01-01 PROCEDURE — 76770 US EXAM ABDO BACK WALL COMP: CPT

## 2023-01-01 PROCEDURE — 83930 ASSAY OF BLOOD OSMOLALITY: CPT | Performed by: PEDIATRICS

## 2023-01-01 PROCEDURE — 84484 ASSAY OF TROPONIN QUANT: CPT | Performed by: INTERNAL MEDICINE

## 2023-01-01 PROCEDURE — 99213 OFFICE O/P EST LOW 20 MIN: CPT | Performed by: FAMILY MEDICINE

## 2023-01-01 PROCEDURE — 93325 DOPPLER ECHO COLOR FLOW MAPG: CPT

## 2023-01-01 PROCEDURE — 96375 TX/PRO/DX INJ NEW DRUG ADDON: CPT | Performed by: FAMILY MEDICINE

## 2023-01-01 PROCEDURE — 250N000013 HC RX MED GY IP 250 OP 250 PS 637: Performed by: INTERNAL MEDICINE

## 2023-01-01 PROCEDURE — 80048 BASIC METABOLIC PNL TOTAL CA: CPT | Performed by: INTERNAL MEDICINE

## 2023-01-01 PROCEDURE — 999N000253 HC STATISTIC WEANING TRIALS

## 2023-01-01 PROCEDURE — 272N000431 US BIOPSY FINE NEEDLE ASPIRATION LYMPH NODE

## 2023-01-01 PROCEDURE — 5A1955Z RESPIRATORY VENTILATION, GREATER THAN 96 CONSECUTIVE HOURS: ICD-10-PCS | Performed by: INTERNAL MEDICINE

## 2023-01-01 PROCEDURE — 258N000003 HC RX IP 258 OP 636: Performed by: PEDIATRICS

## 2023-01-01 PROCEDURE — A9502 TC99M TETROFOSMIN: HCPCS | Performed by: INTERNAL MEDICINE

## 2023-01-01 PROCEDURE — 87637 SARSCOV2&INF A&B&RSV AMP PRB: CPT | Performed by: EMERGENCY MEDICINE

## 2023-01-01 PROCEDURE — 97161 PT EVAL LOW COMPLEX 20 MIN: CPT | Mod: GP | Performed by: PHYSICAL THERAPIST

## 2023-01-01 PROCEDURE — 84145 PROCALCITONIN (PCT): CPT | Performed by: FAMILY MEDICINE

## 2023-01-01 PROCEDURE — 99284 EMERGENCY DEPT VISIT MOD MDM: CPT | Mod: 25 | Performed by: FAMILY MEDICINE

## 2023-01-01 PROCEDURE — 97530 THERAPEUTIC ACTIVITIES: CPT | Mod: GP | Performed by: PHYSICAL THERAPIST

## 2023-01-01 PROCEDURE — 93306 TTE W/DOPPLER COMPLETE: CPT | Mod: 26 | Performed by: INTERNAL MEDICINE

## 2023-01-01 PROCEDURE — 99443 PR PHYSICIAN TELEPHONE EVALUATION 21-30 MIN: CPT | Mod: 95 | Performed by: FAMILY MEDICINE

## 2023-01-01 PROCEDURE — 93000 ELECTROCARDIOGRAM COMPLETE: CPT | Performed by: INTERNAL MEDICINE

## 2023-01-01 PROCEDURE — 3E043XZ INTRODUCTION OF VASOPRESSOR INTO CENTRAL VEIN, PERCUTANEOUS APPROACH: ICD-10-PCS | Performed by: PEDIATRICS

## 2023-01-01 PROCEDURE — 87040 BLOOD CULTURE FOR BACTERIA: CPT | Performed by: EMERGENCY MEDICINE

## 2023-01-01 PROCEDURE — 93321 DOPPLER ECHO F-UP/LMTD STD: CPT

## 2023-01-01 PROCEDURE — 99204 OFFICE O/P NEW MOD 45 MIN: CPT | Mod: 95 | Performed by: NEUROLOGICAL SURGERY

## 2023-01-01 PROCEDURE — 84132 ASSAY OF SERUM POTASSIUM: CPT | Performed by: PEDIATRICS

## 2023-01-01 PROCEDURE — 93321 DOPPLER ECHO F-UP/LMTD STD: CPT | Mod: 26 | Performed by: INTERNAL MEDICINE

## 2023-01-01 PROCEDURE — 94010 BREATHING CAPACITY TEST: CPT | Performed by: INTERNAL MEDICINE

## 2023-01-01 PROCEDURE — 96360 HYDRATION IV INFUSION INIT: CPT | Performed by: FAMILY MEDICINE

## 2023-01-01 PROCEDURE — 87899 AGENT NOS ASSAY W/OPTIC: CPT | Performed by: INTERNAL MEDICINE

## 2023-01-01 PROCEDURE — 93005 ELECTROCARDIOGRAM TRACING: CPT

## 2023-01-01 PROCEDURE — 99233 SBSQ HOSP IP/OBS HIGH 50: CPT | Performed by: PEDIATRICS

## 2023-01-01 PROCEDURE — 83880 ASSAY OF NATRIURETIC PEPTIDE: CPT | Performed by: EMERGENCY MEDICINE

## 2023-01-01 PROCEDURE — 87107 FUNGI IDENTIFICATION MOLD: CPT | Performed by: PEDIATRICS

## 2023-01-01 PROCEDURE — 81001 URINALYSIS AUTO W/SCOPE: CPT | Performed by: INTERNAL MEDICINE

## 2023-01-01 PROCEDURE — 86140 C-REACTIVE PROTEIN: CPT | Performed by: NURSE PRACTITIONER

## 2023-01-01 PROCEDURE — 258N000001 HC RX 258: Performed by: NURSE PRACTITIONER

## 2023-01-01 PROCEDURE — 82043 UR ALBUMIN QUANTITATIVE: CPT | Performed by: FAMILY MEDICINE

## 2023-01-01 PROCEDURE — 82803 BLOOD GASES ANY COMBINATION: CPT | Performed by: INTERNAL MEDICINE

## 2023-01-01 PROCEDURE — 250N000011 HC RX IP 250 OP 636: Performed by: EMERGENCY MEDICINE

## 2023-01-01 PROCEDURE — 36415 COLL VENOUS BLD VENIPUNCTURE: CPT | Performed by: EMERGENCY MEDICINE

## 2023-01-01 PROCEDURE — 83880 ASSAY OF NATRIURETIC PEPTIDE: CPT | Performed by: INTERNAL MEDICINE

## 2023-01-01 PROCEDURE — 85610 PROTHROMBIN TIME: CPT | Performed by: INTERNAL MEDICINE

## 2023-01-01 PROCEDURE — 36569 INSJ PICC 5 YR+ W/O IMAGING: CPT

## 2023-01-01 PROCEDURE — 93005 ELECTROCARDIOGRAM TRACING: CPT | Performed by: FAMILY MEDICINE

## 2023-01-01 PROCEDURE — 999N000123 HC STATISTIC OXYGEN O2DAILY TECH TIME

## 2023-01-01 PROCEDURE — 85014 HEMATOCRIT: CPT | Performed by: FAMILY MEDICINE

## 2023-01-01 PROCEDURE — 250N000013 HC RX MED GY IP 250 OP 250 PS 637: Performed by: NURSE PRACTITIONER

## 2023-01-01 PROCEDURE — C9803 HOPD COVID-19 SPEC COLLECT: HCPCS | Performed by: EMERGENCY MEDICINE

## 2023-01-01 PROCEDURE — 93010 ELECTROCARDIOGRAM REPORT: CPT | Performed by: FAMILY MEDICINE

## 2023-01-01 PROCEDURE — G0439 PPPS, SUBSEQ VISIT: HCPCS | Performed by: FAMILY MEDICINE

## 2023-01-01 PROCEDURE — 0W993ZZ DRAINAGE OF RIGHT PLEURAL CAVITY, PERCUTANEOUS APPROACH: ICD-10-PCS | Performed by: RADIOLOGY

## 2023-01-01 PROCEDURE — 85027 COMPLETE CBC AUTOMATED: CPT | Performed by: NURSE PRACTITIONER

## 2023-01-01 PROCEDURE — 80048 BASIC METABOLIC PNL TOTAL CA: CPT | Performed by: PEDIATRICS

## 2023-01-01 PROCEDURE — 87641 MR-STAPH DNA AMP PROBE: CPT | Performed by: FAMILY MEDICINE

## 2023-01-01 PROCEDURE — 78452 HT MUSCLE IMAGE SPECT MULT: CPT | Mod: 26 | Performed by: INTERNAL MEDICINE

## 2023-01-01 PROCEDURE — 99239 HOSP IP/OBS DSCHRG MGMT >30: CPT | Performed by: PEDIATRICS

## 2023-01-01 PROCEDURE — 96375 TX/PRO/DX INJ NEW DRUG ADDON: CPT | Performed by: EMERGENCY MEDICINE

## 2023-01-01 PROCEDURE — 99418 PROLNG IP/OBS E/M EA 15 MIN: CPT | Performed by: PEDIATRICS

## 2023-01-01 PROCEDURE — 80162 ASSAY OF DIGOXIN TOTAL: CPT | Performed by: PEDIATRICS

## 2023-01-01 PROCEDURE — 83605 ASSAY OF LACTIC ACID: CPT | Performed by: EMERGENCY MEDICINE

## 2023-01-01 PROCEDURE — 71275 CT ANGIOGRAPHY CHEST: CPT

## 2023-01-01 PROCEDURE — 272N000706 US THORACENTESIS

## 2023-01-01 PROCEDURE — 36415 COLL VENOUS BLD VENIPUNCTURE: CPT | Performed by: INTERNAL MEDICINE

## 2023-01-01 PROCEDURE — 88112 CYTOPATH CELL ENHANCE TECH: CPT | Mod: 26

## 2023-01-01 PROCEDURE — 250N000009 HC RX 250: Performed by: PEDIATRICS

## 2023-01-01 PROCEDURE — 85025 COMPLETE CBC W/AUTO DIFF WBC: CPT | Performed by: EMERGENCY MEDICINE

## 2023-01-01 PROCEDURE — 82570 ASSAY OF URINE CREATININE: CPT | Performed by: FAMILY MEDICINE

## 2023-01-01 PROCEDURE — P9047 ALBUMIN (HUMAN), 25%, 50ML: HCPCS | Performed by: PEDIATRICS

## 2023-01-01 PROCEDURE — 84484 ASSAY OF TROPONIN QUANT: CPT | Performed by: NURSE PRACTITIONER

## 2023-01-01 PROCEDURE — 87086 URINE CULTURE/COLONY COUNT: CPT | Performed by: INTERNAL MEDICINE

## 2023-01-01 PROCEDURE — 80048 BASIC METABOLIC PNL TOTAL CA: CPT | Performed by: NURSE PRACTITIONER

## 2023-01-01 PROCEDURE — 84157 ASSAY OF PROTEIN OTHER: CPT | Performed by: FAMILY MEDICINE

## 2023-01-01 PROCEDURE — 99292 CRITICAL CARE ADDL 30 MIN: CPT | Performed by: EMERGENCY MEDICINE

## 2023-01-01 PROCEDURE — 80048 BASIC METABOLIC PNL TOTAL CA: CPT | Performed by: EMERGENCY MEDICINE

## 2023-01-01 PROCEDURE — 93000 ELECTROCARDIOGRAM COMPLETE: CPT | Performed by: STUDENT IN AN ORGANIZED HEALTH CARE EDUCATION/TRAINING PROGRAM

## 2023-01-01 PROCEDURE — 80053 COMPREHEN METABOLIC PANEL: CPT | Performed by: PEDIATRICS

## 2023-01-01 PROCEDURE — 85049 AUTOMATED PLATELET COUNT: CPT | Performed by: STUDENT IN AN ORGANIZED HEALTH CARE EDUCATION/TRAINING PROGRAM

## 2023-01-01 PROCEDURE — 93308 TTE F-UP OR LMTD: CPT | Mod: 26 | Performed by: INTERNAL MEDICINE

## 2023-01-01 PROCEDURE — 82465 ASSAY BLD/SERUM CHOLESTEROL: CPT | Performed by: FAMILY MEDICINE

## 2023-01-01 PROCEDURE — 250N000011 HC RX IP 250 OP 636: Performed by: NURSE PRACTITIONER

## 2023-01-01 PROCEDURE — 84155 ASSAY OF PROTEIN SERUM: CPT | Performed by: FAMILY MEDICINE

## 2023-01-01 PROCEDURE — 87205 SMEAR GRAM STAIN: CPT | Performed by: PEDIATRICS

## 2023-01-01 PROCEDURE — 255N000002 HC RX 255 OP 636: Mod: JZ | Performed by: PEDIATRICS

## 2023-01-01 PROCEDURE — 85520 HEPARIN ASSAY: CPT | Performed by: INTERNAL MEDICINE

## 2023-01-01 PROCEDURE — 88112 CYTOPATH CELL ENHANCE TECH: CPT | Mod: TC | Performed by: FAMILY MEDICINE

## 2023-01-01 PROCEDURE — 99214 OFFICE O/P EST MOD 30 MIN: CPT | Performed by: STUDENT IN AN ORGANIZED HEALTH CARE EDUCATION/TRAINING PROGRAM

## 2023-01-01 PROCEDURE — 96372 THER/PROPH/DIAG INJ SC/IM: CPT | Performed by: NURSE PRACTITIONER

## 2023-01-01 PROCEDURE — 99215 OFFICE O/P EST HI 40 MIN: CPT | Performed by: INTERNAL MEDICINE

## 2023-01-01 PROCEDURE — 5A09457 ASSISTANCE WITH RESPIRATORY VENTILATION, 24-96 CONSECUTIVE HOURS, CONTINUOUS POSITIVE AIRWAY PRESSURE: ICD-10-PCS | Performed by: INTERNAL MEDICINE

## 2023-01-01 PROCEDURE — 99214 OFFICE O/P EST MOD 30 MIN: CPT | Mod: 25 | Performed by: INTERNAL MEDICINE

## 2023-01-01 PROCEDURE — 96365 THER/PROPH/DIAG IV INF INIT: CPT | Mod: 59 | Performed by: EMERGENCY MEDICINE

## 2023-01-01 PROCEDURE — 250N000009 HC RX 250: Performed by: EMERGENCY MEDICINE

## 2023-01-01 PROCEDURE — 85027 COMPLETE CBC AUTOMATED: CPT | Performed by: STUDENT IN AN ORGANIZED HEALTH CARE EDUCATION/TRAINING PROGRAM

## 2023-01-01 PROCEDURE — 84481 FREE ASSAY (FT-3): CPT | Performed by: FAMILY MEDICINE

## 2023-01-01 PROCEDURE — 96374 THER/PROPH/DIAG INJ IV PUSH: CPT | Performed by: EMERGENCY MEDICINE

## 2023-01-01 PROCEDURE — 87040 BLOOD CULTURE FOR BACTERIA: CPT | Performed by: FAMILY MEDICINE

## 2023-01-01 PROCEDURE — 99204 OFFICE O/P NEW MOD 45 MIN: CPT | Mod: 25 | Performed by: INTERNAL MEDICINE

## 2023-01-01 PROCEDURE — 88305 TISSUE EXAM BY PATHOLOGIST: CPT | Mod: 26 | Performed by: PATHOLOGY

## 2023-01-01 PROCEDURE — 93226 XTRNL ECG REC<48 HR SCAN A/R: CPT

## 2023-01-01 PROCEDURE — 99207 PR APP CREDIT; MD BILLING SHARED VISIT: CPT | Performed by: STUDENT IN AN ORGANIZED HEALTH CARE EDUCATION/TRAINING PROGRAM

## 2023-01-01 PROCEDURE — 99285 EMERGENCY DEPT VISIT HI MDM: CPT | Performed by: FAMILY MEDICINE

## 2023-01-01 PROCEDURE — 99232 SBSQ HOSP IP/OBS MODERATE 35: CPT | Performed by: PEDIATRICS

## 2023-01-01 PROCEDURE — 82962 GLUCOSE BLOOD TEST: CPT

## 2023-01-01 PROCEDURE — 86850 RBC ANTIBODY SCREEN: CPT | Performed by: FAMILY MEDICINE

## 2023-01-01 PROCEDURE — 99284 EMERGENCY DEPT VISIT MOD MDM: CPT | Mod: CS,25 | Performed by: EMERGENCY MEDICINE

## 2023-01-01 PROCEDURE — 89051 BODY FLUID CELL COUNT: CPT | Performed by: FAMILY MEDICINE

## 2023-01-01 PROCEDURE — U0003 INFECTIOUS AGENT DETECTION BY NUCLEIC ACID (DNA OR RNA); SEVERE ACUTE RESPIRATORY SYNDROME CORONAVIRUS 2 (SARS-COV-2) (CORONAVIRUS DISEASE [COVID-19]), AMPLIFIED PROBE TECHNIQUE, MAKING USE OF HIGH THROUGHPUT TECHNOLOGIES AS DESCRIBED BY CMS-2020-01-R: HCPCS | Performed by: EMERGENCY MEDICINE

## 2023-01-01 PROCEDURE — 82803 BLOOD GASES ANY COMBINATION: CPT | Performed by: STUDENT IN AN ORGANIZED HEALTH CARE EDUCATION/TRAINING PROGRAM

## 2023-01-01 PROCEDURE — 71250 CT THORAX DX C-: CPT

## 2023-01-01 PROCEDURE — 84484 ASSAY OF TROPONIN QUANT: CPT | Performed by: STUDENT IN AN ORGANIZED HEALTH CARE EDUCATION/TRAINING PROGRAM

## 2023-01-01 PROCEDURE — 85014 HEMATOCRIT: CPT | Performed by: EMERGENCY MEDICINE

## 2023-01-01 PROCEDURE — 87086 URINE CULTURE/COLONY COUNT: CPT | Performed by: PEDIATRICS

## 2023-01-01 PROCEDURE — 87075 CULTR BACTERIA EXCEPT BLOOD: CPT | Performed by: FAMILY MEDICINE

## 2023-01-01 PROCEDURE — 99214 OFFICE O/P EST MOD 30 MIN: CPT | Mod: 25 | Performed by: FAMILY MEDICINE

## 2023-01-01 PROCEDURE — C1729 CATH, DRAINAGE: HCPCS

## 2023-01-01 PROCEDURE — A9552 F18 FDG: HCPCS | Performed by: INTERNAL MEDICINE

## 2023-01-01 PROCEDURE — 250N000011 HC RX IP 250 OP 636: Mod: JZ | Performed by: EMERGENCY MEDICINE

## 2023-01-01 PROCEDURE — 82310 ASSAY OF CALCIUM: CPT | Performed by: FAMILY MEDICINE

## 2023-01-01 PROCEDURE — 250N000009 HC RX 250: Performed by: NURSE ANESTHETIST, CERTIFIED REGISTERED

## 2023-01-01 PROCEDURE — 76536 US EXAM OF HEAD AND NECK: CPT

## 2023-01-01 PROCEDURE — 999N000009 HC STATISTIC AIRWAY CARE

## 2023-01-01 PROCEDURE — 84443 ASSAY THYROID STIM HORMONE: CPT | Performed by: FAMILY MEDICINE

## 2023-01-01 PROCEDURE — A9585 GADOBUTROL INJECTION: HCPCS | Mod: JZ | Performed by: PEDIATRICS

## 2023-01-01 PROCEDURE — 82271 OCCULT BLOOD OTHER SOURCES: CPT | Performed by: FAMILY MEDICINE

## 2023-01-01 PROCEDURE — 250N000011 HC RX IP 250 OP 636: Performed by: NURSE ANESTHETIST, CERTIFIED REGISTERED

## 2023-01-01 PROCEDURE — 84450 TRANSFERASE (AST) (SGOT): CPT | Performed by: PEDIATRICS

## 2023-01-01 PROCEDURE — 99214 OFFICE O/P EST MOD 30 MIN: CPT | Performed by: INTERNAL MEDICINE

## 2023-01-01 PROCEDURE — 99213 OFFICE O/P EST LOW 20 MIN: CPT | Performed by: NURSE PRACTITIONER

## 2023-01-01 PROCEDURE — 99214 OFFICE O/P EST MOD 30 MIN: CPT

## 2023-01-01 PROCEDURE — 999N000215 HC STATISTIC HFNC ADULT NON-CPAP

## 2023-01-01 PROCEDURE — 99207 PR APP CREDIT; MD BILLING SHARED VISIT: CPT | Performed by: FAMILY MEDICINE

## 2023-01-01 PROCEDURE — 78816 PET IMAGE W/CT FULL BODY: CPT | Mod: 26 | Performed by: STUDENT IN AN ORGANIZED HEALTH CARE EDUCATION/TRAINING PROGRAM

## 2023-01-01 PROCEDURE — 99205 OFFICE O/P NEW HI 60 MIN: CPT | Performed by: RADIOLOGY

## 2023-01-01 PROCEDURE — 999N000259 HC STATISTIC EXTUBATION

## 2023-01-01 PROCEDURE — 250N000013 HC RX MED GY IP 250 OP 250 PS 637: Performed by: STUDENT IN AN ORGANIZED HEALTH CARE EDUCATION/TRAINING PROGRAM

## 2023-01-01 PROCEDURE — 78816 PET IMAGE W/CT FULL BODY: CPT | Mod: PI

## 2023-01-01 PROCEDURE — 88342 IMHCHEM/IMCYTCHM 1ST ANTB: CPT | Mod: 26

## 2023-01-01 PROCEDURE — 93306 TTE W/DOPPLER COMPLETE: CPT

## 2023-01-01 PROCEDURE — 77067 SCR MAMMO BI INCL CAD: CPT

## 2023-01-01 PROCEDURE — 70553 MRI BRAIN STEM W/O & W/DYE: CPT

## 2023-01-01 PROCEDURE — 96374 THER/PROPH/DIAG INJ IV PUSH: CPT | Performed by: FAMILY MEDICINE

## 2023-01-01 PROCEDURE — 88305 TISSUE EXAM BY PATHOLOGIST: CPT | Mod: TC

## 2023-01-01 PROCEDURE — 88305 TISSUE EXAM BY PATHOLOGIST: CPT | Mod: 26

## 2023-01-01 PROCEDURE — 36416 COLLJ CAPILLARY BLOOD SPEC: CPT | Performed by: INTERNAL MEDICINE

## 2023-01-01 PROCEDURE — 99233 SBSQ HOSP IP/OBS HIGH 50: CPT | Performed by: STUDENT IN AN ORGANIZED HEALTH CARE EDUCATION/TRAINING PROGRAM

## 2023-01-01 PROCEDURE — 36415 COLL VENOUS BLD VENIPUNCTURE: CPT | Performed by: PEDIATRICS

## 2023-01-01 PROCEDURE — 81001 URINALYSIS AUTO W/SCOPE: CPT | Performed by: PEDIATRICS

## 2023-01-01 PROCEDURE — 82310 ASSAY OF CALCIUM: CPT | Performed by: EMERGENCY MEDICINE

## 2023-01-01 PROCEDURE — 96367 TX/PROPH/DG ADDL SEQ IV INF: CPT | Performed by: EMERGENCY MEDICINE

## 2023-01-01 PROCEDURE — 83036 HEMOGLOBIN GLYCOSYLATED A1C: CPT | Performed by: FAMILY MEDICINE

## 2023-01-01 PROCEDURE — 99292 CRITICAL CARE ADDL 30 MIN: CPT | Performed by: FAMILY MEDICINE

## 2023-01-01 PROCEDURE — 93016 CV STRESS TEST SUPVJ ONLY: CPT | Performed by: INTERNAL MEDICINE

## 2023-01-01 PROCEDURE — 74018 RADEX ABDOMEN 1 VIEW: CPT

## 2023-01-01 PROCEDURE — 82374 ASSAY BLOOD CARBON DIOXIDE: CPT | Performed by: PEDIATRICS

## 2023-01-01 PROCEDURE — 71046 X-RAY EXAM CHEST 2 VIEWS: CPT | Mod: TC | Performed by: INTERNAL MEDICINE

## 2023-01-01 PROCEDURE — 272N000448 HC KIT POWER PICC SOLO 5F TRIPLE LUMEN

## 2023-01-01 PROCEDURE — 84155 ASSAY OF PROTEIN SERUM: CPT | Performed by: PEDIATRICS

## 2023-01-01 RX ORDER — DILTIAZEM HCL/D5W 125 MG/125
5-15 PLASTIC BAG, INJECTION (ML) INTRAVENOUS CONTINUOUS
Status: DISCONTINUED | OUTPATIENT
Start: 2023-01-01 | End: 2023-01-01

## 2023-01-01 RX ORDER — DEXTROSE MONOHYDRATE 25 G/50ML
25-50 INJECTION, SOLUTION INTRAVENOUS
Status: DISCONTINUED | OUTPATIENT
Start: 2023-01-01 | End: 2023-01-01 | Stop reason: HOSPADM

## 2023-01-01 RX ORDER — ALBUTEROL SULFATE 0.83 MG/ML
2.5 SOLUTION RESPIRATORY (INHALATION) EVERY 4 HOURS PRN
Status: DISCONTINUED | OUTPATIENT
Start: 2023-01-01 | End: 2023-01-01

## 2023-01-01 RX ORDER — DEXTROSE MONOHYDRATE, SODIUM CHLORIDE, AND POTASSIUM CHLORIDE 50; 1.49; 4.5 G/1000ML; G/1000ML; G/1000ML
INJECTION, SOLUTION INTRAVENOUS CONTINUOUS
Status: DISCONTINUED | OUTPATIENT
Start: 2023-01-01 | End: 2023-01-01

## 2023-01-01 RX ORDER — EPINEPHRINE 0.3 MG/.3ML
0.3 INJECTION SUBCUTANEOUS EVERY 5 MIN PRN
Status: CANCELLED | OUTPATIENT
Start: 2023-01-01

## 2023-01-01 RX ORDER — LORAZEPAM 2 MG/ML
2 INJECTION INTRAMUSCULAR ONCE
Status: COMPLETED | OUTPATIENT
Start: 2023-01-01 | End: 2023-01-01

## 2023-01-01 RX ORDER — ENOXAPARIN SODIUM 100 MG/ML
40 INJECTION SUBCUTANEOUS DAILY
Status: DISCONTINUED | OUTPATIENT
Start: 2023-01-01 | End: 2023-01-01

## 2023-01-01 RX ORDER — FENTANYL CITRATE 50 UG/ML
25-50 INJECTION, SOLUTION INTRAMUSCULAR; INTRAVENOUS
Status: DISCONTINUED | OUTPATIENT
Start: 2023-01-01 | End: 2023-01-01

## 2023-01-01 RX ORDER — HYDROCHLOROTHIAZIDE 25 MG/1
25 TABLET ORAL DAILY
Qty: 90 TABLET | Refills: 3 | OUTPATIENT
Start: 2023-01-01

## 2023-01-01 RX ORDER — CEFTRIAXONE 2 G/1
2 INJECTION, POWDER, FOR SOLUTION INTRAMUSCULAR; INTRAVENOUS ONCE
Status: COMPLETED | OUTPATIENT
Start: 2023-01-01 | End: 2023-01-01

## 2023-01-01 RX ORDER — ANASTROZOLE 1 MG/1
1 TABLET ORAL DAILY
Status: DISCONTINUED | OUTPATIENT
Start: 2023-01-01 | End: 2023-01-01

## 2023-01-01 RX ORDER — LISINOPRIL 2.5 MG/1
2.5 TABLET ORAL DAILY
Status: DISCONTINUED | OUTPATIENT
Start: 2023-01-01 | End: 2023-01-01

## 2023-01-01 RX ORDER — DIGOXIN 0.25 MG/ML
125 INJECTION INTRAMUSCULAR; INTRAVENOUS ONCE
Status: COMPLETED | OUTPATIENT
Start: 2023-01-01 | End: 2023-01-01

## 2023-01-01 RX ORDER — ENOXAPARIN SODIUM 100 MG/ML
40 INJECTION SUBCUTANEOUS EVERY 24 HOURS
Status: DISCONTINUED | OUTPATIENT
Start: 2023-01-01 | End: 2023-01-01

## 2023-01-01 RX ORDER — FUROSEMIDE 10 MG/ML
20 INJECTION INTRAMUSCULAR; INTRAVENOUS ONCE
Status: COMPLETED | OUTPATIENT
Start: 2023-01-01 | End: 2023-01-01

## 2023-01-01 RX ORDER — ALBUTEROL SULFATE 90 UG/1
2 AEROSOL, METERED RESPIRATORY (INHALATION) 4 TIMES DAILY
Qty: 18 G | Refills: 11 | Status: ON HOLD | COMMUNITY
Start: 2023-01-01 | End: 2023-01-01

## 2023-01-01 RX ORDER — PREDNISONE 20 MG/1
20 TABLET ORAL DAILY
Status: DISCONTINUED | OUTPATIENT
Start: 2023-01-01 | End: 2023-01-01

## 2023-01-01 RX ORDER — ALBUTEROL SULFATE 0.83 MG/ML
2.5 SOLUTION RESPIRATORY (INHALATION) ONCE
Status: COMPLETED | OUTPATIENT
Start: 2023-01-01 | End: 2023-01-01

## 2023-01-01 RX ORDER — ALBUTEROL SULFATE 90 UG/1
1-2 AEROSOL, METERED RESPIRATORY (INHALATION)
Status: CANCELLED
Start: 2023-01-01

## 2023-01-01 RX ORDER — METHYLPREDNISOLONE SODIUM SUCCINATE 125 MG/2ML
60 INJECTION, POWDER, LYOPHILIZED, FOR SOLUTION INTRAMUSCULAR; INTRAVENOUS ONCE
Status: COMPLETED | OUTPATIENT
Start: 2023-01-01 | End: 2023-01-01

## 2023-01-01 RX ORDER — IPRATROPIUM BROMIDE AND ALBUTEROL SULFATE 2.5; .5 MG/3ML; MG/3ML
3 SOLUTION RESPIRATORY (INHALATION)
Status: DISCONTINUED | OUTPATIENT
Start: 2023-01-01 | End: 2023-01-01

## 2023-01-01 RX ORDER — QUETIAPINE FUMARATE 25 MG/1
25 TABLET, FILM COATED ORAL
Status: DISCONTINUED | OUTPATIENT
Start: 2023-01-01 | End: 2023-01-01

## 2023-01-01 RX ORDER — MIRABEGRON 25 MG/1
50 TABLET, FILM COATED, EXTENDED RELEASE ORAL AT BEDTIME
Status: DISCONTINUED | OUTPATIENT
Start: 2023-01-01 | End: 2023-01-01

## 2023-01-01 RX ORDER — EPINEPHRINE 1 MG/ML
0.3 INJECTION, SOLUTION, CONCENTRATE INTRAVENOUS EVERY 5 MIN PRN
Status: CANCELLED | OUTPATIENT
Start: 2023-01-01

## 2023-01-01 RX ORDER — GLIPIZIDE 10 MG/1
1 TABLET ORAL
Status: DISCONTINUED | OUTPATIENT
Start: 2023-01-01 | End: 2023-08-24 | Stop reason: HOSPADM

## 2023-01-01 RX ORDER — METHYLPREDNISOLONE SODIUM SUCCINATE 40 MG/ML
40 INJECTION, POWDER, LYOPHILIZED, FOR SOLUTION INTRAMUSCULAR; INTRAVENOUS EVERY 8 HOURS
Status: DISCONTINUED | OUTPATIENT
Start: 2023-01-01 | End: 2023-01-01

## 2023-01-01 RX ORDER — MEPERIDINE HYDROCHLORIDE 25 MG/ML
25 INJECTION INTRAMUSCULAR; INTRAVENOUS; SUBCUTANEOUS EVERY 30 MIN PRN
Status: CANCELLED | OUTPATIENT
Start: 2023-01-01

## 2023-01-01 RX ORDER — LIDOCAINE HYDROCHLORIDE 20 MG/ML
INJECTION, SOLUTION INFILTRATION; PERINEURAL PRN
Status: DISCONTINUED | OUTPATIENT
Start: 2023-01-01 | End: 2023-01-01

## 2023-01-01 RX ORDER — ONDANSETRON 4 MG/1
TABLET, ORALLY DISINTEGRATING ORAL
Qty: 20 TABLET | Refills: 3 | Status: ON HOLD | OUTPATIENT
Start: 2023-01-01 | End: 2023-01-01

## 2023-01-01 RX ORDER — VENLAFAXINE 37.5 MG/1
37.5 TABLET ORAL 2 TIMES DAILY
Status: DISCONTINUED | OUTPATIENT
Start: 2023-01-01 | End: 2023-01-01

## 2023-01-01 RX ORDER — ALBUTEROL SULFATE 5 MG/ML
5 SOLUTION RESPIRATORY (INHALATION) CONTINUOUS
Status: ACTIVE | OUTPATIENT
Start: 2023-01-01 | End: 2023-01-01

## 2023-01-01 RX ORDER — WATER 10 ML/10ML
INJECTION INTRAMUSCULAR; INTRAVENOUS; SUBCUTANEOUS
Status: COMPLETED
Start: 2023-01-01 | End: 2023-01-01

## 2023-01-01 RX ORDER — ALBUTEROL SULFATE 0.83 MG/ML
2.5 SOLUTION RESPIRATORY (INHALATION)
Status: CANCELLED | OUTPATIENT
Start: 2023-01-01

## 2023-01-01 RX ORDER — METOCLOPRAMIDE HYDROCHLORIDE 5 MG/5ML
10 SOLUTION ORAL EVERY 6 HOURS
Status: COMPLETED | OUTPATIENT
Start: 2023-01-01 | End: 2023-01-01

## 2023-01-01 RX ORDER — DILTIAZEM HYDROCHLORIDE 240 MG/1
240 CAPSULE, COATED, EXTENDED RELEASE ORAL DAILY
Qty: 30 CAPSULE | Refills: 0 | Status: ON HOLD | OUTPATIENT
Start: 2023-01-01 | End: 2023-01-01

## 2023-01-01 RX ORDER — HYDROMORPHONE HYDROCHLORIDE 1 MG/ML
0.3 INJECTION, SOLUTION INTRAMUSCULAR; INTRAVENOUS; SUBCUTANEOUS
Status: DISCONTINUED | OUTPATIENT
Start: 2023-01-01 | End: 2023-01-01

## 2023-01-01 RX ORDER — NICOTINE POLACRILEX 4 MG
15-30 LOZENGE BUCCAL
Status: DISCONTINUED | OUTPATIENT
Start: 2023-01-01 | End: 2023-01-01 | Stop reason: HOSPADM

## 2023-01-01 RX ORDER — ONDANSETRON 4 MG/1
4 TABLET, ORALLY DISINTEGRATING ORAL EVERY 6 HOURS PRN
Status: DISCONTINUED | OUTPATIENT
Start: 2023-01-01 | End: 2023-08-24 | Stop reason: HOSPADM

## 2023-01-01 RX ORDER — LABETALOL HYDROCHLORIDE 5 MG/ML
10 INJECTION, SOLUTION INTRAVENOUS EVERY 4 HOURS PRN
Status: DISCONTINUED | OUTPATIENT
Start: 2023-01-01 | End: 2023-01-01 | Stop reason: HOSPADM

## 2023-01-01 RX ORDER — FUROSEMIDE 20 MG
20 TABLET ORAL DAILY
Status: DISCONTINUED | OUTPATIENT
Start: 2023-01-01 | End: 2023-01-01

## 2023-01-01 RX ORDER — SULFAMETHOXAZOLE/TRIMETHOPRIM 800-160 MG
1 TABLET ORAL 2 TIMES DAILY
Qty: 20 TABLET | Refills: 0 | Status: SHIPPED | OUTPATIENT
Start: 2023-01-01 | End: 2023-01-01

## 2023-01-01 RX ORDER — IPRATROPIUM BROMIDE AND ALBUTEROL SULFATE 2.5; .5 MG/3ML; MG/3ML
3 SOLUTION RESPIRATORY (INHALATION) EVERY 4 HOURS
Status: DISCONTINUED | OUTPATIENT
Start: 2023-01-01 | End: 2023-01-01

## 2023-01-01 RX ORDER — FLUTICASONE FUROATE AND VILANTEROL 200; 25 UG/1; UG/1
1 POWDER RESPIRATORY (INHALATION) DAILY
Status: DISCONTINUED | OUTPATIENT
Start: 2023-01-01 | End: 2023-01-01 | Stop reason: HOSPADM

## 2023-01-01 RX ORDER — LAMOTRIGINE 25 MG/1
500 TABLET ORAL ONCE
Status: CANCELLED
Start: 2023-01-01 | End: 2023-01-01

## 2023-01-01 RX ORDER — AMOXICILLIN 250 MG
1 CAPSULE ORAL 2 TIMES DAILY PRN
Status: DISCONTINUED | OUTPATIENT
Start: 2023-01-01 | End: 2023-01-01 | Stop reason: HOSPADM

## 2023-01-01 RX ORDER — METOCLOPRAMIDE HYDROCHLORIDE 5 MG/ML
5 INJECTION INTRAMUSCULAR; INTRAVENOUS ONCE
Status: COMPLETED | OUTPATIENT
Start: 2023-01-01 | End: 2023-01-01

## 2023-01-01 RX ORDER — CEFTRIAXONE 2 G/1
2 INJECTION, POWDER, FOR SOLUTION INTRAMUSCULAR; INTRAVENOUS EVERY 24 HOURS
Status: COMPLETED | OUTPATIENT
Start: 2023-01-01 | End: 2023-01-01

## 2023-01-01 RX ORDER — ROPIVACAINE IN 0.9% SOD CHL/PF 0.1 %
.03-.125 PLASTIC BAG, INJECTION (ML) EPIDURAL CONTINUOUS
Status: DISCONTINUED | OUTPATIENT
Start: 2023-01-01 | End: 2023-01-01

## 2023-01-01 RX ORDER — GUAIFENESIN 600 MG/1
15 TABLET, EXTENDED RELEASE ORAL DAILY
Status: DISCONTINUED | OUTPATIENT
Start: 2023-01-01 | End: 2023-01-01

## 2023-01-01 RX ORDER — PROPOFOL 10 MG/ML
INJECTION, EMULSION INTRAVENOUS PRN
Status: DISCONTINUED | OUTPATIENT
Start: 2023-01-01 | End: 2023-01-01

## 2023-01-01 RX ORDER — DILTIAZEM HYDROCHLORIDE 120 MG/1
240 CAPSULE, COATED, EXTENDED RELEASE ORAL DAILY
Status: DISCONTINUED | OUTPATIENT
Start: 2023-01-01 | End: 2023-01-01 | Stop reason: HOSPADM

## 2023-01-01 RX ORDER — PREDNISONE 20 MG/1
40 TABLET ORAL DAILY
Status: DISCONTINUED | OUTPATIENT
Start: 2023-01-01 | End: 2023-01-01

## 2023-01-01 RX ORDER — IPRATROPIUM BROMIDE AND ALBUTEROL SULFATE 2.5; .5 MG/3ML; MG/3ML
3 SOLUTION RESPIRATORY (INHALATION)
Status: DISCONTINUED | OUTPATIENT
Start: 2023-01-01 | End: 2023-01-01 | Stop reason: HOSPADM

## 2023-01-01 RX ORDER — POTASSIUM CHLORIDE 29.8 MG/ML
20 INJECTION INTRAVENOUS
Status: COMPLETED | OUTPATIENT
Start: 2023-01-01 | End: 2023-01-01

## 2023-01-01 RX ORDER — MAGNESIUM OXIDE 400 MG/1
400 TABLET ORAL EVERY 4 HOURS
Status: COMPLETED | OUTPATIENT
Start: 2023-01-01 | End: 2023-01-01

## 2023-01-01 RX ORDER — FENTANYL CITRATE 50 UG/ML
50 INJECTION, SOLUTION INTRAMUSCULAR; INTRAVENOUS EVERY 5 MIN PRN
Status: CANCELLED | OUTPATIENT
Start: 2023-01-01

## 2023-01-01 RX ORDER — DEXTROSE MONOHYDRATE 100 MG/ML
INJECTION, SOLUTION INTRAVENOUS CONTINUOUS PRN
Status: DISCONTINUED | OUTPATIENT
Start: 2023-01-01 | End: 2023-01-01

## 2023-01-01 RX ORDER — HYDROCODONE BITARTRATE AND ACETAMINOPHEN 5; 325 MG/1; MG/1
1-2 TABLET ORAL ONCE
Status: COMPLETED | OUTPATIENT
Start: 2023-01-01 | End: 2023-01-01

## 2023-01-01 RX ORDER — FUROSEMIDE 20 MG
20 TABLET ORAL DAILY
Status: DISCONTINUED | OUTPATIENT
Start: 2023-01-01 | End: 2023-01-01 | Stop reason: HOSPADM

## 2023-01-01 RX ORDER — DIPHENHYDRAMINE HYDROCHLORIDE 50 MG/ML
50 INJECTION INTRAMUSCULAR; INTRAVENOUS
Status: CANCELLED
Start: 2023-01-01

## 2023-01-01 RX ORDER — ONDANSETRON 2 MG/ML
4 INJECTION INTRAMUSCULAR; INTRAVENOUS EVERY 30 MIN PRN
Status: CANCELLED | OUTPATIENT
Start: 2023-01-01

## 2023-01-01 RX ORDER — LORAZEPAM 2 MG/ML
1 INJECTION INTRAMUSCULAR EVERY 10 MIN PRN
Status: DISCONTINUED | OUTPATIENT
Start: 2023-01-01 | End: 2023-08-24 | Stop reason: HOSPADM

## 2023-01-01 RX ORDER — ALBUTEROL SULFATE 0.83 MG/ML
2.5 SOLUTION RESPIRATORY (INHALATION)
Status: DISCONTINUED | OUTPATIENT
Start: 2023-01-01 | End: 2023-01-01 | Stop reason: HOSPADM

## 2023-01-01 RX ORDER — ADENOSINE 3 MG/ML
6 INJECTION, SOLUTION INTRAVENOUS ONCE
Status: DISCONTINUED | OUTPATIENT
Start: 2023-01-01 | End: 2023-01-01

## 2023-01-01 RX ORDER — HYDROMORPHONE HCL IN WATER/PF 6 MG/30 ML
0.2 PATIENT CONTROLLED ANALGESIA SYRINGE INTRAVENOUS
Status: DISCONTINUED | OUTPATIENT
Start: 2023-01-01 | End: 2023-01-01

## 2023-01-01 RX ORDER — LAMOTRIGINE 25 MG/1
500 TABLET ORAL ONCE
Status: CANCELLED
Start: 2023-09-04 | End: 2023-09-04

## 2023-01-01 RX ORDER — FUROSEMIDE 10 MG/ML
40 INJECTION INTRAMUSCULAR; INTRAVENOUS ONCE
Status: COMPLETED | OUTPATIENT
Start: 2023-01-01 | End: 2023-01-01

## 2023-01-01 RX ORDER — ALBUTEROL SULFATE 0.83 MG/ML
2.5 SOLUTION RESPIRATORY (INHALATION)
Status: DISCONTINUED | OUTPATIENT
Start: 2023-01-01 | End: 2023-01-01

## 2023-01-01 RX ORDER — HYDROCHLOROTHIAZIDE 25 MG/1
25 TABLET ORAL DAILY
Qty: 90 TABLET | Refills: 3 | Status: ON HOLD | OUTPATIENT
Start: 2023-01-01 | End: 2023-01-01

## 2023-01-01 RX ORDER — DILTIAZEM HYDROCHLORIDE 5 MG/ML
10 INJECTION INTRAVENOUS EVERY 6 HOURS PRN
Status: DISCONTINUED | OUTPATIENT
Start: 2023-01-01 | End: 2023-01-01

## 2023-01-01 RX ORDER — PREDNISONE 20 MG/1
TABLET ORAL
Qty: 21 TABLET | Refills: 0 | Status: ON HOLD | OUTPATIENT
Start: 2023-01-01 | End: 2023-01-01

## 2023-01-01 RX ORDER — ANASTROZOLE 1 MG/1
1 TABLET ORAL DAILY
Qty: 90 TABLET | Refills: 3 | Status: ON HOLD | OUTPATIENT
Start: 2023-01-01 | End: 2023-01-01

## 2023-01-01 RX ORDER — ONDANSETRON 2 MG/ML
4 INJECTION INTRAMUSCULAR; INTRAVENOUS EVERY 6 HOURS PRN
Status: DISCONTINUED | OUTPATIENT
Start: 2023-01-01 | End: 2023-01-01 | Stop reason: HOSPADM

## 2023-01-01 RX ORDER — ALPRAZOLAM 0.25 MG
TABLET ORAL
Qty: 10 TABLET | Refills: 0 | Status: ON HOLD | OUTPATIENT
Start: 2023-01-01 | End: 2023-01-01

## 2023-01-01 RX ORDER — METOCLOPRAMIDE 5 MG/1
10 TABLET ORAL EVERY 6 HOURS
Status: COMPLETED | OUTPATIENT
Start: 2023-01-01 | End: 2023-01-01

## 2023-01-01 RX ORDER — ANASTROZOLE 1 MG/1
1 TABLET ORAL DAILY
Qty: 90 TABLET | Refills: 3 | Status: SHIPPED | OUTPATIENT
Start: 2023-01-01 | End: 2023-01-01

## 2023-01-01 RX ORDER — HYDROXYZINE HYDROCHLORIDE 25 MG/1
TABLET, FILM COATED ORAL
Qty: 12 TABLET | Refills: 3 | Status: ON HOLD | OUTPATIENT
Start: 2023-01-01 | End: 2023-01-01

## 2023-01-01 RX ORDER — ALBUTEROL SULFATE 0.83 MG/ML
SOLUTION RESPIRATORY (INHALATION)
Qty: 75 ML | Refills: 1 | Status: ON HOLD | OUTPATIENT
Start: 2023-01-01 | End: 2023-01-01

## 2023-01-01 RX ORDER — MIRABEGRON 50 MG/1
50 TABLET, FILM COATED, EXTENDED RELEASE ORAL AT BEDTIME
Status: ON HOLD | COMMUNITY
Start: 2022-01-01 | End: 2023-01-01

## 2023-01-01 RX ORDER — LIDOCAINE HYDROCHLORIDE 10 MG/ML
10 INJECTION, SOLUTION EPIDURAL; INFILTRATION; INTRACAUDAL; PERINEURAL ONCE
Status: COMPLETED | OUTPATIENT
Start: 2023-01-01 | End: 2023-01-01

## 2023-01-01 RX ORDER — LORAZEPAM 2 MG/ML
0.5 INJECTION INTRAMUSCULAR
Status: COMPLETED | OUTPATIENT
Start: 2023-01-01 | End: 2023-01-01

## 2023-01-01 RX ORDER — HYDROCHLOROTHIAZIDE 25 MG/1
25 TABLET ORAL DAILY
Status: DISCONTINUED | OUTPATIENT
Start: 2023-01-01 | End: 2023-01-01 | Stop reason: HOSPADM

## 2023-01-01 RX ORDER — PREDNISONE 5 MG/1
10 TABLET ORAL DAILY
Status: DISCONTINUED | OUTPATIENT
Start: 2023-01-01 | End: 2023-01-01 | Stop reason: HOSPADM

## 2023-01-01 RX ORDER — NALOXONE HYDROCHLORIDE 0.4 MG/ML
0.4 INJECTION, SOLUTION INTRAMUSCULAR; INTRAVENOUS; SUBCUTANEOUS
Status: DISCONTINUED | OUTPATIENT
Start: 2023-01-01 | End: 2023-01-01

## 2023-01-01 RX ORDER — ALBUMIN (HUMAN) 12.5 G/50ML
25 SOLUTION INTRAVENOUS ONCE
Status: COMPLETED | OUTPATIENT
Start: 2023-01-01 | End: 2023-01-01

## 2023-01-01 RX ORDER — ALBUTEROL SULFATE 0.83 MG/ML
2.5 SOLUTION RESPIRATORY (INHALATION) EVERY 4 HOURS PRN
Qty: 75 ML | Refills: 1 | Status: ON HOLD | COMMUNITY
Start: 2023-01-01 | End: 2023-01-01

## 2023-01-01 RX ORDER — APIXABAN 5 MG (74)
KIT ORAL
Qty: 74 EACH | Refills: 0 | Status: SHIPPED | OUTPATIENT
Start: 2023-01-01 | End: 2023-01-01

## 2023-01-01 RX ORDER — VITAMIN B COMPLEX
125 TABLET ORAL DAILY
Status: DISCONTINUED | OUTPATIENT
Start: 2023-01-01 | End: 2023-01-01

## 2023-01-01 RX ORDER — MAGNESIUM OXIDE 400 MG/1
400 TABLET ORAL DAILY
Qty: 30 TABLET | Refills: 0 | Status: ON HOLD | OUTPATIENT
Start: 2023-01-01 | End: 2023-01-01

## 2023-01-01 RX ORDER — KETOROLAC TROMETHAMINE 30 MG/ML
15 INJECTION, SOLUTION INTRAMUSCULAR; INTRAVENOUS ONCE
Status: COMPLETED | OUTPATIENT
Start: 2023-01-01 | End: 2023-01-01

## 2023-01-01 RX ORDER — BUSPIRONE HYDROCHLORIDE 5 MG/1
10 TABLET ORAL 3 TIMES DAILY
Status: DISCONTINUED | OUTPATIENT
Start: 2023-01-01 | End: 2023-01-01

## 2023-01-01 RX ORDER — FUROSEMIDE 10 MG/ML
20 INJECTION INTRAMUSCULAR; INTRAVENOUS EVERY 8 HOURS
Status: DISCONTINUED | OUTPATIENT
Start: 2023-01-01 | End: 2023-01-01

## 2023-01-01 RX ORDER — AZITHROMYCIN 500 MG/1
500 INJECTION, POWDER, LYOPHILIZED, FOR SOLUTION INTRAVENOUS ONCE
Status: COMPLETED | OUTPATIENT
Start: 2023-01-01 | End: 2023-01-01

## 2023-01-01 RX ORDER — IPRATROPIUM BROMIDE AND ALBUTEROL SULFATE 2.5; .5 MG/3ML; MG/3ML
3 SOLUTION RESPIRATORY (INHALATION) ONCE
Status: COMPLETED | OUTPATIENT
Start: 2023-01-01 | End: 2023-01-01

## 2023-01-01 RX ORDER — GADOBUTROL 604.72 MG/ML
7.5 INJECTION INTRAVENOUS ONCE
Status: COMPLETED | OUTPATIENT
Start: 2023-01-01 | End: 2023-01-01

## 2023-01-01 RX ORDER — FENTANYL CITRATE 50 UG/ML
50 INJECTION, SOLUTION INTRAMUSCULAR; INTRAVENOUS EVERY 30 MIN PRN
Status: DISCONTINUED | OUTPATIENT
Start: 2023-01-01 | End: 2023-08-24 | Stop reason: HOSPADM

## 2023-01-01 RX ORDER — DILTIAZEM HYDROCHLORIDE 120 MG/1
240 CAPSULE, COATED, EXTENDED RELEASE ORAL DAILY
Status: DISCONTINUED | OUTPATIENT
Start: 2023-01-01 | End: 2023-01-01

## 2023-01-01 RX ORDER — ALBUTEROL SULFATE 0.83 MG/ML
SOLUTION RESPIRATORY (INHALATION)
Qty: 90 ML | Refills: 0 | OUTPATIENT
Start: 2023-01-01

## 2023-01-01 RX ORDER — IPRATROPIUM BROMIDE AND ALBUTEROL SULFATE 2.5; .5 MG/3ML; MG/3ML
1 SOLUTION RESPIRATORY (INHALATION) EVERY 6 HOURS PRN
Qty: 360 ML | Refills: 0 | Status: ON HOLD | OUTPATIENT
Start: 2023-01-01 | End: 2023-01-01

## 2023-01-01 RX ORDER — LIDOCAINE HYDROCHLORIDE 20 MG/ML
5 SOLUTION OROPHARYNGEAL ONCE
Status: COMPLETED | OUTPATIENT
Start: 2023-01-01 | End: 2023-01-01

## 2023-01-01 RX ORDER — CEFAZOLIN SODIUM 1 G/50ML
1250 SOLUTION INTRAVENOUS EVERY 24 HOURS
Status: DISCONTINUED | OUTPATIENT
Start: 2023-01-01 | End: 2023-01-01

## 2023-01-01 RX ORDER — FUROSEMIDE 20 MG
20 TABLET ORAL DAILY
Qty: 30 TABLET | Refills: 0 | Status: ON HOLD | OUTPATIENT
Start: 2023-01-01 | End: 2023-01-01

## 2023-01-01 RX ORDER — LISINOPRIL 2.5 MG/1
2.5 TABLET ORAL DAILY
Status: DISCONTINUED | OUTPATIENT
Start: 2023-01-01 | End: 2023-01-01 | Stop reason: HOSPADM

## 2023-01-01 RX ORDER — ALBUTEROL SULFATE 0.83 MG/ML
SOLUTION RESPIRATORY (INHALATION)
Qty: 75 ML | Refills: 1 | Status: SHIPPED | OUTPATIENT
Start: 2023-01-01 | End: 2023-01-01

## 2023-01-01 RX ORDER — PREDNISONE 10 MG/1
TABLET ORAL
Qty: 33 TABLET | Refills: 0 | Status: SHIPPED | OUTPATIENT
Start: 2023-01-01 | End: 2023-01-01

## 2023-01-01 RX ORDER — ATORVASTATIN CALCIUM 10 MG/1
10 TABLET, FILM COATED ORAL
Status: DISCONTINUED | OUTPATIENT
Start: 2023-01-01 | End: 2023-01-01 | Stop reason: HOSPADM

## 2023-01-01 RX ORDER — GLYCOPYRROLATE 0.2 MG/ML
0.2 INJECTION, SOLUTION INTRAMUSCULAR; INTRAVENOUS ONCE
Status: COMPLETED | OUTPATIENT
Start: 2023-01-01 | End: 2023-01-01

## 2023-01-01 RX ORDER — METOCLOPRAMIDE HYDROCHLORIDE 5 MG/ML
10 INJECTION INTRAMUSCULAR; INTRAVENOUS EVERY 6 HOURS
Status: COMPLETED | OUTPATIENT
Start: 2023-01-01 | End: 2023-01-01

## 2023-01-01 RX ORDER — METHYLPREDNISOLONE SODIUM SUCCINATE 125 MG/2ML
125 INJECTION, POWDER, LYOPHILIZED, FOR SOLUTION INTRAMUSCULAR; INTRAVENOUS
Status: CANCELLED
Start: 2023-01-01

## 2023-01-01 RX ORDER — DEXMEDETOMIDINE HYDROCHLORIDE 4 UG/ML
.1-1.2 INJECTION, SOLUTION INTRAVENOUS CONTINUOUS
Status: DISCONTINUED | OUTPATIENT
Start: 2023-01-01 | End: 2023-01-01

## 2023-01-01 RX ORDER — REGADENOSON 0.08 MG/ML
0.4 INJECTION, SOLUTION INTRAVENOUS ONCE
Status: COMPLETED | OUTPATIENT
Start: 2023-01-01 | End: 2023-01-01

## 2023-01-01 RX ORDER — SODIUM CHLORIDE, SODIUM LACTATE, POTASSIUM CHLORIDE, CALCIUM CHLORIDE 600; 310; 30; 20 MG/100ML; MG/100ML; MG/100ML; MG/100ML
INJECTION, SOLUTION INTRAVENOUS CONTINUOUS
Status: CANCELLED | OUTPATIENT
Start: 2023-01-01

## 2023-01-01 RX ORDER — PIPERACILLIN SODIUM, TAZOBACTAM SODIUM 4; .5 G/20ML; G/20ML
4.5 INJECTION, POWDER, LYOPHILIZED, FOR SOLUTION INTRAVENOUS EVERY 6 HOURS
Status: DISCONTINUED | OUTPATIENT
Start: 2023-01-01 | End: 2023-01-01

## 2023-01-01 RX ORDER — MULTIPLE VITAMINS W/ MINERALS TAB 9MG-400MCG
1 TAB ORAL DAILY
Status: DISCONTINUED | OUTPATIENT
Start: 2023-01-01 | End: 2023-01-01

## 2023-01-01 RX ORDER — PREDNISONE 20 MG/1
40 TABLET ORAL DAILY
Qty: 20 TABLET | Refills: 3 | Status: SHIPPED | OUTPATIENT
Start: 2023-01-01 | End: 2023-01-01

## 2023-01-01 RX ORDER — DILTIAZEM HYDROCHLORIDE 30 MG/1
30 TABLET, FILM COATED ORAL EVERY 6 HOURS SCHEDULED
Status: COMPLETED | OUTPATIENT
Start: 2023-01-01 | End: 2023-01-01

## 2023-01-01 RX ORDER — LEVALBUTEROL INHALATION SOLUTION 0.63 MG/3ML
0.63 SOLUTION RESPIRATORY (INHALATION) EVERY 6 HOURS PRN
Status: DISCONTINUED | OUTPATIENT
Start: 2023-01-01 | End: 2023-01-01

## 2023-01-01 RX ORDER — VENLAFAXINE HYDROCHLORIDE 75 MG/1
75 CAPSULE, EXTENDED RELEASE ORAL DAILY
Status: DISCONTINUED | OUTPATIENT
Start: 2023-01-01 | End: 2023-01-01 | Stop reason: HOSPADM

## 2023-01-01 RX ORDER — SODIUM CHLORIDE 9 MG/ML
1000 INJECTION, SOLUTION INTRAVENOUS CONTINUOUS PRN
Status: CANCELLED
Start: 2023-01-01

## 2023-01-01 RX ORDER — METHYLPREDNISOLONE SODIUM SUCCINATE 40 MG/ML
40 INJECTION, POWDER, LYOPHILIZED, FOR SOLUTION INTRAMUSCULAR; INTRAVENOUS EVERY 24 HOURS
Status: DISCONTINUED | OUTPATIENT
Start: 2023-01-01 | End: 2023-01-01

## 2023-01-01 RX ORDER — METHYLPREDNISOLONE SODIUM SUCCINATE 40 MG/ML
40 INJECTION, POWDER, LYOPHILIZED, FOR SOLUTION INTRAMUSCULAR; INTRAVENOUS EVERY 12 HOURS
Status: DISCONTINUED | OUTPATIENT
Start: 2023-01-01 | End: 2023-01-01

## 2023-01-01 RX ORDER — NALOXONE HYDROCHLORIDE 0.4 MG/ML
.1-.4 INJECTION, SOLUTION INTRAMUSCULAR; INTRAVENOUS; SUBCUTANEOUS
Status: CANCELLED | OUTPATIENT
Start: 2023-01-01

## 2023-01-01 RX ORDER — DOXYCYCLINE 100 MG/1
100 CAPSULE ORAL 2 TIMES DAILY
Qty: 20 CAPSULE | Refills: 0 | Status: SHIPPED | OUTPATIENT
Start: 2023-01-01 | End: 2023-01-01

## 2023-01-01 RX ORDER — HYDROXYZINE HYDROCHLORIDE 25 MG/1
25-50 TABLET, FILM COATED ORAL
Status: DISCONTINUED | OUTPATIENT
Start: 2023-01-01 | End: 2023-01-01

## 2023-01-01 RX ORDER — DEXTROSE MONOHYDRATE 25 G/50ML
25-50 INJECTION, SOLUTION INTRAVENOUS
Status: DISCONTINUED | OUTPATIENT
Start: 2023-01-01 | End: 2023-01-01

## 2023-01-01 RX ORDER — FLUMAZENIL 0.1 MG/ML
0.2 INJECTION, SOLUTION INTRAVENOUS ONCE
Status: COMPLETED | OUTPATIENT
Start: 2023-01-01 | End: 2023-01-01

## 2023-01-01 RX ORDER — MIRABEGRON 25 MG/1
50 TABLET, FILM COATED, EXTENDED RELEASE ORAL AT BEDTIME
Status: DISCONTINUED | OUTPATIENT
Start: 2023-01-01 | End: 2023-01-01 | Stop reason: HOSPADM

## 2023-01-01 RX ORDER — FLUTICASONE PROPIONATE 50 MCG
1 SPRAY, SUSPENSION (ML) NASAL DAILY PRN
Status: DISCONTINUED | OUTPATIENT
Start: 2023-01-01 | End: 2023-01-01 | Stop reason: HOSPADM

## 2023-01-01 RX ORDER — ACETAMINOPHEN 325 MG/1
650 TABLET ORAL EVERY 6 HOURS PRN
Status: DISCONTINUED | OUTPATIENT
Start: 2023-01-01 | End: 2023-01-01

## 2023-01-01 RX ORDER — POTASSIUM CHLORIDE 1500 MG/1
20 TABLET, EXTENDED RELEASE ORAL ONCE
Status: COMPLETED | OUTPATIENT
Start: 2023-01-01 | End: 2023-01-01

## 2023-01-01 RX ORDER — ONDANSETRON 4 MG/1
4 TABLET, ORALLY DISINTEGRATING ORAL EVERY 6 HOURS PRN
Status: DISCONTINUED | OUTPATIENT
Start: 2023-01-01 | End: 2023-01-01 | Stop reason: HOSPADM

## 2023-01-01 RX ORDER — ONDANSETRON 4 MG/1
4 TABLET, FILM COATED ORAL ONCE
Status: CANCELLED | OUTPATIENT
Start: 2023-01-01

## 2023-01-01 RX ORDER — LANOLIN ALCOHOL/MO/W.PET/CERES
3 CREAM (GRAM) TOPICAL
Status: DISCONTINUED | OUTPATIENT
Start: 2023-01-01 | End: 2023-01-01

## 2023-01-01 RX ORDER — SODIUM CHLORIDE 9 MG/ML
INJECTION, SOLUTION INTRAVENOUS CONTINUOUS
Status: DISCONTINUED | OUTPATIENT
Start: 2023-01-01 | End: 2023-01-01

## 2023-01-01 RX ORDER — UREA 10 %
1000 LOTION (ML) TOPICAL DAILY
Status: DISCONTINUED | OUTPATIENT
Start: 2023-01-01 | End: 2023-01-01

## 2023-01-01 RX ORDER — PROPOFOL 10 MG/ML
5-75 INJECTION, EMULSION INTRAVENOUS CONTINUOUS
Status: DISCONTINUED | OUTPATIENT
Start: 2023-01-01 | End: 2023-01-01

## 2023-01-01 RX ORDER — OXYCODONE HYDROCHLORIDE 5 MG/1
5 TABLET ORAL
Status: CANCELLED | OUTPATIENT
Start: 2023-01-01

## 2023-01-01 RX ORDER — NALOXONE HYDROCHLORIDE 0.4 MG/ML
0.2 INJECTION, SOLUTION INTRAMUSCULAR; INTRAVENOUS; SUBCUTANEOUS
Status: DISCONTINUED | OUTPATIENT
Start: 2023-01-01 | End: 2023-01-01

## 2023-01-01 RX ORDER — FUROSEMIDE 20 MG
20 TABLET ORAL
Status: DISCONTINUED | OUTPATIENT
Start: 2023-01-01 | End: 2023-01-01

## 2023-01-01 RX ORDER — ACETAMINOPHEN 325 MG/1
650 TABLET ORAL EVERY 4 HOURS PRN
Status: DISCONTINUED | OUTPATIENT
Start: 2023-01-01 | End: 2023-01-01 | Stop reason: HOSPADM

## 2023-01-01 RX ORDER — OXYCODONE HYDROCHLORIDE 10 MG/1
10 TABLET ORAL
Status: CANCELLED | OUTPATIENT
Start: 2023-01-01

## 2023-01-01 RX ORDER — ATORVASTATIN CALCIUM 10 MG/1
10 TABLET, FILM COATED ORAL AT BEDTIME
Status: DISCONTINUED | OUTPATIENT
Start: 2023-01-01 | End: 2023-01-01

## 2023-01-01 RX ORDER — HYDROMORPHONE HYDROCHLORIDE 1 MG/ML
0.4 INJECTION, SOLUTION INTRAMUSCULAR; INTRAVENOUS; SUBCUTANEOUS EVERY 5 MIN PRN
Status: CANCELLED | OUTPATIENT
Start: 2023-01-01

## 2023-01-01 RX ORDER — ENOXAPARIN SODIUM 100 MG/ML
1 INJECTION SUBCUTANEOUS EVERY 12 HOURS
Status: DISCONTINUED | OUTPATIENT
Start: 2023-01-01 | End: 2023-01-01

## 2023-01-01 RX ORDER — FLUTICASONE FUROATE AND VILANTEROL 200; 25 UG/1; UG/1
1 POWDER RESPIRATORY (INHALATION) DAILY
Status: DISCONTINUED | OUTPATIENT
Start: 2023-01-01 | End: 2023-01-01

## 2023-01-01 RX ORDER — PREDNISONE 10 MG/1
10 TABLET ORAL DAILY
Qty: 3 TABLET | Refills: 0 | Status: SHIPPED | OUTPATIENT
Start: 2023-01-01 | End: 2023-01-01

## 2023-01-01 RX ORDER — PREDNISONE 20 MG/1
20 TABLET ORAL DAILY
Status: COMPLETED | OUTPATIENT
Start: 2023-01-01 | End: 2023-01-01

## 2023-01-01 RX ORDER — LISINOPRIL 2.5 MG/1
2.5 TABLET ORAL DAILY
Qty: 90 TABLET | Refills: 3 | Status: ON HOLD | OUTPATIENT
Start: 2023-01-01 | End: 2023-01-01

## 2023-01-01 RX ORDER — VENLAFAXINE HYDROCHLORIDE 75 MG/1
CAPSULE, EXTENDED RELEASE ORAL
Qty: 90 CAPSULE | Refills: 3 | Status: ON HOLD | OUTPATIENT
Start: 2023-01-01 | End: 2023-01-01

## 2023-01-01 RX ORDER — DILTIAZEM HYDROCHLORIDE 120 MG/1
120 CAPSULE, COATED, EXTENDED RELEASE ORAL DAILY
Status: DISCONTINUED | OUTPATIENT
Start: 2023-01-01 | End: 2023-01-01

## 2023-01-01 RX ORDER — INHALER, ASSIST DEVICES
SPACER (EA) MISCELLANEOUS
Qty: 1 EACH | Refills: 3 | Status: SHIPPED | OUTPATIENT
Start: 2023-01-01 | End: 2023-01-01

## 2023-01-01 RX ORDER — VENLAFAXINE HYDROCHLORIDE 75 MG/1
75 CAPSULE, EXTENDED RELEASE ORAL DAILY
Status: DISCONTINUED | OUTPATIENT
Start: 2023-01-01 | End: 2023-01-01

## 2023-01-01 RX ORDER — ALBUTEROL SULFATE 5 MG/ML
5 SOLUTION RESPIRATORY (INHALATION) CONTINUOUS
Status: DISCONTINUED | OUTPATIENT
Start: 2023-01-01 | End: 2023-01-01

## 2023-01-01 RX ORDER — IPRATROPIUM BROMIDE AND ALBUTEROL SULFATE 2.5; .5 MG/3ML; MG/3ML
SOLUTION RESPIRATORY (INHALATION)
Status: COMPLETED
Start: 2023-01-01 | End: 2023-01-01

## 2023-01-01 RX ORDER — DILTIAZEM HYDROCHLORIDE 120 MG/1
CAPSULE, COATED, EXTENDED RELEASE ORAL
Qty: 90 CAPSULE | Refills: 3 | Status: SHIPPED | OUTPATIENT
Start: 2023-01-01 | End: 2023-01-01

## 2023-01-01 RX ORDER — ENOXAPARIN SODIUM 100 MG/ML
40 INJECTION SUBCUTANEOUS DAILY
Qty: 0.8 ML | Refills: 0 | Status: ON HOLD | OUTPATIENT
Start: 2023-01-01 | End: 2023-01-01

## 2023-01-01 RX ORDER — ANASTROZOLE 1 MG/1
1 TABLET ORAL DAILY
Status: DISCONTINUED | OUTPATIENT
Start: 2023-01-01 | End: 2023-01-01 | Stop reason: HOSPADM

## 2023-01-01 RX ORDER — POLYETHYLENE GLYCOL 3350 17 G/17G
17 POWDER, FOR SOLUTION ORAL DAILY
Status: DISCONTINUED | OUTPATIENT
Start: 2023-01-01 | End: 2023-01-01 | Stop reason: HOSPADM

## 2023-01-01 RX ORDER — FUROSEMIDE 10 MG/ML
20 INJECTION INTRAMUSCULAR; INTRAVENOUS DAILY
Status: DISCONTINUED | OUTPATIENT
Start: 2023-01-01 | End: 2023-01-01

## 2023-01-01 RX ORDER — ATORVASTATIN CALCIUM 10 MG/1
TABLET, FILM COATED ORAL
Qty: 90 TABLET | Refills: 1 | Status: ON HOLD | OUTPATIENT
Start: 2023-01-01 | End: 2023-01-01

## 2023-01-01 RX ORDER — GLYCOPYRROLATE 0.2 MG/ML
0.1 INJECTION, SOLUTION INTRAMUSCULAR; INTRAVENOUS EVERY 4 HOURS PRN
Status: DISCONTINUED | OUTPATIENT
Start: 2023-01-01 | End: 2023-08-24 | Stop reason: HOSPADM

## 2023-01-01 RX ORDER — ONDANSETRON 2 MG/ML
4 INJECTION INTRAMUSCULAR; INTRAVENOUS EVERY 6 HOURS PRN
Status: DISCONTINUED | OUTPATIENT
Start: 2023-01-01 | End: 2023-08-24 | Stop reason: HOSPADM

## 2023-01-01 RX ORDER — IOPAMIDOL 755 MG/ML
500 INJECTION, SOLUTION INTRAVASCULAR ONCE
Status: COMPLETED | OUTPATIENT
Start: 2023-01-01 | End: 2023-01-01

## 2023-01-01 RX ORDER — FENTANYL CITRATE 50 UG/ML
50 INJECTION, SOLUTION INTRAMUSCULAR; INTRAVENOUS ONCE
Status: COMPLETED | OUTPATIENT
Start: 2023-01-01 | End: 2023-01-01

## 2023-01-01 RX ORDER — MEROPENEM 500 MG/1
500 INJECTION, POWDER, FOR SOLUTION INTRAVENOUS EVERY 12 HOURS
Status: DISCONTINUED | OUTPATIENT
Start: 2023-01-01 | End: 2023-01-01

## 2023-01-01 RX ORDER — GLIPIZIDE 10 MG/1
1 TABLET ORAL EVERY 8 HOURS PRN
Status: DISCONTINUED | OUTPATIENT
Start: 2023-01-01 | End: 2023-01-01

## 2023-01-01 RX ORDER — METHYLPREDNISOLONE SODIUM SUCCINATE 125 MG/2ML
125 INJECTION, POWDER, LYOPHILIZED, FOR SOLUTION INTRAMUSCULAR; INTRAVENOUS ONCE
Status: COMPLETED | OUTPATIENT
Start: 2023-01-01 | End: 2023-01-01

## 2023-01-01 RX ORDER — FUROSEMIDE 10 MG/ML
20 INJECTION INTRAMUSCULAR; INTRAVENOUS EVERY 12 HOURS
Status: DISCONTINUED | OUTPATIENT
Start: 2023-01-01 | End: 2023-01-01

## 2023-01-01 RX ORDER — METOPROLOL TARTRATE 1 MG/ML
5 INJECTION, SOLUTION INTRAVENOUS ONCE
Status: COMPLETED | OUTPATIENT
Start: 2023-01-01 | End: 2023-01-01

## 2023-01-01 RX ORDER — NICOTINE POLACRILEX 4 MG
15-30 LOZENGE BUCCAL
Status: DISCONTINUED | OUTPATIENT
Start: 2023-01-01 | End: 2023-01-01

## 2023-01-01 RX ORDER — LIDOCAINE 40 MG/G
CREAM TOPICAL
Status: CANCELLED | OUTPATIENT
Start: 2023-01-01

## 2023-01-01 RX ORDER — HYDROCHLOROTHIAZIDE 25 MG/1
25 TABLET ORAL DAILY
Status: DISCONTINUED | OUTPATIENT
Start: 2023-01-01 | End: 2023-01-01

## 2023-01-01 RX ORDER — MAGNESIUM OXIDE 400 MG/1
400 TABLET ORAL DAILY
Status: DISCONTINUED | OUTPATIENT
Start: 2023-01-01 | End: 2023-01-01

## 2023-01-01 RX ORDER — DIGOXIN 0.25 MG/ML
125 INJECTION INTRAMUSCULAR; INTRAVENOUS
Status: DISCONTINUED | OUTPATIENT
Start: 2023-01-01 | End: 2023-01-01

## 2023-01-01 RX ORDER — ONDANSETRON 4 MG/1
4 TABLET, ORALLY DISINTEGRATING ORAL EVERY 30 MIN PRN
Status: CANCELLED | OUTPATIENT
Start: 2023-01-01

## 2023-01-01 RX ORDER — DEXAMETHASONE 2 MG/1
2 TABLET ORAL
Qty: 60 TABLET | Refills: 0 | Status: ON HOLD | OUTPATIENT
Start: 2023-01-01 | End: 2023-01-01

## 2023-01-01 RX ORDER — METFORMIN HCL 500 MG
500 TABLET, EXTENDED RELEASE 24 HR ORAL
Qty: 90 TABLET | Refills: 3 | Status: ON HOLD | OUTPATIENT
Start: 2023-01-01 | End: 2023-01-01

## 2023-01-01 RX ORDER — FENTANYL CITRATE 50 UG/ML
50 INJECTION, SOLUTION INTRAMUSCULAR; INTRAVENOUS EVERY 10 MIN PRN
Status: DISCONTINUED | OUTPATIENT
Start: 2023-01-01 | End: 2023-08-24 | Stop reason: HOSPADM

## 2023-01-01 RX ORDER — ALBUTEROL SULFATE 0.83 MG/ML
2.5 SOLUTION RESPIRATORY (INHALATION) 4 TIMES DAILY
Qty: 75 ML | Refills: 1 | COMMUNITY
Start: 2023-01-01 | End: 2023-01-01

## 2023-01-01 RX ORDER — HYDROMORPHONE HYDROCHLORIDE 1 MG/ML
0.2 INJECTION, SOLUTION INTRAMUSCULAR; INTRAVENOUS; SUBCUTANEOUS EVERY 5 MIN PRN
Status: CANCELLED | OUTPATIENT
Start: 2023-01-01

## 2023-01-01 RX ORDER — MAGNESIUM SULFATE 1 G/100ML
1 INJECTION INTRAVENOUS ONCE
Status: COMPLETED | OUTPATIENT
Start: 2023-01-01 | End: 2023-01-01

## 2023-01-01 RX ORDER — LIDOCAINE 40 MG/G
CREAM TOPICAL
Status: ACTIVE | OUTPATIENT
Start: 2023-01-01 | End: 2023-01-01

## 2023-01-01 RX ORDER — FUROSEMIDE 10 MG/ML
20 INJECTION INTRAMUSCULAR; INTRAVENOUS EVERY 24 HOURS
Status: DISCONTINUED | OUTPATIENT
Start: 2023-01-01 | End: 2023-01-01

## 2023-01-01 RX ORDER — DILTIAZEM HYDROCHLORIDE 5 MG/ML
16 INJECTION INTRAVENOUS ONCE
Status: COMPLETED | OUTPATIENT
Start: 2023-01-01 | End: 2023-01-01

## 2023-01-01 RX ORDER — HEPARIN SODIUM 10000 [USP'U]/100ML
0-5000 INJECTION, SOLUTION INTRAVENOUS CONTINUOUS
Status: DISCONTINUED | OUTPATIENT
Start: 2023-01-01 | End: 2023-01-01

## 2023-01-01 RX ORDER — ENOXAPARIN SODIUM 100 MG/ML
0.75 INJECTION SUBCUTANEOUS EVERY 12 HOURS
Status: DISCONTINUED | OUTPATIENT
Start: 2023-01-01 | End: 2023-01-01

## 2023-01-01 RX ORDER — POTASSIUM CHLORIDE 20MEQ/15ML
20 LIQUID (ML) ORAL ONCE
Status: COMPLETED | OUTPATIENT
Start: 2023-01-01 | End: 2023-01-01

## 2023-01-01 RX ORDER — FENTANYL CITRATE 50 UG/ML
25 INJECTION, SOLUTION INTRAMUSCULAR; INTRAVENOUS EVERY 5 MIN PRN
Status: CANCELLED | OUTPATIENT
Start: 2023-01-01

## 2023-01-01 RX ORDER — DEXAMETHASONE 2 MG/1
2 TABLET ORAL
Status: DISCONTINUED | OUTPATIENT
Start: 2023-01-01 | End: 2023-01-01

## 2023-01-01 RX ORDER — ONDANSETRON 4 MG/1
4 TABLET, ORALLY DISINTEGRATING ORAL EVERY 8 HOURS PRN
Qty: 20 TABLET | Refills: 3 | Status: SHIPPED | OUTPATIENT
Start: 2023-01-01 | End: 2023-01-01

## 2023-01-01 RX ORDER — HYDROMORPHONE HYDROCHLORIDE 1 MG/ML
0.5 INJECTION, SOLUTION INTRAMUSCULAR; INTRAVENOUS; SUBCUTANEOUS
Status: DISCONTINUED | OUTPATIENT
Start: 2023-01-01 | End: 2023-01-01

## 2023-01-01 RX ORDER — DILTIAZEM HYDROCHLORIDE 30 MG/1
30 TABLET, FILM COATED ORAL EVERY 6 HOURS SCHEDULED
Status: DISCONTINUED | OUTPATIENT
Start: 2023-01-01 | End: 2023-01-01

## 2023-01-01 RX ORDER — IPRATROPIUM BROMIDE AND ALBUTEROL SULFATE 2.5; .5 MG/3ML; MG/3ML
1 SOLUTION RESPIRATORY (INHALATION) EVERY 6 HOURS PRN
Status: DISCONTINUED | OUTPATIENT
Start: 2023-01-01 | End: 2023-01-01

## 2023-01-01 RX ORDER — PIPERACILLIN SODIUM, TAZOBACTAM SODIUM 3; .375 G/15ML; G/15ML
3.38 INJECTION, POWDER, LYOPHILIZED, FOR SOLUTION INTRAVENOUS EVERY 6 HOURS
Status: DISCONTINUED | OUTPATIENT
Start: 2023-01-01 | End: 2023-01-01

## 2023-01-01 RX ADMIN — QUETIAPINE FUMARATE 12.5 MG: 25 TABLET ORAL at 20:51

## 2023-01-01 RX ADMIN — SODIUM CHLORIDE, PRESERVATIVE FREE: 5 INJECTION INTRAVENOUS at 20:10

## 2023-01-01 RX ADMIN — DEXMEDETOMIDINE HYDROCHLORIDE 0.8 MCG/KG/HR: 4 INJECTION, SOLUTION INTRAVENOUS at 08:23

## 2023-01-01 RX ADMIN — FLUTICASONE FUROATE AND VILANTEROL TRIFENATATE 1 PUFF: 200; 25 POWDER RESPIRATORY (INHALATION) at 09:15

## 2023-01-01 RX ADMIN — DEXMEDETOMIDINE HYDROCHLORIDE 0.7 MCG/KG/HR: 4 INJECTION, SOLUTION INTRAVENOUS at 11:00

## 2023-01-01 RX ADMIN — PIPERACILLIN AND TAZOBACTAM 3.38 G: 3; .375 INJECTION, POWDER, FOR SOLUTION INTRAVENOUS at 14:46

## 2023-01-01 RX ADMIN — POTASSIUM CHLORIDE 20 MEQ: 400 INJECTION, SOLUTION INTRAVENOUS at 10:20

## 2023-01-01 RX ADMIN — PREDNISONE 40 MG: 20 TABLET ORAL at 09:27

## 2023-01-01 RX ADMIN — HYDROCHLOROTHIAZIDE 25 MG: 25 TABLET ORAL at 08:15

## 2023-01-01 RX ADMIN — IPRATROPIUM BROMIDE AND ALBUTEROL SULFATE 3 ML: 2.5; .5 SOLUTION RESPIRATORY (INHALATION) at 11:27

## 2023-01-01 RX ADMIN — HEPARIN SODIUM 1550 UNITS/HR: 10000 INJECTION, SOLUTION INTRAVENOUS at 10:32

## 2023-01-01 RX ADMIN — APIXABAN 10 MG: 5 TABLET, FILM COATED ORAL at 21:24

## 2023-01-01 RX ADMIN — Medication 5 MG/HR: at 03:38

## 2023-01-01 RX ADMIN — METHYLPREDNISOLONE SODIUM SUCCINATE 40 MG: 40 INJECTION, POWDER, FOR SOLUTION INTRAMUSCULAR; INTRAVENOUS at 17:47

## 2023-01-01 RX ADMIN — LISINOPRIL 2.5 MG: 2.5 TABLET ORAL at 08:09

## 2023-01-01 RX ADMIN — DILTIAZEM HYDROCHLORIDE 240 MG: 120 CAPSULE, COATED, EXTENDED RELEASE ORAL at 19:37

## 2023-01-01 RX ADMIN — DEXMEDETOMIDINE HYDROCHLORIDE 1 MCG/KG/HR: 4 INJECTION, SOLUTION INTRAVENOUS at 16:45

## 2023-01-01 RX ADMIN — HYDROMORPHONE HYDROCHLORIDE 0.5 MG: 1 INJECTION, SOLUTION INTRAMUSCULAR; INTRAVENOUS; SUBCUTANEOUS at 13:07

## 2023-01-01 RX ADMIN — IPRATROPIUM BROMIDE AND ALBUTEROL SULFATE 3 ML: .5; 3 SOLUTION RESPIRATORY (INHALATION) at 17:05

## 2023-01-01 RX ADMIN — VENLAFAXINE HYDROCHLORIDE 75 MG: 75 CAPSULE, EXTENDED RELEASE ORAL at 10:49

## 2023-01-01 RX ADMIN — FUROSEMIDE 20 MG: 10 INJECTION, SOLUTION INTRAVENOUS at 15:31

## 2023-01-01 RX ADMIN — FENTANYL CITRATE 50 MCG: 50 INJECTION, SOLUTION INTRAMUSCULAR; INTRAVENOUS at 16:41

## 2023-01-01 RX ADMIN — HYDROMORPHONE HYDROCHLORIDE 0.5 MG: 1 INJECTION, SOLUTION INTRAMUSCULAR; INTRAVENOUS; SUBCUTANEOUS at 11:25

## 2023-01-01 RX ADMIN — AMIODARONE HYDROCHLORIDE 1 MG/MIN: 50 INJECTION, SOLUTION INTRAVENOUS at 16:46

## 2023-01-01 RX ADMIN — IPRATROPIUM BROMIDE AND ALBUTEROL SULFATE 3 ML: 2.5; .5 SOLUTION RESPIRATORY (INHALATION) at 11:34

## 2023-01-01 RX ADMIN — ALBUTEROL SULFATE 2.5 MG: 2.5 SOLUTION RESPIRATORY (INHALATION) at 13:48

## 2023-01-01 RX ADMIN — LABETALOL HYDROCHLORIDE 10 MG: 5 INJECTION INTRAVENOUS at 12:16

## 2023-01-01 RX ADMIN — IPRATROPIUM BROMIDE AND ALBUTEROL SULFATE 3 ML: .5; 3 SOLUTION RESPIRATORY (INHALATION) at 22:47

## 2023-01-01 RX ADMIN — CEFTRIAXONE SODIUM 2 G: 2 INJECTION, POWDER, FOR SOLUTION INTRAMUSCULAR; INTRAVENOUS at 19:00

## 2023-01-01 RX ADMIN — Medication 400 MG: at 11:27

## 2023-01-01 RX ADMIN — METOCLOPRAMIDE HYDROCHLORIDE 5 MG: 5 INJECTION INTRAMUSCULAR; INTRAVENOUS at 12:59

## 2023-01-01 RX ADMIN — SODIUM CHLORIDE 70 ML: 9 INJECTION, SOLUTION INTRAVENOUS at 14:03

## 2023-01-01 RX ADMIN — UMECLIDINIUM 1 PUFF: 62.5 AEROSOL, POWDER ORAL at 09:10

## 2023-01-01 RX ADMIN — METHYLPREDNISOLONE SODIUM SUCCINATE 40 MG: 40 INJECTION, POWDER, FOR SOLUTION INTRAMUSCULAR; INTRAVENOUS at 17:58

## 2023-01-01 RX ADMIN — DILTIAZEM HYDROCHLORIDE 240 MG: 120 CAPSULE, COATED, EXTENDED RELEASE ORAL at 20:02

## 2023-01-01 RX ADMIN — FENTANYL CITRATE 25 MCG/HR: 0.05 INJECTION, SOLUTION INTRAMUSCULAR; INTRAVENOUS at 13:41

## 2023-01-01 RX ADMIN — LISINOPRIL 2.5 MG: 2.5 TABLET ORAL at 08:13

## 2023-01-01 RX ADMIN — SODIUM CHLORIDE 500 ML: 9 INJECTION, SOLUTION INTRAVENOUS at 10:28

## 2023-01-01 RX ADMIN — NOREPINEPHRINE BITARTRATE 0.03 MCG/KG/MIN: 0.02 INJECTION, SOLUTION INTRAVENOUS at 10:11

## 2023-01-01 RX ADMIN — Medication 400 MG: at 08:15

## 2023-01-01 RX ADMIN — POTASSIUM CHLORIDE 20 MEQ: 400 INJECTION, SOLUTION INTRAVENOUS at 08:28

## 2023-01-01 RX ADMIN — MIRABEGRON 50 MG: 25 TABLET, FILM COATED, EXTENDED RELEASE ORAL at 20:34

## 2023-01-01 RX ADMIN — QUETIAPINE FUMARATE 12.5 MG: 25 TABLET ORAL at 23:23

## 2023-01-01 RX ADMIN — ACETAMINOPHEN 650 MG: 325 TABLET, FILM COATED ORAL at 13:36

## 2023-01-01 RX ADMIN — DEXTRAN 70, GLYCERIN, HYPROMELLOSE 1 DROP: 1; 2; 3 SOLUTION/ DROPS OPHTHALMIC at 18:23

## 2023-01-01 RX ADMIN — PROPOFOL 45 MCG/KG/MIN: 10 INJECTION, EMULSION INTRAVENOUS at 04:03

## 2023-01-01 RX ADMIN — AMIODARONE HYDROCHLORIDE 150 MG: 1.5 INJECTION, SOLUTION INTRAVENOUS at 02:01

## 2023-01-01 RX ADMIN — DILTIAZEM HYDROCHLORIDE 120 MG: 120 CAPSULE, COATED, EXTENDED RELEASE ORAL at 20:36

## 2023-01-01 RX ADMIN — PANTOPRAZOLE SODIUM 40 MG: 40 INJECTION, POWDER, FOR SOLUTION INTRAVENOUS at 20:53

## 2023-01-01 RX ADMIN — HYDROCHLOROTHIAZIDE 25 MG: 25 TABLET ORAL at 08:09

## 2023-01-01 RX ADMIN — METOCLOPRAMIDE HYDROCHLORIDE 10 MG: 5 INJECTION INTRAMUSCULAR; INTRAVENOUS at 18:17

## 2023-01-01 RX ADMIN — DEXTROSE AND SODIUM CHLORIDE: 5; 900 INJECTION, SOLUTION INTRAVENOUS at 06:54

## 2023-01-01 RX ADMIN — DILTIAZEM HYDROCHLORIDE 240 MG: 120 CAPSULE, COATED, EXTENDED RELEASE ORAL at 09:02

## 2023-01-01 RX ADMIN — CEFTRIAXONE SODIUM 2 G: 2 INJECTION, POWDER, FOR SOLUTION INTRAMUSCULAR; INTRAVENOUS at 18:55

## 2023-01-01 RX ADMIN — IPRATROPIUM BROMIDE AND ALBUTEROL SULFATE 3 ML: 2.5; .5 SOLUTION RESPIRATORY (INHALATION) at 20:34

## 2023-01-01 RX ADMIN — MIRABEGRON 50 MG: 25 TABLET, FILM COATED, EXTENDED RELEASE ORAL at 21:26

## 2023-01-01 RX ADMIN — FENTANYL CITRATE 50 MCG: 50 INJECTION, SOLUTION INTRAMUSCULAR; INTRAVENOUS at 21:32

## 2023-01-01 RX ADMIN — FENTANYL CITRATE 50 MCG/HR: 0.05 INJECTION, SOLUTION INTRAMUSCULAR; INTRAVENOUS at 14:33

## 2023-01-01 RX ADMIN — PROPOFOL 45 MCG/KG/MIN: 10 INJECTION, EMULSION INTRAVENOUS at 08:38

## 2023-01-01 RX ADMIN — ALBUTEROL SULFATE 2.5 MG: 2.5 SOLUTION RESPIRATORY (INHALATION) at 05:01

## 2023-01-01 RX ADMIN — ATORVASTATIN CALCIUM 10 MG: 10 TABLET, FILM COATED ORAL at 16:58

## 2023-01-01 RX ADMIN — IPRATROPIUM BROMIDE AND ALBUTEROL SULFATE 3 ML: 2.5; .5 SOLUTION RESPIRATORY (INHALATION) at 12:53

## 2023-01-01 RX ADMIN — APIXABAN 10 MG: 5 TABLET, FILM COATED ORAL at 20:35

## 2023-01-01 RX ADMIN — AMIODARONE HYDROCHLORIDE 150 MG: 1.5 INJECTION, SOLUTION INTRAVENOUS at 08:17

## 2023-01-01 RX ADMIN — DILTIAZEM HYDROCHLORIDE 240 MG: 120 CAPSULE, COATED, EXTENDED RELEASE ORAL at 20:44

## 2023-01-01 RX ADMIN — FENTANYL CITRATE 50 MCG: 50 INJECTION, SOLUTION INTRAMUSCULAR; INTRAVENOUS at 16:54

## 2023-01-01 RX ADMIN — Medication 10 MG: at 07:20

## 2023-01-01 RX ADMIN — PHENYLEPHRINE HYDROCHLORIDE 0.5 MCG/KG/MIN: 10 INJECTION, SOLUTION INTRAVENOUS at 02:42

## 2023-01-01 RX ADMIN — PREDNISONE 40 MG: 20 TABLET ORAL at 09:14

## 2023-01-01 RX ADMIN — DEXTRAN 70, GLYCERIN, HYPROMELLOSE 1 DROP: 1; 2; 3 SOLUTION/ DROPS OPHTHALMIC at 22:33

## 2023-01-01 RX ADMIN — LISINOPRIL 2.5 MG: 2.5 TABLET ORAL at 08:15

## 2023-01-01 RX ADMIN — PANTOPRAZOLE SODIUM 40 MG: 40 INJECTION, POWDER, FOR SOLUTION INTRAVENOUS at 20:37

## 2023-01-01 RX ADMIN — VENLAFAXINE HYDROCHLORIDE 75 MG: 75 CAPSULE, EXTENDED RELEASE ORAL at 08:32

## 2023-01-01 RX ADMIN — PREDNISONE 20 MG: 20 TABLET ORAL at 08:54

## 2023-01-01 RX ADMIN — FENTANYL CITRATE 50 MCG: 50 INJECTION, SOLUTION INTRAMUSCULAR; INTRAVENOUS at 02:02

## 2023-01-01 RX ADMIN — SODIUM PHOSPHATE, MONOBASIC, MONOHYDRATE AND SODIUM PHOSPHATE, DIBASIC, ANHYDROUS 9 MMOL: 142; 276 INJECTION, SOLUTION INTRAVENOUS at 10:25

## 2023-01-01 RX ADMIN — HYDROMORPHONE HYDROCHLORIDE 0.3 MG: 1 INJECTION, SOLUTION INTRAMUSCULAR; INTRAVENOUS; SUBCUTANEOUS at 23:53

## 2023-01-01 RX ADMIN — HYDROXYZINE HYDROCHLORIDE 25 MG: 25 TABLET ORAL at 20:26

## 2023-01-01 RX ADMIN — METHYLPREDNISOLONE SODIUM SUCCINATE 40 MG: 40 INJECTION, POWDER, FOR SOLUTION INTRAMUSCULAR; INTRAVENOUS at 17:43

## 2023-01-01 RX ADMIN — PROPOFOL 40 MCG/KG/MIN: 10 INJECTION, EMULSION INTRAVENOUS at 00:46

## 2023-01-01 RX ADMIN — DEXMEDETOMIDINE HYDROCHLORIDE 1.2 MCG/KG/HR: 4 INJECTION, SOLUTION INTRAVENOUS at 20:48

## 2023-01-01 RX ADMIN — PANTOPRAZOLE SODIUM 40 MG: 40 INJECTION, POWDER, FOR SOLUTION INTRAVENOUS at 21:46

## 2023-01-01 RX ADMIN — Medication 400 MG: at 09:02

## 2023-01-01 RX ADMIN — FENTANYL CITRATE 50 MCG: 50 INJECTION, SOLUTION INTRAMUSCULAR; INTRAVENOUS at 08:36

## 2023-01-01 RX ADMIN — ROCURONIUM BROMIDE 50 MG: 50 INJECTION, SOLUTION INTRAVENOUS at 09:50

## 2023-01-01 RX ADMIN — HYDROMORPHONE HYDROCHLORIDE 0.3 MG: 1 INJECTION, SOLUTION INTRAMUSCULAR; INTRAVENOUS; SUBCUTANEOUS at 07:10

## 2023-01-01 RX ADMIN — FENTANYL CITRATE 25 MCG: 50 INJECTION, SOLUTION INTRAMUSCULAR; INTRAVENOUS at 10:00

## 2023-01-01 RX ADMIN — DEXMEDETOMIDINE HYDROCHLORIDE 0.7 MCG/KG/HR: 4 INJECTION, SOLUTION INTRAVENOUS at 20:14

## 2023-01-01 RX ADMIN — PIPERACILLIN AND TAZOBACTAM 3.38 G: 3; .375 INJECTION, POWDER, FOR SOLUTION INTRAVENOUS at 15:18

## 2023-01-01 RX ADMIN — FLUTICASONE FUROATE AND VILANTEROL TRIFENATATE 1 PUFF: 200; 25 POWDER RESPIRATORY (INHALATION) at 09:27

## 2023-01-01 RX ADMIN — IPRATROPIUM BROMIDE AND ALBUTEROL SULFATE 3 ML: 2.5; .5 SOLUTION RESPIRATORY (INHALATION) at 10:38

## 2023-01-01 RX ADMIN — PANTOPRAZOLE SODIUM 40 MG: 40 INJECTION, POWDER, FOR SOLUTION INTRAVENOUS at 22:23

## 2023-01-01 RX ADMIN — IPRATROPIUM BROMIDE AND ALBUTEROL SULFATE 3 ML: 2.5; .5 SOLUTION RESPIRATORY (INHALATION) at 12:51

## 2023-01-01 RX ADMIN — HEPARIN SODIUM 1300 UNITS/HR: 10000 INJECTION, SOLUTION INTRAVENOUS at 21:08

## 2023-01-01 RX ADMIN — PIPERACILLIN AND TAZOBACTAM 3.38 G: 3; .375 INJECTION, POWDER, FOR SOLUTION INTRAVENOUS at 02:44

## 2023-01-01 RX ADMIN — IPRATROPIUM BROMIDE AND ALBUTEROL SULFATE 3 ML: 2.5; .5 SOLUTION RESPIRATORY (INHALATION) at 04:10

## 2023-01-01 RX ADMIN — METHYLPREDNISOLONE SODIUM SUCCINATE 40 MG: 40 INJECTION, POWDER, FOR SOLUTION INTRAMUSCULAR; INTRAVENOUS at 02:36

## 2023-01-01 RX ADMIN — PROPOFOL 45 MCG/KG/MIN: 10 INJECTION, EMULSION INTRAVENOUS at 13:51

## 2023-01-01 RX ADMIN — WATER 10 ML: 1 INJECTION INTRAMUSCULAR; INTRAVENOUS; SUBCUTANEOUS at 05:59

## 2023-01-01 RX ADMIN — Medication 400 MG: at 14:19

## 2023-01-01 RX ADMIN — DEXTRAN 70, GLYCERIN, HYPROMELLOSE 1 DROP: 1; 2; 3 SOLUTION/ DROPS OPHTHALMIC at 01:10

## 2023-01-01 RX ADMIN — HEPARIN SODIUM 650 UNITS/HR: 10000 INJECTION, SOLUTION INTRAVENOUS at 06:24

## 2023-01-01 RX ADMIN — FENTANYL CITRATE 50 MCG: 50 INJECTION, SOLUTION INTRAMUSCULAR; INTRAVENOUS at 22:25

## 2023-01-01 RX ADMIN — AZITHROMYCIN MONOHYDRATE 250 MG: 500 INJECTION, POWDER, LYOPHILIZED, FOR SOLUTION INTRAVENOUS at 18:33

## 2023-01-01 RX ADMIN — METHYLPREDNISOLONE SODIUM SUCCINATE 40 MG: 40 INJECTION, POWDER, FOR SOLUTION INTRAMUSCULAR; INTRAVENOUS at 17:29

## 2023-01-01 RX ADMIN — PIPERACILLIN AND TAZOBACTAM 4.5 G: 4; .5 INJECTION, POWDER, FOR SOLUTION INTRAVENOUS at 02:34

## 2023-01-01 RX ADMIN — MAGNESIUM SULFATE HEPTAHYDRATE 1 G: 1 INJECTION, SOLUTION INTRAVENOUS at 19:32

## 2023-01-01 RX ADMIN — MEROPENEM 500 MG: 500 INJECTION, POWDER, FOR SOLUTION INTRAVENOUS at 19:00

## 2023-01-01 RX ADMIN — FENTANYL CITRATE 50 MCG: 50 INJECTION, SOLUTION INTRAMUSCULAR; INTRAVENOUS at 16:23

## 2023-01-01 RX ADMIN — INSULIN HUMAN 2 UNITS/HR: 1 INJECTION, SOLUTION INTRAVENOUS at 11:20

## 2023-01-01 RX ADMIN — ACETAMINOPHEN 650 MG: 325 TABLET, FILM COATED ORAL at 20:37

## 2023-01-01 RX ADMIN — DEXMEDETOMIDINE HYDROCHLORIDE 0.7 MCG/KG/HR: 4 INJECTION, SOLUTION INTRAVENOUS at 22:04

## 2023-01-01 RX ADMIN — LIDOCAINE HYDROCHLORIDE 10 ML: 10 INJECTION, SOLUTION EPIDURAL; INFILTRATION; INTRACAUDAL; PERINEURAL at 14:10

## 2023-01-01 RX ADMIN — Medication 25 MCG: at 16:00

## 2023-01-01 RX ADMIN — METHYLPREDNISOLONE SODIUM SUCCINATE 40 MG: 40 INJECTION, POWDER, FOR SOLUTION INTRAMUSCULAR; INTRAVENOUS at 16:46

## 2023-01-01 RX ADMIN — PIPERACILLIN AND TAZOBACTAM 3.38 G: 3; .375 INJECTION, POWDER, FOR SOLUTION INTRAVENOUS at 14:34

## 2023-01-01 RX ADMIN — LISINOPRIL 2.5 MG: 2.5 TABLET ORAL at 09:27

## 2023-01-01 RX ADMIN — DEXMEDETOMIDINE HYDROCHLORIDE 0.7 MCG/KG/HR: 4 INJECTION, SOLUTION INTRAVENOUS at 09:09

## 2023-01-01 RX ADMIN — IPRATROPIUM BROMIDE AND ALBUTEROL SULFATE 3 ML: 2.5; .5 SOLUTION RESPIRATORY (INHALATION) at 17:58

## 2023-01-01 RX ADMIN — PROPOFOL 150 MG: 10 INJECTION, EMULSION INTRAVENOUS at 09:50

## 2023-01-01 RX ADMIN — GLYCOPYRROLATE 0.2 MG: 0.2 INJECTION, SOLUTION INTRAMUSCULAR; INTRAVENOUS at 15:22

## 2023-01-01 RX ADMIN — IPRATROPIUM BROMIDE AND ALBUTEROL SULFATE 3 ML: 2.5; .5 SOLUTION RESPIRATORY (INHALATION) at 15:51

## 2023-01-01 RX ADMIN — ALBUTEROL SULFATE 2.5 MG: 2.5 SOLUTION RESPIRATORY (INHALATION) at 17:05

## 2023-01-01 RX ADMIN — POTASSIUM CHLORIDE, DEXTROSE MONOHYDRATE AND SODIUM CHLORIDE: 150; 5; 450 INJECTION, SOLUTION INTRAVENOUS at 13:01

## 2023-01-01 RX ADMIN — HEPARIN SODIUM 900 UNITS/HR: 10000 INJECTION, SOLUTION INTRAVENOUS at 16:59

## 2023-01-01 RX ADMIN — ALBUTEROL SULFATE 2.5 MG: 2.5 SOLUTION RESPIRATORY (INHALATION) at 21:33

## 2023-01-01 RX ADMIN — Medication 400 MG: at 11:25

## 2023-01-01 RX ADMIN — METHYLPREDNISOLONE SODIUM SUCCINATE 40 MG: 40 INJECTION, POWDER, FOR SOLUTION INTRAMUSCULAR; INTRAVENOUS at 09:15

## 2023-01-01 RX ADMIN — HYDROMORPHONE HYDROCHLORIDE 0.2 MG: 0.2 INJECTION, SOLUTION INTRAMUSCULAR; INTRAVENOUS; SUBCUTANEOUS at 20:31

## 2023-01-01 RX ADMIN — IPRATROPIUM BROMIDE AND ALBUTEROL SULFATE 3 ML: 2.5; .5 SOLUTION RESPIRATORY (INHALATION) at 07:48

## 2023-01-01 RX ADMIN — Medication 400 MG: at 08:07

## 2023-01-01 RX ADMIN — PANTOPRAZOLE SODIUM 40 MG: 40 INJECTION, POWDER, FOR SOLUTION INTRAVENOUS at 07:37

## 2023-01-01 RX ADMIN — FENTANYL CITRATE 50 MCG/HR: 0.05 INJECTION, SOLUTION INTRAMUSCULAR; INTRAVENOUS at 00:20

## 2023-01-01 RX ADMIN — DEXTRAN 70, GLYCERIN, HYPROMELLOSE 1 DROP: 1; 2; 3 SOLUTION/ DROPS OPHTHALMIC at 09:20

## 2023-01-01 RX ADMIN — PROPOFOL 50 MCG/KG/MIN: 10 INJECTION, EMULSION INTRAVENOUS at 14:31

## 2023-01-01 RX ADMIN — IPRATROPIUM BROMIDE AND ALBUTEROL SULFATE 3 ML: 2.5; .5 SOLUTION RESPIRATORY (INHALATION) at 16:26

## 2023-01-01 RX ADMIN — DEXAMETHASONE 2 MG: 2 TABLET ORAL at 09:02

## 2023-01-01 RX ADMIN — METHYLPREDNISOLONE SODIUM SUCCINATE 40 MG: 40 INJECTION, POWDER, FOR SOLUTION INTRAMUSCULAR; INTRAVENOUS at 09:30

## 2023-01-01 RX ADMIN — ONDANSETRON 4 MG: 2 INJECTION INTRAMUSCULAR; INTRAVENOUS at 05:56

## 2023-01-01 RX ADMIN — DILTIAZEM HYDROCHLORIDE 30 MG: 30 TABLET, FILM COATED ORAL at 13:13

## 2023-01-01 RX ADMIN — DEXTROSE AND SODIUM CHLORIDE: 5; 900 INJECTION, SOLUTION INTRAVENOUS at 03:33

## 2023-01-01 RX ADMIN — PANTOPRAZOLE SODIUM 40 MG: 40 TABLET, DELAYED RELEASE ORAL at 22:38

## 2023-01-01 RX ADMIN — ATORVASTATIN CALCIUM 10 MG: 10 TABLET, FILM COATED ORAL at 17:24

## 2023-01-01 RX ADMIN — ATORVASTATIN CALCIUM 10 MG: 10 TABLET, FILM COATED ORAL at 16:48

## 2023-01-01 RX ADMIN — Medication 15 ML: at 10:26

## 2023-01-01 RX ADMIN — PANTOPRAZOLE SODIUM 40 MG: 40 INJECTION, POWDER, FOR SOLUTION INTRAVENOUS at 20:15

## 2023-01-01 RX ADMIN — INSULIN GLARGINE 10 UNITS: 100 INJECTION, SOLUTION SUBCUTANEOUS at 22:46

## 2023-01-01 RX ADMIN — SODIUM CHLORIDE 500 ML: 9 INJECTION, SOLUTION INTRAVENOUS at 02:48

## 2023-01-01 RX ADMIN — DILTIAZEM HYDROCHLORIDE 240 MG: 120 CAPSULE, COATED, EXTENDED RELEASE ORAL at 20:34

## 2023-01-01 RX ADMIN — PIPERACILLIN AND TAZOBACTAM 3.38 G: 3; .375 INJECTION, POWDER, FOR SOLUTION INTRAVENOUS at 04:07

## 2023-01-01 RX ADMIN — VANCOMYCIN HYDROCHLORIDE 1500 MG: 1 INJECTION, POWDER, LYOPHILIZED, FOR SOLUTION INTRAVENOUS at 10:26

## 2023-01-01 RX ADMIN — IPRATROPIUM BROMIDE AND ALBUTEROL SULFATE 3 ML: 2.5; .5 SOLUTION RESPIRATORY (INHALATION) at 20:35

## 2023-01-01 RX ADMIN — GLYCOPYRROLATE 0.1 MG: 0.2 INJECTION, SOLUTION INTRAMUSCULAR; INTRAVENOUS at 17:07

## 2023-01-01 RX ADMIN — APIXABAN 10 MG: 5 TABLET, FILM COATED ORAL at 08:01

## 2023-01-01 RX ADMIN — DEXMEDETOMIDINE HYDROCHLORIDE 0.8 MCG/KG/HR: 4 INJECTION, SOLUTION INTRAVENOUS at 22:23

## 2023-01-01 RX ADMIN — PIPERACILLIN AND TAZOBACTAM 3.38 G: 3; .375 INJECTION, POWDER, FOR SOLUTION INTRAVENOUS at 09:36

## 2023-01-01 RX ADMIN — FLUMAZENIL 0.2 MG: 0.1 INJECTION, SOLUTION INTRAVENOUS at 14:33

## 2023-01-01 RX ADMIN — DEXMEDETOMIDINE HYDROCHLORIDE 1.2 MCG/KG/HR: 4 INJECTION, SOLUTION INTRAVENOUS at 08:13

## 2023-01-01 RX ADMIN — IPRATROPIUM BROMIDE AND ALBUTEROL SULFATE 3 ML: 2.5; .5 SOLUTION RESPIRATORY (INHALATION) at 11:52

## 2023-01-01 RX ADMIN — ANASTROZOLE 1 MG: 1 TABLET ORAL at 08:01

## 2023-01-01 RX ADMIN — METHYLPREDNISOLONE SODIUM SUCCINATE 40 MG: 40 INJECTION, POWDER, FOR SOLUTION INTRAMUSCULAR; INTRAVENOUS at 05:55

## 2023-01-01 RX ADMIN — ALBUMIN HUMAN 25 G: 0.25 SOLUTION INTRAVENOUS at 18:55

## 2023-01-01 RX ADMIN — ATORVASTATIN CALCIUM 10 MG: 10 TABLET, FILM COATED ORAL at 20:26

## 2023-01-01 RX ADMIN — IPRATROPIUM BROMIDE AND ALBUTEROL SULFATE 3 ML: 2.5; .5 SOLUTION RESPIRATORY (INHALATION) at 07:56

## 2023-01-01 RX ADMIN — FUROSEMIDE 20 MG: 10 INJECTION, SOLUTION INTRAVENOUS at 22:45

## 2023-01-01 RX ADMIN — BUSPIRONE HYDROCHLORIDE 10 MG: 5 TABLET ORAL at 09:02

## 2023-01-01 RX ADMIN — LORAZEPAM 2 MG: 2 INJECTION INTRAMUSCULAR; INTRAVENOUS at 16:23

## 2023-01-01 RX ADMIN — AZITHROMYCIN MONOHYDRATE 250 MG: 500 INJECTION, POWDER, LYOPHILIZED, FOR SOLUTION INTRAVENOUS at 18:03

## 2023-01-01 RX ADMIN — APIXABAN 10 MG: 5 TABLET, FILM COATED ORAL at 08:53

## 2023-01-01 RX ADMIN — SODIUM CHLORIDE: 9 INJECTION, SOLUTION INTRAVENOUS at 00:45

## 2023-01-01 RX ADMIN — FUROSEMIDE 40 MG: 10 INJECTION, SOLUTION INTRAMUSCULAR; INTRAVENOUS at 12:53

## 2023-01-01 RX ADMIN — DEXTRAN 70, GLYCERIN, HYPROMELLOSE 1 DROP: 1; 2; 3 SOLUTION/ DROPS OPHTHALMIC at 14:42

## 2023-01-01 RX ADMIN — PANTOPRAZOLE SODIUM 40 MG: 40 INJECTION, POWDER, FOR SOLUTION INTRAVENOUS at 10:27

## 2023-01-01 RX ADMIN — IPRATROPIUM BROMIDE AND ALBUTEROL SULFATE 3 ML: 2.5; .5 SOLUTION RESPIRATORY (INHALATION) at 12:06

## 2023-01-01 RX ADMIN — PROPOFOL 60 MCG/KG/MIN: 10 INJECTION, EMULSION INTRAVENOUS at 19:46

## 2023-01-01 RX ADMIN — ATORVASTATIN CALCIUM 10 MG: 10 TABLET, FILM COATED ORAL at 17:31

## 2023-01-01 RX ADMIN — PIPERACILLIN AND TAZOBACTAM 3.38 G: 3; .375 INJECTION, POWDER, FOR SOLUTION INTRAVENOUS at 20:36

## 2023-01-01 RX ADMIN — FENTANYL CITRATE 25 MCG: 50 INJECTION, SOLUTION INTRAMUSCULAR; INTRAVENOUS at 11:27

## 2023-01-01 RX ADMIN — APIXABAN 10 MG: 5 TABLET, FILM COATED ORAL at 20:34

## 2023-01-01 RX ADMIN — METHYLPREDNISOLONE SODIUM SUCCINATE 40 MG: 40 INJECTION, POWDER, FOR SOLUTION INTRAMUSCULAR; INTRAVENOUS at 01:51

## 2023-01-01 RX ADMIN — PROPOFOL 55 MCG/KG/MIN: 10 INJECTION, EMULSION INTRAVENOUS at 22:58

## 2023-01-01 RX ADMIN — SODIUM BICARBONATE 10 ML/HR: 84 INJECTION, SOLUTION INTRAVENOUS at 15:46

## 2023-01-01 RX ADMIN — PHENYLEPHRINE HYDROCHLORIDE 0.5 MCG/KG/MIN: 10 INJECTION, SOLUTION INTRAVENOUS at 20:51

## 2023-01-01 RX ADMIN — WATER 10 ML: 1 INJECTION INTRAMUSCULAR; INTRAVENOUS; SUBCUTANEOUS at 05:42

## 2023-01-01 RX ADMIN — Medication 15 ML: at 08:36

## 2023-01-01 RX ADMIN — HYDROMORPHONE HYDROCHLORIDE 0.3 MG: 1 INJECTION, SOLUTION INTRAMUSCULAR; INTRAVENOUS; SUBCUTANEOUS at 04:30

## 2023-01-01 RX ADMIN — APIXABAN 10 MG: 5 TABLET, FILM COATED ORAL at 08:15

## 2023-01-01 RX ADMIN — DILTIAZEM HYDROCHLORIDE 30 MG: 30 TABLET, FILM COATED ORAL at 17:15

## 2023-01-01 RX ADMIN — IPRATROPIUM BROMIDE AND ALBUTEROL SULFATE 3 ML: 2.5; .5 SOLUTION RESPIRATORY (INHALATION) at 09:29

## 2023-01-01 RX ADMIN — PREDNISONE 40 MG: 20 TABLET ORAL at 08:32

## 2023-01-01 RX ADMIN — IPRATROPIUM BROMIDE AND ALBUTEROL SULFATE 3 ML: 2.5; .5 SOLUTION RESPIRATORY (INHALATION) at 17:37

## 2023-01-01 RX ADMIN — CEFTRIAXONE SODIUM 2 G: 2 INJECTION, POWDER, FOR SOLUTION INTRAMUSCULAR; INTRAVENOUS at 19:54

## 2023-01-01 RX ADMIN — HYDROCHLOROTHIAZIDE 25 MG: 25 TABLET ORAL at 08:32

## 2023-01-01 RX ADMIN — ACETAMINOPHEN 650 MG: 325 TABLET, FILM COATED ORAL at 04:33

## 2023-01-01 RX ADMIN — HYDROCHLOROTHIAZIDE 25 MG: 25 TABLET ORAL at 09:14

## 2023-01-01 RX ADMIN — IPRATROPIUM BROMIDE AND ALBUTEROL SULFATE 3 ML: 2.5; .5 SOLUTION RESPIRATORY (INHALATION) at 17:39

## 2023-01-01 RX ADMIN — IPRATROPIUM BROMIDE AND ALBUTEROL SULFATE 3 ML: 2.5; .5 SOLUTION RESPIRATORY (INHALATION) at 08:40

## 2023-01-01 RX ADMIN — DEXMEDETOMIDINE HYDROCHLORIDE 1.2 MCG/KG/HR: 4 INJECTION, SOLUTION INTRAVENOUS at 00:38

## 2023-01-01 RX ADMIN — IPRATROPIUM BROMIDE AND ALBUTEROL SULFATE 3 ML: 2.5; .5 SOLUTION RESPIRATORY (INHALATION) at 08:18

## 2023-01-01 RX ADMIN — DEXMEDETOMIDINE HYDROCHLORIDE 1 MCG/KG/HR: 4 INJECTION, SOLUTION INTRAVENOUS at 07:37

## 2023-01-01 RX ADMIN — SODIUM CHLORIDE, PRESERVATIVE FREE: 5 INJECTION INTRAVENOUS at 21:13

## 2023-01-01 RX ADMIN — HYDROXYZINE HYDROCHLORIDE 25 MG: 25 TABLET ORAL at 21:26

## 2023-01-01 RX ADMIN — FLUTICASONE FUROATE AND VILANTEROL TRIFENATATE 1 PUFF: 200; 25 POWDER RESPIRATORY (INHALATION) at 08:55

## 2023-01-01 RX ADMIN — ATORVASTATIN CALCIUM 10 MG: 10 TABLET, FILM COATED ORAL at 17:15

## 2023-01-01 RX ADMIN — QUETIAPINE FUMARATE 12.5 MG: 25 TABLET ORAL at 21:31

## 2023-01-01 RX ADMIN — PROPOFOL 55 MCG/KG/MIN: 10 INJECTION, EMULSION INTRAVENOUS at 03:26

## 2023-01-01 RX ADMIN — SODIUM BICARBONATE 20 ML/HR: 84 INJECTION, SOLUTION INTRAVENOUS at 11:42

## 2023-01-01 RX ADMIN — DEXMEDETOMIDINE HYDROCHLORIDE 0.8 MCG/KG/HR: 4 INJECTION, SOLUTION INTRAVENOUS at 17:30

## 2023-01-01 RX ADMIN — PIPERACILLIN AND TAZOBACTAM 3.38 G: 3; .375 INJECTION, POWDER, FOR SOLUTION INTRAVENOUS at 08:12

## 2023-01-01 RX ADMIN — ALBUTEROL SULFATE 2.5 MG: 2.5 SOLUTION RESPIRATORY (INHALATION) at 10:18

## 2023-01-01 RX ADMIN — PIPERACILLIN AND TAZOBACTAM 4.5 G: 4; .5 INJECTION, POWDER, FOR SOLUTION INTRAVENOUS at 14:48

## 2023-01-01 RX ADMIN — FENTANYL CITRATE 50 MCG: 50 INJECTION, SOLUTION INTRAMUSCULAR; INTRAVENOUS at 19:56

## 2023-01-01 RX ADMIN — FENTANYL CITRATE 25 MCG: 50 INJECTION, SOLUTION INTRAMUSCULAR; INTRAVENOUS at 13:12

## 2023-01-01 RX ADMIN — POTASSIUM CHLORIDE 20 MEQ: 1.5 SOLUTION ORAL at 07:16

## 2023-01-01 RX ADMIN — HEPARIN SODIUM 900 UNITS/HR: 10000 INJECTION, SOLUTION INTRAVENOUS at 04:18

## 2023-01-01 RX ADMIN — SODIUM CHLORIDE 500 ML: 9 INJECTION, SOLUTION INTRAVENOUS at 10:35

## 2023-01-01 RX ADMIN — INSULIN ASPART 1 UNITS: 100 INJECTION, SOLUTION INTRAVENOUS; SUBCUTANEOUS at 08:35

## 2023-01-01 RX ADMIN — LISINOPRIL 2.5 MG: 2.5 TABLET ORAL at 08:21

## 2023-01-01 RX ADMIN — VENLAFAXINE HYDROCHLORIDE 75 MG: 75 CAPSULE, EXTENDED RELEASE ORAL at 17:46

## 2023-01-01 RX ADMIN — FENTANYL CITRATE 50 MCG: 50 INJECTION, SOLUTION INTRAMUSCULAR; INTRAVENOUS at 17:45

## 2023-01-01 RX ADMIN — PROPOFOL 55 MCG/KG/MIN: 10 INJECTION, EMULSION INTRAVENOUS at 11:49

## 2023-01-01 RX ADMIN — DEXMEDETOMIDINE HYDROCHLORIDE 0.7 MCG/KG/HR: 4 INJECTION, SOLUTION INTRAVENOUS at 04:47

## 2023-01-01 RX ADMIN — IPRATROPIUM BROMIDE AND ALBUTEROL SULFATE 3 ML: 2.5; .5 SOLUTION RESPIRATORY (INHALATION) at 15:43

## 2023-01-01 RX ADMIN — DEXMEDETOMIDINE HYDROCHLORIDE 1.2 MCG/KG/HR: 4 INJECTION, SOLUTION INTRAVENOUS at 00:37

## 2023-01-01 RX ADMIN — METHYLPREDNISOLONE SODIUM SUCCINATE 40 MG: 40 INJECTION, POWDER, FOR SOLUTION INTRAMUSCULAR; INTRAVENOUS at 10:27

## 2023-01-01 RX ADMIN — IPRATROPIUM BROMIDE AND ALBUTEROL SULFATE 3 ML: 2.5; .5 SOLUTION RESPIRATORY (INHALATION) at 19:44

## 2023-01-01 RX ADMIN — VENLAFAXINE HYDROCHLORIDE 75 MG: 75 CAPSULE, EXTENDED RELEASE ORAL at 09:27

## 2023-01-01 RX ADMIN — FUROSEMIDE 20 MG: 20 TABLET ORAL at 08:02

## 2023-01-01 RX ADMIN — VENLAFAXINE HYDROCHLORIDE 75 MG: 75 CAPSULE, EXTENDED RELEASE ORAL at 08:02

## 2023-01-01 RX ADMIN — FENTANYL CITRATE 25 MCG: 50 INJECTION, SOLUTION INTRAMUSCULAR; INTRAVENOUS at 07:50

## 2023-01-01 RX ADMIN — PREDNISONE 40 MG: 20 TABLET ORAL at 08:21

## 2023-01-01 RX ADMIN — ONDANSETRON 4 MG: 4 TABLET, ORALLY DISINTEGRATING ORAL at 06:39

## 2023-01-01 RX ADMIN — IPRATROPIUM BROMIDE AND ALBUTEROL SULFATE 3 ML: 2.5; .5 SOLUTION RESPIRATORY (INHALATION) at 08:52

## 2023-01-01 RX ADMIN — PROPOFOL 50 MCG/KG/MIN: 10 INJECTION, EMULSION INTRAVENOUS at 19:07

## 2023-01-01 RX ADMIN — METHYLPREDNISOLONE SODIUM SUCCINATE 40 MG: 40 INJECTION, POWDER, FOR SOLUTION INTRAMUSCULAR; INTRAVENOUS at 05:42

## 2023-01-01 RX ADMIN — DEXMEDETOMIDINE HYDROCHLORIDE 1.2 MCG/KG/HR: 4 INJECTION, SOLUTION INTRAVENOUS at 04:25

## 2023-01-01 RX ADMIN — FENTANYL CITRATE 25 MCG: 50 INJECTION, SOLUTION INTRAMUSCULAR; INTRAVENOUS at 11:05

## 2023-01-01 RX ADMIN — IPRATROPIUM BROMIDE AND ALBUTEROL SULFATE 3 ML: 2.5; .5 SOLUTION RESPIRATORY (INHALATION) at 18:48

## 2023-01-01 RX ADMIN — PIPERACILLIN AND TAZOBACTAM 3.38 G: 3; .375 INJECTION, POWDER, FOR SOLUTION INTRAVENOUS at 21:40

## 2023-01-01 RX ADMIN — LISINOPRIL 2.5 MG: 2.5 TABLET ORAL at 09:17

## 2023-01-01 RX ADMIN — Medication 25 MCG: at 23:50

## 2023-01-01 RX ADMIN — PROPOFOL 55 MCG/KG/MIN: 10 INJECTION, EMULSION INTRAVENOUS at 07:26

## 2023-01-01 RX ADMIN — SODIUM CHLORIDE 80 ML: 9 INJECTION, SOLUTION INTRAVENOUS at 20:13

## 2023-01-01 RX ADMIN — MIRABEGRON 50 MG: 25 TABLET, FILM COATED, EXTENDED RELEASE ORAL at 20:33

## 2023-01-01 RX ADMIN — Medication 400 MG: at 17:31

## 2023-01-01 RX ADMIN — IPRATROPIUM BROMIDE AND ALBUTEROL SULFATE 3 ML: 2.5; .5 SOLUTION RESPIRATORY (INHALATION) at 12:24

## 2023-01-01 RX ADMIN — MIRABEGRON 50 MG: 25 TABLET, FILM COATED, EXTENDED RELEASE ORAL at 20:44

## 2023-01-01 RX ADMIN — DILTIAZEM HYDROCHLORIDE 30 MG: 30 TABLET, FILM COATED ORAL at 08:21

## 2023-01-01 RX ADMIN — PIPERACILLIN AND TAZOBACTAM 3.38 G: 3; .375 INJECTION, POWDER, FOR SOLUTION INTRAVENOUS at 09:09

## 2023-01-01 RX ADMIN — HYDROCODONE BITARTRATE AND ACETAMINOPHEN 2 TABLET: 5; 325 TABLET ORAL at 18:08

## 2023-01-01 RX ADMIN — LORAZEPAM 1 MG: 2 INJECTION INTRAMUSCULAR; INTRAVENOUS at 16:54

## 2023-01-01 RX ADMIN — INSULIN ASPART 1 UNITS: 100 INJECTION, SOLUTION INTRAVENOUS; SUBCUTANEOUS at 00:47

## 2023-01-01 RX ADMIN — IPRATROPIUM BROMIDE AND ALBUTEROL SULFATE 3 ML: 2.5; .5 SOLUTION RESPIRATORY (INHALATION) at 07:50

## 2023-01-01 RX ADMIN — DIGOXIN 125 MCG: 0.25 INJECTION INTRAMUSCULAR; INTRAVENOUS at 00:51

## 2023-01-01 RX ADMIN — DILTIAZEM HYDROCHLORIDE 10 MG/HR: 5 INJECTION INTRAVENOUS at 14:10

## 2023-01-01 RX ADMIN — METHYLPREDNISOLONE SODIUM SUCCINATE 40 MG: 40 INJECTION, POWDER, FOR SOLUTION INTRAMUSCULAR; INTRAVENOUS at 09:36

## 2023-01-01 RX ADMIN — QUETIAPINE FUMARATE 25 MG: 25 TABLET ORAL at 22:15

## 2023-01-01 RX ADMIN — PROPOFOL 40 MCG/KG/MIN: 10 INJECTION, EMULSION INTRAVENOUS at 05:39

## 2023-01-01 RX ADMIN — FUROSEMIDE 20 MG: 10 INJECTION, SOLUTION INTRAVENOUS at 15:41

## 2023-01-01 RX ADMIN — METHYLPREDNISOLONE SODIUM SUCCINATE 40 MG: 40 INJECTION, POWDER, FOR SOLUTION INTRAMUSCULAR; INTRAVENOUS at 05:24

## 2023-01-01 RX ADMIN — ALBUTEROL SULFATE 5 MG/HR: 2.5 SOLUTION RESPIRATORY (INHALATION) at 13:57

## 2023-01-01 RX ADMIN — METHYLPREDNISOLONE SODIUM SUCCINATE 40 MG: 40 INJECTION, POWDER, FOR SOLUTION INTRAMUSCULAR; INTRAVENOUS at 17:15

## 2023-01-01 RX ADMIN — FUROSEMIDE 20 MG: 10 INJECTION, SOLUTION INTRAVENOUS at 21:41

## 2023-01-01 RX ADMIN — IPRATROPIUM BROMIDE AND ALBUTEROL SULFATE 3 ML: 2.5; .5 SOLUTION RESPIRATORY (INHALATION) at 10:16

## 2023-01-01 RX ADMIN — Medication 15 ML: at 10:49

## 2023-01-01 RX ADMIN — DEXMEDETOMIDINE HYDROCHLORIDE 0.9 MCG/KG/HR: 4 INJECTION, SOLUTION INTRAVENOUS at 02:57

## 2023-01-01 RX ADMIN — PIPERACILLIN AND TAZOBACTAM 3.38 G: 3; .375 INJECTION, POWDER, FOR SOLUTION INTRAVENOUS at 09:30

## 2023-01-01 RX ADMIN — Medication 400 MG: at 07:04

## 2023-01-01 RX ADMIN — SODIUM BICARBONATE 20 ML/HR: 84 INJECTION, SOLUTION INTRAVENOUS at 01:27

## 2023-01-01 RX ADMIN — POTASSIUM CHLORIDE, DEXTROSE MONOHYDRATE AND SODIUM CHLORIDE: 150; 5; 450 INJECTION, SOLUTION INTRAVENOUS at 08:03

## 2023-01-01 RX ADMIN — PIPERACILLIN AND TAZOBACTAM 3.38 G: 3; .375 INJECTION, POWDER, FOR SOLUTION INTRAVENOUS at 20:37

## 2023-01-01 RX ADMIN — DEXTRAN 70, GLYCERIN, HYPROMELLOSE 1 DROP: 1; 2; 3 SOLUTION/ DROPS OPHTHALMIC at 11:00

## 2023-01-01 RX ADMIN — SODIUM CHLORIDE: 9 INJECTION, SOLUTION INTRAVENOUS at 00:47

## 2023-01-01 RX ADMIN — IPRATROPIUM BROMIDE AND ALBUTEROL SULFATE 3 ML: 2.5; .5 SOLUTION RESPIRATORY (INHALATION) at 00:48

## 2023-01-01 RX ADMIN — VENLAFAXINE HYDROCHLORIDE 75 MG: 75 CAPSULE, EXTENDED RELEASE ORAL at 09:16

## 2023-01-01 RX ADMIN — IPRATROPIUM BROMIDE AND ALBUTEROL SULFATE 3 ML: 2.5; .5 SOLUTION RESPIRATORY (INHALATION) at 16:44

## 2023-01-01 RX ADMIN — ATORVASTATIN CALCIUM 10 MG: 10 TABLET, FILM COATED ORAL at 17:40

## 2023-01-01 RX ADMIN — PANTOPRAZOLE SODIUM 40 MG: 40 INJECTION, POWDER, FOR SOLUTION INTRAVENOUS at 07:43

## 2023-01-01 RX ADMIN — VENLAFAXINE HYDROCHLORIDE 75 MG: 75 CAPSULE, EXTENDED RELEASE ORAL at 09:14

## 2023-01-01 RX ADMIN — IPRATROPIUM BROMIDE AND ALBUTEROL SULFATE 3 ML: 2.5; .5 SOLUTION RESPIRATORY (INHALATION) at 15:25

## 2023-01-01 RX ADMIN — VENLAFAXINE HYDROCHLORIDE 75 MG: 75 CAPSULE, EXTENDED RELEASE ORAL at 11:01

## 2023-01-01 RX ADMIN — PHENYLEPHRINE HYDROCHLORIDE 0.5 MCG/KG/MIN: 10 INJECTION, SOLUTION INTRAVENOUS at 21:19

## 2023-01-01 RX ADMIN — VENLAFAXINE 37.5 MG: 37.5 TABLET ORAL at 09:05

## 2023-01-01 RX ADMIN — PANTOPRAZOLE SODIUM 40 MG: 40 INJECTION, POWDER, FOR SOLUTION INTRAVENOUS at 07:44

## 2023-01-01 RX ADMIN — LISINOPRIL 2.5 MG: 2.5 TABLET ORAL at 08:54

## 2023-01-01 RX ADMIN — HYDROXYZINE HYDROCHLORIDE 25 MG: 25 TABLET ORAL at 04:26

## 2023-01-01 RX ADMIN — APIXABAN 10 MG: 5 TABLET, FILM COATED ORAL at 20:44

## 2023-01-01 RX ADMIN — VENLAFAXINE HYDROCHLORIDE 75 MG: 75 CAPSULE, EXTENDED RELEASE ORAL at 08:08

## 2023-01-01 RX ADMIN — FLUTICASONE FUROATE AND VILANTEROL TRIFENATATE 1 PUFF: 200; 25 POWDER RESPIRATORY (INHALATION) at 08:15

## 2023-01-01 RX ADMIN — MEROPENEM 500 MG: 500 INJECTION, POWDER, FOR SOLUTION INTRAVENOUS at 18:56

## 2023-01-01 RX ADMIN — LIDOCAINE HYDROCHLORIDE 1 ML: 10 INJECTION, SOLUTION EPIDURAL; INFILTRATION; INTRACAUDAL; PERINEURAL at 11:08

## 2023-01-01 RX ADMIN — FUROSEMIDE 20 MG: 10 INJECTION, SOLUTION INTRAVENOUS at 10:53

## 2023-01-01 RX ADMIN — SODIUM CHLORIDE 250 ML: 9 INJECTION, SOLUTION INTRAVENOUS at 19:51

## 2023-01-01 RX ADMIN — VENLAFAXINE HYDROCHLORIDE 75 MG: 75 CAPSULE, EXTENDED RELEASE ORAL at 09:02

## 2023-01-01 RX ADMIN — QUETIAPINE FUMARATE 12.5 MG: 25 TABLET ORAL at 23:57

## 2023-01-01 RX ADMIN — DEXTRAN 70, GLYCERIN, HYPROMELLOSE 1 DROP: 1; 2; 3 SOLUTION/ DROPS OPHTHALMIC at 08:25

## 2023-01-01 RX ADMIN — AMIODARONE HYDROCHLORIDE 150 MG: 1.5 INJECTION, SOLUTION INTRAVENOUS at 19:07

## 2023-01-01 RX ADMIN — VENLAFAXINE 37.5 MG: 37.5 TABLET ORAL at 08:37

## 2023-01-01 RX ADMIN — IPRATROPIUM BROMIDE AND ALBUTEROL SULFATE 3 ML: 2.5; .5 SOLUTION RESPIRATORY (INHALATION) at 18:47

## 2023-01-01 RX ADMIN — LORAZEPAM 0.5 MG: 2 INJECTION INTRAMUSCULAR; INTRAVENOUS at 06:45

## 2023-01-01 RX ADMIN — ENOXAPARIN SODIUM 40 MG: 40 INJECTION SUBCUTANEOUS at 14:46

## 2023-01-01 RX ADMIN — PIPERACILLIN AND TAZOBACTAM 3.38 G: 3; .375 INJECTION, POWDER, FOR SOLUTION INTRAVENOUS at 03:04

## 2023-01-01 RX ADMIN — VENLAFAXINE HYDROCHLORIDE 75 MG: 75 CAPSULE, EXTENDED RELEASE ORAL at 08:54

## 2023-01-01 RX ADMIN — IPRATROPIUM BROMIDE AND ALBUTEROL SULFATE 3 ML: 2.5; .5 SOLUTION RESPIRATORY (INHALATION) at 20:02

## 2023-01-01 RX ADMIN — DILTIAZEM HYDROCHLORIDE 10 MG/HR: 5 INJECTION INTRAVENOUS at 12:53

## 2023-01-01 RX ADMIN — IPRATROPIUM BROMIDE AND ALBUTEROL SULFATE 3 ML: 2.5; .5 SOLUTION RESPIRATORY (INHALATION) at 13:28

## 2023-01-01 RX ADMIN — IPRATROPIUM BROMIDE AND ALBUTEROL SULFATE 3 ML: 2.5; .5 SOLUTION RESPIRATORY (INHALATION) at 05:37

## 2023-01-01 RX ADMIN — DEXMEDETOMIDINE HYDROCHLORIDE 0.9 MCG/KG/HR: 4 INJECTION, SOLUTION INTRAVENOUS at 22:44

## 2023-01-01 RX ADMIN — HYDROCHLOROTHIAZIDE 25 MG: 25 TABLET ORAL at 08:55

## 2023-01-01 RX ADMIN — METHYLPREDNISOLONE SODIUM SUCCINATE 40 MG: 40 INJECTION, POWDER, FOR SOLUTION INTRAMUSCULAR; INTRAVENOUS at 06:21

## 2023-01-01 RX ADMIN — FENTANYL CITRATE 50 MCG: 50 INJECTION, SOLUTION INTRAMUSCULAR; INTRAVENOUS at 10:31

## 2023-01-01 RX ADMIN — PROPOFOL 40 MCG/KG/MIN: 10 INJECTION, EMULSION INTRAVENOUS at 11:47

## 2023-01-01 RX ADMIN — HYDROCHLOROTHIAZIDE 25 MG: 25 TABLET ORAL at 09:27

## 2023-01-01 RX ADMIN — LISINOPRIL 2.5 MG: 2.5 TABLET ORAL at 08:32

## 2023-01-01 RX ADMIN — PIPERACILLIN AND TAZOBACTAM 4.5 G: 4; .5 INJECTION, POWDER, FOR SOLUTION INTRAVENOUS at 20:19

## 2023-01-01 RX ADMIN — VENLAFAXINE HYDROCHLORIDE 75 MG: 75 CAPSULE, EXTENDED RELEASE ORAL at 08:21

## 2023-01-01 RX ADMIN — FENTANYL CITRATE 25 MCG/HR: 0.05 INJECTION, SOLUTION INTRAMUSCULAR; INTRAVENOUS at 20:59

## 2023-01-01 RX ADMIN — HYDROMORPHONE HYDROCHLORIDE 0.2 MG: 0.2 INJECTION, SOLUTION INTRAMUSCULAR; INTRAVENOUS; SUBCUTANEOUS at 01:04

## 2023-01-01 RX ADMIN — IOPAMIDOL 65 ML: 755 INJECTION, SOLUTION INTRAVENOUS at 20:14

## 2023-01-01 RX ADMIN — IPRATROPIUM BROMIDE AND ALBUTEROL SULFATE 3 ML: 2.5; .5 SOLUTION RESPIRATORY (INHALATION) at 09:33

## 2023-01-01 RX ADMIN — DILTIAZEM HYDROCHLORIDE 240 MG: 120 CAPSULE, COATED, EXTENDED RELEASE ORAL at 20:35

## 2023-01-01 RX ADMIN — DEXTRAN 70, GLYCERIN, HYPROMELLOSE 1 DROP: 1; 2; 3 SOLUTION/ DROPS OPHTHALMIC at 12:49

## 2023-01-01 RX ADMIN — DEXTRAN 70, GLYCERIN, HYPROMELLOSE 1 DROP: 1; 2; 3 SOLUTION/ DROPS OPHTHALMIC at 20:25

## 2023-01-01 RX ADMIN — IPRATROPIUM BROMIDE AND ALBUTEROL SULFATE 3 ML: 2.5; .5 SOLUTION RESPIRATORY (INHALATION) at 16:58

## 2023-01-01 RX ADMIN — CEFTRIAXONE SODIUM 2 G: 2 INJECTION, POWDER, FOR SOLUTION INTRAMUSCULAR; INTRAVENOUS at 18:06

## 2023-01-01 RX ADMIN — BUSPIRONE HYDROCHLORIDE 10 MG: 5 TABLET ORAL at 20:26

## 2023-01-01 RX ADMIN — POLYETHYLENE GLYCOL 3350 17 G: 17 POWDER, FOR SOLUTION ORAL at 16:20

## 2023-01-01 RX ADMIN — METHYLPREDNISOLONE SODIUM SUCCINATE 125 MG: 125 INJECTION, POWDER, FOR SOLUTION INTRAMUSCULAR; INTRAVENOUS at 19:05

## 2023-01-01 RX ADMIN — FLUTICASONE FUROATE AND VILANTEROL TRIFENATATE 1 PUFF: 200; 25 POWDER RESPIRATORY (INHALATION) at 08:33

## 2023-01-01 RX ADMIN — IOPAMIDOL 75 ML: 755 INJECTION, SOLUTION INTRAVENOUS at 14:03

## 2023-01-01 RX ADMIN — IPRATROPIUM BROMIDE AND ALBUTEROL SULFATE 3 ML: 2.5; .5 SOLUTION RESPIRATORY (INHALATION) at 17:13

## 2023-01-01 RX ADMIN — DEXMEDETOMIDINE HYDROCHLORIDE 0.8 MCG/KG/HR: 4 INJECTION, SOLUTION INTRAVENOUS at 12:17

## 2023-01-01 RX ADMIN — PIPERACILLIN AND TAZOBACTAM 3.38 G: 3; .375 INJECTION, POWDER, FOR SOLUTION INTRAVENOUS at 14:30

## 2023-01-01 RX ADMIN — PANTOPRAZOLE SODIUM 40 MG: 40 INJECTION, POWDER, FOR SOLUTION INTRAVENOUS at 08:03

## 2023-01-01 RX ADMIN — FUROSEMIDE 20 MG: 10 INJECTION, SOLUTION INTRAVENOUS at 14:46

## 2023-01-01 RX ADMIN — DEXMEDETOMIDINE HYDROCHLORIDE 0.8 MCG/KG/HR: 4 INJECTION, SOLUTION INTRAVENOUS at 07:48

## 2023-01-01 RX ADMIN — DILTIAZEM HYDROCHLORIDE 16 MG: 5 INJECTION INTRAVENOUS at 12:48

## 2023-01-01 RX ADMIN — VENLAFAXINE HYDROCHLORIDE 75 MG: 75 CAPSULE, EXTENDED RELEASE ORAL at 10:26

## 2023-01-01 RX ADMIN — SODIUM CHLORIDE 1000 ML: 9 INJECTION, SOLUTION INTRAVENOUS at 15:51

## 2023-01-01 RX ADMIN — FLUDEOXYGLUCOSE F-18 10.09 MILLICURIE: 500 INJECTION, SOLUTION INTRAVENOUS at 10:44

## 2023-01-01 RX ADMIN — QUETIAPINE FUMARATE 12.5 MG: 25 TABLET ORAL at 21:24

## 2023-01-01 RX ADMIN — FUROSEMIDE 20 MG: 20 TABLET ORAL at 15:54

## 2023-01-01 RX ADMIN — SODIUM CHLORIDE: 9 INJECTION, SOLUTION INTRAVENOUS at 15:05

## 2023-01-01 RX ADMIN — CEFTRIAXONE SODIUM 2 G: 2 INJECTION, POWDER, FOR SOLUTION INTRAMUSCULAR; INTRAVENOUS at 18:24

## 2023-01-01 RX ADMIN — LORAZEPAM 1 MG: 2 INJECTION INTRAMUSCULAR; INTRAVENOUS at 16:42

## 2023-01-01 RX ADMIN — FLUTICASONE FUROATE AND VILANTEROL TRIFENATATE 1 PUFF: 200; 25 POWDER RESPIRATORY (INHALATION) at 09:10

## 2023-01-01 RX ADMIN — TETROFOSMIN 33 MILLICURIE: 1.38 INJECTION, POWDER, LYOPHILIZED, FOR SOLUTION INTRAVENOUS at 10:12

## 2023-01-01 RX ADMIN — METHYLPREDNISOLONE SODIUM SUCCINATE 40 MG: 40 INJECTION, POWDER, FOR SOLUTION INTRAMUSCULAR; INTRAVENOUS at 05:32

## 2023-01-01 RX ADMIN — IPRATROPIUM BROMIDE AND ALBUTEROL SULFATE 3 ML: 2.5; .5 SOLUTION RESPIRATORY (INHALATION) at 16:31

## 2023-01-01 RX ADMIN — IPRATROPIUM BROMIDE AND ALBUTEROL SULFATE 3 ML: 2.5; .5 SOLUTION RESPIRATORY (INHALATION) at 15:46

## 2023-01-01 RX ADMIN — PREDNISONE 20 MG: 20 TABLET ORAL at 08:02

## 2023-01-01 RX ADMIN — DILTIAZEM HYDROCHLORIDE 120 MG: 120 CAPSULE, COATED, EXTENDED RELEASE ORAL at 20:12

## 2023-01-01 RX ADMIN — ANASTROZOLE 1 MG: 1 TABLET ORAL at 08:13

## 2023-01-01 RX ADMIN — Medication 15 ML: at 10:31

## 2023-01-01 RX ADMIN — AZITHROMYCIN MONOHYDRATE 250 MG: 500 INJECTION, POWDER, LYOPHILIZED, FOR SOLUTION INTRAVENOUS at 20:01

## 2023-01-01 RX ADMIN — HYDROMORPHONE HYDROCHLORIDE 0.3 MG: 1 INJECTION, SOLUTION INTRAMUSCULAR; INTRAVENOUS; SUBCUTANEOUS at 18:56

## 2023-01-01 RX ADMIN — HYDROCHLOROTHIAZIDE 25 MG: 25 TABLET ORAL at 09:38

## 2023-01-01 RX ADMIN — LISINOPRIL 2.5 MG: 2.5 TABLET ORAL at 08:02

## 2023-01-01 RX ADMIN — PIPERACILLIN AND TAZOBACTAM 4.5 G: 4; .5 INJECTION, POWDER, FOR SOLUTION INTRAVENOUS at 09:12

## 2023-01-01 RX ADMIN — Medication 10 MG: at 12:02

## 2023-01-01 RX ADMIN — APIXABAN 10 MG: 5 TABLET, FILM COATED ORAL at 09:34

## 2023-01-01 RX ADMIN — IPRATROPIUM BROMIDE AND ALBUTEROL SULFATE 3 ML: 2.5; .5 SOLUTION RESPIRATORY (INHALATION) at 20:44

## 2023-01-01 RX ADMIN — DILTIAZEM HYDROCHLORIDE 10 MG/HR: 5 INJECTION INTRAVENOUS at 02:20

## 2023-01-01 RX ADMIN — METHYLPREDNISOLONE SODIUM SUCCINATE 62.5 MG: 125 INJECTION, POWDER, FOR SOLUTION INTRAMUSCULAR; INTRAVENOUS at 10:35

## 2023-01-01 RX ADMIN — PANTOPRAZOLE SODIUM 40 MG: 40 INJECTION, POWDER, FOR SOLUTION INTRAVENOUS at 09:36

## 2023-01-01 RX ADMIN — MIRABEGRON 50 MG: 25 TABLET, FILM COATED, EXTENDED RELEASE ORAL at 21:24

## 2023-01-01 RX ADMIN — PIPERACILLIN AND TAZOBACTAM 3.38 G: 3; .375 INJECTION, POWDER, FOR SOLUTION INTRAVENOUS at 08:24

## 2023-01-01 RX ADMIN — PIPERACILLIN AND TAZOBACTAM 3.38 G: 3; .375 INJECTION, POWDER, FOR SOLUTION INTRAVENOUS at 03:21

## 2023-01-01 RX ADMIN — DEXTROSE MONOHYDRATE 25 ML: 500 INJECTION PARENTERAL at 02:44

## 2023-01-01 RX ADMIN — DILTIAZEM HYDROCHLORIDE 5 MG/HR: 5 INJECTION INTRAVENOUS at 15:54

## 2023-01-01 RX ADMIN — VENLAFAXINE HYDROCHLORIDE 75 MG: 75 CAPSULE, EXTENDED RELEASE ORAL at 08:12

## 2023-01-01 RX ADMIN — Medication 10 MG: at 13:13

## 2023-01-01 RX ADMIN — DEXMEDETOMIDINE HYDROCHLORIDE 0.7 MCG/KG/HR: 4 INJECTION, SOLUTION INTRAVENOUS at 02:30

## 2023-01-01 RX ADMIN — AZITHROMYCIN MONOHYDRATE 500 MG: 500 INJECTION, POWDER, LYOPHILIZED, FOR SOLUTION INTRAVENOUS at 20:22

## 2023-01-01 RX ADMIN — IPRATROPIUM BROMIDE AND ALBUTEROL SULFATE 3 ML: 2.5; .5 SOLUTION RESPIRATORY (INHALATION) at 07:03

## 2023-01-01 RX ADMIN — IPRATROPIUM BROMIDE AND ALBUTEROL SULFATE 3 ML: 2.5; .5 SOLUTION RESPIRATORY (INHALATION) at 08:01

## 2023-01-01 RX ADMIN — APIXABAN 10 MG: 5 TABLET, FILM COATED ORAL at 08:12

## 2023-01-01 RX ADMIN — IPRATROPIUM BROMIDE AND ALBUTEROL SULFATE 3 ML: 2.5; .5 SOLUTION RESPIRATORY (INHALATION) at 20:37

## 2023-01-01 RX ADMIN — DEXMEDETOMIDINE HYDROCHLORIDE 1.2 MCG/KG/HR: 4 INJECTION, SOLUTION INTRAVENOUS at 14:40

## 2023-01-01 RX ADMIN — PANTOPRAZOLE SODIUM 40 MG: 40 INJECTION, POWDER, FOR SOLUTION INTRAVENOUS at 07:55

## 2023-01-01 RX ADMIN — IPRATROPIUM BROMIDE AND ALBUTEROL SULFATE 3 ML: 2.5; .5 SOLUTION RESPIRATORY (INHALATION) at 07:17

## 2023-01-01 RX ADMIN — PIPERACILLIN AND TAZOBACTAM 3.38 G: 3; .375 INJECTION, POWDER, FOR SOLUTION INTRAVENOUS at 20:28

## 2023-01-01 RX ADMIN — FENTANYL CITRATE 50 MCG: 50 INJECTION, SOLUTION INTRAMUSCULAR; INTRAVENOUS at 07:01

## 2023-01-01 RX ADMIN — AMIODARONE HYDROCHLORIDE 200 MG: 200 TABLET ORAL at 10:31

## 2023-01-01 RX ADMIN — DEXTROSE AND SODIUM CHLORIDE: 5; 900 INJECTION, SOLUTION INTRAVENOUS at 17:06

## 2023-01-01 RX ADMIN — IPRATROPIUM BROMIDE AND ALBUTEROL SULFATE 3 ML: 2.5; .5 SOLUTION RESPIRATORY (INHALATION) at 21:59

## 2023-01-01 RX ADMIN — ANASTROZOLE 1 MG: 1 TABLET ORAL at 08:15

## 2023-01-01 RX ADMIN — Medication 10 MG: at 14:58

## 2023-01-01 RX ADMIN — DILTIAZEM HYDROCHLORIDE 240 MG: 120 CAPSULE, COATED, EXTENDED RELEASE ORAL at 19:43

## 2023-01-01 RX ADMIN — HEPARIN SODIUM 900 UNITS/HR: 10000 INJECTION, SOLUTION INTRAVENOUS at 23:46

## 2023-01-01 RX ADMIN — DEXTRAN 70, GLYCERIN, HYPROMELLOSE 1 DROP: 1; 2; 3 SOLUTION/ DROPS OPHTHALMIC at 18:37

## 2023-01-01 RX ADMIN — UMECLIDINIUM 1 PUFF: 62.5 AEROSOL, POWDER ORAL at 08:33

## 2023-01-01 RX ADMIN — ENOXAPARIN SODIUM 40 MG: 40 INJECTION SUBCUTANEOUS at 17:19

## 2023-01-01 RX ADMIN — HYDROMORPHONE HYDROCHLORIDE 0.3 MG: 1 INJECTION, SOLUTION INTRAMUSCULAR; INTRAVENOUS; SUBCUTANEOUS at 16:06

## 2023-01-01 RX ADMIN — METHYLPREDNISOLONE SODIUM SUCCINATE 125 MG: 125 INJECTION, POWDER, FOR SOLUTION INTRAMUSCULAR; INTRAVENOUS at 10:37

## 2023-01-01 RX ADMIN — IPRATROPIUM BROMIDE AND ALBUTEROL SULFATE 3 ML: 2.5; .5 SOLUTION RESPIRATORY (INHALATION) at 12:42

## 2023-01-01 RX ADMIN — NOREPINEPHRINE BITARTRATE 0.07 MCG/KG/MIN: 0.02 INJECTION, SOLUTION INTRAVENOUS at 05:19

## 2023-01-01 RX ADMIN — FENTANYL CITRATE 25 MCG: 50 INJECTION, SOLUTION INTRAMUSCULAR; INTRAVENOUS at 16:45

## 2023-01-01 RX ADMIN — DEXTRAN 70, GLYCERIN, HYPROMELLOSE 1 DROP: 1; 2; 3 SOLUTION/ DROPS OPHTHALMIC at 14:59

## 2023-01-01 RX ADMIN — METHYLPREDNISOLONE SODIUM SUCCINATE 40 MG: 40 INJECTION, POWDER, FOR SOLUTION INTRAMUSCULAR; INTRAVENOUS at 16:30

## 2023-01-01 RX ADMIN — PANTOPRAZOLE SODIUM 40 MG: 40 INJECTION, POWDER, FOR SOLUTION INTRAVENOUS at 08:24

## 2023-01-01 RX ADMIN — ANASTROZOLE 1 MG: 1 TABLET ORAL at 08:09

## 2023-01-01 RX ADMIN — ENOXAPARIN SODIUM 70 MG: 80 INJECTION SUBCUTANEOUS at 09:13

## 2023-01-01 RX ADMIN — PANTOPRAZOLE SODIUM 40 MG: 40 INJECTION, POWDER, FOR SOLUTION INTRAVENOUS at 08:12

## 2023-01-01 RX ADMIN — FUROSEMIDE 20 MG: 20 TABLET ORAL at 08:15

## 2023-01-01 RX ADMIN — ACETAMINOPHEN 650 MG: 325 TABLET, FILM COATED ORAL at 03:14

## 2023-01-01 RX ADMIN — METOPROLOL TARTRATE 5 MG: 1 INJECTION, SOLUTION INTRAVENOUS at 07:46

## 2023-01-01 RX ADMIN — ATORVASTATIN CALCIUM 10 MG: 10 TABLET, FILM COATED ORAL at 17:04

## 2023-01-01 RX ADMIN — PIPERACILLIN AND TAZOBACTAM 3.38 G: 3; .375 INJECTION, POWDER, FOR SOLUTION INTRAVENOUS at 22:05

## 2023-01-01 RX ADMIN — DEXMEDETOMIDINE HYDROCHLORIDE 0.8 MCG/KG/HR: 4 INJECTION, SOLUTION INTRAVENOUS at 02:24

## 2023-01-01 RX ADMIN — DIGOXIN 125 MCG: 0.25 INJECTION INTRAMUSCULAR; INTRAVENOUS at 20:05

## 2023-01-01 RX ADMIN — FUROSEMIDE 20 MG: 10 INJECTION, SOLUTION INTRAVENOUS at 05:59

## 2023-01-01 RX ADMIN — AMIODARONE HYDROCHLORIDE 0.5 MG/MIN: 50 INJECTION, SOLUTION INTRAVENOUS at 07:45

## 2023-01-01 RX ADMIN — IPRATROPIUM BROMIDE AND ALBUTEROL SULFATE 3 ML: 2.5; .5 SOLUTION RESPIRATORY (INHALATION) at 11:43

## 2023-01-01 RX ADMIN — SODIUM BICARBONATE 20 ML/HR: 84 INJECTION, SOLUTION INTRAVENOUS at 20:31

## 2023-01-01 RX ADMIN — Medication 25 MCG: at 17:54

## 2023-01-01 RX ADMIN — MIRABEGRON 50 MG: 25 TABLET, FILM COATED, EXTENDED RELEASE ORAL at 21:51

## 2023-01-01 RX ADMIN — LORAZEPAM 1 MG: 2 INJECTION INTRAMUSCULAR; INTRAVENOUS at 17:08

## 2023-01-01 RX ADMIN — INSULIN ASPART 1 UNITS: 100 INJECTION, SOLUTION INTRAVENOUS; SUBCUTANEOUS at 04:14

## 2023-01-01 RX ADMIN — FLUTICASONE FUROATE AND VILANTEROL TRIFENATATE 1 PUFF: 200; 25 POWDER RESPIRATORY (INHALATION) at 08:05

## 2023-01-01 RX ADMIN — PREDNISONE 10 MG: 5 TABLET ORAL at 08:12

## 2023-01-01 RX ADMIN — WATER: 1 INJECTION INTRAMUSCULAR; INTRAVENOUS; SUBCUTANEOUS at 06:36

## 2023-01-01 RX ADMIN — HYDROCHLOROTHIAZIDE 25 MG: 25 TABLET ORAL at 08:13

## 2023-01-01 RX ADMIN — VENLAFAXINE 37.5 MG: 37.5 TABLET ORAL at 21:46

## 2023-01-01 RX ADMIN — DEXTRAN 70, GLYCERIN, HYPROMELLOSE 1 DROP: 1; 2; 3 SOLUTION/ DROPS OPHTHALMIC at 05:43

## 2023-01-01 RX ADMIN — HEPARIN SODIUM 850 UNITS/HR: 10000 INJECTION, SOLUTION INTRAVENOUS at 14:24

## 2023-01-01 RX ADMIN — HEPARIN SODIUM 900 UNITS/HR: 10000 INJECTION, SOLUTION INTRAVENOUS at 19:49

## 2023-01-01 RX ADMIN — REGADENOSON 0.4 MG: 0.08 INJECTION, SOLUTION INTRAVENOUS at 10:17

## 2023-01-01 RX ADMIN — QUETIAPINE FUMARATE 12.5 MG: 25 TABLET ORAL at 22:21

## 2023-01-01 RX ADMIN — ALBUTEROL SULFATE 2.5 MG: 2.5 SOLUTION RESPIRATORY (INHALATION) at 18:51

## 2023-01-01 RX ADMIN — PIPERACILLIN AND TAZOBACTAM 3.38 G: 3; .375 INJECTION, POWDER, FOR SOLUTION INTRAVENOUS at 15:16

## 2023-01-01 RX ADMIN — FUROSEMIDE 20 MG: 20 TABLET ORAL at 08:09

## 2023-01-01 RX ADMIN — LISINOPRIL 2.5 MG: 2.5 TABLET ORAL at 09:14

## 2023-01-01 RX ADMIN — IPRATROPIUM BROMIDE AND ALBUTEROL SULFATE 3 ML: 2.5; .5 SOLUTION RESPIRATORY (INHALATION) at 11:42

## 2023-01-01 RX ADMIN — PHENYLEPHRINE HYDROCHLORIDE 0.4 MCG/KG/MIN: 10 INJECTION, SOLUTION INTRAVENOUS at 10:11

## 2023-01-01 RX ADMIN — AMIODARONE HYDROCHLORIDE 0.5 MG/MIN: 50 INJECTION, SOLUTION INTRAVENOUS at 19:19

## 2023-01-01 RX ADMIN — Medication 25 MCG: at 04:40

## 2023-01-01 RX ADMIN — MIRABEGRON 50 MG: 25 TABLET, FILM COATED, EXTENDED RELEASE ORAL at 22:37

## 2023-01-01 RX ADMIN — Medication 400 MG: at 17:46

## 2023-01-01 RX ADMIN — FLUTICASONE FUROATE AND VILANTEROL TRIFENATATE 1 PUFF: 200; 25 POWDER RESPIRATORY (INHALATION) at 08:14

## 2023-01-01 RX ADMIN — HEPARIN SODIUM 950 UNITS/HR: 10000 INJECTION, SOLUTION INTRAVENOUS at 06:14

## 2023-01-01 RX ADMIN — FLUTICASONE FUROATE AND VILANTEROL TRIFENATATE 1 PUFF: 200; 25 POWDER RESPIRATORY (INHALATION) at 08:11

## 2023-01-01 RX ADMIN — GADOBUTROL 7.5 ML: 604.72 INJECTION INTRAVENOUS at 12:01

## 2023-01-01 RX ADMIN — MIRABEGRON 50 MG: 25 TABLET, FILM COATED, EXTENDED RELEASE ORAL at 22:00

## 2023-01-01 RX ADMIN — SODIUM BICARBONATE 20 ML/HR: 84 INJECTION, SOLUTION INTRAVENOUS at 06:26

## 2023-01-01 RX ADMIN — SODIUM CHLORIDE 500 ML: 9 INJECTION, SOLUTION INTRAVENOUS at 14:37

## 2023-01-01 RX ADMIN — MIRABEGRON 50 MG: 25 TABLET, FILM COATED, EXTENDED RELEASE ORAL at 20:26

## 2023-01-01 RX ADMIN — PROPOFOL 40 MCG/KG/MIN: 10 INJECTION, EMULSION INTRAVENOUS at 16:39

## 2023-01-01 RX ADMIN — IPRATROPIUM BROMIDE AND ALBUTEROL SULFATE 3 ML: 2.5; .5 SOLUTION RESPIRATORY (INHALATION) at 01:36

## 2023-01-01 RX ADMIN — PANTOPRAZOLE SODIUM 40 MG: 40 INJECTION, POWDER, FOR SOLUTION INTRAVENOUS at 20:28

## 2023-01-01 RX ADMIN — LIDOCAINE HYDROCHLORIDE 10 ML: 10 INJECTION, SOLUTION EPIDURAL; INFILTRATION; INTRACAUDAL; PERINEURAL at 10:15

## 2023-01-01 RX ADMIN — DENOSUMAB 60 MG: 60 INJECTION SUBCUTANEOUS at 10:47

## 2023-01-01 RX ADMIN — VENLAFAXINE HYDROCHLORIDE 75 MG: 75 CAPSULE, EXTENDED RELEASE ORAL at 08:15

## 2023-01-01 RX ADMIN — CEFTRIAXONE SODIUM 2 G: 2 INJECTION, POWDER, FOR SOLUTION INTRAMUSCULAR; INTRAVENOUS at 19:31

## 2023-01-01 RX ADMIN — Medication 5 MG/HR: at 12:22

## 2023-01-01 RX ADMIN — IPRATROPIUM BROMIDE AND ALBUTEROL SULFATE 3 ML: 2.5; .5 SOLUTION RESPIRATORY (INHALATION) at 15:50

## 2023-01-01 RX ADMIN — ATORVASTATIN CALCIUM 10 MG: 10 TABLET, FILM COATED ORAL at 16:32

## 2023-01-01 RX ADMIN — METHYLPREDNISOLONE SODIUM SUCCINATE 40 MG: 40 INJECTION, POWDER, FOR SOLUTION INTRAMUSCULAR; INTRAVENOUS at 06:10

## 2023-01-01 RX ADMIN — IPRATROPIUM BROMIDE AND ALBUTEROL SULFATE 3 ML: 2.5; .5 SOLUTION RESPIRATORY (INHALATION) at 07:45

## 2023-01-01 RX ADMIN — IPRATROPIUM BROMIDE AND ALBUTEROL SULFATE 3 ML: 2.5; .5 SOLUTION RESPIRATORY (INHALATION) at 20:43

## 2023-01-01 RX ADMIN — HYDROCHLOROTHIAZIDE 25 MG: 25 TABLET ORAL at 08:02

## 2023-01-01 RX ADMIN — AMIODARONE HYDROCHLORIDE 150 MG: 1.5 INJECTION, SOLUTION INTRAVENOUS at 16:31

## 2023-01-01 RX ADMIN — PIPERACILLIN AND TAZOBACTAM 3.38 G: 3; .375 INJECTION, POWDER, FOR SOLUTION INTRAVENOUS at 03:37

## 2023-01-01 RX ADMIN — DEXMEDETOMIDINE HYDROCHLORIDE 0.7 MCG/KG/HR: 4 INJECTION, SOLUTION INTRAVENOUS at 14:25

## 2023-01-01 RX ADMIN — KETOROLAC TROMETHAMINE 15 MG: 30 INJECTION, SOLUTION INTRAMUSCULAR; INTRAVENOUS at 16:55

## 2023-01-01 RX ADMIN — AZITHROMYCIN MONOHYDRATE 250 MG: 500 INJECTION, POWDER, LYOPHILIZED, FOR SOLUTION INTRAVENOUS at 19:38

## 2023-01-01 RX ADMIN — PROPOFOL 10 MCG/KG/MIN: 10 INJECTION, EMULSION INTRAVENOUS at 09:37

## 2023-01-01 RX ADMIN — Medication 10 MG: at 10:00

## 2023-01-01 RX ADMIN — HYDROCHLOROTHIAZIDE 25 MG: 25 TABLET ORAL at 09:17

## 2023-01-01 RX ADMIN — DEXMEDETOMIDINE HYDROCHLORIDE 1.2 MCG/KG/HR: 4 INJECTION, SOLUTION INTRAVENOUS at 11:15

## 2023-01-01 RX ADMIN — AMIODARONE HYDROCHLORIDE 1 MG/MIN: 50 INJECTION, SOLUTION INTRAVENOUS at 08:37

## 2023-01-01 RX ADMIN — VANCOMYCIN HYDROCHLORIDE 1250 MG: 1 INJECTION, POWDER, LYOPHILIZED, FOR SOLUTION INTRAVENOUS at 05:20

## 2023-01-01 RX ADMIN — POTASSIUM CHLORIDE 20 MEQ: 1500 TABLET, EXTENDED RELEASE ORAL at 17:24

## 2023-01-01 RX ADMIN — QUETIAPINE FUMARATE 12.5 MG: 25 TABLET ORAL at 21:52

## 2023-01-01 RX ADMIN — HYDROMORPHONE HYDROCHLORIDE 0.2 MG: 0.2 INJECTION, SOLUTION INTRAMUSCULAR; INTRAVENOUS; SUBCUTANEOUS at 06:48

## 2023-01-01 RX ADMIN — FUROSEMIDE 20 MG: 10 INJECTION, SOLUTION INTRAVENOUS at 14:34

## 2023-01-01 RX ADMIN — FENTANYL CITRATE 50 MCG: 50 INJECTION, SOLUTION INTRAMUSCULAR; INTRAVENOUS at 12:36

## 2023-01-01 RX ADMIN — MEROPENEM 500 MG: 500 INJECTION, POWDER, FOR SOLUTION INTRAVENOUS at 06:02

## 2023-01-01 RX ADMIN — SODIUM BICARBONATE: 84 INJECTION, SOLUTION INTRAVENOUS at 08:29

## 2023-01-01 RX ADMIN — SODIUM CHLORIDE 500 ML: 9 INJECTION, SOLUTION INTRAVENOUS at 15:10

## 2023-01-01 RX ADMIN — IPRATROPIUM BROMIDE AND ALBUTEROL SULFATE 3 ML: 2.5; .5 SOLUTION RESPIRATORY (INHALATION) at 21:24

## 2023-01-01 RX ADMIN — CEFTRIAXONE SODIUM 2 G: 2 INJECTION, POWDER, FOR SOLUTION INTRAMUSCULAR; INTRAVENOUS at 20:35

## 2023-01-01 RX ADMIN — MEROPENEM 500 MG: 500 INJECTION, POWDER, FOR SOLUTION INTRAVENOUS at 07:04

## 2023-01-01 RX ADMIN — QUETIAPINE FUMARATE 25 MG: 25 TABLET ORAL at 21:00

## 2023-01-01 RX ADMIN — DEXMEDETOMIDINE HYDROCHLORIDE 0.7 MCG/KG/HR: 4 INJECTION, SOLUTION INTRAVENOUS at 19:16

## 2023-01-01 RX ADMIN — PANTOPRAZOLE SODIUM 40 MG: 40 TABLET, DELAYED RELEASE ORAL at 08:38

## 2023-01-01 RX ADMIN — VENLAFAXINE HYDROCHLORIDE 75 MG: 75 CAPSULE, EXTENDED RELEASE ORAL at 08:13

## 2023-01-01 RX ADMIN — ENOXAPARIN SODIUM 70 MG: 80 INJECTION SUBCUTANEOUS at 20:19

## 2023-01-01 RX ADMIN — IPRATROPIUM BROMIDE AND ALBUTEROL SULFATE 3 ML: .5; 3 SOLUTION RESPIRATORY (INHALATION) at 20:28

## 2023-01-01 RX ADMIN — UMECLIDINIUM 1 PUFF: 62.5 AEROSOL, POWDER ORAL at 09:15

## 2023-01-01 RX ADMIN — DEXMEDETOMIDINE HYDROCHLORIDE 0.2 MCG/KG/HR: 4 INJECTION, SOLUTION INTRAVENOUS at 15:29

## 2023-01-01 RX ADMIN — LIDOCAINE HYDROCHLORIDE 10 ML: 10 INJECTION, SOLUTION EPIDURAL; INFILTRATION; INTRACAUDAL; PERINEURAL at 13:20

## 2023-01-01 RX ADMIN — SODIUM CHLORIDE: 9 INJECTION, SOLUTION INTRAVENOUS at 00:51

## 2023-01-01 RX ADMIN — AMIODARONE HYDROCHLORIDE 0.5 MG/MIN: 50 INJECTION, SOLUTION INTRAVENOUS at 20:42

## 2023-01-01 RX ADMIN — DEXTRAN 70, GLYCERIN, HYPROMELLOSE 1 DROP: 1; 2; 3 SOLUTION/ DROPS OPHTHALMIC at 08:00

## 2023-01-01 RX ADMIN — FLUTICASONE FUROATE AND VILANTEROL TRIFENATATE 1 PUFF: 200; 25 POWDER RESPIRATORY (INHALATION) at 08:48

## 2023-01-01 RX ADMIN — DILTIAZEM HYDROCHLORIDE 240 MG: 120 CAPSULE, COATED, EXTENDED RELEASE ORAL at 12:46

## 2023-01-01 RX ADMIN — FLUTICASONE FUROATE AND VILANTEROL TRIFENATATE 1 PUFF: 200; 25 POWDER RESPIRATORY (INHALATION) at 09:17

## 2023-01-01 RX ADMIN — METHYLPREDNISOLONE SODIUM SUCCINATE 40 MG: 40 INJECTION, POWDER, FOR SOLUTION INTRAMUSCULAR; INTRAVENOUS at 02:24

## 2023-01-01 RX ADMIN — PREDNISONE 40 MG: 20 TABLET ORAL at 09:18

## 2023-01-01 RX ADMIN — DEXMEDETOMIDINE HYDROCHLORIDE 1.1 MCG/KG/HR: 4 INJECTION, SOLUTION INTRAVENOUS at 03:22

## 2023-01-01 RX ADMIN — IPRATROPIUM BROMIDE AND ALBUTEROL SULFATE 3 ML: 2.5; .5 SOLUTION RESPIRATORY (INHALATION) at 13:05

## 2023-01-01 RX ADMIN — TETROFOSMIN 10.6 MILLICURIE: 1.38 INJECTION, POWDER, LYOPHILIZED, FOR SOLUTION INTRAVENOUS at 08:45

## 2023-01-01 RX ADMIN — BUSPIRONE HYDROCHLORIDE 10 MG: 5 TABLET ORAL at 14:41

## 2023-01-01 RX ADMIN — IPRATROPIUM BROMIDE AND ALBUTEROL SULFATE 3 ML: 2.5; .5 SOLUTION RESPIRATORY (INHALATION) at 20:05

## 2023-01-01 RX ADMIN — IPRATROPIUM BROMIDE AND ALBUTEROL SULFATE 3 ML: 2.5; .5 SOLUTION RESPIRATORY (INHALATION) at 11:28

## 2023-01-01 RX ADMIN — Medication 400 MG: at 11:44

## 2023-01-01 RX ADMIN — PREDNISONE 20 MG: 20 TABLET ORAL at 08:15

## 2023-01-01 RX ADMIN — PREDNISONE 20 MG: 20 TABLET ORAL at 08:09

## 2023-01-01 RX ADMIN — ACETAMINOPHEN 650 MG: 325 TABLET, FILM COATED ORAL at 14:36

## 2023-01-01 RX ADMIN — PROPOFOL 40 MCG/KG/MIN: 10 INJECTION, EMULSION INTRAVENOUS at 22:37

## 2023-01-01 RX ADMIN — Medication 15 ML: at 09:05

## 2023-01-01 RX ADMIN — METHYLPREDNISOLONE SODIUM SUCCINATE 40 MG: 40 INJECTION, POWDER, FOR SOLUTION INTRAMUSCULAR; INTRAVENOUS at 17:55

## 2023-01-01 RX ADMIN — PANTOPRAZOLE SODIUM 40 MG: 40 INJECTION, POWDER, FOR SOLUTION INTRAVENOUS at 20:36

## 2023-01-01 RX ADMIN — FENTANYL CITRATE 50 MCG: 50 INJECTION, SOLUTION INTRAMUSCULAR; INTRAVENOUS at 17:06

## 2023-01-01 RX ADMIN — DEXMEDETOMIDINE HYDROCHLORIDE 0.7 MCG/KG/HR: 4 INJECTION, SOLUTION INTRAVENOUS at 15:26

## 2023-01-01 RX ADMIN — LIDOCAINE HYDROCHLORIDE 50 MG: 20 INJECTION, SOLUTION INFILTRATION; PERINEURAL at 09:50

## 2023-01-01 RX ADMIN — QUETIAPINE FUMARATE 25 MG: 25 TABLET ORAL at 20:44

## 2023-01-01 RX ADMIN — FUROSEMIDE 20 MG: 20 TABLET ORAL at 08:13

## 2023-01-01 RX ADMIN — MIRABEGRON 50 MG: 25 TABLET, FILM COATED, EXTENDED RELEASE ORAL at 20:45

## 2023-01-01 RX ADMIN — FUROSEMIDE 20 MG: 20 TABLET ORAL at 14:02

## 2023-01-01 RX ADMIN — FUROSEMIDE 20 MG: 10 INJECTION, SOLUTION INTRAVENOUS at 11:01

## 2023-01-01 RX ADMIN — ATORVASTATIN CALCIUM 10 MG: 10 TABLET, FILM COATED ORAL at 16:20

## 2023-01-01 RX ADMIN — ACETAMINOPHEN 650 MG: 325 TABLET, FILM COATED ORAL at 21:27

## 2023-01-01 RX ADMIN — IPRATROPIUM BROMIDE AND ALBUTEROL SULFATE 3 ML: 2.5; .5 SOLUTION RESPIRATORY (INHALATION) at 08:39

## 2023-01-01 ASSESSMENT — ENCOUNTER SYMPTOMS
CONSTIPATION: 0
JOINT SWELLING: 0
DYSURIA: 0
PALPITATIONS: 0
SORE THROAT: 0
WEAKNESS: 0
MYALGIAS: 0
DIZZINESS: 0
CHILLS: 0
COUGH: 0
SHORTNESS OF BREATH: 1
HEARTBURN: 0
NAUSEA: 1
ARTHRALGIAS: 0
BREAST MASS: 0
FEVER: 0
HEARTBURN: 0
NAUSEA: 1
NERVOUS/ANXIOUS: 0
HEMATOCHEZIA: 0
FREQUENCY: 1
NERVOUS/ANXIOUS: 0
CONSTIPATION: 0
DIZZINESS: 0
SHORTNESS OF BREATH: 1
EYE PAIN: 0
SORE THROAT: 0
PALPITATIONS: 0
HEADACHES: 0
JOINT SWELLING: 0
CHILLS: 0
DIARRHEA: 0
DYSURIA: 0
ABDOMINAL PAIN: 0
COUGH: 0
HEADACHES: 0
ABDOMINAL PAIN: 0
BREAST MASS: 0
HEMATOCHEZIA: 0
PARESTHESIAS: 0
MYALGIAS: 0
PARESTHESIAS: 0
ARTHRALGIAS: 0
FREQUENCY: 1
WEAKNESS: 0
FEVER: 0
DIARRHEA: 0
HEMATURIA: 0
HEMATURIA: 0
EYE PAIN: 0

## 2023-01-01 ASSESSMENT — ACTIVITIES OF DAILY LIVING (ADL)
ADLS_ACUITY_SCORE: 44
ADLS_ACUITY_SCORE: 24
ADLS_ACUITY_SCORE: 48
ADLS_ACUITY_SCORE: 22
ADLS_ACUITY_SCORE: 25
ADLS_ACUITY_SCORE: 32
ADLS_ACUITY_SCORE: 48
ADLS_ACUITY_SCORE: 22
ADLS_ACUITY_SCORE: 44
ADLS_ACUITY_SCORE: 28
ADLS_ACUITY_SCORE: 45
ADLS_ACUITY_SCORE: 32
ADLS_ACUITY_SCORE: 48
ADLS_ACUITY_SCORE: 44
ADLS_ACUITY_SCORE: 42
ADLS_ACUITY_SCORE: 28
ADLS_ACUITY_SCORE: 24
ADLS_ACUITY_SCORE: 22
ADLS_ACUITY_SCORE: 32
ADLS_ACUITY_SCORE: 32
ADLS_ACUITY_SCORE: 44
ADLS_ACUITY_SCORE: 48
ADLS_ACUITY_SCORE: 42
ADLS_ACUITY_SCORE: 42
ADLS_ACUITY_SCORE: 26
DOING_ERRANDS_INDEPENDENTLY_DIFFICULTY: NO
ADLS_ACUITY_SCORE: 32
ADLS_ACUITY_SCORE: 44
ADLS_ACUITY_SCORE: 40
ADLS_ACUITY_SCORE: 48
ADLS_ACUITY_SCORE: 28
ADLS_ACUITY_SCORE: 22
ADLS_ACUITY_SCORE: 44
ADLS_ACUITY_SCORE: 35
ADLS_ACUITY_SCORE: 24
ADLS_ACUITY_SCORE: 34
ADLS_ACUITY_SCORE: 32
ADLS_ACUITY_SCORE: 40
ADLS_ACUITY_SCORE: 35
ADLS_ACUITY_SCORE: 48
ADLS_ACUITY_SCORE: 44
ADLS_ACUITY_SCORE: 26
ADLS_ACUITY_SCORE: 44
ADLS_ACUITY_SCORE: 22
ADLS_ACUITY_SCORE: 35
ADLS_ACUITY_SCORE: 44
ADLS_ACUITY_SCORE: 48
ADLS_ACUITY_SCORE: 26
ADLS_ACUITY_SCORE: 32
ADLS_ACUITY_SCORE: 40
ADLS_ACUITY_SCORE: 32
ADLS_ACUITY_SCORE: 35
ADLS_ACUITY_SCORE: 44
ADLS_ACUITY_SCORE: 48
ADLS_ACUITY_SCORE: 44
ADLS_ACUITY_SCORE: 26
ADLS_ACUITY_SCORE: 48
ADLS_ACUITY_SCORE: 26
ADLS_ACUITY_SCORE: 44
ADLS_ACUITY_SCORE: 24
ADLS_ACUITY_SCORE: 44
ADLS_ACUITY_SCORE: 24
CHANGE_IN_FUNCTIONAL_STATUS_SINCE_ONSET_OF_CURRENT_ILLNESS/INJURY: YES
ADLS_ACUITY_SCORE: 44
ADLS_ACUITY_SCORE: 26
ADLS_ACUITY_SCORE: 24
TOILETING: 0-->INDEPENDENT
ADLS_ACUITY_SCORE: 44
ADLS_ACUITY_SCORE: 22
ADLS_ACUITY_SCORE: 35
ADLS_ACUITY_SCORE: 44
ADLS_ACUITY_SCORE: 44
ADLS_ACUITY_SCORE: 26
WALKING_OR_CLIMBING_STAIRS_DIFFICULTY: NO
ADLS_ACUITY_SCORE: 34
ADLS_ACUITY_SCORE: 44
ADLS_ACUITY_SCORE: 34
ADLS_ACUITY_SCORE: 32
ADLS_ACUITY_SCORE: 22
ADLS_ACUITY_SCORE: 48
ADLS_ACUITY_SCORE: 48
ADLS_ACUITY_SCORE: 40
ADLS_ACUITY_SCORE: 48
ADLS_ACUITY_SCORE: 35
ADLS_ACUITY_SCORE: 25
ADLS_ACUITY_SCORE: 42
ADLS_ACUITY_SCORE: 24
ADLS_ACUITY_SCORE: 22
ADLS_ACUITY_SCORE: 48
ADLS_ACUITY_SCORE: 48
ADLS_ACUITY_SCORE: 28
ADLS_ACUITY_SCORE: 32
ADLS_ACUITY_SCORE: 26
ADLS_ACUITY_SCORE: 48
ADLS_ACUITY_SCORE: 22
ADLS_ACUITY_SCORE: 44
ADLS_ACUITY_SCORE: 40
ADLS_ACUITY_SCORE: 22
ADLS_ACUITY_SCORE: 42
ADLS_ACUITY_SCORE: 24
ADLS_ACUITY_SCORE: 22
ADLS_ACUITY_SCORE: 22
ADLS_ACUITY_SCORE: 24
ADLS_ACUITY_SCORE: 45
ADLS_ACUITY_SCORE: 26
ADLS_ACUITY_SCORE: 42
ADLS_ACUITY_SCORE: 48
ADLS_ACUITY_SCORE: 48
ADLS_ACUITY_SCORE: 22
ADLS_ACUITY_SCORE: 28
ADLS_ACUITY_SCORE: 42
ADLS_ACUITY_SCORE: 24
ADLS_ACUITY_SCORE: 22
ADLS_ACUITY_SCORE: 48
ADLS_ACUITY_SCORE: 25
ADLS_ACUITY_SCORE: 48
ADLS_ACUITY_SCORE: 44
ADLS_ACUITY_SCORE: 32
ADLS_ACUITY_SCORE: 26
ADLS_ACUITY_SCORE: 48
ADLS_ACUITY_SCORE: 26
ADLS_ACUITY_SCORE: 22
ADLS_ACUITY_SCORE: 22
ADLS_ACUITY_SCORE: 40
ADLS_ACUITY_SCORE: 48
ADLS_ACUITY_SCORE: 22
ADLS_ACUITY_SCORE: 32
ADLS_ACUITY_SCORE: 35
ADLS_ACUITY_SCORE: 22
ADLS_ACUITY_SCORE: 32
ADLS_ACUITY_SCORE: 40
ADLS_ACUITY_SCORE: 48
ADLS_ACUITY_SCORE: 48
ADLS_ACUITY_SCORE: 26
ADLS_ACUITY_SCORE: 48
ADLS_ACUITY_SCORE: 48
ADLS_ACUITY_SCORE: 42
ADLS_ACUITY_SCORE: 35
ADLS_ACUITY_SCORE: 25
ADLS_ACUITY_SCORE: 22
ADLS_ACUITY_SCORE: 48
ADLS_ACUITY_SCORE: 35
ADLS_ACUITY_SCORE: 48
CURRENT_FUNCTION: NO ASSISTANCE NEEDED
ADLS_ACUITY_SCORE: 35
ADLS_ACUITY_SCORE: 26
ADLS_ACUITY_SCORE: 40
ADLS_ACUITY_SCORE: 34
ADLS_ACUITY_SCORE: 48
ADLS_ACUITY_SCORE: 48
ADLS_ACUITY_SCORE: 42
TOILETING: 0-->INDEPENDENT
ADLS_ACUITY_SCORE: 44
ADLS_ACUITY_SCORE: 44
DEPENDENT_IADLS:: INDEPENDENT
ADLS_ACUITY_SCORE: 34
ADLS_ACUITY_SCORE: 22
ADLS_ACUITY_SCORE: 48
ADLS_ACUITY_SCORE: 22
ADLS_ACUITY_SCORE: 32
ADLS_ACUITY_SCORE: 40
ADLS_ACUITY_SCORE: 25
ADLS_ACUITY_SCORE: 26
ADLS_ACUITY_SCORE: 44
ADLS_ACUITY_SCORE: 22
TOILETING_ASSISTANCE: TOILETING DIFFICULTY, REQUIRES EQUIPMENT
ADLS_ACUITY_SCORE: 44
ADLS_ACUITY_SCORE: 22
ADLS_ACUITY_SCORE: 24
ADLS_ACUITY_SCORE: 48
ADLS_ACUITY_SCORE: 22
ADLS_ACUITY_SCORE: 26
ADLS_ACUITY_SCORE: 48
ADLS_ACUITY_SCORE: 26
ADLS_ACUITY_SCORE: 48
ADLS_ACUITY_SCORE: 22
ADLS_ACUITY_SCORE: 42
ADLS_ACUITY_SCORE: 24
ADLS_ACUITY_SCORE: 24
ADLS_ACUITY_SCORE: 22
ADLS_ACUITY_SCORE: 44
ADLS_ACUITY_SCORE: 42
ADLS_ACUITY_SCORE: 44
ADLS_ACUITY_SCORE: 24
ADLS_ACUITY_SCORE: 24
ADLS_ACUITY_SCORE: 25
ADLS_ACUITY_SCORE: 26
ADLS_ACUITY_SCORE: 26
ADLS_ACUITY_SCORE: 25
ADLS_ACUITY_SCORE: 44
CURRENT_FUNCTION: NO ASSISTANCE NEEDED
ADLS_ACUITY_SCORE: 32
ADLS_ACUITY_SCORE: 42
ADLS_ACUITY_SCORE: 25
ADLS_ACUITY_SCORE: 44
ADLS_ACUITY_SCORE: 44
ADLS_ACUITY_SCORE: 34
ADLS_ACUITY_SCORE: 48
ADLS_ACUITY_SCORE: 48
ADLS_ACUITY_SCORE: 28
ADLS_ACUITY_SCORE: 40
ADLS_ACUITY_SCORE: 35
ADLS_ACUITY_SCORE: 25
ADLS_ACUITY_SCORE: 35
WEAR_GLASSES_OR_BLIND: NO
ADLS_ACUITY_SCORE: 34
ADLS_ACUITY_SCORE: 44
ADLS_ACUITY_SCORE: 26
ADLS_ACUITY_SCORE: 26
ADLS_ACUITY_SCORE: 40
ADLS_ACUITY_SCORE: 22
ADLS_ACUITY_SCORE: 44
ADLS_ACUITY_SCORE: 25
ADLS_ACUITY_SCORE: 48
ADLS_ACUITY_SCORE: 44
ADLS_ACUITY_SCORE: 25
ADLS_ACUITY_SCORE: 48
ADLS_ACUITY_SCORE: 22
ADLS_ACUITY_SCORE: 32
ADLS_ACUITY_SCORE: 25
ADLS_ACUITY_SCORE: 22
ADLS_ACUITY_SCORE: 44
ADLS_ACUITY_SCORE: 44
ADLS_ACUITY_SCORE: 48
ADLS_ACUITY_SCORE: 35
ADLS_ACUITY_SCORE: 48
ADLS_ACUITY_SCORE: 32
ADLS_ACUITY_SCORE: 44
TOILETING_ISSUES: YES
ADLS_ACUITY_SCORE: 40
ADLS_ACUITY_SCORE: 46
ADLS_ACUITY_SCORE: 44
ADLS_ACUITY_SCORE: 22
ADLS_ACUITY_SCORE: 24
ADLS_ACUITY_SCORE: 42
ADLS_ACUITY_SCORE: 48
ADLS_ACUITY_SCORE: 48
ADLS_ACUITY_SCORE: 22
ADLS_ACUITY_SCORE: 22
ADLS_ACUITY_SCORE: 34
ADLS_ACUITY_SCORE: 22
ADLS_ACUITY_SCORE: 22
ADLS_ACUITY_SCORE: 48
ADLS_ACUITY_SCORE: 22
ADLS_ACUITY_SCORE: 44
ADLS_ACUITY_SCORE: 44
ADLS_ACUITY_SCORE: 40
ADLS_ACUITY_SCORE: 34
ADLS_ACUITY_SCORE: 44
DRESSING/BATHING_DIFFICULTY: NO
ADLS_ACUITY_SCORE: 32
ADLS_ACUITY_SCORE: 24
ADLS_ACUITY_SCORE: 48
ADLS_ACUITY_SCORE: 42
DIFFICULTY_EATING/SWALLOWING: NO
ADLS_ACUITY_SCORE: 32
ADLS_ACUITY_SCORE: 44
ADLS_ACUITY_SCORE: 48
ADLS_ACUITY_SCORE: 26
ADLS_ACUITY_SCORE: 44
FALL_HISTORY_WITHIN_LAST_SIX_MONTHS: NO
ADLS_ACUITY_SCORE: 44
ADLS_ACUITY_SCORE: 44
ADLS_ACUITY_SCORE: 26
ADLS_ACUITY_SCORE: 32
ADLS_ACUITY_SCORE: 25
ADLS_ACUITY_SCORE: 34
ADLS_ACUITY_SCORE: 44
ADLS_ACUITY_SCORE: 22
CONCENTRATING,_REMEMBERING_OR_MAKING_DECISIONS_DIFFICULTY: NO
ADLS_ACUITY_SCORE: 22
ADLS_ACUITY_SCORE: 48
ADLS_ACUITY_SCORE: 44

## 2023-01-01 ASSESSMENT — PAIN SCALES - GENERAL
PAINLEVEL: NO PAIN (0)
PAINLEVEL: SEVERE PAIN (6)
PAINLEVEL: NO PAIN (0)
PAINLEVEL: NO PAIN (0)
PAINLEVEL: MODERATE PAIN (5)

## 2023-01-01 ASSESSMENT — PATIENT HEALTH QUESTIONNAIRE - PHQ9
SUM OF ALL RESPONSES TO PHQ QUESTIONS 1-9: 4
SUM OF ALL RESPONSES TO PHQ QUESTIONS 1-9: 4
10. IF YOU CHECKED OFF ANY PROBLEMS, HOW DIFFICULT HAVE THESE PROBLEMS MADE IT FOR YOU TO DO YOUR WORK, TAKE CARE OF THINGS AT HOME, OR GET ALONG WITH OTHER PEOPLE: NOT DIFFICULT AT ALL
SUM OF ALL RESPONSES TO PHQ QUESTIONS 1-9: 0
SUM OF ALL RESPONSES TO PHQ QUESTIONS 1-9: 0
10. IF YOU CHECKED OFF ANY PROBLEMS, HOW DIFFICULT HAVE THESE PROBLEMS MADE IT FOR YOU TO DO YOUR WORK, TAKE CARE OF THINGS AT HOME, OR GET ALONG WITH OTHER PEOPLE: SOMEWHAT DIFFICULT

## 2023-01-03 NOTE — PROGRESS NOTES
ANTICOAGULATION MANAGEMENT     Latanya Padron 72 year old female is on warfarin with supratherapeutic INR result. (Goal INR 2.0-3.0)    Recent labs: (last 7 days)     01/03/23  0951   INR 3.1*       ASSESSMENT     Source(s): Chart Review and Patient/Caregiver Call     Warfarin doses taken: Warfarin taken as instructed  Diet: Decreased greens/vitamin K in diet; plans to resume previous intake  New illness, injury, or hospitalization: No  Medication/supplement changes: None noted  Signs or symptoms of bleeding or clotting: No  Previous INR: Therapeutic last 2(+) visits  Additional findings: None       PLAN     Recommended plan for temporary change(s) affecting INR     Dosing Instructions: Continue your current warfarin dose with next INR in 3 weeks       Summary  As of 1/3/2023    Full warfarin instructions:  3.75 mg every Tue, Thu, Sat; 2.5 mg all other days   Next INR check:  1/24/2023             Telephone call with Ivette who verbalizes understanding and agrees to plan and who agrees to plan and repeated back plan correctly    Lab visit scheduled    Education provided:   Dietary considerations: importance of consistent vitamin K intake    Plan made per [facility] anticoagulation protocol    Zoey Pickard, RN  Anticoagulation Clinic  1/3/2023    _______________________________________________________________________     Anticoagulation Episode Summary     Current INR goal:  2.0-3.0   TTR:  65.1 % (1 y)   Target end date:  Indefinite   [facility]    Indications    AF (paroxysmal atrial fibrillation) (H) [I48.0]  Atrial fibrillation (H) [I48.91]  Long term current use of anticoagulant therapy [Z79.01]  Atrial fibrillation  unspecified type (H) (Resolved) [I48.91]  Atrial fibrillation  unspecified type (H) [I48.91]           Comments:           Anticoagulation Care Providers     Provider Role Specialty Phone number    Nazario Jones MD Referring Family Medicine [phone]

## 2023-01-05 NOTE — TELEPHONE ENCOUNTER
SUBJECTIVE/OBJECTIVE:                                                    Refill request receive for:  Anastrozole    Last refill per fax: 10/6/22  Date of LOV r/t Medication: 8/18/22  Future appt scheduled? Yes: 1/19/23   Date faxed to clinic: 1/5/23    PLAN:                                                      Sent to provider to advise.

## 2023-01-10 NOTE — TELEPHONE ENCOUNTER
Prescription approved per [facility] Refill Protocol.    Caprice Kellogg RN on 1/10/2023 at 2:20 PM

## 2023-01-10 NOTE — TELEPHONE ENCOUNTER
Medication Request (Pharmacy shows that we received a script on 10/24/22 for an Albuteral Inhaler, we requested Albuteral neb solution. Please verify and send new RX to [facility]. Thank you.)  (Newest Message First)  Abigail Morrow routed conversation to [facility] [address] [place] [address] 20 hours ago (3:43 PM)     Bree Encinas routed conversation to [facility]

## 2023-01-10 NOTE — TELEPHONE ENCOUNTER
Routing refill request to provider for review/approval because:    Requested Prescriptions   Pending Prescriptions Disp Refills    diltiazem ER COATED BEADS (CARDIZEM CD/CARTIA XT) 120 MG 24 hr capsule [Pharmacy Med Name: DILTIAZEM HCL ER COATED  CP24] 90 capsule 3     Sig: TAKE ONE CAPSULE BY MOUTH ONCE DAILY ( LABS DUE )       Calcium Channel Blockers Protocol  Failed - 1/6/2023  9:09 AM        Failed - Normal serum creatinine on file in past 12 months     Recent Labs   Lab Test 07/12/22  0902   CR 1.25*       Ok to refill medication if creatinine is low

## 2023-01-11 NOTE — TELEPHONE ENCOUNTER
I made a referral to adult pulmonology for a second opinion.  Do we need to make that appt for her?  It didn't say .      Electronically signed by:  Nazario Jones M.D.  1/10/2023

## 2023-01-14 NOTE — TELEPHONE ENCOUNTER
Gave patient the billing inquiry number:  [id number]      Debbie HERNÁNDEZ RN. . .  7/18/2019, 11:41 AM     1 person assist

## 2023-01-19 NOTE — PROGRESS NOTES
[facility] Hematology / Oncology  Progress Note  Name: Latanya Padron  :  1950    MRN:  [id number]    --------------------    Assessment / Plan:  Stage 1A (pT2 pN0 cMX), hormone-positive, HER2-negative, invasive lobular carcinoma left breast  # Sep 2018 Presented w/ abnormal screening mammogram; ultrasound w/ no abnormalities; 6-month follow-up recommended.  # 2019 Repeat mammogram and ultrasound w/ heterogeneous mass left breast measuring 11 x 5 x 5 mm; biopsy w/ grade 2 invasive lobular carcinoma, ER/MN positive/HER2 negative; breast MRI w/ 2 cm focus of masslike enhancement without other signs of disease in either breast or adenopathy.  # May 2019 Left breast lumpectomy with sentinel lymph node; pathology w/ (30 mm, unifocial, no ALI, negative margins, 3/3 nodes negative)  # May 2019 Onco score 15; adjuvant chemotherapy not indicatedtype Dx.  # 2019 Completed adjuvant breast radiation.  # 2019 Started adjuvant anastrozole  Osteoporosis:  # 2019 DEXA scan w/ T score -2.7.  # Prolia q6 months ongoing.  Family history of cancer:  # Mother w/ breast cancer in 70s.  # Sister w/ breast cancer in her 40s.  # Maternal cousin w/ breast cancer in her 60s.  # Maternal uncle with stage IV colon cancer.  # Brother w/ prostate cancer.  # Genetic testing not covered by insurance.    Latanya is doing well and shows no signs of clinical recurrence.  She is tolerating adjuvant Arimidex well and without major toxicities.  She will remain on it for 10 years.  She will continue with Prolia every 6 months for prevention of bone thinning associated Arimidex as well as known osteoporosis as well as calcium and vitamin D.  She will continue with annual mammogram.  She will continue with age-appropriate cancer surveillance and vaccinations through her primary care provider.      Vin Dhillon MD    --------------------    Interval History:  Latanya returns for follow up of invasive lobular breast  "cancer.  All in all, Ivette is well.  Hot flashes and achy joints have resolved!  No breast concerns.  Tolerating Prolia well and without major symptoms as well.  Using some collagen.    --------------------    Physical Exam:  VS: /62   Pulse 114   Temp 98.7  F (37.1  C) (Temporal)   Ht 1.588 m (5' 2.5\")   Wt 84 kg (185 lb 3.2 oz)   SpO2 90%   BMI 33.33 kg/m    GEN: Well appearing.  BREAST: No palpable breast masses or axillary adenopathy.    Labs / Imaging:  We reviewed breast mammogram.  "

## 2023-01-19 NOTE — PROGRESS NOTES
Infusion Nursing Note:  Latanya Padron presents today for Prolia.    Patient seen by provider today: Yes: Moy      Note: Injection given subcutaneous in left arm.    Intravenous Access:  No Intravenous access at this visit.    Treatment Conditions:  Lab Results   Component Value Date     (L) 01/19/2023    POTASSIUM 4.2 01/19/2023    CR 1.11 (H) 01/19/2023    IVANNA 10.2 01/19/2023    BILITOTAL 0.5 01/19/2023    ALBUMIN 3.8 01/19/2023    ALT 16 01/19/2023    ALT 16 01/19/2023    AST 21 01/19/2023     Results reviewed, labs MET treatment parameters, ok to proceed with treatment.    Post Infusion Assessment:  Patient tolerated injection without incident.     Discharge Plan:   Discharge instructions reviewed with: Patient.  Patient and/or family verbalized understanding of discharge instructions and all questions answered.  Patient discharged in stable condition accompanied by: self.  Departure Mode: Ambulatory.      Comfort Benson RN

## 2023-01-19 NOTE — LETTER
2023         RE: Latanya Padron  [address] [facility] [address]        Dear Colleague,    Thank you for referring your patient, Latanya Padron, to the [facility] [place]. Please see a copy of my visit note below.    [facility] Hematology / Oncology  Progress Note  Name: Latanya Padron  :  1950    MRN:  [id number]    --------------------    Assessment / Plan:  Stage 1A (pT2 pN0 cMX), hormone-positive, HER2-negative, invasive lobular carcinoma left breast  # Sep 2018 Presented w/ abnormal screening mammogram; ultrasound w/ no abnormalities; 6-month follow-up recommended.  # 2019 Repeat mammogram and ultrasound w/ heterogeneous mass left breast measuring 11 x 5 x 5 mm; biopsy w/ grade 2 invasive lobular carcinoma, ER/VA positive/HER2 negative; breast MRI w/ 2 cm focus of masslike enhancement without other signs of disease in either breast or adenopathy.  # May 2019 Left breast lumpectomy with sentinel lymph node; pathology w/ (30 mm, unifocial, no ALI, negative margins, 3/3 nodes negative)  # May 2019 Onco score 15; adjuvant chemotherapy not indicatedtype Dx.  # 2019 Completed adjuvant breast radiation.  # 2019 Started adjuvant anastrozole  Osteoporosis:  # 2019 DEXA scan w/ T score -2.7.  # Prolia q6 months ongoing.  Family history of cancer:  # Mother w/ breast cancer in 70s.  # Sister w/ breast cancer in her 40s.  # Maternal cousin w/ breast cancer in her 60s.  # Maternal uncle with stage IV colon cancer.  # Brother w/ prostate cancer.  # Genetic testing not covered by insurance.    Latanya is doing well and shows no signs of clinical recurrence.  She is tolerating adjuvant Arimidex well and without major toxicities.  She will remain on it for 10 years.  She will continue with Prolia every 6 months for prevention of bone thinning associated Arimidex as well as known osteoporosis as well as calcium and vitamin D.  She will continue with  "annual mammogram.  She will continue with age-appropriate cancer surveillance and vaccinations through her primary care provider.      Vin Dhillon MD    --------------------    Interval History:  Latanya returns for follow up of invasive lobular breast cancer.  All in all, Ivette is well.  Hot flashes and achy joints have resolved!  No breast concerns.  Tolerating Prolia well and without major symptoms as well.  Using some collagen.    --------------------    Physical Exam:  VS: /62   Pulse 114   Temp 98.7  F (37.1  C) (Temporal)   Ht 1.588 m (5' 2.5\")   Wt 84 kg (185 lb 3.2 oz)   SpO2 90%   BMI 33.33 kg/m    GEN: Well appearing.  BREAST: No palpable breast masses or axillary adenopathy.    Labs / Imaging:  We reviewed breast mammogram.      Again, thank you for allowing me to participate in the care of your patient.        Sincerely,        Vin Dhillon MD    "

## 2023-01-24 NOTE — PROGRESS NOTES
ANTICOAGULATION MANAGEMENT     Latanya Padron 72 year old female is on warfarin with therapeutic INR result. (Goal INR 2.0-3.0)    Recent labs: (last 7 days)     01/24/23  0943   INR 2.9*       ASSESSMENT     Source(s): Chart Review and Patient/Caregiver Call     Warfarin doses taken: Warfarin taken as instructed  Diet: No new diet changes identified  New illness, injury, or hospitalization: No  Medication/supplement changes: None noted  Signs or symptoms of bleeding or clotting: No  Previous INR: Supratherapeutic  Additional findings: None       PLAN     Recommended plan for no diet, medication or health factor changes affecting INR     Dosing Instructions: Continue your current warfarin dose with next INR in 4 weeks       Summary  As of 1/24/2023    Full warfarin instructions:  3.75 mg every Tue, Thu, Sat; 2.5 mg all other days   Next INR check:  2/21/2023             Telephone call with Ivette who verbalizes understanding and agrees to plan and who agrees to plan and repeated back plan correctly    Lab visit scheduled    Education provided:   Dietary considerations: importance of consistent vitamin K intake    Plan made per [facility] anticoagulation protocol    Zoey Pickard RN  Anticoagulation Clinic  1/24/2023    _______________________________________________________________________     Anticoagulation Episode Summary     Current INR goal:  2.0-3.0   TTR:  62.2 % (1 y)   Target end date:  Indefinite   Send INR reminders to:  [facility]    Indications    AF (paroxysmal atrial fibrillation) (H) [I48.0]  Atrial fibrillation (H) [I48.91]  Long term current use of anticoagulant therapy [Z79.01]  Atrial fibrillation  unspecified type (H) (Resolved) [I48.91]  Atrial fibrillation  unspecified type (H) [I48.91]           Comments:           Anticoagulation Care Providers     Provider Role Specialty Phone number    Nazario Jones MD Referring Family Medicine [phone]

## 2023-02-03 NOTE — PROGRESS NOTES
SUBJECTIVE:                                                    Latanya Padron is a 67 year old female who presents to clinic today for the following health issues:      Hyperlipidemia Follow-Up      Rate your low fat/cholesterol diet?: fair    Taking statin?  Yes, possible muscle aches from statin    Other lipid medications/supplements?:  Fish oil/Omega 3, dose 1200 mg without side effects     Hypertension Follow-up      Outpatient blood pressures are being checked at home.  Results are 124/68.    Low Salt Diet: low salt     Depression Followup    Status since last visit: Stable     See PHQ-9 for current symptoms.  Other associated symptoms: None    Complicating factors:   Significant life event:  Yes-     Current substance abuse:  None  Anxiety or Panic symptoms:  No    PHQ-9  English PHQ-9   Any Language          COPD Follow-Up    Symptoms are currently: stable    Current fatigue or dyspnea with ambulation: stable     Shortness of breath: stable    Increased or change in Cough/Sputum: No    Fever(s): No    Baseline ambulation without stopping to rest 1/2 feet, blocks. Able to walk up 1 flights of stairs without stopping to rest.    Any ER/UC or hospital admissions since your last visit? No     History   Smoking Status     Former Smoker     Packs/day: 1.00     Years: 30.00     Quit date: 10/25/2005   Smokeless Tobacco     Never Used     Lab Results   Component Value Date    FEV1 0.50 04/05/2010    VDJ6JAR 0.33 04/05/2010        Recheck A fib:    Amount of exercise or physical activity: None    Problems taking medications regularly: No    Medication side effects: none    Diet: low salt and low fat/cholesterol      Patient working through grief of both parents death in the last year and difficulty dealing with her brother    Problem list and histories reviewed & adjusted, as indicated.  Additional history: as documented    BP Readings from Last 3 Encounters:   06/21/17 132/70   04/17/17 124/64   12/19/16 108/68     Wt Readings from Last 3 Encounters:   06/21/17 218 lb (98.9 kg)   04/17/17 209 lb (94.8 kg)   12/19/16 210 lb (95.3 kg)                  Labs reviewed in EPIC    Reviewed and updated as needed this visit by clinical staff       Reviewed and updated as needed this visit by Provider         ROS:  Constitutional, HEENT, cardiovascular, pulmonary, gi and gu systems are negative, except as otherwise noted.  Generally feels that breathing and other medical problems are well controlled.    OBJECTIVE:                                                    /70  Pulse 84  Temp 97.3  F (36.3  C) (Temporal)  Resp 16  Wt 218 lb (98.9 kg)  SpO2 100%  BMI 40.52 kg/m2  Body mass index is 40.52 kg/(m^2).  GENERAL: healthy, alert and no distress  EYES: Eyes grossly normal to inspection, PERRL and conjunctivae and sclerae normal  NECK: no adenopathy, no asymmetry, masses, or scars and thyroid normal to palpation  RESP: lungs clear to auscultation - no rales, rhonchi or wheezes  CV: Do not hear a murmur today heart seems regular  ABDOMEN: soft, nontender, no hepatosplenomegaly, no masses and bowel sounds normal  MS: no gross musculoskeletal defects noted, no edema  SKIN: no suspicious lesions or rashes  NEURO: Grossly negative  PSYCH: mentation appears normal, affect normal/bright    Diagnostic Test Results:  Results for orders placed or performed in visit on 06/21/17   Basic metabolic panel   Result Value Ref Range    Sodium 141 133 - 144 mmol/L    Potassium 4.0 3.4 - 5.3 mmol/L    Chloride 105 94 - 109 mmol/L    Carbon Dioxide 33 (H) 20 - 32 mmol/L    Anion Gap 3 3 - 14 mmol/L    Glucose 119 (H) 70 - 99 mg/dL    Urea Nitrogen 17 7 - 30 mg/dL    Creatinine 0.95 0.52 - 1.04 mg/dL    GFR Estimate 59 (L) >60 mL/min/1.7m2    GFR Estimate If Black 71 >60 mL/min/1.7m2    Calcium 8.7 8.5 - 10.1 mg/dL   CBC with platelets   Result Value Ref Range    WBC 9.0 4.0 - 11.0 10e9/L    RBC Count 4.58 3.8 - 5.2 10e12/L    Hemoglobin 13.1 11.7  "- 15.7 g/dL    Hematocrit 41.4 35.0 - 47.0 %    MCV 90 78 - 100 fl    MCH 28.6 26.5 - 33.0 pg    MCHC 31.6 31.5 - 36.5 g/dL    RDW 12.7 10.0 - 15.0 %    Platelet Count 290 150 - 450 10e9/L        ASSESSMENT/PLAN:                                                        BMI:   Estimated body mass index is 40.52 kg/(m^2) as calculated from the following:    Height as of 4/17/17: 5' 1.5\" (1.562 m).    Weight as of this encounter: 218 lb (98.9 kg).   Weight management plan: Discussed healthy diet and exercise guidelines and patient will follow up in 6 months in clinic to re-evaluate.      1. Chronic atrial fibrillation (H)  Currently well controlled and will continue all current plans.  - INR CLINIC REFERRAL  - diltiazem (CARTIA XT) 120 MG 24 hr capsule; TAKE ONE CAPSULE BY MOUTH EVERY DAY (DUE FOR FOLLOW-UP APPOINTMENT)  Dispense: 90 capsule; Refill: 3  - warfarin (JANTOVEN) 5 MG tablet; Take 5 mg Mon, Thursday and 2.5 mg other 5 days or as directed by coumadin clinic  Dispense: 60 tablet; Refill: 5    2. Hyperlipidemia LDL goal <130  At last testing numbers were good and we'll continue current medications  - atorvastatin (LIPITOR) 10 MG tablet; Take 1 tablet (10 mg) by mouth daily  Dispense: 90 tablet; Refill: 3    3. Hypertension, goal below 140/90  Adequate control with current medications  - diltiazem (CARTIA XT) 120 MG 24 hr capsule; TAKE ONE CAPSULE BY MOUTH EVERY DAY (DUE FOR FOLLOW-UP APPOINTMENT)  Dispense: 90 capsule; Refill: 3  - hydrochlorothiazide (HYDRODIURIL) 25 MG tablet; Take 1 tablet (25 mg) by mouth daily  Dispense: 90 tablet; Refill: 3  - lisinopril (PRINIVIL/ZESTRIL) 2.5 MG tablet; Take 1 tablet (2.5 mg) by mouth daily  Dispense: 90 tablet; Refill: 3  - Basic metabolic panel  - CBC with platelets    4. Pulmonary emphysema, unspecified emphysema type (H)  Long-standing issues and very well-controlled with current medications. She is very functional at this time.  - mometasone-formoterol (DULERA) 200-5 " MCG/ACT oral inhaler; INHALE 2 PUFFS BY MOUTH TWO TIMES A DAY  Dispense: 13 g; Refill: 11  - umeclidinium (INCRUSE ELLIPTA) 62.5 MCG/INH oral inhaler; Inhale 1 puff into the lungs daily  Dispense: 1 Inhaler; Refill: 11    5. Major depression in complete remission (H)  Even with grief depression seems to be doing well.  - venlafaxine (EFFEXOR-XR) 37.5 MG 24 hr capsule; TAKE ONE CAPSULE BY MOUTH EVERY DAY WITH FOOD  Dispense: 90 capsule; Refill: 3    6. Long-term (current) use of anticoagulants [Z79.01]  Speaks for itself  - warfarin (JANTOVEN) 5 MG tablet; Take 5 mg Mon, Thursday and 2.5 mg other 5 days or as directed by coumadin clinic  Dispense: 60 tablet; Refill: 5    7. CKD (chronic kidney disease) stage 3, GFR 30-59 ml/min  First-time GFR is less than 60. Patient will be reassured that this is not a big issue to call this chronic kidney disease    8. Morbid obesity  As stress reduces and she can be more active this summer, we will be lost. She is in weight watchers and a goal weight is given.  MEDICATIONS:  Continue current medications without change  Work on weight loss  Regular exercise  Patient Instructions   The current medical regimen is effective;  continue present plan and medications.      Follow-up could be in 6 months unless we have another health maintenance concerns before then.  Glen Blue MD  [facility]   Risk Alerts: No risk alerts present

## 2023-02-14 NOTE — TELEPHONE ENCOUNTER
Patient was having a low heart rate last night.  She feels better today.    No chest pain last night or today.  She did not have any more breathing difficulty than normal.    She states her blood pressure was 89/58. Pulse was 124 last night.    Today her blood pressure 125/62 pulse 88.      Patient would like Dr. Jones to know that this happened and she would like to know if she should be seen.    Please advise.  Caprice Kellogg RN on 2/14/2023 at 10:12 AM

## 2023-02-16 NOTE — TELEPHONE ENCOUNTER
I put in a referral to cardiology so they can review her heart monitor strip from last year and see if any further heart studies need to be done to further evaluate for this.    Electronically signed by:  Nazario Jones M.D.  2/16/2023

## 2023-02-16 NOTE — TELEPHONE ENCOUNTER
If this is happening frequently, then we need to put her on a heart monitor to see what is going on throughout her days and nights.  If this is infrequent then that is less concerning.  When is the last time she saw cardiology?    Electronically signed by:  Nazario Jones M.D.  2/15/2023

## 2023-02-16 NOTE — TELEPHONE ENCOUNTER
She had this  Happen once before about 9 months ago. Patient says she has not seen cardiology before.

## 2023-02-16 NOTE — PROGRESS NOTES
Does Latanya have home oxygen?  Yes     (If yes please fill out below.  If no please delete links)    HOME OXYGEN  Concentrator  O2 flow rate 2-2.5 L/min during sleep, and if moving more at home, going up and down steps    nasal cannula    [facility]: [facility] [phone]    [email or web address]

## 2023-02-17 NOTE — PROGRESS NOTES
72-year-old woman last seen in pulmonary clinic in 2015.  She been followed for several years with emphysema and severe COPD related to cigarette smoking however she stopped smoking in 2005.  She was using prn O2 at home and on dulera and spiriva and prn albuterol.    She now returns for follow-up as she had difficulty this summer with increasing cough, congestion, green sputum and chest tightness.  She was seen in the clinic several times had 2 courses of prednisone and 2 courses of antibiotics and had some improvement.  No hospitalizations.  She is no longer on Spiriva as it left a very bad taste in her mouth.  She continues on Dulera twice daily.  Has both albuterol MDI and DuoNeb and uses either 1 about once per week.  She wears her oxygen at night.  Occasionally during the day, most the time her oxygen levels are above 90%.  No hospitalizations.    She has had 4 COVID vaccinations last one was in October 2022.  She has not had a COVID infection.  She is up-to-date on her influenza vaccinations.  She is worked hard to eat less junk food especially at night and has actually lost about 45 pounds over 5 years.  No lower extremity edema.  She continues on warfarin long-term for paroxysmal atrial fibrillation.  She is scheduled for follow-up cardiology visit soon.  Since last pulmonary visit she developed left breast cancer, stage Ia.  Had a lumpectomy and adjuvant breast radiation.  Has been maintained on Arimidex since then.    She does not have prednisone for use at home.  Last course of antibiotics was about 6 months ago.  As she is able to do mild to moderate activities including shopping in a large store and cleaning her house for long she goes slowly.                Current Outpatient Medications   Medication Sig Dispense Refill     albuterol (PROAIR HFA/PROVENTIL HFA/VENTOLIN HFA) 108 (90 Base) MCG/ACT inhaler INHALE ONE TO TWO PUFFS BY MOUTH EVERY 2-4 HOURS AS NEEDED 18 g 11     albuterol (PROVENTIL) (2.5  MG/3ML) 0.083% neb solution Take  by nebulization. 1 NEB EVERY 4-6 HOURS AS NEEDED 75 mL 1     anastrozole (ARIMIDEX) 1 MG tablet Take 1 tablet (1 mg) by mouth daily 90 tablet 3     ASPIRIN NOT PRESCRIBED (INTENTIONAL) Please choose reason not prescribed from choices below. (Patient not taking: Reported on 1/19/2023)       atorvastatin (LIPITOR) 10 MG tablet TAKE ONE TABLET BY MOUTH ONCE DAILY 90 tablet 1     BIOTIN PO Take by mouth daily       Calcium Carb-Cholecalciferol 500-400 MG-UNIT TABS Take 1 tablet by mouth 2 times daily 180 tablet 3     Cyanocobalamin (B-12) 1000 MCG TBCR Take 1,000 mcg by mouth daily 100 tablet 1     diltiazem ER COATED BEADS (CARDIZEM CD/CARTIA XT) 120 MG 24 hr capsule TAKE ONE CAPSULE BY MOUTH ONCE DAILY ( LABS DUE ) 90 capsule 3     fluticasone (FLONASE) 50 MCG/ACT nasal spray Spray 1 spray into both nostrils daily For stuffy/runny nose 15.8 mL 0     hydrochlorothiazide (HYDRODIURIL) 25 MG tablet Take 1 tablet (25 mg) by mouth daily 90 tablet 3     lisinopril (ZESTRIL) 2.5 MG tablet Take 1 tablet (2.5 mg) by mouth daily 90 tablet 3     magnesium 250 MG tablet Take 1 tablet by mouth daily 30 tablet      mometasone-formoterol (DULERA) 200-5 MCG/ACT inhaler INHALE 2 PUFFS BY MOUTH TWO TIMES A DAY 13 g 11     MYRBETRIQ 50 MG 24 hr tablet        order for DME Equipment being ordered: Oxygen 1 each 0     order for DME Equipment being ordered: Nebulizer 1 Device 0     ORDER FOR DME Equipment being ordered: Oxygen @ 2 liters with exertion       Probiotic Product (PROBIOTIC PO) Take by mouth daily       venlafaxine (EFFEXOR XR) 75 MG 24 hr capsule TAKE ONE CAPSULE BY MOUTH ONCE DAILY 90 capsule 3     vitamin B-Complex        VITAMIN D PO        warfarin ANTICOAGULANT (JANTOVEN ANTICOAGULANT) 2.5 MG tablet TAKE 1 1/2 TABLETS BY MOUTH ON TUES, THURS, SATURDAY AND 1 TABLET ALL OTHER DAYS OR AS DIRECTED BY THE COUMADIN CLINIC. 100 tablet 3         REVIEW OF SYSTEMS:  No fever, fatigue or anorexia.  "No abdominal pain, nausea or diarrhea.  RESPIRATORY EXAM:  /80   Ht 1.588 m (5' 2.5\")   Wt 83.9 kg (185 lb)   SpO2 92%   BMI 33.30 kg/m    O2 saturation 92% on room air   Moves easily to exam table without dyspnea  No jugular venous distention  No respiratory accessory muscle use. Thorax normal configuration. Clear breath sounds bilaterally.  Normal S1 and S2 heart sounds without murmur rub or gallop. No limb edema.but adiposity in legs.   Abdomen non-distended  No digit cyanosis  No skin rash.   Affect and cognition are normal.    Aug 2022 CT :   1.  The opacity along the left lower chest seen on comparison chest  radiographs corresponds with increased consolidation and volume loss  in the inferior lingula left lung today. There is no evidence for  centrally obstructing process, and this appearance is most consistent  with increased atelectasis or scarring/fibrosis.  2.  There is some additional subpleural consolidation and volume loss  along the anterior left upper lobe which is similar to previous,  suspected to represent post radiation change.  3.  Cylindrical bronchiectasis again seen in both lungs, most  pronounced along posteromedial right lower lobe bronchi. There is some  associated bronchial wall thickening and scattered mucus plugging.    Serum bicarbonate is 29 suggesting the absence of chronic hypercapnia.  Hemoglobin in July 20 20-11.9.    Pulmonary function test obtained today and reviewed by me showed good effort.  Vital capacity 1.16 L, 42% of predicted.  FEV1 0.51 L, 24% of predicted with a ratio of 44%.  This suggest severe airflow obstruction with decreased vital capacity due to air trapping although lung volumes would be needed to confirm.  Last pulmonary function test in 2013 was 0.46, and in 2010 it was 0.50 therefore its been quite stable over more than a 10-year interval.    Assessment: Severe COPD with emphysema and bronchiectasis which is probably the cause of her chronic cough " and sputum.  However has remained quite stable despite severely reduced FEV1. Mild chronic radiation changes in left lung.   Mild oxygen needs.  I told her I do not think she needs another course of antibiotics as long as her sputum volume, exercise capacity and oxygen levels are not changing.  However I did prescribe home prednisone use for home use when there is increased sputum volume, wheezing, chest tightness or decreased exercise capacity.  40 mg a day x5 days.  If that does not help then she should call her primary for possible course of antibiotics.  Continue Dulera, I told her that she should try and take her either albuterol DuoNeb on a fixed twice daily basis for 2 weeks and see if that helps her exercise capacity even more if not then decreased to just as needed.  She has had adequate COVID vaccinations.  Does not need repeat CT scan for lung cancer screening purposes as she has been a non-smoker for about 18 years.  Return to clinic as needed.    7 minutes prep prior to visit, 35 minutes face to face and 7 minutes post visit all on DOS.  Total 49.       .  Lalo Grady MD, MPH  Associate Professor of Medicine

## 2023-02-20 NOTE — TELEPHONE ENCOUNTER
Patient is calling because she was given a Rx on Friday for prednisone. She notes that it has affected her INR in the past so she did not start it. She is going to start it today. She was scheduled for tomorrow for an INR ut has cancelled it because of the predicted weather for later this week.     She is calling to see if any adjustment to her dosing is needed and would like a call back.    Domi Encinas RN    [facility]

## 2023-02-20 NOTE — TELEPHONE ENCOUNTER
Spoke with pt. Moved her INR appt to next Monday, adjusted her dose one day due to illness/medication. [facility] calendar updated.    MARCELO CeronN, RN- [facility] RN

## 2023-02-27 NOTE — PROGRESS NOTES
ANTICOAGULATION MANAGEMENT     Latanya Padron 72 year old female is on warfarin with supratherapeutic INR result. (Goal INR 2.0-3.0)    Recent labs: (last 7 days)     02/27/23  1121   INR 4.7*       ASSESSMENT     Warfarin Lab Questionnaire    No flowsheet data found.  Pt Rptd Dose SUNDAY MONDAY TUESDAY WEDNESDAY THURSDAY FRIDAY SATURDAY 2/26/2023 1 tablet 1 tablet 1.5 tablet 1 tablet 1 tablet 1 tablet 1 tablet     Warfarin Lab Questionnaire 2/26/2023   Missed doses? No   Medication changes? Yes   Please list: Predisone also started Myrbetriq one month ago    Abnormal bleeding? No   Shortness of breath? No   Injuries or illness since last INR? No   Changes in diet or alcohol? No   Upcoming surgery, procedure? No       Additional findings: finished prednisone on 2/24/23 and she states that she started Myrbetriq one month ago for her bladder, no interacton expected.         PLAN     Recommended plan for temporary change(s) affecting INR     Dosing Instructions: hold dose then continue your current warfarin dose with next INR in 10 days       Summary  As of 2/27/2023    Full warfarin instructions:  2/27: Hold; Otherwise 3.75 mg every Tue, Thu, Sat; 2.5 mg all other days   Next INR check:  3/9/2023             Telephone call with Ivette who verbalizes understanding and agrees to plan    Lab visit scheduled    Education provided:   Goal range and lab monitoring: goal range and significance of current result  Contact [phone]  with any changes, questions or concerns.     Plan made per [facility] anticoagulation protocol    Elin Booker RN  Anticoagulation Clinic  2/27/2023    _______________________________________________________________________     Anticoagulation Episode Summary     Current INR goal:  2.0-3.0   TTR:  60.2 % (1 y)   Target end date:  Indefinite   Send INR reminders to:  [facility] NASCIMENT[facility]    Indications    AF (paroxysmal atrial fibrillation) (H) [I48.0]  Atrial fibrillation (H) [I48.91]  Long term  current use of anticoagulant therapy [Z79.01]  Atrial fibrillation  unspecified type (H) (Resolved) [I48.91]  Atrial fibrillation  unspecified type (H) [I48.91]           Comments:           Anticoagulation Care Providers     Provider Role Specialty Phone number    Nazario Jones MD Referring Family Medicine [phone]

## 2023-03-03 NOTE — TELEPHONE ENCOUNTER
"Nurse Triage SBAR     Situation: Patient calling with abdominal pain     Background: Intermittent abdominal pain for a month. Wants to see a provider but states all of the providers available are ones she does not want to see so she is thinking about contacting her oncologist instead     Assessment: RLQ pain that can range from a 3/10-8/10. Only lasts for a couple of minutes then goes away. No vomiting. Last BM today. No bleeding noted. Eating normal. Denies any other symptoms     Recommendation: [facility] advised  Reviewed care advice per protocol  Patient verbalizes understanding and agrees to plan        Reason for Disposition    [1] MILD pain (e.g., does not interfere with normal activities) AND [2] pain comes and goes (cramps) AND [3] present > 48 hours  (Exception: this same abdominal pain is a chronic symptom recurrent or ongoing AND present > 4 weeks)    Additional Information    Negative: Chest pain    Negative: Pain is mainly in upper abdomen  (if needed ask: \"is it mainly above the belly button?\")    Negative: Followed an abdomen (stomach) injury    Negative: [1] Abdominal pain AND [2] pregnant < 20 weeks    Negative: [1] Abdominal pain AND [2] pregnant 20 or more weeks    Negative: [1] Abdominal pain AND [2] postpartum (from 0 to 6 weeks after delivery)    Negative: Shock suspected (e.g., cold/pale/clammy skin, too weak to stand, low BP, rapid pulse)    Negative: Difficult to awaken or acting confused (e.g., disoriented, slurred speech)    Negative: Passed out (i.e., lost consciousness, collapsed and was not responding)    Negative: Sounds like a life-threatening emergency to the triager    Negative: [1] SEVERE pain (e.g., excruciating) AND [2] present > 1 hour    Negative: [1] SEVERE pain AND [2] age > 60 years    Negative: [1] Vomiting AND [2] contains red blood or black (\"coffee ground\") material  (Exception: few red streaks in vomit that only happened once)    Negative: Blood in bowel movements (Exception: " blood on surface of BM with constipation)    Negative: Black or tarry bowel movements (Exception: chronic-unchanged black-grey bowel movements AND is taking iron pills or Pepto-bismol)    Negative: Patient sounds very sick or weak to the triager    Negative: [1] MILD-MODERATE pain AND [2] constant AND [3] present > 2 hours    Negative: [1] Vomiting AND [2] abdomen looks much more swollen than usual    Negative: [1] Vomiting AND [2] contains bile (green color)    Negative: White of the eyes have turned yellow (i.e., jaundice)    Negative: Fever > 103 F (39.4 C)    Negative: [1] Fever > 101 F (38.3 C) AND [2] age > 60 years    Negative: [1] Fever > 100.0 F (37.8 C) AND [2] bedridden (e.g., nursing home patient, CVA, chronic illness, recovering from surgery)    Negative: [1] Fever > 100.0 F (37.8 C) AND [2] diabetes mellitus or weak immune system (e.g., HIV positive, cancer chemo, splenectomy, organ transplant, chronic steroids)    Negative: [1] SEVERE pain AND [2] present < 1 hour    Negative: [1] MODERATE pain (e.g., interferes with normal activities) AND [2] pain comes and goes (cramps) AND [3] present > 24 hours  (Exception: pain with Vomiting or Diarrhea - see that Guideline)    Protocols used: ABDOMINAL PAIN - FEMALE-A-AH      Leeann Figueroa RN [facility] March 3, 2023 5:04 PM

## 2023-03-08 NOTE — PROGRESS NOTES
ANTICOAGULATION MANAGEMENT     Latanya Padron 72 year old female is on warfarin with supratherapeutic INR result. (Goal INR 2.0-3.0)    Recent labs: (last 7 days)     03/08/23  1156   INR 3.5*       ASSESSMENT     Warfarin Lab Questionnaire    Dose in Tablet or Mg 3/8/2023   TAB or MG? tablet (tab)     Pt Rptd Dose REBEKAH MONDAY TUESDAY WEDNESDAY THURSDAY FRIDAY SATURDAY   3/8/2023 1 1 1.5 1 1.5 1 1.5     Warfarin Lab Questionnaire 3/8/2023   Missed doses? No   Medication changes? No   Please list: -   Abnormal bleeding? No   Shortness of breath? No   Injuries or illness since last INR? No   Changes in diet or alcohol? No   Upcoming surgery, procedure? No       Additional findings: None       PLAN     Recommended plan for no diet, medication or health factor changes affecting INR     Dosing Instructions: decrease your warfarin dose (6% change) with next INR in 2 weeks       Summary  As of 3/8/2023    Full warfarin instructions:  3.75 mg every Tue, Sat; 2.5 mg all other days   Next INR check:  3/22/2023             Telephone call with Ivette who verbalizes understanding and agrees to plan    Lab visit scheduled    Education provided:   Goal range and lab monitoring: goal range and significance of current result  Contact [phone]  with any changes, questions or concerns.     Plan made per [facility] anticoagulation protocol    Elin Booker, RN  Anticoagulation Clinic  3/8/2023    _______________________________________________________________________     Anticoagulation Episode Summary     Current INR goal:  2.0-3.0   TTR:  60.1 % (1 y)   Target end date:  Indefinite   Send INR reminders to:  [facility]CIM[facility]    Indications    AF (paroxysmal atrial fibrillation) (H) [I48.0]  Atrial fibrillation (H) [I48.91]  Long term current use of anticoagulant therapy [Z79.01]  Atrial fibrillation  unspecified type (H) (Resolved) [I48.91]  Atrial fibrillation  unspecified type (H) [I48.91]           Comments:            Anticoagulation Care Providers     Provider Role Specialty Phone number    Nazario Jones MD Referring Family Medicine [phone]

## 2023-03-14 NOTE — TELEPHONE ENCOUNTER
"Requested Prescriptions   Pending Prescriptions Disp Refills    lisinopril (ZESTRIL) 2.5 MG tablet [Pharmacy Med Name: LISINOPRIL 2.5MG TABS] 90 tablet 3     Sig: Take 1 tablet (2.5 mg) by mouth daily       ACE Inhibitors (Including Combos) Protocol Failed - 3/13/2023  9:06 AM        Failed - Normal serum creatinine on file in past 12 months     Recent Labs   Lab Test 01/19/23  0932   CR 1.11*       Ok to refill medication if creatinine is low          Passed - Blood pressure under 140/90 in past 12 months     BP Readings from Last 3 Encounters:   02/17/23 128/80   01/19/23 136/62   08/18/22 118/70                 Passed - Recent (12 mo) or future (30 days) visit within the authorizing provider's specialty     Patient has had an office visit with the authorizing provider or a provider within the authorizing providers department within the previous 12 mos or has a future within next 30 days. See \"Patient Info\" tab in inbasket, or \"Choose Columns\" in Meds & Orders section of the refill encounter.              Passed - Medication is active on med list        Passed - Patient is age 18 or older        Passed - No active pregnancy on record        Passed - Normal serum potassium on file in past 12 months     Recent Labs   Lab Test 01/19/23  0932   POTASSIUM 4.2             Passed - No positive pregnancy test within past 12 months             "

## 2023-03-22 NOTE — PROGRESS NOTES
ANTICOAGULATION MANAGEMENT     Latanya Padron 72 year old female is on warfarin with therapeutic INR result. (Goal INR 2.0-3.0)    Recent labs: (last 7 days)     03/22/23  0833   INR 3.0*       ASSESSMENT     Warfarin Lab Questionnaire    Dose in Tablet or Mg 3/21/2023   TAB or MG? tablet (tab)     Pt Rptd Dose REBEKAH MONDAY TUESDAY WEDNESDAY THURSDAY FRIDAY SATURDAY   3/21/2023 1 1 1.5 1 1 1 1.5     Warfarin Lab Questionnaire 3/21/2023   Missed doses? No   Medication changes? No   Please list: -   Abnormal bleeding? No   Shortness of breath? No   Injuries or illness since last INR? No   Changes in diet or alcohol? No   Upcoming surgery, procedure? No       Additional findings: None       PLAN     Recommended plan for no diet, medication or health factor changes affecting INR     Dosing Instructions: Continue your current warfarin dose with next INR in 3 weeks       Summary  As of 3/22/2023    Full warfarin instructions:  3.75 mg every Tue, Sat; 2.5 mg all other days   Next INR check:  4/12/2023             Detailed voice message left for Ivette with dosing instructions and follow up date.     Contact [phone]  to schedule and with any changes, questions or concerns.     Education provided:   Please call back if any changes to your diet, medications or how you've been taking warfarin  Contact [phone]  with any changes, questions or concerns.     Plan made per [facility] anticoagulation protocol    Mirna FONTANEZ RN  Anticoagulation Clinic  3/22/2023    _______________________________________________________________________     Anticoagulation Episode Summary     Current INR goal:  2.0-3.0   TTR:  57.0 % (1 y)   Target end date:  Indefinite   Send INR reminders to:  [facility]CIM[facility]    Indications    AF (paroxysmal atrial fibrillation) (H) [I48.0]  Atrial fibrillation (H) [I48.91]  Long term current use of anticoagulant therapy [Z79.01]  Atrial fibrillation  unspecified type (H) (Resolved) [I48.91]  Atrial  fibrillation  unspecified type (H) [I48.91]           Comments:           Anticoagulation Care Providers     Provider Role Specialty Phone number    Nazario Jones MD Referring Family Medicine [phone]

## 2023-03-31 NOTE — TELEPHONE ENCOUNTER
Patient has neck pain and stiffness that started Wednesday.  No known injury, she woke up with the stiff neck.  No headache, no fever.  Pain level is 8 out of 10.  She has been using over the counter creams.  Tylenol does not help.    Per protocol patient advised to seen today. She does not want to go out in the ice.    Patient states she will call Monday if she is not better on Monday.    Caprice Kellogg RN on 3/31/2023 at 9:42 AM        Reason for Disposition    SEVERE pain (e.g., excruciating, unable to do any normal activities)    Additional Information    Negative: Shock suspected (e.g., cold/pale/clammy skin, too weak to stand, low BP, rapid pulse)    Negative: Similar pain previously and it was from 'heart attack'    Negative: Similar pain previously from 'angina' and not relieved by nitroglycerin    Negative: Difficult to awaken or acting confused (e.g., disoriented, slurred speech)    Negative: Sounds like a life-threatening emergency to the triager    Negative: Followed an injury to neck (e.g., MVA, sports, impact or collision)    Negative: Chest pain    Negative: Lymph node in the neck is swollen or painful to the touch    Negative: Sore throat is main symptom    Negative: Difficulty breathing or unusual sweating (e.g., sweating without exertion)    Negative: Chest pain lasting longer than 5 minutes    Negative: Stiff neck (can't touch chin to chest) and has headache    Negative: Stiff neck (can't touch chin to chest) and fever    Negative: Weakness of an arm or hand    Negative: Problems with bowel or bladder control    Negative: Patient sounds very sick or weak to the triager    Protocols used: NECK PAIN OR [id number]

## 2023-04-03 NOTE — TELEPHONE ENCOUNTER
Generally, inhalers should not cause nausea.  She should be rinsing her mouth after she does her inhalers just to make sure she does not have any of that medication still in the oral mucosa.  She is needs to rinse and spit after she does the inhalers.  I can send her some Zofran for the nausea to have on hand and if is not getting better, she will need to be seen.  I am glad that her neck stiffness is improving.  She should continue doing some range of motion exercises to try to improve her mobility.    Electronically signed by:  Nazario Jones M.D.  4/3/2023

## 2023-04-03 NOTE — TELEPHONE ENCOUNTER
Patient states she still has a stiff neck from last week, but this is getting better.  She states she is now able to turn her head.    She also states she has the dry heaves. This started Saturday night. She has not vomited. She says when she is done dry heaving she has a large burp and then she feels better.    She has an occasional headaches.  She does not have a headache right now.  She states she also has a runny nose.  No fever.  Patient will take a Covid test at home.    She is wondering if the nausea could be from her inhalers?    She states she does not notice the nausea after taking the inhalers.    Per protocol patient given home care advice for nausea. She will call back if her symptoms get worse.    Please advise if this nausea could be related to her inhalers.    Caprice Kellogg RN on 4/3/2023 at 4:24 PM      Reason for Disposition    Unexplained nausea    Additional Information    Negative: Shock suspected (e.g., cold/pale/clammy skin, too weak to stand, low BP, rapid pulse)    Negative: Sounds like a life-threatening emergency to the triager    Negative: Menstrual Period - Missed or Late (i.e., pregnancy suspected)    Negative: Nausea or vomiting and pregnancy < 20 weeks    Negative: Heat exhaustion suspected (i.e., dehydration from heat exposure)    Negative: Anxiety or stress suspected (i.e., nausea with anxiety attacks or stressful situations)    Negative: Traumatic Brain Injury (TBI) suspected    Negative: Vomiting occurs    Negative: Other symptom is present, see that guideline.  (e.g., chest pain, headache, dizziness, abdominal pain, colds, sore throat, etc.).    Negative: Unable to walk, or can only walk with assistance (e.g., requires support)    Negative: Difficulty breathing    Negative: Insulin-dependent diabetes (Type I) and glucose > 400 mg/dL (22 mmol/L)    Negative: Drinking very little and dehydration suspected (e.g., no urine > 12 hours, very dry mouth, very lightheaded)    Negative:  Patient sounds very sick or weak to the triager    Negative: Fever > 104 F  (40 C)    Negative: Fever > 101 F  (38.3 C) and over 60 years of age    Negative: Fever > 100.0 F  (37.8 C) and bedridden (e.g., nursing home patient, CVA, chronic illness, recovering from surgery)    Negative: Fever > 100.0 F  (37.8 C) and diabetes mellitus or weak immune system (e.g., HIV positive, cancer chemo, splenectomy, chronic steroids)    Negative: Taking any of the following medications: digoxin (Lanoxin), lithium, theophylline, phenytoin (Dilantin)    Negative: Yellowish color of the skin or white of the eye (i.e., jaundice)    Negative: Fever present > 3 days (72 hours)    Negative: Patient wants to be seen    Negative: Receiving cancer chemotherapy medication    Negative: Taking prescription medication that could cause nausea (e.g., narcotics/opiates, antibiotics, OCPs, many others)    Negative: Nausea lasts > 1 week    Negative: Substance use (drug use) or unhealthy alcohol use, known or suspected    Negative: Nausea is a chronic symptom (recurrent or ongoing AND present > 4 weeks)    Protocols used: NAUSEA-A-OH

## 2023-04-04 NOTE — TELEPHONE ENCOUNTER
Can you send the script for Zofran in please? Patient called again and asked. Thank you!    MARCELO GoldmanN, RN

## 2023-04-12 NOTE — PROGRESS NOTES
"ANTICOAGULATION MANAGEMENT     Latanya Padron 73 year old female is on warfarin with supratherapeutic INR result. (Goal INR 2.0-3.0)    Recent labs: (last 7 days)     04/12/23  0911   INR 4.1*       ASSESSMENT     Source(s): Chart Review and Patient/Caregiver Call     Warfarin doses taken: Warfarin taken as instructed  Diet: Has been nauseated so may not be eating like her \"norm\" may be affecting diet and INR  New illness, injury, or hospitalization: Yes: Possible thrush due to her inhaler so not eating as well and has been nauseated   Medication/supplement changes: None noted  Signs or symptoms of bleeding or clotting: No  Previous INR: Therapeutic last visit; previously outside of goal range  Additional findings: None         PLAN     Recommended plan for temporary change(s) affecting INR     Dosing Instructions: hold dose then continue your current warfarin dose with next INR in 1 week       Summary  As of 4/12/2023    Full warfarin instructions:  4/12: Hold; Otherwise 3.75 mg every Tue, Sat; 2.5 mg all other days   Next INR check:  4/20/2023             Telephone call with Ivette who verbalizes understanding and agrees to plan and who agrees to plan and repeated back plan correctly    Lab visit scheduled    Education provided:   Please call back if any changes to your diet, medications or how you've been taking warfarin  Goal range and lab monitoring: goal range and significance of current result and Importance of therapeutic range  Symptom monitoring: monitoring for bleeding signs and symptoms and when to seek medical attention/emergency care    Plan made per [facility] anticoagulation protocol    Manolo Damian, RN  Anticoagulation Clinic  4/12/2023    _______________________________________________________________________     Anticoagulation Episode Summary     Current INR goal:  2.0-3.0   TTR:  51.2 % (1 y)   Target end date:  Indefinite   Send INR reminders to:  WATSON NASCIMENTO    Indications    AF (paroxysmal " atrial fibrillation) (H) [I48.0]  Atrial fibrillation (H) [I48.91]  Long term current use of anticoagulant therapy [Z79.01]  Atrial fibrillation  unspecified type (H) (Resolved) [I48.91]  Atrial fibrillation  unspecified type (H) [I48.91]           Comments:           Anticoagulation Care Providers     Provider Role Specialty Phone number    Nazario Jones MD Referring Family Medicine [phone]

## 2023-04-13 NOTE — ED NOTES
Oxygen turned down to 2 Liters as oxygen sats at 98% and normal sats for patient are between 89-90% with 2-2.5 liters oxygen.  Patient appears to be working a little to breath.

## 2023-04-13 NOTE — DISCHARGE INSTRUCTIONS
-Medical work-up identified no pneumonia.  Changes in strength, increased cough and increased phlegm is consistent with a COPD exacerbation.  Treated in the ED with IV Solu-Medrol which is a steroid to help reduce lung inflammation.    Please start prednisone taper tomorrow morning.  Today start antibiotic therapy doxycycline 100 mg twice daily for 10 days.  Arrange for recheck of INR value in 2 weeks.  Your INR value was elevated yesterday greater than 4.0 and should be rechecked.

## 2023-04-13 NOTE — TELEPHONE ENCOUNTER
ANTICOAGULATION  MANAGEMENT: Discharge Review    Latanya Padron chart reviewed for anticoagulation continuity of care    Emergency room visit on 4/13/23 for COPD exacerbation.    Discharge disposition: Home    Results:    Recent labs: (last 7 days)     04/12/23  0911   INR 4.1*     Anticoagulation inpatient management:     not applicable     Anticoagulation discharge instructions:     Warfarin dosing: home regimen continued   Bridging: No   INR goal change: No      Medication changes affecting anticoagulation: Yes: prednisone and doxycycline    Additional factors affecting anticoagulation: Yes: infection, inflammation     PLAN     Recommend to check INR on 4/17/23    Spoke with Ivette    No adjustment to Anticoagulation Calendar was required    Manolo Damian RN

## 2023-04-13 NOTE — PROGRESS NOTES
Assessment & Plan     SOB O2 sat 64%    To ER     Memo Cruz MD, MD [facility] PILY Steele is a 73 year old, presenting for the following health issues:  COPD    History of Present Illness       COPD:  She presents for follow up of COPD.  Overall, COPD symptoms are slightly worse since last visit. She has more than usual fatigue or shortness of breath with exertion and same as usual shortness of breath at rest.  She often coughs and does have change in sputum. No recent fever. She can walk the length of 1-2 rooms without stopping to rest. She can not walk a flight of stairs without resting. The patient has had no ED, urgent care, or hospital admissions because of COPD since the last visit.     She eats 2-3 servings of fruits and vegetables daily.She consumes 0 sweetened beverage(s) daily.She exercises with enough effort to increase her heart rate 9 or less minutes per day.  She exercises with enough effort to increase her heart rate 3 or less days per week.   She is taking medications regularly.    Today's PHQ-9         PHQ-9 Total Score: 4    PHQ-9 Q9 Thoughts of better off dead/self-harm past 2 weeks :   Not at all    How difficult have these problems made it for you to do your work, take care of things at home, or get along with other people: Somewhat difficult     Patient with extreme shortness of breath pulse ox of 64% tachypneic and tachycardic was transferred over to the emergency room I did call a report to Dr. Tristen Cruz MD

## 2023-04-13 NOTE — ED TRIAGE NOTES
Pt reports SOBfor the past 3 days. Was to the clinic this morning and sent here for possible pneumonia. Pt states she normally uses oxygen at home at 2.5L but didn't bring is with he today and has been on room air for about 45 minutes. Pt speaking in full sentences. Pt brought to room and oxygen applied at 5L/NC.      Triage Assessment     Row Name 04/13/23 0822       Triage Assessment (Adult)    Airway WDL WDL       Respiratory WDL    Respiratory WDL X;cough    Cough Frequency frequent    Cough Type productive       Skin Circulation/Temperature WDL    Skin Circulation/Temperature WDL WDL       Cardiac WDL    Cardiac WDL WDL       Peripheral/Neurovascular WDL    Peripheral Neurovascular WDL WDL       Cognitive/Neuro/Behavioral WDL    Cognitive/Neuro/Behavioral WDL WDL

## 2023-04-13 NOTE — ED PROVIDER NOTES
History     Chief Complaint   Patient presents with     Shortness of Breath     HPI  Latanya Padron is a 73 year old female who presents to the clinic for dyspnea and hypoxia.  Received a call from Dr. Cruz requesting transfer from clinic to ED.    Patient has a known history for COPD.  He has had increased phlegm and a change in her typical pulm appearance in the last week.  Phlegm is gone from white to thick green and more abundant.  She has had no fever chills or night sweats.  Has a history for pulmonary emphysema. Not PO steroid-dependent.  History for some underlying pulmonary fibrosis.  Patient has had no chest discomfort or palpitations.  Does have a history for atrial fibrillation.  Noted INR yesterday supratherapeutic greater than 4.0.    Patient usually is on oxygen at home 2 to 2.5 L.  Came to the clinic today without her oxygen.  Does not have oxygen saturations that were recorded as low as 67%.    Arrived in the ED and placed her on 2.0 L nasal cannula with O2 sats of around 89%.  Increased oxygen to 2.5 L with O2 sats rebounded to 91%.    Allergies:  Allergies   Allergen Reactions     Augmentin [Amoxicillin-Pot Clavulanate] Nausea and Vomiting     Meperidine Hcl Nausea and Vomiting     demerol     Seasonal Allergies      spring       Problem List:    Patient Active Problem List    Diagnosis Date Noted     Atrial fibrillation, unspecified type (H) 10/06/2022     Priority: Medium     Major depression in complete remission (H) 05/19/2022     Priority: Medium     Epidermoid cyst of labia majora 11/04/2020     Priority: Medium     Long term current use of anticoagulant therapy 10/20/2020     Priority: Medium     Coronary artery disease involving native coronary artery of native heart without angina pectoris 08/10/2020     Priority: Medium     Restless leg syndrome 06/24/2020     Priority: Medium     Lymphadenopathy of head and neck 02/26/2020     Priority: Medium     Groin pain, right 02/26/2020      Priority: Medium     Right hip pain 09/04/2019     Priority: Medium     Patellofemoral pain syndrome of right knee 09/04/2019     Priority: Medium     Abnormal gait 09/04/2019     Priority: Medium     Primary osteoarthritis of right knee 09/04/2019     Priority: Medium     Age-related osteoporosis without current pathological fracture 07/03/2019     Priority: Medium     Malignant neoplasm of overlapping sites of left breast in female, estrogen receptor positive (H) 06/12/2019     Priority: Medium     Segmental dysfunction of sacral region 02/01/2019     Priority: Medium     Segmental dysfunction of lumbar region 02/01/2019     Priority: Medium     Segmental dysfunction of thoracic region 02/01/2019     Priority: Medium     Bilateral low back pain with right-sided sciatica 02/01/2019     Priority: Medium     Trochanteric bursitis of right hip 07/13/2018     Priority: Medium     Hip pain, right 06/20/2018     Priority: Medium     Multiple joint pain 06/20/2018     Priority: Medium     CKD (chronic kidney disease) stage 3, GFR 30-59 ml/min (H) 06/21/2017     Priority: Medium     Primary osteoarthritis of both knees 04/18/2017     Priority: Medium     AF (paroxysmal atrial fibrillation) (H) 03/02/2016     Priority: Medium     Major depression in complete remission (H) 12/28/2015     Priority: Medium     Class 1 obesity due to excess calories without serious comorbidity with body mass index (BMI) of 34.0 to 34.9 in adult 12/28/2015     Priority: Medium     Pulmonary emphysema, unspecified emphysema type (H) 12/28/2015     Priority: Medium     Atrial fibrillation (H) 06/20/2014     Priority: Medium     Essential hypertension, benign 09/30/2013     Priority: Medium     Tennis elbow 04/12/2013     Priority: Medium     left         Advanced directives, counseling/discussion 09/09/2011     Priority: Medium     Advance Directive Problem List Overview:   Name Relationship Phone    Primary Health Care Agent             Alternative Health Care Agent          Discussed advance care planning with patient; information given to patient to review.//Brneda Carlin/BEATRIZ(AAMA)  9/9/2011        Vinh Ramon DO  04/13/23 1034         HYPERLIPIDEMIA LDL GOAL <130 10/31/2010     Priority: Medium     Hirsutism 04/13/2007     Priority: Medium     Family history of malignant neoplasm of breast 12/27/2006     Priority: Medium     Female stress incontinence 11/26/2005     Priority: Medium     Disorder of bone and cartilage 07/13/2005     Priority: Medium     Problem list name updated by automated process. Provider to review       Sprain and strain of unspecified site of knee and leg 04/09/2004     Priority: Medium        Past Medical History:    Past Medical History:   Diagnosis Date     Breast cancer (H) 04/12/2019     COPD (chronic obstructive pulmonary disease) (H)      Mixed hyperlipidemia      Neck mass 06/20/2018     Paroxysmal atrial fibrillation (H)      PONV (postoperative nausea and vomiting)      S/P radiation therapy        Past Surgical History:    Past Surgical History:   Procedure Laterality Date     BIOPSY NODE SENTINEL Left 5/15/2019    Procedure: left sentinel node biopsy;  Surgeon: Donald Aleman MD;  Location: [facility]     BREAST BIOPSY, CORE RT/LT Left 04/12/2019     COLONOSCOPY  06/21/10     COLONOSCOPY N/A 3/4/2021    Procedure: Colonoscopy with  polypectomy;  Surgeon: Donald Aleman MD;  Location:  GI     HC BIOPSY OF BREAST, OPEN INCISIONAL Right 1988    Benign per patient     HC CORRECT BUNION,METATARSAL OSTEOTOMY  1993      LAPAROSCOPY, SURGICAL; CHOLECYSTECTOMY  08/04/10     HC SLING OPERATION FOR STRESS INCONTINENCE  04/19/2007     HERNIORRHAPHY INCISIONAL (LOCATION)  9/23/2011    Procedure:HERNIORRHAPHY INCISIONAL (LOCATION); Surgeon:AYALA HOOVER; Location:[facility]     LAPAROSCOPIC HERNIORRHAPHY INCISIONAL  9/23/2011    Procedure:LAPAROSCOPIC HERNIORRHAPHY INCISIONAL; Laparoscopic mesh repair of  incarcerated incisional hernia , extensive lysis of adhesions, excision of hernia sac and closure of fascia.; Surgeon:AYALA HOOVER; Location:[facility]     LAPAROSCOPIC LYSIS ADHESIONS  2011    Procedure:LAPAROSCOPIC LYSIS ADHESIONS; Surgeon:AYALA HOOVER; Location:[facility]     MASTECTOMY PARTIAL WITH NEEDLE LOCALIZATION Left 5/15/2019    Procedure: left wire localized partial mastectomy;  Surgeon: Donald Aleman MD;  Location: [facility]     TONSILLECTOMY       ZZC APPENDECTOMY,W OTHR PROC       Z  DELIVERY ONLY           Z LIGATE FALLOPIAN TUBE       Z NONSPECIFIC PROCEDURE      Exploratory laparotomy with resection of infarcted segment of omentum and minor lysis of adhesions     Z NONSPECIFIC PROCEDURE      Removal of hemorrhagic corpus luteum cyst     [facility] VAGINAL HYSTERECTOMY  1984       Family History:    Family History   Problem Relation Age of Onset     Breast Cancer Mother          at 88     Obesity Mother      Genitourinary Problems Mother      Lipids Mother      Heart Disease Father         by pass (5)     Cerebrovascular Disease Father         hemorragic     Lipids Father      Alzheimer Disease Father 92     Alzheimer Disease Maternal Grandmother      Genitourinary Problems Maternal Grandmother      Cancer Maternal Grandfather      Cancer Paternal Grandfather         lymph nodes     Cancer Brother         prostate     Melanoma Brother      Heart Disease Brother          (aaron with a partner)     Hyperlipidemia Brother      Heart Disease Brother         stent placement     Hyperlipidemia Brother      Prostate Cancer Brother 66     Respiratory Sister      Lipids Sister      Breast Cancer Sister 40        s/p mastectomy     Arthritis Sister      Obesity Sister      Heart Failure Sister      Cancer Sister         stomach, colon, pancreatic     Lipids Sister      Dementia Sister         1/2 sister on dad's side ()     Other - See Comments Sister          1948     Other - See Comments Daughter         fibromyalgia ()     Lymphoma Maternal Aunt      Heart Failure Maternal Aunt 81     Cancer Maternal Uncle         colon     Hemophilia Grandchild      Other - See Comments Grandchild         Transgender       Social History:  Marital Status:   [2]  Social History     Tobacco Use     Smoking status: Former     Packs/day: 1.00     Years: 30.00     Pack years: 30.00     Types: Cigarettes     Quit date: 10/25/2005     Years since quittin.4     Smokeless tobacco: Never   Vaping Use     Vaping status: Never Used   Substance Use Topics     Alcohol use: No     Alcohol/week: 0.0 standard drinks of alcohol     Drug use: No        Medications:    albuterol (PROAIR HFA/PROVENTIL HFA/VENTOLIN HFA) 108 (90 Base) MCG/ACT inhaler  albuterol (PROVENTIL) (2.5 MG/3ML) 0.083% neb solution  anastrozole (ARIMIDEX) 1 MG tablet  ASPIRIN NOT PRESCRIBED (INTENTIONAL)  atorvastatin (LIPITOR) 10 MG tablet  BIOTIN PO  Calcium Carb-Cholecalciferol 500-400 MG-UNIT TABS  Cyanocobalamin (B-12) 1000 MCG TBCR  diltiazem ER COATED BEADS (CARDIZEM CD/CARTIA XT) 120 MG 24 hr capsule  fluticasone (FLONASE) 50 MCG/ACT nasal spray  hydrochlorothiazide (HYDRODIURIL) 25 MG tablet  lisinopril (ZESTRIL) 2.5 MG tablet  magnesium 250 MG tablet  mometasone-formoterol (DULERA) 200-5 MCG/ACT inhaler  MYRBETRIQ 50 MG 24 hr tablet  ondansetron (ZOFRAN ODT) 4 MG ODT tab  order for DME  order for DME  ORDER FOR DME  Probiotic Product (PROBIOTIC PO)  venlafaxine (EFFEXOR XR) 75 MG 24 hr capsule  vitamin B-Complex  VITAMIN D PO  warfarin ANTICOAGULANT (JANTOVEN ANTICOAGULANT) 2.5 MG tablet          Review of Systems   All other systems reviewed and are negative.      Physical Exam   BP: (!) 141/63  Pulse: 90  Temp: 97.7  F (36.5  C)  Resp: 28  SpO2: (!) 74 %      Physical Exam  Vitals and nursing note reviewed.   Constitutional:       Appearance: She is not ill-appearing.   Eyes:      Pupils: Pupils are  equal, round, and reactive to light.   Neck:      Vascular: No JVD.   Cardiovascular:      Rate and Rhythm: Normal rate. Rhythm irregular. No extrasystoles are present.     Pulses: Normal pulses.      Heart sounds: No murmur heard.  Pulmonary:      Effort: Pulmonary effort is normal.      Comments: Speaking in full sentence structure.  No longer tachypneic.  Respiratory rate 18-20 breaths/min.  Diminished breath sounds at the throughout all lung fields.  Crackles present.  Cough with rhonchi noted.  Noted no consolidation.  Musculoskeletal:      Cervical back: Normal range of motion and neck supple.      Right lower leg: No edema.      Left lower leg: No edema.   Skin:     General: Skin is warm and dry.      Capillary Refill: Capillary refill takes less than 2 seconds.   Neurological:      General: No focal deficit present.      Mental Status: She is alert.   Psychiatric:         Mood and Affect: Mood normal.         ED Course              ED Course as of 04/13/23 1021   Thu Apr 13, 2023   1018 Troponin T, High Sensitivity(!)     Procedures                  Results for orders placed or performed during the hospital encounter of 04/13/23 (from the past 24 hour(s))   Symptomatic Influenza A/B, RSV, & SARS-CoV2 PCR (COVID-19) Nasopharyngeal    Specimen: Nasopharyngeal; Swab   Result Value Ref Range    Influenza A PCR Negative Negative    Influenza B PCR Negative Negative    RSV PCR Negative Negative    SARS CoV2 PCR Negative Negative    Narrative    Testing was performed using the Xpert Xpress CoV2/Flu/RSV Assay on the ACE GeneXpert Instrument. This test should be ordered for the detection of SARS-CoV-2, influenza, and RSV viruses in individuals who meet clinical and/or epidemiological criteria. Test performance is unknown in asymptomatic patients. This test is for in vitro diagnostic use under the FDA EUA for laboratories certified under CLIA to perform high or moderate complexity testing. This test has not been  FDA cleared or approved. A negative result does not rule out the presence of PCR inhibitors in the specimen or target RNA in concentration below the limit of detection for the assay. If only one viral target is positive but coinfection with multiple targets is suspected, the sample should be re-tested with another FDA cleared, approved, or authorized test, if coinfection would change clinical management. This test was validated by the [facility]. These laboratories are certified under the Clinical Laboratory Improvement Amendments of 1988 (CLIA-88) as qualified to perform high complexity laboratory testing.   CBC with platelets differential    Narrative    The following orders were created for panel order CBC with platelets differential.  Procedure                               Abnormality         Status                     ---------                               -----------         ------                     CBC with platelets and d...[[id number]]  Abnormal            Final result                 Please view results for these tests on the individual orders.   Basic metabolic panel   Result Value Ref Range    Sodium 137 136 - 145 mmol/L    Potassium 4.6 3.4 - 5.3 mmol/L    Chloride 95 (L) 98 - 107 mmol/L    Carbon Dioxide (CO2) 35 (H) 22 - 29 mmol/L    Anion Gap 7 7 - 15 mmol/L    Urea Nitrogen 19.7 8.0 - 23.0 mg/dL    Creatinine 1.28 (H) 0.51 - 0.95 mg/dL    Calcium 9.6 8.8 - 10.2 mg/dL    Glucose 105 (H) 70 - 99 mg/dL    GFR Estimate 44 (L) >60 mL/min/1.73m2   Troponin T, High Sensitivity   Result Value Ref Range    Troponin T, High Sensitivity 20 (H) <=14 ng/L   Blood gas venous   Result Value Ref Range    pH Venous 7.37 7.32 - 7.43    pCO2 Venous 64 (H) 40 - 50 mm Hg    pO2 Venous 30 25 - 47 mm Hg    Bicarbonate Venous 37 (H) 21 - 28 mmol/L    Base Excess/Deficit (+/-) 9.1 (H) -7.7 - 1.9 mmol/L    FIO2 36    CBC with platelets and differential   Result Value Ref Range    WBC Count 11.1 (H) 4.0  - 11.0 10e3/uL    RBC Count 3.80 3.80 - 5.20 10e6/uL    Hemoglobin 10.4 (L) 11.7 - 15.7 g/dL    Hematocrit 33.9 (L) 35.0 - 47.0 %    MCV 89 78 - 100 fL    MCH 27.4 26.5 - 33.0 pg    MCHC 30.7 (L) 31.5 - 36.5 g/dL    RDW 13.2 10.0 - 15.0 %    Platelet Count 387 150 - 450 10e3/uL    % Neutrophils 82 %    % Lymphocytes 5 %    % Monocytes 8 %    % Eosinophils 3 %    % Basophils 1 %    % Immature Granulocytes 1 %    NRBCs per 100 WBC 0 <1 /100    Absolute Neutrophils 9.2 (H) 1.6 - 8.3 10e3/uL    Absolute Lymphocytes 0.5 (L) 0.8 - 5.3 10e3/uL    Absolute Monocytes 0.9 0.0 - 1.3 10e3/uL    Absolute Eosinophils 0.3 0.0 - 0.7 10e3/uL    Absolute Basophils 0.1 0.0 - 0.2 10e3/uL    Absolute Immature Granulocytes 0.1 <=0.4 10e3/uL    Absolute NRBCs 0.0 10e3/uL   XR Chest Port 1 View    Narrative    CHEST ONE VIEW PORTABLE    4/13/2023 9:45 AM     HISTORY: Congestion with hypoxia    COMPARISON: Chest CT on 1/8/2022      Impression    IMPRESSION: Single AP view of the chest was obtained.  Cardiomediastinal silhouette is within normal limits. No suspicious  focal pulmonary opacities. No significant pleural effusion or  pneumothorax.    GUIDO SUAREZ MD         SYSTEM ID:  [id number]     *Note: Due to a large number of results and/or encounters for the requested time period, some results have not been displayed. A complete set of results can be found in Results Review.       Medications   methylPREDNISolone sodium succinate (solu-MEDROL) injection 125 mg (has no administration in time range)       Assessments & Plan (with Medical Decision Making)  73-year-old female transferred from clinic to the emergency department for evaluation of hypoxia, cough and tachypnea.  Received a phone call from Dr. Memo Cruz.   Patient has history for COPD O2 dependent nonsteroid dependent.  Usually is on 2.5 L of O2 at home.  Came to the clinic today without any oxygen support.  O2 sats are 66-67% and she was speaking in fragmented speech  pattern.  She came in because she has not been feeling well and has noted change in her cough and phlegm.  Cough is become more active and her phlegm is now turning thick green and more abundant.  She denies any fever or chills.  She had no chest discomfort or palpitations.  When patient arrived we placed her on oxygen and her O2 sats rebounded to 89-90% on 2 L.  Increased to 2.5 L and O2 sats leveled off at 90-91%.  We gave the patient for minutes to rest and reassessed and she was able to speak in full sentence structure.  Her respiratory did drop to 18--19 breaths/min.  Her lungs were auscultated down that she had greatly diminished breath sounds in all lung fields.  No noted consolidation.  Crackles throughout which would suggest either fluid or some pulmonary fibrosis.  Chest x-ray was completed which showed COPD elements but did not show any acute interstitial process such as a pneumonia or CHF.  Venous blood gas showed compensated respiratory acidosis with a PCO2 = 64 and a pH = 7.37.  Troponin high-sensitivity = 20.  Slightly above normal though she does have stage III CKD.  I do not believe that she is having any acute coronary syndrome.  With the patient describing her phlegm becoming more abundant and thick green I suggest this is more of a COPD exacerbation.  We should we will discharge her home given that she is doing well on her baseline oxygen that she has available at home.  Administered Solu-Medrol IV in the ED and patient will be started on prednisone slow taper along with doxycycline 100 mg twice daily x10 days.     I have reviewed the nursing notes.    I have reviewed the findings, diagnosis, plan and need for follow up with the patient.          .        New Prescriptions    No medications on file       Final diagnoses:   COPD exacerbation (H)       4/13/2023   [facility] EMERGENCY DEPT

## 2023-04-17 NOTE — TELEPHONE ENCOUNTER
General Call      Reason for Call:  Call back     What are your questions or concerns:  Patient stated she was in today and looking to speak with an nurse.    Date of last appointment with provider: 4/17    Could we send this information to you in [facility] or would you prefer to receive a phone call?:   Patient would prefer a phone call   Okay to leave a detailed message?: Yes at Home number on file [phone] (home)

## 2023-04-17 NOTE — PROGRESS NOTES
ANTICOAGULATION MANAGEMENT     Latanya Padron 73 year old female is on warfarin with supratherapeutic INR result. (Goal INR 2.0-3.0)    Recent labs: (last 7 days)     04/17/23  1144   INR 4.0*       ASSESSMENT     Warfarin Lab Questionnaire    Warfarin Doses Last 7 Days    Pt Rptd Dose SUNDAY MONDAY TUESDAY WED THURS FRIDAY SATURDAY 4/17/2023  10:21 AM 1 1 1.5 0 1 1 1.5         4/17/2023   Warfarin Lab Questionnaire   Missed doses? Yes   If yes; please list when: Pure nurse to not take it   Changes in diet or alcohol? No   Medication changes? Yes   Please list: Predisone, doxicillin   Injuries or illness since last INR? No   Shortness of breath? Yes   If yes, please explain:  Was in ER oxygen level low   Abnormal bleeding? No   Upcoming surgery, procedure? No        Previous INR: Supratherapeutic  Additional findings: Copd exacerbation on prednisone and Doxycycline until 4/23       PLAN     Recommended plan for temporary change(s) affecting INR     Dosing Instructions: hold 1.5 doses doses then continue your current warfarin dose with next INR in 3 weeks       Summary  As of 4/17/2023    Full warfarin instructions:  4/17: Hold; 4/18: 2.5 mg; Otherwise 3.75 mg every Tue, Sat; 2.5 mg all other days   Next INR check:  4/20/2023             Telephone call with Ivette who verbalizes understanding and agrees to plan    Lab visit scheduled    Education provided:   Please call back if any changes to your diet, medications or how you've been taking warfarin  Symptom monitoring: monitoring for bleeding signs and symptoms    Plan made per [facility] anticoagulation protocol    Debbie Love, RN  Anticoagulation Clinic  4/17/2023    _______________________________________________________________________     Anticoagulation Episode Summary     Current INR goal:  2.0-3.0   TTR:  51.1 % (1 y)   Target end date:  Indefinite   Send INR reminders to:  WATSON NASCIMENTO    Indications    AF (paroxysmal atrial fibrillation) (H)  [I48.0]  Atrial fibrillation (H) [I48.91]  Long term current use of anticoagulant therapy [Z79.01]  Atrial fibrillation  unspecified type (H) (Resolved) [I48.91]  Atrial fibrillation  unspecified type (H) [I48.91]           Comments:           Anticoagulation Care Providers     Provider Role Specialty Phone number    Nazario Jones MD Referring Family Medicine [phone]

## 2023-04-17 NOTE — TELEPHONE ENCOUNTER
Spoke with pt and she just wanted to clarify her dosing. This was discussed. See Anticoagulation Encounter. Patient verbalized understanding and agrees with plan of care. Pt had no further questions or concerns at this time.      Manolo Damian RN, BSN

## 2023-04-20 NOTE — PROGRESS NOTES
ANTICOAGULATION MANAGEMENT     Latanya Padron 73 year old female is on warfarin with supratherapeutic INR result. (Goal INR 2.0-3.0)    Recent labs: (last 7 days)     04/20/23  0905   INR 3.4*       ASSESSMENT     Warfarin Lab Questionnaire    Warfarin Doses Last 7 Days      4/19/2023     5:24 PM   Dose in Tablet or Mg   TAB or MG? tablet (tab)     Pt Rptd Dose SUNDAY TUESDAY WED THURS FRIDAY SATURDAY 4/19/2023   5:24 PM 1 1.5 1 1 1 1.5         4/19/2023   Warfarin Lab Questionnaire   Missed doses? Yes   If yes; please list when: Per coumadin nurses   Changes in diet or alcohol? No   Medication changes? Yes   Please list: Doxicillin and predisone   Injuries or illness since last INR? No   Shortness of breath? No   Abnormal bleeding? No   Upcoming surgery, procedure? No   Best number to call with results? I will do that.        Previous INR: Supratherapeutic  Additional findings: None       PLAN     Recommended plan for temporary change(s) and ongoing change(s) affecting INR     Dosing Instructions: hold dose then decrease your warfarin dose (6.2% change) with next INR in 5 days       Summary  As of 4/20/2023    Full warfarin instructions:  4/20: Hold; Otherwise 3.75 mg every Tue; 2.5 mg all other days   Next INR check:  4/25/2023             Telephone call with Ivette who verbalizes understanding and agrees to plan    Lab visit scheduled    Education provided:   Please call back if any changes to your diet, medications or how you've been taking warfarin  Interaction IS anticipated between warfarin and Doxicillin and Prednisone  Symptom monitoring: monitoring for bleeding signs and symptoms and when to seek medical attention/emergency care  Contact [phone]  with any changes, questions or concerns.     Plan made per [facility] anticoagulation protocol    Mirna FONTANEZ RN  Anticoagulation Clinic  4/20/2023    _______________________________________________________________________     Anticoagulation Episode Summary      Current INR goal:  2.0-3.0   TTR:  51.1 % (1 y)   Target end date:  Indefinite   [facility]    Indications    AF (paroxysmal atrial fibrillation) (H) [I48.0]  Atrial fibrillation (H) [I48.91]  Long term current use of anticoagulant therapy [Z79.01]  Atrial fibrillation  unspecified type (H) (Resolved) [I48.91]  Atrial fibrillation  unspecified type (H) [I48.91]           Comments:           Anticoagulation Care Providers     Provider Role Specialty Phone number    Nazario Jones MD Referring Family Medicine [phone]

## 2023-04-25 NOTE — LETTER
4/25/2023    Nazario Jones MD, MD  [address] [place] [address]    RE: Latanya Padron       Dear Colleague,     I had the pleasure of seeing Latanya Padron in the [facility].    General Cardiology Clinic Progress Note  Latanya Padron MRN# [id number]   YOB: 1950 Age: 73 year old       Reason for visit: Rapid heart beating    History of presenting illness:    I had the opportunity to see Latanya Padron at [facility] today for evaluation of rapid heart beating.  She is a 73-year-old woman with history of severe, oxygen dependent COPD.  She was also diagnosed with paroxysmal atrial fibrillation many years ago by her primary care physician although she has not had recurrent episodes that she has recognized.  She remains on warfarin anticoagulation for stroke prevention.  She was seen in 2020 by cardiology and [place] for evaluation of coronary artery calcification seen on CT scan.  She was started on medication for secondary prevention including lisinopril and a statin.  No further ischemic work-up was done at that time since she was not having any typical symptoms of angina.    She continues to have persistent shortness of breath, presumably due to her COPD.  She can walk only a short distance which causes her heart to beat very fast.  Even after she stopped to rest, her heart continues beating rapidly for up to a half an hour.  She reports having rapid heart beating episodes up to 3 times per week and these are sometimes associated with left-sided chest pain which radiates around to the left axilla and into the left upper back.  This is, of course, in addition to her shortness of breath.  She denies lightheadedness and syncope.  Today, her heart rate at rest is elevated but I see going back in her chart that this is consistent for her.  Her heart rate was 114 today, and 112 a year ago.    She tells me that she is lost about 60 or 70 pounds over the  last couple of years by eliminating junk food at night.     She has a family history of heart disease.  Her brother and father both had heart disease.  Her father had quadruple bypass surgery.  She was a smoker for a number of years but has also quit many years ago.  She has hypertension and dyslipidemia and is on medical therapy for those issues.  She is not a diabetic.    I reviewed her twelve-lead ECGs from today and these demonstrate sinus tachycardia with right bundle branch morphology with a pulmonary disease pattern.    I also reviewed a Zio patch monitor including the strips done last summer.  She had no atrial fibrillation identified.  She had 2 episodes of brief nonsustained VT.  She had about 12 episodes of brief SVT as well.            Assessment and Plan:     ASSESSMENT:    Ms. Latanya Padron is a 73-year-old woman with severe oxygen dependent COPD with a history of paroxysmal atrial fibrillation, although has not clearly had any recent atrial fibrillation episodes identified.  However, she is concerned about her episodes of rapid heart beating that tend to occur and persist after she has been walking and take up to a half an hour to resolve.  These are associated with worsening shortness of breath and sometimes left-sided chest pains.  They are not associated with lightheadedness or syncope.    She has multiple cardiac risk factors but has not had any evaluation for coronary artery disease.  She has not had a recent echocardiogram.  Her Zio patch monitor last year did not identify any prolonged abnormal heart rhythms, but her heart rates are clearly higher now.    I suggested we do a 24-hour Holter monitor, echocardiogram, and Lexiscan nuclear perfusion imaging stress test for further evaluation.  She has had a recent thyroid function test which was normal and all of her other labs look relatively unremarkable.    I will have her follow-up to discuss the results of her testing and consider any  "additional evaluation it might be necessary.    Dawit Gamez MD           Orders this Visit:  Orders Placed This Encounter   Procedures    Follow-Up with Cardiology JULIUS    EKG 12-lead complete w/read - Clinics (performed today)    Holter Monitor 24 hour Adult Pediatric    Echocardiogram Complete     No orders of the defined types were placed in this encounter.    Medications Discontinued During This Encounter   Medication Reason    diltiazem ER COATED BEADS (CARDIZEM CD/CARTIA XT) 120 MG 24 hr capsule Therapy completed (No AVS)       Today's clinic visit entailed:  Review of the result(s) of each unique test - bmp, tsh, cbc, ecg, ziopatch monitor  The following tests were independently interpreted by me as noted in my documentation: ziopatch strips, ecg's  Ordering of each unique test  Prescription drug management  35 minutes spent by me on the date of the encounter doing chart review, review of test results, interpretation of tests, patient visit and documentation   Provider  Link to [facility] Help Grid     The level of medical decision making during this visit was of high complexity.           Review of Systems:     Review of Systems:  Skin:        Eyes:  Positive for glasses  ENT:       Respiratory:    dyspnea on exertion;shortness of breath;wheezing  Cardiovascular:  Negative;palpitations;chest pain;lightheadedness;dizziness;syncope or near-syncope Positive for;edema  Gastroenterology:      Genitourinary:       Musculoskeletal:       Neurologic:  Negative numbness or tingling of hands;numbness or tingling of feet  Psychiatric:       Heme/Lymph/Imm:  Positive for allergies  Endocrine:                 Physical Exam:     Vitals: /70 (BP Location: Right arm, Patient Position: Sitting, Cuff Size: Adult Regular)   Pulse 114   Ht 1.588 m (5' 2.5\")   Wt 81.7 kg (180 lb 1.6 oz)   SpO2 90%   BMI 32.42 kg/m    Constitutional: Well nourished and in no apparent distress. Wearing o2 by NC  Eyes: Pupils equal, round. " Sclerae anicteric.   HEENT: Normocephalic, atraumatic.   Neck: Supple. JVD not seen well  Respiratory: Breathing non-labored. Lungs clear bu diminished bilaterally. No crackles, wheezes, rhonchi, or rales.  Cardiovascular:  Regular rate and rhythm, normal S1 and S2. No murmur, rub, or gallop.  Skin: Warm, dry. No rashes, cyanosis, or xanthelasma.  Extremities: No edema.  Neurologic: No gross motor deficits. Alert, awake, and oriented to person, place and time.  Psychiatric: Affect appropriate.             Medications:     Current Outpatient Medications   Medication Sig Dispense Refill    albuterol (PROAIR HFA/PROVENTIL HFA/VENTOLIN HFA) 108 (90 Base) MCG/ACT inhaler INHALE ONE TO TWO PUFFS BY MOUTH EVERY 2-4 HOURS AS NEEDED 18 g 11    albuterol (PROVENTIL) (2.5 MG/3ML) 0.083% neb solution Take  by nebulization. 1 NEB EVERY 4-6 HOURS AS NEEDED 75 mL 1    anastrozole (ARIMIDEX) 1 MG tablet Take 1 tablet (1 mg) by mouth daily 90 tablet 3    ASPIRIN NOT PRESCRIBED (INTENTIONAL) Please choose reason not prescribed from choices below.      atorvastatin (LIPITOR) 10 MG tablet TAKE ONE TABLET BY MOUTH ONCE DAILY 90 tablet 1    BIOTIN PO Take by mouth daily      Calcium Carb-Cholecalciferol 500-400 MG-UNIT TABS Take 1 tablet by mouth 2 times daily 180 tablet 3    Cyanocobalamin (B-12) 1000 MCG TBCR Take 1,000 mcg by mouth daily 100 tablet 1    fluticasone (FLONASE) 50 MCG/ACT nasal spray Spray 1 spray into both nostrils daily For stuffy/runny nose 15.8 mL 0    hydrochlorothiazide (HYDRODIURIL) 25 MG tablet Take 1 tablet (25 mg) by mouth daily 90 tablet 3    lisinopril (ZESTRIL) 2.5 MG tablet TAKE 1 TABLET (2.5 MG) BY MOUTH DAILY 90 tablet 3    magnesium 250 MG tablet Take 1 tablet by mouth daily 30 tablet     mometasone-formoterol (DULERA) 200-5 MCG/ACT inhaler INHALE 2 PUFFS BY MOUTH TWO TIMES A DAY 13 g 11    MYRBETRIQ 50 MG 24 hr tablet       ondansetron (ZOFRAN ODT) 4 MG ODT tab Take 1 tablet (4 mg) by mouth every 8  hours as needed for nausea 20 tablet 3    order for DME Equipment being ordered: Oxygen 1 each 0    order for DME Equipment being ordered: Nebulizer 1 Device 0    ORDER FOR DME Equipment being ordered: Oxygen @ 2 liters with exertion      Probiotic Product (PROBIOTIC PO) Take by mouth daily      venlafaxine (EFFEXOR XR) 75 MG 24 hr capsule TAKE ONE CAPSULE BY MOUTH ONCE DAILY 90 capsule 3    vitamin B-Complex       VITAMIN D PO       warfarin ANTICOAGULANT (JANTOVEN ANTICOAGULANT) 2.5 MG tablet TAKE 1 1/2 TABLETS BY MOUTH ON TUES, THURS, SATURDAY AND 1 TABLET ALL OTHER DAYS OR AS DIRECTED BY THE COUMADIN CLINIC. 100 tablet 3       Family History   Problem Relation Age of Onset    Breast Cancer Mother          at 88    Obesity Mother     Genitourinary Problems Mother     Lipids Mother     Heart Disease Father         by pass (5)    Cerebrovascular Disease Father         hemorragic    Lipids Father     Alzheimer Disease Father 92    Alzheimer Disease Maternal Grandmother     Genitourinary Problems Maternal Grandmother     Cancer Maternal Grandfather     Cancer Paternal Grandfather         lymph nodes    Cancer Brother         prostate    Melanoma Brother     Heart Disease Brother          (aaron with a partner)    Hyperlipidemia Brother     Heart Disease Brother         stent placement    Hyperlipidemia Brother     Prostate Cancer Brother 66    Respiratory Sister     Lipids Sister     Breast Cancer Sister 40        s/p mastectomy    Arthritis Sister     Obesity Sister     Heart Failure Sister     Cancer Sister         stomach, colon, pancreatic    Lipids Sister     Dementia Sister         1/2 sister on dad's side ()    Other - See Comments Sister         1948    Other - See Comments Daughter         fibromyalgia ()    Lymphoma Maternal Aunt     Heart Failure Maternal Aunt 81    Cancer Maternal Uncle         colon    Hemophilia Grandchild     Other - See Comments Grandchild         Transgender        Social History     Socioeconomic History    Marital status:      Spouse name: Nam    Number of children: 1    Years of education: 14    Highest education level: Not on file   Occupational History    Occupation: Ins Cordinater     Comment:  [employer]     Employer: [employer]   Tobacco Use    Smoking status: Former     Packs/day: 1.00     Years: 30.00     Pack years: 30.00     Types: Cigarettes     Quit date: 10/25/2005     Years since quittin.5    Smokeless tobacco: Never   Vaping Use    Vaping status: Never Used   Substance and Sexual Activity    Alcohol use: No     Alcohol/week: 0.0 standard drinks of alcohol    Drug use: No    Sexual activity: Not Currently     Partners: Male     Birth control/protection: Female Surgical     Comment: hysterectomy   Other Topics Concern     Service No    Blood Transfusions No    Caffeine Concern No     Comment: coffee 2c/d pop: 2c/d    Occupational Exposure No    Hobby Hazards No    Sleep Concern No    Stress Concern No    Weight Concern Yes     Comment: desire wt loss    Special Diet No    Back Care No     Comment: Hx: seen chiropractor     Exercise No    Bike Helmet No     Comment: n/a    Seat Belt Yes    Self-Exams Yes     Comment: attempts to do SBE monthly. Patient has fibrous breast tissue.    Parent/sibling w/ CABG, MI or angioplasty before 65F 55M? Not Asked   Social History Narrative    Not on file     Social Determinants of Health     Financial Resource Strain: Not on file   Food Insecurity: Not on file   Transportation Needs: Not on file   Physical Activity: Not on file   Stress: Not on file   Social Connections: Not on file   Intimate Partner Violence: Not on file   Housing Stability: Not on file            Past Medical History:     Past Medical History:   Diagnosis Date    Breast cancer (H) 2019    Left Breast    COPD (chronic obstructive pulmonary disease) (H)     Mixed hyperlipidemia     Neck mass 2018    Paroxysmal  atrial fibrillation (H)     PONV (postoperative nausea and vomiting)     S/P radiation therapy     5,256 cGy to left breast completed on 2019 [facility]              Past Surgical History:     Past Surgical History:   Procedure Laterality Date    BIOPSY NODE SENTINEL Left 5/15/2019    Procedure: left sentinel node biopsy;  Surgeon: Donald Aleman MD;  Location: [facility] OR    BREAST BIOPSY, CORE RT/LT Left 2019    COLONOSCOPY  06/21/10    COLONOSCOPY N/A 3/4/2021    Procedure: Colonoscopy with  polypectomy;  Surgeon: Donald Aleman MD;  Location: [facility] GI    HC BIOPSY OF BREAST, OPEN INCISIONAL Right     Benign per patient    HC CORRECT BUNION,METATARSAL OSTEOTOMY       LAPAROSCOPY, SURGICAL; CHOLECYSTECTOMY  08/04/10    HC SLING OPERATION FOR STRESS INCONTINENCE  2007    HERNIORRHAPHY INCISIONAL (LOCATION)  2011    Procedure:HERNIORRHAPHY INCISIONAL (LOCATION); Surgeon:AYALA HOOVER; Location:[facility] OR    LAPAROSCOPIC HERNIORRHAPHY INCISIONAL  2011    Procedure:LAPAROSCOPIC HERNIORRHAPHY INCISIONAL; Laparoscopic mesh repair of incarcerated incisional hernia , extensive lysis of adhesions, excision of hernia sac and closure of fascia.; Surgeon:AYALA HOOVER; Location:[facility]    LAPAROSCOPIC LYSIS ADHESIONS  2011    Procedure:LAPAROSCOPIC LYSIS ADHESIONS; Surgeon:AYALA HOOVER; Location:[facility]    MASTECTOMY PARTIAL WITH NEEDLE LOCALIZATION Left 5/15/2019    Procedure: left wire localized partial mastectomy;  Surgeon: Donald Aleman MD;  Location: [facility]    TONSILLECTOMY      ZZC APPENDECTOMY,W OTHR PROC      Z  DELIVERY ONLY          Z LIGATE FALLOPIAN TUBE      ZZ NONSPECIFIC PROCEDURE      Exploratory laparotomy with resection of infarcted segment of omentum and minor lysis of adhesions    Z NONSPECIFIC PROCEDURE      Removal of hemorrhagic corpus luteum cyst    Z VAGINAL HYSTERECTOMY                Allergies:    Augmentin [amoxicillin-pot clavulanate], Meperidine hcl, and Seasonal allergies       Data:   All laboratory data reviewed:    Recent Labs   Lab Test 01/19/23  0932 07/08/22  1558 03/31/22  0802 02/11/21  0915 07/13/20  0909 02/26/20  0812 06/20/18  1121   LDL 65  --  50 86  --    < > 78   HDL 61  --  67 61  --    < > 55   NHDL 81  --  62 111  --    < > 94   CHOL 142  --  129 172  --    < > 149   TRIG 82  --  62 124  --    < > 80   TSH  --  1.20  --   --  2.80  --  1.90    < > = values in this interval not displayed.       Lab Results   Component Value Date    WBC 11.1 (H) 04/13/2023    WBC 6.5 07/13/2020    RBC 3.80 04/13/2023    RBC 4.57 07/13/2020    HGB 10.4 (L) 04/13/2023    HGB 13.1 07/13/2020    HCT 33.9 (L) 04/13/2023    HCT 40.7 07/13/2020    MCV 89 04/13/2023    MCV 89 07/13/2020    MCH 27.4 04/13/2023    MCH 28.7 07/13/2020    MCHC 30.7 (L) 04/13/2023    MCHC 32.2 07/13/2020    RDW 13.2 04/13/2023    RDW 12.2 07/13/2020     04/13/2023     07/13/2020       Lab Results   Component Value Date     04/13/2023     01/12/2021    POTASSIUM 4.6 04/13/2023    POTASSIUM 3.7 07/12/2022    POTASSIUM 4.1 01/12/2021    CHLORIDE 95 (L) 04/13/2023    CHLORIDE 102 07/12/2022    CHLORIDE 104 01/12/2021    CO2 35 (H) 04/13/2023    CO2 31 07/12/2022    CO2 30 01/12/2021    ANIONGAP 7 04/13/2023    ANIONGAP 4 07/12/2022    ANIONGAP 4 01/12/2021     (H) 04/13/2023     (H) 07/12/2022     (H) 01/12/2021    BUN 19.7 04/13/2023    BUN 20 07/12/2022    BUN 20 01/12/2021    CR 1.28 (H) 04/13/2023    CR 1.18 (H) 01/12/2021    GFRESTIMATED 44 (L) 04/13/2023    GFRESTIMATED 47 (L) 01/12/2021    GFRESTBLACK 54 (L) 01/12/2021    IVANNA 9.6 04/13/2023    IVANNA 10.2 (H) 01/12/2021      Lab Results   Component Value Date    AST 21 01/19/2023    AST 18 01/12/2021    ALT 16 01/19/2023    ALT 16 01/19/2023    ALT 21 01/12/2021       No results found for: A1C    Lab Results   Component Value Date     INR 3.2 (H) 04/25/2023    INR 3.4 (H) 04/20/2023    INR 4.54 (H) 06/21/2022    INR 3.4 (H) 06/11/2021    INR 2.7 (H) 06/08/2021    INR 1.10 03/04/2021    INR 2.29 (H) 07/13/2020         LIANET SAMUEL MD  [facility]      Thank you for allowing me to participate in the care of your patient.      Sincerely,     LIANET SAMUEL MD     [facility]  cc:   Nazario Jones MD  [address] [place] [address] [place] [address]

## 2023-04-25 NOTE — PROGRESS NOTES
General Cardiology Clinic Progress Note  Latanya Padron MRN# [id number]   YOB: 1950 Age: 73 year old       Reason for visit: Rapid heart beating    History of presenting illness:    I had the opportunity to see Latanya Padron at [facility] today for evaluation of rapid heart beating.  She is a 73-year-old woman with history of severe, oxygen dependent COPD.  She was also diagnosed with paroxysmal atrial fibrillation many years ago by her primary care physician although she has not had recurrent episodes that she has recognized.  She remains on warfarin anticoagulation for stroke prevention.  She was seen in 2020 by cardiology and [place] for evaluation of coronary artery calcification seen on CT scan.  She was started on medication for secondary prevention including lisinopril and a statin.  No further ischemic work-up was done at that time since she was not having any typical symptoms of angina.    She continues to have persistent shortness of breath, presumably due to her COPD.  She can walk only a short distance which causes her heart to beat very fast.  Even after she stopped to rest, her heart continues beating rapidly for up to a half an hour.  She reports having rapid heart beating episodes up to 3 times per week and these are sometimes associated with left-sided chest pain which radiates around to the left axilla and into the left upper back.  This is, of course, in addition to her shortness of breath.  She denies lightheadedness and syncope.  Today, her heart rate at rest is elevated but I see going back in her chart that this is consistent for her.  Her heart rate was 114 today, and 112 a year ago.    She tells me that she is lost about 60 or 70 pounds over the last couple of years by eliminating junk food at night.     She has a family history of heart disease.  Her brother and father both had heart disease.  Her father had quadruple bypass surgery.  She was a smoker for a  number of years but has also quit many years ago.  She has hypertension and dyslipidemia and is on medical therapy for those issues.  She is not a diabetic.    I reviewed her twelve-lead ECGs from today and these demonstrate sinus tachycardia with right bundle branch morphology with a pulmonary disease pattern.    I also reviewed a Zio patch monitor including the strips done last summer.  She had no atrial fibrillation identified.  She had 2 episodes of brief nonsustained VT.  She had about 12 episodes of brief SVT as well.            Assessment and Plan:     ASSESSMENT:    Ms. Latanya Padron is a 73-year-old woman with severe oxygen dependent COPD with a history of paroxysmal atrial fibrillation, although has not clearly had any recent atrial fibrillation episodes identified.  However, she is concerned about her episodes of rapid heart beating that tend to occur and persist after she has been walking and take up to a half an hour to resolve.  These are associated with worsening shortness of breath and sometimes left-sided chest pains.  They are not associated with lightheadedness or syncope.    She has multiple cardiac risk factors but has not had any evaluation for coronary artery disease.  She has not had a recent echocardiogram.  Her Zio patch monitor last year did not identify any prolonged abnormal heart rhythms, but her heart rates are clearly higher now.    I suggested we do a 24-hour Holter monitor, echocardiogram, and Lexiscan nuclear perfusion imaging stress test for further evaluation.  She has had a recent thyroid function test which was normal and all of her other labs look relatively unremarkable.    I will have her follow-up to discuss the results of her testing and consider any additional evaluation it might be necessary.    Dawit Gamez MD           Orders this Visit:  Orders Placed This Encounter   Procedures     Follow-Up with Cardiology JULIUS     EKG 12-lead complete w/read - Clinics (performed  "today)     Holter Monitor 24 hour Adult Pediatric     Echocardiogram Complete     No orders of the defined types were placed in this encounter.    Medications Discontinued During This Encounter   Medication Reason     diltiazem ER COATED BEADS (CARDIZEM CD/CARTIA XT) 120 MG 24 hr capsule Therapy completed (No AVS)       Today's clinic visit entailed:  Review of the result(s) of each unique test - bmp, tsh, cbc, ecg, ziopatch monitor  The following tests were independently interpreted by me as noted in my documentation: ziopatch strips, ecg's  Ordering of each unique test  Prescription drug management  35 minutes spent by me on the date of the encounter doing chart review, review of test results, interpretation of tests, patient visit and documentation   Provider  Link to [facility] Help Grid     The level of medical decision making during this visit was of high complexity.           Review of Systems:     Review of Systems:  Skin:        Eyes:  Positive for glasses  ENT:       Respiratory:    dyspnea on exertion;shortness of breath;wheezing  Cardiovascular:  Negative;palpitations;chest pain;lightheadedness;dizziness;syncope or near-syncope Positive for;edema  Gastroenterology:      Genitourinary:       Musculoskeletal:       Neurologic:  Negative numbness or tingling of hands;numbness or tingling of feet  Psychiatric:       Heme/Lymph/Imm:  Positive for allergies  Endocrine:                 Physical Exam:     Vitals: /70 (BP Location: Right arm, Patient Position: Sitting, Cuff Size: Adult Regular)   Pulse 114   Ht 1.588 m (5' 2.5\")   Wt 81.7 kg (180 lb 1.6 oz)   SpO2 90%   BMI 32.42 kg/m    Constitutional: Well nourished and in no apparent distress. Wearing o2 by NC  Eyes: Pupils equal, round. Sclerae anicteric.   HEENT: Normocephalic, atraumatic.   Neck: Supple. JVD not seen well  Respiratory: Breathing non-labored. Lungs clear bu diminished bilaterally. No crackles, wheezes, rhonchi, or " rales.  Cardiovascular:  Regular rate and rhythm, normal S1 and S2. No murmur, rub, or gallop.  Skin: Warm, dry. No rashes, cyanosis, or xanthelasma.  Extremities: No edema.  Neurologic: No gross motor deficits. Alert, awake, and oriented to person, place and time.  Psychiatric: Affect appropriate.             Medications:     Current Outpatient Medications   Medication Sig Dispense Refill     albuterol (PROAIR HFA/PROVENTIL HFA/VENTOLIN HFA) 108 (90 Base) MCG/ACT inhaler INHALE ONE TO TWO PUFFS BY MOUTH EVERY 2-4 HOURS AS NEEDED 18 g 11     albuterol (PROVENTIL) (2.5 MG/3ML) 0.083% neb solution Take  by nebulization. 1 NEB EVERY 4-6 HOURS AS NEEDED 75 mL 1     anastrozole (ARIMIDEX) 1 MG tablet Take 1 tablet (1 mg) by mouth daily 90 tablet 3     ASPIRIN NOT PRESCRIBED (INTENTIONAL) Please choose reason not prescribed from choices below.       atorvastatin (LIPITOR) 10 MG tablet TAKE ONE TABLET BY MOUTH ONCE DAILY 90 tablet 1     BIOTIN PO Take by mouth daily       Calcium Carb-Cholecalciferol 500-400 MG-UNIT TABS Take 1 tablet by mouth 2 times daily 180 tablet 3     Cyanocobalamin (B-12) 1000 MCG TBCR Take 1,000 mcg by mouth daily 100 tablet 1     fluticasone (FLONASE) 50 MCG/ACT nasal spray Spray 1 spray into both nostrils daily For stuffy/runny nose 15.8 mL 0     hydrochlorothiazide (HYDRODIURIL) 25 MG tablet Take 1 tablet (25 mg) by mouth daily 90 tablet 3     lisinopril (ZESTRIL) 2.5 MG tablet TAKE 1 TABLET (2.5 MG) BY MOUTH DAILY 90 tablet 3     magnesium 250 MG tablet Take 1 tablet by mouth daily 30 tablet      mometasone-formoterol (DULERA) 200-5 MCG/ACT inhaler INHALE 2 PUFFS BY MOUTH TWO TIMES A DAY 13 g 11     MYRBETRIQ 50 MG 24 hr tablet        ondansetron (ZOFRAN ODT) 4 MG ODT tab Take 1 tablet (4 mg) by mouth every 8 hours as needed for nausea 20 tablet 3     order for DME Equipment being ordered: Oxygen 1 each 0     order for DME Equipment being ordered: Nebulizer 1 Device 0     ORDER FOR DME  Equipment being ordered: Oxygen @ 2 liters with exertion       Probiotic Product (PROBIOTIC PO) Take by mouth daily       venlafaxine (EFFEXOR XR) 75 MG 24 hr capsule TAKE ONE CAPSULE BY MOUTH ONCE DAILY 90 capsule 3     vitamin B-Complex        VITAMIN D PO        warfarin ANTICOAGULANT (JANTOVEN ANTICOAGULANT) 2.5 MG tablet TAKE 1 1/2 TABLETS BY MOUTH ON TUES, THURS, SATURDAY AND 1 TABLET ALL OTHER DAYS OR AS DIRECTED BY THE COUMADIN CLINIC. 100 tablet 3       Family History   Problem Relation Age of Onset     Breast Cancer Mother          at 88     Obesity Mother      Genitourinary Problems Mother      Lipids Mother      Heart Disease Father         by pass (5)     Cerebrovascular Disease Father         hemorragic     Lipids Father      Alzheimer Disease Father 92     Alzheimer Disease Maternal Grandmother      Genitourinary Problems Maternal Grandmother      Cancer Maternal Grandfather      Cancer Paternal Grandfather         lymph nodes     Cancer Brother         prostate     Melanoma Brother      Heart Disease Brother          (aaron with a partner)     Hyperlipidemia Brother      Heart Disease Brother         stent placement     Hyperlipidemia Brother      Prostate Cancer Brother 66     Respiratory Sister      Lipids Sister      Breast Cancer Sister 40        s/p mastectomy     Arthritis Sister      Obesity Sister      Heart Failure Sister      Cancer Sister         stomach, colon, pancreatic     Lipids Sister      Dementia Sister         1/2 sister on dad's side ()     Other - See Comments Sister         8     Other - See Comments Daughter         fibromyalgia ()     Lymphoma Maternal Aunt      Heart Failure Maternal Aunt 81     Cancer Maternal Uncle         colon     Hemophilia Grandchild      Other - See Comments Grandchild         Transgender       Social History     Socioeconomic History     Marital status:      Spouse name: Nam     Number of children: 1     Years of education: 14      Highest education level: Not on file   Occupational History     Occupation: Ins Cordinater     Comment:  [employer]     Employer: [employer]   Tobacco Use     Smoking status: Former     Packs/day: 1.00     Years: 30.00     Pack years: 30.00     Types: Cigarettes     Quit date: 10/25/2005     Years since quittin.5     Smokeless tobacco: Never   Vaping Use     Vaping status: Never Used   Substance and Sexual Activity     Alcohol use: No     Alcohol/week: 0.0 standard drinks of alcohol     Drug use: No     Sexual activity: Not Currently     Partners: Male     Birth control/protection: Female Surgical     Comment: hysterectomy   Other Topics Concern      Service No     Blood Transfusions No     Caffeine Concern No     Comment: coffee 2c/d pop: 2c/d     Occupational Exposure No     Hobby Hazards No     Sleep Concern No     Stress Concern No     Weight Concern Yes     Comment: desire wt loss     Special Diet No     Back Care No     Comment: Hx: seen chiropractor      Exercise No     Bike Helmet No     Comment: n/a     Seat Belt Yes     Self-Exams Yes     Comment: attempts to do SBE monthly. Patient has fibrous breast tissue.     Parent/sibling w/ CABG, MI or angioplasty before 65F 55M? Not Asked   Social History Narrative     Not on file     Social Determinants of Health     Financial Resource Strain: Not on file   Food Insecurity: Not on file   Transportation Needs: Not on file   Physical Activity: Not on file   Stress: Not on file   Social Connections: Not on file   Intimate Partner Violence: Not on file   Housing Stability: Not on file            Past Medical History:     Past Medical History:   Diagnosis Date     Breast cancer (H) 2019    Left Breast     COPD (chronic obstructive pulmonary disease) (H)      Mixed hyperlipidemia      Neck mass 2018     Paroxysmal atrial fibrillation (H)      PONV (postoperative nausea and vomiting)      S/P radiation therapy     5,256 cGy to  left breast completed on 2019 - [facility]              Past Surgical History:     Past Surgical History:   Procedure Laterality Date     BIOPSY NODE SENTINEL Left 5/15/2019    Procedure: left sentinel node biopsy;  Surgeon: Donald Aleman MD;  Location: [facility]     BREAST BIOPSY, CORE RT/LT Left 2019     COLONOSCOPY  06/21/10     COLONOSCOPY N/A 3/4/2021    Procedure: Colonoscopy with  polypectomy;  Surgeon: Donald Aleman MD;  Location: [facility] GI     HC BIOPSY OF BREAST, OPEN INCISIONAL Right     Benign per patient     HC CORRECT BUNION,METATARSAL OSTEOTOMY        LAPAROSCOPY, SURGICAL; CHOLECYSTECTOMY  08/04/10     HC SLING OPERATION FOR STRESS INCONTINENCE  2007     HERNIORRHAPHY INCISIONAL (LOCATION)  2011    Procedure:HERNIORRHAPHY INCISIONAL (LOCATION); Surgeon:AYALA HOOVER; Location:[facility] OR     LAPAROSCOPIC HERNIORRHAPHY INCISIONAL  2011    Procedure:LAPAROSCOPIC HERNIORRHAPHY INCISIONAL; Laparoscopic mesh repair of incarcerated incisional hernia , extensive lysis of adhesions, excision of hernia sac and closure of fascia.; Surgeon:AYALA HOOVER; Location:[facility] OR     LAPAROSCOPIC LYSIS ADHESIONS  2011    Procedure:LAPAROSCOPIC LYSIS ADHESIONS; Surgeon:AYALA HOOVER; Location:[facility] OR     MASTECTOMY PARTIAL WITH NEEDLE LOCALIZATION Left 5/15/2019    Procedure: left wire localized partial mastectomy;  Surgeon: Donald Aleman MD;  Location: [facility] OR     TONSILLECTOMY       ZZC APPENDECTOMY,W OTHR PROC       [facility]  DELIVERY ONLY           Z LIGATE FALLOPIAN TUBE       Z NONSPECIFIC PROCEDURE      Exploratory laparotomy with resection of infarcted segment of omentum and minor lysis of adhesions     Z NONSPECIFIC PROCEDURE      Removal of hemorrhagic corpus luteum cyst     Z VAGINAL HYSTERECTOMY                Allergies:   Augmentin [amoxicillin-pot clavulanate], Meperidine hcl, and Seasonal allergies        Data:   All laboratory data reviewed:    Recent Labs   Lab Test 01/19/23  0932 07/08/22  1558 03/31/22  0802 02/11/21  0915 07/13/20  0909 02/26/20  0812 06/20/18  1121   LDL 65  --  50 86  --    < > 78   HDL 61  --  67 61  --    < > 55   NHDL 81  --  62 111  --    < > 94   CHOL 142  --  129 172  --    < > 149   TRIG 82  --  62 124  --    < > 80   TSH  --  1.20  --   --  2.80  --  1.90    < > = values in this interval not displayed.       Lab Results   Component Value Date    WBC 11.1 (H) 04/13/2023    WBC 6.5 07/13/2020    RBC 3.80 04/13/2023    RBC 4.57 07/13/2020    HGB 10.4 (L) 04/13/2023    HGB 13.1 07/13/2020    HCT 33.9 (L) 04/13/2023    HCT 40.7 07/13/2020    MCV 89 04/13/2023    MCV 89 07/13/2020    MCH 27.4 04/13/2023    MCH 28.7 07/13/2020    MCHC 30.7 (L) 04/13/2023    MCHC 32.2 07/13/2020    RDW 13.2 04/13/2023    RDW 12.2 07/13/2020     04/13/2023     07/13/2020       Lab Results   Component Value Date     04/13/2023     01/12/2021    POTASSIUM 4.6 04/13/2023    POTASSIUM 3.7 07/12/2022    POTASSIUM 4.1 01/12/2021    CHLORIDE 95 (L) 04/13/2023    CHLORIDE 102 07/12/2022    CHLORIDE 104 01/12/2021    CO2 35 (H) 04/13/2023    CO2 31 07/12/2022    CO2 30 01/12/2021    ANIONGAP 7 04/13/2023    ANIONGAP 4 07/12/2022    ANIONGAP 4 01/12/2021     (H) 04/13/2023     (H) 07/12/2022     (H) 01/12/2021    BUN 19.7 04/13/2023    BUN 20 07/12/2022    BUN 20 01/12/2021    CR 1.28 (H) 04/13/2023    CR 1.18 (H) 01/12/2021    GFRESTIMATED 44 (L) 04/13/2023    GFRESTIMATED 47 (L) 01/12/2021    GFRESTBLACK 54 (L) 01/12/2021    IVANNA 9.6 04/13/2023    IVANNA 10.2 (H) 01/12/2021      Lab Results   Component Value Date    AST 21 01/19/2023    AST 18 01/12/2021    ALT 16 01/19/2023    ALT 16 01/19/2023    ALT 21 01/12/2021       No results found for: A1C    Lab Results   Component Value Date    INR 3.2 (H) 04/25/2023    INR 3.4 (H) 04/20/2023    INR 4.54 (H) 06/21/2022    INR 3.4 (H)  06/11/2021    INR 2.7 (H) 06/08/2021    INR 1.10 03/04/2021    INR 2.29 (H) 07/13/2020         LIANET SAMUEL MD  [facility]

## 2023-04-25 NOTE — PROGRESS NOTES
ANTICOAGULATION MANAGEMENT     Latanya Padron 73 year old female is on warfarin with supratherapeutic INR result. (Goal INR 2.0-3.0)    Recent labs: (last 7 days)     04/25/23  1433   INR 3.2*       ASSESSMENT     Warfarin Lab Questionnaire    Warfarin Doses Last 7 Days      4/24/2023    10:26 AM   Dose in Tablet or Mg   TAB or MG? tablet (tab)     Pt Rptd Dose SUNDAY MONDAY TUESDAY WED FRIDAY SATURDAY 4/24/2023  10:26 AM 1 1 1.5 1 1 1.5         4/24/2023   Warfarin Lab Questionnaire   Missed doses within past 14 days? Yes   If yes; please list when: Per coumadin nurses   Changes in diet or alcohol within past 14 days? No   Medication changes since last result? Yes   Please list: Today is my last predisone and I an done the doxcillin.   Injuries or illness since last result? No   New shortness of breath, severe headaches or sudden changes in vision since last result? No   Abnormal bleeding since last result? No   Upcoming surgery, procedure? No        Previous result: Supratherapeutic  Additional findings: patient was suppose to take 3.75mg Tues and 2.5mg AOD, she did take 3.75 MG this past Sat, will have patient try new maintenance dose, she is now done with the prednisone and doxycyline as of yesterday.  She will also eat some greens.         PLAN     Recommended plan for temporary change(s) affecting INR     Dosing Instructions: Continue your current warfarin dose with next INR in 10 days       Summary  As of 4/25/2023    Full warfarin instructions:  4/25: 2.5 mg; Otherwise 3.75 mg every Sat; 2.5 mg all other days   Next INR check:  5/4/2023             Telephone call with Ivette who verbalizes understanding and agrees to plan    Lab visit scheduled    Education provided:   Goal range and lab monitoring: goal range and significance of current result  Contact [phone]  with any changes, questions or concerns.     Plan made per [facility] anticoagulation protocol    Elin Booker RN  Anticoagulation  Clinic  4/25/2023    _______________________________________________________________________     Anticoagulation Episode Summary     Current INR goal:  2.0-3.0   TTR:  51.1 % (1 y)   Target end date:  Indefinite   Send INR reminders to:  WATSON NASCIMENTO    Indications    AF (paroxysmal atrial fibrillation) (H) [I48.0]  Atrial fibrillation (H) [I48.91]  Long term current use of anticoagulant therapy [Z79.01]  Atrial fibrillation  unspecified type (H) (Resolved) [I48.91]  Atrial fibrillation  unspecified type (H) [I48.91]           Comments:           Anticoagulation Care Providers     Provider Role Specialty Phone number    Nazario Jones MD Referring Family Medicine [phone]

## 2023-04-27 NOTE — TELEPHONE ENCOUNTER
"Requested Prescriptions   Pending Prescriptions Disp Refills     hydrochlorothiazide (HYDRODIURIL) 25 MG tablet [Pharmacy Med Name: HYDROCHLOROTHIAZIDE 25MG TABS] 90 tablet 3     Sig: TAKE 1 TABLET (25 MG) BY MOUTH DAILY       Diuretics (Including Combos) Protocol Failed - 4/23/2023  2:22 PM        Failed - Normal serum creatinine on file in past 12 months     Recent Labs   Lab Test 04/13/23  0930   CR 1.28*              Passed - Blood pressure under 140/90 in past 12 months     BP Readings from Last 3 Encounters:   04/25/23 132/70   04/13/23 135/68   02/17/23 128/80                 Passed - Recent (12 mo) or future (30 days) visit within the authorizing provider's specialty     Patient has had an office visit with the authorizing provider or a provider within the authorizing providers department within the previous 12 mos or has a future within next 30 days. See \"Patient Info\" tab in inbasket, or \"Choose Columns\" in Meds & Orders section of the refill encounter.              Passed - Medication is active on med list        Passed - Patient is age 18 or older        Passed - No active pregancy on record        Passed - Normal serum potassium on file in past 12 months     Recent Labs   Lab Test 04/13/23  0930   POTASSIUM 4.6                    Passed - Normal serum sodium on file in past 12 months     Recent Labs   Lab Test 04/13/23  0930                 Passed - No positive pregnancy test in past 12 months             "

## 2023-04-28 NOTE — TELEPHONE ENCOUNTER
Patient Returning Call    Reason for call:  INR, pt has questions about dosing     Information relayed to patient:  Nurse will call the pt back     Patient has additional questions:  No      Could we send this information to you in [facility] or would you prefer to receive a phone call?:   Patient would prefer a phone call   Okay to leave a detailed message?: Yes at Home number on file [phone] (home)

## 2023-04-28 NOTE — TELEPHONE ENCOUNTER
Pt was calling to clarify her dosing. I have instructed her to take 3.75 mg every Sat; 2.5 mg all other days recheck 5/5/23.. Patient verbalized understanding and agrees with plan of care. Pt had no further questions or concerns at this time. Manolo Damian RN, BSN  [facility] Anticoagulation Team

## 2023-05-01 NOTE — TELEPHONE ENCOUNTER
Medication approved.  Needs repeat BMP and microalbumin.  Orders placed.     Electronically signed by:  Nazario Jones M.D.  5/1/2023

## 2023-05-03 NOTE — PROGRESS NOTES
ANTICOAGULATION MANAGEMENT     Latanya Padron 73 year old female is on warfarin with supratherapeutic INR result. (Goal INR 2.0-3.0)    Recent labs: (last 7 days)     05/03/23  1202   INR 4.93*       ASSESSMENT     Source(s): Chart Review and Patient/Caregiver Call     Warfarin doses taken: Warfarin taken as instructed  Diet: No new diet changes identified  Medication/supplement changes: None noted  New illness, injury, or hospitalization: No  Signs or symptoms of bleeding or clotting: No  Previous result: Supratherapeutic  Additional findings: continues to have decreased appetite and she is having COPD issues. Was seen by PCP today. No new medications or changes at this time.  Labs and ultrasound ordered           PLAN     Recommended plan for ongoing change(s) affecting INR     Dosing Instructions: hold dose then decrease your warfarin dose (13% change) with next INR in 1 week       Summary  As of 5/3/2023    Full warfarin instructions:  5/3: Hold; Otherwise 1.25 mg every Thu; 2.5 mg all other days   Next INR check:  5/10/2023             Telephone call with Ivette who verbalizes understanding and agrees to plan  Sent [employer] message with dosing and follow up instructions    Lab visit scheduled    Education provided:   Goal range and lab monitoring: goal range and significance of current result  Symptom monitoring: monitoring for bleeding signs and symptoms and when to seek medical attention/emergency care  Contact [phone]  with any changes, questions or concerns.     Plan made per [facility] anticoagulation protocol    Elin Booker, RN  Anticoagulation Clinic  5/3/2023    _______________________________________________________________________     Anticoagulation Episode Summary     Current INR goal:  2.0-3.0   TTR:  51.1 % (1 y)   Target end date:  Indefinite   Send INR reminders to:  WATSON NASCIMENTO    Indications    AF (paroxysmal atrial fibrillation) (H) [I48.0]  Atrial fibrillation (H) [I48.91]  Long  term current use of anticoagulant therapy [Z79.01]  Atrial fibrillation  unspecified type (H) (Resolved) [I48.91]  Atrial fibrillation  unspecified type (H) [I48.91]           Comments:           Anticoagulation Care Providers     Provider Role Specialty Phone number    Nazario Jones MD Referring Family Medicine [phone]

## 2023-05-03 NOTE — PROGRESS NOTES
"  Assessment & Plan     (R63.4) Weight loss  (primary encounter diagnosis)  Comment: peak weight was 232# now down to 172#  Plan: Hemoglobin A1c, Comprehensive metabolic panel         (BMP + Alb, Alk Phos, ALT, AST, Total. Bili,         TP), TSH with free T4 reflex, T3, Free        Will check labs today, her weight loss could be due to her chronic disease (COPD)    (R10.9) Right flank pain  Comment: new issue.  Plan: UA Macroscopic with reflex to Microscopic and         Culture, US Renal Complete Non-Vascular        No history of kidney stones or recurrent UTIs    (J43.9) Pulmonary emphysema, unspecified emphysema type (H)  Comment: this has worsened over the past 6-9 months, ever since getting sick last summer.  Plan: Adult Pulmonary Medicine Referral        She will need follow up with Pulmonology regularly     (R73.09) Elevated glucose  Comment: she has had some elevated sugars but no history of diabetes.   Plan: Hemoglobin A1c        Will check a glucose today and an A1C    (F32.5) Major depressive disorder in full remission, unspecified whether recurrent (H)  Comment: stable on her venlafaxine.   Plan: continue current dose.     (N18.31) Stage 3a chronic kidney disease (H)  Comment: has been stable.    Plan: recheck labs today.    Review of prior external note(s) from - Outside records from cardiology and pulmonology.  30 minutes spent by me on the date of the encounter doing chart review, history and exam, documentation and further activities per the note       BMI:   Estimated body mass index is 33.45 kg/m  as calculated from the following:    Height as of this encounter: 1.529 m (5' 0.2\").    Weight as of this encounter: 78.2 kg (172 lb 6.4 oz).   Weight management plan: has lost about 50# in the last year.      MEDICATIONS:  Continue current medications without change    Nazario Jones MD, MD  [facility]    Elva Steele is a 73 year old, presenting for the following health " issues:  Breathing Problem and Weight Loss        5/3/2023    10:49 AM   Additional Questions   Roomed by Betty FONTANEZ LPN   Accompanied by Ray,      History of Present Illness       Back Pain:  She presents for follow up of back pain. Patient's back pain is a new problem.    Original cause of back pain: not sure  First noticed back pain: in the last week  Patient feels back pain: dailyLocation of back pain:  Right lower back and right side of waist  Description of back pain: dull ache  Back pain spreads: nowhere    Since patient first noticed back pain, pain is: gradually worsening  Does back pain interfere with her job:  Not applicable  On a scale of 1-10 (10 being the worst), patient describes pain as:  5  What makes back pain worse: certain positions  Acupuncture: not tried  Acetaminophen: helpful  Activity or exercise: not tried  Chiropractor:  Not tried  Cold: not tried  Heat: not tried  Massage: not tried  Muscle relaxants: not tried  NSAIDS: not tried  Opioids: not tried  Physical Therapy: not tried  Rest: not helpful  Steroid Injection: not tried  Stretching: not tried  Surgery: not tried  TENS unit: not tried  Topical pain relievers: not helpful  Other healthcare providers patient is seeing for back pain: None    She eats 0-1 servings of fruits and vegetables daily.She consumes 1 sweetened beverage(s) daily.She exercises with enough effort to increase her heart rate 9 or less minutes per day.  She exercises with enough effort to increase her heart rate 3 or less days per week.   She is taking medications regularly.     Patient continues to lose weight.  She is down almost 50 pounds since 2018 but 30 of that since January 2022.  She really has not been trying to lose weight but just has had decreased appetite.  She had a significant COPD exacerbation this past summer and her breathing has been worse since.  She has been on supplemental O2 since.  She continues to follow with pulmonology.  She also saw  "cardiology yesterday and had a nuclear scan done, results are not back yet.  New to her just over the past few days is some right flank pain.  She drinks about 64 ounces of fluid a day and feels like she is having some difficulty with urination.  She has never had any kidney stones but has had some issues with stress incontinence.  She is on a medication for that which seems to help a little bit.  The right flank pain radiates down along the lateral aspect and down towards the bladder.  No history of kidney stones.  She has not had any trauma or injuries to her back.      Review of Systems   Constitutional, HEENT, cardiovascular, pulmonary, GI, , musculoskeletal, neuro, skin, endocrine and psych systems are negative, except as otherwise noted.      Objective    /70 (BP Location: Left arm, Patient Position: Chair)   Pulse 88   Temp 98  F (36.7  C) (Temporal)   Resp 20   Ht 1.529 m (5' 0.2\")   Wt 78.2 kg (172 lb 6.4 oz)   SpO2 90%   BMI 33.45 kg/m    Body mass index is 33.45 kg/m .  Physical Exam   GENERAL: healthy, alert and no distress  RESP: She has some expiratory wheezing bilaterally with a prolonged expiratory phase.  She is wearing her oxygen today.  She is not moving air terribly well right now.  She is got no nasal flaring or accessory muscle use.  She appears comfortable on her O2.  She is at 90% on 2 L per nasal cannula.  CV: regular rates and rhythm, normal S1 S2, no S3 or S4 and no murmur, click or rub  ABDOMEN: Abdomen is soft tender along the right lateral and lower quadrant but no rebound or guarding noted.  She is slightly tender over the bladder area.  BACK: She has tenderness in the right CVA but actually a little bit lower and this radiates more lateral along the rib margin.  Nothing similar on the left.    Labs are being obtained today and results are pending.  She also has a kidney ultrasound pending.              "

## 2023-05-04 NOTE — PROGRESS NOTES
Pt recently seen by Dr. Gamez for symptoms of rapid heart beating, shortness of breath.     Echo, Holter and Lexiscan stress Nuclear MPI ordered. Called pt no answer. Left VM stating the Lexiscan stress test suggests it was a negative study meaning no signs of ischemia reported. Also stated the Holter monitor suggests her avg HR is a little fast but not concerning at this time and no arrhythmias identified during the time she wore the monitor. Echo scheduled for 5/22/223. Pt has 6/1/23 JULIUS OV scheduled to review testing w/provider. Asked pt please keep her appts to complete testing and review results in clinic with JULIUS. Asked pt call back if concerns/questions prior to her appt.Sue Mack RN on 5/4/2023 at 12:25 PM      June 12, 2023  Addendum    Pt canceled 6/1/23 JULIUS visit and r/s to 7/20/23. Sent pt a [employer] message asking her to keep her appt on 7/20/23 to review/discuss test results with her provider, and if concerns/questions prior to that visit to contact us. Sue Mack RN on 6/12/2023 at 10:28 AM

## 2023-05-05 NOTE — TELEPHONE ENCOUNTER
General Call      Reason for Call:  Pt would like call back from INR nurse to discuss pt forgot to take Coumadin medication  last night and is wondering what is the next step. Please call pt back to discuss.    What are your questions or concerns:  na    Date of last appointment with provider: na    Could we send this information to you in GMEX or would you prefer to receive a phone call?:   Patient would prefer a phone call   Okay to leave a detailed message?: Yes at Cell number on file:    Telephone Information:   Mobile [phone]

## 2023-05-05 NOTE — TELEPHONE ENCOUNTER
Patient sent in a [employer] message:    Patient states she has been wheezing since she was seen on 5/3/23.    Her oxygen has been about 88-90's. It has currently at 87%    She states she is not getting worse.  She would like to avoid getting worse over the weekend.    She states she is more short of breath than her normal off and on. She does not feel that way right now.    She states the coughing has gotten worse.  She is wondering if she should try zyrtec to see if this helps.    She is coughing up green Phlegm.  Per protocol patient advised to go to the ER. Patient states she will go if her Oxygen does not improve after she takes off her nail polish.  Caprice Kellogg RN on 5/5/2023 at 2:08 PM          Reason for Disposition    Oxygen level (e.g., pulse oximetry) 90 percent or lower    Additional Information    Negative: SEVERE difficulty breathing (e.g., struggling for each breath, speaks in single words, pulse > 120)    Negative: Breathing stopped and hasn't returned    Negative: Choking on something    Negative: Bluish (or gray) lips or face    Negative: Difficult to awaken or acting confused (e.g., disoriented, slurred speech)    Negative: Passed out (i.e., fainted, collapsed and was not responding)    Negative: Wheezing started suddenly after medicine, an allergic food, or bee sting    Negative: Stridor    Negative: Slow, shallow and weak breathing    Negative: Sounds like a life-threatening emergency to the triager    Negative: Chest pain    Negative: Wheezing (high pitched whistling sound) and previous asthma attacks or use of asthma medicines    Negative: Difficulty breathing and within 14 days of COVID-19 Exposure    Negative: Difficulty breathing and only present when coughing    Negative: Difficulty breathing and only from stuffy nose    Negative: Difficulty breathing and only from stuffy nose or runny nose from common cold    Negative: MODERATE difficulty breathing (e.g., speaks in phrases, SOB even at  rest, pulse 100-120) of new-onset or worse than normal    Protocols used: BREATHING DIFFICULTY-A-OH

## 2023-05-05 NOTE — TELEPHONE ENCOUNTER
8:17 AM    Since the INR was elevated on 5/3/23, writer did not have the patient make up the missed dose on 5/4/23.    Mirna Trejo, RN, BSN, PHN  [facility]   [phone]

## 2023-05-08 NOTE — MEDICATION SCRIBE - ADMISSION MEDICATION HISTORY
Medication Scribe Admission Medication History    Admission medication history is complete. The information provided in this note is only as accurate as the sources available at the time of the update.    Medication reconciliation/reorder completed by provider prior to medication history? No    Information Source(s): Patient via in-person    Pertinent Information: n/a    Changes made to PTA medication list:    Added: None    Deleted: biotin    Changed: warfarin dose and time, lipitor to suppertime, takes Dulera at 7am and 3pm    Medication Affordability:  Not including over the counter (OTC) medications, was there a time in the past 12 months when you did not take your medications as prescribed because of cost?: No    Allergies reviewed with patient and updates made in EHR: yes    Medication History Completed By: SIMONA SALINAS 5/8/2023 12:29 PM    Prior to Admission medications    Medication Sig Last Dose Taking? Auth Provider Long Term End Date   albuterol (PROAIR HFA/PROVENTIL HFA/VENTOLIN HFA) 108 (90 Base) MCG/ACT inhaler INHALE ONE TO TWO PUFFS BY MOUTH EVERY 2-4 HOURS AS NEEDED  Patient taking differently: Inhale 1-2 puffs into the lungs every 2 hours as needed for shortness of breath 5/7/2023 at 1900 Yes Nazario Jones MD Yes    albuterol (PROVENTIL) (2.5 MG/3ML) 0.083% neb solution Take  by nebulization. 1 NEB EVERY 4-6 HOURS AS NEEDED  Patient taking differently: Take 2.5 mg by nebulization every 4 hours as needed for shortness of breath Past Week at [facility] Yes Nazario Jones MD Yes    anastrozole (ARIMIDEX) 1 MG tablet Take 1 tablet (1 mg) by mouth daily 5/7/2023 at am Yes Vin Dhillon MD Yes    atorvastatin (LIPITOR) 10 MG tablet TAKE ONE TABLET BY MOUTH ONCE DAILY  Patient taking differently: Take 10 mg by mouth daily (with dinner) 5/7/2023 at 1900 Yes Nazario Jones MD Yes    Calcium Carb-Cholecalciferol 500-400 MG-UNIT TABS Take 1 tablet by mouth daily 5/7/2023 at am Yes Mirza  Glen Bejarano MD     Cyanocobalamin (B-12) 1000 MCG TBCR Take 1,000 mcg by mouth daily 5/7/2023 at am Yes Glen Blue MD     fluticasone (FLONASE) 50 MCG/ACT nasal spray Spray 1 spray into both nostrils daily For stuffy/runny nose More than a month at [facility] Yes Tom Monte, PA-C     hydrochlorothiazide (HYDRODIURIL) 25 MG tablet TAKE 1 TABLET (25 MG) BY MOUTH DAILY 5/7/2023 at am Yes Nazario Jones MD Yes    lisinopril (ZESTRIL) 2.5 MG tablet TAKE 1 TABLET (2.5 MG) BY MOUTH DAILY 5/7/2023 at am Yes Nazario Jones MD Yes    magnesium 250 MG tablet Take 1 tablet by mouth daily 5/7/2023 at am Yes Glen Blue MD     metFORMIN (GLUCOPHAGE XR) 500 MG 24 hr tablet Take 1 tablet (500 mg) by mouth daily (with dinner) 5/7/2023 at supper Yes Nazario Jones MD Yes    mometasone-formoterol (DULERA) 200-5 MCG/ACT inhaler INHALE 2 PUFFS BY MOUTH TWO TIMES A DAY  Patient taking differently: Inhale 2 puffs into the lungs 2 times daily 7am and 3pm 5/7/2023 at 1500 Yes Nazario Jones MD Yes    MYRBETRIQ 50 MG 24 hr tablet Take 50 mg by mouth daily 5/7/2023 at am Yes Reported, Patient     ondansetron (ZOFRAN ODT) 4 MG ODT tab Take 1 tablet (4 mg) by mouth every 8 hours as needed for nausea 5/6/2023 at k Yes Nazario Jones MD     order for DME Equipment being ordered: Oxygen  at [facility] Yes Glen Blue MD     order for DME Equipment being ordered: Nebulizer Past Week at [facility] Yes Glen Blue MD     ORDER FOR DME Equipment being ordered: Oxygen @ 2 liters with exertion  at n/a Yes Glen Blue MD     Probiotic Product (PROBIOTIC PO) Take by mouth daily 5/7/2023 at am Yes Reported, Patient     venlafaxine (EFFEXOR XR) 75 MG 24 hr capsule TAKE ONE CAPSULE BY MOUTH ONCE DAILY  Patient taking differently: Take 75 mg by mouth daily 5/7/2023 at am Yes Nazario Jones MD Yes    vitamin B-Complex Take 1 tablet by mouth daily 5/7/2023 at am Yes Reported, Patient     VITAMIN D PO  Take 1 capsule by mouth daily 5/7/2023 at am Yes Reported, Patient     warfarin ANTICOAGULANT (JANTOVEN ANTICOAGULANT) 2.5 MG tablet TAKE 1 1/2 TABLETS BY MOUTH ON TUES, THURS, SATURDAY AND 1 TABLET ALL OTHER DAYS OR AS DIRECTED BY THE COUMADIN CLINIC.  Patient taking differently: daily (with dinner) TAKE 1 1/2 TABLETS (3.75mg) BY MOUTH ON TUESDAYS,  AND 1 TABLET (2.5MG) ALL OTHER DAYS OR AS DIRECTED BY THE COUMADIN CLINIC. 5/7/2023 at supper 2.5mg Yes Nazario Jones MD     ASPIRIN NOT PRESCRIBED (INTENTIONAL) Please choose reason not prescribed from choices below.  at n/a  Nazario Jones MD Yes

## 2023-05-08 NOTE — ED PROVIDER NOTES
History     Chief Complaint   Patient presents with     Shortness of Breath     HPI  Latanya Padron is a 73 year old female who has a known history for COPD.  O2 dependent at 2-2.5 L.  Was seen in the ED at [facility] emergency department mid April and diagnosed with COPD exacerbation.  She is treated as an outpatient with doxycycline, gradual prednisone taper and prescribed DuoNebs.  She states she improved but now that she is off her prednisone symptoms are returning.  She has had no fever or chills.  Has had intermittent night sweats.  Still producing more of a thick green phlegm but states most of the mucus she cannot clear and it is rattles in her chest.  She believes she had an episode of mucous plugging this morning that caused her oxygen saturation levels dropped to the low to mid 60s.  She has had no hemoptysis.  Denies any chest discomfort or palpitations.  Noted that when she was here mid April that her troponin = 20 which is slightly elevated for her expected baseline but she does have CKD stage III.  She has had no pleuritic chest discomfort.  No worsening orthopnea or fluid retention related to weight gain.  With regards to spring allergies she has had no sneezing.  Occasionally has had some itchy eyes and a little bit of rhinorrhea.  Chest x-ray and COVID screening completed when she was here mid April were both unremarkable.    Nuclear medicine scan from 5/2/2023:       The nuclear stress test is negative for inducible myocardial ischemia or infarction.     Left ventricular function is hyperdynamic.     The left ventricular ejection fraction at rest is greater than 70%. The left ventricular ejection fraction at stress is greater than 70%.     Stress to rest cavity ratio is 1.10.  TID is absent.     LV cavity small, perfusion volume 41cc.     There is no prior study for comparison.        ECG Summary    ECG Baseline electrocardiogram demonstrates sinus rhythm and nonspecific ST-T  abnormalities.   No significant ST segment changes. There were no arrhythmias during stress.            Allergies:  Allergies   Allergen Reactions     Augmentin [Amoxicillin-Pot Clavulanate] Nausea and Vomiting     Meperidine Hcl Nausea and Vomiting     demerol     Seasonal Allergies      spring       Problem List:    Patient Active Problem List    Diagnosis Date Noted     Atrial fibrillation, unspecified type (H) 10/06/2022     Priority: Medium     Major depression in complete remission (H) 05/19/2022     Priority: Medium     Epidermoid cyst of labia majora 11/04/2020     Priority: Medium     Long term current use of anticoagulant therapy 10/20/2020     Priority: Medium     Coronary artery disease involving native coronary artery of native heart without angina pectoris 08/10/2020     Priority: Medium     Restless leg syndrome 06/24/2020     Priority: Medium     Lymphadenopathy of head and neck 02/26/2020     Priority: Medium     Groin pain, right 02/26/2020     Priority: Medium     Right hip pain 09/04/2019     Priority: Medium     Patellofemoral pain syndrome of right knee 09/04/2019     Priority: Medium     Abnormal gait 09/04/2019     Priority: Medium     Primary osteoarthritis of right knee 09/04/2019     Priority: Medium     Age-related osteoporosis without current pathological fracture 07/03/2019     Priority: Medium     Malignant neoplasm of overlapping sites of left breast in female, estrogen receptor positive (H) 06/12/2019     Priority: Medium     Segmental dysfunction of sacral region 02/01/2019     Priority: Medium     Segmental dysfunction of lumbar region 02/01/2019     Priority: Medium     Segmental dysfunction of thoracic region 02/01/2019     Priority: Medium     Bilateral low back pain with right-sided sciatica 02/01/2019     Priority: Medium     Trochanteric bursitis of right hip 07/13/2018     Priority: Medium     Hip pain, right 06/20/2018     Priority: Medium     Multiple joint pain 06/20/2018      Priority: Medium     CKD (chronic kidney disease) stage 3, GFR 30-59 ml/min (H) 06/21/2017     Priority: Medium     Primary osteoarthritis of both knees 04/18/2017     Priority: Medium     AF (paroxysmal atrial fibrillation) (H) 03/02/2016     Priority: Medium     Major depression in complete remission (H) 12/28/2015     Priority: Medium     Class 1 obesity due to excess calories without serious comorbidity with body mass index (BMI) of 34.0 to 34.9 in adult 12/28/2015     Priority: Medium     Pulmonary emphysema, unspecified emphysema type (H) 12/28/2015     Priority: Medium     Atrial fibrillation (H) 06/20/2014     Priority: Medium     Essential hypertension, benign 09/30/2013     Priority: Medium     Tennis elbow 04/12/2013     Priority: Medium     left         Advanced directives, counseling/discussion 09/09/2011     Priority: Medium     Advance Directive Problem List Overview:   Name Relationship Phone    Primary Health Care Agent            Alternative Health Care Agent          Discussed advance care planning with patient; information given to patient to review.//Brenda Carlin/BEATRIZ(AAMA)  9/9/2011        Vinh Ramon DO  05/08/23 2059         HYPERLIPIDEMIA LDL GOAL <130 10/31/2010     Priority: Medium     Hirsutism 04/13/2007     Priority: Medium     Family history of malignant neoplasm of breast 12/27/2006     Priority: Medium     Female stress incontinence 11/26/2005     Priority: Medium     Disorder of bone and cartilage 07/13/2005     Priority: Medium     Problem list name updated by automated process. Provider to review       Sprain and strain of unspecified site of knee and leg 04/09/2004     Priority: Medium        Past Medical History:    Past Medical History:   Diagnosis Date     Breast cancer (H) 04/12/2019     COPD (chronic obstructive pulmonary disease) (H)      Mixed hyperlipidemia      Neck mass 06/20/2018     Paroxysmal atrial fibrillation (H)      PONV (postoperative nausea and  vomiting)      S/P radiation therapy        Past Surgical History:    Past Surgical History:   Procedure Laterality Date     BIOPSY NODE SENTINEL Left 5/15/2019    Procedure: left sentinel node biopsy;  Surgeon: Donald Aleman MD;  Location: [facility] OR     BREAST BIOPSY, CORE RT/LT Left 2019     COLONOSCOPY  06/21/10     COLONOSCOPY N/A 3/4/2021    Procedure: Colonoscopy with  polypectomy;  Surgeon: Dnoald Aleman MD;  Location: [facility] GI     HC BIOPSY OF BREAST, OPEN INCISIONAL Right     Benign per patient     HC CORRECT BUNION,METATARSAL OSTEOTOMY       HC LAPAROSCOPY, SURGICAL; CHOLECYSTECTOMY  08/04/10     HC SLING OPERATION FOR STRESS INCONTINENCE  2007     HERNIORRHAPHY INCISIONAL (LOCATION)  2011    Procedure:HERNIORRHAPHY INCISIONAL (LOCATION); Surgeon:AYALA HOOVER; Location:[facility] OR     LAPAROSCOPIC HERNIORRHAPHY INCISIONAL  2011    Procedure:LAPAROSCOPIC HERNIORRHAPHY INCISIONAL; Laparoscopic mesh repair of incarcerated incisional hernia , extensive lysis of adhesions, excision of hernia sac and closure of fascia.; Surgeon:AYALA HOOVER; Location:[facility]     LAPAROSCOPIC LYSIS ADHESIONS  2011    Procedure:LAPAROSCOPIC LYSIS ADHESIONS; Surgeon:AYALA HOOVER; Location:[facility] OR     MASTECTOMY PARTIAL WITH NEEDLE LOCALIZATION Left 5/15/2019    Procedure: left wire localized partial mastectomy;  Surgeon: Donald Aleman MD;  Location: [facility] OR     TONSILLECTOMY       ZZC APPENDECTOMY,W OTHR PROC       [facility]  DELIVERY ONLY           Z LIGATE FALLOPIAN TUBE  's     Z NONSPECIFIC PROCEDURE      Exploratory laparotomy with resection of infarcted segment of omentum and minor lysis of adhesions     Z NONSPECIFIC PROCEDURE      Removal of hemorrhagic corpus luteum cyst     Z VAGINAL HYSTERECTOMY         Family History:    Family History   Problem Relation Age of Onset     Breast Cancer Mother          at 88     Obesity Mother       Genitourinary Problems Mother      Lipids Mother      Heart Disease Father         by pass (5)     Cerebrovascular Disease Father         hemorragic     Lipids Father      Alzheimer Disease Father 92     Alzheimer Disease Maternal Grandmother      Genitourinary Problems Maternal Grandmother      Cancer Maternal Grandfather      Cancer Paternal Grandfather         lymph nodes     Cancer Brother         prostate     Melanoma Brother      Heart Disease Brother          (aaron with a partner)     Hyperlipidemia Brother      Heart Disease Brother         stent placement     Hyperlipidemia Brother      Prostate Cancer Brother 66     Respiratory Sister      Lipids Sister      Breast Cancer Sister 40        s/p mastectomy     Arthritis Sister      Obesity Sister      Heart Failure Sister      Cancer Sister         stomach, colon, pancreatic     Lipids Sister      Dementia Sister         1/2 sister on dad's side ()     Other - See Comments Sister         1948     Other - See Comments Daughter         fibromyalgia ()     Lymphoma Maternal Aunt      Heart Failure Maternal Aunt 81     Cancer Maternal Uncle         colon     Hemophilia Grandchild      Other - See Comments Grandchild         Transgender       Social History:  Marital Status:   [2]  Social History     Tobacco Use     Smoking status: Former     Packs/day: 1.00     Years: 30.00     Pack years: 30.00     Types: Cigarettes     Quit date: 10/25/2005     Years since quittin.5     Smokeless tobacco: Never   Vaping Use     Vaping status: Never Used   Substance Use Topics     Alcohol use: No     Alcohol/week: 0.0 standard drinks of alcohol     Drug use: No        Medications:    albuterol (PROAIR HFA/PROVENTIL HFA/VENTOLIN HFA) 108 (90 Base) MCG/ACT inhaler  albuterol (PROVENTIL) (2.5 MG/3ML) 0.083% neb solution  anastrozole (ARIMIDEX) 1 MG tablet  ASPIRIN NOT PRESCRIBED (INTENTIONAL)  atorvastatin (LIPITOR) 10 MG tablet  BIOTIN PO  Calcium  Carb-Cholecalciferol 500-400 MG-UNIT TABS  Cyanocobalamin (B-12) 1000 MCG TBCR  fluticasone (FLONASE) 50 MCG/ACT nasal spray  hydrochlorothiazide (HYDRODIURIL) 25 MG tablet  lisinopril (ZESTRIL) 2.5 MG tablet  magnesium 250 MG tablet  metFORMIN (GLUCOPHAGE XR) 500 MG 24 hr tablet  mometasone-formoterol (DULERA) 200-5 MCG/ACT inhaler  MYRBETRIQ 50 MG 24 hr tablet  ondansetron (ZOFRAN ODT) 4 MG ODT tab  order for DME  order for DME  ORDER FOR DME  Probiotic Product (PROBIOTIC PO)  venlafaxine (EFFEXOR XR) 75 MG 24 hr capsule  vitamin B-Complex  VITAMIN D PO  warfarin ANTICOAGULANT (JANTOVEN ANTICOAGULANT) 2.5 MG tablet          Review of Systems   All other systems reviewed and are negative.      Physical Exam   BP: 126/56  Pulse: 86  Temp: 98.6  F (37  C)  Resp: 26  SpO2: (!) 68 %      Physical Exam  Vitals and nursing note reviewed.   Constitutional:       Appearance: She is obese. She is not ill-appearing or toxic-appearing.   HENT:      Head: Normocephalic.      Nose: Nose normal.      Mouth/Throat:      Mouth: Mucous membranes are moist.   Eyes:      Conjunctiva/sclera: Conjunctivae normal.   Neck:      Vascular: No JVD.   Cardiovascular:      Rate and Rhythm: Normal rate. Rhythm irregular. No extrasystoles are present.     Pulses: Normal pulses.   Pulmonary:      Effort: Pulmonary effort is normal.      Breath sounds: Wheezing and rhonchi present. No decreased breath sounds or rales.   Chest:      Chest wall: No tenderness.   Abdominal:      General: Bowel sounds are normal.      Palpations: Abdomen is soft.   Musculoskeletal:      Cervical back: Normal range of motion and neck supple.      Right lower leg: No edema.      Left lower leg: No edema.   Skin:     General: Skin is warm.      Capillary Refill: Capillary refill takes less than 2 seconds.      Findings: No rash.   Neurological:      General: No focal deficit present.      Mental Status: She is alert and oriented to person, place, and time.    Psychiatric:         Mood and Affect: Mood normal.         Behavior: Behavior normal.         ED Course                 Procedures         EKG:  Interpretation by Vinh Ramon DO.   Indication: Dyspnea/hypoxia  Sinus rhythm.  Knee negative precordial T waves in leads V1 and V2.  Some RR interval variation due to respiratory cycle.  More prominent T waves.  No change in EKG from April 25 ED visit at [facility].  Impressions abnormal EKG               Results for orders placed or performed during the hospital encounter of 05/08/23 (from the past 24 hour(s))   CBC with platelets differential    Narrative    The following orders were created for panel order CBC with platelets differential.  Procedure                               Abnormality         Status                     ---------                               -----------         ------                     CBC with platelets and d...[[id number]]  Abnormal            Final result                 Please view results for these tests on the individual orders.   Blood gas venous   Result Value Ref Range    pH Venous 7.39 7.32 - 7.43    pCO2 Venous 66 (H) 40 - 50 mm Hg    pO2 Venous 22 (L) 25 - 47 mm Hg    Bicarbonate Venous 40 (H) 21 - 28 mmol/L    Base Excess/Deficit (+/-) 10.9 (H) -7.7 - 1.9 mmol/L    FIO2 32    CBC with platelets and differential   Result Value Ref Range    WBC Count 8.4 4.0 - 11.0 10e3/uL    RBC Count 3.88 3.80 - 5.20 10e6/uL    Hemoglobin 10.6 (L) 11.7 - 15.7 g/dL    Hematocrit 34.3 (L) 35.0 - 47.0 %    MCV 88 78 - 100 fL    MCH 27.3 26.5 - 33.0 pg    MCHC 30.9 (L) 31.5 - 36.5 g/dL    RDW 13.0 10.0 - 15.0 %    Platelet Count 425 150 - 450 10e3/uL    % Neutrophils 74 %    % Lymphocytes 6 %    % Monocytes 13 %    % Eosinophils 5 %    % Basophils 1 %    % Immature Granulocytes 1 %    NRBCs per 100 WBC 0 <1 /100    Absolute Neutrophils 6.2 1.6 - 8.3 10e3/uL    Absolute Lymphocytes 0.5 (L) 0.8 - 5.3 10e3/uL    Absolute Monocytes 1.1 0.0 - 1.3  10e3/uL    Absolute Eosinophils 0.4 0.0 - 0.7 10e3/uL    Absolute Basophils 0.1 0.0 - 0.2 10e3/uL    Absolute Immature Granulocytes 0.1 <=0.4 10e3/uL    Absolute NRBCs 0.0 10e3/uL     *Note: Due to a large number of results and/or encounters for the requested time period, some results have not been displayed. A complete set of results can be found in Results Review.       Medications   ipratropium - albuterol 0.5 mg/2.5 mg/3 mL (DUONEB) neb solution 3 mL (has no administration in time range)   methylPREDNISolone sodium succinate (solu-MEDROL) injection 62.5 mg (has no administration in time range)       Assessments & Plan (with Medical Decision Making) Ivette is 73 years of age.  Very presented with continued dyspnea and a productive cough of green phlegm.  She was seen in ED for similar complaints in April 13 and noted to have a COPD exacerbation.  Treated with prednisone, oral doxycycline and told to use DuoNebs.  She never took DuoNebs.  Apparently nebulizer was not set up.  Felt better but now worse after medication stopped.  She has had no fever or hemoptysis.  Is on O2 at home at 2.5 L.  Had mucous plugging with clearing of green phlegm this morning during that time showed O2 sats that dropped into the mid 60s so she came to the emergency department.  She had a recent nuclear medicine scan and noted to have no reversible myocardial ischemia concerns.  No valvular heart disease.  Medical work-up today noted that the patient was not hypoxic on her 2.5 L.  She spoke in full sentence structure.  Had occasional loose cough and there was scattered rhonchi with some expiratory wheezing went for chest auscultation.  Her chest x-ray did not show any acute infiltrates of pneumonia.  No signs for CHF.  Discharge patient home with continued O2 at 2.5 L.  Maintain good hydration.  Extended prednisone gradual taper over 12 days.  Did not add additional antibiotics.  Added DuoNebs 3-4 times daily and she got a new nebulizer  machine with proper tubing set up.  In the ED received Solu-Medrol IV.     I have reviewed the nursing notes.    I have reviewed the findings, diagnosis, plan and need for follow up with the patient.          New Prescriptions    No medications on file       Final diagnoses:   Chronic obstructive pulmonary disease with acute exacerbation (H)       5/8/2023   [facility] EMERGENCY DEPT

## 2023-05-08 NOTE — ED TRIAGE NOTES
PT c/o SOB. Sp02 was 68% on 2L. Pt normally has 2L at home. Gets winded quickly. Denies chest pain. States her SOB is getting worse.  Pt on 6L NC pts Sp02 came up to 93%.     Triage Assessment     Row Name 05/08/23 0924       Triage Assessment (Adult)    Airway WDL WDL       Respiratory WDL    Respiratory WDL X;rhythm/pattern;cough    Rhythm/Pattern, Respiratory tachypneic;shortness of breath    Cough Frequency infrequent    Cough Type productive       Cognitive/Neuro/Behavioral WDL    Cognitive/Neuro/Behavioral WDL WDL

## 2023-05-08 NOTE — TELEPHONE ENCOUNTER
"S: shortness of breath    B: patient is calling in with complaints of worsening shortness of breath. Patient states has history COPD. Is on 2.5L O2 normally. Patient was seen by PCP last week on 5/3.  Patient was triaged on Friday 5/5. Patient had worsening shortness of breath then, she was advised to go to ED, she did not.  Patient states things have worsened since then.  O2 sats are ranging 85-90% on her 2.5L. she is still coughing up thick green phlegm. Denies fever. Patient does report she had some A. Fib over the weekend, irregular HR. Has not had this for a very long time per report. Patient does feeling shortness of breath at rest, not all the time but most of the time. Patient does have wheezing, this is worse than her normal with COPD.     A: Advised patient to be evaluated in the ED now.     R: Patient verbalized understanding, states she thought that's where she needed to go.  They will go there now.     Reason for Disposition    Oxygen level (e.g., pulse oximetry) 90 percent or lower    MODERATE difficulty breathing (e.g., speaks in phrases, SOB even at rest, pulse 100-120) of new-onset or worse than normal    Wheezing can be heard across the room    Extra heart beats OR irregular heart beating (i.e., \"palpitations\")    Additional Information    Negative: Chest pain    Negative: Wheezing (high pitched whistling sound) and previous asthma attacks or use of asthma medicines    Negative: Difficulty breathing and within 14 days of COVID-19 Exposure    Negative: Difficulty breathing and only present when coughing    Negative: Difficulty breathing and only from stuffy nose    Negative: Difficulty breathing and only from stuffy nose or runny nose from common cold    Negative: SEVERE difficulty breathing (e.g., struggling for each breath, speaks in single words, pulse > 120)    Negative: Breathing stopped and hasn't returned    Negative: Choking on something    Negative: Bluish (or gray) lips or face    " Negative: Difficult to awaken or acting confused (e.g., disoriented, slurred speech)    Negative: Passed out (i.e., fainted, collapsed and was not responding)    Negative: Wheezing started suddenly after medicine, an allergic food, or bee sting    Negative: Stridor    Negative: Slow, shallow and weak breathing    Negative: Sounds like a life-threatening emergency to the triager    Protocols used: BREATHING DIFFICULTY-A-OH

## 2023-05-08 NOTE — PROGRESS NOTES
ANTICOAGULATION  MANAGEMENT: Discharge Review    Latanya Padron chart reviewed for anticoagulation continuity of care    Emergency room visit on 5/8/23 for COPD exacerbation .    Discharge disposition: Home    Results:    Recent labs: (last 7 days)     05/03/23  1154 05/03/23  1202 05/08/23  1035   INR 7.9* 4.93* 2.95*     Anticoagulation inpatient management:     not applicable     Anticoagulation discharge instructions:     Warfarin dosing: home regimen continued   Bridging: No   INR goal change: No      Medication changes affecting anticoagulation: Yes: given prednisone taper    Additional factors affecting anticoagulation: Yes: COPD      PLAN     No adjustment to anticoagulation plan needed    Recommended follow up is scheduled  Patient not contacted has an INR scheduled for 5/10/23    No adjustment to Anticoagulation Calendar was required    Elin Booker RN

## 2023-05-08 NOTE — DISCHARGE INSTRUCTIONS
Prednisone taper 40 mg daily x3 days followed by 30 mg daily x3 days followed by 20 mg daily x3 days followed by 10 mg daily x3 days followed by 5 mg daily x3 days then off    DuoNeb treatments 3-4 times daily for 1 week then as needed

## 2023-05-10 NOTE — PROGRESS NOTES
Ivette called wondering if she should keep her INR appointment today. I told her to keep the appointment and we will call her after to discuss result and when to come back for next INR being she is on Prednisone.  She expressed understanding.  Ilsa Hinton RN

## 2023-05-10 NOTE — PROGRESS NOTES
ANTICOAGULATION MANAGEMENT     Latanya Padron 73 year old female is on warfarin with supratherapeutic INR result. (Goal INR 2.0-3.0)    Recent labs: (last 7 days)     05/10/23  1107   INR 3.9*       ASSESSMENT     Source(s): Chart Review and Patient/Caregiver Call     Warfarin doses taken: Warfarin taken as instructed  Diet: Decreased greens/vitamin K in diet; plans to resume previous intake  Medication/supplement changes: prednisone started on 5/8-5/18/23 which may be increasing INR today  New illness, injury, or hospitalization: Yes: ER visit on 5/8/23 for COPD exacerbation.  States she si feeling better since starting prednisone  Signs or symptoms of bleeding or clotting: No  Previous result: Therapeutic last visit; previously outside of goal range  Additional findings: patient states that she has not been eating well, appetite is decreased she states now that she is feeling better her appetite is better, plans on eating some greens and getting back on track with that. will keep maintenance dose the same and recheck early next week, can adjust dose then if needed          PLAN     Recommended plan for temporary change(s) affecting INR     Dosing Instructions: hold dose then continue your current warfarin dose with next INR in 5 days       Summary  As of 5/10/2023    Full warfarin instructions:  5/10: Hold; Otherwise 1.25 mg every Thu; 2.5 mg all other days   Next INR check:  5/22/2023             Telephone call with Ivette who verbalizes understanding and agrees to plan    Lab visit scheduled    Education provided:   Goal range and lab monitoring: goal range and significance of current result  Dietary considerations: importance of consistent vitamin K intake  Contact [phone]  with any changes, questions or concerns.     Plan made per [facility] anticoagulation protocol    Elin Booker, RN  Anticoagulation Clinic  5/10/2023    _______________________________________________________________________      Anticoagulation Episode Summary     Current INR goal:  2.0-3.0   TTR:  51.1 % (1 y)   Target end date:  Indefinite   [facility]    Indications    AF (paroxysmal atrial fibrillation) (H) [I48.0]  Atrial fibrillation (H) [I48.91]  Long term current use of anticoagulant therapy [Z79.01]  Atrial fibrillation  unspecified type (H) (Resolved) [I48.91]  Atrial fibrillation  unspecified type (H) [I48.91]           Comments:           Anticoagulation Care Providers     Provider Role Specialty Phone number    Nazario Jones MD Referring Family Medicine [phone]

## 2023-05-12 NOTE — TELEPHONE ENCOUNTER
11:31 AM    Writer LVM with dosing instructions. Writer will also send a MCM. Requested a call back if the patient has further questions.    Mirna Trejo RN, BSN, PHN  Anticoagulation Clinic   [phone]

## 2023-05-15 NOTE — PROGRESS NOTES
"ANTICOAGULATION MANAGEMENT     Latanya Padron 73 year old female is on warfarin with therapeutic INR result. (Goal INR 2.0-3.0)    Recent labs: (last 7 days)     05/15/23  0859   INR 2.6*       ASSESSMENT     Warfarin Lab Questionnaire    Warfarin Doses Last 7 Days    Pt Rptd Dose SUNDAY MONDAY THURS FRIDAY SATURDAY 5/14/2023   9:48 AM 1 1 1.5 1 1         5/14/2023   Warfarin Lab Questionnaire   Missed doses within past 14 days? No   Changes in diet or alcohol within past 14 days? No - diet is back to \"normal\"   Medication changes since last result? Yes - still on prednisone until 5/18/23   Injuries or illness since last result? No   New shortness of breath, severe headaches or sudden changes in vision since last result? No   Abnormal bleeding since last result? No   Upcoming surgery, procedure? No        Previous result: Supratherapeutic  Additional findings: None       PLAN     Recommended plan for temporary change(s) affecting INR     Dosing Instructions: Continue your current warfarin dose with next INR in 4 days       Summary  As of 5/15/2023    Full warfarin instructions:  1.25 mg every Thu; 2.5 mg all other days   Next INR check:  5/19/2023             Telephone call with Ivette who verbalizes understanding and agrees to plan and who agrees to plan and repeated back plan correctly    Lab visit scheduled    Education provided:   Please call back if any changes to your diet, medications or how you've been taking warfarin  Interaction IS anticipated between warfarin and prednisone    Plan made per [facility] anticoagulation protocol    Manolo Damian RN  Anticoagulation Clinic  5/15/2023    _______________________________________________________________________     Anticoagulation Episode Summary     Current INR goal:  2.0-3.0   TTR:  50.3 % (1 y)   Target end date:  Indefinite   Send INR reminders to:  [facility]IM[facility]    Indications    AF (paroxysmal atrial fibrillation) (H) [I48.0]  Atrial fibrillation (H) " [I48.91]  Long term current use of anticoagulant therapy [Z79.01]  Atrial fibrillation  unspecified type (H) (Resolved) [I48.91]  Atrial fibrillation  unspecified type (H) [I48.91]           Comments:           Anticoagulation Care Providers     Provider Role Specialty Phone number    Nazario Jones MD Referring Family Medicine [phone]

## 2023-05-16 NOTE — TELEPHONE ENCOUNTER
patient wants to make sure the form from [facility] for her nebulizer arrives and is signed.    Caprice Kellogg RN on 5/16/2023 at 3:33 PM

## 2023-05-18 NOTE — TELEPHONE ENCOUNTER
Patient calling back to check of status of form.  Patient needs is asap so she can get it on 5/19.

## 2023-05-19 NOTE — PROGRESS NOTES
ANTICOAGULATION MANAGEMENT     Latanya Padron 73 year old female is on warfarin with supratherapeutic INR result. (Goal INR 2.0-3.0)    Recent labs: (last 7 days)     05/19/23  0913   INR 3.4*       ASSESSMENT     Warfarin Lab Questionnaire    Warfarin Doses Last 7 Days      5/18/2023     9:18 AM   Dose in Tablet or Mg   TAB or MG? tablet (tab)     Pt Rptd Dose SUNDAY MONDAY TUESDAY WED THURS SATURDAY 5/18/2023   9:18 AM 1 1 1 1 1.5 - pt was only suppose to take 0.5 tablets not 1.5 tablets 1         5/18/2023   Warfarin Lab Questionnaire   Missed doses within past 14 days? No   Changes in diet or alcohol within past 14 days? No   Medication changes since last result? No - still taking prednisone until today   Injuries or illness since last result? No   New shortness of breath, severe headaches or sudden changes in vision since last result? No   Abnormal bleeding since last result? No   Upcoming surgery, procedure? No        Previous result: Therapeutic last visit  Additional findings: None       PLAN     Recommended plan for temporary change(s) affecting INR     Dosing Instructions: hold dose then continue your current warfarin dose with next INR in 4 days       Summary  As of 5/19/2023    Full warfarin instructions:  5/19: Hold; Otherwise 1.25 mg every Thu; 2.5 mg all other days   Next INR check:  5/26/2023             Telephone call with Ivette who verbalizes understanding and agrees to plan and who agrees to plan and repeated back plan correctly    Check at provider office visit    Education provided:   Taking warfarin: Importance of taking warfarin as instructed  Goal range and lab monitoring: goal range and significance of current result and Importance of therapeutic range  Contact [phone]  with any changes, questions or concerns.     Plan made per [facility] anticoagulation protocol    Manolo Damian, RN  Anticoagulation  Clinic  5/19/2023    _______________________________________________________________________     Anticoagulation Episode Summary     Current INR goal:  2.0-3.0   TTR:  49.8 % (1 y)   Target end date:  Indefinite   Send INR reminders to:  [facility]    Indications    AF (paroxysmal atrial fibrillation) (H) [I48.0]  Atrial fibrillation (H) [I48.91]  Long term current use of anticoagulant therapy [Z79.01]  Atrial fibrillation  unspecified type (H) (Resolved) [I48.91]  Atrial fibrillation  unspecified type (H) [I48.91]           Comments:           Anticoagulation Care Providers     Provider Role Specialty Phone number    Nazario Jones MD Referring Family Medicine [phone]

## 2023-05-23 NOTE — PATIENT INSTRUCTIONS
Patient Education   Personalized Prevention Plan  You are due for the preventive services outlined below.  Your care team is available to assist you in scheduling these services.  If you have already completed any of these items, please share that information with your care team to update in your medical record.  Health Maintenance Due   Topic Date Due     COVID-19 Vaccine (5 - Moderna series) 02/06/2023     Annual Wellness Visit  05/19/2023       Exercise for a Healthier Heart  You may wonder how you can improve the health of your heart. If you re thinking about exercise, you re on the right track. You don t need to become an athlete. But you do need a certain amount of brisk exercise to help strengthen your heart. If you have been diagnosed with a heart condition, your healthcare provider may advise exercise to help your condition. To help make exercise a habit, choose safe, fun activities.      Exercise with a friend. When activity is fun, you're more likely to stick with it.     Before you start  Check with your healthcare provider before starting an exercise program. This is especially important if you haven't been active for a while. It's also important if you have a long-term (chronic) health problem such as heart disease, diabetes, or obesity. Also check with your provider if you're at high risk for having these problems.   Why exercise?  Exercising regularly offers many healthy rewards. It can help you do all of these:     Improve your blood cholesterol level to help prevent further heart trouble.    Lower your blood pressure to help prevent a stroke or heart attack.    Control diabetes or reduce your risk of getting this disease.    Improve your heart and lung function.    Reach and stay at a healthy weight.    Make your muscles stronger so you can stay active.    Prevent falls and fractures by slowing the loss of bone mass (osteoporosis).    Manage stress better.    Improve your sense of self and your  body image.  Exercise tips      Ease into your routine. Set small goals. Then build on them. Talk with your healthcare provider first before starting an exercise routine if you're not sure what your activity level should be.    Exercise on most days. Aim for a total of at least 150 minutes (2 hours and 30 minutes) or more of moderate-intensity aerobic activity each week. You could also do 75 minutes (1 hour and 15 minutes) or more of vigorous-intensity aerobic activity each week. Or try for a combination of both. Moderate activity means that you breathe heavier and your heart rate increases, but you can still talk. Think about doing at least 30 minutes of moderate exercise, 5 times a week. It's OK to work up to the 30-minute period over time. Examples of moderate-intensity activity are brisk walking, gardening, and water aerobics.    Step up your daily activity level.  Along with your exercise program, try being more active the whole day. Walk instead of drive. Or park further away so that you take more steps each day. Do more household tasks or yard work. You may not be able to meet the advised amount of physical activity. But doing some moderate- or vigorous-intensity aerobic activity can help reduce your risk for heart disease. Your healthcare provider can help you figure out what is best for you.    Choose 1 or more activities you enjoy.  Walking is one of the easiest things you can do. You can also try swimming, riding a bike, dancing, or taking an exercise class.    Call 911  Call 911 right away if any of these occur:     Chest pain that doesn't go away quickly with rest    New burning, tightness, pressure, or heaviness in your chest, neck, shoulders, back, or arms    Abnormal or severe shortness of breath    A very fast or irregular heartbeat (palpitations)    Fainting  When to call your healthcare provider  Call your healthcare provider if you have any of these:     Dizziness or lightheadedness    Mild  shortness of breath or chest pain    Increased or new joint or muscle pain    [employer] last reviewed this educational content on 7/1/2022 2000-2022 [employer]. All rights reserved. This information is not intended as a substitute for professional medical care. Always follow your healthcare professional's instructions.          Signs of Hearing Loss  Hearing loss is a problem shared by many people. In fact, it's one of the most common health problems, particularly as people age. Most people aged 65 and older have some hearing loss. By age 80, almost everyone does. Hearing loss often occurs slowly over the years. So, you may not realize your hearing has gotten worse.   When sudden hearing loss occurs, it's important to contact your healthcare provider right away. Your provider will do a medical exam and a hearing exam as soon as possible. This is to help find the cause and type of your sudden hearing loss. Based on your diagnosis, your healthcare provider will discuss possible treatments.      Hearing much better with one ear can be a sign of hearing loss.     Have your hearing checked  Call your healthcare provider if you:     Have to strain to hear normal conversation    Have to watch other people s faces very carefully to follow what they re saying    Need to ask people to repeat what they ve said    Often misunderstand what people are saying    Turn the volume of the television or radio up so high that others complain    Feel that people are mumbling when they re talking to you    Find that the effort to hear leaves you feeling tired and irritated    Notice, when using the phone, that you hear better with one ear than the other  Donald last reviewed this educational content on 6/1/2022 2000-2022 [employer]. All rights reserved. This information is not intended as a substitute for professional medical care. Always follow your healthcare professional's  instructions.          Urinary Incontinence, Female (Adult)   Urinary incontinence means loss of bladder control. This problem affects many women, especially as they get older. If you have incontinence, you may be embarrassed to ask for help. But know that this problem can be treated.   Types of Incontinence  There are different types of incontinence. Two of the main types are described here. You can have more than one type.     Stress incontinence. With this type, urine leaks when pressure (stress) is put on the bladder. This may happen when you cough, sneeze, or laugh. Stress incontinence most often occurs because the pelvic floor muscles that support the bladder and urethra are weak. This can happen after pregnancy and vaginal childbirth or a hysterectomy. It can also be due to excess body weight or hormone changes.    Urge incontinence (also called overactive bladder). With this type, a sudden urge to urinate is felt often. This may happen even though there may not be much urine in the bladder. The need to urinate often during the night is common. Urge incontinence most often occurs because of bladder spasms. This may be due to bladder irritation or infection. Damage to bladder nerves or pelvic muscles, constipation, and certain medicines can also lead to urge incontinence.  Treatment depends on the cause. Further evaluation is needed to find the type you have. This will likely include an exam and certain tests. Based on the results, you and your healthcare provider can then plan treatment. Until a diagnosis is made, the home care tips below can help ease symptoms.   Home care    Do pelvic floor muscle exercises, if they are prescribed. The pelvic floor muscles help support the bladder and urethra. Many women find that their symptoms improve when doing special exercises that strengthen these muscles. To do the exercises, contract the muscles you would use to stop your stream of urine. But do this when you re not  urinating. Hold for 10 seconds, then relax. Repeat 10 to 20 times in a row, at least 3 times a day. Your healthcare provider may give you other instructions for how to do the exercises and how often.    Keep a bladder diary. This helps track how often and how much you urinate over a set period of time. Bring this diary with you to your next visit with the provider. The information can help your provider learn more about your bladder problem.    Lose weight, if advised to by your provider. Extra weight puts pressure on the bladder. Your provider can help you create a weight-loss plan that s right for you. This may include exercising more and making certain diet changes.    Don't have foods and drinks that may irritate the bladder. These can include alcohol and caffeinated drinks.    Quit smoking. Smoking and other tobacco use can lead to a long-term (chronic) cough that strains the pelvic floor muscles. Smoking may also damage the bladder and urethra. Talk with your provider about treatments or methods you can use to quit smoking.    If drinking large amounts of fluid makes you have symptoms, you may be advised to limit your fluid intake. You may also be advised to drink most of your fluids during the day and to limit fluids at night.    If you re worried about urine leakage or accidents, you may wear absorbent pads to catch urine. Change the pads often. This helps reduce discomfort. It may also reduce the risk of skin or bladder infections.    Follow-up care  Follow up with your healthcare provider, or as directed. It may take some to find the right treatment for your problem. But healthy lifestyle changes can be made right away. These include such things as exercising on a regular basis, eating a healthy diet, losing weight (if needed), and quitting smoking. Your treatment plan may include special therapies or medicines. Certain procedures or surgery may also be options. Talk about any questions you have with your  provider.   When to seek medical advice  Call the healthcare provider right away if any of these occur:    Fever of 100.4 F (38 C) or higher, or as directed by your provider    Bladder pain or fullness    Belly swelling    Nausea or vomiting    Back pain    Weakness, dizziness, or fainting  Donald last reviewed this educational content on 1/1/2020 2000-2022 [employer]. All rights reserved. This information is not intended as a substitute for professional medical care. Always follow your healthcare professional's instructions.

## 2023-05-23 NOTE — PROGRESS NOTES
ANTICOAGULATION MANAGEMENT     Latanya Padron 73 year old female is on warfarin with therapeutic INR result. (Goal INR 2.0-3.0)    Recent labs: (last 7 days)     05/23/23  0830   INR 2.6*       ASSESSMENT     Source(s): Chart Review and Patient/Caregiver Call     Warfarin doses taken: Warfarin taken as instructed  Diet: No new diet changes identified  Medication/supplement changes: None noted  New illness, injury, or hospitalization: No  Signs or symptoms of bleeding or clotting: No  Previous result: Supratherapeutic  Additional findings: None         PLAN     Recommended plan for no diet, medication or health factor changes affecting INR     Dosing Instructions: Continue your current warfarin dose with next INR in 3 days       Summary  As of 5/23/2023    Full warfarin instructions:  1.25 mg every Thu; 2.5 mg all other days   Next INR check:  5/26/2023             Telephone call with Ivette who verbalizes understanding and agrees to plan    Lab visit scheduled    Education provided:   Contact [phone]  with any changes, questions or concerns.     Plan made per [facility] anticoagulation protocol    Elin Booker RN  Anticoagulation Clinic  5/23/2023    _______________________________________________________________________     Anticoagulation Episode Summary     Current INR goal:  2.0-3.0   TTR:  49.2 % (1 y)   Target end date:  Indefinite   [facility]    Indications    AF (paroxysmal atrial fibrillation) (H) [I48.0]  Atrial fibrillation (H) [I48.91]  Long term current use of anticoagulant therapy [Z79.01]  Atrial fibrillation  unspecified type (H) (Resolved) [I48.91]  Atrial fibrillation  unspecified type (H) [I48.91]           Comments:           Anticoagulation Care Providers     Provider Role Specialty Phone number    Nazario Jones MD Referring Family Medicine [phone]
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
## 2023-05-25 NOTE — TELEPHONE ENCOUNTER
Patient would like to know if she can take Zyrtec D.    Please advise.    Caprice Kellogg RN on 5/25/2023 at 8:53 AM

## 2023-05-26 NOTE — PROGRESS NOTES
ANTICOAGULATION MANAGEMENT     Latanya Padron 73 year old female is on warfarin with therapeutic INR result. (Goal INR 2.0-3.0)    Recent labs: (last 7 days)     05/26/23  0859   INR 2.9*       ASSESSMENT     Source(s): Chart Review and Patient/Caregiver Call     Warfarin doses taken: Warfarin taken as instructed  Diet: No new diet changes identified  Medication/supplement changes: None noted  New illness, injury, or hospitalization: No  Signs or symptoms of bleeding or clotting: No  Previous result: Therapeutic last visit; previously outside of goal range  Additional findings: None         PLAN     Recommended plan for no diet, medication or health factor changes affecting INR     Dosing Instructions: Continue your current warfarin dose with next INR in 1 week       Summary  As of 5/26/2023    Full warfarin instructions:  1.25 mg every Thu; 2.5 mg all other days   Next INR check:  6/1/2023             Telephone call with Ivette who verbalizes understanding and agrees to plan    Lab visit scheduled    Education provided:   Please call back if any changes to your diet, medications or how you've been taking warfarin  Contact [phone]  with any changes, questions or concerns.     Plan made per [facility] anticoagulation protocol    Mirna FONTANEZ RN  Anticoagulation Clinic  5/26/2023    _______________________________________________________________________     Anticoagulation Episode Summary     Current INR goal:  2.0-3.0   TTR:  49.3 % (1 y)   Target end date:  Indefinite   Send INR reminders to:  WATSON NASCIMENTO    Indications    AF (paroxysmal atrial fibrillation) (H) [I48.0]  Atrial fibrillation (H) [I48.91]  Long term current use of anticoagulant therapy [Z79.01]  Atrial fibrillation  unspecified type (H) (Resolved) [I48.91]  Atrial fibrillation  unspecified type (H) [I48.91]           Comments:           Anticoagulation Care Providers     Provider Role Specialty Phone number    Nazario Jones MD Referring  Family Medicine [phone]

## 2023-06-02 NOTE — PROGRESS NOTES
ANTICOAGULATION MANAGEMENT     Latanya Padron 73 year old female is on warfarin with supratherapeutic INR result. (Goal INR 2.0-3.0)    Recent labs: (last 7 days)     06/02/23  1304   INR 3.3*       ASSESSMENT     Warfarin Lab Questionnaire    Warfarin Doses Last 7 Days      5/31/2023    10:01 AM   Dose in Tablet or Mg   TAB or MG? tablet (tab)     Pt Rptd Dose SUNDAY MONDAY TUESDAY WED THURS FRIDAY SATURDAY 5/31/2023  10:01 AM 1 1 1 1 0.5 1 1         5/31/2023   Warfarin Lab Questionnaire   Missed doses within past 14 days? No   Changes in diet or alcohol within past 14 days? No: May have ate less greens than normal   Medication changes since last result? No   Injuries or illness since last result? No   New shortness of breath, severe headaches or sudden changes in vision since last result? No: COPD   Abnormal bleeding since last result? No   Upcoming surgery, procedure? No        Previous result: Therapeutic last 2(+) visits  Additional findings: None       PLAN     Recommended plan for ongoing change(s) affecting INR     Dosing Instructions: decrease your warfarin dose (7.7% change) with next INR in 1 week       Summary  As of 6/2/2023    Full warfarin instructions:  1.25 mg every Sun, Thu; 2.5 mg all other days   Next INR check:  6/9/2023             Telephone call with Ivette who verbalizes understanding and agrees to plan    Lab visit scheduled    Education provided:   Please call back if any changes to your diet, medications or how you've been taking warfarin  Symptom monitoring: monitoring for bleeding signs and symptoms and when to seek medical attention/emergency care  Contact [phone]  with any changes, questions or concerns.     Plan made per [facility] anticoagulation protocol    Mirna FONTANEZ RN  Anticoagulation Clinic  6/2/2023    _______________________________________________________________________     Anticoagulation Episode Summary     Current INR goal:  2.0-3.0   TTR:  47.8 % (1 y)   Target end  date:  Indefinite   [facility] reminders [facility]:  [facility]    Indications    AF (paroxysmal atrial fibrillation) (H) [I48.0]  Atrial fibrillation (H) [I48.91]  Long term current use of anticoagulant therapy [Z79.01]  Atrial fibrillation  unspecified type (H) (Resolved) [I48.91]  Atrial fibrillation  unspecified type (H) [I48.91]           Comments:           Anticoagulation Care Providers     Provider Role Specialty Phone number    Nazario Jones MD Referring Family Medicine [phone]

## 2023-06-02 NOTE — TELEPHONE ENCOUNTER
Pending Prescriptions:                       Disp   Refills    venlafaxine (EFFEXOR XR) 75 MG 24 hr capsu*90 cap*3        Sig: TAKE ONE CAPSULE BY MOUTH ONCE DAILY      Routing refill request to provider for review/approval because:  Labs out of range:  alin Kellogg RN on 6/2/2023 at 5:00 PM

## 2023-06-06 NOTE — TELEPHONE ENCOUNTER
"The \"D\" component can potentially raise her BP. If she needs it periodically it probably won't be an issue.  The longer the use the more potential for raising her BP.    Electronically signed by:  Nazario Jones M.D.  6/5/2023    "

## 2023-06-07 NOTE — TELEPHONE ENCOUNTER
S: Difficulty Breathing    B: Patient is calling in with complaints of difficulty breathing. Patient has history of COPD.  Has many exacerbations. Patient states she was in the ED on 4/13, feels the same.  Reports feeling shortness of breath even at rest.  RN can audibly hear wheezing. Patient reports she is on 2.5L O2 with sats at 90%. Patient states her baseline is 2.0-2.5L and normally about 90-92%. Denies fevers. Denies chest pain. Denies irregular or skipped heart beats.     A: Advised patient that per protocol to go to ED.     R: Patient verbalizes understanding, however declines at this time.  She would like message sent to PCP and get his recommendations.  Patient will go to ED if anything changes or worsens from how she is feeling right now before she hears back.     Message handled by Nurse Triage with [facility] - provider name: Dr. Jones.  RN did review encounter with PCP.  Per Dr. Jones to have patient take same prednisone taper as prescribed on 4/13, and then Bactrim DS twice a day x 10 days.   Will need to decrease Warfarin. After discussion with pharmacist, to decrease dosing to 1.25mg daily with INR recheck on Friday.  To also send to anticoagulation team to help her get this set up.     RN did call and speak with patient. We did review above message and instructions from provider.  Patient verbalized understanding and is agreeable. She already has an INR appointment set for Friday 6/9.     Reason for Disposition    MODERATE difficulty breathing (e.g., speaks in phrases, SOB even at rest, pulse 100-120) of new-onset or worse than normal    Additional Information    Negative: Chest pain    Negative: Wheezing (high pitched whistling sound) and previous asthma attacks or use of asthma medicines    Negative: Difficulty breathing and within 14 days of COVID-19 Exposure    Negative: Difficulty breathing and only present when coughing    Negative: Difficulty breathing and only from stuffy nose    Negative:  Difficulty breathing and only from stuffy nose or runny nose from common cold    Protocols used: BREATHING DIFFICULTY-A-OH

## 2023-06-07 NOTE — PROGRESS NOTES
ANTICOAGULATION  MANAGEMENT     Interacting Medication Review    Interacting medication(s): Sulfamethoxazole-Trimethoprim (Bactrim) with warfarin.    Duration: 10 days  (6/7/23 to 6/17/23)    Indication: respiratory distress    New medication?: Yes, interaction may increase INR and risk of bleeding. With Sulfamethoxazole-Trimethoprim, [facility] protocol Appendix G recommends empiric reduction of warfarin dose in therapeutic/supratherapeutic patients.        PLAN     Temporarily adjust warfarin dose during interaction (20% change) with next INR in 2 days    dosing was discussed with [facility] pharmacist and not [facility] nurses so that protocol could be discussed.  Will update calendar for 20% dose decrease and can readjust if needed after 6/9/23 INR.     Summary  As of 6/7/2023    Full warfarin instructions:  6/7: 1.25 mg; 6/10: 1.25 mg; Otherwise 1.25 mg every Sun, Thu; 2.5 mg all other days   Next INR check:               Telephone call with Ivette who verbalizes understanding and agrees to plan    Daily doses modified on anticoagulation calendar for interaction <= 7 days    Plan made with [facility] Pharmacist Chaparrita Booker, RN  Anticoagulation Clinic

## 2023-06-09 NOTE — PROGRESS NOTES
ANTICOAGULATION MANAGEMENT     Latanya Padron 73 year old female is on warfarin with therapeutic INR result. (Goal INR 2.0-3.0)    Recent labs: (last 7 days)     06/09/23  0832   INR 2.7*       ASSESSMENT     Warfarin Lab Questionnaire    Warfarin Doses Last 7 Days    Pt Rptd Dose SUNDAY MONDAY TUESDAY WED THURS FRIDAY SATURDAY 6/8/2023   9:17 AM 1.25 1 1 1.25 1.25 1 1         6/8/2023   Warfarin Lab Questionnaire   Missed doses within past 14 days? No   Changes in diet or alcohol within past 14 days? No   Medication changes since last result? Yes   Please list: Predisone and bactrim   Injuries or illness since last result? Yes   If yes, please explain: Coughing and green phlem   New shortness of breath, severe headaches or sudden changes in vision since last result? Yes   If yes, please explain:  Copd breathing cause of the weather   Abnormal bleeding since last result? No   Upcoming surgery, procedure? No        Previous result: Supratherapeutic  Additional findings: Dosin adjusted at the last visit due to patient being on Bactrim until 6/17/23.       PLAN     Recommended plan for temporary change(s) affecting INR     Dosing Instructions: Continue adjusted dose per last visit.  with next INR in 4 days       Summary  As of 6/9/2023    Full warfarin instructions:  6/10: 1.25 mg; 6/13: 1.25 mg; Otherwise 1.25 mg every Sun, Thu; 2.5 mg all other days   Next INR check:  6/13/2023             Telephone call with Ivette who verbalizes understanding and agrees to plan    Lab visit scheduled    Education provided:   Please call back if any changes to your diet, medications or how you've been taking warfarin  Interaction IS anticipated between warfarin and Bactrim and Prednisone  Symptom monitoring: monitoring for bleeding signs and symptoms and when to seek medical attention/emergency care  Contact [phone]  with any changes, questions or concerns.     Plan made per [facility] anticoagulation protocol    Mirna FONTANEZ  RN  Anticoagulation Clinic  6/9/2023    _______________________________________________________________________     Anticoagulation Episode Summary     Current INR goal:  2.0-3.0   TTR:  48.1 % (1 y)   Target end date:  Indefinite   Send INR reminders to:  [facility]    Indications    AF (paroxysmal atrial fibrillation) (H) [I48.0]  Atrial fibrillation (H) [I48.91]  Long term current use of anticoagulant therapy [Z79.01]  Atrial fibrillation  unspecified type (H) (Resolved) [I48.91]  Atrial fibrillation  unspecified type (H) [I48.91]           Comments:           Anticoagulation Care Providers     Provider Role Specialty Phone number    Nazario Jones MD Referring Family Medicine [phone]

## 2023-06-13 NOTE — PROGRESS NOTES
ANTICOAGULATION MANAGEMENT     Latanya Padron 73 year old female is on warfarin with subtherapeutic INR result. (Goal INR 2.0-3.0)    Recent labs: (last 7 days)     06/13/23  0943   INR 1.9*       ASSESSMENT     Warfarin Lab Questionnaire    Warfarin Doses Last 7 Days      6/12/2023     9:51 AM   Dose in Tablet or Mg   TAB or MG? milligram (mg)     Pt Rptd Dose SUNDAY MONDAY TUESDAY WED THURS FRIDAY SATURDAY 6/12/2023   9:51 AM 1.5 2.5 1.5 1.5 1.25 2.5 1.5         6/12/2023   Warfarin Lab Questionnaire   Missed doses within past 14 days? No   Changes in diet or alcohol within past 14 days? No   Medication changes since last result? No- Pt on bactrim thru 6/17   Injuries or illness since last result? Yes   If yes, please explain: Fell tripped over box.   New shortness of breath, severe headaches or sudden changes in vision since last result? No   Abnormal bleeding since last result? No   Upcoming surgery, procedure? No        Previous result: Therapeutic last visit; previously outside of goal range  Additional findings: None       PLAN     Recommended plan for temporary change(s) affecting INR     Dosing Instructions: Continue your current warfarin dose with next INR in 6 days       Summary  As of 6/13/2023    Full warfarin instructions:  1.25 mg every Sun, Thu; 2.5 mg all other days   Next INR check:  6/19/2023             Telephone call with Ivette who verbalizes understanding and agrees to plan and who agrees to plan and repeated back plan correctly    Lab visit scheduled    Education provided:   Symptom monitoring: monitoring for bleeding signs and symptoms, monitoring for clotting signs and symptoms and when to seek medical attention/emergency care    Plan made with [facility] Pharmacist Chaparrita Pickard, LOVE  Anticoagulation Clinic  6/13/2023    _______________________________________________________________________     Anticoagulation Episode Summary     Current INR goal:  2.0-3.0   TTR:  49.1 % (1  y)   Target end date:  Indefinite   Send INR reminders to:  [facility]    Indications    AF (paroxysmal atrial fibrillation) (H) [I48.0]  Atrial fibrillation (H) [I48.91]  Long term current use of anticoagulant therapy [Z79.01]  Atrial fibrillation  unspecified type (H) (Resolved) [I48.91]  Atrial fibrillation  unspecified type (H) [I48.91]           Comments:           Anticoagulation Care Providers     Provider Role Specialty Phone number    Nazario Jones MD Referring Family Medicine [phone]

## 2023-06-19 NOTE — TELEPHONE ENCOUNTER
Can squeeze them in 7/5 and 7/7 at noon Prednisone 20 MG       Last Written Prescription Date:    Last Fill Quantity: ,   # refills:   Last Office Visit: 7/24/19  Future Office visit:    Next 5 appointments (look out 90 days)    Oct 15, 2019 11:15 AM CDT  Return Visit with Arik Poole MD  [facility] [address] [place] [id number]  [phone]   Dec 10, 2019 10:45 AM CST  Return Visit with Julita Gaxiola MD  [facility] ([facility]) [address] [place] [id number]  [phone]           Routing refill request to provider for review/approval because:  Drug not on the [facility], [facility] or [facility] refill protocol or controlled substance  Drug not active on patient's medication list

## 2023-06-19 NOTE — PROGRESS NOTES
ANTICOAGULATION MANAGEMENT     Latanya Padron 73 year old female is on warfarin with therapeutic INR result. (Goal INR 2.0-3.0)    Recent labs: (last 7 days)     06/19/23  1243   INR 2.5*       ASSESSMENT     Warfarin Lab Questionnaire    Warfarin Doses Last 7 Days      6/18/2023     9:39 PM   Dose in Tablet or Mg   TAB or MG? milligram (mg)     Pt Rptd Dose SUNDAY MONDAY TUESDAY WED THURS FRIDAY SATURDAY 6/18/2023   9:39 PM 1.5 2.5 2.5 2.5 1.5 2.5 2.5         6/18/2023   Warfarin Lab Questionnaire   Missed doses within past 14 days? No   Changes in diet or alcohol within past 14 days? No   Medication changes since last result? Yes   Please list: I am done with my antibiotic  bactrim   Injuries or illness since last result? No   New shortness of breath, severe headaches or sudden changes in vision since last result? No   Abnormal bleeding since last result? No   Upcoming surgery, procedure? No        Previous result: Subtherapeutic  Additional findings: None       PLAN     Recommended plan for no diet, medication or health factor changes affecting INR     Dosing Instructions: Continue your current warfarin dose with next INR in 1 week       Summary  As of 6/19/2023    Full warfarin instructions:  1.25 mg every Sun, Thu; 2.5 mg all other days   Next INR check:  7/10/2023             Detailed voice message left for Ivette with dosing instructions and follow up date.     Lab visit scheduled    Education provided:   Please call back if any changes to your diet, medications or how you've been taking warfarin    Plan made per [facility] anticoagulation protocol    Debbie Love, RN  Anticoagulation Clinic  6/19/2023    _______________________________________________________________________     Anticoagulation Episode Summary     Current INR goal:  2.0-3.0   TTR:  50.4 % (1 y)   Target end date:  Indefinite   Send INR reminders to:  [facility]    Indications    AF (paroxysmal atrial fibrillation) (H)  [I48.0]  Atrial fibrillation (H) [I48.91]  Long term current use of anticoagulant therapy [Z79.01]  Atrial fibrillation  unspecified type (H) (Resolved) [I48.91]  Atrial fibrillation  unspecified type (H) [I48.91]           Comments:           Anticoagulation Care Providers     Provider Role Specialty Phone number    Nazario Jones MD Referring Family Medicine [phone]

## 2023-06-20 NOTE — TELEPHONE ENCOUNTER
Another anticoag encounter has been documented since this encounter was sent. Will close this encounter.

## 2023-06-28 NOTE — TELEPHONE ENCOUNTER
Per        She needs to take the Spiriva and Dulera daily. She needs to take the Albuterol or the Duo Neb every 4 hours as needed and do not overlap them.     Patient was notified of message above and will call if she has any other concerns   MP/MA

## 2023-06-28 NOTE — TELEPHONE ENCOUNTER
Reason for Call:  Other appointment    Detailed comments: Patient is calling to see if Dr. Stevens team could make sure she is taking the right medications and send her a list of meds on her mychart?     Phone Number Patient can be reached at: Home number on file [phone] (home)    Best Time: any    Can we leave a detailed message on this number? YES    Call taken on 6/28/2023 at 9:38 AM by Suri Douglass

## 2023-06-28 NOTE — TELEPHONE ENCOUNTER
FYI - Status Update    Who is Calling: patient    Update: The patient was just put on the medication Prednisone from the pulmonologist, two tablets for a week then one tablet     Does caller want a call/response back: Yes     Could we send this information to you in [facility] or would you prefer to receive a phone call?:   Patient would prefer a phone call   Okay to leave a detailed message?: Yes at Cell number on file:    Telephone Information:   Mobile [phone]

## 2023-06-28 NOTE — PROGRESS NOTES
Does Latanya have home oxygen?  Yes     HOME OXYGEN  Concentrator  O2 flow rate 2.5 L/min continuous    nasal cannula    [facility] [phone]   [email or web address]

## 2023-06-28 NOTE — PROGRESS NOTES
Former[facility]  Pulmonary Medicine  Visit Clinic Note  June 28, 2023         ASSESSMENT & PLAN       Very severe COPD  Chronic hypoxemic respiratory failure  Acute exacerbation of COPD  Bronchiectasis    Currently she is in a prolonged COPD exacerbation.  She has tachypnea with mild accessory muscle use and decreased breath sounds.  I am going to give her a prolonged course of prednisone for 2 weeks.  40 mg daily for the first week and 20 mg daily for the second week.  She is not coughing up any phlegm, so I will not add an antibiotic.  I will get a chest x-ray to make sure that there has not been a developing pneumonia, but I suspect it will be clear.    She is currently on an ICS/LABA inhaler with Dulera.  She does not use a spacer at home, so I have prescribed one for her.  Previously, she had been on Spiriva but she did not like the dry powder.  I will try Spiriva Respimat instead.  Instructed her to use the spacer with both her Dulera and albuterol inhaler.  She can also use her DuoNebs as needed throughout the day.  She states that she has enough fluid at home.    I asked her to get in touch with my clinic in the next couple days if she is not feeling better with the above therapies.    Return to clinic in 3 months.      Jemal Mai MD     I spent 35 minutes on the date of the encounter doing chart review, history and exam, documentation and discussing use of a spacer and other therapeutics.         Today's visit note:     Chief Complaint: RECHECK (Pulmonary emphysema)      HISTORY OF PRESENT ILLNESS:    Latanya Padron is a 73 year old year old female who is being seen for shortness of breath.    She has a history of very severe airflow obstruction related to COPD and perhaps a bit of bronchiectasis as well.  She was last seen in the pulmonary clinic back in February of this year.  At that time she was doing okay all things considered.  Unfortunately over the last 2 months, her breathing has  really gone downhill.  She went to the emergency department in early May for COPD exacerbation.  She was also treated for a COPD exacerbation by her primary care physician over the last 2 weeks.  She just finished prednisone and a course of Bactrim almost 1 week ago.  She is not really certain they helped out that much.  She takes Dulera 2 puffs in the morning and 2 puffs in the afternoon.  She takes albuterol by inhaler maybe 3-4 times a day.  She has a nebulizer at home with Vladimir, but she has not been using them.  Currently on 2 and half liters of oxygen throughout the day, and noticing that her oxygen saturation is 89 to 90% at home.  She does have an occasional cough which is dry.  Denies any wheezing or chest tightness.  With this current respiratory illness, she has not been sleeping well.  She gets up at 3 AM.  She feels overall just very fatigued.         Past Medical and Surgical History:     Past Medical History:   Diagnosis Date     Breast cancer (H) 04/12/2019    Left Breast     COPD (chronic obstructive pulmonary disease) (H)      Mixed hyperlipidemia      Neck mass 06/20/2018     Paroxysmal atrial fibrillation (H)      PONV (postoperative nausea and vomiting)      S/P radiation therapy     5,256 cGy to left breast completed on 7/18/2019 - [facility]     Past Surgical History:   Procedure Laterality Date     BIOPSY NODE SENTINEL Left 5/15/2019    Procedure: left sentinel node biopsy;  Surgeon: Donald Aleman MD;  Location: [facility] OR     BREAST BIOPSY, CORE RT/LT Left 04/12/2019     COLONOSCOPY  06/21/10     COLONOSCOPY N/A 3/4/2021    Procedure: Colonoscopy with  polypectomy;  Surgeon: Donald Aleman MD;  Location: [facility] GI     HC BIOPSY OF BREAST, OPEN INCISIONAL Right 1988    Benign per patient     HC CORRECT BUNION,METATARSAL OSTEOTOMY  1993      LAPAROSCOPY, SURGICAL; CHOLECYSTECTOMY  08/04/10     HC SLING OPERATION FOR STRESS INCONTINENCE  04/19/2007     HERNIORRHAPHY INCISIONAL (LOCATION)   2011    Procedure:HERNIORRHAPHY INCISIONAL (LOCATION); Surgeon:AYALA HOOVER; Location:[facility] OR     LAPAROSCOPIC HERNIORRHAPHY INCISIONAL  2011    Procedure:LAPAROSCOPIC HERNIORRHAPHY INCISIONAL; Laparoscopic mesh repair of incarcerated incisional hernia , extensive lysis of adhesions, excision of hernia sac and closure of fascia.; Surgeon:AYALA HOOVER; Location:[facility] OR     LAPAROSCOPIC LYSIS ADHESIONS  2011    Procedure:LAPAROSCOPIC LYSIS ADHESIONS; Surgeon:AYALA HOOVER; Location:[facility] OR     MASTECTOMY PARTIAL WITH NEEDLE LOCALIZATION Left 5/15/2019    Procedure: left wire localized partial mastectomy;  Surgeon: Donald Aleman MD;  Location: [facility] OR     TONSILLECTOMY       ZZC APPENDECTOMY,W OTHR PROC       Z  DELIVERY ONLY           Z LIGATE FALLOPIAN TUBE       Z NONSPECIFIC PROCEDURE      Exploratory laparotomy with resection of infarcted segment of omentum and minor lysis of adhesions     Z NONSPECIFIC PROCEDURE      Removal of hemorrhagic corpus luteum cyst     ZC VAGINAL HYSTERECTOMY             Family History:     Family History   Problem Relation Age of Onset     Breast Cancer Mother          at 88     Obesity Mother      Genitourinary Problems Mother      Lipids Mother      Heart Disease Father         by pass (5)     Cerebrovascular Disease Father         hemorragic     Lipids Father      Alzheimer Disease Father 92     Alzheimer Disease Maternal Grandmother      Genitourinary Problems Maternal Grandmother      Cancer Maternal Grandfather      Cancer Paternal Grandfather         lymph nodes     Cancer Brother         prostate     Melanoma Brother      Heart Disease Brother          (aaron with a partner)     Hyperlipidemia Brother      Heart Disease Brother         stent placement     Hyperlipidemia Brother      Prostate Cancer Brother 66     Respiratory Sister      Lipids Sister      Breast Cancer Sister 40        s/p  mastectomy     Arthritis Sister      Obesity Sister      Heart Failure Sister      Cancer Sister         stomach, colon, pancreatic     Lipids Sister      Dementia Sister         1/2 sister on dad's side ()     Other - See Comments Sister         1948     Other - See Comments Daughter         fibromyalgia ()     Lymphoma Maternal Aunt      Heart Failure Maternal Aunt 81     Cancer Maternal Uncle         colon     Hemophilia Grandchild      Other - See Comments Grandchild         Transgender              Social History:     Social History     Socioeconomic History     Marital status:      Spouse name: Nam     Number of children: 1     Years of education: 14     Highest education level: Not on file   Occupational History     Occupation: [employer]     Comment:  [employer]     Employer: [employer]   Tobacco Use     Smoking status: Former     Packs/day: 1.00     Years: 30.00     Pack years: 30.00     Types: Cigarettes     Quit date: 10/25/2005     Years since quittin.6     Smokeless tobacco: Never   Vaping Use     Vaping Use: Never used   Substance and Sexual Activity     Alcohol use: No     Alcohol/week: 0.0 standard drinks of alcohol     Drug use: No     Sexual activity: Not Currently     Partners: Male     Birth control/protection: Female Surgical     Comment: hysterectomy   Other Topics Concern      Service No     Blood Transfusions No     Caffeine Concern No     Comment: coffee 2c/d pop: 2c/d     Occupational Exposure No     Hobby Hazards No     Sleep Concern No     Stress Concern No     Weight Concern Yes     Comment: desire wt loss     Special Diet No     Back Care No     Comment: Hx: seen chiropractor      Exercise No     Bike Helmet No     Comment: n/a     Seat Belt Yes     Self-Exams Yes     Comment: attempts to do SBE monthly. Patient has fibrous breast tissue.     Parent/sibling w/ CABG, MI or angioplasty before 65F 55M? Not Asked   Social History Narrative      Not on file     Social Determinants of Health     Financial Resource Strain: Not on file   Food Insecurity: Not on file   Transportation Needs: Not on file   Physical Activity: Not on file   Stress: Not on file   Social Connections: Not on file   Intimate Partner Violence: Not on file   Housing Stability: Not on file            Medications:     Current Outpatient Medications   Medication     albuterol (PROAIR HFA/PROVENTIL HFA/VENTOLIN HFA) 108 (90 Base) MCG/ACT inhaler     albuterol (PROVENTIL) (2.5 MG/3ML) 0.083% neb solution     anastrozole (ARIMIDEX) 1 MG tablet     ASPIRIN NOT PRESCRIBED (INTENTIONAL)     atorvastatin (LIPITOR) 10 MG tablet     Calcium Carb-Cholecalciferol 500-400 MG-UNIT TABS     Cyanocobalamin (B-12) 1000 MCG TBCR     fluticasone (FLONASE) 50 MCG/ACT nasal spray     hydrochlorothiazide (HYDRODIURIL) 25 MG tablet     ipratropium - albuterol 0.5 mg/2.5 mg/3 mL (DUONEB) 0.5-2.5 (3) MG/3ML neb solution     lisinopril (ZESTRIL) 2.5 MG tablet     magnesium 250 MG tablet     metFORMIN (GLUCOPHAGE XR) 500 MG 24 hr tablet     mometasone-formoterol (DULERA) 200-5 MCG/ACT inhaler     MYRBETRIQ 50 MG 24 hr tablet     ondansetron (ZOFRAN ODT) 4 MG ODT tab     order for DME     order for DME     ORDER FOR DME     predniSONE (DELTASONE) 20 MG tablet     Probiotic Product (PROBIOTIC PO)     spacer (OPTICHAMBER ROSANNE) holding chamber     tiotropium (SPIRIVA RESPIMAT) 2.5 MCG/ACT inhaler     venlafaxine (EFFEXOR XR) 75 MG 24 hr capsule     vitamin B-Complex     VITAMIN D PO     warfarin ANTICOAGULANT (JANTOVEN ANTICOAGULANT) 2.5 MG tablet     Current Facility-Administered Medications   Medication     3.0 mL bupivacaine (MARCAINE) 0.5% injection (50 mL vial)     triamcinolone (KENALOG-40) injection 80 mg            Review of Systems:       A complete review of systems was otherwise negative except as noted in the HPI.      PHYSICAL EXAM:  /68   Pulse 99   Temp (!) 96.5  F (35.8  C) (Temporal)   Resp  20   Wt 72.6 kg (160 lb)   SpO2 (!) 69%   BMI 31.04 kg/m   Oxygen saturation was performed on room air.    General: Appears uncomfortable  Eyes: Anicteric  Ears: Hearing grossly normal  Mouth: Oral mucosa is moist, without any lesions. No oropharyngeal exudate.  Neck: supple, no thyromegaly  Lymphatics: No cervical or supraclavicular nodes  Respiratory: Mild tachypnea.  Decreased breath sounds bilaterally.  No wheezing or crackling heard  Cardiac: RRR, normal S1, S2. No murmurs. No JVD  Abdomen: Soft, NT/ND  Musculoskeletal:  No clubbing. No cyanosis.  Lymphedema is present, but no pitting edema  Skin: No rash on limited exam  Neuro: Normal mentation. Normal speech.  Psych:Normal affect           Data:   All laboratory and imaging data reviewed.      PFT:   Spirometry performed in February 2023:  Vital capacity 1.16 L, 42% of predicted.  FEV1 0.51 L, 24% of predicted with a ratio of 44%.      PFT Interpretation:  Very severe airflow obstruction with a low vital capacity suggestive of gas trapping.  Valid Maneuver    CXR: I have reviewed the CXR images from May 8, 2023 and agree with the radiologist interpretation below:   IMPRESSION: Left partial mastectomy. Stable chronic interstitial  coarsening. No acute findings. No focal pneumonic consolidation or  pleural effusion. Normal heart size.      Chest CT: I have reviewed the chest CT images from 8/2022 and agree with the radiologist interpretation below:  LUNGS AND PLEURA: The area of density along the left lower chest,  projected along the left heart border on the comparison chest  radiographs correspond with some consolidation and volume loss in the  inferior lingula left lung which has become more pronounced today than  on the comparison CT chest of 7/29/2021. No centrally obstructing  process is evident. There is some additional subpleural consolidation  with volume loss along the anterior left upper lobe which is suspected  to represent postradiation change,  similar in appearance to previous.  Reticulonodular area of infiltrate along the posterior aspect of the  superior segment right lower lobe has not shown significant interval  change compared with 7/29/2021. Areas of cylindrical bronchiectasis,  most pronounced involving posterior medial right lower lobe bronchi  are again seen. There is some associated bronchial wall thickening as  well as scattered mucous plugging. Mild underlying emphysematous  changes. Small scattered nodules in the lungs are also again noted.  The largest of these in the lateral subpleural left lower lobe again  measures 5 mm in diameter (series 4, image 225). A 3 mm diameter left  lower lobe nodule (image 182) was not clearly conspicuous on previous  studies. A few other tiny scattered nodules previously seen, 2-3 mm in  diameter have not shown significant interval change.     MEDIASTINUM/AXILLAE: No lymphadenopathy. No significant pericardial  fluid. Normal caliber thoracic aorta and esophagus. Postoperative  changes again noted involving the left breast.     CORONARY ARTERY CALCIFICATION: Severe.     UPPER ABDOMEN: Cholecystectomy.     MUSCULOSKELETAL: No significant osseous pathology.                                                                      IMPRESSION:   1.  The opacity along the left lower chest seen on comparison chest  radiographs corresponds with increased consolidation and volume loss  in the inferior lingula left lung today. There is no evidence for  centrally obstructing process, and this appearance is most consistent  with increased atelectasis or scarring/fibrosis.  2.  There is some additional subpleural consolidation and volume loss  along the anterior left upper lobe which is similar to previous,  suspected to represent post radiation change.  3.  Cylindrical bronchiectasis again seen in both lungs, most  pronounced along posteromedial right lower lobe bronchi. There is some  associated bronchial wall thickening and  scattered mucus plugging.  4.  A 3 mm diameter nodule in the lower lobe left lung was not clearly  evident on previous studies. Some additional scattered nodules  elsewhere, with the largest measuring 5 mm along the lateral  subpleural left lower lobe have remained stable.  5.  Atherosclerosis including severe coronary artery calcification.   6.  ACR Assessment Category (v1.1):  Lung-RADS Category 2. Benign  appearance or behavior.

## 2023-06-28 NOTE — TELEPHONE ENCOUNTER
Spoke with patient and she is going out of town for the holiday weekend.  Started prednisone today and was advised to recheck INR in one week after last INR.  She will come in on 6/30/23 to check INR prior to leaving for the weekend.    Elin Booker RN  [facility]

## 2023-06-29 NOTE — TELEPHONE ENCOUNTER
Patient notified of below, patient will schedule this. Number given for imaging.      Ana Currie RN on 6/29/2023 at 10:19 AM                Adam Mai MD  [facility] Care Specialty Pool 38 minutes ago (9:36 AM)     NG  Please help her schedule chest CT. To be done within a week or so     lisandro

## 2023-06-29 NOTE — PROGRESS NOTES
Appropriate assistive devices provided during their visit. yes (Yes, No, N/A) wheelchair (list device)    Exam table and/or cart  placed in the lowest position. yes (Yes, No, N/A)    Brakes on tables/carts/wheelchairs used at all times. yes (Yes, No, N/A)    Non slip footwear applied. na (Yes, No, NA)    Patient was accompanied by staff throughout visit. na (Yes, No, N/A)    Equipment safety straps used. na (Yes, No, N/A)    Assist with toileting. na (Yes, No, N/A)

## 2023-06-29 NOTE — TELEPHONE ENCOUNTER
Hi Bing Cunningham Assefa, MD called and wanted you to know some urgent findings for Ivette.     IMPRESSION:   1.  Findings suspicious for right upper lobe pulmonary neoplasm with  probable lymphangitic spread in the right upper and right lower lobes  as well as bilateral mediastinal and right supraclavicular lymph node  metastases. Infectious etiology with reactive lymphadenopathy also in  the differential but is not favored. The right supraclavicular lymph  node is amenable for ultrasound-guided biopsy. Otherwise follow-up CT  or PET/CT in 1 month recommended.Ivy Smart MD

## 2023-06-29 NOTE — TELEPHONE ENCOUNTER
Radiologist is calling a RUL on CXR.  She has already received 2 courses of antibiotics (doxycycline and bactrim).  No improvement.  I will order a chest CT without contrast to evaluate the RUL opacity.    Jemal Mai MD

## 2023-06-30 NOTE — TELEPHONE ENCOUNTER
Patient called asking is Dr. Mai wanted her to hold any of her other medications such as inhalers or prednisone in preparation to the upcoming biopsy. I read patient what Dr. Mai instructed and re-iterated the need to hold the coumadin. He didn't advise holding her other medications so instructed to continue them.    Suri Merritt RN on 6/30/2023 at 1:14 PM

## 2023-06-30 NOTE — TELEPHONE ENCOUNTER
Spoke with Ivette and gave tentative plan that will be holding only, and that [facility] would follow up if any changes to plan, otherwise we'll talk to her 07/05/2023 with next INR check to ensure INR in range for procedure.      Chaparrita Ramon, PharmD BCACP  Anticoagulation Clinical Pharmacist

## 2023-06-30 NOTE — TELEPHONE ENCOUNTER
Spoke with someone at [facility] and confirmed they do this ultrasound guided biopsy. Separate order placed for the biopsy as the biopsy order was not placed.    Biopsy scheduled.    Patient updated with date/time of biopsy 7/7/23 with 1:15pm check-in.  Suri Merritt RN on 6/30/2023 at 11:35 AM

## 2023-06-30 NOTE — TELEPHONE ENCOUNTER
"[facility]-PROCED[facility] ANTICOAGULATION  MANAGEMENT    ASSESSMENT     Warfarin interruption plan for lymph node biopsy on 2023.    Indication for Anticoagulation: Atrial Fibrillation      [id number] = 3 (Hypertension, Age 65-74 and Female)      Maria Teresa-Procedure Risk stratification for thromboembolism: low (2017 ACC periprocedure pathway for NVAF Expert Consensus)    NVAF: 2017 ACC periprocedure pathway for NVAF advises NO bridge for low risk stratification ([id number] score <=4 and no prior hx of stroke, TIA or systemic embolism)     RECOMMENDATION       Pre-Procedure:  o Already advised to hold warfarin for 7 days, until after procedure startin2023   o No Bridge      Post-Procedure:  o Resume warfarin dose if okay with provider doing procedure on night of procedure, 2023 PM: 2.5mg  o Recheck INR ~ 7 days after resuming warfarin       Plan routed to referring provider for approval  ?   Chaparrita Ramon [facility]    SUBJECTIVE/OBJECTIVE     Latanya Padron, a 73 year old female    Goal INR Range: 2.0-3.0     Patient bridged in past: No      Wt Readings from Last 3 Encounters:   23 72.6 kg (160 lb)   23 76 kg (167 lb 8 oz)   23 78.2 kg (172 lb 6.4 oz)      Ideal body weight: 46 kg (101 lb 5.2 oz)  Adjusted ideal body weight: 56.6 kg (124 lb 12.7 oz)     Estimated body mass index is 31.04 kg/m  as calculated from the following:    Height as of 5/3/23: 1.529 m (5' 0.2\").    Weight as of 23: 72.6 kg (160 lb).    Lab Results   Component Value Date    INR 3.0 (H) 2023    INR 2.5 (H) 2023    INR 1.9 (H) 2023     Lab Results   Component Value Date    HGB 10.6 (L) 2023    HCT 34.3 (L) 2023     2023     Lab Results   Component Value Date    CR 1.22 (H) 2023    CR 1.04 (H) 2023    CR 1.28 (H) 2023     Estimated Creatinine Clearance: 37.2 mL/min (A) (based on SCr of 1.22 mg/dL (H)).  "

## 2023-06-30 NOTE — TELEPHONE ENCOUNTER
Pt scheduled for an U/S guided biopsy of lymph node on 7/7/23.     called and spoke with Ms Padron about the chest CT findings.  It is concerning for lung cancer.  There is a supraclavicular lymph node that his large on the right.  I discussed this with the radiologist, and it may be reachable by ultrasound - guided biopsy.  Vasculature may obscure the window for biopsy.      Ms Padron would like to get the biopsy done in [place], so I will place an ultrasound order.  If the anatomy is not suitable when she arrives for the biopsy, then I will refer her to IR in [place].       She is on coumadin for A fib.  I have asked her to stop this right now so that her INR can drift down by next week.  I will let Dr Jones know so he can manage this differently if he chooses.       Jemal Mai MD    Pt scheduled on 7/5/23 to recheck INR I assume to make sure that the INR level is coming down before procedure    Will send to [facility] for review

## 2023-06-30 NOTE — TELEPHONE ENCOUNTER
I called and spoke with Ms Padron about the chest CT findings.  It is concerning for lung cancer.  There is a supraclavicular lymph node that his large on the right.  I discussed this with the radiologist, and it may be reachable by ultrasound - guided biopsy.  Vasculature may obscure the window for biopsy.     Ms Padron would like to get the biopsy done in [place], so I will place an ultrasound order.  If the anatomy is not suitable when she arrives for the biopsy, then I will refer her to IR in [place].      She is on coumadin for A fib.  I have asked her to stop this right now so that her INR can drift down by next week.  I will let Dr Jones know so he can manage this differently if he chooses.      Jemal Mai MD

## 2023-06-30 NOTE — PROGRESS NOTES
ANTICOAGULATION MANAGEMENT     Latanya Padron 73 year old female is on warfarin with therapeutic INR result. (Goal INR 2.0-3.0)    Recent labs: (last 7 days)     06/30/23  0923   INR 3.0*       ASSESSMENT     Source(s): Chart Review and Patient/Caregiver Call     Warfarin doses taken: Warfarin taken as instructed  Diet: No new diet changes identified  Medication/supplement changes: Bactrim stopped/pt on prednisone  New illness, injury, or hospitalization: Yes:   Pt's pulmonologist contacted pt today 6/30/23:  called and spoke with Ms Padron about the chest CT findings.  It is concerning for lung cancer.  There is a supraclavicular lymph node that his large on the right.  I discussed this with the radiologist, and it may be reachable by ultrasound - guided biopsy.  Vasculature may obscure the window for biopsy.      Ms Padron would like to get the biopsy done in [place], so I will place an ultrasound order.  If the anatomy is not suitable when she arrives for the biopsy, then I will refer her to IR in [place].       She is on coumadin for A fib.  I have asked her to stop this right now so that her INR can drift down by next week.  I will let Dr Jones know so he can manage this differently if he chooses.       Jemal Mai MD      Signs or symptoms of bleeding or clotting: No  Previous result: Therapeutic last visit; previously outside of goal range  Additional findings: Upcoming surgery/procedure 7/7/23 U/S guided lymph node biopsy   Sent separate note to [facility] for review  Pt already scheduled by clinic to recheck INR on 7/5/23 to probably to make sure that the INR level is trending down before the procedure         PLAN     Recommended plan for temporary change(s) affecting INR     Dosing Instructions: Hold warfarin 6/30/23,7/1/23,7/2/23, 7/3/23, 7/4/23,7/5/23,7/6/23 with next INR in 5 days       Summary  As of 6/30/2023    Full warfarin instructions:  6/30: Hold; 7/1: Hold; 7/2: Hold; 7/3: Hold; 7/4: Hold;  7/5: Hold; 7/6: Hold; Otherwise 1.25 mg every Sun, Thu; 2.5 mg all other days   Next INR check:  7/5/2023             Telephone call with Ivette who verbalizes understanding and agrees to plan. Pt aware that we will contact her on 7/5/23 after next INR check and update her after [facility] review     Lab visit scheduled    Education provided:   Contact [phone]  with any changes, questions or concerns.     Plan made per [facility] anticoagulation protocol    Josephine Regalado RN  Anticoagulation Clinic  6/30/2023    _______________________________________________________________________     Anticoagulation Episode Summary     Current INR goal:  2.0-3.0   TTR:  52.7 % (1 y)   Target end date:  Indefinite   Send INR reminders to:  [facility]    Indications    AF (paroxysmal atrial fibrillation) (H) [I48.0]  Atrial fibrillation (H) [I48.91]  Long term current use of anticoagulant therapy [Z79.01]  Atrial fibrillation  unspecified type (H) (Resolved) [I48.91]  Atrial fibrillation  unspecified type (H) [I48.91]           Comments:           Anticoagulation Care Providers     Provider Role Specialty Phone number    Nazario Jones MD Referring Family Medicine [phone]

## 2023-07-03 NOTE — TELEPHONE ENCOUNTER
This seems very reasonable. Thanks for keeping me in the loop.    Electronically signed by:  Nazario Jones M.D.  7/3/2023

## 2023-07-05 NOTE — TELEPHONE ENCOUNTER
Called and spoke with patient, dosages and instructions for patient's Spiriva, Albuterol inhaler, and Albuterol neb were discussed. No further questions at this time.     Gloria Ro RN   [facility]

## 2023-07-05 NOTE — TELEPHONE ENCOUNTER
Pt calling to get clarification on how often she should be doing her nebs, also to clarify if she is to continue taking her 2 inhalers? Please call Ivette to advise. Thank You Linh

## 2023-07-05 NOTE — TELEPHONE ENCOUNTER
See [facility] encounter from today.  Patient is aware of plan.    Elin Booker RN  [facility]

## 2023-07-05 NOTE — PROGRESS NOTES
ANTICOAGULATION MANAGEMENT     Latanya Padron 73 year old female is on warfarin with subtherapeutic INR result. (Goal INR 2.0-3.0)    Recent labs: (last 7 days)     07/05/23  0912   INR 1.0       ASSESSMENT     Source(s): Chart Review and Patient/Caregiver Call     Warfarin doses taken: Held for lymph node biopsy   recently which may be affecting INR  Diet: No new diet changes identified  Medication/supplement changes: None noted  New illness, injury, or hospitalization: No  Signs or symptoms of bleeding or clotting: No  Previous result: Therapeutic last 2(+) visits  Additional findings: Upcoming surgery/procedure Lymph Node biopsy scheduled for 7/7/23, hold plan discussed         PLAN     Recommended plan for temporary change(s) affecting INR     Dosing Instructions: hold 2 doses then continue your current warfarin dose with next INR in 1 week       Summary  As of 7/5/2023    Full warfarin instructions:  7/5: Hold; 7/6: Hold; Otherwise 1.25 mg every Sun, Thu; 2.5 mg all other days   Next INR check:  7/14/2023             Telephone call with Ivette who verbalizes understanding and agrees to plan    Lab visit scheduled    Education provided:   Contact [phone]  with any changes, questions or concerns.     Plan made per [facility] anticoagulation protocol    Elin Booker RN  Anticoagulation Clinic  7/5/2023    _______________________________________________________________________     Anticoagulation Episode Summary     Current INR goal:  2.0-3.0   TTR:  52.7 % (1 y)   Target end date:  Indefinite   Send INR reminders to:  [facility]    Indications    AF (paroxysmal atrial fibrillation) (H) [I48.0]  Atrial fibrillation (H) [I48.91]  Long term current use of anticoagulant therapy [Z79.01]  Atrial fibrillation  unspecified type (H) (Resolved) [I48.91]  Atrial fibrillation  unspecified type (H) [I48.91]           Comments:           Anticoagulation Care Providers     Provider Role Specialty Phone number     Nazario Jones MD [facility] Medicine [phone]

## 2023-07-09 PROBLEM — R79.89 TROPONIN LEVEL ELEVATED: Status: ACTIVE | Noted: 2023-01-01

## 2023-07-09 PROBLEM — I26.09 ACUTE PULMONARY EMBOLISM WITH ACUTE COR PULMONALE, UNSPECIFIED PULMONARY EMBOLISM TYPE (H): Status: ACTIVE | Noted: 2023-01-01

## 2023-07-09 PROBLEM — J18.9 PNEUMONIA OF BOTH LUNGS DUE TO INFECTIOUS ORGANISM, UNSPECIFIED PART OF LUNG: Status: ACTIVE | Noted: 2023-01-01

## 2023-07-09 PROBLEM — J96.01 ACUTE RESPIRATORY FAILURE WITH HYPOXIA AND HYPERCAPNIA (H): Status: ACTIVE | Noted: 2023-01-01

## 2023-07-09 PROBLEM — R22.2 SUPRACLAVICULAR MASS: Status: ACTIVE | Noted: 2023-01-01

## 2023-07-09 PROBLEM — J96.02 ACUTE RESPIRATORY FAILURE WITH HYPOXIA AND HYPERCAPNIA (H): Status: ACTIVE | Noted: 2023-01-01

## 2023-07-09 NOTE — ED TRIAGE NOTES
"Patient c/o short of breath \"all day\". Lung biopsy on Friday, 7/7/23     Triage Assessment     Row Name 07/09/23 1823       Triage Assessment (Adult)    Airway WDL WDL       Respiratory WDL    Respiratory WDL X    Rhythm/Pattern, Respiratory shortness of breath       Skin Circulation/Temperature WDL    Skin Circulation/Temperature WDL X;circulation    Skin Circulation pallor              "

## 2023-07-10 PROBLEM — Z79.01 LONG TERM CURRENT USE OF ANTICOAGULANT THERAPY: Status: ACTIVE | Noted: 2020-10-20

## 2023-07-10 PROBLEM — I27.20 PULMONARY HYPERTENSION (H): Status: ACTIVE | Noted: 2023-01-01

## 2023-07-10 PROBLEM — R73.03 PREDIABETES: Status: ACTIVE | Noted: 2023-01-01

## 2023-07-10 PROBLEM — I48.91 ATRIAL FIBRILLATION, UNSPECIFIED TYPE (H): Status: ACTIVE | Noted: 2022-01-01

## 2023-07-10 PROBLEM — I25.10 CORONARY ARTERY DISEASE INVOLVING NATIVE CORONARY ARTERY OF NATIVE HEART WITHOUT ANGINA PECTORIS: Status: ACTIVE | Noted: 2020-08-10

## 2023-07-10 PROBLEM — R59.1 LYMPHADENOPATHY: Status: ACTIVE | Noted: 2023-01-01

## 2023-07-10 PROBLEM — I50.811 ACUTE RIGHT-SIDED HEART FAILURE (H): Status: ACTIVE | Noted: 2023-01-01

## 2023-07-10 PROBLEM — I48.91 ATRIAL FIBRILLATION WITH RVR (H): Status: ACTIVE | Noted: 2023-01-01

## 2023-07-10 NOTE — ED PROVIDER NOTES
History     chief complaint  HPI  Patient is a 73-year-old female with a history of COPD on 2 to 2.5 L of oxygen at baseline as well as history of breast cancer on anastrozole who is getting worked up for possible lung cancer status post biopsy on 7/7/2023 who is presenting with shortness of breath.  Patient states this started suddenly yesterday but much more profoundly so today.  She was unable to get her oxygen saturations above 90.  She was on 5 L by the time medics got there and she was saturating at 52%.  Patient herself denies chest pain, cough, fevers, chills, abdominal pain.  States she vomited once today.  She had stopped her Coumadin for the procedure and just restarted yesterday.      Review of Systems:  Limited due to acuity    Allergies:  Allergies   Allergen Reactions     Augmentin [Amoxicillin-Pot Clavulanate] Nausea and Vomiting     Meperidine Hcl Nausea and Vomiting     demerol     Seasonal Allergies      spring       Problem List:    Patient Active Problem List    Diagnosis Date Noted     Troponin level elevated 07/09/2023     Priority: Medium     Supraclavicular mass 07/09/2023     Priority: Medium     Acute respiratory failure with hypoxia and hypercapnia (H) 07/09/2023     Priority: Medium     Pneumonia of both lungs due to infectious organism, unspecified part of lung 07/09/2023     Priority: Medium     Acute pulmonary embolism with acute cor pulmonale, unspecified pulmonary embolism type (H) 07/09/2023     Priority: Medium     Atrial fibrillation, unspecified type (H) 10/06/2022     Priority: Medium     Major depression in complete remission (H) 05/19/2022     Priority: Medium     Epidermoid cyst of labia majora 11/04/2020     Priority: Medium     Long term current use of anticoagulant therapy 10/20/2020     Priority: Medium     Coronary artery disease involving native coronary artery of native heart without angina pectoris 08/10/2020     Priority: Medium     Restless leg syndrome  06/24/2020     Priority: Medium     Lymphadenopathy of head and neck 02/26/2020     Priority: Medium     Groin pain, right 02/26/2020     Priority: Medium     Right hip pain 09/04/2019     Priority: Medium     Patellofemoral pain syndrome of right knee 09/04/2019     Priority: Medium     Abnormal gait 09/04/2019     Priority: Medium     Primary osteoarthritis of right knee 09/04/2019     Priority: Medium     Age-related osteoporosis without current pathological fracture 07/03/2019     Priority: Medium     Malignant neoplasm of overlapping sites of left breast in female, estrogen receptor positive (H) 06/12/2019     Priority: Medium     Segmental dysfunction of sacral region 02/01/2019     Priority: Medium     Segmental dysfunction of lumbar region 02/01/2019     Priority: Medium     Segmental dysfunction of thoracic region 02/01/2019     Priority: Medium     Bilateral low back pain with right-sided sciatica 02/01/2019     Priority: Medium     Trochanteric bursitis of right hip 07/13/2018     Priority: Medium     Hip pain, right 06/20/2018     Priority: Medium     Multiple joint pain 06/20/2018     Priority: Medium     CKD (chronic kidney disease) stage 3, GFR 30-59 ml/min (H) 06/21/2017     Priority: Medium     Primary osteoarthritis of both knees 04/18/2017     Priority: Medium     AF (paroxysmal atrial fibrillation) (H) 03/02/2016     Priority: Medium     Major depression in complete remission (H) 12/28/2015     Priority: Medium     Class 1 obesity due to excess calories without serious comorbidity with body mass index (BMI) of 34.0 to 34.9 in adult 12/28/2015     Priority: Medium     Pulmonary emphysema, unspecified emphysema type (H) 12/28/2015     Priority: Medium     Atrial fibrillation (H) 06/20/2014     Priority: Medium     Essential hypertension, benign 09/30/2013     Priority: Medium     Tennis elbow 04/12/2013     Priority: Medium     left         Advanced directives, counseling/discussion 09/09/2011      Priority: Medium     Advance Directive Problem List Overview:   Name Relationship Phone    Primary Health Care Agent            Alternative Health Care Agent          Discussed advance care planning with patient; information given to patient to review.//Brenda Carlin/BEATRIZ(AAMA)  9/9/2011        Cabrera Connor MD  07/09/23 2155         HYPERLIPIDEMIA LDL GOAL <130 10/31/2010     Priority: Medium     Hirsutism 04/13/2007     Priority: Medium     Family history of malignant neoplasm of breast 12/27/2006     Priority: Medium     Female stress incontinence 11/26/2005     Priority: Medium     Disorder of bone and cartilage 07/13/2005     Priority: Medium     Problem list name updated by automated process. Provider to review       Sprain and strain of unspecified site of knee and leg 04/09/2004     Priority: Medium        Past Medical History:    Past Medical History:   Diagnosis Date     Breast cancer (H) 04/12/2019     COPD (chronic obstructive pulmonary disease) (H)      Mixed hyperlipidemia      Neck mass 06/20/2018     Paroxysmal atrial fibrillation (H)      PONV (postoperative nausea and vomiting)      S/P radiation therapy        Past Surgical History:    Past Surgical History:   Procedure Laterality Date     BIOPSY NODE SENTINEL Left 5/15/2019    Procedure: left sentinel node biopsy;  Surgeon: Donald Aleman MD;  Location: [facility]     BREAST BIOPSY, CORE RT/LT Left 04/12/2019     COLONOSCOPY  06/21/10     COLONOSCOPY N/A 3/4/2021    Procedure: Colonoscopy with  polypectomy;  Surgeon: Donald Aleman MD;  Location: [facility]     HC BIOPSY OF BREAST, OPEN INCISIONAL Right 1988    Benign per patient     HC CORRECT BUNION,METATARSAL OSTEOTOMY  1993      LAPAROSCOPY, SURGICAL; CHOLECYSTECTOMY  08/04/10      SLING OPERATION FOR STRESS INCONTINENCE  04/19/2007     HERNIORRHAPHY INCISIONAL (LOCATION)  9/23/2011    Procedure:HERNIORRHAPHY INCISIONAL (LOCATION); Surgeon:AYALA HOOVER; Location:[facility]     LAPAROSCOPIC  HERNIORRHAPHY INCISIONAL  2011    Procedure:LAPAROSCOPIC HERNIORRHAPHY INCISIONAL; Laparoscopic mesh repair of incarcerated incisional hernia , extensive lysis of adhesions, excision of hernia sac and closure of fascia.; Surgeon:AYALA HOOVER; Location:[facility]     LAPAROSCOPIC LYSIS ADHESIONS  2011    Procedure:LAPAROSCOPIC LYSIS ADHESIONS; Surgeon:AYALA HOOVER; Location:[facility]     MASTECTOMY PARTIAL WITH NEEDLE LOCALIZATION Left 5/15/2019    Procedure: left wire localized partial mastectomy;  Surgeon: Donald Aleman MD;  Location: [facility]     TONSILLECTOMY       ZZC APPENDECTOMY,W OTHR PROC       [facility]  DELIVERY ONLY           [facility] LIGATE FALLOPIAN TUBE       [facility] NONSPECIFIC PROCEDURE      Exploratory laparotomy with resection of infarcted segment of omentum and minor lysis of adhesions     [facility] NONSPECIFIC PROCEDURE      Removal of hemorrhagic corpus luteum cyst     [facility] VAGINAL HYSTERECTOMY         Family History:    Family History   Problem Relation Age of Onset     Breast Cancer Mother          at 88     Obesity Mother      Genitourinary Problems Mother      Lipids Mother      Heart Disease Father         by pass (5)     Cerebrovascular Disease Father         hemorragic     Lipids Father      Alzheimer Disease Father 92     Alzheimer Disease Maternal Grandmother      Genitourinary Problems Maternal Grandmother      Cancer Maternal Grandfather      Cancer Paternal Grandfather         lymph nodes     Cancer Brother         prostate     Melanoma Brother      Heart Disease Brother         1955 (aaron with a partner)     Hyperlipidemia Brother      Heart Disease Brother         stent placement     Hyperlipidemia Brother      Prostate Cancer Brother 66     Respiratory Sister      Lipids Sister      Breast Cancer Sister 40        s/p mastectomy     Arthritis Sister      Obesity Sister      Heart Failure Sister      Cancer Sister         stomach, colon,  pancreatic     Lipids Sister      Dementia Sister         1/2 sister on dad's side (1947)     Other - See Comments Sister         1948     Other - See Comments Daughter         fibromyalgia (1972)     Lymphoma Maternal Aunt      Heart Failure Maternal Aunt 81     Cancer Maternal Uncle         colon     Hemophilia Grandchild      Other - See Comments Grandchild         Transgender       Medications:    albuterol (PROAIR HFA/PROVENTIL HFA/VENTOLIN HFA) 108 (90 Base) MCG/ACT inhaler  albuterol (PROVENTIL) (2.5 MG/3ML) 0.083% neb solution  anastrozole (ARIMIDEX) 1 MG tablet  ASPIRIN NOT PRESCRIBED (INTENTIONAL)  atorvastatin (LIPITOR) 10 MG tablet  Calcium Carb-Cholecalciferol 500-400 MG-UNIT TABS  Cyanocobalamin (B-12) 1000 MCG TBCR  fluticasone (FLONASE) 50 MCG/ACT nasal spray  hydrochlorothiazide (HYDRODIURIL) 25 MG tablet  ipratropium - albuterol 0.5 mg/2.5 mg/3 mL (DUONEB) 0.5-2.5 (3) MG/3ML neb solution  lisinopril (ZESTRIL) 2.5 MG tablet  magnesium 250 MG tablet  metFORMIN (GLUCOPHAGE XR) 500 MG 24 hr tablet  mometasone-formoterol (DULERA) 200-5 MCG/ACT inhaler  MYRBETRIQ 50 MG 24 hr tablet  ondansetron (ZOFRAN ODT) 4 MG ODT tab  order for DME  order for DME  ORDER FOR DME  predniSONE (DELTASONE) 20 MG tablet  Probiotic Product (PROBIOTIC PO)  spacer (OPTICHAMBER ROSANNE) holding chamber  tiotropium (SPIRIVA RESPIMAT) 2.5 MCG/ACT inhaler  venlafaxine (EFFEXOR XR) 75 MG 24 hr capsule  vitamin B-Complex  VITAMIN D PO  warfarin ANTICOAGULANT (JANTOVEN ANTICOAGULANT) 2.5 MG tablet          Physical Exam   BP: (!) 183/88  Pulse: 102  Temp: 98.1  F (36.7  C)  Resp: 25  SpO2: 97 %    Gen: Vital signs reviewed  Eyes: Sclera white, pupils round  ENT: External ears and nares normal  Card: Regular rate and rhythm  Resp: No respiratory distress. Lungs clear to auscultation bilaterally  Abd: Soft, non-distended, non-tender  Extremities: Symmetric distal pulses.  Skin: No rashes  Neuro: Alert, speech normal.     ED Course         Procedures         EKG interpreted by myself.  Poor baseline.  Sinus tachycardia at a rate of 107 bpm, LA interval 142 ms, QTc 446 ms, QRS duration 102 ms.  She has nonspecific T wave changes across the precordium. Leads V4 through V6 are smaller in amplitude with more pronounced T wave flattening than her last EKG in 5/8, though difficult to tell due to poor baseline.       Results for orders placed or performed during the hospital encounter of 07/09/23 (from the past 24 hour(s))   CBC with platelets differential    Narrative    The following orders were created for panel order CBC with platelets differential.  Procedure                               Abnormality         Status                     ---------                               -----------         ------                     CBC with platelets and d...[[id number]]  Abnormal            Final result                 Please view results for these tests on the individual orders.   Basic metabolic panel   Result Value Ref Range    Sodium 137 136 - 145 mmol/L    Potassium 3.9 3.4 - 5.3 mmol/L    Chloride 91 (L) 98 - 107 mmol/L    Carbon Dioxide (CO2) 33 (H) 22 - 29 mmol/L    Anion Gap 13 7 - 15 mmol/L    Urea Nitrogen 27.9 (H) 8.0 - 23.0 mg/dL    Creatinine 1.20 (H) 0.51 - 0.95 mg/dL    Calcium 10.2 8.8 - 10.2 mg/dL    Glucose 222 (H) 70 - 99 mg/dL    GFR Estimate 48 (L) >60 mL/min/1.73m2   Troponin T, High Sensitivity   Result Value Ref Range    Troponin T, High Sensitivity 59 (H) <=14 ng/L   Nt probnp inpatient (BNP)   Result Value Ref Range    N terminal Pro BNP Inpatient 14,983 (H) 0 - 900 pg/mL   CBC with platelets and differential   Result Value Ref Range    WBC Count 13.8 (H) 4.0 - 11.0 10e3/uL    RBC Count 3.84 3.80 - 5.20 10e6/uL    Hemoglobin 10.8 (L) 11.7 - 15.7 g/dL    Hematocrit 35.0 35.0 - 47.0 %    MCV 91 78 - 100 fL    MCH 28.1 26.5 - 33.0 pg    MCHC 30.9 (L) 31.5 - 36.5 g/dL    RDW 14.3 10.0 - 15.0 %    Platelet Count 173 150 - 450 10e3/uL    %  Neutrophils 80 %    % Lymphocytes 8 %    % Monocytes 9 %    % Eosinophils 2 %    % Basophils 0 %    % Immature Granulocytes 1 %    NRBCs per 100 WBC 0 <1 /100    Absolute Neutrophils 11.0 (H) 1.6 - 8.3 10e3/uL    Absolute Lymphocytes 1.1 0.8 - 5.3 10e3/uL    Absolute Monocytes 1.3 0.0 - 1.3 10e3/uL    Absolute Eosinophils 0.3 0.0 - 0.7 10e3/uL    Absolute Basophils 0.0 0.0 - 0.2 10e3/uL    Absolute Immature Granulocytes 0.1 <=0.4 10e3/uL    Absolute NRBCs 0.0 10e3/uL   XR Chest Port 1 View    Narrative    EXAM: XR CHEST PORT 1 VIEW  LOCATION: [facility]  DATE: 7/9/2023    INDICATION: dyspnea  COMPARISON: None.      Impression    IMPRESSION: There some mild infiltrates right upper lobe and to lesser degree left mid lung concerning for pneumonia. No previous for comparison.   Extra Tube ([place] Draw) *Canceled*    Narrative    The following orders were created for panel order Extra Tube ([place] Draw).  Procedure                               Abnormality         Status                     ---------                               -----------         ------                     Extra Heparinized Syringe[[id number]]                                                     Please view results for these tests on the individual orders.   Blood gas venous   Result Value Ref Range    pH Venous 7.29 (L) 7.32 - 7.43    pCO2 Venous 76 (HH) 40 - 50 mm Hg    pO2 Venous 56 (H) 25 - 47 mm Hg    Bicarbonate Venous 36 (H) 21 - 28 mmol/L    Base Excess/Deficit (+/-) 7.3 (H) -7.7 - 1.9 mmol/L    FIO2 23    INR   Result Value Ref Range    INR 1.31 (H) 0.85 - 1.15   Symptomatic Influenza A/B, RSV, & SARS-CoV2 PCR (COVID-19) Nose    Specimen: Nose; Swab   Result Value Ref Range    Influenza A PCR Negative Negative    Influenza B PCR Negative Negative    RSV PCR Negative Negative    SARS CoV2 PCR Negative Negative    Narrative    Testing was performed using the [employer] Xpress CoV2/Flu/RSV Assay on the [employer]  GeneXpert Instrument. This test should be ordered for the detection of SARS-CoV-2, influenza, and RSV viruses in individuals who meet clinical and/or epidemiological criteria. Test performance is unknown in asymptomatic patients. This test is for in vitro diagnostic use under the FDA EUA for laboratories certified under CLIA to perform high or moderate complexity testing. This test has not been FDA cleared or approved. A negative result does not rule out the presence of PCR inhibitors in the specimen or target RNA in concentration below the limit of detection for the assay. If only one viral target is positive but coinfection with multiple targets is suspected, the sample should be re-tested with another FDA cleared, approved, or authorized test, if coinfection would change clinical management. This test was validated by the [facility]. These laboratories are certified under the Clinical Laboratory Improvement Amendments of 1988 (CLIA-88) as qualified to perform high complexity laboratory testing.   CT Chest Pulmonary Embolism w Contrast   Result Value Ref Range    Radiologist flags New diagnosis of pulmonary embolism (AA)     Narrative    EXAM: CT CHEST PULMONARY EMBOLISM W CONTRAST  LOCATION: [facility]  DATE: 7/9/2023    INDICATION: significantly increased dyspnea, hypoxic to the 50's despite her home 02, recent lung biopsy, possible new active lung cancer  COMPARISON: 06/29/2023.   TECHNIQUE: CT chest pulmonary angiogram during arterial phase injection of IV contrast. Multiplanar reformats and MIP reconstructions were performed. Dose reduction techniques were used.   CONTRAST: 65 mL Isovue 370    FINDINGS:  ANGIOGRAM CHEST: Pulmonary arteries are normal in caliber. There are segmental and subsegmental filling defects in the left upper lobe. RV/LV ratio is 46/33.  Thoracic aorta is negative for dissection.    LUNGS AND PLEURA: Emphysema. Scarring in the  peripheral left upper lobe. Reticular interstitial opacities more confluent opacities in the right upper lung.  Small right pleural effusion.    MEDIASTINUM/AXILLAE:  2.8 x 2.7 cm right supraclavicular mass. Multiple prominent and enlarged mediastinal and hilar lymph nodes. Left breast surgical clips and skin thickening.     CORONARY ARTERY CALCIFICATION: Moderate.    UPPER ABDOMEN: Cholecystectomy. Likely simple right renal cysts. Atherosclerotic disease.     MUSCULOSKELETAL: Superior endplate compression of T12 has not changed.       Impression    IMPRESSION:  1.  Study is positive for PE to the left upper lobe. Right heart strain is possible.  2.  Right lung opacities, lymphadenopathy, and a right supraclavicular mass. Evidence of left breast cancer.       [Critical Result: New diagnosis of pulmonary embolism]    Finding was identified on 7/9/2023 8:28 PM CDT.     Dr. Connor was contacted by me on 7/9/2023 8:37 PM CDT and verbalized understanding of the critical result.    Blood gas venous   Result Value Ref Range    pH Venous 7.16 (LL) 7.32 - 7.43    pCO2 Venous 56 (H) 40 - 50 mm Hg    pO2 Venous 55 (H) 25 - 47 mm Hg    Bicarbonate Venous 20 (L) 21 - 28 mmol/L    Base Excess/Deficit (+/-) -8.3 (L) -7.7 - 1.9 mmol/L    FIO2 60    Troponin T, High Sensitivity   Result Value Ref Range    Troponin T, High Sensitivity 48 (H) <=14 ng/L   Lactic Acid STAT   Result Value Ref Range    Lactic Acid 1.2 0.7 - 2.0 mmol/L   Blood gas venous   Result Value Ref Range    pH Venous 7.28 (L) 7.32 - 7.43    pCO2 Venous 78 (HH) 40 - 50 mm Hg    pO2 Venous 33 25 - 47 mm Hg    Bicarbonate Venous 37 (H) 21 - 28 mmol/L    Base Excess/Deficit (+/-) 8.1 (H) -7.7 - 1.9 mmol/L    FIO2 60    Extra Tube    Narrative    The following orders were created for panel order Extra Tube.  Procedure                               Abnormality         Status                     ---------                               -----------         ------                      Extra Green Top (Lithium...[[id number]]                      Final result               Extra Purple Top Tube[[id number]]                            Final result                 Please view results for these tests on the individual orders.   Extra Green Top (Lithium Heparin) Tube   Result Value Ref Range    Hold Specimen jic    Extra Purple Top Tube   Result Value Ref Range    Hold Specimen jic      *Note: Due to a large number of results and/or encounters for the requested time period, some results have not been displayed. A complete set of results can be found in Results Review.       Medications   heparin 25,000 units in 0.45% NaCl 250 mL ANTICOAGULANT infusion (1,300 Units/hr Intravenous $New Bag 7/9/23 2108)   ipratropium - albuterol 0.5 mg/2.5 mg/3 mL (DUONEB) neb solution 3 mL (3 mLs Nebulization $Given 7/9/23 1848)   albuterol (PROVENTIL) neb solution 2.5 mg (2.5 mg Nebulization $Given 7/9/23 1851)   methylPREDNISolone sodium succinate (solu-MEDROL) injection 125 mg (125 mg Intravenous $Given 7/9/23 1905)   magnesium sulfate 1 g in 100 mL D5W intermittent infusion (0 g Intravenous Stopped 7/9/23 2010)   ipratropium - albuterol 0.5 mg/2.5 mg/3 mL (DUONEB) 0.5-2.5 (3) MG/3ML neb solution (3 mLs  $Given 7/9/23 1847)   cefTRIAXone (ROCEPHIN) 2 g vial to attach to  ml bag for ADULTS or NS 50 ml bag for PEDS (0 g Intravenous Stopped 7/9/23 2000)   azithromycin (ZITHROMAX) 500 mg vial to attach to  mL bag (0 mg Intravenous Stopped 7/9/23 2128)   sodium chloride 0.9 % bag 100mL for CT scan flush use (80 mLs Intravenous $Given 7/9/23 2013)   iopamidol (ISOVUE-370) solution 500 mL (65 mLs Intravenous $Given 7/9/23 2014)   heparin ANTICOAGULANT Loading dose for HIGH INTENSITY TREATMENT * Give BEFORE starting heparin infusion (5,000 Units Intravenous $Given 7/9/23 2107)         Consultations:  Hospitalist    Social Determinants of Health:  Manages ADLs, lives with her     Assessments & Plan  (with Medical Decision Making)       I have reviewed the nursing notes.    I have reviewed the findings, diagnosis, plan and need for follow up with the patient.      Medical Decision Making  On arrival, patient is hypoxic.  Afebrile but tachypneic and tachycardic.  The differential for presentation includes pneumothorax, COPD exacerbation, pneumonia, intra-alveolar hemorrhage from her biopsy.  She was started on more nebulizing therapies and started on methylprednisolone.  X-ray obtained.  No pneumothorax but did show possible pneumonia.  Started on Rocephin and azithromycin.  Given her degree of hypoxia with such minimal chest x-ray findings, I did order a CT PE study as she had just restarted on her Coumadin and likely is not therapeutic.    CT demonstrated pulmonary embolism with possible right heart strain.  Her newly elevated BNP and troponin above baseline certainly would speak for right heart strain.  We did place her on BiPAP and she looked much more comfortable.  Her tachypnea resolved and her work of breathing improved significantly.  Her repeat troponin went down to 48.  I do suspect that her right heart strain caused by a combination of her pneumonia, COPD, and PE rather than just her PE alone.  Started on heparin drip as she is subtherapeutic on her INR.  She continues to demonstrate clinical stability on BiPAP without any worsening tachycardia or hypotension.  I discussed her case with hospitalist.  Patient was admitted to the ICU here.    Of note the 2nd VBG that was drawn was erroneous I suspect because it came back with a hemoglobin of 6 and a potassium of 2 and did not fit her clinical picture of improvement.  We repeated this and her VBG came back much better and more consistent with her picture.    Critical care time independent of procedures was 35 minutes.    Final diagnoses:   Acute pulmonary embolism with acute cor pulmonale, unspecified pulmonary embolism type (H)   Troponin level elevated    Pneumonia of both lungs due to infectious organism, unspecified part of lung   Supraclavicular mass   Acute respiratory failure with hypoxia and hypercapnia (H)         Cabrera Connor M.D.   [facility] Emergency Department

## 2023-07-10 NOTE — ED NOTES
ED Nursing criteria listed below was addressed during verbal handoff:     Abnormal vitals: Yes  Abnormal results: Yes  Med Reconciliation completed: Yes  Meds given in ED: Yes  Any Overdue Meds: No  Core Measures: N/A  Isolation: N/A  Special needs: Yes  Skin assessment: Yes    Observation Patient  Education provided: N/A

## 2023-07-10 NOTE — ED NOTES
Post neb treatments-Sats 96-97% and trending down on nasal canula and oxymask. Bi-pap started to bring O2 sats up.

## 2023-07-10 NOTE — H&P
[facility]    History and Physical - Hospitalist Service       Date of Admission:  7/9/2023    Assessment & Plan   Acute on chronic hypoxic and hypercapnic respiratory failure  DANTE segmental and subsegmental pulmonary emboli  COPD exacerbation  RUL pneumonia  -cont heparin drip  -elevated trop and BNP;possible right heart strain on CT  -Echo in AM  -on 2-2.5L at home; cont BIPAP for now  -SoluMedrol 125mg x1 in ED; cont prednisone 40mg daily  -DuoNeb q4h, albuterol nebs q2h prn; cont PTA Dulera and Spiriva  -cont ceftriaxone and azithro  -blood culture pending; strep Ag pending    Supraclavicular mass, right; concern for lung CA  -s/p biopsy 7/7/23; path pending    Paroxysmal afib  -stop warfarin; cont heparin drip   -INR 1.31 (warfarin recently held for biopsy)    CKD 3a  -stable; baseline creatinine 1.04-1.28    MDD  -cont venlafaxine    Anemia, normocytic  Stable; baseline Hgb ~10.5    DM  -last A1C 5.8   -hold metformin; ISS    HTN  -cont hydrochlorothiazide and lisinopril    HLD  -cont atorvastatin    H/o breast CA  -hold anastrozole in setting of PE      Diet:   npo  DVT Prophylaxis: heparin drip  Ponce Catheter: Not present  Lines: None     Code Status: FULL    Clinically Significant Risk Factors Present on Admission     # Drug Induced Coagulation Defect: home medication list includes an anticoagulant medication    # Hypertension: Noted on problem list   # Non-Invasive mechanical ventilation: current O2 Device: BiPAP/CPAP  # Acute hypoxic respiratory failure: continue supplemental O2 as needed              Disposition Plan      Expected Discharge Date: 07/11/2023                The patient's care was discussed with the patient.        SAHARA NEUMANN MD  [facility]  Securely message with the Vocera Web Console (learn more here)  Text page via [employer] Paging/Directory      Visit/Communication Style   Virtual (Video) communication was used to  evaluate Latanya.  Latanya consented to the use of video communication:yes  Video START time: 0030, 7/10/2023  Video STOP time: 0040 , 7/10/2023   Patient's location: [facility]   Provider's location during the visit: [facility] Tele-medicine site        ______________________________________________________________________    Chief Complaint   SOB    History of Present Illness    73yoF with COPD on home oxygen, paroxysmal afib, HTN, HLD, h/o breast CA (2019), and possible lung CA presented to the ED with   SOB for the last two days.  She denies cough, sputum production, , recent URI symptoms, sick contacts, chest pain, fever, abdominal pain, LE pain/redness/swelling.  She had one episode of vomiting today and cold sweats.    She typically uses 2-2.5L of home oxygen but she increased it to 5L.  When EMS arrived, her sat was in the 50s on 5L.  She was placed on BIPAP in the ED.    She had a biopsy of a right supraclavicular mass on 7/7/23.  There  Is a concern for lung CA.  She held her warfarin for this and restarted it yesterday.    Review of Systems    General: negative for fever, chills, weakness  Eyes: negative for blurred vision, loss of vision  Ear Nose and Throat: negative for pharyngitis, speech or swallowing difficulties  Respiratory:  negative for sputum production, pleuritic pain,or cough  Cardiology:  negative for chest pain, palpitations, orthopnea, PND, edema, syncope   Gastrointestinal: negative for abdominal pain, nausea, vomiting, diarrhea, constipation, hematemesis, melena or hematochezia  Genitourinary: negative for frequency, urgency, dysuria, hematuria   Neurological: negative for focal weakness, paresthesia    Past Medical History    I have reviewed this patient's medical history and updated it with pertinent information if needed.   Past Medical History:   Diagnosis Date     Breast cancer (H) 04/12/2019    Left Breast     COPD (chronic obstructive pulmonary  disease) (H)      Mixed hyperlipidemia      Neck mass 2018     Paroxysmal atrial fibrillation (H)      PONV (postoperative nausea and vomiting)      S/P radiation therapy     5,256 cGy to left breast completed on 2019 - [facility]       Past Surgical History   I have reviewed this patient's surgical history and updated it with pertinent information if needed.  Past Surgical History:   Procedure Laterality Date     BIOPSY NODE SENTINEL Left 5/15/2019    Procedure: left sentinel node biopsy;  Surgeon: Donald Aleman MD;  Location: [facility] OR     BREAST BIOPSY, CORE RT/LT Left 2019     COLONOSCOPY  06/21/10     COLONOSCOPY N/A 3/4/2021    Procedure: Colonoscopy with  polypectomy;  Surgeon: Donald Aleman MD;  Location: [facility] GI     HC BIOPSY OF BREAST, OPEN INCISIONAL Right     Benign per patient     HC CORRECT BUNION,METATARSAL OSTEOTOMY        LAPAROSCOPY, SURGICAL; CHOLECYSTECTOMY  08/04/10      SLING OPERATION FOR STRESS INCONTINENCE  2007     HERNIORRHAPHY INCISIONAL (LOCATION)  2011    Procedure:HERNIORRHAPHY INCISIONAL (LOCATION); Surgeon:AYALA HOOVER; Location:[facility] OR     LAPAROSCOPIC HERNIORRHAPHY INCISIONAL  2011    Procedure:LAPAROSCOPIC HERNIORRHAPHY INCISIONAL; Laparoscopic mesh repair of incarcerated incisional hernia , extensive lysis of adhesions, excision of hernia sac and closure of fascia.; Surgeon:AYALA HOOVER; Location:[facility] OR     LAPAROSCOPIC LYSIS ADHESIONS  2011    Procedure:LAPAROSCOPIC LYSIS ADHESIONS; Surgeon:AYALA HOOVER; Location:[facility] OR     MASTECTOMY PARTIAL WITH NEEDLE LOCALIZATION Left 5/15/2019    Procedure: left wire localized partial mastectomy;  Surgeon: Donald Aleman MD;  Location: [facility] OR     TONSILLECTOMY       ZZC APPENDECTOMY,W OTHR PROC       Z  DELIVERY ONLY           Z LIGATE FALLOPIAN TUBE       Z NONSPECIFIC PROCEDURE      Exploratory laparotomy with resection of  infarcted segment of omentum and minor lysis of adhesions     [facility] NONSPECIFIC PROCEDURE  1981    Removal of hemorrhagic corpus luteum cyst     [facility] VAGINAL HYSTERECTOMY         Social History   I have reviewed this patient's social history and updated it with pertinent information if needed.  Social History     Tobacco Use     Smoking status: Former     Packs/day: 1.00     Years: 30.00     Pack years: 30.00     Types: Cigarettes     Quit date: 10/25/2005     Years since quittin.7     Smokeless tobacco: Never   Vaping Use     Vaping Use: Never used   Substance Use Topics     Alcohol use: No     Alcohol/week: 0.0 standard drinks of alcohol     Drug use: No       Family History   I have reviewed this patient's family history and updated it with pertinent information if needed.  Family History   Problem Relation Age of Onset     Breast Cancer Mother          at 88     Obesity Mother      Genitourinary Problems Mother      Lipids Mother      Heart Disease Father         by pass (5)     Cerebrovascular Disease Father         hemorragic     Lipids Father      Alzheimer Disease Father 92     Alzheimer Disease Maternal Grandmother      Genitourinary Problems Maternal Grandmother      Cancer Maternal Grandfather      Cancer Paternal Grandfather         lymph nodes     Cancer Brother         prostate     Melanoma Brother      Heart Disease Brother          (aaron with a partner)     Hyperlipidemia Brother      Heart Disease Brother         stent placement     Hyperlipidemia Brother      Prostate Cancer Brother 66     Respiratory Sister      Lipids Sister      Breast Cancer Sister 40        s/p mastectomy     Arthritis Sister      Obesity Sister      Heart Failure Sister      Cancer Sister         stomach, colon, pancreatic     Lipids Sister      Dementia Sister         1/2 sister on dad's side ()     Other - See Comments Sister         1948     Other - See Comments Daughter         fibromyalgia ()      Lymphoma Maternal Aunt      Heart Failure Maternal Aunt 81     Cancer Maternal Uncle         colon     Hemophilia Grandchild      Other - See Comments Grandchild         Transgender       Prior to Admission Medications   Prior to Admission Medications   Prescriptions Last Dose Informant Patient Reported? Taking?   ASPIRIN NOT PRESCRIBED (INTENTIONAL)   No No   Sig: Please choose reason not prescribed from choices below.   Calcium Carb-Cholecalciferol 500-400 MG-UNIT TABS   Yes No   Sig: Take 1 tablet by mouth daily   Cyanocobalamin (B-12) 1000 MCG TBCR   Yes No   Sig: Take 1,000 mcg by mouth daily   MYRBETRIQ 50 MG 24 hr tablet   Yes No   Sig: Take 50 mg by mouth daily   ORDER FOR DME   Yes No   Sig: Equipment being ordered: Oxygen @ 2 liters with exertion   Probiotic Product (PROBIOTIC PO)   Yes No   Sig: Take by mouth daily   VITAMIN D PO   Yes No   Sig: Take 1 capsule by mouth daily   albuterol (PROAIR HFA/PROVENTIL HFA/VENTOLIN HFA) 108 (90 Base) MCG/ACT inhaler   No No   Sig: INHALE ONE TO TWO PUFFS BY MOUTH EVERY 2-4 HOURS AS NEEDED   Patient taking differently: Inhale 1-2 puffs into the lungs every 2 hours as needed for shortness of breath   albuterol (PROVENTIL) (2.5 MG/3ML) 0.083% neb solution   No No   Sig: Take  by nebulization. 1 NEB EVERY 4-6 HOURS AS NEEDED   Patient taking differently: Take 2.5 mg by nebulization every 4 hours as needed for shortness of breath   anastrozole (ARIMIDEX) 1 MG tablet   No No   Sig: Take 1 tablet (1 mg) by mouth daily   atorvastatin (LIPITOR) 10 MG tablet   No No   Sig: TAKE ONE TABLET BY MOUTH ONCE DAILY   Patient taking differently: Take 10 mg by mouth daily (with dinner)   fluticasone (FLONASE) 50 MCG/ACT nasal spray   No No   Sig: Spray 1 spray into both nostrils daily For stuffy/runny nose   hydrochlorothiazide (HYDRODIURIL) 25 MG tablet   No No   Sig: TAKE 1 TABLET (25 MG) BY MOUTH DAILY   ipratropium - albuterol 0.5 mg/2.5 mg/3 mL (DUONEB) 0.5-2.5 (3) MG/3ML neb  solution   No No   Sig: Take 1 vial (3 mLs) by nebulization every 6 hours as needed for shortness of breath, wheezing or cough   lisinopril (ZESTRIL) 2.5 MG tablet   No No   Sig: TAKE 1 TABLET (2.5 MG) BY MOUTH DAILY   magnesium 250 MG tablet   Yes No   Sig: Take 1 tablet by mouth daily   metFORMIN (GLUCOPHAGE XR) 500 MG 24 hr tablet   No No   Sig: Take 1 tablet (500 mg) by mouth daily (with dinner)   mometasone-formoterol (DULERA) 200-5 MCG/ACT inhaler   No No   Sig: INHALE 2 PUFFS BY MOUTH TWO TIMES A DAY   Patient taking differently: Inhale 2 puffs into the lungs 2 times daily 7am and 3pm   ondansetron (ZOFRAN ODT) 4 MG ODT tab   No No   Sig: Take 1 tablet (4 mg) by mouth every 8 hours as needed for nausea   order for DME   No No   Sig: Equipment being ordered: Nebulizer   order for DME   No No   Sig: Equipment being ordered: Oxygen   predniSONE (DELTASONE) 20 MG tablet   No No   Sig: Take 2 tablets (40 mg) by mouth daily for 7 days, THEN 1 tablet (20 mg) daily for 7 days.   spacer ([employer] ROSANNE) holding chamber   No No   Sig: Use with dulera and albuterol inhalers   tiotropium (SPIRIVA RESPIMAT) 2.5 MCG/ACT inhaler   No No   Sig: Inhale 2 puffs into the lungs daily   venlafaxine (EFFEXOR XR) 75 MG 24 hr capsule   No No   Sig: TAKE ONE CAPSULE BY MOUTH ONCE DAILY   vitamin B-Complex   Yes No   Sig: Take 1 tablet by mouth daily   warfarin ANTICOAGULANT (JANTOVEN ANTICOAGULANT) 2.5 MG tablet   No No   Sig: TAKE 1 1/2 TABLETS BY MOUTH ON TUES, THURS, SATURDAY AND 1 TABLET ALL OTHER DAYS OR AS DIRECTED BY THE COUMADIN CLINIC.   Patient taking differently: daily (with dinner) TAKE 1 1/2 TABLETS (3.75mg) BY MOUTH ON TUESDAYS,  AND 1 TABLET (2.5MG) ALL OTHER DAYS OR AS DIRECTED BY THE COUMADIN CLINIC.      Facility-Administered Medications Last Administration Doses Remaining   3.0 mL bupivacaine (MARCAINE) 0.5% injection (50 mL vial) 3/23/2022  9:32 AM    triamcinolone (KENALOG-40) injection 80 mg 3/23/2022  9:32  AM         Allergies   Allergies   Allergen Reactions     Augmentin [Amoxicillin-Pot Clavulanate] Nausea and Vomiting     Meperidine Hcl Nausea and Vomiting     demerol     Seasonal Allergies      spring       Physical Exam   Vital Signs: Temp: 98.1  F (36.7  C)   BP: 134/70 Pulse: 93   Resp: 25 SpO2: 100 % O2 Device: BiPAP/CPAP Oxygen Delivery: 15 LPM  Weight: 0 lbs 0 oz    Gen:  On BIPAP;  lying semi-supine in hospital stretcher  HEENT:  Anicteric sclera, PER, hearing intact to voice  Resp:  No accessory muscle use; +wheezes;  no rales no rhonchi  Card:  No murmur, normal S1, S2   Abd:  Soft per RN exam, no TTP, non-distended, normoactive bowel sounds are present  Musc:  Normal strength and movement of the major muscle groups without obvious deformity  Psych:  Good insight, oriented to person, place and time, not anxious, not agitated  Extr: + edema bilat LEs    Data     Recent Labs   Lab 07/09/23  1910 07/09/23  1850 07/05/23  0912   WBC  --  13.8*  --    HGB  --  10.8*  --    MCV  --  91  --    PLT  --  173  --    INR 1.31*  --  1.0   NA  --  137  --    POTASSIUM  --  3.9  --    CHLORIDE  --  91*  --    CO2  --  33*  --    BUN  --  27.9*  --    CR  --  1.20*  --    ANIONGAP  --  13  --    IVANNA  --  10.2  --    GLC  --  222*  --          Recent Results (from the past 24 hour(s))   XR Chest Port 1 View    Narrative    EXAM: XR CHEST PORT 1 VIEW  LOCATION: [facility]  DATE: 7/9/2023    INDICATION: dyspnea  COMPARISON: None.      Impression    IMPRESSION: There some mild infiltrates right upper lobe and to lesser degree left mid lung concerning for pneumonia. No previous for comparison.   CT Chest Pulmonary Embolism w Contrast   Result Value    Radiologist flags New diagnosis of pulmonary embolism (AA)    Narrative    EXAM: CT CHEST PULMONARY EMBOLISM W CONTRAST  LOCATION: [facility]  DATE: 7/9/2023    INDICATION: significantly increased dyspnea, hypoxic  to the 50's despite her home 02, recent lung biopsy, possible new active lung cancer  COMPARISON: 06/29/2023.   TECHNIQUE: CT chest pulmonary angiogram during arterial phase injection of IV contrast. Multiplanar reformats and MIP reconstructions were performed. Dose reduction techniques were used.   CONTRAST: 65 mL Isovue 370    FINDINGS:  ANGIOGRAM CHEST: Pulmonary arteries are normal in caliber. There are segmental and subsegmental filling defects in the left upper lobe. RV/LV ratio is 46/33.  Thoracic aorta is negative for dissection.    LUNGS AND PLEURA: Emphysema. Scarring in the peripheral left upper lobe. Reticular interstitial opacities more confluent opacities in the right upper lung.  Small right pleural effusion.    MEDIASTINUM/AXILLAE:  2.8 x 2.7 cm right supraclavicular mass. Multiple prominent and enlarged mediastinal and hilar lymph nodes. Left breast surgical clips and skin thickening.     CORONARY ARTERY CALCIFICATION: Moderate.    UPPER ABDOMEN: Cholecystectomy. Likely simple right renal cysts. Atherosclerotic disease.     MUSCULOSKELETAL: Superior endplate compression of T12 has not changed.       Impression    IMPRESSION:  1.  Study is positive for PE to the left upper lobe. Right heart strain is possible.  2.  Right lung opacities, lymphadenopathy, and a right supraclavicular mass. Evidence of left breast cancer.       [Critical Result: New diagnosis of pulmonary embolism]    Finding was identified on 7/9/2023 8:28 PM CDT.     Dr. Connor was contacted by me on 7/9/2023 8:37 PM CDT and verbalized understanding of the critical result.

## 2023-07-10 NOTE — PROGRESS NOTES
[facility]    Medicine Progress Note - Hospitalist Service    Date of Admission:  7/9/2023    Assessment & Plan    Patient is a 73-year-old female with past medical history of COPD on 2 to 2-1/2 L oxygen chronically, paroxysmal atrial fibrillation on Coumadin, hypertension, hyperlipidemia, previous left-sided breast cancer in 2019 currently thought to be in remission and on Arimidex, with recent concern for possible lung cancer with biopsy performed on supraclavicular mass on 7/7/2023 with results currently pending.  Coumadin was reversed to allow for this procedure and patient was not bridged.  She presented with worsening shortness of breath over the past 2 days and on day of presentation had cold sweats, vomiting, and felt acutely unwell.  EMS found her sats to be 50% on 5 L prior to transfer and patient was placed on BiPAP upon arrival in the emergency room.  Work-up revealed a left upper lobe pulmonary emboli as well as a right upper lobe pneumonia with CT scan showing concern for right-sided heart strain.  Patient has been admitted into the ICU for ongoing medical management and remains critically ill.    Principal Problem:    Acute on chronic respiratory failure with hypoxia and hypercapnia (H)    Assessment: Acute component likely is caused by a combination of acute left upper lobe pulmonary emboli, right upper lobe pneumonia and acute COPD exacerbation and complicated by concern for lung cancer with biopsy from 7/7/23 on supraclavicular mass pending.  This seems to have some improvement but is not transitioning off of BiPAP while    Plan:   -We will attempt high flow nasal cannula oxygen to see if the improved pressure support would assist in respiratory stabilization.  If not effective would need to transition back to BiPAP for ongoing management  -Continue heparin drip for management of acute PE  -Continue Rocephin and azithromycin for management of pneumonia  -Continue  daily prednisone and scheduled bronchodilators every 4 hours for COPD exacerbation  -Given concern for right-sided heart strain and crackles noted on exam this morning we will also start Lasix IV 20 mg every 8 hours and monitor closely for tolerance and improvement in respiratory status.  -Continue with echocardiogram for further evaluation of right-sided heart failure  -Continue to monitor for biopsy results with plans to discuss with patient's primary oncologist, Dr. Dhillon, once results return  -Wean O2 supplementation as able    Active Problems:    Acute pulmonary embolism with suspected acute cor pulmonale, unspecified pulmonary embolism type, left upper lobe    Assessment: Present in the left upper lung, likely occurred as patient was subtherapeutic on Coumadin in recent days to allow for biopsy as above but also worrisome given suspected malignancy underlying    Plan:   -Continue with IV heparin drip for now  -Hold Coumadin for now  -We will plan to discuss with primary oncologist once biopsy results return best anticoagulation to use going forward  -Proceed with echocardiogram to confirm the suspicion of right-sided heart strain      Pneumonia of right upper lobe due to infectious organism    Assessment: Patient presented with a leukocytosis with left shift, increased oxygen needs, opacities noted on right upper lung fields concerning for infectious pneumonia.     Plan:   -continue Rocephin and azithromycin  -Wean O2 supplementation as able      COPD exacerbation (H)    Assessment: Present at time of admission with decreased lung sounds and tight wheezy respirations.  Patient is having improved air movement following BiPAP with no wheezing on auscultation currently    Plan:   -Continue prednisone 40 mg daily  -Continue DuoNebs every 4 hours scheduled  -Wean O2 supplementation as able      Supraclavicular mass    Assessment: Patient underwent biopsy on 7/7/2023 with results currently pending.  Concern for  possible underlying lung cancer as etiology for mass    Plan:   -Continue to monitor for results and discussed with patient's primary oncologist once results are available      Lymphadenopathy in chest    Assessment: Noted on recent imaging and concerning for possible cancerous involvement versus reactive lymph nodes    Plan:   -Proceed with work-up for possible malignancy as outlined above      Troponin level elevated    Assessment: Troponins have been mildly elevated in the 40s, thought likely secondary to heart strain following pulmonary emboli and patient has not had any symptoms concerning for angina.  No changes on EKG    Plan:   -Continue to monitor serially to ensure ongoing stabilization  -Echocardiogram today      Hyperlipidemia LDL goal <130    Assessment: On atorvastatin at baseline    Plan:   -Continue home regimen without change      Essential hypertension, benign    Assessment: On hydrochlorothiazide and lisinopril with blood pressure stable.    Plan:   -Continue hydrochlorothiazide 25 mg daily and lisinopril 2.5 mg daily and monitor closely for blood pressure stability      Pulmonary emphysema, unspecified emphysema type (H)    Assessment: With acute exacerbation as outlined above    Plan:   -Proceed with plan as above      CKD (chronic kidney disease) stage 3, GFR 30-59 ml/min (H)    Assessment: Chronic and stable with baseline creatinine 1.0-1.2    Plan:   -We will continue to monitor for creatinine stability following IV Lasix initiation as outlined above      Malignant neoplasm of overlapping sites of left breast in female, estrogen receptor positive (H)    Assessment: Previous history with patient on Arimidex which has been held secondary to acute clot and infection    Plan:   -We will continue to hold today.  In discussion with inpatient pharmacy team the half-life is approximately 50 hours and can be held for 3 to 5 days prior to losing effectiveness in the bloodstream  -Discussed with primary  oncology team correct timing of when to reinitiate this in days coming forward.      Coronary artery disease involving native coronary artery of native heart without angina pectoris    Assessment: Diagnosed in 2020 with patient having coronary artery calcifications noted on imaging following her cancer treatment.  Was followed by cardiology and medical management was advised.  Patient has not had any symptoms concerning for angina and did have a thorough work-up performed for her shortness of breath with exertion in April of this year with negative Lexiscan    Plan:   -Heparin drip  -Ongoing statin use and blood pressure management as per cardiology recommendations as an outpatient      Atrial fibrillation, paroxysmal     Long term current use of anticoagulant therapy    Assessment: Not currently on rate controlling medication per her prior to [facility] although she does have a remote history of diltiazem use.  Is on Coumadin for anticoagulation.  Has been in normal sinus rhythm to sinus tachycardia during the stay so far    Plan:   -We will continue to monitor closely with continuous cardiac monitoring as patient is at increased risk for recurrent paroxysmal atrial fibrillation especially with concern for acute cardiac strain and multifactorial lung disease  -Continue heparin drip      Major depression in complete remission (H)    Assessment: Patient on Effexor with symptoms well controlled    Plan:   -Continue home regimen without change      Prediabetes    Assessment: was started on metformin several months ago with hemoglobin A1C most recently 5.8    Plan:   -hold metformin due to ongoing poor oral intake  -monitor blood sugars before meals and bedtime and change Novolog coverage to be low resistance with meals and bedtime.  -assess for worsening blood sugars as steroid effect occurs and consider further titration on insulin or resumption of metformin as appropriate   -Continue moderate carbohydrate diet      "  Diet: Moderate Consistent Carb (60 g CHO per Meal) Diet    DVT Prophylaxis: heparin drip  Ponce Catheter: Not present  Lines: None     Cardiac Monitoring: ACTIVE order. Indication: ICU  Code Status: Full Code      Clinically Significant Risk Factors Present on Admission           # Hypercalcemia: Highest Ca = 10.2 mg/dL in last 2 days, will monitor as appropriate     # Drug Induced Coagulation Defect: home medication list includes an anticoagulant medication    # Hypertension: Noted on problem list   # Acute Respiratory Failure: Documented O2 saturation < 91%.  Continue supplemental oxygen as needed     # Overweight: Estimated body mass index is 29.53 kg/m  as calculated from the following:    Height as of this encounter: 1.6 m (5' 3\").    Weight as of this encounter: 75.6 kg (166 lb 11.2 oz).            Disposition Plan      Expected Discharge Date: 07/11/2023                  Nilam Laboy MD  Hospitalist Service  [facility]  Securely message with [employer] (more info)  Text page via [employer] Paging/Directory   ______________________________________________________________________    Interval History   Patient was on Bipap overnight with resolution of acidosis and attempted transition to oxymask and nasal cannula but with persistent hypoxia and increased volume needs, noted to be worst when patient is lying on her left side.  She feels her breathing is not any better but not worse than yesterday.  No new symptoms per patient.  No new nursing concerns.     Physical Exam   Vital Signs: Temp: 97.7  F (36.5  C) Temp src: Oral BP: 126/75 Pulse: 95   Resp: 20 SpO2: 96 % O2 Device: Oxymask Oxygen Delivery: 4 LPM  Weight: 166 lbs 11.2 oz    Constitutional: alert, awake, tachypnea noted and patient winded with talking with hypoxia occurring.  Appears acutely ill but non-toxic   Respiratory: increased work of breathing as above, decreased breath sounds with fine crackles noted in " bilateral bases  Cardiovascular: RRR in the 90-low 100 range  GI: bowel sounds present, abdomen obese but soft and non-tender  Neurologic: Awake, alert, oriented to name, place and situation     Medical Decision Making       45 MINUTES SPENT BY ME so far today performing critical care management and doing chart review, history, exam, documentation & further activities per the note.      Data     I have personally reviewed the following data over the past 24 hrs:    12.6 (H)  \   10.4 (L)   / 168     137 93 (L) 30.3 (H) /  147 (H)   3.9 30 (H) 1.31 (H) \       Trop: 45 (H) BNP: 17,226 (H)       Procal: N/A CRP: N/A Lactic Acid: 1.2       INR:  1.31 (H) PTT:  N/A   D-dimer:  N/A Fibrinogen:  N/A

## 2023-07-10 NOTE — PLAN OF CARE
"Goal Outcome Evaluation:      Plan of Care Reviewed With: patient    Overall Patient Progress: improvingOverall Patient Progress: improving    Outcome Evaluation: Pt now on oxymask 3-5 L, 10 L with activity.  Heparin drip at 1000 units/hr for PE.  Next Heparin Xa ordered at 1200 today.  Lungs sounds wheezy throughout, on scheduled Nebs.  Waiting on sputum sample.  Tolerating low carb diet. ECHO scheduled for today.  2+ BLE edema.  IV Rochphin/Azithromycin    /68   Pulse 95   Temp 97.6  F (36.4  C) (Oral)   Resp 24   Ht 1.6 m (5' 3\")   Wt 75.6 kg (166 lb 11.2 oz)   SpO2 99%   BMI 29.53 kg/m     "

## 2023-07-10 NOTE — PROGRESS NOTES
Pt started on HF 35 LPM and 50%.  Pt states her SpO2 at home is around 88% on 2-4 LPM at rest and in the 50s with activity on 4 LPM.

## 2023-07-10 NOTE — CONSULTS
Care Management Initial Consult    General Information  Assessment completed with: Patient, Family,    Type of CM/SW Visit: Initial Assessment    Primary Care Provider verified and updated as needed: Yes   Readmission within the last 30 days:        Reason for Consult: other (see comments) (Elevated Risk score)  Advance Care Planning:          Communication Assessment  Patient's communication style: spoken language (English or Bilingual)    Hearing Difficulty or Deaf: no   Wear Glasses or Blind: no    Cognitive  Cognitive/Neuro/Behavioral: WDL                      Living Environment:   People in home: spouse     Current living Arrangements: house      Able to return to prior arrangements: yes     Family/Social Support:  Care provided by: self, spouse/significant other  Provides care for: no one  Marital Status:   , Children  Ray       Description of Support System: Supportive, Involved    Support Assessment: Adequate family and caregiver support    Current Resources:   Patient receiving home care services: No     Community Resources: None  Equipment currently used at home: none  Supplies currently used at home: Oxygen Tubing/Supplies    Employment/Financial:  Employment Status: retired        Financial Concerns:         Does the patient's insurance plan have a 3 day qualifying hospital stay waiver?  No    Lifestyle & Psychosocial Needs:  Social Determinants of Health     Tobacco Use: Medium Risk (6/28/2023)    Patient History      Smoking Tobacco Use: Former      Smokeless Tobacco Use: Never      Passive Exposure: Not on file   Alcohol Use: Not on file   Financial Resource Strain: Not on file   Food Insecurity: Not on file   Transportation Needs: Not on file   Physical Activity: Not on file   Stress: Not on file   Social Connections: Not on file   Intimate Partner Violence: Not on file   Depression: Not at risk (5/23/2023)    PHQ-2      PHQ-2 Score: 0   Housing Stability: Not on file       Functional  Status:  Prior to admission patient needed assistance:   Dependent ADLs:: Independent  Dependent IADLs:: Independent       Mental Health Status:          Chemical Dependency Status:              Values/Beliefs:  Spiritual, Cultural Beliefs, Anabaptist Practices, Values that affect care: no               Additional Information:  Referral received due to elevated risk score.  Initial assessment completed with patient, , and daughter, at bedside.    Patient lives in a home with her , Nam.  At baseline, patient ambulates independently.  She has home O2 at 2.0-2.5L.  She does not receive any in-home services.    Discussed discharge options depending on needs closer to discharge.  Provided information on home care and list of agencies.      Discussed PCP and follow up appt.  Patient states her PCP is Dr. Nazario Jones.  [facility] task request sent for follow up appt.    Care Management will continue to follow and assess discharge needs when patient is medically stable.    ERICA Marks  [facility] [phone]/ [facility] [phone]  Care Management

## 2023-07-10 NOTE — PROGRESS NOTES
S-(situation): Patient arrives to room 217 via cart from ED06    B-(background): SOB, recent lung biopsy.  ON Bi-Pap    A-(assessment): Bi-pap Heparin drip at 1300 units/hr    R-(recommendations): Orders reviewed with Pt. Will monitor patient per MD orders.     Inpatient nursing criteria listed below were met:    Health care directives status obtained and documented: Yes  VTE ordered/documented: Yes  Skin issues/needs documented:Yes  Isolation addressed and Signage used: NA  Fall Prevention: Care plan updated Yes Education given and documented Yes  Care Plan initiated and Co-Morbidities added: Yes  Education Assessment documented:Yes  Admission Education Documented: Yes  If present CAUTI/CLABI Education done: NA  New medication patient education completed and documented (Possible Side Effects of Common Medications handout): Yes  Allergies Reviewed: Yes  Admission Medication Reconciliation completed: Yes  Home medications if not able to send immediately home with family stored here: NA  Reminder note placed in discharge instructions regarding home meds: NA  Individualized care needs/preferences addressed and charted: Yes  Provider Notified that patient has arrived to the unit: Yes

## 2023-07-10 NOTE — SIGNIFICANT EVENT
Significant Event Note    Time of event: 11:50 PM July 10, 2023    Description of event:  Patient returned to bed from the commode and had sudden onset of narrow complex tachycardia that appears regular in nature.  Patient is asymptomatic.  Blood pressure is stable.  On telemetry on initial review HR is 154 consistently without variation for several minutes.  EKG ordered and plan for adenosine 6 mg x1 to be performed, however before adenosine arrived patient's HR started to have fluctuation and ranged from the 110-150 range with obvious atrial fibrillation appearance.  Blood pressure recheck remains stable    EKG - confirms atrial fibrillation with RVR with HR in the 140s    ECHO - results have returned and confirms acute right sided systolic heart failure and new pulmonary HTN likely related to the PE and respiratory changes patient is known to have.      In further discussion the patient has been taking Diltiazem 120 mg XR at bedtime faithfully for many years including up to this hospital stay but it is not on her prior to med rec list and appears to have been discontinued in Epic at the time of her last cardiology appointment in April however there is nothing in the note to explain why and patient was not told and has continued to take them.      Plan:  -will start diltiazem drip now and plan to restart home Diltiazem  mg this evening and wean down on diltiazem drip as able   -continue with lasix IV 20 mg every 8 hours  -check BMP, magnesium and phosphorous now and replace electrolytes as appropriate  -monitor blood pressures closely for stability and nursing will inform if patient has significant lowering of blood pressures following diltiazem drip initiation  -continue IV heparin for anticoagulation  -continue HFNC for pressure and oxygen support and titrate as needed    Discussed with: patient, her , her daughter, bedside nurse.     22 minutes spent in management of the above, total time spent so  far today on critical care management is 67 minutes    Nilam Laboy MD

## 2023-07-11 PROBLEM — N17.9 ACUTE KIDNEY FAILURE, UNSPECIFIED (H): Status: ACTIVE | Noted: 2023-01-01

## 2023-07-11 NOTE — PLAN OF CARE
"Goal Outcome Evaluation:           Overall Patient Progress: improvingOverall Patient Progress: improving       Patient is overall improving, she desaturates with any activity and have learned to turn her oxygen % up on her high flow nasal cannula with activity ( to 70%) then it does not take as long to recover, only a few minutes to recover versus 10-15 minutes to recover without turning oxygen up. While sitting up in a chair for meals, high flow cannula able to be on 35% and 30 LPM flow.  Discussed with her the need to increase her tolerance for activity and expanding her lung capacity by increasing her time up in a chair and walking into the bathroom instead of using a purewick.     States she would like something to help her sleep tonight, maybe something for anxiety.    Problem: Plan of Care - These are the overarching goals to be used throughout the patient stay.    Goal: Patient-Specific Goal (Individualized)  Description: You can add care plan individualizations to a care plan. Examples of Individualization might be:  \"Parent requests to be called daily at 9am for status\", \"I have a hard time hearing out of my right ear\", or \"Do not touch me to wake me up as it startles me\".  Outcome: Progressing     Problem: Gas Exchange Impaired  Goal: Optimal Gas Exchange  Outcome: Progressing    S- Transfer to 267 from 217 at about 5 pm    B- Right sided pneumonia and PE's , respiratory distress    A- Brief systems assessment: Currently on high flow nasal cannula, off of the cardizem drip this morning and oral dose given. Diminished lungs. On Rocephin and Zithromax. Sausage stuck in her throat at breakfast with a subsequent emesis. Was in bed for breakfast although sitting up in bed. Did get her up for following meals for lunch and supper, no food stuck in her throat with those meals, so may have been positioning this morning. Electrolyte replacements are all rechecks in the a.m. tomorrow, did replace her potassium this " evening with p.o. dose.     R- Transfer to 267 per physician orders. Continue to monitor pt and update physician as needed.      Code status: Full Code  Skin: bruised, scattered on her arms and neck area.   Fall Risk: Yes- Department fall risk interventions implemented.  Isolation and Signage: None  Medication drips upon transfer: heparin drip  Blue Bin checked and medications transfer out with patient:Yes

## 2023-07-11 NOTE — PROGRESS NOTES
[facility]    Medicine Progress Note - Hospitalist Service    Date of Admission:  7/9/2023    Assessment & Plan    Patient is a 73-year-old female with past medical history of COPD on 2 to 2-1/2 L oxygen chronically, paroxysmal atrial fibrillation on Coumadin, hypertension, hyperlipidemia, previous left-sided breast cancer in 2019 currently thought to be in remission and on Arimidex, with recent concern for possible lung cancer with biopsy performed on supraclavicular mass on 7/7/2023 with results currently pending.  Coumadin was reversed to allow for this procedure and patient was not bridged.  She presented with worsening shortness of breath on day of presentation had cold sweats, vomiting, and felt acutely unwell.  EMS found her sats to be 50% on 5 L prior to transfer and patient was placed on BiPAP upon arrival in the emergency room.  Work-up revealed a left upper lobe pulmonary emboli as well as a right upper lobe pneumonia with Echo showing new pulmonary HTN and acute right sided systolic heart failure.      Patient was admitted into the ICU for ongoing medical management and placed on a heparin drip, Rocephin/azithromycin for antibiotic coverage.  She has been cautiously diuresis with IV lasix and has respiratory improvement since admission and has been transitioned to HFNC with stability noted and de-escalation of pressure and FiO2 in the past 24 hours.  Hospital course was further complicated by episode of paroxysmal atrial fibrillation with RVR that resolved with diltiazem drip and resumption of home PO Diltiazem.  Nursing will turn off the diltiazem drip now and if HR continues to be NSR and stable anticipate transition out to the med/surg floor this afternoon for ongoing medical management.      Principal Problem:    Acute on chronic respiratory failure with hypoxia and hypercapnia (H)    Assessment: Acute component likely is caused by a combination of acute left upper  lobe pulmonary emboli, right upper lobe pneumonia and acute COPD exacerbation and complicated by concern for lung cancer with biopsy from 7/7/23 on supraclavicular mass pending.  Acute component is improving with patient off Bipap for over 24 hours and is starting to wean down on HFNC pressure and FiO2 needs.      Plan:   -Continue high flow nasal cannula oxygen and titrate as appropriate to keep sats 86-94%  -Continue heparin drip for management of acute PE  -Continue Rocephin and azithromycin for management of pneumonia  -Continue daily prednisone and scheduled bronchodilators for COPD exacerbation but decrease bronchodilator frequency to 4 times daily  -Hold further Lasix at this time given mild creatinine elevation and improvement in peripheral edema findings  -Continue to monitor for biopsy results with plans to discuss with patient's primary oncologist, Dr. Dhillon, once results return  -Wean O2 supplementation as able    Active Problems:    Acute pulmonary embolism with acute cor pulmonale, unspecified pulmonary embolism type, left upper lobe    Acute pulmonary hypertension    Acute right sided systolic heart failure    Assessment: PE present in the left upper lung with echocardiogram confirming no pulmonary hypertension and moderate acute right-sided systolic heart failure, likely occurred as patient was subtherapeutic on Coumadin in recent days to allow for biopsy as above but also worrisome given suspected malignancy underlying    Plan:   -Continue with IV heparin drip for now  -Hold Coumadin for now and will attempt to contact patient's primary oncologist to discuss best anticoagulation option going forward  -Hold further diuresis at this time given mild creatinine elevation and improvement of systemic symptoms of overload  -Wean down on oxygen as able      Pneumonia of right upper lobe due to infectious organism    Assessment: Patient presented with a leukocytosis with left shift, increased oxygen  needs, opacities noted on right upper lung fields concerning for infectious pneumonia.     Plan:   -continue Rocephin and azithromycin  -Wean O2 supplementation as able      COPD exacerbation (H)    Assessment: Present at time of admission with decreased lung sounds and tight wheezy respirations.  Patient is having improved air movement with no wheezing on auscultation currently    Plan:   -Continue prednisone 40 mg daily  -Continue DuoNebs but decrease frequency to 4 times daily  -Wean O2 supplementation as able      Supraclavicular mass    Assessment: Patient underwent biopsy on 7/7/2023 with results currently pending.  Concern for possible underlying lung cancer as etiology for mass    Plan:   -Continue to monitor for results and discussed with patient's primary oncologist once results are available      Lymphadenopathy in chest    Assessment: Noted on recent imaging and concerning for possible cancerous involvement versus reactive lymph nodes    Plan:   -Proceed with work-up for possible malignancy as outlined above      Atrial fibrillation with RVR - resolved    Atrial fibrillation, paroxysmal     Long term current use of anticoagulant therapy    Assessment: Not currently on rate controlling medication per her prior to [facility] although she has been on diltiazem for years and has been taking this regularly prior to hospitalization.  It appears to have been discontinued at her last cardiology appointment on her MAR but patient has been taking faithfully.  Did have episode of A-fib with RVR earlier in this hospitalization and was started on a diltiazem drip.  Home diltiazem  mg started last evening and diltiazem drip has been weaned down to 5 mg with heart rates in the 70s to 80s and normal sinus rhythm seen on telemetry.    Plan:   -We will discontinue diltiazem drip now and monitor for ongoing stability of heart rate  -Continue diltiazem extended release 120 mg daily at bedtime  -Continue continuous  cardiac monitoring for any recurrent A-fib with RVR as patient is asymptomatic when this occurs  -Continue heparin drip      Troponin level elevated    Assessment: Troponins have been mildly elevated in the 40s, thought likely secondary to heart strain following pulmonary emboli and patient has not had any symptoms concerning for angina.  Did have mild transient elevation of into the 50s following A-fib with RVR but now trending downward.  Echocardiogram shows no signs of wall motion abnormality    Plan:   -Continue to monitor serially to ensure ongoing stabilization      Hyperlipidemia LDL goal <130    Assessment: On atorvastatin at baseline    Plan:   -Continue home regimen without change      Essential hypertension, benign    Assessment: On hydrochlorothiazide, diltiazem and lisinopril with blood pressure stable.    Plan:   -Continue hydrochlorothiazide 25 mg daily and lisinopril 2.5 mg daily and monitor closely for blood pressure stability  -Continue diltiazem  mg daily       Pulmonary emphysema, unspecified emphysema type (H)    Assessment: With acute exacerbation as outlined above    Plan:   -Proceed with plan as above      CKD (chronic kidney disease) stage 3, GFR 30-59 ml/min (H)    Assessment: Chronic and stable with baseline creatinine 1.0-1.2    Plan:   -We will continue to monitor for creatinine stability following IV Lasix initiation as outlined above      Malignant neoplasm of overlapping sites of left breast in female, estrogen receptor positive (H)    Assessment: Previous history with patient on Arimidex which has been held secondary to acute clot and infection    Plan:   -We will continue to hold today.  In discussion with inpatient pharmacy team the half-life is approximately 50 hours and can be held for 3 to 5 days prior to losing effectiveness in the bloodstream  -Discuss with primary oncology team correct timing of when to reinitiate this in days coming forward.      Coronary artery  "disease involving native coronary artery of native heart without angina pectoris    Assessment: Diagnosed in 2020 with patient having coronary artery calcifications noted on imaging following her cancer treatment.  Was followed by cardiology and medical management was advised.  Patient has not had any symptoms concerning for angina and did have a thorough work-up performed for her shortness of breath with exertion in April of this year with negative Lexiscan    Plan:   -Heparin drip  -Ongoing statin use and blood pressure management as per cardiology recommendations as an outpatient      Major depression in complete remission (H)    Assessment: Patient on Effexor with symptoms well controlled    Plan:   -Continue home regimen without change      Prediabetes    Assessment: was started on metformin several months ago with hemoglobin A1C most recently 5.8    Plan:   -hold metformin due to ongoing poor oral intake  -monitor blood sugars before meals and bedtime and continue Novolog coverage low resistance with meals and bedtime.  -assess for worsening blood sugars as steroid effect occurs and consider further titration on insulin or resumption of metformin as appropriate   -Continue moderate carbohydrate diet     Diet: Moderate Consistent Carb (60 g CHO per Meal) Diet    DVT Prophylaxis: heparin drip  [place] Catheter: Not present  Lines: None     Cardiac Monitoring: ACTIVE order. Indication: ICU  Code Status: Full Code      Clinically Significant Risk Factors           # Hypercalcemia: Highest Ca = 10.2 mg/dL in last 2 days, will monitor as appropriate     # Coagulation Defect: INR = 1.31 (Ref range: 0.85 - 1.15) and/or PTT = N/A, will monitor for bleeding    # Hypertension: Noted on problem list        # Obesity: Estimated body mass index is 31.51 kg/m  as calculated from the following:    Height as of this encounter: 1.6 m (5' 3\").    Weight as of this encounter: 80.7 kg (177 lb 14.4 oz)., PRESENT ON ADMISSION      "     Disposition Plan    Patient is anticipated to be discharged in 3 to 5 days to unknown location depending on progress during the stay    Nilam Laboy MD  Hospitalist Service  [facility]  Securely message with [employer] (more info)  Text page via [employer] Paging/Directory   ______________________________________________________________________    Interval History   Patient reports that she is noticing improvement in her breathing overall.  She does not get as winded when talking or when moving although still is significantly impaired compared to her baseline.  Vital signs have been stable and patient has been in normal sinus rhythm since last evening.  Following initiation of oral diltiazem nursing was able to titrate her diltiazem drip down to 5 mL with heart rate currently in the 70s to 80s and blood pressure stable.  Nursing denies any new symptoms or concerns.  Patient does describe one episode of food getting stuck in her esophagus when she was eating breakfast this morning after trying to consume some of the dry sausage that she did not chew well.  She reports this has occasionally happened to her in the home environment and typically will happen with meat textures, especially if they are drier.  No other difficulty with swallowing.  White blood cell count is slightly higher today than previous but not unexpected given steroid initiation.    Physical Exam   Vital Signs: Temp: 99  F (37.2  C) Temp src: Oral BP: 134/62 Pulse: 85   Resp: 26 SpO2: 94 % O2 Device: High Flow Nasal Cannula (HFNC) Oxygen Delivery: 30 LPM  Weight: 177 lbs 14.4 oz    Constitutional: awake, alert, cooperative, no apparent distress patient has notable improvement in oxygenation with the shifting around in the bed to allow exam to occur, and appears stated age  Respiratory: Mild tachypnea noted at rest however patient appears less winded and can talk for longer periods of time and move around in  the bed more without desaturation, increased air movement diffusely without crackles or wheezes  Cardiovascular: Normal sinus rhythm in the 70s to 80s  GI: Bowel sounds present, abdomen obese but soft and nondistended, nontender  Neurologic: Awake, alert, oriented to name, place and situation    Medical Decision Making       55 MINUTES SPENT BY ME on the date of service doing chart review, history, exam, documentation & further activities per the note.      Data     I have personally reviewed the following data over the past 24 hrs:    16.5 (H)  \   9.2 (L)   / 186     135 (L) 91 (L) 31.2 (H) /  120 (H)   3.5 33 (H) 1.43 (H) \       Trop: 47 (H) BNP: N/A

## 2023-07-11 NOTE — PLAN OF CARE
"  Problem: Plan of Care - These are the overarching goals to be used throughout the patient stay.    Goal: Plan of Care Review  Description: The Plan of Care Review/Shift note should be completed every shift.  The Outcome Evaluation is a brief statement about your assessment that the patient is improving, declining, or no change.  This information will be displayed automatically on your shift note.  Outcome: Progressing  Goal: Patient-Specific Goal (Individualized)  Description: You can add care plan individualizations to a care plan. Examples of Individualization might be:  \"Parent requests to be called daily at 9am for status\", \"I have a hard time hearing out of my right ear\", or \"Do not touch me to wake me up as it startles me\".  Outcome: Progressing  Goal: Absence of Hospital-Acquired Illness or Injury  Outcome: Progressing  Intervention: Identify and Manage Fall Risk  Recent Flowsheet Documentation  Taken 7/11/2023 0400 by Haris Kumar RN  Safety Promotion/Fall Prevention:   activity supervised   increase visualization of patient   nonskid shoes/slippers when out of bed   room near nurse's station   room organization consistent   safety round/check completed   supervised activity  Taken 7/11/2023 0000 by Haris Kumar, RN  Safety Promotion/Fall Prevention:   activity supervised   increase visualization of patient   nonskid shoes/slippers when out of bed   room near nurse's station   room organization consistent   safety round/check completed   supervised activity  Taken 7/10/2023 2000 by Haris Kumar, RN  Safety Promotion/Fall Prevention:   activity supervised   increase visualization of patient   nonskid shoes/slippers when out of bed   room near nurse's station   room organization consistent   safety round/check completed   supervised activity  Intervention: Prevent Skin Injury  Recent Flowsheet Documentation  Taken 7/11/2023 0400 by Haris Kumar, RN  Body Position: position changed " independently  Taken 7/11/2023 0000 by Haris Kumar RN  Body Position: position changed independently  Taken 7/10/2023 2000 by Haris Kumar RN  Body Position: position changed independently  Intervention: Prevent and Manage VTE (Venous Thromboembolism) Risk  Recent Flowsheet Documentation  Taken 7/11/2023 0400 by Haris Kumar RN  VTE Prevention/Management: other (see comments)  Taken 7/11/2023 0000 by Haris Kumar RN  VTE Prevention/Management: other (see comments)  Taken 7/10/2023 2000 by Haris Kumar RN  VTE Prevention/Management: other (see comments)  Goal: Optimal Comfort and Wellbeing  Outcome: Progressing  Intervention: Monitor Pain and Promote Comfort  Recent Flowsheet Documentation  Taken 7/10/2023 2100 by Haris Kumar RN  Pain Management Interventions: medication (see MAR)  Intervention: Provide Person-Centered Care  Recent Flowsheet Documentation  Taken 7/11/2023 0400 by Haris Kumar RN  Trust Relationship/Rapport: care explained  Taken 7/11/2023 0000 by Haris Kumar RN  Trust Relationship/Rapport: care explained  Taken 7/10/2023 2000 by Haris Kumar RN  Trust Relationship/Rapport: care explained  Goal: Readiness for Transition of Care  Outcome: Progressing     Problem: Risk for Delirium  Goal: Optimal Coping  Outcome: Progressing  Goal: Improved Behavioral Control  Outcome: Progressing  Intervention: Minimize Safety Risk  Recent Flowsheet Documentation  Taken 7/11/2023 0400 by Haris Kumar RN  Enhanced Safety Measures: room near unit station  Trust Relationship/Rapport: care explained  Taken 7/11/2023 0000 by Haris Kumar RN  Enhanced Safety Measures: room near unit station  Trust Relationship/Rapport: care explained  Taken 7/10/2023 2000 by Haris Kumar RN  Enhanced Safety Measures: room near unit station  Trust Relationship/Rapport: care explained  Goal: Improved Attention and Thought Clarity  Outcome: Progressing  Goal: Improved Sleep  Outcome: Progressing      Problem: Fall Injury Risk  Goal: Absence of Fall and Fall-Related Injury  Outcome: Progressing  Intervention: Identify and Manage Contributors  Recent Flowsheet Documentation  Taken 7/11/2023 0400 by Haris Kumar RN  Medication Review/Management: medications reviewed  Taken 7/11/2023 0000 by Haris Kumar RN  Medication Review/Management: medications reviewed  Taken 7/10/2023 2000 by Haris Kumar RN  Medication Review/Management: medications reviewed  Intervention: Promote Injury-Free Environment  Recent Flowsheet Documentation  Taken 7/11/2023 0400 by Haris Kumar RN  Safety Promotion/Fall Prevention:   activity supervised   increase visualization of patient   nonskid shoes/slippers when out of bed   room near nurse's station   room organization consistent   safety round/check completed   supervised activity  Taken 7/11/2023 0000 by Haris Kumar RN  Safety Promotion/Fall Prevention:   activity supervised   increase visualization of patient   nonskid shoes/slippers when out of bed   room near nurse's station   room organization consistent   safety round/check completed   supervised activity  Taken 7/10/2023 2000 by Haris Kumar RN  Safety Promotion/Fall Prevention:   activity supervised   increase visualization of patient   nonskid shoes/slippers when out of bed   room near nurse's station   room organization consistent   safety round/check completed   supervised activity     Problem: Gas Exchange Impaired  Goal: Optimal Gas Exchange  Outcome: Progressing  Intervention: Optimize Oxygenation and Ventilation  Recent Flowsheet Documentation  Taken 7/11/2023 0400 by Haris Kumar RN  Head of Bed (HOB) Positioning: HOB at 30-45 degrees  Taken 7/11/2023 0000 by Haris Kumar RN  Head of Bed (HOB) Positioning: HOB at 30-45 degrees  Taken 7/10/2023 2000 by Haris Kumar RN  Head of Bed (HOB) Positioning: HOB at 30-45 degrees     Problem: VTE (Venous Thromboembolism)  Goal: VTE (Venous  Thromboembolism) Symptom Resolution  Outcome: Progressing  Intervention: Prevent or Manage VTE (Venous Thromboembolism)  Recent Flowsheet Documentation  Taken 7/11/2023 0400 by Haris Kumar RN  VTE Prevention/Management: other (see comments)  Taken 7/11/2023 0000 by Haris Kumar RN  VTE Prevention/Management: other (see comments)  Taken 7/10/2023 2000 by Haris Kumar RN  VTE Prevention/Management: other (see comments)       Oxygen saturations remain stable on current high flow settings. Pt does desat quickly with only small amounts of activity and at times with conversation, oxygen saturations do however rebound fairly quickly. Cardizem infusion continues at 5mg, Telemetry has shown a sinus rhythm with a rate in the 70's, Pt did have a one isolated 7 beat run of Vtac, Provider was notified. Blood pressures are stable. 700 ml of urine output so far this shift. Heparin infusion continues at 900. Pt denies pain and has been repositioned for comfort.

## 2023-07-12 NOTE — SIGNIFICANT EVENT
Significant Event Note    Time of event: 12:27 AM July 12, 2023    Description of event:  9 beats of V tach, similar episode last night as well, asymptomatic  Patient with PE and hx of  PAF  Plan:  Continue to monitor  If this recurs should consider either increasing the patient's cardizem or changing rate control to BB, will defer to attending in the morning.  Electrolytes are stable      Discussed with: LOVE Lora MD

## 2023-07-12 NOTE — PLAN OF CARE
Goal Outcome Evaluation:                 Outcome Evaluation: Patient had PRN seroquel at HS and was able to sleep this shift, patient reporting feeling grateful. Exertion minimized, purwick used. HFNC at 30 % 30 LPM at rest, needs 70% with any exertion. Medium BM x 1. Sinus Dysrthythmia overnight with one 9-beat run of VTACH around midnight. Heparin Gtt at 9 ml/hour, Xa protocol ran and rate stays same for today with recheck tomorrow morning. NS runnign at 50 ml/hour due to creatinine 1.76 yesterday, was 1.29 this morning.  and 132.  Lungs diminished throughout.

## 2023-07-12 NOTE — PROVIDER NOTIFICATION
"Notified Dr. Lora of telemetry reading showing 9 beat run of VTACH. Similar episode reported from previous night shift. Patient resting in bed, asymptomatic.     Provider responded \"OK will monitor\".   "

## 2023-07-12 NOTE — PLAN OF CARE
Goal Outcome Evaluation:      Plan of Care Reviewed With: patient    Overall Patient Progress: improvingOverall Patient Progress: improving    Outcome Evaluation: Pt is A&Ox4; HFNC adjusting throughout the day 30-40% and 25-30 LPM at rest. Pt desats w/ minimal activity to bedside commode and chair for meals. Pt was noted to desat when holding long conversations. Tele reads NSR. Heparin Gtt 9 ml/hr, AM recheck per protocol. 2 ml/hr NS carrier for the Heparin. LS expiratory wheezes throughout.  Mag, Phos and K+ protocols are AM recheck.  BG 91, 122 and 176 today. Lctic 0.9.

## 2023-07-12 NOTE — PROGRESS NOTES
Pt remains on HF 50% 30 LPM. Pt desats with exertion at baseline.  Nebs given as ordered.  RT to follow

## 2023-07-12 NOTE — PROGRESS NOTES
[facility]    Medicine Progress Note - Hospitalist Service    Date of Admission:  7/9/2023    Assessment & Plan     Patient is a 73-year-old female with past medical history of COPD on 2 to 2-1/2 L oxygen chronically, paroxysmal atrial fibrillation on Coumadin, hypertension, hyperlipidemia, previous left-sided breast cancer in 2019 currently thought to be in remission and on Arimidex, with recent concern for possible lung cancer with biopsy performed on supraclavicular mass on 7/7/2023 with results currently pending.  Coumadin was reversed to allow for this procedure and patient was not bridged.  She presented with worsening shortness of breath on day of presentation had cold sweats, vomiting, and felt acutely unwell.  EMS found her sats to be 50% on 5 L prior to transfer and patient was placed on BiPAP upon arrival in the emergency room.  Work-up revealed a left upper lobe pulmonary emboli as well as a right upper lobe pneumonia with Echo showing new pulmonary HTN and acute right sided systolic heart failure.      Patient was admitted into the ICU for ongoing medical management and placed on a heparin drip, Rocephin/azithromycin for antibiotic coverage.  She has been cautiously diuresis with IV lasix and has respiratory improvement since admission and has been transitioned to HFNC with stability noted and de-escalation of pressure and FiO2 and patient transitioned to med/surg floor on 7/11/23.  Mild KYM developed which resolved following diuresis discontinuation and small 250 ml fluid bolus.     Hospital course was further complicated by episodes of paroxysmal atrial fibrillation with RVR that resolved with diltiazem drip and resumption of home PO Diltiazem.  Patient did have slightly widened QRS tachycardia for 9 beat last night however appears atypical for ventricular tachycardia.  Will increase diltiazem to 240 mg and monitor with continuous cardiac monitoring.      In discussion  with Dr. Dhillon today, patient's primary oncologist, will continue heparin drip for now and plan to transition to Eliquis when ready.  Outpatient follow up with oncology next week.      Principal Problem:    Acute on chronic respiratory failure with hypoxia and hypercapnia (H)    Assessment: Acute component likely is caused by a combination of acute left upper lobe pulmonary emboli, right upper lobe pneumonia and acute COPD exacerbation and complicated by concern for lung cancer with biopsy from 7/7/23 on supraclavicular mass pending.  Patient continues to slowly improve and has titrated down further on HFNC needs over the past 24 hours.     Plan:   -Continue high flow nasal cannula oxygen and titrate as appropriate to keep sats 86-94%  -Continue heparin drip for management of acute PE with plans to transition to Eliquis going forward.  Prescription sent to outpatient pharmacy and is affordable to the patient   -Continue Rocephin and azithromycin for management of pneumonia  -Continue daily prednisone and scheduled bronchodilators QID for COPD exacerbation   -Hold further Lasix at this time given mild KYM yesterday but continue to saline lock IVF  -Continue to monitor for biopsy results with plans to follow up with patient's primary oncologist, Dr. Dhillon, next week as an outpatient   -Wean O2 supplementation as able    Active Problems:    Acute pulmonary embolism with acute cor pulmonale, unspecified pulmonary embolism type, left upper lobe    Acute pulmonary hypertension    Acute right sided systolic heart failure    Assessment: PE present in the left upper lung with echocardiogram confirming no pulmonary hypertension and moderate acute right-sided systolic heart failure, likely occurred as patient was subtherapeutic on Coumadin in recent days to allow for biopsy as above but also worrisome given suspected malignancy underlying    Plan:   -Continue with IV heparin drip for now  -transition to Eliquis going  forward   -No further Lasix at this time  -Wean down on oxygen as able      Pneumonia of right upper lobe due to infectious organism    Assessment: Patient presented with a leukocytosis with left shift, increased oxygen needs, opacities noted on right upper lung fields concerning for infectious pneumonia.     Plan:   -continue Rocephin and azithromycin  -Wean O2 supplementation as able      COPD exacerbation (H)    Assessment: Present at time of admission with decreased lung sounds and tight wheezy respirations.  Patient is having improved air movement with no wheezing on auscultation currently    Plan:   -Continue prednisone 40 mg daily  -Continue DuoNebs but decrease frequency to 4 times daily  -Wean O2 supplementation as able      Supraclavicular mass    Assessment: Patient underwent biopsy on 7/7/2023 with results currently pending.  Concern for possible underlying lung cancer as etiology for mass    Plan:   -Continue to monitor for results   -will perform MRI of the brain with and without contrast prior to discharge at Dr. Dhillon's request to expedite staging evaluation and treatment plan options - given respiratory distress currently future order has been placed to tentatively occur on 7/14/23      Lymphadenopathy in chest    Assessment: Noted on recent imaging and concerning for possible cancerous involvement versus reactive lymph nodes    Plan:   -Proceed with work-up for possible malignancy as outlined above      Atrial fibrillation with RVR - resolved    Atrial fibrillation, paroxysmal     Long term current use of anticoagulant therapy    Assessment: Not currently on rate controlling medication per her prior to [facility] although she has been on diltiazem for years and has been taking this regularly prior to hospitalization.  It appears to have been discontinued at her last cardiology appointment on her MAR but patient has been taking faithfully.  Did have episode of A-fib with RVR earlier in this  hospitalization and was started on a diltiazem drip and now transitioned to home diltiazem  mg.  A fib vs slightly widened QRS tachycardia occurred for 9 beat run last evening during which the patient was asymptomatic.      Plan:   -given diltiazem 30 mg short acting during the day to day and increased bedtime diltiazem extended release to 240 mg daily   -Continue continuous cardiac monitoring for any recurrent arrhythmias as patient is asymptomatic when this occurs  -Continue heparin drip      Troponin level elevated    Assessment: Troponins have been mildly elevated in the 40s, thought likely secondary to heart strain following pulmonary emboli and patient has not had any symptoms concerning for angina.  Did have mild transient elevation of into the 50s following A-fib with RVR but now trending downward.  Echocardiogram shows no signs of wall motion abnormality    Plan:   -Continue to monitor serially to ensure ongoing stabilization      Hyperlipidemia LDL goal <130    Assessment: On atorvastatin at baseline    Plan:   -Continue home regimen without change      Essential hypertension, benign    Assessment: On hydrochlorothiazide, diltiazem and lisinopril with blood pressure elevated    Plan:   -Continue hydrochlorothiazide 25 mg daily and lisinopril 2.5 mg daily and monitor closely for blood pressure stability  -increase to diltiazem  mg daily       Pulmonary emphysema, unspecified emphysema type (H)    Assessment: With acute exacerbation as outlined above    Plan:   -Proceed with plan as above      Acute kidney injury superimposed on CKD (chronic kidney disease) stage 3, GFR 30-59 ml/min (H)    Assessment: baseline creatinine 1.0-1.2, did increase up to 1.7 following 3 doses of IV Lasix, and now back to 1.2 this morning following a 250 ml bolus and holding of Lasix.     Plan:   -continue to monitor for return to previous baseline  -hold further diuretics       Malignant neoplasm of overlapping sites  of left breast in female, estrogen receptor positive (H)    Assessment: Previous history with patient on Arimidex which has been held secondary to acute clot and infection    Plan:   -We will continue to hold today.  In discussion with inpatient pharmacy team the half-life is approximately 50 hours and can be held for 3 to 5 days prior to losing effectiveness in the bloodstream.  Currently unclear if patient has had metastatic breast cancer despite this treatment with biopsy pending but expected in the next 1-2 days.       Coronary artery disease involving native coronary artery of native heart without angina pectoris    Assessment: Diagnosed in 2020 with patient having coronary artery calcifications noted on imaging following her cancer treatment.  Was followed by cardiology and medical management was advised.  Patient has not had any symptoms concerning for angina and did have a thorough work-up performed for her shortness of breath with exertion in April of this year with negative Lexiscan    Plan:   -Heparin drip  -Ongoing statin use and blood pressure management as per cardiology recommendations as an outpatient      Major depression in complete remission (H)    Assessment: Patient on Effexor with symptoms well controlled    Plan:   -Continue home regimen without change      Prediabetes    Assessment: was started on metformin several months ago with hemoglobin A1C most recently 5.8    Plan:   -hold metformin due to ongoing poor oral intake  -monitor blood sugars before meals and bedtime and continue Novolog coverage low resistance with meals and bedtime.  -assess for worsening blood sugars as steroid effect occurs and consider further titration on insulin or resumption of metformin as appropriate   -Continue moderate carbohydrate diet       Diet: Moderate Consistent Carb (60 g CHO per Meal) Diet    DVT Prophylaxis: Heparin drip  [place] Catheter: Not present  Lines: None     Cardiac Monitoring: ACTIVE order.  "Indication: Tachyarrhythmias, acute (48 hours)  Code Status: Full Code      Clinically Significant Risk Factors                  # Hypertension: Noted on problem list        # Overweight: Estimated body mass index is 29.65 kg/m  as calculated from the following:    Height as of this encounter: 1.6 m (5' 3\").    Weight as of this encounter: 75.9 kg (167 lb 6.4 oz)., PRESENT ON ADMISSION          Disposition Plan   Anticipated discharge in 3-5 days as patient continues to clinically improve and can be transitioned to oxygen supplementation that can be continued at discharge.  Will need physical therapy evaluation once respiratory status is improve to allow for assessment of safe discharge plan.     Nilam Laboy MD  Hospitalist Service  [facility]  Securely message with [email or web address] (more info)  Text page via [employer] Paging/Directory   ______________________________________________________________________    Interval History   Patient slept very well overnight following the prn seroquel and feels much more rested today.  Oxygen down to 30L and 30% and patient tolerating well at rest but still needs escalation with even minimal exertion.  Appetite improving.  Creatinine back to baseline.  No new patient concerns.     Physical Exam   Vital Signs: Temp: 97.9  F (36.6  C) Temp src: Oral BP: (!) 158/58 Pulse: 74   Resp: 24 SpO2: (!) 87 % O2 Device: High Flow Nasal Cannula (HFNC) Oxygen Delivery: 30 LPM  Weight: 167 lbs 6.4 oz    Constitutional: awake, alert, cooperative, no apparent distress, and appears stated age  Respiratory: ongoing mild tachypnea but no accessory muscle use, decreased breath sounds diffusely but no wheezing or crackles.   Cardiovascular: RRR without murmur  GI: bowel sounds present, abdomen soft and non-tender  Neurologic: Awake, alert, oriented to name, place and situation     Medical Decision Making       60 MINUTES SPENT BY ME on the date of service doing chart " review, history, exam, documentation & further activities per the note.      Data     I have personally reviewed the following data over the past 24 hrs:    14.7 (H)  \   9.2 (L)   / 183     137 98 28.7 (H) /  176 (H)   4.3 30 (H) 1.29 (H) \       Trop: 39 (H) BNP: N/A       Procal: N/A CRP: N/A Lactic Acid: 0.9       INR:  1.12 PTT:  N/A   D-dimer:  N/A Fibrinogen:  N/A

## 2023-07-13 PROBLEM — J96.21 ACUTE ON CHRONIC RESPIRATORY FAILURE WITH HYPOXIA AND HYPERCAPNIA (H): Status: ACTIVE | Noted: 2023-01-01

## 2023-07-13 PROBLEM — D68.32 WARFARIN-INDUCED COAGULOPATHY (H): Status: ACTIVE | Noted: 2023-01-01

## 2023-07-13 PROBLEM — J96.22 ACUTE ON CHRONIC RESPIRATORY FAILURE WITH HYPOXIA AND HYPERCAPNIA (H): Status: ACTIVE | Noted: 2023-01-01

## 2023-07-13 PROBLEM — R00.0 WIDE-COMPLEX TACHYCARDIA: Status: ACTIVE | Noted: 2023-01-01

## 2023-07-13 PROBLEM — E87.1 HYPONATREMIA: Status: ACTIVE | Noted: 2023-01-01

## 2023-07-13 PROBLEM — Z85.3 PERSONAL HISTORY OF MALIGNANT NEOPLASM OF BREAST: Status: ACTIVE | Noted: 2023-01-01

## 2023-07-13 PROBLEM — D64.9 ANEMIA, UNSPECIFIED TYPE: Status: ACTIVE | Noted: 2023-01-01

## 2023-07-13 PROBLEM — T45.515A WARFARIN-INDUCED COAGULOPATHY (H): Status: ACTIVE | Noted: 2023-01-01

## 2023-07-13 NOTE — PLAN OF CARE
"Goal Outcome Evaluation:      Plan of Care Reviewed With: patient    Overall Patient Progress: no changeOverall Patient Progress: no change    Outcome Evaluation: HFNC at 30 L 40% FIO2,  O2 demand increased with activity.  1 dose IV zofran given for nausea.  Heparin drip at 900 units/hr, next Xa this AMBGL 116,147.  No v-tach events overnight. Pur wik in place    BP (!) 148/59 (BP Location: Right arm)   Pulse 75   Temp 98  F (36.7  C) (Oral)   Resp 18   Ht 1.6 m (5' 3\")   Wt 77.5 kg (170 lb 12.8 oz)   SpO2 (!) 87%   BMI 30.26 kg/m     "

## 2023-07-13 NOTE — PLAN OF CARE
"Goal Outcome Evaluation:      Plan of Care Reviewed With: patient    Overall Patient Progress: improvingOverall Patient Progress: improving    Outcome Evaluation: HFNC 30L 40% FIO2; O2 demands increase w/activity. Sats dip to mid 70%s. Heparin drip 900 units/hr. Tele is reading NSR. K+, Mag, and Phos AM rechecks. BG 90, 114 and 165, 1unit insulin given per sliding scale at dinner.     BP (!) 152/63 (BP Location: Right arm)   Pulse 83   Temp 98.3  F (36.8  C) (Oral)   Resp 20   Ht 1.6 m (5' 3\")   Wt 77.8 kg (171 lb 8 oz)   SpO2 94%   BMI 30.38 kg/m         "

## 2023-07-13 NOTE — PROGRESS NOTES
Formerly [facility]    Medicine Progress Note - Hospitalist Service    Date of Admission:  7/9/2023    Assessment & Plan   Patient is a 73-year-old female with severe COPD, chronic respiratory failure treated with 2 to 2-1/2 L NC oxygen, paroxysmal atrial fibrillation chronically on warfarin which causes coagulation defect, hypertension, hyperlipidemia, previous left-sided breast cancer in 2019 on Arimidex and thought to be in remission, and recent supraclavicular mass for which biopsy was performed on 7/7/2023 after interruption of therapeutic anticoagulation who presented with worsening shortness of breath, cold sweats, malaise, and vomiting, and was admitted due to concerns for left upper lobe pulmonary emboli, possible right upper lobe pneumonia, and new pulmonary hypertension with acute cor pulmonale.  She was started on BiPAP and admitted into the ICU and has gradually improved.      Acute pulmonary emboli with acute pulmonary hypertension, acute cor pulmonale, and acute on chronic respiratory failure with hypoxia and hypercapnia   Known chronic respiratory failure with hypoxia and hypercapnia treated chronically with low flow nasal cannula oxygen supplementation.  Presented with dyspnea, severe hypoxia, and worsening hypercapnia with acute respiratory acidosis concerning for acute on chronic hypoxic and hypercapnic respiratory failure.  Chest CTA demonstrated pulmonary emboli in the left upper lobe.  Echocardiogram demonstrated findings suspicious for moderate pulmonary hypertension, right heart failure, and RV pressure and volume overload with dilated IVC all of which was new compared with May 2023 and strongly suspicious for acute cor pulmonale.  Troponin has been elevated probably due to acute cor pulmonale and remains stable.  Radiographic findings also demonstrated possible right upper lobe infiltrate and she presented with signs of acute COPD exacerbation both of which probably  also contributed to acute on chronic respiratory failure.  INR was normal July 5 just prior to biopsy of supraclavicular mass, remained subtherapeutic upon admission, and patient had not been receiving alternative bridging anticoagulation while chronic warfarin therapy was interrupted recently.  Lack of anticoagulation likely contributed to venous thromboembolism, and there is also suspicion for recurrent malignancy with possible associated hypercoagulable state.  Venous thromboembolism has been treated with IV heparin therapeutic anticoagulation.  Acute on chronic respiratory failure was initially treated with BiPAP followed by transition to high flow nasal cannula and she is gradually improving with decreasing oxygen requirement and symptomatic improvement in dyspnea although continues to require high flow nasal cannula treatment.  She does demonstrate signs of volume overload after her weight initially increased about 10 to 12 pounds likely due to cor pulmonale, but volume overload is starting to improve.  She had received aggressive diuresis to treat acute cor pulmonale and volume overload initially but developed transient hypotension and acute kidney injury such that diuresis was discontinued.  -Titrate and wean high flow nasal cannula oxygen to keep oxygen saturations 86-94% and maintain stable hypercapnia without worsening respiratory acidosis, anticipate attempting to wean from high flow to low-flow nasal cannula as tolerated over the next 24 to 48 hours  -Monitor venous blood gases while weaning oxygen supplementation  -Continue heparin infusion per high intensity protocol, anticipate transition to Eliquis when medically stable probably as she transitions to low-flow oxygen supplementation  -Follow clinically for signs of worsening cor pulmonale and could resume loop diuretic therapy to treat edema if necessary as long as she remains hemodynamically stable    Pneumonia of right upper lobe due to infectious  organism  Patient presented with dyspnea, radiographic infiltrates, worsening respiratory failure, and leukocytosis with left shift all raising suspicion for infectious pneumonia.  Testing for urine pneumococcal antigen, rapid testing for COVID, influenza, and RSV, and blood cultures have all been negative so far.  Sepsis has not been suspected.  -continue Rocephin and azithromycin empirically to complete treatment course    COPD exacerbation  Emphysema  Known emphysema and COPD and presented with decreased lung sounds and tight wheezy respirations suspicious for COPD exacerbation.  Signs of COPD exacerbation have improved with treatment including corticosteroids and bronchodilators.  COPD exacerbation likely contributed to acute on chronic respiratory failure.  -Begin to taper prednisone from 40 mg daily to 20 mg daily starting tomorrow  -Continue DuoNebs 4 times daily with additional bronchodilators as needed for wheezing or shortness of breath  -Wean O2 supplementation as above    Right supraclavicular mass  Lymphadenopathy in chest  Patient underwent biopsy of right supraclavicular mass on 7/7/2023 with results currently pending.  Also noted to have intrathoracic lymphadenopathy.  Combination of supraclavicular mass and lymphadenopathy in chest is worrisome for malignancy.  -Follow-up biopsy results when available  -Assuming malignancy is confirmed on most recent biopsy results, Oncology has advised MRI of the brain with and without contrast prior to discharge to expedite staging evaluation and treatment options, will defer MRI until biopsy results are available because the indication for MRI is for staging purposes of cancer and she is not yet known to have recurrent malignancy    Paroxysmal atrial fibrillation with RVR  Warfarin induced coagulopathy  Wide-complex tachycardia  Patient has reported that she has been taking diltiazem chronically without interruption.  Had episode of A-fib with RVR earlier in this  hospitalization treated transiently with diltiazem infusion with adequate rate control after transition from infusion to oral diltiazem.  She had a 9 beat episode of asymptomatic wide-complex tachycardia about 10:30 PM on 7/11 more suspicious for paroxysmal atrial fibrillation with aberrant conduction and ventricular dysrhythmia.  She presently appears to have maintained sinus rhythm over the last 1 to 2 days.  Elevated troponin may also be due partly to arrhythmias.  -Continue higher dose diltiazem extended release 240 mg daily   -Continue cardiac monitoring at least until 7/14  -Continue anticoagulation as described above    Acute kidney injury superimposed on CKD (chronic kidney disease) stage 3  Baseline creatinine 1.0-1.2 and increased up to 1.76 during hospitalization coinciding with acute cor pulmonale, IV Lasix treatment, and transient hypotension and concerning for acute kidney injury superimposed upon stage III chronic kidney disease.  Renal function has recovered to baseline.   -continue to monitor renal function  -Continuing chronic thiazide diuretic but for now not treating with additional doses of a loop diuretics while following volume status and renal function closely    Essential hypertension, benign  Chronic hypertension normally treated with hydrochlorothiazide, diltiazem and lisinopril.  Had transient hypotension due to acute cor pulmonale older this hospital stay but has now been hypertensive including intermittently severely hypertensive although is asymptomatic from hypertension.   -Continue chronic doses of hydrochlorothiazide 25 mg daily and lisinopril 2.5 mg daily and higher dose diltiazem  mg daily   -Added labetalol IV as needed last evening for severe hypertension    Anemia, unspecified type  Noted to be anemic at admission with hemoglobin 10.8.  Anemia has worsened during hospitalization, but hemoglobin has stabilized at 9 for several days.  She has ecchymosis but no obvious  bleeding.  -Monitor hemoglobin as indicated    Hyponatremia  Serum sodium was normal at admission but decreased as low as 133 during hospitalization.  Hyponatremia has since resolved even after resuming chronic thiazide diuretic therapy.  -Monitor sodium as indicated    Coronary artery disease involving native coronary artery of native heart without angina pectoris  CAD was diagnosed in 2020 apparently based on radiographic findings of coronary artery calcifications noted following her cancer treatment.  Cardiology advised medical management previously.  Patient has not had any symptoms concerning for angina and did have a thorough work-up performed for exertional dyspnea in April 2023 including negative Lexiscan.  Elevated troponin during this hospitalization has been attributed to acute cor pulmonale rather than ischemic heart disease.  -Continue chronic management as previously advised by cardiology including optimization of treatment of hypertension and chronic statin therapy    Prediabetes  She was started on metformin for treatment of prediabetes several months ago with hemoglobin A1C most recently 5.8.  Glycemic control has been adequate during hospitalization even while she is receiving treatment with corticosteroids.  Metformin has been held after chest CTA and due to KYM.  -Continue to hold metformin today  -monitor blood sugars before meals and bedtime and continue Novolog coverage low resistance with meals and bedtime.  -Continue moderate carbohydrate diet    Hyperlipidemia  Chronic and treated with atorvastatin  -Continue chronic dose of atorvastatin    History of malignant neoplasm of overlapping sites of left breast in female, estrogen receptor positive  Previous history of left breast cancer that was treated and considered to be in remission.  Patient has continued Arimidex treatment since then.  Arimidex was held during this hospitalization due to to acute venous thromboembolism.  Although there is  suspicion for recurrent malignancy as noted above, it is not clear whether this is due to metastatic breast cancer or a second malignancy such as lung cancer.  -Continue to hold Arimidex today and reassess whether to restart it prior to or at the time of hospital discharge    Major depression in complete remission  Chronically treated with Effexor with depressive symptoms well controlled  -Continue chronic Effexor    Obesity  Chronic with BMI 30-31 and likely contributes to health problems including prediabetes  -No acute intervention       Diet: Moderate Consistent Carb (60 g CHO per Meal) Diet    DVT Prophylaxis: Heparin infusion  Ponce Catheter: Not present  Lines: None     Cardiac Monitoring: ACTIVE order. Indication: Tachyarrhythmias, acute (48 hours)  Code Status: Full Code      Clinically Significant Risk Factors                                Disposition Plan   Anticipate discharge once medically stable, probably several days       Dat Peters MD  Hospitalist Service  [facility]  Securely message with [employer] (more info)  Text page via [facility] Paging/Directory   ______________________________________________________________________    Interval History   Last evening her blood pressure was severely elevated as high as 203/87.  She did not have symptoms related to high blood pressure at that time.  She was prescribed IV labetalol to use as needed for severe hypertension but did not require any doses of IV labetalol.  There were no significant overnight events.  She remains afebrile.  Blood pressure is improved today ranging from normotensive to moderately hypertensive.  Oxygenation is gradually improving with decreasing oxygen requirement from FiO2 50% yesterday to 30% today although she continues to use high flow nasal cannula supplementation.  She feels that her breathing is improved.  She denies dyspnea at rest or with limited activity.  She denies any palpitations or  dizziness.  She generally feels better today.  She is tolerating better oral intake.  She had transient nausea this morning that resolved after 1 dose of antiemetic medication.  She did not vomit.  She denies abdominal pain or diarrhea.  She is voiding spontaneously.    Physical Exam   Vital Signs: Temp: 98.3  F (36.8  C) Temp src: Oral BP: 137/44 Pulse: 85   Resp: 20 SpO2: (!) 88 % O2 Device: High Flow Nasal Cannula (HFNC) Oxygen Delivery: 30 LPM  Patient Vitals for the past 24 hrs:   BP Temp Temp src Pulse Resp SpO2 Weight   07/13/23 1053 137/44 98.3  F (36.8  C) Oral 85 -- (!) 88 % 77.8 kg (171 lb 8 oz)   07/13/23 0802 (!) 165/70 97.7  F (36.5  C) Oral 87 20 92 % --   07/13/23 0602 -- -- -- -- -- (!) 87 % --   07/13/23 0600 -- -- -- -- -- (!) 88 % --   07/13/23 0500 -- -- -- -- -- 98 % --   07/13/23 0441 -- -- -- -- -- -- 77.5 kg (170 lb 12.8 oz)   07/13/23 0407 -- -- -- -- -- 92 % --   07/13/23 0400 -- -- -- -- -- (!) 85 % --   07/13/23 0354 -- -- -- -- -- 93 % --   07/13/23 0340 (!) 148/59 98  F (36.7  C) Oral 75 18 94 % --   07/13/23 0000 -- -- -- -- -- 95 % --   07/12/23 2324 (!) 146/60 97.7  F (36.5  C) Oral 77 20 96 % --   07/12/23 2202 -- -- -- -- -- 93 % --   07/12/23 2200 -- -- -- -- -- (!) 89 % --   07/12/23 2100 -- -- -- -- -- 98 % --   07/12/23 2000 -- -- -- -- -- (!) 85 % --   07/12/23 1900 (!) 203/87 98.7  F (37.1  C) Oral 92 20 (!) 86 % --   07/12/23 1630 (!) 169/67 97.8  F (36.6  C) Oral 91 20 91 % --   07/12/23 1516 (!) 171/75 97.9  F (36.6  C) Oral 96 20 92 % --   07/12/23 1314 -- -- -- -- -- 90 % --   07/12/23 1229 -- -- -- -- -- (!) 70 % --     Weight: 171 lbs 8 oz  Vitals:    07/10/23 0012 07/11/23 0930 07/11/23 1442 07/13/23 0441   Weight: 75.6 kg (166 lb 11.2 oz) 80.7 kg (177 lb 14.4 oz) 75.9 kg (167 lb 6.4 oz) 77.5 kg (170 lb 12.8 oz)    07/13/23 1053   Weight: 77.8 kg (171 lb 8 oz)     Wt Readings from Last 4 Encounters:   07/13/23 77.8 kg (171 lb 8 oz)   06/28/23 72.6 kg (160 lb)    05/23/23 76 kg (167 lb 8 oz)   05/03/23 78.2 kg (172 lb 6.4 oz)       Intake/Output Summary (Last 24 hours) at 7/13/2023 1151  Last data filed at 7/12/2023 1717  Gross per 24 hour   Intake 360 ml   Output 200 ml   Net 160 ml       General Appearance: Elderly woman without signs of acute distress while sitting up in a chair  Respiratory: Normal respiratory effort, mildly diminished breath sounds bilaterally, few inspiratory crackles bilaterally, scattered high-pitched expiratory wheezes with prolonged expiratory phase 2 to 3 seconds bilaterally  Cardiovascular: Regular rate and rhythm, good radial pulse, normal capillary refill, moderate peripheral edema in the lower legs  Skin: Pale color, ecchymosis without hematoma visible in the region of the suprasternal notch  Other: Alert and maintains wakefulness and attention, no confusion evident    Medical Decision Making     108 MINUTES SPENT BY ME on the date of service doing chart review, history, exam, documentation & further activities per the note.      Data     I have personally reviewed the following data over the past 24 hrs:    N/A  \   N/A   / N/A     140 101 25.0 (H) /  90   4.3 32 (H) 1.08 (H) \     Trop: 42 (H) BNP: N/A     Procal: N/A CRP: N/A Lactic Acid: 0.9         Blood sugars ranged  over the last day  Magnesium 2.1, phosphorus 2.8  Blood cultures continue to be negative    Venous Blood Gas  Recent Labs   Lab 07/12/23  1757 07/12/23  0516 07/11/23  0518 07/10/23  0531   PHV 7.42 7.42 7.44* 7.33   PCO2V 53* 56* 56* 67*   PO2V 51* 44 48* 74*   HCO3V 34* 36* 38* 35*   JORGE 8.6* 10.5* 12.1* 7.5*   O2PER 24 30 30 40     Cardiac monitor results reviewed over the past 24 hrs: Appears to have maintained normal sinus rhythm over the last 24 hours although interpretation at times is limited by wavy baseline.  Reviewed episode of wide-complex tachycardia from 10:31 PM on July 11 which has irregular RR interval suspicious for atrial fibrillation with  aberrant conduction.  Reviewed admission EKG from July 10 demonstrating atrial fibrillation with subtle diffuse ST segment depression but normal QRS duration and QT interval.    Recent Labs   Lab 07/13/23  0757 07/13/23  0723 07/13/23  0336 07/12/23  0728 07/12/23  0516 07/11/23  2107 07/11/23  1815 07/11/23  0812 07/11/23  0518 07/10/23  0742 07/10/23  0531 07/10/23  0023 07/09/23  1910   WBC  --   --   --   --  14.7*  --   --   --  16.5*  --  12.6*  --   --    HGB  --   --   --   --  9.2*  --  9.4*  --  9.2*  --  10.4*  --   --    MCV  --   --   --   --  92  --   --   --  89  --  90  --   --    PLT  --   --   --   --  183  --   --   --  186  --  168  --   --    INR  --   --   --   --  1.12  --   --   --   --   --   --   --  1.31*   NA  --  140  --   --  137  --  133*  --  135*   < > 136  --   --    POTASSIUM  --  4.3  --   --  4.3  --  4.2  --  3.5   < > 4.0  --   --    CHLORIDE  --  101  --   --  98  --  91*  --  91*   < > 93*  --   --    CO2  --  32*  --   --  30*  --  32*  --  33*   < > 31*  --   --    BUN  --  25.0*  --   --  28.7*  --  37.7*  --  31.2*   < > 25.9*  --   --    CR  --  1.08*  --   --  1.29*  --  1.76*  --  1.43*   < > 1.14*  --   --    ANIONGAP  --  7  --   --  9  --  10  --  11   < > 12  --   --    IVANNA  --  9.4  --   --  9.2  --  9.3  --  9.4   < > 10.1  --   --    GLC 90 96 147*   < > 103*   < > 184*   < > 137*   < > 168*   < >  --     < > = values in this interval not displayed.

## 2023-07-13 NOTE — TELEPHONE ENCOUNTER
Good Morning, could this be documented on and closed if Dr Mai and pt have received these results. Thank You Linh

## 2023-07-13 NOTE — PROGRESS NOTES
Care Management Follow Up    Length of Stay (days): 4    Expected Discharge Date:  7/18/23     Concerns to be Addressed:       Patient plan of care discussed at interdisciplinary rounds: Yes    Anticipated Discharge Disposition: Home, Home Care     Anticipated Discharge Services:    Anticipated Discharge DME:      Patient/family educated on Medicare website which has current facility and service quality ratings: yes  Education Provided on the Discharge Plan:    Patient/Family in Agreement with the Plan: yes    Referrals Placed by CM/SW: Internal Clinic Care Coordination, Homecare, Scheduled Follow-up appointments  Private pay costs discussed: Not applicable    Additional Information:  Patient remains on Hi-Low NC. Not medically ready for discharge planning at this time.  Patient will need P/T eval when on O2 NC and can tolerate activity.    Care Management to follow and assist with disposition when patient is medically stable.    ERICA Marks  [facility] [phone]/ Patrick [phone]  Care Management

## 2023-07-13 NOTE — PROGRESS NOTES
Pt remains on HHFNC at this time for CPAP use.    Attempted to wean FIO2 and Liter flow. However, when the pt moves from bed to chair or commode she becomes SOB and desats fairly quick. Though she is quick to recover she is still requiring the use of 30L and 30-45% FIO2. We will try to wean again as tolerated.

## 2023-07-14 PROBLEM — R90.89 ABNORMAL BRAIN MRI: Status: ACTIVE | Noted: 2023-01-01

## 2023-07-14 NOTE — PROGRESS NOTES
[facility]    Medicine Progress Note - Hospitalist Service    Date of Admission:  7/9/2023    Assessment & Plan   Patient is a 73-year-old female with severe COPD, chronic respiratory failure treated with 2 to 2-1/2 L NC oxygen, paroxysmal atrial fibrillation chronically on warfarin which causes coagulation defect, hypertension, hyperlipidemia, previous left-sided breast cancer in 2019 on Arimidex and thought to be in remission, and recent supraclavicular mass for which biopsy was performed on 7/7/2023 after interruption of therapeutic anticoagulation who presented with worsening shortness of breath, cold sweats, malaise, and vomiting, and was admitted due to concerns for left upper lobe pulmonary emboli, possible right upper lobe pneumonia, and new pulmonary hypertension with acute cor pulmonale.  She was started on BiPAP and admitted into the ICU and has gradually improved.      Acute pulmonary emboli with acute pulmonary hypertension, acute cor pulmonale, and acute on chronic respiratory failure with hypoxia and hypercapnia   Known chronic respiratory failure with hypoxia and hypercapnia treated chronically with low flow nasal cannula oxygen supplementation.  Presented with dyspnea, severe hypoxia, and worsening hypercapnia with acute respiratory acidosis concerning for acute on chronic hypoxic and hypercapnic respiratory failure.  Chest CTA demonstrated pulmonary emboli in the left upper lobe.  Echocardiogram demonstrated findings suspicious for moderate pulmonary hypertension, right heart failure, and RV pressure and volume overload with dilated IVC all of which was new compared with May 2023 and strongly suspicious for acute cor pulmonale.  Troponin has been elevated probably due to acute cor pulmonale and remains stable.  Radiographic findings also demonstrated possible right upper lobe infiltrate and she presented with signs of acute COPD exacerbation both of which probably  also contributed to acute on chronic respiratory failure.  INR was normal July 5 just prior to biopsy of supraclavicular mass, remained subtherapeutic upon admission, and patient had not been receiving alternative bridging anticoagulation while chronic warfarin therapy was interrupted recently.  Lack of anticoagulation likely contributed to venous thromboembolism, and there is also suspicion for recurrent malignancy with possible associated hypercoagulable state.  Venous thromboembolism has been treated with IV heparin therapeutic anticoagulation.  Acute on chronic respiratory failure was initially treated with BiPAP followed by transition to high flow nasal cannula and she is gradually improving with decreasing oxygen requirement and symptomatic improvement in dyspnea and on 7/14 has tolerated transition to low-flow nasal cannula although at higher dose than her previous baseline of 3 L continuously.  She continues to signs of volume overload after her weight initially increased about 10 to 12 pounds likely due to cor pulmonale.  She had received aggressive diuresis to treat acute cor pulmonale and volume overload initially but developed transient hypotension and acute kidney injury such that diuresis was discontinued.  -Titrate and wean low flow nasal cannula oxygen to keep oxygen saturations 86-94% and maintain stable hypercapnia without worsening respiratory acidosis  -Ordered recheck of venous blood gas after transition to low-flow nasal cannula  -Discontinue heparin infusion and start Eliquis at higher dose for 7 days due to acute PE followed by lower maintenance dose  -Resume low-dose oral Lasix  -Use ace wraps on lower legs for treatment of edema    Pneumonia of right upper lobe due to infectious organism  Patient presented with dyspnea, radiographic infiltrates, worsening respiratory failure, and leukocytosis with left shift all raising suspicion for infectious pneumonia.  Testing for urine pneumococcal  antigen, rapid testing for COVID, influenza, and RSV, and blood cultures have all been negative so far.  Sepsis has not been suspected.  She completed treatment course with azithromycin.  -continue Rocephin empirically to complete treatment course (has 1 remaining dose)    COPD exacerbation  Emphysema  Known emphysema and COPD and presented with decreased lung sounds and tight wheezy respirations suspicious for COPD exacerbation.  Signs of COPD exacerbation have improved with treatment including corticosteroids and bronchodilators.  COPD exacerbation likely contributed to acute on chronic respiratory failure.  -Begin to taper prednisone from 40 mg daily to 20 mg daily starting tomorrow  -Continue DuoNebs 4 times daily with additional bronchodilators as needed for wheezing or shortness of breath  -Wean O2 supplementation as above    Right supraclavicular mass  Lymphadenopathy in chest  Abnormal brain MRI  Patient underwent biopsy of right supraclavicular mass on 7/7/2023 with benign results and no evidence for malignancy although it appears as though thyroid tissue was sampled rather than lymphoid tissue.  She also has been noted to have intrathoracic lymphadenopathy.  Brain MRI performed 7/14 is abnormal with left brain lesion and associated vasogenic edema radiographically that is most worrisome for metastatic lesion as well as other small lesions of unclear etiology and clinical significance.  Combination of persistent supraclavicular mass, intrathoracic lymphadenopathy, and abnormal brain MRI results remain worrisome for malignancy, but malignancy has not yet been confirmed.  -Reviewed with the patient today and advised continuing current treatment plan for her acute medical problems and pursuing very close outpatient follow-up with oncology and/or pulmonology regarding the persistent concerns about possible recurrent malignancy    Paroxysmal atrial fibrillation with RVR  Warfarin induced coagulopathy  Paroxysmal  wide-complex tachycardia, suspect atrial fibrillation with aberrant conduction  Patient has reported that she has been taking diltiazem chronically without interruption.  Had episode of A-fib with RVR earlier in this hospitalization treated transiently with diltiazem infusion with adequate rate control after transition from infusion to oral diltiazem.  She had a 9 beat episode of asymptomatic wide-complex tachycardia about 10:30 PM on 7/11 and a 14 beat run of wide-complex tachycardia at approximately 4 AM on 7/14 that are most suspicious for paroxysmal atrial fibrillation with aberrant conduction in left bundle branch block pattern rather than ventricular dysrhythmia.  Both episodes of wide-complex tachycardia resolved without specific intervention.  She otherwise has maintained sinus rhythm over the last 2-3 days.  Elevated troponin may also be due partly to arrhythmias.  -Continue higher dose diltiazem extended release 240 mg daily   -discontinue cardiac monitoring  -Continue anticoagulation as described above    Acute kidney injury superimposed on CKD (chronic kidney disease) stage 3  Baseline creatinine 1.0-1.2 and increased up to 1.76 during hospitalization coinciding with acute cor pulmonale, IV Lasix treatment, and transient hypotension and concerning for acute kidney injury superimposed upon stage III chronic kidney disease.  Renal function has recovered to baseline.   -continue to monitor renal function  -Continuing chronic thiazide diuretic and adding low-dose loop diuretic 7/14 while following volume status and renal function closely    Essential hypertension, benign  Chronic hypertension normally treated with hydrochlorothiazide, diltiazem and lisinopril.  Had transient hypotension due to acute cor pulmonale older this hospital stay but has now been hypertensive including intermittently severely hypertensive although is asymptomatic from hypertension.   -Continue chronic doses of hydrochlorothiazide 25  mg daily and lisinopril 2.5 mg daily and higher dose diltiazem  mg daily   -Continue labetalol IV as needed last evening for severe hypertension    Anemia, unspecified type  Noted to be anemic at admission with hemoglobin 10.8.  Anemia has worsened during hospitalization, but hemoglobin has stabilized at 9 for several days.  She has ecchymosis but no obvious bleeding.  -Monitor hemoglobin as indicated    Hyponatremia  Serum sodium was normal at admission but decreased as low as 133 during hospitalization.  Hyponatremia has since resolved even after resuming chronic thiazide diuretic therapy.  -Monitor sodium as indicated    Coronary artery disease involving native coronary artery of native heart without angina pectoris  CAD was diagnosed in 2020 apparently based on radiographic findings of coronary artery calcifications noted following her cancer treatment.  Cardiology advised medical management previously.  Patient has not had any symptoms concerning for angina and did have a thorough work-up performed for exertional dyspnea in April 2023 including negative Lexiscan.  Elevated troponin during this hospitalization has been attributed to acute cor pulmonale and possibly paroxysmal arrhythmias rather than ischemic heart disease.  -Continue chronic management as previously advised by cardiology including optimization of treatment of hypertension and chronic statin therapy    Prediabetes  She was started on metformin for treatment of prediabetes several months ago with hemoglobin A1C most recently 5.8.  Glycemic control has been adequate during hospitalization even while she is receiving treatment with corticosteroids.  Metformin has been held after chest CTA and due to KYM.  -Continue to hold metformin   -monitor blood sugars before meals and bedtime and continue Novolog coverage low resistance with meals and bedtime.  -Continue moderate carbohydrate diet    Hyperlipidemia  Chronic and treated with  atorvastatin  -Continue chronic dose of atorvastatin    History of malignant neoplasm of overlapping sites of left breast in female, estrogen receptor positive  Previous history of left breast cancer that was treated and considered to be in remission.  Patient has continued Arimidex treatment since then.  Arimidex was held during this hospitalization due to to acute venous thromboembolism.  Although there is suspicion for recurrent malignancy as noted above, it is not clear whether this is due to metastatic breast cancer or a second malignancy such as lung cancer.  -Continue to hold Arimidex today and reassess whether to restart it prior to or at the time of hospital discharge    Major depression in complete remission  Chronically treated with Effexor with depressive symptoms well controlled  -Continue chronic Effexor    Obesity  Chronic with BMI 30-31 and likely contributes to health problems including prediabetes  -No acute intervention       Diet: Moderate Consistent Carb (60 g CHO per Meal) Diet    DVT Prophylaxis: IV heparin  Ponce Catheter: Not present  Lines: None     Cardiac Monitoring: ACTIVE order. Indication: Tachyarrhythmias, acute (48 hours)  Code Status: Full Code      Clinically Significant Risk Factors                                 Disposition Plan   Anticipate discharge once medically stable, possibly 2 to 3 days       Dat Peters MD  Hospitalist Service  [facility]  Securely message with [employer] (more info)  Text page via [facility] Paging/Directory   ______________________________________________________________________    Interval History   There were no significant overnight events last night.  She feels better overall today although is noticing more swelling in her legs today.  However, she has been up in the chair and on her feet more today than previously.  Breathing has improved today and she has tolerated transition from high flow to low-flow nasal cannula  all day long since about 8 AM this morning.  She continues to have exertional dyspnea.  She remains afebrile and hemodynamically stable.  Urine output has been adequate.  She is tolerating good oral intake.  There has been no bleeding noted.    Physical Exam   Vital Signs: Temp: 98  F (36.7  C) Temp src: Oral BP: (!) 159/59 Pulse: 92   Resp: 18 SpO2: 92 % O2 Device: Nasal cannula Oxygen Delivery: 6 LPM  Patient Vitals for the past 24 hrs:   BP Temp Temp src Pulse Resp SpO2 Weight   07/14/23 1532 (!) 159/59 98  F (36.7  C) Oral 92 18 92 % --   07/14/23 1242 -- -- -- -- -- 99 % --   07/14/23 1053 (!) 147/50 98.3  F (36.8  C) Oral 84 18 95 % --   07/14/23 1020 -- -- -- -- -- (!) 82 % --   07/14/23 0755 -- -- -- -- -- 92 % --   07/14/23 0745 134/48 -- -- -- -- 96 % --   07/14/23 0742 134/48 97.9  F (36.6  C) Oral 65 18 98 % --   07/14/23 0500 -- -- -- -- -- 95 % --   07/14/23 0436 -- -- -- -- -- 90 % --   07/14/23 0434 -- -- -- -- -- (!) 86 % --   07/14/23 0345 132/71 97.7  F (36.5  C) Oral 79 20 94 % 79.1 kg (174 lb 6.4 oz)   07/14/23 0309 -- -- -- -- -- 93 % --   07/14/23 0230 -- -- -- -- -- (!) 89 % --   07/13/23 2256 (!) 145/59 98.8  F (37.1  C) Oral 83 22 96 % --   07/13/23 1947 (!) 152/63 98.3  F (36.8  C) Oral 83 20 94 % --   07/13/23 1940 (!) 152/63 -- -- -- -- (!) 87 % --   07/13/23 1935 -- -- -- -- -- (!) 79 % --   07/13/23 1930 -- -- -- -- -- (!) 75 % --     Weight: 174 lbs 6.4 oz  Vitals:    07/11/23 0930 07/11/23 1442 07/13/23 0441 07/13/23 1053   Weight: 80.7 kg (177 lb 14.4 oz) 75.9 kg (167 lb 6.4 oz) 77.5 kg (170 lb 12.8 oz) 77.8 kg (171 lb 8 oz)    07/14/23 0345   Weight: 79.1 kg (174 lb 6.4 oz)       Intake/Output Summary (Last 24 hours) at 7/14/2023 1728  Last data filed at 7/14/2023 1651  Gross per 24 hour   Intake 2262 ml   Output 1700 ml   Net 562 ml     General Appearance: Elderly woman without signs of acute distress while sitting up in a chair  Respiratory: Normal respiratory effort, diminished  breath sounds throughout, inspiratory crackles at the lung bases bilaterally, no wheezes  Cardiovascular: Regular rate and rhythm, good radial pulse, brisk capillary refill, moderate pitting edema in the lower legs     Medical Decision Making           Data     I have personally reviewed the following data over the past 24 hrs:    N/A  \   N/A   / N/A     N/A N/A N/A /  189 (H)   4.5 N/A N/A \       Factor Xa level 0.47 with 0.3-0.7 the therapeutic range  Magnesium 2.1, phosphorus 3.4  Blood sugars range 114-165 over the last day  Blood cultures remain negative to date    Report of final results from supraclavicular mass biopsy performed July 7 was reviewed: Benign thyroid tissue reported without lymphoid tissue, no signs of malignancy    Cardiac monitor results reviewed over the past 24 hours: 14 beat run of irregular wide-complex tachycardia with left bundle branch morphology and no P waves noted about 4 AM today, no other arrhythmias    Imaging results reviewed over the past 24 hrs:   Recent Results (from the past 24 hour(s))   MR Brain w/o & w Contrast    Narrative    MRI BRAIN WITHOUT AND WITH CONTRAST  7/14/2023 12:20 PM     HISTORY:  Concern for metastatic cancer. Recommended by Dr. Dhillon,  oncology team, for work-up of extent of metastasis.    TECHNIQUE: Routine multiplanar, multisequence MRI of the brain without  and with 7.5 mL Gadavist.    COMPARISON: None.     FINDINGS: There is an ovoid enhancing parenchymal nodule in the mid  right precentral gyrus measuring 7-8 mm (series 8 image 20) with mild  surrounding vasogenic edema, concerning for metastasis given the  patient's history. Indeterminate focus of enhancement in the left  anterior aspect of the thierry measuring 3-4 mm (series 8 image 8) with  faint corresponding DWI hyperintense signal in that location. This is  concerning for a metastatic lesion, although a subacute infarct could  also potentially have this appearance. Punctate focus of  DWI  hyperintense signal in the region of the left superior parietal lobule  (series 3 image 59). No obvious corresponding area of enhancement in  that location. This is indeterminate. A tiny recent infarct or  possibly an early metastatic lesion could have this appearance. There  appears to be a linear focus of enhancement slightly more anteriorly  in the left superior parietal lobe, probably representing a  developmental venous anomaly. No other convincing areas of abnormal  enhancement identified intracranially, accounting for technique.    The ventricles appear within normal limits in size and configuration.  There is mild generalized brain parenchymal volume loss. Mild patchy  nonspecific T2 FLAIR hyperintense signal in the cerebral white matter,  likely due to chronic small vessel ischemic disease. Mild patchy T2  FLAIR hyperintense signal in the thierry also likely due to chronic small  vessel ischemic disease. No intracranial hemorrhage, extra-axial fluid  collection, or mass effect/herniation.    Orbits appear within normal limits, accounting for technique. Minimal  mucosal thickening in the ethmoid sinuses. Scattered fluid/membrane  thickening in the bilateral mastoid air cells. The calvarium, skull  base, and midface otherwise appear grossly unremarkable.      Impression    IMPRESSION:  1. Enhancing nodule in the right precentral gyrus with mild  surrounding vasogenic edema, most consistent with a metastasis given  the patient's history.  2. Indeterminate small focus of enhancement in the left aspect of the  thierry with corresponding DWI hyperintense signal. This also may  represent a metastatic lesion, although a subacute infarct is not  entirely excluded.  3. Indeterminate punctate focus of DWI hyperintense signal in the left  superior parietal lobule could represent either a tiny recent infarct  or tiny early metastatic lesion.  4. Linear enhancement more anteriorly within the left superior  parietal  lobe/postcentral gyrus may represent a developmental venous  anomaly.  5. Brain atrophy and presumed chronic small vessel ischemic change, as  described.  6. No acute intracranial hemorrhage, extra-axial fluid collection, or  significant mass effect/herniation.    RECOMMENDATION: Recommend thin section volumetric/3-D T2 FLAIR and  postcontrast T1-weighted sequences through the brain. This may help to  characterize the indeterminate lesions above and can also help to  assess for the possibility of other subtle small enhancing brain  metastases.    LING BELTRAN MD         SYSTEM ID:  [id number]     [facility]   Lab 07/14/23  1636 07/14/23  1224 07/14/23  0736 07/14/23  0627 07/13/23  0757 07/13/23  0723 07/12/23  0728 07/12/23  0516 07/11/23  2107 07/11/23  1815 07/11/23  0812 07/11/23  0518 07/10/23  0742 07/10/23  0531 07/10/23  0023 07/09/23  1910   WBC  --   --   --   --   --   --   --  14.7*  --   --   --  16.5*  --  12.6*  --   --    HGB  --   --   --   --   --   --   --  9.2*  --  9.4*  --  9.2*  --  10.4*  --   --    MCV  --   --   --   --   --   --   --  92  --   --   --  89  --  90  --   --    PLT  --   --   --   --   --   --   --  183  --   --   --  186  --  168  --   --    INR  --   --   --   --   --   --   --  1.12  --   --   --   --   --   --   --  1.31*   NA  --   --   --   --   --  140  --  137  --  133*  --  135*   < > 136  --   --    POTASSIUM  --   --   --  4.5  --  4.3  --  4.3  --  4.2  --  3.5   < > 4.0  --   --    CHLORIDE  --   --   --   --   --  101  --  98  --  91*  --  91*   < > 93*  --   --    CO2  --   --   --   --   --  32*  --  30*  --  32*  --  33*   < > 31*  --   --    BUN  --   --   --   --   --  25.0*  --  28.7*  --  37.7*  --  31.2*   < > 25.9*  --   --    CR  --   --   --   --   --  1.08*  --  1.29*  --  1.76*  --  1.43*   < > 1.14*  --   --    ANIONGAP  --   --   --   --   --  7  --  9  --  10  --  11   < > 12  --   --    IVANNA  --   --   --   --   --  9.4  --  9.2  --  9.3  --   9.4   < > 10.1  --   --    * 114* 101*  --    < > 96   < > 103*   < > 184*   < > 137*   < > 168*   < >  --     < > = values in this interval not displayed.      normal...

## 2023-07-14 NOTE — PROVIDER NOTIFICATION
Notified Dr. Wilkes: Tele shows a run of wide complex tachycardia/Vtach of 14 beats at 0409. Pt denies pain, otherwise NSR. Vss .   No new orders at this time.

## 2023-07-14 NOTE — PLAN OF CARE
Goal Outcome Evaluation:      Plan of Care Reviewed With: patient    Overall Patient Progress: no changeOverall Patient Progress: no change    Outcome Evaluation: Continues on 4 liters oxygen with bumps up to 6 liters with activity as is still dyspneic with activity. Denies pain. Sad about the news today after MRI of probable cancer in her brain. Comforted per writer. Patient stated her nissa will help her to move ahead. Telemetry was DCed as was heparin drip. Ate well for supper while sitting in chair.

## 2023-07-14 NOTE — PROGRESS NOTES
07/14/23 1242 07/14/23 1348   Vital Signs   Oximeter Heart Rate 90 bpm  --    Oxygen Therapy   SpO2 99 %  --    O2 Device Nasal cannula Nasal cannula   Oxygen Delivery 4 LPM 4 LPM   Assessment   Respiratory WDL X  --    Rhythm/Pattern, Respiratory dyspnea on exertion  --    Expansion/Accessory Muscles/Retractions accessory muscle use;diaphragmatic  --    Mucous Membranes intact  --    Cough Frequency infrequent  --    Cough Type nonproductive  --    Breath Sounds   Breath Sounds All [place]  --    DANTE Breath Sounds diminished  --    LLL Breath Sounds diminished  --    RUL Breath Sounds diminished  --    RML Breath Sounds diminished  --    RLL Breath Sounds diminished  --    All Lung Fields Breath Sounds diminished  --    Nebulizer Assessment & Treatment   $RT Use ONLY Delivery Method Nebulizer - Additional Nebulizer - Additional   Nebulizer Device Mask Mask   Pretreatment Heart Rate (beats/min) 83 89   Pretreatment Resp Rate (breaths/min) 22 22   Pretreat Breath Sounds - Bilat - All Lobes diminished wheezes, expiratory   Pretreat Breath Sounds - DANTE diminished wheezes, expiratory   Pretreat Breath Sounds - LLL diminished wheezes, expiratory   Pretreat Breath Sounds - RUL diminished wheezes, expiratory   Pretreat Breath Sounds - RML diminished wheezes, expiratory   Pretreat Breath Sounds - RLL  --  wheezes, expiratory   Patient Position Zurita's Parminder's   Respiratory Treatment Status (SVN) given given   Breath Sounds Post-Respiratory Treatment   Posttreatment Heart Rate (beats/min) 83 82   Posttreatment Resp Rate (breaths/min) 18 20     Patient weaned off HFNC at 0750 this am to nasal cannula   Patient had a friend visitor and second had smoke fumes in room   SpO2 dropped into mid 80's and wheezing increased  PRN neb given with improvement in breath sounds and SpO2, patient's friend left prior to addational neb

## 2023-07-14 NOTE — PLAN OF CARE
Goal Outcome Evaluation:      Plan of Care Reviewed With: patient    Overall Patient Progress: no changeOverall Patient Progress: no change    Outcome Evaluation: Has been on 4 liters oxygen since 0800 today. Diminished lung sounds throughout. Went down for brain scan. See report. Very short of air with any activity (desatted to 62% at one point), so turned up oxygen to 6 liters before any activity. Has denied pain. Tolerated regular diet. Heparin drip continues. Electrolytes within protocols, so will be rechecked tomorrow morning. SR per telemetry. Visits from family.

## 2023-07-14 NOTE — PLAN OF CARE
Goal Outcome Evaluation:      Plan of Care Reviewed With: patient    Overall Patient Progress: no changeOverall Patient Progress: no change    Outcome Evaluation: HFNC at 40% and 30L, attempted to wean FIO2 to 30%, then O2 sats gradually decreased to 85%, now FIO2 at 35%, O2 sats low-mid 90s. Pt desats with any activity- when up to the bedside commode last evening at 1900, O2 sats decreased to mid 60s and gradually increased back to mid 80s, 90s after 4-5 minutes. Lungs are diminished with expiratory wheezes. PRN Albuterol neb given later in night per pt request. Tylenol given for headache. See provicder notification note regarding 14 beat run of Vtach, no new orders. Telemetry otherwise NSR.

## 2023-07-15 NOTE — PROGRESS NOTES
[facility]    Medicine Progress Note - Hospitalist Service    Date of Admission:  7/9/2023    Assessment & Plan     Patient is a 73-year-old female with severe COPD, chronic respiratory failure treated with 2 to 2-1/2 L NC oxygen, paroxysmal atrial fibrillation chronically on warfarin which causes coagulation defect, hypertension, hyperlipidemia, previous left-sided breast cancer in 2019 on Arimidex and thought to be in remission, and recent supraclavicular mass for which biopsy was performed on 7/7/2023 after interruption of therapeutic anticoagulation who presented with worsening shortness of breath, cold sweats, malaise, and vomiting, and was admitted due to concerns for left upper lobe pulmonary emboli, possible right upper lobe pneumonia, and new pulmonary hypertension with acute cor pulmonale.  She was started on BiPAP and admitted into the ICU and has gradually improved and weaned down on oxygen needs overall, ranging from 1-6L oxygmask and NC.  She continues to have signs of mild volume overload and will start cautious PO diuresis today, as patient had KYM follow aggressive diuresis following IV Lasix earlier in this stay.  Further work up has revealed concern for metastatic cancer with chest lymphadenopathy and likely brain metastasis and patient will be seeing her oncologist next week to discuss ongoing evaluation needed.  Physical therapy will start working with patient to evaluate for discharge plan with hopeful discharge in 2-4 days.      Acute pulmonary emboli with acute pulmonary hypertension, acute cor pulmonale, and acute on chronic respiratory failure with hypoxia and hypercapnia   Known chronic respiratory failure with hypoxia and hypercapnia treated chronically with 2-2.5 L nasal cannula oxygen supplementation.  Presented with dyspnea, severe hypoxia, and worsening hypercapnia with acute respiratory acidosis concerning for acute on chronic hypoxic and hypercapnic  respiratory failure with chest CTA demonstrated pulmonary emboli in the left upper lobe.  Echocardiogram demonstrated findings suspicious for moderate pulmonary hypertension, right heart failure, and RV pressure and volume overload with dilated IVC all of which was new compared with May 2023 and strongly suspicious for acute cor pulmonale.  Troponin has been elevated probably due to acute cor pulmonale and remains stable.  Radiographic findings also demonstrated possible right upper lobe infiltrate and she presented with signs of acute COPD exacerbation both of which probably also contributed to acute on chronic respiratory failure.  INR was normal July 5 just prior to biopsy of supraclavicular mass, remained subtherapeutic upon admission, and patient had not been receiving alternative bridging anticoagulation while chronic warfarin therapy was interrupted recently.  Lack of anticoagulation likely contributed to venous thromboembolism, and there is also suspicion for recurrent malignancy with possible associated hypercoagulable state.  Venous thromboembolism has been treated with IV heparin therapeutic anticoagulation and now transitioned to Eliquis as per oncology recommendations.  Acute on chronic respiratory failure was initially treated with BiPAP followed by transition to high flow nasal cannula and she is gradually improving with decreasing oxygen requirement and symptomatic improvement in dyspnea but is needing oxymask to maintain saturations this afternoon after ambulation - currently on 3L.  She continues to signs of volume overload after her weight initially increased about 10 to 12 pounds likely due to cor pulmonale.  She had received aggressive diuresis to treat acute cor pulmonale and volume overload initially but developed transient hypotension and acute kidney injury such that diuresis was discontinued.    -Will start Lasix 20 mg x1 and monitor for diuresis response and monitor further dosing based on  response and renal tolerance  -Titrate and wean oxygen to keep oxygen saturations 86-94% and maintain stable hypercapnia without worsening respiratory acidosis  -Continue Eliquis at higher dose due to acute PE followed by lower maintenance dose=  -Use ace wraps on lower legs for treatment of edema    Pneumonia of right upper lobe due to infectious organism  Patient presented with dyspnea, radiographic infiltrates, worsening respiratory failure, and leukocytosis with left shift all raising suspicion for infectious pneumonia.  Testing for urine pneumococcal antigen, rapid testing for COVID, influenza, and RSV, and blood cultures have all been negative so far.  Sepsis has not been suspected.  She completed treatment course with azithromycin and receiving last dose of Rocephin today  -continue with antibiotic course completion of Rocephin this evening  -monitor tomorrow off antibiotics     COPD exacerbation  Emphysema  Known emphysema and COPD and presented with decreased lung sounds and tight wheezy respirations suspicious for COPD exacerbation.  Signs of COPD exacerbation have improved with treatment including corticosteroids and bronchodilators.  COPD exacerbation likely contributed to acute on chronic respiratory failure.  -Begin to taper prednisone from 40 mg daily to 20 mg daily today with ongoing titration every few days as able  -Continue DuoNebs 4 times daily with additional bronchodilators as needed for wheezing or shortness of breath  -Wean O2 supplementation as able    Right supraclavicular mass  Lymphadenopathy in chest  Abnormal brain MRI  Patient underwent biopsy of right supraclavicular mass on 7/7/2023 with benign results and no evidence for malignancy although it appears as though thyroid tissue was sampled rather than lymphoid tissue.  She also has been noted to have intrathoracic lymphadenopathy.  Brain MRI performed 7/14 is abnormal with left brain lesion and associated vasogenic edema  radiographically that is most worrisome for metastatic lesion as well as other small lesions of unclear etiology and clinical significance.  Combination of persistent supraclavicular mass, intrathoracic lymphadenopathy, and abnormal brain MRI results remain worrisome for malignancy, but malignancy has not yet been confirmed.  -patient will continuing current treatment plan for her acute medical problems and pursuing very close outpatient follow-up with oncology and/or pulmonology regarding the persistent concerns about possible recurrent malignancy.  Appointment has been made for 7/20 with oncology    Paroxysmal atrial fibrillation with RVR  Warfarin induced coagulopathy  Paroxysmal wide-complex tachycardia, suspect atrial fibrillation with aberrant conduction  Patient has reported that she has been taking diltiazem chronically without interruption.  Had episode of A-fib with RVR earlier in this hospitalization treated transiently with diltiazem infusion with adequate rate control after transition from infusion to oral diltiazem.  She had a 9 beat episode of asymptomatic wide-complex tachycardia about 10:30 PM on 7/11 and a 14 beat run of wide-complex tachycardia at approximately 4 AM on 7/14 that are most suspicious for paroxysmal atrial fibrillation with aberrant conduction in left bundle branch block pattern rather than ventricular dysrhythmia.  Both episodes of wide-complex tachycardia resolved without specific intervention.  She otherwise has maintained sinus rhythm over the last 2-3 days.  Elevated troponin may also be due partly to arrhythmias.  -Continue higher dose diltiazem extended release 240 mg daily   -Continue anticoagulation as described above    Acute kidney injury superimposed on CKD (chronic kidney disease) stage 3  Baseline creatinine 1.0-1.2 and increased up to 1.76 during hospitalization coinciding with acute cor pulmonale, IV Lasix treatment, and transient hypotension and concerning for acute  kidney injury superimposed upon stage III chronic kidney disease.  Renal function has recovered to baseline.   -Continuing chronic thiazide diuretic   -start Lasix 20 mg daily   -continue to monitor renal function as cautious diuretics are restarted    Essential hypertension, benign  Chronic hypertension normally treated with hydrochlorothiazide, diltiazem and lisinopril.  Had transient hypotension due to acute cor pulmonale early in this hospital stay but has now been hypertensive including intermittently severely hypertensive although is asymptomatic  -Continue chronic doses of hydrochlorothiazide 25 mg daily and lisinopril 2.5 mg daily and higher dose diltiazem  mg daily   -Start lasix 20 mg daily  -Continue labetalol IV as needed for severe hypertension    Anemia, unspecified type  Noted to be anemic at admission with hemoglobin 10.8.  Anemia has worsened during hospitalization, but hemoglobin has stabilized at 9 for several days.  She has ecchymosis but no obvious bleeding.  -Monitor hemoglobin as indicated    Hyponatremia  Serum sodium was normal at admission but decreased as low as 133 during hospitalization.  Hyponatremia has since resolved even after resuming chronic thiazide diuretic therapy.  -Monitor sodium as indicated    Coronary artery disease involving native coronary artery of native heart without angina pectoris  CAD was diagnosed in 2020 apparently based on radiographic findings of coronary artery calcifications noted following her cancer treatment.  Cardiology advised medical management previously.  Patient has not had any symptoms concerning for angina and did have a thorough work-up performed for exertional dyspnea in April 2023 including negative Lexiscan.  Elevated troponin during this hospitalization has been attributed to acute cor pulmonale and possibly paroxysmal arrhythmias rather than ischemic heart disease.  -Continue chronic management as previously advised by cardiology  including optimization of treatment of hypertension and chronic statin therapy    Prediabetes  She was started on metformin for treatment of prediabetes several months ago with hemoglobin A1C most recently 5.8.  Glycemic control has been adequate during hospitalization even while she is receiving treatment with corticosteroids.  Metformin has been held after chest CTA and due to KYM.  -Continue to hold metformin   -monitor blood sugars before meals and bedtime and continue Novolog coverage low resistance with meals and bedtime.  -Continue moderate carbohydrate diet    Hyperlipidemia  Chronic and treated with atorvastatin  -Continue chronic dose of atorvastatin    History of malignant neoplasm of overlapping sites of left breast in female, estrogen receptor positive  Previous history of left breast cancer that was treated and considered to be in remission.  Patient has continued Arimidex treatment since then.  Arimidex has been held during this hospitalization due to to acute venous thromboembolism.  Although there is suspicion for recurrent malignancy as noted above, it is not clear whether this is due to metastatic breast cancer or a second malignancy such as lung cancer.  -In discussion with IP pharmacy team, given patient's clinical improvement and duration of anticoagulation during this stay, there is minimal risk to restart Arimidex at this time.  Will plan to start again tomorrow morning.  -will need evaluation by oncology team if change in regimen is needed    Major depression in complete remission  Chronically treated with Effexor with depressive symptoms well controlled  -Continue chronic Effexor    Obesity  Chronic with BMI 30-31 and likely contributes to health problems including prediabetes  -No acute intervention       Diet: Moderate Consistent Carb (60 g CHO per Meal) Diet    DVT Prophylaxis: Eliquis   Ponce Catheter: Not present  Lines: None     Cardiac Monitoring: None  Code Status: Full Code   "    Clinically Significant Risk Factors                  # Hypertension: Noted on problem list        # Obesity: Estimated body mass index is 30.89 kg/m  as calculated from the following:    Height as of this encounter: 1.6 m (5' 3\").    Weight as of this encounter: 79.1 kg (174 lb 6.4 oz).           Disposition Plan    Anticipate discharge in 2-4 days to unknown location based on patient's progress.      Nilam Laboy MD  Hospitalist Service  [facility]  Securely message with [employer] (more info)  Text page via [facility] Paging/Directory   ______________________________________________________________________    Interval History   Patient has been afebrile, ongoing hypertension but has not needed prn labetalol.  Oxygen needs have varied from 1 L NC at rest to 6 L oxymask after activity.  Feels more fatigued and winded today, more short of breath and not able to do as much.  Denies any new symptoms.  No new nursing concerns.     Physical Exam   Vital Signs: Temp: 97.9  F (36.6  C) Temp src: Oral BP: (!) 174/72 Pulse: 85   Resp: 20 SpO2: 95 % O2 Device: Nasal cannula Oxygen Delivery: 4 LPM  Weight: 174 lbs 6.4 oz    Constitutional: awake, alert, cooperative, no apparent distress at rest, and appears stated age  Respiratory: No increased work of breathing, decreased air exchange, fine crackles in bilateral lung bases, mild expiratory wheezing diffusely  Cardiovascular: RRR  GI: bowel sounds present, abdomen soft and non-tender   Neurologic: Awake, alert, oriented to name, place and situation     Medical Decision Making       55 MINUTES SPENT BY ME on the date of service doing chart review, history, exam, documentation & further activities per the note.      Data     I have personally reviewed the following data over the past 24 hrs:    N/A  \   N/A   / N/A     137 100 19.5 /  131 (H)   4.2 29 0.95 \       "

## 2023-07-15 NOTE — PLAN OF CARE
Goal Outcome Evaluation:      Plan of Care Reviewed With: patient    Overall Patient Progress: decliningOverall Patient Progress: declining    Outcome Evaluation: Was not able to keep oxygen at 1 liter. Kept increasing oxygen to keep sats above 85% and is now on oxymask at 3 liters to sat 90%. Needed oxygen turned up to 6-7 liters with any activites. Slight sore throat went away after hot tea. Blood sugars were 87 and 102 . Electrolytes were within parameters so will be rechecked in the morning. Up to bedside commode to void and had BM. Requested that ace wraps be removed for comfort. Bed alarm on.

## 2023-07-15 NOTE — PROGRESS NOTES
07/15/23 1253 07/15/23 1257   Oxygen Therapy   SpO2 (!) 86 %  --    O2 Device Nasal cannula Nasal cannula   Oxygen Delivery 3 LPM 4 LPM   Breath Sounds   Breath Sounds All Fields  --    All Lung Fields Breath Sounds crackles  --    Nebulizer Assessment & Treatment   $RT Use ONLY Delivery Method Nebulizer - Additional  --    Nebulizer Device Mask  --    Pretreatment Heart Rate (beats/min) 83  --    Pretreatment Resp Rate (breaths/min) 22  --    Pretreat Breath Sounds - Bilat - All Lobes crackles  --    Pretreat Breath Sounds - DANTE crackles  --    Pretreat Breath Sounds - LLL crackles  --    Pretreat Breath Sounds - RUL crackles  --    Pretreat Breath Sounds - RML crackles  --    Pretreat Breath Sounds - RLL crackles  --    Patient Position Zurita's  --    Respiratory Treatment Status (SVN) given  --    Breath Sounds Post-Respiratory Treatment   Posttreatment Heart Rate (beats/min) 84  --    Posttreatment Resp Rate (breaths/min) 18  --    Posttreatment Assessment (SVN) breath sounds unchanged  --    Signs of Intolerance (SVN) none  --    Breath Sounds Posttreatment All Fields crackles  --    Breath Sounds Posttreatment DANTE crackles  --    Breath Sounds Posttreatment LLL crackles  --    Breath Sounds Posttreatment RUL crackles  --    Breath Sounds Posttreatment RML crackles  --    Breath Sounds Posttreatment RLL crackles  --      Patient's lungs have more crackles today and throughout the day  Patient states she feels more dyspneic today  Encouraged purse lip breathing  Breath sounds unchanged post nebulizer treatment

## 2023-07-15 NOTE — PLAN OF CARE
Goal Outcome Evaluation:      Plan of Care Reviewed With: patient    Overall Patient Progress: no changeOverall Patient Progress: no change    Outcome Evaluation: Denies pain. PRN Seroquel given x2 for anxiety. Continued on 4L O2.

## 2023-07-15 NOTE — PROGRESS NOTES
07/15/23 1200   Appointment Info   Signing Clinician's Name / Credentials (PT) Kavin Ng PT   Rehab Comments (PT) PT eval order for: generalized weakness and respiratory fatigue after PE and pneumonia and new metastatic cancer diagnosis, assist in disposition planning       Present no   Living Environment   People in Home spouse   Current Living Arrangements house   Home Accessibility stairs to enter home;stairs within home   Number of Stairs, Main Entrance 1   Stair Railings, Main Entrance none   Number of Stairs, Within Home, Primary greater than 10 stairs   Stair Railings, Within Home, Primary railing on left side (ascending);railings safe and in good condition   Transportation Anticipated family or friend will provide   Living Environment Comments rambler home, no concerns   Self-Care   Usual Activity Tolerance poor   Current Activity Tolerance poor   Regular Exercise No   Equipment Currently Used at Home shower chair   Fall history within last six months yes  (fell over box at night, fell due to darkness, no injury other than a bruise)   Number of times patient has fallen within last six months 1   Activity/Exercise/Self-Care Comment pt usually awake 14 hours per day, sits 75% watching TV or games on the computer   General Information   Onset of Illness/Injury or Date of Surgery 07/09/23   Referring Physician Nilam Laboy MD   Patient/Family Therapy Goals Statement (PT) be able to go home and breath   Pertinent History of Current Problem (include personal factors and/or comorbidities that impact the POC) Patient is a 73-year-old female with severe COPD, chronic respiratory failure treated with 2 to 2-1/2 L NC oxygen, paroxysmal atrial fibrillation chronically on warfarin which causes coagulation defect, hypertension, hyperlipidemia, previous left-sided breast cancer in 2019 on Arimidex and thought to be in remission, and recent supraclavicular mass for which biopsy was performed  on 7/7/2023 after interruption of therapeutic anticoagulation who presented with worsening shortness of breath, cold sweats, malaise, and vomiting, and was admitted due to concerns for left upper lobe pulmonary emboli, possible right upper lobe pneumonia, and new pulmonary hypertension with acute cor pulmonale. She was started on BiPAP and admitted into the ICU and has gradually improved.. PT orders today from Dr Laboy.   General Observations pt's  present in room. Pt alert lying in bed on nasal O2 5%.   Cognition   Affect/Mental Status (Cognition) WNL   Orientation Status (Cognition) oriented x 4   Follows Commands (Cognition) WNL   Pain Assessment   Patient Currently in Pain No   Integumentary/Edema   Integumentary/Edema Comments pt notes lower legs feel more swollen today. L>R pitted edema in her feet, not much in lower legs   Posture    Posture Not impaired   Range of Motion (ROM)   Range of Motion ROM is WFL   Strength (Manual Muscle Testing)   Strength Comments 5/5 , ankle DF 5/5, strong MMT ankle PF   Bed Mobility   Comment, (Bed Mobility) independent but fatigues easily   Transfers   Comment, (Transfers) deferred at this time as pt notes she is quite tired, can assess tomorrow   Gait/Stairs (Locomotion)   Comment, (Gait/Stairs) deferred at this time as pt notes she is quite tired, may assess tomorrow, pt has not used assistive device in the past but is not opposed to using a wheeled walker   Balance   Balance Comments deferred at this time as pt notes she is quite tired, can assess tomorrow   Clinical Impression   Criteria for Skilled Therapeutic Intervention Yes, treatment indicated   PT Diagnosis (PT) decreased functional level   Influenced by the following impairments endurance, weakness   Functional limitations due to impairments general mobility, poor activity tolerance, poor sitting, standing and walking endurance   Clinical Presentation (PT Evaluation Complexity) Stable/Uncomplicated    Clinical Presentation Rationale clinical judgement   Clinical Decision Making (Complexity) low complexity   Planned Therapy Interventions (PT) home exercise program;patient/family education;strengthening;progressive activity/exercise;gait training   Anticipated Equipment Needs at Discharge (PT) walker, rolling   Risk & Benefits of therapy have been explained evaluation/treatment results reviewed;care plan/treatment goals reviewed;risks/benefits reviewed;patient;spouse/significant other;participants voiced agreement with care plan;current/potential barriers reviewed   Clinical Impression Comments 73 year old with decreased functional level reporting that she feels she is at only 25% of her normal 100% functional level prior to admission on 7/9/23. Pt with severe COPD, chronic respiratory failure and mainly has endurance issues. Pt hasn't used an assistive device in the past but may be reception to using a wheeled walker at first for energy conservation.   PT Total Evaluation Time   PT Merrill Low Complexity Minutes (69169) 20   Physical Therapy Goals   PT Frequency Daily   PT Predicted Duration/Target Date for Goal Attainment 07/19/23   PT Goals Transfers;Gait   PT: Transfers Independent;Bed to/from chair;Sit to/from stand   PT: Gait Modified independent;Assistive device;50 feet   Interventions   Interventions Quick Adds Therapeutic Activity   Therapeutic Activity   Therapeutic Activities: dynamic activities to improve functional performance Minutes (72511) 10   Symptoms Noted During/After Treatment Fatigue   Treatment Detail/Skilled Intervention pt instructed in and performed B ankle pumps, QS and GS x 5 each and heel slides x 3 each and pt encouraged to do while lying in bed x 3-5 reps every hour to help with swelling and so she doesn't get more deconditioned. Also a FWW was brought in the room and went over reasons for using and proper height. Went over pros of 4WW as well due to her poor endurance   PT Discharge  Planning   PT Plan See pt daily, increase activity as able, determine if assistive device needed   PT Discharge Recommendation (DC Rec) home;home with home care physical therapy   PT Rationale for DC Rec Pt motivated to get back home. She is reportedly at 25% of her normal 100% functional level maily due to decreased endurance level from respiratory issues, may benefit from home PT to assist with pt getting back to prior level   PT Brief overview of current status Independent bed mobility, on 5L O2 today, poor activity tolerance, did not want to get up today with PT, may benefit from assistive deivce when up to help with energy conservation

## 2023-07-15 NOTE — PLAN OF CARE
Goal Outcome Evaluation:      Plan of Care Reviewed With: patient    Overall Patient Progress: improvingOverall Patient Progress: improving    Outcome Evaluation: Oxygen was weaned from 4L down to 1L with sats in the lower 90's at rest. With activity to the bedside commode, oxygen increased to 6L and sats decrease to 78-79%. Pt is very dyspneic with activity but does well with recovering. Lower legs ace wrapped as pt was concerned about increasing swelling over the past couple of days. She has slept well tonight after receiving Seroquel last evening.

## 2023-07-16 NOTE — PLAN OF CARE
Goal Outcome Evaluation:      Plan of Care Reviewed With: patient    Overall Patient Progress: no changeOverall Patient Progress: no change    Outcome Evaluation: Pt. maintaing 02 on 3 LPM at rest, increased needs to 6 LPM w/ activity via oxymask. . Up to BSC SBA.

## 2023-07-16 NOTE — PROGRESS NOTES
[facility]    Medicine Progress Note - Hospitalist Service    Date of Admission:  7/9/2023    Assessment & Plan     Patient is a 73-year-old female with severe COPD, chronic respiratory failure treated with 2 to 2-1/2 L NC oxygen, paroxysmal atrial fibrillation chronically on warfarin which causes coagulation defect, hypertension, hyperlipidemia, previous left-sided breast cancer in 2019 on Arimidex and thought to be in remission, and recent supraclavicular mass for which biopsy was performed on 7/7/2023 after interruption of therapeutic anticoagulation who presented with worsening shortness of breath, cold sweats, malaise, and vomiting, and was admitted due to concerns for left upper lobe pulmonary emboli, possible right upper lobe pneumonia, and new pulmonary hypertension with acute cor pulmonale.  She was started on BiPAP and admitted into the ICU and has gradually improved and weaned down on oxygen needs overall, ranging from 1-6L oxygmask and NC.  She continues to have signs of mild volume overload and will start cautious PO diuresis today, as patient had KYM follow aggressive diuresis following IV Lasix earlier in this stay.  Further work up has revealed concern for metastatic cancer with chest lymphadenopathy and likely brain metastasis and patient will be seeing her oncologist next week to discuss ongoing evaluation needed.  Physical therapy will start working with patient to evaluate for discharge plan with hopeful discharge in 2-3 days    Acute pulmonary emboli with acute pulmonary hypertension, acute cor pulmonale, and acute on chronic respiratory failure with hypoxia and hypercapnia   Known chronic respiratory failure with hypoxia and hypercapnia treated chronically with 2-2.5 L nasal cannula oxygen supplementation.  Presented with dyspnea, severe hypoxia, and worsening hypercapnia with acute respiratory acidosis concerning for acute on chronic hypoxic and hypercapnic  respiratory failure with chest CTA demonstrated pulmonary emboli in the left upper lobe.  Echocardiogram demonstrated findings suspicious for moderate pulmonary hypertension, right heart failure, and RV pressure and volume overload with dilated IVC all of which was new compared with May 2023 and strongly suspicious for acute cor pulmonale.  Troponin has been elevated probably due to acute cor pulmonale and remains stable.  Radiographic findings also demonstrated possible right upper lobe infiltrate and she presented with signs of acute COPD exacerbation both of which probably also contributed to acute on chronic respiratory failure.  INR was normal July 5 just prior to biopsy of supraclavicular mass, remained subtherapeutic upon admission, and patient had not been receiving alternative bridging anticoagulation while chronic warfarin therapy was interrupted recently.  Lack of anticoagulation likely contributed to venous thromboembolism, and there is also suspicion for recurrent malignancy with possible associated hypercoagulable state.  Venous thromboembolism has been treated with IV heparin therapeutic anticoagulation and now transitioned to Eliquis as per oncology recommendations.  Acute on chronic respiratory failure was initially treated with BiPAP followed by transition to high flow nasal cannula and she is gradually improving with decreasing oxygen requirement and symptomatic improvement in dyspnea but is needing oxymask to maintain saturations this afternoon after ambulation - currently on 3L.  She continues to signs of volume overload after her weight initially increased about 10 to 12 pounds likely due to cor pulmonale.  She had received aggressive diuresis to treat acute cor pulmonale and volume overload initially but developed transient hypotension and acute kidney injury such that diuresis was discontinued.  She has been restarted on low dose Lasix 20 mg on 7/15/23 with good response and so far renal  tolerance.    -Continue Lasix 20 mg PO twice daily dosing today and monitor for diuresis response and monitor further dosing based on response and renal tolerance  -Titrate and wean oxygen to keep oxygen saturations 86-94% and maintain stable hypercapnia without worsening respiratory acidosis  -Continue Eliquis at higher dose due to acute PE followed by lower maintenance dose  -Use ace wraps on lower legs for treatment of edema    Pneumonia of right upper lobe due to infectious organism  Patient presented with dyspnea, radiographic infiltrates, worsening respiratory failure, and leukocytosis with left shift all raising suspicion for infectious pneumonia.  Testing for urine pneumococcal antigen, rapid testing for COVID, influenza, and RSV, and blood cultures have all been negative so far.  Sepsis has not been suspected.  She completed treatment course with azithromycin and received last dose of Rocephin last evening  -monitor off antibiotics for ongoing clinical improvement     COPD exacerbation  Emphysema  Known emphysema and COPD and presented with decreased lung sounds and tight wheezy respirations suspicious for COPD exacerbation.  Signs of COPD exacerbation have improved with treatment including corticosteroids and bronchodilators.  COPD exacerbation likely contributed to acute on chronic respiratory failure.  -Continue taper of prednisone of 20 mg daily today with ongoing titration every few days as able  -Continue DuoNebs 4 times daily with additional bronchodilators as needed for wheezing or shortness of breath  -Wean O2 supplementation as able    Right supraclavicular mass  Lymphadenopathy in chest  Abnormal brain MRI  Patient underwent biopsy of right supraclavicular mass on 7/7/2023 with benign results and no evidence for malignancy although it appears as though thyroid tissue was sampled rather than lymphoid tissue.  She also has been noted to have intrathoracic lymphadenopathy.  Brain MRI performed 7/14  is abnormal with left brain lesion and associated vasogenic edema radiographically that is most worrisome for metastatic lesion as well as other small lesions of unclear etiology and clinical significance.  Combination of persistent supraclavicular mass, intrathoracic lymphadenopathy, and abnormal brain MRI results remain worrisome for malignancy, but malignancy has not yet been confirmed.  -patient will continuing current treatment plan for her acute medical problems and pursuing very close outpatient follow-up with oncology and/or pulmonology regarding the persistent concerns about possible recurrent malignancy.  Appointment has been made for 7/20 with oncology    Paroxysmal atrial fibrillation with RVR  Warfarin induced coagulopathy  Paroxysmal wide-complex tachycardia, suspect atrial fibrillation with aberrant conduction  Patient has reported that she has been taking diltiazem chronically without interruption.  Had episode of A-fib with RVR earlier in this hospitalization treated transiently with diltiazem infusion with adequate rate control after transition from infusion to oral diltiazem.  She had a 9 beat episode of asymptomatic wide-complex tachycardia about 10:30 PM on 7/11 and a 14 beat run of wide-complex tachycardia at approximately 4 AM on 7/14 that are most suspicious for paroxysmal atrial fibrillation with aberrant conduction in left bundle branch block pattern rather than ventricular dysrhythmia.  Both episodes of wide-complex tachycardia resolved without specific intervention.  She otherwise has maintained sinus rhythm over recent days.  Elevated troponin may also be due partly to arrhythmias.  -Continue higher dose diltiazem extended release 240 mg daily   -Continue anticoagulation as described above    Acute kidney injury superimposed on CKD (chronic kidney disease) stage 3  Baseline creatinine 1.0-1.2 and increased up to 1.76 during hospitalization coinciding with acute cor pulmonale, IV Lasix  treatment, and transient hypotension and concerning for acute kidney injury superimposed upon stage III chronic kidney disease.  Renal function has recovered to baseline and so far is tolerating more cautious PO diuresis well  -Continuing chronic thiazide diuretic   -Lasix 20 mg PO BID   -continue to monitor renal function as cautious diuretics are restarted    Essential hypertension, benign  Chronic hypertension normally treated with hydrochlorothiazide, diltiazem and lisinopril.  Had transient hypotension due to acute cor pulmonale early in this hospital stay but has now been hypertensive including intermittently severely hypertensive although is asymptomatic  -Continue chronic doses of hydrochlorothiazide 25 mg daily and lisinopril 2.5 mg daily and higher dose diltiazem  mg daily   -Lasix 20 mg BID  -Continue labetalol IV as needed for severe hypertension    Anemia, unspecified type  Noted to be anemic at admission with hemoglobin 10.8.  Anemia has worsened during hospitalization, but hemoglobin has stabilized at 9-10 for several days.  She has ecchymosis but no obvious bleeding.  -Monitor hemoglobin as indicated    Hyponatremia  Serum sodium was normal at admission but decreased as low as 133 during hospitalization.  Hyponatremia has since resolved even after resuming chronic thiazide diuretic therapy and starting lasix treatment.  -Monitor sodium daily for ongoing resolution     Coronary artery disease involving native coronary artery of native heart without angina pectoris  CAD was diagnosed in 2020 apparently based on radiographic findings of coronary artery calcifications noted following her cancer treatment.  Cardiology advised medical management previously.  Patient has not had any symptoms concerning for angina and did have a thorough work-up performed for exertional dyspnea in April 2023 including negative Lexiscan.  Elevated troponin during this hospitalization has been attributed to acute cor  pulmonale and possibly paroxysmal arrhythmias rather than ischemic heart disease.  -Continue chronic management as previously advised by cardiology including optimization of treatment of hypertension and chronic statin therapy    Prediabetes  She was started on metformin for treatment of prediabetes several months ago with hemoglobin A1C most recently 5.8.  Glycemic control has been adequate during hospitalization even while she is receiving treatment with corticosteroids.  Metformin has been held after chest CTA and due to KYM.  -Continue to hold metformin   -monitor blood sugars before meals and bedtime and continue Novolog coverage low resistance with meals and bedtime.  -Continue moderate carbohydrate diet    Hyperlipidemia  Chronic and treated with atorvastatin  -Continue chronic dose of atorvastatin    History of malignant neoplasm of overlapping sites of left breast in female, estrogen receptor positive  Previous history of left breast cancer that was treated and considered to be in remission.  Patient has continued Arimidex treatment since then.  Arimidex has been held during this hospitalization due to to acute venous thromboembolism.  Although there is suspicion for recurrent malignancy as noted above, it is not clear whether this is due to metastatic breast cancer or a second malignancy such as lung cancer.  -In discussion with IP pharmacy team, given patient's clinical improvement and duration of anticoagulation during this stay, there is minimal risk to restart Arimidex at this time and restart this morning  -will need evaluation by oncology team as an outpatient shortly after hospital discharge     Major depression in complete remission  Chronically treated with Effexor with depressive symptoms well controlled  -Continue chronic Effexor    Obesity  Chronic with BMI 30-31 and likely contributes to health problems including prediabetes  -No acute intervention       Diet: Moderate Consistent Carb (60 g  "CHO per Meal) Diet    DVT Prophylaxis: Eliquis   Ponce Catheter: Not present  Lines: None     Cardiac Monitoring: None  Code Status: Full Code      Clinically Significant Risk Factors                  # Hypertension: Noted on problem list        # Obesity: Estimated body mass index is 30.89 kg/m  as calculated from the following:    Height as of this encounter: 1.6 m (5' 3\").    Weight as of this encounter: 79.1 kg (174 lb 6.4 oz).           Disposition Plan      Expected Discharge Date: 07/19/2023      Destination: home with family  Discharge Comments: PT Eval, not medically ready. TCU vs Home with [facility]      Nilam Laboy MD  Hospitalist Service  [facility]  Securely message with [employer] (more info)  Text page via [employer] Paging/Directory   ______________________________________________________________________    Interval History   Patient reports sleeping well last night, breathing better today and having improved activity tolerance with less desaturations but ongoing increased oxygen needs to 4-6 L NC to maintain saturations and still has poor physical tolerance overall.  No new symptoms.  No new nursing concerns     Physical Exam   Vital Signs: Temp: 98.2  F (36.8  C) Temp src: Oral BP: (!) 156/63 Pulse: 86   Resp: 20 SpO2: 95 % O2 Device: Nasal cannula Oxygen Delivery: 1 LPM  Weight: 174 lbs 6.4 oz    Constitutional: awake, alert, cooperative, no apparent distress, and appears stated age  Respiratory: No increased work of breathing, decreased but improving air exchange, clear to auscultation bilaterally, no crackles or wheezing  Cardiovascular: RRR  GI: bowel sounds present, abdomen soft and non-tender  Musculoskeletal: patient has trace pitting edema superimposed on non-pitting edema in bilateral lower legs  Neurologic: Awake, alert, oriented to name, place and situation     Medical Decision Making       45 MINUTES SPENT BY ME on the date of service doing chart review, " history, exam, documentation & further activities per the note.      Data   ------------------------- PAST 24 HR DATA REVIEWED -----------------------------------------------    I have personally reviewed the following data over the past 24 hrs:    16.9 (H)  \   10.4 (L)   / 237     139 98 21.0 /  143 (H)   4.3 33 (H) 1.16 (H) \       Trop: N/A BNP: 1,435 (H)

## 2023-07-16 NOTE — PLAN OF CARE
"Goal Outcome Evaluation:      Plan of Care Reviewed With: patient    Overall Patient Progress: improvingOverall Patient Progress: improving    Outcome Evaluation: States it is easier to breathe today and she feels \"like there is a liitle more zip to my step\". Up to chair for lunch and using commode to void. Oral Lasix was increased to bid today. Crackles and some wheezing per auscultation and lower legs are feeling less tight. BP in the 140's systolically. Has denied pain. Oxygen at 2-3 liters per NC to maintain sats above 85% and below 96%.      "

## 2023-07-16 NOTE — PROGRESS NOTES
07/16/23 1525   Vital Signs   Oximeter Heart Rate 79 bpm   Oxygen Therapy   SpO2 94 %   O2 Device Nasal cannula   Oxygen Delivery 3 LPM   Nebulizer Assessment & Treatment   $RT Use ONLY Delivery Method Nebulizer - Additional   Nebulizer Device Mask   Pretreatment Heart Rate (beats/min) 85   Pretreatment Resp Rate (breaths/min) 24   Pretreat Breath Sounds - Bilat - All Lobes diminished   Pretreat Breath Sounds - DANTE diminished   Pretreat Breath Sounds - LLL diminished;crackles   Pretreat Breath Sounds - RUL clear;diminished   Pretreat Breath Sounds - RML crackles   Pretreat Breath Sounds - RLL crackles   Patient Position Parminder's   Respiratory Treatment Status (SVN) given   Breath Sounds Post-Respiratory Treatment   Posttreatment Heart Rate (beats/min) 85   Posttreatment Resp Rate (breaths/min) 24   Posttreatment Assessment (SVN) breath sounds improved;increased aeration   Signs of Intolerance (SVN) none   Breath Sounds Posttreatment All Fields aeration increased;diminished   Breath Sounds Posttreatment DANTE aeration increased;clear   Breath Sounds Posttreatment LLL crackles   Breath Sounds Posttreatment RUL clear   Breath Sounds Posttreatment RML crackles;aeration increased   Breath Sounds Posttreatment RLL crackles     Patient improved today with dyspnea and breath sounds  Patient continues to quickly desat at times with speaking an any activity  RCP will follow

## 2023-07-16 NOTE — PLAN OF CARE
"Goal Outcome Evaluation:      Plan of Care Reviewed With: patient    Overall Patient Progress: improvingOverall Patient Progress: improving    Outcome Evaluation: Up in chair for supper and ate well. Using commode after receiving oral Lasix. Denied pain. On 3 liters oxygen to sat 100% after supper, so decreased to 2 liters. Has continued to be dyspneic with activity, so oxygen was increased to 6 liters prior to moving to prevent large drop in sats. Has been on 1 liter briefly today, but could not maintain sats above 85%. Lungs with inspiratory and expiratory wheezes. Spoke with brother on the phone sharing \"they think I might have cancer in my brain\". Calves less tight with fluid and weight down 6# from admission, but still 1+.      "

## 2023-07-16 NOTE — PLAN OF CARE
Goal Outcome Evaluation:      Plan of Care Reviewed With: patient    Overall Patient Progress: no changeOverall Patient Progress: no change    Outcome Evaluation: Fine crackles on lower lung lobes. IS encouraged. PRN Seroquel given. O2 at 3L per NC. Denies pain. Purewick in place.

## 2023-07-16 NOTE — DISCHARGE INSTRUCTIONS
Apixaban (By mouth)  Apixaban (a-PIX-a-ban)    Treats and prevents blood clots. This medicine is a blood thinner.    Brand Name(s):Eliquis,Eliquis 30 Day DVT/PE Starter Pack  There may be other brand names for this medicine.    When This Medicine Should Not Be Used:  This medicine is not right for everyone. Do not use it if you had an allergic reaction to apixaban or you have active bleeding.    How to Use This Medicine:  Tablet  Your doctor will tell you how much medicine to use. Do not use more than directed.   If you are not able to swallow the tablets whole, they may be crushed and mixed in water, 5% dextrose in water (D5W), apple juice, or applesauce. The crushed tablets may be mixed with 60 mL of water or D5W dose and given through a nasogastric tube (NGT).  This medicine should come with a Medication Guide. Ask your pharmacist for a copy if you do not have one.   Missed dose: Take a dose as soon as you remember. If it is almost time for your next dose, wait until then and take a regular dose. Do not take extra medicine to make up for a missed dose.   Store the medicine in a closed container at room temperature, away from heat, moisture, and direct light.     Drugs and Foods to Avoid:  Ask your doctor or pharmacist before using any other medicine, including over-the-counter medicines, vitamins, and herbal products.  Some medicines can affect how apixaban works. Tell your doctor if you are using any of the following:   Carbamazepine, itraconazole, ketoconazole, phenytoin, rifampin, ritonavir, Melissa's wort  Blood thinner (including clopidogrel, heparin, prasugrel, warfarin)  Medicine to treat depression  NSAID pain or arthritis medicine (including aspirin, celecoxib, diclofenac, ibuprofen, naproxen)    Warnings While Using This Medicine:  Tell your doctor if you are pregnant or breastfeeding, or if you have kidney disease, liver disease, bleeding problems, antiphospholipid syndrome, or an artificial heart  valve.  Do not stop using this medicine suddenly without asking your doctor. You might have a higher risk of stroke for a short time after you stop using this medicine.  This medicine increases your risk for bleeding that can become serious if not controlled. You may also bruise easily, and it may take longer than usual for bleeding to stop.  This medicine may increase your risk for a hematoma (blood clot) in your spine or back if you undergo an epidural or spinal puncture. This could lead to paralysis. Tell your doctor if you ever had spine problems or back surgery.  Tell any doctor or dentist who treats you that you are using this medicine. With your doctor's supervision, you may need to stop using this medicine several days before you have surgery or medical tests.  Your doctor will do lab tests at regular visits to check on the effects of this medicine. Keep all appointments.   Keep all medicine out of the reach of children. Never share your medicine with anyone.     Possible Side Effects While Using This Medicine:  Call your doctor right away if you notice any of these side effects:  Allergic reaction: Itching or hives, swelling in your face or hands, swelling or tingling in your mouth or throat, chest tightness, trouble breathing  Change in how much or how often you urinate, red or pink urine  Chest pain, trouble breathing  Coughing up blood, vomiting blood or material that looks like coffee grounds  Numbness, tingling, or muscle weakness in your legs or feet  Red or black, tarry stools  Unusual bleeding, bruising, or weakness    If you notice other side effects that you think are caused by this medicine, tell your doctor.  Call your doctor for medical advice about side effects. You may report side effects to FDA at 0-167-BOV-5640    Clinic will call to schedule hospital follow up with Dr. Jones. If you do not hear from them by 7/21, please call [phone].

## 2023-07-17 NOTE — PLAN OF CARE
Goal Outcome Evaluation:      Plan of Care Reviewed With: patient    Overall Patient Progress: improvingOverall Patient Progress: improving    O2 sats maintained on 1.5L Continues to have trace edema on LE. Fine crackles on lower lung lobes. Ace wraps refused. Denies pain.

## 2023-07-17 NOTE — PLAN OF CARE
Goal Outcome Evaluation:      Plan of Care Reviewed With: patient    Overall Patient Progress: improvingOverall Patient Progress: improving    Outcome Evaluation: Pt is A & O x 4, VSS, initally on 1.5 L NC this AM and increased to 2.5L as sats in low 80s. Sats in goal range on 2.5 L and per pt she wears 2-2.5L at home baseline. Lung sounds have expiratory wheeze throughout, scheduled nebs per MAR. Pt denies shortness of breathe but dyspneic when up. Pt requires 6 L NC with activity. Denies any pain. Trade edema to bilateral legs. Blood sugars stable, no sliding scale coverage needed. Magnesium replaced this shift and to be rechecked in AM long with potassium and phosphorus.

## 2023-07-17 NOTE — PROGRESS NOTES
[facility]    Medicine Progress Note - Hospitalist Service    Date of Admission:  7/9/2023    Assessment & Plan     Patient is a 73-year-old female with severe COPD, chronic respiratory failure treated with 2 to 2-1/2 L NC oxygen, paroxysmal atrial fibrillation chronically on warfarin which causes coagulation defect, hypertension, hyperlipidemia, previous left-sided breast cancer in 2019 on Arimidex and thought to be in remission, and recent supraclavicular mass for which biopsy was performed on 7/7/2023 after interruption of therapeutic anticoagulation who presented with worsening shortness of breath, cold sweats, malaise, and vomiting, and was admitted due to concerns for left upper lobe pulmonary emboli, possible right upper lobe pneumonia, and new pulmonary hypertension with acute cor pulmonale.  She was started on BiPAP and admitted into the ICU and has gradually improved and weaned down on oxygen needs overall, ranging from 1-6L NC depending on activity level.  She continues to have signs of mild volume overload and has been receiving cautious PO diuresis for the past few days, as patient had KYM follow aggressive diuresis following IV Lasix earlier in this stay.  Further work up has revealed concern for metastatic cancer with chest lymphadenopathy and likely brain metastasis and patient will be seeing her oncologist next week to discuss ongoing evaluation needed.  Physical therapy will start working with patient to evaluate for discharge plan with hopeful discharge in 1-2 days - had been recommending TCU however today patient's activity tolerance has greatly improved and she is very hopeful to discharge home with home care instead.    Acute pulmonary emboli with acute pulmonary hypertension, acute cor pulmonale, and acute on chronic respiratory failure with hypoxia and hypercapnia   Known chronic respiratory failure with hypoxia and hypercapnia treated chronically with 2-2.5 L  nasal cannula oxygen supplementation.  Presented with dyspnea, severe hypoxia, and worsening hypercapnia with acute respiratory acidosis concerning for acute on chronic hypoxic and hypercapnic respiratory failure with chest CTA demonstrated pulmonary emboli in the left upper lobe.  Echocardiogram demonstrated findings suspicious for moderate pulmonary hypertension, right heart failure, and RV pressure and volume overload with dilated IVC all of which was new compared with May 2023 and strongly suspicious for acute cor pulmonale.  Troponin has been elevated probably due to acute cor pulmonale and remains stable.  Radiographic findings also demonstrated possible right upper lobe infiltrate and she presented with signs of acute COPD exacerbation both of which probably also contributed to acute on chronic respiratory failure.  INR was normal July 5 just prior to biopsy of supraclavicular mass, remained subtherapeutic upon admission, and patient had not been receiving alternative bridging anticoagulation while chronic warfarin therapy was interrupted recently.  Lack of anticoagulation likely contributed to venous thromboembolism, and there is also suspicion for recurrent malignancy with possible associated hypercoagulable state.  Venous thromboembolism has been treated with IV heparin therapeutic anticoagulation and now transitioned to Eliquis as per oncology recommendations.  Acute on chronic respiratory failure was initially treated with BiPAP followed by transition to high flow nasal cannula and she is gradually improving with decreasing oxygen requirement and symptomatic improvement in dyspnea but is needing oxymask to maintain saturations this afternoon after ambulation - currently on 3L.  She had received aggressive diuresis to treat acute cor pulmonale and volume overload initially but developed transient hypotension and acute kidney injury such that diuresis was discontinued.  She has been restarted on low dose  Lasix 20 mg on 7/15/23 with good response and so far renal tolerance and is now back to her admission weight of 166 lbs.   -Continue Lasix 20 mg PO x1 today and monitor for diuresis response and monitor further dosing based on response and renal tolerance  -Titrate and wean oxygen to keep oxygen saturations 86-94% and maintain stable hypercapnia without worsening respiratory acidosis  -Continue Eliquis at higher dose due to acute PE followed by lower maintenance dose  -Use ace wraps on lower legs for treatment of edema as patient will allow    Pneumonia of right upper lobe due to infectious organism  Patient presented with dyspnea, radiographic infiltrates, worsening respiratory failure, and leukocytosis with left shift all raising suspicion for infectious pneumonia.  Testing for urine pneumococcal antigen, rapid testing for COVID, influenza, and RSV, and blood cultures have all been negative so far.  Sepsis has not been suspected.  She completed treatment course with azithromycin and received last dose of Rocephin last evening  -monitor off antibiotics for ongoing clinical improvement     COPD exacerbation  Emphysema  Known emphysema and COPD and presented with decreased lung sounds and tight wheezy respirations suspicious for COPD exacerbation.  Signs of COPD exacerbation have improved with treatment including corticosteroids and bronchodilators.  COPD exacerbation likely contributed to acute on chronic respiratory failure.  -Continue taper of prednisone of 20 mg daily x2 more days followed by 4 days of 10 mg dosing with these orders placed in Epic  -Continue DuoNebs 4 times daily with additional bronchodilators as needed for wheezing or shortness of breath  -Wean O2 supplementation as able    Right supraclavicular mass  Lymphadenopathy in chest  Abnormal brain MRI  Patient underwent biopsy of right supraclavicular mass on 7/7/2023 with benign results and no evidence for malignancy although it appears as though  thyroid tissue was sampled rather than lymphoid tissue.  She also has been noted to have intrathoracic lymphadenopathy.  Brain MRI performed 7/14 is abnormal with left brain lesion and associated vasogenic edema radiographically that is most worrisome for metastatic lesion as well as other small lesions of unclear etiology and clinical significance.  Combination of persistent supraclavicular mass, intrathoracic lymphadenopathy, and abnormal brain MRI results remain worrisome for malignancy, but malignancy has not yet been confirmed.  -patient will continuing current treatment plan for her acute medical problems and pursuing very close outpatient follow-up with oncology and/or pulmonology regarding the persistent concerns about possible recurrent malignancy.  Appointment has been made for 7/20 with oncology    Paroxysmal atrial fibrillation with RVR  Warfarin induced coagulopathy  Paroxysmal wide-complex tachycardia, suspect atrial fibrillation with aberrant conduction  Patient has reported that she has been taking diltiazem chronically without interruption.  Had episode of A-fib with RVR earlier in this hospitalization treated transiently with diltiazem infusion with adequate rate control after transition from infusion to oral diltiazem.  She had a 9 beat episode of asymptomatic wide-complex tachycardia about 10:30 PM on 7/11 and a 14 beat run of wide-complex tachycardia at approximately 4 AM on 7/14 that are most suspicious for paroxysmal atrial fibrillation with aberrant conduction in left bundle branch block pattern rather than ventricular dysrhythmia.  Both episodes of wide-complex tachycardia resolved without specific intervention.  She otherwise has maintained sinus rhythm over recent days and telemetry has now been discontinued.    -Continue higher dose diltiazem extended release 240 mg daily   -Continue anticoagulation as described above    Acute kidney injury superimposed on CKD (chronic kidney disease)  stage 3  Baseline creatinine 1.0-1.2 and increased up to 1.76 during hospitalization coinciding with acute cor pulmonale, IV Lasix treatment, and transient hypotension and concerning for acute kidney injury superimposed upon stage III chronic kidney disease.  Renal function has recovered to baseline and so far is tolerating more cautious PO diuresis well  -Continuing chronic thiazide diuretic   -Lasix 20 mg PO x1 today  -continue to monitor renal function as cautious diuretics are restarted    Essential hypertension, benign  Chronic hypertension normally treated with hydrochlorothiazide, diltiazem and lisinopril.  Had transient hypotension due to acute cor pulmonale early in this hospital stay but has now been hypertensive but blood pressures trending downward overall as diuresis occurs  -Continue chronic doses of hydrochlorothiazide 25 mg daily and lisinopril 2.5 mg daily and higher dose diltiazem  mg daily   -Lasix 20 mg x1 today  -Continue labetalol IV as needed for severe hypertension    Anemia, unspecified type  Noted to be anemic at admission with hemoglobin 10.8.  Anemia has worsened during hospitalization, but hemoglobin has stabilized at 9-10 for several days.  She has ecchymosis but no obvious bleeding.  -Monitor hemoglobin as indicated    Hyponatremia  Serum sodium was normal at admission but decreased as low as 133 during hospitalization.  Hyponatremia has since resolved even after resuming chronic thiazide diuretic therapy and starting lasix treatment.  -Monitor sodium daily for ongoing resolution     Coronary artery disease involving native coronary artery of native heart without angina pectoris  CAD was diagnosed in 2020 apparently based on radiographic findings of coronary artery calcifications noted following her cancer treatment.  Cardiology advised medical management previously.  Patient has not had any symptoms concerning for angina and did have a thorough work-up performed for exertional  dyspnea in April 2023 including negative Lexiscan.  Elevated troponin during this hospitalization has been attributed to acute cor pulmonale and possibly paroxysmal arrhythmias rather than ischemic heart disease.  -Continue chronic management as previously advised by cardiology including optimization of treatment of hypertension and chronic statin therapy    Prediabetes  She was started on metformin for treatment of prediabetes several months ago with hemoglobin A1C most recently 5.8.  Glycemic control has been adequate during hospitalization even while she is receiving treatment with corticosteroids.  Metformin has been held after chest CTA and due to KYM.  -Continue to hold metformin   -monitor blood sugars before meals and bedtime and continue Novolog coverage low resistance with meals and bedtime.  -Continue moderate carbohydrate diet    Hyperlipidemia  Chronic and treated with atorvastatin  -Continue chronic dose of atorvastatin    History of malignant neoplasm of overlapping sites of left breast in female, estrogen receptor positive  Previous history of left breast cancer that was treated and considered to be in remission.  Patient has continued Arimidex treatment since then.  Arimidex has been held during this hospitalization due to to acute venous thromboembolism.  Although there is suspicion for recurrent malignancy as noted above, it is not clear whether this is due to metastatic breast cancer or a second malignancy such as lung cancer.  -In discussion with IP pharmacy team, given patient's clinical improvement and duration of anticoagulation during this stay, there is minimal risk to restart Arimidex at this time and restart this morning  -will need evaluation by oncology team as an outpatient shortly after hospital discharge     Major depression in complete remission  Chronically treated with Effexor with depressive symptoms well controlled  -Continue chronic Effexor    Obesity  Chronic with BMI 30-31  "and likely contributes to health problems including prediabetes  -No acute intervention     Diet: Moderate Consistent Carb (60 g CHO per Meal) Diet    DVT Prophylaxis: DOAC  [place] Catheter: Not present  Lines: None     Cardiac Monitoring: None  Code Status: Full Code      Clinically Significant Risk Factors                  # Hypertension: Noted on problem list        # Overweight: Estimated body mass index is 29.45 kg/m  as calculated from the following:    Height as of this encounter: 1.6 m (5' 3\").    Weight as of this encounter: 75.4 kg (166 lb 3.6 oz).           Disposition Plan      Expected Discharge Date: 07/19/2023      Destination: home with family  Discharge Comments: PT Eval, not medically ready. TCU vs [facility]          Nilam Laboy MD  Hospitalist Service  [facility]  Securely message with [employer] (more info)  Text page via [employer] Paging/Directory   ______________________________________________________________________    Interval History   Patient reports having an overall very good night - did have to get up several times to urinate after afternoon Lasix given but feels rested today and breathing more easily.  Appetite fair but patient making an effort to increase amount of intake.  No new concerns.     Physical Exam   Vital Signs: Temp: 98  F (36.7  C) Temp src: Oral BP: (!) 165/54 Pulse: 82   Resp: 18 SpO2: 94 % O2 Device: Nasal cannula Oxygen Delivery: 1.5 LPM  Weight: 166 lbs 3.63 oz    Constitutional: awake, alert, cooperative, no apparent distress, and appears stated age  Respiratory: No increased work of breathing, decreased air exchange, clear to auscultation bilaterally, no crackles or wheezing  Cardiovascular: RRR  GI: bowel sounds present, abdomen soft and non-tender  Musculoskeletal: trace non-pitting edema present in bilateral lower legs  Neurologic: Awake, alert, oriented to name, place and situation     Medical Decision Making       40 " MINUTES SPENT BY ME on the date of service doing chart review, history, exam, documentation & further activities per the note.      Data     I have personally reviewed the following data over the past 24 hrs:    15.2 (H)  \   9.4 (L)   / 222     136 94 (L) 25.7 (H) /  128 (H)   4.1 35 (H) 1.16 (H) \

## 2023-07-18 NOTE — PLAN OF CARE
Goal Outcome Evaluation:      Plan of Care Reviewed With: patient    Overall Patient Progress: no changeOverall Patient Progress: no change    Outcome Evaluation: Patient up in chair for meals. 3L NC at rest and 6L with activity. Plan to discharge tomorrow. IV SL. SOB and dyspneic with activity. Lungs are diminished with crackles in the bases and slight wheezing to the left upper lobe. Denies pain. Bruising scattered to body.

## 2023-07-18 NOTE — PLAN OF CARE
Goal Outcome Evaluation:      Plan of Care Reviewed With: patient    Overall Patient Progress: no changeOverall Patient Progress: no change    Outcome Evaluation: Oxygen has been on at 2.5-3L today. Pt states she is feeling better. Daughter here this evening and showered patient. She tolerated it well. Seroquel given per her request at HS.

## 2023-07-18 NOTE — PROGRESS NOTES
Former[facility]    Medicine Progress Note - Hospitalist Service    Date of Admission:  7/9/2023    Assessment & Plan   Patient is a 73-year-old female with severe COPD, chronic respiratory failure treated with 2 to 2-1/2 L NC oxygen, paroxysmal atrial fibrillation chronically on warfarin which causes coagulation defect, hypertension, hyperlipidemia, previous left-sided breast cancer in 2019 on Arimidex and thought to be in remission, and recent supraclavicular mass for which biopsy was performed on 7/7/2023 after interruption of therapeutic anticoagulation who presented with worsening shortness of breath, cold sweats, malaise, and vomiting, and was admitted due to concerns for left upper lobe pulmonary emboli, possible right upper lobe pneumonia, and new pulmonary hypertension with acute cor pulmonale.  She was started on BiPAP and admitted into the ICU.    Acute pulmonary emboli with acute pulmonary hypertension, acute cor pulmonale, and acute on chronic respiratory failure with hypoxia and hypercapnia   Known chronic respiratory failure with hypoxia and hypercapnia treated chronically with 2-2.5 L nasal cannula oxygen supplementation.  Presented with dyspnea, severe hypoxia, and worsening hypercapnia with acute respiratory acidosis concerning for acute on chronic hypoxic and hypercapnic respiratory failure with chest CTA demonstrated pulmonary emboli in the left upper lobe.  Echocardiogram demonstrated findings suspicious for moderate pulmonary hypertension, right heart failure, and RV pressure and volume overload with dilated IVC all of which was new compared with May 2023 and strongly suspicious for acute cor pulmonale.  Troponin was elevated presumed due to acute cor pulmonale and remained stable.  Radiographic findings also demonstrated possible right upper lobe infiltrate and she presented with signs of acute COPD exacerbation both of which probably also contributed to acute on  chronic respiratory failure.  INR was normal July 5 just prior to biopsy of supraclavicular mass, remained subtherapeutic upon admission, and patient had not been receiving alternative bridging anticoagulation while chronic warfarin therapy was interrupted recently.  Lack of anticoagulation likely contributed to venous thromboembolism, and there is also suspicion for recurrent malignancy with possible associated hypercoagulable state.  Venous thromboembolism was treated with IV heparin therapeutic anticoagulation followed by transition to Eliquis as per oncology recommendations.  Acute on chronic respiratory failure was initially treated with BiPAP followed by transition to high flow nasal cannula and then low flow nasal cannula.  She had received aggressive diuresis to treat acute cor pulmonale and volume overload initially but developed transient hypotension and acute kidney injury such that diuresis was discontinued.  She was restarted on low dose Lasix with good response, recovery to her admission weight of 166 lbs, and stable renal function.   -Continue Lasix 20 mg PO daily   -Titrate and wean oxygen to keep oxygen saturations 86-94% and maintain stable hypercapnia without worsening respiratory acidosis  -Continue Eliquis at higher dose due to acute PE followed by lower maintenance dose  -Use ace wraps on lower legs for treatment of edema as patient will allow    Pneumonia of right upper lobe due to infectious organism  Patient presented with dyspnea, radiographic infiltrates, worsening respiratory failure, and leukocytosis with left shift all raising suspicion for infectious pneumonia.  Testing for urine pneumococcal antigen, rapid testing for COVID, influenza, and RSV, and blood cultures have all been negative so far.  Sepsis has not been suspected.  She completed treatment course with azithromycin and  Rocephin during hospitalization.  Persistent leukocytosis is likely due to the effect of  corticosteroids.  -monitor clinically    COPD exacerbation  Emphysema  Known emphysema and COPD and presented with decreased lung sounds and tight wheezy respirations suspicious for COPD exacerbation.  Signs of COPD exacerbation have improved with treatment including corticosteroids and bronchodilators.  COPD exacerbation likely contributed to acute on chronic respiratory failure.  -Continue taper of prednisone starting 10 mg daily dosing for 4 days on 7/19   -Continue Breo inhaler and DuoNebs 4 times daily with additional bronchodilators as needed for wheezing or shortness of breath  -Wean O2 supplementation as able    Right supraclavicular mass  Lymphadenopathy in chest  Abnormal brain MRI  Patient underwent biopsy of right supraclavicular mass on 7/7/2023 with benign results and no evidence for malignancy although it appears as though thyroid tissue was sampled rather than lymphoid tissue.  She also has been noted to have intrathoracic lymphadenopathy.  Brain MRI performed 7/14 is abnormal with left brain lesion and associated vasogenic edema radiographically that is most worrisome for metastatic lesion as well as other small lesions of unclear etiology and clinical significance.  Combination of persistent supraclavicular mass, intrathoracic lymphadenopathy, and abnormal brain MRI results remain worrisome for malignancy, but malignancy has not yet been confirmed.  -patient will continuing current treatment plan for her acute medical problems and pursuing very close outpatient follow-up with oncology regarding the persistent concerns about possible recurrent malignancy.  Appointment has been made for 7/20 with oncology    Paroxysmal atrial fibrillation with RVR  Warfarin induced coagulopathy  Paroxysmal wide-complex tachycardia, suspect atrial fibrillation with aberrant conduction  Patient has reported that she has been taking diltiazem chronically without interruption.  Had episode of A-fib with RVR earlier in  this hospitalization treated transiently with diltiazem infusion with adequate rate control after transition from infusion to oral diltiazem.  She had a 9 beat episode of asymptomatic wide-complex tachycardia about 10:30 PM on 7/11 and a 14 beat run of wide-complex tachycardia at approximately 4 AM on 7/14 that are most suspicious for paroxysmal atrial fibrillation with aberrant conduction in left bundle branch block pattern rather than ventricular dysrhythmia.  Both episodes of wide-complex tachycardia resolved without specific intervention.  She otherwise has maintained sinus rhythm over recent days and telemetry has now been discontinued.    -Continue higher dose diltiazem extended release 240 mg daily   -Continue anticoagulation as described above    Acute kidney injury superimposed on CKD (chronic kidney disease) stage 3  Baseline creatinine 1.0-1.2 and increased up to 1.76 during hospitalization coinciding with acute cor pulmonale, IV Lasix treatment, and transient hypotension and concerning for acute kidney injury superimposed upon stage III chronic kidney disease.  Renal function has recovered to baseline and so far is tolerating daily Lasix along with chronic hydrochlorothiazide  -Continuing chronic thiazide diuretic   -Lasix 20 mg PO daily  -continue to monitor renal function    Essential hypertension, benign  Chronic hypertension normally treated with hydrochlorothiazide, diltiazem and lisinopril.  Had transient hypotension due to acute cor pulmonale early in this hospital stay but has now been hypertensive but blood pressures trending downward overall as diuresis occurs  -Continue chronic doses of hydrochlorothiazide 25 mg daily and lisinopril 2.5 mg daily and higher dose diltiazem  mg daily   -Lasix 20 mg daily  -Continue labetalol IV as needed for severe hypertension    Anemia, unspecified type  Noted to be anemic at admission with hemoglobin 10.8.  Anemia has worsened during hospitalization, but  hemoglobin has stabilized at 9-10 for several days.  She has ecchymosis but no obvious bleeding.  -Monitor hemoglobin as indicated    Hyponatremia  Serum sodium was normal at admission but decreased as low as 133 during hospitalization.  Hyponatremia has since resolved even after resuming chronic thiazide diuretic therapy and starting lasix treatment.  -Monitor sodium daily for ongoing resolution     Coronary artery disease involving native coronary artery of native heart without angina pectoris  CAD was diagnosed in 2020 apparently based on radiographic findings of coronary artery calcifications noted following her cancer treatment.  Cardiology advised medical management previously.  Patient has not had any symptoms concerning for angina and did have a thorough work-up performed for exertional dyspnea in April 2023 including negative Lexiscan.  Elevated troponin during this hospitalization has been attributed to acute cor pulmonale and possibly paroxysmal arrhythmias rather than ischemic heart disease.  -Continue chronic management as previously advised by cardiology including optimization of treatment of hypertension and chronic statin therapy    Prediabetes  She was started on metformin for treatment of prediabetes several months ago with hemoglobin A1C most recently 5.8.  Glycemic control has been adequate during hospitalization even while she is receiving treatment with corticosteroids.  Metformin has been held after chest CTA and due to KYM.  -Stop metformin   -monitor blood sugars before meals and bedtime and continue Novolog coverage low resistance with meals and bedtime.  -Continue moderate carbohydrate diet    Hyperlipidemia  Chronic and treated with atorvastatin  -Continue chronic dose of atorvastatin    History of malignant neoplasm of overlapping sites of left breast in female, estrogen receptor positive  Previous history of left breast cancer that was treated and considered to be in remission.  Patient  has continued Arimidex treatment since then.  Arimidex has been held during this hospitalization due to to acute venous thromboembolism.  Although there is suspicion for recurrent malignancy as noted above, it is not clear whether this is due to metastatic breast cancer or a second malignancy such as lung cancer.  Arimidex has been restarted uneventfully.  -Continue chronic dose of Arimidex  -Anticipate evaluation by oncology team as an outpatient shortly after hospital discharge     Major depression in complete remission  Chronically treated with Effexor with depressive symptoms well controlled  -Continue chronic Effexor    Obesity  Chronic with BMI 30-31 and likely contributes to health problems including prediabetes  -No acute intervention       Diet: Moderate Consistent Carb (60 g CHO per Meal) Diet    DVT Prophylaxis: DOAC  [place]once Catheter: Not present  Lines: None     Cardiac Monitoring: None  Code Status: Full Code      Clinically Significant Risk Factors                                 Disposition Plan             Dat Peters MD  Hospitalist Service  [facility]  Securely message with [employer] (more info)  Text page via [employer] Paging/Directory   ______________________________________________________________________    Interval History   There were no significant overnight events.  She generally feels better.  She is tolerating advancing ambulation using a walker.  She is hoping she can discharge home once medically stable.  She was awakened overnight by episodes of her oximeter alarm during sleep.  Since waking, she has maintained adequate saturations.  She remains afebrile and hemodynamically stable with elevated blood pressure readings.  She has had good urine output.  She thinks her legs are still swollen today is not sure whether they have changed.  She is tolerating oral intake.    Physical Exam   Vital Signs: Temp: 97.8  F (36.6  C) Temp src: Oral BP: (!) 161/64 Pulse:  78   Resp: 20 SpO2: 94 % O2 Device: Nasal cannula with humidification Oxygen Delivery: 3 LPM  Patient Vitals for the past 24 hrs:   BP Temp Temp src Pulse Resp SpO2 Weight   07/18/23 0740 (!) 161/64 97.8  F (36.6  C) Oral 78 20 94 % --   07/18/23 0544 -- -- -- -- -- -- 74.2 kg (163 lb 9.6 oz)   07/18/23 0300 -- -- -- -- -- 94 % --   07/18/23 0200 -- -- -- -- -- 96 % --   07/17/23 2250 (!) 143/56 97.9  F (36.6  C) Oral 86 20 94 % --   07/17/23 2238 -- -- -- -- -- 93 % --   07/17/23 2200 -- -- -- -- -- 94 % --   07/17/23 2100 -- -- -- -- -- 94 % --   07/17/23 2051 -- -- -- -- -- 94 % --   07/17/23 2000 -- -- -- -- -- 91 % --   07/17/23 1900 -- -- -- -- -- 97 % --   07/17/23 1757 -- -- -- -- -- (!) 85 % --   07/17/23 1730 -- -- -- -- -- 91 % --   07/17/23 1700 -- -- -- -- -- 90 % --   07/17/23 1645 -- -- -- -- -- (!) 86 % --   07/17/23 1632 (!) 167/66 98.2  F (36.8  C) Oral 64 20 92 % --   07/17/23 1554 -- -- -- -- -- 90 % --   07/17/23 1500 -- -- -- -- -- (!) 85 % --       Weight: 163 lbs 9.6 oz    General Appearance: No acute distress sitting at the edge of her bed  Respiratory: Normal respiratory effort, diminished breath sounds throughout, few scattered wheezes  Cardiovascular: Regular rate and rhythm, good radial pulse, brisk capillary refill, mild peripheral edema     Medical Decision Making           Data     I have personally reviewed the following data over the past 24 hrs:    15.5 (H)  \   9.8 (L)   / 226     136 95 (L) 26.1 (H) /  91   4.1 35 (H) 1.16 (H) \       Magnesium 2.1, phosphorus 4.1  Blood sugars range  over the last day    Venous Blood Gas  Recent Labs   Lab 07/18/23  0649 07/17/23  0633 07/16/23  0649 07/15/23  0608   PHV 7.42 7.40 7.38 7.38   PCO2V 63* 65* 65* 60*   PO2V 46 57* 28 34   HCO3V 40* 40* 38* 36*   JORGE 12.8* 13.5* 10.9* 9.0*   O2PER 32 32 40 24       Recent Labs   Lab 07/18/23  0743 07/18/23  0649 07/18/23  0304 07/17/23  0755 07/17/23  0633 07/16/23  0735 07/16/23  0649  07/12/23  0728 07/12/23  0516   WBC  --  15.5*  --   --  15.2*  --  16.9*  --  14.7*   HGB  --  9.8*  --   --  9.4*  --  10.4*  --  9.2*   MCV  --  92  --   --  93  --  92  --  92   PLT  --  226  --   --  222  --  237  --  183   INR  --   --   --   --   --   --   --   --  1.12   NA  --  136  --   --  136  --  139   < > 137   POTASSIUM  --  4.1  --   --  4.1  --  4.3   < > 4.3   CHLORIDE  --  95*  --   --  94*  --  98   < > 98   CO2  --  35*  --   --  35*  --  33*   < > 30*   BUN  --  26.1*  --   --  25.7*  --  21.0   < > 28.7*   CR  --  1.16*  --   --  1.16*  --  1.16*   < > 1.29*   ANIONGAP  --  6*  --   --  7  --  8   < > 9   IVANNA  --  9.8  --   --  9.9  --  10.0   < > 9.2   GLC 91 93 94   < > 89   < > 87   < > 103*    < > = values in this interval not displayed.

## 2023-07-18 NOTE — PLAN OF CARE
Goal Outcome Evaluation:      Plan of Care Reviewed With: patient    Overall Patient Progress: no changeOverall Patient Progress: no change    Outcome Evaluation: 2-3 L NC overnight, 6L with activity.  Onc appointment on 7/20.  To TCU or home with home health soon.

## 2023-07-18 NOTE — PROGRESS NOTES
Care Management Follow Up    Length of Stay (days): 9    Expected Discharge Date: 07/19/2023     Concerns to be Addressed:  discharge planning        Patient plan of care discussed at interdisciplinary rounds: Yes    Anticipated Discharge Disposition: Home, Home Care     Anticipated Discharge Services: Home Care RN and PT     Anticipated Discharge DME:  Home Oxygen     Patient/family educated on Medicare website which has current facility and service quality ratings: yes    Education Provided on the Discharge Plan: yes      Patient/Family in Agreement with the Plan: yes    Referrals Placed by CM/SW: Internal Clinic Care Coordination, Homecare, Scheduled Follow-up appointments    Private pay costs discussed: Not applicable    Additional Information:  Respiratory therapy has called Care Management and indicated that patient will need increased oxygen at 6 liters upon discharge to home and her home tank only goes to 5 liters.       has called and left a message with intake at [facility] in [place] #[phone], where patient currently gets her oxygen from.  Awaiting a call back to discuss need for different oxygen set up at home.       has sent communication to the [facility] (Phone: [phone]) liaison, Veronica, regarding patient's anticipated discharge to home tomorrow.      [id number]-  received call back from Marta at [facility].  She stated that patient will need testing to qualify for the 10L or High Flow concentrator.   spoke with the charge nurse, Estrella, and she plans to call Marta directly #[phone] regarding the specifications of the testing needed in order for patient to qualify for the 10L concentrator.      ERICA Baumann  [facility]   [phone]

## 2023-07-18 NOTE — PLAN OF CARE
Physical Therapy Discharge Summary    Reason for therapy discharge:    All goals and outcomes met, no further needs identified.    Progress towards therapy goal(s). See goals on Care Plan in [facility] electronic health record for goal details.  Goals met    Therapy recommendation(s):    Continued therapy is recommended.  Rationale/Recommendations:  patient would benefit from HHPT to address functional endurance and energy conservation training in the home for improved quality of life.    Thank you for your referral.  Candis Owens, PT, DPT, ATC, [facility]  O: [phone]  E: [email or web address]

## 2023-07-18 NOTE — PROGRESS NOTES
"CLINICAL NUTRITION SERVICES    Reviewed nutrition risk factors due to LOS. Pt is tolerating moderate consistent carb (60 g CHO per meal) diet well. Has been eating % consistently per nursing documentation. Only an occasional 50% intake. Appetite last rated as \"good.\" Adequate PO fluid intake. GI is WDL, no concerns. Last BM was today - soft, brown.     No nutrition issues identified at this time. RD will follow via rounds at this time, unless consulted.    Kaycee Sanchez RD, LD  Clinical Dietitian  Office: [phone]  Weekend pager: [phone]  "

## 2023-07-19 NOTE — DISCHARGE SUMMARY
"[facility]  Hospitalist Discharge Summary      Date of Admission:  7/9/2023  Date of Discharge:  7/19/2023  Discharging Provider: Dat Peters MD  Discharge Service: Hospitalist Service    Discharge Diagnoses   Principal Problem:    Acute pulmonary embolism with suspected acute cor pulmonale, unspecified pulmonary embolism type, left upper lobe  Active Problems:    Acute on chronic respiratory failure with hypoxia and hypercapnia (H)    COPD exacerbation (H)    Pulmonary emphysema, unspecified emphysema type (H)    CKD (chronic kidney disease) stage 3, GFR 30-59 ml/min (H)    Coronary artery disease involving native coronary artery of native heart without angina pectoris    Atrial fibrillation, paroxysmal     Supraclavicular mass-benign biopsy results 7/7/23    Pneumonia of right upper lobe due to infectious organism    Lymphadenopathy in chest    Prediabetes    Atrial fibrillation with RVR (H)    Pulmonary hypertension (H)    Acute kidney injury (H)    Warfarin-induced coagulopathy (H)    Anemia, unspecified type    Wide-complex tachycardia    Abnormal brain MRI    Hyperlipidemia LDL goal <130    Essential hypertension, benign    Major depression in complete remission (H)    Troponin level elevated    Personal history of malignant neoplasm of breast    Hyponatremia        Clinically Significant Risk Factors     # Overweight: Estimated body mass index is 28.91 kg/m  as calculated from the following:    Height as of this encounter: 1.6 m (5' 3\").    Weight as of this encounter: 74 kg (163 lb 3.2 oz).       Follow-ups Needed After Discharge   Follow-up Appointments     Follow-up and recommended labs and tests       Follow up with primary care provider, Nazario Jones MD, within 7   days to evaluate medication change, for hospital follow- up, and regarding   new diagnosis.  The following labs/tests are recommended: potassium and   magnesium within 1 week.            Discharge " Disposition   Admited to home care:   Agency: [facility]  Discharged to home  Condition at discharge: Stable    Hospital Course   Patient is a 73-year-old female with severe COPD, chronic respiratory failure treated with 2 to 2-1/2 L NC oxygen, paroxysmal atrial fibrillation chronically on warfarin which causes coagulation defect, hypertension, hyperlipidemia, previous left-sided breast cancer in 2019 on Arimidex and thought to be in remission, and recent supraclavicular mass for which biopsy was performed on 7/7/2023 after interruption of therapeutic anticoagulation who presented with worsening shortness of breath, cold sweats, malaise, and vomiting, and was admitted due to concerns for left upper lobe pulmonary emboli, possible right upper lobe pneumonia, and new pulmonary hypertension with acute cor pulmonale.  She was started on BiPAP and admitted into the ICU.    Acute pulmonary emboli with acute pulmonary hypertension, acute cor pulmonale, and acute on chronic respiratory failure with hypoxia and hypercapnia   Known chronic respiratory failure with hypoxia and hypercapnia treated chronically with 2-2.5 L nasal cannula oxygen supplementation.  Presented with dyspnea, severe hypoxia, and worsening hypercapnia with acute respiratory acidosis concerning for acute on chronic hypoxic and hypercapnic respiratory failure with chest CTA demonstrated pulmonary emboli in the left upper lobe.  Echocardiogram demonstrated findings suspicious for moderate pulmonary hypertension, right heart failure, and RV pressure and volume overload with dilated IVC all of which was new compared with May 2023 and strongly suspicious for acute cor pulmonale.  Troponin was elevated presumed due to acute cor pulmonale and remained stable.  Radiographic findings also demonstrated possible right upper lobe infiltrate and she presented with signs of acute COPD exacerbation both of which probably also contributed to acute on chronic respiratory  failure.  INR was normal July 5 just prior to biopsy of supraclavicular mass, remained subtherapeutic upon admission, and patient had not been receiving alternative bridging anticoagulation while chronic warfarin therapy was interrupted recently.  Lack of anticoagulation likely contributed to venous thromboembolism, and there is also suspicion for recurrent malignancy with possible associated hypercoagulable state.  Venous thromboembolism was treated with IV heparin therapeutic anticoagulation followed by transition to Eliquis as per oncology recommendations.  Acute on chronic respiratory failure was initially treated with BiPAP followed by transition to high flow nasal cannula and then low flow nasal cannula.  Although she had recovered to her baseline need for oxygen supplementation at rest by discharge, she was continuing to require more oxygen supplementation 6 L nasal cannula with activity at the time of discharge.  While initially receiving aggressive diuresis to treat acute cor pulmonale and volume overload she developed transient hypotension and acute kidney injury such that diuresis was discontinued.  As she continued to improve with resolution of KYM, she was restarted on low dose Lasix with good response, recovery to her admission weight of 166 lbs, and stable renal function.  Ongoing daily Lasix was recommended after discharge with close follow-up with her PCP.    On the day of discharge writer met with the patient as well as her spouse and daughter.  Her diagnoses, treatment recommendations, and prognosis were reviewed.  Discharge planning process was briefly reviewed including processes for assessing discharge needs with assistance from therapy team and care transitions and anticipated follow-up with home care services after discharge.  Their questions and concerns were answered and addressed.  Patient appeared to have clear understanding of the situation and continued to express preference to  discharge home in accordance with discharge recommendations that were provided.  Her daughter appeared to be upset and left the room during the course of this discussion indicating that she had to leave the hospital to help coordinate home oxygen arrangements.    Pneumonia of right upper lobe due to infectious organism  Patient presented with dyspnea, radiographic infiltrates, worsening respiratory failure, and leukocytosis with left shift all raising suspicion for infectious pneumonia.  Testing for urine pneumococcal antigen, rapid testing for COVID, influenza, and RSV, and blood cultures were all negative.  Sepsis was not suspected.  She completed treatment course with azithromycin and  Rocephin during hospitalization.  Persistent leukocytosis was attributed to the effect of corticosteroid treatment.    COPD exacerbation  Emphysema  Known emphysema and COPD and presented with decreased lung sounds and tight wheezy respirations suspicious for COPD exacerbation.  Signs of COPD exacerbation improved with treatment including corticosteroids and bronchodilators.  COPD exacerbation likely contributed to acute on chronic respiratory failure.  She was advised to complete taper of prednisone after discharge, resume Spiriva and Dulera, and use albuterol inhaler 4 times daily.  She will also continue to use albuterol inhaler and/or nebulizer treatments as needed for breakthrough dyspnea or wheezing.    Right supraclavicular mass  Lymphadenopathy in chest  Abnormal brain MRI  Patient underwent biopsy of right supraclavicular mass on 7/7/2023 with benign results and no evidence for malignancy although it appears as though thyroid tissue was sampled rather than lymphoid tissue.  She also had been noted to have intrathoracic lymphadenopathy.  Brain MRI performed 7/14 at recommendation of oncology was abnormal with left brain lesion and associated vasogenic edema radiographically that was worrisome for metastatic lesion as well  as other small lesions of unclear etiology and clinical significance.  Combination of persistent supraclavicular mass, intrathoracic lymphadenopathy, and abnormal brain MRI results remained worrisome for malignancy, but recurrent or new malignancy had not yet been confirmed.  Outpatient follow-up with oncology was arranged for 7/20.  Case was discussed by telephone with her pulmonologist on the day of discharge who expressed intent to coordinate additional outpatient evaluation for suspected malignancy with oncology.    Paroxysmal atrial fibrillation with RVR  Warfarin induced coagulopathy  Paroxysmal wide-complex tachycardia, suspect atrial fibrillation with aberrant conduction  Patient reported that she has been taking diltiazem chronically without interruption.  She had episode of A-fib with RVR during hospitalization treated transiently with diltiazem infusion with adequate rate control after transition from infusion to oral diltiazem.  She had 2 episodes of asymptomatic wide-complex tachycardia that were most suspicious for paroxysmal atrial fibrillation with aberrant conduction in left bundle branch block pattern rather than ventricular dysrhythmia.  Both episodes of wide-complex tachycardia resolved without specific intervention.  She otherwise maintained sinus rhythm.  She was advised to continue higher dose of diltiazem and therapeutic anticoagulation.  In the context of paroxysmal atrial fibrillation, magnesium was also relatively low and she was advised to continue treatment with higher dose magnesium supplementation after discharge at least until outpatient follow-up.    Acute kidney injury superimposed on CKD (chronic kidney disease) stage 3  Baseline creatinine 1.0-1.2 and increased up to 1.76 during hospitalization coinciding with acute cor pulmonale, IV Lasix treatment, and transient hypotension and concerning for acute kidney injury superimposed upon stage III chronic kidney disease.  Renal function  recovered to baseline and she subsequently tolerated low-dose daily Lasix along with chronic hydrochlorothiazide.    Essential hypertension, benign  Chronic hypertension was normally treated with hydrochlorothiazide, diltiazem and lisinopril.  She had transient hypotension due to acute cor pulmonale but then became hypertensive as her acute medical problems stabilized.  Dose of diltiazem was increased and Lasix was added.    Anemia, unspecified type  Noted to be anemic at admission with hemoglobin 10.8.  Anemia worsened during hospitalization, but hemoglobin stabilized at 9-10.  She had skin ecchymoses but no hematoma or other obvious bleeding.    Hyponatremia  Serum sodium was normal at admission but decreased as low as 133 during hospitalization.  Hyponatremia resolved even after resuming chronic thiazide diuretic therapy and starting Lasix treatment.    Coronary artery disease involving native coronary artery of native heart without angina pectoris  CAD was diagnosed in 2020 apparently based on radiographic findings of coronary artery calcifications noted following her cancer treatment.  Cardiology advised medical management previously.  She did not have any symptoms concerning for angina and did have a thorough work-up performed for exertional dyspnea in April 2023 including negative Lexiscan.  Elevated troponin during this hospitalization was attributed to acute cor pulmonale and possibly paroxysmal arrhythmias rather than ischemic heart disease.    Prediabetes  She was started on metformin previously for treatment of prediabetes several months ago with hemoglobin A1C most recently 5.8.  Glycemic control was adequate during hospitalization even while she was receiving treatment with corticosteroids.  Metformin has been held after chest CTA and subsequently stopped due to KYM.    Hyperlipidemia  Chronic hyperlipidemia was presumed to be stable and chronic treatment with atorvastatin was continued.    History of  malignant neoplasm of overlapping sites of left breast in female, estrogen receptor positive  Previous history of left breast cancer that was treated and considered to be in remission.  Patient has continued Arimidex treatment since then.  Arimidex was initially held during this hospitalization due to to acute venous thromboembolism.  Although there was suspicion for recurrent malignancy as noted above, it was not clear whether this was due to metastatic breast cancer or a second malignancy such as lung cancer.  As her acute medical problems stabilized, Arimidex was restarted uneventfully.    Major depression in complete remission  Chronically treated with Effexor which was continued with depressive symptoms well controlled during hospitalization.    Obesity  Chronic with BMI 30-31 and likely contributes to health problems including prediabetes    Consultations This Hospital Stay   PHARMACY IP CONSULT  RESPIRATORY CARE IP CONSULT  CARE MANAGEMENT / SOCIAL WORK IP CONSULT  PHYSICAL THERAPY ADULT IP CONSULT  CARE MANAGEMENT / SOCIAL WORK IP CONSULT  SMOKING CESSATION PROGRAM IP CONSULT    Code Status   Full Code    Time Spent on this Encounter   I, Dat Peters MD, personally saw the patient today and spent greater than 30 minutes discharging this patient.       Dat Peters MD  [address] SURGICAL  [address] [place] [address]  Phone: [phone]  ______________________________________________________________________    Physical Exam   Vital Signs: Temp: 97.5  F (36.4  C) Temp src: Oral BP: (!) 161/81 (just returned from bathroom) Pulse: 81   Resp: 18 SpO2: 90 % O2 Device: Nasal cannula with humidification Oxygen Delivery: 2 LPM  Weight: 163 lbs 3.2 oz  General Appearance: Obese elderly woman, no acute distress resting in bed  Respiratory: Easily speaks full sentences, normal respiratory effort        Primary Care Physician   Nazario Jones MD    Discharge Orders       Medication Therapy Management Referral      Home Care Referral      Reason for your hospital stay    Hospitalized due to breathing problems from blood clots in lungs (PE), pneumonia and COPD and improved     Follow-up and recommended labs and tests     Follow up with primary care provider, Nazario Jones MD, within 7 days to evaluate medication change, for hospital follow- up, and regarding new diagnosis.  The following labs/tests are recommended: potassium and magnesium within 1 week.     Activity    Your activity upon discharge: activity as tolerated     Walker Order for DME - ONLY FOR DME    I, the undersigned, certify that the above prescribed supplies are medically necessary for this patient and is both reasonable and necessary in reference to accepted standards of medical and necessary in reference to accepted standards of medical practice in the treatment of this patient's condition and is not prescribed as a convenience.      Walker Order for DME - ONLY FOR DME    I, the undersigned, certify that the above prescribed supplies are medically necessary for this patient and is both reasonable and necessary in reference to accepted standards of medical and necessary in reference to accepted standards of medical practice in the treatment of this patient's condition and is not prescribed as a convenience.      Oxygen Adult/Peds    Oxygen Documentation  I certify that this patient, Latanay Padron has been under my care (or a nurse practitioner or physican's assistant working with me). This is the face-to-face encounter for oxygen medical necessity.      At the time of this encounter supplemental oxygen is reasonable and necessary and is expected to improve the patient's condition in a home setting.       Patient has continued oxygen desaturation due to Respiratory Failure with Hypoxia  COPD J44.9  Pulmonary Embolism  Cor pulmonale  If portability is ordered, is the patient mobile within the home? yes      Diet    Follow this diet upon discharge: Orders Placed This Encounter      Moderate Consistent Carb (60 g CHO per Meal) Diet       Significant Results and Procedures   Most Recent 3 CBC's:  Recent Labs   Lab Test 07/18/23  0649 07/17/23  0633 07/16/23  0649   WBC 15.5* 15.2* 16.9*   HGB 9.8* 9.4* 10.4*   MCV 92 93 92    222 237     Most Recent 3 BMP's:  Recent Labs   Lab Test 07/19/23  0812 07/19/23  0618 07/19/23  0139 07/18/23  0743 07/18/23  0649 07/17/23  0755 07/17/23  0633   NA  --  135*  --   --  136  --  136   POTASSIUM  --  3.9  --   --  4.1  --  4.1   CHLORIDE  --  94*  --   --  95*  --  94*   CO2  --  34*  --   --  35*  --  35*   BUN  --  26.5*  --   --  26.1*  --  25.7*   CR  --  1.34*  --   --  1.16*  --  1.16*   ANIONGAP  --  7  --   --  6*  --  7   IVANNA  --  9.7  --   --  9.8  --  9.9   GLC 95 86 104*   < > 93   < > 89    < > = values in this interval not displayed.     Most Recent 2 LFT's:  Recent Labs   Lab Test 05/08/23  1000 05/03/23  1150   AST 21 24   ALT 17 21   ALKPHOS 53 59   BILITOTAL 0.4 0.3     Most Recent Hemoglobin A1c:  Recent Labs   Lab Test 05/03/23  1150   A1C 5.8*     Most Recent 6 glucoses:  Recent Labs   Lab Test 07/19/23  0812 07/19/23  0618 07/19/23  0139 07/18/23  2105 07/18/23  1652 07/18/23  1145   GLC 95 86 104* 120* 122* 155*   ,   Results for orders placed or performed during the hospital encounter of 07/09/23   XR Chest Port 1 View    Narrative    EXAM: XR CHEST PORT 1 VIEW  LOCATION: [facility]  DATE: 7/9/2023    INDICATION: dyspnea  COMPARISON: None.      Impression    IMPRESSION: There some mild infiltrates right upper lobe and to lesser degree left mid lung concerning for pneumonia. No previous for comparison.   CT Chest Pulmonary Embolism w Contrast     Value    Radiologist flags New diagnosis of pulmonary embolism (AA)    Narrative    EXAM: CT CHEST PULMONARY EMBOLISM W CONTRAST  LOCATION: [facility]  DATE: 7/9/2023    INDICATION: significantly increased dyspnea, hypoxic to the 50's despite her home 02, recent lung biopsy, possible new active lung cancer  COMPARISON: 06/29/2023.   TECHNIQUE: CT chest pulmonary angiogram during arterial phase injection of IV contrast. Multiplanar reformats and MIP reconstructions were performed. Dose reduction techniques were used.   CONTRAST: 65 mL Isovue 370    FINDINGS:  ANGIOGRAM CHEST: Pulmonary arteries are normal in caliber. There are segmental and subsegmental filling defects in the left upper lobe. RV/LV ratio is 46/33.  Thoracic aorta is negative for dissection.    LUNGS AND PLEURA: Emphysema. Scarring in the peripheral left upper lobe. Reticular interstitial opacities more confluent opacities in the right upper lung.  Small right pleural effusion.    MEDIASTINUM/AXILLAE:  2.8 x 2.7 cm right supraclavicular mass. Multiple prominent and enlarged mediastinal and hilar lymph nodes. Left breast surgical clips and skin thickening.     CORONARY ARTERY CALCIFICATION: Moderate.    UPPER ABDOMEN: Cholecystectomy. Likely simple right renal cysts. Atherosclerotic disease.     MUSCULOSKELETAL: Superior endplate compression of T12 has not changed.       Impression    IMPRESSION:  1.  Study is positive for PE to the left upper lobe. Right heart strain is possible.  2.  Right lung opacities, lymphadenopathy, and a right supraclavicular mass. Evidence of left breast cancer.       [Critical Result: New diagnosis of pulmonary embolism]    Finding was identified on 7/9/2023 8:28 PM CDT.     Dr. Connor was contacted by me on 7/9/2023 8:37 PM CDT and verbalized understanding of the critical result.    MR Brain w/o & w Contrast    Narrative    MRI BRAIN WITHOUT AND WITH CONTRAST  7/14/2023 12:20 PM     HISTORY:  Concern for metastatic cancer. Recommended by Dr. Dhillon,  oncology team, for work-up of extent of metastasis.    TECHNIQUE: Routine multiplanar, multisequence MRI of the  brain without  and with 7.5 mL Gadavist.    COMPARISON: None.     FINDINGS: There is an ovoid enhancing parenchymal nodule in the mid  right precentral gyrus measuring 7-8 mm (series 8 image 20) with mild  surrounding vasogenic edema, concerning for metastasis given the  patient's history. Indeterminate focus of enhancement in the left  anterior aspect of the thierry measuring 3-4 mm (series 8 image 8) with  faint corresponding DWI hyperintense signal in that location. This is  concerning for a metastatic lesion, although a subacute infarct could  also potentially have this appearance. Punctate focus of DWI  hyperintense signal in the region of the left superior parietal lobule  (series 3 image 59). No obvious corresponding area of enhancement in  that location. This is indeterminate. A tiny recent infarct or  possibly an early metastatic lesion could have this appearance. There  appears to be a linear focus of enhancement slightly more anteriorly  in the left superior parietal lobe, probably representing a  developmental venous anomaly. No other convincing areas of abnormal  enhancement identified intracranially, accounting for technique.    The ventricles appear within normal limits in size and configuration.  There is mild generalized brain parenchymal volume loss. Mild patchy  nonspecific T2 FLAIR hyperintense signal in the cerebral white matter,  likely due to chronic small vessel ischemic disease. Mild patchy T2  FLAIR hyperintense signal in the thierry also likely due to chronic small  vessel ischemic disease. No intracranial hemorrhage, extra-axial fluid  collection, or mass effect/herniation.    Orbits appear within normal limits, accounting for technique. Minimal  mucosal thickening in the ethmoid sinuses. Scattered fluid/membrane  thickening in the bilateral mastoid air cells. The calvarium, skull  base, and midface otherwise appear grossly unremarkable.      Impression    IMPRESSION:  1. Enhancing nodule in  the right precentral gyrus with mild  surrounding vasogenic edema, most consistent with a metastasis given  the patient's history.  2. Indeterminate small focus of enhancement in the left aspect of the  thierry with corresponding DWI hyperintense signal. This also may  represent a metastatic lesion, although a subacute infarct is not  entirely excluded.  3. Indeterminate punctate focus of DWI hyperintense signal in the left  superior parietal lobule could represent either a tiny recent infarct  or tiny early metastatic lesion.  4. Linear enhancement more anteriorly within the left superior  parietal lobe/postcentral gyrus may represent a developmental venous  anomaly.  5. Brain atrophy and presumed chronic small vessel ischemic change, as  described.  6. No acute intracranial hemorrhage, extra-axial fluid collection, or  significant mass effect/herniation.    RECOMMENDATION: Recommend thin section volumetric/3-D T2 FLAIR and  postcontrast T1-weighted sequences through the brain. This may help to  characterize the indeterminate lesions above and can also help to  assess for the possibility of other subtle small enhancing brain  metastases.    LING BELTRAN MD         SYSTEM ID:  [id number]   Echocardiogram Limited     Value    LVEF  >70%    Narrative    [id number]  [id number]  [id number]  [id number]^THIEN^SAHARA     [facility]  Echocardiography Laboratory  [address] [place] [address]     Name: GINA LEDESMA  MRN: [id number]  : 1950  Study Date: 07/10/2023 07:50 AM  Age: 73 yrs  Gender: Female  Patient Location: [facility]  Reason For Study: Pulmonary Embolism  Ordering Physician: SAHARA NEUMANN  Referring Physician: MADELINE CHEEMA  Performed By: Domi Kent RDCS     BSA: 2.2 m2  Height: 67 in  Weight: 233 lb  HR: 98  BP: 109/62 mmHg  ______________________________________________________________________________  Procedure  Limited Portable Echo  Adult.  ______________________________________________________________________________  Interpretation Summary     Hyperdynamic left ventricular function. The visual ejection fraction is >70%.  Flattened septum is consistent with RV pressure/volume overload.  Right ventricle is moderately dilated. Right ventricular systolic function is  mild to moderately reduced.  Pulmonary hypertension present. The right ventricular systolic pressure is  approximated at 43mmHg plus the right atrial pressure.  Dilation of the inferior vena cava is present with abnormal respiratory  variation in diameter.  Trivial pericardial effusion. No echocardiographic evidence of tamponade.  Clinical correlation recommended.     This study was compared to a TTE from 5/22/2023. LV function is now  hyperdynamic. RV function is reduced. Pulmonary hypertension present.  ______________________________________________________________________________  Left Ventricle  Hyperdynamic left ventricular function. The visual ejection fraction is >70%.  Flattened septum is consistent with RV pressure/volume overload.     Right Ventricle  The right ventricle is moderately dilated. The right ventricular systolic  function is mild to moderately reduced.     Tricuspid Valve  There is mild (1+) tricuspid regurgitation. The right ventricular systolic  pressure is approximated at 43mmHg plus the right atrial pressure. Pulmonary  hypertension.     Vessels  Dilation of the inferior vena cava is present with abnormal respiratory  variation in diameter.     Pericardium  Trivial pericardial effusion. No echocardiographic evidence of tamponade.  Clinical correlation recommended.  ______________________________________________________________________________  MMode/2D Measurements & Calculations     TAPSE: 2.3 cm     Doppler Measurements & Calculations  MV E max rm: 56.1 cm/sec  MV A max rm: 101.1 cm/sec  MV E/A: 0.56  MV dec time: 0.19 sec  TR max rm: 328.5 cm/sec  TR  max P.2 mmHg  E/E' av.8  Lateral E/e': 6.6  Medial E/e': 11.0  RV S Ottoniel: 10.6 cm/sec     ______________________________________________________________________________  Report approved by: Mandie Arias 07/10/2023 10:41 AM           *Note: Due to a large number of results and/or encounters for the requested time period, some results have not been displayed. A complete set of results can be found in Results Review.       Discharge Medications   Current Discharge Medication List        START taking these medications    Details   apixaban ANTICOAGULANT (ELIQUIS) 5 MG tablet Take 2 tablets (10 mg) by mouth 2 times daily for 2 days, THEN 1 tablet (5 mg) 2 times daily for 30 days.  Qty: 68 tablet, Refills: 0    Associated Diagnoses: Acute pulmonary embolism with acute cor pulmonale, unspecified pulmonary embolism type (H); Atrial fibrillation, unspecified type (H)      diltiazem ER COATED BEADS (CARDIZEM CD/CARTIA XT) 240 MG 24 hr capsule Take 1 capsule (240 mg) by mouth daily  Qty: 30 capsule, Refills: 0    Associated Diagnoses: Atrial fibrillation with RVR (H)      furosemide (LASIX) 20 MG tablet Take 1 tablet (20 mg) by mouth daily  Qty: 30 tablet, Refills: 0    Associated Diagnoses: Acute right-sided heart failure (H)      magnesium oxide (MAG-OX) 400 MG tablet Take 1 tablet (400 mg) by mouth daily  Qty: 30 tablet, Refills: 0    Associated Diagnoses: Wide-complex tachycardia; Atrial fibrillation, unspecified type (H); Hypomagnesemia           CONTINUE these medications which have CHANGED    Details   albuterol (PROAIR HFA/PROVENTIL HFA/VENTOLIN HFA) 108 (90 Base) MCG/ACT inhaler Inhale 2 puffs into the lungs 4 times daily  Qty: 18 g, Refills: 11    Comments: Pharmacy may dispense brand covered by insurance (Proair, or proventil or ventolin or generic albuterol inhaler)  Associated Diagnoses: Pulmonary emphysema, unspecified emphysema type (H)      albuterol (PROVENTIL) (2.5 MG/3ML) 0.083% neb solution Take 1  vial (2.5 mg) by nebulization every 4 hours as needed for shortness of breath, wheezing or cough  Qty: 75 mL, Refills: 1    Associated Diagnoses: Pulmonary emphysema, unspecified emphysema type (H)      predniSONE (DELTASONE) 10 MG tablet Take 1 tablet (10 mg) by mouth daily for 3 days  Qty: 3 tablet, Refills: 0    Associated Diagnoses: COPD exacerbation (H)           CONTINUE these medications which have NOT CHANGED    Details   anastrozole (ARIMIDEX) 1 MG tablet Take 1 tablet (1 mg) by mouth daily  Qty: 90 tablet, Refills: 3    Associated Diagnoses: Malignant neoplasm of overlapping sites of left breast in female, estrogen receptor positive (H)      atorvastatin (LIPITOR) 10 MG tablet TAKE ONE TABLET BY MOUTH ONCE DAILY  Qty: 90 tablet, Refills: 1    Associated Diagnoses: Mixed hyperlipidemia      Calcium Carb-Cholecalciferol 500-400 MG-UNIT TABS Take 1 tablet by mouth daily  Qty: 180 tablet, Refills: 3      Cyanocobalamin (B-12) 1000 MCG TBCR Take 1,000 mcg by mouth daily  Qty: 100 tablet, Refills: 1      fluticasone (FLONASE) 50 MCG/ACT nasal spray Spray 1 spray into both nostrils daily For stuffy/runny nose  Qty: 15.8 mL, Refills: 0    Associated Diagnoses: Nasal congestion      hydrochlorothiazide (HYDRODIURIL) 25 MG tablet TAKE 1 TABLET (25 MG) BY MOUTH DAILY  Qty: 90 tablet, Refills: 3    Associated Diagnoses: Essential hypertension, benign      ipratropium - albuterol 0.5 mg/2.5 mg/3 mL (DUONEB) 0.5-2.5 (3) MG/3ML neb solution Take 1 vial (3 mLs) by nebulization every 6 hours as needed for shortness of breath, wheezing or cough  Qty: 360 mL, Refills: 0      lisinopril (ZESTRIL) 2.5 MG tablet TAKE 1 TABLET (2.5 MG) BY MOUTH DAILY  Qty: 90 tablet, Refills: 3    Associated Diagnoses: Essential hypertension, benign      mometasone-formoterol (DULERA) 200-5 MCG/ACT inhaler INHALE 2 PUFFS BY MOUTH TWO TIMES A DAY  Qty: 13 g, Refills: 11    Associated Diagnoses: Pulmonary emphysema, unspecified emphysema type (H)       MYRBETRIQ 50 MG 24 hr tablet Take 50 mg by mouth daily      ondansetron (ZOFRAN ODT) 4 MG ODT tab Take 1 tablet (4 mg) by mouth every 8 hours as needed for nausea  Qty: 20 tablet, Refills: 3    Associated Diagnoses: Nausea      !! ORDER FOR DME Equipment being ordered: Oxygen @ 2 liters with exertion    Associated Diagnoses: COPD (chronic obstructive pulmonary disease) (H)      Probiotic Product (PROBIOTIC PO) Take by mouth daily      spacer (OPTICHAMBER ROSANNE) holding chamber Use with dulera and albuterol inhalers  Qty: 1 each, Refills: 3    Associated Diagnoses: COPD exacerbation (H); Stage 4 very severe COPD by GOLD classification (H)      tiotropium (SPIRIVA RESPIMAT) 2.5 MCG/ACT inhaler Inhale 2 puffs into the lungs daily  Qty: 4 g, Refills: 11    Associated Diagnoses: Pulmonary emphysema, unspecified emphysema type (H); Stage 4 very severe COPD by GOLD classification (H)      venlafaxine (EFFEXOR XR) 75 MG 24 hr capsule TAKE ONE CAPSULE BY MOUTH ONCE DAILY  Qty: 90 capsule, Refills: 3    Associated Diagnoses: Major depression in complete remission (H)      vitamin B-Complex Take 1 tablet by mouth daily      VITAMIN D PO Take 1 capsule by mouth daily      !! order for DME Equipment being ordered: Oxygen  Qty: 1 each, Refills: 0    Associated Diagnoses: Pulmonary emphysema, unspecified emphysema type (H)      !! order for DME Equipment being ordered: Nebulizer  Qty: 1 Device, Refills: 0    Associated Diagnoses: Pulmonary emphysema, unspecified emphysema type (H)       !! - Potential duplicate medications found. Please discuss with provider.        STOP taking these medications       magnesium 250 MG tablet Comments:   Reason for Stopping:         metFORMIN (GLUCOPHAGE XR) 500 MG 24 hr tablet Comments:   Reason for Stopping:         warfarin ANTICOAGULANT (JANTOVEN ANTICOAGULANT) 2.5 MG tablet Comments:   Reason for Stopping:             Allergies   Allergies   Allergen Reactions    Augmentin  [Amoxicillin-Pot Clavulanate] Nausea and Vomiting    Meperidine Hcl Nausea and Vomiting     demerol    Seasonal Allergies      spring

## 2023-07-19 NOTE — PLAN OF CARE
Goal Outcome Evaluation:      Plan of Care Reviewed With: patient    Overall Patient Progress: improvingOverall Patient Progress: improving    Outcome Evaluation: On oxygen at 1 liter at rest. Second oxygen home assessment completed. Needs 6 liters with any activity and new concentrator was delivered to her hospital room. Good appetite. VSS. Denied pain. Alert and oreinted x4. Anxious to get home. Will see oncologist tomorrow.    S-(situation): Patient discharged to home via wheelchair to private vehicle with  and daughter.     B-(background): Pneumonia and PE's; hx COPD    A-(assessment): as above. Inspiratory and expiratory wheezes. Up with walker and SBA.     R-(recommendations): Discharge instructions reviewed with patient Listed belongings gathered and returned to patient.         Discharge Nursing Criteria:     Care Plan and Patient education resolved: Yes    New Medications- pt has been educated about purpose and side effects: Yes    Vaccines  Influenza status verified at discharge:  Not Applicable      MISC  Prescriptions if needed, hard copies sent with patient  Yes  Home medications returned to patient: Yes  Medication Bin checked and emptied on discharge Yes  Patient reports post-discharge pain management plan is effective: Yes

## 2023-07-19 NOTE — PROVIDER NOTIFICATION
07/19/23 0900   Home Oxygen Assessment (RN/RT ONLY)   Does patient have oxygen at home? Yes   Home O2 Agency Other (add comment)  ([facility] out of [place])   1. SpO2 on room air at rest while awake 87   2. SpO2 while at rest on oxygen 93       Oxygen LPM at rest 1   3. SpO2 on room air during Activity/with exercise 74   4. SpO2 with oxygen during activity/with exercise 80       Oxygen LPM during activity/with exercise 6   Does patient qualify for Home O2? Yes

## 2023-07-19 NOTE — PROGRESS NOTES
Care Management Discharge Note    Discharge Date: 07/19/2023       Discharge Disposition: Home, Home Care    Discharge Services: [facility]- RN and PT services      Discharge DME:  10L oxygen concentrator from current oxygen company of [employer] in [place] #[phone]. Writer had spoken with He yesterday and has left a message for a call back as soon as possible today.  Writer has faxed the home O2 orders and testing results as discussed yesterday.  Awaiting call back from [facility] as to when they can deliver the 10L concentrator today.  Patient currently has a concentrator that goes up to 5L.  Patient is needing a higher level of oxygen with activity.      Discharge Transportation: family or friend will provide    Private pay costs discussed: Not applicable    Does the patient's insurance plan have a 3 day qualifying hospital stay waiver?  Yes   Will the waiver be used for post-acute placement? No    Patient/family educated on Medicare website which has current facility and service quality ratings: yes    Education Provided on the Discharge Plan:  Yes     Persons Notified of Discharge Plans: Patient, Daughter- Chelo, - Nam    Patient/Family in Agreement with the Plan: Yes     Handoff Referral Completed: Yes    Additional Information:  Writer visited with patient, - Nam, and jackie Whitney in the patient room.  Discussed discharge plan for today.  Reviewed that patient was accepted by [facility] (Phone: [phone]) for RN and PT services and that the home care typically will see a patient within 48 hours of discharge.  Patient ok with the home care calling Chelo galicia #[phone], regarding the home care schedule.  Writer has communicated this request to the home care liaisonVeronica.      Discussed need for the 10L oxygen tank to be delivered to the home before patient returns home.  Reviewed that all of the paperwork has been faxed to  "[facility] and jackr is awaiting their call back.  Patient stated she is ok waiting at the hospital until the concentrator can be delivered to her home.      1128- Jackr has spoken with the unit coordinator, Malgorzata.  She is awaiting call back from the [facility] about a post-hospital follow up appointment for this patient, as one had been scheduled by the [facility] team awhile ago and it was for today, so patient is needing a new follow up appointment. Malgorzata is waiting to hear back from the clinic on an available appointment with Dr. Jones.      Jackr has received a call back from Marta at [facility]. She stated that they are processing the paperwork for this patient and looking at the schedule for today as to when they can deliver the 10L concentrator and possibly a portable tank for the transport home.  Marta is talking with Mikayla in [place] here to verify if there is already a portable [employer] tank here at this hospital.  Will await another call back from Marta.      1214-  has visited with patient, , and daughter in the patient room to update them on conversation from Marta at [facility].  Daughter, Chelo, has voiced concerns about patient safety at home and is requesting further information as to assessments of patient's safety and is requesting \"a written statement that patient will be safe at home for the next 48 hours until home care can get there.\"  Jackr has sent communication to the hospitalist regarding daughter's concern.  Writer discussed with patient and family the assessments that were completed by hospital staff including hospital PT services, nursing, hospitalist, and care management to determine patient's safety in returning home.  Daughter is requesting more information on the assessments and patient safety.  Patient has reported no concerns about going home and patient feels she is ready for discharge today.  The discharge Important Message from Medicare letter was reviewed " with the patient (with daughter and  present) and patient signed the letter.      Care Management has received a call from ARI Contreras from Medicare #[id number].  Daughter, Chelo, had called the [facility] stating concerns about the quality of care from the hospital (regarding discharge planning).  Care Management reassured Ms. Negron that Care Management is in communication with the patient, , and daughter regarding discharge planning and that patient has been accepted by [facility] for RN and PT services.  Also that Care Management is working with [facility] to get a new concentrator delivered to the home and portable oxygen tank to the hospital before patient leaves the hospital today. The case number from the [facility] is [id number].        1428- Writer spoke with Marta at [facility] at approximately 12:30 pm today.  Marta stated she will deliver a portable tank to the hospital around 1:30 for patient to use for transport and then the concentrator to patient's home after that.  Marta stated she will call daughterChelo, to discuss the delivery of the tanks.      Writer spoke with the hospitalist, Dr. Peters.  He stated he has spoken with patient and family.  Patient is still wanting to discharge home today.    Writer has spoken with [facility] liaison, Lauren ORTIZ, as Veronica is out of the office this afternoon.  Writer requested that home care see patient tomorrow if at all possible.  Lauren is checking with their scheduling team.      Tracie Hyde, [facility]   [phone]

## 2023-07-19 NOTE — PROGRESS NOTES
RT time spent helping with figure out oxygen needs for home. [facility] already has home O2 but needed a new concentrator with higher flow.

## 2023-07-19 NOTE — TELEPHONE ENCOUNTER
Reason for Call:  Appointment Request    Patient requesting this type of appt:  Hospital/ED Follow-Up     Requested provider: Nazario Jones    Reason patient unable to be scheduled: Not within requested timeframe    When does patient want to be seen/preferred time: 1-2 days    Comments: [facility] staff calling to schedule INF are requesting a F/U tomorrow with PCP please.  [facility] 7/09-7/19 PNA  Please advise at the number provided.    Could we send this information to you in [facility] or would you prefer to receive a phone call?:   Patient would prefer a phone call   Okay to leave a detailed message?: Yes at Other phone number:  [phone] [facility] nurses station    Call taken on 7/19/2023 at 10:42 AM by Bree Pagan

## 2023-07-19 NOTE — PLAN OF CARE
"Goal Outcome Evaluation:                 Outcome Evaluation: Pt Oxygen is between 0.5-1L/min at rest this shift and  increased to 6L/min when ambulating to the bathroom, see flowsheet. Reported not getting a good sleep this shift and \" I don't know why\". Writer inquired if she wants a medicine for sleep,however, pt refused. PRN Tylenol given due to frontal headache and was effective. Given a lemon lime drink as per request due to abdominal discomfort.    BP (!) 144/55 (BP Location: Left arm, Patient Position: Semi-Zurita's, Cuff Size: Adult Small)   Pulse 79   Temp 98  F (36.7  C) (Oral)   Resp 18   Ht 1.6 m (5' 3\")   Wt 74 kg (163 lb 3.2 oz)   SpO2 93%   BMI 28.91 kg/m      "

## 2023-07-19 NOTE — PROGRESS NOTES
"SPIRITUAL HEALTH SERVICES Progress Note     I visited Ivette on the Medical Surgical Unit at [facility].  She has been hospitalized for 10 days, and says she is \"doing fine.\"  She plans to go home today.     Meaning, Beliefs, and Spirituality - Ivette was open to my introduction and to emotional and spiritual support.  At admission, she reported that she is non-Congregational.  I offered her a blessing.  .     Plan of Care - No plan      Ed Ta, Ph.D,   Spiritual Health Services  [address] [place] [id number] [phone]  [email or web address]  "

## 2023-07-19 NOTE — PROGRESS NOTES
Oxygen Documentation  I certify that this patient, Latanya Padron has been under my care (or a nurse practitioner or physican's assistant working with me). This is the face-to-face encounter for oxygen medical necessity.      At the time of this encounter supplemental oxygen is reasonable and necessary and is expected to improve the patient's condition in a home setting.       Patient has continued oxygen desaturation due to Respiratory Failure with Hypoxia  COPD J44.9  Pulmonary Embolism  Cor pulmonale  If portability is ordered, is the patient mobile within the home? yes

## 2023-07-19 NOTE — PROGRESS NOTES
ANTICOAGULATION  MANAGEMENT    Latanya Padron is being discharged from the [facility] ([facility]).    Reason for discharge: warfarin replaced by alternate therapy, Eliquis per Oncology and recent hospitalization for a  Pulmonary embolism.     Anticoagulation episode resolved, ACC referral closed and Standing order discontinued    If patient needs warfarin management in the future, please send a new referral    Elin Booker RN

## 2023-07-19 NOTE — PROGRESS NOTES
Patient has been assessed for Home Oxygen needs. Oxygen readings:    *Pulse oximetry (SpO2) = 87% on room air at rest while awake.    *SpO2 improved to 93% on 2liters/minute at rest.    *SpO2 = 77% on room air during activity/with exercise.    *SpO2 improved to 93% on 6liters/minute during activity/with exercise.

## 2023-07-20 NOTE — TELEPHONE ENCOUNTER
Reason for Call:  Other call back    Detailed comments: pt had visit with Jacquie today and is wondering if she is to still take anastrozole. Please call and advise     Phone Number Patient can be reached at: Cell number on file:    Telephone Information:   Mobile [phone]       Best Time: any    Can we leave a detailed message on this number? YES    Call taken on 7/20/2023 at 2:44 PM by Eliza Jaffe

## 2023-07-20 NOTE — LETTER
7/20/2023    Nazario Jones MD, MD  [address] [place] [address]    RE: Latanya Padron       Dear Colleague,     I had the pleasure of seeing Latanya Padron in the [facility].  HPI:  Latanya Padron is a delightful 72 yo female here today with her . She is here today for follow up. She was seen by  in April at that time he recommended an echo/ stress test, and holter monitor. She is here today to review the tests.    Unfortunately, she was hospitalized from 7/9/23- 7/19/2023 with acute pulmonary emboli and acute on chronic respiratory failure.  Patient was started on BiPAP and admitted to the ICU.  Her warfarin was placed on hold for a biopsy of a supraclavicular mass.  No bridging completed, and unfortunately she presented with severe hypoxia and pulm emboli of the left upper lobe.  She was bridged with heparin and changed to Eliquis.  CT of the chest showed a suspicious right upper lobe pulmonary neoplasm and right supraclavicular lymph node metastases.  She was rotation to PET scan to find out a primary source of stage IV cancer.    She has a history of oxygen dependent COPD, hyperlipidemia, hypertension and previous outside breast cancer 2019 on Arimidex thought to be in remission, and atrial fibrillation (paroxysmal).  She reports a 70 pound weight loss over the past 6 months.  She admits making dietary changes, but her  states that he was suspicious of things going on.  Due to the increased shortness of breath, history of premature coronary disease Dr. Gamez additional testing.    Currently she denies chest pain, palpitations, lightheadedness, dizziness.  He does have shortness of breath.  Occasionally feel palpitations, but feels that it is stable.  At this point, we agree that her heart does not raise concern.    Diagnostics reviewed today:  Echocardiogram 5/2023: EF 65 some exam.  Motion abnormality.  Normal RV function.  No significant valvular  insufficiency.    Limited echo 7/10/2023: EF greater than 70%.  Flat septum consistent with RV pressure overload, RV cell function is moderate reduced.  RVSP elevated 40 mmHg plus RAP.    Lexiscan 5/2023 negative for inducible ischemia or infarction.  EF 70%    24-hour monitor showed sinus rhythm.  No evidence of A-fib during monitor.    Plan:   1. paroxysmal atrial fibrillation  Warfarin placed on hold for biopsy, unfortunately patient presented with pulmonary emboli.  Optimized with IV heparin changed to Eliquis 5 mg twice daily.  Holter showed no evidence of A-fib.  Patient did however have episodes of A-fib with RVR acutely during her hospitalization.  On metoprolol diltiazem for rate control.    Patient asymptomatic has no complaint of palpitations at this time.    2. oxygen dependent COPD  3.  New diagnosis of metastatic cancer  Patient frustrated because the supraclavicular mass that was biopsied revealed thyroid tissue which is benign-appearing.    Patient states she received the news an hour before our visit.  Patient states they are certain if the primary source is lung or brain.  Scheduled to have PET scan.     She can follow-up with cardiology as needed.  If she is bothered with A-fib we optimize her diltiazem as needed.    Thanks likeliness in her care.  Suri Mahajan, NP, APRN CNP            Today's clinic visit entailed:  Review of the result(s) of each unique test - review echo, stress test, and holter  I spent a total of 20 minutes on the day of the visit.   Time spent by me doing chart review, history and exam, documentation and further activities per the note  Provider  Link to [facility] Help Grid     The level of medical decision making during this visit was of moderate complexity.      No orders of the defined types were placed in this encounter.      No orders of the defined types were placed in this encounter.      There are no discontinued medications.      Encounter Diagnosis   Name Primary?    AF  (paroxysmal atrial fibrillation) (H)        CURRENT MEDICATIONS:  Current Outpatient Medications   Medication Sig Dispense Refill    albuterol (PROAIR HFA/PROVENTIL HFA/VENTOLIN HFA) 108 (90 Base) MCG/ACT inhaler Inhale 2 puffs into the lungs 4 times daily 18 g 11    albuterol (PROVENTIL) (2.5 MG/3ML) 0.083% neb solution Take 1 vial (2.5 mg) by nebulization every 4 hours as needed for shortness of breath, wheezing or cough 75 mL 1    anastrozole (ARIMIDEX) 1 MG tablet Take 1 tablet (1 mg) by mouth daily 90 tablet 3    apixaban ANTICOAGULANT (ELIQUIS) 5 MG tablet Take 2 tablets (10 mg) by mouth 2 times daily for 2 days, THEN 1 tablet (5 mg) 2 times daily for 30 days. 68 tablet 0    atorvastatin (LIPITOR) 10 MG tablet TAKE ONE TABLET BY MOUTH ONCE DAILY (Patient taking differently: Take 10 mg by mouth daily (with dinner)) 90 tablet 1    Calcium Carb-Cholecalciferol 500-400 MG-UNIT TABS Take 1 tablet by mouth daily 180 tablet 3    Cyanocobalamin (B-12) 1000 MCG TBCR Take 1,000 mcg by mouth daily 100 tablet 1    diltiazem ER COATED BEADS (CARDIZEM CD/CARTIA XT) 240 MG 24 hr capsule Take 1 capsule (240 mg) by mouth daily 30 capsule 0    fluticasone (FLONASE) 50 MCG/ACT nasal spray Spray 1 spray into both nostrils daily For stuffy/runny nose 15.8 mL 0    furosemide (LASIX) 20 MG tablet Take 1 tablet (20 mg) by mouth daily 30 tablet 0    hydrochlorothiazide (HYDRODIURIL) 25 MG tablet TAKE 1 TABLET (25 MG) BY MOUTH DAILY 90 tablet 3    ipratropium - albuterol 0.5 mg/2.5 mg/3 mL (DUONEB) 0.5-2.5 (3) MG/3ML neb solution Take 1 vial (3 mLs) by nebulization every 6 hours as needed for shortness of breath, wheezing or cough 360 mL 0    lisinopril (ZESTRIL) 2.5 MG tablet TAKE 1 TABLET (2.5 MG) BY MOUTH DAILY 90 tablet 3    magnesium oxide (MAG-OX) 400 MG tablet Take 1 tablet (400 mg) by mouth daily 30 tablet 0    mometasone-formoterol (DULERA) 200-5 MCG/ACT inhaler INHALE 2 PUFFS BY MOUTH TWO TIMES A DAY (Patient taking  differently: Inhale 2 puffs into the lungs 2 times daily 7am and 3pm) 13 g 11    MYRBETRIQ 50 MG 24 hr tablet Take 50 mg by mouth daily      ondansetron (ZOFRAN ODT) 4 MG ODT tab Take 1 tablet (4 mg) by mouth every 8 hours as needed for nausea 20 tablet 3    order for DME Equipment being ordered: Oxygen 1 each 0    order for DME Equipment being ordered: Nebulizer 1 Device 0    ORDER FOR DME Equipment being ordered: Oxygen @ 2 liters with exertion      predniSONE (DELTASONE) 10 MG tablet Take 1 tablet (10 mg) by mouth daily for 3 days 3 tablet 0    Probiotic Product (PROBIOTIC PO) Take by mouth daily      spacer ([employer] ROSANNE) holding chamber Use with dulera and albuterol inhalers 1 each 3    tiotropium (SPIRIVA RESPIMAT) 2.5 MCG/ACT inhaler Inhale 2 puffs into the lungs daily 4 g 11    venlafaxine (EFFEXOR XR) 75 MG 24 hr capsule TAKE ONE CAPSULE BY MOUTH ONCE DAILY 90 capsule 3    vitamin B-Complex Take 1 tablet by mouth daily      VITAMIN D PO Take 1 capsule by mouth daily         ALLERGIES     Allergies   Allergen Reactions    Augmentin [Amoxicillin-Pot Clavulanate] Nausea and Vomiting    Meperidine Hcl Nausea and Vomiting     demerol    Seasonal Allergies      spring       PAST MEDICAL HISTORY:  Past Medical History:   Diagnosis Date    Breast cancer (H) 04/12/2019    Left Breast    COPD (chronic obstructive pulmonary disease) (H)     Mixed hyperlipidemia     Neck mass 06/20/2018    Paroxysmal atrial fibrillation (H)     PONV (postoperative nausea and vomiting)     S/P radiation therapy     5,256 cGy to left breast completed on 7/18/2019 [facility]       PAST SURGICAL HISTORY:  Past Surgical History:   Procedure Laterality Date    BIOPSY NODE SENTINEL Left 5/15/2019    Procedure: left sentinel node biopsy;  Surgeon: Donald Aleman MD;  Location: [facility] OR    BREAST BIOPSY, CORE RT/LT Left 04/12/2019    COLONOSCOPY  06/21/10    COLONOSCOPY N/A 3/4/2021    Procedure: Colonoscopy with  polypectomy;   Surgeon: Donald Aleman MD;  Location: [facility] GI    HC BIOPSY OF BREAST, OPEN INCISIONAL Right 1988    Benign per patient    HC CORRECT BUNION,METATARSAL OSTEOTOMY       LAPAROSCOPY, SURGICAL; CHOLECYSTECTOMY  08/04/10    HC SLING OPERATION FOR STRESS INCONTINENCE  2007    HERNIORRHAPHY INCISIONAL (LOCATION)  2011    Procedure:HERNIORRHAPHY INCISIONAL (LOCATION); Surgeon:AYALA HOOVER; Location:[facility] OR    LAPAROSCOPIC HERNIORRHAPHY INCISIONAL  2011    Procedure:LAPAROSCOPIC HERNIORRHAPHY INCISIONAL; Laparoscopic mesh repair of incarcerated incisional hernia , extensive lysis of adhesions, excision of hernia sac and closure of fascia.; Surgeon:AYALA HOOVER; Location:[facility] OR    LAPAROSCOPIC LYSIS ADHESIONS  2011    Procedure:LAPAROSCOPIC LYSIS ADHESIONS; Surgeon:AYALA HOOVER; Location:[facility]    MASTECTOMY PARTIAL WITH NEEDLE LOCALIZATION Left 5/15/2019    Procedure: left wire localized partial mastectomy;  Surgeon: Donald Aleman MD;  Location: [facility] OR    TONSILLECTOMY      ZZC APPENDECTOMY,W OTHR PROC      Z  DELIVERY ONLY          Z LIGATE FALLOPIAN TUBE  's    Z NONSPECIFIC PROCEDURE      Exploratory laparotomy with resection of infarcted segment of omentum and minor lysis of adhesions    Z NONSPECIFIC PROCEDURE      Removal of hemorrhagic corpus luteum cyst    Z VAGINAL HYSTERECTOMY  1984       FAMILY HISTORY:  Family History   Problem Relation Age of Onset    Breast Cancer Mother          at 88    Obesity Mother     Genitourinary Problems Mother     Lipids Mother     Heart Disease Father         by pass (5)    Cerebrovascular Disease Father         hemorragic    Lipids Father     Alzheimer Disease Father 92    Alzheimer Disease Maternal Grandmother     Genitourinary Problems Maternal Grandmother     Cancer Maternal Grandfather     Cancer Paternal Grandfather         lymph nodes    Cancer Brother         prostate    Melanoma  Brother     Heart Disease Brother          (aaron with a partner)    Hyperlipidemia Brother     Heart Disease Brother         stent placement    Hyperlipidemia Brother     Prostate Cancer Brother 66    Respiratory Sister     Lipids Sister     Breast Cancer Sister 40        s/p mastectomy    Arthritis Sister     Obesity Sister     Heart Failure Sister     Cancer Sister         stomach, colon, pancreatic    Lipids Sister     Dementia Sister         1/2 sister on dad's side ()    Other - See Comments Sister         1948    Other - See Comments Daughter         fibromyalgia ()    Lymphoma Maternal Aunt     Heart Failure Maternal Aunt 81    Cancer Maternal Uncle         colon    Hemophilia Grandchild     Other - See Comments Grandchild         Transgender       SOCIAL HISTORY:  Social History     Socioeconomic History    Marital status:      Spouse name: Nam    Number of children: 1    Years of education: 14   Occupational History    Occupation: Status4     Comment:  [employer]     Employer: [employer]   Tobacco Use    Smoking status: Former     Packs/day: 1.00     Years: 30.00     Pack years: 30.00     Types: Cigarettes     Quit date: 10/25/2005     Years since quittin.7    Smokeless tobacco: Never   Vaping Use    Vaping Use: Never used   Substance and Sexual Activity    Alcohol use: No     Alcohol/week: 0.0 standard drinks of alcohol    Drug use: No    Sexual activity: Not Currently     Partners: Male     Birth control/protection: Female Surgical     Comment: hysterectomy   Other Topics Concern     Service No    Blood Transfusions No    Caffeine Concern No     Comment: coffee 2c/d pop: 2c/d    Occupational Exposure No    Hobby Hazards No    Sleep Concern No    Stress Concern No    Weight Concern Yes     Comment: desire wt loss    Special Diet No    Back Care No     Comment: Hx: seen chiropractor     Exercise No    Bike Helmet No     Comment: n/a    Seat Belt Yes     "Self-Exams Yes     Comment: attempts to do SBE monthly. Patient has fibrous breast tissue.       Review of Systems:  Skin:  Negative       Eyes:  Positive for glasses    ENT:  Negative      Respiratory:  Positive for shortness of breath;dyspnea on exertion when up and moving around will increase O2 to 6 LPM then when sitting will decreased O2 to 2 LPM   Cardiovascular:  Negative for;chest pain;lightheadedness;dizziness Positive for;palpitations;edema;exercise intolerance;fatigue    Gastroenterology: Negative      Genitourinary:  Negative      Musculoskeletal:  Negative      Neurologic:  Negative      Psychiatric:  Negative      Heme/Lymph/Imm:  Positive for allergies    Endocrine:  Negative        Physical Exam:  Vitals: /70   Pulse 72   Ht 1.529 m (5' 0.2\")   Wt 72.6 kg (160 lb)   SpO2 91%   BMI 31.04 kg/m      Constitutional:  cooperative;in no acute distress frail      Skin:  warm and dry to the touch;no apparent skin lesions or masses noted          Head:  normocephalic, no masses or lesions        Eyes:  pupils equal and round        Lymph:      ENT:           Neck:  JVP normal        Respiratory:  clear to auscultation;normal respiratory excursion         Cardiac: no murmurs, gallops or rubs detected irregularly irregular rhythm              not assessed this visit                                        GI:  not assessed this visit        Extremities and Muscular Skeletal:  no edema;no deformities, clubbing, cyanosis, erythema observed              Neurological:  affect appropriate   in a wheelchair    Psych:  Alert and Oriented x 3        CC  Dawit Gamez MD  [address]  [place] [address]                  Thank you for allowing me to participate in the care of your patient.      Sincerely,     Suri Mahajan NP, APRN CNP     [facility]  "

## 2023-07-20 NOTE — TELEPHONE ENCOUNTER
I am gone all next week on vacation and not back until Tuesday, please set her up for follow-up with one of my partners.  Internal medicine would be ideal since she is a very complicated patient.    Electronically signed by:  Nazario Jones M.D.  7/20/2023

## 2023-07-20 NOTE — PROGRESS NOTES
Clinic Care Coordination Contact    Situation: Patient chart reviewed by care coordinator.    Background: Patient on the TCM report after recent hospitalization for acute PE.     Assessment: Patient already had follow up with oncology and cardiology today. Patient also works with oncology care coordinator.     Plan/Recommendations: RN CC will not reach out at this time to reduce duplicate calls.     Tatum Menjivar RN Care Coordination   [facility]  Email: [email or web address]  Phone: [phone]

## 2023-07-20 NOTE — PROGRESS NOTES
HPI:  Latanya Padron is a delightful 74 yo female here today with her . She is here today for follow up. She was seen by  in April at that time he recommended an echo/ stress test, and holter monitor. She is here today to review the tests.    Unfortunately, she was hospitalized from 7/9/23- 7/19/2023 with acute pulmonary emboli and acute on chronic respiratory failure.  Patient was started on BiPAP and admitted to the ICU.  Her warfarin was placed on hold for a biopsy of a supraclavicular mass.  No bridging completed, and unfortunately she presented with severe hypoxia and pulm emboli of the left upper lobe.  She was bridged with heparin and changed to Eliquis.  CT of the chest showed a suspicious right upper lobe pulmonary neoplasm and right supraclavicular lymph node metastases.  She was rotation to PET scan to find out a primary source of stage IV cancer.    She has a history of oxygen dependent COPD, hyperlipidemia, hypertension and previous outside breast cancer 2019 on Arimidex thought to be in remission, and atrial fibrillation (paroxysmal).  She reports a 70 pound weight loss over the past 6 months.  She admits making dietary changes, but her  states that he was suspicious of things going on.  Due to the increased shortness of breath, history of premature coronary disease Dr. Gamez additional testing.    Currently she denies chest pain, palpitations, lightheadedness, dizziness.  He does have shortness of breath.  Occasionally feel palpitations, but feels that it is stable.  At this point, we agree that her heart does not raise concern.    Diagnostics reviewed today:  Echocardiogram 5/2023: EF 65 some exam.  Motion abnormality.  Normal RV function.  No significant valvular insufficiency.    Limited echo 7/10/2023: EF greater than 70%.  Flat septum consistent with RV pressure overload, RV cell function is moderate reduced.  RVSP elevated 40 mmHg plus RAP.    Lexiscan 5/2023 negative for  inducible ischemia or infarction.  EF 70%    24-hour monitor showed sinus rhythm.  No evidence of A-fib during monitor.    Plan:   1. paroxysmal atrial fibrillation  Warfarin placed on hold for biopsy, unfortunately patient presented with pulmonary emboli.  Optimized with IV heparin changed to Eliquis 5 mg twice daily.  Holter showed no evidence of A-fib.  Patient did however have episodes of A-fib with RVR acutely during her hospitalization.  On metoprolol diltiazem for rate control.    Patient asymptomatic has no complaint of palpitations at this time.    2. oxygen dependent COPD  3.  New diagnosis of metastatic cancer  Patient frustrated because the supraclavicular mass that was biopsied revealed thyroid tissue which is benign-appearing.    Patient states she received the news an hour before our visit.  Patient states they are certain if the primary source is lung or brain.  Scheduled to have PET scan.     She can follow-up with cardiology as needed.  If she is bothered with A-fib we optimize her diltiazem as needed.    Thanks likeliness in her care.  Suri Mahajan, NP, APRN CNP            Today's clinic visit entailed:  Review of the result(s) of each unique test - review echo, stress test, and holter  I spent a total of 20 minutes on the day of the visit.   Time spent by me doing chart review, history and exam, documentation and further activities per the note  Provider  Link to [facility] Help Grid     The level of medical decision making during this visit was of moderate complexity.      No orders of the defined types were placed in this encounter.      No orders of the defined types were placed in this encounter.      There are no discontinued medications.      Encounter Diagnosis   Name Primary?     AF (paroxysmal atrial fibrillation) (H)        CURRENT MEDICATIONS:  Current Outpatient Medications   Medication Sig Dispense Refill     albuterol (PROAIR HFA/PROVENTIL HFA/VENTOLIN HFA) 108 (90 Base) MCG/ACT inhaler  Inhale 2 puffs into the lungs 4 times daily 18 g 11     albuterol (PROVENTIL) (2.5 MG/3ML) 0.083% neb solution Take 1 vial (2.5 mg) by nebulization every 4 hours as needed for shortness of breath, wheezing or cough 75 mL 1     anastrozole (ARIMIDEX) 1 MG tablet Take 1 tablet (1 mg) by mouth daily 90 tablet 3     apixaban ANTICOAGULANT (ELIQUIS) 5 MG tablet Take 2 tablets (10 mg) by mouth 2 times daily for 2 days, THEN 1 tablet (5 mg) 2 times daily for 30 days. 68 tablet 0     atorvastatin (LIPITOR) 10 MG tablet TAKE ONE TABLET BY MOUTH ONCE DAILY (Patient taking differently: Take 10 mg by mouth daily (with dinner)) 90 tablet 1     Calcium Carb-Cholecalciferol 500-400 MG-UNIT TABS Take 1 tablet by mouth daily 180 tablet 3     Cyanocobalamin (B-12) 1000 MCG TBCR Take 1,000 mcg by mouth daily 100 tablet 1     diltiazem ER COATED BEADS (CARDIZEM CD/CARTIA XT) 240 MG 24 hr capsule Take 1 capsule (240 mg) by mouth daily 30 capsule 0     fluticasone (FLONASE) 50 MCG/ACT nasal spray Spray 1 spray into both nostrils daily For stuffy/runny nose 15.8 mL 0     furosemide (LASIX) 20 MG tablet Take 1 tablet (20 mg) by mouth daily 30 tablet 0     hydrochlorothiazide (HYDRODIURIL) 25 MG tablet TAKE 1 TABLET (25 MG) BY MOUTH DAILY 90 tablet 3     ipratropium - albuterol 0.5 mg/2.5 mg/3 mL (DUONEB) 0.5-2.5 (3) MG/3ML neb solution Take 1 vial (3 mLs) by nebulization every 6 hours as needed for shortness of breath, wheezing or cough 360 mL 0     lisinopril (ZESTRIL) 2.5 MG tablet TAKE 1 TABLET (2.5 MG) BY MOUTH DAILY 90 tablet 3     magnesium oxide (MAG-OX) 400 MG tablet Take 1 tablet (400 mg) by mouth daily 30 tablet 0     mometasone-formoterol (DULERA) 200-5 MCG/ACT inhaler INHALE 2 PUFFS BY MOUTH TWO TIMES A DAY (Patient taking differently: Inhale 2 puffs into the lungs 2 times daily 7am and 3pm) 13 g 11     MYRBETRIQ 50 MG 24 hr tablet Take 50 mg by mouth daily       ondansetron (ZOFRAN ODT) 4 MG ODT tab Take 1 tablet (4 mg) by  mouth every 8 hours as needed for nausea 20 tablet 3     order for DME Equipment being ordered: Oxygen 1 each 0     order for DME Equipment being ordered: Nebulizer 1 Device 0     ORDER FOR DME Equipment being ordered: Oxygen @ 2 liters with exertion       predniSONE (DELTASONE) 10 MG tablet Take 1 tablet (10 mg) by mouth daily for 3 days 3 tablet 0     Probiotic Product (PROBIOTIC PO) Take by mouth daily       spacer (OPTICHAMBER ROSANNE) holding chamber Use with dulera and albuterol inhalers 1 each 3     tiotropium (SPIRIVA RESPIMAT) 2.5 MCG/ACT inhaler Inhale 2 puffs into the lungs daily 4 g 11     venlafaxine (EFFEXOR XR) 75 MG 24 hr capsule TAKE ONE CAPSULE BY MOUTH ONCE DAILY 90 capsule 3     vitamin B-Complex Take 1 tablet by mouth daily       VITAMIN D PO Take 1 capsule by mouth daily         ALLERGIES     Allergies   Allergen Reactions     Augmentin [Amoxicillin-Pot Clavulanate] Nausea and Vomiting     Meperidine Hcl Nausea and Vomiting     demerol     Seasonal Allergies      spring       PAST MEDICAL HISTORY:  Past Medical History:   Diagnosis Date     Breast cancer (H) 04/12/2019    Left Breast     COPD (chronic obstructive pulmonary disease) (H)      Mixed hyperlipidemia      Neck mass 06/20/2018     Paroxysmal atrial fibrillation (H)      PONV (postoperative nausea and vomiting)      S/P radiation therapy     5,256 cGy to left breast completed on 7/18/2019 [facility]       PAST SURGICAL HISTORY:  Past Surgical History:   Procedure Laterality Date     BIOPSY NODE SENTINEL Left 5/15/2019    Procedure: left sentinel node biopsy;  Surgeon: Donald Aleman MD;  Location: [facility] OR     BREAST BIOPSY, CORE RT/LT Left 04/12/2019     COLONOSCOPY  06/21/10     COLONOSCOPY N/A 3/4/2021    Procedure: Colonoscopy with  polypectomy;  Surgeon: Donald Aleman MD;  Location: [facility] GI     HC BIOPSY OF BREAST, OPEN INCISIONAL Right 1988    Benign per patient     HC CORRECT BUNION,METATARSAL OSTEOTOMY  1993     HC  LAPAROSCOPY, SURGICAL; CHOLECYSTECTOMY  08/04/10     HC SLING OPERATION FOR STRESS INCONTINENCE  2007     HERNIORRHAPHY INCISIONAL (LOCATION)  2011    Procedure:HERNIORRHAPHY INCISIONAL (LOCATION); Surgeon:AYALA HOOVER; Location:[facility] OR     LAPAROSCOPIC HERNIORRHAPHY INCISIONAL  2011    Procedure:LAPAROSCOPIC HERNIORRHAPHY INCISIONAL; Laparoscopic mesh repair of incarcerated incisional hernia , extensive lysis of adhesions, excision of hernia sac and closure of fascia.; Surgeon:AYALA HOOVER; Location:[facility] OR     LAPAROSCOPIC LYSIS ADHESIONS  2011    Procedure:LAPAROSCOPIC LYSIS ADHESIONS; Surgeon:AYALA HOOVER; Location:[facility] OR     MASTECTOMY PARTIAL WITH NEEDLE LOCALIZATION Left 5/15/2019    Procedure: left wire localized partial mastectomy;  Surgeon: Donald Aleman MD;  Location: [facility] OR     TONSILLECTOMY       ZZC APPENDECTOMY,W OTHR PROC       RUST  DELIVERY ONLY           Z LIGATE FALLOPIAN TUBE       Z NONSPECIFIC PROCEDURE      Exploratory laparotomy with resection of infarcted segment of omentum and minor lysis of adhesions     Z NONSPECIFIC PROCEDURE      Removal of hemorrhagic corpus luteum cyst     RUST VAGINAL HYSTERECTOMY         FAMILY HISTORY:  Family History   Problem Relation Age of Onset     Breast Cancer Mother          at 88     Obesity Mother      Genitourinary Problems Mother      Lipids Mother      Heart Disease Father         by pass (5)     Cerebrovascular Disease Father         hemorragic     Lipids Father      Alzheimer Disease Father 92     Alzheimer Disease Maternal Grandmother      Genitourinary Problems Maternal Grandmother      Cancer Maternal Grandfather      Cancer Paternal Grandfather         lymph nodes     Cancer Brother         prostate     Melanoma Brother      Heart Disease Brother         195 (aaron with a partner)     Hyperlipidemia Brother      Heart Disease Brother         stent placement      Hyperlipidemia Brother      Prostate Cancer Brother 66     Respiratory Sister      Lipids Sister      Breast Cancer Sister 40        s/p mastectomy     Arthritis Sister      Obesity Sister      Heart Failure Sister      Cancer Sister         stomach, colon, pancreatic     Lipids Sister      Dementia Sister         1/2 sister on dad's side ()     Other - See Comments Sister         1948     Other - See Comments Daughter         fibromyalgia ()     Lymphoma Maternal Aunt      Heart Failure Maternal Aunt 81     Cancer Maternal Uncle         colon     Hemophilia Grandchild      Other - See Comments Grandchild         Transgender       SOCIAL HISTORY:  Social History     Socioeconomic History     Marital status:      Spouse name: Nam     Number of children: 1     Years of education: 14   Occupational History     Occupation: [employer]     Comment:  [employer]     Employer: [employer]   Tobacco Use     Smoking status: Former     Packs/day: 1.00     Years: 30.00     Pack years: 30.00     Types: Cigarettes     Quit date: 10/25/2005     Years since quittin.7     Smokeless tobacco: Never   Vaping Use     Vaping Use: Never used   Substance and Sexual Activity     Alcohol use: No     Alcohol/week: 0.0 standard drinks of alcohol     Drug use: No     Sexual activity: Not Currently     Partners: Male     Birth control/protection: Female Surgical     Comment: hysterectomy   Other Topics Concern      Service No     Blood Transfusions No     Caffeine Concern No     Comment: coffee 2c/d pop: 2c/d     Occupational Exposure No     Hobby Hazards No     Sleep Concern No     Stress Concern No     Weight Concern Yes     Comment: desire wt loss     Special Diet No     Back Care No     Comment: Hx: seen chiropractor      Exercise No     Bike Helmet No     Comment: n/a     Seat Belt Yes     Self-Exams Yes     Comment: attempts to do SBE monthly. Patient has fibrous breast tissue.       Review of  "Systems:  Skin:  Negative       Eyes:  Positive for glasses    ENT:  Negative      Respiratory:  Positive for shortness of breath;dyspnea on exertion when up and moving around will increase O2 to 6 LPM then when sitting will decreased O2 to 2 LPM   Cardiovascular:  Negative for;chest pain;lightheadedness;dizziness Positive for;palpitations;edema;exercise intolerance;fatigue    Gastroenterology: Negative      Genitourinary:  Negative      Musculoskeletal:  Negative      Neurologic:  Negative      Psychiatric:  Negative      Heme/Lymph/Imm:  Positive for allergies    Endocrine:  Negative        Physical Exam:  Vitals: /70   Pulse 72   Ht 1.529 m (5' 0.2\")   Wt 72.6 kg (160 lb)   SpO2 91%   BMI 31.04 kg/m      Constitutional:  cooperative;in no acute distress frail      Skin:  warm and dry to the touch;no apparent skin lesions or masses noted          Head:  normocephalic, no masses or lesions        Eyes:  pupils equal and round        Lymph:      ENT:           Neck:  JVP normal        Respiratory:  clear to auscultation;normal respiratory excursion         Cardiac: no murmurs, gallops or rubs detected irregularly irregular rhythm              not assessed this visit                                        GI:  not assessed this visit        Extremities and Muscular Skeletal:  no edema;no deformities, clubbing, cyanosis, erythema observed              Neurological:  affect appropriate   in a wheelchair    Psych:  Alert and Oriented x 3        CC  Dawit Gamez MD  [id number]  [place] [id number]              "

## 2023-07-20 NOTE — TELEPHONE ENCOUNTER
"Chemotherapy Education    Patient is here today for chemotherapy education, accompanied by spouse.  Pt has a cancer diagnosis of Breast cancer, recurrence.  Their Oncologist is Dr. Dhillon.    Reviewed the following with the patient and their support person:    Treatment Goal: Palliative    Regimen: Ibrance/Faslodex    Duration: Rationale for strict adherence, specific supportive medication and side effects (including long-term and short-term) and management; skin changes/hand-foot syndrome, anemia, neutropenia, thrombocytopenia, diarrhea/constipation, hair loss syndrome, memory changes/ \"chemobrain\", mouth sores, taste changes, neuropathy, fatigue, infertility, myelosuppression, and risk of extravasation or infiltration.    Infection prevention and monitoring of lab values, what lab tests and what changes of these values meant, along with the possibility of hydration or blood product transfusion, or the need to defer or hold treatment.      General Chemotherapy Information, including ways it is excreted from the body and cleaning and containment of vomitus or other bodily fluid, use of the bathroom, sexual health and intimacy, what to do if needing to miss a treatment, when to call a provider and the need for staff to wear protective equipment.    Oral Chemotherapy Yes  If Yes:  Add handling and waste.      Safe Handling of Oral Chemotherapy Medicines  Storing your medicine  \" Store the medicine in the original container (unless you use a pill box).  \" Do not store where it is hot, humid or nguyen.  \" Keep away from children or pets.  Handling your medicine  \" Wash your hands before and after touching the pills.  \" No one but you should handle the pills.  \" If you need help with the pills, have your helper wear special chemotherapy gloves. They should also wash their hands before and after handling the pills.  \" Do not crush, split, chew or dissolve the pills unless your health care team says it's okay.  Handling " "body waste  While taking this medicine, and for a while afterward, your urine, stool, vomit and sweat may contain the drug.  Each time you use the toilet, close the lid and flush twice. Do this for at least two days after the last time you take the chemotherapy.  If you vomit, empty the basin into the toilet, close the lid and flush twice. Your helper should wear waterproof, disposable (throw-away) gloves. After taking off the gloves, they should wash their hands.  If your clothes, sheets or towels are soiled with urine, stool or vomit, do not wash them with other items. Wash them twice in hot water.  Getting rid of unused medicine  \" Do not flush unused pills down the toilet. This will pollute the water.  \" Work with your health care team to safely dispose of your medicine.     For informational purposes only. Not to replace the advice of your health care provider.  Developed in collaboration with [facility].  Copyright   2013 [facility]. All rights reserved. [place] 258940 - 12/15.    Bottle Pump Infuser: N/A    Other Concerns: None at this time  Comments:Patient will complete biopsy per Dr. Mai before proceeding with Ibrance and faslodex    Pt instructed to call with further questions or concerns.  Patient states understanding and is in agreement with this plan.  Copy of appointments, and after visit summary (AVS) given to patient. Patient discharged home with spouse.    Jordyn Myers, RN, BSN  [facility]  Oncology/Hematology Care Coordinator RN  [facility]  [phone]              "

## 2023-07-20 NOTE — PATIENT INSTRUCTIONS
1) PET scan STAT.  2) [facility] Rad/Onc consult ASAP.  3) No Prolia today.  4) Checking PA on Faslodex and Ibrance pending biopsy from Dr. Mai.  5) Send biopsy for in house NGS testing (breast cancer).    Vin Dhillon MD.

## 2023-07-20 NOTE — LETTER
2023         RE: Latanya Padron  [address] [facility] [id number]        Dear Colleague,    Thank you for referring your patient, Latanya Padron, to the [facility]. Please see a copy of my visit note below.    [facility] Hematology / Oncology  Progress Note  Name: Latanya Padron  :  1950    MRN:  [id number]    --------------------    Assessment / Plan:  Latanya, her  and I reviewed that right now the clinical suspicion would be highest for metastatic breast cancer.  Some of the pulmonary changes could be lymphangitic carcinomatosis but I agree with continuing antibiotics for possible pneumonia.  She is on an appropriate anticoagulants and is tolerating it well.  I would recommend that she complete several weeks before considering any procedures.  While it is important to get a tissue diagnosis, I think getting her breathing and lungs into a better place is obviously paramount as well.  Nothing seems ominous or paramount about her cancer at this time.  We are planning to get a PET scan and ask our pulmonary colleagues to consider an endobronchial ultrasound or CT-guided biopsy depending upon their review of the imaging.  Unfortunately her pathology was nondiagnostic.  As we discussed, if this does confirm stage IV breast cancer, we have plans to initiate therapy with Faslodex in conjunction with Ibrance.  Bone strengthening agents will be an important part of her care.  We reviewed the logistics of administration as well as potential side effects associated with these agents.  Obviously if this is not breast cancer and another form of malignancy, then systemic therapy options would dramatically change.  Once we have a tissue biopsy we will also plan to send NGS testing.  For now, we are unfortunately in a bit of a holding pattern while allowing her lungs to heal from the pulmonary embolism standpoint to which point we can get a diagnostic  "tissue sample.  Certainly if the PET scan reveals another lesion, we may look at alternative sites for biopsy as well.  We did an MRI when she was in the hospital to rule out metastatic disease and we unfortunately did find a lesion suggestive of metastatic focus.  I referred her to the [place] radiation oncologist for consideration of gamma knife/definitive therapy to the CNS lesion.  She will need near future brain imaging on a serial fashion as well.  She is not symptomatic and have not started on steroids as a result of this time.    Vin Dhillon MD    --------------------    Interval History:  Latanya returns for follow up of breast cancer and new thoracic disease.  She unfortunately was hospitalized with a pulmonary embolism and has been transitioned to a DOAC.  Her breathing is okay but she is using oxygen to 6 L with exertion and 2 L at rest.  The blood thinner has caused some bruising but no overt bleeding.  She has no pain.  She is sleeping okay.  No headaches or other neurologic changes.  She is been taking and adherent with her Arimidex without issue.    --------------------    Physical Exam:  VS: /70 (BP Location: Right arm, Patient Position: Sitting, Cuff Size: Adult Regular)   Pulse 91   Temp 97.9  F (36.6  C) (Temporal)   Resp 22   Ht 1.6 m (5' 3\")   Wt 72.6 kg (160 lb)   SpO2 95%   BMI 28.34 kg/m    GEN: Well appearing, breathing comfortable on oxygen nasal cannula.    Labs / Imaging:  We reviewed CBC, CMP, nondiagnostic pathology report.  We personally reviewed her chest imaging together.      Again, thank you for allowing me to participate in the care of your patient.        Sincerely,        Vin Dhillon MD    "

## 2023-07-20 NOTE — PATIENT INSTRUCTIONS
Call my nurse with any questions or concerns:  [phone]  *If you have concerns after hours, please call [phone], option 2 to speak with on call Cardiologist.    Call with any questions or concerns

## 2023-07-20 NOTE — PROGRESS NOTES
New Patient Oncology Nurse Navigator Note     Referring provider: Dr. Vin Dhillon     Referring Clinic/Organization: [facility]     Referred to (specialty:) Radiation Oncology     Requested provider (if applicable): Patient received care in 2019 with Dr. Gaxiola.      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    2019 Apr 2019 Repeat mammogram and ultrasound w/ heterogeneous mass left breast measuring 11 x 5 x 5 mm; biopsy w/ grade 2 invasive lobular carcinoma, ER/LA positive/HER2 negative; breast MRI w/ 2 cm focus of masslike enhancement without other signs of disease in either breast or adenopathy.    May 2019 Left breast lumpectomy with sentinel lymph node; pathology w/ (30 mm, unifocial, no ALI, negative margins, 3/3 nodes negative)  Stage 1A (pT2 pN0 cMX), hormone-positive, HER2-negative, invasive lobular carcinoma left breast    May 2019 Oncotype RS =  15    7/18/2019  Completed adjuvant breast radiation   5,256 cGy to left breast completed on 7/18/2019 - [facility]     Jul 2019 Started adjuvant anastrozole    2023 6/29/2023 - CT Chest performed due to acute hypoxemic respiratory failure (H); Pulmonary  emphysema, unspecified emphysema type (H).    IMPRESSION:   1.  Findings suspicious for right upper lobe pulmonary neoplasm with probable lymphangitic spread in the right upper and right lower lobes as well as bilateral mediastinal and right supraclavicular lymph node metastases. Infectious etiology with reactive lymphadenopathy also in the differential but is not favored. The  right supraclavicular lymph node is amenable for ultrasound-guided biopsy. Otherwise, follow-up CT or PET/CT in 1 month recommended.  2.  Mild to moderate emphysema.     7/7/23 - US HEAD NECK SOFT TISSUE      HISTORY: Biopsy of supraclavicular lymph node needed. Cancer  suspected.; Supraclavicular lymphadenopathy  TECHNIQUE: Transcutaneous soft tissue ultrasound of the right supraclavicular region and right neck.  COMPARISON: CT chest 6/29/2023  FINDINGS:  Limited sonographic evaluation of the right supraclavicular region and right neck demonstrates 3 discrete observations. Within the right neck, a hypoechoic observation is seen measuring 1.2 x 1.0 x 1.3 cm. Within the right supraclavicular region, 2 solid appearing lesions are identified measuring 2.3 x 1.3 x 2.1 cm and 1.6 x 2.5 x 1.7 cm. Limited sonographic evaluation of the contralateral supraclavicular  region demonstrates no similar findings.  IMPRESSION:  1.  Indeterminate right supraclavicular and right neck observations measuring up to 2.5 cm.    A. Needle biopsy submitted as tissue from right neck lymph node:  - Benign appearing thyroid tissue without lymph node tissue.    7/14/23 - Brain MRI  IMPRESSION:  1. Enhancing nodule in the right precentral gyrus with mild surrounding vasogenic edema, most consistent with a metastasis given the patient's history.  2. Indeterminate small focus of enhancement in the left aspect of the thierry with corresponding DWI hyperintense signal. This also may represent a metastatic lesion, although a subacute infarct is not entirely excluded.  3. Indeterminate punctate focus of DWI hyperintense signal in the left superior parietal lobule could represent either a tiny recent infarct or tiny early metastatic lesion.  4. Linear enhancement more anteriorly within the left superior parietal lobe/postcentral gyrus may represent a developmental venous anomaly.  5. Brain atrophy and presumed chronic small vessel ischemic change, as  described.  6. No acute intracranial hemorrhage, extra-axial fluid collection, or significant mass effect/herniation.    Records Location: See Bookmarked material     Additional testing needed prior to consult: PET scan scheduled for 7/29/23.    7/21 8:17 - Telephoned and spoke with Ivette. Writer received referral, reviewed for appropriate plan, and call transferred to New Patient Scheduling for completion.

## 2023-07-21 PROBLEM — C79.31 MALIGNANT NEOPLASM METASTATIC TO BRAIN (H): Status: ACTIVE | Noted: 2023-01-01

## 2023-07-21 NOTE — TELEPHONE ENCOUNTER
Patient is still waiting to hear is she is suppose to continue to take her anastrozole, please call her today to advise.

## 2023-07-21 NOTE — TELEPHONE ENCOUNTER
FREE DRUG APPLICATION INITIATED    Medication: IBRANCE 125 MG PO CAPS  Free Drug Program Name:    Start Date: 7/21/23  Phone #:[phone]   Fax #: [phone]  Additional Information: Faxed free drug application to [employer]

## 2023-07-21 NOTE — TELEPHONE ENCOUNTER
Home Care is calling regarding an established patient with [facility].       Requesting orders from: aNzario Jones  Provider is following patient: Yes  Is this a 60-day recertification request?  No    Orders Requested    Skilled Nursing  Request for initial certification (first set of orders)   Frequency: 1x/wk for 4 wks  then 1x/wk for  every other week x3 wks  4 PRN Visits.     Confirmed ok to leave a detailed message with call back.  Contact information confirmed and updated as needed.  LOVE Jalloh with [facility].  Call [phone]. Secure line, ok to leave a message.     Trina Kelly RN

## 2023-07-21 NOTE — TELEPHONE ENCOUNTER
PA Initiation    Medication: IBRANCE 125 MG PO CAPS  Insurance Company: [employer] - Phone [phone] Fax [phone]  Pharmacy Filling the Rx:    Filling Pharmacy Phone:    Filling Pharmacy Fax:    Start Date: 7/21/2023

## 2023-07-21 NOTE — PROGRESS NOTES
[facility] Hematology / Oncology  Progress Note  Name: Latanya Padron  :  1950    MRN:  [id number]    --------------------    Assessment / Plan:  Latanya, her  and I reviewed that right now the clinical suspicion would be highest for metastatic breast cancer.  Some of the pulmonary changes could be lymphangitic carcinomatosis but I agree with continuing antibiotics for possible pneumonia.  She is on an appropriate anticoagulants and is tolerating it well.  I would recommend that she complete several weeks before considering any procedures.  While it is important to get a tissue diagnosis, I think getting her breathing and lungs into a better place is obviously paramount as well.  Nothing seems ominous or paramount about her cancer at this time.  We are planning to get a PET scan and ask our pulmonary colleagues to consider an endobronchial ultrasound or CT-guided biopsy depending upon their review of the imaging.  Unfortunately her pathology was nondiagnostic.  As we discussed, if this does confirm stage IV breast cancer, we have plans to initiate therapy with Faslodex in conjunction with Ibrance.  Bone strengthening agents will be an important part of her care.  We reviewed the logistics of administration as well as potential side effects associated with these agents.  Obviously if this is not breast cancer and another form of malignancy, then systemic therapy options would dramatically change.  Once we have a tissue biopsy we will also plan to send NGS testing.  For now, we are unfortunately in a bit of a holding pattern while allowing her lungs to heal from the pulmonary embolism standpoint to which point we can get a diagnostic tissue sample.  Certainly if the PET scan reveals another lesion, we may look at alternative sites for biopsy as well.  We did an MRI when she was in the hospital to rule out metastatic disease and we unfortunately did find a lesion suggestive of metastatic focus.  I  "referred her to the [place] radiation oncologist for consideration of gamma knife/definitive therapy to the CNS lesion.  She will need near future brain imaging on a serial fashion as well.  She is not symptomatic and have not started on steroids as a result of this time.    Vin Dhillon MD    --------------------    Interval History:  Latanya returns for follow up of breast cancer and new thoracic disease.  She unfortunately was hospitalized with a pulmonary embolism and has been transitioned to a DOAC.  Her breathing is okay but she is using oxygen to 6 L with exertion and 2 L at rest.  The blood thinner has caused some bruising but no overt bleeding.  She has no pain.  She is sleeping okay.  No headaches or other neurologic changes.  She is been taking and adherent with her Arimidex without issue.    --------------------    Physical Exam:  VS: /70 (BP Location: Right arm, Patient Position: Sitting, Cuff Size: Adult Regular)   Pulse 91   Temp 97.9  F (36.6  C) (Temporal)   Resp 22   Ht 1.6 m (5' 3\")   Wt 72.6 kg (160 lb)   SpO2 95%   BMI 28.34 kg/m    GEN: Well appearing, breathing comfortable on oxygen nasal cannula.    Labs / Imaging:  We reviewed CBC, CMP, nondiagnostic pathology report.  We personally reviewed her chest imaging together.  "

## 2023-07-21 NOTE — TELEPHONE ENCOUNTER
Phoned Yeimi from [facility] back.  Left message on her secure voicemail informing her of documentation as stated below for approval on verbal orders Home Care for patient.  Left message to call back with any further questions or concerns.    Flory Hidalgo RN

## 2023-07-21 NOTE — TELEPHONE ENCOUNTER
July 21, 2023 9:54 AM KARL   Internal Referral, Per call to PT and she stated she previously had radiation at [facility] for Breast Cancer somewhere between [phone]    Email to [facility] in  for availability/status of records    July 24, 2023  Response from [facility] stating they have all the records.

## 2023-07-21 NOTE — TELEPHONE ENCOUNTER
Nursing Note    D:  patient calls today with concerns of continuous coughing for the last 1.5 hours. Was just discharged from the hospital with acute respiratory failure. O2 sats have mostly been in the low 90's today. Currently 87% at rest. Productive cough with light phlegm. Denies SOB, chest pain, difficulty breathing. Writer reviewed her meds and denies using neb or albuterol inhaler today. Taking all prescribed routine meds (including inhalers)    A:  advised patient use albuterol inhaler 2 puffs now.    R:  patient's cough subsided. Reminded patient to call clinic back if the cough returns or develops more concerning symptoms.    Suri Merritt RN  [facility]  [phone]  7/21/2023 1:34 PM

## 2023-07-21 NOTE — TELEPHONE ENCOUNTER
Prior Authorization Approval    Medication: IBRANCE 125 MG PO CAPS  Authorization Effective Date: 6/21/2023  Authorization Expiration Date: 7/20/2026  Approved Dose/Quantity: 21/28  Reference #: [id number]   Insurance Company: [employer] - Phone [phone] Fax [phone]  Expected CoPay: $1725.89     CoPay Card Available: No    Financial Assistance Needed: Yes  Which Pharmacy is filling the prescription:    Pharmacy Notified: Yes  Patient Notified: Yes

## 2023-07-21 NOTE — TELEPHONE ENCOUNTER
Continue anastrozole for now pending decision to pursue biopsy.    It would be ideal to wait until biopsy completed before switching therapies.    Vin Dhillon MD.

## 2023-07-24 NOTE — TELEPHONE ENCOUNTER
Spoke to Darcy at [employer]. Application is in, benefit check is pending, and it will take 24-48 hours   Ph: [phone]

## 2023-07-25 NOTE — PROGRESS NOTES
IDENTIFICATION: This is a 73 year old woman with multiple comorbidities and a h/o left-sided breast cancer originally diagnosed in 2019, s/p lumpectomy and sentinel lymph node biopsy revealing [id number] disease, followed by adjuvant radiation therapy completed July 2019 and Arimidex, who recently presented with nausea vomiting headache and respiratory failure found to have lung and mediastinal disease as well as multiple brain metastases.  She is referred for gamma knife stereotactic radiosurgery.     HISTORY OF PRESENT ILLNESS: Ms. Padron is a 73 year old woman with a strong family history of breast cancer who in 2019 was also diagnosed with left breast G2 ILCA, ER+/IA+/H2N-, status post lumpectomy and SLNBx, revealing [id number] disease. Her Oncotype score was 15 and she did not receive chemotherapy. She completed adjuvant radiation therapy to the left breast on 7/18/19 and then was started on Arimidex.  She also has multiple comorbidities which include severe COPD, chronic respiratory failure (2.5 L NC O2), paroxysmal atrial fibrillation chronically on warfarin which causes coagulation defect, hypertension, hyperlipidemia.    Recently she had been having progressive respiratory symptoms. On 6/29/23 chest CT showed right upper lobe pulmonary neoplasm with probable lymphangitic spread in the right upper and right lower lobes as well as bilateral mediastinal and right supraclavicular lymph node metastases. 7/7/23 biopsy of the SCV node was unsuccessful.  However her anticoagulation was interrupted and on 7/9/23 she presented with dyspnea, n/v/ha. As an inpt she was found to have a DANTE PE, possible right upper lobe pneumonia, and new pulmonary hypertension with acute cor pulmonale. She was started on BiPAP and admitted into the ICU.  While in house brain MRI showed enhancing nodule in the right precentral gyrus with mild surrounding vasogenic edema, indeterminate small focus of enhancement in the left thierry, and  indeterminate punctate focus in the left superior parietal lobule.    She was eventually discharged on 7/19/23 and is now referred for XRT.    Currently her headache is rated 8 out of 10 in the morning which decreases to about 4 out of 10 mid afternoon after she takes Tylenol.  She denies any nausea vomiting or neurologic symptoms at this time.  Her daughter is with her today and states that she does occasionally has slurred speech over the past month.  Otherwise no new neurologic symptoms. She is being seen here today in consultation for gamma knife stereotactic radiosurgery.      REVIEW OF SYSTEMS: As per HPI, a 14-point review of system is otherwise negative.  PAST RADIATION THERAPY:  Denies  PAST CTD/PACEMAKER: Denies    Past Medical History:   Diagnosis Date    Breast cancer (H) 04/12/2019    Left Breast    COPD (chronic obstructive pulmonary disease) (H)     Mixed hyperlipidemia     Neck mass 06/20/2018    Paroxysmal atrial fibrillation (H)     PONV (postoperative nausea and vomiting)     S/P radiation therapy     5,256 cGy to left breast completed on 7/18/2019 - [facility]       Past Surgical History:   Procedure Laterality Date    BIOPSY NODE SENTINEL Left 5/15/2019    Procedure: left sentinel node biopsy;  Surgeon: Donald Aleman MD;  Location: [facility] OR    BREAST BIOPSY, CORE RT/LT Left 04/12/2019    COLONOSCOPY  06/21/10    COLONOSCOPY N/A 3/4/2021    Procedure: Colonoscopy with  polypectomy;  Surgeon: Donald Aleman MD;  Location: [facility] GI    HC BIOPSY OF BREAST, OPEN INCISIONAL Right 1988    Benign per patient    HC CORRECT BUNION,METATARSAL OSTEOTOMY  1993     LAPAROSCOPY, SURGICAL; CHOLECYSTECTOMY  08/04/10     SLING OPERATION FOR STRESS INCONTINENCE  04/19/2007    HERNIORRHAPHY INCISIONAL (LOCATION)  9/23/2011    Procedure:HERNIORRHAPHY INCISIONAL (LOCATION); Surgeon:AYALA HOOVER; Location:[facility] OR    LAPAROSCOPIC HERNIORRHAPHY INCISIONAL  9/23/2011    Procedure:LAPAROSCOPIC HERNIORRHAPHY  INCISIONAL; Laparoscopic mesh repair of incarcerated incisional hernia , extensive lysis of adhesions, excision of hernia sac and closure of fascia.; Surgeon:AYALA HOOVER; Location:[facility] OR    LAPAROSCOPIC LYSIS ADHESIONS  2011    Procedure:LAPAROSCOPIC LYSIS ADHESIONS; Surgeon:AYALA HOOVER; Location:[facility] OR    MASTECTOMY PARTIAL WITH NEEDLE LOCALIZATION Left 5/15/2019    Procedure: left wire localized partial mastectomy;  Surgeon: Donald Aleman MD;  Location: [facility] OR    TONSILLECTOMY      ZZC APPENDECTOMY,W OTHR PROC      Z  DELIVERY ONLY          Z LIGATE FALLOPIAN TUBE      Z NONSPECIFIC PROCEDURE      Exploratory laparotomy with resection of infarcted segment of omentum and minor lysis of adhesions    [facility] NONSPECIFIC PROCEDURE      Removal of hemorrhagic corpus luteum cyst    [facility] VAGINAL HYSTERECTOMY  1984       Family History   Problem Relation Age of Onset    Breast Cancer Mother          at 88    Obesity Mother     Genitourinary Problems Mother     Lipids Mother     Heart Disease Father         by pass (5)    Cerebrovascular Disease Father         hemorragic    Lipids Father     Alzheimer Disease Father 92    Alzheimer Disease Maternal Grandmother     Genitourinary Problems Maternal Grandmother     Cancer Maternal Grandfather     Cancer Paternal Grandfather         lymph nodes    Cancer Brother         prostate    Melanoma Brother     Heart Disease Brother          (aaron with a partner)    Hyperlipidemia Brother     Heart Disease Brother         stent placement    Hyperlipidemia Brother     Prostate Cancer Brother 66    Respiratory Sister     Lipids Sister     Breast Cancer Sister 40        s/p mastectomy    Arthritis Sister     Obesity Sister     Heart Failure Sister     Cancer Sister         stomach, colon, pancreatic    Lipids Sister     Dementia Sister         1/2 sister on dad's side ()    Other - See Comments Sister              Other - See Comments Daughter         fibromyalgia (1972)    Lymphoma Maternal Aunt     Heart Failure Maternal Aunt 81    Cancer Maternal Uncle         colon    Hemophilia Grandchild     Other - See Comments Grandchild         Transgender       Social History     Tobacco Use    Smoking status: Former     Packs/day: 1.00     Years: 30.00     Pack years: 30.00     Types: Cigarettes     Quit date: 10/25/2005     Years since quittin.7    Smokeless tobacco: Never   Substance Use Topics    Alcohol use: No     Alcohol/week: 0.0 standard drinks of alcohol     Current Outpatient Medications   Medication    albuterol (PROAIR HFA/PROVENTIL HFA/VENTOLIN HFA) 108 (90 Base) MCG/ACT inhaler    anastrozole (ARIMIDEX) 1 MG tablet    apixaban ANTICOAGULANT (ELIQUIS) 5 MG tablet    atorvastatin (LIPITOR) 10 MG tablet    Calcium Carb-Cholecalciferol 500-400 MG-UNIT TABS    Cyanocobalamin (B-12) 1000 MCG TBCR    diltiazem ER COATED BEADS (CARDIZEM CD/CARTIA XT) 240 MG 24 hr capsule    fluticasone (FLONASE) 50 MCG/ACT nasal spray    furosemide (LASIX) 20 MG tablet    hydrochlorothiazide (HYDRODIURIL) 25 MG tablet    lisinopril (ZESTRIL) 2.5 MG tablet    magnesium oxide (MAG-OX) 400 MG tablet    mometasone-formoterol (DULERA) 200-5 MCG/ACT inhaler    MYRBETRIQ 50 MG 24 hr tablet    ondansetron (ZOFRAN ODT) 4 MG ODT tab    order for DME    order for DME    ORDER FOR DME    Probiotic Product (PROBIOTIC PO)    spacer (OPTICHAMBER ROSANNE) holding chamber    tiotropium (SPIRIVA RESPIMAT) 2.5 MCG/ACT inhaler    venlafaxine (EFFEXOR XR) 75 MG 24 hr capsule    vitamin B-Complex    albuterol (PROVENTIL) (2.5 MG/3ML) 0.083% neb solution    ipratropium - albuterol 0.5 mg/2.5 mg/3 mL (DUONEB) 0.5-2.5 (3) MG/3ML neb solution     No current facility-administered medications for this visit.        Allergies   Allergen Reactions    Augmentin [Amoxicillin-Pot Clavulanate] Nausea and Vomiting    Meperidine Hcl Nausea and Vomiting     demerol     Seasonal Allergies      spring         PHYSICAL EXAMINATION:  /68   Pulse 84   Temp 97.4  F (36.3  C) (Oral)   Resp 16   Wt 73.3 kg (161 lb 9.6 oz)   SpO2 95%   BMI 31.35 kg/m    GENERAL      thinerl-appearing woman in no acute distress, sitting comfortably in wheelchair.  HEENT            Normocephalic. Sclerae anicteric.  CVR                No murmurs, rubs, or gallops.  LUNGS            Clear to auscultation bilaterally.  LYMPH           deferred  EXT                  No CCE.    NEURO           No focal deficits. CN II-XII intact. Gait wnl.  Moves slow as she is attempting to get out of wheelchair.  Strength is symmetrical in all four extremities. Sensation intact in all areas tested.    MSK                 Good ROM.   SKIN                 Warm and well perfused.  Scars well-healed  PSYCH            Alert and oriented x 3        ECOG PERFORMANCE STATUS: 2     IMPRESSION/PLAN: Latanya Padron is a 73 year old woman with multiple comorbidities and a h/o left-sided breast cancer originally diagnosed in 2019, s/p lumpectomy and sentinel lymph node biopsy revealing [id number] disease, followed by adjuvant radiation therapy completed July 2019 and Arimidex, who recently presented with nausea vomiting headache and respiratory failure found to have lung and mediastinal disease as well as multiple brain metastases.  She is referred for gamma knife stereotactic radiosurgery. I recommend GK stereotactic radiosurgery to the new brain lesions to improve local control of the tumors.      The risks, benefits, treatment rationale and regimen of stereotactic radiosurgery discussed with the patient and daughter today. Patient consented to treatment.  Schedule is for patient to return to [facility] Rad On[facility] Dept for frameMRI and CT based planning. She will also have an appt with our [facility] Dr. Rose. Treatment will be given on over the course of 1 day.     Additional problem list to be addressed in the following manner:   Follow up  with Med Onc for discussion regarding additional systemic agents to be given after GK SRS  Will start on 2 mg Decadron QAM to help with morning headache.        There was ample time for questions and all were answered to the patient's satisfaction. Thank you for allowing me to participate in the care of this pleasant patient. If you have any questions, please do not hesitate to contact my office.     Sincerely,  Nya Gaxiola MD     [phone] Pager   [phone] [facility]   [phone] Inga Oh  [phone] [place]

## 2023-07-25 NOTE — TELEPHONE ENCOUNTER
Dr Erazo you are seeing this patient on 7/28 would you be willing to address this prior to the appt?    Gail Morrow MA 7/25/2023

## 2023-07-25 NOTE — NURSING NOTE
REASON FOR APPOINTMENT   Type of Cancer: Metastatic Left Breast Cancer  Location: Brain [facility]  Date of Symptom Onset: 7/9/23 Patient presented to ED with SOB, overall not feeling well, 7/14/23 MRI brain    TREATMENT TO-DATE FOR THIS CANCER  Surgery ? 5/15/2019 L wire localized lumpectomy with L SLN bx Dr. Aleman  Chemotherapy ? No  Other Treatments for this Cancer ? Arimidex started 7/2019- current    PERSONAL HISTORY OF CANCER   Previous Cancer ? no   Prior Radiation ? Completed radiation on 7/18/2019 to the L breast [facility] Dr. Gaxiola    Prior Chemotherapy ? no   Prior Hormonal Therapy ? Yes, see above     REFERRALS NEEDED  no    VITALS  /68   Pulse 84   Temp 97.4  F (36.3  C) (Oral)   Resp 16   Wt 73.3 kg (161 lb 9.6 oz)   SpO2 95%   BMI 31.35 kg/m      PACEMAKER/IMPLANTED CARDIAC DEVICE no    PAIN  Current history of pain associated with this visit:   Intensity: 4-6/10  Current: aching  Location: Headaches, R upper back, R chest wall  Treatment: Tylenol PRN    PSYCHOSOCIAL  Marital Status:   Patient lives in home with spouse.  Number of children: unknown  Working status: Retired  Do you feel safe in your home? Yes    REVIEW OF SYSTEMS  Skin: positive for bruising  Eyes: positive for glasses and [place] in vision on R eye  Ears/Nose/Throat: positive for hearing loss  Respiratory: Shortness of breath- O2 2.5L at all times and Dyspnea on exertion- O2 6L with activity  Cardiovascular: positive for negative and lower extremity edema  Gastrointestinal: positive for constipation and nausea  Genitourinary: negative  Musculoskeletal: positive for R upper back pain and muscular weakness  Neurologic: positive for headaches and numbness or tingling of toes on R foot  Psychiatric: negative  Hematologic/Lymphatic/Immunologic: positive for chills and weight loss  Endocrine: negative    WOMEN ONLY  Any chance you may be pregnant: No    Do you have an advanced directive or living will? Yes  Are you  DNR/DNI? No    Nikky Infante RN

## 2023-07-25 NOTE — TELEPHONE ENCOUNTER
Patient and daughter calling to see if she can get something more than Tylenol for her headache to get her through until Friday when she has her appointment with Dr. Erazo. Patient was recently diagnosed with brain cancer and now has 4 spots on her brain per daughter and has a constant headache that Tylenol is not helping.     She cannot take ibuprofen due to being on blood thinners.    José Severino,BSN, RN

## 2023-07-25 NOTE — TELEPHONE ENCOUNTER
This is no longer needed, patient is being seen by specialists and has upcoming testing as well as a pre op being done.      Whit Nowak XRO/

## 2023-07-25 NOTE — LETTER
7/25/2023         RE: Latanya Padron  [address] [facility] [address]        Dear Colleague,    Thank you for referring your patient, Latanya Padron, to the [facility] [place]. Please see a copy of my visit note below.    IDENTIFICATION: This is a 73 year old woman with multiple comorbidities and a h/o left-sided breast cancer originally diagnosed in 2019, s/p lumpectomy and sentinel lymph node biopsy revealing [id number] disease, followed by adjuvant radiation therapy completed July 2019 and Arimidex, who recently presented with nausea vomiting headache and respiratory failure found to have lung and mediastinal disease as well as multiple brain metastases.  She is referred for gamma knife stereotactic radiosurgery.     HISTORY OF PRESENT ILLNESS: Ms. Padron is a 73 year old woman with a strong family history of breast cancer who in 2019 was also diagnosed with left breast G2 ILCA, ER+/MA+/H2N-, status post lumpectomy and SLNBx, revealing [id number] disease. Her Oncotype score was 15 and she did not receive chemotherapy. She completed adjuvant radiation therapy to the left breast on 7/18/19 and then was started on Arimidex.  She also has multiple comorbidities which include severe COPD, chronic respiratory failure (2.5 L NC O2), paroxysmal atrial fibrillation chronically on warfarin which causes coagulation defect, hypertension, hyperlipidemia.    Recently she had been having progressive respiratory symptoms. On 6/29/23 chest CT showed right upper lobe pulmonary neoplasm with probable lymphangitic spread in the right upper and right lower lobes as well as bilateral mediastinal and right supraclavicular lymph node metastases. 7/7/23 biopsy of the SCV node was unsuccessful.  However her anticoagulation was interrupted and on 7/9/23 she presented with dyspnea, n/v/ha. As an inpt she was found to have a DANTE PE, possible right upper lobe pneumonia, and new pulmonary hypertension  with acute cor pulmonale. She was started on BiPAP and admitted into the ICU.  While in house brain MRI showed enhancing nodule in the right precentral gyrus with mild surrounding vasogenic edema, indeterminate small focus of enhancement in the left thierry, and indeterminate punctate focus in the left superior parietal lobule.    She was eventually discharged on 7/19/23 and is now referred for XRT.    Currently her headache is rated 8 out of 10 in the morning which decreases to about 4 out of 10 mid afternoon after she takes Tylenol.  She denies any nausea vomiting or neurologic symptoms at this time.  Her daughter is with her today and states that she does occasionally has slurred speech over the past month.  Otherwise no new neurologic symptoms. She is being seen here today in consultation for gamma knife stereotactic radiosurgery.      REVIEW OF SYSTEMS: As per HPI, a 14-point review of system is otherwise negative.  PAST RADIATION THERAPY:  Denies  PAST CTD/PACEMAKER: Denies    Past Medical History:   Diagnosis Date     Breast cancer (H) 04/12/2019    Left Breast     COPD (chronic obstructive pulmonary disease) (H)      Mixed hyperlipidemia      Neck mass 06/20/2018     Paroxysmal atrial fibrillation (H)      PONV (postoperative nausea and vomiting)      S/P radiation therapy     5,256 cGy to left breast completed on 7/18/2019 - [facility]       Past Surgical History:   Procedure Laterality Date     BIOPSY NODE SENTINEL Left 5/15/2019    Procedure: left sentinel node biopsy;  Surgeon: Donald Aleman MD;  Location: [facility] OR     BREAST BIOPSY, CORE RT/LT Left 04/12/2019     COLONOSCOPY  06/21/10     COLONOSCOPY N/A 3/4/2021    Procedure: Colonoscopy with  polypectomy;  Surgeon: Donald Aleman MD;  Location: [facility] GI     HC BIOPSY OF BREAST, OPEN INCISIONAL Right 1988    Benign per patient     HC CORRECT BUNION,METATARSAL OSTEOTOMY  1993      LAPAROSCOPY, SURGICAL; CHOLECYSTECTOMY  08/04/10     HC SLING  OPERATION FOR STRESS INCONTINENCE  2007     HERNIORRHAPHY INCISIONAL (LOCATION)  2011    Procedure:HERNIORRHAPHY INCISIONAL (LOCATION); Surgeon:AYALA HOOVER; Location:[facility] OR     LAPAROSCOPIC HERNIORRHAPHY INCISIONAL  2011    Procedure:LAPAROSCOPIC HERNIORRHAPHY INCISIONAL; Laparoscopic mesh repair of incarcerated incisional hernia , extensive lysis of adhesions, excision of hernia sac and closure of fascia.; Surgeon:AYALA HOOVER; Location:[facility] OR     LAPAROSCOPIC LYSIS ADHESIONS  2011    Procedure:LAPAROSCOPIC LYSIS ADHESIONS; Surgeon:AYALA HOOVER; Location:[facility] OR     MASTECTOMY PARTIAL WITH NEEDLE LOCALIZATION Left 5/15/2019    Procedure: left wire localized partial mastectomy;  Surgeon: Donald Aleman MD;  Location: [facility] OR     TONSILLECTOMY       ZZC APPENDECTOMY,W OTHR PROC       Z  DELIVERY ONLY           Z LIGATE FALLOPIAN TUBE  's     Z NONSPECIFIC PROCEDURE      Exploratory laparotomy with resection of infarcted segment of omentum and minor lysis of adhesions     Z NONSPECIFIC PROCEDURE      Removal of hemorrhagic corpus luteum cyst     Z VAGINAL HYSTERECTOMY  1984       Family History   Problem Relation Age of Onset     Breast Cancer Mother          at 88     Obesity Mother      Genitourinary Problems Mother      Lipids Mother      Heart Disease Father         by pass (5)     Cerebrovascular Disease Father         hemorragic     Lipids Father      Alzheimer Disease Father 92     Alzheimer Disease Maternal Grandmother      Genitourinary Problems Maternal Grandmother      Cancer Maternal Grandfather      Cancer Paternal Grandfather         lymph nodes     Cancer Brother         prostate     Melanoma Brother      Heart Disease Brother         195 (aaron with a partner)     Hyperlipidemia Brother      Heart Disease Brother         stent placement     Hyperlipidemia Brother      Prostate Cancer Brother 66     Respiratory Sister       Lipids Sister      Breast Cancer Sister 40        s/p mastectomy     Arthritis Sister      Obesity Sister      Heart Failure Sister      Cancer Sister         stomach, colon, pancreatic     Lipids Sister      Dementia Sister         1/2 sister on dad's side ()     Other - See Comments Sister         1948     Other - See Comments Daughter         fibromyalgia ()     Lymphoma Maternal Aunt      Heart Failure Maternal Aunt 81     Cancer Maternal Uncle         colon     Hemophilia Grandchild      Other - See Comments Grandchild         Transgender       Social History     Tobacco Use     Smoking status: Former     Packs/day: 1.00     Years: 30.00     Pack years: 30.00     Types: Cigarettes     Quit date: 10/25/2005     Years since quittin.7     Smokeless tobacco: Never   Substance Use Topics     Alcohol use: No     Alcohol/week: 0.0 standard drinks of alcohol     Current Outpatient Medications   Medication     albuterol (PROAIR HFA/PROVENTIL HFA/VENTOLIN HFA) 108 (90 Base) MCG/ACT inhaler     anastrozole (ARIMIDEX) 1 MG tablet     apixaban ANTICOAGULANT (ELIQUIS) 5 MG tablet     atorvastatin (LIPITOR) 10 MG tablet     Calcium Carb-Cholecalciferol 500-400 MG-UNIT TABS     Cyanocobalamin (B-12) 1000 MCG TBCR     diltiazem ER COATED BEADS (CARDIZEM CD/CARTIA XT) 240 MG 24 hr capsule     fluticasone (FLONASE) 50 MCG/ACT nasal spray     furosemide (LASIX) 20 MG tablet     hydrochlorothiazide (HYDRODIURIL) 25 MG tablet     lisinopril (ZESTRIL) 2.5 MG tablet     magnesium oxide (MAG-OX) 400 MG tablet     mometasone-formoterol (DULERA) 200-5 MCG/ACT inhaler     MYRBETRIQ 50 MG 24 hr tablet     ondansetron (ZOFRAN ODT) 4 MG ODT tab     order for DME     order for DME     ORDER FOR DME     Probiotic Product (PROBIOTIC PO)     spacer (OPTICHAMBER ROSANNE) holding chamber     tiotropium (SPIRIVA RESPIMAT) 2.5 MCG/ACT inhaler     venlafaxine (EFFEXOR XR) 75 MG 24 hr capsule     vitamin B-Complex     albuterol  (PROVENTIL) (2.5 MG/3ML) 0.083% neb solution     ipratropium - albuterol 0.5 mg/2.5 mg/3 mL (DUONEB) 0.5-2.5 (3) MG/3ML neb solution     No current facility-administered medications for this visit.        Allergies   Allergen Reactions     Augmentin [Amoxicillin-Pot Clavulanate] Nausea and Vomiting     Meperidine Hcl Nausea and Vomiting     demerol     Seasonal Allergies      spring         PHYSICAL EXAMINATION:  /68   Pulse 84   Temp 97.4  F (36.3  C) (Oral)   Resp 16   Wt 73.3 kg (161 lb 9.6 oz)   SpO2 95%   BMI 31.35 kg/m    GENERAL      thinerl-appearing woman in no acute distress, sitting comfortably in wheelchair.  HEENT            Normocephalic. Sclerae anicteric.  CVR                No murmurs, rubs, or gallops.  LUNGS            Clear to auscultation bilaterally.  LYMPH           deferred  EXT                  No CCE.    NEURO           No focal deficits. CN II-XII intact. Gait wnl.  Moves slow as she is attempting to get out of wheelchair.  Strength is symmetrical in all four extremities. Sensation intact in all areas tested.    MSK                 Good ROM.   SKIN                 Warm and well perfused.  Scars well-healed  PSYCH            Alert and oriented x 3        ECOG PERFORMANCE STATUS: 2     IMPRESSION/PLAN: Latanya Padron is a 73 year old woman with multiple comorbidities and a h/o left-sided breast cancer originally diagnosed in 2019, s/p lumpectomy and sentinel lymph node biopsy revealing [id number] disease, followed by adjuvant radiation therapy completed July 2019 and Arimidex, who recently presented with nausea vomiting headache and respiratory failure found to have lung and mediastinal disease as well as multiple brain metastases.  She is referred for gamma knife stereotactic radiosurgery. I recommend GK stereotactic radiosurgery to the new brain lesions to improve local control of the tumors.      The risks, benefits, treatment rationale and regimen of stereotactic radiosurgery  discussed with the patient and daughter today. Patient consented to treatment.  Schedule is for patient to return to [facility] Rad Onc Dept for frameMRI and CT based planning. She will also have an appt with our [facility] Dr. Rose. Treatment will be given on over the course of 1 day.     Additional problem list to be addressed in the following manner:   Follow up with Med Onc for discussion regarding additional systemic agents to be given after GK SRS  Will start on 2 mg Decadron QAM to help with morning headache.        There was ample time for questions and all were answered to the patient's satisfaction. Thank you for allowing me to participate in the care of this pleasant patient. If you have any questions, please do not hesitate to contact my office.     Sincerely,  Nya Gaxiola MD     [phone] Pager   [phone] [facility]   [phone] [place] Oh  [phone] [place]      Again, thank you for allowing me to participate in the care of your patient.        Sincerely,        DIANA Gaxiola MD

## 2023-07-27 NOTE — TELEPHONE ENCOUNTER
Writer contacted the patient to schedule GI procedure. Scheduled for 08/01 with Dr. Lee. H&P not typically needed for endo cases but patient is completing one 07/28. Packet information being sent via [employer] per patient request.    Patient expressed concerns over nausea during sedation, message sent to provider for review.    Justin Isbell on 7/27/2023 at 10:23 AM

## 2023-07-27 NOTE — TELEPHONE ENCOUNTER
FREE DRUG APPLICATION APPROVED    Medication: IBRANCE 125 MG PO CAPS  Program Name: [employer]  Effective Date: 7/27/2023  Expiration Date: 12/31/2023  Pharmacy Filling the Rx: [facility] - [address]  Patient Notified: yes  Additional Information: drug will be shipped to patients house Monday 7/31

## 2023-07-27 NOTE — TELEPHONE ENCOUNTER
Spoke to Verónica, at [facility]. Application is in, benefit is pending still, and it will take 24-48 hours  Ph: [phone]

## 2023-07-28 NOTE — PROGRESS NOTES
[address] [address]  Phone: [phone]  Fax: [phone]  Primary Provider: Nazario Jones  Pre-op Performing Provider: MARY BRYANT      PREOPERATIVE EVALUATION:  Today's date: 7/28/2023    Latanya Padron is a 73 year old female who presents for a preoperative evaluation.      7/28/2023     1:29 PM   Additional Questions   Roomed by Betty MAZARIEGOS   Accompanied by Ray, , Bufforin, daughter       Surgical Information:  Surgery/Procedure: BRONCHOSCOPY, WITH BIOPSY OF 1 OR 2 LYMPH NODE STATIONS WITH ENDOBRONCHIAL ULTRASOUND GUIDANCE   Surgery Location: [facility]  Surgeon: Dr. Lee  Surgery Date: 8/1/2023  Time of Surgery: 1:30 pm  Where patient plans to recover: At home with family  Fax number for surgical facility: Note does not need to be faxed, will be available electronically in Epic.    Assessment & Plan     The proposed surgical procedure is considered INTERMEDIATE risk.    Preop general physical exam  Patient working with oncology and pulmonology regarding her recent clot and cancer with metastasis.  They plan to move forward with biopsy now given need for treatment.  Patient understands risk of bleeding, blood clot and cardiac arrest our increased in her unfortunate situation. Discussed potential repeat of echo now but respiratory and fluid status improved. They are wanting to move forward with procedure and understand risks of doing so.  Plan for anticoagulation as noted below.  They plan for alternative biopsy site if PET scan with different findings.  She is having this done tomorrow.  Following with pulmonology and oncology.  - EKG 12-lead complete w/read - Clinics  - enoxaparin ANTICOAGULANT (LOVENOX) 40 MG/0.4ML syringe  Dispense: 0.8 mL; Refill: 0    Malignant neoplasm metastatic to brain (H)  Supraclavicular mass-benign biopsy results 7/7/23  Malignant neoplasm of overlapping sites of left breast in female,  estrogen receptor positive (H)  Currently on steroids given her brain mets.  - Miscellaneous Order for DME - ONLY FOR DME    AF (paroxysmal atrial fibrillation) (H)  Long term current use of anticoagulant therapy  Was on chronic warfarin previously.  Patient rates well controlled with diltiazem now and on Eliquis. Regular today.     Pulmonary hypertension (H)  Right heart failure  Recent echo completed on 7/10/2023 with pulmonary hypertension.  Stress test back in May normal.  No signs of clinical fluid overload at this time.  Weight stable and actually down from previous encounter.  Continues on Lasix and lisinopril.  Kidney function slightly improved.    Acute pulmonary embolism with suspected acute cor pulmonale, unspecified pulmonary embolism type, left upper lobe  Patient with significant risk for recurrent clot given recent PE off of warfarin.  Patient likely hypercoagulable state with her cancer.  Procedure with high bleeding risk.  Did discuss case with Dr. Dhillon who she has been following with in oncology.  We will plan to hold her Eliquis 2 days prior to procedure with Lovenox bridge with prophylactic dosing until the procedure.  Specific instructions for patient to take last Lovenox injection 24 hours prior to procedure discussed in depth with family and patient today.  They have a good understanding of this as well as her clotting and bleeding risk being significantly high in this current situation.  Patient has completed several weeks of anticoagulation and given need for diagnosis in regards to treatment of her cancer I think it is reasonable to move forward with procedure and plan to restart anticoagulation after.  If upcoming PET scan shows other areas then alternative biopsy may be considered.  - enoxaparin ANTICOAGULANT (LOVENOX) 40 MG/0.4ML syringe  Dispense: 0.8 mL; Refill: 0    Stage 3a chronic kidney disease (H)  Stable    Coronary artery disease involving native coronary artery of native  heart without angina pectoris  Patient on atorvastatin    Pulmonary emphysema, unspecified emphysema type (H)  Oxygen needs stable given her underlying lung disease as well as recent clot.  Continue with home inhalers.  Recent imaging on 7/9/2023.    Essential hypertension, benign  - Comprehensive metabolic panel (BMP + Alb, Alk Phos, ALT, AST, Total. Bili, TP)  - CBC with platelets  - Comprehensive metabolic panel (BMP + Alb, Alk Phos, ALT, AST, Total. Bili, TP)  - CBC with platelets    History of tobacco use        MED REC REQUIRED  Post Medication Reconciliation Status:  Discharge medications reconciled and changed, see notes/orders       Risks and Recommendations:  The patient has the following additional risks and recommendations for perioperative complications:   - Recurrent use of steroids  Cardiovascular:  Increased risk given recent clot and right heart failure  Pulmonary:    - Incentive spirometry post-op   - Oxygen dependent lung disease    Antiplatelet or Anticoagulation Medication Instructions:   - apixaban (Eliquis), edoxaban (Savaysa), rivaroxaban (Xarelto): Bleeding risk is moderate or high for this procedure AND CrCl  (>=) 50 mL/min. HOLD 2 days before surgery.     Additional Medication Instructions:  Patient is to take all scheduled medications on the day of surgery EXCEPT for modifications listed below:  Hold hydrochlorothiazide, Lasix and lisinopril morning of surgery    RECOMMENDATION:  APPROVAL GIVEN to proceed with proposed procedure, without further diagnostic evaluation.    Subjective       HPI related to upcoming procedure: BRONCHOSCOPY, WITH BIOPSY OF 1 OR 2 LYMPH NODE STATIONS WITH ENDOBRONCHIAL ULTRASOUND GUIDANCE         7/27/2023     9:08 AM   Preop Questions   1. Have you ever had a heart attack or stroke? No   2. Have you ever had surgery on your heart or blood vessels, such as a stent placement, a coronary artery bypass, or surgery on an artery in your head, neck, heart, or legs? No    3. Do you have chest pain with activity? No   4. Do you have a history of  heart failure? No   5. Do you currently have a cold, bronchitis or symptoms of other infection? No   6. Do you have a cough, shortness of breath, or wheezing? YES - shortness of breath   7. Do you or anyone in your family have previous history of blood clots? UNKNOWN - self   8. Do you or does anyone in your family have a serious bleeding problem such as prolonged bleeding following surgeries or cuts? UNKNOWN - self   9. Have you ever had problems with anemia or been told to take iron pills? No   10. Have you had any abnormal blood loss such as black, tarry or bloody stools, or abnormal vaginal bleeding? No   11. Have you ever had a blood transfusion? No   12. Are you willing to have a blood transfusion if it is medically needed before, during, or after your surgery? Yes   13. Have you or any of your relatives ever had problems with anesthesia? YES - self, daughter, grandson (nausea)   14. Do you have sleep apnea, excessive snoring or daytime drowsiness? No   15. Do you have any artifical heart valves or other implanted medical devices like a pacemaker, defibrillator, or continuous glucose monitor? No   16. Do you have artificial joints? No   17. Are you allergic to latex? No       Health Care Directive:  Patient has a Health Care Directive on file      Preoperative Review of :   reviewed - no record of controlled substances prescribed.      Status of Chronic Conditions:  A-FIB - Patient has a longstanding history of chronic A-fib currently on rate control. Current treatment regimen includes Apixaban for stroke prevention and denies significant symptoms of lightheadedness, palpitations or dyspnea.     COPD - Patient has a longstanding history of moderate-severe COPD . Patient has been doing well overall noting SOB and continues on medication regimen consisting of oxygen, dulera, albuterol, spiriva and duoneb as needed without adverse  reactions or side effects. Is on steroids now for brain mets.     Review of Systems  Constitutional, neuro, ENT, endocrine, pulmonary, cardiac, gastrointestinal, genitourinary, musculoskeletal, integument and psychiatric systems are negative, except as otherwise noted.    Patient Active Problem List    Diagnosis Date Noted    Malignant neoplasm metastatic to brain (H) 07/21/2023     Priority: Medium    Abnormal brain MRI 07/14/2023     Priority: Medium    Personal history of malignant neoplasm of breast 07/13/2023     Priority: Medium    Warfarin-induced coagulopathy (H) 07/13/2023     Priority: Medium    Anemia, unspecified type 07/13/2023     Priority: Medium    Hyponatremia 07/13/2023     Priority: Medium    Wide-complex tachycardia 07/13/2023     Priority: Medium    Acute kidney injury (H) 07/11/2023     Priority: Medium    Lymphadenopathy in chest 07/10/2023     Priority: Medium    Prediabetes 07/10/2023     Priority: Medium    Atrial fibrillation with RVR (H) 07/10/2023     Priority: Medium    Acute right-sided heart failure (H) 07/10/2023     Priority: Medium    Pulmonary hypertension (H) 07/10/2023     Priority: Medium    Troponin level elevated 07/09/2023     Priority: Medium    Supraclavicular mass-benign biopsy results 7/7/23 07/09/2023     Priority: Medium    Acute on chronic respiratory failure with hypoxia and hypercapnia (H) 07/09/2023     Priority: Medium    Pneumonia of right upper lobe due to infectious organism 07/09/2023     Priority: Medium    Acute pulmonary embolism with suspected acute cor pulmonale, unspecified pulmonary embolism type, left upper lobe 07/09/2023     Priority: Medium    Atrial fibrillation, paroxysmal  10/06/2022     Priority: Medium    Major depression in complete remission (H) 05/19/2022     Priority: Medium    Epidermoid cyst of labia majora 11/04/2020     Priority: Medium    Long term current use of anticoagulant therapy 10/20/2020     Priority: Medium    Coronary artery  disease involving native coronary artery of native heart without angina pectoris 08/10/2020     Priority: Medium    Restless leg syndrome 06/24/2020     Priority: Medium    Lymphadenopathy of head and neck 02/26/2020     Priority: Medium    Groin pain, right 02/26/2020     Priority: Medium    Right hip pain 09/04/2019     Priority: Medium    Patellofemoral pain syndrome of right knee 09/04/2019     Priority: Medium    Abnormal gait 09/04/2019     Priority: Medium    Primary osteoarthritis of right knee 09/04/2019     Priority: Medium    Age-related osteoporosis without current pathological fracture 07/03/2019     Priority: Medium    Malignant neoplasm of overlapping sites of left breast in female, estrogen receptor positive (H) 06/12/2019     Priority: Medium    Segmental dysfunction of sacral region 02/01/2019     Priority: Medium    Segmental dysfunction of lumbar region 02/01/2019     Priority: Medium    Segmental dysfunction of thoracic region 02/01/2019     Priority: Medium    Bilateral low back pain with right-sided sciatica 02/01/2019     Priority: Medium    Trochanteric bursitis of right hip 07/13/2018     Priority: Medium    Hip pain, right 06/20/2018     Priority: Medium    Multiple joint pain 06/20/2018     Priority: Medium    CKD (chronic kidney disease) stage 3, GFR 30-59 ml/min (H) 06/21/2017     Priority: Medium    Primary osteoarthritis of both knees 04/18/2017     Priority: Medium    AF (paroxysmal atrial fibrillation) (H) 03/02/2016     Priority: Medium    Major depression in complete remission (H) 12/28/2015     Priority: Medium    Class 1 obesity due to excess calories without serious comorbidity with body mass index (BMI) of 34.0 to 34.9 in adult 12/28/2015     Priority: Medium    Pulmonary emphysema, unspecified emphysema type (H) 12/28/2015     Priority: Medium    Atrial fibrillation (H) 06/20/2014     Priority: Medium    Essential hypertension, benign 09/30/2013     Priority: Medium    Tennis  elbow 04/12/2013     Priority: Medium     left        Advanced directives, counseling/discussion 09/09/2011     Priority: Medium     Advance Directive Problem List Overview:   Name Relationship Phone    Primary Health Care Agent            Alternative Health Care Agent          Discussed advance care planning with patient; information given to patient to review.//Brenda Carlin/BEATRIZ(AAMA)  9/9/2011         COPD exacerbation (H) 09/09/2011     Priority: Medium    Hyperlipidemia LDL goal <130 10/31/2010     Priority: Medium    Hirsutism 04/13/2007     Priority: Medium    Family history of malignant neoplasm of breast 12/27/2006     Priority: Medium    Female stress incontinence 11/26/2005     Priority: Medium    Disorder of bone and cartilage 07/13/2005     Priority: Medium     Problem list name updated by automated process. Provider to review      Sprain and strain of unspecified site of knee and leg 04/09/2004     Priority: Medium      Past Medical History:   Diagnosis Date    Breast cancer (H) 04/12/2019    Left Breast    COPD (chronic obstructive pulmonary disease) (H)     Depressive disorder 10 years    Mixed hyperlipidemia     Neck mass 06/20/2018    Paroxysmal atrial fibrillation (H)     PONV (postoperative nausea and vomiting)     S/P radiation therapy     5,256 cGy to left breast completed on 7/18/2019 - [facility]     Past Surgical History:   Procedure Laterality Date    BIOPSY NODE SENTINEL Left 05/15/2019    Procedure: left sentinel node biopsy;  Surgeon: Donald Aleman MD;  Location: [facility] OR    BREAST BIOPSY, CORE RT/LT Left 04/12/2019    BREAST SURGERY  2019    Breast cancer    COLONOSCOPY  06/21/2010    COLONOSCOPY N/A 03/04/2021    Procedure: Colonoscopy with  polypectomy;  Surgeon: Donald Aleman MD;  Location: [facility] GI    HC BIOPSY OF BREAST, OPEN INCISIONAL Right 1988    Benign per patient    HC CORRECT BUNION,METATARSAL OSTEOTOMY  1993     LAPAROSCOPY, SURGICAL; CHOLECYSTECTOMY  08/04/2010     HC SLING OPERATION FOR STRESS INCONTINENCE  2007    HERNIORRHAPHY INCISIONAL (LOCATION)  2011    Procedure:HERNIORRHAPHY INCISIONAL (LOCATION); Surgeon:AYALA HOOVER; Location:[facility] OR    LAPAROSCOPIC HERNIORRHAPHY INCISIONAL  2011    Procedure:LAPAROSCOPIC HERNIORRHAPHY INCISIONAL; Laparoscopic mesh repair of incarcerated incisional hernia , extensive lysis of adhesions, excision of hernia sac and closure of fascia.; Surgeon:AYALA HOOVER; Location:[facility] OR    LAPAROSCOPIC LYSIS ADHESIONS  2011    Procedure:LAPAROSCOPIC LYSIS ADHESIONS; Surgeon:AYALA HOOVER; Location:[facility] OR    MASTECTOMY PARTIAL WITH NEEDLE LOCALIZATION Left 05/15/2019    Procedure: left wire localized partial mastectomy;  Surgeon: Donald Aleman MD;  Location: [facility] OR    TONSILLECTOMY      ZZC APPENDECTOMY,W OTHR PROC      [facility]  DELIVERY ONLY          [facility] LIGATE FALLOPIAN TUBE  's    [facility] NONSPECIFIC PROCEDURE      Exploratory laparotomy with resection of infarcted segment of omentum and minor lysis of adhesions    [facility] NONSPECIFIC PROCEDURE      Removal of hemorrhagic corpus luteum cyst    [facility] VAGINAL HYSTERECTOMY  1984     Current Outpatient Medications   Medication Sig Dispense Refill    albuterol (PROAIR HFA/PROVENTIL HFA/VENTOLIN HFA) 108 (90 Base) MCG/ACT inhaler Inhale 2 puffs into the lungs 4 times daily 18 g 11    albuterol (PROVENTIL) (2.5 MG/3ML) 0.083% neb solution Take 2.5 mg by nebulization every 4 hours as needed for shortness of breath, wheezing or cough 75 mL 1    anastrozole (ARIMIDEX) 1 MG tablet Take 1 tablet (1 mg) by mouth daily 90 tablet 3    apixaban ANTICOAGULANT (ELIQUIS) 5 MG tablet Take 2 tablets (10 mg) by mouth 2 times daily for 2 days, THEN 1 tablet (5 mg) 2 times daily for 30 days. 68 tablet 0    atorvastatin (LIPITOR) 10 MG tablet TAKE ONE TABLET BY MOUTH ONCE DAILY (Patient taking differently: Take 10 mg by mouth daily (with dinner)) 90 tablet 1     Calcium Carb-Cholecalciferol 500-400 MG-UNIT TABS Take 1 tablet by mouth daily 180 tablet 3    Cyanocobalamin (B-12) 1000 MCG TBCR Take 1,000 mcg by mouth daily 100 tablet 1    dexamethasone (DECADRON) 2 MG tablet Take 1 tablet (2 mg) by mouth daily (with breakfast) 60 tablet 0    diltiazem ER COATED BEADS (CARDIZEM CD/CARTIA XT) 240 MG 24 hr capsule Take 1 capsule (240 mg) by mouth daily 30 capsule 0    enoxaparin ANTICOAGULANT (LOVENOX) 40 MG/0.4ML syringe Inject 0.4 mLs (40 mg) Subcutaneous daily (Take on first day of holding blood thinner in the morning and again the day prior to the procedure in the morning) 0.8 mL 0    fluticasone (FLONASE) 50 MCG/ACT nasal spray Spray 1 spray into both nostrils daily For stuffy/runny nose 15.8 mL 0    furosemide (LASIX) 20 MG tablet Take 1 tablet (20 mg) by mouth daily 30 tablet 0    hydrochlorothiazide (HYDRODIURIL) 25 MG tablet TAKE 1 TABLET (25 MG) BY MOUTH DAILY 90 tablet 3    ipratropium - albuterol 0.5 mg/2.5 mg/3 mL (DUONEB) 0.5-2.5 (3) MG/3ML neb solution Take 1 vial (3 mLs) by nebulization every 6 hours as needed for shortness of breath, wheezing or cough 360 mL 0    lisinopril (ZESTRIL) 2.5 MG tablet TAKE 1 TABLET (2.5 MG) BY MOUTH DAILY 90 tablet 3    magnesium oxide (MAG-OX) 400 MG tablet Take 1 tablet (400 mg) by mouth daily 30 tablet 0    mometasone-formoterol (DULERA) 200-5 MCG/ACT inhaler INHALE 2 PUFFS BY MOUTH TWO TIMES A DAY (Patient taking differently: Inhale 2 puffs into the lungs 2 times daily 7am and 3pm) 13 g 11    MYRBETRIQ 50 MG 24 hr tablet Take 50 mg by mouth daily      order for DME Equipment being ordered: Oxygen 1 each 0    order for DME Equipment being ordered: Nebulizer 1 Device 0    ORDER FOR DME Equipment being ordered: Oxygen @ 2.5 liters at all times, increase to 6 L with activity.      Probiotic Product (PROBIOTIC PO) Take by mouth daily      spacer (OPTICHAMBER ROSANNE) holding chamber Use with dulera and albuterol inhalers 1 each 3     tiotropium (SPIRIVA RESPIMAT) 2.5 MCG/ACT inhaler Inhale 2 puffs into the lungs daily 4 g 11    venlafaxine (EFFEXOR XR) 75 MG 24 hr capsule TAKE ONE CAPSULE BY MOUTH ONCE DAILY 90 capsule 3    vitamin B-Complex Take 1 tablet by mouth daily      ondansetron (ZOFRAN ODT) 4 MG ODT tab DISSOLVE 1 TABLET (4 MG) BY MOUTH EVERY 8 HOURS AS NEEDED FOR NAUSEA 20 tablet 3       Allergies   Allergen Reactions    Augmentin [Amoxicillin-Pot Clavulanate] Nausea and Vomiting    Meperidine Hcl Nausea and Vomiting     demerol    Seasonal Allergies      spring        Social History     Tobacco Use    Smoking status: Former     Packs/day: 1.00     Years: 30.00     Pack years: 30.00     Types: Cigarettes     Start date: 1966     Quit date: 10/25/2005     Years since quittin.7    Smokeless tobacco: Never    Tobacco comments:     Do not miss it   Substance Use Topics    Alcohol use: No     Family History   Problem Relation Age of Onset    Breast Cancer Mother          at 88    Obesity Mother     Genitourinary Problems Mother     Lipids Mother     Hypertension Mother     Heart Disease Father         by pass (5)    Cerebrovascular Disease Father         hemorragic    Lipids Father     Alzheimer Disease Father 92    Alzheimer Disease Maternal Grandmother     Genitourinary Problems Maternal Grandmother     Cancer Maternal Grandfather     Cancer Paternal Grandfather         lymph nodes    Cancer Brother         prostate    Melanoma Brother     Heart Disease Brother          (aaron with a partner)    Hyperlipidemia Brother     Coronary Artery Disease Brother     Breast Cancer Brother         Prostrate, melaoma, heart surgery    Heart Disease Brother         stent placement    Hyperlipidemia Brother     Prostate Cancer Brother     Obesity Brother     Respiratory Sister     Lipids Sister     Breast Cancer Sister         s/p mastectomy    Arthritis Sister     Obesity Sister     Heart Failure Sister     Cancer Sister          stomach, colon, pancreatic    Coronary Artery Disease Sister     Lipids Sister     Coronary Artery Disease Sister     Obesity Sister     Dementia Sister         1/2 sister on dad's side (1947)    Other - See Comments Sister         1948    Other - See Comments Daughter         fibromyalgia (1972)    Lymphoma Maternal Aunt     Heart Failure Maternal Aunt 81    Cancer Maternal Uncle         colon    Hemophilia Grandchild     Other - See Comments Grandchild         Transgender     History   Drug Use No         Objective     /70 (BP Location: Left arm, Patient Position: Chair)   Pulse 88   Temp 97  F (36.1  C) (Temporal)   Resp 20   Ht 1.524 m (5')   Wt 71.6 kg (157 lb 12.8 oz)   SpO2 97%   BMI 30.82 kg/m      Physical Exam    GENERAL APPEARANCE: alert and no distress     EYES: EOMI, PERRL     HENT: ear canals and TM's normal and nose and mouth without ulcers or lesions     NECK: no adenopathy, no asymmetry, masses, or scars and thyroid normal to palpation     RESP: lungs clear to auscultation - no rales, rhonchi or wheezes     CV: regular rates and rhythm, normal S1 S2, and no murmur     ABDOMEN:  soft, nontender, no HSM or masses and bowel sounds normal     MS: extremities normal- no gross deformities noted, no evidence of inflammation in joints, FROM in all extremities.     SKIN: no suspicious lesions or rashes     NEURO:  mentation intact and speech normal     PSYCH: mentation appears normal. and affect normal/bright     LYMPHATICS: No cervical adenopathy    Recent Labs   Lab Test 07/19/23  0618 07/18/23  0649 07/17/23  0633 07/13/23  0723 07/12/23  0516 07/10/23  0531 07/09/23  1910 05/03/23  1154 05/03/23  1150   HGB  --  9.8* 9.4*   < > 9.2*   < >  --    < >  --    PLT  --  226 222   < > 183   < >  --    < >  --    INR  --   --   --   --  1.12  --  1.31*   < >  --    * 136 136   < > 137   < >  --    < > 135*   POTASSIUM 3.9 4.1 4.1   < > 4.3   < >  --    < > 3.6   CR 1.34* 1.16* 1.16*   < >  1.29*   < >  --    < > 1.04*   A1C  --   --   --   --   --   --   --   --  5.8*    < > = values in this interval not displayed.        Diagnostics:  Labs pending at this time.  Results will be reviewed when available.   EKG: appears normal, NSR, normal axis, normal intervals, no acute ST/T changes c/w ischemia, unchanged from previous tracings    Revised Cardiac Risk Index (RCRI):  The patient has the following serious cardiovascular risks for perioperative complications:   - Coronary Artery Disease (MI, positive stress test, angina, Qs on EKG) = 1 point   - Congestive Heart Failure (pulmonary edema, PND, s3 fredo, CXR with pulmonary congestion, basilar rales) = 1 point     RCRI Interpretation: 2 points: Class III (moderate risk - 6.6% complication rate)     Estimated Functional Capacity: CANNOT perform 4 METS without symptoms           Signed Electronically by: Frank Erazo MD  Copy of this evaluation report is provided to requesting physician.

## 2023-07-31 NOTE — TELEPHONE ENCOUNTER
Please call patient to confirm. It looks like biopsy is rescheduled for 8/4/23? If so stop Eliquis tomorrow and start Lovenox injection on 8/1/23, 8/2/23 and 8/3/23. Do not take injection on 8/4/23.  Stefan Ac MD

## 2023-07-31 NOTE — TELEPHONE ENCOUNTER
Called and spoke with patient, advised her that she should proceed with her biopsy as scheduled and get the PET rescheduled ASAP. She states that as she did not hear back from anyone yesterday she took her Eliquis yesterday morning as well as this morning. She has not taken any Lovenox at this time.      It appears that anticoagulation is being addressed by Dr. Erazo per pre-op appointment note from 7/28/23. She was to be bridging with Lovenox prior to procedure. Will route to team to see if any changes in the plan for bridging are recommended once biopsy is scheduled.     Please assist patient in rescheduling her biopsy that is currently scheduled for 8/1/23.    Patient would like assistance in rescheduling PET as well, will route to oncology team.     Gloria Ro RN   [facility]

## 2023-07-31 NOTE — TELEPHONE ENCOUNTER
RN TRIAGE CALL:    Patient Contact    Attempt # 1    Was call answered?  No.  Left message on voicemail with information to call clinic triage RN back.    Flory Hidalgo RN

## 2023-07-31 NOTE — TELEPHONE ENCOUNTER
Patient calling for Lovenox instructions. Patient given instructions per Dr. Ac.  She verbalized understanding,       Please call patient to confirm. It looks like biopsy is rescheduled for 8/4/23? If so stop Eliquis tomorrow and start Lovenox injection on 8/1/23, 8/2/23 and 8/3/23. Do not take injection on 8/4/23.  MD José Clark BSN, RN

## 2023-07-31 NOTE — TELEPHONE ENCOUNTER
Reason for Call:  Other appointment    Detailed comments: patient calling because she wasn't able to get her PET scan done this past weekend and wondering how that will affect the biopsy? Please call her at [phone]     Phone Number Patient can be reached at: Home number on file      Best Time: any    Can we leave a detailed message on this number? YES    Call taken on 7/31/2023 at 8:20 AM by Suri Douglass

## 2023-08-02 NOTE — TELEPHONE ENCOUNTER
Called patient to review gamma knife prep/procedure that is scheduled for 8/17/23. In that conversation it was discovered that the patient was not taking her dexamethasone as prescribed (2 mg every am). She had understood to take it only if she had a headache. I had the patient read the label and we discussed dosing. Patient verbalized understanding and will start taking 2 mg every morning. She denies any neuro symptoms today. We discussed that Dr. Gaxiola will address a taper schedule when we see her on 8/17/23. Patient has our number to call if needed.

## 2023-08-02 NOTE — TELEPHONE ENCOUNTER
Writer contacted the patient to schedule her IP procedure in the OR. Scheduled for 08/14 with Dr. Montano. H&P completed previously. Packet information being sent via [employer].    Justin Isbell on 8/2/2023 at 12:06 PM

## 2023-08-02 NOTE — TELEPHONE ENCOUNTER
Patient is calling to request some medication to help her sleep.    Patient is having a hard time relaxing and being able to sleep since her recent diagnoses.    Patient is asking if you can give her something to help her sleep?    [facility] - [place]    Whit PATTERSONO/

## 2023-08-03 NOTE — PROGRESS NOTES
Oral Chemotherapy Monitoring Program    Primary Oncologist: Dr. Dhillon  Primary Oncology Clinic: [place]  Cancer Diagnosis: Breast Cancer    Drug: palbociclib (Ibrance) 125 mg daily; 21 days on and 7 days off  Start Date: TBD  Expected duration of therapy: Until disease progression or unacceptable toxicity    Drug Interaction Assessment: None    Lab Monitoring Plan  Every 2 weeks for first 2 cycles and then monthly after    Subjective/Objective:  Latanya Padron is a 73 year old female contacted by phone for an initial visit for oral chemotherapy education.          8/3/2023     1:00 PM   ORAL CHEMOTHERAPY   Assessment Type New Teach   Diagnosis Code Breast Cancer   Providers Jacquie   Clinic Name/Location [facility]   Drug Name Ibrance (palbociclib)   Dose 125 mg   Current Schedule Daily   Cycle Details 3 weeks on, 1 week off       Vitals:  BP:   BP Readings from Last 1 Encounters:   07/28/23 124/70     Wt Readings from Last 1 Encounters:   07/28/23 71.6 kg (157 lb 12.8 oz)     Estimated body surface area is 1.74 meters squared as calculated from the following:    Height as of 7/28/23: 1.524 m (5').    Weight as of 7/28/23: 71.6 kg (157 lb 12.8 oz).    Labs:  _  Result Component Current Result Ref Range   Sodium 135 (L) (7/28/2023) 136 - 145 mmol/L     _  Result Component Current Result Ref Range   Potassium 4.3 (7/28/2023) 3.4 - 5.3 mmol/L     _  Result Component Current Result Ref Range   Calcium 11.8 (H) (7/28/2023) 8.8 - 10.2 mg/dL     _  Result Component Current Result Ref Range   Magnesium 1.9 (7/19/2023) 1.7 - 2.3 mg/dL     _  Result Component Current Result Ref Range   Phosphorus 3.4 (7/19/2023) 2.5 - 4.5 mg/dL     _  Result Component Current Result Ref Range   Albumin 3.9 (7/28/2023) 3.5 - 5.2 g/dL     _  Result Component Current Result Ref Range   Urea Nitrogen 28.7 (H) (7/28/2023) 8.0 - 23.0 mg/dL     _  Result Component Current Result Ref Range   Creatinine 1.19 (H) (7/28/2023) 0.51 - 0.95 mg/dL        _  Result Component Current Result Ref Range   AST 20 (7/28/2023) 0 - 45 U/L     _  Result Component Current Result Ref Range   ALT 23 (7/28/2023) 0 - 50 U/L     _  Result Component Current Result Ref Range   Bilirubin Total 0.3 (7/28/2023) <=1.2 mg/dL       _  Result Component Current Result Ref Range   WBC Count 16.7 (H) (7/28/2023) 4.0 - 11.0 10e3/uL     _  Result Component Current Result Ref Range   Hemoglobin 11.1 (L) (7/28/2023) 11.7 - 15.7 g/dL     _  Result Component Current Result Ref Range   Platelet Count 241 (7/28/2023) 150 - 450 10e3/uL     No results found for ANC within last 30 days.         Assessment:  Patient is appropriate to start therapy.    Plan:  Basic chemotherapy teaching was reviewed with the patient including indication, start date of therapy, dose, administration, adverse effects, missed doses, food and drug interactions, monitoring, side effect management, office contact information, and safe handling. Written materials were provided and all questions answered.    Follow-Up:  TBD - 1 week follow-up to verify start date and assess tolerability     [facility]  Oncology Pharmacy [facility]  [phone]

## 2023-08-03 NOTE — TELEPHONE ENCOUNTER
"Patient reports since her new diagnosis, she is having trouble with restless sleep, and not sleeping for more than a few hours at a time. She has used Melatonin in the past and that does not help. She otherwise has not tired anything else as she is unsure what she should take with her current medications.    She would like something prescribed.    MARCELO GoldmanN, RN        Reason for Disposition   Insomnia is an ongoing problem (> 2 weeks)    Additional Information   Negative: Difficulty breathing   Negative: Depression is suspected   Negative: Traumatic Brain Injury (TBI) is suspected   Negative: Suspected alcohol withdrawal or unhealthy use of alcohol (drinking too much)   Negative: Suspected substance use (drug use), addiction, or withdrawal   Negative: Pain is causing insomnia and pain is not a chronic symptom (recurrent or ongoing AND present > 4 weeks)   Negative: Insomnia persists > 1 week and following Insomnia Care Advice   Negative: Insomnia interferes with work or school   Negative: Pain is causing insomnia and pain is a chronic symptom (recurrent or ongoing AND present > 4 weeks)   Negative: Patient wants to be seen   Negative: Awakened by jerking leg movements   Negative: Restless legs or unpleasant feeling in legs and causes insomnia   Negative: Loud snoring is an ongoing problem  (> 2 weeks)   Negative: Excessive daytime sleepiness is an ongoing problem  (> 2 weeks)    Answer Assessment - Initial Assessment Questions  1. DESCRIPTION: \"Tell me about your sleeping problem.\"       Restless, not good quality. Only sleeps a few hours at a time  2. ONSET: \"How long have you been having trouble sleeping?\" (e.g., days, weeks, months)      Since her diagnosis  3. RECURRENT: \"Have you had sleeping problems before?\"  If Yes, ask: \"What happened that time?\" \"What helped your sleeping problem go away in the past?\"       No  4. STRESS: \"Is there anything in your life that is making you feel stressed or tense?\"   " "   Yes, recent cancer dx  5. PAIN: \"Do you have any pain that is keeping you awake?\" (e.g., back pain, headache, abdominal pain)      Yes, she does have cancer, uses a concentrator  6. CAFFEINE ABUSE: \"Do you drink caffeinated beverages, and how much each day?\" (e.g., coffee, tea, aaron)      No  7. ALCOHOL USE OR SUBSTANCE USE (DRUG USE): \"Do you drink alcohol or use any illegal drugs?\"      No  8. OTHER SYMPTOMS: \"Do you have any other symptoms?\"  (e.g., difficulty breathing)      Yes but has lung cancer, uses O2 concentrator    Protocols used: Insomnia-A-[place]    "

## 2023-08-03 NOTE — TELEPHONE ENCOUNTER
Patient calling back, she would really like someone to address this if possible.    She understands that it may have to wait for Dr Jones.    Whit Nowak XRO/

## 2023-08-08 NOTE — PROGRESS NOTES
Virtual Visit Details    Type of service:  Video Visit   Video Start Time: 3:06 PM  Video End Time:3:29 PM    Originating Location (pt. Location): Home    Distant Location (provider location):  On-site  Platform used for Video Visit: Telephone    Neurosurgery Clinic Note    Reason for Visit: gamma knife    History of Present Illness  Latanya Morrow is  73 year oldo woman with a history of breast cancer and newly discovered metastatic disease to the lungs and brain. Three contrast-enhancing lesions were found throughout the brain, and gamma knife was recommended. She tells me that she is actually scheduled for lung biopsy as well next week on Monday and then gamma knife on Thursday. She states that she is asymptomatic and denies any neurologic symptoms. Today we discussed the alternatives, potential benefits, and risks of GK radiation.        Past Medical History:   Diagnosis Date    Breast cancer (H) 04/12/2019    Left Breast    COPD (chronic obstructive pulmonary disease) (H)     Depressive disorder 10 years    Mixed hyperlipidemia     Neck mass 06/20/2018    Paroxysmal atrial fibrillation (H)     PONV (postoperative nausea and vomiting)     S/P radiation therapy     5,256 cGy to left breast completed on 7/18/2019 - [facility]       Past Surgical History:   Procedure Laterality Date    BIOPSY NODE SENTINEL Left 05/15/2019    Procedure: left sentinel node biopsy;  Surgeon: Donald Aleman MD;  Location: [facility] OR    BREAST BIOPSY, CORE RT/LT Left 04/12/2019    BREAST SURGERY  2019    Breast cancer    COLONOSCOPY  06/21/2010    COLONOSCOPY N/A 03/04/2021    Procedure: Colonoscopy with  polypectomy;  Surgeon: Donald Aleman MD;  Location:  GI    HC BIOPSY OF BREAST, OPEN INCISIONAL Right 1988    Benign per patient     CORRECT BUNION,METATARSAL OSTEOTOMY  1993     LAPAROSCOPY, SURGICAL; CHOLECYSTECTOMY  08/04/2010    HC SLING OPERATION FOR STRESS INCONTINENCE  04/19/2007    HERNIORRHAPHY INCISIONAL  (LOCATION)  2011    Procedure:HERNIORRHAPHY INCISIONAL (LOCATION); Surgeon:AYALA HOOVER; Location:[facility] OR    LAPAROSCOPIC HERNIORRHAPHY INCISIONAL  2011    Procedure:LAPAROSCOPIC HERNIORRHAPHY INCISIONAL; Laparoscopic mesh repair of incarcerated incisional hernia , extensive lysis of adhesions, excision of hernia sac and closure of fascia.; Surgeon:AYALA HOOVER; Location:[facility] OR    LAPAROSCOPIC LYSIS ADHESIONS  2011    Procedure:LAPAROSCOPIC LYSIS ADHESIONS; Surgeon:AYALA HOOVER; Location:[facility] OR    MASTECTOMY PARTIAL WITH NEEDLE LOCALIZATION Left 05/15/2019    Procedure: left wire localized partial mastectomy;  Surgeon: Donald Aleman MD;  Location: [facility] OR    TONSILLECTOMY      ZZC APPENDECTOMY,W OTHR PROC      Z  DELIVERY ONLY          Z LIGATE FALLOPIAN TUBE      Z NONSPECIFIC PROCEDURE      Exploratory laparotomy with resection of infarcted segment of omentum and minor lysis of adhesions    Z NONSPECIFIC PROCEDURE      Removal of hemorrhagic corpus luteum cyst    Z VAGINAL HYSTERECTOMY  1984       Family History   Problem Relation Age of Onset    Breast Cancer Mother          at 88    Obesity Mother     Genitourinary Problems Mother     Lipids Mother     Hypertension Mother     Heart Disease Father         by pass (5)    Cerebrovascular Disease Father         hemorragic    Lipids Father     Alzheimer Disease Father 92    Alzheimer Disease Maternal Grandmother     Genitourinary Problems Maternal Grandmother     Cancer Maternal Grandfather     Cancer Paternal Grandfather         lymph nodes    Cancer Brother         prostate    Melanoma Brother     Heart Disease Brother          (aaron with a partner)    Hyperlipidemia Brother     Coronary Artery Disease Brother     Breast Cancer Brother         Prostrate, melaoma, heart surgery    Heart Disease Brother         stent placement    Hyperlipidemia Brother     Prostate Cancer  Brother     Obesity Brother     Respiratory Sister     Lipids Sister     Breast Cancer Sister         s/p mastectomy    Arthritis Sister     Obesity Sister     Heart Failure Sister     Cancer Sister         stomach, colon, pancreatic    Coronary Artery Disease Sister     Lipids Sister     Coronary Artery Disease Sister     Obesity Sister     Dementia Sister         1/2 sister on dad's side ()    Other - See Comments Sister         1948    Other - See Comments Daughter         fibromyalgia ()    Lymphoma Maternal Aunt     Heart Failure Maternal Aunt 81    Cancer Maternal Uncle         colon    Hemophilia Grandchild     Other - See Comments Grandchild         Transgender       Social History     Socioeconomic History    Marital status:      Spouse name: Nam    Number of children: 1    Years of education: 14    Highest education level: Not on file   Occupational History    Occupation: Ins Cordinater     Comment:  Re-vinyl     Employer: [employer]   Tobacco Use    Smoking status: Former     Packs/day: 1.00     Years: 30.00     Pack years: 30.00     Types: Cigarettes     Start date: 1966     Quit date: 10/25/2005     Years since quittin.7    Smokeless tobacco: Never    Tobacco comments:     Do not miss it   Vaping Use    Vaping Use: Never used   Substance and Sexual Activity    Alcohol use: No    Drug use: No    Sexual activity: Not Currently     Partners: Male     Birth control/protection: Other, None     Comment: Medication   Other Topics Concern     Service No    Blood Transfusions No    Caffeine Concern No     Comment: coffee 2c/d pop: 2c/d    Occupational Exposure No    Hobby Hazards No    Sleep Concern No    Stress Concern No    Weight Concern Yes     Comment: desire wt loss    Special Diet No    Back Care No     Comment: Hx: seen chiropractor     Exercise No    Bike Helmet No     Comment: n/a    Seat Belt Yes    Self-Exams Yes     Comment: attempts to do SBE monthly.  Patient has fibrous breast tissue.    Parent/sibling w/ CABG, MI or angioplasty before 65F 55M? No   Social History Narrative    Not on file     Social Determinants of Health     Financial Resource Strain: Not on file   Food Insecurity: Not on file   Transportation Needs: Not on file   Physical Activity: Not on file   Stress: Not on file   Social Connections: Not on file   Intimate Partner Violence: Not on file   Housing Stability: Not on file          Allergies   Allergen Reactions    Augmentin [Amoxicillin-Pot Clavulanate] Nausea and Vomiting    Meperidine Hcl Nausea and Vomiting     demerol    Seasonal Allergies      spring       Current Outpatient Medications   Medication    albuterol (PROAIR HFA/PROVENTIL HFA/VENTOLIN HFA) 108 (90 Base) MCG/ACT inhaler    albuterol (PROVENTIL) (2.5 MG/3ML) 0.083% neb solution    anastrozole (ARIMIDEX) 1 MG tablet    apixaban ANTICOAGULANT (ELIQUIS) 5 MG tablet    atorvastatin (LIPITOR) 10 MG tablet    Calcium Carb-Cholecalciferol 500-400 MG-UNIT TABS    Cyanocobalamin (B-12) 1000 MCG TBCR    dexamethasone (DECADRON) 2 MG tablet    diltiazem ER COATED BEADS (CARDIZEM CD/CARTIA XT) 240 MG 24 hr capsule    enoxaparin ANTICOAGULANT (LOVENOX) 40 MG/0.4ML syringe    fluticasone (FLONASE) 50 MCG/ACT nasal spray    furosemide (LASIX) 20 MG tablet    hydrochlorothiazide (HYDRODIURIL) 25 MG tablet    ipratropium - albuterol 0.5 mg/2.5 mg/3 mL (DUONEB) 0.5-2.5 (3) MG/3ML neb solution    lisinopril (ZESTRIL) 2.5 MG tablet    magnesium oxide (MAG-OX) 400 MG tablet    mometasone-formoterol (DULERA) 200-5 MCG/ACT inhaler    MYRBETRIQ 50 MG 24 hr tablet    ondansetron (ZOFRAN ODT) 4 MG ODT tab    order for DME    order for DME    ORDER FOR DME    Probiotic Product (PROBIOTIC PO)    spacer (OPTICHAMBER ROSANNE) holding chamber    tiotropium (SPIRIVA RESPIMAT) 2.5 MCG/ACT inhaler    venlafaxine (EFFEXOR XR) 75 MG 24 hr capsule    vitamin B-Complex     No current facility-administered medications  for this visit.       ROS: 10 point ROS neg other than the symptoms noted above in the HPI.        Imaging: my interpretations only  MR brain with and without contrast: three small lesions from parietal lobe to thierry        Assessment and Plan   Latanya Padron is a 73 year old female with likely metastatic breast cancer to the brain with some edema. These lesions are small and distributed widely so gamma knife therapy is the ideal treatment. She is on anticoagulation for PE    Today we went through the informed consent process to discuss the alternatives, potential benefits, and risks of gamma knife radiosurgery.  In particular we discussed the risks of pin site pain, pin site infection, brain swelling, radiation necrosis, headache, nausea and vomiting, numbness/tingling of the pin sites, hair loss, seizures, and rarely bleeding into the brain. They asked excellent questions, which were answered.         GK frame based tx      Taco Rose MD  Department of Neurosurgery  [facility]

## 2023-08-08 NOTE — NURSING NOTE
Is the patient currently in the state of [place]? YES    Visit mode:VIDEO    If the visit is dropped, the patient can be reconnected by: VIDEO VISIT: Text to cell phone: [phone]    Will anyone else be joining the visit? NO      How would you like to obtain your AVS? MyChart    Are changes needed to the allergy or medication list? NO    Reason for visit: RECHECK (Video visit return)

## 2023-08-08 NOTE — LETTER
8/8/2023       RE: Latanya Pdaron  [address]  [facility] [address]       Dear Colleague,    Thank you for referring your patient, Latanya Padron, to the [facility] NEUROSURGERY CLINIC [place] [facility]. Please see a copy of my visit note below.      Neurosurgery Clinic Note    Reason for Visit: gamma knife    History of Present Illness  Latanya Morrow is  73 year oldo woman with a history of breast cancer and newly discovered metastatic disease to the lungs and brain. Three contrast-enhancing lesions were found throughout the brain, and gamma knife was recommended. She tells me that she is actually scheduled for lung biopsy as well next week on Monday and then gamma knife on Thursday. She states that she is asymptomatic and denies any neurologic symptoms. Today we discussed the alternatives, potential benefits, and risks of GK radiation.        Past Medical History:   Diagnosis Date    Breast cancer (H) 04/12/2019    Left Breast    COPD (chronic obstructive pulmonary disease) (H)     Depressive disorder 10 years    Mixed hyperlipidemia     Neck mass 06/20/2018    Paroxysmal atrial fibrillation (H)     PONV (postoperative nausea and vomiting)     S/P radiation therapy     5,256 cGy to left breast completed on 7/18/2019 - [facility]       Past Surgical History:   Procedure Laterality Date    BIOPSY NODE SENTINEL Left 05/15/2019    Procedure: left sentinel node biopsy;  Surgeon: Donald Aleman MD;  Location:  OR    BREAST BIOPSY, CORE RT/LT Left 04/12/2019    BREAST SURGERY  2019    Breast cancer    COLONOSCOPY  06/21/2010    COLONOSCOPY N/A 03/04/2021    Procedure: Colonoscopy with  polypectomy;  Surgeon: Donald Aleman MD;  Location:  GI    HC BIOPSY OF BREAST, OPEN INCISIONAL Right 1988    Benign per patient    HC CORRECT BUNION,METATARSAL OSTEOTOMY  1993    HC LAPAROSCOPY, SURGICAL; CHOLECYSTECTOMY  08/04/2010    HC SLING  OPERATION FOR STRESS INCONTINENCE  2007    HERNIORRHAPHY INCISIONAL (LOCATION)  2011    Procedure:HERNIORRHAPHY INCISIONAL (LOCATION); Surgeon:AYALA HOOVER; Location:[facility] OR    LAPAROSCOPIC HERNIORRHAPHY INCISIONAL  2011    Procedure:LAPAROSCOPIC HERNIORRHAPHY INCISIONAL; Laparoscopic mesh repair of incarcerated incisional hernia , extensive lysis of adhesions, excision of hernia sac and closure of fascia.; Surgeon:AYALA HOOVER; Location:[facility] OR    LAPAROSCOPIC LYSIS ADHESIONS  2011    Procedure:LAPAROSCOPIC LYSIS ADHESIONS; Surgeon:AYALA HOOVER; Location:[facility] OR    MASTECTOMY PARTIAL WITH NEEDLE LOCALIZATION Left 05/15/2019    Procedure: left wire localized partial mastectomy;  Surgeon: Donald Aleman MD;  Location: [facility] OR    TONSILLECTOMY      ZZC APPENDECTOMY,W OTHR PROC      Z  DELIVERY ONLY          Z LIGATE FALLOPIAN TUBE  's    Z NONSPECIFIC PROCEDURE      Exploratory laparotomy with resection of infarcted segment of omentum and minor lysis of adhesions    Z NONSPECIFIC PROCEDURE      Removal of hemorrhagic corpus luteum cyst    Z VAGINAL HYSTERECTOMY  1984       Family History   Problem Relation Age of Onset    Breast Cancer Mother          at 88    Obesity Mother     Genitourinary Problems Mother     Lipids Mother     Hypertension Mother     Heart Disease Father         by pass (5)    Cerebrovascular Disease Father         hemorragic    Lipids Father     Alzheimer Disease Father 92    Alzheimer Disease Maternal Grandmother     Genitourinary Problems Maternal Grandmother     Cancer Maternal Grandfather     Cancer Paternal Grandfather         lymph nodes    Cancer Brother         prostate    Melanoma Brother     Heart Disease Brother          (aaron with a partner)    Hyperlipidemia Brother     Coronary Artery Disease Brother     Breast Cancer Brother         Prostrate, melaoma, heart surgery    Heart Disease Brother          stent placement    Hyperlipidemia Brother     Prostate Cancer Brother     Obesity Brother     Respiratory Sister     Lipids Sister     Breast Cancer Sister         s/p mastectomy    Arthritis Sister     Obesity Sister     Heart Failure Sister     Cancer Sister         stomach, colon, pancreatic    Coronary Artery Disease Sister     Lipids Sister     Coronary Artery Disease Sister     Obesity Sister     Dementia Sister         1/2 sister on dad's side ()    Other - See Comments Sister         1948    Other - See Comments Daughter         fibromyalgia ()    Lymphoma Maternal Aunt     Heart Failure Maternal Aunt 81    Cancer Maternal Uncle         colon    Hemophilia Grandchild     Other - See Comments Grandchild         Transgender       Social History     Socioeconomic History    Marital status:      Spouse name: Nam    Number of children: 1    Years of education: 14    Highest education level: Not on file   Occupational History    Occupation: Ins Cordinater     Comment:  [employer]     Employer: [employer]   Tobacco Use    Smoking status: Former     Packs/day: 1.00     Years: 30.00     Pack years: 30.00     Types: Cigarettes     Start date: 1966     Quit date: 10/25/2005     Years since quittin.7    Smokeless tobacco: Never    Tobacco comments:     Do not miss it   Vaping Use    Vaping Use: Never used   Substance and Sexual Activity    Alcohol use: No    Drug use: No    Sexual activity: Not Currently     Partners: Male     Birth control/protection: Other, None     Comment: Medication   Other Topics Concern     Service No    Blood Transfusions No    Caffeine Concern No     Comment: coffee 2c/d pop: 2c/d    Occupational Exposure No    Hobby Hazards No    Sleep Concern No    Stress Concern No    Weight Concern Yes     Comment: desire wt loss    Special Diet No    Back Care No     Comment: Hx: seen chiropractor     Exercise No    Bike Helmet No     Comment: n/a    Seat  Belt Yes    Self-Exams Yes     Comment: attempts to do SBE monthly. Patient has fibrous breast tissue.    Parent/sibling w/ CABG, MI or angioplasty before 65F 55M? No   Social History Narrative    Not on file     Social Determinants of Health     Financial Resource Strain: Not on file   Food Insecurity: Not on file   Transportation Needs: Not on file   Physical Activity: Not on file   Stress: Not on file   Social Connections: Not on file   Intimate Partner Violence: Not on file   Housing Stability: Not on file          Allergies   Allergen Reactions    Augmentin [Amoxicillin-Pot Clavulanate] Nausea and Vomiting    Meperidine Hcl Nausea and Vomiting     demerol    Seasonal Allergies      spring       Current Outpatient Medications   Medication    albuterol (PROAIR HFA/PROVENTIL HFA/VENTOLIN HFA) 108 (90 Base) MCG/ACT inhaler    albuterol (PROVENTIL) (2.5 MG/3ML) 0.083% neb solution    anastrozole (ARIMIDEX) 1 MG tablet    apixaban ANTICOAGULANT (ELIQUIS) 5 MG tablet    atorvastatin (LIPITOR) 10 MG tablet    Calcium Carb-Cholecalciferol 500-400 MG-UNIT TABS    Cyanocobalamin (B-12) 1000 MCG TBCR    dexamethasone (DECADRON) 2 MG tablet    diltiazem ER COATED BEADS (CARDIZEM CD/CARTIA XT) 240 MG 24 hr capsule    enoxaparin ANTICOAGULANT (LOVENOX) 40 MG/0.4ML syringe    fluticasone (FLONASE) 50 MCG/ACT nasal spray    furosemide (LASIX) 20 MG tablet    hydrochlorothiazide (HYDRODIURIL) 25 MG tablet    ipratropium - albuterol 0.5 mg/2.5 mg/3 mL (DUONEB) 0.5-2.5 (3) MG/3ML neb solution    lisinopril (ZESTRIL) 2.5 MG tablet    magnesium oxide (MAG-OX) 400 MG tablet    mometasone-formoterol (DULERA) 200-5 MCG/ACT inhaler    MYRBETRIQ 50 MG 24 hr tablet    ondansetron (ZOFRAN ODT) 4 MG ODT tab    order for DME    order for DME    ORDER FOR DME    Probiotic Product (PROBIOTIC PO)    spacer (OPTICHAMBER ROSANNE) holding chamber    tiotropium (SPIRIVA RESPIMAT) 2.5 MCG/ACT inhaler    venlafaxine (EFFEXOR XR) 75 MG 24 hr capsule     vitamin B-Complex     No current facility-administered medications for this visit.       ROS: 10 point ROS neg other than the symptoms noted above in the HPI.        Imaging: my interpretations only  MR brain with and without contrast: three small lesions from parietal lobe to thierry        Assessment and Plan   Latanya Padron is a 73 year old female with likely metastatic breast cancer to the brain with some edema. These lesions are small and distributed widely so gamma knife therapy is the ideal treatment. She is on anticoagulation for PE    Today we went through the informed consent process to discuss the alternatives, potential benefits, and risks of gamma knife radiosurgery.  In particular we discussed the risks of pin site pain, pin site infection, brain swelling, radiation necrosis, headache, nausea and vomiting, numbness/tingling of the pin sites, hair loss, seizures, and rarely bleeding into the brain. They asked excellent questions, which were answered.       GK frame based tx        Again, thank you for allowing me to participate in the care of your patient.      Sincerely,    Taco Rose MD

## 2023-08-08 NOTE — TELEPHONE ENCOUNTER
Add her on at the end of the day virtual.    Electronically signed by:  Nazario Jones M.D.  8/8/2023

## 2023-08-09 NOTE — PROGRESS NOTES
Ivette is a 73 year old who is being evaluated via a billable telephone visit.      What phone number would you like to be contacted at? [phone]  How would you like to obtain your AVS? Mail a copy    Distant Location (provider location):  On-site    Assessment & Plan     (G47.09) Other insomnia  (primary encounter diagnosis)  Comment: not sleeping well since she got her new diagnosis.    Plan: hydrOXYzine (ATARAX) 25 MG tablet        Will use hydroxyzine 1-2 tablets at bedtime to help shut down her brain and perseveration.      (F41.8) Situational anxiety  Comment: she is having more anxiety since discovering she has metastatic cancer to the lung and brain.    Plan: ALPRAZolam (XANAX) 0.25 MG tablet        Will use 1/2-1 tablet of xanax for severe anxiety or panic.     Review of prior external note(s) from - pulmonology and oncology  22 minutes spent by me on the date of the encounter doing chart review, history and exam, documentation and further activities per the note      MED REC REQUIRED  Post Medication Reconciliation Status:     MEDICATIONS:  Continue current medications without change    Nazario Jones MD, MD  [facility]   Ivette is a 73 year old, presenting for the following health issues:  restless sleep        8/9/2023     2:55 PM   Additional Questions   Roomed by nic   Accompanied by self       History of Present Illness       Reason for visit:  Restless sleep  Symptom onset:  3-7 days ago  Symptoms include:  Not sleeping, anxious  Symptom intensity:  Moderate  Symptom progression:  Staying the same  Had these symptoms before:  No  What makes it worse:  None  What makes it better:  None        Insomnia  Onset/Duration: pt says for a while now  Description:   Frequency of insomnia:  nightly  Time to fall asleep (sleep latency): 1.5 hours  Middle of night awakening:  YES  Early morning awakening:  YES  Progression of Symptoms:  same  Accompanying Signs &  Symptoms:  Daytime sleepiness/napping: YES  Excessive snoring/apnea: No  Restless legs: YES  Waking to urinate: YES- pt says every couple of hours  Chronic pain:  No  Depression symptoms (if yes, do PHQ9): No  Anxiety symptoms (if yes, do SHARLA-7): YES  History:  Prior Insomnia: No  New stressful situation: YES  Precipitating factors:   Caffeine intake: pt says once in awhile  OTC decongestants: No  Any new medications: No  Alleviating factors:  Self medicating (alcohol, etc.):  No  Stress-reduction (exercise, yoga, meditation etc): No  Therapies tried and outcome: melatonin -  not effective  Newly diagnosed with lung and brain cancer, most likely metastatic from her invasive ductal carcinoma of the breast she was diagnoses with in 2019.  She is having a bronchoscopy with biopsy to get a tissue sample to try to confirm the diagnosis.  She is having difficulty sleeping at night due to this and is having more anxiety thinking about the upcoming biopsy and treatments.      Pt says she was given something in the hospital that actually helped with sleeping, I tried to look in her chart but couldn't find anything specifically.      Review of Systems   Constitutional, HEENT, cardiovascular, pulmonary, gi and gu systems are negative, except as otherwise noted.      Objective    Vitals - Patient Reported  Pain Score: No Pain (0)        Physical Exam   healthy, alert, and no distress  PSYCH: Alert and oriented times 3; coherent speech, normal   rate and volume, able to articulate logical thoughts, able   to abstract reason, no tangential thoughts, no hallucinations   or delusions  Her affect is normal  RESP: No cough, no audible wheezing, able to talk in full sentences, although she does sound somewhat short of breath.    Remainder of exam unable to be completed due to telephone visits                Phone call duration: 22 minutes

## 2023-08-10 NOTE — TELEPHONE ENCOUNTER
Patient reports she took her first dose of PRN Hydroxyzine 1 tab (25mg) last night at 9pm to help her with sleep. She says she is nauseated this morning. She has not eaten anything and thinks maybe it is from the med. Did encourage patient to eat some crackers or toast and try her PRN Zofran if needed as she often reports she is nauseated due to her cancer. She agreed to try it and if symptoms worsen, she will call back.    MARCELO GoldmanN, RN          Reason for Disposition   Caller has medicine question, adult has minor symptoms, caller declines triage, and triager answers question    Additional Information   Negative: Intentional drug overdose and suicidal thoughts or ideas   Negative: Drug overdose and triager unable to answer question   Negative: Caller requesting a renewal or refill of a medicine patient is currently taking   Negative: Caller requesting information unrelated to medicine   Negative: Caller requesting information about COVID-19 Vaccine   Negative: Caller requesting information about Emergency Contraception   Negative: Caller requesting information about Combined Birth Control Pills   Negative: Caller requesting information about Progestin Birth Control Pills   Negative: Caller requesting information about Post-Op pain or medicines   Negative: Caller requesting a prescription antibiotic (such as penicillin) for Strep throat and has a positive culture result   Negative: Caller requesting a prescription anti-viral med (such as Tamiflu) and has influenza (flu) symptoms   Negative: Immunization reaction suspected   Negative: Rash while taking a medicine or within 3 days of stopping it   Negative: Asthma and having symptoms of asthma (cough, wheezing, etc.)   Negative: Symptom of illness (e.g., headache, abdominal pain, earache, vomiting) that are more than mild   Negative: Breastfeeding questions about mother's medicines and diet   Negative: MORE THAN A DOUBLE DOSE of a prescription or  over-the-counter (OTC) drug   Negative: DOUBLE DOSE (an extra dose or lesser amount) of prescription drug and any symptoms (e.g., dizziness, nausea, pain, sleepiness)   Negative: DOUBLE DOSE (an extra dose or lesser amount) of over-the-counter (OTC) drug and any symptoms (e.g., dizziness, nausea, pain, sleepiness)   Negative: Took another person's prescription drug   Negative: DOUBLE DOSE (an extra dose or lesser amount) of prescription drug and NO symptoms  (Exception: A double dose of antibiotics.)   Negative: Diabetes drug error or overdose (e.g., took wrong type of insulin or took extra dose)   Negative: Caller has medication question about med NOT prescribed by PCP and triager unable to answer question (e.g., compatibility with other med, storage)   Negative: Prescription not at pharmacy and was prescribed by PCP recently  (Exception: triager has access to EMR and prescription is recorded there. Go to Home Care and confirm for pharmacy.)   Negative: Pharmacy calling with prescription question and triager unable to answer question   Negative: Caller has URGENT medicine question about med that PCP or specialist prescribed and triager unable to answer question   Negative: Caller has NON-URGENT medicine question about med that PCP or specialist prescribed and triager unable to answer question   Negative: Caller wants to use a complementary or alternative medicine   Negative: Medicine patch causing local rash or itching   Negative: Prescription request for new medicine (not a refill)   Negative: Prescription prescribed recently is not at pharmacy and triager has access to patient's EMR and prescription is recorded in the EMR   Negative: DOUBLE DOSE (an extra dose or lesser amount) of over-the-counter (OTC) drug and NO symptoms   Negative: DOUBLE DOSE (an extra dose or lesser amount) of antibiotic drug and NO symptoms   Negative: Caller has medicine question only, adult not sick, and triager answers  "question    Answer Assessment - Initial Assessment Questions  1. NAME of MEDICATION: \"What medicine are you calling about?\"      hydroxyzine  2. QUESTION: \"What is your question?\" (e.g., double dose of medicine, side effect)      Patient started taking Hydroxyzine yesterday for sleep. She took one dose last night at 9PM and reports she feels nauseated this morning.     3. PRESCRIBING HCP: \"Who prescribed it?\" Reason: if prescribed by specialist, call should be referred to that group.      PCP  4. SYMPTOMS: \"Do you have any symptoms?\"      Some nausea thia morning  5. SEVERITY: If symptoms are present, ask \"Are they mild, moderate or severe?\"      mild  6. PREGNANCY:  \"Is there any chance that you are pregnant?\" \"When was your last menstrual period?\"      No    Protocols used: Medication Question Call-A-OH    "

## 2023-08-11 NOTE — DISCHARGE INSTRUCTIONS
Continue your current medications.  Keep your appointments next week as scheduled.  Drink plenty of fluids.  You need to be drinking enough so that your urine is fairly clear.  You were a little bit dehydrated tonight.  Please return to the ED if you develop a fever over 100.4, persistent vomiting, or any concerns.  It was nice visiting with all of you.  I wish you the very best going forward.    Thank you for choosing [facility]. We appreciate the opportunity to meet your urgent medical needs. Please let us know if we could have done anything to make your stay more satisfying.    After discharge, please closely monitor for any new or worsening symptoms. Return to the Emergency Department if you develop any acute worsening signs or symptoms.    If you had lab work, cultures or imaging studies done during your stay, the final results may still be pending. We will call you if your plan of care needs to change. However, if you are not improving as expected, please follow up with your primary care provider or clinic.     Start any prescription medications that were prescribed to you and take them as directed.     Please see additional handouts that may be pertinent to your condition.

## 2023-08-11 NOTE — TELEPHONE ENCOUNTER
Reason for Call:  Form, our goal is to have forms completed with 72 hours, however, some forms may require a visit or additional information.    Type of letter, form or note:  Home Health Certification    Who is the form from?: Home care    Where did the form come from: form was faxed in    What clinic location was the form placed at?: [facility]    Where the form was placed: Given to MA/RN - [place]    What number is listed as a contact on the form?: [phone]       Additional [facility]    Call taken on 8/11/2023 at 3:28 PM by Whit Nowak

## 2023-08-11 NOTE — ED TRIAGE NOTES
Patient is here with her family for concerns of lethargy. Patient took a xanax around 1400, family is concerned that she has been tired ever since.      Triage Assessment       Row Name 08/11/23 0032       Triage Assessment (Adult)    Airway WDL WDL       Respiratory WDL    Respiratory WDL WDL       Skin Circulation/Temperature WDL    Skin Circulation/Temperature WDL WDL       Cardiac WDL    Cardiac WDL WDL       Peripheral/Neurovascular WDL    Peripheral Neurovascular WDL WDL       Cognitive/Neuro/Behavioral WDL    Cognitive/Neuro/Behavioral WDL X    Level of Consciousness alert    Arousal Level opens eyes spontaneously    Orientation disoriented x 4       [place] Coma Scale    Best Eye Response 4-->(E4) spontaneous    Best Motor Response 6-->(M6) obeys commands    Best Verbal Response 5-->(V5) oriented    [place] Coma Scale Score 15

## 2023-08-11 NOTE — ED PROVIDER NOTES
History     Chief Complaint   Patient presents with    Altered Mental Status     HPI  Latanya Padron is a 73 year old female who presents to the ED with lethargy.  She has breast cancer with mets to the brain and lungs.  Is scheduled to undergo bronchoscopy and gamma knife to the brain this coming week at the [facility].      She had some scrambled eggs and russell for breakfast around 10 AM.  Then laid down afterwards and was up a few times to go to the bathroom.   At 2:00 PM she was feeling a little bit anxious and says she just did not feel quite right so she took Xanax 0.25 mg and then slept most of the rest of the day.  Took some Zofran prior to that because she felt a bit nauseous.  Denies any headaches.  Daughter thinks that her breathing has been a bit off though she denies any shortness of breath.  She is on anticoagulation for PE and paroxysmal A-fib.  No fevers.  No focal weakness.  Toes are maybe been tingling a little bit.  Just wants to go to sleep.    She is on home oxygen at 5 L/min.  Bumps it up to 6 L with activity.    Allergies:  Allergies   Allergen Reactions    Augmentin [Amoxicillin-Pot Clavulanate] Nausea and Vomiting    Meperidine Hcl Nausea and Vomiting     demerol    Seasonal Allergies      spring       Problem List:    Patient Active Problem List    Diagnosis Date Noted    Malignant neoplasm metastatic to brain (H) 07/21/2023     Priority: Medium    Abnormal brain MRI 07/14/2023     Priority: Medium    Personal history of malignant neoplasm of breast 07/13/2023     Priority: Medium    Warfarin-induced coagulopathy (H) 07/13/2023     Priority: Medium    Anemia, unspecified type 07/13/2023     Priority: Medium    Hyponatremia 07/13/2023     Priority: Medium    Wide-complex tachycardia 07/13/2023     Priority: Medium    Acute kidney injury (H) 07/11/2023     Priority: Medium    Lymphadenopathy in chest 07/10/2023     Priority: Medium    Prediabetes 07/10/2023     Priority: Medium     Atrial fibrillation with RVR (H) 07/10/2023     Priority: Medium    Acute right-sided heart failure (H) 07/10/2023     Priority: Medium    Pulmonary hypertension (H) 07/10/2023     Priority: Medium    Troponin level elevated 07/09/2023     Priority: Medium    Supraclavicular mass-benign biopsy results 7/7/23 07/09/2023     Priority: Medium    Acute on chronic respiratory failure with hypoxia and hypercapnia (H) 07/09/2023     Priority: Medium    Pneumonia of right upper lobe due to infectious organism 07/09/2023     Priority: Medium    Acute pulmonary embolism with suspected acute cor pulmonale, unspecified pulmonary embolism type, left upper lobe 07/09/2023     Priority: Medium    Atrial fibrillation, paroxysmal  10/06/2022     Priority: Medium    Major depression in complete remission (H) 05/19/2022     Priority: Medium    Epidermoid cyst of labia majora 11/04/2020     Priority: Medium    Long term current use of anticoagulant therapy 10/20/2020     Priority: Medium    Coronary artery disease involving native coronary artery of native heart without angina pectoris 08/10/2020     Priority: Medium    Restless leg syndrome 06/24/2020     Priority: Medium    Lymphadenopathy of head and neck 02/26/2020     Priority: Medium    Groin pain, right 02/26/2020     Priority: Medium    Right hip pain 09/04/2019     Priority: Medium    Patellofemoral pain syndrome of right knee 09/04/2019     Priority: Medium    Abnormal gait 09/04/2019     Priority: Medium    Primary osteoarthritis of right knee 09/04/2019     Priority: Medium    Age-related osteoporosis without current pathological fracture 07/03/2019     Priority: Medium    Malignant neoplasm of overlapping sites of left breast in female, estrogen receptor positive (H) 06/12/2019     Priority: Medium    Segmental dysfunction of sacral region 02/01/2019     Priority: Medium    Segmental dysfunction of lumbar region 02/01/2019     Priority: Medium    Segmental dysfunction  of thoracic region 02/01/2019     Priority: Medium    Bilateral low back pain with right-sided sciatica 02/01/2019     Priority: Medium    Trochanteric bursitis of right hip 07/13/2018     Priority: Medium    Hip pain, right 06/20/2018     Priority: Medium    Multiple joint pain 06/20/2018     Priority: Medium    CKD (chronic kidney disease) stage 3, GFR 30-59 ml/min (H) 06/21/2017     Priority: Medium    Primary osteoarthritis of both knees 04/18/2017     Priority: Medium    AF (paroxysmal atrial fibrillation) (H) 03/02/2016     Priority: Medium    Major depression in complete remission (H) 12/28/2015     Priority: Medium    Class 1 obesity due to excess calories without serious comorbidity with body mass index (BMI) of 34.0 to 34.9 in adult 12/28/2015     Priority: Medium    Pulmonary emphysema, unspecified emphysema type (H) 12/28/2015     Priority: Medium    Atrial fibrillation (H) 06/20/2014     Priority: Medium    Essential hypertension, benign 09/30/2013     Priority: Medium    Tennis elbow 04/12/2013     Priority: Medium     left        Advanced directives, counseling/discussion 09/09/2011     Priority: Medium     Advance Directive Problem List Overview:   Name Relationship Phone    Primary Health Care Agent            Alternative Health Care Agent          Discussed advance care planning with patient; information given to patient to review.//Brenda Carlin/BEATRIZ(AAMA)  9/9/2011         COPD exacerbation (H) 09/09/2011     Priority: Medium    Hyperlipidemia LDL goal <130 10/31/2010     Priority: Medium    Hirsutism 04/13/2007     Priority: Medium    Family history of malignant neoplasm of breast 12/27/2006     Priority: Medium    Female stress incontinence 11/26/2005     Priority: Medium    Disorder of bone and cartilage 07/13/2005     Priority: Medium     Problem list name updated by automated process. Provider to review      Sprain and strain of unspecified site of knee and leg 04/09/2004     Priority: Medium         Past Medical History:    Past Medical History:   Diagnosis Date    Breast cancer (H) 2019    COPD (chronic obstructive pulmonary disease) (H)     Depressive disorder 10 years    Mixed hyperlipidemia     Neck mass 2018    Paroxysmal atrial fibrillation (H)     PONV (postoperative nausea and vomiting)     S/P radiation therapy        Past Surgical History:    Past Surgical History:   Procedure Laterality Date    BIOPSY NODE SENTINEL Left 05/15/2019    Procedure: left sentinel node biopsy;  Surgeon: Donald Aleman MD;  Location: [facility]    BREAST BIOPSY, CORE RT/LT Left 2019    BREAST SURGERY      Breast cancer    COLONOSCOPY  2010    COLONOSCOPY N/A 2021    Procedure: Colonoscopy with  polypectomy;  Surgeon: Donald Aleman MD;  Location: [facility]    HC BIOPSY OF BREAST, OPEN INCISIONAL Right     Benign per patient    HC CORRECT BUNION,METATARSAL OSTEOTOMY      HC LAPAROSCOPY, SURGICAL; CHOLECYSTECTOMY  2010    HC SLING OPERATION FOR STRESS INCONTINENCE  2007    HERNIORRHAPHY INCISIONAL (LOCATION)  2011    Procedure:HERNIORRHAPHY INCISIONAL (LOCATION); Surgeon:AYALA HOOVER; Location:[facility] OR    LAPAROSCOPIC HERNIORRHAPHY INCISIONAL  2011    Procedure:LAPAROSCOPIC HERNIORRHAPHY INCISIONAL; Laparoscopic mesh repair of incarcerated incisional hernia , extensive lysis of adhesions, excision of hernia sac and closure of fascia.; Surgeon:YAALA HOOVER; Location:[facility]    LAPAROSCOPIC LYSIS ADHESIONS  2011    Procedure:LAPAROSCOPIC LYSIS ADHESIONS; Surgeon:AYALA HOOVER; Location:[facility]    MASTECTOMY PARTIAL WITH NEEDLE LOCALIZATION Left 05/15/2019    Procedure: left wire localized partial mastectomy;  Surgeon: Donald Aleman MD;  Location: [facility]    TONSILLECTOMY      ZZC APPENDECTOMY,W OTHR PROC      Z  DELIVERY ONLY          Z LIGATE FALLOPIAN TUBE      Z NONSPECIFIC PROCEDURE      Exploratory  laparotomy with resection of infarcted segment of omentum and minor lysis of adhesions    ZZC NONSPECIFIC PROCEDURE  1981    Removal of hemorrhagic corpus luteum cyst    Z VAGINAL HYSTERECTOMY         Family History:    Family History   Problem Relation Age of Onset    Breast Cancer Mother          at 88    Obesity Mother     Genitourinary Problems Mother     Lipids Mother     Hypertension Mother     Heart Disease Father         by pass (5)    Cerebrovascular Disease Father         hemorragic    Lipids Father     Alzheimer Disease Father 92    Alzheimer Disease Maternal Grandmother     Genitourinary Problems Maternal Grandmother     Cancer Maternal Grandfather     Cancer Paternal Grandfather         lymph nodes    Cancer Brother         prostate    Melanoma Brother     Heart Disease Brother          (aaron with a partner)    Hyperlipidemia Brother     Coronary Artery Disease Brother     Breast Cancer Brother         Prostrate, melaoma, heart surgery    Heart Disease Brother         stent placement    Hyperlipidemia Brother     Prostate Cancer Brother     Obesity Brother     Respiratory Sister     Lipids Sister     Breast Cancer Sister         s/p mastectomy    Arthritis Sister     Obesity Sister     Heart Failure Sister     Cancer Sister         stomach, colon, pancreatic    Coronary Artery Disease Sister     Lipids Sister     Coronary Artery Disease Sister     Obesity Sister     Dementia Sister         1/2 sister on dad's side ()    Other - See Comments Sister         8    Other - See Comments Daughter         fibromyalgia ()    Lymphoma Maternal Aunt     Heart Failure Maternal Aunt 81    Cancer Maternal Uncle         colon    Hemophilia Grandchild     Other - See Comments Grandchild         Transgender       Social History:  Marital Status:   [2]  Social History     Tobacco Use    Smoking status: Former     Packs/day: 1.00     Years: 30.00     Pack years: 30.00     Types: Cigarettes      Start date: 1966     Quit date: 10/25/2005     Years since quittin.8    Smokeless tobacco: Never    Tobacco comments:     Do not miss it   Vaping Use    Vaping Use: Never used   Substance Use Topics    Alcohol use: No    Drug use: No        Medications:    ALPRAZolam (XANAX) 0.25 MG tablet  albuterol (PROAIR HFA/PROVENTIL HFA/VENTOLIN HFA) 108 (90 Base) MCG/ACT inhaler  albuterol (PROVENTIL) (2.5 MG/3ML) 0.083% neb solution  anastrozole (ARIMIDEX) 1 MG tablet  apixaban ANTICOAGULANT (ELIQUIS) 5 MG tablet  atorvastatin (LIPITOR) 10 MG tablet  Calcium Carb-Cholecalciferol 500-400 MG-UNIT TABS  Cyanocobalamin (B-12) 1000 MCG TBCR  dexamethasone (DECADRON) 2 MG tablet  diltiazem ER COATED BEADS (CARDIZEM CD/CARTIA XT) 240 MG 24 hr capsule  enoxaparin ANTICOAGULANT (LOVENOX) 40 MG/0.4ML syringe  fluticasone (FLONASE) 50 MCG/ACT nasal spray  furosemide (LASIX) 20 MG tablet  hydrochlorothiazide (HYDRODIURIL) 25 MG tablet  hydrOXYzine (ATARAX) 25 MG tablet  ipratropium - albuterol 0.5 mg/2.5 mg/3 mL (DUONEB) 0.5-2.5 (3) MG/3ML neb solution  lisinopril (ZESTRIL) 2.5 MG tablet  magnesium oxide (MAG-OX) 400 MG tablet  mometasone-formoterol (DULERA) 200-5 MCG/ACT inhaler  MYRBETRIQ 50 MG 24 hr tablet  ondansetron (ZOFRAN ODT) 4 MG ODT tab  order for DME  order for DME  ORDER FOR DME  Probiotic Product (PROBIOTIC PO)  spacer (OPTICHAMBER ROSANNE) holding chamber  tiotropium (SPIRIVA RESPIMAT) 2.5 MCG/ACT inhaler  venlafaxine (EFFEXOR XR) 75 MG 24 hr capsule  vitamin B-Complex          Review of Systems   All other systems reviewed and are negative.      Physical Exam   BP: 136/77  Pulse: 83  Temp: 97.6  F (36.4  C)  Resp: 18  SpO2: 95 %      Physical Exam  Constitutional:       Comments: A little sleepy but arouses to voice and able to answer questions appropriate   HENT:      Mouth/Throat:      Mouth: Mucous membranes are dry.   Eyes:      Extraocular Movements: Extraocular movements intact.      Pupils: Pupils are  equal, round, and reactive to light.   Cardiovascular:      Rate and Rhythm: Normal rate and regular rhythm.   Pulmonary:      Effort: Pulmonary effort is normal.      Breath sounds: Normal breath sounds.   Neurological:      Mental Status: She is oriented to person, place, and time. She is lethargic.      GCS: GCS eye subscore is 3. GCS verbal subscore is 5. GCS motor subscore is 5.      Cranial Nerves: Cranial nerve deficit present.      Sensory: Sensory deficit present.         ED Course                 Procedures              Critical Care time:  none               Results for orders placed or performed during the hospital encounter of 08/11/23 (from the past 24 hour(s))   CBC with platelets differential    Narrative    The following orders were created for panel order CBC with platelets differential.  Procedure                               Abnormality         Status                     ---------                               -----------         ------                     CBC with platelets and d...[[id number]]  Abnormal            Final result                 Please view results for these tests on the individual orders.   Comprehensive metabolic panel   Result Value Ref Range    Sodium 133 (L) 136 - 145 mmol/L    Potassium 4.6 3.4 - 5.3 mmol/L    Chloride 86 (L) 98 - 107 mmol/L    Carbon Dioxide (CO2) 35 (H) 22 - 29 mmol/L    Anion Gap 12 7 - 15 mmol/L    Urea Nitrogen 50.5 (H) 8.0 - 23.0 mg/dL    Creatinine 1.44 (H) 0.51 - 0.95 mg/dL    Calcium 12.0 (H) 8.8 - 10.2 mg/dL    Glucose 129 (H) 70 - 99 mg/dL    Alkaline Phosphatase 63 35 - 104 U/L    AST 21 0 - 45 U/L    ALT 27 0 - 50 U/L    Protein Total 6.4 6.4 - 8.3 g/dL    Albumin 3.8 3.5 - 5.2 g/dL    Bilirubin Total 0.4 <=1.2 mg/dL    GFR Estimate 38 (L) >60 mL/min/1.73m2   Blood gas venous   Result Value Ref Range    pH Venous 7.34 7.32 - 7.43    pCO2 Venous 82 (HH) 40 - 50 mm Hg    pO2 Venous 42 25 - 47 mm Hg    Bicarbonate Venous 44 (H) 21 - 28 mmol/L     Base Excess/Deficit (+/-) 13.8 (H) -7.7 - 1.9 mmol/L    FIO2 100    CBC with platelets and differential   Result Value Ref Range    WBC Count 20.4 (H) 4.0 - 11.0 10e3/uL    RBC Count 4.05 3.80 - 5.20 10e6/uL    Hemoglobin 11.6 (L) 11.7 - 15.7 g/dL    Hematocrit 37.1 35.0 - 47.0 %    MCV 92 78 - 100 fL    MCH 28.6 26.5 - 33.0 pg    MCHC 31.3 (L) 31.5 - 36.5 g/dL    RDW 13.3 10.0 - 15.0 %    Platelet Count 204 150 - 450 10e3/uL    % Neutrophils 91 %    % Lymphocytes 3 %    % Monocytes 5 %    % Eosinophils 0 %    % Basophils 0 %    % Immature Granulocytes 1 %    NRBCs per 100 WBC 0 <1 /100    Absolute Neutrophils 18.4 (H) 1.6 - 8.3 10e3/uL    Absolute Lymphocytes 0.6 (L) 0.8 - 5.3 10e3/uL    Absolute Monocytes 1.1 0.0 - 1.3 10e3/uL    Absolute Eosinophils 0.0 0.0 - 0.7 10e3/uL    Absolute Basophils 0.1 0.0 - 0.2 10e3/uL    Absolute Immature Granulocytes 0.3 <=0.4 10e3/uL    Absolute NRBCs 0.0 10e3/uL   CT Head w/o Contrast    Narrative    EXAM: CT HEAD W/O CONTRAST  LOCATION: [facility]  DATE: 8/11/2023    INDICATION: Breast CA with mets to brain, lungs, more lethargic today, on anticoagulation, eval for edema, bleed into mets, etc.  COMPARISON: Brain MRI from 07/14/2023.  TECHNIQUE: Routine CT Head without IV contrast. Multiplanar reformats. Dose reduction techniques were used.    FINDINGS:  INTRACRANIAL CONTENTS: No acute hemorrhage or mass effect. Associated edema appears to have mildly increased since the prior MRI. No definite new areas of vasogenic edema. Mild volume loss and presumed chronic small vessel ischemia are stable. No CT   evidence of acute infarct.    VISUALIZED ORBITS/SINUSES/MASTOIDS: No intraorbital abnormality. No paranasal sinus mucosal disease. No middle ear or mastoid effusion.    BONES/SOFT TISSUES: No acute abnormality.      Impression    IMPRESSION:  1.  No acute intracranial hemorrhage or definite CT evidence of acute infarct.    2.  Small metastatic lesion  in the right precentral gyrus again noted. This is much better appreciated on the previous MRI. Apparent slight progression of mild vasogenic edema.   UA with Microscopic reflex to Culture    Specimen: Urine, Clean Catch   Result Value Ref Range    Color Urine Yellow Colorless, Straw, Light Yellow, Yellow    Appearance Urine Slightly Cloudy (A) Clear    Glucose Urine Negative Negative mg/dL    Bilirubin Urine Negative Negative    Ketones Urine Negative Negative mg/dL    Specific Gravity Urine 1.015 1.003 - 1.035    Blood Urine Negative Negative    pH Urine 5.0 5.0 - 7.0    Protein Albumin Urine Negative Negative mg/dL    Urobilinogen Urine Normal Normal, 2.0 mg/dL    Nitrite Urine Negative Negative    Leukocyte Esterase Urine Trace (A) Negative    Bacteria Urine Few (A) None Seen /HPF    Mucus Urine Present (A) None Seen /LPF    RBC Urine 0 <=2 /HPF    WBC Urine 1 <=5 /HPF    Squamous Epithelials Urine 2 (H) <=1 /HPF    Narrative    Urine Culture not indicated     *Note: Due to a large number of results and/or encounters for the requested time period, some results have not been displayed. A complete set of results can be found in Results Review.       Medications   0.9% sodium chloride BOLUS (0 mLs Intravenous Stopped 8/11/23 0355)       Assessments & Plan (with Medical Decision Making)  73-year-old female with breast cancer with mets to the brain and lungs more lethargic today.  Took Xanax 0.25 mg at about 2 PM.  On anticoagulation for pulmonary emboli.  Just wants to sleep.  CT of the head to look fo vasogenic edema or bleed into a mat.  Basic labs sent.  VBG shows a normal pH and a compensated respiratory acidosis with a pCO2 of 82 bicarb 44  White count elevated at 20.4.  UA shows trace leukocyte esterase, 0 red cells, 1 white cell.  Head CT shows no acute intracranial hemorrhage small metastatic lesion the right precentral gyrus is again noted.  This is much better appreciated on previous MRI.  Apparent slight  progression of mild vasogenic edema.  Call placed to neurosurgery to discuss whether it would be okay to put her on some Decadron prior to her gamma knife procedure scheduled for this next week.  She seems to be more awake and alert.  She is asking for something to drink.  Appears a bit dry on exam because she slept most of the day.  Her mouth is dry and her BUN and creatinine are up a bit from her baseline.  She was given 500 cc of normal saline and she can trial oral liquids.  Oral fluids went well.  She has been up to the commode several times.  She does desaturate but that is nothing new.  She is on home oxygen.  Much more awake and alert now.  She needs make sure she drinks enough fluids during the day.  She feels comfortable going home and will keep her follow-up appointments next week as scheduled.  She will return to the ED if she develops a fever of 100.4 or any other concerns.  Verbal and written discharge instructions given.  She and her daughter are comfortable with this plan.       I have reviewed the nursing notes.    I have reviewed the findings, diagnosis, plan and need for follow up with the patient.           Medical Decision Making  The patient's presentation was of moderate complexity (an undiagnosed new problem with uncertain diagnosis).    The patient's evaluation involved:  ordering and/or review of 3+ test(s) in this encounter (see separate area of note for details)    The patient's management necessitated moderate risk (prescription drug management including medications given in the ED).        New Prescriptions    No medications on file       Final diagnoses:   Somnolence   Dehydration   Malignant neoplasm of breast metastatic to brain, unspecified laterality (H)   Carcinoma of breast metastatic to lung, unspecified laterality (H)       8/11/2023   [facility] EMERGENCY DEPT       Cody Pike MD  08/11/23 5581

## 2023-08-12 PROBLEM — R40.0 SOMNOLENCE: Status: ACTIVE | Noted: 2023-01-01

## 2023-08-12 PROBLEM — D72.829 LEUKOCYTOSIS, UNSPECIFIED TYPE: Status: ACTIVE | Noted: 2023-01-01

## 2023-08-12 NOTE — ED TRIAGE NOTES
When EMS arrive patient sats 79% on 2L, Neb given in route, typically uses 4L at home, sats 96% on the way here.      Triage Assessment       Row Name 08/12/23 1150       Triage Assessment (Adult)    Airway WDL X;airway symptoms       Respiratory WDL    Respiratory WDL X

## 2023-08-12 NOTE — PROGRESS NOTES
S-(situation): Patient registered to Observation. Patient arrived to room 247 and then transferred to 215 via cart.     B-(background): afib RVR    A-(assessment): Patient is alert, oriented. Denies pain. Placed on tele-afib present with bradycardia noted down to 40's. LS are coarse, diminished. Edema noted in BLE. Bruising on all extremities. No abdominal pain noted. No nausea/vomiting. Last BM 8/12. Family plans to take belongings home(pair of garrett) and spouse will bring back home inhalers.     R-(recommendations): Orders and observation goals reviewed with patient and spouse      Nursing Observation criteria listed below was met:    Skin issues/needs documented:NA  Isolation needs addressed and Signage up: NA  Fall Prevention: Education given and documented: Yes  Education Assessment documented:Yes  Admission Education Documented: Yes  New medication patient education completed and documented (Possible Side Effects of Common Medications handout): Yes  OBS video/handout Reviewed & Documented: Yes  Allergies Reviewed: Yes  Medication Reconciliation Complete: Yes  Home medications if not able to send immediately home with family stored here: NA  Reminder note placed in discharge instructions of home meds: NA  Patient has discharge needs (If yes, please explain): No  Patient discharge preferences addressed and charted on white board:  Yes  Provider notified that patient has arrived to the unit: Yes

## 2023-08-12 NOTE — H&P
Formerly [facility]    History and Physical - Hospitalist Service       Date of Admission:  8/12/2023    Assessment & Plan   Latanya Padron is a 73 year old female admitted on 8/12/2023 with past medical history of COPD, Emphysema, chronic respiratory failure (on home O2 4L NC), paroxysmal atrial fibrillation (recently switched from coumadin to Eliquis due to Pulmonary Embolism. Eliquis on hold due to having Bronchoscopy done on Monday so started Lovenox today), breast cancer 2019 (on Arimidex) now  with mets to the brain and lungs, hypertension, HLD, depression, CKD stage 3, anemia, CAD, prediabetes, obesity presented to ED complaints of lethargy and shortness of breath.     AMS   Malignant neoplasm Mets to brain   -likely secondary to Xanax and Mets to brain   -neuro checks   -discontinue Xanax   -CT head 8/11/23: No acute intracranial hemorrhage or definite CT evidence of acute infarct.   -PTA on decadron (resume)  -Patient follow Radiation oncology outpatient  -Patient scheduled for Gamma knife procedure on Thursday at the [facility].     Dyspnea  Atrial Fibrillation with RVR   Malignant neoplasm of overlapping sites of left breast   Acute pulmonary emboli  COPD/Emphysema   Acute on chronic respiratory failure   -recently diagnose with pneumonia in July 2023, treated with Rocephin and Azithromycin all work up negative.   -likely secondary cancer and AFIB w/RVR  -currently on home O2 4L per oxymask  -PTA on Eliquis on hold switched to Lovenox (resume), resume PTA inhalers  -CXR: Heart size and vascularity are normal. Subpleural consolidative opacity medial right upper lobe and reticulonodular peribronchial opacities right upper lobe. Small right pleural effusion has increased in size. Findings could represent worsening right lung pneumonia versus metastatic disease with lymphangitic spread of tumor right upper lobe.  -EKG in the ED: atrial fibrillation-rapid tachycardia. No  acute ischemic changes and non-specific ST-T wave changes   -Given dose of IV lasix in the ED.  -Will hold off on antibiotics for now awaiting blood cultures  -continues pulse oximetry keeps sats greater than 90%  -given dose of Diltiazem IV in the ED, and her home dose of Diltiazem 240 mg PO. Resume PTA Diltiazem. Diltiazem as needed IV if HR sustain greater than 120  -HR 40's to 130's noted   -tele monitoring  -EKG as needed   -patient scheduled for lung biopsy on Monday at [facility]   -follows oncology outpatient   -PTA Arimidex (held)   -added CRP pending   -repeat echocardiogram    Persistent Leukocytosis  -attributed to the effect of corticosteroid treatment   -on admission WBC 24.5  -trend level  -no fevers or chills noted  -repeat CBC in am    Elevated troponin   -likely secondary to Atrial fibrillation with RVR  -No complaints of Chest pain  -Will collect another troponin just to see a trend but unless patient develop symptoms no further work-up needed     Hypercalcemia   -likely secondary to cancer or dehydration  -on admission 11.1  -IVF   -trend level   -repeat BMP in am       Prediabetes  -A1c 5/30/23 5.8  -sliding scale insulin (med)  -hypoglycemic protocol   -Metformin on last admission discontinued     CAD  -CAD was diagnosed in 2020 apparently based off radiographic finding coronary artery calcifications noted following cancer tx. Cardiology advised medical tx only. Patient have no concerning angina and did have thorough workup. Lexiscan unremarkable.       Acute on CKD stage 3  -on admission Cr 1.4  -baseline Cr 1.0-1.2  -IV bolus of 1.5 L and IVF hydration given  -avoid nephrotoxin medication  -avoid NSAID's  -trend level  -repeat BMP in am   -Hold PTA Lasix and hydrochlorothiazide     Hyponatremia  -likely secondary to dehydration  -on admission Na 132  -IVF hydration  -trend level  -repeat BMP in am     Hypertension   -PTA on Lasix, hydrochlorothiazide, Diltiazem, lisinopril    -hold Lasix, hydrochlorothiazide, lisinopril due KYM   -resume Diltiazem  -monitor BP       Anemia  -on admission Hgb 12.5  -no evidence of bleeding  -monitor       HLD  -PTA statin (resume)    Major depression   Anxiety  -PTA Effexor (resume)   -added Buspar 10 mg tid     Obesity  -education of diet modification and weight loss     Diet: Combination Diet Low Saturated Fat Na <2400mg Diet, No Caffeine Diet  DVT Prophylaxis: Enoxaparin (Lovenox) SQ  Ponce Catheter: Not present  Lines: None     Cardiac Monitoring: None  Code Status: Full Code    Clinically Significant Risk Factors Present on Admission           # Hypercalcemia: Highest Ca = 12 mg/dL in last 2 days, will monitor as appropriate     # Drug Induced Coagulation Defect: home medication list includes an anticoagulant medication    # Hypertension: Noted on problem list      # Obesity: Estimated body mass index is 30.82 kg/m  as calculated from the following:    Height as of 7/28/23: 1.524 m (5').    Weight as of 7/28/23: 71.6 kg (157 lb 12.8 oz).            Disposition Plan      Expected Discharge Date: 08/13/2023                The patient's care was discussed with the Attending Physician, Dr. JUVE Santana, CNP  Hospitalist Service  [facility]  Securely message with [employer] (more info)  Text page via [facility] Paging/Directory     ______________________________________________________________________    Chief Complaint   Leathrgy and shortness of breath    History is obtained from the patient, , and medical records    History of Present Illness   Latanya Padron is a 73 year old female admitted on 8/12/2023 with past medical history of COPD, Emphysema, chronic respiratory failure (on home O2 4L NC), paroxysmal atrial fibrillation (recently switched from coumadin to Eliquis due to Pulmonary Embolism. Eliquis on hold due to having Bronchoscopy done on Monday so started Lovenox today), breast cancer  2019 (on  Arimidex) now  mets to the brain and lungs, hypertension, HLD, depression, CKD stage 3, anemia, CAD, prediabetes, Obesity presented to ED via EMS due to complaints of lethargy and shortness of breath. Patient was just in the ED 8/11/23 for the same issue. At that time didn't find anything on the workup. Patient appeared to be chronic and compensated. She was alert and at her baseline so was discharge home. Patient reports since being home she feels more short of breath, wheezing, and tightness at times. Patient also states her legs or more swollen than normal. She reports taking a Xanax this morning do her feeling so bad. Her  reports her being very sleep and unable to move to go to restroom. Denies any chest pain, nausea or vomiting. Patient reports a lung biopsy on Monday and Gamma knife procedure on Thursday at the [facility].     Patient was recently admitted inpatient in July 2023 for acute on chronic respiratory failure, pneumonia of right upper lobe, and found to have pulmonary embolism. Patient was initially treated with Bipap followed by transition to hi-flow nasal cannula then low flow nasal cannula. She received aggressive diuresis to treat acute cor pulmonale and volume overload. She developed transient hypotension and acute kidney injury such that diuresis was discontinued. As patient continue to improve with resolution of KYM her home low dose lasix resume with good response. Patient on discharge was requiring 6L NC with any activity at the time of discharge. Patient warfarin was placed on hold for biopsy of a supraclavicular mass on July 7, 2023 which is how patient developed PE. Biopsy results were benign. She was started on heparin on admission 7/9/23 and switched to Eliquis. Patient was treat with Rocephin and Azithromycin for pneumonia. Oncologist recommended Brain MRI on 7/14/23 worrisome for malignancy. Patient was setup with outpatient follow up oncologist. Patient had 2  episodes of asymptomatic wide-complex tachycardia that were suspicious for AFIB resolved  with specific intervention. See previous admission for more details.     Past Medical History    Past Medical History:   Diagnosis Date    Breast cancer (H) 04/12/2019    Left Breast    COPD (chronic obstructive pulmonary disease) (H)     Depressive disorder 10 years    Mixed hyperlipidemia     Neck mass 06/20/2018    Paroxysmal atrial fibrillation (H)     PONV (postoperative nausea and vomiting)     S/P radiation therapy     5,256 cGy to left breast completed on 7/18/2019 - [facility]       Past Surgical History   Past Surgical History:   Procedure Laterality Date    BIOPSY NODE SENTINEL Left 05/15/2019    Procedure: left sentinel node biopsy;  Surgeon: Donald Aleman MD;  Location: [facility] OR    BREAST BIOPSY, CORE RT/LT Left 04/12/2019    BREAST SURGERY  2019    Breast cancer    COLONOSCOPY  06/21/2010    COLONOSCOPY N/A 03/04/2021    Procedure: Colonoscopy with  polypectomy;  Surgeon: Donald Aleman MD;  Location:  GI    HC BIOPSY OF BREAST, OPEN INCISIONAL Right 1988    Benign per patient    HC CORRECT BUNION,METATARSAL OSTEOTOMY  1993     LAPAROSCOPY, SURGICAL; CHOLECYSTECTOMY  08/04/2010     SLING OPERATION FOR STRESS INCONTINENCE  04/19/2007    HERNIORRHAPHY INCISIONAL (LOCATION)  09/23/2011    Procedure:HERNIORRHAPHY INCISIONAL (LOCATION); Surgeon:AYALA HOOVER; Location:[facility] OR    LAPAROSCOPIC HERNIORRHAPHY INCISIONAL  09/23/2011    Procedure:LAPAROSCOPIC HERNIORRHAPHY INCISIONAL; Laparoscopic mesh repair of incarcerated incisional hernia , extensive lysis of adhesions, excision of hernia sac and closure of fascia.; Surgeon:AYALA HOOVER; Location:[facility] OR    LAPAROSCOPIC LYSIS ADHESIONS  09/23/2011    Procedure:LAPAROSCOPIC LYSIS ADHESIONS; Surgeon:AYALA HOOVER; Location:[facility] OR    MASTECTOMY PARTIAL WITH NEEDLE LOCALIZATION Left 05/15/2019    Procedure: left wire localized partial mastectomy;   Surgeon: Donald Aleman MD;  Location: [facility] OR    TONSILLECTOMY      ZZC APPENDECTOMY,W OTHR PROC      ZZC  DELIVERY ONLY          ZZC LIGATE FALLOPIAN TUBE      ZC NONSPECIFIC PROCEDURE      Exploratory laparotomy with resection of infarcted segment of omentum and minor lysis of adhesions    ZZ NONSPECIFIC PROCEDURE      Removal of hemorrhagic corpus luteum cyst    ZZC VAGINAL HYSTERECTOMY         Prior to Admission Medications   Prior to Admission Medications   Prescriptions Last Dose Informant Patient Reported? Taking?   ALPRAZolam (XANAX) 0.25 MG tablet 2023 at 1100  No Yes   Si/2 to 1 tablet up to every 8 hours as needed for anxiety   Calcium Carb-Cholecalciferol 500-400 MG-UNIT TABS 2023 at am  Yes Yes   Sig: Take 1 tablet by mouth daily   Cyanocobalamin (B-12) 1000 MCG TBCR 2023 at am  Yes Yes   Sig: Take 1,000 mcg by mouth daily   MYRBETRIQ 50 MG 24 hr tablet 2023 at hs  Yes Yes   Sig: Take 50 mg by mouth At Bedtime   ORDER FOR DME  at continuous  Yes Yes   Sig: Equipment being ordered: Oxygen @ 2.5 liters at all times, increase to 6 L with activity.   Probiotic Product (PROBIOTIC PO) 2023 at am  Yes Yes   Sig: Take by mouth daily   Vitamin D3 (CHOLECALCIFEROL) 125 MCG (5000 UT) tablet 2023 at am  Yes Yes   Sig: Take 125 mcg by mouth daily   albuterol (PROAIR HFA/PROVENTIL HFA/VENTOLIN HFA) 108 (90 Base) MCG/ACT inhaler 2023 at 1122  Yes Yes   Sig: Inhale 2 puffs into the lungs 4 times daily 1000, 1200, 1400, 1600 scheduled   albuterol (PROVENTIL) (2.5 MG/3ML) 0.083% neb solution Past Month at unknown  Yes Yes   Sig: Take 2.5 mg by nebulization every 4 hours as needed for shortness of breath, wheezing or cough   anastrozole (ARIMIDEX) 1 MG tablet 2023 at am  No Yes   Sig: Take 1 tablet (1 mg) by mouth daily   apixaban ANTICOAGULANT (ELIQUIS) 5 MG tablet 2023 at am  No Yes   Sig: Take 2 tablets (10 mg) by mouth 2 times  daily for 2 days, THEN 1 tablet (5 mg) 2 times daily for 30 days.   atorvastatin (LIPITOR) 10 MG tablet 8/11/2023 at hs  No Yes   Sig: TAKE ONE TABLET BY MOUTH ONCE DAILY   Patient taking differently: Take 10 mg by mouth At Bedtime   dexamethasone (DECADRON) 2 MG tablet 8/11/2023 at am  No Yes   Sig: Take 1 tablet (2 mg) by mouth daily (with breakfast)   diltiazem ER COATED BEADS (CARDIZEM CD/CARTIA XT) 240 MG 24 hr capsule 8/11/2023 at hs  No Yes   Sig: Take 1 capsule (240 mg) by mouth daily   Patient taking differently: Take 240 mg by mouth At Bedtime   enoxaparin ANTICOAGULANT (LOVENOX) 40 MG/0.4ML syringe  at not started  No Yes   Sig: Inject 0.4 mLs (40 mg) Subcutaneous daily (Take on first day of holding blood thinner in the morning and again the day prior to the procedure in the morning)   fluticasone (FLONASE) 50 MCG/ACT nasal spray Past Month at unknown  No Yes   Sig: Spray 1 spray into both nostrils daily For stuffy/runny nose   Patient taking differently: Spray 1 spray into both nostrils daily as needed for rhinitis   furosemide (LASIX) 20 MG tablet 8/11/2023 at am  No Yes   Sig: Take 1 tablet (20 mg) by mouth daily   hydrOXYzine (ATARAX) 25 MG tablet 8/11/2023 at hs  No Yes   Sig: Take 1-2 tablets at bedtime for insomnia or restlessness.   Patient taking differently: Take 25-50 mg by mouth nightly as needed for other (insomnia or restlessness)   hydrochlorothiazide (HYDRODIURIL) 25 MG tablet 8/11/2023 at am  No Yes   Sig: TAKE 1 TABLET (25 MG) BY MOUTH DAILY   ipratropium - albuterol 0.5 mg/2.5 mg/3 mL (DUONEB) 0.5-2.5 (3) MG/3ML neb solution Past Month at unknown  No Yes   Sig: Take 1 vial (3 mLs) by nebulization every 6 hours as needed for shortness of breath, wheezing or cough   lisinopril (ZESTRIL) 2.5 MG tablet 8/11/2023 at am  No Yes   Sig: TAKE 1 TABLET (2.5 MG) BY MOUTH DAILY   magnesium oxide (MAG-OX) 400 MG tablet 8/11/2023 at am  No Yes   Sig: Take 1 tablet (400 mg) by mouth daily    mometasone-formoterol (DULERA) 200-5 MCG/ACT inhaler 8/11/2023 at hs  No Yes   Sig: INHALE 2 PUFFS BY MOUTH TWO TIMES A DAY   Patient taking differently: Inhale 2 puffs into the lungs 2 times daily 7am and 3pm   ondansetron (ZOFRAN ODT) 4 MG ODT tab 8/12/2023 at am  No Yes   Sig: DISSOLVE 1 TABLET (4 MG) BY MOUTH EVERY 8 HOURS AS NEEDED FOR NAUSEA   Patient taking differently: Take 4 mg by mouth every 8 hours as needed for nausea   order for DME Past Month at unknown  No Yes   Sig: Equipment being ordered: Nebulizer   order for DME  at 6L w/activity  No Yes   Sig: Equipment being ordered: Oxygen   tiotropium (SPIRIVA RESPIMAT) 2.5 MCG/ACT inhaler 8/12/2023 at am  No Yes   Sig: Inhale 2 puffs into the lungs daily   venlafaxine (EFFEXOR XR) 75 MG 24 hr capsule 8/11/2023 at am  No Yes   Sig: TAKE ONE CAPSULE BY MOUTH ONCE DAILY   Patient taking differently: Take 75 mg by mouth daily   vitamin B-Complex 8/11/2023 at am  Yes Yes   Sig: Take 1 tablet by mouth daily      Facility-Administered Medications: None          Physical Exam   Vital Signs: Temp: 97.5  F (36.4  C) Temp src: Oral BP: 129/82 Pulse: 113   Resp: 22 SpO2: 92 % O2 Device: Oxymask Oxygen Delivery: 4 LPM  Weight: 0 lbs 0 oz    Constitutional: a little sleepy but arouses to voice and able to answer all question appropriately   Cardiovascular: irregular irregular tachycardia  Respiratory: lung sounds coarse and diminished   Gastrointestinal: Abdomen soft, non-tender. BS normal. No masses.  Skin: warm, dry, intact, BLE edema noted. bruises noted all over extremities.  Msk: JERRI   Neuro: alert, oriented x3      Medical Decision Making       70 MINUTES SPENT BY ME on the date of service doing chart review, history, exam, documentation & further activities per the note.  MANAGEMENT DISCUSSED with the following over the past 24 hours: patient, family member, and Dr. Michelle       Data     I have personally reviewed the following data over the past 24  hrs:    24.5 (H)  \   12.5   / 187     132 (L) 87 (L) 57.8 (H) /  131 (H)   4.2 33 (H) 1.40 (H) \     ALT: 28 AST: 25 AP: 65 TBILI: 0.6   ALB: 3.7 TOT PROTEIN: 6.2 (L) LIPASE: N/A     Trop: 72 (H) BNP: 483     Procal: N/A CRP: N/A Lactic Acid: 1.1         Imaging results reviewed over the past 24 hrs:   Recent Results (from the past 24 hour(s))   XR Chest Port 1 View    Narrative    EXAM: XR CHEST PORT 1 VIEW  LOCATION: [facility]  DATE: 8/12/2023    INDICATION: Increasing shortness of breath. Reason history of pulmonary embolus. History of left breast cancer.  COMPARISON: Chest CTA 07/09/2023      Impression    IMPRESSION: Heart size and vascularity are normal. Subpleural consolidative opacity medial right upper lobe and reticulonodular peribronchial opacities right upper lobe. Small Small right pleural effusion has increased in size. Findings could represent   worsening right lung pneumonia versus metastatic disease with lymphangitic spread of tumor right upper lobe.

## 2023-08-12 NOTE — ED PROVIDER NOTES
History     Chief Complaint   Patient presents with    Shortness of Breath     HPI  Latanya Padron is a 73 year old female who presents back to the emergency department with complaints of continued lethargy and shortness of breath.  Patient was here just 2 days ago for the same problem.  At that time they did not really find anything on the work-up.  Everything appeared to be chronic and compensated.  She was awake and ambulated per her baseline here so ultimately was discharged home.  Patient states since being home she has felt more short of breath.  She continues to feel very wheezy and tight at times.  Denies any chest pain or palpitations.  Denies any nausea or vomiting.  Patient states that her legs do seem more swollen than normal for her.  This morning she was feeling so bad when her daughter got here she asked for one of her Xanax and then they ended up calling 911 because her breathing was getting worse and ultimately transported her here.  Patient did not have her morning medications here yet.  Patient has a past medical history significant for Malignant cancer with spread to the lungs and brain.  Patient is supposed to be getting a lung biopsy on Monday and is supposed to be getting a gamma knife procedure on Thursday.    Allergies:  Allergies   Allergen Reactions    Augmentin [Amoxicillin-Pot Clavulanate] Nausea and Vomiting    Meperidine Hcl Nausea and Vomiting     demerol    Seasonal Allergies      spring       Problem List:    Patient Active Problem List    Diagnosis Date Noted    Malignant neoplasm metastatic to brain (H) 07/21/2023     Priority: Medium    Abnormal brain MRI 07/14/2023     Priority: Medium    Personal history of malignant neoplasm of breast 07/13/2023     Priority: Medium    Warfarin-induced coagulopathy (H) 07/13/2023     Priority: Medium    Anemia, unspecified type 07/13/2023     Priority: Medium    Hyponatremia 07/13/2023     Priority: Medium    Wide-complex tachycardia  07/13/2023     Priority: Medium    Acute kidney injury (H) 07/11/2023     Priority: Medium    Lymphadenopathy in chest 07/10/2023     Priority: Medium    Prediabetes 07/10/2023     Priority: Medium    Atrial fibrillation with RVR (H) 07/10/2023     Priority: Medium    Acute right-sided heart failure (H) 07/10/2023     Priority: Medium    Pulmonary hypertension (H) 07/10/2023     Priority: Medium    Troponin level elevated 07/09/2023     Priority: Medium    Supraclavicular mass-benign biopsy results 7/7/23 07/09/2023     Priority: Medium    Acute on chronic respiratory failure with hypoxia and hypercapnia (H) 07/09/2023     Priority: Medium    Pneumonia of right upper lobe due to infectious organism 07/09/2023     Priority: Medium    Acute pulmonary embolism with suspected acute cor pulmonale, unspecified pulmonary embolism type, left upper lobe 07/09/2023     Priority: Medium    Atrial fibrillation, paroxysmal  10/06/2022     Priority: Medium    Major depression in complete remission (H) 05/19/2022     Priority: Medium    Epidermoid cyst of labia majora 11/04/2020     Priority: Medium    Long term current use of anticoagulant therapy 10/20/2020     Priority: Medium    Coronary artery disease involving native coronary artery of native heart without angina pectoris 08/10/2020     Priority: Medium    Restless leg syndrome 06/24/2020     Priority: Medium    Lymphadenopathy of head and neck 02/26/2020     Priority: Medium    Groin pain, right 02/26/2020     Priority: Medium    Right hip pain 09/04/2019     Priority: Medium    Patellofemoral pain syndrome of right knee 09/04/2019     Priority: Medium    Abnormal gait 09/04/2019     Priority: Medium    Primary osteoarthritis of right knee 09/04/2019     Priority: Medium    Age-related osteoporosis without current pathological fracture 07/03/2019     Priority: Medium    Malignant neoplasm of overlapping sites of left breast in female, estrogen receptor positive (H)  06/12/2019     Priority: Medium    Segmental dysfunction of sacral region 02/01/2019     Priority: Medium    Segmental dysfunction of lumbar region 02/01/2019     Priority: Medium    Segmental dysfunction of thoracic region 02/01/2019     Priority: Medium    Bilateral low back pain with right-sided sciatica 02/01/2019     Priority: Medium    Trochanteric bursitis of right hip 07/13/2018     Priority: Medium    Hip pain, right 06/20/2018     Priority: Medium    Multiple joint pain 06/20/2018     Priority: Medium    CKD (chronic kidney disease) stage 3, GFR 30-59 ml/min (H) 06/21/2017     Priority: Medium    Primary osteoarthritis of both knees 04/18/2017     Priority: Medium    AF (paroxysmal atrial fibrillation) (H) 03/02/2016     Priority: Medium    Major depression in complete remission (H) 12/28/2015     Priority: Medium    Class 1 obesity due to excess calories without serious comorbidity with body mass index (BMI) of 34.0 to 34.9 in adult 12/28/2015     Priority: Medium    Pulmonary emphysema, unspecified emphysema type (H) 12/28/2015     Priority: Medium    Atrial fibrillation (H) 06/20/2014     Priority: Medium    Essential hypertension, benign 09/30/2013     Priority: Medium    Tennis elbow 04/12/2013     Priority: Medium     left        Advanced directives, counseling/discussion 09/09/2011     Priority: Medium     Advance Directive Problem List Overview:   Name Relationship Phone    Primary Health Care Agent            Alternative Health Care Agent          Discussed advance care planning with patient; information given to patient to review.//Brenda Carlin/BEATRIZ(AAMA)  9/9/2011         COPD exacerbation (H) 09/09/2011     Priority: Medium    Hyperlipidemia LDL goal <130 10/31/2010     Priority: Medium    Hirsutism 04/13/2007     Priority: Medium    Family history of malignant neoplasm of breast 12/27/2006     Priority: Medium    Female stress incontinence 11/26/2005     Priority: Medium    Disorder of bone and  cartilage 07/13/2005     Priority: Medium     Problem list name updated by automated process. Provider to review      Sprain and strain of unspecified site of knee and leg 04/09/2004     Priority: Medium        Past Medical History:    Past Medical History:   Diagnosis Date    Breast cancer (H) 04/12/2019    COPD (chronic obstructive pulmonary disease) (H)     Depressive disorder 10 years    Mixed hyperlipidemia     Neck mass 06/20/2018    Paroxysmal atrial fibrillation (H)     PONV (postoperative nausea and vomiting)     S/P radiation therapy        Past Surgical History:    Past Surgical History:   Procedure Laterality Date    BIOPSY NODE SENTINEL Left 05/15/2019    Procedure: left sentinel node biopsy;  Surgeon: Donald Aleman MD;  Location: [facility]    BREAST BIOPSY, CORE RT/LT Left 04/12/2019    BREAST SURGERY  2019    Breast cancer    COLONOSCOPY  06/21/2010    COLONOSCOPY N/A 03/04/2021    Procedure: Colonoscopy with  polypectomy;  Surgeon: Donald Aleman MD;  Location: [facility]    HC BIOPSY OF BREAST, OPEN INCISIONAL Right 1988    Benign per patient    HC CORRECT BUNION,METATARSAL OSTEOTOMY  1993    HC LAPAROSCOPY, SURGICAL; CHOLECYSTECTOMY  08/04/2010    HC SLING OPERATION FOR STRESS INCONTINENCE  04/19/2007    HERNIORRHAPHY INCISIONAL (LOCATION)  09/23/2011    Procedure:HERNIORRHAPHY INCISIONAL (LOCATION); Surgeon:AYALA HOOVER; Location:[facility]    LAPAROSCOPIC HERNIORRHAPHY INCISIONAL  09/23/2011    Procedure:LAPAROSCOPIC HERNIORRHAPHY INCISIONAL; Laparoscopic mesh repair of incarcerated incisional hernia , extensive lysis of adhesions, excision of hernia sac and closure of fascia.; Surgeon:AYALA HOOVER; Location:[facility]    LAPAROSCOPIC LYSIS ADHESIONS  09/23/2011    Procedure:LAPAROSCOPIC LYSIS ADHESIONS; Surgeon:AYALA HOOVER; Location:[facility]    MASTECTOMY PARTIAL WITH NEEDLE LOCALIZATION Left 05/15/2019    Procedure: left wire localized partial mastectomy;  Surgeon: Donald Aleman MD;   Location: [facility]    TONSILLECTOMY      ZZC APPENDECTOMY,W OTHR PROC      Z  DELIVERY ONLY          Z LIGATE FALLOPIAN TUBE      Z NONSPECIFIC PROCEDURE      Exploratory laparotomy with resection of infarcted segment of omentum and minor lysis of adhesions    Z NONSPECIFIC PROCEDURE      Removal of hemorrhagic corpus luteum cyst    Z VAGINAL HYSTERECTOMY         Family History:    Family History   Problem Relation Age of Onset    Breast Cancer Mother          at 88    Obesity Mother     Genitourinary Problems Mother     Lipids Mother     Hypertension Mother     Heart Disease Father         by pass (5)    Cerebrovascular Disease Father         hemorragic    Lipids Father     Alzheimer Disease Father 92    Alzheimer Disease Maternal Grandmother     Genitourinary Problems Maternal Grandmother     Cancer Maternal Grandfather     Cancer Paternal Grandfather         lymph nodes    Cancer Brother         prostate    Melanoma Brother     Heart Disease Brother          (aaron with a partner)    Hyperlipidemia Brother     Coronary Artery Disease Brother     Breast Cancer Brother         Prostrate, melaoma, heart surgery    Heart Disease Brother         stent placement    Hyperlipidemia Brother     Prostate Cancer Brother     Obesity Brother     Respiratory Sister     Lipids Sister     Breast Cancer Sister         s/p mastectomy    Arthritis Sister     Obesity Sister     Heart Failure Sister     Cancer Sister         stomach, colon, pancreatic    Coronary Artery Disease Sister     Lipids Sister     Coronary Artery Disease Sister     Obesity Sister     Dementia Sister         1/2 sister on dad's side ()    Other - See Comments Sister         1948    Other - See Comments Daughter         fibromyalgia ()    Lymphoma Maternal Aunt     Heart Failure Maternal Aunt 81    Cancer Maternal Uncle         colon    Hemophilia Grandchild     Other - See Comments Grandchild          Transgender       Social History:  Marital Status:   [2]  Social History     Tobacco Use    Smoking status: Former     Packs/day: 1.00     Years: 30.00     Pack years: 30.00     Types: Cigarettes     Start date: 1966     Quit date: 10/25/2005     Years since quittin.8    Smokeless tobacco: Never    Tobacco comments:     Do not miss it   Vaping Use    Vaping Use: Never used   Substance Use Topics    Alcohol use: No    Drug use: No        Medications:    albuterol (PROAIR HFA/PROVENTIL HFA/VENTOLIN HFA) 108 (90 Base) MCG/ACT inhaler  albuterol (PROVENTIL) (2.5 MG/3ML) 0.083% neb solution  ALPRAZolam (XANAX) 0.25 MG tablet  anastrozole (ARIMIDEX) 1 MG tablet  apixaban ANTICOAGULANT (ELIQUIS) 5 MG tablet  atorvastatin (LIPITOR) 10 MG tablet  Calcium Carb-Cholecalciferol 500-400 MG-UNIT TABS  Cyanocobalamin (B-12) 1000 MCG TBCR  dexamethasone (DECADRON) 2 MG tablet  diltiazem ER COATED BEADS (CARDIZEM CD/CARTIA XT) 240 MG 24 hr capsule  enoxaparin ANTICOAGULANT (LOVENOX) 40 MG/0.4ML syringe  fluticasone (FLONASE) 50 MCG/ACT nasal spray  furosemide (LASIX) 20 MG tablet  hydrochlorothiazide (HYDRODIURIL) 25 MG tablet  hydrOXYzine (ATARAX) 25 MG tablet  ipratropium - albuterol 0.5 mg/2.5 mg/3 mL (DUONEB) 0.5-2.5 (3) MG/3ML neb solution  lisinopril (ZESTRIL) 2.5 MG tablet  magnesium oxide (MAG-OX) 400 MG tablet  mometasone-formoterol (DULERA) 200-5 MCG/ACT inhaler  MYRBETRIQ 50 MG 24 hr tablet  ondansetron (ZOFRAN ODT) 4 MG ODT tab  order for DME  order for DME  ORDER FOR DME  Probiotic Product (PROBIOTIC PO)  tiotropium (SPIRIVA RESPIMAT) 2.5 MCG/ACT inhaler  venlafaxine (EFFEXOR XR) 75 MG 24 hr capsule  vitamin B-Complex  Vitamin D3 (CHOLECALCIFEROL) 125 MCG (5000 UT) tablet          Review of Systems   All other systems reviewed and are negative.      Physical Exam   BP: (!) 143/96  Pulse: 91  Temp: 97.5  F (36.4  C)  Resp: 22  SpO2: (!) 88 %      Physical Exam  Vitals and nursing note reviewed.    Constitutional:       General: She is not in acute distress.     Appearance: She is well-developed. She is not diaphoretic.   HENT:      Head: Normocephalic and atraumatic.      Nose: Nose normal.      Mouth/Throat:      Pharynx: No oropharyngeal exudate.   Eyes:      Conjunctiva/sclera: Conjunctivae normal.   Cardiovascular:      Rate and Rhythm: Normal rate and regular rhythm.      Heart sounds: Normal heart sounds. No murmur heard.     No friction rub.   Pulmonary:      Effort: Pulmonary effort is normal. No respiratory distress.      Breath sounds: No stridor. Wheezing and rhonchi present. No rales.   Abdominal:      General: Bowel sounds are normal. There is no distension.      Palpations: Abdomen is soft. There is no mass.      Tenderness: There is no abdominal tenderness. There is no guarding.   Musculoskeletal:         General: No tenderness. Normal range of motion.      Cervical back: Normal range of motion and neck supple.   Skin:     General: Skin is warm and dry.      Capillary Refill: Capillary refill takes less than 2 seconds.      Findings: No erythema.   Neurological:      Mental Status: She is alert and oriented to person, place, and time.   Psychiatric:         Judgment: Judgment normal.         ED Course                 Procedures              EKG Interpretation:      Interpreted by Dony Garcia MD  Time reviewed: now  Symptoms at time of EKG: None   Rhythm: atrial fibrillation - rapid  Rate: Tachycardia  Axis: Normal  Ectopy: none  Conduction: normal  ST Segments/ T Waves: No acute ischemic changes and Non-specific ST-T wave changes  Q Waves: none  Comparison to prior: No old EKG available    Clinical Impression: atrial fibrillation (chronic)            Results for orders placed or performed during the hospital encounter of 08/12/23 (from the past 24 hour(s))   [place] Draw    Narrative    The following orders were created for panel order [place] Draw.  Procedure                                Abnormality         Status                     ---------                               -----------         ------                     Extra Green Top (Lithium...[[id number]]                      Final result               Extra Purple Top Tube[[id number]]                            Final result                 Please view results for these tests on the individual orders.   Extra Green Top (Lithium Heparin) Tube   Result Value Ref Range    Hold Specimen JIC    Extra Purple Top Tube   Result Value Ref Range    Hold Specimen JIC    CBC with platelets differential    Narrative    The following orders were created for panel order CBC with platelets differential.  Procedure                               Abnormality         Status                     ---------                               -----------         ------                     CBC with platelets and d...[[id number]]  Abnormal            Final result                 Please view results for these tests on the individual orders.   Comprehensive metabolic panel   Result Value Ref Range    Sodium 132 (L) 136 - 145 mmol/L    Potassium 4.2 3.4 - 5.3 mmol/L    Chloride 87 (L) 98 - 107 mmol/L    Carbon Dioxide (CO2) 33 (H) 22 - 29 mmol/L    Anion Gap 12 7 - 15 mmol/L    Urea Nitrogen 57.8 (H) 8.0 - 23.0 mg/dL    Creatinine 1.40 (H) 0.51 - 0.95 mg/dL    Calcium 11.1 (H) 8.8 - 10.2 mg/dL    Glucose 150 (H) 70 - 99 mg/dL    Alkaline Phosphatase 65 35 - 104 U/L    AST 25 0 - 45 U/L    ALT 28 0 - 50 U/L    Protein Total 6.2 (L) 6.4 - 8.3 g/dL    Albumin 3.7 3.5 - 5.2 g/dL    Bilirubin Total 0.6 <=1.2 mg/dL    GFR Estimate 40 (L) >60 mL/min/1.73m2   Magnesium   Result Value Ref Range    Magnesium 2.1 1.7 - 2.3 mg/dL   Troponin T, High Sensitivity   Result Value Ref Range    Troponin T, High Sensitivity 75 (H) <=14 ng/L   Nt probnp inpatient (BNP)   Result Value Ref Range    N terminal Pro BNP Inpatient 483 0 - 900 pg/mL   CBC with platelets and differential   Result Value  Ref Range    WBC Count 24.5 (H) 4.0 - 11.0 10e3/uL    RBC Count 4.33 3.80 - 5.20 10e6/uL    Hemoglobin 12.5 11.7 - 15.7 g/dL    Hematocrit 38.7 35.0 - 47.0 %    MCV 89 78 - 100 fL    MCH 28.9 26.5 - 33.0 pg    MCHC 32.3 31.5 - 36.5 g/dL    RDW 13.1 10.0 - 15.0 %    Platelet Count 187 150 - 450 10e3/uL    % Neutrophils 86 %    % Lymphocytes 3 %    % Monocytes 9 %    % Eosinophils 1 %    % Basophils 0 %    % Immature Granulocytes 1 %    NRBCs per 100 WBC 0 <1 /100    Absolute Neutrophils 21.0 (H) 1.6 - 8.3 10e3/uL    Absolute Lymphocytes 0.7 (L) 0.8 - 5.3 10e3/uL    Absolute Monocytes 2.2 (H) 0.0 - 1.3 10e3/uL    Absolute Eosinophils 0.2 0.0 - 0.7 10e3/uL    Absolute Basophils 0.0 0.0 - 0.2 10e3/uL    Absolute Immature Granulocytes 0.3 <=0.4 10e3/uL    Absolute NRBCs 0.0 10e3/uL   Blood gas venous   Result Value Ref Range    pH Venous 7.36 7.32 - 7.43    pCO2 Venous 74 (H) 40 - 50 mm Hg    pO2 Venous 36 25 - 47 mm Hg    Bicarbonate Venous 42 (H) 21 - 28 mmol/L    Base Excess/Deficit (+/-) 12.6 (H) -7.7 - 1.9 mmol/L    FIO2 21    XR Chest Port 1 View    Narrative    EXAM: XR CHEST PORT 1 VIEW  LOCATION: [facility]  DATE: 8/12/2023    INDICATION: Increasing shortness of breath. Reason history of pulmonary embolus. History of left breast cancer.  COMPARISON: Chest CTA 07/09/2023      Impression    IMPRESSION: Heart size and vascularity are normal. Subpleural consolidative opacity medial right upper lobe and reticulonodular peribronchial opacities right upper lobe. Small Small right pleural effusion has increased in size. Findings could represent   worsening right lung pneumonia versus metastatic disease with lymphangitic spread of tumor right upper lobe.   Troponin T, High Sensitivity   Result Value Ref Range    Troponin T, High Sensitivity 72 (H) <=14 ng/L   Lactic acid whole blood   Result Value Ref Range    Lactic Acid 1.1 0.7 - 2.0 mmol/L     *Note: Due to a large number of results  and/or encounters for the requested time period, some results have not been displayed. A complete set of results can be found in Results Review.       Medications   diltiazem ER COATED BEADS (CARDIZEM CD/CARTIA XT) 24 hr capsule 240 mg (240 mg Oral $Given 8/12/23 1246)   0.9% sodium chloride BOLUS (500 mLs Intravenous $New Bag 8/12/23 1437)   enoxaparin ANTICOAGULANT (LOVENOX) injection 40 mg (40 mg Subcutaneous $Given 8/12/23 1446)   furosemide (LASIX) injection 40 mg (40 mg Intravenous $Given 8/12/23 1253)   diltiazem (CARDIZEM) injection 16 mg (16 mg Intravenous $Given 8/12/23 1248)     This is a 73-year-old female who presents back to the emergency department with complaints of continued left of the C and shortness of breath.  Upon arrival patient is sleepy but easily arousable, patient was able to be weaned down to her normal oxygen levels.  On arrival patient was noted to be in A-fib with RVR.  Work-up shows labs are pretty stable if not a little improved.  Patient's CO2 is down from the of the day of 70 down to 62 and her pH remains normal, this appears to be more chronic CO2 retention.  Patient's troponin was slightly elevated, I did a second 2-hour troponin and it was essentially the same.  Lactic was normal.  Patient's white count though is trending up.  Patient was recently hospitalized for pneumonia, x-ray is questionable if this could be a new pneumonia or more malignant spread.  I think patient's somnolence could be a combination of the Xanax that she is taking but certainly possibility of infection would be a thought.  I think her shortness of breath could also be from this possible developing or continued pneumonia and her A-fib with RVR.  For her A-fib she had not taken her morning meds yet but apparently she takes her diltiazem at night which we did not know initially.  So I gave the patient a diltiazem bolus and then gave the patient an oral dose of her long-acting diltiazem.  Her heart rate came  down initially with the bolus but it has trended back up but now seems to be trending down, I think the oral diltiazem is starting to kick in.  Since this is the patient's second visit for the same symptoms I think we should plan on bringing the patient for observation.  We should continue work on trying to get the heart rate down to see if this is causing her shortness of breath.  I am equivocal on if we need to start antibiotics.  I will discuss with the hospitalist if he wanted to start empiric antibiotics or if you want to do more imaging like a CAT scan of the chest for further evaluation if there could be a pneumonia.    Assessments & Plan (with Medical Decision Making)  Dyspnea, A-fib with RVR, somnolence     I have reviewed the nursing notes.    I have reviewed the findings, diagnosis, plan and need for follow up with the patient.    8/12/2023   [facility] EMERGENCY DEPT       Dony Garcia MD  08/12/23 1500

## 2023-08-12 NOTE — MEDICATION SCRIBE - ADMISSION MEDICATION HISTORY
Medication Scribe Admission Medication History    Admission medication history is complete. The information provided in this note is only as accurate as the sources available at the time of the update.    Medication reconciliation/reorder completed by provider prior to medication history? Yes    Information Source(s): Family member via in-person    Pertinent Information: daughter Chelo and  Nam    Changes made to PTA medication list:  Added: D3  Deleted: spacer  Changed: atorvastatin, diltiazem and Myrbetriq to HS, flonase and hydroxyzine to prn    Medication Affordability:  Not including over the counter (OTC) medications, was there a time in the past 3 months when you did not take your medications as prescribed because of cost?: Unable to Assess    Allergies reviewed with patient and updates made in EHR: yes    Medication History Completed By: SIMONA SALINAS 8/12/2023 2:34 PM    Prior to Admission medications    Medication Sig Last Dose Taking? Auth Provider Long Term End Date   albuterol (PROAIR HFA/PROVENTIL HFA/VENTOLIN HFA) 108 (90 Base) MCG/ACT inhaler Inhale 2 puffs into the lungs 4 times daily 1000, 1200, 1400, 1600 scheduled 8/12/2023 at 1122 Yes Dat Peters MD Yes    albuterol (PROVENTIL) (2.5 MG/3ML) 0.083% neb solution Take 2.5 mg by nebulization every 4 hours as needed for shortness of breath, wheezing or cough Past Month at unknown Yes Dat Peters MD Yes    ALPRAZolam (XANAX) 0.25 MG tablet 1/2 to 1 tablet up to every 8 hours as needed for anxiety 8/12/2023 at 1100 Yes Nazairo Jones MD     anastrozole (ARIMIDEX) 1 MG tablet Take 1 tablet (1 mg) by mouth daily 8/11/2023 at am Yes Vin Dhillon MD Yes    apixaban ANTICOAGULANT (ELIQUIS) 5 MG tablet Take 2 tablets (10 mg) by mouth 2 times daily for 2 days, THEN 1 tablet (5 mg) 2 times daily for 30 days. 8/11/2023 at am Yes Dat Peters MD No 8/20/23   atorvastatin (LIPITOR) 10 MG tablet TAKE ONE TABLET BY MOUTH ONCE  DAILY  Patient taking differently: Take 10 mg by mouth At Bedtime 8/11/2023 at hs Yes Nazario Jones MD Yes    Calcium Carb-Cholecalciferol 500-400 MG-UNIT TABS Take 1 tablet by mouth daily 8/11/2023 at am Yes Glen Blue MD     Cyanocobalamin (B-12) 1000 MCG TBCR Take 1,000 mcg by mouth daily 8/11/2023 at am Yes Glen Blue MD     dexamethasone (DECADRON) 2 MG tablet Take 1 tablet (2 mg) by mouth daily (with breakfast) 8/11/2023 at am Yes Julita Gaxiola MD Yes    diltiazem ER COATED BEADS (CARDIZEM CD/CARTIA XT) 240 MG 24 hr capsule Take 1 capsule (240 mg) by mouth daily  Patient taking differently: Take 240 mg by mouth At Bedtime 8/11/2023 at hs Yes Dat Peters MD Yes    enoxaparin ANTICOAGULANT (LOVENOX) 40 MG/0.4ML syringe Inject 0.4 mLs (40 mg) Subcutaneous daily (Take on first day of holding blood thinner in the morning and again the day prior to the procedure in the morning)  at not started Yes Frank Erazo MD     fluticasone (FLONASE) 50 MCG/ACT nasal spray Spray 1 spray into both nostrils daily For stuffy/runny nose  Patient taking differently: Spray 1 spray into both nostrils daily as needed for rhinitis Past Month at unknown Yes Tom Monte PA-C     furosemide (LASIX) 20 MG tablet Take 1 tablet (20 mg) by mouth daily 8/11/2023 at am Yes Dat Peters MD Yes    hydrochlorothiazide (HYDRODIURIL) 25 MG tablet TAKE 1 TABLET (25 MG) BY MOUTH DAILY 8/11/2023 at am Yes Nazario Jones MD Yes    hydrOXYzine (ATARAX) 25 MG tablet Take 1-2 tablets at bedtime for insomnia or restlessness.  Patient taking differently: Take 25-50 mg by mouth nightly as needed for other (insomnia or restlessness) 8/11/2023 at hs Yes Nazario Jones MD     ipratropium - albuterol 0.5 mg/2.5 mg/3 mL (DUONEB) 0.5-2.5 (3) MG/3ML neb solution Take 1 vial (3 mLs) by nebulization every 6 hours as needed for shortness of breath, wheezing or cough Past Month at unknown Yes Vinh Ramon  Billy, DO Yes    lisinopril (ZESTRIL) 2.5 MG tablet TAKE 1 TABLET (2.5 MG) BY MOUTH DAILY 8/11/2023 at am Yes Nazario Jones MD Yes    magnesium oxide (MAG-OX) 400 MG tablet Take 1 tablet (400 mg) by mouth daily 8/11/2023 at am Yes Dat Peters MD     mometasone-formoterol (DULERA) 200-5 MCG/ACT inhaler INHALE 2 PUFFS BY MOUTH TWO TIMES A DAY  Patient taking differently: Inhale 2 puffs into the lungs 2 times daily 7am and 3pm 8/11/2023 at hs Yes Nazario Jones MD Yes    MYRBETRIQ 50 MG 24 hr tablet Take 50 mg by mouth At Bedtime 8/11/2023 at hs Yes Reported, Patient     ondansetron (ZOFRAN ODT) 4 MG ODT tab DISSOLVE 1 TABLET (4 MG) BY MOUTH EVERY 8 HOURS AS NEEDED FOR NAUSEA  Patient taking differently: Take 4 mg by mouth every 8 hours as needed for nausea 8/12/2023 at am Yes Nazario Jones MD     order for DME Equipment being ordered: Oxygen  at 6L w/activity Yes Glen Blue MD     order for DME Equipment being ordered: Nebulizer Past Month at unknown Yes Glen Blue MD     ORDER FOR DME Equipment being ordered: Oxygen @ 2.5 liters at all times, increase to 6 L with activity.  at continuous Yes Glen Blue MD     Probiotic Product (PROBIOTIC PO) Take by mouth daily 8/11/2023 at am Yes Reported, Patient     tiotropium (SPIRIVA RESPIMAT) 2.5 MCG/ACT inhaler Inhale 2 puffs into the lungs daily 8/12/2023 at am Yes Adam Mai MD Yes    venlafaxine (EFFEXOR XR) 75 MG 24 hr capsule TAKE ONE CAPSULE BY MOUTH ONCE DAILY  Patient taking differently: Take 75 mg by mouth daily 8/11/2023 at am Yes Nazario Jones MD Yes    vitamin B-Complex Take 1 tablet by mouth daily 8/11/2023 at am Yes Reported, Patient     Vitamin D3 (CHOLECALCIFEROL) 125 MCG (5000 UT) tablet Take 125 mcg by mouth daily 8/11/2023 at am Yes Reported, Patient

## 2023-08-12 NOTE — PROGRESS NOTES
S-(situation): Patient changed to inpatient status    B-(background): Patient status change due to observation goals not being met.     A-(assessment): Alert, oriented. Minimal appetite. No insulin given at meal time. Family reports patient is not a diabetic. HR WNL at this time. Episodes noted of bradycardia down to 40's. Provider is aware.       R-(recommendations):  Will monitor patient per MD orders.       Inpatient nursing criteria listed below were met:    Adult Profile completedYes  Health care directives status obtained and documented: Yes  VTE prophylaxis orders: Lovenox  (FYI: Asprin is not an approved anticoagulation for DVT prophylaxis)  SCD's Documented: No  Vaccine assessment done and vaccine ordered if needed. Not Applicable  My Chart patient sign up addressed and documented: No  Care Plan initiated: Yes  Discharge planning review completed (admission navigator) Yes

## 2023-08-13 NOTE — PROGRESS NOTES
S-(situation): Patient registered to Observation. Patient arrived to room 247 via cart from ED    B-(background): Pt is here with Afib with rapid rate, SOB and weakness.    A-(assessment): Pt is A&Ox4. Heart rate is currently 107 but other than that VSS on 4 L per oximask. Pt is up with 1A pivot from cart to bed. Pt denies pain. Pt is noted to be weak and reports feeling fatigued. Heart rhythm is irregular.     R-(recommendations): Orders and observation goals reviewed with Patient and pt's .    Nursing Observation criteria listed below was met:    Skin issues/needs documented:Deferred skin assessment to ICU nurse as pt is transferring into ICU.  Isolation needs addressed and Signage up: NA  Fall Prevention: Education given and documented: Yes  Education Assessment documented:Yes  Admission Education Documented: Yes  New medication patient education completed and documented (Possible Side Effects of Common Medications handout): Yes  OBS video/handout Reviewed & Documented: Yes  Allergies Reviewed: Yes  Medication Reconciliation Complete: Yes  Home medications if not able to send immediately home with family stored here: Pt does not have home meds at this time but encouraged pt's family to bring in pt's inhalers.   Reminder note placed in discharge instructions of home meds: Updated ICU nurse.  Patient has discharge needs (If yes, please explain): No  Patient discharge preferences addressed and charted on white board:  Yes  Provider notified that patient has arrived to the unit: Yes

## 2023-08-13 NOTE — CONSULTS
Care Management Initial Consult    General Information  Assessment completed with: VM-chart review,    Type of CM/SW Visit: Offer D/C Planning    Primary Care Provider verified and updated as needed:     Readmission within the last 30 days:  YES     7/9/2023 - 7/19/2023 (10 days)  [facility]       Reason for Consult: discharge planning (Elevated Risk score)  Advance Care Planning: Advance Care Planning Reviewed: present on chart          Communication Assessment  Patient's communication style: spoken language (English or Bilingual)    Hearing Difficulty or Deaf: no   Wear Glasses or Blind: yes    Cognitive  Cognitive/Neuro/Behavioral: .WDL except, level of consciousness  Level of Consciousness: lethargic  Arousal Level: arouses to voice  Orientation: oriented x 4  Mood/Behavior: calm, cooperative  Best Language: 0 - No aphasia  Speech: slow, clear, spontaneous, logical    Living Environment:   People in home: spouse     Current living Arrangements: house      Able to return to prior arrangements: yes       Family/Social Support:  Care provided by: self, spouse/significant other  Provides care for: no one  Marital Status:             Description of Support System: Supportive, Involved         Current Resources:   Patient receiving home care services: No     Community Resources: None  Equipment currently used at home: walker, standard, walker, rolling, shower chair, grab bar, tub/shower, commode chair, other (see comments) (Elevating bed (not hospital bed though), reports she's been sleeping on the couch lately b/c it's closer to the bathroom with the taller toilet)  Supplies currently used at home: Oxygen Tubing/Supplies    Employment/Financial:  Employment Status: retired        Financial Concerns:             Does the patient's insurance plan have a 3 day qualifying hospital stay waiver?  Yes   Will the waiver be used for post-acute placement? No    Lifestyle &  Psychosocial Needs:  Social Determinants of Health     Tobacco Use: Medium Risk (8/12/2023)    Patient History     Smoking Tobacco Use: Former     Smokeless Tobacco Use: Never     Passive Exposure: Not on file   Alcohol Use: Not on file   Financial Resource Strain: Not on file   Food Insecurity: Not on file   Transportation Needs: Not on file   Physical Activity: Not on file   Stress: Not on file   Social Connections: Not on file   Intimate Partner Violence: Not on file   Depression: Not at risk (8/8/2023)    PHQ-2     PHQ-2 Score: 0   Housing Stability: Not on file       Functional Status:  Prior to admission patient needed assistance:   Dependent ADLs:: Ambulation-walker  Dependent IADLs:: Cleaning, Cooking, Laundry, Transportation, Meal Preparation, Shopping, Medication Management, Money Management  Assesssment of Functional Status: Not at  functional baseline    Mental Health Status:  Depression, anxiety, insomnia    Chemical Dependency Status:     No concerns noted           Values/Beliefs:  Spiritual, Cultural Beliefs, Church Practices, Values that affect care: no               Additional Information:  Referral received due to Elevated Risk Score/ Re-Admission within 30 days and Pt being a current [facility] home care patient.     7/9/2023 - 7/19/2023 (10 days)  [facility]    Informed during IDT rounds-Pt has a planned biopsy at the [facility] 8/14--currently somnolent and unable to be awake to complete assessment.   MD notes this appointment may need to be rescheduled.   Also has scheduled for Gamma knife procedure 8/17 at [facility]     CM familiar with Pt from recent admission. Confirmed Pt remains open with [facility] home care: RN and PT. [facility]     Has a very supportive spouse Ray and daughter Chelo    PLAN: Once plan is established in am for biopsy, and pt more awake, CM will meet with Pt and family to discuss additional needs on discharge        ERICA Boykin  [facility] [phone]   [facility]  [phone]

## 2023-08-13 NOTE — PROGRESS NOTES
Major shift events: Patient lethargic most of shift.  Unable to get out of bed due to sedative effect from ativan dose.  Reversed at 1430 to see if patient would be more alert to no effect.  Pt attempted to work with her, however pt was still too lethargic to safely complete therapy.    Neuro: Lethargic. Opens eyes to voice and touch, but falls asleep very quickly.  CMS: Intact  Pulmonary: On 2.5 to 3L O2 by NC or oxymask.  Desats very quickly with activity.  Recovers fairly quickly with a temporary bump in O2.  Lungs diminished with audible expiratory wheezes.  CV: Tele SR, HR 70s, -160s over 60-70s  GI: +BS, no BM this shift  : Purewick in place.    Skin: Scattered bruises.  Lines/Tubes/Drains: PIV x1  Drips: NS @ 100    Plan of care: Pt still very lethargic after reversal with flumazenil. Will check labwork and continue to monitor.

## 2023-08-13 NOTE — H&P
Formerly [facility]    ICU History and Physical - Hospitalist Service       Date of Admission:  8/12/2023    Assessment & Plan      Latanya Padron is a 73 year old female admitted on 8/12/2023 with past medical history of COPD, Emphysema, chronic hypoxic, hypercapneic respiratory failure (on home O2 4L NC), paroxysmal atrial fibrillation (recently switched apixaban d/t PE on warfarin, now on enoxaparin in preparation for upcoming procedures), breast cancer 2019 (on Arimidex) now with concern for mets to the brain and lungs, hypertension, HLD, depression, CKD stage 3, anemia, CAD, diabetes type 2, obesity presented to ED complaints of lethargy and shortness of breath found to be in Afib w/ RVR. Give PO and IV diltiazem and converted to normal sinus rhythm. Now admitted to the ICU for encephalopathy in the setting of lorazepam given overnight and increasing hypercapnia. Received flumazenil and being starting on BiPAP.    acute on chronic hypoxic, hypercapneic respiratory failure on 4L home O2  COPD/Emphysema   concern for lung mets 2/2 breast cancer  pulmonary embolus on anticoagulation  CXR with normal heart size and vascularity. Subpleural consolidative opacity medial right upper lobe and reticulonodular peribronchial opacities right upper lobe. Small right pleural effusion has increased in size. Findings could represent worsening right lung pneumonia versus metastatic disease with lymphangitic spread of tumor right upper lobe.  -keep sats 89-92% on home 4L  -continue home inhalers  -patient scheduled for lung biopsy on 8/14/2023 at [facility] (will likely need to be rescheduled)  -AVOID sedating medications when able (NO benzos)  -BiPAP for CO2 retention  -trend VBG  -speech consult  -NPO until no longer encephalopathic and able to pass bedside swallow    atrial fibrillation with rapid ventricular rate  Patient was transitioned from apixaban to enoxaparin for upcoming procedure.  Patient received her PO diltiazem and IV which converted her sinus rhythm and regular rate. She was monitored on telemetry inpatient.  -HOLD apixaban   -enoxaparin 40mg  -diltiazem 240mg PO    acute encephalopathy  concern for brain mets 2/2 breast cancer  CT head 8/11/23 showed no acute findings. She has become encephalopathic with prolonged somnolence after taking lorazepam over the past few days. She should not be taking this medication for anxiety. Started buspirone. Gave flumazenil 0.2mg and patient only partially woke up so obtained a VBG and her CO2 increased to 81. Initiating BiPAP.   -DISCONTINUED lorazepam PRN  -dexamethaxone 2mg qAM  -anastrozole 1mg  -scheduled for Gamma knife procedure 8/17 at [facility]    depression   anxiety  insomnia  Patient should not be on ativan and it has been discontinued on her home medications. She becomes too somnolent with this medication and has led to multiple ED visits and prolonged hospital stay. Will trial on buspirone for anxiety since it isn't sedating like benzodiazepines.  -DISCONTINUED lorazepam PRN  -STARTED buspirone 10 mg tid   -venlafaxine 75mg  -hydroxyzine 25-50mg PRN  -melatonin PRN    elevated troponin   Troponin elevated to 75 and downtrended to 62. EKG in the ED showed atrial fibrillation-rapid tachycardia without acute ischemic changes and non-specific ST-T wave changes. No chest pain. Likely secondary to Atrial fibrillation with RVR.    hypercalcemia   Resolved with IVF.  -trend Ca    leukocytosis  In the setting of steroids and without infectious symptoms.  -CTM    acute on CKD stage 3 - resolved  On admission Cr 1.4. Baseline Cr 1.0-1.2. Resolved with IVF.  -trend BMP    hyponatremia  Likely secondary to poor PO intake. Resolved with IVF.  -trend Na    hypertension   -Lasix 20mg PO  -hydrochlorothiazide 25mg  -lisinopril 2.5mg    DM2  A1c 5/30/23 5.8. Metformin was discontinued on last admission.  -low ssi  -hypoglycemic protocol      chronic normocytic anemia  Baseline Hgb ~10. She was 11.6 on admission and wnl on discharge.    hyperlipidemia  -atorvastatin 10mg qHS    CAD  CAD was diagnosed in 2020 apparently based off radiographic finding coronary artery calcifications noted following cancer tx. Cardiology advised medical tx only. Patient have no concerning angina and did have thorough workup. Lexiscan unremarkable.       obesity  -recommend diet modification and weight loss        Diet: NPO for Medical/Clinical Reasons Except for: No Exceptions    DVT Prophylaxis: Enoxaparin (Lovenox) SQ  Ponce Catheter: Not present  Lines: None     Cardiac Monitoring: ACTIVE order. Indication: ICU  Code Status: Full Code        Disposition Plan   Expected Discharge Date: 2-3 days    Destination: home with family            Lisa Michelle MD  Hospitalist Service  [facility]  Securely message with [employer] (more info)  Text page via [facility] Paging/Directory     ______________________________________________________________________    Chief Complaint   Encephalopathy and hypercapnia     History is obtained from the Patient, Patient's family and chart.     History of Present Illness   Latanya Padron is a 73 year old female admitted on 8/12/2023 with past medical history of COPD, Emphysema, chronic hypoxic, hypercapneic respiratory failure (on home O2 4L NC), paroxysmal atrial fibrillation (recently switched apixaban d/t PE on warfarin, now on enoxaparin in preparation for upcoming procedures), breast cancer 2019 (on Arimidex) now with concern for mets to the brain and lungs, hypertension, HLD, depression, CKD stage 3, anemia, CAD, diabetes type 2, obesity presented to ED complaints of lethargy and shortness of breath found to be in Afib w/ RVR. Give PO and IV diltiazem and converted to normal sinus rhythm. Now admitted to the ICU for encephalopathy in the setting of lorazepam given overnight and increasing hypercapnia.  Received flumazenil and being starting on BiPAP.    See previous H&P to medicine for further details. Transferring patient to the ICU because she has remained somnolent and difficult to wake since receiving lorazepam overnight. She was given flumazenil 0.2mg with only slight improvement. Now with increasing CO2 of 81 on VBG. Starting BiPAP and transferred to ICU care.  and daughter are at bedside and understand the plan. Will keep NPO until no longer encephalopathic and able to pass bedside swallow.      Past Medical History    Past Medical History:   Diagnosis Date    Breast cancer (H) 04/12/2019    Left Breast    COPD (chronic obstructive pulmonary disease) (H)     Depressive disorder 10 years    Mixed hyperlipidemia     Neck mass 06/20/2018    Paroxysmal atrial fibrillation (H)     PONV (postoperative nausea and vomiting)     S/P radiation therapy     5,256 cGy to left breast completed on 7/18/2019 - [facility]    Somnolence 8/12/2023       Past Surgical History   Past Surgical History:   Procedure Laterality Date    BIOPSY NODE SENTINEL Left 05/15/2019    Procedure: left sentinel node biopsy;  Surgeon: Donald Aleman MD;  Location: [facility] OR    BREAST BIOPSY, CORE RT/LT Left 04/12/2019    BREAST SURGERY  2019    Breast cancer    COLONOSCOPY  06/21/2010    COLONOSCOPY N/A 03/04/2021    Procedure: Colonoscopy with  polypectomy;  Surgeon: Donald Aleman MD;  Location:  GI     BIOPSY OF BREAST, OPEN INCISIONAL Right 1988    Benign per patient     CORRECT BUNION,METATARSAL OSTEOTOMY  1993     LAPAROSCOPY, SURGICAL; CHOLECYSTECTOMY  08/04/2010    HC SLING OPERATION FOR STRESS INCONTINENCE  04/19/2007    HERNIORRHAPHY INCISIONAL (LOCATION)  09/23/2011    Procedure:HERNIORRHAPHY INCISIONAL (LOCATION); Surgeon:AYALA HOOVER; Location: OR    LAPAROSCOPIC HERNIORRHAPHY INCISIONAL  09/23/2011    Procedure:LAPAROSCOPIC HERNIORRHAPHY INCISIONAL; Laparoscopic mesh repair of incarcerated incisional  hernia , extensive lysis of adhesions, excision of hernia sac and closure of fascia.; Surgeon:AYALA HOOVER; Location:[facility] OR    LAPAROSCOPIC LYSIS ADHESIONS  2011    Procedure:LAPAROSCOPIC LYSIS ADHESIONS; Surgeon:AYALA HOOVER; Location:[facility] OR    MASTECTOMY PARTIAL WITH NEEDLE LOCALIZATION Left 05/15/2019    Procedure: left wire localized partial mastectomy;  Surgeon: Donald Aleman MD;  Location: [facility] OR    TONSILLECTOMY      ZZC APPENDECTOMY,W OTHR PROC      ZZC  DELIVERY ONLY          ZZC LIGATE FALLOPIAN TUBE      ZZC NONSPECIFIC PROCEDURE      Exploratory laparotomy with resection of infarcted segment of omentum and minor lysis of adhesions    Z NONSPECIFIC PROCEDURE      Removal of hemorrhagic corpus luteum cyst    Z VAGINAL HYSTERECTOMY         Prior to Admission Medications   Prior to Admission Medications   Prescriptions Last Dose Informant Patient Reported? Taking?   Calcium Carb-Cholecalciferol 500-400 MG-UNIT TABS 2023 at am  Yes Yes   Sig: Take 1 tablet by mouth daily   Cyanocobalamin (B-12) 1000 MCG TBCR 2023 at am  Yes Yes   Sig: Take 1,000 mcg by mouth daily   MYRBETRIQ 50 MG 24 hr tablet 2023 at hs  Yes Yes   Sig: Take 50 mg by mouth At Bedtime   ORDER FOR DME  at continuous  Yes Yes   Sig: Equipment being ordered: Oxygen @ 2.5 liters at all times, increase to 6 L with activity.   Probiotic Product (PROBIOTIC PO) 2023 at am  Yes Yes   Sig: Take by mouth daily   Vitamin D3 (CHOLECALCIFEROL) 125 MCG (5000 UT) tablet 2023 at am  Yes Yes   Sig: Take 125 mcg by mouth daily   albuterol (PROAIR HFA/PROVENTIL HFA/VENTOLIN HFA) 108 (90 Base) MCG/ACT inhaler 2023 at 1122  Yes Yes   Sig: Inhale 2 puffs into the lungs 4 times daily 1000, 1200, 1400, 1600 scheduled   albuterol (PROVENTIL) (2.5 MG/3ML) 0.083% neb solution Past Month at unknown  Yes Yes   Sig: Take 2.5 mg by nebulization every 4 hours as needed for  shortness of breath, wheezing or cough   anastrozole (ARIMIDEX) 1 MG tablet 8/11/2023 at am  No Yes   Sig: Take 1 tablet (1 mg) by mouth daily   apixaban ANTICOAGULANT (ELIQUIS) 5 MG tablet 8/11/2023 at am  No Yes   Sig: Take 2 tablets (10 mg) by mouth 2 times daily for 2 days, THEN 1 tablet (5 mg) 2 times daily for 30 days.   atorvastatin (LIPITOR) 10 MG tablet 8/11/2023 at hs  No Yes   Sig: TAKE ONE TABLET BY MOUTH ONCE DAILY   Patient taking differently: Take 10 mg by mouth At Bedtime   dexamethasone (DECADRON) 2 MG tablet 8/11/2023 at am  No Yes   Sig: Take 1 tablet (2 mg) by mouth daily (with breakfast)   diltiazem ER COATED BEADS (CARDIZEM CD/CARTIA XT) 240 MG 24 hr capsule 8/11/2023 at hs  No Yes   Sig: Take 1 capsule (240 mg) by mouth daily   Patient taking differently: Take 240 mg by mouth At Bedtime   enoxaparin ANTICOAGULANT (LOVENOX) 40 MG/0.4ML syringe  at not started  No Yes   Sig: Inject 0.4 mLs (40 mg) Subcutaneous daily (Take on first day of holding blood thinner in the morning and again the day prior to the procedure in the morning)   fluticasone (FLONASE) 50 MCG/ACT nasal spray Past Month at unknown  No Yes   Sig: Spray 1 spray into both nostrils daily For stuffy/runny nose   Patient taking differently: Spray 1 spray into both nostrils daily as needed for rhinitis   furosemide (LASIX) 20 MG tablet 8/11/2023 at am  No Yes   Sig: Take 1 tablet (20 mg) by mouth daily   hydrOXYzine (ATARAX) 25 MG tablet 8/11/2023 at hs  No Yes   Sig: Take 1-2 tablets at bedtime for insomnia or restlessness.   Patient taking differently: Take 25-50 mg by mouth nightly as needed for other (insomnia or restlessness)   hydrochlorothiazide (HYDRODIURIL) 25 MG tablet 8/11/2023 at am  No Yes   Sig: TAKE 1 TABLET (25 MG) BY MOUTH DAILY   ipratropium - albuterol 0.5 mg/2.5 mg/3 mL (DUONEB) 0.5-2.5 (3) MG/3ML neb solution Past Month at unknown  No Yes   Sig: Take 1 vial (3 mLs) by nebulization every 6 hours as needed for  shortness of breath, wheezing or cough   lisinopril (ZESTRIL) 2.5 MG tablet 2023 at am  No Yes   Sig: TAKE 1 TABLET (2.5 MG) BY MOUTH DAILY   magnesium oxide (MAG-OX) 400 MG tablet 2023 at am  No Yes   Sig: Take 1 tablet (400 mg) by mouth daily   mometasone-formoterol (DULERA) 200-5 MCG/ACT inhaler 2023 at hs  No Yes   Sig: INHALE 2 PUFFS BY MOUTH TWO TIMES A DAY   Patient taking differently: Inhale 2 puffs into the lungs 2 times daily 7am and 3pm   ondansetron (ZOFRAN ODT) 4 MG ODT tab 2023 at am  No Yes   Sig: DISSOLVE 1 TABLET (4 MG) BY MOUTH EVERY 8 HOURS AS NEEDED FOR NAUSEA   Patient taking differently: Take 4 mg by mouth every 8 hours as needed for nausea   order for DME Past Month at unknown  No Yes   Sig: Equipment being ordered: Nebulizer   order for DME  at 6L w/activity  No Yes   Sig: Equipment being ordered: Oxygen   tiotropium (SPIRIVA RESPIMAT) 2.5 MCG/ACT inhaler 2023 at am  No Yes   Sig: Inhale 2 puffs into the lungs daily   venlafaxine (EFFEXOR XR) 75 MG 24 hr capsule 2023 at am  No Yes   Sig: TAKE ONE CAPSULE BY MOUTH ONCE DAILY   Patient taking differently: Take 75 mg by mouth daily   vitamin B-Complex 2023 at am  Yes Yes   Sig: Take 1 tablet by mouth daily      Facility-Administered Medications: None        Review of Systems    Review of systems not obtained due to patient factors - mental status    Social History   I have reviewed this patient's social history and updated it with pertinent information if needed.  Social History     Tobacco Use    Smoking status: Former     Packs/day: 1.00     Years: 30.00     Pack years: 30.00     Types: Cigarettes     Start date: 1966     Quit date: 10/25/2005     Years since quittin.8    Smokeless tobacco: Never    Tobacco comments:     Do not miss it   Vaping Use    Vaping Use: Never used   Substance Use Topics    Alcohol use: No    Drug use: No         Family History   I have reviewed this patient's family history  and updated it with pertinent information if needed.  Family History   Problem Relation Age of Onset    Breast Cancer Mother          at 88    Obesity Mother     Genitourinary Problems Mother     Lipids Mother     Hypertension Mother     Heart Disease Father         by pass (5)    Cerebrovascular Disease Father         hemorragic    Lipids Father     Alzheimer Disease Father 92    Alzheimer Disease Maternal Grandmother     Genitourinary Problems Maternal Grandmother     Cancer Maternal Grandfather     Cancer Paternal Grandfather         lymph nodes    Cancer Brother         prostate    Melanoma Brother     Heart Disease Brother          (aaron with a partner)    Hyperlipidemia Brother     Coronary Artery Disease Brother     Breast Cancer Brother         Prostrate, melaoma, heart surgery    Heart Disease Brother         stent placement    Hyperlipidemia Brother     Prostate Cancer Brother     Obesity Brother     Respiratory Sister     Lipids Sister     Breast Cancer Sister         s/p mastectomy    Arthritis Sister     Obesity Sister     Heart Failure Sister     Cancer Sister         stomach, colon, pancreatic    Coronary Artery Disease Sister     Lipids Sister     Coronary Artery Disease Sister     Obesity Sister     Dementia Sister         1/2 sister on dad's side ()    Other - See Comments Sister         8    Other - See Comments Daughter         fibromyalgia ()    Lymphoma Maternal Aunt     Heart Failure Maternal Aunt 81    Cancer Maternal Uncle         colon    Hemophilia Grandchild     Other - See Comments Grandchild         Transgender         Allergies   Allergies   Allergen Reactions    Augmentin [Amoxicillin-Pot Clavulanate] Nausea and Vomiting    Meperidine Hcl Nausea and Vomiting     demerol    Seasonal Allergies      spring        Physical Exam   Vital Signs: Temp: 97.6  F (36.4  C) Temp src: Oral BP: (!) 151/65 Pulse: 76   Resp: (!) 44 SpO2: 97 % O2 Device: Oxymask Oxygen Delivery: 2.5  LPM  Weight: 158 lbs 0 oz    General Appearance: Somnolent but will wake to voice and light touch  Eyes: no scleral icterus  HEENT: NC/AT, MMM  Respiratory: CTAB, no wheezes  Cardiovascular: RRR, b/l LE edema  GI: soft, nontender; bowel sounds present  Skin: WWP  Musculoskeletal: no gross deformities  Neurologic: A+Ox3  Psychiatric: somnolent    Medical Decision Making       45 MINUTES SPENT BY ME on the date of service doing chart review, history, exam, documentation & further activities per the note.      Data     I have personally reviewed the following data over the past 24 hrs:    20.1 (H)  \   11.8   / 157     135 (L) 93 (L) 49.2 (H) /  119 (H)   4.0 33 (H) 1.19 (H) \     Trop: 62 (H) BNP: N/A     Procal: N/A CRP: 27.10 (H) Lactic Acid: N/A         Imaging results reviewed over the past 24 hrs:   No results found for this or any previous visit (from the past 24 hour(s)).

## 2023-08-13 NOTE — PROGRESS NOTES
S-(situation): PT orders received: Eval & Treat as indicated; Discharge recommendations and therapy to facilitate a safe discharge.     B-(background): 73 year old female admitted on 8/12/2023 with past medical history of COPD, Emphysema, chronic respiratory failure (on home O2 4L NC), paroxysmal atrial fibrillation (recently switched from coumadin to Eliquis due to Pulmonary Embolism. Eliquis on hold due to having Bronchoscopy done on Monday so started Lovenox today), breast cancer 2019 (on Arimidex) now with mets to the brain and lungs, hypertension, HLD, depression, CKD stage 3, anemia, CAD, prediabetes, obesity presented to ED complaints of lethargy and shortness of breath.     A-(assessment): Attempted to see patient to complete initial PT evaluation on 8/13/23.  Difficult to arouse, patient unable to keep eyes open longer than a few seconds.  Unable to fully participate in attempts at bringing legs to edge of bed.  Discussed with nursing staff, recommended holding on PT evaluation for today until patient is more alert and more accurate assessment can be performed.      R-(recommendations): Will assess again tomorrow, 8/14/23, and attempt PT evaluation if status has improved.        Padmini Cronin, PT, DPT, LARISSAT-ENRIQUE  [facility]  Physical Therapist     Phone: [phone]  Email: [email or web address]

## 2023-08-13 NOTE — PROGRESS NOTES
Pt was placed on BiPAP/CPAP (Settings  12/5 rate 10 and FiO2 40)   Mask Sizes: FFM s/ UnderNose Mask  A  Follow up was completed with MD post placement of BiPAP  Plan was discussed with RN   Rounding and Mask rotation completed Q4 unless otherwise noted by RT or RN  Breakdown noted (Y/N): NO  RT will continue to rounds per protocol.  Thank you

## 2023-08-13 NOTE — PROGRESS NOTES
PRIMARY DIAGNOSIS: GENERALIZED WEAKNESS    OUTPATIENT/OBSERVATION GOALS TO BE MET BEFORE DISCHARGE  1. Orthostatic performed: N/A    2. Tolerating PO medications: Yes    3. Return to near baseline physical activity: Yes    4. Cleared for discharge by consultants (if involved): N/A    Discharge Planner Nurse   Safe discharge environment identified: Yes  Barriers to discharge: No       Entered by: Negar España RN 08/12/2023 10:18 PM     Please review provider order for any additional goals.   Nurse to notify provider when observation goals have been met and patient is ready for discharge.

## 2023-08-13 NOTE — PROGRESS NOTES
S- Transfer to [facility] from [facility].    B- Pt here with Afib with rapid rate, SOB and weakness.    A- Brief systems assessment: Pt is A&Ox4. Heart rate is irregular (afib) 100-140's. Pt is on 4 L per oximask. IV fluids NS infusing at 100 ml/hr per order. Pt is up with 1A pivot weakness noted and pt reports fatigue.     R- Transfer to ICU per physician orders. Continue to monitor pt and update physician as needed.     Code status: Full Code  Skin: Skin assessment deferred to ICU nurse as head to toe assessment was not complete before transfer from Med surg.   Fall Risk: Yes- Department fall risk interventions implemented.  Isolation and Signage: None  Medication drips upon transfer: none   Blue Bin checked and medications transfer out with patient:Yes

## 2023-08-13 NOTE — PLAN OF CARE
"  Problem: Plan of Care - These are the overarching goals to be used throughout the patient stay.    Goal: Plan of Care Review  Description: The Plan of Care Review/Shift note should be completed every shift.  The Outcome Evaluation is a brief statement about your assessment that the patient is improving, declining, or no change.  This information will be displayed automatically on your shift note.  Outcome: Progressing  Goal: Patient-Specific Goal (Individualized)  Description: You can add care plan individualizations to a care plan. Examples of Individualization might be:  \"Parent requests to be called daily at 9am for status\", \"I have a hard time hearing out of my right ear\", or \"Do not touch me to wake me up as it startles me\".  Outcome: Progressing  Goal: Absence of Hospital-Acquired Illness or Injury  Outcome: Progressing  Intervention: Identify and Manage Fall Risk  Recent Flowsheet Documentation  Taken 8/13/2023 0400 by Haris Kumar RN  Safety Promotion/Fall Prevention:   activity supervised   increased rounding and observation   nonskid shoes/slippers when out of bed   room door open   room near nurse's station   room organization consistent   safety round/check completed   supervised activity  Intervention: Prevent Skin Injury  Recent Flowsheet Documentation  Taken 8/13/2023 0400 by Haris Kumar RN  Body Position: position changed independently  Goal: Optimal Comfort and Wellbeing  Outcome: Progressing  Goal: Readiness for Transition of Care  Outcome: Progressing     Problem: Gas Exchange Impaired  Goal: Optimal Gas Exchange  Outcome: Progressing  Intervention: Optimize Oxygenation and Ventilation  Recent Flowsheet Documentation  Taken 8/13/2023 0400 by Haris Kumar RN  Head of Bed (HOB) Positioning: HOB at 20-30 degrees     Problem: Pain Acute  Goal: Optimal Pain Control and Function  Outcome: Progressing  Intervention: Prevent or Manage Pain  Recent Flowsheet Documentation  Taken 8/13/2023 0400 " by José, Haris, RN  Sensory Stimulation Regulation: care clustered  Medication Review/Management: medications reviewed     Problem: Fall Injury Risk  Goal: Absence of Fall and Fall-Related Injury  Outcome: Progressing  Intervention: Identify and Manage Contributors  Recent Flowsheet Documentation  Taken 8/13/2023 0400 by Haris Kumar RN  Medication Review/Management: medications reviewed  Intervention: Promote Injury-Free Environment  Recent Flowsheet Documentation  Taken 8/13/2023 0400 by Haris Kumar RN  Safety Promotion/Fall Prevention:   activity supervised   increased rounding and observation   nonskid shoes/slippers when out of bed   room door open   room near nurse's station   room organization consistent   safety round/check completed   supervised activity     Problem: Risk for Delirium  Goal: Optimal Coping  Outcome: Progressing  Goal: Improved Behavioral Control  Outcome: Progressing  Intervention: Minimize Safety Risk  Recent Flowsheet Documentation  Taken 8/13/2023 0400 by Haris Kumar RN  Enhanced Safety Measures: room near unit station  Goal: Improved Attention and Thought Clarity  Outcome: Progressing  Intervention: Maximize Cognitive Function  Recent Flowsheet Documentation  Taken 8/13/2023 0400 by Haris Kumar RN  Sensory Stimulation Regulation: care clustered  Reorientation Measures: clock in view  Goal: Improved Sleep  Outcome: Progressing       Pt remains alert and oriented.   Oxygen saturations are stable on 3 LPM via oxymask.   Telemetry shows sinus rhythm.   Lung sounds are clear on the left and very diminished on the right.   Pt denies shortness of breath.   Urine output is 600 this shift.   Will continue to monitor and follow plan of care.

## 2023-08-14 PROBLEM — E83.52 HYPERCALCEMIA: Status: ACTIVE | Noted: 2023-01-01

## 2023-08-14 PROBLEM — I26.99 PULMONARY EMBOLISM (H): Status: ACTIVE | Noted: 2023-01-01

## 2023-08-14 PROBLEM — G47.00 INSOMNIA: Status: ACTIVE | Noted: 2023-01-01

## 2023-08-14 PROBLEM — G93.40 ACUTE ENCEPHALOPATHY: Status: ACTIVE | Noted: 2023-01-01

## 2023-08-14 PROBLEM — F41.8 SITUATIONAL ANXIETY: Status: ACTIVE | Noted: 2023-01-01

## 2023-08-14 PROBLEM — C78.01 MALIGNANT NEOPLASM METASTATIC TO RIGHT LUNG (H): Status: ACTIVE | Noted: 2023-01-01

## 2023-08-14 PROBLEM — R40.0 SOMNOLENCE: Status: RESOLVED | Noted: 2023-01-01 | Resolved: 2023-01-01

## 2023-08-14 NOTE — PROGRESS NOTES
Patient has been on Bipap 20/6 for the past hour with slight improvement in VBG on recheck but patient still overall unresponsive and tital volumes still in the low to mid 300 range despite these settings.  WBC increased from yesterday, procalcitonin low risk for systemic infection but chest x-ray shows significant worsening of opacities and pleural effusion compared to day of admission.      Discussed with Dr. Jay, tele-ICU provider, who agrees with need for intubation and sedation at this time and below plan formulated with his advise.     Plan:  -proceed with intubation.  Dr. Jay to perform Vent orderset.  Will obtain sputum culture following intubation  -proceed with PICC line and art line placement  -Start IV solumedrol 40 mg every 8 hours and scheduled bronchodilators  -Start Zosyn and Vanco for broad antibiotics.  MRSA swab to be obtained and vanco discontinued if negative  -CTA once patient respiratory status is stable.   -no radiology on site today to perform thoracentesis.  Will see if an ED provider is available and willing once patient more stabilized.  Otherwise investigating to make sure this could be performed tomorrow when radiology is back on site.  -Dr. Jay will reach out to patient's interventional pulmonologist at [facility] and inform Dr. Montano of the change in status and will see if there is any recommendation for transfer.      24 minutes spent on the above.     Electronically Signed:  Nilam Laboy MD

## 2023-08-14 NOTE — PROGRESS NOTES
Chart reviewed.    Unfortunately Ivette was admitted to [facility] with encephalopathy.  She was scheduled to have a bronchoscopy with biopsy. This will need to be rescheduled.   Her procedure for tomorrow 8/14/23 is cancelled due to her admission.    I will contact her primary oncologist.  When she is discharged my team will contact Ivette to arrange a new time.    Joe Montano MD

## 2023-08-14 NOTE — PLAN OF CARE
Shift events: Propofol started with Intubation at 0950, OG at 0955, Levo started at 1010 and off at 1030, PICC line at 1030, ART line at 1040, barbosa at 1230.     Drips: Propofol at 50/hr, Fentanyl at 25/hr, NS at 50/hr  Abx: Vanco and zosyn    ETT 22 at teeth  OG 64 at lip  ART left wrist  PICC RUE, PIV RUE    RASS -1. CPOT 0. Follows commands, moving arms and legs evenly. Skin bruised, intact. Dried blood noted in left ear, cleansed and small scab noted on outer ear. LS insp/exp wheezes throughout. Apical pulse regular, intermittent runs of a.fib/bradycardia and then back to NSR in the 80-90s. BP now stable with sedation stabilized, previously hypotensive with medications given for intubation.

## 2023-08-14 NOTE — PROGRESS NOTES
Pt this morning has been increasingly confused, pulling at Bipap mask, making frequent and repeated requests to remove Bipap mask and drink water, Pt has frequently been reminded of why she is NPO and the importance of keeping the Bipap mask in place. Atarax given for anxiety with minimal improvement.

## 2023-08-14 NOTE — TELEPHONE ENCOUNTER
Medication reconciliation completed by RN. Form and chart forwarded to PCP for signatures    Caprice Kellogg RN on 8/14/2023 at 2:07 PM

## 2023-08-14 NOTE — ANESTHESIA PREPROCEDURE EVALUATION
Anesthesia Pre-Procedure Evaluation    Patient: Latanya Padron   MRN: [id number] : 1950        Procedure : * No procedures listed *          Past Medical History:   Diagnosis Date    Breast cancer (H) 2019    Left Breast    COPD (chronic obstructive pulmonary disease) (H)     Depressive disorder 10 years    Mixed hyperlipidemia     Neck mass 2018    Paroxysmal atrial fibrillation (H)     PONV (postoperative nausea and vomiting)     S/P radiation therapy     5,256 cGy to left breast completed on 2019 [facility]    [place] 2023      Past Surgical History:   Procedure Laterality Date    BIOPSY NODE SENTINEL Left 05/15/2019    Procedure: left sentinel node biopsy;  Surgeon: Donald Aleman MD;  Location: [facility] OR    BREAST BIOPSY, CORE RT/LT Left 2019    BREAST SURGERY  2019    Breast cancer    COLONOSCOPY  2010    COLONOSCOPY N/A 2021    Procedure: Colonoscopy with  polypectomy;  Surgeon: Donald Aleman MD;  Location:  GI    HC BIOPSY OF BREAST, OPEN INCISIONAL Right     Benign per patient    HC CORRECT BUNION,METATARSAL OSTEOTOMY       LAPAROSCOPY, SURGICAL; CHOLECYSTECTOMY  2010    HC SLING OPERATION FOR STRESS INCONTINENCE  2007    HERNIORRHAPHY INCISIONAL (LOCATION)  2011    Procedure:HERNIORRHAPHY INCISIONAL (LOCATION); Surgeon:AYALA HOOVER; Location:[facility] OR    LAPAROSCOPIC HERNIORRHAPHY INCISIONAL  2011    Procedure:LAPAROSCOPIC HERNIORRHAPHY INCISIONAL; Laparoscopic mesh repair of incarcerated incisional hernia , extensive lysis of adhesions, excision of hernia sac and closure of fascia.; Surgeon:YAALA HOOVER; Location:[facility] OR    LAPAROSCOPIC LYSIS ADHESIONS  2011    Procedure:LAPAROSCOPIC LYSIS ADHESIONS; Surgeon:AYALA HOOVER; Location:[facility] OR    MASTECTOMY PARTIAL WITH NEEDLE LOCALIZATION Left 05/15/2019    Procedure: left wire localized partial mastectomy;  Surgeon: Donald Aleman MD;   Location: [facility] OR    TONSILLECTOMY      ZZC APPENDECTOMY,W OTHR PROC      [facility]  DELIVERY ONLY          [facility] LIGATE FALLOPIAN TUBE  's    [facility] NONSPECIFIC PROCEDURE      Exploratory laparotomy with resection of infarcted segment of omentum and minor lysis of adhesions    [facility] NONSPECIFIC PROCEDURE      Removal of hemorrhagic corpus luteum cyst    [facility] VAGINAL HYSTERECTOMY        Allergies   Allergen Reactions    Augmentin [Amoxicillin-Pot Clavulanate] Nausea and Vomiting    Meperidine Hcl Nausea and Vomiting     demerol    Seasonal Allergies      spring      Social History     Tobacco Use    Smoking status: Former     Packs/day: 1.00     Years: 30.00     Pack years: 30.00     Types: Cigarettes     Start date: 1966     Quit date: 10/25/2005     Years since quittin.8    Smokeless tobacco: Never    Tobacco comments:     Do not miss it   Substance Use Topics    Alcohol use: No      Wt Readings from Last 1 Encounters:   23 71.7 kg (158 lb)              OUTSIDE LABS:  CBC:   Lab Results   Component Value Date    WBC 21.6 (H) 2023    WBC 20.1 (H) 2023    HGB 10.6 (L) 2023    HGB 11.8 2023    HCT 33.5 (L) 2023    HCT 36.8 2023     2023     2023     BMP:   Lab Results   Component Value Date     2023     (L) 2023    POTASSIUM 4.6 2023    POTASSIUM 4.0 2023    CHLORIDE 96 (L) 2023    CHLORIDE 93 (L) 2023    CO2 29 2023    CO2 33 (H) 2023    BUN 53.9 (H) 2023    BUN 49.2 (H) 2023    CR 1.17 (H) 2023    CR 1.08 (H) 2023     (H) 2023     (H) 2023     COAGS:   Lab Results   Component Value Date    INR 1.12 2023     POC: No results found for: BGM, HCG, HCGS  HEPATIC:   Lab Results   Component Value Date    ALBUMIN 3.4 (L) 2023    PROTTOTAL 5.9 (L) 2023    ALT 26 2023    AST 20 2023     ALKPHOS 64 08/14/2023    BILITOTAL 0.3 08/14/2023     OTHER:   Lab Results   Component Value Date    PH 7.29 (L) 08/14/2023    LACT 1.1 08/12/2023    A1C 5.8 (H) 05/03/2023    IVANNA 9.8 08/14/2023    PHOS 3.4 07/19/2023    MAG 2.1 08/12/2023    LIPASE 170 05/24/2010    AMYLASE 75 05/24/2010    TSH 1.60 05/03/2023    SED 53 (H) 02/26/2020       Anesthesia Plan    ASA Status:  4, emergent    - Procedure: Procedure only, no anesthetic delivered                    Consents            Postoperative Care            Comments:                SOSA Mayers CRNA

## 2023-08-14 NOTE — PROGRESS NOTES
Patient has been intubated and sedated.  Had persistent hypotension into the 60s systolic that has not improved with fluid bolus ordered and started.  Tital volumes improved following intubation.      PLAN:  -complete ordered fluid bolus  -start Levophed peripherally, continue with PICC line placement when available  -art line placement when available  -ABG 1 hour after vent setting started  -Ponce catheter for strict I/O in critically ill patient  - will order restraints as patient is being monitored with continuous cardiac monitoring and pulse oximetry and will have indwelling devices including Arterial Line and Central Venous Catheter and Ponce catheter.  Patient's mentation is abnormal due to acute encephalopathy, and this limits their ability to follow instructions reliably. Due to intermittent spontaneous movements of limbs including purposeful movements, patient is at risk for inadvertently pulling on, displacing, or removing these monitors or devices which could lead to patient harm.  For the patient's safety, restraint of their limbs is therefore recommended at this time. These restraints will be discontinued once criteria for discontinuation have been met.    Family informed in person of the above and next steps including plan for CTA today, consideration of thoracentesis, etc.    22 minutes spent on the above.    Electronically Signed:  Nilam Laboy MD

## 2023-08-14 NOTE — ANESTHESIA PROCEDURE NOTES
Arterial Line Procedure Note    Pre-Procedure   Staff -        CRNA: Haris Louis APRN CRNA       Performed By: CRNA       Location: ICU       Pre-Anesthestic Checklist: patient identified, risks and benefits discussed, informed consent, monitors and equipment checked and pre-op evaluation  Timeout:       Correct Patient: Yes        Correct Procedure: Yes        Correct Site: Yes        Correct Position: Yes   Line Placement:   This line was placed Pre Induction starting at 8/14/2023 11:12 AM and ending at 8/14/2023 11:36 AM  Procedure   Procedure: arterial line and new line       Laterality: left  Sterile Prep        All elements of maximal sterile barrier technique followed       Patient Prep/Sterile Barriers: hand hygiene, sterile gloves, hat, mask, draped, sterile gown, patient draped, draped       Skin prep: Chloraprep  Insertion/Injection        Technique: ultrasound guided and Seldinger Technique        1. Ultrasound was used to evaluate the access site.       2. Artery evaluated via ultrasound for patency/adequacy.       3. Using real-time ultrasound the needle/catheter was observed entering the artery/vein.       5. The visualized structures were anatomically normal.       6. There were no apparent abnormal pathologic findings.       Catheter size: 20 Ga, 15cm.  Narrative         Secured by: suture       Tegaderm and Biopatch dressing used.       Complications: None apparent,        Arterial waveform: Yes        IBP within 10% of NIBP: Yes

## 2023-08-14 NOTE — PHARMACY-VANCOMYCIN DOSING SERVICE
"Pharmacy Vancomycin Initial Note  Date of Service 2023  Patient's  1950  73 year old, female    Indication: [facility] Pneumonia    Current estimated CrCl = Estimated Creatinine Clearance: 41.2 mL/min (A) (based on SCr of 1.17 mg/dL (H)).    Creatinine for last 3 days  2023: 12:23 PM Creatinine 1.40 mg/dL  2023:  5:21 AM Creatinine 1.19 mg/dL;  4:12 PM Creatinine 1.08 mg/dL  2023:  7:09 AM Creatinine 1.17 mg/dL    Recent Vancomycin Level(s) for last 3 days  No results found for requested labs within last 3 days.      Vancomycin IV Administrations (past 72 hours)        No vancomycin orders with administrations in past 72 hours.                    Nephrotoxins and other renal medications (From now, onward)      Start     Dose/Rate Route Frequency Ordered Stop    08/15/23 0600  vancomycin (VANCOCIN) 1,250 mg in sodium chloride 0.9 % 250 mL intermittent infusion         1,250 mg  over 90 Minutes Intravenous EVERY 24 HOURS 23 0908      23 1000  vancomycin (VANCOCIN) 1,500 mg in sodium chloride 0.9 % 250 mL intermittent infusion         1,500 mg  over 90 Minutes Intravenous ONCE 23 0908      23 0900  piperacillin-tazobactam (ZOSYN) 4.5 g vial to attach to  mL bag        Note to Pharmacy: For [facility], [facility] and [facility]: For Zosyn-naive patients, use the \"Zosyn initial dose + extended infusion\" order panel.    4.5 g  over 30 Minutes Intravenous EVERY 6 HOURS 23 0858      23 1530  lisinopril (ZESTRIL) tablet 2.5 mg        Note to Pharmacy: PTA Sig:TAKE 1 TABLET (2.5 MG) BY MOUTH DAILY      2.5 mg Oral DAILY 23 1515      23 1530  furosemide (LASIX) tablet 20 mg        Note to Pharmacy: PTA Sig:Take 1 tablet (20 mg) by mouth daily      20 mg Oral DAILY 23 1515              Contrast Orders - past 72 hours (72h ago, onward)      None            [employer] Prediction of Planned Initial Vancomycin Regimen     Loading dose: 1500 mg at " 10:00 08/14/2023.  Regimen: 1250 mg IV every 24 hours.  Start time: 09:08 on 08/14/2023  Exposure target: AUC24 (range)400-600 mg/L.hr   AUC24,ss: 547 mg/L.hr  Probability of AUC24 > 400: 82 %  Ctrough,ss: 16.7 mg/L  Probability of Ctrough,ss > 20: 34 %  Probability of nephrotoxicity (Lodise ASHLEY 2009): 12 %          Plan:  Start vancomycin  1500mg iv x1 then 1250 mg IV q24h.   Vancomycin monitoring method: AUC  Vancomycin therapeutic monitoring goal: 400-600 mg*h/L  Pharmacy will check vancomycin levels as appropriate in 1-3 Days.    Serum creatinine levels will be ordered daily for the first week of therapy and at least twice weekly for subsequent weeks.      Connor Hernandez RPH

## 2023-08-14 NOTE — PROGRESS NOTES
Patient has been stable on vent setting and continues to be better sedated.  Blood pressures have been stable off levophed.  Repeat ABG shows ongoing improvement of acute acidosis component.  CTA shows large right sided pleural effusion and opacities in remainder of right lung fields - atelectasis vs infection vs other.    Discussed with Dr. Jay and following conversation with Dr. Montano, interventional pulmonologist, goal is to allow patient recovery from acute respiratory failure, proceed with thoracentesis as planned including cytology and evaluate if patient still needs bronchoscopy for diagnostic confirmation going forward.      Will plan to proceed with thoracentesis for therapeutic and diagnostic reasons tomorrow when available at this facility.  Orders placed and family aware and in agreement.  Continue ICU management overnight.      13 minutes spent on the above.  Total time spent in critical care management on this patient today: 106 minutes    Electronically Signed:  Nilam Laboy MD

## 2023-08-14 NOTE — PROCEDURES
[facility]    Triple Lumen PICC Placement    Date/Time: 8/14/2023 11:03 AM    Performed by: Candis Blum RN  Authorized by: Nilam Laboy MD  Indications: vascular access      UNIVERSAL PROTOCOL   Site Marked: Yes  Prior Images Obtained and Reviewed:  No  Required items: Required blood products, implants, devices and special equipment available    Patient identity confirmed:  Arm band and hospital-assigned identification number  Patient was reevaluated immediately before administering moderate or deep sedation or anesthesia (intubated and sedated)  Confirmation Checklist:  Patient's identity using two indicators  Time out: Immediately prior to the procedure a time out was called    Universal Protocol: the Joint Commission Universal Protocol was followed    Preparation: Patient was prepped and draped in usual sterile fashion    ESBL (mL):  1     ANESTHESIA    Anesthesia:  Local infiltration  Local Anesthetic:  Lidocaine 1% without epinephrine  Anesthetic Total (mL):  1      SEDATION  Patient Sedated: Yes    Sedation Type:  Deep  Sedation:  Propofol  Vital signs: Vital signs monitored during sedation        Preparation: skin prepped with 2% chlorhexidine  Skin prep agent: skin prep agent completely dried prior to procedure  Sterile barriers: maximum sterile barriers were used: cap, mask, sterile gown, sterile gloves, and large sterile sheet  Hand hygiene: hand hygiene performed prior to central venous catheter insertion  Type of line used: PICC  Catheter type: triple lumen  Lumen type: power PICC and valved  Catheter size: 5 Fr  Brand: Bard  Lot number: [id number]  Placement method: MST and ultrasound  Number of attempts: 1  Difficulty threading catheter: no  Successful placement: yes  Orientation: right  Catheter to Vein (%): 33  Location: basilic vein  Tip Location: SVC/RA Junction  Arm circumference: adults 10 cm  Extremity circumference: 33  Visible catheter  length: 3  Total catheter length: 37  Dressing and securement: chlorhexidine disc applied, occlusive dressing applied and securement device  Post procedure assessment: placement verified by x-ray  PROCEDURE   Patient Tolerance:  Patient tolerated the procedure well with no immediate complicationsDescribe Procedure: Pt tolerated picc line placement well. 5Fr triple lumen, valved power picc to the right basilic vein on 1st attempt. Good blood return. Flushes easily. Pt sedated and intubated during procedure. Verified by xray. Catheter pulled back 3cm per x-ray. Ok to use. Follow up xray ordered.     Ok to use  Disposal: sharps and needle count correct at the end of procedure, needles and guidewire disposed in sharps container

## 2023-08-14 NOTE — ANESTHESIA PROCEDURE NOTES
Airway       Patient location during procedure: OR       Procedure Start/Stop Times: 8/14/2023 9:51 AM  Staff -        Performed By: CRNA  Consent for Airway        Urgency: elective  Indications and Patient Condition       Indications for airway management: roseann-procedural       Induction type:intravenous       Mask difficulty assessment: 1 - vent by mask    Final Airway Details       Final airway type: endotracheal airway       Successful airway: ETT - single  Endotracheal Airway Details        ETT size (mm): 7.5       Cuffed: yes       Successful intubation technique: video laryngoscopy       VL Blade Size: Glidescope 3       Grade View of Cords: 1       Adjucts: stylet       Position: Center       Measured from: lips       Bite block used: Oral Airway    Post intubation assessment        Placement verified by: capnometry, equal breath sounds and chest rise        Number of attempts at approach: 1       Number of other approaches attempted: 0       Secured with: plastic tape       Ease of procedure: easy       Dentition: Intact and Unchanged    Medication(s) Administered   Medication Administration Time: 8/14/2023 9:51 AM

## 2023-08-14 NOTE — PROGRESS NOTES
Tele-ICU Note  Case discussed with Dr. Laboy and appreciate her input and fine care. I have also discussed case with Dr. Montano from [facility].    Current problem list  1. Acute respiratory failure, likely COPD exacerbation (on 4 L NC at home) increasing right sided pleural effusion, and possible CAP. She is deteriorating on BIPAP and agree with plans for intubation (ventilator orders placed) and propofol and narcotic orders placed; and PICC and arterial line placement. I agree with sending procalcitonin and starting antibiotics and sending MRSA nares swab; continue bronchodilators and steroids; and therapeutic Lovenox; and agree with plans for CTA chest.   2. History of breast cancer with possible mets to lungs  3. History of PE and currently on anticoagulation  4. Atrial fibrillation   5. Metabolic encephalopathy  6. Depression/anxiety   7. CKD, baseline creatinine 1.0-1.2  8. History of HTN  9. DM  10. Dyslipidemia     Overall, critical.     Tele-ICU will follow with you.     Sal ruiz  August 14, 2023      Addendum  Discussed with Dr. Laboy and agree with plans for therapeutic and diagnostic right sided thoracentesis in AM. If this can be accomplished early in the day I would then to a breathing trial to see if she is ready for extubation.     I am concerned that with her history this may be a malignant effusion.     connie

## 2023-08-14 NOTE — PROGRESS NOTES
[facility]    Medicine Progress Note - Hospitalist Service    Date of Admission:  8/12/2023    Assessment & Plan   Patient is a 73-year-old female with past medical history of emphysema and chronic respiratory failure on 2 to 6 L nasal cannula at baseline, paroxysmal atrial fibrillation, recent pulmonary embolism, breast cancer diagnosed in 2019 with concern for metastasis to the brain and lungs, hypertension, hyperlipidemia, depression, chronic kidney disease stage III, chronic anemia, CAD, prediabetes and obesity who presented to the emergency room with complaints of lethargy and shortness of breath and was found to be in A-fib with RVR.  She converted following IV and p.o. diltiazem and was just registered to observation status for monitoring of return to baseline.  Patient received recently started home dose of lorazepam and had worsening sedation and acute encephalopathy and was found to have acute on chronic respiratory failure with hypoxia and hypercapnia and was admitted to the ICU on 8/13/2023 and given flumazenil and initiated on BiPAP for respiratory support.  Despite these interventions patient has continued to worsen and this morning is overall unresponsive with increasing oxygen needs.  No labs have been obtained this morning and on evaluation patient has very low tidal volumes and diminished breath sounds diffusely and only grimaces and slightly moves to sternal rub.    We will perform stat VBG now.  We will titrate up on BiPAP to optimize pressure support, increasing up to 16/6 now with further titration upward per RT as indicated to improve tidal volumes in the next 1 to 2 hours.  Perform repeat VBG 1 hour after BiPAP is felt to be optimized.  Will perform CBC, procalcitonin, CRP, CMP now and also performed stat chest x-ray to further evaluate causes of respiratory failure.  At this time patient is critically ill and has high risk of further respiratory decompensation  and need for intubation in upcoming hours if she does not significantly improve with BiPAP titration changes.    Principal Problem:    Acute on chronic respiratory failure with hypoxia and hypercapnia (H)    Assessment: Etiology unclear at this time.  Was thought yesterday to be due to lorazepam administration however has not improved despite benzo reversal and BiPAP with respiratory status continuing to worsen and suspected worsening acidosis and hypercapnia based on clinical exam this morning.  Patient with lung mass suspicious for cancer as well as pleural effusion and concern for opacities on initial imaging from time of presentation    Plan:   -Perform stat VBG now  -increase Bipap settings to 16/6 and further titrate up as per RT in the next hour, repeat VBG 1 hour after optimization of BiPAP is felt achieved  -Perform CBC and procalcitonin this morning with consideration of antibiotic initiation if ongoing leukocytosis is present  -perform STAT chest x-ray now with consideration of CTA once respiratory status is more stable  -We will discuss with telemetry ICU following results of the above    Active Problems:    Acute encephalopathy    Assessment: Worsening over the past 12 hours and patient now is nonresponsive    Plan:   -Proceed with plan as outlined above      Leukocytosis, unspecified type    Assessment: Present at time of admission, patient was not placed on any antibiotics as she had recently been given steroids and was felt to have elevated white blood cell count secondary to this    Plan:   -Perform CBC and procalcitonin at this time  -Low threshold for broad antibiotic initiation going forward      Atrial fibrillation with RVR (H)      AF (paroxysmal atrial fibrillation) (H)    Assessment: Patient has known paroxysmal A-fib and did have A-fib with RVR present at time of ED presentation.  She has not had any further recurrence since admission    Plan:   -Hold p.o. diltiazem this morning secondary to  patient's mentation  -Monitor via continuous telemetry and nursing will inform if recurrent A-fib with RVR occurs  -Continue Lovenox with increased to 1 mg/kg twice daily dosing for therapeutic treatment      Elevated troponin    Assessment: Present at time of admission thought secondary to A-fib with RVR    Plan:   -We will repeat testing today for reevaluation in the context of worsening respiratory failure as above      Hyponatremia    Assessment: Noted initially, resolved with fluid resuscitation    Plan:   -Recheck today to ensure ongoing resolution      Hypercalcemia    Assessment: Noted initially, resolved with IV fluids    Plan:   -Recheck today to ensure ongoing resolution      Malignant neoplasm of overlapping sites of left breast in female, estrogen receptor positive (H)    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  She had PE development despite being on Coumadin in July 2023 which prompted evaluation of possible recurrent cancer.  Recent PET scan has shown active intake on the right upper lung mass, multiple lymph nodes, brain lesions.  Patient was to undergo bronchoscopy today at [facility] for biopsy of one of the lymph nodes to assist in confirmation of cancer diagnosis and treatment plan going forward    Plan:   -Bronchoscopy scheduled at [facility] today will be canceled due to patient's location at outlying facility and ongoing critical illness as outlined above  -Once patient is more stabilized we will attempt to coordinate with interventional pulmonology best options going forward      Pulmonary embolism - left upper lobe, diagnosed 7/23    Assessment: Diagnosed last month despite being on Coumadin, felt possibly secondary to return of active cancer.  Patient had been on Eliquis but had recently stopped that and was bridging with Lovenox due to  bronchoscopy planned    Plan:   -Continue with Lovenox but will increase to patient's 1 mg/kg twice daily dosing for therapeutic bridging given unknown timing until bronchoscopy  -Proceed with CTA when patient more stable and reevaluate PE status at that time      Acute right-sided heart failure (H)    Assessment: was present last month following acute PE, no acute signs of CHF exacerbation    Plan:   -continue with Bipap  -decrease IV fluid to 50 ml/hr for now as work up is ongoing  -hold PO Lasix given respiratory and mentation status   -monitor strict I/Os      Essential hypertension, benign    Assessment: Patient normally on diltiazem 240 mg daily, Lasix 20 mg daily, hydrochlorothiazide 25 mg daily and lisinopril 2.5 mg daily.  Blood pressures are normal to mildly hypertensive during the stay    Plan:   -Hold all oral medications this morning due to patient's mentation  -Consider IV administration of Vasotec and diltiazem going forward if persistent hypertension continues      Pulmonary emphysema, unspecified emphysema type (H)    Assessment: Present at baseline and patient normally is on 4 to 6 L nasal cannula oxygen.  No obvious known acute flare however air movement is severely diminished and unclear if wheezing would be heard in current setting    Plan:   -We will give Solu-Medrol 40 mg IV given patient's inability to take oral Decadron as per her home regimen  -Continue scheduled bronchodilators 4 times daily  -Continue as needed albuterol as needed for wheezing      CKD (chronic kidney disease) stage 3, GFR 30-59 ml/min (H)    Assessment: Baseline creatinine 1.0-1.2.  Patient's creatinine was mildly elevated at 1.4 at initial presentation but has returned to previous baseline following fluid resuscitation    Plan:   -Continue supportive care, proceed with CTA when patient respiratorily stable and monitor renal function very closely following      Coronary artery disease involving native coronary artery of  native heart without angina pectoris    Assessment: Patient was diagnosed in 2020 based on radiographic findings for coronary artery calcifications noted following her cancer treatment.  Has been followed by cardiology and Lexiscan was performed which was unremarkable.  Cardiology recommended medical treatment only and patient has not had any symptoms concerning for angina    Plan:   -Continue to trend troponins given worsening mental status as outlined above      Major depression in complete remission (H)    Situational anxiety    Insomnia    Assessment: Patient is normally on Effexor 75 mg daily.  She was recently started on hydroxyzine and lorazepam due to insomnia and situational anxiety regarding possible recurrent cancer diagnosis.  Lorazepam and all benzodiazepines are now being avoided secondary to worsening respiratory status as outlined above the patient was a started on BuSpar on 8/13 without notable improvement of her symptoms    Plan:   -We will hold Effexor and BuSpar at this time given worsening mental status as outlined above  -Avoid benzodiazepines  -Reassess when patient's mental status has improved      Prediabetes    Assessment: Diagnosed earlier this year and patient was started on metformin however this was discontinued following hospitalization for PE.  Patient has needed several steroid bursts since initial diagnosis but blood sugars have been overall fairly well controlled    Plan:   -N.p.o. status secondary to mentation  -We will start monitoring blood sugars every 4 hours with low resistance sliding scale NovoLog as appropriate if hyperglycemia develops given need for IV steroids as above      Normocytic anemia, chronic    Assessment: Patient has baseline hemoglobin of approximately 10.  Was slightly higher than that at time of presentation but following fluid resuscitation has now returned to the mid 10 range.  No signs of active bleeding    Plan:   -Continue to monitor for stabilization  "at baseline      Hyperlipidemia LDL goal <130    Assessment: Normally on atorvastatin 10 mg daily    Plan:   -Hold for now given worsening mentation.       Diet: NPO for Medical/Clinical Reasons Except for: No Exceptions    DVT Prophylaxis: Enoxaparin (Lovenox) subcutaneous - increase to the 1 mg/kg BID dosing based on known PE  Ponce Catheter: Not present  Lines: None     Cardiac Monitoring: ACTIVE order. Indication: ICU  Code Status: Full Code      Clinically Significant Risk Factors           # Hypercalcemia: Highest Ca = 11.1 mg/dL in last 2 days, will monitor as appropriate        # Hypertension: Noted on problem list        # Overweight: Estimated body mass index is 27.99 kg/m  as calculated from the following:    Height as of this encounter: 1.6 m (5' 3\").    Weight as of this encounter: 71.7 kg (158 lb)., PRESENT ON ADMISSION          Disposition Plan     Expected Discharge Date: 08/14/2023      Destination: home with family            Nilam Laboy MD  Hospitalist Service  [facility]  Securely message with "[employer] (more info)  Text page via [employer] Paging/Directory   ______________________________________________________________________    Interval History   Patient has continued to worsen overnight and was at first fighting the Bipap and was agitated and now more recently was not making any movements, now waking up when stimulated.  Blood pressures continue to be hypertensive.  Oxygen needs increasing overnight from FiO2 of 30% now up to 40%.  Minimal urine output.  No labs ordered for today.  Most recent VBG shows ongoing acidosis with hypercapnia but not checked after adjustments made.      Physical Exam   Vital Signs: Temp: 98.7  F (37.1  C) Temp src: Axillary BP: (!) 166/81 Pulse: 67   Resp: 28 SpO2: (!) 88 % O2 Device: BiPAP/CPAP Oxygen Delivery: 2.5 LPM  Weight: 158 lbs 0 oz    Constitutional: patient is somnolent and does not awaken to voice or touch, does " move without purpose to sternal run but does not open her eyes   Respiratory: tachypnea in the mid 20s with minimal air movement diffusely and tital volumes of low 200s noted on Bipap machine   Cardiovascular: RRR  GI: bowel sounds hypoactive, abdomen soft without apparent tenderness   Skin: capillary refill 2 seconds   Musculoskeletal: patient is not purposefully moving extremities  Neurologic: does not awaken to voice or touch, only minimal movement to sternal rub    Medical Decision Making       41 MINUTES SPENT BY ME so far doing chart review, history, exam, documentation & further activities per the note.  Total amount of critical care time spend on this patient will be reflected in final note by this provider from today's date.      Data   No labs or imaging ordered for this morning.  VBG last evening showed ongoing acute on chronic respiratory acidosis with hypercapnia (7.25/81/32/35 with base excess 5.7)

## 2023-08-14 NOTE — PROGRESS NOTES
CLINICAL NUTRITION SERVICES - ASSESSMENT NOTE     Nutrition Prescription    RECOMMENDATIONS FOR MDs/PROVIDERS TO ORDER:  Highly recommend EN support vs. Diet advancement within 2-3 days    Malnutrition Status:    Unable to determine due to lack of NFPE, nutrition hx - will meet with pt as able pending improvements to overall status    Recommendations already ordered by Registered Dietitian (RD):  None at this time as pt is NPO with no exceptions - will continue to monitor and offer intervention as able    Future/Additional Recommendations:  Will follow to monitor NPO status, diet advancement, weight changes, and GI symptoms/BMs     REASON FOR ASSESSMENT  Latanya Padron is a/an 73 year old female assessed by the dietitian for: identified at risk via screening criteria    MST score of 2: recent weight loss of 2-13 lbs, poor appetite noted    NUTRITION HISTORY  Pt admitted with lethargy and SOB, was in ED 2 days PTA with same issue. Did not find anything in the work-up at that time. Was more SOB since being home, continues to feel very wheezy and tight at times. Legs feel more swollen than normal. Has a hx metastatic malignant cancer spread to lungs and brain, was supposed to have lung biopsy today 8/14 and gamma knife procedure on Thursday.     Found to have acute on chronic respiratory failure with hypoxia and hypercapnia, a.fib. Was transferred to ICU and has been on BIPAP.     Extensive dxhx including hyperlipidemia, HTN, pulmonary emphysema, a.fib, CKD stage 3, malignant neoplasm of overlapping sites of left breast, CAD, major depression in complete remission, prediabetes, HF, normocytic anemia, hyponatremia, pulmonary embolism, insomnia, situational anxiety, sprain and strain of knee and leg, disorder of bone and cartilage, hirsutism, COPD, tennis elbow left, class 1 obesity, primary osteoarthritis of both knees, trochanteric bursitis of right hip, segmental dysfunction of sacral, lumbar, thoracic region,  "age-related osteoporosis, abnormal gait, lymphadenopathy of head and neck, RLS, epidermoid cyst of labia majora, supraclavicular mass benign, pneumonia    CURRENT NUTRITION ORDERS  Diet: Orders Placed This Encounter      NPO for Medical/Clinical Reasons Except for: No Exceptions    Intake/Tolerance: pt did minimal PO fluid intake yesterday, ate a few bites of lunch but too lethargic for adequate intake. 0% since due to change to NPO status. No GI concerns. Abdomen is soft, non-tender in all quadrants. Bowel sounds are audible, normoactive. Last BM was 8/12 per flow sheet.     LABS  Labs reviewed - chloride low, elevated urea nitrogen and creatinine, low GFR, elevated CRP, elevated BGs, elevated procalcitonin, elevated troponin, low hemoglobin and hematocrit    MEDICATIONS  Medications reviewed - lovenox injection, novolog, duoneb, inhaler, solu-medrol, protonix, IV zosyn, IV vancomycin, IV propofol, sodium chloride IVF at 50 mL/hr x 24 hrs = 1,200 mL per day, PRN tylenol given 8/12, PRN hydroxyzine last given today 8/14    ANTHROPOMETRICS  Height: 160 cm (5' 3\")  Most Recent Weight: 71.7 kg (158 lb) via bed scale   IBW: 115 lbs  ADJ BW: 126 lbs  BMI: Overweight BMI 25-29.9  Weight History:   Wt Readings from Last 15 Encounters:   08/12/23 71.7 kg (158 lb)   07/28/23 71.6 kg (157 lb 12.8 oz)   07/25/23 73.3 kg (161 lb 9.6 oz)   07/20/23 72.6 kg (160 lb)   07/20/23 72.6 kg (160 lb)   07/19/23 74 kg (163 lb 3.2 oz)   06/28/23 72.6 kg (160 lb)   05/23/23 76 kg (167 lb 8 oz)   05/03/23 78.2 kg (172 lb 6.4 oz)   04/25/23 81.7 kg (180 lb 1.6 oz)   02/17/23 83.9 kg (185 lb)   01/19/23 84 kg (185 lb 3.2 oz)   08/18/22 86 kg (189 lb 9.6 oz)   07/14/22 85 kg (187 lb 6.4 oz)   05/19/22 87.5 kg (193 lb)   12.2% weight loss in less than 6 months (significant for severe malnutrition)     Dosing Weight: 57.3 kg using ADJ BW, actual BW of 71.7 kg, 52.4 kg using IBW    ASSESSED NUTRITION NEEDS  Estimated Energy Needs: 1,004-1,219 " kcals/day (14 - 17 kcals/kg) actual BW  Justification: Increased needs, Overweight, Repletion, and Vented  Estimated Protein Needs: 105+ grams protein/day (2.0+ grams of pro/kg) IBW  Justification: Hypercatabolism with critical illness, Increased needs, Obesity guidelines, and Repletion  Estimated Fluid Needs: 1,433-1,719 mL/day (25 - 30 mL/kg) ADJ BW  Justification: Maintenance    PHYSICAL FINDINGS  See malnutrition section below.    MALNUTRITION  % Intake: Unable to assess PTA due to pt lethargy  % Weight Loss: > 10% in less than 6 months (severe)  Subcutaneous Fat Loss: Unable to assess  Muscle Loss: Unable to assess  Fluid Accumulation/Edema: None noted  Malnutrition Diagnosis: Unable to determine due to lack of NFPE, nutrition hx related to pt's overall status    NUTRITION DIAGNOSIS  Inadequate energy intake related to poor appetite, acute on chronic illness, lethargy as evidenced by current NPO status, poor intake of 0-few bites earlier in admission, increased needs above baseline, and weight loss of > 10% in less than 6 months (severe)    INTERVENTIONS  Implementation  Nutrition Education: Not appropriate to assess needs at this time due to patient condition     Collaboration with other providers   None at this time as pt is NPO with no exceptions - plan for intubation, will continue to monitor and offer recs if EN/PN becomes plan of care    Goals  Diet adv v nutrition support within 2-3 days  Pt weight will remain stable during admission  Pt will tolerate diet advancement      Monitoring/Evaluation  Progress toward goals will be monitored and evaluated per protocol.  Kaycee Sanchez RD, LD  Clinical Dietitian  Office: [phone]  Weekend pager: [phone]

## 2023-08-14 NOTE — PROGRESS NOTES
S-(situation): Order received for PT and attempted initial evaluation 8-    B-(background): Pt admitted through ED and transferred to ICU.     A-(assessment): Patient not medically stable for PT at this time.     R-(recommendations): Will continue to check pt status for PT.

## 2023-08-14 NOTE — PLAN OF CARE
Problem: Gas Exchange Impaired  Goal: Optimal Gas Exchange  Outcome: Not Progressing  Intervention: Optimize Oxygenation and Ventilation  Recent Flowsheet Documentation  Taken 8/14/2023 0000 by Haris Kumar RN  Head of Bed (HOB) Positioning: HOB at 20-30 degrees  Taken 8/13/2023 2000 by Haris Kumar RN  Head of Bed (HOB) Positioning: HOB at 20-30 degrees  Taken 8/13/2023 1600 by Haris Kumar RN  Head of Bed (HOB) Positioning: HOB at 20-30 degrees     Problem: Plan of Care - These are the overarching goals to be used throughout the patient stay.    Goal: Plan of Care Review  Description: The Plan of Care Review/Shift note should be completed every shift.  The Outcome Evaluation is a brief statement about your assessment that the patient is improving, declining, or no change.  This information will be displayed automatically on your shift note.  Outcome: Progressing  Goal: Absence of Hospital-Acquired Illness or Injury  Outcome: Progressing  Intervention: Identify and Manage Fall Risk  Recent Flowsheet Documentation  Taken 8/14/2023 0000 by Haris Kumar RN  Safety Promotion/Fall Prevention:   activity supervised   clutter free environment maintained   increased rounding and observation   nonskid shoes/slippers when out of bed   room door open   room near nurse's station   room organization consistent   safety round/check completed   supervised activity  Taken 8/13/2023 2000 by Haris Kumar RN  Safety Promotion/Fall Prevention:   activity supervised   clutter free environment maintained   increased rounding and observation   nonskid shoes/slippers when out of bed   room door open   room near nurse's station   room organization consistent   safety round/check completed   supervised activity  Taken 8/13/2023 1600 by Haris Kumar RN  Safety Promotion/Fall Prevention:   activity supervised   clutter free environment maintained   increased rounding and observation   nonskid shoes/slippers when out of  bed   room door open   room near nurse's station   room organization consistent   safety round/check completed   supervised activity  Intervention: Prevent Skin Injury  Recent Flowsheet Documentation  Taken 8/14/2023 0000 by Haris Kumar RN  Body Position: turned  Taken 8/13/2023 2000 by Haris Kumar RN  Body Position: turned  Taken 8/13/2023 1600 by Haris Kumar RN  Body Position: turned  Intervention: Prevent and Manage VTE (Venous Thromboembolism) Risk  Recent Flowsheet Documentation  Taken 8/14/2023 0000 by Haris Kumar RN  VTE Prevention/Management: other (see comments)  Taken 8/13/2023 2000 by Haris Kumar RN  VTE Prevention/Management: other (see comments)  Taken 8/13/2023 1600 by Haris Kumar RN  VTE Prevention/Management: other (see comments)  Goal: Optimal Comfort and Wellbeing  Outcome: Progressing  Goal: Readiness for Transition of Care  Outcome: Progressing     Problem: Pain Acute  Goal: Optimal Pain Control and Function  Outcome: Progressing  Intervention: Prevent or Manage Pain  Recent Flowsheet Documentation  Taken 8/14/2023 0000 by Haris Kumar RN  Sensory Stimulation Regulation:   auditory stimulation minimized   care clustered   lighting decreased  Medication Review/Management: medications reviewed  Taken 8/13/2023 2000 by Haris Kumar RN  Sensory Stimulation Regulation:   auditory stimulation minimized   care clustered   lighting decreased  Medication Review/Management: medications reviewed  Taken 8/13/2023 1600 by Haris Kumar RN  Sensory Stimulation Regulation:   auditory stimulation minimized   care clustered   lighting decreased  Medication Review/Management: medications reviewed     Problem: Fall Injury Risk  Goal: Absence of Fall and Fall-Related Injury  Outcome: Progressing  Intervention: Identify and Manage Contributors  Recent Flowsheet Documentation  Taken 8/14/2023 0000 by Haris Kumar RN  Medication Review/Management: medications reviewed  Taken 8/13/2023  2000 by Haris Kumar RN  Medication Review/Management: medications reviewed  Taken 8/13/2023 1600 by Haris Kumar RN  Medication Review/Management: medications reviewed  Intervention: Promote Injury-Free Environment  Recent Flowsheet Documentation  Taken 8/14/2023 0000 by Haris Kumar RN  Safety Promotion/Fall Prevention:   activity supervised   clutter free environment maintained   increased rounding and observation   nonskid shoes/slippers when out of bed   room door open   room near nurse's station   room organization consistent   safety round/check completed   supervised activity  Taken 8/13/2023 2000 by Haris Kumar RN  Safety Promotion/Fall Prevention:   activity supervised   clutter free environment maintained   increased rounding and observation   nonskid shoes/slippers when out of bed   room door open   room near nurse's station   room organization consistent   safety round/check completed   supervised activity  Taken 8/13/2023 1600 by Haris Kumar RN  Safety Promotion/Fall Prevention:   activity supervised   clutter free environment maintained   increased rounding and observation   nonskid shoes/slippers when out of bed   room door open   room near nurse's station   room organization consistent   safety round/check completed   supervised activity     Problem: Risk for Delirium  Goal: Optimal Coping  Outcome: Progressing  Goal: Improved Behavioral Control  Outcome: Progressing  Intervention: Minimize Safety Risk  Recent Flowsheet Documentation  Taken 8/14/2023 0000 by Haris Kumar RN  Enhanced Safety Measures: room near unit station  Taken 8/13/2023 2000 by Haris Kumar RN  Enhanced Safety Measures: room near unit station  Taken 8/13/2023 1600 by Haris Kumar RN  Enhanced Safety Measures: room near unit station  Goal: Improved Attention and Thought Clarity  Outcome: Progressing  Intervention: Maximize Cognitive Function  Recent Flowsheet Documentation  Taken 8/14/2023 0000 by José  LOVE Carballo  Sensory Stimulation Regulation:   auditory stimulation minimized   care clustered   lighting decreased  Reorientation Measures: familiar social contact encouraged  Taken 8/13/2023 2000 by Haris Kumar RN  Sensory Stimulation Regulation:   auditory stimulation minimized   care clustered   lighting decreased  Reorientation Measures: familiar social contact encouraged  Taken 8/13/2023 1600 by Haris Kumar RN  Sensory Stimulation Regulation:   auditory stimulation minimized   care clustered   lighting decreased  Goal: Improved Sleep  Outcome: Progressing      Pt has tolerated Bipap well this shift, oxygen saturations have been greater than 90 percent on current settings with Fi02 now at 33 percent, she does quickly desat with interruptions in flow from Bipap. Lung sounds are clear on the left and very diminished on the right, she has had minimal urine output, Purwik in place, IV fluids continue at 100, NPO status continues, Pt denies pain, telemetry shows sinus rhythm, Family was here to visit this shift,   Will continue to monitor and follow plan of care.

## 2023-08-14 NOTE — PROGRESS NOTES
Pt remains intubated and stable at this time.   Ventilator settings:  Mode: AC/VC  VT: 370  Rate: 16  PEEP: 7  FiO2: 40  Plateau pressures during shift: 19,   PEEPi during shift: 10, 0.9  Weaning attempted: NO   ETT size/secured: 7.5/23    Breath sounds: exp. Wheezing and tight, to clear post 3 nebs given today  Secretions: minimal, thick white  Respiratory meds given: YES  RT will remain on call for the night. Please reach out for any questions or concerns for this pt.   RT will continue to follow per protocol.  Ambu bag and mask ready at bedside.

## 2023-08-14 NOTE — PROGRESS NOTES
Patient's blood pressures are now hypertensive once more and patient weaned off Levophed drip however was becoming more awake and agitated.  Increasing propofol per policy but still very awake.  Nursing did give one time dose of Fentanyl with excellent results and improvement in sedation, blood pressures.     PLAN:  -continue propofol with upward sedation as needed  -start fentanyl drip at 25 mcg/hr and titrate as needed for comfort  -proceed with CTA of the chest for further evaluation of respiratory failure etiology    7 minutes spent on the above.    Electronically Signed:  Nilam Laboy MD

## 2023-08-15 NOTE — ANESTHESIA CARE TRANSFER NOTE
Patient: Latanya Padron    Procedure: * No procedures listed *       Diagnosis: * No pre-op diagnosis entered *  Diagnosis Additional Information: No value filed.    Anesthesia Type:   Other (see Comments)     Note:      Level of Consciousness: iatrogenic sedation      Airway patency satisfactory and stable: Intubation with ETT.  Dentition: dentition unchanged  Vital Signs Stable: post-procedure vital signs reviewed and stable  Report to RN Given: handoff report given  Patient transferred to: ICU    ICU Handoff: Call for PAUSE to initiate/utilize ICU HANDOFF, Identified Patient, Identified Responsible Provider, Reviewed the Pertinent Medical History, Discussed Surgical Course, Reviewed Intra-OP Anesthesia Management and Issues during Anesthesia, Set Expectations for Post Procedure Period and Allowed Opportunity for Questions and Acknowledgement of Understanding      Vitals:  Vitals Value Taken Time   BP     Temp     Pulse 114 08/15/23 0921   Resp 6 08/15/23 0921   SpO2 99 % 08/15/23 0921   Vitals shown include unvalidated device data.    Electronically Signed By: SOSA Andrea CRNA  August 15, 2023  9:22 AM

## 2023-08-15 NOTE — PLAN OF CARE
Goal Outcome Evaluation:      Plan of Care Reviewed With: patient, child    Overall Patient Progress: no changeOverall Patient Progress: no change    Outcome Evaluation: 4 Hour Shift Note: Patient remains intubated at 40% Fio2. No changes made to sedation or pain medication. She will arouse briefly and try to reposition at times but quickly goes back to sleep. BP has been very labile needing frequent adjustments to Levophed. Art line and cuff do not coorelate. Good waveform on art line so have made adjustments based off of that pressure. Pt went into a fib around 2000 with rates ranging from 's but does not sustain greater than 120 for any length of time. She has had several pauses, returns to SR and then back to rapid a fib again. Daughter was at bedside this evening.

## 2023-08-15 NOTE — PROGRESS NOTES
Former[facility]    Medicine Progress Note - Hospitalist Service    Date of Admission:  8/12/2023    Assessment & Plan     Patient is a 73-year-old female with past medical history of emphysema and chronic respiratory failure on 2 to 6 L nasal cannula at baseline, paroxysmal atrial fibrillation, recent pulmonary embolism, breast cancer diagnosed in 2019 with concern for metastasis to the brain and lungs, hypertension, hyperlipidemia, depression, chronic kidney disease stage III, chronic anemia, CAD, prediabetes and obesity who presented to the emergency room with complaints of lethargy and shortness of breath and was found to be in A-fib with RVR.  She converted following IV and p.o. diltiazem and was registered to observation status for monitoring of return to baseline.  Patient received recently started home dose of lorazepam and had worsening sedation and acute encephalopathy and was found to have acute on chronic respiratory failure with hypoxia and hypercapnia and was admitted to the ICU on 8/13/2023 and given flumazenil and initiated on BiPAP for respiratory support.  Despite these interventions patient continued to worsen and required intubation and sedation the morning of 8/14/23 and work up showed rapidly expanding right pleural effusion with concern for underlying atelectasis vs infection in patient with worsening WBC.  Patient was started on Zosyn and Vanco, intubated and sedated with Propofol and Fentanyl.  She has needed Levophed for blood pressure support following sedation.      Plan for today:  -MRSA swab - negative.  Will stop Vanco and continue with Zosyn alone for antibiotic coverage  -Proceed with thoracentesis today at around 1030, hold lovenox this morning prior to this procedure, perform INR per radiologist request.  Will sent fluid for diagnostic testing including cultures and cytology.  -Frequent alternating between a fib with RVR and NSR.  Still on levophed for  blood pressures support so beta blocker and diltiazem not best options at this time.  Creatinine increasing with urine output borderline so digoxin would be challenging to dose accurately.  Will proceed with amiodarone bolus and infusion for antiarrhythmic therapy to try and minimize frequency of a fib episodes.   -continue with current vent settings and discuss with Tele-ICU following thoracentesis    VENT:  CMV mode  Tital volume 370  PEEP 7  RR 14  Plateau pressure: 26  ABG 7.34/52/100/28 on FiO2 of 40%  P/F ratio: 250    Drips:  Propofol 40 mcg/kg/min  Fentanyl 25 mcg/hr  Levophed 0.06 mcg/kg/min  Will be starting Amiodarone bolus and infusion     Lines/Drains/Etc:  ET tube 7.5 mm placed 8/14  OG tube placed 8/14  PICC line placed 8/14  Art line placed 8/14  Ponce placed 8/14    Principal Problem:    Acute on chronic respiratory failure with hypoxia and hypercapnia (H)    Assessment: suspected secondary to rapidly increasing right sided pleural effusion with possible underlying infectious vs cancerous etiology, exacerbated by lorazepam administration.  Now intubated and sedated with ABGs improving and FiO2 needs slightly decreased overnight.     Plan:   -continue with current vent settings  -proceed with thoracentesis for therapeutic and diagnostic purposes  -continue Zosyn for antibiotic coverage, discontinue Vanco based on negative MRSA  -continue IV solumedrol and scheduled duonebs    Active Problems:    Acute toxic-metabolic encephalopathy    Assessment: progressively worsening secondary to hypercapnia development as respiratory failure worsened.     Plan:   -continue with treatment as above  -will need to reassess mentation following extubation going forward       Leukocytosis, unspecified type    Assessment: Present at time of admission, improved after initial fluid resuscitation but then worsening again despite ongoing fluids with worsening signs of respiratory failure and possible infectious etiology.   Procalcitonin showed risk of possible localized infection but no sepsis.  Improving form 21.6 down to 18.9 following antibiotic initiation despite IV steroid initiation     Plan:   -Continue Zosyn as above   -monitor CBC daily       Atrial fibrillation with RVR (H)      AF (paroxysmal atrial fibrillation) (H)    Assessment: Patient has known paroxysmal A-fib and did have A-fib with RVR present at time of ED presentation.  Has started to have return of frequent a fib episodes since intubation as diltiazem has been held due to hypotension issues.     Plan:   - start amiodarone bolus and infusion   -continue telemetry monitoring   -Continue Lovenox with 1 mg/kg twice daily dosing for therapeutic treatment      Elevated troponin - demand ischemia from A fib with RVR    Assessment: Present at time of admission thought secondary to A-fib with RVR, had been trending downward without concern for non-STEMI    Plan:   -No further monitoring unless patient's clinical status changes       Hyponatremia    Assessment: Noted initially, resolved with fluid resuscitation    Plan:   -Recheck daily to ensure ongoing resolution      Hypercalcemia    Assessment: Noted initially, resolved with IV fluids    Plan:   -Recheck daily to ensure ongoing resolution      Malignant neoplasm of overlapping sites of left breast in female, estrogen receptor positive (H)    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  She had PE development despite being on Coumadin in July 2023 which prompted evaluation of possible recurrent cancer.  Recent PET scan has shown active intake on the right upper lung mass, multiple lymph nodes, brain lesions.  Patient was to undergo bronchoscopy at [facility] for biopsy of one of the lymph nodes to assist in confirmation of cancer diagnosis and treatment plan going forward  but was cancelled due to this hospitalization     Plan:   -will proceed with thoracentesis today and obtain cytology   -Once patient is more stabilized we will attempt to coordinate with interventional pulmonology best options going forward if cytology from pleural fluids is not diagnostic       Pulmonary embolism - left upper lobe, diagnosed 7/23    Assessment: Diagnosed June 2023 despite being on Coumadin, felt possibly secondary to return of active cancer.  Patient had been on Eliquis but had recently stopped that and was bridging with Lovenox due to bronchoscopy planned    Plan:   -Continue with Lovenox 1 mg/kg twice daily dosing for therapeutic bridging given unknown timing until bronchoscopy and ongoing critical illness      Acute right-sided heart failure (H)    Assessment: was present last month following acute PE, no acute signs of CHF exacerbation    Plan:   -hold home Lasix given critical illness but consider prn IV dosing to maintain euvolemia   -monitor strict I/Os  -consider limited echo in upcoming days to evaluate for improvement of right sided heart failure and pulmonary HTN given resolution of visible PE      Essential hypertension, benign    Assessment: Patient normally on diltiazem 240 mg daily, Lasix 20 mg daily, hydrochlorothiazide 25 mg daily and lisinopril 2.5 mg daily.  All have been held in last two days with patient needing levophed to maintain blood pressures due to hypotension     Plan:   -Hold all home antihypertensive medications      Pulmonary emphysema, unspecified emphysema type (H)    Assessment: Present at baseline and patient normally is on 4 to 6 L nasal cannula oxygen.  No obvious known acute flare however air movement is severely diminished and unclear if wheezing would be heard in current setting    Plan:   -continue Solumedrol 40 mg every 8 hours   -Continue scheduled bronchodilators 4 times daily  -Continue as needed albuterol as needed for wheezing      CKD (chronic kidney  disease) stage 3, GFR 30-59 ml/min (H)    Assessment: Baseline creatinine 1.0-1.2.  Patient's creatinine was mildly elevated at 1.4 at initial presentation but returned to baseline.  No is again mildly elevated into the 1.3 range following CTA on 8/14    Plan:   -Continue IV fluids, close monitoring of creatinine and urinary output      Coronary artery disease involving native coronary artery of native heart without angina pectoris    Assessment: Patient was diagnosed in 2020 based on radiographic findings for coronary artery calcifications noted following her cancer treatment.  Has been followed by cardiology and Lexiscan was performed which was unremarkable.  Cardiology recommended medical treatment only and patient has not had any symptoms concerning for angina    Plan:   -no further routine monitoring of troponins at this time.       Major depression in complete remission (H)    Situational anxiety    Insomnia    Assessment: Patient is normally on Effexor 75 mg daily.  She was recently started on hydroxyzine and lorazepam due to insomnia and situational anxiety regarding possible recurrent cancer diagnosis.  Lorazepam and all benzodiazepines are now being avoided secondary to worsening respiratory status as outlined above the patient was a started on BuSpar on 8/13 without notable improvement of her symptoms and patient is now sedated and intubated     Plan:   -Continue to hold Effexor and BuSpar   -Avoid benzodiazepines  -Reassess when patient is extubated      Prediabetes    Assessment: Diagnosed earlier this year and patient was started on metformin however this was discontinued following hospitalization for PE.  Patient has needed several steroid bursts since initial diagnosis but blood sugars have been overall fairly well controlled    Plan:   -Continue monitoring blood sugars every 4 hours with low resistance sliding scale NovoLog as appropriate if hyperglycemia develops given need for IV steroids as  "above      Normocytic anemia, chronic    Possible upper GI bleed    Assessment: Patient has baseline hemoglobin of approximately 10.  Was slightly higher than that at time of presentation but following fluid resuscitation returned to baseline range.  This morning has dropped unexpectedly to 8.8 and there is some dark maroon appearing material being suctioned out from OG tube today    Plan:   -will perform serial hemoglobin every 8 hours  -obtain gastric occult testing  -increase PPI to BID dosing  -Consider holding lovenox if hemoglobin continue to decline  -consider transfusion if hemoglobin becomes less than 7.0      Hyperlipidemia LDL goal <130    Assessment: Normally on atorvastatin 10 mg daily    Plan:   -Hold for now        Diet: NPO for Medical/Clinical Reasons Except for: No Exceptions    DVT Prophylaxis: Enoxaparin (Lovenox) SQ  Ponce Catheter: PRESENT, indication: Strict 1-2 Hour I&O;Deep Sedation/Paralysis  Lines: PRESENT      PICC 08/14/23 Triple Lumen Right Basilic-Site Assessment: WDL  Arterial Line 08/14/23 Wrist-Site Assessment: WDL      Cardiac Monitoring: ACTIVE order. Indication: ICU  Code Status: Full Code      Clinically Significant Risk Factors           # Hypercalcemia: corrected calcium is >10.1, will monitor as appropriate    # Hypoalbuminemia: Lowest albumin = 2.9 g/dL at 8/15/2023  5:29 AM, will monitor as appropriate     # Hypertension: Noted on problem list        # Overweight: Estimated body mass index is 29.25 kg/m  as calculated from the following:    Height as of this encounter: 1.6 m (5' 3\").    Weight as of this encounter: 74.9 kg (165 lb 1.6 oz)., PRESENT ON ADMISSION     # COPD: noted on problem list        Disposition Plan   Unknown at this time    Nilam Laboy MD  Hospitalist Service  [facility]  Securely message with [employer] (more info)  Text page via [facility] Paging/Directory "   ______________________________________________________________________    Interval History   Patient remained respiratorily stable overnight.  Did start having frequent episodes of a fib with RVR that were self limited without change in vitals when present.  Did require Levophed overnight to maintain blood pressure stabilization and patient has had borderline urine output.  Creatinine slightly elevated today compared to baseline.  Nursing noted maroon appearing material in OG tube on suction but not large volume.  Sedation working well    Physical Exam   Vital Signs: Temp: 98.3  F (36.8  C) Temp src: Oral BP: 111/59 Pulse: 111   Resp: (!) 9 SpO2: 98 % O2 Device: Mechanical Ventilator    Weight: 165 lbs 1.6 oz    Constitutional: sedated and intubated   Respiratory: not overbreathing the vent, improved air movement diffusely with mild end expiratory wheezing noted  Cardiovascular: RRR in the 80s  GI: bowel sounds present, abdomen soft and non-distended, maroon blood appearing material visible on OG suction  Skin: capillary refill less than 2 seconds  Musculoskeletal: no lower extremity pitting edema present  Neurologic: sedated and intubated    Medical Decision Making       52 MINUTES SPENT BY ME so far on the date of service doing chart review, history, exam, documentation & further activities per the note of this critically ill patient      Data     I have personally reviewed the following data over the past 24 hrs:    18.9 (H)  \   8.0 (L)   / 176     139 103 56.1 (H) /  146 (H)   3.7 24 1.39 (H) \     ALT: 23 AST: 16 AP: 48 TBILI: 0.3   ALB: 2.9 (L) TOT PROTEIN: 4.9 (L) LIPASE: N/A     Procal: 0.15 (H) CRP: 76.61 (H) Lactic Acid: N/A       INR:  1.38 (H) PTT:  N/A   D-dimer:  N/A Fibrinogen:  N/A     Ferritin:  N/A % Retic:  N/A LDH:  290 (H)

## 2023-08-15 NOTE — ANESTHESIA POSTPROCEDURE EVALUATION
Patient: Latanya Padron    Procedure: * No procedures listed *       Anesthesia Type:  Other (see Comments)    Note:  Disposition: ICU            ICU Sign Out: Anesthesiologist/ICU physician sign out WAS performed   Postop Pain Control: Uneventful            Sign Out: Well controlled pain   PONV: No   Neuro/Psych: Uneventful            Sign Out: Acceptable/Baseline neuro status   Airway/Respiratory: Uneventful            Sign Out: AIRWAY IN SITU/Resp. Support               Airway in situ/Resp. Support: ETT   CV/Hemodynamics: Uneventful            Sign Out: Acceptable CV status   Other NRE: NONE   DID A NON-ROUTINE EVENT OCCUR? No    Event details/Postop Comments:  Pt was happy with anesthesia care.  No complications.  I will follow up with the pt if needed.           Last vitals:  Vitals:    08/15/23 0615 08/15/23 0630 08/15/23 0743   BP: 121/59 111/59    Pulse: 103 111    Resp: 14 (!) 9    Temp:   98.6  F (37  C)   SpO2: 99% 98%        Electronically Signed By: SOSA Andrea CRNA  August 15, 2023  9:23 AM

## 2023-08-15 NOTE — PLAN OF CARE
Goal Outcome Evaluation:           Overall Patient Progress: no changeOverall Patient Progress: no change    Outcome Evaluation: Patient remains at 40% FiO2. Propofol remains at 40ml, Levophed weaned down to 0.06mcg, fentanyl at 25mcg. Fentanyl bumped up to 50mcg for 2 hours overnight when patient was grimacing and slightly pulling at restraints. Zosyn and Vanco given. Arouses briefly with turns and remains -1 to -2 for RASS score. ART line with good waveform but continues to not correlate with manual cuff. Tele reads SR to AFib with occasional pauses. Plan for Thoracentesis this morning. Ponce in place, cares completed.

## 2023-08-15 NOTE — PROGRESS NOTES
Patient had 1L removed via thoracentesis of the right pleural space today - radiologist stopped at that time to decreased the risk of flash pulmonary edema.  Patient tolerated procedure well.  Fluid is cloudy with lots of RBC but few WBC and no definite prominence in cell type.      Updated family on how patient did overnight, procedure and results back so far and goal for repeat thoracentesis for remainder of fluid removal tomorrow.  All questions answered.     16 minutes spent in the above    Electronically Signed:  Nilam Laboy MD

## 2023-08-15 NOTE — PLAN OF CARE
Goal Outcome Evaluation:      Plan of Care Reviewed With: spouse, child      S-(situation): End of shift note    B-(background): Respiratory distress on Ventilator support    A-(assessment):   NSR and short runs of fib/flutter in 140s  Amiodarone bolus and gtt initiated and has remained in NSR since  Thoracentesis in bed with 2 RNs and RT - 1L removed per radiologist. Plan to do another thoracentesis at 1pm tomorrow.  Urine output low - increased IVF from 50 to 100/hr with increased output.   VENT per RT with fio2 40% LS clear/diminished  OGT LIS. 100ml output dark brown and Occult + per gastric output - increased PPI.  Trending HGBs  RASS +2 at times with 1 PRN propofol and 2 PRN fentanyl doses = effective.  Continuous propofol 40 and Fentanyl increased from 25 to 50mcg = RASS score -2.    ART line positional and reading higher than NIBP.  Using NIBP to monitor MAPS.  Levophed turned off at 1151am.   Oozing from old IV right AC site this am- pressure dressing applied and oozing stopped.    Outcome Evaluation:  R-(recommendations): monitor and per family request will limit visitors and promote rest today.

## 2023-08-15 NOTE — PLAN OF CARE
Right wrist, Left wrist, and soft restraints initiated on patient on 8/14/2023 at 11:40AM    Clinical Justification: Pulling lines, pulling tubes, and pulling equipment  Less Restrictive Alternative: Repositioning, Re-evaluate equipment, Disguise equipment, Pain management  Attending Physician Notified: Yes, Attending Physician's Name: Pierre   Order received: Yes     Family Notification: Spouse/significant other   Criteria explained to Patient  Patient's Response: No evidence of learning      Liz Marrero RN

## 2023-08-15 NOTE — PROGRESS NOTES
Tele-ICU Note  I discussed case with Dr. Laboy and am grateful for her update and fine care.     Patient had thoracentesis performed today with about 1 liter removed and procedure stopped at this point with concern for risk of pulmonary edema; Lab studies mostly pending though pH 8.0 and nucleated cell count 495 (33% neutrophils, 9% Lymphocytes, and 58% monocytes) with 21,000 RBC's.       If possible, we will plan to do therapeutic thoracentesis again tomorrow and possible breathing trial afterwards. Given large volume of fluid still remaining in right chest she will benefit acutely by removing it prior to extubation (especially given underlying severe COPD).     She has been on minimal Levophed vasopressor and would continue antibiotics.    Tele-ICU will follow with you- please call if we can be of assistance.     Sal ruiz  August 15, 2023

## 2023-08-16 NOTE — PROGRESS NOTES
[facility]. Pt has received two thoracentesis over the last two days with total output of 1800 mls. It was requested to try weaning trials 6-8hours post thoracentesis however, there will be no RT on shift in-house to do this weaning trial. I will re-evaluate this pt before shifts end. Pt will resume weaning tomorrow morning per protocol.   Ventilator settings:  Mode: AC/VC    VT: 370  Rate: 14  PEEP: 7  FiO2: 50  Plateau pressures during shift: 26  PEEPi during shift: 5.5  Weaning attempted: No   ETT size/secured: 7.5/22    Breath sounds: clear  Secretions: scant white  Respiratory meds given: yes

## 2023-08-16 NOTE — PROGRESS NOTES
"Tele-ICU note  I discussed case with Dr. Laboy and appreciate her input and fine care.     Patient is on ventilator with known severe COPD and a large right pleural effusion, 30% and +7 PEEP. She had another thoracentesis today which removed about 800 mls and was \"tapped dry\" per radiologist report. Her thoracentesis results from yesterday indicate an exudative process with infection unlikely; cytology is pending.     I agree with breathing trial today, though it may take 6-8 hours after thoracentesis before lung fully expands and patient likely to have the most benefit. If she is not \"pristine\" today, I would defer extubation until tomorrow AM when she may yet be more improved.     Tele-ICU will continue to follow with you.    Sal ruiz  August 16, 2023                  "

## 2023-08-16 NOTE — PLAN OF CARE
S-(situation): OT Orders Acknowledged    B-(background): 73 yr old female, admitted through ED and transferred to ICU.     A-(assessment): Patient continues to not be medically appropriate for therapies    R-(recommendations): OT to assess when medically appropriate

## 2023-08-16 NOTE — TELEPHONE ENCOUNTER
Called and spoke to patient's , Nam. Discussed that since Ivette is still hospitalized we will postpone her gamma knife treatment until after she is out of the hospital. Tentative date rescheduled for 8/31/23 if patient is stable. Nam verbalized understanding.

## 2023-08-16 NOTE — PROGRESS NOTES
[facility]    Medicine Progress Note - Hospitalist Service    Date of Admission:  8/12/2023    Assessment & Plan    Patient is a 73-year-old female with past medical history of emphysema and chronic respiratory failure on 2 to 6 L nasal cannula at baseline, paroxysmal atrial fibrillation, recent pulmonary embolism, breast cancer diagnosed in 2019 with concern for metastasis to the brain and lungs, hypertension, hyperlipidemia, depression, chronic kidney disease stage III, chronic anemia, CAD, prediabetes and obesity who presented to the emergency room with complaints of lethargy and shortness of breath and was found to be in A-fib with RVR.  She converted following IV and p.o. diltiazem and was registered to observation status for monitoring of return to baseline.  Patient received recently started home dose of lorazepam and had worsening sedation and acute encephalopathy and was found to have acute on chronic respiratory failure with hypoxia and hypercapnia and was admitted to the ICU on 8/13/2023 and given flumazenil and initiated on BiPAP for respiratory support.      Despite these interventions patient continued to worsen and required intubation and sedation the morning of 8/14/23 and work up showed rapidly expanding right pleural effusion with concern for underlying atelectasis vs infection in patient with worsening WBC.  Patient was started on Zosyn and Vanco, intubated and sedated with Propofol and Fentanyl.  She did need transient Levophed for hypotension from 8/14-8/15 but has now been off pressors for over 24 hours.  Antibiotics have been narrowed to Zosyn alone following negative MRSA swab with blood cultures and sputum cultures pending.  Thoracentesis for 1L was performed on 8/15 for therapeutic and diagnostic purposes with removal intentionally stopped after 1 L to avoid/pulmonary edema per radiology team.  Testing from thoracentesis is showing an exudate with   without predominant shift, Cultures and cytology performed are pending.  Patient is to undergo a second thoracentesis today for removal of the remaining fluid with consideration of pressure support trial to follow depending on patient's ongoing respiratory improvement.    Hospitalization was further complicated on 8/15/2023 with development of an upper GI bleed confirmed by positive gastric occult and a 2 g hemoglobin drop despite no change in IV fluid intake.  Lovenox has been held and PPI has been increased to twice daily dosing with hemoglobin over the last 24 hours has now stabilized in the upper 7 range.      Plan for today:  -Proceed with second thoracentesis today at around 1300  -continue with Zosyn for antibiotic coverage and awake sputum and pleural fluid culture results  -continue BID PPI and holding of Lovenox with ongoing monitoring of hemoglobin stability  -continue amiodarone drip until this evening, then consider amiodarone daily via OG vs PO route going forward.   -continue with current vent settings   -lighten sedation following thoracentesis with plan for pressure support trial this afternoon     VENT:  CMV mode  Tital volume 370  PEEP 7  RR 14  Plateau pressure: 26-29  ABG 7.37/44/68/26 on FiO2 of 30%  P/F ratio: 226    Drips:  Propofol 45 mcg/kg/min  Fentanyl 50 mcg/hr  Amiodarone 0.5 mg/kg/min    Lines/Drains/Etc:  ET tube 7.5 mm placed 8/14  OG tube placed 8/14  PICC line placed 8/14  Art line placed 8/14  Ponce placed 8/14    Principal Problem:    Acute on chronic respiratory failure with hypoxia and hypercapnia (H)    Assessment: suspected secondary to rapidly increasing right sided pleural effusion with possible underlying infectious vs cancerous etiology, exacerbated by lorazepam administration worsening hypercapnia.  Now intubated and sedated with ABGs improving and FiO2 continuing to decrease.  S/p 1L thoracentesis yesterday. CRP continuing to improve.  WBC increased slightly today but  unclear etiology given IV steroids.     Plan:   -continue with current vent settings  -proceed with second thoracentesis today for therapeutic purposes  -continue Zosyn for antibiotic coverage  -continue IV solumedrol and scheduled duonebs  -monitor for sputum culture and pleural fluid culture results     Active Problems:    Acute toxic-metabolic encephalopathy    Assessment: progressively worsening secondary to hypercapnia development as respiratory failure worsened.     Plan:   -continue with treatment as above  -will need to reassess mentation following extubation going forward       Leukocytosis, unspecified type    Possible community acquired pneumonia     Assessment: Present at time of admission, improved after initial fluid resuscitation but then worsening again despite ongoing fluids with worsening signs of respiratory failure and possible infectious etiology.  Procalcitonin showed risk of possible localized infection but no sepsis.  WBC had started to improve but now slightly worse again today but in context of IV steroid initiation 2 days ago    Plan:   -Continue Zosyn as above   -monitor CBC daily       Atrial fibrillation with RVR (H)      AF (paroxysmal atrial fibrillation) (H)    Assessment: Patient has known paroxysmal A-fib and did have A-fib with RVR present at time of ED presentation.  Did have return of frequent a fib episodes since intubation as diltiazem has been held due to hypotension issues and patient started on IV amiodarone bolus and infusion yesterday with good results and no further a fib runs noted overnight or so far this morning. Lovenox had to be held secondary to upper GI bleed.     Plan:   -continue amiodarone infusion until this evening, anticipate transitioning to PO/OG dosing tomorrow morning  -continue telemetry monitoring   -Continue to hold Lovenox      Upper GI bleeding    Assessment:  between 8/14/23 and 8/15/23 patient had an almost 2 point drop in hemoglobin (10.6 down to  8.8) with OG tube starting to suction out bright red blood and then melanotic appearing output in the early morning of 8/15.  Lovenox held and PPI increased to BID dosing and over the next 24 hours hemoglobin has stabilized in the 7.7-8.0 range without further bleeding noted on OG output.      Plan:    -continue to monitor hemoglobin every 8 hours today to ensure stability  -continue BID PPI  -continue to hold Lovenox       Elevated troponin - demand ischemia from A fib with RVR    Assessment: Present at time of admission thought secondary to A-fib with RVR, had been trending downward without concern for non-STEMI    Plan:   -No further monitoring unless patient's clinical status changes       Hyponatremia    Assessment: Noted initially, resolved with fluid resuscitation    Plan:   -Recheck daily to ensure ongoing resolution      Hypercalcemia    Assessment: Noted initially, resolved with IV fluids    Plan:   -Recheck daily to ensure ongoing resolution      Malignant neoplasm of overlapping sites of left breast in female, estrogen receptor positive (H)    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  She had PE development despite being on Coumadin in July 2023 which prompted evaluation of possible recurrent cancer.  Recent PET scan has shown active intake on the right upper lung mass, multiple lymph nodes, brain lesions.  Patient was to undergo bronchoscopy at [facility] for biopsy of one of the lymph nodes to assist in confirmation of cancer diagnosis and treatment plan going forward but was cancelled due to this hospitalization     Plan:   -monitor for cytology results   -Once patient is more stabilized we will attempt to coordinate with interventional pulmonology best options going forward if cytology from pleural fluids is not diagnostic       Pulmonary embolism -  left upper lobe, diagnosed 7/23    Assessment: Diagnosed June 2023 despite being on Coumadin, felt possibly secondary to return of active cancer.  Patient had been on Eliquis but had recently stopped that and was bridging with Lovenox due to bronchoscopy planned.  Lovenox now stopped due to GI bleed during this stay    Plan:   -discussed with family and they are aware of increase risk of recurrent clot however need for holding of anticoagulation at this time due to recent acute bleed      Acute right-sided heart failure (H)    Severe pulmonary hypertension    Assessment: was present last month following acute PE, and is still present on repeat echo performed during this stay despite PE no longer being seen on CTA of the chest     Plan:   -hold home Lasix given critical illness but consider prn IV dosing to maintain euvolemia   -monitor strict I/Os      Essential hypertension, benign    Assessment: Patient normally on diltiazem 240 mg daily, Lasix 20 mg daily, hydrochlorothiazide 25 mg daily and lisinopril 2.5 mg daily.  All have been held in last days with patient needing levophed to maintain blood pressures due to hypotension     Plan:   -Hold all home antihypertensive medications      Pulmonary emphysema, unspecified emphysema type (H)    Assessment: Present at baseline and patient normally is on 4 to 6 L nasal cannula oxygen.  No obvious known acute flare however air movement is severely diminished and unclear if wheezing would be heard in current setting    Plan:   -continue Solumedrol 40 mg every 8 hours   -Continue scheduled bronchodilators 4 times daily  -Continue as needed albuterol as needed for wheezing      CKD (chronic kidney disease) stage 3, GFR 30-59 ml/min (H)    Assessment: Baseline creatinine 1.0-1.2.  Patient's creatinine was mildly elevated at 1.4 at initial presentation but returned to baseline.  Now is again mildly elevated into the 1.3-1.4 range following CTA on 8/14 followed by upper GI bleed  on 8/15    Plan:   -Continue IV fluids, close monitoring of creatinine and urinary output      Coronary artery disease involving native coronary artery of native heart without angina pectoris    Assessment: Patient was diagnosed in 2020 based on radiographic findings for coronary artery calcifications noted following her cancer treatment.  Has been followed by cardiology and Lexiscan was performed which was unremarkable.  Cardiology recommended medical treatment only and patient has not had any symptoms concerning for angina    Plan:   -no further routine monitoring of troponins at this time.   -no ASA at this time given GI bleed      Major depression in complete remission (H)    Situational anxiety    Insomnia    Assessment: Patient is normally on Effexor 75 mg daily.  She was recently started on hydroxyzine and lorazepam due to insomnia and situational anxiety regarding possible recurrent cancer diagnosis.  Lorazepam and all benzodiazepines are now being avoided secondary to worsening respiratory status as outlined above the patient was a started on BuSpar on 8/13 without notable improvement of her symptoms and patient is now sedated and intubated     Plan:   -Restart Effexor tomorrow (PO vs OG) if extubation is performed or anticipated  -Avoid benzodiazepines  -Reassess when patient is extubated      Prediabetes    Assessment: Diagnosed earlier this year and patient was started on metformin however this was discontinued following hospitalization for PE.  Patient has needed several steroid bursts since initial diagnosis but blood sugars have been overall fairly well controlled    Plan:   -Continue monitoring blood sugars every 4 hours with low resistance sliding scale NovoLog as appropriate if hyperglycemia develops given need for IV steroids as above      Normocytic anemia, chronic - complicated by acute upper GI bleed as above    Assessment: Patient has baseline hemoglobin of approximately 10.  Was slightly  "higher than that at time of presentation but following fluid resuscitation returned to baseline range.  Now stabilizing in the 7.7-8.0 range following upper GI bleeding    Plan:   -will perform serial hemoglobin every 8 hours      Hyperlipidemia LDL goal <130    Assessment: Normally on atorvastatin 10 mg daily    Plan:   -Hold for now        Diet: NPO for Medical/Clinical Reasons Except for: No Exceptions    DVT Prophylaxis: Pneumatic Compression Devices  Ponce Catheter: PRESENT, indication: Strict 1-2 Hour I&O  Lines: PRESENT      PICC 08/14/23 Triple Lumen Right Basilic-Site Assessment: WDL  Arterial Line 08/14/23 Wrist-Site Assessment: WDL Except;Positional      Cardiac Monitoring: ACTIVE order. Indication: ICU  Code Status: Full Code      Clinically Significant Risk Factors           # Hypercalcemia: corrected calcium is >10.1, will monitor as appropriate    # Hypoalbuminemia: Lowest albumin = 2.6 g/dL at 8/16/2023  5:26 AM, will monitor as appropriate  # Coagulation Defect: INR = 1.38 (Ref range: 0.85 - 1.15) and/or PTT = N/A, will monitor for bleeding    # Hypertension: Noted on problem list        # Overweight: Estimated body mass index is 29.25 kg/m  as calculated from the following:    Height as of this encounter: 1.6 m (5' 3\").    Weight as of this encounter: 74.9 kg (165 lb 1.6 oz)., PRESENT ON ADMISSION     # COPD: noted on problem list        Disposition Plan   Unknown    Nilam Laboy MD  Hospitalist Service  [facility]  Securely message with [employer] by [employer] (more info)  Text page via [employer] Paging/Directory   ______________________________________________________________________    Interval History   Patient remained vitally stable overnight without any breakthrough A-fib episodes identified.  Remained off Levophed since yesterday morning with blood pressures and maps stable.  Patient did not need increased sedation this morning due to agitation but is much more calm " now in anticipation for upcoming thoracentesis.  Patient continues to have borderline urinary output.  No further blood has been identified in OG tube and hemoglobin has stabilized.  Nursing has been able to wean FiO2 to 30%.  No new nursing concerns.    Physical Exam   Vital Signs: Temp: 98.4  F (36.9  C) Temp src: Oral BP: 100/76 Pulse: 92   Resp: 10 SpO2: 90 % O2 Device: Mechanical Ventilator    Weight: 165 lbs 1.6 oz    Constitutional: Sedated and intubated  Respiratory: Patient does not overbreathing the vent, ongoing diminished air movement but has improved significantly compared to prior to intubation with mild expiratory wheezing noted diffusely  Cardiovascular: Regular rate and rhythm  GI: Bowel sounds present, abdomen is obese but soft  Musculoskeletal: no lower extremity pitting edema present, ongoing nonpitting edema present in bilateral lower legs as well as bruising unchanged from yesterday  Neurologic: Sedated and intubated    Medical Decision Making       51 MINUTES SPENT BY ME on the date of service doing chart review, history, exam, documentation & further activities per the note on this patient with ongoing critical illness.      Data     I have personally reviewed the following data over the past 24 hrs:    20.4 (H)  \   8.0 (L)   / 164     141 106 51.2 (H) /  149 (H)   3.4 24 1.44 (H) \     ALT: 20 AST: 14 AP: 40 TBILI: 0.3   ALB: 2.6 (L) TOT PROTEIN: 4.8 (L) LIPASE: N/A     Procal: 0.13 (H) CRP: 40.98 (H) Lactic Acid: N/A

## 2023-08-16 NOTE — PLAN OF CARE
Goal Outcome Evaluation:      Plan of Care Reviewed With: spouse, child    Overall Patient Progress: improvingOverall Patient Progress: improving    Outcome Evaluation: see SBAR note    S-(situation): End of shift note    B-(background): Respiratory distress on Ventilator     A-(assessment):   SR with PACs and one run of ST this am around 9am.    Thoracentesis at bedside via Radiologist 900ml at 1300.  Family at bedside this am.   RASS 0 to +3 this am = titrating propofol and fentanyl with PRN boluses (see MAR).  This afternoon wean down on gtts for RASS -2 to -3 and will attempt weaning trial     R-(recommendations): monitor.  Will start weaning trial when more alert

## 2023-08-16 NOTE — PLAN OF CARE
"  Problem: Plan of Care - These are the overarching goals to be used throughout the patient stay.    Goal: Plan of Care Review  Description: The Plan of Care Review/Shift note should be completed every shift.  The Outcome Evaluation is a brief statement about your assessment that the patient is improving, declining, or no change.  This information will be displayed automatically on your shift note.  Outcome: Progressing  Goal: Patient-Specific Goal (Individualized)  Description: You can add care plan individualizations to a care plan. Examples of Individualization might be:  \"Parent requests to be called daily at 9am for status\", \"I have a hard time hearing out of my right ear\", or \"Do not touch me to wake me up as it startles me\".  Outcome: Progressing  Goal: Absence of Hospital-Acquired Illness or Injury  Outcome: Progressing  Intervention: Identify and Manage Fall Risk  Recent Flowsheet Documentation  Taken 8/16/2023 0000 by Haris Kumar RN  Safety Promotion/Fall Prevention:   increased rounding and observation   room door open   room near nurse's station   room organization consistent   safety round/check completed   treat reversible contributory factors  Taken 8/15/2023 2000 by Haris Kumar RN  Safety Promotion/Fall Prevention:   increased rounding and observation   room door open   room near nurse's station   room organization consistent   safety round/check completed   treat reversible contributory factors  Intervention: Prevent Skin Injury  Recent Flowsheet Documentation  Taken 8/16/2023 0000 by Haris Kumar RN  Body Position: turned  Taken 8/15/2023 2000 by Haris Kumar RN  Body Position: turned  Intervention: Prevent and Manage VTE (Venous Thromboembolism) Risk  Recent Flowsheet Documentation  Taken 8/16/2023 0000 by Haris Kumar RN  VTE Prevention/Management: other (see comments)  Taken 8/15/2023 2000 by Haris Kumar RN  VTE Prevention/Management: other (see comments)  Goal: Optimal " Comfort and Wellbeing  Outcome: Progressing  Intervention: Provide Person-Centered Care  Recent Flowsheet Documentation  Taken 8/16/2023 0000 by Haris Kumar RN  Trust Relationship/Rapport: care explained  Taken 8/15/2023 2000 by Haris Kumar RN  Trust Relationship/Rapport: care explained  Goal: Readiness for Transition of Care  Outcome: Progressing     Problem: Gas Exchange Impaired  Goal: Optimal Gas Exchange  Outcome: Progressing  Intervention: Optimize Oxygenation and Ventilation  Recent Flowsheet Documentation  Taken 8/16/2023 0000 by Haris Kumar RN  Head of Bed (HOB) Positioning: HOB at 30 degrees  Taken 8/15/2023 2000 by Haris Kumar RN  Head of Bed (HOB) Positioning: HOB at 30 degrees     Problem: Pain Acute  Goal: Optimal Pain Control and Function  Outcome: Progressing  Intervention: Prevent or Manage Pain  Recent Flowsheet Documentation  Taken 8/16/2023 0000 by Haris Kumar RN  Sensory Stimulation Regulation:   auditory stimulation minimized   care clustered   quiet environment promoted  Medication Review/Management:   medications reviewed   high-risk medications identified   infusion initiated  Taken 8/15/2023 2000 by Haris Kumar RN  Sensory Stimulation Regulation:   auditory stimulation minimized   care clustered   quiet environment promoted  Medication Review/Management:   medications reviewed   high-risk medications identified   infusion initiated     Problem: Fall Injury Risk  Goal: Absence of Fall and Fall-Related Injury  Outcome: Progressing  Intervention: Identify and Manage Contributors  Recent Flowsheet Documentation  Taken 8/16/2023 0000 by Haris Kumar RN  Medication Review/Management:   medications reviewed   high-risk medications identified   infusion initiated  Taken 8/15/2023 2000 by Haris Kumar RN  Medication Review/Management:   medications reviewed   high-risk medications identified   infusion initiated  Intervention: Promote Injury-Free Environment  Recent  Flowsheet Documentation  Taken 8/16/2023 0000 by Haris Kumar RN  Safety Promotion/Fall Prevention:   increased rounding and observation   room door open   room near nurse's station   room organization consistent   safety round/check completed   treat reversible contributory factors  Taken 8/15/2023 2000 by Haris Kumar RN  Safety Promotion/Fall Prevention:   increased rounding and observation   room door open   room near nurse's station   room organization consistent   safety round/check completed   treat reversible contributory factors     Problem: Risk for Delirium  Goal: Optimal Coping  Outcome: Progressing  Goal: Improved Behavioral Control  Outcome: Progressing  Intervention: Minimize Safety Risk  Recent Flowsheet Documentation  Taken 8/16/2023 0000 by Haris Kumar RN  Enhanced Safety Measures: room near unit station  Trust Relationship/Rapport: care explained  Taken 8/15/2023 2000 by Haris Kumar RN  Enhanced Safety Measures: room near unit station  Trust Relationship/Rapport: care explained  Goal: Improved Attention and Thought Clarity  Outcome: Progressing  Intervention: Maximize Cognitive Function  Recent Flowsheet Documentation  Taken 8/16/2023 0000 by Haris Kumar RN  Sensory Stimulation Regulation:   auditory stimulation minimized   care clustered   quiet environment promoted  Reorientation Measures: familiar social contact encouraged  Taken 8/15/2023 2000 by Haris Kumar RN  Sensory Stimulation Regulation:   auditory stimulation minimized   care clustered   quiet environment promoted  Reorientation Measures: familiar social contact encouraged  Goal: Improved Sleep  Outcome: Progressing     Problem: Oral Intake Inadequate  Goal: Improved Oral Intake  Outcome: Progressing     Problem: Cardiac Rhythm Management Device  Goal: Optimal Adjustment to Device  Outcome: Progressing  Goal: Absence of Bleeding  Outcome: Progressing  Goal: Effective Oxygenation and Ventilation  Outcome:  Progressing  Intervention: Optimize Oxygenation and Ventilation  Recent Flowsheet Documentation  Taken 8/16/2023 0000 by Haris Kumar RN  Cough And Deep Breathing: unable to perform  Taken 8/15/2023 2000 by Haris Kumar RN  Cough And Deep Breathing: unable to perform     Problem: Mechanical Ventilation Invasive  Goal: Effective Communication  Outcome: Progressing  Intervention: Ensure Effective Communication  Recent Flowsheet Documentation  Taken 8/16/2023 0000 by Haris Kumar RN  Trust Relationship/Rapport: care explained  Taken 8/15/2023 2000 by Haris Kumar RN  Trust Relationship/Rapport: care explained  Goal: Optimal Device Function  Outcome: Progressing  Intervention: Optimize Device Care and Function  Recent Flowsheet Documentation  Taken 8/16/2023 0000 by Haris Kumar RN  Oral Care:   mouth wash rinse   suction provided  Taken 8/15/2023 2000 by Haris Kumar RN  Oral Care:   mouth wash rinse   suction provided  Goal: Mechanical Ventilation Liberation  Outcome: Progressing  Intervention: Promote Extubation and Mechanical Ventilation Liberation  Recent Flowsheet Documentation  Taken 8/16/2023 0000 by Haris Kumar RN  Medication Review/Management:   medications reviewed   high-risk medications identified   infusion initiated  Taken 8/15/2023 2000 by Haris Kumar RN  Medication Review/Management:   medications reviewed   high-risk medications identified   infusion initiated  Goal: Optimal Nutrition Delivery  Outcome: Progressing  Goal: Absence of Device-Related Skin and Tissue Injury  Outcome: Progressing  Goal: Absence of Ventilator-Induced Lung Injury  Outcome: Progressing  Intervention: Prevent Ventilator-Associated Pneumonia  Recent Flowsheet Documentation  Taken 8/16/2023 0000 by Haris Kumar RN  Oral Care:   mouth wash rinse   suction provided  Head of Bed (HOB) Positioning: HOB at 30 degrees  Taken 8/15/2023 2000 by Haris Kumar RN  Oral Care:   mouth wash rinse   suction  provided  Head of Bed (HOB) Positioning: HOB at 30 degrees       Fio2 remains at 30 percent throughout the shift, with that oxygen saturations have been in the mid, and high 90's at times, when Fio2 was briefly titrated down to 25 percent oxygen saturations dropped quickly to mid 80's.   Blood sugar was 61 this morning, 25 ML of D50 was given and recheck was 130.   Blood sugar is trending down again this morning, D5NS started at 100 ml an hour.   Urine out put is 405 for the shift.   Og output is 100 for the shift.  Propofol continues at 45.  Levophed has been off all shift.   Fentanyl is at 25 mcg.   Pt has maintained a Rass score of between -2 to -3 with moderate sedation, she does arouse to voice and touch.   Telemetry has been sinus rhythm with intermittent but brief periods of Sinus tachycardia and Aflutter at times.   Will continue with plan of care.

## 2023-08-17 NOTE — PROGRESS NOTES
First weaning attempt today at 0940. Sedation decreased, RT placed patient on CPAP weaning trial.  Patient agitated, HR 120s, SBP 200s, O2 needs increased. Wean stopped, placed back on original settings and sedation.   Second weaning trial done. Lasted slightly longer than first try but patient not following commands and increased BP. Wean stopped and placed back on original settings and sedation.

## 2023-08-17 NOTE — PROGRESS NOTES
RT called to bedside for ventilator alarms (airway obstruction) and filters and HME were changed but it appears patient is fighting the ventilator, We discussed trying another weaning trial to make patient more comfortable- her ventilator numbers looked great and alarm issues resolved but her blood pressure increased and she appeared agitated again. Weaning trial lasted 20min before decision was made to stop. We will hold off on weaning again until tomorrow.   Receiving nebs per MAR. Crackles heard in right side. Suctioning scant amount from ETT.     RT will continue to follow and assess.

## 2023-08-17 NOTE — PLAN OF CARE
"  Problem: Plan of Care - These are the overarching goals to be used throughout the patient stay.    Goal: Plan of Care Review  Description: The Plan of Care Review/Shift note should be completed every shift.  The Outcome Evaluation is a brief statement about your assessment that the patient is improving, declining, or no change.  This information will be displayed automatically on your shift note.  Outcome: Progressing  Goal: Patient-Specific Goal (Individualized)  Description: You can add care plan individualizations to a care plan. Examples of Individualization might be:  \"Parent requests to be called daily at 9am for status\", \"I have a hard time hearing out of my right ear\", or \"Do not touch me to wake me up as it startles me\".  Outcome: Progressing  Goal: Absence of Hospital-Acquired Illness or Injury  Outcome: Progressing  Intervention: Identify and Manage Fall Risk  Recent Flowsheet Documentation  Taken 8/17/2023 0400 by Haris Kumar RN  Safety Promotion/Fall Prevention:   activity supervised   increased rounding and observation   increase visualization of patient   room door open   room near nurse's station   room organization consistent   safety round/check completed   supervised activity  Taken 8/17/2023 0000 by Haris Kumar RN  Safety Promotion/Fall Prevention:   activity supervised   increased rounding and observation   increase visualization of patient   room door open   room near nurse's station   room organization consistent   safety round/check completed   supervised activity  Intervention: Prevent Skin Injury  Recent Flowsheet Documentation  Taken 8/17/2023 0500 by Haris Kumar RN  Body Position:   turned   left  Taken 8/17/2023 0400 by Haris Kumar RN  Body Position: turned  Taken 8/17/2023 0000 by Haris Kumar RN  Body Position: turned  Intervention: Prevent and Manage VTE (Venous Thromboembolism) Risk  Recent Flowsheet Documentation  Taken 8/17/2023 0400 by Haris Kumar RN  VTE " 03/30/2018OS-[id number]+[id number]/20 -2&nbsp;SN &nbsp; &nbsp; Prevention/Management: SCDs (sequential compression devices) on  Taken 8/17/2023 0000 by Haris Kumar RN  VTE Prevention/Management: SCDs (sequential compression devices) on  Goal: Optimal Comfort and Wellbeing  Outcome: Progressing  Intervention: Provide Person-Centered Care  Recent Flowsheet Documentation  Taken 8/17/2023 0400 by Haris Kumar RN  Trust Relationship/Rapport: care explained  Taken 8/17/2023 0000 by Haris Kumar RN  Trust Relationship/Rapport: care explained  Goal: Readiness for Transition of Care  Outcome: Progressing     Problem: Gas Exchange Impaired  Goal: Optimal Gas Exchange  Outcome: Progressing  Intervention: Optimize Oxygenation and Ventilation  Recent Flowsheet Documentation  Taken 8/17/2023 0400 by Haris Kumar RN  Airway/Ventilation Management:   airway patency maintained   calming measures promoted  Head of Bed (HOB) Positioning: HOB at 20-30 degrees  Taken 8/17/2023 0000 by Haris Kumar RN  Airway/Ventilation Management:   airway patency maintained   calming measures promoted  Head of Bed (HOB) Positioning: HOB at 20-30 degrees     Problem: Pain Acute  Goal: Optimal Pain Control and Function  Outcome: Progressing  Intervention: Prevent or Manage Pain  Recent Flowsheet Documentation  Taken 8/17/2023 0400 by Haris Kumar RN  Sensory Stimulation Regulation:   care clustered   music on  Sleep/Rest Enhancement:   awakenings minimized   music provided   relaxation techniques promoted  Complementary Therapy: massage therapy performed  Medication Review/Management: medications reviewed  Taken 8/17/2023 0000 by Haris Kumar RN  Sensory Stimulation Regulation:   care clustered   music on  Sleep/Rest Enhancement:   awakenings minimized   music provided   relaxation techniques promoted  Complementary Therapy: massage therapy performed  Medication Review/Management: medications reviewed  Intervention: Optimize Psychosocial Wellbeing  Recent Flowsheet Documentation  Taken  8/17/2023 0400 by Haris Kumar RN  Spiritual Activities Assistance: music provided  Taken 8/17/2023 0000 by Haris Kumar RN  Spiritual Activities Assistance: music provided     Problem: Fall Injury Risk  Goal: Absence of Fall and Fall-Related Injury  Outcome: Progressing  Intervention: Identify and Manage Contributors  Recent Flowsheet Documentation  Taken 8/17/2023 0400 by Haris Kumar RN  Medication Review/Management: medications reviewed  Taken 8/17/2023 0000 by Haris Kumar RN  Medication Review/Management: medications reviewed  Intervention: Promote Injury-Free Environment  Recent Flowsheet Documentation  Taken 8/17/2023 0400 by Haris Kumar RN  Safety Promotion/Fall Prevention:   activity supervised   increased rounding and observation   increase visualization of patient   room door open   room near nurse's station   room organization consistent   safety round/check completed   supervised activity  Taken 8/17/2023 0000 by Haris Kumar RN  Safety Promotion/Fall Prevention:   activity supervised   increased rounding and observation   increase visualization of patient   room door open   room near nurse's station   room organization consistent   safety round/check completed   supervised activity     Problem: Risk for Delirium  Goal: Optimal Coping  Outcome: Progressing  Goal: Improved Behavioral Control  Outcome: Progressing  Intervention: Prevent and Manage Agitation  Recent Flowsheet Documentation  Taken 8/17/2023 0400 by Haris Kumar RN  Complementary Therapy: massage therapy performed  Taken 8/17/2023 0000 by Haris Kumar RN  Complementary Therapy: massage therapy performed  Intervention: Minimize Safety Risk  Recent Flowsheet Documentation  Taken 8/17/2023 0400 by Haris Kumar RN  Enhanced Safety Measures: room near unit station  Trust Relationship/Rapport: care explained  Taken 8/17/2023 0000 by Haris Kumar RN  Enhanced Safety Measures: room near unit station  Trust  Relationship/Rapport: care explained  Goal: Improved Attention and Thought Clarity  Outcome: Progressing  Intervention: Maximize Cognitive Function  Recent Flowsheet Documentation  Taken 8/17/2023 0400 by Haris Kumar RN  Sensory Stimulation Regulation:   care clustered   music on  Reorientation Measures: reorientation provided  Taken 8/17/2023 0000 by Haris Kumar RN  Sensory Stimulation Regulation:   care clustered   music on  Reorientation Measures: reorientation provided  Goal: Improved Sleep  Outcome: Progressing  Intervention: Promote Sleep  Recent Flowsheet Documentation  Taken 8/17/2023 0400 by Haris Kumar RN  Sleep/Rest Enhancement:   awakenings minimized   music provided   relaxation techniques promoted  Taken 8/17/2023 0000 by Haris Kumar RN  Sleep/Rest Enhancement:   awakenings minimized   music provided   relaxation techniques promoted     Problem: Oral Intake Inadequate  Goal: Improved Oral Intake  Outcome: Progressing     Problem: Cardiac Rhythm Management Device  Goal: Optimal Adjustment to Device  Outcome: Progressing  Goal: Absence of Bleeding  Outcome: Progressing  Goal: Effective Oxygenation and Ventilation  Outcome: Progressing  Intervention: Optimize Oxygenation and Ventilation  Recent Flowsheet Documentation  Taken 8/17/2023 0400 by Haris Kumar RN  Cough And Deep Breathing: unable to perform  Airway/Ventilation Management:   airway patency maintained   calming measures promoted  Taken 8/17/2023 0000 by Haris Kumar RN  Cough And Deep Breathing: unable to perform  Airway/Ventilation Management:   airway patency maintained   calming measures promoted     Problem: Mechanical Ventilation Invasive  Goal: Effective Communication  Outcome: Progressing  Intervention: Ensure Effective Communication  Recent Flowsheet Documentation  Taken 8/17/2023 0400 by Haris Kumar RN  Trust Relationship/Rapport: care explained  Taken 8/17/2023 0000 by Haris Kumar RN Trust  Relationship/Rapport: care explained  Goal: Optimal Device Function  Outcome: Progressing  Intervention: Optimize Device Care and Function  Recent Flowsheet Documentation  Taken 8/17/2023 0400 by Haris Kumar RN  Airway Safety Measures:   all equipment/monitors on and audible   manual resuscitator/mask/valve at bedside  Airway/Ventilation Management:   airway patency maintained   calming measures promoted  Oral Care: swabbed with antiseptic solution  Taken 8/17/2023 0000 by Haris Kumar RN  Airway Safety Measures:   all equipment/monitors on and audible   manual resuscitator/mask/valve at bedside  Airway/Ventilation Management:   airway patency maintained   calming measures promoted  Oral Care: swabbed with antiseptic solution  Goal: Mechanical Ventilation Liberation  Outcome: Progressing  Intervention: Promote Extubation and Mechanical Ventilation Liberation  Recent Flowsheet Documentation  Taken 8/17/2023 0400 by Haris Kumar RN  Sleep/Rest Enhancement:   awakenings minimized   music provided   relaxation techniques promoted  Medication Review/Management: medications reviewed  Environmental Support:   calm environment promoted   distractions minimized  Taken 8/17/2023 0000 by Haris Kumar RN  Sleep/Rest Enhancement:   awakenings minimized   music provided   relaxation techniques promoted  Medication Review/Management: medications reviewed  Environmental Support:   calm environment promoted   distractions minimized  Goal: Optimal Nutrition Delivery  Outcome: Progressing  Goal: Absence of Device-Related Skin and Tissue Injury  Outcome: Progressing  Goal: Absence of Ventilator-Induced Lung Injury  Outcome: Progressing  Intervention: Prevent Ventilator-Associated Pneumonia  Recent Flowsheet Documentation  Taken 8/17/2023 0400 by Haris Kumar RN  Oral Care: swabbed with antiseptic solution  Head of Bed (HOB) Positioning: HOB at 20-30 degrees  Taken 8/17/2023 0000 by Haris Kumar RN  Oral Care: swabbed  with antiseptic solution  Head of Bed (HOB) Positioning: HOB at 20-30 degrees       Vent settings remain unchanged this shift other than Fio2 which is now at 33 percent.   Oxygen saturations have been around mid to high 90's , titration of Fi02 to 30 percent resulted in desat to the low 80's. Lungs are clear in the upper lobes and diminished in the bases.   Telemetry has shown sinus rhythm with a rate in the 70's, there has been no rapid or irregular rhythms this shift so far.   Fentanyl continues at 75 , Propofol at 55, Rass score has been around -3, Pt does arouse to light physical stimuli.   Blood pressures have been stable.   Urine output has been moderate.   Mits remain in place with no complication.   Blood sugar was 210 and covered on sliding scale.   Will continue to monitor and follow plan of care.

## 2023-08-17 NOTE — PROGRESS NOTES
After two failed weaning attempts patient has remained on vent settings:  Ventilator settings:  Mode: AC/VC                VT: 370  Rate: 14  PEEP: 7  FiO2: 35  Plateau pressures during shift: 30  PEEPi during shift: 8  Weaning attempted: Yes  ETT size/secured: 7.5/22    Breath sounds: clear  Secretions: scant white  Respiratory meds given: yes         My recommendation is to work on decreasing peep and switching to SIMV mode with pressure support to facilitate weaning as patient is occasionally breathing over the vent this evening. Otherwise no issues today. Please call if a need arises.

## 2023-08-17 NOTE — PLAN OF CARE
Goal Outcome Evaluation:      Plan of Care Reviewed With: patient    Overall Patient Progress: no changeOverall Patient Progress: no change    Outcome Evaluation: Fio2 has ranged from 30%-50%. Pt has had times of agitation, restlessness and asynchrony with the vent. Sedation and pain medications adjusted several times to achieve comfort. Amiodorone stopped this evening. Pt had 1 brief episode of rapid a fib shortly after but has been SR in the 80's since. No obvious blood from OG. Hemoglobin 7.9 tonight.

## 2023-08-17 NOTE — PROGRESS NOTES
At 12:35- Failed second wean attempt, vent toleration was ok but NIBP 198/98, not following commands for following with eyes, attempted to move toes but not very successful, did not follow to squeeze hands.  At 1225 restarted propofol at 25 mcg and fentanyl increased to 50 from 25.    At 12:45- RT stated this is the second wean trial of the day and suggests we let her rest awhile. Propofol now at 40, Fentanyl at 50.  RT did state the patient is trying to over breath the vent as she was earlier, but can still go up on propofol and fentanyl settings to previous doses (Fentanyl 100, propofol 50) if we need to.

## 2023-08-17 NOTE — PROGRESS NOTES
Attempted weaning trial this morning. Patient was not breathing adequate so sedation was turned down and unfortunately patient became very agitated with dropping oxygen saturations ( needing 50%) and heart rate and blood pressure (200/80) increased. We were unable to calm patient down so after about 10min we turned sedation back up and will try again in a few hours.   RT to follow.

## 2023-08-17 NOTE — PROGRESS NOTES
"Tele-ICU note  Case discussed with Dr. Laboy and appreciate her fine care and plans     Patient has acute respiratory failure due to COPD exacerbation, and large exudative pleural effusion suspicious for malignant effusion, and possible CAP. She continues on duonebs, breo, solumedrol, and Zosyn. She is on ventilator at 35% and +7 PEEP.     By report she had difficulty on breathing trial immediately which may have been due to effects of sedatives.     Review of CXR today shows only less right sided effusion after yesterday's second thoracentesis; cytology still pending.     With Na 146 free water has been ordered per NGT and IV fluid rate decreased from 75 to 50 mls/hr.     Plan  We will do a breathing trial tomorrow and will try and hold sedation for longer in preparation. Propofol has been changed to precedex which will hopefully be less sedating.     Please call Tele-ICU tomorrow and let us know when you plan to start breathing trial so we can \"camera in\" with our elimra-visual link and monitor her progress with you.     We will check in tomorrow and are available any time you need assistance.      Sal ruiz  August 17, 2023  "

## 2023-08-17 NOTE — PROGRESS NOTES
[facility]    Medicine Progress Note - Hospitalist Service    Date of Admission:  8/12/2023    Assessment & Plan   Patient is a 73-year-old female with past medical history of emphysema and chronic respiratory failure on 2 to 6 L nasal cannula at baseline, paroxysmal atrial fibrillation, recent pulmonary embolism, breast cancer diagnosed in 2019 with concern for metastasis to the brain and lungs, hypertension, hyperlipidemia, depression, chronic kidney disease stage III, chronic anemia, CAD, prediabetes and obesity who presented to the emergency room with complaints of lethargy and shortness of breath and was found to be in A-fib with RVR.  She converted following IV and p.o. diltiazem and was registered to observation status for monitoring of return to baseline.  Patient received recently started home dose of lorazepam and had worsening sedation and acute encephalopathy and was found to have acute on chronic respiratory failure with hypoxia and hypercapnia and was admitted to the ICU on 8/13/2023 and given flumazenil and initiated on BiPAP for respiratory support.      Despite these interventions patient continued to worsen and required intubation and sedation the morning of 8/14/23 and work up showed rapidly expanding right pleural effusion with concern for underlying atelectasis vs infection in patient with worsening WBC.  Patient was started on Zosyn and Vanco, intubated and sedated with Propofol and Fentanyl.  She did need transient Levophed for hypotension from 8/14-8/15 but has now been off pressors for over 24 hours.  Antibiotics have been narrowed to Zosyn alone following negative MRSA swab with blood cultures and sputum cultures pending.  Thoracentesis for 1L was performed on 8/15 for therapeutic and diagnostic purposes with removal intentionally stopped after 1 L to avoid/pulmonary edema per radiology team.  Testing from thoracentesis is showing an exudate with   without predominant shift, Cultures and cytology performed are pending.  Patient underwent a second therapeutic thoracentesis on 8/16 with another 800-900 mL removed.      Hospitalization was further complicated on 8/15/2023 with development of an upper GI bleed confirmed by positive gastric occult and a 2 g hemoglobin drop despite no change in IV fluid intake.  Lovenox has been held and PPI has been increased to twice daily dosing with hemoglobin over the last 24 hours has now stabilized in the upper 7 range.  Further complicated by mild hematuria noted in urine today which has not been present previously    Today, attempted pressure support CPAP trial x2 with minimal tolerance but unclear if it is related to patient anxiety vs respiratory intolerance.      Plan for today:  -Start Precedex with hopes of getting patient off Propofol completely over the next hours.  Allow patient to be calm to slightly drowsy and plan to attemt pressure support trial again tomorrow morning with hopeful extubation to Bipap.    -continue with Zosyn for antibiotic coverage and awake sputum and pleural fluid culture results  -continue BID PPI and holding of Lovenox with ongoing monitoring of hemoglobin stability  -monitor HR closely and consider amiodarone infusion reinitiating vs OG administration if there is concern for recurrent A fib with RVR  -continue with current vent settings     VENT:  CMV mode  Tital volume 370  PEEP 7  RR 14  Plateau pressure: 26-29 - did increased to 33 during CPAP trial  ABG 7.35/47/85/26 on FiO2 of 33%  P/F ratio: 258    Drips:  Propofol 40 mcg/kg/min  Fentanyl 50 mcg/hr    Lines/Drains/Etc:  ET tube 7.5 mm placed 8/14  OG tube placed 8/14  PICC line placed 8/14  Art line placed 8/14  Ponce placed 8/14    Principal Problem:    Acute on chronic respiratory failure with hypoxia and hypercapnia (H)    Assessment: suspected secondary to rapidly increasing right sided pleural effusion with possible underlying  infectious vs cancerous etiology, exacerbated by lorazepam administration worsening hypercapnia.  Now intubated and sedated with ABGs improving and FiO2 continuing to decrease.  S/p thoracentesis x2 (8/15 and 8/16). CRP continuing to improve.  WBC increased slightly today but unclear etiology given IV steroids.     Plan:   -continue with current vent settings  -continue Zosyn for antibiotic coverage  -continue IV solumedrol and scheduled duonebs  -monitor for sputum culture and pleural fluid culture results   -attempt transition to Precedex and stopping Propofol with plan for pressure support trial tomorrow.   -will extubate to Bipap when patient shows signs of being ready    Active Problems:    Acute toxic-metabolic encephalopathy    Assessment: progressively worsening secondary to hypercapnia development as respiratory failure worsened.     Plan:   -continue with treatment as above  -will need to reassess mentation following lightening of sedation and extubation tomorrow.       Leukocytosis, unspecified type    Possible community acquired pneumonia     Assessment: Present at time of admission, improved after initial fluid resuscitation but then worsening again despite ongoing fluids with worsening signs of respiratory failure and possible infectious etiology.  Procalcitonin showed risk of possible localized infection but no sepsis.  WBC had started to improve but now slightly worse again today but in context of IV steroid initiation     Plan:   -Continue Zosyn as above   -monitor CBC daily       Atrial fibrillation with RVR (H)      AF (paroxysmal atrial fibrillation) (H)    Assessment: Patient has known paroxysmal A-fib and did have A-fib with RVR present at time of ED presentation.  Did have return of frequent a fib episodes since intubation as diltiazem has been held due to hypotension issues and patient started on IV amiodarone bolus and infusion 8/15 with good results and no further a fib runs noted. Lovenox  had to be held secondary to upper GI bleed.     Plan:   -continue off amiodarone for now but start infusion again vs OG administration if further episodes seen.  -continue telemetry monitoring   -Continue to hold Lovenox  -anticipate restarting home diltiazem dosing in the future       Upper GI bleeding    Assessment:  between 8/14/23 and 8/15/23 patient had an almost 2 point drop in hemoglobin (10.6 down to 8.8) with OG tube starting to suction out bright red blood and then melanotic appearing output in the early morning of 8/15.  Lovenox held and PPI increased to BID dosing and over the next 24 hours hemoglobin has stabilized in the 7.7-8.0 range without further bleeding noted on OG output.  Is slightly lower at 7.5 today but no evidence of bleeding recurrence and patient appears slightly fluid overloaded.      Plan:    -continue to monitor hemoglobin every 8 hours today to ensure stability  -continue BID PPI  -continue to hold Lovenox       Elevated troponin - demand ischemia from A fib with RVR    Assessment: Present at time of admission thought secondary to A-fib with RVR, had been trending downward without concern for non-STEMI    Plan:   -No further monitoring unless patient's clinical status changes       Hyponatremia    Assessment: Noted initially, resolved with fluid resuscitation    Plan:   -Recheck daily to ensure ongoing resolution      Hypercalcemia    Assessment: Noted initially, resolved with IV fluids    Plan:   -Recheck daily to ensure ongoing resolution      Malignant neoplasm of overlapping sites of left breast in female, estrogen receptor positive (H)    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  She had PE development despite being on Coumadin in July 2023 which prompted evaluation of possible recurrent cancer.  Recent PET scan has shown  active intake on the right upper lung mass, multiple lymph nodes, brain lesions.  Patient was to undergo bronchoscopy at [facility] for biopsy of one of the lymph nodes to assist in confirmation of cancer diagnosis and treatment plan going forward but was cancelled due to this hospitalization     Plan:   -monitor for cytology results   -Once patient is more stabilized we will attempt to coordinate with interventional pulmonology best options going forward if cytology from pleural fluids is not diagnostic       Pulmonary embolism - left upper lobe, diagnosed 7/23    Assessment: Diagnosed June 2023 despite being on Coumadin, felt possibly secondary to return of active cancer.  Patient had been on Eliquis but had recently stopped that and was bridging with Lovenox due to bronchoscopy planned.  Lovenox now stopped due to GI bleed during this stay    Plan:   -discussed with family and they are aware of increase risk of recurrent clot however need for holding of anticoagulation at this time due to recent acute bleed      Acute right-sided heart failure (H)    Severe pulmonary hypertension    Assessment: was present last month following acute PE, and is still present on repeat echo performed during this stay despite PE no longer being seen on CTA of the chest.  Patient is now showing some signs of volume overload and has increased pulmonary edema on x-ray.  Weight up approximately 25 lbs since admission if weights are accurate.    Plan:   -Give Lasix 20 mg IV x1 and monitor for urine output results and weight improvement  -monitor strict I/Os      Essential hypertension, benign    Assessment: Patient normally on diltiazem 240 mg daily, Lasix 20 mg daily, hydrochlorothiazide 25 mg daily and lisinopril 2.5 mg daily.  All have been held in last days with blood pressures normal apart from during pressure support trials.     Plan:   -Hold all home antihypertensive medications  -IV Lasix 20 mg x1 now.      Pulmonary emphysema,  unspecified emphysema type (H)    Assessment: Present at baseline and patient normally is on 4 to 6 L nasal cannula oxygen.  No obvious known acute flare however air movement is severely diminished and unclear if wheezing would be heard in current setting    Plan:   -continue Solumedrol 40 mg every but decrease to every 12 hour dosing.   -Continue scheduled bronchodilators 4 times daily  -Continue as needed albuterol as needed for wheezing      CKD (chronic kidney disease) stage 3, GFR 30-59 ml/min (H)    Assessment: Baseline creatinine 1.0-1.2.  Patient's creatinine was mildly elevated at 1.4 at initial presentation but returned to baseline.  Now is again mildly elevated into the 1.3-1.4 range following CTA on 8/14 followed by upper GI bleed on 8/15    Plan:   -Continue IV fluids, close monitoring of creatinine and urinary output      Coronary artery disease involving native coronary artery of native heart without angina pectoris    Assessment: Patient was diagnosed in 2020 based on radiographic findings for coronary artery calcifications noted following her cancer treatment.  Has been followed by cardiology and Lexiscan was performed which was unremarkable.  Cardiology recommended medical treatment only and patient has not had any symptoms concerning for angina    Plan:   -no further routine monitoring of troponins at this time.   -no ASA at this time given GI bleed      Major depression in complete remission (H)    Situational anxiety    Insomnia    Assessment: Patient is normally on Effexor 75 mg daily.  She was recently started on hydroxyzine and lorazepam due to insomnia and situational anxiety regarding possible recurrent cancer diagnosis.  Lorazepam and all benzodiazepines are now being avoided secondary to worsening respiratory status as outlined above the patient was a started on BuSpar on 8/13 without notable improvement of her symptoms and patient is now sedated and intubated     Plan:   -Restart Effexor  tomorrow (PO vs OG) if extubation is performed or anticipated  -Avoid benzodiazepines  -Reassess when patient is extubated      Prediabetes    Assessment: Diagnosed earlier this year and patient was started on metformin however this was discontinued following hospitalization for PE.  Patient has needed several steroid bursts since initial diagnosis but blood sugars have been overall fairly well controlled    Plan:   -Continue monitoring blood sugars every 4 hours with low resistance sliding scale NovoLog as appropriate if hyperglycemia develops given need for IV steroids as above      Normocytic anemia, chronic - complicated by acute upper GI bleed as above    Assessment: Patient has baseline hemoglobin of approximately 10.  Was slightly higher than that at time of presentation but following fluid resuscitation returned to baseline range.  Now stabilizing in the 7.7-8.0 range following upper GI bleeding but hematuria noted on urine, no clots.  Hemoglobin slightly lower at 7.5 this morning.     Plan:   -will perform serial hemoglobin every 8 hours      Hyperlipidemia LDL goal <130    Assessment: Normally on atorvastatin 10 mg daily    Plan:   -Hold for now        Diet: NPO for Medical/Clinical Reasons Except for: No Exceptions    DVT Prophylaxis: Pneumatic Compression Devices  Ponce Catheter: PRESENT, indication: Strict 1-2 Hour I&O  Lines: PRESENT      PICC 08/14/23 Triple Lumen Right Basilic-Site Assessment: WDL  Arterial Line 08/14/23 Wrist-Site Assessment: WDL      Cardiac Monitoring: ACTIVE order. Indication: ICU  Code Status: Full Code      Clinically Significant Risk Factors        # Hypokalemia: Lowest K = 3.2 mmol/L in last 2 days, will replace as needed  # Hypernatremia: Highest Na = 146 mmol/L in last 2 days, will monitor as appropriate      # Hypoalbuminemia: Lowest albumin = 2.5 g/dL at 8/17/2023  6:23 AM, will monitor as appropriate  # Coagulation Defect: INR = 1.38 (Ref range: 0.85 - 1.15) and/or PTT =  "N/A, will monitor for bleeding    # Hypertension: Noted on problem list        # Overweight: Estimated body mass index is 29.25 kg/m  as calculated from the following:    Height as of this encounter: 1.6 m (5' 3\").    Weight as of this encounter: 74.9 kg (165 lb 1.6 oz).      # COPD: noted on problem list        Disposition Plan   Unknown       Nilam Laboy MD  Hospitalist Service  [facility]  Securely message with [employer] (more info)  Text page via [email or web address] Paging/Directory   ______________________________________________________________________    Interval History   Patient has been afebrile with blood pressures well controlled and HR normal without further a fib with RVR noted even following amiodarone infusion discontinuation.  Patient has attempted pressure support trial x2 and both times patient becomes agitated with severe hypertension and tachycardia development with increased RR and patient calmed once placed back on CMV mode and sedation increased.  Urine output has increased overall today but is intermittently watermelon colored which is new.  No other new nursing symptoms.     Physical Exam   Vital Signs: Temp: 98.5  F (36.9  C) Temp src: Oral BP: (!) 152/79 Pulse: 98   Resp: 15 SpO2: 94 % O2 Device: Mechanical Ventilator    Weight: 165 lbs 1.6 oz    Constitutional: sedated and intubated, opens eyes to touch but not voice   Respiratory: No increased work of breathing, decreased air exchange,  slight increase in crackles in bases today  Cardiovascular: Normal apical impulse, regular rate and rhythm, normal S1 and S2, no S3 or S4, and no murmur noted  GI: bowel sounds present, abdomen soft and obese but non-distended   Skin: capillary refill less than 2 second, ongoing diffuse bruising   Musculoskeletal: patient has trace pitting edema in bilateral lower legs superimposed on chronic non-pitting edema present  Neurologic: sedated and intubated     Medical Decision " Making       45 MINUTES SPENT BY ME on the date of service doing chart review, history, exam, documentation & further activities per the note.      Data     I have personally reviewed the following data over the past 24 hrs:    21.3 (H)  \   8.7 (L)   / 145 (L)     146 (H) 112 (H) 44.1 (H) /  153 (H)   4.4 24 1.39 (H) \     ALT: 22 AST: 14 AP: 36 TBILI: 0.2   ALB: 2.5 (L) TOT PROTEIN: 4.5 (L) LIPASE: N/A     Procal: N/A CRP: 19.59 (H) Lactic Acid: N/A         Imaging results reviewed over the past 24 hrs:   Recent Results (from the past 24 hour(s))   XR Chest Port 1 View    Narrative    CHEST ONE VIEW  8/17/2023 2:55 PM     HISTORY: follow up respiratory failure    COMPARISON: Chest x-ray 8/15/2023.      Impression    IMPRESSION: Stable cardiac silhouette. Aortic arch calcification.  Stable positioning of support devices. Emphysematous changes.  Pulmonary vascular congestion. Small right pleural effusion, decreased  in size compared to prior. Trace left pleural effusion. No  pneumothorax. Similar appearance of right perihilar opacity which may  represent residual atelectasis.    ELIZABETH POLO MD         SYSTEM ID:  [id number]

## 2023-08-18 PROBLEM — D69.6 THROMBOCYTOPENIA (H): Status: ACTIVE | Noted: 2023-01-01

## 2023-08-18 PROBLEM — R60.1 ANASARCA: Status: ACTIVE | Noted: 2023-01-01

## 2023-08-18 PROBLEM — E87.6 HYPOKALEMIA: Status: ACTIVE | Noted: 2023-01-01

## 2023-08-18 PROBLEM — I50.812 CHRONIC RIGHT-SIDED HEART FAILURE (H): Status: ACTIVE | Noted: 2023-01-01

## 2023-08-18 PROBLEM — D68.9 MEDICATION INDUCED COAGULOPATHY (H): Status: ACTIVE | Noted: 2023-01-01

## 2023-08-18 PROBLEM — K92.2 ACUTE UPPER GI BLEED: Status: ACTIVE | Noted: 2023-01-01

## 2023-08-18 PROBLEM — T50.905A MEDICATION INDUCED COAGULOPATHY (H): Status: ACTIVE | Noted: 2023-01-01

## 2023-08-18 PROBLEM — E88.09 HYPOALBUMINEMIA: Status: ACTIVE | Noted: 2023-01-01

## 2023-08-18 PROBLEM — R31.0 GROSS HEMATURIA: Status: ACTIVE | Noted: 2023-01-01

## 2023-08-18 PROBLEM — J95.859: Status: ACTIVE | Noted: 2023-01-01

## 2023-08-18 PROBLEM — J90 PLEURAL EFFUSION ON RIGHT: Status: ACTIVE | Noted: 2023-01-01

## 2023-08-18 PROBLEM — D62 ACUTE BLOOD LOSS ANEMIA: Status: ACTIVE | Noted: 2023-01-01

## 2023-08-18 NOTE — PLAN OF CARE
"  Problem: Plan of Care - These are the overarching goals to be used throughout the patient stay.    Goal: Plan of Care Review  Description: The Plan of Care Review/Shift note should be completed every shift.  The Outcome Evaluation is a brief statement about your assessment that the patient is improving, declining, or no change.  This information will be displayed automatically on your shift note.  Outcome: Progressing  Goal: Patient-Specific Goal (Individualized)  Description: You can add care plan individualizations to a care plan. Examples of Individualization might be:  \"Parent requests to be called daily at 9am for status\", \"I have a hard time hearing out of my right ear\", or \"Do not touch me to wake me up as it startles me\".  Outcome: Progressing  Goal: Absence of Hospital-Acquired Illness or Injury  Outcome: Progressing  Intervention: Identify and Manage Fall Risk  Recent Flowsheet Documentation  Taken 8/18/2023 0400 by Haris Kumar RN  Safety Promotion/Fall Prevention:   increased rounding and observation   increase visualization of patient   room door open   room near nurse's station   room organization consistent   safety round/check completed  Taken 8/18/2023 0000 by Haris Kumar RN  Safety Promotion/Fall Prevention:   increased rounding and observation   increase visualization of patient   room door open   room near nurse's station   room organization consistent   safety round/check completed  Taken 8/17/2023 2000 by Haris Kumar RN  Safety Promotion/Fall Prevention:   increased rounding and observation   increase visualization of patient   room door open   room near nurse's station   room organization consistent   safety round/check completed  Intervention: Prevent Skin Injury  Recent Flowsheet Documentation  Taken 8/18/2023 0400 by Haris Kumar RN  Body Position: turned  Taken 8/18/2023 0000 by Haris Kumar RN  Body Position: turned  Intervention: Prevent and Manage VTE (Venous " Thromboembolism) Risk  Recent Flowsheet Documentation  Taken 8/18/2023 0400 by Haris Kumar RN  VTE Prevention/Management: SCDs (sequential compression devices) on  Taken 8/18/2023 0000 by Haris Kumar RN  VTE Prevention/Management: SCDs (sequential compression devices) on  Taken 8/17/2023 2000 by Haris Kumar RN  VTE Prevention/Management: SCDs (sequential compression devices) on  Goal: Optimal Comfort and Wellbeing  Outcome: Progressing  Intervention: Provide Person-Centered Care  Recent Flowsheet Documentation  Taken 8/18/2023 0400 by Haris Kumar RN  Trust Relationship/Rapport: care explained  Taken 8/18/2023 0000 by Haris Kumar RN  Trust Relationship/Rapport: care explained  Taken 8/17/2023 2000 by Haris Kumar RN  Trust Relationship/Rapport: care explained  Goal: Readiness for Transition of Care  Outcome: Progressing     Problem: Gas Exchange Impaired  Goal: Optimal Gas Exchange  Outcome: Progressing  Intervention: Optimize Oxygenation and Ventilation  Recent Flowsheet Documentation  Taken 8/18/2023 0400 by Haris Kumar RN  Head of Bed (HOB) Positioning: HOB at 20-30 degrees  Taken 8/18/2023 0000 by Haris Kumar RN  Head of Bed (HOB) Positioning: HOB at 20-30 degrees     Problem: Pain Acute  Goal: Optimal Pain Control and Function  Outcome: Progressing  Intervention: Prevent or Manage Pain  Recent Flowsheet Documentation  Taken 8/18/2023 0400 by Haris Kumar RN  Sensory Stimulation Regulation:   care clustered   quiet environment promoted  Sleep/Rest Enhancement: awakenings minimized  Medication Review/Management: medications reviewed  Taken 8/18/2023 0000 by Haris Kumar RN  Sensory Stimulation Regulation:   care clustered   quiet environment promoted  Sleep/Rest Enhancement: awakenings minimized  Medication Review/Management: medications reviewed  Taken 8/17/2023 2000 by Haris Kumar RN  Sensory Stimulation Regulation:   care clustered   quiet environment  promoted  Sleep/Rest Enhancement: awakenings minimized  Medication Review/Management: medications reviewed     Problem: Fall Injury Risk  Goal: Absence of Fall and Fall-Related Injury  Outcome: Progressing  Intervention: Identify and Manage Contributors  Recent Flowsheet Documentation  Taken 8/18/2023 0400 by Haris Kumar RN  Medication Review/Management: medications reviewed  Taken 8/18/2023 0000 by Haris Kumar RN  Medication Review/Management: medications reviewed  Taken 8/17/2023 2000 by Haris Kumar RN  Medication Review/Management: medications reviewed  Intervention: Promote Injury-Free Environment  Recent Flowsheet Documentation  Taken 8/18/2023 0400 by Haris Kumar RN  Safety Promotion/Fall Prevention:   increased rounding and observation   increase visualization of patient   room door open   room near nurse's station   room organization consistent   safety round/check completed  Taken 8/18/2023 0000 by Haris Kumar RN  Safety Promotion/Fall Prevention:   increased rounding and observation   increase visualization of patient   room door open   room near nurse's station   room organization consistent   safety round/check completed  Taken 8/17/2023 2000 by Haris Kumar RN  Safety Promotion/Fall Prevention:   increased rounding and observation   increase visualization of patient   room door open   room near nurse's station   room organization consistent   safety round/check completed     Problem: Risk for Delirium  Goal: Optimal Coping  Outcome: Progressing  Goal: Improved Behavioral Control  Outcome: Progressing  Intervention: Minimize Safety Risk  Recent Flowsheet Documentation  Taken 8/18/2023 0400 by Haris Kumar RN  Enhanced Safety Measures: room near unit station  Trust Relationship/Rapport: care explained  Taken 8/18/2023 0000 by Haris Kumar RN  Enhanced Safety Measures: room near unit station  Trust Relationship/Rapport: care explained  Taken 8/17/2023 2000 by Haris Kumar  RN  Enhanced Safety Measures: room near unit station  Trust Relationship/Rapport: care explained  Goal: Improved Attention and Thought Clarity  Outcome: Progressing  Intervention: Maximize Cognitive Function  Recent Flowsheet Documentation  Taken 8/18/2023 0400 by Haris Kumar RN  Sensory Stimulation Regulation:   care clustered   quiet environment promoted  Reorientation Measures: hearing device use encouraged  Taken 8/18/2023 0000 by Haris Kumar RN  Sensory Stimulation Regulation:   care clustered   quiet environment promoted  Reorientation Measures: hearing device use encouraged  Taken 8/17/2023 2000 by Haris Kumar RN  Sensory Stimulation Regulation:   care clustered   quiet environment promoted  Reorientation Measures: hearing device use encouraged  Goal: Improved Sleep  Outcome: Progressing  Intervention: Promote Sleep  Recent Flowsheet Documentation  Taken 8/18/2023 0400 by Haris Kumar RN  Sleep/Rest Enhancement: awakenings minimized  Taken 8/18/2023 0000 by Haris Kumar RN  Sleep/Rest Enhancement: awakenings minimized  Taken 8/17/2023 2000 by Haris Kumar RN  Sleep/Rest Enhancement: awakenings minimized     Problem: Oral Intake Inadequate  Goal: Improved Oral Intake  Outcome: Progressing     Problem: Cardiac Rhythm Management Device  Goal: Optimal Adjustment to Device  Outcome: Progressing  Goal: Absence of Bleeding  Outcome: Progressing  Goal: Effective Oxygenation and Ventilation  Outcome: Progressing  Intervention: Optimize Oxygenation and Ventilation  Recent Flowsheet Documentation  Taken 8/18/2023 0400 by Haris Kumar RN  Cough And Deep Breathing: unable to perform  Taken 8/18/2023 0000 by Haris Kumar RN  Cough And Deep Breathing: unable to perform  Taken 8/17/2023 2000 by Haris Kumar RN  Cough And Deep Breathing: unable to perform     Problem: Mechanical Ventilation Invasive  Goal: Effective Communication  Outcome: Progressing  Intervention: Ensure Effective  Communication  Recent Flowsheet Documentation  Taken 8/18/2023 0400 by Haris Kumar RN  Trust Relationship/Rapport: care explained  Taken 8/18/2023 0000 by Haris Kumar RN  Trust Relationship/Rapport: care explained  Taken 8/17/2023 2000 by Haris Kumar RN  Trust Relationship/Rapport: care explained  Goal: Optimal Device Function  Outcome: Progressing  Intervention: Optimize Device Care and Function  Recent Flowsheet Documentation  Taken 8/18/2023 0400 by Haris Kumar RN  Airway Safety Measures: all equipment/monitors on and audible  Taken 8/18/2023 0000 by Haris Kumar RN  Airway Safety Measures: all equipment/monitors on and audible  Taken 8/17/2023 2000 by Haris Kumar RN  Airway Safety Measures: all equipment/monitors on and audible  Goal: Mechanical Ventilation Liberation  Outcome: Progressing  Intervention: Promote Extubation and Mechanical Ventilation Liberation  Recent Flowsheet Documentation  Taken 8/18/2023 0400 by Haris Kumar RN  Sleep/Rest Enhancement: awakenings minimized  Medication Review/Management: medications reviewed  Environmental Support: calm environment promoted  Taken 8/18/2023 0000 by Haris Kumar RN  Sleep/Rest Enhancement: awakenings minimized  Medication Review/Management: medications reviewed  Environmental Support: calm environment promoted  Taken 8/17/2023 2000 by Haris Kumar RN  Sleep/Rest Enhancement: awakenings minimized  Medication Review/Management: medications reviewed  Environmental Support: calm environment promoted  Goal: Optimal Nutrition Delivery  Outcome: Progressing  Goal: Absence of Device-Related Skin and Tissue Injury  Outcome: Progressing  Goal: Absence of Ventilator-Induced Lung Injury  Outcome: Progressing  Intervention: Prevent Ventilator-Associated Pneumonia  Recent Flowsheet Documentation  Taken 8/18/2023 0400 by Haris Kumar RN  Head of Bed (HOB) Positioning: HOB at 20-30 degrees  Taken 8/18/2023 0000 by Haris Kumar RN  Head of  Bed (HOB) Positioning: HOB at 20-30 degrees       Vent settings remain unchanged.   Pt has been able to maintain oxygen saturations on the mid 90's   Lung sounds are clear in the upper lobes and diminished in the bases.   Precedex continues at 0.9 , becomes intermittently alert and restless with attempts to titrate below 0.9, fentanyl continues at 50 mcg, Rass score has been -2 throughout the shift aside from attempts to titrate down.   Blood pressures are stable.   No irregular Rhythms observed.   Urine output has been moderate.   Will continue with plan of care.

## 2023-08-18 NOTE — PROGRESS NOTES
[facility]    Medicine Progress Note - Hospitalist Service    Date of Admission:  8/12/2023    Assessment & Plan   Patient is a 73-year-old female with past medical history of COPD/emphysema and chronic hypoxic and hypercapnic respiratory failure on 2 to 6 L nasal cannula at baseline, paroxysmal atrial fibrillation, recent pulmonary embolism July 2023, breast cancer diagnosed in 2019 with recent concern for metastasis to the brain and lungs, hypertension, hyperlipidemia, depression, chronic kidney disease stage III, chronic anemia, CAD, prediabetes and obesity who presented to the emergency room with complaints of lethargy and shortness of breath and was found to be in A-fib with RVR.  She converted to normal sinus rhythm following IV and p.o. diltiazem and was registered to observation status for monitoring.  After receiving her home dose of lorazepam (which had recently been added as a new medication as an outpatient), she had worsening sedation and acute encephalopathy and developed acute on chronic respiratory failure with hypoxia and hypercapnia after which she was admitted to the ICU on 8/13/2023 and treated with flumazenil and BiPAP for respiratory support.      Despite those interventions, respiratory failure progressively worsened requiring intubation and sedation the morning of 8/14/23.  There was initial concern for rapidly expanding right pleural effusion with underlying atelectasis vs infection in patient with worsening leukocytosis.  Patient was also started on antibiotics empirically.  She transiently required vasopressor therapy with Levophed for hypotension from 8/14-8/15 but rapidly weaned off pressors.  Thoracentesis with removal of 1L right pleural fluid was performed on 8/15 for therapeutic and diagnostic purposes and she subsequently underwent a second therapeutic right thoracentesis on 8/16 with another 800-900 mL of pleural fluid removed.      Hospitalization  was also complicated on 8/15/2023 by upper GI bleed with gross bleeding from orogastric tube confirmed by positive gastric occult and a 2-3 g hemoglobin drop.  Lovenox was then held and PPI was increased to twice daily with hemoglobin that has now stabilized at 7-8 range.      So far she has not tolerated attempted ventilator weaning trials to pressure support CPAP on 8/17 and 8/18 and has demonstrated signs of patient ventilator dyssynchrony.  Today it is noted that she has had 30 pound weight gain during hospitalization with signs of anasarca and clinical findings are strongly suspicion for significant COPD exacerbation with diffuse wheezing and prolonged expiratory phase which may be impairing ability to wean ventilator support.    Plan for 8/18:  -trial of continuous albuterol nebulization for 2 hours and then reassess whether to continue with continuous nebulization or use intermittent nebulization more frequently  -Continue corticosteroids  -Increase dosing of Lasix to 20 mg IV every 8 hours as long as she tolerates it hemodynamically  -Titrate Precedex to sedation goal and stop Propofol   -Stop fentanyl infusion and use boluses of fentanyl as needed for analgesia  -continue with Zosyn for antibiotic coverage and awake sputum and pleural fluid culture results  -continue BID PPI and holding   -Restart prophylactic dosing of Lovenox as long as bleeding does not recur  -Ordered continuing monitoring of hemoglobin   -monitor heart rhythm closely and consider amiodarone bolus and/or infusion if she develops recurrent A fib with RVR  -Anticipate adjusting vent settings to permit moderate respiratory acidosis which appears to be her baseline (estimated baseline venous PCO2 in the 60s)    VENT:  CMV mode  Tital volume 370  PEEP 7  RR 14    Drips:  Precedex     Lines/Drains/Etc:  ET tube 7.5 mm placed 8/14  OG tube placed 8/14  PICC line placed 8/14  Art line placed 8/14  Ponce placed 8/14    Acute on chronic  respiratory failure with hypoxia and hypercapnia  Patient ventilator dyssynchrony  Right pleural effusion  Toxic metabolic encephalopathy  COPD exacerbation with emphysema  Initially suspected secondary to a combination of rapidly increasing right sided pleural effusion and sedation from lorazepam with worsening acute toxic metabolic encephalopathy.  Remains intubated and sedated not tolerating attempted ventilator weaning trial so far probably due at least partly to persistent signs of COPD exacerbation.    S/p thoracentesis x2 (8/15 and 8/16).    -Titrate ventilator  -ordered ABG monitoring  -continue IV solumedrol and increase frequency of nebulized bronchodilators and adjust frequency depending upon clinical response  -transition to Precedex and stop Propofol     Leukocytosis, unspecified type  Possible community acquired pneumonia   Positive sputum culture for Aspergillus  Leukocytosis present at time of admission, improved after initial fluid resuscitation but then worsened again coinciding with worsening signs of respiratory failure and concern for infectious etiology.  Procalcitonin showed risk of possible localized infection but sepsis has not been suspected.  WBC continues to fluctuate in context of IV steroid treatment.  Sputum culture is also starting to grow Aspergillus fumigatus of uncertain clinical significance.  -Continue Zosyn as above   anticipate additional discussion regarding whether to begin antifungal therapy based on preliminary sputum culture results  -monitor CBC daily     Atrial fibrillation with RVR (H)    AF (paroxysmal atrial fibrillation) (H)  Patient has known paroxysmal A-fib and presented with A-fib with RVR.  After initial cardioversion, she had return of frequent a fib episodes and was treated with IV amiodarone bolus and infusion 8/15 with return to NSR and no further a fib runs noted. Lovenox has been held secondary to upper GI bleed.   -continue off amiodarone for now but  start infusion again if it recurs  -continue cardiac monitoring   -anticipate restarting home diltiazem dosing in the future     Upper GI bleeding  Acute blood loss anemia  Normocytic anemia, chronic   Patient has chronic anemia with baseline hemoglobin of approximately 10-11.  Between 8/14/23 and 8/15/23 she had an almost 2 point drop in hemoglobin (10.6 down to 8.8) with OG tube containing bright red blood and then melanotic output.  Lovenox held and PPI increased to BID dosing and hemoglobin has stabilized at 7-8 without further bleeding noted in the OG tube but concerning for acute blood loss anemia.   -continue to monitor hemoglobin closely, transfuse PRBCs for hgb 7 or less or recurrent active bleeding  -continue BID PPI  -considering restarting Lovenox     Chronic right-sided heart failure  Severe pulmonary hypertension  Anasarca  Hypoalbuminemia  RVF and pulmonary hypertension were present July 2023 at time of acute PE and are unchanged on repeat echo performed during this stay.  Patient has now developed signs of volume overload and had increased pulmonary edema on x-ray which could exacerbate COPD and respiratory failure.  Weight up approximately ore than 30 lbs since admission if weights are accurate.  BNP was normal at admission. Serum albumin has decreased during hospitalization. Hypoalbuminemia could exacerbate anasarca.  -ordered Lasix 20 mg IV every 8 hours  -Reducing IV fluid rate to TKO in anticipation of starting enteral feeding  -monitor urine output and daily weight   -ordered BNP  -Monitor albumin as indicated    History of malignant neoplasm of overlapping sites of left breast in female, estrogen receptor positive (H)  Suspected malignant neoplasm metastatic to brain  Suspected malignant neoplasm metastatic to right lung  Patient diagnosed with breast cancer in 2019 and recently was on Arimidex as monotherapy.  She had PE despite being on Coumadin in July 2023 which prompted evaluation of  possible recurrent cancer.  Recent PET scan has shown active intake on the right upper lung mass, multiple lymph nodes, and brain lesions.  Patient was to undergo bronchoscopy at [facility] for biopsy of one of the lymph nodes to assist in confirmation of cancer diagnosis and treatment plan going forward but biopsy was cancelled due to this hospitalization   -monitor for pleural fluid cytology results   -Once patient is more stabilized we will attempt to coordinate with interventional pulmonology best options going forward if cytology from pleural fluid     Pulmonary embolism - left upper lobe, diagnosed 7/23  Medication induced coagulopathy  Diagnosed July 2023 despite being on Coumadin, felt possibly secondary to return of active cancer.  Patient had been on Eliquis but had recently stopped that and was bridging with Lovenox due to bronchoscopy planned.  Lovenox was stopped due to GI bleed during this stay which was discussed with family who are aware of increase risk of recurrent clot but  need to hold anticoagulation due to recent acute bleed    Elevated troponin - demand ischemia from A fib with RVR  Coronary artery disease involving native coronary artery of native heart without angina pectoris  Patient was diagnosed in 2020 with CAD based on radiographic findings for coronary artery calcifications noted following her cancer treatment.  Has been followed by cardiology and Lexiscan was performed which was unremarkable.  Cardiology previously recommended medical treatment only and patient has not had any symptoms concerning for angina.  Elevated troponin Present at time of admission was attributed to A-fib with RVR and had been trending downward without concern for non-STEMI  -no further routine monitoring of troponins at this time unless patient's clinical status changes   -no ASA at this time given GI bleed    Hypokalemia  Potassium has been as low as 3.2 during hospitalization and hypokalemia is likely to be  exacerbated by diuretic therapy and/or hypomagnesemia.  Hypokalemia and/or hypomagnesemia increase her risk for recurrent cardiac arrhythmia.  -Continue potassium supplementation per protocol  -Monitor potassium as indicated including recheck if she develops significant diuretic response or recurrent arrhythmia  -Follow magnesium    CKD (chronic kidney disease) stage 3, GFR 30-59 ml/min (H)  She has stage III chronic kidney disease with baseline creatinine 1.0-1.3.  Patient's creatinine was mildly elevated at 1.4 at initial presentation but recovered to baseline and continues to fluctuate 1.3-1.4.  -Continue close monitoring of creatinine and urinary output    Hyponatremia  Noted initially, resolved with fluid resuscitation  -Recheck due to aggressive diuresis      Hypercalcemia  Noted initially, resolved with IV fluids.  May be at risk for recurrent hypercalcemia due to suspicion for recurrent malignancy.  -Ordered recheck      Essential hypertension, benign  Patient normally on diltiazem 240 mg daily, Lasix 20 mg daily, hydrochlorothiazide 25 mg daily and lisinopril 2.5 mg daily to treat hypertension.  All have been held in last days with blood pressures normal apart from during pressure support trials.   -Continue to hold all home antihypertensive medications except for Lasix      Major depression in complete remission (H)    Situational anxiety    Insomnia  Patient is normally on Effexor 75 mg daily.  She was recently started on hydroxyzine and lorazepam due to insomnia and situational anxiety regarding possible recurrent cancer diagnosis.  Lorazepam and all benzodiazepines are now being avoided secondary to worsening respiratory status as outlined above the patient was a started on BuSpar on 8/13 without notable improvement of her symptoms and patient is now sedated and intubated   -Effexor restarted   -Avoid benzodiazepines  -Reassess when patient is extubated    Prediabetes  Prediabetes diagnosed earlier this  "year and patient was started on metformin which was discontinued following hospitalization for PE.  Patient has needed several steroid bursts since initial diagnosis but blood sugars have been only mildly to moderately elevated   -Continue monitoring blood sugars every 4 hours with low resistance sliding scale NovoLog     Hyperlipidemia LDL goal <130  Hyperlipidemia is chronically treated with atorvastatin 10 mg daily  -Holding chronic statin for now        Diet: NPO for Medical/Clinical Reasons Except for: No Exceptions    DVT Prophylaxis: Pneumatic Compression Devices  Ponce Catheter: PRESENT, indication: Strict 1-2 Hour I&O  Lines: PRESENT      PICC 08/14/23 Triple Lumen Right Basilic-Site Assessment: WDL  Arterial Line 08/14/23 Wrist-Site Assessment: WDL      Cardiac Monitoring: ACTIVE order. Indication: ICU  Code Status: Full Code      Clinically Significant Risk Factors        # Hypokalemia: Lowest K = 3.2 mmol/L in last 2 days, will replace as needed  # Hypernatremia: Highest Na = 146 mmol/L in last 2 days, will monitor as appropriate      # Hypoalbuminemia: Lowest albumin = 2.5 g/dL at 8/17/2023  6:23 AM, will monitor as appropriate     # Hypertension: Noted on problem list        # Obesity: Estimated body mass index is 33.25 kg/m  as calculated from the following:    Height as of this encounter: 1.6 m (5' 3\").    Weight as of this encounter: 85.1 kg (187 lb 11.2 oz).      # COPD: noted on problem list        Disposition Plan           Dat Peters MD  Hospitalist Service  [facility]  Securely message with [employer] (more info)  Text page via [email or web address] Paging/Directory   ______________________________________________________________________    Interval History   There were no significant overnight events.  Today an attempt was made to undergo ventilator weaning after transition from propofol infusion to Precedex infusion and discontinuation of fentanyl infusion.  However, within " 10 minutes, she developed increasing heart rate and work of breathing at which time the ventilator weaning trial was discontinued.  Since then she appears to have been resting comfortably on the ventilator.  At times there is dyssynchrony between the patient's respiratory effort and the ventilator particularly during the weaning trial.  She remains sedated although was agitated during the ventilator weaning trial but not able to follow instructions reliably at that time.  She has not had fever.  She has generally been hemodynamically stable except for lower blood pressures at times when she has recurrence of atrial fibrillation or atrial flutter.  She was given a dose of IV Lasix this morning and has had good urine output.  There is been no recurrent bleeding from her orogastric tube or her urinary catheter.    Physical Exam   Vital Signs: Temp: 98.3  F (36.8  C) Temp src: Oral BP: 107/75 Pulse: 86   Resp: 20 SpO2: 96 % O2 Device: Mechanical Ventilator    Vent Mode: CMV/AC  (Continuous Mandatory Ventilation/ Assist Control)  FiO2 (%): 35 %  Resp Rate (Set): 14 breaths/min  Tidal Volume (Set, mL): 370 mL  PEEP (cm H2O): 7 cmH2O  Pressure Support (cm H2O): 9 cmH2O  Inspiratory Pressure (cm H2O) (Drager Cassie): 14  Resp: 20    Ventilator measurements: Expiratory tidal volume 350, minute volume 4.2, mean airway pressure 9, plateau pressure 33, peak inspiratory pressure 26    Patient Vitals for the past 24 hrs:   BP Temp Temp src Pulse Resp SpO2 Weight   08/18/23 1300 107/75 -- -- 86 20 96 % --   08/18/23 1200 105/65 98.3  F (36.8  C) Oral 90 16 95 % --   08/18/23 1100 (!) 142/78 -- -- 95 18 96 % --   08/18/23 1000 132/64 -- -- 92 16 96 % --   08/18/23 0900 128/75 98.1  F (36.7  C) Oral 86 14 95 % --   08/18/23 0805 -- -- -- -- -- -- 85.1 kg (187 lb 11.2 oz)   08/18/23 0700 98/50 -- -- 75 15 97 % --   08/18/23 0600 98/51 -- -- 76 14 97 % --   08/18/23 0500 98/55 -- -- 77 14 97 % --   08/18/23 0400 102/60 97.7  F (36.5   C) Axillary 79 13 97 % --   08/18/23 0300 102/58 -- -- 80 14 97 % --   08/18/23 0200 105/58 -- -- 83 14 97 % --   08/18/23 0100 100/57 98  F (36.7  C) Axillary 86 14 96 % --   08/18/23 0000 100/57 -- -- 87 12 96 % --   08/17/23 2300 116/65 -- -- 98 18 97 % --   08/17/23 2200 124/80 -- -- 95 12 96 % --   08/17/23 2100 135/88 -- -- 94 12 94 % --   08/17/23 2000 (!) 146/95 97.7  F (36.5  C) Axillary 95 15 93 % --   08/17/23 1900 129/72 -- -- 95 11 95 % --   08/17/23 1845 -- -- -- 105 17 91 % --   08/17/23 1830 -- -- -- 92 12 92 % --   08/17/23 1800 123/68 -- -- 93 11 97 % --   08/17/23 1736 -- -- -- 97 13 96 % --   08/17/23 1730 -- -- -- 88 11 97 % --   08/17/23 1700 137/77 -- -- 93 20 96 % --   08/17/23 1630 -- -- -- 94 15 95 % --   08/17/23 1615 -- -- -- 87 14 96 % --   08/17/23 1600 (!) 125/94 -- -- 88 14 98 % --   08/17/23 1545 -- -- -- 86 14 99 % --   08/17/23 1530 -- -- -- 88 14 97 % --   08/17/23 1515 108/56 98.5  F (36.9  C) Oral 89 14 98 % --   08/17/23 1500 134/66 -- -- 89 -- 96 % --   08/17/23 1445 -- -- -- -- -- 98 % --   08/17/23 1443 134/69 -- -- 89 -- -- --   08/17/23 1430 (!) 141/96 -- -- 89 14 93 % --   08/17/23 1415 114/59 -- -- 89 14 98 % --   08/17/23 1400 135/65 -- -- 90 14 96 % --   08/17/23 1351 (!) 162/85 -- -- 96 12 94 % 83 kg (183 lb)     Weight: 187 lbs 11.2 oz  Vitals:    08/12/23 1616 08/12/23 1721 08/15/23 0431 08/17/23 1351   Weight: 69.7 kg (153 lb 10.6 oz) 71.7 kg (158 lb) 74.9 kg (165 lb 1.6 oz) 83 kg (183 lb)    08/18/23 0805   Weight: 85.1 kg (187 lb 11.2 oz)       Intake/Output Summary (Last 24 hours) at 8/18/2023 1340  Last data filed at 8/18/2023 1252  Gross per 24 hour   Intake 1590 ml   Output 1740 ml   Net -150 ml       General Appearance: Ill-appearing elderly woman, presently appears comfortable on the ventilator while receiving Precedex sedation  Respiratory: Respiratory effort is currently synchronous with the ventilator, mild use of accessory muscles, diffuse high-pitched  expiratory wheezes with prolonged expiratory phase 3 to 4 seconds  Cardiovascular: Regular rate and rhythm, peripheral edema present in all the limbs, normal capillary refill in the fingers and toes  GI: Hypoactive bowel sounds, soft abdomen, no distention, no reaction to deep palpation  Skin: Pale color, no rash  Neuro: Sedated, not following instructions reliably, occasional spontaneous movements    Medical Decision Making       Total critical care time 111 minutes so far today including time spent reassessing respiratory status and adjusting mechanical ventilation treatment of life-threatening respiratory failure       Data     I have personally reviewed the following data over the past 24 hrs:    23.4 (H)  \   8.0 (L)   / 134 (L)     144 112 (H) 43.3 (H) /  163 (H)   4.4 23 1.46 (H) \     ALT: 36 AST: 24 AP: 36 TBILI: 0.3   ALB: 2.8 (L) TOT PROTEIN: 4.7 (L) LIPASE: N/A     Procal: N/A CRP: 11.04 (H) Lactic Acid: N/A         Magnesium 2.4, phosphorus 3.3  Blood sugars 141-163 over the last day  Sputum culture is growing Aspergillus fumigatus on preliminary results  Pleural fluid culture is negative to date    Imaging results reviewed over the past 24 hrs:   Recent Results (from the past 24 hour(s))   XR Chest Port 1 View    Narrative    CHEST ONE VIEW  8/17/2023 2:55 PM     HISTORY: follow up respiratory failure    COMPARISON: Chest x-ray 8/15/2023.      Impression    IMPRESSION: Stable cardiac silhouette. Aortic arch calcification.  Stable positioning of support devices. Emphysematous changes.  Pulmonary vascular congestion. Small right pleural effusion, decreased  in size compared to prior. Trace left pleural effusion. No  pneumothorax. Similar appearance of right perihilar opacity which may  represent residual atelectasis.    ELIZABETH POLO MD         SYSTEM ID:  [id number]     Recent Labs   Lab 08/18/23  0854 08/18/23  0458 08/18/23  0430 08/17/23  2035 08/17/23  1802 08/17/23  1712 08/17/23  1258  08/17/23  0759 08/17/23  0623 08/16/23  0531 08/16/23  0526 08/15/23  1105 08/15/23  0733   WBC  --  23.4*  --   --   --   --   --   --  21.3*  --  20.4*  --   --    HGB  --  8.0*  --   --  8.7*  --   --   --  7.5*   < > 7.7*   < >  --    MCV  --  93  --   --   --   --   --   --  93  --  91  --   --    PLT  --  134*  --   --   --   --   --   --  145*  --  164  --   --    INR  --   --   --   --   --   --   --   --   --   --   --   --  1.38*   NA  --  144  --   --   --   --   --   --  146*  --  141   < >  --    POTASSIUM  --  4.4  --   --   --   --  4.4  --  3.2*  --  3.4   < >  --    CHLORIDE  --  112*  --   --   --   --   --   --  112*  --  106   < >  --    CO2  --  23  --   --   --   --   --   --  24  --  24   < >  --    BUN  --  43.3*  --   --   --   --   --   --  44.1*  --  51.2*   < >  --    CR  --  1.46*  --   --   --   --   --   --  1.39*  --  1.44*   < >  --    ANIONGAP  --  9  --   --   --   --   --   --  10  --  11   < >  --    IVANNA  --  8.6*  --   --   --   --   --   --  8.5*  --  8.8   < >  --    * 163* 151*   < >  --    < >  --    < > 216*   < > 151*   < >  --    ALBUMIN  --  2.8*  --   --   --   --   --   --  2.5*  --  2.6*  --   --    PROTTOTAL  --  4.7*  --   --   --   --   --   --  4.5*  --  4.8*  --  4.9*   BILITOTAL  --  0.3  --   --   --   --   --   --  0.2  --  0.3  --   --    ALKPHOS  --  36  --   --   --   --   --   --  36  --  40  --   --    ALT  --  36  --   --   --   --   --   --  22  --  20  --   --    AST  --  24  --   --   --   --   --   --  14  --  14  --   --     < > = values in this interval not displayed.

## 2023-08-18 NOTE — PLAN OF CARE
Physical Therapy, Occupational Therapy and Speech Language Pathology: Orders received. Chart reviewed and discussed with care team.? Physical Therapy, Occupational Therapy and Speech Language Pathology not indicated due to patient with ongoing intubation and unable to follow commands.? Defer discharge recommendations to when patient is medically appropriate.? Will complete orders at this time, please place when needed.    Thank you for your referral.  Candis Owens, PT, DPT, ATC, [facility]  O: [phone]  E: [email or web address]

## 2023-08-18 NOTE — PLAN OF CARE
Goal Outcome Evaluation:      Plan of Care Reviewed With: patient    Overall Patient Progress: no changeOverall Patient Progress: no change      Neuro: with sedation lowered, patient was unable to follow commands. Would thrash head back and forth and lift arms up and drop them back into the bed. Was able to focus eyes once before allowing them to roll back.   CMS: +2 generalized edema  Pulmonary: diminished throughout. Completed a 2hr continuous nebulizer with no significant change in ABG's  CV: SR throughout the morning and afternoon. At completion of the continuous nebulizer patient went into rapid afib and blood pressures dropped. An amiodarone bolus was given followed by a continuous infusion. Patient converted back to SR within the first 5 minutes of the continuous infusion. MD wants the infusion to continue through the night and will reassess in the morning for continuation. Blood pressures stabilized once HR controlled.   GI: bowels hypoactive. Abdomen was more distended this evening, hooked OG up to suction and a lot of air was removed from abdomen along with 250cc of bile. Patient more comfortable appearing afterwards.   : barbosa in place with good output. X2 doses of lasix given today  Skin: no change. Scattered bruises.   Pain: on pca fentanyl until 1530 then transitioned over to fentanyl boluses.   Activity: bedrest. Repositioned every 3hrs with pillows.   Hygiene: oral cares done x3  Lines/Tubes/Drains: triple lumen PICC, art line  Drips: amiodarone, precedex    Plan: monitor respiratory and cardiac status.

## 2023-08-18 NOTE — TELECONSULT
Tele-ICU progress note  DOS: 8/18/2023, 9:12 AM      Patient chart reviewed. Ms Padron remains intubated/sedated with acute/chronic mixed respiratory failure. SBTs attempted x2 yesterday, unsuccessful because of agitation/patient-ventilator dyssynchrony. Viewed on camera this morning, after 10 minutes the patient was hypertensive, agitated, and tachypneic with Vt ~200s so trial stopped.     Temp:  [97.7  F (36.5  C)-98.7  F (37.1  C)] 97.7  F (36.5  C)  Pulse:  [] 75  Resp:  [9-20] 15  BP: ()/(50-99) 98/50  MAP:  [63 mmHg-290 mmHg] 63 mmHg  Arterial Line BP: ()/() 94/45  FiO2 (%):  [35 %] 35 %  SpO2:  [90 %-99 %] 97 %     Vent Mode: CMV/AC  (Continuous Mandatory Ventilation/ Assist Control)  FiO2 (%): 35 %  Resp Rate (Set): 14 breaths/min  Tidal Volume (Set, mL): 370 mL  PEEP (cm H2O): 7 cmH2O  Pressure Support (cm H2O): 9 cmH2O  Inspiratory Pressure (cm H2O) (Drager Cassie): 14  Resp: 15    Dex 0.8, fentanyl 25-50  7.34/46/87/24  Cr stable at 1.46  Cytology from thoracentesis pending  Respiratory culture with aspergillus fumigatus    Assessment/recommendations:  72 y/o woman with oxygen-dependent COPD, breast CA with metastases to brain and lung, atrial fibrillation, and CKD admitted 8/12, intubated 8/14 with acute-on-chronic mixed respiratory failure. Subsequently had thoracentesis with drainage of large, possibly malignant, exudative right pleural effusion. Failed SBT this morning because of rapid/shallow breathing and agitation.    # Acute-on-chronic mixed respiratory failure  # Oxygen-dependent COPD  # Pleural effusion s/p thoracentesis  # Breast cancer metastases to lung  - Not weaning well from ventilator secondary to agitation and shallow breathing  - Consider weaning PEEP to 5 to keep SpO2 >88%  - Continue dexmedetomidine, can titrate this up for comfort during SBT if needed  - Could add scheduled low-dose quetiapine (QTc looked OK) to see if this helps from an anxiety perspective  -  Repeat SBT again later today  - Continues on bronchodilators/steroids/abx as empiric COPD therapy  - Follow on cytology results    At the time of my review I was able to access the patient s medical records/chart but not personally see or evaluate the patient. I cannot provide direct medical advice or consultation, but can provide a general opinion for consideration by the in-person treating provider. The contents of this note are not meant to replace or supersede the clinical judgement of the in-person treating provider.     Tele-hub will continue to follow. Please call with questions.    Darci Torres MD, PhD  Surgical critical care  8/18/2023, 9:56 AM

## 2023-08-19 PROBLEM — B44.9: Status: ACTIVE | Noted: 2023-01-01

## 2023-08-19 PROBLEM — E87.0 HYPERNATREMIA: Status: ACTIVE | Noted: 2023-01-01

## 2023-08-19 NOTE — PLAN OF CARE
Goal Outcome Evaluation:      Plan of Care Reviewed With: patient    Overall Patient Progress: no change Overall Patient Progress: no change    Outcome Evaluation:     Vent/Respiratory- FiO2 at 35%, settings unchanged. Oral cares provided and repositioning completed. Lung sounds with coarse crackles on right, faint expiratory wheezes, faint expiratory wheezes, diminished. Scan, think secretions, inline suction completed x1.     Cardiac - Two close runs of rapid a. Fib with HR in 150s at 0025 Tele ICU notified, orders received to discontinue Albuterol and DuoNeb. New order received for PRN Xopenex. Patient did not require any PRN Diltiazem. 3rd run of RVR with HR in 150s at 0600 while attempting to wean down Precedex drip. Respiratory rate also increased to 23 at this time. Precedex had been decreased at two 15 intervals down to 1.0. Fentanyl given at dose of 50mcg, Precedex increased at two 15 minute intervals back up to 1.2 and HR back down in the 90s.     GI/: [place] with clear, yellow urine output. OG connected to low suction x2 over night during periods of increased restlessness. 50cc and 25cc bilious returns. OG currently clamped.     Drips: Amiodarone at 0.5mg. Precedex at 1.2mcg/kh/hr.     Patient remains edematous throughout, repositioned and elevation provided to extremities q2h.

## 2023-08-19 NOTE — PROGRESS NOTES
Patient once more went into a fib with RVR in the 120-140 range this afternoon and in the past few minutes blood pressures have suddenly changed and are in the 80-90s systolic with MAPs 63-67.  On review, patient did get amiodarone 200 mg per OG this morning and was started on hydrochlorothiazide and has been on Lasix IV 20 mg TID.      ABG shows it is unchanged from several hours ago with pH still 7.29 and pCO2 54.  Sedation has been increased and patient is no longer overbreathing the vent.    PLAN:  -Will given another 150 mg IV x now and then start her on the 0.5 mg/kg infusion for the forseeable future.  Will also hold further Lasix and hydrochlorothiazide at this time until blood pressures stabilize and then restart once able.    - for Vent settings, it will take a while for bicarb to increase to allow for compensation at this setting however given ongoing acidosis will increase Tital volume to 380 and will plan to recheck ABG at 0000.    -Given her increasing WBC of unclear etiology with recurrent a fib despite ongoing amiodarone, will add lactic acid and once heart rate and blood pressures stabilized will proceed with CT scan without contrast (due to renal function) of the chest/abdomen/pelvis     Discussed with provider from Tele-ICU today who has assisted with vent management changes and agrees with the overall plan as outlined above    23 minutes of critical care time was spend on this patient this evening, in addition to the critical care management performed by Dr. Peters earlier today.      Electronically Signed:  Nilam Laboy MD

## 2023-08-19 NOTE — PROGRESS NOTES
[facility]    Medicine Progress Note - Hospitalist Service    Date of Admission:  8/12/2023    Assessment & Plan   Patient is a 73-year-old female with past medical history of COPD/emphysema and chronic hypoxic and hypercapnic respiratory failure on 2 to 6 L nasal cannula at baseline, paroxysmal atrial fibrillation, recent pulmonary embolism July 2023, breast cancer diagnosed in 2019 with recent concern for metastasis to the brain and lungs, hypertension, hyperlipidemia, depression, chronic kidney disease stage III, chronic anemia, CAD, prediabetes and obesity who presented to the emergency room with complaints of lethargy and shortness of breath and was found to be in A-fib with RVR.  She converted to normal sinus rhythm following IV and p.o. diltiazem and was registered to observation status for monitoring.  After receiving her home dose of lorazepam (which had recently been added as a new medication as an outpatient), she had worsening sedation and acute encephalopathy and developed acute on chronic respiratory failure with hypoxia and hypercapnia after which she was admitted to the ICU on 8/13/2023 and treated with flumazenil and BiPAP for respiratory support.      Despite those interventions, respiratory failure progressively worsened requiring intubation and sedation the morning of 8/14/23.  There was initial concern for rapidly expanding right pleural effusion with underlying atelectasis vs infection in patient with worsening leukocytosis.  Patient was also started on antibiotics empirically.  She transiently required vasopressor therapy with Levophed for hypotension from 8/14-8/15 but rapidly weaned off pressors.  Thoracentesis with removal of 1L right pleural fluid was performed on 8/15 for therapeutic and diagnostic purposes and she subsequently underwent a second therapeutic right thoracentesis on 8/16 with another 800-900 mL of pleural fluid removed.      Hospitalization  was also complicated on 8/15/2023 by upper GI bleed with gross bleeding from orogastric tube confirmed by positive gastric occult and a 2-3 g hemoglobin drop.  Lovenox was then held and PPI was increased to twice daily with hemoglobin that has now stabilized at 7-8 range.      So far she has not tolerated attempted ventilator weaning trials to pressure support CPAP on 8/17 and 8/18 and has demonstrated signs of patient ventilator dyssynchrony, anasarca with newly elevated BNP 8/18, and significant signs of COPD exacerbation on 8/18 all of which probably impairing ability to wean ventilator support.  She has had normal to minimal elevation of PCO2 with normal serum carbon dioxide and at baseline has respiratory acidosis with venous PCO2 in the 60s and elevated serum carbon dioxide, so her current acid-base status will also limit her ability to tolerate ventilator weaning.  She remains hypoxic with underlying severe pulmonary hypertension both of which also will limit ability to tolerate ventilator weaning.  Today she is now hypernatremic which increases her risk for metabolic encephalopathy that could also impair weaning from ventilator support.  She remains critically ill with guarded prognosis for recovery.    Plan for 8/19:  -Adjusting ventilator to SIMV with pressure support and reducing tidal volume and potentially ventilator rate to permit hypercapnia, also increasing FiO2 to achieve PaO2 above 80  -Continue corticosteroids and now using bronchodilators as needed because of worsening atrial fibrillation with scheduled doses of bronchodilators  -Continue higher dose of Lasix to 20 mg IV every 8 hours and resume chronic dose of hydrochlorothiazide for additive diuretic effect  -Titrate Precedex to sedation goal  -use boluses of fentanyl as needed for analgesia  -continue with Zosyn for antibiotic coverage  -Restart therapeutic anticoagulation with IV heparin per protocol today as long as bleeding does not  recur  -Treat hypernatremia with free water supplementation through orogastric or nasogastric tube  -Switch IV amiodarone infusion to enteral amiodarone administration and consider adding enteral doses of diltiazem if atrial fibrillation continues to recur and blood pressure is normal to elevated    VENT:  SIMV mode  Tital volume 370  PEEP 5  RR 10    Drips:  Precedex     Lines/Drains/Etc:  ET tube 7.5 mm placed 8/14  OG tube placed 8/14  PICC line placed 8/14  Art line placed 8/14  Ponce placed 8/14    Acute on chronic respiratory failure with hypoxia and hypercapnia  Patient ventilator dyssynchrony  Right pleural effusion  Toxic metabolic encephalopathy  COPD exacerbation with emphysema  Hypernatremia  Initially suspected secondary to a combination of rapidly increasing right sided pleural effusion and sedation from lorazepam with worsening acute toxic metabolic encephalopathy.  Remains intubated and sedated not tolerating attempted ventilator weaning trial likely from multiple reasons as summarized above.    S/p thoracentesis x2 (8/15 and 8/16).  Signs of COPD exacerbation noted 8/18 have significantly improved if not resolved. On 8/19 she is mildly hypernatremic increasing her risk for worsening encephalopathy.  -Adjust ventilator settings as above  -ordered ABG monitoring  -continue IV solumedrol and use nebulized bronchodilators as needed  -Continue Precedex  -Anticipate transition from orogastric to nasogastric tube today and initiation of enteral feeding, registered dietitian consulted to assist with managing enteral feeding  -Free water supplementation adjusted to 240 mL per NG tube every 4 hours based on calculated free water deficit of approximately 1.8 L and will monitor electrolytes closely and adjust free water supplementation as indicated with goal to achieve normal serum sodium in the next 24 hours  -Ordered serum osmolality    Leukocytosis, unspecified type  Possible community acquired pneumonia    Positive sputum culture for Aspergillus  Leukocytosis present at time of admission, improved after initial fluid resuscitation but then worsened again coinciding with worsening signs of respiratory failure and concern for infectious etiology.  Procalcitonin showed risk of possible localized infection but sepsis has not been suspected.  WBC continues to fluctuate in context of IV steroid treatment.  Sputum culture is growing Aspergillus fumigatus of uncertain clinical significance in the context of her known severe COPD.  -Continue Zosyn for now but anticipate potentially stopping antibiotics today or tomorrow (today is day 6 of Zosyn)  -anticipate additional consideration regarding whether to begin antifungal therapy   -monitor WBC    Paroxysmal atrial fibrillation with RVR   Patient has known paroxysmal A-fib and presented with A-fib with RVR.  After initial cardioversion, she had return of frequent a fib episodes and was treated with IV amiodarone bolus and infusion 8/15 with return to NSR and no further a fib runs noted until 8/18 when it recurred coinciding with escalation of bronchodilator treatment and again resolved after reinitiation of IV amiodarone. Lovenox has been held secondary to upper GI bleed.   -Switch IV amiodarone to enteral administration today   -continue cardiac monitoring   -anticipate restarting diltiazem possibly within the next 24 hours depending upon whether atrial fibrillation recurs and whether she remains normotensive to hypertensive    Upper GI bleeding  Acute blood loss anemia  Thrombocytopenia  Aspirin induced platelet function defect  Normocytic anemia, chronic   Patient has chronic anemia with baseline hemoglobin of approximately 10-11.  Between 8/14/23 and 8/15/23 she had an almost 2 point drop in hemoglobin (10.6 down to 8.8) with OG tube containing bright red blood and then melanotic output.  Lovenox held and PPI increased to BID dosing and hemoglobin has stabilized at 7-8  without further bleeding noted in the OG tube but concerning for acute blood loss anemia.   Hemoglobin is starting to improve coinciding with aggressive diuresis. She chronically takes aspirin that causes platelet function defect and increases her bleeding risk.  She has developed acute mild thrombocytopenia that has been stable but exacerbates bleeding risk.  -continue to monitor hemoglobin closely, transfuse PRBCs for hgb 7 or less or recurrent active bleeding  -continue BID PPI  -Restarting therapeutic anticoagulation 8/19  -Monitoring platelet count  -Holding chronic aspirin    Chronic right-sided heart failure  Severe pulmonary hypertension  Acute pulmonary edema  Anasarca  Hypoalbuminemia  RVF and pulmonary hypertension were present July 2023 at time of acute PE and are unchanged on repeat echo performed during this stay.  Patient developed signs of volume overload with acute pulmonary edema likely exacerbating COPD and respiratory failure.  Weight up more than 30 lbs since admission if weights are accurate.  BNP was normal at admission and is now significantly elevated. Serum albumin has decreased during hospitalization. Hypoalbuminemia likely exacerbates anasarca.  -Continue Lasix 20 mg IV every 8 hours and resume chronic dose of hydrochlorothiazide  -Discontinue supplemental IV fluids  -monitor urine output and daily weight   -Monitor albumin as indicated    History of malignant neoplasm of overlapping sites of left breast in female, estrogen receptor positive (H)  Suspected malignant neoplasm metastatic to brain  Suspected malignant neoplasm metastatic to right lung  Patient diagnosed with breast cancer in 2019 and recently was on Arimidex as monotherapy.  She had PE despite being on Coumadin in July 2023 which prompted evaluation of possible recurrent cancer.  Recent PET scan has shown active intake on the right upper lung mass, multiple lymph nodes, and brain lesions.  Patient was to undergo bronchoscopy  at [facility] for biopsy of one of the lymph nodes to assist in confirmation of cancer diagnosis and treatment plan going forward but biopsy was cancelled due to this hospitalization   -monitor for pleural fluid cytology results   -Once patient is more stabilized we will attempt to coordinate with interventional pulmonology best options going forward if cytology from pleural fluid     Pulmonary embolism - left upper lobe, diagnosed 7/23  Medication induced coagulopathy  Diagnosed July 2023 despite being on Coumadin, felt possibly secondary to return of active cancer.  Patient had been on Eliquis but had recently stopped that and was bridging with Lovenox due to bronchoscopy planned.  Lovenox was stopped due to GI bleed during this stay which was discussed with family.  She has not had bleeding for several days.  No acute PE was noted on chest CT at admission and so far acute DVT has not been is suspected but she is at high risk for recurrent venous thromboembolism because of her underlying malignancy.  -Start IV heparin per protocol today with factor Xa monitoring because she has not received a DOAC since hospital admission August 12, avoiding boluses due to recent GI bleeding and anticipate transition from IV heparin to Lovenox in 24 to 48 hours if she does not have recurrent bleeding after starting IV heparin    Elevated troponin - demand ischemia from A fib with RVR  Coronary artery disease involving native coronary artery of native heart without angina pectoris  Patient was diagnosed in 2020 with CAD based on radiographic findings for coronary artery calcifications noted following her cancer treatment.  Has been followed by cardiology and Lexiscan was performed which was unremarkable.  Cardiology previously recommended medical treatment only and patient has not had any symptoms concerning for angina.  Elevated troponin Present at time of admission was attributed to A-fib with RVR and had been trending downward without  concern for non-STEMI  -no further routine monitoring of troponins at this time unless patient's clinical status changes   -no ASA at this time given GI bleed    Hypokalemia  Potassium has been as low as 3.2 during hospitalization and hypokalemia is likely to be exacerbated by diuretic therapy and/or hypomagnesemia.  Hypokalemia and/or hypomagnesemia increase her risk for recurrent cardiac arrhythmia.  -Continue potassium supplementation per protocol  -Monitor potassium as indicated including recheck if she develops significant diuretic response or recurrent arrhythmia  -Follow magnesium    CKD (chronic kidney disease) stage 3, GFR 30-59 ml/min (H)  She has stage III chronic kidney disease with baseline creatinine 1.0-1.3.  Patient's creatinine was mildly elevated at 1.4 at initial presentation but recovered to baseline and continues to fluctuate 1.3-1.4.  -Continue close monitoring of creatinine and urinary output    Hyponatremia  Noted initially, resolved with fluid resuscitation and is now hypernatremic  -Monitoring sodium as noted above    Hypercalcemia  Noted initially, resolved with IV fluids.  May be at risk for recurrent hypercalcemia due to suspicion for recurrent malignancy.  -Ordered recheck of calcium    Essential hypertension, benign  Patient normally on diltiazem 240 mg daily, Lasix 20 mg daily, hydrochlorothiazide 25 mg daily and lisinopril 2.5 mg daily to treat hypertension.  All have been held in last days with blood pressures normotensive to hypertensive in the last day.   -Continue IV Lasix and resume hydrochlorothiazide today  -May restart diltiazem today as well mostly for treatment of atrial fibrillation  -Continue to hold other chronic antihypertensive medications    Major depression in complete remission (H)  Situational anxiety  Insomnia  Patient is normally on Effexor 75 mg daily.  She was recently started on hydroxyzine and lorazepam due to insomnia and situational anxiety regarding  "possible recurrent cancer diagnosis.  Lorazepam and all benzodiazepines are now being avoided secondary to worsening respiratory status at admission.  The patient was started on BuSpar on 8/13 without notable improvement of her symptoms and patient is now sedated and intubated.   -Continue Effexor  -Avoid benzodiazepines  -Reassess when patient is extubated    Prediabetes  Prediabetes diagnosed earlier this year and patient was started on metformin which was discontinued following hospitalization for PE.  Patient has needed several steroid bursts since initial diagnosis but blood sugars have been only mildly to moderately elevated   -Continue monitoring blood sugars every 4 hours with low resistance sliding scale NovoLog     Hyperlipidemia LDL goal <130  Hyperlipidemia is chronically treated with atorvastatin 10 mg daily  -Holding chronic statin for now        Diet: NPO for Medical/Clinical Reasons Except for: No Exceptions  Adult Formula Drip Feeding: Continuous Vital High Protein; Orogastric tube; Goal Rate: 45 mL/hr; mL/hr; Start at 15 mL/hr and advance by 10 mL/hr every 8 hours until goal of 45 mL/hr    DVT Prophylaxis: IV heparin, SCDs  Ponce Catheter: PRESENT, indication: Strict 1-2 Hour I&O  Lines: PRESENT      Arterial Line 08/14/23 Wrist-Site Assessment: WDL      Cardiac Monitoring: ACTIVE order. Indication: ICU  Code Status: Full Code      Clinically Significant Risk Factors         # Hypernatremia: Highest Na = 148 mmol/L in last 2 days, will monitor as appropriate      # Hypoalbuminemia: Lowest albumin = 2.5 g/dL at 8/17/2023  6:23 AM, will monitor as appropriate   # Thrombocytopenia: Lowest platelets = 129 in last 2 days, will monitor for bleeding   # Hypertension: Noted on problem list        # Obesity: Estimated body mass index is 33.21 kg/m  as calculated from the following:    Height as of this encounter: 1.6 m (5' 3\").    Weight as of this encounter: 85 kg (187 lb 8 oz).      # COPD: noted on " problem list        Disposition Plan           Dat Peters MD  Hospitalist Service  [facility]  Securely message with [employer] (more info)  Text page via [employer] Paging/Directory   ______________________________________________________________________    Interval History   While receiving aggressive bronchodilator therapy yesterday patient began to have recurrent episodes of atrial fibrillation with rapid ventricular response.  Blood pressures began to trend downward.  She was given bolus IV amiodarone followed by IV amiodarone infusion with cardioversion to sinus rhythm.  However, she continued to have episodic rapid atrial fibrillation even while receiving amiodarone infusion and while continuing to receive intermittent bronchodilator therapy by nebulization.  Therefore, scheduled nebulizer therapy was discontinued overnight and she was started on Xopenex as needed.  She received 1 dose of IV fentanyl overnight and her dose of Precedex was increased.  There were otherwise no significant overnight events.  She remains afebrile.  Heart rate has been normal for most of the night.  Blood pressure has been normotensive to hypertensive overnight.  Oxygenation has been stable on the ventilator.  She has had good urine output.    Physical Exam   Vital Signs: Temp: 98  F (36.7  C) Temp src: Axillary BP: 126/83 Pulse: 100   Resp: 18 SpO2: 90 % O2 Device: Mechanical Ventilator    Vent Mode: CMV/AC  (Continuous Mandatory Ventilation/ Assist Control)  FiO2 (%): 35 %  Resp Rate (Set): 12 breaths/min  Tidal Volume (Set, mL): 370 mL  PEEP (cm H2O): 5 cmH2O  Pressure Support (cm H2O): 10 cmH2O  Inspiratory Pressure (cm H2O) (Drager Cassie): 16  Resp: 18  Current ventilator mode is SIMV with pressure support 10    Ventilator measurements: Expiratory tidal volume 360, minute volume 6.5, peak inspiratory pressure 16, mean airway pressure 8, plateau pressure 26.  Minute volume, peak inspiratory  pressure, and plateau pressure are all improved compared with yesterday morning.    Patient Vitals for the past 24 hrs:   BP Temp Temp src Pulse Resp SpO2 Weight   08/19/23 0700 126/83 -- -- 100 18 90 % --   08/19/23 0633 -- -- -- (!) 141 22 95 % --   08/19/23 0609 -- -- -- -- -- -- 85 kg (187 lb 8 oz)   08/19/23 0600 108/59 -- -- 90 (!) 39 95 % --   08/19/23 0300 -- 98  F (36.7  C) Axillary -- -- -- --   08/19/23 0100 109/67 -- -- 94 22 96 % --   08/19/23 0026 -- -- -- (!) 151 22 96 % --   08/19/23 0000 103/65 -- -- 93 19 95 % --   08/18/23 2300 (!) 115/90 97.8  F (36.6  C) Axillary 95 (!) 39 94 % --   08/18/23 2200 117/64 -- -- 87 16 96 % --   08/18/23 1949 -- 97.5  F (36.4  C) Axillary -- -- -- --   08/18/23 1800 106/54 -- -- 91 (!) 31 96 % --   08/18/23 1700 95/56 -- -- 91 18 95 % --   08/18/23 1630 -- -- -- (!) 144 (!) 33 94 % --   08/18/23 1625 -- -- -- (!) 142 (!) 35 94 % --   08/18/23 1620 -- -- -- (!) 142 30 94 % --   08/18/23 1615 -- -- -- (!) 140 (!) 31 94 % --   08/18/23 1610 -- -- -- (!) 142 24 94 % --   08/18/23 1605 (!) 78/37 98.1  F (36.7  C) Oral (!) 130 23 94 % --   08/18/23 1600 (!) 76/49 -- -- 116 30 93 % --   08/18/23 1555 -- -- -- (!) 125 20 94 % --   08/18/23 1550 -- -- -- (!) 128 15 94 % --   08/18/23 1545 -- -- -- (!) 146 15 96 % --   08/18/23 1540 -- -- -- (!) 133 17 95 % --   08/18/23 1535 -- -- -- (!) 142 15 95 % --   08/18/23 1530 -- -- -- 94 19 95 % --   08/18/23 1525 -- -- -- 91 14 95 % --   08/18/23 1520 -- -- -- 89 14 95 % --   08/18/23 1515 -- -- -- 90 15 95 % --   08/18/23 1500 110/75 -- -- 90 13 95 % --   08/18/23 1400 113/68 -- -- 89 15 94 % --   08/18/23 1300 107/75 -- -- 86 20 96 % --   08/18/23 1200 105/65 98.3  F (36.8  C) Oral 90 16 95 % --   08/18/23 1100 (!) 142/78 -- -- 95 18 96 % --   08/18/23 1000 132/64 -- -- 92 16 96 % --     Weight: 187 lbs 8 oz  Vitals:    08/12/23 1721 08/15/23 0431 08/17/23 1351 08/18/23 0805   Weight: 71.7 kg (158 lb) 74.9 kg (165 lb 1.6 oz) 83  kg (183 lb) 85.1 kg (187 lb 11.2 oz)    08/19/23 0609   Weight: 85 kg (187 lb 8 oz)       Intake/Output Summary (Last 24 hours) at 8/19/2023 0936  Last data filed at 8/19/2023 0705  Gross per 24 hour   Intake 2595.9 ml   Output 2450 ml   Net 145.9 ml     General Appearance: Ill-appearing elderly woman, intubated and sedated  Respiratory: Mildly tachypneic with respiratory rate above the set ventilator rate, not using accessory muscles, good air movement with symmetric breath sounds, no wheezing, expiratory phase 2 seconds (improved from 3 to 4 seconds yesterday morning)  Cardiovascular: Regular rate and rhythm, good radial pulse, brisk capillary refill  GI: Hypoactive bowel sounds, soft, no reaction to palpation of the abdomen  Skin: Pale color, skin entry sites of left radial arterial line and right upper arm PICC line appear normal without bleeding, drainage, or surrounding erythema  Neuro: Sedated and currently unresponsive    Medical Decision Making       Total critical care time 65 minutes so far today including time spent reviewing respiratory status and adjusting mechanical ventilation treatment of life-threatening respiratory failure       Data     I have personally reviewed the following data over the past 24 hrs:    N/A  \   8.9 (L)   / 129 (L)     148 (H) 114 (H) 45.8 (H) /  130 (H)   4.0 23 1.41 (H) \     Trop: N/A BNP: N/A       Blood sugars range 121-163 over the last day  Magnesium 2.2, phosphorus 3.8  Pleural fluid cultures negative to date  Sputum culture is growing Aspergillus fumigatus on preliminary results  BNP yesterday was 9886, increased from 483 at admission    Cardiac monitor results reviewed over the past 24 hours: Normal sinus rhythm with episodes of paroxysmal atrial fibrillation with RVR, no other arrhythmias  Imaging results reviewed over the past 24 hrs:   No results found for this or any previous visit (from the past 24 hour(s)).  Recent Labs   Lab 08/19/23  0825 08/19/23  0616  08/19/23  0352 08/18/23  2021 08/18/23  1838 08/18/23  1623 08/18/23  1620 08/18/23  0854 08/18/23  0458 08/17/23  0759 08/17/23  0623 08/16/23  0531 08/16/23  0526 08/15/23  1105 08/15/23  0733   WBC  --   --   --   --   --   --   --   --  23.4*  --  21.3*  --  20.4*  --   --    HGB  --  8.9*  --   --  8.7*  --   --   --  8.0*   < > 7.5*   < > 7.7*   < >  --    MCV  --   --   --   --   --   --   --   --  93  --  93  --  91  --   --    PLT  --  129*  --   --   --   --   --   --  134*  --  145*  --  164  --   --    INR  --   --   --   --   --   --   --   --   --   --   --   --   --   --  1.38*   NA  --  148*  --   --   --   --   --   --  144  --  146*  --  141   < >  --    POTASSIUM  --  4.0  --   --   --   --  4.5  --  4.4   < > 3.2*  --  3.4   < >  --    CHLORIDE  --  114*  --   --   --   --   --   --  112*  --  112*  --  106   < >  --    CO2  --  23  --   --   --   --   --   --  23  --  24  --  24   < >  --    BUN  --  45.8*  --   --   --   --   --   --  43.3*  --  44.1*  --  51.2*   < >  --    CR  --  1.41*  --   --   --   --   --   --  1.46*  --  1.39*  --  1.44*   < >  --    ANIONGAP  --  11  --   --   --   --   --   --  9  --  10  --  11   < >  --    IVANNA  --  8.8  --   --   --   --   --   --  8.6*  --  8.5*  --  8.8   < >  --    * 121* 145*   < >  --    < >  --    < > 163*   < > 216*   < > 151*   < >  --    ALBUMIN  --   --   --   --   --   --   --   --  2.8*  --  2.5*  --  2.6*  --   --    PROTTOTAL  --   --   --   --   --   --   --   --  4.7*  --  4.5*  --  4.8*  --  4.9*   BILITOTAL  --   --   --   --   --   --   --   --  0.3  --  0.2  --  0.3  --   --    ALKPHOS  --   --   --   --   --   --   --   --  36  --  36  --  40  --   --    ALT  --   --   --   --   --   --   --   --  36  --  22  --  20  --   --    AST  --   --   --   --   --   --   --   --  24  --  14  --  14  --   --     < > = values in this interval not displayed.

## 2023-08-19 NOTE — PROGRESS NOTES
CLINICAL NUTRITION SERVICES - BRIEF NOTE      Nutrition Prescription     RECOMMENDATIONS FOR MDs/PROVIDERS TO ORDER:  - Discontinue D5 once EN initiated  - Monitor for refeeding syndrome  - FWF adjustments per team (patency flushes ordered by RD)     Recommendations already ordered by Registered Dietitian (RD):  Vital High Protein @ goal of 45ml/hr (1080ml/day)  + 1 pkt Prosource TF20 will provide: 1160 kcals (16 kcal/kg actual BW), 114 g PRO (2.2 g/kg IBW), 902 ml free H20, 119 g CHO, and 0 g fiber daily.   --Enteral access: OG  --GOAL: 45 mL/hr  --Start TF @ 15 ml/hr and advance by 10 ml q 8 hrs until goal rate.  --Do not start or advance TF rate unless K+ >3.0, Mg++ > 1.5,  and Phos > 1.9.  --Minimum 30 ml q 4 hrs water flushes for tube patency.  --If gastric enteral access: HOB > 30 degrees or Reverse Trendelenburg >10-25 degrees  --MVI/minerals supplement if not already ordered (Certavite)     - Discontinued Thera-vit-m tablet     Future/Additional Recommendations:  - Monitor TF advancement and tolerance  - Monitor labs for refeeding syndrome    *Please see full assessment note from 8/14/23    Received consult for Provider Order - Registered Dietitian to Assess and Order TF per Medical Nutrition Therapy Protocol    New Findings:  - OG placed 8/14, placement confirmed by X-ray    Weight:  08/19/23 0609 85 kg (187 lb 8 oz) Bed scale   08/18/23 0805 85.1 kg (187 lb 11.2 oz) Bed scale   08/17/23 1351 83 kg (183 lb) Bed scale   08/15/23 0431 74.9 kg (165 lb 1.6 oz) Bed scale   08/12/23 1721 71.7 kg (158 lb) Bed scale   08/12/23 1616 69.7 kg (153 lb 10.6 oz) Bed scale   Weight up 15.3 kg since admission. Difficult to assess weight trends due to fluid status.    Labs:  Na: 148 (H)  BUN: 45.8 (H)  Cr: 1.41 (H)  Lytes WNL  Hemoglobin A1C: 5.8 (5/3)  Glucose POCT: 140-163 over 24 hours    Meds:  Lasix, TID  Novolog, low intensity sliding scale  Protonix  Precedex  D5 @ 10 mL/hr    GI:  - Emesis/OG output: 150-300 mL/day  "over past 4 days  - Per RN note 8/18, \"bowels hypoactive. Abdomen was more distended this evening, hooked OG up to suction and a lot of air was removed from abdomen along with 250cc of bile. Patient more comfortable appearing afterwards.\"    Skin:  - Mild to moderate edema  - Dann 10    Dosing Weight: 57.3 kg using ADJ BW, actual BW of 71.7 kg, 52.4 kg using IBW     ASSESSED NUTRITION NEEDS  Estimated Energy Needs: 1,004-1,219 kcals/day (14 - 17 kcals/kg) actual BW  Justification: Increased needs, Overweight, Repletion, and Vented  Estimated Protein Needs: 105+ grams protein/day (2.0+ grams of pro/kg) IBW  Justification: Hypercatabolism with critical illness, Increased needs, Obesity guidelines, and Repletion  Estimated Fluid Needs: 1,433-1,719 mL/day (25 - 30 mL/kg) ADJ BW  Justification: Maintenance    Interventions  Collaboration with other providers - spoke with RN regarding plan to initiate TF  Enteral Nutrition - Initiate  Feeding tube flush - patency flushes     RD to follow per protocol.    Estrella King RD, LD  Weekend/Holiday RD pager: [phone]             "

## 2023-08-19 NOTE — PROGRESS NOTES
Ventilator settings:  Mode: SIMv  VT: 370  Rate: 12  PEEP: 5  FiO2: 35  Plateau pressures during shift:   PEEPi during shift: 8, 22  Weaning attempted: yes and failed     Breath sounds: diminished    Recent Labs   Lab 08/18/23  1838 08/18/23  1620 08/18/23  1530 08/18/23  1336   PH 7.32* 7.32* 7.30* 7.30*   PCO2 47* 47* 50* 50*   PO2 79* 70* 74* 78*   HCO3 24 24 25 25   O2PER 35 35 35 35      Pt remains on vent.  See flowsheets for parameters  RT to follow

## 2023-08-20 NOTE — PROGRESS NOTES
Continues to have persistent Afib with RVR despite amiodarone bolus and drip.    Will give a dose of digoxin 125 mcg now and re-evaluate.    Bp appears to be holding and not required pressors thus far.      Anneliese Mcmillan MD  ICU

## 2023-08-20 NOTE — PROGRESS NOTES
S: RT Note  B: MV  A: Pt remains on MV. No weaning today per MD. Many vent changes. See flowsheets for parameters.  Sx minimal amt thick tan x1. ETT moved Q2 by RN or RT.   No skin issues noted.   R: Rt to follow

## 2023-08-20 NOTE — PLAN OF CARE
Goal Outcome Evaluation:      Plan of Care Reviewed With: patient    Overall Patient Progress: improvingOverall Patient Progress: improving      Neuro: unable to follow commands with decreased sedation. Did make eye contact once and then eyes rolled back  CMS: +2-3 generalized edema. X4 extremities are ace wrapped to help decrease edema  Pulmonary: lung sounds diminished throughout. Vent changes frequently throughout the shift. On 40% FiO2.   CV: A-fib with RVR restarted IV amiodarone with bolus followed by continuous   GI: TF started at 15cc/hr. No bowel movement.   : barbosa patent with good output. Lasix and hydrochlorothiazide was put on hold this evening due to low blood pressures.   Skin: bruising to all extremities.   Pain: iv dilaudid x2  Activity: bedrest. Repositioned every 4hrs   Hygiene: total bed bath and linen change completed. Oral cares done every 4hrs  Lines/Tubes/Drains: art line and triple lumen PICC  Drips: heparin, amiodarone, precedex    Plan: monitor HR and respiratory status. CT scan when stable

## 2023-08-20 NOTE — PLAN OF CARE
"Goal Outcome Evaluation:      Plan of Care Reviewed With: patient    Overall Patient Progress: decliningOverall Patient Progress: declining    Outcome Evaluation: Vent settings; FIO2 30%, RR 20, PEEP 5.  Drips: Amio 1 mg/min, Precedex 1.0 mcg/kg/hr. Heparin D/C due to signs of bleeding.  Michele drip on hold dueto AM HTN and A-fib RVR.  1 small BM overnight, Bloody urine in [place]. Dilaudid given x3 for signs of pain.  K/Mag/Phos at goal,recheck 0600 8/21    /67   Pulse 101   Temp 97.7  F (36.5  C) (Axillary)   Resp 20   Ht 1.6 m (5' 3\")   Wt 85 kg (187 lb 8 oz)   SpO2 95%   BMI 33.21 kg/m     "

## 2023-08-20 NOTE — PROVIDER NOTIFICATION
Florian blood noted in barbosa again. Has been yellow today until this point. Heparin infusing. Hemoglobin and XA ran. Both stable. Per MD, will continue heparin at this time.

## 2023-08-20 NOTE — PLAN OF CARE
Goal Outcome Evaluation:      Plan of Care Reviewed With: patient, spouse, child        Turned and repositioned patient every 2 hours today, frequent output (urinary) recorded.  Frequent lab draws via arterial line today. ABG's goal for PCO2 in 60's.  Medicated for blood sugars x 2 (1 unit insulin each time).  Have not needed to go back on the lyn (off since 0530). Did re-initiate the heparin at 950 units per hour at about 0945. Urine output has decreased this shift, but no further blood/pink tinged blood and has gotten more gal colored as the day has gone on and now see lasix is ordered for this afternoon or evening.Tele- a-fib/flutter 100's for heart rates.  Tube feeding increased to 35 ml/hour at around 0800.  Zosyn discontinued. SCD removed from bed as is on heparin drip again. Does seem to over breath the ventilator at times.  Have weaned Precedex from 1.0 to 0.7 today.

## 2023-08-20 NOTE — PROGRESS NOTES
Ventilator settings:  Mode: simv  VT: 380  Rate: 16  PEEP: 5  FiO2: 40  Plateau pressures during shift: 22, 26  PEEPi during shift: 20,   Weaning attempted: no, cancelled by MD   ETT size/secured: 7.5/22@lip    Breath sounds: diminished/crackles  Secretions: scant thick tan  Recent Labs   Lab 08/20/23  1713 08/20/23  1355 08/20/23  1238 08/20/23  1012   PH 7.25* 7.22* 7.22* 7.31*   PCO2 52* 55* 56* 47*   PO2 97 89 89 101   HCO3 23 22 23 24   O2PER 40 40 40 40      Many vent changes by MD  See flowsheets for parameters  ETT Moved Q2 by RT/RN  RT to [facility]

## 2023-08-20 NOTE — PROGRESS NOTES
OG tube removed, tube feeding changed over to NG tube per Dr. Saavedra orders. Plan to wean Precedex as able today. Mitts in place for protection from pulling at tubes. Did grimace a bit with OG removal. Following blood gases to increase PCO2 up towards 60's per Dr. Peters, as that is closer to her baseline.

## 2023-08-20 NOTE — PROGRESS NOTES
Hospitalist progress note    Over the course of the day today patient has had persistent atrial fibrillation with adequate rate control while receiving IV amiodarone infusion.  Oxygenation has been stable on the ventilator.  She developed worsening hypercapnia and acidosis after reducing ventilator rate, but hypercapnia has stabilized and acidosis is trending toward improvement.  Acidosis appears to be due to combined respiratory and metabolic acidosis with normal anion gap.  Cause for metabolic acidosis is not completely clear, but persistent metabolic acidosis prevents capacity to generate compensatory metabolic alkalosis for persistent respiratory acidosis.  Blood pressure had trended upward earlier this afternoon, so IV Lasix was restarted.  After administration of 1 dose IV Lasix today she became hypotensive necessitating reinitiation of phenylephrine infusion after which blood pressure normalized.  She continues to demonstrate diffuse anasarca.  She has had intermittent gross hematuria but her hemoglobin has been stable and heparin 10 a level is within the therapeutic range this afternoon.    Adjustments in treatment plan this afternoon and evening:  Stopping IV Lasix due to hemodynamic instability  Avoiding restarting diltiazem today due to hemodynamic instability  Continue IV amiodarone infusion at low rate and would consider additional boluses of IV amiodarone if rapid atrial fibrillation worsens  Ordered 25 g IV albumin for hemodynamic support because she already demonstrates anasarca after previous IV fluid treatment  Titrate and/or wean phenylephrine infusion to keep mean arterial pressure 65 or higher  Continue current ventilator settings to permit mild hypercapnia as long as acidosis does not worsen but if she develops worsening metabolic acidosis she may require either bicarbonate treatment or adjustment in ventilator settings or both  Ordered close monitoring of electrolytes and ABG  overnight  Continuing IV heparin per protocol unless she develops worsening active bleeding and/or worsening hemodynamic instability  Ordered digoxin level in a.m. and anticipate continuing IV digoxin    Discussed with bedside nurse    Dat Peters MD

## 2023-08-20 NOTE — PROGRESS NOTES
[facility]    Medicine Progress Note - Hospitalist Service    Date of Admission:  8/12/2023    Assessment & Plan   Patient is a 73-year-old female with past medical history of COPD/emphysema and chronic hypoxic and hypercapnic respiratory failure on 2 to 6 L nasal cannula at baseline, paroxysmal atrial fibrillation, recent pulmonary embolism July 2023, breast cancer diagnosed in 2019 with recent concern for metastasis to the brain and lungs, hypertension, hyperlipidemia, depression, chronic kidney disease stage III, chronic anemia, CAD, prediabetes and obesity who presented to the emergency room with complaints of lethargy and shortness of breath and was found to be in A-fib with RVR.  She converted to normal sinus rhythm following IV and p.o. diltiazem and was registered to observation status for monitoring.  After receiving her home dose of lorazepam (which had recently been added as a new medication as an outpatient), she had worsening sedation and acute encephalopathy and developed acute on chronic respiratory failure with hypoxia and hypercapnia after which she was admitted to the ICU on 8/13/2023 and treated with flumazenil and BiPAP for respiratory support.      Despite those interventions, respiratory failure progressively worsened requiring intubation and sedation the morning of 8/14/23.  There was initial concern for rapidly expanding right pleural effusion with underlying atelectasis vs infection in patient with worsening leukocytosis.  Patient was also started on antibiotics empirically.  She transiently required vasopressor therapy with Levophed for hypotension from 8/14-8/15 but rapidly weaned off pressors.  Thoracentesis with removal of 1L right pleural fluid was performed on 8/15 for therapeutic and diagnostic purposes and she subsequently underwent a second therapeutic right thoracentesis on 8/16 with another 800-900 mL of pleural fluid removed.      Hospitalization  was also complicated on 8/15/2023 by upper GI bleed with gross bleeding from orogastric tube confirmed by positive gastric occult and a 2-3 g hemoglobin drop.  Lovenox was then held and PPI was increased to twice daily with hemoglobin that has now stabilized at 7-8 range.      So far she has not tolerated attempted ventilator weaning trials to pressure support CPAP on 8/17 and 8/18 and has demonstrated signs of patient ventilator dyssynchrony, anasarca with newly elevated BNP 8/18, and significant signs of COPD exacerbation on 8/18 all of which probably impairing ability to wean ventilator support.  She has had normal to minimal elevation of PCO2 with normal serum carbon dioxide and at baseline has respiratory acidosis with venous PCO2 in the 60s and elevated serum carbon dioxide, so her current acid-base status will also limit her ability to tolerate ventilator weaning.  She remains hypoxic with underlying severe pulmonary hypertension both of which also will limit ability to tolerate ventilator weaning.  Today she is now hypernatremic which increases her risk for metabolic encephalopathy that could also impair weaning from ventilator support.  She remains critically ill with guarded prognosis for recovery.    Plan for 8/20:  -Adjusting ventilator to permit hypercapnia and allow the patient to generate compensatory metabolic alkalosis which is her normal baseline (patient recent baseline venous PCO2 has been in the mid 60s with chronic compensatory metabolic alkalosis at baseline), also increasing FiO2 to achieve PaO2 above 80 and would not wean FiO2 below 40% because patient uses 2.5 to 6 L nasal cannula supplementation chronically at baseline (equivalent to FiO2 30 to 44% at baseline)  -Continue corticosteroids and use bronchodilators as needed   -Adjusting IV Lasix to maintain adequate diuresis but avoid severe hypotension  -Titrate Precedex to sedation goal of calm to drowsy  -use boluses of Dilaudid as needed  for analgesia but favor minimizing use of IV narcotics because of excessive sedation that is interfering with the ability to wean ventilator support  -Discontinue IV Zosyn  -Restart therapeutic anticoagulation with IV heparin per protocol today without bolus once Xa level is appropriate as long as bleeding does not recur  -Reduce free water supplementation through nasogastric tube  -Advance enteral feeding using nasogastric feeding tube and remove orogastric tube  -Continue IV amiodarone infusion and continue IV digoxin while monitoring digoxin level  -If blood pressure improves anticipate starting low-dose diltiazem to nasogastric tube    VENT:  SIMV mode  Tital volume 380  PEEP 5  RR 16    Drips:  Precedex     Lines/Drains/Etc:  ET tube 7.5 mm placed 8/14  OG tube placed 8/14  NG feeding tube placed 8/19  PICC line placed 8/14  Art line placed 8/14  Urinary catheter placed 8/14    Acute on chronic respiratory failure with hypoxia and hypercapnia  Patient ventilator dyssynchrony  Right pleural effusion  Toxic metabolic encephalopathy  COPD exacerbation with emphysema  Hypernatremia with hyperosmolality  Initially suspected secondary to a combination of rapidly increasing right sided pleural effusion and sedation from lorazepam with worsening acute toxic metabolic encephalopathy.  Remains intubated and sedated not tolerating attempted ventilator weaning trial likely from multiple reasons as summarized above.    S/p thoracentesis x2 (8/15 and 8/16).  Signs of COPD exacerbation noted 8/18 have significantly improved if not resolved. On 8/19 she was mildly hypernatremic with elevated serum osmolality increasing her risk for worsening encephalopathy, and hypernatremia has resolved with free water supplementation.  -Adjust ventilator settings  -ordered ABG monitoring  -continue IV solumedrol and use nebulized bronchodilators as needed  -Continue Precedex  -Anticipate removal of orogastric tube with use of nasogastric  tube today for ongoing enteral feeding  -Free water supplementation decreased from 240 mL to 90 mL per NG tube every 4 hours today  -Ordered serum osmolality    Leukocytosis, unspecified type  Possible community acquired pneumonia   Positive sputum culture for Aspergillus  Leukocytosis present at time of admission, improved after initial fluid resuscitation but then worsened again coinciding with worsening signs of respiratory failure and concern for infectious etiology.  Procalcitonin showed risk of possible localized infection but sepsis has not been suspected.  WBC continues to fluctuate in context of IV steroid treatment and increased as high as 32.5 on 8/19 but has again improved.  Sputum culture is growing Aspergillus fumigatus of uncertain clinical significance in the context of her known severe COPD.  -Discontinue Zosyn (today is day 7 of Zosyn)  -anticipate additional consideration regarding whether to begin antifungal therapy   -monitor WBC    Paroxysmal atrial fibrillation with RVR   Patient has known paroxysmal A-fib and presented with A-fib with RVR.  After initial cardioversion, she had return of frequent a fib episodes and was treated with IV amiodarone bolus and infusion 8/15 with return to NSR and no further a fib runs noted until 8/18 when it recurred coinciding with escalation of bronchodilator treatment.  She continues to have recurrence of significant atrial fibrillation with RVR with improvement in rate control after reinitiation of IV amiodarone.   -Continue IV amiodarone infusion today   -Continue IV digoxin adjusting dose based on serum digoxin level  -Anticipate restarting diltiazem via feeding tube once blood pressure is stable (patient was chronically taking diltiazem  mg daily)  -continue cardiac monitoring     Upper GI bleeding  Gross hematuria  Acute blood loss anemia  Thrombocytopenia  Aspirin induced platelet function defect  Normocytic anemia, chronic   Patient has chronic  anemia with baseline hemoglobin of approximately 10-11.  Between 8/14/23 and 8/15/23 she had an almost 2 point drop in hemoglobin (10.6 down to 8.8) with OG tube containing bright red blood and then melanotic output.  She also had gross hematuria.  Lovenox held and PPI increased to BID dosing and hemoglobin has stabilized and trended toward improvement without recurrent bleeding noted in the OG tube but with recurrence of gross hematuria in context of excessive anticoagulation with IV heparin.  Course and test results are concerning for acute blood loss anemia.   She chronically takes aspirin that causes platelet function defect and increases her bleeding risk.  She has developed acute mild thrombocytopenia that has been stable but exacerbates bleeding risk.  -continue to monitor hemoglobin closely, transfuse PRBCs for hgb 7 or less or recurrent active bleeding  -continue BID PPI  -Monitoring platelet count  -Holding chronic aspirin  -Monitor for recurrent GI bleeding and/or gross hematuria    Chronic right-sided heart failure  Severe pulmonary hypertension  Acute pulmonary edema  Anasarca  Hypoalbuminemia  RVF and pulmonary hypertension were present July 2023 at time of acute PE and are unchanged on repeat echo performed during this stay.  Patient developed signs of volume overload with acute pulmonary edema likely exacerbating COPD and respiratory failure.  Weight up more than 30 lbs since admission and she does have anasarca.  Patient chronically had been taking hydrochlorothiazide and Lasix.  BNP was normal at admission but became significantly elevated on 8/18. Serum albumin has decreased during hospitalization. Hypoalbuminemia likely exacerbates anasarca.    -Holding IV Lasix until blood pressure stabilizes  -Avoiding supplemental IV fluids as able  -monitor urine output and daily weight   -Monitor albumin as indicated    History of malignant neoplasm of overlapping sites of left breast in female, estrogen  receptor positive (H)  Suspected malignant neoplasm metastatic to brain  Suspected malignant neoplasm metastatic to right lung  Patient diagnosed with breast cancer in 2019 and recently was on Arimidex as monotherapy.  She had PE despite being on Coumadin in July 2023 which prompted evaluation of possible recurrent cancer.  Recent PET scan has shown active intake on the right upper lung mass, multiple lymph nodes, and brain lesions.  Patient was to undergo bronchoscopy at [facility] for biopsy of one of the lymph nodes to assist in confirmation of cancer diagnosis and treatment plan going forward but biopsy was cancelled due to this hospitalization   -monitor for pleural fluid cytology results   -Once patient is more stabilized we will attempt to coordinate with interventional pulmonology best options going forward if cytology from pleural fluid     Pulmonary embolism - left upper lobe, diagnosed 7/23  Medication induced coagulopathy  Diagnosed July 2023 despite being on Coumadin, felt possibly secondary to return of active cancer.  Patient had been on Eliquis but had recently stopped that and was bridging with Lovenox due to bronchoscopy planned.  Lovenox was stopped due to GI bleed during this stay which was discussed with family.  She has not had bleeding for several days.  No acute PE was noted on chest CT at admission and so far acute DVT has not been is suspected but she is at high risk for recurrent venous thromboembolism because of her underlying malignancy.  -Resume IV heparin per protocol today without bolus assuming bleeding does not recur    Elevated troponin - demand ischemia from A fib with RVR  Coronary artery disease involving native coronary artery of native heart without angina pectoris  Patient was diagnosed in 2020 with CAD based on radiographic findings for coronary artery calcifications noted following her cancer treatment.  Has been followed by cardiology and Lexiscan was performed which was  unremarkable.  Cardiology previously recommended medical treatment only and patient has not had any symptoms concerning for angina.  Elevated troponin Present at time of admission was attributed to A-fib with RVR and had been trending downward without concern for non-STEMI.  She again has elevated troponin 8/20 coinciding with worsening atrial fibrillation with RVR suspicious for demand ischemia.  -monitoring troponin today until decreasing   -no ASA at this time given GI bleed  -Treat rapid atrial fibrillation and hypoxia    Hypokalemia  Potassium has been as low as 3.2 during hospitalization and hypokalemia is likely to be exacerbated by diuretic therapy and/or hypomagnesemia.  Hypokalemia and/or hypomagnesemia increase her risk for recurrent cardiac arrhythmia.  -Continue potassium supplementation per protocol  -Monitor potassium as indicated including recheck if she develops significant diuretic response or recurrent arrhythmia  -Follow magnesium    CKD (chronic kidney disease) stage 3, GFR 30-59 ml/min (H)  She has stage III chronic kidney disease with baseline creatinine 1.0-1.3.  Patient's creatinine was mildly elevated at 1.4 at initial presentation but recovered to baseline and continues to fluctuate 1.3-1.4.  -Continue close monitoring of creatinine and urinary output    Hyponatremia  Noted initially, resolved with fluid resuscitation and is now hypernatremic  -Monitoring sodium as noted above    Hypercalcemia  Noted initially, resolved with IV fluids.  May be at risk for recurrent hypercalcemia due to suspicion for recurrent malignancy.  -Ordered recheck of calcium    Essential hypertension, benign  Patient normally on diltiazem 240 mg daily, Lasix 20 mg daily, hydrochlorothiazide 25 mg daily and lisinopril 2.5 mg daily to treat hypertension.  All have been held in last days with blood pressures normotensive to hypertensive in the last day.   -Continue IV Lasix and resume hydrochlorothiazide today  -May  restart diltiazem today as well mostly for treatment of atrial fibrillation  -Continue to hold other chronic antihypertensive medications    Major depression in complete remission (H)  Situational anxiety  Insomnia  Patient is normally on Effexor 75 mg daily.  She was recently started on hydroxyzine and lorazepam due to insomnia and situational anxiety regarding possible recurrent cancer diagnosis.  Lorazepam and all benzodiazepines are now being avoided secondary to worsening respiratory status at admission.  The patient was started on BuSpar on 8/13 without notable improvement of her symptoms and patient is now sedated and intubated.   -Continue Effexor  -Avoid benzodiazepines  -Reassess when patient is extubated    Prediabetes  Prediabetes diagnosed earlier this year and patient was started on metformin which was discontinued following hospitalization for PE.  Patient has needed several steroid bursts since initial diagnosis but blood sugars have been only mildly to moderately elevated   -Continue monitoring blood sugars every 4 hours with low resistance sliding scale NovoLog     Hyperlipidemia LDL goal <130  Hyperlipidemia is chronically treated with atorvastatin 10 mg daily  -Holding chronic statin for now        Diet: NPO for Medical/Clinical Reasons Except for: No Exceptions  Adult Formula Drip Feeding: Continuous Vital High Protein; Orogastric tube; Goal Rate: 45 mL/hr; mL/hr; Start at 15 mL/hr and advance by 10 mL/hr every 8 hours until goal of 45 mL/hr    DVT Prophylaxis: Pneumatic Compression Devices  Ponce Catheter: PRESENT, indication: Strict 1-2 Hour I&O  Lines: PRESENT      PICC 08/14/23 Triple Lumen Right Basilic-Site Assessment: WDL  Arterial Line 08/14/23 Wrist-Site Assessment: WDL      Cardiac Monitoring: ACTIVE order. Indication: ICU  Code Status: Full Code      Clinically Significant Risk Factors         # Hypernatremia: Highest Na = 148 mmol/L in last 2 days, will monitor as appropriate      #  "Hypoalbuminemia: Lowest albumin = 2.5 g/dL at 8/17/2023  6:23 AM, will monitor as appropriate   # Thrombocytopenia: Lowest platelets = 121 in last 2 days, will monitor for bleeding   # Hypertension: Noted on problem list        # Obesity: Estimated body mass index is 33.21 kg/m  as calculated from the following:    Height as of this encounter: 1.6 m (5' 3\").    Weight as of this encounter: 85 kg (187 lb 8 oz).      # COPD: noted on problem list        Disposition Plan    unknown       Dat Peters MD  Hospitalist Service  [facility]  Securely message with [employer] (more info)  Text page via [facility] Paging/Directory   ______________________________________________________________________    Interval History   Overnight the patient developed worsening rapid atrial fibrillation and lower blood pressure readings.  IV amiodarone boluses were administered and amiodarone infusion was restarted.  She was given a dose of IV digoxin early this morning and a dose of IV metoprolol just before 8 AM.  She transiently required phenylephrine vasopressor therapy last evening for a few hours but it was subsequently discontinued.  Early this morning she developed gross bleeding from her urinary catheter and 10 a level was greater than 1.10, so heparin infusion was paused and remains on hold.  She no longer is having gross hematuria.  There was concern about the change in her T waves on cardiac monitoring so EKG was performed demonstrating inverted T waves but no acute ischemic ST segment changes and troponin was ordered and was elevated at 194 this morning.  Patient remains intubated and sedated and received a dose of IV Dilaudid this morning.  She continues to be afebrile.  Heart rate has improved after treatment suggested overnight.  Oxygenation worsened when FiO2 was weaned overnight.  Chronically patient uses 2 to 6 L nasal cannula supplementation at home which is equivalent to supplemental FiO2 of " 28 to 44% at baseline.  She is continuing to receive enteral feeding through her orogastric tube.  Nasogastric feeding tube was placed yesterday with tip in the stomach although it had not yet migrated to the pylorus yesterday.  She has had good urine output, but IV Lasix was held overnight when she became hypotensive and hydrochlorothiazide was discontinued.  She has been receiving boluses of free water every 4 hours and sodium has normalized.    Physical Exam   Vital Signs: Temp: 98.9  F (37.2  C) Temp src: Axillary BP: 99/59 Pulse: 84   Resp: 27 SpO2: 98 % O2 Device: Mechanical Ventilator Oxygen Delivery: 2.5 LPM  Vent Mode: SIMV/PS  (Synchronized Intermittent Mandatory Ventilation with Pressure Support)  FiO2 (%): 30 %  Resp Rate (Set): 20 breaths/min  Tidal Volume (Set, mL): 380 mL  PEEP (cm H2O): 5 cmH2O  Pressure Support (cm H2O): 10 cmH2O  Inspiratory Pressure (cm H2O) (Drager Cassie): 15  Resp: 27    Ventilator measurements:  tidal volume 388, minute volume 7.0, mean airway pressure 14, peak inspiratory pressure 33, plateau pressure 19    Patient Vitals for the past 24 hrs:   BP Temp Temp src Pulse Resp SpO2   23 0800 99/59 -- -- 84 27 98 %   23 0750 110/73 98.9  F (37.2  C) Axillary 80 25 98 %   23 0740 -- -- -- (!) 125 29 97 %   23 0700 (!) 146/116 -- -- (!) 125 29 95 %   23 0600 -- -- -- 101 20 95 %   23 0500 122/67 -- -- 105 20 97 %   23 0417 -- -- -- 79 20 97 %   23 0400 117/69 -- -- 80 20 97 %   23 0359 -- -- -- 81 20 --   23 0355 117/69 -- -- 81 20 96 %   23 0345 -- 97.7  F (36.5  C) Axillary -- 19 97 %   23 0312 -- -- -- 78 20 98 %   23 0304 (!) 166/87 -- -- 80 20 97 %   23 0300 -- -- -- 82 20 97 %   23 0259 -- -- -- 82 20 97 %   23 0258 -- -- -- 79 20 --   23 0245 92/44 -- -- 89 20 --   23 0230 112/74 -- -- 61 16 --   23 0215 99/88 -- -- (!) 134 25 --   23 0200 121/88 -- --  (!) 146 27 --   08/20/23 0145 -- -- -- (!) 156 18 --   08/20/23 0130 104/52 -- -- (!) 144 20 --   08/20/23 0115 107/57 -- -- (!) 146 17 --   08/20/23 0100 -- 98.2  F (36.8  C) Axillary -- -- --   08/19/23 2300 97/55 -- -- 111 19 98 %   08/19/23 2206 118/66 -- -- 116 18 97 %   08/19/23 2200 118/66 -- -- 105 20 97 %   08/19/23 2147 115/80 -- -- 105 20 96 %   08/19/23 2133 -- -- -- 106 16 97 %   08/19/23 2130 (!) 140/79 -- -- 105 15 --   08/19/23 2119 106/56 -- -- 117 19 98 %   08/19/23 2117 106/56 -- -- 111 18 --   08/19/23 2115 106/56 -- -- 111 19 --   08/19/23 2110 -- -- -- (!) 121 18 --   08/19/23 2105 120/73 -- -- (!) 121 20 --   08/19/23 2100 90/71 -- -- 105 16 --   08/19/23 2059 90/71 -- -- 109 16 --   08/19/23 2055 -- -- -- 108 16 --   08/19/23 2050 (!) 84/51 -- -- 105 16 --   08/19/23 2045 -- -- -- 89 15 --   08/19/23 2040 -- -- -- (!) 129 24 --   08/19/23 2031 -- -- -- -- 24 --   08/19/23 2000 116/60 -- -- 120 21 98 %   08/19/23 1937 -- 97.8  F (36.6  C) Axillary -- -- --   08/19/23 1700 116/59 -- -- 86 16 99 %   08/19/23 1657 116/59 -- -- 84 16 99 %   08/19/23 1600 106/54 -- -- 80 16 98 %   08/19/23 1500 103/55 -- -- 82 20 99 %   08/19/23 1445 -- 98.8  F (37.1  C) Oral 82 20 99 %   08/19/23 1430 -- -- -- 85 20 99 %   08/19/23 1415 -- -- -- 86 20 99 %   08/19/23 1400 91/52 -- -- 89 20 98 %   08/19/23 1345 -- -- -- 90 17 98 %   08/19/23 1330 -- -- -- 95 18 97 %   08/19/23 1300 115/45 -- -- 98 20 --   08/19/23 1200 -- 99.1  F (37.3  C) Oral (!) 135 18 94 %   08/19/23 1100 (!) 158/76 -- -- 105 27 92 %   08/19/23 1000 (!) 156/77 -- -- 104 26 95 %     Weight: 187 lbs 8 oz  Vitals:    08/12/23 1721 08/15/23 0431 08/17/23 1351 08/18/23 0805   Weight: 71.7 kg (158 lb) 74.9 kg (165 lb 1.6 oz) 83 kg (183 lb) 85.1 kg (187 lb 11.2 oz)    08/19/23 0609   Weight: 85 kg (187 lb 8 oz)       Intake/Output Summary (Last 24 hours) at 8/20/2023 0904  Last data filed at 8/20/2023 0800  Gross per 24 hour   Intake 3291.4 ml    Output 2285 ml   Net 1006.4 ml     General Appearance: Ill-appearing elderly woman, intubated and sedated, currently overbreathing the ventilator rate  Respiratory: Mildly tachypneic, not using accessory muscles, diminished breath sounds throughout, scattered rhonchi, occasional wheeze, no crackles  Cardiovascular: Irregularly irregular rate and rhythm borderline tachycardic, weak but palpable radial pulse, normal capillary refill, edema present in all limbs  GI: Hypoactive bowel sounds, nondistended abdomen, soft  Skin: Pale color, no bleeding or drainage from the skin entry sites of arterial line catheter and PICC line catheter and no skin erythema surrounding those skin entry sites  Neuro: Sedated and currently unresponsive    Medical Decision Making       52 minutes spent so far today in critical care including reassessment of respiratory status and adjusting mechanical ventilation treatment of life-threatening respiratory failure       Data     I have personally reviewed the following data over the past 24 hrs:    25.4 (H)  \   8.6 (L)   / 121 (L)     142 109 (H) 54.1 (H) /  156 (H)   3.5 22 1.39 (H) \     Trop: 194 (HH) BNP: N/A     Procal: N/A CRP: N/A Lactic Acid: 0.8         Magnesium 2.1, phosphorus 4.4  Serum osmolality 316 yesterday with 301 the upper limit of normal  Blood sugars 156-184 over the last day with 2 of 6 blood sugar readings 180 or higher  Pleural fluid culture was negative  Pleural fluid cytology is pending  Sputum culture is growing Aspergillus fumigatus on preliminary results    Recent Labs   Lab 08/20/23  0519 08/20/23  0118 08/19/23  1752 08/19/23  1620   PH 7.39 7.29* 7.29* 7.29*   PCO2 40 50* 54* 54*   PO2 69* 76* 80 91   HCO3 24 24 26 26   O2PER 30 35 40 40     Cardiac monitoring results reviewed over the past 24 hours: Normal sinus rhythm and atrial fibrillation, no other arrhythmias    Imaging results reviewed over the past 24 hrs:   Recent Results (from the past 24 hour(s))    XR Abdomen Port 1 View    Narrative    EXAM: XR ABDOMEN PORT 1 VIEW  LOCATION: [facility]  DATE: 8/19/2023    INDICATION: Verify post pyloric feeding tube placement  COMPARISON: None.      Impression    IMPRESSION: NG tube and feeding tube identified at the level of the distal stomach. Surgical clips in the right upper quadrant. The bowel gas pattern is nonobstructive.   XR Abdomen Port 1 View    Narrative    EXAM: XR ABDOMEN PORT 1 VIEW  LOCATION: [facility]  DATE: 8/19/2023    INDICATION: Check feeding tube placement  COMPARISON: 08/19/2023      Impression    IMPRESSION: Enteric tube tip projected over the distal stomach. NG tube side-port projected over the mid stomach. Prior postoperative changes cholecystectomy. Mild thoracolumbar curvature with mild to moderate associated hypertrophic changes. Normal   bowel gas pattern with no evidence for amy ileus. Mild pleural fluid or thickening greatest on the right.     Recent Labs   Lab 08/20/23  0519 08/20/23  0344 08/20/23  0132 08/19/23  1935 08/19/23  1752 08/19/23  1627 08/19/23  1420 08/19/23  1218 08/19/23  0950 08/19/23  0825 08/19/23  0616 08/18/23  0854 08/18/23  0458 08/17/23  0759 08/17/23  0623 08/15/23  1105 08/15/23  0733   WBC 25.4*  --   --   --   --   --   --   --  32.5*  32.5*  --   --   --  23.4*  --  21.3*   < >  --    HGB 8.6*  --   --   --   --   --   --   --  9.4*  --  8.9*   < > 8.0*   < > 7.5*   < >  --    MCV  --   --   --   --   --   --   --   --  94  --   --   --  93  --  93   < >  --    *  --   --   --   --   --   --   --  135*  --  129*  --  134*  --  145*   < >  --    INR  --   --   --   --   --   --   --   --   --   --   --   --   --   --   --   --  1.38*     --   --   --  145  --  145  --  145  --  148*  --  144  --  146*   < >  --    POTASSIUM 3.5  --   --   --  4.0  --  4.2  --  4.5  --  4.0   < > 4.4   < > 3.2*   < >  --    CHLORIDE 109*  --   --   --   111*  --  112*  --  111*  --  114*  --  112*  --  112*   < >  --    CO2 22  --   --   --  23  --  22  --  22  --  23  --  23  --  24   < >  --    BUN 54.1*  --   --   --   --   --   --   --   --   --  45.8*  --  43.3*  --  44.1*   < >  --    CR 1.39*  --   --   --   --   --   --   --   --   --  1.41*  --  1.46*  --  1.39*   < >  --    ANIONGAP 11  --   --   --  11  --  11  --  12  --  11  --  9  --  10   < >  --    IVANNA 8.7*  --   --   --   --   --   --   --   --   --  8.8  --  8.6*  --  8.5*   < >  --    * 181* 184*   < >  --    < >  --    < >  --    < > 121*   < > 163*   < > 216*   < >  --    ALBUMIN  --   --   --   --   --   --   --   --   --   --   --   --  2.8*  --  2.5*   < >  --    PROTTOTAL  --   --   --   --   --   --   --   --   --   --   --   --  4.7*  --  4.5*   < > 4.9*   BILITOTAL  --   --   --   --   --   --   --   --   --   --   --   --  0.3  --  0.2   < >  --    ALKPHOS  --   --   --   --   --   --   --   --   --   --   --   --  36  --  36   < >  --    ALT  --   --   --   --   --   --   --   --   --   --   --   --  36  --  22   < >  --    AST  --   --   --   --   --   --   --   --   --   --   --   --  24  --  14   < >  --     < > = values in this interval not displayed.

## 2023-08-21 PROBLEM — E72.20 HYPERAMMONEMIA (H): Status: ACTIVE | Noted: 2023-01-01

## 2023-08-21 PROBLEM — B17.9 ACUTE HEPATITIS: Status: ACTIVE | Noted: 2023-01-01

## 2023-08-21 PROBLEM — K76.9 HEPATOCELLULAR INJURY: Status: ACTIVE | Noted: 2023-01-01

## 2023-08-21 PROBLEM — R65.10 SIRS (SYSTEMIC INFLAMMATORY RESPONSE SYNDROME) (H): Status: ACTIVE | Noted: 2023-01-01

## 2023-08-21 NOTE — PLAN OF CARE
Goal Outcome Evaluation:         Patient has remained on the ventilator, this morning Dr. Peters changed rate to 20, this afternoon he changed rate to 24.  40% FiO2, , PEEP 5. Blood pressures very labile, dependent on activity and medication.     BPs have ranged from 71/46 MAP 53 to now during care conference 173/83 MAP 93 with about 10 people (family) in her room and a few on the phone for the care conference.    HR now 140's, Have obtained an order to infuse cardizem for heart rates.    Blood gases drawn about every 2 hours, see lab results for details.  Heparin drip at 850 units/ hour, Tube feeding at 45 ml/hour, Have given NS boluses today and currently are infusing NS at 125cc/hour x 4 hours.  Low urine output. Precedex at 0.7 mcg/kg/hour, (decreased from 0.8 earlier today).

## 2023-08-21 NOTE — PROGRESS NOTES
Dr. Peters made changes to the vent settings, rate 24, . Continues at 40% FiO2.    Arterial line blood pressures currently low, did back off on Precedex to 0.7 (from 0.9), and HR now 90, so backed cardizem drip down to 5 mcg.    Ponce catheter exchanged, awaiting urine in the tube to draw UA/UC.    Starting Merrem antibiotic.

## 2023-08-21 NOTE — PROGRESS NOTES
CLINICAL NUTRITION SERVICES - REASSESSMENT NOTE     Nutrition Prescription    RECOMMENDATIONS FOR MDs/PROVIDERS TO ORDER:  None at this time    Malnutrition Status:    Difficult to assess due to lack of NFPE, nutrition hx PTA, weight fluctuations related to edema     Recommendations already ordered by Registered Dietitian (RD):  Enteral Nutrition - continue as ordered     Vital HP at goal rate of 45 mL/hr x 24 hrs, 1 packet Prosource TF20   FWF of 90 mL q 4 hrs     Future/Additional Recommendations:  Will continue to follow to monitor TF tolerance, NPO status, diet advancement, weight changes, and GI symptoms/BMs      EVALUATION OF THE PROGRESS TOWARD GOALS   Diet: Orders Placed This Encounter      NPO for Medical/Clinical Reasons Except for: No Exceptions    Nutrition Support: Vital HP at goal rate of 45 mL/hr x 24 hrs, 1 packet Prosource TF20   *provides: 1160 kcals (16 kcal/kg actual BW), 114 g PRO (2.2 g/kg IBW), 902 ml free H20, 119 g CHO, and 0 g fiber daily  Intake: no PO intake due to NPO status, sedation, ventilation      NEW FINDINGS   Pt remains intubated following multiple unsuccessful weaning trials. Becomes increasingly restless and uncomfortable with reduction in sedation. Was started on TF has tolerated advancement well. Minimal to no residuals noted.      GI is WDL, no concerns. PT's abdomen is soft to palpation. Bowel sounds are normoactive in all quadrants. Stools are moderate, brown/dark. Is passing flatus. Last BM was today 8/21.     Weights during admission:   08/21/23 0559 83.7 kg (184 lb 8 oz) Bed scale   08/20/23 1300 87.5 kg (193 lb) Bed scale   08/19/23 0609 85 kg (187 lb 8 oz) Bed scale   08/18/23 0805 85.1 kg (187 lb 11.2 oz) Bed scale   08/17/23 1351 83 kg (183 lb) Bed scale   08/15/23 0431 74.9 kg (165 lb 1.6 oz) Bed scale   08/12/23 1721 71.7 kg (158 lb) Bed scale   08/12/23 1616 69.7 kg (153 lb 10.6 oz) Bed scale   Fluctuations in bed scale weight - does have significant edema  currently,to 2+ mild to 3+ moderate, does have generalized edema noted concerning for possible malnutrition     Dosing Weight: 57.3 kg using ADJ BW, actual BW of 71.7 kg, 52.4 kg using IBW     ASSESSED NUTRITION NEEDS  Estimated Energy Needs: 1,004-1,219 kcals/day (14 - 17 kcals/kg) actual BW  Justification: Increased needs, Overweight, Repletion, and Vented  Estimated Protein Needs: 105+ grams protein/day (2.0+ grams of pro/kg) IBW  Justification: Hypercatabolism with critical illness, Increased needs, Obesity guidelines, and Repletion  Estimated Fluid Needs: 1,433-1,719 mL/day (25 - 30 mL/kg) ADJ BW  Justification: Maintenance    MALNUTRITION  % Intake: Unable to assess PTA  - needs met with TF currently   % Weight Loss: > 10% in less than 6 months (severe)  Subcutaneous Fat Loss: Unable to assess due to status  Muscle Loss: Unable to assess due to status  Fluid Accumulation/Edema: Moderate  Malnutrition Diagnosis: Difficult to assess due to lack of NFPE, nutrition hx PTA, weight fluctuations related to edema     Previous Goals   Diet adv v nutrition support within 2-3 days  Evaluation: met but not within stated time frame, now on EN at goal rate    Pt weight will remain stable during admission  Evaluation: Unable to evaluate - bed scale weight is fluctuating due to edema     Pt will tolerate diet advancement   Evaluation: ongoing, not progressing - pt remains intubated and sedated     Previous Nutrition Diagnosis  Inadequate energy intake related to poor appetite, acute on chronic illness, lethargy as evidenced by current NPO status, poor intake of 0-few bites earlier in admission, increased needs above baseline, and weight loss of > 10% in less than 6 months (severe)   Evaluation: ongoing, progressing slowly now with TF, updated/revised    CURRENT NUTRITION DIAGNOSIS  Inadequate energy intake related to poor appetite, acute on chronic illness as evidenced by prolonged and current NPO status, poor intake of 0-few  bites earlier in admission, increased needs above baseline, reliance on TF to meet needs, and weight loss of > 10% in less than 6 months (severe)     INTERVENTIONS  Implementation  Collaboration with other providers  Enteral Nutrition - continue as ordered   Multivitamin/mineral supplement therapy - liquid per OG tube    Goals  Total avg nutritional intake to meet a minimum of 14 kcal/kg and 2.0 g PRO/kg daily (per dosing wt 71.7 kg actual BW and 52.4 kg IBW *per previous assessment)  Pt weight will remain stable during admission  Pt will tolerate TF at goal rate  Pt will tolerate diet advancement     Monitoring/Evaluation  Progress toward goals will be monitored and evaluated per protocol.    Kaycee Sanchez RD, LD  Clinical Dietitian  Office: [phone]  Weekend pager: [phone]

## 2023-08-21 NOTE — PROGRESS NOTES
Shift Note 1900 to 0700    Drips:   Amio @ 0.5, verified with Tele ICU to continue infusion after 18 hrs.   Precedex @ 0.8mcg, titrated up to 1.2 at midnight and back down to current rate at 0200  Heparin @ 950units, 10a 0.73. Hold for one hour, resume at 0850.  Fentanyl @ 25mcg, total given since initiation at 2100 = 307.5mcg    Vent:   40% FiO2, RR 15, PEEP 5, TV, 380, Pressure Support 10.   Cuff leak detected and corrected x1  Inline and oral suction provided with scant, thin secretions    GI/:  Continues to have low urine output. Urine is gal/tea colored. No blood noted.   Did have large BM yesterday.    Tele:   CARMEN De La Torre and CARMEN Patel overnight, remains tachy.     Tube Feeding:   At goal rate of 45ml/hr. Zero noted for residuals.     Labs:   CRITICAL labs this morning: AST 5117, ALT 6573  Ph down to 7.22 this morning. Hemoglobin 7.7 down from 9.0    Lines:   Triple lumen PICC and Arterial line. Both function well and sites CDI.     Notes:   Persistent generalized edema, anasarca. Mottling noted bilaterally on toes. Mitts removed. Patient appears to become distressed and blood pressure increases, over breaths on vent and grimaces with tears.

## 2023-08-21 NOTE — PLAN OF CARE
Goal Outcome Evaluation:    Improved Oral Intake: ongoing, not progressing. Pt remains sedated and ventilated, has been unable to tolerate multiple weaning trials. Remains NPO at this time.    Feeding Tolerance: ongoing, progressing. Pt has tolerated advancement of Vital HP formula to goal rate of 45 mL/hr x 24 hrs, prosource 1 packet daily, FWF of 90 mL q 4 hrs. No to minimal residuals noted.     Will continue to provide EN at goal rate, currently meeting estimated needs. Will follow for potential diet advancement.    Kaycee Sanchez RD, LD  Clinical Dietitian  Office: [phone]  Weekend pager: [phone]

## 2023-08-21 NOTE — PROGRESS NOTES
Tele ICU contacted regarding Amiodarone drip. Currently running at 0.5mg for 18 hours. Called to clarify if we should continue or make any adjustments.   Plan is to continue trip at current rate.

## 2023-08-21 NOTE — PROVIDER NOTIFICATION
08/21/23 1753   Critical Test Results/Notification   Critical Lab Result (Lab Name and Value) ammonia 105   What Time Did The Lab Notify You? 1754   Provider Notified yes   Date of Provider Notification 08/21/23   Time of Provider Notification 1754   Mechanism of Provider notification page   What Provider Did You Notify? Lorraine   Response orders obtained

## 2023-08-21 NOTE — TELEPHONE ENCOUNTER
Situation: Patient chart reviewed by care coordinator.    Background: Inpatient ICU    Assessment per Dr. Peters note: Hospitalization was also complicated on 8/15/2023 by upper GI bleed with gross bleeding from orogastric tube confirmed by positive gastric occult and a 2-3 g hemoglobin drop.  Lovenox was then held and PPI was increased to twice daily with hemoglobin that has now stabilized at 7-8 range.       So far she has not tolerated attempted ventilator weaning trials to pressure support CPAP  and has demonstrated signs of patient ventilator dyssynchrony, anasarca with newly elevated BNP 8/18, significant signs of COPD exacerbation on 8/18, hypernatremia on 8/19, and recurrent a fib with RVR all of which impair ability to wean ventilator support.  On 8/21 she has new hepatocellular injury and acute renal failure. She remains critically ill with guarded prognosis for recovery.       Plan/Recommendations: This CC will notify Dr. Dhillon of patient currently status and continue to monitor inpatient status.    Jordyn Myers RN, BSN  [facility]  Oncology/Hematology Care Coordinator RN  [facility]  [phone]  8/21/2023, 2:58 PM

## 2023-08-21 NOTE — PROGRESS NOTES
S: RT Note  B: Resp Failure/MV  A: Currently on  24 PEEP 5 40%. No weaning per MD. Pt declining.  Sx x2 moderate amount for moderate amt of yellow. ETT moved Q2 by RT/RN  ETT 22@teeth  R: RT to follow

## 2023-08-21 NOTE — ACP (ADVANCE CARE PLANNING)
Care Planning Discussion 8/21/2023. Dat SANDERS MD met with Patient and their family today at the hospital at the patient's bedside to discuss Advance Care Planning in the context of her severe acute life-threatening medical illness superimposed upon suspected widely metastatic cancer. Latanya Padron does not have decisional capacity at this time because she is intubated and sedated and was present for this discussion.  Those present were informed of the voluntary nature of this discussion and wished to proceed.  The discussion included:  Diagnoses, clinical course during hospitalization, and treatment options . This discussion began at 3 PM and ended at 3:38 PM for a total of 38 minutes.     Family expressed preference to consider the options further before making final decisions regarding resuscitation preferences and whether to continue level of care or withdraw care.  They did not appear to be interested in escalating care although it was discussed and offered as a potential option.    Total critical care time 102 minutes so far today including time spent in this discussion with the patient and her family.

## 2023-08-21 NOTE — PROGRESS NOTES
Called regarding continued agitation and elevated blood pressure.  Precedex infusion is inadequate at present to control agitation.  Will start Fentanyl gtt and assess response.    Jacy Campbell MD  [phone]

## 2023-08-21 NOTE — PROGRESS NOTES
I spoke with Dr. Peters who is the provider on service caring for Ms. Padron today in the ICU. Dr. Peters has ICU privileges including vent management and notes that he does not currently need support from the tele-ICU but will reach out with any questions. Tele-ICU is available 24 hours a day, 7 days a week.

## 2023-08-21 NOTE — PROGRESS NOTES
Patient had another liquid brown BM, warm to touch so rectal temp taken and was 102.6.  IV Toradol given.  Dr. Peters made aware, 3 sets of blood cultures (1 set arterial line, 1 set PICC line, 1 set peripheral).  Additional blood work ordered and drawn.      Exchanging Ponce catheter and will then draw UA/UC. Ice packs to arm pits and groin and cool wash cloth to forehead to help cool her. Cardizem drip inititated and titrated up now to 10 mg/hour.      Noted hematoma on right breast, some of which is hard to touch, charge nurse aware.

## 2023-08-21 NOTE — PROGRESS NOTES
Tele ICU contact made    S: Patient comfort and pain   B/A: BP elevated with repositioning and assessment, [id number]. Overbreathing vent. Grimacing, tears.   R: Precedex increased Inquiry made for additional or increased pain reliever to maintain patient comfort while performing necessary nursing interventions and cares. Awaiting new orders.     Yi Jimenez RN

## 2023-08-21 NOTE — PROGRESS NOTES
Hospitalist note    Abnormal test results received and reviewed.  Serum ammonia level has increased from 85 this morning to 105 this evening.  Nursing reports that the patient is having large loose stools.  Serum osmolality this morning was 323, increased from 309 yesterday.  Patient is now receiving diltiazem infusion mixed with D5 but will also increase free water supplementation from 90 mL every 4 hours to 180 mL every 4 hours.  Most recent ABG demonstrates worsening respiratory acidosis in the context of new fever this afternoon, so ventilator rate increased from 22 to 24 with plan to recheck ABG within an hour.    Urinary catheter has been exchanged and new urine collection completed for UA and UCx.  WBC is unchanged from this morning, but procalcitonin is 1.32, significantly increased from 0.13 on August 16.    Asked by nursing to review abnormal skin findings on the right breast.  Patient has fairly large ecchymosis with smaller 2 to 3 cm central hematoma on the inferolateral aspect of the right breast extending toward the axilla.    After starting diltiazem infusion, heart rate has been controlled currently less than 100 with underlying atrial flutter.  However, blood pressure is trending downward even after starting second normal saline bolus today.  Patient continues to be oliguric.  If blood pressure continues to trend downward, will administer 1 dose IV digoxin and reduce IV diltiazem dose per infusion protocol.    Additional 20 minutes spent in critical care today    Dat Peters MD

## 2023-08-21 NOTE — PROGRESS NOTES
Patient appears uncomfortable with activity, tears in eyes. Fentanyl drip currently at 25 mcg, heparin drip re-started at 950 units/hour, re-check 10xa around 1330. ABG due at 9am, Precedex was at 0.8, decreased to 0.7 until order comes through for blood pressure support as NIBP and art line BPs low.  30 ml urine output. Moderate BM around 0845. Wt. 184.5 lb      Abdominal x-ray obtained.    VENT settings: 40% FiO2, , RR 20 (Katter increased from 18), PEEP 5 pressure support 10.      Discussed with provider about possibly coordinating a care conference for this afternoon with family for further goals of care.      Now with activity of turning and repositioning after abdominal x-ray and cleaning up from BM, BP soeducrs228/76 NIBP MAP 91, Art line /49 MAP 64.

## 2023-08-21 NOTE — PROGRESS NOTES
[facility]    Medicine Progress Note - Hospitalist Service    Date of Admission:  8/12/2023    Assessment & Plan   Patient is a 73-year-old female with past medical history of COPD/emphysema and chronic hypoxic and hypercapnic respiratory failure on 2 to 6 L nasal cannula at baseline, paroxysmal atrial fibrillation, recent pulmonary embolism July 2023, breast cancer diagnosed in 2019 with recent concern for metastasis to the brain and lungs, hypertension, hyperlipidemia, depression, chronic kidney disease stage III, chronic anemia, CAD, prediabetes and obesity who presented to the emergency room with complaints of lethargy and shortness of breath and was found to be in A-fib with RVR.  She converted to normal sinus rhythm following IV and p.o. diltiazem and was registered to observation status for monitoring.  After receiving her home dose of lorazepam (which had recently been added as a new medication as an outpatient), she had worsening sedation and acute encephalopathy and developed acute on chronic respiratory failure with hypoxia and hypercapnia after which she was admitted to the ICU on 8/13/2023 and treated with flumazenil and BiPAP for respiratory support.      Despite those interventions, respiratory failure progressively worsened requiring intubation and sedation the morning of 8/14/23.  There was initial concern for rapidly expanding right pleural effusion with underlying atelectasis vs infection in patient with worsening leukocytosis.  Patient was also started on antibiotics empirically.  She transiently required vasopressor therapy with Levophed for hypotension from 8/14-8/15 but rapidly weaned off pressors.  Thoracentesis with removal of 1L right pleural fluid was performed on 8/15 for therapeutic and diagnostic purposes and she subsequently underwent a second therapeutic right thoracentesis on 8/16 with another 800-900 mL of pleural fluid removed.      Hospitalization  was also complicated on 8/15/2023 by upper GI bleed with gross bleeding from orogastric tube confirmed by positive gastric occult and a 2-3 g hemoglobin drop.  Lovenox was then held and PPI was increased to twice daily with hemoglobin that has now stabilized at 7-8 range.      So far she has not tolerated attempted ventilator weaning trials to pressure support CPAP  and has demonstrated signs of patient ventilator dyssynchrony, anasarca with newly elevated BNP 8/18, significant signs of COPD exacerbation on 8/18, hypernatremia on 8/19, and recurrent a fib with RVR all of which impair ability to wean ventilator support.  On 8/21 she has new hepatocellular injury and acute renal failure.  She remains critically ill with guarded prognosis for recovery.    Plan for 8/21:  -Adjusting ventilator to maintain normal acid-base status and keep PaO2 above 80 and not wean FiO2 below 35-40% because patient uses 2.5 to 6 L nasal cannula supplementation chronically at baseline (equivalent to FiO2 30 to 44% at baseline)  -Stop IV amiodarone due to significant hepatocellular injury  -Use bolus doses of digoxin or diltiazem as needed for rapid atrial fibrillation assuming blood pressure is stable enough to tolerate diltiazem  -Holding IV Lasix due to severe hypotension   -Titrate Precedex to sedation goal of calm to drowsy  -Continue fentanyl infusion for analgesia  -Continue therapeutic anticoagulation with IV heparin unless INR is significantly elevated and/or she develops significant bleeding  -Continue enteral feeding and free water supplementation through nasogastric tube  -Monitor liver function closely and check INR and ammonia level  -Monitor urine output and renal function closely  -Anticipate reviewing the significant change in patient's status with her family when they are available to revisit care goals    VENT:  SIMV mode  Tital volume 380  PEEP 5  RR 16  PS 10    Continuous infusions:  Precedex    Fentanyl  Phenylephrine    Lines/Drains/Etc:  ET tube 7.5 mm placed 8/14  OG tube placed 8/14  NG feeding tube placed 8/19  PICC line placed 8/14  Art line placed 8/14  Urinary catheter placed 8/14    Acute on chronic respiratory failure with hypoxia and hypercapnia  Patient ventilator dyssynchrony  Right pleural effusion  Toxic metabolic encephalopathy  COPD exacerbation with emphysema  Hypernatremia with hyperosmolality  Initially suspected secondary to a combination of rapidly increasing right sided pleural effusion and sedation from lorazepam with worsening acute toxic metabolic encephalopathy.  Remains intubated and sedated not tolerating attempted ventilator weaning trial likely from multiple reasons as summarized above.    S/p thoracentesis x2 (8/15 and 8/16).  Signs of COPD exacerbation noted 8/18 have resolved. On 8/19 she was mildly hypernatremic with elevated serum osmolality increasing her risk for worsening encephalopathy.  Hypernatremia has resolved with free water supplementation.  On 8/21 she has significant hepatocellular injury increasing her risk for hyperammonemia that could exacerbate encephalopathy.  She also has new acute renal failure that will prevent her from developing appropriate compensatory metabolic alkalosis in response to respiratory acidosis.  -Adjust ventilator settings  -ordered ABG monitoring  -continue IV solumedrol and use nebulized bronchodilators as needed  -Continue Precedex  -Abdomen x-ray ordered to check nasogastric tube placement   -Anticipate continued enteral feeding unless there is concern that components of her enteral feeds could exacerbate hepatocellular injury  -Continue free water supplementation and adjust as indicated depending upon test results    Acute hepatocellular injury  Acute renal failure  Significantly increased hepatic transaminases concerning for acute hepatocellular injury on 8/21.  This coincides with high doses of amiodarone administration over  the last few days, so hepatotoxicity from amiodarone seems most likely.  Test results 8/21 are also concerning for acute renal failure.  She has been oliguric over the last 24 hours.  -Discontinue amiodarone completely  -Monitor LFTs and check ammonia level and INR  -Avoid other hepatotoxic medications is much as possible  -Monitor renal function and urine output closely    Hypotension  She has had recurring hypotension over the last 1 to 2 days.  Suspect hypotension is primarily due to rapid atrial fibrillation and chronic right heart failure with relative intravascular hypovolemia after aggressive diuresis.  Hypotension likely contributes to acute renal failure and possibly hepatocellular injury.  Hypotension has been responsive to phenylephrine infusion.  -Continue phenylephrine infusion as needed for hypotension to maintain mean arterial pressure above 65  -Administer 1 bolus normal saline 500 mL IV today    Paroxysmal atrial fibrillation with RVR   Patient has known paroxysmal A-fib and presented with A-fib with RVR.  After initial cardioversion, she had return of frequent a fib episodes and was treated with IV amiodarone bolus and infusion 8/15 with return to NSR and no further a fib runs noted until 8/18 when it recurred coinciding with escalation of bronchodilator treatment.  She continues to have recurrence of significant atrial fibrillation with RVR with improvement in rate control after reinitiation of IV amiodarone.  However, she has received significant cumulative dose of IV amiodarone the last few days and now demonstrates significant hepatocellular injury.  -discontinue IV amiodarone infusion today   -Use bolus dose of IV digoxin cautiously in context of acute renal failure as needed for rapid atrial fibrillation   -Consider use of diltiazem to treat rapid atrial fibrillation (patient was chronically taking diltiazem  mg daily), but its use has been limited by hypotension  -continue cardiac  monitoring     Leukocytosis, unspecified type  Possible community acquired pneumonia   Positive sputum culture for Aspergillus  Leukocytosis present at time of admission, improved after initial fluid resuscitation but then worsened again coinciding with worsening signs of respiratory failure and concern for infectious etiology.  Procalcitonin showed risk of possible localized infection but sepsis has not been suspected.  WBC continues to fluctuate in context of IV steroid treatment and increased as high as 32.5 on 8/19 but has again improved.  Sputum culture is growing Aspergillus fumigatus of uncertain clinical significance in the context of her known severe COPD.  She received 7 days of Zosyn therapy during hospitalization (Zosyn was discontinued 8/20).  Leukocytosis has improved even after stopping Zosyn.  -anticipate additional consideration regarding whether to begin antifungal therapy   -monitor WBC    Upper GI bleeding  Gross hematuria  Acute blood loss anemia  Thrombocytopenia  Aspirin induced platelet function defect  Normocytic anemia, chronic   Patient has chronic anemia with baseline hemoglobin of approximately 10-11.  Between 8/14/23 and 8/15/23 she had an almost 2 point drop in hemoglobin (10.6 down to 8.8) with OG tube containing bright red blood and then melanotic output.  She also had gross hematuria.  Lovenox held and PPI increased to BID dosing and hemoglobin has stabilized and trended toward improvement without recurrent bleeding noted in the OG tube but with recurrence of gross hematuria in context of excessive anticoagulation with IV heparin.  Course and test results are concerning for acute blood loss anemia.   She chronically takes aspirin that causes platelet function defect and increases her bleeding risk.  She has developed acute mild thrombocytopenia that has been stable but exacerbates bleeding risk.  -continue to monitor hemoglobin closely, transfuse PRBCs for hgb 7 or less or recurrent  active bleeding  -continue BID PPI  -Monitoring platelet count  -Holding chronic aspirin  -Monitor for recurrent GI bleeding and/or gross hematuria    Chronic right-sided heart failure  Severe pulmonary hypertension  Acute pulmonary edema  Anasarca  Hypoalbuminemia  RVF and pulmonary hypertension were present July 2023 at time of acute PE and are unchanged on repeat echo performed during this stay.  Patient developed signs of volume overload with acute pulmonary edema likely exacerbating COPD and respiratory failure.  Weight up more than 30 lbs since admission and she does have anasarca.  Patient chronically had been taking hydrochlorothiazide and Lasix.  BNP was normal at admission but became significantly elevated on 8/18. Serum albumin has decreased during hospitalization. Hypoalbuminemia likely exacerbates anasarca.  She received a dose of IV albumin administration on 8/20 with at least transient improvement in her blood pressure.  -Holding Lasix until blood pressure stabilizes  -Avoiding supplemental IV fluids as able  -monitor urine output and daily weight   -Monitor albumin as indicated    History of malignant neoplasm of overlapping sites of left breast in female, estrogen receptor positive (H)  Suspected malignant neoplasm metastatic to brain  Suspected malignant neoplasm metastatic to right lung  Patient diagnosed with breast cancer in 2019 and recently was on Arimidex as monotherapy.  She had PE despite being on Coumadin in July 2023 which prompted evaluation of possible recurrent cancer.  Recent PET scan has shown active intake on the right upper lung mass, multiple lymph nodes, and brain lesions.  Patient was to undergo bronchoscopy at [facility] for biopsy of one of the lymph nodes to assist in confirmation of cancer diagnosis and treatment plan going forward but biopsy was cancelled due to this hospitalization   -monitor for pleural fluid cytology results   -Once patient is more stable, depending upon her  care goals, we will attempt to coordinate with interventional pulmonology best options going forward if cytology from pleural fluid     Pulmonary embolism - left upper lobe, diagnosed 7/23  Medication induced coagulopathy  Diagnosed July 2023 despite being on Coumadin, felt possibly secondary to return of active cancer.  Patient had been on Eliquis but had recently stopped that and was bridging with Lovenox due to bronchoscopy planned.  Lovenox was stopped due to GI bleed during this stay which was discussed with family.  She has not had bleeding for several days.  No acute PE was noted on chest CT at admission and so far acute DVT has not been is suspected but she is at high risk for recurrent venous thromboembolism because of her underlying malignancy. On 8/21 she has significant hepatocellular injury that could increase her risk for coagulopathy.  -Continue IV heparin per protocol today without bolus assuming bleeding does not recur and assuming INR is not 2 or higher    Elevated troponin - demand ischemia from A fib with RVR  Coronary artery disease involving native coronary artery of native heart without angina pectoris  Patient was diagnosed in 2020 with CAD based on radiographic findings for coronary artery calcifications noted following her cancer treatment.  Has been followed by cardiology and Lexiscan was performed which was unremarkable.  Cardiology previously recommended medical treatment only and patient has not had any symptoms concerning for angina.  Elevated troponin Present at time of admission was attributed to A-fib with RVR and had been trending downward without concern for non-STEMI.  She again developed elevated troponin 8/20 coinciding with worsening atrial fibrillation with RVR suspicious for demand ischemia.  Troponin has progressively increased over the last 24 hours although rate of rise is decreasing and the increase in troponin is probably due at least part to acute renal failure with  decreased clearance.  -Not continuing to monitor troponin unless her clinical status changes  -no ASA at this time given GI bleed  -Treat other medical problems including rapid atrial fibrillation and hypoxia    Hypokalemia  Potassium has been as low as 3.2 during hospitalization and hypokalemia is likely to be exacerbated by diuretic therapy and/or hypomagnesemia.  Hypokalemia and/or hypomagnesemia increase her risk for recurrent cardiac arrhythmia.  Hypokalemia resolved with supplementation and she is now at risk for hyperkalemia in the context of acute renal failure.  -Continue potassium supplementation per protocol  -Monitor potassium as indicated including recheck if she develops significant diuretic response or recurrent arrhythmia  -Follow magnesium    CKD (chronic kidney disease) stage 3, GFR 30-59 ml/min (H)  She has stage III chronic kidney disease with recent baseline creatinine 1.0-1.1.  Patient's creatinine was mildly elevated at 1.4 at initial presentation but recovered to baseline and had fluctuated 1.3-1.4 until 8/21.  -Continue close monitoring of creatinine and urinary output    Hyponatremia  Noted initially, resolved with fluid resuscitation and is now hypernatremic  -Monitoring sodium as noted above    Hypercalcemia  Noted initially, resolved with IV fluids.  May be at risk for recurrent hypercalcemia due to suspicion for recurrent malignancy.  -Ordered recheck of calcium    Essential hypertension, benign  Patient normally on diltiazem 240 mg daily, Lasix 20 mg daily, hydrochlorothiazide 25 mg daily and lisinopril 2.5 mg daily to treat hypertension.  All these meds were held over the last several days with blood pressures normotensive to hypertensive until she has developed worsening hypotension since 8/20.   -Not continuing diuretics at this time   -May restart diltiazem for treatment of atrial fibrillation if she is not hypotensive  -Continue to hold other chronic antihypertensive  medications    Major depression in complete remission (H)  Situational anxiety  Insomnia  Patient is normally on Effexor 75 mg daily.  She was recently started on hydroxyzine and lorazepam due to insomnia and situational anxiety regarding possible recurrent cancer diagnosis.  Lorazepam and all benzodiazepines are now being avoided secondary to worsening respiratory status at admission.  The patient was started on BuSpar on 8/13 without notable improvement of her symptoms and patient is now sedated and intubated.   -Continue Effexor  -Avoid benzodiazepines  -Reassess when patient is extubated    Prediabetes  Prediabetes diagnosed earlier this year and patient was started on metformin which was discontinued following hospitalization for PE.  Patient has needed several steroid bursts since initial diagnosis but blood sugars were only mildly to moderately elevated until enteral feeding was initiated since which time she has developed worsening hyperglycemia.  -Continue monitoring blood sugars every 4 hours with low resistance sliding scale NovoLog     Hyperlipidemia LDL goal <130  Hyperlipidemia is chronically treated with atorvastatin 10 mg daily  -Holding chronic statin for now        Diet: NPO for Medical/Clinical Reasons Except for: No Exceptions  Adult Formula Drip Feeding: Continuous Vital High Protein; Nasogastric tube; Goal Rate: 45 mL/hr; mL/hr; Start at 15 mL/hr and advance by 10 mL/hr every 8 hours until goal of 45 mL/hr    DVT Prophylaxis: IV heparin  Ponce Catheter: PRESENT, indication: Strict 1-2 Hour I&O;Deep Sedation/Paralysis  Lines: PRESENT      PICC 08/14/23 Triple Lumen Right Basilic-Site Assessment: WDL  Arterial Line 08/14/23 Wrist-Site Assessment: WDL      Cardiac Monitoring: ACTIVE order. Indication: ICU  Code Status: Full Code      Clinically Significant Risk Factors              # Hypoalbuminemia: Lowest albumin = 2.5 g/dL at 8/17/2023  6:23 AM, will monitor as appropriate   # Thrombocytopenia:  "Lowest platelets = 121 in last 2 days, will monitor for bleeding  # Acute Kidney Injury, unspecified: based on a >150% or 0.3 mg/dL increase in last creatinine compared to past 90 day average, will monitor renal function  # Hypertension: Noted on problem list        # Obesity: Estimated body mass index is 32.68 kg/m  as calculated from the following:    Height as of this encounter: 1.6 m (5' 3\").    Weight as of this encounter: 83.7 kg (184 lb 8 oz).      # COPD: noted on problem list        Disposition Plan    unknown       Dat Peters MD  Hospitalist Service  [facility]  Securely message with [employer] (more info)  Text page via [facility] Paging/Directory   ______________________________________________________________________    Interval History   Patient was given IV albumin last evening after which her blood pressure improved and she weaned off of phenylephrine infusion about 9 PM.  Overnight she was more restless and nursing reports that she appeared to be tearful as if she was uncomfortable.  However, patient was not reliably able to follow instructions per nursing.  Doses of IV Dilaudid did not seem to provide benefit, so fentanyl infusion was started.  Since then, patient appears to be resting more comfortably.  She remains afebrile.  She has been persistently tachycardic, but heart rate has been generally adequately controlled while continuing IV amiodarone infusion.  Blood pressure was stable overnight after stopping phenylephrine infusion but this morning is now hypotensive again such that phenylephrine infusion has been restarted.  Oxygenation has been stable.  Respiratory rate has been generally synchronous with the ventilator without overbreathing.  She is tolerating enteral feeding.  Urine output has decreased over the last 12 hours.  She has not had significant recurrent hematuria or other bleeding.    Physical Exam   Vital Signs: Temp: 98.8  F (37.1  C) Temp src: Oral " BP: 114/77 Pulse: 116   Resp: 16 SpO2: 96 % O2 Device: Mechanical Ventilator    Vent Mode: SIMV/PS  (Synchronized Intermittent Mandatory Ventilation with Pressure Support)  FiO2 (%): 40 %  Resp Rate (Set): 16 breaths/min  Tidal Volume (Set, mL): 380 mL  PEEP (cm H2O): 5 cmH2O  Pressure Support (cm H2O): 10 cmH2O  Inspiratory Pressure (cm H2O) (Drager Cassie): 15  Resp: 16    Ventilator measurements:  tidal volume 492, minute volume 5.8, peak inspiratory pressure 31, mean airway pressure 14, plateau pressure 27    Patient Vitals for the past 24 hrs:   BP Temp Temp src Pulse Resp SpO2 Weight   23 0700 114/77 -- -- 116 16 -- --   23 0600 108/64 -- -- 105 17 96 % --   23 0559 -- -- -- -- -- -- 83.7 kg (184 lb 8 oz)   23 0530 94/48 -- -- 111 17 97 % --   23 0500 100/52 -- -- 107 17 96 % --   23 0445 -- -- -- 116 19 -- --   23 0442 -- -- -- (!) 130 16 -- --   23 0430 118/82 -- -- 120 21 96 % --   23 0400 114/80 98.8  F (37.1  C) Oral 105 15 97 % --   23 0330 99/51 -- -- 93 17 98 % --   23 0300 129/64 97.9  F (36.6  C) Oral 107 13 98 % --   23 0224 -- -- -- 107 17 97 % --   23 0200 99/55 -- -- 103 15 97 % --   23 0057 -- -- -- 111 15 -- --   23 0043 -- -- -- 114 16 -- --   23 0016 -- -- -- (!) 134 24 -- --   23 0009 -- -- -- (!) 146 25 94 % --   23 0005 -- -- -- (!) 135 17 95 % --   23 0000 (!) 157/97 -- -- (!) 126 17 95 % --   235 -- -- -- (!) 133 17 94 % --   230 -- 98.2  F (36.8  C) Oral 104 16 97 % --   23 111/62 -- -- 103 16 98 % --   23 104/69 -- -- 106 17 98 % --   23 2130 102/72 -- -- 108 17 98 % --   23 2100 107/57 -- -- 99 17 98 % --   23 (!) 112/90 -- -- 109 15 97 % --   23 -- -- -- 115 24 96 % --   23 138/82 99.2  F (37.3  C) Oral 116 14 96 % --   23 -- -- -- (!) 122 16 96 % --   23 --  -- -- (!) 122 19 96 % --   08/20/23 1957 -- -- -- (!) 125 16 96 % --   08/20/23 1945 -- 99.2  F (37.3  C) -- -- -- -- --   08/20/23 1900 (!) 136/94 -- -- (!) 124 22 94 % --   08/20/23 1800 120/70 -- -- 103 18 98 % --   08/20/23 1720 99/62 -- -- 105 20 99 % --   08/20/23 1710 92/61 -- -- 106 21 98 % --   08/20/23 1700 110/57 -- -- 105 22 98 % --   08/20/23 1650 116/62 -- -- 105 23 97 % --   08/20/23 1640 135/75 -- -- 111 14 (!) 82 % --   08/20/23 1600 138/79 -- -- 112 27 96 % --   08/20/23 1556 100/42 -- -- 105 24 95 % --   08/20/23 1550 (!) 66/54 -- -- 101 25 93 % --   08/20/23 1540 -- -- -- 105 28 93 % --   08/20/23 1532 (!) 87/39 -- -- 104 24 96 % --   08/20/23 1522 -- 99.4  F (37.4  C) Oral 111 28 97 % --   08/20/23 1521 -- -- -- 107 25 97 % --   08/20/23 1520 -- -- -- 108 25 96 % --   08/20/23 1519 -- -- -- 110 28 96 % --   08/20/23 1518 -- -- -- 106 27 96 % --   08/20/23 1517 -- -- -- 101 28 96 % --   08/20/23 1503 -- -- -- 100 25 96 % --   08/20/23 1502 -- -- -- 111 26 96 % --   08/20/23 1501 -- -- -- 101 27 96 % --   08/20/23 1500 107/54 -- -- 101 27 96 % --   08/20/23 1400 116/59 -- -- 116 29 96 % --   08/20/23 1300 (!) 144/94 -- -- (!) 121 (!) 31 96 % 87.5 kg (193 lb)   08/20/23 1200 (!) 130/101 -- -- 115 28 97 % --   08/20/23 1100 -- -- -- 115 27 98 % --   08/20/23 1000 103/53 -- -- 99 17 98 % --   08/20/23 0900 130/71 -- -- 100 26 97 % --     Weight: 184 lbs 8 oz  Vitals:    08/17/23 1351 08/18/23 0805 08/19/23 0609 08/20/23 1300   Weight: 83 kg (183 lb) 85.1 kg (187 lb 11.2 oz) 85 kg (187 lb 8 oz) 87.5 kg (193 lb)    08/21/23 0559   Weight: 83.7 kg (184 lb 8 oz)       Intake/Output Summary (Last 24 hours) at 8/21/2023 0806  Last data filed at 8/21/2023 0600  Gross per 24 hour   Intake 2301.76 ml   Output 498 ml   Net 1803.76 ml     She had a bowel movement overnight    General Appearance: Ill-appearing elderly woman, intubated and sedated  Respiratory: Respiratory rate synchronous with the ventilator,  not using accessory muscles, diminished breath sounds throughout, clear lung fields aside from scattered rhonchi, expiratory phase approximately 1 to 2 seconds  Cardiovascular: Tachycardic with irregularly irregular rhythm, fingers and toes are pink with brisk capillary refill, edema present in all limbs  GI: Hypoactive bowel sounds, soft abdomen without distention  Skin: Hands and feet are pink, no mottling  Neuro: Sluggish eye-opening to voice although not reliably following instructions or purposefully moving limbs at this time    Medical Decision Making       Total critical care time today so far 59 minutes including time spent reassessing respiratory status and adjusting mechanical ventilation treatment of life-threatening respiratory failure and assessing hemodynamic status and adjusting vasopressor treatment of life-threatening hypotension       Data     I have personally reviewed the following data over the past 24 hrs:    21.0 (H)  \   7.7 (L)   / 85 (L)     143 109 (H) 87.3 (H) /  181 (H)   4.3 21 (L) 2.17 (H) \     ALT: 6,573 (HH) AST: 5,117 (HH) AP: 49 TBILI: 0.3   ALB: 2.9 (L) TOT PROTEIN: 4.8 (L) LIPASE: N/A     Trop: 276 (HH) BNP: N/A     INR:  1.78 (H) PTT:  N/A   D-dimer:  N/A Fibrinogen:  N/A       For comparison, most recent liver function transaminase tests performed August 18 were normal  Blood sugars ranged 174-231 over the last 24 hours with 3 out of 6 blood sugar readings 180 or higher  Serum osmolality yesterday was 309 with 301 the upper limit of normal  Pleural fluid cytology results are pending    Recent Labs   Lab 08/21/23  0549 08/21/23  0204 08/20/23  2200 08/20/23  1713   PH 7.22* 7.25* 7.28* 7.25*   PCO2 54* 52* 49* 52*   PO2 96 100 98 97   HCO3 22 23 23 23   O2PER 40 40 40 40     Cardiac monitor results reviewed over the past 24 hrs: Persistent atrial fibrillation, no other arrhythmias    Recent Labs   Lab 08/21/23  0831 08/21/23  0548 08/21/23  0427 08/21/23  0205 08/21/23  0027  08/20/23  2159 08/20/23 2022 08/20/23  1714 08/20/23  1238 08/20/23  1233 08/20/23  1229 08/20/23  0519 08/19/23  1218 08/19/23  0950 08/19/23  0825 08/19/23  0616 08/18/23  0854 08/18/23  0458 08/17/23  0759 08/17/23  0623 08/15/23  1105 08/15/23  0733   WBC  --  21.0*  --   --   --   --   --   --   --   --   --  25.4*  --  32.5*  32.5*  --   --   --  23.4*  --  21.3*   < >  --    HGB  --  7.7*  --   --   --   --   --  9.0*  --   --  9.8* 8.6*  --  9.4*  --  8.9*   < > 8.0*   < > 7.5*   < >  --    MCV  --   --   --   --   --   --   --   --   --   --   --   --   --  94  --   --   --  93  --  93   < >  --    PLT  --  85*  --   --   --   --   --   --   --   --   --  121*  --  135*  --  129*  --  134*  --  145*   < >  --    INR  --  1.78*  --   --   --   --   --   --   --   --   --   --   --   --   --   --   --   --   --   --   --  1.38*   NA  --  143  --  143  --  141  --  140  --   --  142 142   < > 145  --  148*  --  144  --  146*   < >  --    POTASSIUM  --  4.3  --  4.2  --  4.4  --  4.3  --   --  5.0 3.5   < > 4.5  --  4.0   < > 4.4   < > 3.2*   < >  --    CHLORIDE  --  109*  --  109*  --  107  --  107  --   --  108* 109*   < > 111*  --  114*  --  112*  --  112*   < >  --    CO2  --  21*  --  21*  --  20*  --  20*  --   --  19* 22   < > 22  --  23  --  23  --  24   < >  --    BUN  --  87.3*  --   --   --   --   --   --   --   --   --  54.1*  --   --   --  45.8*  --  43.3*  --  44.1*   < >  --    CR  --  2.17*  --   --   --   --   --   --   --   --   --  1.39*  --   --   --  1.41*  --  1.46*  --  1.39*   < >  --    ANIONGAP  --  13  --  13  --  14  --  13  --   --  15 11   < > 12  --  11  --  9  --  10   < >  --    IVANNA  --  8.6*  --   --   --   --   --   --   --   --   --  8.7*  --   --   --  8.8  --  8.6*  --  8.5*   < >  --    * 190* 194*  --    < >  --    < >  --    < >  --   --  156*   < >  --    < > 121*   < > 163*   < > 216*   < >  --    ALBUMIN  --  2.9*  --   --   --   --   --   --   --  2.7*  --    --   --   --   --   --   --  2.8*  --  2.5*   < >  --    PROTTOTAL  --  4.8*  --   --   --   --   --   --   --   --   --   --   --   --   --   --   --  4.7*  --  4.5*   < > 4.9*   BILITOTAL  --  0.3  --   --   --   --   --   --   --   --   --   --   --   --   --   --   --  0.3  --  0.2   < >  --    ALKPHOS  --  49  --   --   --   --   --   --   --   --   --   --   --   --   --   --   --  36  --  36   < >  --    ALT  --  6,573*  --   --   --   --   --   --   --   --   --   --   --   --   --   --   --  36  --  22   < >  --    AST  --  5,117*  --   --   --   --   --   --   --   --   --   --   --   --   --   --   --  24  --  14   < >  --     < > = values in this interval not displayed.

## 2023-08-21 NOTE — PLAN OF CARE
Goal Outcome Evaluation:      Plan of Care Reviewed With: family    Overall Patient Progress: decliningOverall Patient Progress: declining    Outcome Evaluation: 4 Hour Shift Note: BP initially very low (70's/40's) requiring pheylephrine to be restarted again. Max dose was 0.8mcg/hr before titrating back down to current rate of 0.1mcg/hr. Pt has been more agitated this afternoon. BP very high at times, HR into the 120's and tears in her eyes. Dilaudid given x 2 with short term relief. Precedex increased to 1.0mcg/hr. Tele ICU contacted for better pain management. 1 time dose of albumin given. Urine output has been very low. Ace wraps removed from arms as they were tourniquets affecting circulation. Ace wraps also removed from legs for a break. Pt has blood in her urine at times. 1 very large loose/soft stool. No blood present in her stool. Tube feedings advanced to goal of 45ml/hr. Pt has been tolerating well. Residual of 5ml prior to advancing. No changes in vent settings. She remains at 40% fio2.

## 2023-08-22 PROBLEM — C80.1: Status: ACTIVE | Noted: 2023-01-01

## 2023-08-22 PROBLEM — C78.01: Status: ACTIVE | Noted: 2023-01-01

## 2023-08-22 PROBLEM — J91.0 MALIGNANT PLEURAL EFFUSION (H): Status: ACTIVE | Noted: 2023-01-01

## 2023-08-22 NOTE — PROGRESS NOTES
CHUY-SYNEPHRINE paused for elevated blood pressures. Highest blood pressure was 149/109, recheck was 106/67 after paused for 25 min.   Temp was elevated to 100.5 at last check.   Heart rate has been Aflutter ranging from 90's to 120's.  In line secretions are appearing thicker and tan later in the shift.  Update given to charge nurse.

## 2023-08-22 NOTE — PROGRESS NOTES
S: RT Note  B: Resp failure  A: Pt on MV. See flowsheets for parameters. No SBT today.  Sx x1 for small amount of yellow. Sputum sent  ETT moved Q2 by RT/RN. 22@[facility]  R: RT to follow

## 2023-08-22 NOTE — PLAN OF CARE
"  Problem: Plan of Care - These are the overarching goals to be used throughout the patient stay.    Goal: Plan of Care Review  Description: The Plan of Care Review/Shift note should be completed every shift.  The Outcome Evaluation is a brief statement about your assessment that the patient is improving, declining, or no change.  This information will be displayed automatically on your shift note.  Outcome: Progressing  Goal: Patient-Specific Goal (Individualized)  Description: You can add care plan individualizations to a care plan. Examples of Individualization might be:  \"Parent requests to be called daily at 9am for status\", \"I have a hard time hearing out of my right ear\", or \"Do not touch me to wake me up as it startles me\".  Outcome: Progressing  Goal: Absence of Hospital-Acquired Illness or Injury  Outcome: Progressing  Intervention: Identify and Manage Fall Risk  Recent Flowsheet Documentation  Taken 8/22/2023 0400 by Haris Kumar RN  Safety Promotion/Fall Prevention:   increased rounding and observation   increase visualization of patient   lighting adjusted   room door open   room near nurse's station   room organization consistent   safety round/check completed  Taken 8/22/2023 0000 by Haris Kumar RN  Safety Promotion/Fall Prevention:   increased rounding and observation   increase visualization of patient   lighting adjusted   room door open   room near nurse's station   room organization consistent   safety round/check completed  Intervention: Prevent Skin Injury  Recent Flowsheet Documentation  Taken 8/22/2023 0600 by Haris Kumar RN  Body Position:   turned   right  Taken 8/22/2023 0400 by Haris Kumar RN  Body Position: turned  Taken 8/22/2023 0000 by Haris Kumar RN  Body Position:   turned   right  Intervention: Prevent and Manage VTE (Venous Thromboembolism) Risk  Recent Flowsheet Documentation  Taken 8/22/2023 0400 by Haris Kumar RN  VTE Prevention/Management: SCDs " (sequential compression devices) off  Taken 8/22/2023 0000 by Haris Kumar RN  VTE Prevention/Management: SCDs (sequential compression devices) off  Goal: Optimal Comfort and Wellbeing  Outcome: Progressing  Intervention: Provide Person-Centered Care  Recent Flowsheet Documentation  Taken 8/22/2023 0400 by Haris Kumar RN  Trust Relationship/Rapport:   care explained   reassurance provided  Taken 8/22/2023 0000 by Haris Kumar RN  Trust Relationship/Rapport:   care explained   reassurance provided  Goal: Readiness for Transition of Care  Outcome: Progressing     Problem: Gas Exchange Impaired  Goal: Optimal Gas Exchange  Outcome: Progressing  Intervention: Optimize Oxygenation and Ventilation  Recent Flowsheet Documentation  Taken 8/22/2023 0400 by Haris Kumar RN  Head of Bed (HOB) Positioning: HOB at 30 degrees  Taken 8/22/2023 0000 by Haris Kumar RN  Head of Bed (HOB) Positioning: HOB at 30 degrees     Problem: Pain Acute  Goal: Optimal Pain Control and Function  Outcome: Progressing  Intervention: Prevent or Manage Pain  Recent Flowsheet Documentation  Taken 8/22/2023 0400 by Haris Kumar RN  Sensory Stimulation Regulation:   care clustered   lighting decreased  Sleep/Rest Enhancement: awakenings minimized  Medication Review/Management: medications reviewed  Taken 8/22/2023 0000 by Haris Kumar RN  Sensory Stimulation Regulation:   care clustered   lighting decreased  Sleep/Rest Enhancement: awakenings minimized  Medication Review/Management: medications reviewed     Problem: Fall Injury Risk  Goal: Absence of Fall and Fall-Related Injury  Outcome: Progressing  Intervention: Identify and Manage Contributors  Recent Flowsheet Documentation  Taken 8/22/2023 0400 by Haris Kumar RN  Medication Review/Management: medications reviewed  Taken 8/22/2023 0000 by Haris Kumar RN  Medication Review/Management: medications reviewed  Intervention: Promote Injury-Free Environment  Recent Flowsheet  Documentation  Taken 8/22/2023 0400 by Haris Kumar RN  Safety Promotion/Fall Prevention:   increased rounding and observation   increase visualization of patient   lighting adjusted   room door open   room near nurse's station   room organization consistent   safety round/check completed  Taken 8/22/2023 0000 by Haris Kumar RN  Safety Promotion/Fall Prevention:   increased rounding and observation   increase visualization of patient   lighting adjusted   room door open   room near nurse's station   room organization consistent   safety round/check completed     Problem: Risk for Delirium  Goal: Optimal Coping  Outcome: Progressing  Goal: Improved Behavioral Control  Outcome: Progressing  Intervention: Minimize Safety Risk  Recent Flowsheet Documentation  Taken 8/22/2023 0400 by Haris Kumar RN  Enhanced Safety Measures: room near unit station  Trust Relationship/Rapport:   care explained   reassurance provided  Taken 8/22/2023 0000 by Haris Kumar RN  Enhanced Safety Measures: room near unit station  Trust Relationship/Rapport:   care explained   reassurance provided  Goal: Improved Attention and Thought Clarity  Outcome: Progressing  Intervention: Maximize Cognitive Function  Recent Flowsheet Documentation  Taken 8/22/2023 0400 by Haris Kumar RN  Sensory Stimulation Regulation:   care clustered   lighting decreased  Reorientation Measures:   calendar in view   reorientation provided  Taken 8/22/2023 0000 by Haris Kumar RN  Sensory Stimulation Regulation:   care clustered   lighting decreased  Reorientation Measures:   calendar in view   reorientation provided  Goal: Improved Sleep  Outcome: Progressing  Intervention: Promote Sleep  Recent Flowsheet Documentation  Taken 8/22/2023 0400 by Haris Kumar RN  Sleep/Rest Enhancement: awakenings minimized  Taken 8/22/2023 0000 by Haris Kumar RN  Sleep/Rest Enhancement: awakenings minimized     Problem: Oral Intake Inadequate  Goal: Improved  Oral Intake  Outcome: Progressing     Problem: Cardiac Rhythm Management Device  Goal: Optimal Adjustment to Device  Outcome: Progressing  Goal: Absence of Bleeding  Outcome: Progressing  Goal: Effective Oxygenation and Ventilation  Outcome: Progressing     Problem: Mechanical Ventilation Invasive  Goal: Effective Communication  Outcome: Progressing  Intervention: Ensure Effective Communication  Recent Flowsheet Documentation  Taken 8/22/2023 0400 by Haris Kumar RN  Communication Enhancement Strategies: communication board used  Trust Relationship/Rapport:   care explained   reassurance provided  Taken 8/22/2023 0000 by Haris Kumar RN  Communication Enhancement Strategies: communication board used  Trust Relationship/Rapport:   care explained   reassurance provided  Goal: Optimal Device Function  Outcome: Progressing  Intervention: Optimize Device Care and Function  Recent Flowsheet Documentation  Taken 8/22/2023 0400 by Haris Kumar RN  Airway Safety Measures:   all equipment/monitors on and audible   manual resuscitator/mask/valve at bedside  Oral Care: swabbed with antiseptic solution  Taken 8/22/2023 0000 by Haris Kumar RN  Airway Safety Measures:   all equipment/monitors on and audible   manual resuscitator/mask/valve at bedside  Oral Care: swabbed with antiseptic solution  Goal: Mechanical Ventilation Liberation  Outcome: Progressing  Intervention: Promote Extubation and Mechanical Ventilation Liberation  Recent Flowsheet Documentation  Taken 8/22/2023 0400 by Haris Kumar RN  Sleep/Rest Enhancement: awakenings minimized  Medication Review/Management: medications reviewed  Environmental Support:   calm environment promoted   distractions minimized  Taken 8/22/2023 0000 by Haris Kumar RN  Sleep/Rest Enhancement: awakenings minimized  Medication Review/Management: medications reviewed  Environmental Support:   calm environment promoted   distractions minimized  Goal: Optimal Nutrition  Delivery  Outcome: Progressing  Goal: Absence of Device-Related Skin and Tissue Injury  Outcome: Progressing  Goal: Absence of Ventilator-Induced Lung Injury  Outcome: Progressing  Intervention: Prevent Ventilator-Associated Pneumonia  Recent Flowsheet Documentation  Taken 8/22/2023 0400 by Haris Kumar RN  Oral Care: swabbed with antiseptic solution  Head of Bed (HOB) Positioning: HOB at 30 degrees  Taken 8/22/2023 0000 by Haris Kumar RN  Oral Care: swabbed with antiseptic solution  Head of Bed (HOB) Positioning: HOB at 30 degrees     Problem: Enteral Nutrition  Goal: Absence of Aspiration Signs and Symptoms  Outcome: Progressing  Intervention: Minimize Aspiration Risk  Recent Flowsheet Documentation  Taken 8/22/2023 0400 by Haris Kumar RN  Oral Care: swabbed with antiseptic solution  Head of Bed (HOB) Positioning: HOB at 30 degrees  Taken 8/22/2023 0000 by Haris Kumar RN  Oral Care: swabbed with antiseptic solution  Head of Bed (HOB) Positioning: HOB at 30 degrees  Goal: Safe, Effective Therapy Delivery  Outcome: Progressing  Goal: Feeding Tolerance  Outcome: Progressing       Oxygen saturations have been stable on current vent settings, Fio2 turned down to 35 percent for saturations in the high 90's.   Telemetry has shown Aflutter with a rate from 110-140 this morning, Cardizem restarted at 5, now up to 10 an hour but with that blood pressures have trended down.   Urine output has increased in the last 8 hours with a total of 130 ml out this shift.   AST and ALT are elevated this morning, Provider was notified.   Last Temp was 101.5 at 0630, Provider was notified of that also.   Lungs are coarse throughout.   Precedex infusion continues at 0.7  Fentanyl continues at 25 mcg an hour.   Will continue with plan of care.

## 2023-08-22 NOTE — PROVIDER NOTIFICATION
Pt BP now 150s/60s. Phenylephrine running at 0.1mcg/kg/min. UOP this shift only 10mls. 3+ edema noted to arms and legs, LS course throughout. MD made aware. MD ok with leaving phenylephrine running with increased BP. No other new orders at this time d/t kidney function.  UA results back md aware pt already on Merrem. No new orders.

## 2023-08-22 NOTE — PROVIDER NOTIFICATION
Pt BP 80s/40s. HR remains in a-flutter per Tele with HR in 80s-90s. Scheduled digoxin given per MAR. MD ok to restart phenylephrine to keep MAPs >65.

## 2023-08-22 NOTE — PROGRESS NOTES
Hospitalist note    Reviewed current vital signs and most recent lab results.  Family now present, so provided update to them this morning including results of pleural fluid cytology confirming malignant pleural effusion.    Bicarbonate infusion was started at low rate about 8 AM but patient appears to have worsening metabolic acidosis.  She has had persistent hyperglycemia overnight with blood sugars consistently above 180.    Plan:  -Increase bicarbonate infusion and continue to monitor electrolytes and blood gases  -Start insulin infusion to optimize glycemic control  -Family expressing preference to continue current treatments    Discussed with bedside nurse    Additional critical care time 20 minutes, cumulative critical care time so far today 90 minutes    Dat Peters MD     Declines

## 2023-08-22 NOTE — PROGRESS NOTES
Hospitalist note    Case reviewed by telephone today with intensivist due to concern for multisystem organ dysfunction.  Patient does not appear to have medical indication at this time to start renal replacement therapy.  Consultation with oncology was suggested.    Case subsequently reviewed by telephone today with her primary oncologist Dr. Dhillon who expressed opinion that chemotherapy treatment generally would not be offered while the patient is receiving mechanical ventilation particularly in the context of her abnormal hepatic function.  If she were to improve including improvement in hepatic function and wean off of ventilator support either with extubation or with tracheostomy placement, then reevaluation for possible cancer chemotherapy may be appropriate.    Over the course of the day the patient had persistent hypotension such that phenylephrine infusion was restarted.  Rate of atrial fibrillation has been well controlled on diltiazem infusion 10 mg/h.  Sodium bicarbonate infusion was started and then switched to more concentrated bicarbonate infusion formulation and rate is being titrated based on blood gas results.  Insulin infusion was started for treatment of hyperglycemia with improving blood sugars.  Fever has trended downward without antipyretic therapy.  Urine output has improved.    Additional plan for overnight:  -Start low-dose Lantus insulin this evening with goal to try to wean off of continuous IV insulin infusion in the next 24 hours  -Titrate rate of bicarbonate infusion based on blood gas results with goal to correct metabolic acidosis  -Continue serum osmolality monitoring and increase free water supplementation as indicated with goal to correct hyperosmolar state over the next 24 hours  -Continue treatment plan as otherwise outlined earlier today    Additional critical care time 30 minutes performing the above activities including telephone consultation with Intensivist and  Oncologist.  Total cumulative critical care time today 120 minutes.    Dat Peters MD

## 2023-08-22 NOTE — PROGRESS NOTES
Formerly [facility]    Medicine Progress Note - Hospitalist Service    Date of Admission:  8/12/2023    Assessment & Plan   Patient is a 73-year-old female with past medical history of COPD/emphysema and chronic hypoxic and hypercapnic respiratory failure on 2 to 6 L nasal cannula at baseline, paroxysmal atrial fibrillation, recent pulmonary embolism July 2023, breast cancer diagnosed in 2019 with recent concern for metastasis to the brain and lungs, hypertension, hyperlipidemia, depression, chronic kidney disease stage III, chronic anemia, CAD, prediabetes and obesity who presented to the emergency room with complaints of lethargy and shortness of breath and was found to be in A-fib with RVR.  She converted to normal sinus rhythm following IV and p.o. diltiazem and was registered to observation status for monitoring.  After receiving her home dose of lorazepam (which had recently been added as a new medication as an outpatient), she had worsening sedation and acute encephalopathy and developed acute on chronic respiratory failure with hypoxia and hypercapnia after which she was admitted to the ICU on 8/13/2023 and treated with flumazenil and BiPAP for respiratory support.   She was treated for possible pneumonia empirically with 1 week course of Zosyn with negative cultures aside from sputum culture that grew Aspergillus of unclear clinical significance.  She underwent right thoracentesis on 8/15 and 8/16.  She has had atrial fibrillation with RVR treated with antiarrhythmic therapy.  Anasarca and possible pulmonary edema have been treated with aggressive diuresis.  Signs of COPD exacerbation were treated with corticosteroids and bronchodilators.  Despite all of these interventions, she has not tolerated attempted ventilator weaning trials and on 8/21 developed signs of progressive multisystem organ failure.   Pleural fluid cytology from August 15 confirms metastatic adenocarcinoma of unknown  primary and malignant pleural effusion.  Upon further review of PET scan results from August 1, those findings were concerning for widely metastatic cancer including involvement of the right lung with malignant effusion and lymphangitic carcinomatosis, lymph nodes, pericardium, bones, and brain.  She remains critically ill with guarded prognosis for recovery and possibly terminal prognosis particularly if unable to treat her widespread malignancy acutely.      At family meeting 8/21, they expressed preference to avoid escalation of care including hospital transfer for other interventions (which may or may not be medically indicated) not available at this hospital such as consideration of inpatient chemotherapy or dialysis.  They expressed preference for change to DNR CODE STATUS.  They expressed preference to continue current level of care while they attempt to make arrangements for other family members to be able to come and visit the patient in person.    Plan for 8/22:  -Adjusting ventilator to maintain normal acid-base status and keep PaO2 above 80 and not wean FiO2 below 35-40% because patient uses 2.5 to 6 L nasal cannula supplementation chronically at baseline (equivalent to FiO2 30 to 44% at baseline)  -add bicarbonate infusion titrated to treat metabolic acidosis  -Continue diltiazem infusion as tolerated hemodynamically for rapid atrial fibrillation   -Check digoxin level and could also use low doses of digoxin bolus for rate control of atrial fibrillation  -Avoiding acetaminophen and NSAIDs for antipyretic therapy because of hepatocellular injury and acute renal failure so recommend use of other cooling measures  -Continuing to hold scheduled Lasix, reassess for possible bolus dose depending upon clinical course  -Titrate Precedex to sedation goal of calm to drowsy  -Continue fentanyl infusion for analgesia  -Continue therapeutic anticoagulation with IV heparin unless INR is significantly elevated and/or  she develops significant bleeding  -Continue enteral feeding and free water supplementation through nasogastric tube titrating free water supplementation as needed to treat hyperosmolality  -Monitor liver function, INR, and ammonia level closely and not treating hyperammonemia with laxative therapy at this time due to spontaneous loose stools associated with enteral tube feeding and to risk of exacerbating metabolic acidosis  -Monitor urine output and renal function closely  -Anticipate reviewing the patient's status with her family when they are available today    VENT:  AC mode  Tital volume 380  PEEP 5  RR 24    Continuous infusions:  Precedex   Fentanyl  Diltiazem  Heparin    Lines/Drains/Etc:  ET tube 7.5 mm placed 8/14  OG tube placed 8/14  NG feeding tube placed 8/19  PICC line placed 8/14  Art line placed 8/14  Urinary catheter placed 8/14    Suspect widely metastatic adenocarcinoma including metastases to right lung with lymphangitic carcinomatosis and malignant right pleural effusion, lymph nodes, pericardium, bones, and brain  History of malignant neoplasm of overlapping sites of left breast in female, estrogen receptor positive  Acute on chronic respiratory failure with hypoxia and hypercapnia  Patient ventilator dyssynchrony  Toxic metabolic encephalopathy  COPD exacerbation with emphysema  Hypernatremia with hyperosmolality  Worsening respiratory failure initially attributed to a combination of rapidly increasing right sided pleural effusion, possible pneumonia, and sedation from lorazepam with worsening acute toxic metabolic encephalopathy.  Underwent right pleural fluid thoracentesis with improvement in pleural effusion.  Treated with 7 days IV Zosyn empirically for possible pneumonia.  Treated for possible acute pulmonary edema from rapid atrial fibrillation with aggressive diuretic therapy.  COPD exacerbation treated with corticosteroids and bronchodilators with resolution of signs of COPD  exacerbation.  Treated hypernatremia and hyperosmolality with free water supplementation.    She now has acute renal failure which will likely prevent bicarbonate regeneration and she is known to have chronic hypercapnia with chronic respiratory acidosis and compensatory metabolic alkalosis.  She remains intubated and sedated not tolerating attempted ventilator weaning trials.  Pleural fluid cytology was positive for metastatic adenocarcinoma.  PET scan August 1 was worrisome for metastatic cancer to the right lung with malignant effusion and lymphangitic carcinomatosis as well as other metastases.  For these reasons, there is increasing concern that primary trigger for acute on chronic respiratory failure is progressive metastatic malignancy.  Based on worsening signs of multisystem organ failure, it does not appear as though the patient would be a candidate to initiate specific treatment of metastatic malignancy such as chemotherapy.  -Adjust ventilator settings  -ordered ABG monitoring  -continue IV solumedrol (both for COPD but also for brain metastasis for which dexamethasone had recently been prescribed) and use nebulized bronchodilators as needed  -Continue Precedex and fentanyl infusions  -continue enteral feeding  -Continue free water supplementation titrated to treat hyperosmolality    Acute hepatocellular injury  Hyperammonemia  Coagulopathy  Acute renal failure  Significantly increased hepatic transaminases concerning for acute hepatocellular injury on 8/21.  She has also had worsening coagulopathy and hyperammonemia likely due to hepatocellular injury.  Hepatocellular injury coincides with amiodarone administration over the preceding days, so hepatotoxicity from amiodarone appears most likely.  LFTs have significantly improved over the last 24 hours after stopping amiodarone and coagulopathy and hyperammonemia are also improving.  Test results 8/21 were also concerning for acute renal failure and she  had worsening oliguria on 8/21.  Renal function continues to worsen and she is more uremic with worsening metabolic acidosis 8/22, but urine output is improving and she has not been hyperkalemic  -Avoiding amiodarone   -Monitor LFTs, ammonia, and INR  -Avoid other hepatotoxic medications is much as possible  -Not treating hyperammonemia with lactulose because of risk of exacerbating hypovolemia and metabolic acidosis  -Monitor renal function and urine output closely  -Start bicarbonate infusion titrated to resolution of metabolic acidosis    Hypotension  She has had recurring hypotension over the last 2-3 days.  Hypotension has coincided with recurring rapid atrial fibrillation.  Although chronic right heart failure with relative intravascular hypovolemia after aggressive diuresis may contribute to hypotension, she had only signs of mild right heart failure on echocardiogram.  Upon review of PET scan results August 1, there was suspicion for pericardial involvement of metastatic cancer raising concern for pericardial constrictive physiology which is also probably contributing factor to hypotension and would explain her sensitivity to volume resuscitation and/or aggressive diuresis.  Hypotension likely exacerbates acute renal failure and possibly hepatocellular injury.  Hypotension has been responsive to phenylephrine infusion, IV albumin administration, discontinuation of diuretics, and cautious fluid resuscitation.  -Continue phenylephrine infusion as needed for hypotension to maintain mean arterial pressure above 65  -Starting sodium bicarb infusion which will provide some volume support along with her continuous infusions  -May consider additional boluses of IV fluids depending upon clinical course    Paroxysmal atrial fibrillation with RVR   Patient has known paroxysmal A-fib and presented with A-fib with RVR.  She continues to have recurrence of atrial fibrillation with RVR which improved with IV amiodarone  therapy.  However, she demonstrated new onset severe hepatocellular injury on 8/21 with concern for hepatotoxicity from amiodarone at which time amiodarone was discontinued.  After stopping amiodarone and with onset of fever on 8/21, rate of atrial fibrillation has worsened.  However, hypotension has improved and she is more reliably tolerating diltiazem infusion with adequate rate control of atrial fibrillation.    -Avoiding amiodarone  -Consider repeat doses of IV digoxin cautiously in the context of acute renal failure depending upon clinical course, renal function, and digoxin level  -Continue diltiazem infusion per protocol to optimize rate control of atrial fibrillation as long as blood pressure is stable    SIRS  Leukocytosis  Possible community acquired pneumonia   Positive sputum culture for Aspergillus  Leukocytosis was present at time of admission and has persisted throughout hospitalization even after 1 week course of IV Zosyn therapy empirically for treatment of possible pneumonia.  She has been taking corticosteroids which probably contributes to leukocytosis.  Sputum culture from endotracheal tube grew Aspergillus species of uncertain clinical significance in the context of her known severe chronic lung disease (presumed colonization).  She developed new fever concerning for SIRS on 8/21 without definite localized source for infection.  Repeat cultures were obtained and are pending and she was started empirically on meropenem.  Vancomycin was not added because of previous negative MRSA testing, acute renal failure, and concern for high risk of vancomycin nephrotoxicity.  -Continue meropenem while following up culture results  -Not using acetaminophen for antipyretic therapy because of hepatocellular injury and not using NSAIDs for antipyretic therapy because of acute renal failure    Prediabetes  Prediabetes diagnosed earlier this year and patient was started on metformin which was discontinued  following hospitalization for PE.  During hospitalization, blood sugars were only mildly to moderately elevated until enteral feeding was initiated since which time she has developed worsening hyperglycemia.  -Continue monitoring blood sugars every 4 hours and considering initiation of insulin infusion on 8/22    Chronic right-sided heart failure  Severe pulmonary hypertension  Acute pulmonary edema  Anasarca  Hypoalbuminemia  Mild RVF and pulmonary hypertension were present July 2023 at time of acute PE and are unchanged on repeat echo performed during this stay.  Patient developed signs of volume overload with acute pulmonary edema likely exacerbating COPD and respiratory failure.  Weight up more than 30 lbs since admission and she has anasarca.  Patient chronically had been taking hydrochlorothiazide and Lasix.  BNP was normal at admission but became significantly elevated on 8/18. Serum albumin has decreased during hospitalization. Hypoalbuminemia likely exacerbates anasarca.  She received a dose of IV albumin administration on 8/20 with at least transient improvement in her blood pressure.  -Holding Lasix until blood pressure stabilizes  -Avoiding supplemental IV fluids as able  -monitor urine output and daily weight   -Monitor albumin as indicated    Upper GI bleeding  Gross hematuria  Acute blood loss anemia  Thrombocytopenia  Aspirin induced platelet function defect  Normocytic anemia, chronic   Patient has chronic anemia with baseline hemoglobin of approximately 10-11.  She has had 2-3 point drop in hemoglobin. OG tube transiently contained bright red blood and then melanotic output concerning for upper GI bleeding while she was receiving therapeutic anticoagulation.  Chronic aspirin has been held.  She also had gross hematuria.  Lovenox held and PPI increased to BID dosing.  GI bleeding stopped.  Hemoglobin stabilized at 7-9.  Therapeutic anticoagulation was restarted.  She has had intermittent recurrent  mild gross hematuria but has generally tolerated restarting therapeutic anticoagulation with IV heparin.  Course and test results are concerning for acute blood loss anemia.   She chronically takes aspirin that causes platelet function defect and increases her bleeding risk.  She has developed acute mild thrombocytopenia that has been stable but exacerbates bleeding risk.  -continue to monitor hemoglobin closely, transfuse PRBCs for hgb 7 or less or recurrent active bleeding  -continue BID PPI  -Monitoring platelet count  -Holding chronic aspirin  -Monitor for recurrent GI bleeding and/or gross hematuria    Pulmonary embolism - left upper lobe, diagnosed 7/23  Medication induced coagulopathy  Diagnosed with acute PE July 2023 despite being on warfarin with concern that PE was due to hypercoagulability associated with recurrent cancer.  Patient had been on Eliquis that had been stopped with initiation of bridging Lovenox in anticipation of diagnostic bronchoscopy with biopsy.  Lovenox was stopped due to GI bleed during this hospitalization.  No acute PE was noted on chest CT at admission and so far acute DVT has not been suspected.  She is at high risk for recurrent venous thromboembolism because of her underlying malignancy. On 8/21 she developed significant hepatocellular injury with prolonged INR suspicious for coagulopathy.  Coagulopathy has since improved.  -Continue IV heparin per protocol today without bolus assuming bleeding does not recur and assuming INR is not 2 or higher    Elevated troponin - demand ischemia from A fib with RVR  Coronary artery disease involving native coronary artery of native heart without angina pectoris  Patient was diagnosed in 2020 with CAD based on radiographic findings for coronary artery calcifications noted following her cancer treatment.  Has been followed by cardiology and Lexiscan was performed which was unremarkable.  Cardiology previously recommended medical treatment only  and patient has not had any symptoms concerning for angina.  Elevated troponin Present at time of admission was attributed to A-fib with RVR and had been trending downward without concern for non-STEMI.  She again developed elevated troponin 8/20 coinciding with worsening atrial fibrillation with RVR suspicious for demand ischemia.  Troponin has progressively increased over the last 24 hours although rate of rise is decreasing and the increase in troponin is probably due at least part to acute renal failure with decreased clearance.  -Not continuing to monitor troponin unless her clinical status changes  -no ASA at this time given GI bleed  -Treat other medical problems including rapid atrial fibrillation and hypoxia    Hypokalemia  Potassium has been as low as 3.2 during hospitalization and hypokalemia is likely to be exacerbated by diuretic therapy and/or hypomagnesemia.  Hypokalemia and/or hypomagnesemia increase her risk for recurrent cardiac arrhythmia.  Hypokalemia resolved with supplementation and she is now at risk for hyperkalemia in the context of acute renal failure.  -Continue potassium supplementation per protocol  -Monitor potassium as indicated including recheck if she develops significant diuretic response or recurrent arrhythmia  -Follow magnesium    CKD (chronic kidney disease) stage 3, GFR 30-59 ml/min (H)  She has stage III chronic kidney disease with recent baseline creatinine 1.0-1.1.  Patient's creatinine was mildly elevated at 1.4 at initial presentation but recovered to baseline and had fluctuated 1.3-1.4 until 8/21.  -Continue close monitoring of creatinine and urinary output    Hyponatremia  Noted initially, resolved with fluid resuscitation and is now hypernatremic  -Monitoring sodium as noted above    Hypercalcemia  Noted initially, resolved with IV fluids.  May be at risk for recurrent hypercalcemia due to suspicion for recurrent malignancy.  -Ordered recheck of calcium    Essential  hypertension, benign  Patient normally on diltiazem 240 mg daily, Lasix 20 mg daily, hydrochlorothiazide 25 mg daily and lisinopril 2.5 mg daily to treat hypertension.  All these meds were held over the last several days with blood pressures normotensive to hypertensive until she has developed worsening hypotension since 8/20.   -Not continuing diuretics at this time   -restarted diltiazem for atrial fibrillation  -Continue to hold other chronic antihypertensive medications    Major depression in complete remission (H)  Situational anxiety  Insomnia  Patient is normally on Effexor 75 mg daily.  She was recently started on hydroxyzine and lorazepam due to insomnia and situational anxiety regarding possible recurrent cancer diagnosis.  Lorazepam and all benzodiazepines are now being avoided secondary to worsening respiratory status at admission.  The patient was started on BuSpar on 8/13 without notable improvement of her symptoms and patient is now sedated and intubated.   -Continue Effexor  -Avoid benzodiazepines  -Reassess when patient is extubated    Hyperlipidemia LDL goal <130  Hyperlipidemia is chronically treated with atorvastatin 10 mg daily  -Holding chronic statin for now        Diet: NPO for Medical/Clinical Reasons Except for: No Exceptions  Adult Formula Drip Feeding: Continuous Vital High Protein; Nasogastric tube; Goal Rate: 45 mL/hr; mL/hr; Start at 15 mL/hr and advance by 10 mL/hr every 8 hours until goal of 45 mL/hr    DVT Prophylaxis: IV heparin  Ponce Catheter: PRESENT, indication: Strict 1-2 Hour I&O  Lines: PRESENT      PICC 08/14/23 Triple Lumen Right Basilic-Site Assessment: WDL  Arterial Line 08/14/23 Wrist-Site Assessment: WDL      Cardiac Monitoring: ACTIVE order. Indication: ICU  Code Status: No CPR- Pre-arrest intubation OK      Clinically Significant Risk Factors              # Hypoalbuminemia: Lowest albumin = 2.5 g/dL at 8/17/2023  6:23 AM, will monitor as appropriate  # Coagulation  "Defect: INR = 1.38 (Ref range: 0.85 - 1.15) and/or PTT = N/A, will monitor for bleeding  # Thrombocytopenia: Lowest platelets = 85 in last 2 days, will monitor for bleeding  # Acute Kidney Injury, unspecified: based on a >150% or 0.3 mg/dL increase in last creatinine compared to past 90 day average, will monitor renal function  # Hypertension: Noted on problem list        # Obesity: Estimated body mass index is 33.8 kg/m  as calculated from the following:    Height as of this encounter: 1.6 m (5' 3\").    Weight as of this encounter: 86.5 kg (190 lb 12.8 oz).      # COPD: noted on problem list        Disposition Plan    unknown       Dat Peters MD  Hospitalist Service  [facility]  Securely message with [employer] (more info)  Text page via [employer] Paging/Directory   ______________________________________________________________________    Interval History   Overnight the patient had transient hypotension that responded to phenylephrine infusion.  Blood pressure subsequently trended upward and phenylephrine was discontinued early this morning.  She has had recurrent rapid atrial fibrillation for which diltiazem infusion was restarted with improved rate control and without recurrence of severe hypotension.  She has continued to have fever overnight after she received 1 dose of Toradol yesterday but has not received any other antipyretic therapy.  Nursing has been applying cool packs.  Oxygenation has been stable on the ventilator.  She continues to be sedated with Precedex and is receiving fentanyl infusion.  Urine output improved overnight compared with yesterday although remains oliguric.  She had 2 large loose stools yesterday afternoon but no further stools overnight.  She has been intermittently opening her eyes although not following instructions reliably.    Physical Exam   Vital Signs: Temp: (!) 101.5  F (38.6  C) Temp src: Oral BP: 110/71 Pulse: (!) 131   Resp: 24 SpO2: 96 % O2 " Device: Mechanical Ventilator    Vent Mode: SIMV  (Synchronized Intermittent Mandatory Ventilation)  FiO2 (%): 35 %  Resp Rate (Set): 24 breaths/min  Tidal Volume (Set, mL): 350 mL  PEEP (cm H2O): 5 cmH2O  Pressure Support (cm H2O): 10 cmH2O  Resp: 24  Current ventilator mode is AC (not SIMV)  Ventilator measurements:  tidal volume 350, minute volume 8.2, peak inspiratory pressure 36, mean airway pressure 16, plateau pressure 30    Patient Vitals for the past 24 hrs:   BP Temp Temp src Pulse Resp SpO2 Weight   23 0630 110/71 (!) 101.5  F (38.6  C) Oral (!) 131 24 96 % --   23 0600 117/74 -- -- (!) 128 24 95 % 86.5 kg (190 lb 12.8 oz)   23 0500 (!) 141/98 (!) 100.5  F (38.1  C) Oral (!) 129 24 94 % --   23 0400 (!) 149/109 -- -- (!) 123 24 95 % --   23 0300 (!) 157/75 (!) 100.5  F (38.1  C) Oral 114 (!) 32 96 % --   23 0200 (!) 141/76 -- -- 104 (!) 35 97 % --   23 0100 131/75 -- -- 98 24 97 % --   23 0020 -- -- -- -- -- 99 % --   23 0000 113/61 -- -- 96 24 100 % --   23 2300 108/60 98.9  F (37.2  C) Oral 92 24 99 % --   23 -- -- -- 95 23 99 % --   23 117/66 -- -- 89 24 99 % --   23 -- -- -- 89 24 100 % --   23 133/71 -- -- 89 24 100 % --   23 -- -- -- 88 (!) 33 100 % --   08/21/23 2130 (!) 145/77 -- -- 89 24 100 % --   23 137/71 -- -- 90 24 100 % --   23 (!) 84/51 -- -- 90 24 100 % --   23 -- -- -- 90 24 100 % --   23 (!) 84/49 -- -- 90 24 100 % --   23 (!) 87/52 -- -- 90 24 100 % --   23 -- 98.1  F (36.7  C) -- -- -- -- --   23 (!) 79/48 -- -- 90 24 100 % --   23 -- -- -- 90 24 99 % --   23 193 (!) 80/44 -- -- 90 24 99 % --   23 191 -- -- -- 90 24 99 % --   23 190 (!) 80/49 -- -- 90 10 99 % --   23 184 -- -- -- 90 12 98 % --   23 183 (!) 72/45 -- -- 90 13 99 % --   23  1815 -- -- -- 90 24 98 % --   08/21/23 1800 92/55 -- -- 91 22 98 % --   08/21/23 1745 -- -- -- 100 21 97 % --   08/21/23 1730 128/69 -- -- 101 23 97 % --   08/21/23 1700 122/52 -- -- 97 21 96 % --   08/21/23 1630 (!) 146/92 -- -- (!) 140 18 96 % --   08/21/23 1600 (!) 166/93 -- -- (!) 151 22 97 % --   08/21/23 1500 104/56 -- -- 116 24 99 % --   08/21/23 1400 108/65 -- -- 114 25 99 % --   08/21/23 1330 92/56 -- -- 102 23 99 % --   08/21/23 1300 (!) 156/86 -- -- 113 13 98 % --   08/21/23 1230 122/64 -- -- 106 20 99 % --   08/21/23 1200 114/69 98.8  F (37.1  C) Axillary 97 16 99 % --   08/21/23 1130 115/68 -- -- 93 20 99 % --   08/21/23 1100 119/60 -- -- 89 22 99 % --   08/21/23 1030 116/70 -- -- 91 20 99 % --   08/21/23 1000 97/64 -- -- 93 21 99 % --   08/21/23 0930 103/64 -- -- 90 20 99 % --   08/21/23 0900 106/60 -- -- 92 20 99 % --   08/21/23 0845 (!) 86/69 -- -- 98 12 97 % --   08/21/23 0815 91/55 -- -- 98 13 99 % --   08/21/23 0806 (!) 73/43 -- -- 88 20 99 % --   08/21/23 0800 (!) 71/46 98.2  F (36.8  C) Axillary 92 20 98 % --     Weight: 190 lbs 12.8 oz  Vitals:    08/18/23 0805 08/19/23 0609 08/20/23 1300 08/21/23 0559   Weight: 85.1 kg (187 lb 11.2 oz) 85 kg (187 lb 8 oz) 87.5 kg (193 lb) 83.7 kg (184 lb 8 oz)    08/22/23 0600   Weight: 86.5 kg (190 lb 12.8 oz)       Intake/Output Summary (Last 24 hours) at 8/22/2023 0734  Last data filed at 8/22/2023 0700  Gross per 24 hour   Intake 1762.85 ml   Output 192 ml   Net 1570.85 ml     She had 3 bowel movements yesterday    General Appearance: Ill-appearing elderly woman, intubated and sedated  Respiratory: Grossly audible air leak in the neck with ventilator breaths, symmetric breath sounds over the lung fields and the lung fields themselves are clear  Cardiovascular: Borderline tachycardic with irregularly irregular rhythm, normal capillary refill in the fingers and toes  GI: Hypoactive bowel sounds, nondistended abdomen, soft  Skin: No bleeding or drainage and  no surrounding skin erythema at PICC line and arterial line skin entry sites, moderately large ecchymosis over the inferolateral right breast extending to the axilla  other: Opens eyes to voice, not following instructions    Medical Decision Making       Total critical care time so far today 70 minutes including time spent reassessing respiratory status and adjusting mechanical ventilation treatment of respiratory failure and reassessing hemodynamic status and adjusting antiarrhythmic therapy for arrhythmia       Data     I have personally reviewed the following data over the past 24 hrs:    24.3 (H)  \   8.6 (L); 8.6 (L)   / 95 (L)     141 105 120.7 (H) /  184 (H)   4.9 18 (L) 2.80 (H) \     ALT: 4,867 (HH) AST: 910 (HH) AP: 60 TBILI: 0.5   ALB: 3.2 (L) TOT PROTEIN: 5.3 (L) LIPASE: N/A     Procal: 1.36 (H) CRP: N/A Lactic Acid: N/A       INR:  1.38 (H) PTT:  N/A   D-dimer:  N/A Fibrinogen:  N/A       Ammonia level 97, improved from 105 late yesterday  Blood sugars range 184-205 overnight  Procalcitonin yesterday was 1.32  INR yesterday was 1.6  Heparin 10 a level 0.87 with 0.7 the upper limit of the therapeutic range    Blood cultures from August 21 are pending  Urine culture from August 21 is pending, urine culture from August 20 was negative  Serum bated glucan level is pending  Sputum culture from August 15 grew 2 species Aspergillus    Right pleural fluid cytology was positive for metastatic adenocarcinoma of unknown primary with additional testing in progress    PET scan results from August 1 were reviewed    Recent Labs   Lab 08/22/23  0746 08/22/23  0509 08/21/23  1838 08/21/23  1724   PH 7.26* 7.23* 7.25* 7.24*   PCO2 41 46* 44 47*   PO2 104 80 91 91   HCO3 19* 19* 19* 20*   O2PER 40 35 40 40     Imaging results reviewed over the past 24 hrs:   Recent Results (from the past 24 hour(s))   XR Abdomen Port 1 View    Narrative    XR ABDOMEN PORT 1 VIEW   8/21/2023 8:45 AM     HISTORY: intubated, check feeding  tube placement    COMPARISON: 8/19/2023      Impression    IMPRESSION: The feeding tube tip is in the pylorus.    ONELIA YORK MD         SYSTEM ID:  [id number]     Recent Labs   Lab 08/22/23  0511 08/22/23  0508 08/21/23  2346 08/21/23  1843 08/21/23  1718 08/21/23  1405 08/21/23  1213 08/21/23  0831 08/21/23  0548 08/20/23 2022 08/20/23  1714 08/20/23  1229 08/20/23  0519 08/19/23  1218 08/19/23  0950 08/18/23  0854 08/18/23  0458   WBC  --  24.3*  --   --  20.9*  --   --   --  21.0*  --   --   --  25.4*  --  32.5*  32.5*  --  23.4*   HGB  --  8.6*  8.6*  --   --   --   --   --   --  7.7*  --  9.0*   < > 8.6*  --  9.4*   < > 8.0*   MCV  --  93  --   --   --   --   --   --   --   --   --   --   --   --  94  --  93   PLT  --  95*  --   --   --   --   --   --  85*  --   --   --  121*  --  135*   < > 134*   INR  --  1.38*  --   --  1.60*  --   --   --  1.78*  --   --   --   --   --   --   --   --    NA  --  141  --   --   --   --  145  --  143   < > 140   < > 142   < > 145   < > 144   POTASSIUM  --  4.9  --   --   --   --  3.4  --  4.3   < > 4.3   < > 3.5   < > 4.5   < > 4.4   CHLORIDE  --  105  --   --   --   --  117*  --  109*   < > 107   < > 109*   < > 111*   < > 112*   CO2  --  18*  --   --   --   --  16*  --  21*   < > 20*   < > 22   < > 22   < > 23   BUN  --  120.7*  --   --   --   --   --   --  87.3*  --   --   --  54.1*  --   --    < > 43.3*   CR  --  2.80*  --   --   --   --   --   --  2.17*  --   --   --  1.39*  --   --    < > 1.46*   ANIONGAP  --  18*  --   --   --   --  12  --  13   < > 13   < > 11   < > 12   < > 9   IVANNA  --  8.5*  --   --   --   --   --   --  8.6*  --   --   --  8.7*  --   --    < > 8.6*   * 205* 185*   < >  --    < >  --    < > 190*   < >  --    < > 156*   < >  --    < > 163*   ALBUMIN  --  3.2*  --   --   --   --   --   --  2.9*  --   --    < >  --   --   --   --  2.8*   PROTTOTAL  --  5.3*  --   --   --   --   --   --  4.8*  --   --   --   --   --   --   --  4.7*   BILITOTAL   --  0.5  --   --   --   --   --   --  0.3  --   --   --   --   --   --   --  0.3   ALKPHOS  --  60  --   --   --   --   --   --  49  --   --   --   --   --   --   --  36   ALT  --  4,867*  --   --   --   --   --   --  6,573*  --   --   --   --   --   --   --  36   AST  --  910*  --   --   --   --   --   --  5,117*  --   --   --   --   --   --   --  24    < > = values in this interval not displayed.

## 2023-08-23 NOTE — PROGRESS NOTES
"Tele-ICU Note:    Ms. Ivette Padron is a 73 yof with a history of COPD/emphysema and chronic hypoxic and hypercapnic respiratory failure on 2-6 L O2 baseline, paroxysmal atrial fibrillation, pulmonary embolism July 2023, breast cancer diagnosed in 2019 with recent concern for metastasis to the brain and lungs, chronic kidney disease stage III, and CAD who presented to the emergency room 8/12 with complaints of lethargy and shortness of breath and was found to be in A-fib with RVR.  Hospital stay has been complicated by progressive multi-organ failure presumed 2/2 widely metastatic adenocarcinoma of unknown primary (pleural fluid cytology 8/15; PET 8/1).  She is not a candidate for cancer directed therapy at this time due to critical illness.      Vital signs:  Temp: (!) 100.9  F (38.3  C) Temp src: Rectal BP: 97/51 Pulse: 96   Resp: 24 SpO2: 98 % O2 Device: Mechanical Ventilator Oxygen Delivery: 2.5 LPM Height: 160 cm (5' 3\") Weight: 88.5 kg (195 lb)  Estimated body mass index is 34.54 kg/m  as calculated from the following:    Height as of this encounter: 1.6 m (5' 3\").    Weight as of this encounter: 88.5 kg (195 lb).    Most Recent 3 CBC's:  Recent Labs   Lab Test 08/23/23  0546 08/22/23  1744 08/22/23  0508 08/21/23  1718 08/21/23  0548 08/20/23  1229 08/20/23  0519 08/19/23  0950 08/18/23  1838 08/18/23  0458   WBC 21.9*  --  24.3* 20.9* 21.0*  --  25.4* 32.5*  32.5*  --  23.4*   HGB 8.1* 8.1* 8.6*  8.6*  --  7.7*   < > 8.6* 9.4*   < > 8.0*   MCV  --   --  93  --   --   --   --  94  --  93   PLT  --   --  95*  --  85*  --  121* 135*   < > 134*    < > = values in this interval not displayed.     Most Recent 3 BMP's:  Recent Labs   Lab Test 08/23/23  0847 08/23/23  0727 08/23/23  0643 08/23/23  0546 08/22/23  1936 08/22/23  1744 08/22/23  1432 08/22/23  1409 08/22/23  0511 08/22/23  0508   NA  --   --   --  144  --  138  --  140   < > 141   POTASSIUM  --   --   --  5.0  --  4.9  --  4.9   < > 4.9   CHLORIDE  --  "  --   --  102  --  102  --  105   < > 105   CO2  --   --   --  24  --  18*  --  17*   < > 18*   BUN  --   --   --  145.5*  --  134.8*  --   --   --  120.7*   CR  --   --   --  3.09*  --  2.90*  --   --   --  2.80*   ANIONGAP  --   --   --  18*  --  18*  --  18*   < > 18*   IVANNA  --   --   --  8.0*  --  8.2*  --   --   --  8.5*   * 149* 141* 168*   < > 191*   < >  --    < > 205*    < > = values in this interval not displayed.     Most Recent 2 LFT's:  Recent Labs   Lab Test 08/23/23  0546 08/22/23  1744   * 360*   ALT 3,008* 3,684*   ALKPHOS 61 61   BILITOTAL 0.5 0.5        Intubated, mechanically ventilated - 24/380/5/40% - last ABG 7.41/40/88  Currently on phenyl 0.4 to maintain MAPs.  Having fevers with Tmax 102F; respiratory, urine and blood cultures remain NGTD.    Brief Assessment and Plan:    73 yof with multiple medical co morbidities admitted to the hospital on 8/12 in a-fib with RVR, now with prolonged hospital stay complicated by worsening multi-organ failure in the context of relatively new diagnosis of widely metastatic adenocarcinoma of unknown primary.  Oncology was consulted and she is unfortunately not a candidate for cancer directed therapies at this time due to her critical illness and multi-organ dysfunction.  From preliminary discussions with family and local MD, family has expressed a preference to avoid escalation of care including hospital transfer for interventions not locally available.  They also agreed to change in code status to DNR.      Cased discussed at length with Dr. Peters.   We reviewed the patient's worsening renal function and impending need for dialysis if that were to be within the goals of care.  Given need for pressors, patient would most likely need CRRT which would necessitate inter-facility transfer.  We agreed that dialysis could be pursued but would likely not change the overall prognosis at this point given widely metastatic cancer without current  treatment options and multi-organ failure.  Dr. Peters will speak with the family again today regarding goals of care.      At the time of my review I was able to access to the patient s medical records/chart but not personally see or evaluate the patient. I cannot provide direct medical advice or consultation, but can provide a general opinion for consideration by the in-person treating provider. The contents of this note are not meant to replace or supersede the clinical judgement of the in-person treating provider.      Tele-hub will continue to follow. Please call with questions.    Lurdes Raza MD  Pulmonary and Critical Care Medicine

## 2023-08-23 NOTE — DISCHARGE SUMMARY
"[facility]  Hospitalist Discharge Summary      Date of Admission:  8/12/2023  Date of Discharge:  8/23/2023  Discharging Provider: Dat Peters MD  Discharge Service: Hospitalist Service    Discharge Diagnoses   Principal Problem:    Metastatic adenocarcinoma to lung with unknown primary site, right (H)  Active Problems:    Acute on chronic respiratory failure with hypoxia and hypercapnia (H)    COPD exacerbation (H)    Pulmonary emphysema, unspecified emphysema type (H)    CKD (chronic kidney disease) stage 3, GFR 30-59 ml/min (H)    Malignant neoplasm of overlapping sites of left breast in female, estrogen receptor positive (H)    Coronary artery disease involving native coronary artery of native heart without angina pectoris    Prediabetes    Atrial fibrillation with RVR (H)    Chronic right-sided heart failure (H)    Severe pulmonary hypertension (H)    Acute renal failure (H)    Medication induced coagulopathy (H)    Malignant neoplasm metastatic to brain (H)    Acute encephalopathy    Pulmonary embolism - left upper lobe, diagnosed 7/23    Malignant pleural effusion-right    Acute upper GI bleed    Acute blood loss anemia    Gross hematuria    Anasarca    Hypoalbuminemia    Thrombocytopenia (H)    Patient ventilator dyssynchrony (H)    Hypokalemia    Hypernatremia    Positive sputum culture for Aspergillus (H)    Hepatocellular injury    Hyperammonemia (H)    SIRS (systemic inflammatory response syndrome) (H)    Hyperlipidemia LDL goal <130    Essential hypertension, benign    Major depression in complete remission (H)    Elevated troponin    Normocytic anemia, chronic    Hyponatremia    Hypercalcemia    Insomnia    Situational anxiety    Clinically Significant Risk Factors     # Obesity: Estimated body mass index is 34.54 kg/m  as calculated from the following:    Height as of this encounter: 1.6 m (5' 3\").    Weight as of this encounter: 88.5 kg (195 lb).       Follow-ups " Needed After Discharge   Patient   Unresulted Labs Ordered in the Past 30 Days of this Admission       Date and Time Order Name Status Description    2023  4:44 PM Respiratory Aerobic Bacterial Culture with Gram Stain Preliminary     2023  4:39 PM Blood Culture Line, arterial Preliminary     2023  4:39 PM Blood Culture Line, venous Preliminary     2023  4:39 PM Blood Culture Peripheral Blood Preliminary     2023  2:30 PM 1,3 Beta D glucan fungitell In process             Discharge Disposition   Discharged to  home  Condition at discharge:     Hospital Course   Patient is a 73-year-old female with past medical history of COPD/emphysema and chronic hypoxic and hypercapnic respiratory failure on 2 to 6 L nasal cannula at baseline, paroxysmal atrial fibrillation, recent pulmonary embolism 2023, breast cancer diagnosed in  with recent concern for metastasis to the brain and lungs, hypertension, hyperlipidemia, depression, chronic kidney disease stage III, chronic anemia, CAD, prediabetes and obesity who presented to the emergency room with complaints of lethargy and shortness of breath and was found to be in A-fib with RVR.  She converted to normal sinus rhythm following IV and p.o. diltiazem and was registered to observation status for monitoring.  After receiving her home dose of lorazepam, she had worsening sedation with acute encephalopathy and acute on chronic respiratory failure with hypoxia and hypercapnia and was therefore admitted to the ICU on 2023.  She failed initial treatment with flumazenil and BiPAP and was intubated .       Suspect widely metastatic adenocarcinoma including metastases to right lung with lymphangitic carcinomatosis and malignant right pleural effusion, lymph nodes, pericardium, bones, and brain    History of malignant neoplasm of overlapping sites of left breast in female, estrogen receptor positive    Acute on chronic  respiratory failure with hypoxia and hypercapnia  Patient ventilator dyssynchrony    COPD exacerbation with emphysema    Worsening respiratory failure necessitating intubation was initially attributed to a combination of rapidly increasing right sided pleural effusion, possible pneumonia, and sedation from lorazepam.  She underwent right pleural fluid thoracentesis with improvement in pleural effusion.  She was treated with 7 days IV Zosyn empirically for possible pneumonia.  She was treated for possible acute pulmonary edema from rapid atrial fibrillation with aggressive diuretic therapy.  COPD exacerbation was treated with corticosteroids and bronchodilators with resolution of signs of COPD exacerbation.  Despite mechanical ventilation and treatment of these underlying conditions, she did not tolerate ventilator weaning.  PET scan results from  had been worrisome for metastatic cancer to the right lung with malignant effusion and lymphangitic carcinomatosis as well as numerous other metastases.  For these reasons, acute on chronic respiratory failure was attributed to progressive metastatic adenocarcinoma after additional investigation.  Her case reviewed with her primary oncologist by telephone (inpatient oncology consultation was not available at this hospital) who indicated that patient was not a candidate to initiate chemotherapy while intubated with significantly abnormal liver function.  Patient developed progressive multisystem organ failure.  Given her dire prognosis, care goals were reviewed with her family including her spouse and daughter who expressed preference for comfort measures only including withdrawal of antiarrhythmic therapy, vasopressor therapy, and ventilator support.  After withdrawal of these supports, patient .  Preliminary cause of death was metastatic adenocarcinoma.    Toxic metabolic encephalopathy  Hypernatremia and hyperosmolality  After hospitalization she developed  increasing confusion and somnolence attributed to a combination of the effect of benzodiazepines but also worsening hypercapnia from acute on chronic respiratory acidosis necessitating intubation and initiation of mechanical ventilation.  While receiving ventilator support, she was treated with continuous sedative infusion and narcotic analgesics.  With intermittent interruption of these therapies she initially was able to be responsive.  She developed transient hypernatremia that improved with free water supplementation.  She subsequently developed multisystem organ failure including acute hepatocellular injury and acute renal failure that were associated with hyperammonemia and uremia respectively.  Hyperosmolar state worsened and she became unresponsive and comatose.  Clinical course was suspicious for toxic metabolic encephalopathy due to a combination of medication, uremia, and hyperammonemia with hyperosmolar state.    Acute hepatocellular injury  Hyperammonemia  Coagulopathy  Acute renal failure  She developed significantly increased hepatic transaminases concerning for acute hepatocellular injury.  She also had worsening coagulopathy and hyperammonemia likely due to hepatocellular injury.  Hepatocellular injury coincided with amiodarone administration, so hepatotoxicity from amiodarone was suspected.  LFTs, coagulopathy, and hyperammonemia improved after stopping amiodarone.  She also developed acute renal failure with worsening oliguria, metabolic acidosis, and uremia.  Renal function continued to worsen.    Hypotension  She developed severe hypotension that required vasopressor therapy as well as IV fluid and IV albumin treatment.  Upon review of PET scan results August 1, there was suspicion for pericardial involvement of metastatic cancer raising concern for pericardial constrictive physiology that likely contributed to hypotension.      Paroxysmal atrial fibrillation with RVR   Patient had known  paroxysmal A-fib and presented with A-fib with RVR.  She continued to have recurrence of and ultimately persistent atrial fibrillation with RVR which improved with IV amiodarone therapy.  However, due to new onset severe hepatocellular injury, amiodarone was discontinued.  She was then treated with continuous diltiazem infusion and achieved adequate rate control although required phenylephrine for treatment of hypotension while receiving diltiazem infusion.      SIRS  Leukocytosis  Possible community acquired pneumonia   Positive sputum culture for Aspergillus  Leukocytosis was present at time of admission and persisted throughout hospitalization even after 1 week course of IV Zosyn therapy empirically for treatment of possible pneumonia.  She had been taking dexamethasone because of brain metastasis which probably contributed to leukocytosis.  Sputum culture from endotracheal tube grew Aspergillus species presumed reflective of colonization in this patient with chronic lung disease.  She developed new fever concerning for SIRS without definite localized source for infection and cultures repeated at that time were negative.  When she developed fever, she was treated empirically with meropenem while awaiting culture results.      Prediabetes  Prediabetes had been diagnosed earlier this year and patient was started on metformin which was discontinued following hospitalization in July 2023 for PE.  During this hospitalization, hyperglycemia worsened after initiation of enteral feeding while the patient was receiving mechanical ventilation.  Hyperglycemia was treated with insulin with improvement.      Chronic right-sided heart failure  Severe pulmonary hypertension  Acute pulmonary edema  Anasarca  Hypoalbuminemia  Mild RV failure and severe pulmonary hypertension were present in July 2023 at time of acute PE and were unchanged on repeat echo performed during this stay.  After fluid resuscitation, patient developed  signs of volume overload including anasarca and acute pulmonary edema that likely exacerbated COPD and respiratory failure.  Patient chronically had been taking hydrochlorothiazide and Lasix and was treated with IV Lasix during hospitalization.  Serum albumin decreased during hospitalization and she became hypoalbuminemic. Hypoalbuminemia likely exacerbated anasarca.  Due to hypotension and hypoalbuminemia, she was treated with IV albumin administration.    Upper GI bleeding  Gross hematuria  Acute blood loss anemia  Thrombocytopenia  Aspirin induced platelet function defect  Normocytic anemia, chronic   Patient had chronic anemia with baseline hemoglobin of approximately 10-11.  She had 2-3 point drop in hemoglobin and her OG tube transiently contained bright red blood and then melanotic output concerning for upper GI bleeding with acute blood loss anemia while she was receiving therapeutic anticoagulation.   She also had intermittent gross hematuria.  Therapeutic anticoagulation with Lovenox was transiently held and PPI increased to BID dosing.  GI bleeding stopped.  Hemoglobin stabilized at 7-9 and she did not require blood transfusion.  Therapeutic anticoagulation was restarted with IV heparin.   She chronically takes aspirin that causes platelet function defect and increases her bleeding risk.  Chronic aspirin was held.   She developed acute mild thrombocytopenia that was stable but also exacerbated bleeding risk.    Pulmonary embolism - left upper lobe, diagnosed 7/23  Medication induced coagulopathy  She had been diagnosed with acute PE July 2023 despite being on warfarin with concern that PE was due to hypercoagulability associated with recurrent cancer.  At admission, patient had been receiving treatment with bridging Lovenox (which causes coagulopathy) after stopping Eliquis in anticipation of upcoming diagnostic bronchoscopy with biopsy.  No acute PE was noted on chest CT at admission and acute DVT was  not suspected.  When she developed hepatocellular injury, she also developed increased INR suspicious for an acute coagulopathy due to liver injury.    Elevated troponin - demand ischemia from A fib with RVR  Coronary artery disease involving native coronary artery of native heart without angina pectoris  Patient was diagnosed in 2020 with CAD based on radiographic findings for coronary artery calcifications noted after her previous cancer treatment.  She had been followed by cardiology and prior Lexiscan had been unremarkable.  Cardiology previously recommended medical treatment of CAD and patient had not had angina.  Elevated troponin was present at admission and attributed to demand ischemia from A-fib with RVR.  Troponin trended downward without concern for non-STEMI.      Hypokalemia  Hypomagnesemia  Hypophosphatemia  Hyperphosphatemia  Potassium was as low as 3.2 during hospitalization and she had low serum magnesium and phosphorus levels.  Hypokalemia was likely exacerbated by diuretic therapy and/or hypomagnesemia.  Hypokalemia, hypomagnesemia, and hypophosphatemia resolved with supplementation.  She subsequently developed mild to moderate hyperphosphatemia from acute renal failure although did not require specific treatment for hyperphosphatemia.      CKD (chronic kidney disease) stage 3, GFR 30-59 ml/min (H)  She had stage III chronic kidney disease with recent baseline creatinine 1.0-1.1.  Patient's creatinine was mildly elevated at 1.4 at initial presentation but recovered to baseline until she developed acute renal failure and multisystem organ dysfunction.    Hyponatremia  Hyponatremia was noted initially and resolved with fluid resuscitation.    Hypercalcemia  Hypercalcemia was noted initially and resolved with IV fluid treatment.  Hypercalcemia was attributed to underlying malignancy.    Essential hypertension, benign  Chronic hypertension had been treated with diltiazem 240 mg daily, Lasix 20 mg  daily, hydrochlorothiazide 25 mg daily and lisinopril 2.5 mg daily.  Her antihypertensive medications were held when she developed worsening hypotension.    Major depression in complete remission  Situational anxiety  Insomnia  Patient is normally on Effexor 75 mg daily for treatment of major depression that had been in complete remission.  As an outpatient, she had recently been started on hydroxyzine and lorazepam due to insomnia and situational anxiety regarding recurrent cancer.      Hyperlipidemia LDL goal <130  Hyperlipidemia was chronically treated with atorvastatin 10 mg daily which was held during hospitalization and then stopped due to hepatocellular injury    Consultations This Hospital Stay   PHYSICAL THERAPY ADULT IP CONSULT  OCCUPATIONAL THERAPY ADULT IP CONSULT  CARE MANAGEMENT / SOCIAL WORK IP CONSULT  SPEECH LANGUAGE PATH ADULT IP CONSULT  VASCULAR ACCESS ADULT IP CONSULT  PHARMACY TO DOSE VANCO  PHARMACY IP CONSULT  NUTRITION SERVICES ADULT IP CONSULT  PHARMACY IP CONSULT  PHARMACY IP CONSULT  PHARMACY IP CONSULT    Code Status   No CPR- Do NOT Intubate    Time Spent on this Encounter   I, Dat Peters MD, personally saw the patient today and spent greater than 30 minutes discharging this patient.       Dat Peters MD  [facility] INTENSIVE CARE  [address] [place] [address]  Phone: [phone]  ______________________________________________________________________    Physical Exam                  Patient is        Primary Care Physician   Nazario Jones MD    Discharge Orders       Significant Results and Procedures   Most Recent 3 CBC's:  Recent Labs   Lab Test 23  0546 23  1744 23  0508 23  1718 23  0548 23  1229 23  0519 23  0950 23  1838 23  0458   WBC 21.9*  --  24.3* 20.9* 21.0*  --  25.4* 32.5*  32.5*  --  23.4*   HGB 8.1* 8.1* 8.6*  8.6*  --  7.7*   < > 8.6* 9.4*   < > 8.0*    MCV  --   --  93  --   --   --   --  94  --  93   PLT  --   --  95*  --  85*  --  121* 135*   < > 134*    < > = values in this interval not displayed.     Most Recent 3 BMP's:  Recent Labs   Lab Test 08/23/23  1241 08/23/23  1206 08/23/23  1144 08/23/23  1033 08/23/23  0643 08/23/23  0546 08/22/23  1936 08/22/23  1744 08/22/23  0511 08/22/23  0508   NA  --  146*  --   --   --  144  --  138   < > 141   POTASSIUM  --  4.7  --   --   --  5.0  --  4.9   < > 4.9   CHLORIDE  --  102  --   --   --  102  --  102   < > 105   CO2  --  25  --   --   --  24  --  18*   < > 18*   BUN  --   --   --   --   --  145.5*  --  134.8*  --  120.7*   CR  --   --   --   --   --  3.09*  --  2.90*  --  2.80*   ANIONGAP  --  19*  --   --   --  18*  --  18*   < > 18*   IVANNA  --   --   --   --   --  8.0*  --  8.2*  --  8.5*   *  --  131* 142*   < > 168*   < > 191*   < > 205*    < > = values in this interval not displayed.     Most Recent 2 LFT's:  Recent Labs   Lab Test 08/23/23  0546 08/22/23  1744   * 360*   ALT 3,008* 3,684*   ALKPHOS 61 61   BILITOTAL 0.5 0.5     Most Recent 3 INR's:  Recent Labs   Lab Test 08/23/23  0546 08/22/23  0508 08/21/23  1718   INR 1.40* 1.38* 1.60*     7-Day Micro Results       Collected Updated Procedure Result Status      08/22/2023 1538 08/23/2023 1358 Respiratory Aerobic Bacterial Culture with Gram Stain [[id number]]   Sputum from Endotracheal    Preliminary result Component Value   Culture Culture in progress  [P]    Gram Stain Result >25 PMNs/low power field  [P]     No organisms seen  [P]                08/21/2023 2138 08/23/2023 0527 Urine Culture [[id number]]   Urine, Ponce Catheter    Final result Component Value   Culture No Growth               08/21/2023 1728 08/22/2023 2216 Blood Culture Peripheral Blood [[id number]]    Peripheral Blood    Preliminary result Component Value   Culture No growth after 1 day  [P]                08/21/2023 1718 08/21/2023 1729 1,3 Beta D glucan  fungitell [[id number]]   Blood from Artery, Radial, Left    In process Component Value   No component results            08/21/2023 1718 08/22/2023 2216 Blood Culture Line, venous [[id number]]   Blood from Line, venous    Preliminary result Component Value   Culture No growth after 1 day  [P]                08/21/2023 1718 08/22/2023 2216 Blood Culture Line, arterial [[id number]]   Blood from Line, arterial    Preliminary result Component Value   Culture No growth after 1 day  [P]                08/20/2023 0405 08/21/2023 1152 Urine Culture [[id number]]   Urine, Ponce Catheter    Final result Component Value   Culture No Growth                     Most Recent ABG:  Recent Labs   Lab Test 08/23/23  1206   PH 7.47*   PO2 94   PCO2 39   HCO3 29*   SOHA 4.5*   ,   Results for orders placed or performed during the hospital encounter of 08/12/23   XR Chest Port 1 View    Narrative    EXAM: XR CHEST PORT 1 VIEW  LOCATION: [facility]  DATE: 8/12/2023    INDICATION: Increasing shortness of breath. Reason history of pulmonary embolus. History of left breast cancer.  COMPARISON: Chest CTA 07/09/2023      Impression    IMPRESSION: Heart size and vascularity are normal. Subpleural consolidative opacity medial right upper lobe and reticulonodular peribronchial opacities right upper lobe. Small Small right pleural effusion has increased in size. Findings could represent   worsening right lung pneumonia versus metastatic disease with lymphangitic spread of tumor right upper lobe.   XR Chest Port 1 View    Narrative    CHEST ONE VIEW  August 14, 2023 8:33 AM     HISTORY: Worsening respiratory failure.    COMPARISON: August 12, 2023.      Impression    IMPRESSION: Moderate right effusion increased from previous. Increased  perihilar airspace disease on the right. Left lung appears clear.    LING HASTINGS MD         SYSTEM ID:  [id number]   XR Chest Port 1 View    Narrative    CHEST ONE VIEW August  14, 2023 10:09 AM     HISTORY: Endotracheal tube positioning.    COMPARISON: August 14, 2023 at 8:23 AM.      Impression    IMPRESSION: Endotracheal tube tip 5.2 cm from the cem. Moderate  effusion and infiltrates on the right not significantly changed given  the difference in position. Trace pleural fluid or pleural thickening  on the left.    LING HASTINGS MD         SYSTEM ID:  [id number]   X-ray Abdomen flat port    Narrative    ABDOMEN ONE VIEW PORTABLE  8/14/2023 10:11 AM     HISTORY: Evaluate OG tube    COMPARISON: None.       Impression    IMPRESSION: Nasogastric tube tip in the left abdomen in the expected  location of stomach. Small amount of stool.  Nonobstructed bowel gas  pattern.    LING HASTINGS MD         SYSTEM ID:  [id number]   XR Chest Port 1 View    Narrative    CHEST ONE VIEW  8/14/2023 11:12 AM     HISTORY: RN placed PICC - verify tip placement    COMPARISON: August 14, 2023      Impression    IMPRESSION: Right PICC tip in the low SVC. Endotracheal tube tip 4.8  cm from the cem. Effusion and infiltrates on the right similar to  previous.    LING HASTINGS MD         SYSTEM ID:  [id number]   XR Chest Port 1 View    Narrative    XR CHEST PORT 1 VIEW 8/14/2023 12:15 PM    HISTORY: RN placed PICC - verify tip placement    COMPARISON: Radiograph earlier today at 11:10 AM      Impression    IMPRESSION: Right arm PICC tip is at the SVC/right atrial junction.  Endotracheal tube tip is about 3.8 cm above the cem. An enteric  tube coursing into the stomach. Moderate right pleural effusion and  right basilar atelectasis is unchanged. No left lung infiltrate or  left-sided effusion. No pneumothorax. Normal heart size.    ONELIA YORK MD         SYSTEM ID:  [id number]   CT Chest Pulmonary Embolism w Contrast    Narrative    CT CHEST PULMONARY EMBOLISM WITH CONTRAST August 14, 2023 2:17 PM    CLINICAL HISTORY: Chest Pain. Acute on chronic respiratory failure now  intubated, recent pulmonary embolus in left  upper lobe and metastatic  mass in right lung.    TECHNIQUE: CT angiogram chest during arterial phase injection IV  contrast. 2D and 3D MIP reconstructions were performed by the CT  technologist. Dose reduction techniques were used.  CONTRAST: 80mL, Isovue 370.    COMPARISON: July 9, 2023.    FINDINGS:  ANGIOGRAM CHEST: Pulmonary arteries are normal caliber and negative  for pulmonary emboli. Thoracic aorta is negative for dissection. No CT  evidence of right heart strain.    LUNGS AND PLEURA: Moderate to large right effusion with extensive  compressive atelectasis and/or infiltrate throughout the right lower  lobe. There is airway filling of mucous and/or occlusion in the right  lower lobe. No left effusion.    MEDIASTINUM/AXILLAE: Right supraclavicular lesion is unchanged.  Mediastinal adenopathy is unchanged. No aneurysm.    CORONARY ARTERY CALCIFICATIONS: Mild.    UPPER ABDOMEN: No acute findings.    MUSCULOSKELETAL: No frankly destructive bony lesions.      Impression    IMPRESSION:  1.  No pulmonary embolism demonstrated.  2.  Moderate to large right effusion and extensive compressive  atelectasis and/or infiltrate in the right lung.    LING HASTINGS MD         SYSTEM ID:  [id number]   US Thoracentesis    Narrative    US THORACENTESIS 8/15/2023 10:47 AM    CLINICAL HISTORY: Symptomatic right pleural effusion, shortness of  breath.    PROCEDURE: Informed consent obtained. Time out performed. The chest  was prepped and draped in sterile fashion. 10 mL of 1% Xylocaine was  infused into the local soft tissues. Under direct ultrasound guidance,  a 5 Brazilian catheter system was placed into the pleural effusion.     1000 mL of serosanguineous fluid was removed and sent to lab, if  requested.    Patient tolerated procedure well.    Ultrasound imaging was obtained and placed in the patient's permanent  medical record.      Impression    IMPRESSION:  1.  Status post right ultrasound-guided thoracentesis.    Reference CPT  Code: 40805    RASHMI MARINELLI MD         SYSTEM ID:  [id number]   XR Chest Port 1 View    Narrative    XR CHEST PORT 1 VIEW 8/15/2023 11:14 AM    HISTORY: Post right thoracentesis.    COMPARISON: 8/14/2023      Impression    IMPRESSION: Interval decrease in right extrapleural fluid compared  with prior day's exam consistent with post thoracentesis status. No  pneumothorax. Unchanged position of lines and tubes. No acute  infiltrate.    RASHMI MARINELLI MD         SYSTEM ID:  [id number]   US Thoracentesis    Narrative    US THORACENTESIS 8/16/2023 1:48 PM    CLINICAL HISTORY: therapeutic draining of right pleural effusion. 1 L  removed yesterday.    PROCEDURE: Informed consent obtained. Time out performed. The chest  was prepped and draped in sterile fashion. 10 mL of 1% lidocaine was  infused into the local soft tissues. Under direct ultrasound guidance,  a 5 [place] catheter system was placed into the pleural effusion.     0.9 liters of blood tinged fluid were removed.    Patient tolerated procedure well.    Ultrasound imaging was obtained and placed in the patient's permanent  medical record.      Impression    IMPRESSION:  1.  Status post right ultrasound-guided thoracentesis. Total removed  over 2 days was 1.9 L. Only a tiny amount of residual fluid  (approximately 100 mL) remains.    JAISON BEARDEN MD         SYSTEM ID:  [id number]   XR Chest Port 1 View    Narrative    CHEST ONE VIEW  8/17/2023 2:55 PM     HISTORY: follow up respiratory failure    COMPARISON: Chest x-ray 8/15/2023.      Impression    IMPRESSION: Stable cardiac silhouette. Aortic arch calcification.  Stable positioning of support devices. Emphysematous changes.  Pulmonary vascular congestion. Small right pleural effusion, decreased  in size compared to prior. Trace left pleural effusion. No  pneumothorax. Similar appearance of right perihilar opacity which may  represent residual atelectasis.    ELIZABETH POLO MD         SYSTEM ID:   [id number]   XR Abdomen Port 1 View    Narrative    EXAM: XR ABDOMEN PORT 1 VIEW  LOCATION: [facility]  DATE: 2023    INDICATION: Verify post pyloric feeding tube placement  COMPARISON: None.      Impression    IMPRESSION: NG tube and feeding tube identified at the level of the distal stomach. Surgical clips in the right upper quadrant. The bowel gas pattern is nonobstructive.   XR Abdomen Port 1 View    Narrative    EXAM: XR ABDOMEN PORT 1 VIEW  LOCATION: [facility]  DATE: 2023    INDICATION: Check feeding tube placement  COMPARISON: 2023      Impression    IMPRESSION: Enteric tube tip projected over the distal stomach. NG tube side-port projected over the mid stomach. Prior postoperative changes cholecystectomy. Mild thoracolumbar curvature with mild to moderate associated hypertrophic changes. Normal   bowel gas pattern with no evidence for amy ileus. Mild pleural fluid or thickening greatest on the right.   XR Abdomen Port 1 View    Narrative    XR ABDOMEN PORT 1 VIEW   2023 8:45 AM     HISTORY: intubated, check feeding tube placement    COMPARISON: 2023      Impression    IMPRESSION: The feeding tube tip is in the pylorus.    ONELIA YORK MD         SYSTEM ID:  [id number]   Echocardiogram Limited     Value    LVEF  65-70%    Narrative    [id number]  [id number]  [id number]  [id number]^ALEXANDRA^SAHARA^KERRY     [facility]  Echocardiography Laboratory  [address] [place] [address]     Name: GINA LEDESMA  MRN: [id number]  : 1950  Study Date: 2023 08:06 AM  Age: 73 yrs  Gender: Female  Patient Location: Psychiatric  Reason For Study: Pulmonary HTN, CHF  History: htn, hyperlipidemia, ckd, copd,  Ordering Physician: SAHARA MORRIS  Referring Physician: Nazario Jones  Performed By: Rita Dixon     BSA: 1.8 m2  Height: 63 in  Weight: 165 lb  HR: 131  BP: 116/50  mmHg  ______________________________________________________________________________  Procedure  Limited Portable Echo Adult.  ______________________________________________________________________________  Interpretation Summary     The left ventricular cavity is small.  Hyperdynamic left ventricular function  The visual ejection fraction is 65-70%.  The right ventricle is normal size.  Mildly decreased right ventricular systolic function  There is mild (1+) tricuspid regurgitation.  Right ventricular systolic pressure is elevated, consistent with severe  pulmonary hypertension.  IVC diameter >2.1 cm collapsing <50% with sniff suggests a high RA pressure  estimated at 15 mmHg or greater.     Compared with the previous study, the HR is higher, otherwise no significant  change.  ______________________________________________________________________________  Left Ventricle  The left ventricular cavity is small. There is normal left ventricular wall  thickness. Hyperdynamic left ventricular function. The visual ejection  fraction is 65-70%. No regional wall motion abnormalities noted.     Right Ventricle  The right ventricle is normal size. Mildly decreased right ventricular  systolic function.     Atria  Normal left atrial size. Right atrial size is normal. There is no atrial shunt  seen.     Mitral Valve  The mitral valve is normal in structure and function. There is trace mitral  regurgitation.     Tricuspid Valve  There is mild (1+) tricuspid regurgitation. The right ventricular systolic  pressure is approximated at 53.6 mmHg plus the right atrial pressure. IVC  diameter >2.1 cm collapsing <50% with sniff suggests a high RA pressure  estimated at 15 mmHg or greater. Right ventricular systolic pressure is  elevated, consistent with severe pulmonary hypertension.     Aortic Valve  The aortic valve is normal in structure and function. No aortic regurgitation  is present. No aortic stenosis is present.     Pulmonic  Valve  The pulmonic valve is not well seen, but is grossly normal. There is trace  pulmonic valvular regurgitation.     Vessels  Normal size aorta.     Pericardium  Trivial pericardial effusion.     Rhythm  The rhythm was supraventricular tachycardia.  ______________________________________________________________________________  MMode/2D Measurements & Calculations  IVSd: 0.60 cm     LVIDd: 4.4 cm  LVIDs: 3.9 cm  LVPWd: 0.90 cm  FS: 11.4 %  LV mass(C)d: 100.6 grams  LV mass(C)dI: 56.5 grams/m2  Ao root diam: 2.9 cm  LA dimension: 3.1 cm  LA/Ao: 1.1  RWT: 0.41  TAPSE: 1.8 cm     Doppler Measurements & Calculations  PA V2 max: 113.0 cm/sec  PA max P.1 mmHg  TR max ottoniel: 366.0 cm/sec  TR max P.6 mmHg  RV S Ottoniel: 15.3 cm/sec     ______________________________________________________________________________  Report approved by: Mandie Yuen 2023 02:30 PM           *Note: Due to a large number of results and/or encounters for the requested time period, some results have not been displayed. A complete set of results can be found in Results Review.       Discharge Medications   Patient is     Allergies   Allergies   Allergen Reactions    Augmentin [Amoxicillin-Pot Clavulanate] Nausea and Vomiting    Meperidine Hcl Nausea and Vomiting     demerol    Seasonal Allergies      spring

## 2023-08-23 NOTE — DEATH PRONOUNCEMENT
MD DEATH PRONOUNCEMENT    Called to pronounce Latanya Padron dead.    Physical Exam: Unresponsive to noxious stimuli, Spontaneous respirations absent, Breath sounds absent, Carotid pulse absent, Heart sounds absent, and Pupillary light reflex absent    Patient was pronounced dead at 5:20 PM, August 23, 2023.    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)    COPD exacerbation (H)    Pulmonary emphysema, unspecified emphysema type (H)    CKD (chronic kidney disease) stage 3, GFR 30-59 ml/min (H)    Malignant neoplasm of overlapping sites of left breast in female, estrogen receptor positive (H)    Coronary artery disease involving native coronary artery of native heart without angina pectoris    Prediabetes    Atrial fibrillation with RVR (H)    Chronic right-sided heart failure (H)    Severe pulmonary hypertension (H)    Acute renal failure (H)    Medication induced coagulopathy (H)    Malignant neoplasm metastatic to brain (H)    Acute encephalopathy    Pulmonary embolism - left upper lobe, diagnosed 7/23    Malignant pleural effusion-right    Acute upper GI bleed    Acute blood loss anemia    Gross hematuria    Anasarca    Hypoalbuminemia    Thrombocytopenia (H)    Patient ventilator dyssynchrony (H)    Hypokalemia    Hypernatremia    Positive sputum culture for Aspergillus (H)    Hepatocellular injury    Hyperammonemia (H)    SIRS (systemic inflammatory response syndrome) (H)    Hyperlipidemia LDL goal <130    Essential hypertension, benign    Major depression in complete remission (H)    Elevated troponin    Normocytic anemia, chronic    Hyponatremia    Hypercalcemia    Insomnia    Situational anxiety       Infectious disease present?: NO    Communicable disease  present? (examples: HIV, chicken pox, TB, Ebola, CJD) :  NO    Multi-drug resistant organism present? (example: MRSA): NO    Please consider an autopsy if any of the following exist:  NO Unexpected or unexplained death during or following any dental, medical, or surgical diagnostic treatment procedures.   NO Death of mother at or up to seven days after delivery.     NO All  and pediatric deaths.     NO Death where the cause is sufficiently obscure to delay completion of the death certificate.   NO Deaths in which autopsy would confirm a suspected illness/condition that would affect surviving family members or recipients of transplanted organs.     The following deaths must be reported to the 's Office:  NO A death that may be due entirely or in part to any factors other than natural disease (recent surgery, recent trauma, suspected abuse/neglect).   NO A death that may be an accident, suicide, or homicide.     NO Any sudden, unexpected death in which there is no prior history of significant heart disease or any other condition associated with sudden death.   NO A death under suspicious, unusual, or unexpected circumstances.    NO Any death which is apparently due to natural causes but in which the  does not have a personal physician familiar with the patient s medical history, social, or environmental situation or the circumstances of the terminal event.   NO Any death apparently due to Sudden Infant Death Syndrome.     NO Deaths that occur during, in association with, or as consequences of a diagnostic, therapeutic, or anesthetic procedure.   NO Any death in which a fracture of a major bone has occurred within the past (6) six months.   NO A death of persons note seen by their physician within 120 days of demise.     NO Any death in which the  was an inmate of a public institution or was in the custody of Law Enforcement personnel.   NO  All unexpected deaths of children   NO Solid  organ donors   NO Unidentified bodies   YES Deaths of persons whose bodies are to be cremated or otherwise disposed of so that the bodies will later be unavailable for examination;   NO Deaths unattended by a physician outside of a licensed healthcare facility or licensed residential hospice program   NO Deaths occurring within 24 hours of arrival to a health care facility if death is unexpected.    NO Deaths associated with the decedent s employment.   NO Deaths attributed to acts of terrorism.   NO Any death in which there is uncertainty as to whether it is a medical examiner s care should be discussed with the medical investigator.        Body disposition: Autopsy was discussed with family member:  Spouse and Daughter in person.  Permission for autopsy was declined.  Case referred to the .

## 2023-08-23 NOTE — PROGRESS NOTES
[facility]    Medicine Progress Note - Hospitalist Service    Date of Admission:  8/12/2023    Assessment & Plan   Patient is a 73-year-old female with past medical history of COPD/emphysema and chronic hypoxic and hypercapnic respiratory failure on 2 to 6 L nasal cannula at baseline, paroxysmal atrial fibrillation, recent pulmonary embolism July 2023, breast cancer diagnosed in 2019 with recent concern for metastasis to the brain and lungs, hypertension, hyperlipidemia, depression, chronic kidney disease stage III, chronic anemia, CAD, prediabetes and obesity who presented to the emergency room with complaints of lethargy and shortness of breath and was found to be in A-fib with RVR.  She converted to normal sinus rhythm following IV and p.o. diltiazem and was registered to observation status for monitoring.  After receiving her home dose of lorazepam, she had worsening sedation with acute encephalopathy and acute on chronic respiratory failure with hypoxia and hypercapnia and was therefore admitted to the ICU on 8/13/2023.  She failed initial treatment with flumazenil and BiPAP and was intubated 8/14.   She was treated for possible pneumonia empirically with 1 week course of Zosyn with negative cultures aside from sputum culture that grew Aspergillus of unclear clinical significance.  She underwent right thoracentesis on 8/15 and 8/16.  She has had atrial fibrillation with RVR treated with antiarrhythmic therapy.  Anasarca and possible pulmonary edema have been treated with aggressive diuresis.  Signs of COPD exacerbation were treated with corticosteroids and bronchodilators.  Despite all of these interventions, she has not tolerated attempted ventilator weaning trials and on 8/21 developed signs of progressive multisystem organ failure.   Pleural fluid cytology from August 15 confirms metastatic adenocarcinoma of unknown primary and malignant pleural effusion. Upon further review  of PET scan results from August 1, those findings were concerning for widely metastatic cancer including involvement of the right lung with malignant effusion and lymphangitic carcinomatosis, lymph nodes, pericardium, bones, and brain.  She remains critically ill with guarded prognosis for recovery and possibly terminal prognosis particularly if unable to treat her widespread malignancy.      At family meeting 8/21, they expressed preference to avoid escalation of care including hospital transfer for other interventions not available at this hospital.  They expressed preference for change to DNR CODE STATUS.  They expressed preference to continue current level of care while they attempt to make arrangements for other family members to be able to come and visit the patient in person.  On 8/22, oncology expressed opinion that patient is not a candidate for chemotherapy while intubated receiving mechanical ventilation and until liver function recovers.    Plan for 8/23:  -Adjusting ventilator to maintain normal acid-base status and keep PaO2 above 80 (patient uses 2.5 to 6 L nasal cannula supplementation chronically at baseline)  -titrate bicarbonate infusion titrated to normalize metabolic acidosis until renal function recovers  -Continue diltiazem infusion titrated to optimize rate control of rapid atrial fibrillation   -Avoiding acetaminophen and NSAIDs for antipyretic therapy because of hepatocellular injury and acute renal failure so recommend use of other cooling measures  -reassess for possible bolus dose of Lasix depending upon clinical course  -Titrate Precedex to sedation goal of calm to drowsy but not attempting awakening trial due to worsening uremia and hyperosmolality both of which are likely to exacerbate encephalopathy until uremia and hyperosmolality improve  -Continue fentanyl infusion for analgesia  -Continue therapeutic anticoagulation with IV heparin unless INR is significantly elevated and/or she  develops significant bleeding  -Reduce rate of enteral feeding because of worsening uremia and persistent hyperammonemia   -Titrate free water supplementation through nasogastric tube to treat hyperosmolality with goal to normalize serum osmolality over the next 24 hours or so  -Monitor liver function, INR, and ammonia level closely   -not treating hyperammonemia with laxative therapy at this time due to spontaneous loose stools associated with enteral tube feeding, spontaneously improving hyperammonemia, and risk of exacerbating metabolic acidosis by precipitating diarrhea from laxatives  -Monitor urine output and renal function closely  -Anticipate reviewing the patient's status with her family when they are available today    VENT:  AC mode  Tital volume 380  PEEP 5  RR 24    Continuous infusions:  Precedex   Fentanyl  Diltiazem  Phenylephrine  Heparin  Insulin  Enteral feedings running at 45 mL/h (goal rate)    Lines/Drains/Etc:  ET tube 7.5 mm placed 8/14  OG tube placed 8/14  NG feeding tube placed 8/19  PICC line placed 8/14  Art line placed 8/14  Urinary catheter replaced 8/21    Suspect widely metastatic adenocarcinoma including metastases to right lung with lymphangitic carcinomatosis and malignant right pleural effusion, lymph nodes, pericardium, bones, and brain  History of malignant neoplasm of overlapping sites of left breast in female, estrogen receptor positive  Acute on chronic respiratory failure with hypoxia and hypercapnia  Patient ventilator dyssynchrony  Toxic metabolic encephalopathy  COPD exacerbation with emphysema  Hypernatremia with hyperosmolality  Worsening respiratory failure initially attributed to a combination of rapidly increasing right sided pleural effusion, possible pneumonia, and sedation from lorazepam with worsening acute toxic metabolic encephalopathy.  Underwent right pleural fluid thoracentesis with improvement in pleural effusion.  Treated with 7 days IV Zosyn empirically  for possible pneumonia.  Treated for possible acute pulmonary edema from rapid atrial fibrillation with aggressive diuretic therapy.  COPD exacerbation treated with corticosteroids and bronchodilators with resolution of signs of COPD exacerbation.  Treated hypernatremia and hyperosmolality with free water supplementation.    She now has acute renal failure which prevents bicarbonate regeneration and she is known to have chronic hypercapnia with chronic respiratory acidosis and compensatory metabolic alkalosis.  She remains intubated and sedated not tolerating attempted ventilator weaning trials.  Pleural fluid cytology was positive for metastatic adenocarcinoma.  PET scan August 1 was worrisome for metastatic cancer to the right lung with malignant effusion and lymphangitic carcinomatosis as well as other metastases.  For these reasons, there is increasing concern that primary trigger for acute on chronic respiratory failure is progressive metastatic malignancy.  Case reviewed with her primary oncologist 8/22 who indicated that patient is not a candidate to initiate chemotherapy until she is not intubated and until liver function improves.  -Adjust ventilator settings  -ordered ABG monitoring  -continue IV solumedrol (both for COPD and brain metastases with cerebral edema and use nebulized bronchodilators as needed  -Continue Precedex and fentanyl infusions  -continue enteral feeding but weaning to minimum rate on 8/23 due to worsening uremia and persistent hyperammonemia  -Continue free water supplementation titrated to treat hyperosmolality    Acute hepatocellular injury  Hyperammonemia  Coagulopathy  Acute renal failure  Significantly increased hepatic transaminases concerning for acute hepatocellular injury on 8/21.  She has also had worsening coagulopathy and hyperammonemia likely due to hepatocellular injury.  Hepatocellular injury coincides with amiodarone administration over the preceding days, so  hepatotoxicity from amiodarone appears most likely.  LFTs have significantly improved over the last 24 hours after stopping amiodarone and coagulopathy and hyperammonemia are also improving.  Test results 8/21 were also concerning for acute renal failure and she had worsening oliguria on 8/21.  Renal function continues to worsen and she is more uremic with worsening metabolic acidosis 8/22, but urine output is improving and she has not been hyperkalemic  -Avoiding amiodarone   -Monitor LFTs, ammonia, and INR  -Avoid other hepatotoxic medications is much as possible  -Not treating hyperammonemia with lactulose because of risk of exacerbating hypovolemia and metabolic acidosis  -Monitor renal function and urine output closely  -Start bicarbonate infusion titrated to resolution of metabolic acidosis    Hypotension  She has had recurring hypotension over the last 2-3 days.  Hypotension has coincided with recurring rapid atrial fibrillation.  Although chronic right heart failure with relative intravascular hypovolemia after aggressive diuresis may contribute to hypotension, she had only signs of mild right heart failure on echocardiogram.  Upon review of PET scan results August 1, there was suspicion for pericardial involvement of metastatic cancer raising concern for pericardial constrictive physiology which is also probably contributing factor to hypotension and would explain her sensitivity to volume resuscitation and/or aggressive diuresis.  Hypotension likely exacerbates acute renal failure and possibly hepatocellular injury.  Hypotension has been responsive to phenylephrine infusion, IV albumin administration, discontinuation of diuretics, and cautious fluid resuscitation.  -Continue phenylephrine infusion titrated to maintain mean arterial pressure above 65  -She continues to receive volume support from her multiple continuous infusions  -May consider additional boluses of IV fluids depending upon clinical  course    Paroxysmal atrial fibrillation with RVR   Patient has known paroxysmal A-fib and presented with A-fib with RVR.  She continues to have recurrence of atrial fibrillation with RVR which improved with IV amiodarone therapy.  However, she demonstrated new onset severe hepatocellular injury on 8/21 with concern for hepatotoxicity from amiodarone at which time amiodarone was discontinued.  After stopping amiodarone and with onset of fever on 8/21, rate of atrial fibrillation worsened but has now normalized after starting treatment with continuous diltiazem infusion although she is requiring phenylephrine therapy to maintain adequate blood pressure.  Digoxin level in a.m. 8/23 is 0.9 and had been 1.0 on 8/22 after most recent IV digoxin dose on 8/21.  -Avoiding amiodarone  -Consider repeat doses of IV digoxin cautiously in the context of acute renal failure once digoxin level falls below the therapeutic range if rapid atrial fibrillation persists  -Continue diltiazem infusion per protocol to optimize rate control of atrial fibrillation    SIRS  Leukocytosis  Possible community acquired pneumonia   Positive sputum culture for Aspergillus  Leukocytosis was present at time of admission and has persisted throughout hospitalization even after 1 week course of IV Zosyn therapy empirically for treatment of possible pneumonia.  She has been taking corticosteroids which probably contributes to leukocytosis.  Sputum culture from endotracheal tube grew Aspergillus species of uncertain clinical significance in the context of her known severe chronic lung disease (presumed colonization).  She developed new fever concerning for SIRS on 8/21 without definite localized source for infection.  Repeat cultures were obtained and are negative so far (endotracheal sputum culture was obtained 822 and is currently pending).  She was started empirically on meropenem on 8/21.  Vancomycin was not added because of previous negative MRSA  testing, acute renal failure, and concern for high risk of vancomycin nephrotoxicity.  -Continue meropenem while following up culture results and if cultures are negative on 8/24-8/25 would consider discontinuation of meropenem  -Not using acetaminophen for antipyretic therapy because of hepatocellular injury and not using NSAIDs for antipyretic therapy because of acute renal failure    Prediabetes  Prediabetes diagnosed earlier this year and patient was started on metformin which was discontinued following hospitalization for PE.  During hospitalization, blood sugars were only mildly to moderately elevated until enteral feeding was initiated since which time she has developed worsening hyperglycemia.  Insulin infusion was started 8/22 and Lantus insulin was administered at bedtime on 8/22 since which time blood sugars have improved.  Hyperglycemia will also likely improve on 8/23 as enteral feeding rate is decreased.  -Anticipate discontinuation of insulin infusion as enteral feeding rate is decreased today   -Continue monitoring blood sugars frequently per insulin infusion protocol until it is discontinued then resume monitoring blood sugars every 4 hours   -Continue low-dose Lantus insulin at bedtime unless she develops hypoglycemia and anticipate restarting NovoLog insulin correction scale every 4 hours once insulin infusion has been discontinued    Chronic right-sided heart failure  Severe pulmonary hypertension  Acute pulmonary edema  Anasarca  Hypoalbuminemia  Mild RVF and pulmonary hypertension were present July 2023 at time of acute PE and are unchanged on repeat echo performed during this stay.  Patient developed signs of volume overload with acute pulmonary edema likely exacerbating COPD and respiratory failure.  Weight up more than 30 lbs since admission and she has anasarca.  Patient chronically had been taking hydrochlorothiazide and Lasix.  BNP was normal at admission but became significantly elevated  on 8/18. Serum albumin has decreased during hospitalization. Hypoalbuminemia likely exacerbates anasarca.  She received a dose of IV albumin administration on 8/20 with at least transient improvement in her blood pressure.  -Reassessing daily whether to resume Lasix  -Minimizing use of supplemental IV fluids as able  -monitor urine output and daily weight   -Monitor albumin as indicated    Upper GI bleeding  Gross hematuria  Acute blood loss anemia  Thrombocytopenia  Aspirin induced platelet function defect  Normocytic anemia, chronic   Patient has chronic anemia with baseline hemoglobin of approximately 10-11.  She has had 2-3 point drop in hemoglobin. OG tube transiently contained bright red blood and then melanotic output concerning for upper GI bleeding while she was receiving therapeutic anticoagulation.  Chronic aspirin has been held.  She also had gross hematuria.  Lovenox held and PPI increased to BID dosing.  GI bleeding stopped.  Hemoglobin stabilized at 7-9.  Therapeutic anticoagulation was restarted.  She has had intermittent recurrent mild gross hematuria but has generally tolerated restarting therapeutic anticoagulation with IV heparin.  Course and test results are concerning for acute blood loss anemia.   She chronically takes aspirin that causes platelet function defect and increases her bleeding risk.  She has developed acute mild thrombocytopenia that has been stable but exacerbates bleeding risk.  -continue to monitor hemoglobin closely, transfuse PRBCs for hgb 7 or less or recurrent active bleeding  -continue BID PPI  -Monitoring platelet count  -Holding chronic aspirin  -Monitor for recurrent GI bleeding and/or gross hematuria    Pulmonary embolism - left upper lobe, diagnosed 7/23  Medication induced coagulopathy  Diagnosed with acute PE July 2023 despite being on warfarin with concern that PE was due to hypercoagulability associated with recurrent cancer.  Patient had been on Eliquis that had  been stopped with initiation of bridging Lovenox in anticipation of diagnostic bronchoscopy with biopsy.  Lovenox was stopped due to GI bleed during this hospitalization.  No acute PE was noted on chest CT at admission and so far acute DVT has not been suspected.  She is at high risk for recurrent venous thromboembolism because of her underlying malignancy. On 8/21 she developed significant hepatocellular injury with prolonged INR suspicious for coagulopathy.  Coagulopathy has since improved.  -Continue IV heparin per protocol today without bolus assuming bleeding does not recur and assuming INR is not 2 or higher    Elevated troponin - demand ischemia from A fib with RVR  Coronary artery disease involving native coronary artery of native heart without angina pectoris  Patient was diagnosed in 2020 with CAD based on radiographic findings for coronary artery calcifications noted following her cancer treatment.  Has been followed by cardiology and Lexiscan was performed which was unremarkable.  Cardiology previously recommended medical treatment only and patient has not had any symptoms concerning for angina.  Elevated troponin Present at time of admission was attributed to A-fib with RVR and had been trending downward without concern for non-STEMI.  She again developed elevated troponin 8/20 coinciding with worsening atrial fibrillation with RVR suspicious for demand ischemia.  Troponin has progressively increased over the last 24 hours although rate of rise is decreasing and the increase in troponin is probably due at least part to acute renal failure with decreased clearance.  -Not continuing to monitor troponin unless her clinical status changes  -no ASA at this time given GI bleed  -Treat other medical problems including rapid atrial fibrillation and hypoxia    Hypokalemia  Hypomagnesemia  Hypophosphatemia  Hyperphosphatemia  Potassium has been as low as 3.2 during hospitalization and she has had low serum  magnesium and phosphorus levels as well.  Hypokalemia is likely to be exacerbated by diuretic therapy and/or hypomagnesemia.  Hypokalemia and/or hypomagnesemia increase her risk for recurrent cardiac arrhythmia.  Hypokalemia resolved with supplementation and she is now at risk for hyperkalemia in the context of acute renal failure.  She has developed mild to moderate hyperphosphatemia because of acute renal failure.  Last dose of phosphorus supplementation had been administered on August 16.  -Discontinued potassium, magnesium, and phosphorus supplementation protocols because of acute renal failure and risk of hyperkalemia    CKD (chronic kidney disease) stage 3, GFR 30-59 ml/min (H)  She has stage III chronic kidney disease with recent baseline creatinine 1.0-1.1.  Patient's creatinine was mildly elevated at 1.4 at initial presentation but recovered to baseline and had fluctuated 1.3-1.4 until 8/21.  -Continue close monitoring of creatinine and urinary output    Hyponatremia  Noted initially, resolved with fluid resuscitation and is now hypernatremic  -Monitoring sodium as noted above    Hypercalcemia  Noted initially, resolved with IV fluids.  May be at risk for recurrent hypercalcemia due to suspicion for recurrent malignancy.  -Ordered recheck of calcium    Essential hypertension, benign  Patient normally on diltiazem 240 mg daily, Lasix 20 mg daily, hydrochlorothiazide 25 mg daily and lisinopril 2.5 mg daily to treat hypertension.  All these meds were held over the last several days with blood pressures normotensive to hypertensive until she has developed worsening hypotension since 8/20.   -Not continuing diuretics at this time   -restarted diltiazem for atrial fibrillation  -Continue to hold other chronic antihypertensive medications    Major depression in complete remission (H)  Situational anxiety  Insomnia  Patient is normally on Effexor 75 mg daily.  She was recently started on hydroxyzine and lorazepam  "due to insomnia and situational anxiety regarding possible recurrent cancer diagnosis.  Lorazepam and all benzodiazepines are now being avoided secondary to worsening respiratory status at admission.  The patient was started on BuSpar on 8/13 without notable improvement of her symptoms and patient is now sedated and intubated.   -Hold Effexor  -Avoid benzodiazepines  -Reassess when patient is extubated    Hyperlipidemia LDL goal <130  Hyperlipidemia is chronically treated with atorvastatin 10 mg daily  -Stopped chronic statin due to hepatocellular injury       Diet: NPO for Medical/Clinical Reasons Except for: No Exceptions  Adult Formula Drip Feeding: Continuous Vital High Protein; Nasogastric tube; Goal Rate: 30 mL/hr; mL/hr; reduce to 30 ml/hr now and do not advance    DVT Prophylaxis: IV heparin infusion  Ponce Catheter: PRESENT, indication: Strict 1-2 Hour I&O  Lines: PRESENT      PICC 08/14/23 Triple Lumen Right Basilic-Site Assessment: WDL  Arterial Line 08/14/23 Wrist-Site Assessment: WDL      Cardiac Monitoring: ACTIVE order. Indication: ICU  Code Status: No CPR- Pre-arrest intubation OK      Clinically Significant Risk Factors             # Anion Gap Metabolic Acidosis: Highest Anion Gap = 19 mmol/L in last 2 days, will monitor and treat as appropriate  # Hypoalbuminemia: Lowest albumin = 2.5 g/dL at 8/17/2023  6:23 AM, will monitor as appropriate  # Coagulation Defect: INR = 1.40 (Ref range: 0.85 - 1.15) and/or PTT = N/A, will monitor for bleeding  # Thrombocytopenia: Lowest platelets = 95 in last 2 days, will monitor for bleeding  # Acute Kidney Injury, unspecified: based on a >150% or 0.3 mg/dL increase in last creatinine compared to past 90 day average, will monitor renal function  # Hypertension: Noted on problem list        # Obesity: Estimated body mass index is 34.54 kg/m  as calculated from the following:    Height as of this encounter: 1.6 m (5' 3\").    Weight as of this encounter: 88.5 kg (195 " lb).      # COPD: noted on problem list        Disposition Plan    unknown       Dat Peters MD  Hospitalist Service  [facility]  Securely message with [employer] (more info)  Text page via [employer] Paging/Directory   ______________________________________________________________________    Interval History   Yesterday the patient started insulin infusion per protocol.  She was persistently hypotensive so phenylephrine infusion was restarted.  Bicarbonate infusion was started and titrated yesterday with improvement in metabolic acidosis.  She continues to have persistent fever although there is a discrepancy between oral temperature readings which are running higher than rectal temperature readings.  She has been receiving ice packs and other cooling measures to treat fever.  There were no significant overnight events.  Heart rate has been well controlled on diltiazem infusion 10 mg/h.  Blood pressure has been normal on low-dose phenylephrine infusion.  Oxygenation has been stable on current ventilator settings.  She had better urine output over the last 24 hours compared with the previous 24 hours but remains oliguric.  She is having spontaneous stooling.  She will open eyes spontaneously but is not following instructions reliably.    Physical Exam   Vital Signs: Temp: (!) 100.9  F (38.3  C) Temp src: Rectal BP: 134/72 Pulse: 94   Resp: 16 SpO2: 99 % O2 Device: Mechanical Ventilator    Vent Mode: CMV/AC  (Continuous Mandatory Ventilation/ Assist Control)  FiO2 (%): 40 %  Resp Rate (Set): 24 breaths/min  Tidal Volume (Set, mL): 380 mL  PEEP (cm H2O): 5 cmH2O  Pressure Support (cm H2O): 10 cmH2O  Resp: 16    Ventilator measurements:  tidal volume 380, minute volume 7.7, peak inspiratory pressure 38    Patient Vitals for the past 24 hrs:   BP Temp Temp src Pulse Resp SpO2 Weight   23 0740 -- (!) 100.9  F (38.3  C) Rectal -- -- -- --   23 0735 134/72 (!) 102.8  F (39.3   C) Oral 94 16 -- --   08/23/23 0710 -- -- -- 94 24 -- --   08/23/23 0700 128/62 -- -- 94 24 99 % --   08/23/23 0645 -- (!) 102.7  F (39.3  C) Oral -- -- -- --   08/23/23 0630 136/68 -- -- 93 24 -- --   08/23/23 0600 127/64 (!) 102.8  F (39.3  C) -- 94 24 99 % --   08/23/23 0500 138/73 (!) 102.3  F (39.1  C) Oral 93 13 98 % --   08/23/23 0447 -- -- -- -- -- -- 88.5 kg (195 lb)   08/23/23 0430 -- 100.1  F (37.8  C) Oral -- -- -- --   08/23/23 0400 139/69 -- -- 92 24 97 % --   08/23/23 0330 -- (!) 101.4  F (38.6  C) Oral -- -- -- --   08/23/23 0300 119/59 -- -- 93 24 98 % --   08/23/23 0200 117/63 100.1  F (37.8  C) Oral 93 24 97 % --   08/23/23 0130 -- (!) 100.6  F (38.1  C) Oral -- -- -- --   08/23/23 0100 129/68 (!) 102.5  F (39.2  C) -- 93 24 98 % --   08/23/23 0000 113/60 -- -- 93 24 100 % --   08/22/23 2300 104/56 -- -- 92 24 100 % --   08/22/23 2200 117/62 99.3  F (37.4  C) Oral 92 24 100 % --   08/22/23 2100 107/58 -- -- 92 24 100 % --   08/22/23 2000 116/61 -- -- 92 24 100 % --   08/22/23 1900 107/60 (!) 100.6  F (38.1  C) Oral 90 24 100 % --   08/22/23 1830 114/58 -- -- 89 24 99 % --   08/22/23 1815 -- -- -- 89 -- 100 % --   08/22/23 1800 116/68 -- -- 89 -- 100 % --   08/22/23 1745 -- -- -- 87 -- 99 % --   08/22/23 1730 114/66 -- -- 89 -- 99 % --   08/22/23 1715 -- -- -- 89 -- 99 % --   08/22/23 1700 113/66 -- -- 89 -- 99 % --   08/22/23 1645 -- -- -- 88 14 99 % --   08/22/23 1630 113/60 -- -- 88 14 99 % --   08/22/23 1615 -- -- -- 88 24 99 % --   08/22/23 1600 98/54 -- -- 89 23 99 % --   08/22/23 1545 -- -- -- 89 19 99 % --   08/22/23 1530 105/60 -- -- 90 25 99 % --   08/22/23 1528 -- 100  F (37.8  C) Oral -- -- -- --   08/22/23 1515 -- -- -- 90 24 (!) 87 % --   08/22/23 1500 114/59 -- -- 89 24 97 % --   08/22/23 1445 -- -- -- 90 24 99 % --   08/22/23 1430 110/58 -- -- 90 24 100 % --   08/22/23 1415 -- -- -- 90 24 100 % --   08/22/23 1400 111/59 -- -- 90 24 100 % --   08/22/23 1345 -- -- -- 90 24 100 % --    08/22/23 1330 112/64 -- -- 90 24 100 % --   08/22/23 1315 -- -- -- 91 24 100 % --   08/22/23 1300 105/63 -- -- 90 24 99 % --   08/22/23 1245 -- -- -- 90 24 98 % --   08/22/23 1230 116/67 -- -- 89 24 99 % --   08/22/23 1215 -- -- -- 90 24 98 % --   08/22/23 1200 -- 99.3  F (37.4  C) -- 90 24 100 % --   08/22/23 1145 -- -- -- 90 24 100 % --   08/22/23 1130 (!) 83/52 -- -- 90 24 100 % --   08/22/23 1115 -- -- -- 90 26 100 % --   08/22/23 1100 96/56 -- -- 90 24 100 % --   08/22/23 1045 -- -- -- 90 24 100 % --   08/22/23 1030 115/67 -- -- 90 24 100 % --   08/22/23 1015 -- -- -- 90 24 100 % --   08/22/23 1000 96/56 -- -- 90 24 99 % --   08/22/23 0945 -- -- -- 88 16 100 % --   08/22/23 0930 119/65 -- -- 90 24 100 % --   08/22/23 0915 -- -- -- 91 20 100 % --   08/22/23 0900 101/60 -- -- 90 24 100 % --   08/22/23 0845 -- -- -- 92 24 100 % --   08/22/23 0830 105/62 (!) 100.5  F (38.1  C) Oral 94 24 99 % --   08/22/23 0815 -- -- -- 91 24 100 % --     Weight: 195 lbs 0 oz  Vitals:    08/19/23 0609 08/20/23 1300 08/21/23 0559 08/22/23 0600   Weight: 85 kg (187 lb 8 oz) 87.5 kg (193 lb) 83.7 kg (184 lb 8 oz) 86.5 kg (190 lb 12.8 oz)    08/23/23 0447   Weight: 88.5 kg (195 lb)       Intake/Output Summary (Last 24 hours) at 8/23/2023 0808  Last data filed at 8/23/2023 0615  Gross per 24 hour   Intake 4190.8 ml   Output 545 ml   Net 3645.8 ml       General Appearance: Ill-appearing elderly woman, no acute distress while intubated and sedated  Respiratory: Respiratory effort is synchronous with the ventilator without signs of respiratory distress, symmetric breath sounds with clear lung fields  Cardiovascular: Irregularly irregular heart rate and rhythm, normal capillary refill  GI: Hypoactive bowel sounds, nondistended abdomen, soft  Skin: Pale color  Other: Intubated and sedated    Medical Decision Making       42 minutes spent so far on critical care service today including reassessment of respiratory status and adjusting  mechanical ventilation treatment of respiratory failure and reassessment of hemodynamic status and adjusting antiarrhythmic and vasopressor therapy for cardiac arrhythmia and hypotension       Data     I have personally reviewed the following data over the past 24 hrs:    21.9 (H)  \   8.1 (L)   / N/A     144 102 145.5 (H) /  149 (H)   5.0 24 3.09 (H) \     ALT: 3,008 (HH) AST: 260 (H) AP: 61 TBILI: 0.5   ALB: 2.7 (L) TOT PROTEIN: 4.7 (L) LIPASE: N/A     INR:  1.40 (H) PTT:  N/A   D-dimer:  N/A Fibrinogen:  N/A       Magnesium 2.4, phosphorus 6.8  Ammonia 78, improved from 97 yesterday  Heparin 10 a level 0.57 which is therapeutic  Digoxin level 0.9, improved from 1.0 yesterday  Blood cultures from August 21 are negative so far  Urine culture from August 21 was negative  Sputum culture from August 22 is pending  Beta D glucan level from August 21 is pending    Serum osmolality yesterday morning 338 decreasing to 333 yesterday evening and is pending this morning    Recent Labs   Lab 08/23/23  0546 08/22/23  1746 08/22/23  1410 08/22/23  1054   PH 7.41 7.26* 7.25* 7.27*   PCO2 40 42 41 40   PO2 88 99 107* 105   HCO3 25 19* 18* 19*   O2PER 40 40 40 40       Cardiac monitor results reviewed over the past 24 hrs: Atrial fibrillation and atrial flutter with controlled ventricular response, no other arrhythmias    Recent Labs   Lab 08/23/23  0727 08/23/23  0643 08/23/23  0546 08/22/23  1936 08/22/23  1744 08/22/23  1432 08/22/23  1409 08/22/23  0511 08/22/23  0508 08/21/23  1843 08/21/23  1718 08/21/23  0831 08/21/23  0548 08/20/23  1229 08/20/23  0519 08/19/23  1218 08/19/23  0950 08/18/23  0854 08/18/23  0458   WBC  --   --  21.9*  --   --   --   --   --  24.3*  --  20.9*  --  21.0*  --  25.4*  --  32.5*  32.5*  --  23.4*   HGB  --   --   --   --  8.1*  --   --   --  8.6*  8.6*  --   --   --  7.7*   < > 8.6*  --  9.4*   < > 8.0*   MCV  --   --   --   --   --   --   --   --  93  --   --   --   --   --   --   --  94  --   93   PLT  --   --   --   --   --   --   --   --  95*  --   --   --  85*  --  121*  --  135*   < > 134*   INR  --   --  1.40*  --   --   --   --   --  1.38*  --  1.60*  --  1.78*   < >  --   --   --   --   --    NA  --   --  144  --  138  --  140   < > 141  --   --    < > 143   < > 142   < > 145   < > 144   POTASSIUM  --   --  5.0  --  4.9  --  4.9   < > 4.9  --   --    < > 4.3   < > 3.5   < > 4.5   < > 4.4   CHLORIDE  --   --  102  --  102  --  105   < > 105  --   --    < > 109*   < > 109*   < > 111*   < > 112*   CO2  --   --  24  --  18*  --  17*   < > 18*  --   --    < > 21*   < > 22   < > 22   < > 23   BUN  --   --  145.5*  --  134.8*  --   --   --  120.7*  --   --   --  87.3*  --  54.1*  --   --    < > 43.3*   CR  --   --  3.09*  --  2.90*  --   --   --  2.80*  --   --   --  2.17*  --  1.39*  --   --    < > 1.46*   ANIONGAP  --   --  18*  --  18*  --  18*   < > 18*  --   --    < > 13   < > 11   < > 12   < > 9   IVANNA  --   --  8.0*  --  8.2*  --   --   --  8.5*  --   --   --  8.6*  --  8.7*  --   --    < > 8.6*   * 141* 168*   < > 191*   < >  --    < > 205*   < >  --    < > 190*   < > 156*   < >  --    < > 163*   ALBUMIN  --   --  2.7*  --  2.8*  --   --   --  3.2*  --   --   --  2.9*   < >  --   --   --   --  2.8*   PROTTOTAL  --   --  4.7*  --  4.8*  --   --   --  5.3*  --   --   --  4.8*  --   --   --   --   --  4.7*   BILITOTAL  --   --  0.5  --  0.5  --   --   --  0.5  --   --   --  0.3  --   --   --   --   --  0.3   ALKPHOS  --   --  61  --  61  --   --   --  60  --   --   --  49  --   --   --   --   --  36   ALT  --   --  3,008*  --  3,684*  --   --   --  4,867*  --   --   --  6,573*  --   --   --   --   --  36   AST  --   --  260*  --  360*  --   --   --  910*  --   --   --  5,117*  --   --   --   --   --  24    < > = values in this interval not displayed.

## 2023-08-23 NOTE — PROGRESS NOTES
Patient continued with sedation throughout shift. Attempted to wean Precedex without success as patient having increased discomfort and facial grimacing. Cardizem, Heparin, Sodium Bicarb, Fent running. Michele weaned and eventually turned off-pressures normalized and then became slightly hypertensive with family present in the room. Insulin gtt dc'd. TF decreased with free water flushes increased. Turned and repositioned, oral cares completed. Ponce in place.     Family meeting occurred this afternoon and decision to withdrawal support was made. Patient medicated per orders and extubated without incident. Medicated for comfort per family request. Family at bedside when patient passed away. All belongings except for blanket sent with family which daughter plans to return to  this evening.     [employer] notified of intent to withdrawal life support.    [employer] notified for cremation services. Awaiting return call for when  will be here to  remains.'s office notified of death. [employer] contacted after cardiac death.

## 2023-08-23 NOTE — PROGRESS NOTES
Temp was 102.5 at 0100, cooling measures taken, blanket removed, ice to arm pits, cool towel to head, fan blowing on Pt, Recheck at 0130 was 100.6. Provider updated.   Telemetry continues to show aflutter with a rate in the 90's.   Blood pressures have been stable.   Urine out put has been around 30-40 every two hours.    Will continue with plan of care.

## 2023-08-23 NOTE — PROGRESS NOTES
Hospitalist note    Met with family.  Reviewed diagnoses, current deteriorating status including coma and worsening renal failure, treatment options, and prognosis including what appears to be terminal prognosis.  Discussed withdrawal of current supportive measures.    Family questions and concerns answered and addressed.  Her spouse and daughter Chelo wish to withdraw care focusing purely on comfort measures which appears clinically appropriate and aligned with the patient's previous expressed wishes.    Discussed with nursing.  Anticipate proceeding with withdrawing care today once family is ready.    Total end-of-life care 40 minutes so far this afternoon from 2:30 PM to 3:10 PM    Dat Peters MD

## 2023-08-23 NOTE — PROGRESS NOTES
S-(situation): shift note    B-(background): Pnuemonia, multi-organ failure    A-(assessment): Patient sedated, eyes open with voice, but no response, does not respond to verbal instructions, no focusing of eyes or movement of extremities. No attempts at weaning this shift per MD. Michele-synephrine added for MAP <65. Insulin gtt initiated for tight glycemic control.  HR stable in 90's with cardizem gtt. Extremities swollen and cool to touch. Temp max 100.0 this shift. Tolerating Tube feeds. Zero stool this shift. Urine output slightly improved compared to previous shift. Family at bedside most of day. Vent FIO2 40%.     R-(recommendations): Continue with current cares and monitor labs.  Renny Arechiga RN

## 2023-08-24 LAB
1,3 BETA GLUCAN SER-MCNC: 135 PG/ML
BACTERIA SPT CULT: ABNORMAL
GRAM STAIN RESULT: ABNORMAL
GRAM STAIN RESULT: ABNORMAL
OBSERVATION IMP: POSITIVE
PATH REPORT.ADDENDUM SPEC: ABNORMAL
PATH REPORT.COMMENTS IMP SPEC: ABNORMAL
PATH REPORT.COMMENTS IMP SPEC: ABNORMAL
PATH REPORT.COMMENTS IMP SPEC: YES
PATH REPORT.FINAL DX SPEC: ABNORMAL
PATH REPORT.GROSS SPEC: ABNORMAL
PATH REPORT.MICROSCOPIC SPEC OTHER STN: ABNORMAL
PATH REPORT.RELEVANT HX SPEC: ABNORMAL

## 2023-08-24 NOTE — TELEPHONE ENCOUNTER
Spoke to Destiny, at [employer]. Application is canceled out due to patient passing 8/24/23 8:40am  Ph: [phone]  Last shipment 7/27

## 2023-08-26 LAB
BACTERIA BLD CULT: NO GROWTH

## 2024-06-10 NOTE — PROGRESS NOTES
"Office Visit-Follow up    Chief Complaint: Latanya Padron is a 71 year old female who is being seen for   Chief Complaint   Patient presents with     RECHECK     Right knee degenerative joint disease patellofemoral mild to moderate.  2 patellofemoral syndrome       History of Present Illness:   Patient is here in follow-up steroid injection.  Her last steroid injection was 2/12/2021 by me.  Previous to that she had one on 7/31/2020.  Patient states that this last injection lasted at least 8 months if not longer.  She has noticed that the pain has been getting worse in the right anterior knee as well as now she is feeling it in the left anterior knee.  Patient can feel the pain more with weather changes.    Physical Exam:  Vitals: /66   Ht 1.588 m (5' 2.5\")   Wt 92.8 kg (204 lb 8 oz)   BMI 36.81 kg/m    BMI= Body mass index is 36.81 kg/m .  Constitutional: healthy, alert and no acute distress   Psychiatric: mentation appears normal and affect normal/bright  NEURO: no focal deficits, CMS intact right lower extremity  RESP: Normal with easy respirations and no use of accessory muscles to breathe, no audible wheezing or retractions  CV: Calf soft and nontender to palpation, leg warm   SKIN: No erythema, rashes, excoriation, or breakdown. No evidence of infection.   MUSCULOSKELETAL:    INSPECTION of right knee: No gross deformities, erythema, edema, ecchymosis, atrophy or fasciculations.     PALPATION: No tenderness on palpation of the medial, lateral, anterior and posterior portion of the knee. No specific joint line tenderness. No increased warmth.  No effusion.     ROM: Able to flex and extend without any catching or locking or pain.    STRENGTH: Able to get on the exam table and off without any problems and able to ambulate without any problems.    SPECIAL TEST: None today.   GAIT: non-antalgic  Lymph: no palpable lymph nodes    Diagnostic Modalities:  X-rays done today AP lateral and [place] and sunrise " view all bilateral.  These x-rays show space well preserved the medial and lateral compartment but in the patellofemoral from the osteoarthritis is moderate.  The left is greater than the right.  These x-rays are compared to the x-rays that were done on 2/12/2021 and there is progression of the patellofemoral degenerative joint disease.  No other fracture dislocation or tumor.    Independent visualization of the images was performed.     Impression: 1.  Right knee degenerative joint disease, patellofemoral, mild to moderate.  2.  Right knee patellofemoral syndrome    Plan:    Large Joint Injection/Arthocentesis: R knee joint    Date/Time: 3/23/2022 9:32 AM  Performed by: Esteban Taylor PA-C  Authorized by: Esteban Taylor PA-C     Indications:  Pain  Needle Size:  22 G  Guidance: landmark guided    Approach:  Anterolateral  Location:  Knee      Medications:  80 mg triamcinolone 40 MG/ML; 3 mL bupivacaine 0.5 %  Aspirate amount (mL):  0  Procedure discussed: discussed risks, benefits, and alternatives    Consent Given by:  Patient  Timeout: timeout called immediately prior to procedure    Prep: patient was prepped and draped in usual sterile fashion     The skin was prepped with betadine. The patient was in a seated position. I used tiffany chloride spray prior to doing the injection.  The patient tolerated the injection well, and there were no complications. The injection site was covered with a Band-Aid.           FOCUSED PLAN:    This patient is not able to take NSAIDs for 2 reasons 1 her kidney function is not good with a GFR of 56 which has been lower in the past and secondarily she is now on Coumadin for atrial fibrillation.  She has tried hinged brace in the past without benefit.  In the past we have suggested formal physical therapy and she wanted to hold off on that.  She is very happy with the steroid injections.  We did talk about gel injections but we know she is doing so well with the steroid injections we  might will continue with them.  We discussed straight leg raising again and she will continue to work on that.  Follow-up on an as-needed basis    Re-x-ray on return: No      This note was dictated with [employer].    Esteban Taylor PA-C     (1) 13 years and above

## 2025-07-17 NOTE — PROGRESS NOTES
ANTICOAGULATION FOLLOW-UP CLINIC VISIT    Patient Name:  Latanya Padron  Date:  11/21/2018  Contact Type:  Face to Face    SUBJECTIVE:     Patient Findings     Positives No Problem Findings           OBJECTIVE    INR Protime   Date Value Ref Range Status   11/21/2018 3.0 (A) 0.86 - 1.14 Final       ASSESSMENT / PLAN  INR assessment THER    Recheck INR In: 6 WEEKS    INR Location Clinic      Anticoagulation Summary as of 11/21/2018     INR goal 2.0-3.0   Today's INR 3.0   Warfarin maintenance plan 5 mg (5 mg x 1) on Mon; 2.5 mg (5 mg x 0.5) all other days   Full warfarin instructions 5 mg on Mon; 2.5 mg all other days   Weekly warfarin total 20 mg   No change documented Francine Andrew RN   Plan last modified Francine Andrew RN (7/26/2017)   Next INR check 1/2/2019   Target end date     Indications   Long-term (current) use of anticoagulants [Z79.01] [Z79.01]  Atrial fibrillation (H) [I48.91]         Anticoagulation Episode Summary     INR check location     Preferred lab     Send INR reminders to [facility] POOL    Comments 5 mg tabs, likes card, PM dose      Anticoagulation Care Providers     Provider Role Specialty Phone number    Glen Blue MD [facility] [phone]            See the Encounter Report to view Anticoagulation Flowsheet and Dosing Calendar (Go to Encounters tab in chart review, and find the Anticoagulation Therapy Visit)    Dosage adjustment made based on physician directed care plan.      Francine Andrew RN                Post Anesthesia Care    No driving for 24 hours after anesthesia   No driving if you will take prescription pain medications after arrival at home  Ambulate with assistance until tomorrow, and as needed for feelings of instability  It is recommended that a responsible adult stay with you for first 24 hours after anesthesia  Drink plenty of fluids  Sore throat is common after surgery: cough drops, cold liquids, or gargling warm salt water can help alleviate discomfort  Rest today, you may ease into your usual daily activities starting tomorrow  Resume your normal diet, avoid greasy and spicy foods today

## (undated) DEVICE — GLOVE ESTEEM POWDER FREE 5.5  2D72PL55

## (undated) DEVICE — ESU PENCIL SMOKE EVAC W/ROCKER SWITCH 0703-047-000

## (undated) DEVICE — DRSG KERLIX FLUFFS X5

## (undated) DEVICE — ADH LIQUID MASTISOL TOPICAL VIAL 2-3ML 0523-48

## (undated) DEVICE — DRSG TEGADERM 2 3/8X2 3/4" 1624W

## (undated) DEVICE — CLIP APPLIER 11.5" PREMIUM II 134053

## (undated) DEVICE — DRSG BIOPATCH GERMICIDAL SPLIT SPONGE 4MM MED 4150

## (undated) DEVICE — SYR 10ML PREFILLED 0.9% NACL INJ NOT STERILE 306500

## (undated) DEVICE — SYR BULB IRRIG DOVER 60 ML LATEX FREE 67000

## (undated) DEVICE — DRAPE SHEET REV FOLD 3/4 9349

## (undated) DEVICE — MARKER MARGIN MARKER STD 6 COLOR SGL USE MMS6

## (undated) DEVICE — GLOVE PROTEXIS W/NEU-THERA 5.5  2D73TE55

## (undated) DEVICE — BLADE KNIFE SURG 15 371115

## (undated) DEVICE — SPONGE LAP 18X18" X8435

## (undated) DEVICE — SPONGE LAP 18X18" 1515

## (undated) DEVICE — GLOVE ESTEEM BLUE W/NEU-THERA 6.0  2D73PB60

## (undated) DEVICE — BLADE KNIFE SURG 10 371110

## (undated) DEVICE — DRSG ABDOMINAL 07 1/2X8" 7197D

## (undated) DEVICE — COVER TRANSDUCER PROBE 7X24" 610-575

## (undated) DEVICE — STPL SKIN SUBCUTICULAR INSORB 2025

## (undated) DEVICE — DRSG STERI STRIP 1/2X4" R1547

## (undated) DEVICE — BNDG KLING 3" 2232

## (undated) DEVICE — ESU ELEC BLADE 2.75" COATED/INSULATED E1455

## (undated) DEVICE — SOL WATER IRRIG 1000ML BOTTLE 07139-09

## (undated) DEVICE — DRSG GAUZE 4X4" TRAY

## (undated) DEVICE — SOL WATER IRRIG 1000ML BOTTLE 2F7114

## (undated) DEVICE — SOL NACL 0.9% IRRIG 1000ML BOTTLE 07138-09

## (undated) DEVICE — GLOVE PROTEXIS BLUE W/NEU-THERA 6.5  2D73EB65

## (undated) DEVICE — DRAPE BREAST/CHEST 29420

## (undated) DEVICE — PACK MINOR PROCEDURE CUSTOM

## (undated) RX ORDER — LIDOCAINE HYDROCHLORIDE 20 MG/ML
INJECTION, SOLUTION EPIDURAL; INFILTRATION; INTRACAUDAL; PERINEURAL
Status: DISPENSED
Start: 2021-03-04

## (undated) RX ORDER — PROPOFOL 10 MG/ML
INJECTION, EMULSION INTRAVENOUS
Status: DISPENSED
Start: 2021-03-04

## (undated) RX ORDER — BUPIVACAINE HYDROCHLORIDE 2.5 MG/ML
INJECTION, SOLUTION EPIDURAL; INFILTRATION; INTRACAUDAL
Status: DISPENSED
Start: 2019-05-15

## (undated) RX ORDER — DEXAMETHASONE SODIUM PHOSPHATE 10 MG/ML
INJECTION, SOLUTION INTRAMUSCULAR; INTRAVENOUS
Status: DISPENSED
Start: 2019-05-15

## (undated) RX ORDER — LIDOCAINE HYDROCHLORIDE AND EPINEPHRINE 10; 10 MG/ML; UG/ML
INJECTION, SOLUTION INFILTRATION; PERINEURAL
Status: DISPENSED
Start: 2019-05-15

## (undated) RX ORDER — FENTANYL CITRATE 50 UG/ML
INJECTION, SOLUTION INTRAMUSCULAR; INTRAVENOUS
Status: DISPENSED
Start: 2019-05-15

## (undated) RX ORDER — LIDOCAINE HYDROCHLORIDE 20 MG/ML
INJECTION, SOLUTION EPIDURAL; INFILTRATION; INTRACAUDAL; PERINEURAL
Status: DISPENSED
Start: 2019-05-15

## (undated) RX ORDER — ONDANSETRON 2 MG/ML
INJECTION INTRAMUSCULAR; INTRAVENOUS
Status: DISPENSED
Start: 2019-05-15

## (undated) RX ORDER — PROPOFOL 10 MG/ML
INJECTION, EMULSION INTRAVENOUS
Status: DISPENSED
Start: 2019-05-15

## (undated) RX ORDER — DIMENHYDRINATE 50 MG/ML
INJECTION, SOLUTION INTRAMUSCULAR; INTRAVENOUS
Status: DISPENSED
Start: 2019-05-15

## (undated) RX ORDER — HYDROMORPHONE HYDROCHLORIDE 1 MG/ML
INJECTION, SOLUTION INTRAMUSCULAR; INTRAVENOUS; SUBCUTANEOUS
Status: DISPENSED
Start: 2019-05-15